# Patient Record
Sex: FEMALE | Race: BLACK OR AFRICAN AMERICAN | NOT HISPANIC OR LATINO | Employment: OTHER | URBAN - METROPOLITAN AREA
[De-identification: names, ages, dates, MRNs, and addresses within clinical notes are randomized per-mention and may not be internally consistent; named-entity substitution may affect disease eponyms.]

---

## 2017-01-13 ENCOUNTER — TELEPHONE (OUTPATIENT)
Dept: INTERNAL MEDICINE | Facility: CLINIC | Age: 72
End: 2017-01-13

## 2017-01-13 NOTE — TELEPHONE ENCOUNTER
----- Message from Brittney Niño sent at 1/13/2017  2:13 PM EST -----  Contact: CHIDI EPPERSON WITH [facility] IN HOME CARE MANAGER  SHE IS CALLING TO INFORM US THAT WHILE PATIENT WAS HAVING DIALYSIS DONE SHE WAS GIVEN 2 MEDICATIONS THAT SHE HAD ALLERGIC REACTION. THE 2 MEDICATIONS ARE MIRCERA AND VENOFER. WHILE GETTING THESE MEDICATION DURING DIALYSIS PATIENT STOPPED BREATHING AND WAS TAKEN TO [facility] FOR TREATMENT. PATIENT IS BETTER NOW BUT SHE WANTED THIS TO BE NOTED IN HER CHART ABOUT HER ALLERGIC REACTION TO THOSE MEDICATIONS. IF YOU NEED TO SPEAK TO CHIDI YOU CAN REACH HER [phone]

## 2017-01-13 NOTE — TELEPHONE ENCOUNTER
Pt states that while at dialysis yesterday that she had an anaphylaxis reaction to the medication that was given with her Iron.  This is the second time this has happened, and was taken to [facility] by EMS for treatment.  Pt is going to call and find out name of medication she was given and call us back.

## 2017-01-16 ENCOUNTER — DOCUMENTATION (OUTPATIENT)
Dept: CARDIAC REHAB | Facility: HOSPITAL | Age: 72
End: 2017-01-16

## 2017-01-16 NOTE — PROGRESS NOTES
Pt. Was scheduled for Phase II Cardiac Rehab at [facility] on 10/26/16.  Pt. Failed to show for scheduled appointment.  We are closing her file at this time.

## 2017-01-25 ENCOUNTER — TELEPHONE (OUTPATIENT)
Dept: CARDIOLOGY | Facility: CLINIC | Age: 72
End: 2017-01-25

## 2017-01-25 RX ORDER — LANCING DEVICE/LANCETS
KIT MISCELLANEOUS
Qty: 1 KIT | Refills: 0 | Status: SHIPPED | OUTPATIENT
Start: 2017-01-25 | End: 2020-07-21

## 2017-01-25 RX ORDER — LANCETS
EACH MISCELLANEOUS
Qty: 300 EACH | Refills: 3 | Status: SHIPPED | OUTPATIENT
Start: 2017-01-25 | End: 2017-02-14

## 2017-01-25 RX ORDER — BLOOD-GLUCOSE METER
EACH MISCELLANEOUS
Qty: 1 KIT | Refills: 0 | Status: SHIPPED | OUTPATIENT
Start: 2017-01-25 | End: 2020-07-21

## 2017-01-25 NOTE — TELEPHONE ENCOUNTER
Michelle, RN with [facility], has called requesting to move pt appt up due to night time tachycardia.  The patient is currently scheduled for two weeks from now, so I offered to place her on the cancellation list.  She verbalized agreement and appreciation.  VM left with scheduling.

## 2017-02-14 ENCOUNTER — OFFICE VISIT (OUTPATIENT)
Dept: CARDIOLOGY | Facility: CLINIC | Age: 72
End: 2017-02-14

## 2017-02-14 VITALS
HEART RATE: 78 BPM | DIASTOLIC BLOOD PRESSURE: 66 MMHG | WEIGHT: 195.2 LBS | HEIGHT: 67 IN | BODY MASS INDEX: 30.64 KG/M2 | SYSTOLIC BLOOD PRESSURE: 200 MMHG

## 2017-02-14 DIAGNOSIS — I10 ESSENTIAL HYPERTENSION: Primary | ICD-10-CM

## 2017-02-14 DIAGNOSIS — I25.9 ISCHEMIC HEART DISEASE: ICD-10-CM

## 2017-02-14 DIAGNOSIS — N18.6 TYPE 2 DIABETES MELLITUS WITH CHRONIC KIDNEY DISEASE ON CHRONIC DIALYSIS, WITH LONG-TERM CURRENT USE OF INSULIN (HCC): ICD-10-CM

## 2017-02-14 DIAGNOSIS — E78.5 HYPERLIPIDEMIA LDL GOAL <70: ICD-10-CM

## 2017-02-14 DIAGNOSIS — N18.6 END STAGE RENAL DISEASE ON DIALYSIS (HCC): ICD-10-CM

## 2017-02-14 DIAGNOSIS — I25.119 CORONARY ARTERY DISEASE WITH ANGINA PECTORIS, UNSPECIFIED VESSEL OR LESION TYPE, UNSPECIFIED WHETHER NATIVE OR TRANSPLANTED HEART (HCC): ICD-10-CM

## 2017-02-14 DIAGNOSIS — E11.22 TYPE 2 DIABETES MELLITUS WITH CHRONIC KIDNEY DISEASE ON CHRONIC DIALYSIS, WITH LONG-TERM CURRENT USE OF INSULIN (HCC): ICD-10-CM

## 2017-02-14 DIAGNOSIS — I35.0 NONRHEUMATIC AORTIC VALVE STENOSIS: ICD-10-CM

## 2017-02-14 DIAGNOSIS — Z79.4 TYPE 2 DIABETES MELLITUS WITH CHRONIC KIDNEY DISEASE ON CHRONIC DIALYSIS, WITH LONG-TERM CURRENT USE OF INSULIN (HCC): ICD-10-CM

## 2017-02-14 DIAGNOSIS — Z99.2 END STAGE RENAL DISEASE ON DIALYSIS (HCC): ICD-10-CM

## 2017-02-14 DIAGNOSIS — Z99.2 TYPE 2 DIABETES MELLITUS WITH CHRONIC KIDNEY DISEASE ON CHRONIC DIALYSIS, WITH LONG-TERM CURRENT USE OF INSULIN (HCC): ICD-10-CM

## 2017-02-14 PROCEDURE — 99213 OFFICE O/P EST LOW 20 MIN: CPT | Performed by: INTERNAL MEDICINE

## 2017-02-14 RX ORDER — LACTULOSE 10 G/15ML
20 SOLUTION ORAL 2 TIMES DAILY PRN
COMMUNITY
End: 2017-08-09

## 2017-02-14 RX ORDER — DOCUSATE SODIUM 100 MG/1
100 CAPSULE, LIQUID FILLED ORAL 2 TIMES DAILY
COMMUNITY
End: 2017-08-09

## 2017-02-14 RX ORDER — LISINOPRIL 20 MG/1
20 TABLET ORAL DAILY
Qty: 30 TABLET | Refills: 5 | Status: SHIPPED | OUTPATIENT
Start: 2017-02-14 | End: 2017-08-09

## 2017-02-14 RX ORDER — CARVEDILOL 25 MG/1
25 TABLET ORAL EVERY 12 HOURS SCHEDULED
Qty: 60 TABLET | Refills: 5 | Status: SHIPPED | OUTPATIENT
Start: 2017-02-14 | End: 2019-10-25 | Stop reason: SDUPTHER

## 2017-02-14 NOTE — PROGRESS NOTES
Subjective:     Encounter Date:02/14/2017      Patient ID: Esperanza Pop is a 72 y.o.   female, full-time domestic worker, from [place], [place].     INTERNIST: Meghana Rodgers MD  REFERRING HEALTHCARE PROVIDER: [facility]   INTERVENTIONAL CARDIOLOGIST: Hugh Pop MD, [facility], [facility]  ADVANCED HEART FAILURE CLINIC: [facility]  NEPHROLOGIST: [facility]  INTERVENTIONAL CARDIOLOGIST:  Scooter Zhao MD, [facility]     Chief Complaint:   Chief Complaint   Patient presents with   • Follow-up     Problem List:  1. Ischemic heart disease:  a. Acute non-STEMI/congestive heart failure with diagnostic coronary arteriography demonstrating 20% LAD plaque with high-grade stenosis in the proximal mid right coronary artery requiring a Xience drug-eluting stent (3.0 mm x 28 mm) with reduced echocardiographic LVEF (0.25) with abnormal diastolic LV dysfunction, December 2013.   b. Improved echocardiogram, April 2014.  c. Recent non-STEMI related to malignant hypertension with distal RCA occlusion, patent previous RCA stent with mild inferior hypokinesis but overall acceptable systolic LV function (LVEF 0.55) with PTCA, DARRYL distal RCA, October 2016.  d. Residual CCS class I angina pectoris/NYHA class II CHF.  2. Mild aortic stenosis (June 2016).  3. Severe hypertension - probably essential.   4. Dyslipidemia.  5. Type 2 diabetes mellitus type 2 with suboptimal control (hemoglobin A1c 8.8%).  6. Moderate obesity (BMI 36.2).  7. Acute kidney injury: Admission from 12/26/2013 to 01/02/2014, now resolved with latest creatinine 1.4 on 01/08/2014.  8. Moderate normocytic normochromic anemia; hemoglobin 9.2 gm/dL (June 2016).  9. Noncompliance.  10. Recent apparent end-stage renal disease with initiation of hemodialysis MWF weekly and construction of right upper extremity AV fistula; data deficit (June 2016).  11. Remote operations:  a. 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   b. Abdominal hernia repair/panniculectomy.  c. Hysterectomy.     Allergies   Allergen Reactions   • Albuterol      Increased blood pressure  Used right before heart attack   • Mircera [Methoxy Polyethylene Glycol-Epoetin Beta]      Pt stopped breathing   • Penicillins Hives   • Prednisone      Makes jittery/anxious   • Venofer [Iron Sucrose]      Pt stopped breathing         Current Outpatient Prescriptions:   •  amLODIPine (NORVASC) 10 MG tablet, Take 1 tablet by mouth Daily., Disp: 30 tablet, Rfl: 3  •  aspirin 81 MG EC tablet, Take 1 tablet by mouth Daily., Disp: , Rfl:   •  atorvastatin (LIPITOR) 80 MG tablet, Take 1 tablet by mouth Every Night., Disp: 90 tablet, Rfl: 1  •  B Complex-C-Folic Acid (ELVIRA-BO PO), Take  by mouth Daily., Disp: , Rfl:   •  Blood Glucose Monitoring Suppl (ACCU-CHEK NADER PLUS) W/DEVICE kit, DX: E11.65, Disp: 1 kit, Rfl: 0  •  carvedilol (COREG) 12.5 MG tablet, Take 1 tablet by mouth Every 12 (Twelve) Hours. Disp: 30 tablet, Rfl: 3  •  cholecalciferol (VITAMIN D3) 1000 UNITS tablet, Take 2,000 Units by mouth daily., Disp: , Rfl:   •  clopidogrel (PLAVIX) 75 MG tablet, Take 1 tablet by mouth Daily., Disp: 30 tablet, Rfl: 3  •  docusate sodium (COLACE) 100 MG capsule, Take 100 mg by mouth 2 (Two) Times a Day., Disp: , Rfl:   •  glucose blood (ACCU-CHEK NADER PLUS) test strip, Use as instructed 1 TO 3 TIMES DAILY  DX: E11.65, Disp: 300 each, Rfl: 3  •  hydrALAZINE (APRESOLINE) 50 MG tablet, Take 1 tablet by mouth 3 (Three) Times a Day., Disp: , Rfl:   •  insulin glargine (LANTUS) 100 UNIT/ML injection, Inject 20 Units under the skin Every Night. (Patient taking differently: Inject 20 Units under the skin 2 (Two) Times a Day As Needed.), Disp: 10 mL, Rfl: 5  •  insulin NPH (NOVOLIN N) 100 UNIT/ML injection, Inject 6 Units under the skin 2 (Two) Times a Day Before Meals. (Patient taking differently: Inject 6 Units under the skin 3 (Three) Times a Day.), Disp: 3 mL, Rfl:  "12  •  IRON DEXTRAN IJ, Inject  as directed 3 (Three) Times a Week., Disp: , Rfl:   •  isosorbide mononitrate (IMDUR) 120 MG 24 hr tablet, Take 1 tablet by mouth daily., Disp: 90 tablet, Rfl: 2  •  lactulose (CHRONULAC) 10 GM/15ML solution, Take 20 g by mouth 2 (Two) Times a Day As Needed., Disp: , Rfl:   •  Lancets Misc. (ACCU-CHEK SOFTCLIX LANCET DEV) kit, TEST 1-3 TIMES DAILY DX:E11.65, Disp: 1 kit, Rfl: 0  •  lisinopril (PRINIVIL,ZESTRIL) 2.5 MG tablet, Take 1 tablet by mouth Daily. (Patient taking differently: Take 5 mg by mouth Daily.), Disp: 30 tablet, Rfl: 3  •  nitroglycerin (NITROSTAT) 0.4 MG SL tablet, Place 0.4 mg under the tongue every 5 (five) minutes as needed. Dissolve one tablet under the tongue every 5 minutes as needed for chest pain , Disp: , Rfl:   •  olopatadine (PATANOL) 0.1 % ophthalmic solution, Apply 1 drop to eye 2 (two) times a day as needed. At 6-8 intervals, Disp: , Rfl:   •  torsemide (DEMADEX) 100 MG tablet, Take 0.5 tablets by mouth Daily As Needed. Pt takes 1 tablet 4 days a week and 0.5 tab;et 3 days a week, Disp: , Rfl:     History of Present Illness Patient returns for followup after a 6-month hiatus. She said that she \"wound up in the emergency room at [place] on [birth year],\" her birthday.  She notes that she had reactions during dialysis to Mircera and Venofer and stopped breathing.  She says that what is bothering her today are her palpitations, which wake her up at night. She says that she had an incident of swelling in her feet with taking 25 mg of Coreg, and her physician told her to only take 12.5 mg, but she asks if she should be taking the full 25 mg tablet. Patient otherwise denies chest pain, shortness of breath, PND, edema, palpitations, syncope or presyncope at this time.  She continues to apparently have very labile blood pressure readings when she undergoes dialysis, which is scheduled again in AM.  She can give no coherent history in terms of what happened at [facility] a month ago, but apparently there were no major changes in her medications or requirement for hospitalization or significant testing.  No tobacco or NTG use.      Review of Systems   Cardiovascular: Positive for irregular heartbeat and palpitations.   Skin: Positive for dry skin.      Obtained and otherwise negative except as outlined in problem list and HPI.    Procedures       Objective:       Vitals:    02/14/17 1306 02/14/17 1307 02/14/17 1335   BP: (!) 200/60 (!) 210/64 (!) 200/66   BP Location: Left arm Left arm Left arm   Patient Position: Sitting Standing Sitting   Pulse: 78     Weight: 195 lb 3.2 oz (88.5 kg)     Height: 67\" (170.2 cm)       Body mass index is 30.57 kg/(m^2).   Last weight: 192 lbs.    Physical Exam   Constitutional: She appears well-developed and well-nourished.   HENT:   Head: Normocephalic and atraumatic.   Mouth/Throat: Oropharynx is clear and moist.   Neck: Neck supple. No JVD present. Carotid bruit is not present. No thyromegaly present.   Cardiovascular: Normal rate and regular rhythm.  Exam reveals no gallop, no S3 and no friction rub.    Murmur heard.   Medium-pitched harsh early systolic murmur is present with a grade of 3/6  at the upper right sternal border radiating to the neck  Pulses:       Carotid pulses are 1+ on the right side, and 1+ on the left side.       Radial pulses are 1+ on the right side, and 1+ on the left side.        Femoral pulses are 1+ on the right side, and 1+ on the left side.       Popliteal pulses are 1+ on the right side, and 1+ on the left side.        Dorsalis pedis pulses are 1+ on the right side, and 1+ on the left side.        Posterior tibial pulses are 1+ on the right side, and 1+ on the left side.   Pulmonary/Chest: Effort normal and breath sounds normal.   Abdominal: Soft. She exhibits no mass. There is no hepatosplenomegaly. There is no tenderness.   Lymphadenopathy:     She has no cervical adenopathy. "   Neurological: She is alert. She has normal strength. No cranial nerve deficit or sensory deficit.   Skin: Skin is warm, dry and intact.       Lab Review:   Lab Results   Component Value Date    GLUCOSE 118 (H) 10/11/2016    BUN 60 (H) 10/11/2016    CREATININE 6.60 (H) 10/11/2016    EGFRIFAFRI 8 (L) 10/11/2016    BCR 9.1 10/11/2016    CO2 26.0 10/11/2016    CALCIUM 8.8 10/11/2016    PROTENTOTREF 6.1 04/29/2015    ALBUMIN 4.30 10/08/2016    LABIL2 1.0 10/08/2016    AST 20 10/08/2016    ALT 11 10/08/2016       Lab Results   Component Value Date    WBC 6.33 10/11/2016    HGB 7.3 (L) 10/11/2016    HCT 23.0 (L) 10/11/2016    MCV 91.6 10/11/2016     10/11/2016       Lab Results   Component Value Date    HGBA1C 7.60 (H) 10/11/2016       Lab Results   Component Value Date    TSH 1.962 06/30/2016       Lab Results   Component Value Date    CHOL 261 (H) 10/11/2016    CHOL 293 (H) 10/10/2016     Lab Results   Component Value Date    TRIG 171 (H) 10/11/2016    TRIG 94 10/10/2016    TRIG 216 (H) 07/29/2015     Lab Results   Component Value Date    HDL 46 10/11/2016    HDL 54 10/10/2016    HDL 57 07/29/2015         Assessment:   Overall continued acceptable course with no interim cardiopulmonary complaints with suboptimal functional status and uncontrolled hypertension. We will defer additional diagnostic or therapeutic intervention from a cardiac perspective at this time other than to attempt to modify her medications; hopefully, she is being compliant.       Diagnosis Plan   1. Essential hypertension  lisinopril (PRINIVIL,ZESTRIL) 20 MG tablet    carvedilol (COREG) 25 MG tablet   2. Coronary artery disease with angina pectoris, unspecified vessel or lesion type, unspecified whether native or transplanted heart  lisinopril (PRINIVIL,ZESTRIL) 20 MG tablet    carvedilol (COREG) 25 MG tablet   3. Ischemic heart disease     4. Nonrheumatic aortic valve stenosis     5. Hyperlipidemia LDL goal <70     6. Type 2 diabetes  mellitus with chronic kidney disease on chronic dialysis, with long-term current use of insulin     7. End stage renal disease on dialysis            Plan:         1. Patient to continue current medications and close follow up with the above providers with the following changes:  A. Alter lisinopril to 20 mg daily.  B. Alter carvedilol to 25 mg bid.  2. The patient will follow up with [facility] and the dialysis clinic on 20 February 2017; it is imperative in my opinion to keep her systolic blood pressure long term closer to 140 torr.  3. Tentative cardiology follow up in June 2017, or patient may return sooner PRN.       Transcribed by Mary Rivera for Dr. Demetrius Sagastume at 1:34 PM on 02/14/2017    I, Demetrius Sagastume MD, [facility], personally performed the services described in this documentation as scribed by the above named individual in my presence, and it is both accurate and complete. At 1:34 PM on 02/14/2017

## 2017-03-01 ENCOUNTER — RESULTS ENCOUNTER (OUTPATIENT)
Dept: INTERNAL MEDICINE | Facility: CLINIC | Age: 72
End: 2017-03-01

## 2017-03-01 DIAGNOSIS — Z99.2 END STAGE RENAL DISEASE ON DIALYSIS (HCC): ICD-10-CM

## 2017-03-01 DIAGNOSIS — I10 ESSENTIAL HYPERTENSION: ICD-10-CM

## 2017-03-01 DIAGNOSIS — E78.5 HYPERLIPIDEMIA LDL GOAL <70: ICD-10-CM

## 2017-03-01 DIAGNOSIS — I50.43 ACUTE ON CHRONIC COMBINED SYSTOLIC AND DIASTOLIC CONGESTIVE HEART FAILURE (HCC): ICD-10-CM

## 2017-03-01 DIAGNOSIS — N18.6 END STAGE RENAL DISEASE ON DIALYSIS (HCC): ICD-10-CM

## 2017-03-21 ENCOUNTER — OFFICE VISIT (OUTPATIENT)
Dept: INTERNAL MEDICINE | Facility: CLINIC | Age: 72
End: 2017-03-21

## 2017-03-21 VITALS
DIASTOLIC BLOOD PRESSURE: 62 MMHG | OXYGEN SATURATION: 97 % | HEIGHT: 67 IN | SYSTOLIC BLOOD PRESSURE: 130 MMHG | BODY MASS INDEX: 30.2 KG/M2 | WEIGHT: 192.4 LBS | HEART RATE: 72 BPM

## 2017-03-21 DIAGNOSIS — N18.6 TYPE 2 DIABETES MELLITUS WITH CHRONIC KIDNEY DISEASE ON CHRONIC DIALYSIS, WITH LONG-TERM CURRENT USE OF INSULIN (HCC): Primary | ICD-10-CM

## 2017-03-21 DIAGNOSIS — Z79.4 TYPE 2 DIABETES MELLITUS WITH CHRONIC KIDNEY DISEASE ON CHRONIC DIALYSIS, WITH LONG-TERM CURRENT USE OF INSULIN (HCC): Primary | ICD-10-CM

## 2017-03-21 DIAGNOSIS — E11.22 TYPE 2 DIABETES MELLITUS WITH CHRONIC KIDNEY DISEASE ON CHRONIC DIALYSIS, WITH LONG-TERM CURRENT USE OF INSULIN (HCC): Primary | ICD-10-CM

## 2017-03-21 DIAGNOSIS — Z99.2 TYPE 2 DIABETES MELLITUS WITH CHRONIC KIDNEY DISEASE ON CHRONIC DIALYSIS, WITH LONG-TERM CURRENT USE OF INSULIN (HCC): Primary | ICD-10-CM

## 2017-03-21 LAB — HBA1C MFR BLD: 7.8 %

## 2017-03-21 PROCEDURE — 83036 HEMOGLOBIN GLYCOSYLATED A1C: CPT | Performed by: INTERNAL MEDICINE

## 2017-03-21 PROCEDURE — 99214 OFFICE O/P EST MOD 30 MIN: CPT | Performed by: INTERNAL MEDICINE

## 2017-03-21 RX ORDER — LISINOPRIL 5 MG/1
1 TABLET ORAL DAILY
COMMUNITY
Start: 2017-03-16 | End: 2017-08-09 | Stop reason: SDUPTHER

## 2017-03-21 RX ORDER — DORZOLAMIDE HYDROCHLORIDE AND TIMOLOL MALEATE 20; 5 MG/ML; MG/ML
1 SOLUTION/ DROPS OPHTHALMIC DAILY
COMMUNITY
Start: 2017-01-04 | End: 2019-10-25 | Stop reason: SDUPTHER

## 2017-03-21 RX ORDER — LATANOPROST 50 UG/ML
1 SOLUTION/ DROPS OPHTHALMIC NIGHTLY
Status: ON HOLD | COMMUNITY
Start: 2017-01-04 | End: 2021-01-20

## 2017-03-21 NOTE — PROGRESS NOTES
Hypertension and Diabetes    Subjective   Esperanza Pop is a 72 y.o. female is here today for follow-up.    History of Present Illness   Esperanza reports that she has  Been to [place] after they gave her a couple of meds- venofer and ? Epo/ mircera, and she had passed out.  Feels very dry and is concerned. Her BP was elevated when she was at Dr. Sagastume's , and she was advised to keep her systolic at 140.  Here for f/u on her DM, insulin changed to NPH as others were not covered, reports she stopped the lantus when she picked this one up per pharm recs.    Current Outpatient Prescriptions:   •  amLODIPine (NORVASC) 10 MG tablet, Take 1 tablet by mouth Daily., Disp: 30 tablet, Rfl: 3  •  aspirin 81 MG EC tablet, Take 1 tablet by mouth Daily., Disp: , Rfl:   •  atorvastatin (LIPITOR) 80 MG tablet, Take 1 tablet by mouth Every Night., Disp: 90 tablet, Rfl: 1  •  B Complex-C-Folic Acid (ELVIRA-BO PO), Take  by mouth Daily., Disp: , Rfl:   •  Blood Glucose Monitoring Suppl (ACCU-CHEK NADER PLUS) W/DEVICE kit, DX: E11.65, Disp: 1 kit, Rfl: 0  •  carvedilol (COREG) 25 MG tablet, Take 1 tablet by mouth Every 12 (Twelve) Hours., Disp: 60 tablet, Rfl: 5  •  cholecalciferol (VITAMIN D3) 1000 UNITS tablet, Take 2,000 Units by mouth daily., Disp: , Rfl:   •  clopidogrel (PLAVIX) 75 MG tablet, Take 1 tablet by mouth Daily., Disp: 30 tablet, Rfl: 3  •  docusate sodium (COLACE) 100 MG capsule, Take 100 mg by mouth 2 (Two) Times a Day., Disp: , Rfl:   •  dorzolamide-timolol (COSOPT) 22.3-6.8 MG/ML ophthalmic solution, , Disp: , Rfl:   •  glucose blood (ACCU-CHEK NADER PLUS) test strip, Use as instructed 1 TO 3 TIMES DAILY  DX: E11.65, Disp: 300 each, Rfl: 3  •  hydrALAZINE (APRESOLINE) 50 MG tablet, Take 1 tablet by mouth 3 (Three) Times a Day., Disp: , Rfl:   •  IRON DEXTRAN IJ, Inject  as directed 3 (Three) Times a Week., Disp: , Rfl:   •  isosorbide mononitrate (IMDUR) 120 MG 24 hr tablet, Take 1 tablet by mouth daily., Disp: 90 tablet,  Rfl: 2  •  lactulose (CHRONULAC) 10 GM/15ML solution, Take 20 g by mouth 2 (Two) Times a Day As Needed., Disp: , Rfl:   •  Lancets Misc. (ACCU-CHEK SOFTCLIX LANCET DEV) kit, TEST 1-3 TIMES DAILY DX:E11.65, Disp: 1 kit, Rfl: 0  •  latanoprost (XALATAN) 0.005 % ophthalmic solution, , Disp: , Rfl:   •  lisinopril (PRINIVIL,ZESTRIL) 5 MG tablet, Take 1 tablet by mouth Daily., Disp: , Rfl:   •  nitroglycerin (NITROSTAT) 0.4 MG SL tablet, Place 0.4 mg under the tongue every 5 (five) minutes as needed. Dissolve one tablet under the tongue every 5 minutes as needed for chest pain , Disp: , Rfl:   •  olopatadine (PATANOL) 0.1 % ophthalmic solution, Apply 1 drop to eye 2 (two) times a day as needed. At 6-8 intervals, Disp: , Rfl:   •  torsemide (DEMADEX) 100 MG tablet, Take 0.5 tablets by mouth Daily As Needed. Pt takes 1 tablet 4 days a week and 0.5 tab;et 3 days a week, Disp: , Rfl:   •  insulin NPH-insulin regular (novoLIN 70/30) (70-30) 100 UNIT/ML injection, Inject 12 units before dinner and 8 Units before breakfast., Disp: 10 mL, Rfl: 5  •  lisinopril (PRINIVIL,ZESTRIL) 20 MG tablet, Take 1 tablet by mouth Daily., Disp: 30 tablet, Rfl: 5      The following portions of the patient's history were reviewed and updated as appropriate: allergies, current medications, past family history, past medical history, past social history, past surgical history and problem list.    Review of Systems   Constitutional: Positive for fatigue. Negative for chills and fever.   HENT: Negative for ear discharge, ear pain, sinus pressure and sore throat.    Respiratory: Negative for cough, chest tightness and shortness of breath.    Cardiovascular: Negative for chest pain, palpitations and leg swelling.   Gastrointestinal: Negative for diarrhea, nausea and vomiting.   Musculoskeletal: Positive for arthralgias. Negative for back pain and myalgias.   Skin:        dry   Neurological: Negative for dizziness, syncope and headaches.  "  Psychiatric/Behavioral: Negative for confusion and sleep disturbance.       Objective   Visit Vitals   • /62   • Pulse 72   • Ht 67\" (170.2 cm)   • Wt 192 lb 6.4 oz (87.3 kg)   • SpO2 97%  Comment: ra   • BMI 30.13 kg/m2     Physical Exam   Constitutional: She is oriented to person, place, and time. She appears well-developed and well-nourished.   HENT:   Head: Normocephalic and atraumatic.   Right Ear: External ear normal.   Left Ear: External ear normal.   Mouth/Throat: No oropharyngeal exudate.   Eyes: Conjunctivae are normal. Pupils are equal, round, and reactive to light.   Neck: Neck supple. No thyromegaly present.   Cardiovascular: Normal rate and regular rhythm.    Pulmonary/Chest: Effort normal. She has rales (basal minimal).   Abdominal: Soft. Bowel sounds are normal. She exhibits no distension. There is no tenderness.   Musculoskeletal: She exhibits no edema.   Neurological: She is alert and oriented to person, place, and time. No cranial nerve deficit.   Skin: Skin is warm and dry.   Diminished turgor- dehydrated   Psychiatric: She has a normal mood and affect. Judgment normal.   Nursing note and vitals reviewed.        Results for orders placed or performed in visit on 03/21/17   POC Glycosylated Hemoglobin (Hb A1C)   Result Value Ref Range    Hemoglobin A1C 7.8 %             Assessment/Plan   Diagnoses and all orders for this visit:    Type 2 diabetes mellitus with chronic kidney disease on chronic dialysis, with long-term current use of insulin  -     POC Glycosylated Hemoglobin (Hb A1C)  -     insulin NPH-insulin regular (novoLIN 70/30) (70-30) 100 UNIT/ML injection; Inject 12 units before dinner and 8 Units before breakfast.  -     Lipid panel  -     Comprehensive metabolic panel  -     BNP    Other orders  -     lisinopril (PRINIVIL,ZESTRIL) 5 MG tablet; Take 1 tablet by mouth Daily.  -     latanoprost (XALATAN) 0.005 % ophthalmic solution;   -     dorzolamide-timolol (COSOPT) 22.3-6.8 " MG/ML ophthalmic solution;       Sugars high, change insulin to 70/30 as above to supplement mealtime insulin.           Return in about 3 months (around 6/21/2017) for Next scheduled follow up with [facility].

## 2017-03-22 LAB
ALBUMIN SERPL-MCNC: 4.1 G/DL (ref 3.2–4.8)
ALBUMIN/GLOB SERPL: 1 G/DL (ref 1.5–2.5)
ALP SERPL-CCNC: 129 U/L (ref 25–100)
ALT SERPL-CCNC: 17 U/L (ref 7–40)
AST SERPL-CCNC: 16 U/L (ref 0–33)
BILIRUB SERPL-MCNC: 0.2 MG/DL (ref 0.3–1.2)
BNP SERPL-MCNC: 137.7 PG/ML (ref 0–100)
BUN SERPL-MCNC: 55 MG/DL (ref 9–23)
BUN/CREAT SERPL: 9.5 (ref 7–25)
CALCIUM SERPL-MCNC: 9.5 MG/DL (ref 8.7–10.4)
CHLORIDE SERPL-SCNC: 98 MMOL/L (ref 99–109)
CHOLEST SERPL-MCNC: 225 MG/DL (ref 0–200)
CO2 SERPL-SCNC: 30 MMOL/L (ref 20–31)
CREAT SERPL-MCNC: 5.8 MG/DL (ref 0.6–1.3)
GLOBULIN SER CALC-MCNC: 4.1 GM/DL
GLUCOSE SERPL-MCNC: 199 MG/DL (ref 70–100)
HDLC SERPL-MCNC: 60 MG/DL (ref 40–60)
LDLC SERPL CALC-MCNC: 138 MG/DL (ref 0–100)
POTASSIUM SERPL-SCNC: 5.2 MMOL/L (ref 3.5–5.5)
PROT SERPL-MCNC: 8.2 G/DL (ref 5.7–8.2)
SODIUM SERPL-SCNC: 139 MMOL/L (ref 132–146)
TRIGL SERPL-MCNC: 134 MG/DL (ref 0–150)
VLDLC SERPL CALC-MCNC: 26.8 MG/DL

## 2017-07-06 ENCOUNTER — OFFICE VISIT (OUTPATIENT)
Dept: INTERNAL MEDICINE | Facility: CLINIC | Age: 72
End: 2017-07-06

## 2017-07-06 VITALS
SYSTOLIC BLOOD PRESSURE: 118 MMHG | BODY MASS INDEX: 30.13 KG/M2 | WEIGHT: 192 LBS | DIASTOLIC BLOOD PRESSURE: 66 MMHG | HEIGHT: 67 IN

## 2017-07-06 DIAGNOSIS — Z23 ENCOUNTER FOR IMMUNIZATION: ICD-10-CM

## 2017-07-06 DIAGNOSIS — Z11.59 SCREENING FOR VIRAL DISEASE: ICD-10-CM

## 2017-07-06 DIAGNOSIS — Z99.2 END STAGE RENAL DISEASE ON DIALYSIS (HCC): ICD-10-CM

## 2017-07-06 DIAGNOSIS — Z99.2 TYPE 2 DIABETES MELLITUS WITH CHRONIC KIDNEY DISEASE ON CHRONIC DIALYSIS, WITH LONG-TERM CURRENT USE OF INSULIN (HCC): ICD-10-CM

## 2017-07-06 DIAGNOSIS — Z00.00 MEDICARE ANNUAL WELLNESS VISIT, SUBSEQUENT: Primary | ICD-10-CM

## 2017-07-06 DIAGNOSIS — I25.9 ISCHEMIC HEART DISEASE: ICD-10-CM

## 2017-07-06 DIAGNOSIS — I50.43 ACUTE ON CHRONIC COMBINED SYSTOLIC AND DIASTOLIC CONGESTIVE HEART FAILURE (HCC): ICD-10-CM

## 2017-07-06 DIAGNOSIS — N18.6 END STAGE RENAL DISEASE ON DIALYSIS (HCC): ICD-10-CM

## 2017-07-06 DIAGNOSIS — N18.6 TYPE 2 DIABETES MELLITUS WITH CHRONIC KIDNEY DISEASE ON CHRONIC DIALYSIS, WITH LONG-TERM CURRENT USE OF INSULIN (HCC): ICD-10-CM

## 2017-07-06 DIAGNOSIS — E55.9 VITAMIN D DEFICIENCY: ICD-10-CM

## 2017-07-06 DIAGNOSIS — Z79.4 TYPE 2 DIABETES MELLITUS WITH CHRONIC KIDNEY DISEASE ON CHRONIC DIALYSIS, WITH LONG-TERM CURRENT USE OF INSULIN (HCC): ICD-10-CM

## 2017-07-06 DIAGNOSIS — E11.22 TYPE 2 DIABETES MELLITUS WITH CHRONIC KIDNEY DISEASE ON CHRONIC DIALYSIS, WITH LONG-TERM CURRENT USE OF INSULIN (HCC): ICD-10-CM

## 2017-07-06 LAB
HBA1C MFR BLD: 7.6 %
POC CREATININE URINE: 300
POC MICROALBUMIN URINE: 150

## 2017-07-06 PROCEDURE — 90471 IMMUNIZATION ADMIN: CPT | Performed by: INTERNAL MEDICINE

## 2017-07-06 PROCEDURE — G0439 PPPS, SUBSEQ VISIT: HCPCS | Performed by: INTERNAL MEDICINE

## 2017-07-06 PROCEDURE — 96160 PT-FOCUSED HLTH RISK ASSMT: CPT | Performed by: INTERNAL MEDICINE

## 2017-07-06 PROCEDURE — 82044 UR ALBUMIN SEMIQUANTITATIVE: CPT | Performed by: INTERNAL MEDICINE

## 2017-07-06 PROCEDURE — 82570 ASSAY OF URINE CREATININE: CPT | Performed by: INTERNAL MEDICINE

## 2017-07-06 PROCEDURE — 90732 PPSV23 VACC 2 YRS+ SUBQ/IM: CPT | Performed by: INTERNAL MEDICINE

## 2017-07-06 PROCEDURE — 99397 PER PM REEVAL EST PAT 65+ YR: CPT | Performed by: INTERNAL MEDICINE

## 2017-07-06 PROCEDURE — 83036 HEMOGLOBIN GLYCOSYLATED A1C: CPT | Performed by: INTERNAL MEDICINE

## 2017-07-06 PROCEDURE — G0444 DEPRESSION SCREEN ANNUAL: HCPCS | Performed by: INTERNAL MEDICINE

## 2017-07-06 NOTE — PROGRESS NOTES
QUICK REFERENCE INFORMATION:  The ABCs of the Annual Wellness Visit    Subsequent Medicare Wellness Visit    HEALTH RISK ASSESSMENT    1945    Recent Hospitalizations:  Recently treated at the following:  [facility].        Current Medical Providers:  Patient Care Team:  Meghana Rodgers MD as PCP - General  Meghana Rodgers MD as PCP - Family Medicine        Smoking Status:  History   Smoking Status   • Never Smoker   Smokeless Tobacco   • Never Used       Alcohol Consumption:  History   Alcohol Use No       Depression Screen:   PHQ-9 Depression Screening 7/6/2017   Feeling down, depressed, or hopeless 0   PHQ-9 Total Score 0       Health Habits and Functional and Cognitive Screening:  Functional & Cognitive Status 7/6/2017   Do you have difficulty preparing food and eating? No   Do you have difficulty bathing yourself? No   Do you have difficulty getting dressed? No   Do you have difficulty using the toilet? No   Do you have difficulty moving around from place to place? No   In the past year have you fallen or experienced a near fall? No   Do you need help using the phone?  No   Are you deaf or do you have serious difficulty hearing?  No   Do you need help with transportation? No   Do you need help shopping? No   Do you need help preparing meals?  No   Do you need help with housework?  No   Do you need help with laundry? No   Do you need help taking your medications? No   Do you need help managing money? No              Does the patient have evidence of cognitive impairment? No    Aspirin use counseling: Start ASA 81 mg daily       Recent Lab Results:  CMP:  Lab Results   Component Value Date     (H) 03/21/2017    BUN 55 (H) 03/21/2017    CREATININE 5.80 (H) 03/21/2017    EGFRIFNONA 7 (L) 03/21/2017    EGFRIFAFRI 9 (L) 03/21/2017    BCR 9.5 03/21/2017     03/21/2017    K 5.2 03/21/2017    CO2 30.0 03/21/2017    CALCIUM 9.5 03/21/2017    PROTENTOTREF 8.2 03/21/2017    ALBUMIN 4.10  03/21/2017    LABGLOBREF 4.1 03/21/2017    LABIL2 1.0 (L) 03/21/2017    BILITOT 0.2 (L) 03/21/2017    ALKPHOS 129 (H) 03/21/2017    AST 16 03/21/2017    ALT 17 03/21/2017     Lipid Panel:  Lab Results   Component Value Date    CHOL 261 (H) 10/11/2016    TRIG 134 03/21/2017    HDL 60 03/21/2017    VLDL 26.8 03/21/2017    LDLDIRECT 176 (H) 10/11/2016     HbA1c:  Lab Results   Component Value Date    HGBA1C 7.6 07/06/2017       Visual Acuity:  No exam data present    Age-appropriate Screening Schedule:  Refer to the list below for future screening recommendations based on patient's age, sex and/or medical conditions. Orders for these recommended tests are listed in the plan section. The patient has been provided with a written plan.    Health Maintenance   Topic Date Due   • PNEUMOCOCCAL VACCINES (65+ LOW/MEDIUM RISK) (1 of 2 - PCV13) 01/12/2012   • DIABETIC EYE EXAM  07/11/2016   • ZOSTER VACCINE  07/12/2018 (Originally 5/25/2016)   • INFLUENZA VACCINE  08/01/2017   • HEMOGLOBIN A1C  09/21/2017   • LIPID PANEL  03/21/2018   • COLONOSCOPY  06/30/2018   • DIABETIC FOOT EXAM  07/06/2018   • URINE MICROALBUMIN  07/06/2018   • MAMMOGRAM  08/24/2018   • TDAP/TD VACCINES  Excluded        Subjective   History of Present Illness    Esperanza Pop is a 72 y.o. female who presents for an Subsequent Wellness Visit.and follow up on her HTN, HLP, DM2, CHF and CAD.  She reports her sugars have been stable. Denies any hypoglycemic spells.  No cp, soa, and edema.    The following portions of the patient's history were reviewed and updated as appropriate: allergies, current medications, past family history, past medical history, past social history, past surgical history and problem list.    Outpatient Medications Prior to Visit   Medication Sig Dispense Refill   • amLODIPine (NORVASC) 10 MG tablet Take 1 tablet by mouth Daily. 30 tablet 3   • aspirin 81 MG EC tablet Take 1 tablet by mouth Daily.     • atorvastatin (LIPITOR) 80 MG tablet  Take 1 tablet by mouth Every Night. 90 tablet 1   • B Complex-C-Folic Acid (ELVIRA-BO PO) Take  by mouth Daily.     • Blood Glucose Monitoring Suppl (ACCU-CHEK NADER PLUS) W/DEVICE kit DX: E11.65 1 kit 0   • carvedilol (COREG) 25 MG tablet Take 1 tablet by mouth Every 12 (Twelve) Hours. 60 tablet 5   • cholecalciferol (VITAMIN D3) 1000 UNITS tablet Take 2,000 Units by mouth daily.     • clopidogrel (PLAVIX) 75 MG tablet Take 1 tablet by mouth Daily. 30 tablet 3   • docusate sodium (COLACE) 100 MG capsule Take 100 mg by mouth 2 (Two) Times a Day.     • dorzolamide-timolol (COSOPT) 22.3-6.8 MG/ML ophthalmic solution      • glucose blood (ACCU-CHEK NADER PLUS) test strip Use as instructed 1 TO 3 TIMES DAILY  DX: E11.65 300 each 3   • hydrALAZINE (APRESOLINE) 50 MG tablet Take 1 tablet by mouth 3 (Three) Times a Day.     • insulin NPH-insulin regular (novoLIN 70/30) (70-30) 100 UNIT/ML injection Inject 12 units before dinner and 8 Units before breakfast. 10 mL 5   • IRON DEXTRAN IJ Inject  as directed 3 (Three) Times a Week.     • isosorbide mononitrate (IMDUR) 120 MG 24 hr tablet Take 1 tablet by mouth daily. 90 tablet 2   • lactulose (CHRONULAC) 10 GM/15ML solution Take 20 g by mouth 2 (Two) Times a Day As Needed.     • Lancets Misc. (ACCU-CHEK SOFTCLIX LANCET DEV) kit TEST 1-3 TIMES DAILY DX:E11.65 1 kit 0   • latanoprost (XALATAN) 0.005 % ophthalmic solution      • lisinopril (PRINIVIL,ZESTRIL) 20 MG tablet Take 1 tablet by mouth Daily. 30 tablet 5   • lisinopril (PRINIVIL,ZESTRIL) 5 MG tablet Take 1 tablet by mouth Daily.     • nitroglycerin (NITROSTAT) 0.4 MG SL tablet Place 0.4 mg under the tongue every 5 (five) minutes as needed. Dissolve one tablet under the tongue every 5 minutes as needed for chest pain      • olopatadine (PATANOL) 0.1 % ophthalmic solution Apply 1 drop to eye 2 (two) times a day as needed. At 6-8 intervals     • torsemide (DEMADEX) 100 MG tablet Take 0.5 tablets by mouth Daily As Needed. Pt  takes 1 tablet 4 days a week and 0.5 tab;et 3 days a week       No facility-administered medications prior to visit.        Patient Active Problem List   Diagnosis   • Acute on chronic diastolic heart failure   • Type 2 diabetes mellitus   • Essential hypertension   • Coronary artery disease involving native coronary artery of native heart with angina pectoris   • Breast cancer, female, right   • Seasonal allergic rhinitis   • Right carotid bruit   • Vitamin B12 deficiency   • Hyperlipidemia LDL goal <70   • End stage renal disease on dialysis   • Nonrheumatic aortic valve stenosis   • NSTEMI (non-ST elevated myocardial infarction)   • UTI (urinary tract infection)   • Ischemic heart disease   • CHF (congestive heart failure)   • Acute non-ST segment elevation myocardial infarction (STEMI) following previous myocardial infarction   • Hypertension   • Dyslipidemia   • Diabetes mellitus   • Mild obesity       Advance Care Planning:  has an advance directive - a copy has been provided and is in file    Identification of Risk Factors:  Risk factors include: weight , cardiovascular risk and increased fall risk.    Review of Systems   Constitutional: Negative for chills, fatigue and fever.   HENT: Negative for ear pain, hearing loss, nosebleeds, postnasal drip, sinus pressure and sore throat.    Eyes: Negative for pain, redness and itching.   Respiratory: Positive for shortness of breath. Negative for cough and wheezing.    Cardiovascular: Negative for chest pain, palpitations and leg swelling.   Gastrointestinal: Negative for abdominal distention, abdominal pain, blood in stool, constipation and diarrhea.   Endocrine: Negative for cold intolerance, heat intolerance and polyuria.   Genitourinary: Negative for dysuria, flank pain, hematuria and pelvic pain.   Musculoskeletal: Negative for back pain, joint swelling and myalgias.   Skin: Negative for pallor and wound.   Neurological: Positive for dizziness. Negative for  "tremors, seizures, weakness, light-headedness and headaches.   Psychiatric/Behavioral: Negative for agitation, confusion, hallucinations and sleep disturbance.       Compared to one year ago, the patient feels her physical health is better.  Compared to one year ago, the patient feels her mental health is better.    Objective     Physical Exam   Constitutional: She is oriented to person, place, and time. She appears well-developed and well-nourished.   HENT:   Head: Normocephalic and atraumatic.   Right Ear: External ear normal.   Left Ear: External ear normal.   Mouth/Throat: No oropharyngeal exudate.   Eyes: Conjunctivae are normal. Pupils are equal, round, and reactive to light.   Neck: Neck supple. No thyromegaly present.   Cardiovascular: Normal rate, regular rhythm and intact distal pulses.  Exam reveals no gallop and no friction rub.    Murmur heard.  Pulmonary/Chest: Effort normal and breath sounds normal. She has no wheezes. She has no rales.   Abdominal: Soft. Bowel sounds are normal. She exhibits no distension. There is no tenderness. There is no rebound and no guarding.   Musculoskeletal: She exhibits no edema.   Lymphadenopathy:     She has no cervical adenopathy.   Neurological: She is alert and oriented to person, place, and time. She displays normal reflexes. No cranial nerve deficit. She exhibits normal muscle tone. Coordination normal.   Skin: Skin is warm and dry.   Psychiatric: She has a normal mood and affect. Her behavior is normal. Judgment and thought content normal.   Nursing note and vitals reviewed.      Vitals:    07/06/17 1256   BP: 118/66   Weight: 192 lb (87.1 kg)   Height: 67\" (170.2 cm)       Body mass index is 30.07 kg/(m^2).  Discussed the patient's BMI with her. The BMI is above average; BMI management plan is completed.    Esperanza was seen today for [facility] medicare wellness.    Diagnoses and all orders for this visit:    Medicare annual wellness visit, subsequent  -     Pneumococcal " Polysaccharide Vaccine 23-Valent Greater Than or Equal To 1yo Subcutaneous / IM    End stage renal disease on dialysis    Type 2 diabetes mellitus with chronic kidney disease on chronic dialysis, with long-term current use of insulin  -     POC Glycosylated Hemoglobin (Hb A1C)    Ischemic heart disease  -     Lipid Panel  -     TSH    Acute on chronic combined systolic and diastolic congestive heart failure  -     Comprehensive Metabolic Panel  -     TSH    Screening for viral disease  -     Hepatitis C Antibody    Vitamin D deficiency  -     Vitamin D 25 Hydroxy    Encounter for immunization   -     Pneumococcal Polysaccharide Vaccine 23-Valent Greater Than or Equal To 1yo Subcutaneous / IM    Other orders  -     Cancel: Pneumococcal Polysaccharide Vaccine 23-Valent Greater Than or Equal To 1yo Subcutaneous / IM      Assessment/Plan   Patient Self-Management and Personalized Health Advice  The patient has been provided with information about: diet, exercise, prevention of cardiac or vascular disease, fall prevention and supplements and preventive services including:   · Bone densitometry screening, Counseling for cardiovascular disease risk reduction, Diabetes screening, see lab orders, Exercise counseling provided, Fall Risk assessment done, Fall Risk plan of care done, Pneumococcal vaccine , Screening mammography, referral placed.    Visit Diagnoses:    ICD-10-CM ICD-9-CM   1. Medicare annual wellness visit, subsequent Z00.00 V70.0   2. End stage renal disease on dialysis N18.6 585.6    Z99.2 V45.11   3. Type 2 diabetes mellitus with chronic kidney disease on chronic dialysis, with long-term current use of insulin E11.22 250.40    N18.6 585.9    Z99.2 V45.11    Z79.4 V58.67   4. Ischemic heart disease I25.9 414.9   5. Acute on chronic combined systolic and diastolic congestive heart failure I50.43 428.43     428.0   6. Screening for viral disease Z11.59 V73.99   7. Vitamin D deficiency E55.9 268.9   8. Encounter  for immunization  Z23 V03.89       Orders Placed This Encounter   Procedures   • Pneumococcal Polysaccharide Vaccine 23-Valent Greater Than or Equal To 1yo Subcutaneous / IM   • Comprehensive Metabolic Panel   • Hepatitis C Antibody   • Lipid Panel   • TSH   • Vitamin D 25 Hydroxy   • POC Glycosylated Hemoglobin (Hb A1C)       Outpatient Encounter Prescriptions as of 7/6/2017   Medication Sig Dispense Refill   • Ferric Citrate (AURYXIA PO) Take 1 tablet by mouth. WITH MEALS     • amLODIPine (NORVASC) 10 MG tablet Take 1 tablet by mouth Daily. 30 tablet 3   • aspirin 81 MG EC tablet Take 1 tablet by mouth Daily.     • atorvastatin (LIPITOR) 80 MG tablet Take 1 tablet by mouth Every Night. 90 tablet 1   • B Complex-C-Folic Acid (ELVIRA-BO PO) Take  by mouth Daily.     • Blood Glucose Monitoring Suppl (ACCU-CHEK NADER PLUS) W/DEVICE kit DX: E11.65 1 kit 0   • carvedilol (COREG) 25 MG tablet Take 1 tablet by mouth Every 12 (Twelve) Hours. 60 tablet 5   • cholecalciferol (VITAMIN D3) 1000 UNITS tablet Take 2,000 Units by mouth daily.     • clopidogrel (PLAVIX) 75 MG tablet Take 1 tablet by mouth Daily. 30 tablet 3   • docusate sodium (COLACE) 100 MG capsule Take 100 mg by mouth 2 (Two) Times a Day.     • dorzolamide-timolol (COSOPT) 22.3-6.8 MG/ML ophthalmic solution      • glucose blood (ACCU-CHEK NADER PLUS) test strip Use as instructed 1 TO 3 TIMES DAILY  DX: E11.65 300 each 3   • hydrALAZINE (APRESOLINE) 50 MG tablet Take 1 tablet by mouth 3 (Three) Times a Day.     • insulin NPH-insulin regular (novoLIN 70/30) (70-30) 100 UNIT/ML injection Inject 12 units before dinner and 8 Units before breakfast. 10 mL 5   • IRON DEXTRAN IJ Inject  as directed 3 (Three) Times a Week.     • isosorbide mononitrate (IMDUR) 120 MG 24 hr tablet Take 1 tablet by mouth daily. 90 tablet 2   • lactulose (CHRONULAC) 10 GM/15ML solution Take 20 g by mouth 2 (Two) Times a Day As Needed.     • Lancets Misc. (ACCU-CHEK SOFTCLIX LANCET DEV) kit  TEST 1-3 TIMES DAILY DX:E11.65 1 kit 0   • latanoprost (XALATAN) 0.005 % ophthalmic solution      • lisinopril (PRINIVIL,ZESTRIL) 20 MG tablet Take 1 tablet by mouth Daily. 30 tablet 5   • lisinopril (PRINIVIL,ZESTRIL) 5 MG tablet Take 1 tablet by mouth Daily.     • nitroglycerin (NITROSTAT) 0.4 MG SL tablet Place 0.4 mg under the tongue every 5 (five) minutes as needed. Dissolve one tablet under the tongue every 5 minutes as needed for chest pain      • olopatadine (PATANOL) 0.1 % ophthalmic solution Apply 1 drop to eye 2 (two) times a day as needed. At 6-8 intervals     • torsemide (DEMADEX) 100 MG tablet Take 0.5 tablets by mouth Daily As Needed. Pt takes 1 tablet 4 days a week and 0.5 tab;et 3 days a week       No facility-administered encounter medications on file as of 7/6/2017.        Reviewed use of high risk medication in the elderly: yes  Reviewed for potential of harmful drug interactions in the elderly: yes    Follow Up:  Return in about 3 months (around 10/6/2017).     An After Visit Summary and PPPS with all of these plans were given to the patient.

## 2017-07-13 RX ORDER — CLOPIDOGREL BISULFATE 75 MG/1
75 TABLET ORAL DAILY
Qty: 30 TABLET | Refills: 2 | Status: SHIPPED | OUTPATIENT
Start: 2017-07-13 | End: 2018-04-28 | Stop reason: SDUPTHER

## 2017-07-31 ENCOUNTER — OFFICE VISIT (OUTPATIENT)
Dept: INTERNAL MEDICINE | Facility: CLINIC | Age: 72
End: 2017-07-31

## 2017-07-31 VITALS
BODY MASS INDEX: 31.01 KG/M2 | DIASTOLIC BLOOD PRESSURE: 68 MMHG | WEIGHT: 198 LBS | SYSTOLIC BLOOD PRESSURE: 196 MMHG | HEART RATE: 64 BPM | OXYGEN SATURATION: 99 %

## 2017-07-31 DIAGNOSIS — S39.012A LUMBAR STRAIN, INITIAL ENCOUNTER: Primary | ICD-10-CM

## 2017-07-31 PROCEDURE — 99213 OFFICE O/P EST LOW 20 MIN: CPT | Performed by: PHYSICIAN ASSISTANT

## 2017-07-31 RX ORDER — TRAMADOL HYDROCHLORIDE 50 MG/1
50 TABLET ORAL EVERY 6 HOURS PRN
Qty: 30 TABLET | Refills: 0 | Status: CANCELLED
Start: 2017-07-31

## 2017-07-31 RX ORDER — METHOCARBAMOL 500 MG/1
500 TABLET, FILM COATED ORAL 3 TIMES DAILY PRN
Qty: 30 TABLET | Refills: 0 | Status: SHIPPED | OUTPATIENT
Start: 2017-07-31 | End: 2017-12-29

## 2017-07-31 NOTE — PROGRESS NOTES
Chief Complaint   Patient presents with   • Left sciatic nerve pain       Subjective   Esperanza Pop is a 72 y.o. female.       History of Present Illness     Pt fell years ago on her left hip and developed a sciatic nerve injury. This weekend she developed left hip pain that moved into her lower back and radiates into buttock and somewhat into her groin. Hurts to move at all. Tried some heat and ice. Took motrin and tylenol- ibuprofen helped the most but was concerned about her kidney function.     Pt did not take all of her BP meds this morning, BP usually drops during dialysis.      Current Outpatient Prescriptions:   •  amLODIPine (NORVASC) 10 MG tablet, Take 1 tablet by mouth Daily., Disp: 30 tablet, Rfl: 3  •  aspirin 81 MG EC tablet, Take 1 tablet by mouth Daily., Disp: , Rfl:   •  atorvastatin (LIPITOR) 80 MG tablet, Take 1 tablet by mouth Every Night., Disp: 90 tablet, Rfl: 1  •  B Complex-C-Folic Acid (ELVIRA-BO PO), Take  by mouth Daily., Disp: , Rfl:   •  Blood Glucose Monitoring Suppl (ACCU-CHEK NADER PLUS) W/DEVICE kit, DX: E11.65, Disp: 1 kit, Rfl: 0  •  carvedilol (COREG) 25 MG tablet, Take 1 tablet by mouth Every 12 (Twelve) Hours., Disp: 60 tablet, Rfl: 5  •  cholecalciferol (VITAMIN D3) 1000 UNITS tablet, Take 2,000 Units by mouth daily., Disp: , Rfl:   •  clopidogrel (PLAVIX) 75 MG tablet, Take 1 tablet by mouth Daily., Disp: 30 tablet, Rfl: 2  •  docusate sodium (COLACE) 100 MG capsule, Take 100 mg by mouth 2 (Two) Times a Day., Disp: , Rfl:   •  dorzolamide-timolol (COSOPT) 22.3-6.8 MG/ML ophthalmic solution, , Disp: , Rfl:   •  Ferric Citrate (AURYXIA PO), Take 1 tablet by mouth. WITH MEALS, Disp: , Rfl:   •  glucose blood (ACCU-CHEK NADER PLUS) test strip, Use as instructed 1 TO 3 TIMES DAILY  DX: E11.65, Disp: 300 each, Rfl: 3  •  hydrALAZINE (APRESOLINE) 50 MG tablet, Take 1 tablet by mouth 3 (Three) Times a Day., Disp: , Rfl:   •  insulin NPH-insulin regular (novoLIN 70/30) (70-30) 100  UNIT/ML injection, Inject 12 units before dinner and 8 Units before breakfast., Disp: 10 mL, Rfl: 5  •  IRON DEXTRAN IJ, Inject  as directed 3 (Three) Times a Week., Disp: , Rfl:   •  isosorbide mononitrate (IMDUR) 120 MG 24 hr tablet, Take 1 tablet by mouth daily., Disp: 90 tablet, Rfl: 2  •  lactulose (CHRONULAC) 10 GM/15ML solution, Take 20 g by mouth 2 (Two) Times a Day As Needed., Disp: , Rfl:   •  Lancets Misc. (ACCU-CHEK SOFTCLIX LANCET DEV) kit, TEST 1-3 TIMES DAILY DX:E11.65, Disp: 1 kit, Rfl: 0  •  latanoprost (XALATAN) 0.005 % ophthalmic solution, , Disp: , Rfl:   •  lisinopril (PRINIVIL,ZESTRIL) 20 MG tablet, Take 1 tablet by mouth Daily., Disp: 30 tablet, Rfl: 5  •  lisinopril (PRINIVIL,ZESTRIL) 5 MG tablet, Take 1 tablet by mouth Daily., Disp: , Rfl:   •  nitroglycerin (NITROSTAT) 0.4 MG SL tablet, Place 0.4 mg under the tongue every 5 (five) minutes as needed. Dissolve one tablet under the tongue every 5 minutes as needed for chest pain , Disp: , Rfl:   •  olopatadine (PATANOL) 0.1 % ophthalmic solution, Apply 1 drop to eye 2 (two) times a day as needed. At 6-8 intervals, Disp: , Rfl:   •  torsemide (DEMADEX) 100 MG tablet, Take 0.5 tablets by mouth Daily As Needed. Pt takes 1 tablet 4 days a week and 0.5 tab;et 3 days a week, Disp: , Rfl:   •  methocarbamol (ROBAXIN) 500 MG tablet, Take 1 tablet by mouth 3 (Three) Times a Day As Needed for Muscle Spasms., Disp: 30 tablet, Rfl: 0     PMFSH  The following portions of the patient's history were reviewed and updated as appropriate: allergies, current medications, past family history, past medical history, past social history, past surgical history and problem list.    Review of Systems   Constitutional: Negative for appetite change, fever and unexpected weight change.   HENT: Negative.    Eyes: Negative for pain and visual disturbance.   Respiratory: Negative for chest tightness, shortness of breath and wheezing.    Cardiovascular: Negative for chest  pain and palpitations.   Gastrointestinal: Negative for abdominal pain, blood in stool, diarrhea, nausea and vomiting.   Endocrine: Negative.    Genitourinary: Negative for difficulty urinating, flank pain, frequency and urgency.   Musculoskeletal: Positive for back pain. Negative for joint swelling.   Skin: Negative for color change and rash.   Neurological: Negative for dizziness, tremors, weakness and light-headedness.   Hematological: Negative for adenopathy.   Psychiatric/Behavioral: Negative for confusion and decreased concentration.   All other systems reviewed and are negative.      Objective   BP (!) 196/68  Pulse 64  Wt 198 lb (89.8 kg)  SpO2 99%  BMI 31.01 kg/m2    Physical Exam   Constitutional: She is oriented to person, place, and time. She appears well-developed and well-nourished. No distress.   HENT:   Head: Normocephalic and atraumatic.   Eyes: Conjunctivae and EOM are normal. Pupils are equal, round, and reactive to light. No scleral icterus.   Neck: Normal range of motion. Neck supple.   Cardiovascular: Normal rate, regular rhythm and normal heart sounds.  Exam reveals no gallop.    No murmur heard.  Pulmonary/Chest: Effort normal and breath sounds normal. No respiratory distress. She has no wheezes. She has no rales. She exhibits no tenderness.   Abdominal: Soft. There is no tenderness.   Musculoskeletal: She exhibits tenderness. She exhibits no deformity.        Lumbar back: She exhibits decreased range of motion, tenderness (left side), pain and spasm. She exhibits no bony tenderness, no swelling, no edema, no deformity and normal pulse.   Neurological: She is alert and oriented to person, place, and time. She has normal reflexes. She displays no atrophy, no tremor and normal reflexes. No sensory deficit. She exhibits normal muscle tone. Coordination normal.   Skin: Skin is warm and dry. No rash noted. She is not diaphoretic.   Psychiatric: She has a normal mood and affect. Her behavior is  normal. Judgment and thought content normal.   Nursing note and vitals reviewed.           ASSESSMENT/PLAN    Problem List Items Addressed This Visit     None      Visit Diagnoses     Lumbar strain, initial encounter    -  Primary    Gave pt rx for tramadol 50 mg 1 po q 6hrs prn #30 0RF per Dr Rodgers. Use robaxin prn, continue ice/heat, rest, gentle ROM exercises. Will refer for PT prn.    Relevant Medications    methocarbamol (ROBAXIN) 500 MG tablet               Return if symptoms worsen or fail to improve.

## 2017-08-01 ENCOUNTER — TELEPHONE (OUTPATIENT)
Dept: INTERNAL MEDICINE | Facility: CLINIC | Age: 72
End: 2017-08-01

## 2017-08-01 NOTE — TELEPHONE ENCOUNTER
PATIENT HAD A REACTION, BROKE OUT IN SMALL HIVES, AFTER TAKING 1/2 TAB OF ROBAXIN 500MG. PATIENT WONDERING ON WHAT SHE SHOULD DO. PLEASE CALL BACK AND ADVISE.

## 2017-08-01 NOTE — TELEPHONE ENCOUNTER
She should stop taking the robaxin and take some benadryl for the hives. Come back in if not resolving.

## 2017-08-09 ENCOUNTER — OFFICE VISIT (OUTPATIENT)
Dept: CARDIOLOGY | Facility: CLINIC | Age: 72
End: 2017-08-09

## 2017-08-09 VITALS
HEIGHT: 67 IN | WEIGHT: 200.2 LBS | DIASTOLIC BLOOD PRESSURE: 60 MMHG | BODY MASS INDEX: 31.42 KG/M2 | SYSTOLIC BLOOD PRESSURE: 158 MMHG | HEART RATE: 64 BPM

## 2017-08-09 DIAGNOSIS — I25.119 CORONARY ARTERY DISEASE INVOLVING NATIVE CORONARY ARTERY OF NATIVE HEART WITH ANGINA PECTORIS (HCC): Primary | ICD-10-CM

## 2017-08-09 DIAGNOSIS — I10 ESSENTIAL HYPERTENSION: ICD-10-CM

## 2017-08-09 DIAGNOSIS — Z99.2 TYPE 2 DIABETES MELLITUS WITH CHRONIC KIDNEY DISEASE ON CHRONIC DIALYSIS, WITH LONG-TERM CURRENT USE OF INSULIN (HCC): ICD-10-CM

## 2017-08-09 DIAGNOSIS — E11.22 TYPE 2 DIABETES MELLITUS WITH CHRONIC KIDNEY DISEASE ON CHRONIC DIALYSIS, WITH LONG-TERM CURRENT USE OF INSULIN (HCC): ICD-10-CM

## 2017-08-09 DIAGNOSIS — Z79.4 TYPE 2 DIABETES MELLITUS WITH CHRONIC KIDNEY DISEASE ON CHRONIC DIALYSIS, WITH LONG-TERM CURRENT USE OF INSULIN (HCC): ICD-10-CM

## 2017-08-09 DIAGNOSIS — Z99.2 END STAGE RENAL DISEASE ON DIALYSIS (HCC): ICD-10-CM

## 2017-08-09 DIAGNOSIS — N18.6 TYPE 2 DIABETES MELLITUS WITH CHRONIC KIDNEY DISEASE ON CHRONIC DIALYSIS, WITH LONG-TERM CURRENT USE OF INSULIN (HCC): ICD-10-CM

## 2017-08-09 DIAGNOSIS — E78.5 HYPERLIPIDEMIA LDL GOAL <70: ICD-10-CM

## 2017-08-09 DIAGNOSIS — N18.6 END STAGE RENAL DISEASE ON DIALYSIS (HCC): ICD-10-CM

## 2017-08-09 DIAGNOSIS — I50.43 ACUTE ON CHRONIC COMBINED SYSTOLIC AND DIASTOLIC CONGESTIVE HEART FAILURE (HCC): ICD-10-CM

## 2017-08-09 PROCEDURE — 99214 OFFICE O/P EST MOD 30 MIN: CPT | Performed by: INTERNAL MEDICINE

## 2017-08-09 RX ORDER — CHOLECALCIFEROL (VITAMIN D3) 125 MCG
CAPSULE ORAL 3 TIMES WEEKLY
COMMUNITY
End: 2019-08-28 | Stop reason: SDUPTHER

## 2017-08-09 RX ORDER — LISINOPRIL 5 MG/1
5 TABLET ORAL DAILY
Qty: 90 TABLET | Refills: 1 | Status: SHIPPED | OUTPATIENT
Start: 2017-08-09 | End: 2017-12-29 | Stop reason: SDUPTHER

## 2017-08-09 RX ORDER — ATORVASTATIN CALCIUM 80 MG/1
80 TABLET, FILM COATED ORAL NIGHTLY
Qty: 90 TABLET | Refills: 1 | Status: SHIPPED | OUTPATIENT
Start: 2017-08-09 | End: 2018-01-18 | Stop reason: ALTCHOICE

## 2017-08-09 RX ORDER — FAMOTIDINE 20 MG/1
20 TABLET, FILM COATED ORAL DAILY PRN
COMMUNITY
End: 2018-07-26

## 2017-08-09 NOTE — PROGRESS NOTES
Subjective:     Encounter Date:08/09/2017      Patient ID: Esperanza Pop is a 72 y.o.   female, part-time , from [place], [place].      INTERNIST: Meghana Rodgers MD  REFERRING HEALTHCARE PROVIDER: [facility]   INTERVENTIONAL CARDIOLOGIST: Hugh Pop MD, [facility], [facility]  [facility] FAILURE [facility]: [facility]  NEPHROLOGIST: [facility]  INTERVENTIONAL CARDIOLOGIST:  Scooter Zhao MD, [facility]     Chief Complaint:   Chief Complaint   Patient presents with   • Coronary Artery Disease   • Chest Pain   • Edema   • Hypotension     Problem List:  1. Ischemic heart disease:  a. Acute non-STEMI/congestive heart failure with diagnostic coronary arteriography demonstrating 20% LAD plaque with high-grade stenosis in the proximal mid right coronary artery requiring a Xience drug-eluting stent (3.0 mm x 28 mm) with reduced echocardiographic LVEF (0.25) with abnormal diastolic LV dysfunction, December 2013.   b. Improved echocardiogram, April 2014.  c. Recent non-STEMI related to malignant hypertension with distal RCA occlusion, patent previous RCA stent with mild inferior hypokinesis but overall acceptable systolic LV function (LVEF 0.55) with PTCA, DARRYL distal RCA, October 2016.  d. Residual CCS class I angina pectoris/NYHA class II CHF.  2. Mild aortic stenosis (June 2016).  3. Severe hypertension - probably essential.   4. Dyslipidemia.  5. Type 2 diabetes mellitus type 2 with suboptimal control (hemoglobin A1c 8.8%).  6. Moderate obesity (BMI 36.2).  7. Acute kidney injury: Admission from 12/26/2013 to 01/02/2014, now resolved with latest creatinine 1.4 on 01/08/2014.  8. Moderate normocytic normochromic anemia; hemoglobin 9.2 gm/dL (June 2016).  9. Noncompliance.  10. Recent apparent end-stage renal disease with initiation of hemodialysis MWF weekly and construction of right upper extremity AV fistula; data deficit (June  2016).  11. Remote operations:  a. 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   b. Abdominal hernia repair/panniculectomy.  c. Hysterectomy.      Allergies   Allergen Reactions   • Albuterol      Increased blood pressure  Used right before heart attack   • Mircera [Methoxy Polyethylene Glycol-Epoetin Beta]      Pt stopped breathing   • Penicillins Hives   • Prednisone      Makes jittery/anxious   • Venofer [Iron Sucrose]      Pt stopped breathing         Current Outpatient Prescriptions:   •  amLODIPine (NORVASC) 10 MG tablet, Take 1 tablet by mouth Daily., Disp: 30 tablet, Rfl: 3  •  aspirin 81 MG EC tablet, Take 1 tablet by mouth Daily., Disp: , Rfl:   •  atorvastatin (LIPITOR) 80 MG tablet, Take 1 tablet by mouth Every Night., Disp: 90 tablet, Rfl: 1  •  B Complex-C-Folic Acid (ELVIRA-BO PO), Take  by mouth Daily., Disp: , Rfl:   •  Blood Glucose Monitoring Suppl (ACCU-CHEK NADER PLUS) W/DEVICE kit, DX: E11.65, Disp: 1 kit, Rfl: 0  •  carvedilol (COREG) 25 MG tablet, Take 1 tablet by mouth Every 12 (Twelve) Hours., Disp: 60 tablet, Rfl: 5  •  Cholecalciferol (VITAMIN D3) 2000 UNITS tablet, Take  by mouth Daily., Disp: , Rfl:   •  clopidogrel (PLAVIX) 75 MG tablet, Take 1 tablet by mouth Daily., Disp: 30 tablet, Rfl: 2  •  dorzolamide-timolol (COSOPT) 22.3-6.8 MG/ML ophthalmic solution, Administer 1 drop to both eyes Daily., Disp: , Rfl:   •  famotidine (PEPCID) 20 MG tablet, Take 20 mg by mouth Daily., Disp: , Rfl:   •  Ferric Citrate (AURYXIA PO), Take 210 mg by mouth Daily. WITH MEALS , Disp: , Rfl:   •  glucose blood (ACCU-CHEK NADER PLUS) test strip, Use as instructed 1 TO 3 TIMES DAILY  DX: E11.65, Disp: 300 each, Rfl: 3  •  hydrALAZINE (APRESOLINE) 50 MG tablet, Take 1 tablet by mouth 3 (Three) Times a Day., Disp: , Rfl:   •  insulin NPH-insulin regular (novoLIN 70/30) (70-30) 100 UNIT/ML injection, Inject 12 units before dinner and 8 Units before breakfast., Disp: 10 mL, Rfl: 5  •  IRON DEXTRAN IJ,  Inject  as directed 3 (Three) Times a Week., Disp: , Rfl:   •  isosorbide mononitrate (IMDUR) 120 MG 24 hr tablet, Take 1 tablet by mouth daily., Disp: 90 tablet, Rfl: 2  •  Lancets Misc. (ACCU-CHEK SOFTCLIX LANCET DEV) kit, TEST 1-3 TIMES DAILY DX:E11.65, Disp: 1 kit, Rfl: 0  •  latanoprost (XALATAN) 0.005 % ophthalmic solution, Administer 1 drop to both eyes Every Night., Disp: , Rfl:   •  lisinopril (PRINIVIL,ZESTRIL) 5 MG tablet, Take 1 tablet by mouth Daily., Disp: , Rfl:   •  methocarbamol (ROBAXIN) 500 MG tablet, Take 1 tablet by mouth 3 (Three) Times a Day As Needed for Muscle Spasms. (Patient taking differently: Take 500 mg by mouth As Needed for Muscle Spasms.), Disp: 30 tablet, Rfl: 0  •  nitroglycerin (NITROSTAT) 0.4 MG SL tablet, Place 0.4 mg under the tongue every 5 (five) minutes as needed. Dissolve one tablet under the tongue every 5 minutes as needed for chest pain , Disp: , Rfl:   •  torsemide (DEMADEX) 100 MG tablet, Take 0.5 tablets by mouth Daily As Needed. Pt takes 1 tablet 4 days a week and 0.5 tab;et 3 days a week, Disp: , Rfl:     History of Present Illness Patient returns for followup after a 6-month hiatus.  She denies any chest pain, shortness of breath, presyncope, or syncope.  She has had trouble with her dialysis and her blood pressure dropping to 80/50 after she gets done.  She has been holding her medications on these days because she feels so weak afterwards.  She has tried eating olives when she starts feeling this way because she states that she knows that they have salt in them.  Intermittently she is having some palpitations that she notices more at night when she is laying down.  She has had some episodes where her blood pressure is getting too high, but then she goes to dialysis and it gets too low.  She is only taking her atorvastatin 3 times a week.  Patient otherwise denies chest pain, shortness of breath, PND, edema, palpitations, syncope or presyncope at this time.  She  "continues to work part-time as a , 3-4 days per week for 3 hours.        Review of Systems   Cardiovascular: Positive for irregular heartbeat.   Skin: Positive for dry skin.   Musculoskeletal: Positive for back pain.      Obtained and otherwise negative except as outlined in problem list and HPI.    Procedures       Objective:       Vitals:    08/09/17 0858 08/09/17 0911   BP: 164/62 158/60   BP Location: Right arm Right arm   Patient Position: Sitting Standing   Pulse: 60 64   Weight: 200 lb 3.2 oz (90.8 kg)    Height: 67\" (170.2 cm)    Recheck blood pressure left arm sitting was 170/50  Body mass index is 31.36 kg/(m^2).   Last weight:  195 lbs.    Physical Exam   Constitutional: She is oriented to person, place, and time. She appears well-developed and well-nourished.   Neck: No JVD present. Carotid bruit is present. No thyromegaly present.   Cardiovascular: Regular rhythm, S1 normal and S2 normal.  Exam reveals no gallop, no S3 and no friction rub.    Murmur heard.   Medium-pitched harsh early systolic murmur is present with a grade of 3/6  at the upper right sternal border, upper left sternal border radiating to the neck  Pulses:       Dorsalis pedis pulses are 2+ on the right side, and 2+ on the left side.        Posterior tibial pulses are 2+ on the right side, and 2+ on the left side.   Pulmonary/Chest: Effort normal and breath sounds normal. She has no wheezes. She has no rhonchi. She has no rales.   Abdominal: Soft. She exhibits no mass. There is no hepatosplenomegaly. There is no tenderness. There is no guarding.   Lymphadenopathy:     She has no cervical adenopathy.   Neurological: She is alert and oriented to person, place, and time.   Skin: Skin is warm, dry and intact. No rash noted.   Vitals reviewed.      Lab Review:   Lab Results   Component Value Date    GLUCOSE 118 (H) 10/11/2016    BUN 55 (H) 03/21/2017    CREATININE 5.80 (H) 03/21/2017    EGFRIFNONA 7 (L) 03/21/2017    EGFRIFAFRI 9 (L) " 03/21/2017    BCR 9.5 03/21/2017    CO2 30.0 03/21/2017    CALCIUM 9.5 03/21/2017    PROTENTOTREF 8.2 03/21/2017    ALBUMIN 4.10 03/21/2017    LABIL2 1.0 (L) 03/21/2017    AST 16 03/21/2017    ALT 17 03/21/2017       Lab Results   Component Value Date    WBC 6.33 10/11/2016    HGB 7.3 (L) 10/11/2016    HCT 23.0 (L) 10/11/2016    MCV 91.6 10/11/2016     10/11/2016       Lab Results   Component Value Date    HGBA1C 7.6 07/06/2017       Lab Results   Component Value Date    TSH 1.962 06/30/2016       Lab Results   Component Value Date    CHOL 261 (H) 10/11/2016    CHOL 293 (H) 10/10/2016     Lab Results   Component Value Date    TRIG 134 03/21/2017    TRIG 171 (H) 10/11/2016    TRIG 94 10/10/2016     Lab Results   Component Value Date    HDL 60 03/21/2017    HDL 46 10/11/2016    HDL 54 10/10/2016       Lab Results   Component Value Date     (H) 03/21/2017   TOTAL CHOLESTEROL - 225 (03/21/2017)      Assessment:   Overall continued acceptable course with no interim cardiopulmonary complaints with good functional status. She has a grade 3/6 RUSBSEM and we will order an echocardiogram to assess this.  We instructed the patient to take her atorvastatin daily.  We also encouraged the patient to work with her nephrologist on maintaining her blood pressure after dialysis, as well as keeping it within normal limits when she is not in dialysis.  She has had recent sciatica pain and may need to have neurosurgical evaluation if this becomes progressive and disabling.       Diagnosis Plan   1. Coronary artery disease involving native coronary artery of native heart with angina pectoris  Stable   2. Essential hypertension  Uncontrolled, Encouraged patient to work with her nephrologist    3. Hyperlipidemia LDL goal <70  Instructed patient to take her atorvastatin daily instead of 3 times a week    4. Type 2 diabetes mellitus with chronic kidney disease on chronic dialysis, with long-term current use of insulin   Uncontrolled          Plan:         1. Patient to continue current medications and close follow up with the above providers.  2. Tentative cardiology follow up in December 2017 or patient may return sooner PRN.  3. Echocardiogram  4. Atorvastatin 80 mg daily; compliance has been stressed, and if she is unable to be compliant, we would recommend Repatha injection monthly.      Scribed for Demetrius Sagastume MD by Geena Estrella, APRN. 8/9/2017  9:33 AM    I, Demetrius Sagastume MD, [facility], personally performed the services described in this documentation as scribed by the above named individual in my presence, and it is both accurate and complete. At 9:53 AM on 08/09/2017

## 2017-08-23 ENCOUNTER — HOSPITAL ENCOUNTER (OUTPATIENT)
Dept: CARDIOLOGY | Facility: HOSPITAL | Age: 72
Discharge: HOME | End: 2017-08-23
Attending: INTERNAL MEDICINE | Admitting: INTERNAL MEDICINE

## 2017-08-23 VITALS
HEIGHT: 67 IN | BODY MASS INDEX: 31.39 KG/M2 | WEIGHT: 200 LBS | DIASTOLIC BLOOD PRESSURE: 74 MMHG | SYSTOLIC BLOOD PRESSURE: 204 MMHG

## 2017-08-23 DIAGNOSIS — I50.43 ACUTE ON CHRONIC COMBINED SYSTOLIC AND DIASTOLIC CONGESTIVE HEART FAILURE (HCC): ICD-10-CM

## 2017-08-23 DIAGNOSIS — I25.119 CORONARY ARTERY DISEASE INVOLVING NATIVE CORONARY ARTERY OF NATIVE HEART WITH ANGINA PECTORIS (HCC): ICD-10-CM

## 2017-08-23 LAB
AORTIC DIMENSIONLESS INDEX: 0.4 CM2
BH CV ECHO MEAS - AO MAX PG (FULL): 12.6 MMHG
BH CV ECHO MEAS - AO MAX PG: 17 MMHG
BH CV ECHO MEAS - AO MEAN PG (FULL): 6.7 MMHG
BH CV ECHO MEAS - AO MEAN PG: 9 MMHG
BH CV ECHO MEAS - AO ROOT AREA (BSA CORRECTED): 1.4
BH CV ECHO MEAS - AO ROOT AREA: 5.9 CM^2
BH CV ECHO MEAS - AO ROOT DIAM: 2.8 CM
BH CV ECHO MEAS - AO V2 MAX: 207 CM/SEC
BH CV ECHO MEAS - AO V2 MEAN: 138.3 CM/SEC
BH CV ECHO MEAS - AO V2 VTI: 54.4 CM
BH CV ECHO MEAS - AVA(I,A): 1.3 CM^2
BH CV ECHO MEAS - AVA(I,D): 1.1 CM^2
BH CV ECHO MEAS - AVA(V,A): 1.2 CM^2
BH CV ECHO MEAS - AVA(V,D): 1.2 CM^2
BH CV ECHO MEAS - BSA(HAYCOCK): 2.1 M^2
BH CV ECHO MEAS - BSA: 2 M^2
BH CV ECHO MEAS - BZI_BMI: 31.3 KILOGRAMS/M^2
BH CV ECHO MEAS - BZI_METRIC_HEIGHT: 170.2 CM
BH CV ECHO MEAS - BZI_METRIC_WEIGHT: 90.7 KG
BH CV ECHO MEAS - EDV(CUBED): 91.1 ML
BH CV ECHO MEAS - EDV(TEICH): 92.4 ML
BH CV ECHO MEAS - EF(CUBED): 63.4 %
BH CV ECHO MEAS - EF(MOD-SP4): 55 %
BH CV ECHO MEAS - EF(TEICH): 55 %
BH CV ECHO MEAS - ESV(CUBED): 33.4 ML
BH CV ECHO MEAS - ESV(TEICH): 41.6 ML
BH CV ECHO MEAS - FS: 28.4 %
BH CV ECHO MEAS - IVS/LVPW: 1.1
BH CV ECHO MEAS - IVSD: 1.6 CM
BH CV ECHO MEAS - LA DIMENSION: 4.3 CM
BH CV ECHO MEAS - LA/AO: 1.5
BH CV ECHO MEAS - LAT PEAK E' VEL: 7.3 CM/SEC
BH CV ECHO MEAS - LV MASS(C)D: 268.8 GRAMS
BH CV ECHO MEAS - LV MASS(C)DI: 132.9 GRAMS/M^2
BH CV ECHO MEAS - LV MAX PG: 2.5 MMHG
BH CV ECHO MEAS - LV MEAN PG: 2 MMHG
BH CV ECHO MEAS - LV V1 MAX: 79.7 CM/SEC
BH CV ECHO MEAS - LV V1 MEAN: 58 CM/SEC
BH CV ECHO MEAS - LV V1 VTI: 24.6 CM
BH CV ECHO MEAS - LVIDD: 4.5 CM
BH CV ECHO MEAS - LVIDS: 3 CM
BH CV ECHO MEAS - LVOT AREA (M): 2.8 CM^2
BH CV ECHO MEAS - LVOT AREA: 2.8 CM^2
BH CV ECHO MEAS - LVOT DIAM: 1.9 CM
BH CV ECHO MEAS - LVPWD: 1.4 CM
BH CV ECHO MEAS - MED PEAK E' VEL: 4.15 CM/SEC
BH CV ECHO MEAS - MV A MAX VEL: 106 CM/SEC
BH CV ECHO MEAS - MV E MAX VEL: 111 CM/SEC
BH CV ECHO MEAS - MV E/A: 1
BH CV ECHO MEAS - MV MAX PG: 7.4 MMHG
BH CV ECHO MEAS - MV MEAN PG: 3 MMHG
BH CV ECHO MEAS - MV V2 MAX: 136 CM/SEC
BH CV ECHO MEAS - MV V2 MEAN: 79.4 CM/SEC
BH CV ECHO MEAS - MV V2 VTI: 50.5 CM
BH CV ECHO MEAS - MVA(VTI): 1.4 CM^2
BH CV ECHO MEAS - PA ACC SLOPE: 406 CM/SEC^2
BH CV ECHO MEAS - PA ACC TIME: 0.2 SEC
BH CV ECHO MEAS - PA MAX PG: 3.7 MMHG
BH CV ECHO MEAS - PA PR(ACCEL): -9.7 MMHG
BH CV ECHO MEAS - PA V2 MAX: 96.6 CM/SEC
BH CV ECHO MEAS - PI END-D VEL: 141 CM/SEC
BH CV ECHO MEAS - RAP SYSTOLE: 3 MMHG
BH CV ECHO MEAS - RVSP: 24.8 MMHG
BH CV ECHO MEAS - SI(AO): 159.7 ML/M^2
BH CV ECHO MEAS - SI(CUBED): 28.6 ML/M^2
BH CV ECHO MEAS - SI(LVOT): 34.5 ML/M^2
BH CV ECHO MEAS - SI(TEICH): 25.2 ML/M^2
BH CV ECHO MEAS - SV(AO): 322.9 ML
BH CV ECHO MEAS - SV(CUBED): 57.7 ML
BH CV ECHO MEAS - SV(LVOT): 69.7 ML
BH CV ECHO MEAS - SV(TEICH): 50.9 ML
BH CV ECHO MEAS - TAPSE (>1.6): 1.7 CM2
BH CV ECHO MEAS - TR MAX VEL: 233.5 CM/SEC
BH CV VAS BP LEFT ARM: NORMAL MMHG
BH CV XLRA - RV BASE: 3.1 CM
BH CV XLRA - RV LENGTH: 8 CM
BH CV XLRA - RV MID: 2.7 CM
BH CV XLRA - TDI S': 11.7 CM/SEC
E/E' RATIO: 15.3
LEFT ATRIUM VOLUME INDEX: 29.2 ML/M2
LV EF 2D ECHO EST: 65 %

## 2017-08-23 PROCEDURE — 93306 TTE W/DOPPLER COMPLETE: CPT | Performed by: INTERNAL MEDICINE

## 2017-08-23 PROCEDURE — 93306 TTE W/DOPPLER COMPLETE: CPT

## 2017-10-05 ENCOUNTER — TRANSCRIBE ORDERS (OUTPATIENT)
Dept: ADMINISTRATIVE | Facility: HOSPITAL | Age: 72
End: 2017-10-05

## 2017-10-05 DIAGNOSIS — Z12.31 VISIT FOR SCREENING MAMMOGRAM: Primary | ICD-10-CM

## 2017-10-24 ENCOUNTER — HOSPITAL ENCOUNTER (OUTPATIENT)
Dept: MAMMOGRAPHY | Facility: HOSPITAL | Age: 72
Discharge: HOME | End: 2017-10-24
Attending: INTERNAL MEDICINE | Admitting: INTERNAL MEDICINE

## 2017-10-24 DIAGNOSIS — Z12.31 VISIT FOR SCREENING MAMMOGRAM: ICD-10-CM

## 2017-10-24 PROCEDURE — 77063 BREAST TOMOSYNTHESIS BI: CPT

## 2017-10-24 PROCEDURE — G0202 SCR MAMMO BI INCL CAD: HCPCS

## 2017-10-25 PROCEDURE — G0202 SCR MAMMO BI INCL CAD: HCPCS | Performed by: RADIOLOGY

## 2017-10-25 PROCEDURE — 77063 BREAST TOMOSYNTHESIS BI: CPT | Performed by: RADIOLOGY

## 2017-12-21 ENCOUNTER — TELEPHONE (OUTPATIENT)
Dept: CARDIOLOGY | Facility: CLINIC | Age: 72
End: 2017-12-21

## 2017-12-21 NOTE — TELEPHONE ENCOUNTER
"Patient called because the dialysis nurse told her to let us know that her heart is irregular.  She also states that her blood pressure has been going up and down all week and her heart feels like it is \"skipping\" a beat at times.  I had her take her BP this morning and call me back it was 192/62 with a pulse of 75. She is having no shortness of breath, CP, chest pressure or edema. She was suppose to have a follow up appointment with [facility] in December but there was not one scheduled so [facility] scheduled her for December 29th.  Do you want to adjust any medications?   "

## 2017-12-22 ENCOUNTER — OFFICE VISIT (OUTPATIENT)
Dept: INTERNAL MEDICINE | Facility: CLINIC | Age: 72
End: 2017-12-22

## 2017-12-22 VITALS
SYSTOLIC BLOOD PRESSURE: 188 MMHG | OXYGEN SATURATION: 98 % | WEIGHT: 201.1 LBS | BODY MASS INDEX: 31.5 KG/M2 | DIASTOLIC BLOOD PRESSURE: 70 MMHG | HEART RATE: 78 BPM

## 2017-12-22 DIAGNOSIS — E55.9 VITAMIN D DEFICIENCY: ICD-10-CM

## 2017-12-22 DIAGNOSIS — I25.9 ISCHEMIC HEART DISEASE: ICD-10-CM

## 2017-12-22 DIAGNOSIS — R00.2 HEART PALPITATIONS: Primary | ICD-10-CM

## 2017-12-22 DIAGNOSIS — Z79.4 TYPE 2 DIABETES MELLITUS WITH CHRONIC KIDNEY DISEASE ON CHRONIC DIALYSIS, WITH LONG-TERM CURRENT USE OF INSULIN (HCC): ICD-10-CM

## 2017-12-22 DIAGNOSIS — Z11.59 SCREENING FOR VIRAL DISEASE: ICD-10-CM

## 2017-12-22 DIAGNOSIS — E11.22 TYPE 2 DIABETES MELLITUS WITH CHRONIC KIDNEY DISEASE ON CHRONIC DIALYSIS, WITH LONG-TERM CURRENT USE OF INSULIN (HCC): ICD-10-CM

## 2017-12-22 DIAGNOSIS — Z23 ENCOUNTER FOR IMMUNIZATION: ICD-10-CM

## 2017-12-22 DIAGNOSIS — Z00.00 MEDICARE ANNUAL WELLNESS VISIT, SUBSEQUENT: ICD-10-CM

## 2017-12-22 DIAGNOSIS — I50.43 ACUTE ON CHRONIC COMBINED SYSTOLIC AND DIASTOLIC CONGESTIVE HEART FAILURE (HCC): ICD-10-CM

## 2017-12-22 DIAGNOSIS — Z99.2 TYPE 2 DIABETES MELLITUS WITH CHRONIC KIDNEY DISEASE ON CHRONIC DIALYSIS, WITH LONG-TERM CURRENT USE OF INSULIN (HCC): ICD-10-CM

## 2017-12-22 DIAGNOSIS — I10 ESSENTIAL HYPERTENSION: ICD-10-CM

## 2017-12-22 DIAGNOSIS — N18.6 TYPE 2 DIABETES MELLITUS WITH CHRONIC KIDNEY DISEASE ON CHRONIC DIALYSIS, WITH LONG-TERM CURRENT USE OF INSULIN (HCC): ICD-10-CM

## 2017-12-22 DIAGNOSIS — N18.6 END STAGE RENAL DISEASE ON DIALYSIS (HCC): ICD-10-CM

## 2017-12-22 DIAGNOSIS — Z99.2 END STAGE RENAL DISEASE ON DIALYSIS (HCC): ICD-10-CM

## 2017-12-22 PROCEDURE — 93000 ELECTROCARDIOGRAM COMPLETE: CPT | Performed by: NURSE PRACTITIONER

## 2017-12-22 PROCEDURE — 99214 OFFICE O/P EST MOD 30 MIN: CPT | Performed by: NURSE PRACTITIONER

## 2017-12-22 NOTE — PROGRESS NOTES
Subjective  Irregular Heart Beat (2+ weeks)      Esperanza Pop is a 72 y.o. female.   Allergies   Allergen Reactions   • Albuterol      Increased blood pressure  Used right before heart attack   • Mircera [Methoxy Polyethylene Glycol-Epoetin Beta]      Pt stopped breathing   • Penicillins Hives   • Prednisone      Makes jittery/anxious   • Venofer [Iron Sucrose]      Pt stopped breathing     History of Present Illness      Intermittent waking up at night with pounding HR, feels as if beating fasting, will take hydralazine or carvedilol to slow down , this does help , taking half of Imdur because a whole one caused nightmares and low b/p  This problem has been going on at least 2 years and did get better for a while, does get better on days she does not do dialysis, DR Lerma has adjusted her dialysis  Does see the heart clinic on 1/16/18 , sees cardiology in one week   Pt only taking her hydralazine 2 x daily instead of 3   The following portions of the patient's history were reviewed and updated as appropriate: allergies, past family history, past surgical history and problem list.    Review of Systems   Constitutional: Negative.    HENT: Negative.    Respiratory: Negative.    Cardiovascular: Positive for palpitations.   Gastrointestinal: Negative.    Allergic/Immunologic: Negative.    Hematological: Negative.    Psychiatric/Behavioral: Negative.        Objective   Physical Exam   Constitutional: She is oriented to person, place, and time. She appears well-developed and well-nourished.   HENT:   Head: Normocephalic and atraumatic.   Eyes: Conjunctivae are normal.   Cardiovascular: Normal rate and regular rhythm.    Murmur heard.   Systolic murmur is present with a grade of 3/6   Lymphadenopathy:     She has no cervical adenopathy.   Neurological: She is alert and oriented to person, place, and time.   Skin: Skin is warm and dry.   Psychiatric: She has a normal mood and affect. Her behavior is normal. Judgment and  thought content normal.   Vitals reviewed.    BP (!) 188/70 (BP Location: Left arm, Patient Position: Sitting, Cuff Size: Adult)  Pulse 78  Wt 91.2 kg (201 lb 1.6 oz)  LMP  (LMP Unknown)  SpO2 98%  BMI 31.5 kg/m2    Assessment/Plan     Problem List Items Addressed This Visit        Cardiovascular and Mediastinum    Hypertension      Other Visit Diagnoses     Heart palpitations    -  Primary    Relevant Orders    ECG 12 Lead        Take hydralazine tid as ordered , keep cardiology and heart clinic appt , reviewed ekg with pt, if worsens present to ED    ECG 12 Lead  Date/Time: 12/22/2017 10:04 AM  Performed by: BRYAN DE LA GARZA  Authorized by: BRYAN DE LA GARZA   Rhythm: sinus rhythm  Rate: normal  BPM: 75  ST Segments: ST segments normal  T Waves: T waves normal  QRS axis: normal  Other: no other findings  Clinical impression: normal ECG

## 2017-12-22 NOTE — TELEPHONE ENCOUNTER
Per [facility] patient needs EKG and better control BP control with nephrology.  Instructed patient to get EKG and talk to her nephrologist about her blood pressure and to keep her appointment with Dr Sagastume on 12/29 and to call back if she has any further problems.  She verbalizes understanding.

## 2017-12-23 LAB
25(OH)D3+25(OH)D2 SERPL-MCNC: 15.9 NG/ML (ref 30–100)
ALBUMIN SERPL-MCNC: 4 G/DL (ref 3.5–4.8)
ALBUMIN/GLOB SERPL: 1.1 {RATIO} (ref 1.2–2.2)
ALP SERPL-CCNC: 127 IU/L (ref 39–117)
ALT SERPL-CCNC: 10 IU/L (ref 0–32)
AST SERPL-CCNC: 13 IU/L (ref 0–40)
BILIRUB SERPL-MCNC: 0.3 MG/DL (ref 0–1.2)
BUN SERPL-MCNC: 47 MG/DL (ref 8–27)
BUN/CREAT SERPL: 8 (ref 12–28)
CALCIUM SERPL-MCNC: 9.1 MG/DL (ref 8.7–10.3)
CHLORIDE SERPL-SCNC: 102 MMOL/L (ref 96–106)
CHOLEST SERPL-MCNC: 189 MG/DL (ref 100–199)
CO2 SERPL-SCNC: 24 MMOL/L (ref 18–29)
CREAT SERPL-MCNC: 5.92 MG/DL (ref 0.57–1)
GLOBULIN SER CALC-MCNC: 3.8 G/DL (ref 1.5–4.5)
GLUCOSE SERPL-MCNC: 175 MG/DL (ref 65–99)
HCV AB S/CO SERPL IA: <0.1 S/CO RATIO (ref 0–0.9)
HCV AB SERPL QL IA: NORMAL
HDLC SERPL-MCNC: 54 MG/DL
LDLC SERPL CALC-MCNC: 116 MG/DL (ref 0–99)
POTASSIUM SERPL-SCNC: 4.7 MMOL/L (ref 3.5–5.2)
PROT SERPL-MCNC: 7.8 G/DL (ref 6–8.5)
SODIUM SERPL-SCNC: 145 MMOL/L (ref 134–144)
TRIGL SERPL-MCNC: 97 MG/DL (ref 0–149)
TSH SERPL DL<=0.005 MIU/L-ACNC: 1.35 UIU/ML (ref 0.45–4.5)
VLDLC SERPL CALC-MCNC: 19 MG/DL (ref 5–40)

## 2017-12-29 ENCOUNTER — OFFICE VISIT (OUTPATIENT)
Dept: CARDIOLOGY | Facility: CLINIC | Age: 72
End: 2017-12-29

## 2017-12-29 VITALS
DIASTOLIC BLOOD PRESSURE: 74 MMHG | HEIGHT: 67 IN | WEIGHT: 201 LBS | SYSTOLIC BLOOD PRESSURE: 161 MMHG | HEART RATE: 74 BPM | BODY MASS INDEX: 31.55 KG/M2

## 2017-12-29 DIAGNOSIS — I10 ESSENTIAL HYPERTENSION: ICD-10-CM

## 2017-12-29 DIAGNOSIS — I50.33 ACUTE ON CHRONIC DIASTOLIC HEART FAILURE (HCC): ICD-10-CM

## 2017-12-29 DIAGNOSIS — N18.6 END STAGE RENAL DISEASE ON DIALYSIS (HCC): ICD-10-CM

## 2017-12-29 DIAGNOSIS — Z79.4 TYPE 2 DIABETES MELLITUS WITH CHRONIC KIDNEY DISEASE ON CHRONIC DIALYSIS, WITH LONG-TERM CURRENT USE OF INSULIN (HCC): ICD-10-CM

## 2017-12-29 DIAGNOSIS — I25.119 CORONARY ARTERY DISEASE INVOLVING NATIVE CORONARY ARTERY OF NATIVE HEART WITH ANGINA PECTORIS (HCC): ICD-10-CM

## 2017-12-29 DIAGNOSIS — Z99.2 TYPE 2 DIABETES MELLITUS WITH CHRONIC KIDNEY DISEASE ON CHRONIC DIALYSIS, WITH LONG-TERM CURRENT USE OF INSULIN (HCC): ICD-10-CM

## 2017-12-29 DIAGNOSIS — E11.22 TYPE 2 DIABETES MELLITUS WITH CHRONIC KIDNEY DISEASE ON CHRONIC DIALYSIS, WITH LONG-TERM CURRENT USE OF INSULIN (HCC): ICD-10-CM

## 2017-12-29 DIAGNOSIS — N18.6 TYPE 2 DIABETES MELLITUS WITH CHRONIC KIDNEY DISEASE ON CHRONIC DIALYSIS, WITH LONG-TERM CURRENT USE OF INSULIN (HCC): ICD-10-CM

## 2017-12-29 DIAGNOSIS — R00.2 PALPITATIONS: Primary | ICD-10-CM

## 2017-12-29 DIAGNOSIS — Z99.2 END STAGE RENAL DISEASE ON DIALYSIS (HCC): ICD-10-CM

## 2017-12-29 PROCEDURE — 99214 OFFICE O/P EST MOD 30 MIN: CPT | Performed by: INTERNAL MEDICINE

## 2017-12-29 RX ORDER — NITROGLYCERIN 400 UG/1
1 SPRAY ORAL
Qty: 1 EACH | Refills: 12 | Status: SHIPPED | OUTPATIENT
Start: 2017-12-29 | End: 2019-02-12 | Stop reason: SDUPTHER

## 2017-12-29 RX ORDER — LISINOPRIL 20 MG/1
20 TABLET ORAL DAILY
Qty: 90 TABLET | Refills: 4 | Status: ON HOLD | OUTPATIENT
Start: 2017-12-29 | End: 2019-04-21 | Stop reason: SDUPTHER

## 2017-12-29 NOTE — PROGRESS NOTES
Subjective:     Encounter Date:12/29/2017    Patient ID: Esperanza Pop is a 72 y.o.   female, retired , from [place], [place].      INTERNIST: Meghana Rodgers MD  REFERRING HEALTHCARE PROVIDER: [facility]   INTERVENTIONAL CARDIOLOGIST: Hugh Pop MD, [facility], [facility]  ADVANCED HE[facility] FAILURE [facility]INIC: [facility]  NEPHROLOGIST: [facility]  INTERVENTIONAL CARDIOLOGIST:  Scooter Zhao MD, [facility]   VASCULAR SURGEON:  Demetrius Rich MD-[facility]    Chief Complaint:   Chief Complaint   Patient presents with   • Hypertension   • Coronary Artery Disease     Problem List:  1. Ischemic heart disease:  a. Acute non-STEMI/congestive heart failure with diagnostic coronary arteriography demonstrating 20% LAD plaque with high-grade stenosis in the proximal mid right coronary artery requiring a Xience drug-eluting stent (3.0 mm x 28 mm) with reduced echocardiographic LVEF (0.25) with abnormal diastolic LV dysfunction, December 2013.   b. Improved echocardiogram, April 2014.  c. Remote non-STEMI related to malignant hypertension with distal RCA occlusion, patent previous RCA stent with mild inferior hypokinesis but overall acceptable systolic LV function (LVEF 0.55) with PTCA, DARRYL distal RCA, October 2016.  d. Residual CCS class I angina pectoris/NYHA class II CHF.  2. Mild aortic stenosis (June 2016).  3. Severe hypertension - probably essential.   4. Dyslipidemia.  5. Type 2 diabetes mellitus type 2 with suboptimal control (hemoglobin A1c 8.8%).  6. Moderate obesity (BMI 36.2).  7. Acute kidney injury: Admission from 12/26/2013 to 01/02/2014, now resolved with latest creatinine 1.4 on 01/08/2014.  8. Moderate normocytic normochromic anemia; hemoglobin 9.2 gm/dL (June 2016).  9. Noncompliance.  10. Remote apparent end-stage renal disease with initiation of hemodialysis MWF weekly and construction of right upper extremity AV fistula; data deficit (June  2016) with subsequent AV fistula dysfunction and fistulogram with angioplasty - [facility], October 2017.  11. Remote operations:  a. 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 Abdominal hernia repair/panniculectomy  c. Hysterectomy      Allergies   Allergen Reactions   • Albuterol      Increased blood pressure  Used right before heart attack   • Mircera [Methoxy Polyethylene Glycol-Epoetin Beta]      Pt stopped breathing   • Penicillins Hives   • Prednisone      Makes jittery/anxious   • Venofer [Iron Sucrose]      Pt stopped breathing         Current Outpatient Prescriptions:   •  amLODIPine (NORVASC) 10 MG tablet, Take 1 tablet by mouth Daily., Disp: 30 tablet, Rfl: 3  •  aspirin 81 MG EC tablet, Take 1 tablet by mouth Daily., Disp: , Rfl:   •  atorvastatin (LIPITOR) 80 MG tablet, Take 1 tablet by mouth Every Night., Disp: 90 tablet, Rfl: 1  •  Blood Glucose Monitoring Suppl (ACCU-CHEK NADER PLUS) W/DEVICE kit, DX: E11.65, Disp: 1 kit, Rfl: 0  •  carvedilol (COREG) 25 MG tablet, Take 1 tablet by mouth Every 12 (Twelve) Hours., Disp: 60 tablet, Rfl: 5  •  Cholecalciferol (VITAMIN D3) 2000 UNITS tablet, Take  by mouth Daily., Disp: , Rfl:   •  clopidogrel (PLAVIX) 75 MG tablet, Take 1 tablet by mouth Daily., Disp: 30 tablet, Rfl: 2  •  dorzolamide-timolol (COSOPT) 22.3-6.8 MG/ML ophthalmic solution, Administer 1 drop to both eyes Daily., Disp: , Rfl:   •  famotidine (PEPCID) 20 MG tablet, Take 20 mg by mouth Daily., Disp: , Rfl:   •  Ferric Citrate (AURYXIA PO), Take 210 mg by mouth Daily. WITH MEALS , Disp: , Rfl:   •  glucose blood (ACCU-CHEK NADER PLUS) test strip, Use as instructed 1 TO 3 TIMES DAILY  DX: E11.65, Disp: 300 each, Rfl: 3  •  hydrALAZINE (APRESOLINE) 50 MG tablet, Take 1 tablet by mouth 3 (Three) Times a Day., Disp: , Rfl:   •  insulin NPH-insulin regular (novoLIN 70/30) (70-30) 100 UNIT/ML injection, Inject 12 units before dinner and 8 Units before breakfast., Disp: 10 mL,  "Rfl: 5  •  IRON DEXTRAN IJ, Inject  as directed 3 (Three) Times a Week., Disp: , Rfl:   •  isosorbide mononitrate (IMDUR) 120 MG 24 hr tablet, Take 1 tablet by mouth daily., Disp: 90 tablet, Rfl: 2  •  Lancets Misc. (ACCU-CHEK SOFTCLIX LANCET DEV) kit, TEST 1-3 TIMES DAILY DX:E11.65, Disp: 1 kit, Rfl: 0  •  latanoprost (XALATAN) 0.005 % ophthalmic solution, Administer 1 drop to both eyes Every Night., Disp: , Rfl:   •  lisinopril (PRINIVIL,ZESTRIL) 5 MG tablet, Take 1 tablet by mouth Daily., Disp: 90 tablet, Rfl: 1  •  nitroglycerin (NITROSTAT) 0.4 MG SL tablet, Place 0.4 mg under the tongue every 5 (five) minutes as needed. Dissolve one tablet under the tongue every 5 minutes as needed for chest pain , Disp: , Rfl:   •  torsemide (DEMADEX) 100 MG tablet, Take 0.5 tablets by mouth Daily As Needed. Pt takes 1 tablet 4 days a week and 0.5 tab;et 3 days a week, Disp: , Rfl:     HISTORY OF PRESENT ILLNESS: Patient returns for scheduled 4-month followup. In the interim, she was undergoing dialysis, and the nurse heard her heart \"skip a beat,\" so she was advised to come in to be seen in our office.  She notices this skipped heartbeat herself \"often.\"  She states that her heart rate dropped while she was at dialysis also (40).  Her blood pressure is elevated today, and she wrote down yesterday what her blood pressures were running, and it was 165/64 when she woke up at 2 AM.  She goes to dialysis 3 days a week and is seen by the nephrologist there.  [facility], she states that on Sunday when she was at [facility], she had a little bit of pain in her elbow but no chest pain.  She did have some chest pain on Wednesday, 12/27/2017, when she was at dialysis, and she used nitroglycerin at that time; she typically does not need to use the nitroglycerin very often, maybe once a month.  She notes that she carries nitroglycerin in her bag when she goes to dialysis.  She has never been tried on nitroglycerin spray.  She states that she " "got a little bit short of breath at her sister's house on [place]alk[place] Day when she was going up 5-6 stairs.  Patient otherwise denies prolonged chest pain, severe shortness of breath, PND, edema, sustained palpitations even though they happen daily, syncope or presyncope at this time on limited activity.        Review of Systems   Constitution: Positive for decreased appetite.   Cardiovascular: Positive for palpitations.   Respiratory: Positive for shortness of breath (on 12/25/2017 at sister's house, going up steps).       Obtained and otherwise negative except as outlined in problem list and HPI.    Procedures       Objective:       Vitals:    12/29/17 0954 12/29/17 0955   BP: (!) 186/79 161/74   BP Location: Left arm Left arm   Patient Position: Sitting Standing   Pulse: 71 74   Weight: 91.2 kg (201 lb)    Height: 170.2 cm (67\")      Body mass index is 31.48 kg/(m^2).   Last weight:  200 lbs.    Physical Exam   Constitutional: She is oriented to person, place, and time. She appears well-developed and well-nourished.   Neck: No JVD present. Carotid bruit is present (bilateral). No thyromegaly present.   Cardiovascular: Regular rhythm, S1 normal and S2 normal.  Exam reveals distant heart sounds. Exam reveals no gallop, no S3 and no friction rub.    Murmur heard.   Medium-pitched harsh early systolic murmur is present with a grade of 2/6  at the upper right sternal border radiating to the neck  Pulses:       Carotid pulses are 1+ on the right side, and 1+ on the left side.       Radial pulses are 1+ on the right side, and 1+ on the left side.        Femoral pulses are 1+ on the right side, and 1+ on the left side.       Popliteal pulses are 1+ on the right side, and 1+ on the left side.        Dorsalis pedis pulses are 1+ on the right side, and 1+ on the left side.        Posterior tibial pulses are 1+ on the right side, and 1+ on the left side.   Pulmonary/Chest: Effort normal. She has decreased breath sounds. She " has no wheezes. She has no rhonchi. She has no rales.   Abdominal: Soft. She exhibits no mass. There is no hepatosplenomegaly. There is no tenderness. There is no guarding.   Bowel sounds audible x4   Musculoskeletal: Normal range of motion. She exhibits no edema.   Lymphadenopathy:     She has no cervical adenopathy.   Neurological: She is alert and oriented to person, place, and time.   Skin: Skin is warm, dry and intact. No rash noted.   Vitals reviewed.        Lab Review:   Lab Results   Component Value Date    GLUCOSE 118 (H) 10/11/2016    BUN 47 (H) 12/22/2017    CREATININE 5.92 (H) 12/22/2017    EGFRIFNONA 7 (L) 12/22/2017    EGFRIFAFRI 8 (L) 12/22/2017    BCR 8 (L) 12/22/2017    CO2 24 12/22/2017    CALCIUM 9.1 12/22/2017    PROTENTOTREF 7.8 12/22/2017    ALBUMIN 4.0 12/22/2017    LABIL2 1.1 (L) 12/22/2017    AST 13 12/22/2017    ALT 10 12/22/2017 12/22/2017:  · Glucose - 175  · Sodium - 145  · Potassium - 4.7    Lab Results   Component Value Date    WBC 6.33 10/11/2016    HGB 7.3 (L) 10/11/2016    HCT 23.0 (L) 10/11/2016    MCV 91.6 10/11/2016     10/11/2016       Lab Results   Component Value Date    HGBA1C 7.6 07/06/2017       Lab Results   Component Value Date    TSH 1.350 12/22/2017     TOTAL CHOLESTEROL - 189 (12/22/2017)    Lab Results   Component Value Date    CHOL 261 (H) 10/11/2016    CHOL 293 (H) 10/10/2016     Lab Results   Component Value Date    TRIG 97 12/22/2017    TRIG 134 03/21/2017     Lab Results   Component Value Date    HDL 54 12/22/2017    HDL 60 03/21/2017     Lab Results   Component Value Date     (H) 12/22/2017     (H) 03/21/2017       Lab Results   Component Value Date    .7 (H) 03/21/2017     Dialysis Clinic Date, December 2017:  · Serum albumin - 3.5  · enPCR - 0.9  · Potassium - 4.4  · Hemoglobin - 9.6  · Phosphorus - 4.9  · Calcium - 8.9  · IPTH - 331  · Hemoglobin A1c - 8.1%      Assessment:   Overall continued acceptable course with no definitive  "interim cardiopulmonary complaints with marginal functional status. I am uncertain whether she is having important cardiac arrhythmia, but she does note tachypalpitation and states during dialysis her heart rates are as low as \"40 beats per minute.\"  Her blood pressure readings remain uncontrolled which makes her at high risk for progressive CHF, as well as stroke.  We will defer additional diagnostic or therapeutic intervention from a cardiac perspective at this time other than to have the patient wear a 14-day event monitor her heart rate.  We will also increase the lisinopril dose to 20 mg daily.         Diagnosis Plan   1. Palpitations  Holter Monitor - 72 Hour Up To 21 Days   2. Essential hypertension  Increase lisinopril to 20 mg daily   3. Acute on chronic diastolic heart failure  Increase lisinopril to 20 mg daily   4. Coronary artery disease involving native coronary artery of native heart with angina pectoris  NTG spray prn; defer MPS/LHC at this time   5. Type 2 diabetes mellitus with chronic kidney disease on chronic dialysis, with long-term current use of insulin  Per Dr. Rodgers   6. End stage renal disease on dialysis  Per nephrologist          Plan:         1. Patient to continue current medications and close follow up with the above providers other than to increase her lisinopril to 20 mg daily.  2. 14-day event monitor ordered and placed.  3. Tentative cardiology follow up in May 2018, or patient may return sooner PRN.       Transcribed by Mary Rivera for Dr. Demetrius Sagastume at 9:58 AM on 12/29/2017    IDemetrius MD, [facility], personally performed the services described in this documentation as scribed by the above named individual in my presence, and it is both accurate and complete. At 10:32 AM on 12/29/2017         "

## 2018-01-18 ENCOUNTER — OFFICE VISIT (OUTPATIENT)
Dept: CARDIOLOGY | Facility: HOSPITAL | Age: 73
End: 2018-01-18

## 2018-01-18 VITALS
BODY MASS INDEX: 31.39 KG/M2 | RESPIRATION RATE: 19 BRPM | HEIGHT: 67 IN | DIASTOLIC BLOOD PRESSURE: 78 MMHG | TEMPERATURE: 97.6 F | HEART RATE: 73 BPM | SYSTOLIC BLOOD PRESSURE: 150 MMHG | OXYGEN SATURATION: 96 % | WEIGHT: 200 LBS

## 2018-01-18 DIAGNOSIS — E04.2 MULTIPLE THYROID NODULES: ICD-10-CM

## 2018-01-18 DIAGNOSIS — I50.32 CHRONIC DIASTOLIC CONGESTIVE HEART FAILURE (HCC): Primary | ICD-10-CM

## 2018-01-18 DIAGNOSIS — E78.5 HYPERLIPIDEMIA LDL GOAL <70: ICD-10-CM

## 2018-01-18 DIAGNOSIS — I10 ESSENTIAL HYPERTENSION: ICD-10-CM

## 2018-01-18 DIAGNOSIS — N18.6 END STAGE RENAL DISEASE ON DIALYSIS (HCC): ICD-10-CM

## 2018-01-18 DIAGNOSIS — I25.119 CORONARY ARTERY DISEASE INVOLVING NATIVE CORONARY ARTERY OF NATIVE HEART WITH ANGINA PECTORIS (HCC): ICD-10-CM

## 2018-01-18 DIAGNOSIS — Z99.2 END STAGE RENAL DISEASE ON DIALYSIS (HCC): ICD-10-CM

## 2018-01-18 DIAGNOSIS — I50.43 ACUTE ON CHRONIC COMBINED SYSTOLIC AND DIASTOLIC CONGESTIVE HEART FAILURE (HCC): ICD-10-CM

## 2018-01-18 DIAGNOSIS — E04.9 GOITER: ICD-10-CM

## 2018-01-18 PROCEDURE — 99214 OFFICE O/P EST MOD 30 MIN: CPT | Performed by: NURSE PRACTITIONER

## 2018-01-18 RX ORDER — FLUTICASONE PROPIONATE 50 MCG
2 SPRAY, SUSPENSION (ML) NASAL DAILY
COMMUNITY
Start: 2017-11-10 | End: 2020-06-16 | Stop reason: SDUPTHER

## 2018-01-18 RX ORDER — AMLODIPINE BESYLATE 10 MG/1
10 TABLET ORAL
Qty: 30 TABLET | Refills: 5 | Status: SHIPPED | OUTPATIENT
Start: 2018-01-18 | End: 2018-03-01 | Stop reason: SDUPTHER

## 2018-01-18 NOTE — PROGRESS NOTES
Encounter Date:01/18/2018      Patient ID: Esperanza Pop is a 73 y.o. female.        Subjective:     Chief Complaint: Follow-up CHF     History of Present Illness  [facility] of chronic diastolic heart failure and HTN. She reports that blood pressure is variable- SBP can be as high as 200 and then in the 90s. She admits that she does not always take medications like she should. She only takes her plavix and aspirin on days that she does not have dialysis because she says she bleeds heavily after dialysis. She also stopped her statin because she feels it was causing her heart to go faster. She tried cutting the atorvastatin in half which made it better but symptoms were still present. She also reports that hydralazine causes her to be sensitive to light, especially when she takes it with Imdur. She says it is better if she takes it by itself and with a meal. She says the lisinopril also drops her BP too low at times with a SBP around 90.   She recently wore a two week cardiac monitor to evaluate palpitations and bradycardia during dialysis. She has occasional mild chest discomfort but not usually during exertion. She does take nitro PRN. Her  is ill and she takes care of all of her household activities. She still preaches on occasion. Patient denies dyspnea, presyncope, syncope, orthopnea, PND, abdominal fullness, early satiety, claudication, cough or edema.    Patient Active Problem List   Diagnosis   • Acute on chronic diastolic heart failure   • Type 2 diabetes mellitus   • Essential hypertension   • Coronary artery disease involving native coronary artery of native heart with angina pectoris   • Breast cancer, female, right   • Seasonal allergic rhinitis   • Right carotid bruit   • Vitamin B12 deficiency   • Hyperlipidemia LDL goal <70   • End stage renal disease on dialysis   • Nonrheumatic aortic valve stenosis   • NSTEMI (non-ST elevated  myocardial infarction)   • UTI (urinary tract infection)   • Ischemic heart disease   • CHF (congestive heart failure)   • Acute non-ST segment elevation myocardial infarction (STEMI) following previous myocardial infarction   • Hypertension   • Dyslipidemia   • Diabetes mellitus   • Mild obesity         Past Surgical History:   Procedure Laterality Date   • AV FISTULA PLACEMENT, BRACHIOBASILIC     • BREAST BIOPSY Left 2010   • BREAST CYST EXCISION     • BREAST LUMPECTOMY Right 2005   • BREAST SURGERY     • CARDIAC CATHETERIZATION N/A 10/10/2016    Procedure: Left Heart Cath;  Surgeon: Scooter Zhao MD;  Location:  [facility];  Service:    • CARDIAC CATHETERIZATION  10/2016   • HERNIA REPAIR     • HYSTERECTOMY  2000   • INSERTION HEMODIALYSIS CATHETER Right 6/29/2016    Procedure: HEMODIALYSIS CATHETER INSERTION TUNNELLED;  Surgeon: Ramón Chavez MD;  Location:  [facility];  Service:    • MASTECTOMY Right 2006   • REDUCTION MAMMAPLASTY Left 2005       Allergies   Allergen Reactions   • Albuterol      Increased blood pressure  Used right before heart attack   • Mircera [Methoxy Polyethylene Glycol-Epoetin Beta]      Pt stopped breathing   • Penicillins Hives   • Prednisone      Makes jittery/anxious   • Venofer [Iron Sucrose]      Pt stopped breathing         Current Outpatient Prescriptions:   •  amLODIPine (NORVASC) 10 MG tablet, Take 1 tablet by mouth Daily., Disp: 30 tablet, Rfl: 3  •  aspirin 81 MG EC tablet, Take 1 tablet by mouth Daily. (Patient taking differently: Take 81 mg by mouth Every Other Day.), Disp: , Rfl:   •  carvedilol (COREG) 25 MG tablet, Take 1 tablet by mouth Every 12 (Twelve) Hours., Disp: 60 tablet, Rfl: 5  •  Cholecalciferol (VITAMIN D3) 2000 UNITS tablet, Take  by mouth 3 (Three) Times a Week., Disp: , Rfl:   •  clopidogrel (PLAVIX) 75 MG tablet, Take 1 tablet by mouth Daily. (Patient taking differently: Take 75 mg by mouth Every Other Day.), Disp: 30  tablet, Rfl: 2  •  dorzolamide-timolol (COSOPT) 22.3-6.8 MG/ML ophthalmic solution, Administer 1 drop to both eyes Daily., Disp: , Rfl:   •  famotidine (PEPCID) 20 MG tablet, Take 20 mg by mouth Daily As Needed., Disp: , Rfl:   •  Ferric Citrate 1  MG(Fe) tablet, Take 1 tablet by mouth As Needed., Disp: , Rfl:   •  fluticasone (FLONASE) 50 MCG/ACT nasal spray, 2 sprays into each nostril Daily., Disp: , Rfl:   •  hydrALAZINE (APRESOLINE) 50 MG tablet, Take 1 tablet by mouth 3 (Three) Times a Day., Disp: , Rfl:   •  insulin NPH-insulin regular (novoLIN 70/30) (70-30) 100 UNIT/ML injection, Inject 12 units before dinner and 8 Units before breakfast., Disp: 10 mL, Rfl: 5  •  IRON DEXTRAN IJ, Inject  as directed 3 (Three) Times a Week., Disp: , Rfl:   •  isosorbide mononitrate (IMDUR) 120 MG 24 hr tablet, Take 1 tablet by mouth daily., Disp: 90 tablet, Rfl: 2  •  latanoprost (XALATAN) 0.005 % ophthalmic solution, Administer 1 drop to both eyes Every Night., Disp: , Rfl:   •  lisinopril (PRINIVIL,ZESTRIL) 20 MG tablet, Take 1 tablet by mouth Daily., Disp: 90 tablet, Rfl: 4  •  nitroglycerin (NITROLINGUAL) 0.4 MG/SPRAY spray, Place 1 spray under the tongue Every 5 (Five) Minutes As Needed for Chest Pain., Disp: 1 each, Rfl: 12  •  torsemide (DEMADEX) 100 MG tablet, Take 0.5 tablets by mouth Daily As Needed., Disp: , Rfl:   •  atorvastatin (LIPITOR) 80 MG tablet, Take 1 tablet by mouth Every Night., Disp: 90 tablet, Rfl: 1  •  Blood Glucose Monitoring Suppl (ACCU-CHEK NADER PLUS) W/DEVICE kit, DX: E11.65, Disp: 1 kit, Rfl: 0  •  glucose blood (ACCU-CHEK NADER PLUS) test strip, Use as instructed 1 TO 3 TIMES DAILY  DX: E11.65, Disp: 300 each, Rfl: 3  •  Lancets Misc. (ACCU-CHEK SOFTCLIX LANCET DEV) kit, TEST 1-3 TIMES DAILY DX:E11.65, Disp: 1 kit, Rfl: 0    The following portions of the chart were reviewed and updated as appropriate: Allergies, current medications, past family history, social history, past medical  "history.     Review of Systems   Constitution: Negative for chills, decreased appetite, diaphoresis, fever, weakness, malaise/fatigue, night sweats, weight gain and weight loss.   HENT: Negative for congestion, hearing loss, hoarse voice and nosebleeds.    Eyes: Negative for blurred vision, visual disturbance and visual halos.   Cardiovascular: Positive for irregular heartbeat and palpitations. Negative for chest pain, claudication, cyanosis, dyspnea on exertion, leg swelling, near-syncope, orthopnea, paroxysmal nocturnal dyspnea and syncope.   Respiratory: Negative for cough, hemoptysis, shortness of breath, sleep disturbances due to breathing, snoring, sputum production and wheezing.    Hematologic/Lymphatic: Negative for bleeding problem. Does not bruise/bleed easily.   Skin: Positive for itching. Negative for dry skin and rash.   Musculoskeletal: Positive for joint pain. Negative for arthritis, joint swelling and myalgias.   Gastrointestinal: Positive for heartburn and nausea. Negative for bloating, abdominal pain, constipation, diarrhea, flatus, hematemesis, hematochezia, melena and vomiting.   Genitourinary: Negative for dysuria, frequency, hematuria, nocturia and urgency.   Neurological: Positive for loss of balance. Negative for excessive daytime sleepiness, dizziness, headaches and light-headedness.   Psychiatric/Behavioral: Negative for depression. The patient does not have insomnia and is not nervous/anxious.    Allergic/Immunologic:        Seasonal allergies           Objective:     Vitals:    01/18/18 1454 01/18/18 1456 01/18/18 1520   BP: (!) 190/80 151/70 150/78   BP Location: Left arm Left arm Left arm   Patient Position: Sitting Standing    Cuff Size: Adult     Pulse: 74 73    Resp: 19     Temp: 97.6 °F (36.4 °C)     TempSrc: Temporal Artery      SpO2: 96%     Weight: 90.7 kg (200 lb)     Height: 170.2 cm (67\")           Physical Exam   Constitutional: She is oriented to person, place, and time. She " appears well-developed and well-nourished. She is active and cooperative. No distress.   HENT:   Head: Normocephalic and atraumatic.   Mouth/Throat: Oropharynx is clear and moist.   Eyes: Conjunctivae and EOM are normal. Pupils are equal, round, and reactive to light.   Neck: Normal range of motion. Neck supple. No JVD present. No tracheal deviation present. Thyromegaly (palpable thyroid nodules) present.   Cardiovascular: Normal rate, regular rhythm and intact distal pulses.    Murmur (2/6 systolic murmur ) heard.  Pulmonary/Chest: Effort normal and breath sounds normal.   Abdominal: Soft. Bowel sounds are normal. She exhibits no distension. There is no tenderness.   Musculoskeletal: Normal range of motion.   Neurological: She is alert and oriented to person, place, and time.   Skin: Skin is warm, dry and intact.   Psychiatric: She has a normal mood and affect. Her behavior is normal.   Nursing note and vitals reviewed.      Lab and Diagnostic Review:      Results for orders placed or performed in visit on 12/22/17   Comprehensive metabolic panel   Result Value Ref Range    Glucose 175 (H) 65 - 99 mg/dL    BUN 47 (H) 8 - 27 mg/dL    Creatinine 5.92 (H) 0.57 - 1.00 mg/dL    eGFR Non African Am 7 (L) >59 mL/min/1.73    eGFR African Am 8 (L) >59 mL/min/1.73    BUN/Creatinine Ratio 8 (L) 12 - 28    Sodium 145 (H) 134 - 144 mmol/L    Potassium 4.7 3.5 - 5.2 mmol/L    Chloride 102 96 - 106 mmol/L    Total CO2 24 18 - 29 mmol/L    Calcium 9.1 8.7 - 10.3 mg/dL    Total Protein 7.8 6.0 - 8.5 g/dL    Albumin 4.0 3.5 - 4.8 g/dL    Globulin 3.8 1.5 - 4.5 g/dL    A/G Ratio 1.1 (L) 1.2 - 2.2    Total Bilirubin 0.3 0.0 - 1.2 mg/dL    Alkaline Phosphatase 127 (H) 39 - 117 IU/L    AST (SGOT) 13 0 - 40 IU/L    ALT (SGPT) 10 0 - 32 IU/L   Lipid panel   Result Value Ref Range    Total Cholesterol 189 100 - 199 mg/dL    Triglycerides 97 0 - 149 mg/dL    HDL Cholesterol 54 >39 mg/dL    VLDL Cholesterol 19 5 - 40 mg/dL    LDL Cholesterol   116 (H) 0 - 99 mg/dL   TSH   Result Value Ref Range    TSH 1.350 0.450 - 4.500 uIU/mL   Vitamin D 25 hydroxy   Result Value Ref Range    25 Hydroxy, Vitamin D 15.9 (L) 30.0 - 100.0 ng/mL   HCV Antibody Rfx To Qnt PCR   Result Value Ref Range    Hepatitis C Ab <0.1 0.0 - 0.9 s/co ratio         Assessment and Plan:         1. Chronic diastolic congestive heart failure  - Euvolemic    2. Essential hypertension  - Elevated today but she is not taking her meds consistently. She has multiple medication intolerances and reported episodes of hypotension.  Advised her to take her medications regularly.  - Continue close follow up with nephrology    3. Coronary artery disease involving native coronary artery of native heart with angina pectoris  - Occasional stable angina. Continue nitro PRN and imdur    4. End stage renal disease on dialysis  - Tolerating hemodialysis  - Followed by nephrology associates    5. Hyperlipidemia  Encouraged patient to resume lipitor 80 mg qhs.   - LDL not at goal    6. Goiter with multiple thyroid nodules  - Reviewed thyroid US from 2016 which showed multiple nodules and goiter  - Will repeat ultrasound and refer to endocrinology  It has been a pleasure to participate in the care of this patient.  Patient was instructed to call the [facility] with any questions, concerns, or worsening symptoms.    * Please note that portions of this note were completed with a voice recognition program. Efforts were made to edit the dictation but occasionally words are transcribed.

## 2018-01-19 RX ORDER — ATORVASTATIN CALCIUM 80 MG/1
80 TABLET, FILM COATED ORAL NIGHTLY
Qty: 90 TABLET | Refills: 1 | Status: SHIPPED | OUTPATIENT
Start: 2018-01-19 | End: 2020-03-11 | Stop reason: SDUPTHER

## 2018-01-23 ENCOUNTER — TELEPHONE (OUTPATIENT)
Dept: CARDIOLOGY | Facility: CLINIC | Age: 73
End: 2018-01-23

## 2018-01-23 RX ORDER — AMIODARONE HYDROCHLORIDE 200 MG/1
200 TABLET ORAL 2 TIMES DAILY
Qty: 60 TABLET | Refills: 5 | Status: SHIPPED | OUTPATIENT
Start: 2018-01-23 | End: 2018-07-26 | Stop reason: SDUPTHER

## 2018-01-23 NOTE — TELEPHONE ENCOUNTER
Patient called about HM results.  Per KTS She needs Amiodarone 200 mg BID for a month and then 200 mg daily and follow up in March 2018.  Patient verbalizes understanding.

## 2018-01-25 ENCOUNTER — HOSPITAL ENCOUNTER (OUTPATIENT)
Dept: ULTRASOUND IMAGING | Facility: HOSPITAL | Age: 73
Discharge: HOME | End: 2018-01-25
Admitting: NURSE PRACTITIONER

## 2018-01-25 ENCOUNTER — TELEPHONE (OUTPATIENT)
Dept: CARDIOLOGY | Facility: HOSPITAL | Age: 73
End: 2018-01-25

## 2018-01-25 DIAGNOSIS — E04.9 GOITER: ICD-10-CM

## 2018-01-25 DIAGNOSIS — E04.2 MULTIPLE THYROID NODULES: ICD-10-CM

## 2018-01-25 PROCEDURE — 76536 US EXAM OF HEAD AND NECK: CPT

## 2018-01-25 NOTE — TELEPHONE ENCOUNTER
----- Message from Ofelia Zelaya sent at 1/24/2018  9:23 AM EST -----  Mrs. Pop verbalized understanding of the medication update/instructions.  Ofelia  ----- Message -----     From: WILMA Chacon     Sent: 1/19/2018   8:20 AM       To: Ofelia Gilbert,  Would u mind to call Ms. Pop and tell her that the new cholesterol medication we talked about yesterday is not approved in dialysis patients. Lipitor is however safe with dialysis patients. Tell her that I would like to resume lipitor because I don't feel like the lipitor is causing the palpitations. Thanks

## 2018-03-01 ENCOUNTER — OFFICE VISIT (OUTPATIENT)
Dept: INTERNAL MEDICINE | Facility: CLINIC | Age: 73
End: 2018-03-01

## 2018-03-01 VITALS
SYSTOLIC BLOOD PRESSURE: 142 MMHG | HEIGHT: 67 IN | DIASTOLIC BLOOD PRESSURE: 60 MMHG | BODY MASS INDEX: 31.39 KG/M2 | OXYGEN SATURATION: 96 % | WEIGHT: 200 LBS | HEART RATE: 73 BPM

## 2018-03-01 DIAGNOSIS — Z99.2 TYPE 2 DIABETES MELLITUS WITH CHRONIC KIDNEY DISEASE ON CHRONIC DIALYSIS, WITH LONG-TERM CURRENT USE OF INSULIN (HCC): ICD-10-CM

## 2018-03-01 DIAGNOSIS — I10 ESSENTIAL HYPERTENSION: Primary | ICD-10-CM

## 2018-03-01 DIAGNOSIS — E11.22 TYPE 2 DIABETES MELLITUS WITH CHRONIC KIDNEY DISEASE ON CHRONIC DIALYSIS, WITH LONG-TERM CURRENT USE OF INSULIN (HCC): ICD-10-CM

## 2018-03-01 DIAGNOSIS — N18.6 TYPE 2 DIABETES MELLITUS WITH CHRONIC KIDNEY DISEASE ON CHRONIC DIALYSIS, WITH LONG-TERM CURRENT USE OF INSULIN (HCC): ICD-10-CM

## 2018-03-01 DIAGNOSIS — I50.32 CHRONIC DIASTOLIC CONGESTIVE HEART FAILURE (HCC): ICD-10-CM

## 2018-03-01 DIAGNOSIS — R53.83 FATIGUE, UNSPECIFIED TYPE: ICD-10-CM

## 2018-03-01 DIAGNOSIS — I25.119 CORONARY ARTERY DISEASE INVOLVING NATIVE CORONARY ARTERY OF NATIVE HEART WITH ANGINA PECTORIS (HCC): ICD-10-CM

## 2018-03-01 DIAGNOSIS — E78.5 HYPERLIPIDEMIA LDL GOAL <70: ICD-10-CM

## 2018-03-01 DIAGNOSIS — T82.898D ARTERIOVENOUS FISTULA OCCLUSION, SUBSEQUENT ENCOUNTER: ICD-10-CM

## 2018-03-01 DIAGNOSIS — Z79.4 TYPE 2 DIABETES MELLITUS WITH CHRONIC KIDNEY DISEASE ON CHRONIC DIALYSIS, WITH LONG-TERM CURRENT USE OF INSULIN (HCC): ICD-10-CM

## 2018-03-01 LAB — HBA1C MFR BLD: 7.6 %

## 2018-03-01 PROCEDURE — 81003 URINALYSIS AUTO W/O SCOPE: CPT | Performed by: INTERNAL MEDICINE

## 2018-03-01 PROCEDURE — 83036 HEMOGLOBIN GLYCOSYLATED A1C: CPT | Performed by: INTERNAL MEDICINE

## 2018-03-01 PROCEDURE — 99214 OFFICE O/P EST MOD 30 MIN: CPT | Performed by: INTERNAL MEDICINE

## 2018-03-01 RX ORDER — AMLODIPINE BESYLATE 10 MG/1
10 TABLET ORAL
Qty: 30 TABLET | Refills: 5 | Status: SHIPPED | OUTPATIENT
Start: 2018-03-01 | End: 2018-08-14 | Stop reason: SDUPTHER

## 2018-03-01 RX ORDER — LISINOPRIL 5 MG/1
TABLET ORAL
COMMUNITY
Start: 2018-02-02 | End: 2018-03-01 | Stop reason: ALTCHOICE

## 2018-03-01 NOTE — PROGRESS NOTES
Med Refill (Amlodipine ); Post-op Follow-up (Blood clot removal ); and Medication Problem    Subjective   Esperanza Pop is a 73 y.o. female is here today for follow-up.    History of Present Illness   Was at [facility] last Monday for a removal of blood clot in her vein from HD.  Done by Dr. Tan.  Here for f/u on DM2, chf and HTN.  She feels tired and off balance since her surgery, unsure if anesthesia.  Insulin regimen is up an down, tries to take it regularly though.  Needs DM shoes.    Current Outpatient Prescriptions:   •  amiodarone (PACERONE) 200 MG tablet, Take 1 tablet by mouth 2 (Two) Times a Day. Take 1 tablet twice daily for one month and then take one tablet daily 200 mg once daily., Disp: 60 tablet, Rfl: 5  •  amLODIPine (NORVASC) 10 MG tablet, Take 1 tablet by mouth Daily., Disp: 30 tablet, Rfl: 5  •  aspirin 81 MG EC tablet, Take 1 tablet by mouth Daily. (Patient taking differently: Take 81 mg by mouth Every Other Day.), Disp: , Rfl:   •  atorvastatin (LIPITOR) 80 MG tablet, Take 1 tablet by mouth Every Night., Disp: 90 tablet, Rfl: 1  •  Blood Glucose Monitoring Suppl (ACCU-CHEK NADER PLUS) W/DEVICE kit, DX: E11.65, Disp: 1 kit, Rfl: 0  •  carvedilol (COREG) 25 MG tablet, Take 1 tablet by mouth Every 12 (Twelve) Hours., Disp: 60 tablet, Rfl: 5  •  Cholecalciferol (VITAMIN D3) 2000 UNITS tablet, Take  by mouth 3 (Three) Times a Week., Disp: , Rfl:   •  clopidogrel (PLAVIX) 75 MG tablet, Take 1 tablet by mouth Daily. (Patient taking differently: Take 75 mg by mouth Every Other Day.), Disp: 30 tablet, Rfl: 2  •  dorzolamide-timolol (COSOPT) 22.3-6.8 MG/ML ophthalmic solution, Administer 1 drop to both eyes Daily., Disp: , Rfl:   •  Ferric Citrate 1  MG(Fe) tablet, Take 1 tablet by mouth As Needed., Disp: , Rfl:   •  fluticasone (FLONASE) 50 MCG/ACT nasal spray, 2 sprays into each nostril Daily., Disp: , Rfl:   •  glucose blood (ACCU-CHEK NADER PLUS) test strip, Use as instructed 1 TO 3 TIMES DAILY   DX: E11.65, Disp: 300 each, Rfl: 3  •  hydrALAZINE (APRESOLINE) 50 MG tablet, Take 1 tablet by mouth 3 (Three) Times a Day., Disp: , Rfl:   •  insulin NPH-insulin regular (novoLIN 70/30) (70-30) 100 UNIT/ML injection, Inject 12 units before dinner and 8 Units before breakfast., Disp: 10 mL, Rfl: 5  •  IRON DEXTRAN IJ, Inject  as directed 3 (Three) Times a Week., Disp: , Rfl:   •  isosorbide mononitrate (IMDUR) 120 MG 24 hr tablet, Take 1 tablet by mouth daily., Disp: 90 tablet, Rfl: 2  •  Lancets Misc. (ACCU-CHEK SOFTCLIX LANCET DEV) kit, TEST 1-3 TIMES DAILY DX:E11.65, Disp: 1 kit, Rfl: 0  •  latanoprost (XALATAN) 0.005 % ophthalmic solution, Administer 1 drop to both eyes Every Night., Disp: , Rfl:   •  lisinopril (PRINIVIL,ZESTRIL) 20 MG tablet, Take 1 tablet by mouth Daily., Disp: 90 tablet, Rfl: 4  •  nitroglycerin (NITROLINGUAL) 0.4 MG/SPRAY spray, Place 1 spray under the tongue Every 5 (Five) Minutes As Needed for Chest Pain., Disp: 1 each, Rfl: 12  •  torsemide (DEMADEX) 100 MG tablet, Take 0.5 tablets by mouth Daily As Needed., Disp: , Rfl:   •  famotidine (PEPCID) 20 MG tablet, Take 20 mg by mouth Daily As Needed., Disp: , Rfl:       The following portions of the patient's history were reviewed and updated as appropriate: allergies, current medications, past family history, past medical history, past social history, past surgical history and problem list.    Review of Systems   Constitutional: Positive for fatigue. Negative for chills and fever.   HENT: Negative for ear discharge, ear pain, sinus pressure and sore throat.    Respiratory: Negative for cough, chest tightness and shortness of breath.    Cardiovascular: Negative for chest pain, palpitations and leg swelling.   Gastrointestinal: Negative for diarrhea, nausea and vomiting.   Musculoskeletal: Negative for arthralgias, back pain and myalgias.   Skin: Positive for wound (arm healed).   Neurological: Positive for dizziness, weakness and  "light-headedness. Negative for syncope and headaches.   Psychiatric/Behavioral: Negative for confusion and sleep disturbance.       Objective   /60  Pulse 73  Ht 170.2 cm (67.01\")  Wt 90.7 kg (200 lb)  LMP  (LMP Unknown)  SpO2 96%  BMI 31.32 kg/m2  Physical Exam   Constitutional: She is oriented to person, place, and time. She appears well-developed and well-nourished.   HENT:   Head: Normocephalic and atraumatic.   Mouth/Throat: No oropharyngeal exudate.   Eyes: Conjunctivae are normal. Pupils are equal, round, and reactive to light.   Neck: Neck supple. No thyromegaly present.   Cardiovascular: Normal rate and regular rhythm.    Pulmonary/Chest: Effort normal. She has rales.   Abdominal: Soft. Bowel sounds are normal. She exhibits no distension. There is no tenderness.   Musculoskeletal: She exhibits edema.   Neurological: She is alert and oriented to person, place, and time. No cranial nerve deficit.   Skin: Skin is warm and dry.   Left arm surgical site healed   Psychiatric: She has a normal mood and affect. Judgment normal.   Nursing note and vitals reviewed.        Results for orders placed or performed in visit on 03/01/18   CBC (No Diff)   Result Value Ref Range    WBC 7.84 3.50 - 10.80 10*3/mm3    RBC 3.67 (L) 3.89 - 5.14 10*6/mm3    Hemoglobin 10.4 (L) 11.5 - 15.5 g/dL    Hematocrit 34.5 34.5 - 44.0 %    MCV 94.0 80.0 - 99.0 fL    MCH 28.3 27.0 - 31.0 pg    MCHC 30.1 (L) 32.0 - 36.0 g/dL    RDW 13.8 11.3 - 14.5 %    Platelets 220 150 - 450 10*3/mm3   Comprehensive Metabolic Panel   Result Value Ref Range    Glucose 141 (H) 70 - 100 mg/dL    BUN 40 (H) 9 - 23 mg/dL    Creatinine 6.80 (H) 0.60 - 1.30 mg/dL    eGFR Non African Am 6 (L) >60 mL/min/1.73    eGFR African Am 7 (L) >60 mL/min/1.73    BUN/Creatinine Ratio 5.9 (L) 7.0 - 25.0    Sodium 137 132 - 146 mmol/L    Potassium 4.5 3.5 - 5.5 mmol/L    Chloride 97 (L) 99 - 109 mmol/L    Total CO2 29.0 20.0 - 31.0 mmol/L    Calcium 8.6 (L) 8.7 - 10.4 " mg/dL    Total Protein 8.1 5.7 - 8.2 g/dL    Albumin 4.10 3.20 - 4.80 g/dL    Globulin 4.0 gm/dL    A/G Ratio 1.0 (L) 1.5 - 2.5 g/dL    Total Bilirubin 0.2 (L) 0.3 - 1.2 mg/dL    Alkaline Phosphatase 92 25 - 100 U/L    AST (SGOT) 14 0 - 33 U/L    ALT (SGPT) 12 7 - 40 U/L   TSH   Result Value Ref Range    TSH 1.657 0.350 - 5.350 mIU/mL   T4, Free   Result Value Ref Range    Free T4 1.08 0.89 - 1.76 ng/dL   BNP   Result Value Ref Range    BNP CANCELED pg/mL   Request Problem   Result Value Ref Range    Request Problem CANCELED    POC Urinalysis Dipstick, Automated   Result Value Ref Range    Color Yellow Yellow, Straw, Dark Yellow, Yoana    Clarity, UA Clear Clear    Glucose, UA Negative Negative, 1000 mg/dL (3+) mg/dL    Bilirubin Negative Negative    Ketones, UA Negative Negative    Specific Gravity  1.010 1.005 - 1.030    Blood, UA Negative Negative    pH, Urine 5.0 5.0 - 8.0    Protein, POC Negative Negative mg/dL    Urobilinogen, UA Normal Normal    Leukocytes Negative Negative    Nitrite, UA Negative Negative   POC Glycosylated Hemoglobin (Hb A1C)   Result Value Ref Range    Hemoglobin A1C 7.6 %             Assessment/Plan   Diagnoses and all orders for this visit:    Essential hypertension  -     amLODIPine (NORVASC) 10 MG tablet; Take 1 tablet by mouth Daily.    Arteriovenous fistula occlusion, subsequent encounter    Fatigue, unspecified type  -     CBC (No Diff)  -     Comprehensive Metabolic Panel  -     TSH  -     T4, Free  -     POC Urinalysis Dipstick, Automated    Coronary artery disease involving native coronary artery of native heart with angina pectoris  -     TSH  -     T4, Free    Hyperlipidemia LDL goal <70  -     Comprehensive Metabolic Panel  -     TSH  -     T4, Free    Chronic diastolic congestive heart failure  -     BNP    Type 2 diabetes mellitus with chronic kidney disease on chronic dialysis, with long-term current use of insulin  -     POC Glycosylated Hemoglobin (Hb A1C)    Other  orders  -     Discontinue: lisinopril (PRINIVIL,ZESTRIL) 5 MG tablet;   -     Request Problem      Sugars stable, continue current insilin regimen.           Return in about 3 months (around 6/1/2018) for Next scheduled follow up.foot exam and diabetic shoes at f/u

## 2018-03-02 ENCOUNTER — TELEPHONE (OUTPATIENT)
Dept: INTERNAL MEDICINE | Facility: CLINIC | Age: 73
End: 2018-03-02

## 2018-03-02 LAB
ALBUMIN SERPL-MCNC: 4.1 G/DL (ref 3.2–4.8)
ALBUMIN/GLOB SERPL: 1 G/DL (ref 1.5–2.5)
ALP SERPL-CCNC: 92 U/L (ref 25–100)
ALT SERPL-CCNC: 12 U/L (ref 7–40)
AST SERPL-CCNC: 14 U/L (ref 0–33)
BILIRUB BLD-MCNC: NEGATIVE MG/DL
BILIRUB SERPL-MCNC: 0.2 MG/DL (ref 0.3–1.2)
BNP SERPL-MCNC: NORMAL PG/ML
BUN SERPL-MCNC: 40 MG/DL (ref 9–23)
BUN/CREAT SERPL: 5.9 (ref 7–25)
CALCIUM SERPL-MCNC: 8.6 MG/DL (ref 8.7–10.4)
CHLORIDE SERPL-SCNC: 97 MMOL/L (ref 99–109)
CLARITY, POC: CLEAR
CO2 SERPL-SCNC: 29 MMOL/L (ref 20–31)
COLOR UR: YELLOW
CREAT SERPL-MCNC: 6.8 MG/DL (ref 0.6–1.3)
ERYTHROCYTE [DISTWIDTH] IN BLOOD BY AUTOMATED COUNT: 13.8 % (ref 11.3–14.5)
GFR SERPLBLD CREATININE-BSD FMLA CKD-EPI: 6 ML/MIN/1.73
GFR SERPLBLD CREATININE-BSD FMLA CKD-EPI: 7 ML/MIN/1.73
GLOBULIN SER CALC-MCNC: 4 GM/DL
GLUCOSE SERPL-MCNC: 141 MG/DL (ref 70–100)
GLUCOSE UR STRIP-MCNC: NEGATIVE MG/DL
HCT VFR BLD AUTO: 34.5 % (ref 34.5–44)
HGB BLD-MCNC: 10.4 G/DL (ref 11.5–15.5)
KETONES UR QL: NEGATIVE
LEUKOCYTE EST, POC: NEGATIVE
MCH RBC QN AUTO: 28.3 PG (ref 27–31)
MCHC RBC AUTO-ENTMCNC: 30.1 G/DL (ref 32–36)
MCV RBC AUTO: 94 FL (ref 80–99)
NITRITE UR-MCNC: NEGATIVE MG/ML
PH UR: 5 [PH] (ref 5–8)
PLATELET # BLD AUTO: 220 10*3/MM3 (ref 150–450)
POTASSIUM SERPL-SCNC: 4.5 MMOL/L (ref 3.5–5.5)
PROT SERPL-MCNC: 8.1 G/DL (ref 5.7–8.2)
PROT UR STRIP-MCNC: NEGATIVE MG/DL
RBC # BLD AUTO: 3.67 10*6/MM3 (ref 3.89–5.14)
RBC # UR STRIP: NEGATIVE /UL
REQUEST PROBLEM: NORMAL
SODIUM SERPL-SCNC: 137 MMOL/L (ref 132–146)
SP GR UR: 1.01 (ref 1–1.03)
T4 FREE SERPL-MCNC: 1.08 NG/DL (ref 0.89–1.76)
TSH SERPL DL<=0.005 MIU/L-ACNC: 1.66 MIU/ML (ref 0.35–5.35)
UROBILINOGEN UR QL: NORMAL
WBC # BLD AUTO: 7.84 10*3/MM3 (ref 3.5–10.8)

## 2018-03-02 NOTE — TELEPHONE ENCOUNTER
Spoke with Patrizia and informed her it was a mistake on our part a tube was sent but was not frozen Will call and inform patient that she will need to come back in for a redraw

## 2018-03-02 NOTE — TELEPHONE ENCOUNTER
[facility] CALLED, STATES A BNP WAS ORDERED BUT THEY DID NOT RECEIVE A FROZEN SPECIMEN TO COMPLETE THIS ORDER. WAS THIS OVERLOOKED OR NOT DONE? PLEASE RETURN CALL TO [facility] AT THE NUMBER LISTED BELOW.    [facility] [phone] #[id number]

## 2018-03-23 ENCOUNTER — OFFICE VISIT (OUTPATIENT)
Dept: CARDIOLOGY | Facility: CLINIC | Age: 73
End: 2018-03-23

## 2018-03-23 VITALS
SYSTOLIC BLOOD PRESSURE: 144 MMHG | DIASTOLIC BLOOD PRESSURE: 56 MMHG | WEIGHT: 200 LBS | HEART RATE: 76 BPM | BODY MASS INDEX: 31.39 KG/M2 | HEIGHT: 67 IN

## 2018-03-23 DIAGNOSIS — Z99.2 TYPE 2 DIABETES MELLITUS WITH CHRONIC KIDNEY DISEASE ON CHRONIC DIALYSIS, WITH LONG-TERM CURRENT USE OF INSULIN (HCC): ICD-10-CM

## 2018-03-23 DIAGNOSIS — N18.6 END STAGE RENAL DISEASE ON DIALYSIS (HCC): ICD-10-CM

## 2018-03-23 DIAGNOSIS — I25.9 ISCHEMIC HEART DISEASE: Primary | ICD-10-CM

## 2018-03-23 DIAGNOSIS — N18.6 TYPE 2 DIABETES MELLITUS WITH CHRONIC KIDNEY DISEASE ON CHRONIC DIALYSIS, WITH LONG-TERM CURRENT USE OF INSULIN (HCC): ICD-10-CM

## 2018-03-23 DIAGNOSIS — Z99.2 END STAGE RENAL DISEASE ON DIALYSIS (HCC): ICD-10-CM

## 2018-03-23 DIAGNOSIS — E11.22 TYPE 2 DIABETES MELLITUS WITH CHRONIC KIDNEY DISEASE ON CHRONIC DIALYSIS, WITH LONG-TERM CURRENT USE OF INSULIN (HCC): ICD-10-CM

## 2018-03-23 DIAGNOSIS — Z79.4 TYPE 2 DIABETES MELLITUS WITH CHRONIC KIDNEY DISEASE ON CHRONIC DIALYSIS, WITH LONG-TERM CURRENT USE OF INSULIN (HCC): ICD-10-CM

## 2018-03-23 DIAGNOSIS — I10 ESSENTIAL HYPERTENSION: ICD-10-CM

## 2018-03-23 DIAGNOSIS — I50.32 CHRONIC DIASTOLIC CONGESTIVE HEART FAILURE (HCC): ICD-10-CM

## 2018-03-23 PROCEDURE — 99214 OFFICE O/P EST MOD 30 MIN: CPT | Performed by: INTERNAL MEDICINE

## 2018-03-23 PROCEDURE — 93000 ELECTROCARDIOGRAM COMPLETE: CPT | Performed by: INTERNAL MEDICINE

## 2018-03-23 NOTE — PROGRESS NOTES
Subjective:     Encounter Date:03/23/2018    Patient ID: Esperanza Pop is a 73 y.o.   female, retired , from [place], [place].      INTERNIST: Meghana Rodgers MD  REFERRING HEALTHCARE PROVIDER: [facility]   INTERVENTIONAL CARDIOLOGIST: Hugh Pop MD, [facility], [facility]  ADVANCED HEART FAILURE CLINIC: [facility]  NEPHROLOGIST: [facility]  INTERVENTIONAL CARDIOLOGIST:  Scooter Zhao MD, [facility]   VASCULAR SURGEON:  Demetrius Rich MD-[facility]    Chief Complaint:   Chief Complaint   Patient presents with   • Palpitations   • Coronary Artery Disease   • Hypertension   • Congestive Heart Failure   • Dizziness     Problem List:  1. Ischemic heart disease:  a. Acute non-STEMI/congestive heart failure with diagnostic coronary arteriography demonstrating 20% LAD plaque with high-grade stenosis in the proximal mid right coronary artery requiring a Xience drug-eluting stent (3.0 mm x 28 mm) with reduced echocardiographic LVEF (0.25) with abnormal diastolic LV dysfunction, December 2013.   b. Improved echocardiogram, April 2014.  c. Remote non-STEMI related to malignant hypertension with distal RCA occlusion, patent previous RCA stent with mild inferior hypokinesis but overall acceptable systolic LV function (LVEF 0.55) with PTCA, DARRYL distal RCA, October 2016.  d. Residual CCS class I angina pectoris/NYHA class II CHF.  2. Mild aortic stenosis (June 2016).  3. Severe hypertension - probably essential.   4. Dyslipidemia.  5. Type 2 diabetes mellitus type 2 with suboptimal control (hemoglobin A1c 8.8%; March 2018, 7.6%).  6. Mild obesity (BMI 31.3).  7. Acute kidney injury: Admission from 12/26/2013 to 01/02/2014, now resolved with latest creatinine 1.4 on 01/08/2014.  8. Moderate normocytic normochromic anemia; hemoglobin 9.2 gm/dL (June 2016).  9. Noncompliance.  10. Remote apparent end-stage renal disease with initiation of hemodialysis MWF weekly  and construction of right upper extremity AV fistula; data deficit (June 2016) with subsequent AV fistula dysfunction and fistulogram with angioplasty - [facility], October 2017.  11. AV fistulogram with apparent thrombectomy [facility], February 2018  12. Palpitations with recent abnormal Holter monitor demonstrating intermittent brief atrial fibrillation, December 2017/January 2018  13. Remote operations:  a. 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 Abdominal hernia repair/panniculectomy  c. Hysterectomy      Allergies   Allergen Reactions   • Albuterol      Increased blood pressure  Used right before heart attack   • Mircera [Methoxy Polyethylene Glycol-Epoetin Beta]      Pt stopped breathing   • Penicillins Hives   • Prednisone      Makes jittery/anxious   • Venofer [Iron Sucrose]      Pt stopped breathing         Current Outpatient Prescriptions:   •  amiodarone (PACERONE) 200 MG tablet, Take 1 tablet by mouth 2 (Two) Times a Day. Take 1 tablet twice daily for one month and then take one tablet daily 200 mg once daily., Disp: 60 tablet, Rfl: 5  •  amLODIPine (NORVASC) 10 MG tablet, Take 1 tablet by mouth Daily., Disp: 30 tablet, Rfl: 5  •  aspirin 81 MG EC tablet, Take 1 tablet by mouth Daily. (Patient taking differently: Take 81 mg by mouth Every Other Day.), Disp: , Rfl:   •  atorvastatin (LIPITOR) 80 MG tablet, Take 1 tablet by mouth Every Night., Disp: 90 tablet, Rfl: 1  •  Blood Glucose Monitoring Suppl (ACCU-CHEK NADER PLUS) W/DEVICE kit, DX: E11.65, Disp: 1 kit, Rfl: 0  •  carvedilol (COREG) 25 MG tablet, Take 1 tablet by mouth Every 12 (Twelve) Hours., Disp: 60 tablet, Rfl: 5  •  Cholecalciferol (VITAMIN D3) 2000 UNITS tablet, Take  by mouth 3 (Three) Times a Week., Disp: , Rfl:   •  clopidogrel (PLAVIX) 75 MG tablet, Take 1 tablet by mouth Daily. (Patient taking differently: Take 75 mg by mouth Every Other Day.), Disp: 30 tablet, Rfl: 2  •  dorzolamide-timolol (COSOPT) 22.3-6.8  "MG/ML ophthalmic solution, Administer 1 drop to both eyes Daily., Disp: , Rfl:   •  famotidine (PEPCID) 20 MG tablet, Take 20 mg by mouth Daily As Needed., Disp: , Rfl:   •  Ferric Citrate 1  MG(Fe) tablet, Take 1 tablet by mouth As Needed., Disp: , Rfl:   •  fluticasone (FLONASE) 50 MCG/ACT nasal spray, 2 sprays into each nostril Daily., Disp: , Rfl:   •  glucose blood (ACCU-CHEK NADER PLUS) test strip, Use as instructed 1 TO 3 TIMES DAILY  DX: E11.65, Disp: 300 each, Rfl: 3  •  hydrALAZINE (APRESOLINE) 50 MG tablet, Take 1 tablet by mouth 3 (Three) Times a Day., Disp: , Rfl:   •  insulin NPH-insulin regular (novoLIN 70/30) (70-30) 100 UNIT/ML injection, Inject 12 units before dinner and 8 Units before breakfast., Disp: 10 mL, Rfl: 5  •  IRON DEXTRAN IJ, Inject  as directed 3 (Three) Times a Week., Disp: , Rfl:   •  isosorbide mononitrate (IMDUR) 120 MG 24 hr tablet, Take 1 tablet by mouth daily., Disp: 90 tablet, Rfl: 2  •  Lancets Misc. (ACCU-CHEK SOFTCLIX LANCET DEV) kit, TEST 1-3 TIMES DAILY DX:E11.65, Disp: 1 kit, Rfl: 0  •  latanoprost (XALATAN) 0.005 % ophthalmic solution, Administer 1 drop to both eyes Every Night., Disp: , Rfl:   •  lisinopril (PRINIVIL,ZESTRIL) 20 MG tablet, Take 1 tablet by mouth Daily., Disp: 90 tablet, Rfl: 4  •  nitroglycerin (NITROLINGUAL) 0.4 MG/SPRAY spray, Place 1 spray under the tongue Every 5 (Five) Minutes As Needed for Chest Pain., Disp: 1 each, Rfl: 12  •  torsemide (DEMADEX) 100 MG tablet, Take 0.5 tablets by mouth Daily As Needed., Disp: , Rfl:     HISTORY OF PRESENT ILLNESS: Patient returns for followup after a 3-month hiatus. She comes in today to discuss the results of her recent Holter monitor and to discuss the use of amiodarone to get rid of her extra beats.  She has noticed a change in her symptoms with the amiodarone and states that she is sleeping better.  She is up and about on a daily basis.  She notes that she gets \"oli nauseated\" in the early morning " "hours; she will get up and eat a piece of fruit.  She has not experienced any chest tightness or pressure.  Her blood pressure is elevated today, and she says that today, during dialysis, her blood pressure went down and then came back up.  She indicates that her nephrologists \"don't talk to me too much about my blood pressure.\"  She feels her palpitations are much less notable, and she is much more comfortable on a daily basis with no recurrent \"skipped beats or pauses.\"  No tobacco or NTG use.        Review of Systems   Skin: Positive for dry skin.   Musculoskeletal: Positive for neck pain and stiffness.      Obtained and otherwise negative except as outlined in problem list and HPI.      ECG 12 Lead  Date/Time: 3/23/2018 3:55 PM  Performed by: CALEB HENRY  Authorized by: CALEB HENRY   Comparison: compared with previous ECG from 12/22/2017  Similar to previous ECG  Rhythm: sinus rhythm  BPM: 73  Other findings: prolonged QTc interval  Clinical impression: abnormal ECG  Comments: Nonspecific ST-T changes  QRS 92 ms  QTc 493 ms   ms               Objective:       Vitals:    03/23/18 1508 03/23/18 1512 03/23/18 1530   BP: 178/77 144/59 144/56   BP Location: Left arm Left arm Left arm   Patient Position: Sitting Standing Sitting   Pulse: 73 76    Weight: 90.7 kg (200 lb)     Height: 170.2 cm (67\")       Body mass index is 31.32 kg/m².   Last weight:  201 lbs.    Physical Exam   Constitutional: She is oriented to person, place, and time. She appears well-developed and well-nourished.   Neck: No JVD present. Carotid bruit is present (bilateral). No thyromegaly present.   Cardiovascular: Regular rhythm, S1 normal and S2 normal.  Exam reveals no gallop, no S3 and no friction rub.    Murmur heard.   Medium-pitched harsh early systolic murmur is present with a grade of 2/6  at the upper right sternal border radiating to the neck  Pulses:       Carotid pulses are 1+ on the right side, and 1+ on the left side.   "     Radial pulses are 1+ on the right side, and 1+ on the left side.        Femoral pulses are 1+ on the right side, and 1+ on the left side.       Popliteal pulses are 1+ on the right side, and 1+ on the left side.        Dorsalis pedis pulses are 1+ on the right side, and 1+ on the left side.        Posterior tibial pulses are 1+ on the right side, and 1+ on the left side.   Pulmonary/Chest: Effort normal. She has decreased breath sounds. She has no wheezes. She has no rhonchi. She has no rales.   Abdominal: Soft. She exhibits no mass. There is no hepatosplenomegaly. There is no tenderness. There is no guarding.   Bowel sounds audible x4   Musculoskeletal: Normal range of motion. She exhibits no edema.   Lymphadenopathy:     She has no cervical adenopathy.   Neurological: She is alert and oriented to person, place, and time.   Skin: Skin is warm, dry and intact. No rash noted.   Vitals reviewed.        Lab Review:   Lab Results   Component Value Date    GLUCOSE 118 (H) 10/11/2016    BUN 40 (H) 03/01/2018    CREATININE 6.80 (H) 03/01/2018    EGFRIFNONA 6 (L) 03/01/2018    EGFRIFAFRI 7 (L) 03/01/2018    BCR 5.9 (L) 03/01/2018    CO2 29.0 03/01/2018    CALCIUM 8.6 (L) 03/01/2018    PROTENTOTREF 8.1 03/01/2018    ALBUMIN 4.10 03/01/2018    LABIL2 1.0 (L) 03/01/2018    AST 14 03/01/2018    ALT 12 03/01/2018       Lab Results   Component Value Date    WBC 7.84 03/01/2018    HGB 10.4 (L) 03/01/2018    HCT 34.5 03/01/2018    MCV 94.0 03/01/2018     03/01/2018       Lab Results   Component Value Date    HGBA1C 7.6 03/01/2018       Lab Results   Component Value Date    TSH 1.657 03/01/2018   T4, Free - 1.08 (03/01/2018)      Lab Results   Component Value Date    CHOL 261 (H) 10/11/2016    CHOL 293 (H) 10/10/2016   TOTAL CHOLESTEROL - 189 (12/22/2017)    Lab Results   Component Value Date    TRIG 97 12/22/2017    TRIG 134 03/21/2017     Lab Results   Component Value Date    HDL 54 12/22/2017    HDL 60 03/21/2017      Lab Results   Component Value Date     (H) 12/22/2017     (H) 03/21/2017       Lab Results   Component Value Date    BNP CANCELED 03/01/2018     Vitamin D - 15.9 (12/22/2017)        Assessment:   Overall continued acceptable course with no interim cardiopulmonary complaints with good functional status. We will defer additional diagnostic or therapeutic intervention from a cardiac perspective at this time.  Because of her continuing dialysis 3 times weekly, as well as only brief atrial fibrillation, we will defer formal anticoagulation at this time and continue dual antiplatelet therapy.  She seems to have had marked improvement in symptoms on amiodarone, and hopefully, she will be able to tolerate low-dose amiodarone in view of the fact that her ESRD and chronic ischemic heart disease preclude the use of other antiarrhythmic drug therapy.       Diagnosis Plan   1. Ischemic heart disease  No recurrent angina pectoris or CHF on limited activity   2. Chronic diastolic congestive heart failure  No recurrent symptoms   3. Essential hypertension  Continued labile blood pressure readings   4. End stage renal disease on dialysis  Per Nephrology   5. Type 2 diabetes mellitus with chronic kidney disease on chronic dialysis, with long-term current use of insulin  No data to review          Plan:         1. Patient to continue current medications and close follow up with the above providers.  2. Patient is to continue taking her amiodarone  3. Tentative cardiology follow up in July 2018, or patient may return sooner PRN.       Transcribed by Mary Rivera for Dr. Demetrius Sagastume at 3:30 PM on 03/23/2018    IDemetrius MD, [facility], personally performed the services described in this documentation as scribed by the above named individual in my presence, and it is both accurate and complete. At 3:56 PM on 03/23/2018

## 2018-04-13 ENCOUNTER — OFFICE VISIT (OUTPATIENT)
Dept: INTERNAL MEDICINE | Facility: CLINIC | Age: 73
End: 2018-04-13

## 2018-04-13 VITALS
WEIGHT: 200 LBS | BODY MASS INDEX: 31.32 KG/M2 | DIASTOLIC BLOOD PRESSURE: 82 MMHG | SYSTOLIC BLOOD PRESSURE: 142 MMHG | TEMPERATURE: 98.9 F

## 2018-04-13 DIAGNOSIS — J40 BRONCHITIS: Primary | ICD-10-CM

## 2018-04-13 PROCEDURE — 99213 OFFICE O/P EST LOW 20 MIN: CPT | Performed by: NURSE PRACTITIONER

## 2018-04-13 RX ORDER — DOXYCYCLINE HYCLATE 100 MG/1
100 CAPSULE ORAL 2 TIMES DAILY
Qty: 20 CAPSULE | Refills: 0 | Status: SHIPPED | OUTPATIENT
Start: 2018-04-13 | End: 2018-04-26

## 2018-04-13 NOTE — PROGRESS NOTES
Subjective  Cough (x couple of days, Patient states coughing up green and yellow phelgm)      Esperanza Pop is a 73 y.o. female.   Allergies   Allergen Reactions   • Albuterol      Increased blood pressure  Used right before heart attack   • Mircera [Methoxy Polyethylene Glycol-Epoetin Beta]      Pt stopped breathing   • Penicillins Hives   • Prednisone      Makes jittery/anxious   • Venofer [Iron Sucrose]      Pt stopped breathing     History of Present Illness      Cough x 3 days, pnd down back of throat , pt has chills but not know if has had temp , neg body aches  The following portions of the patient's history were reviewed and updated as appropriate: allergies, current medications, past surgical history and problem list.    Review of Systems   Constitutional: Positive for chills.   HENT: Positive for postnasal drip and rhinorrhea.    Respiratory: Positive for cough.    All other systems reviewed and are negative.      Objective   Physical Exam   Constitutional: She is oriented to person, place, and time. She appears well-developed and well-nourished.  Non-toxic appearance. No distress.   HENT:   Head: Normocephalic and atraumatic. Hair is normal.   Right Ear: External ear normal. No drainage, swelling or tenderness. Tympanic membrane is retracted.   Left Ear: External ear normal. No drainage, swelling or tenderness. Tympanic membrane is retracted.   Nose: Mucosal edema present. No epistaxis.   Mouth/Throat: Uvula is midline and mucous membranes are normal. No oral lesions. No uvula swelling. Posterior oropharyngeal erythema present. No oropharyngeal exudate.   Eyes: Conjunctivae and EOM are normal. Pupils are equal, round, and reactive to light. Right eye exhibits no discharge. Left eye exhibits no discharge. No scleral icterus.   Neck: Normal range of motion. Neck supple.   Cardiovascular: Normal rate and regular rhythm.  Exam reveals no gallop.    No murmur heard.  Pulmonary/Chest: Effort normal. No stridor.  She has no wheezes. She has rhonchi in the right upper field, the right middle field, the right lower field and the left upper field. She has no rales. She exhibits no tenderness.   Abdominal: Soft. There is no tenderness.   Lymphadenopathy:     She has cervical adenopathy.   Neurological: She is alert and oriented to person, place, and time. She exhibits normal muscle tone.   Skin: Skin is warm and dry. No rash noted. She is not diaphoretic.   Psychiatric: She has a normal mood and affect. Her behavior is normal. Judgment and thought content normal.   Nursing note and vitals reviewed.    /82   Temp 98.9 °F (37.2 °C)   Wt 90.7 kg (200 lb)   LMP  (LMP Unknown)   BMI 31.32 kg/m²     Assessment/Plan     Problem List Items Addressed This Visit     None      Visit Diagnoses     Bronchitis    -  Primary    Relevant Medications    doxycycline (VIBRAMYCIN) 100 MG capsule

## 2018-04-18 ENCOUNTER — TRANSCRIBE ORDERS (OUTPATIENT)
Dept: INTERVENTIONAL RADIOLOGY/VASCULAR | Facility: HOSPITAL | Age: 73
End: 2018-04-18

## 2018-04-18 ENCOUNTER — TELEPHONE (OUTPATIENT)
Dept: INTERNAL MEDICINE | Facility: CLINIC | Age: 73
End: 2018-04-18

## 2018-04-18 ENCOUNTER — HOSPITAL ENCOUNTER (OUTPATIENT)
Dept: GENERAL RADIOLOGY | Facility: HOSPITAL | Age: 73
Discharge: HOME | End: 2018-04-18
Attending: INTERNAL MEDICINE | Admitting: INTERNAL MEDICINE

## 2018-04-18 DIAGNOSIS — J18.9 PNEUMONIA DUE TO INFECTIOUS ORGANISM, UNSPECIFIED LATERALITY, UNSPECIFIED PART OF LUNG: Primary | ICD-10-CM

## 2018-04-18 PROCEDURE — 71046 X-RAY EXAM CHEST 2 VIEWS: CPT

## 2018-04-18 NOTE — TELEPHONE ENCOUNTER
I don't see where we have Rxed any nebs for her.  Is it her 's?  Or she may have seen Pulmonary.  Pls check.

## 2018-04-18 NOTE — TELEPHONE ENCOUNTER
PATIENT WOULD LIKE NEBULIZER SOLUTION CALLED INTO HER PHARMACY. SHE COULD NOT REMEMBER WHAT IT WAS CALLED. SHE ONLY SAYS THAT SHE CAN NOT USE ALBUTEROL.

## 2018-04-19 NOTE — TELEPHONE ENCOUNTER
It looks like she may have the beginning of a pneumonia.  Are they treating her for it?  If not to be seen asap.  Can overbook any AM physical appt.    thanks

## 2018-04-19 NOTE — TELEPHONE ENCOUNTER
Called pt and she said she has had a bad cough and wanted neb solution for it. Doctor at dialysis sent her to [facility] for a chest x-ray yesterday. Pt states she is feeling a little better but want to know if you need to see her.

## 2018-04-20 ENCOUNTER — OFFICE VISIT (OUTPATIENT)
Dept: INTERNAL MEDICINE | Facility: CLINIC | Age: 73
End: 2018-04-20

## 2018-04-20 ENCOUNTER — CLINICAL SUPPORT (OUTPATIENT)
Dept: INTERNAL MEDICINE | Facility: CLINIC | Age: 73
End: 2018-04-20

## 2018-04-20 VITALS
OXYGEN SATURATION: 96 % | WEIGHT: 200 LBS | SYSTOLIC BLOOD PRESSURE: 206 MMHG | HEART RATE: 86 BPM | BODY MASS INDEX: 31.39 KG/M2 | HEIGHT: 67 IN | DIASTOLIC BLOOD PRESSURE: 84 MMHG

## 2018-04-20 DIAGNOSIS — J18.9 PNEUMONIA OF RIGHT MIDDLE LOBE DUE TO INFECTIOUS ORGANISM: Primary | ICD-10-CM

## 2018-04-20 DIAGNOSIS — J45.41 MODERATE PERSISTENT REACTIVE AIRWAY DISEASE WITH ACUTE EXACERBATION: ICD-10-CM

## 2018-04-20 PROCEDURE — 96372 THER/PROPH/DIAG INJ SC/IM: CPT | Performed by: INTERNAL MEDICINE

## 2018-04-20 PROCEDURE — 99213 OFFICE O/P EST LOW 20 MIN: CPT | Performed by: INTERNAL MEDICINE

## 2018-04-20 RX ORDER — LEVALBUTEROL INHALATION SOLUTION 1.25 MG/3ML
1 SOLUTION RESPIRATORY (INHALATION) EVERY 4 HOURS PRN
Qty: 60 AMPULE | Refills: 4 | Status: SHIPPED | OUTPATIENT
Start: 2018-04-20 | End: 2018-05-10

## 2018-04-20 RX ORDER — BENZONATATE 100 MG/1
100 CAPSULE ORAL 3 TIMES DAILY PRN
Qty: 30 CAPSULE | Refills: 1 | Status: SHIPPED | OUTPATIENT
Start: 2018-04-20 | End: 2018-05-10

## 2018-04-20 RX ORDER — CEFDINIR 300 MG/1
300 CAPSULE ORAL 2 TIMES DAILY
Qty: 20 CAPSULE | Refills: 0 | Status: SHIPPED | OUTPATIENT
Start: 2018-04-20 | End: 2018-05-10

## 2018-04-20 RX ORDER — PROMETHAZINE HYDROCHLORIDE AND CODEINE PHOSPHATE 6.25; 1 MG/5ML; MG/5ML
2.5 SYRUP ORAL EVERY 6 HOURS PRN
Qty: 120 ML | Refills: 0 | Status: SHIPPED | OUTPATIENT
Start: 2018-04-20 | End: 2018-05-10

## 2018-04-20 RX ORDER — CEFTRIAXONE 1 G/1
1 INJECTION, POWDER, FOR SOLUTION INTRAMUSCULAR; INTRAVENOUS EVERY 24 HOURS
Status: COMPLETED | OUTPATIENT
Start: 2018-04-20 | End: 2018-04-21

## 2018-04-20 RX ADMIN — CEFTRIAXONE 1 G: 1 INJECTION, POWDER, FOR SOLUTION INTRAMUSCULAR; INTRAVENOUS at 16:12

## 2018-04-20 NOTE — TELEPHONE ENCOUNTER
Spoke with Pt, she states she goes in for dialysis at 11:30 and last until around 3:30. Per message below it is ok to schedule on the back half of a PE. Pt is coming in at 9am.

## 2018-04-20 NOTE — PROGRESS NOTES
URI (1 week ); Shortness of Breath (1 week); and Headache (1 week)    Subjective   Esperanza Pop is a 73 y.o. female is here today for follow-up.    History of Present Illness   On doxy for bronchitis, , but her cough became worse, so the Dialysis drAmado Sent her for a CXR, which she wanted reviewed by me. Tried Juan's nebs - albuterol, makes her BP go up.      Current Outpatient Prescriptions:   •  amiodarone (PACERONE) 200 MG tablet, Take 1 tablet by mouth 2 (Two) Times a Day. Take 1 tablet twice daily for one month and then take one tablet daily 200 mg once daily., Disp: 60 tablet, Rfl: 5  •  amLODIPine (NORVASC) 10 MG tablet, Take 1 tablet by mouth Daily., Disp: 30 tablet, Rfl: 5  •  aspirin 81 MG EC tablet, Take 1 tablet by mouth Daily. (Patient taking differently: Take 81 mg by mouth Every Other Day.), Disp: , Rfl:   •  atorvastatin (LIPITOR) 80 MG tablet, Take 1 tablet by mouth Every Night., Disp: 90 tablet, Rfl: 1  •  Blood Glucose Monitoring Suppl (ACCU-CHEK NADER PLUS) W/DEVICE kit, DX: E11.65, Disp: 1 kit, Rfl: 0  •  carvedilol (COREG) 25 MG tablet, Take 1 tablet by mouth Every 12 (Twelve) Hours., Disp: 60 tablet, Rfl: 5  •  Cholecalciferol (VITAMIN D3) 2000 UNITS tablet, Take  by mouth 3 (Three) Times a Week., Disp: , Rfl:   •  clopidogrel (PLAVIX) 75 MG tablet, Take 1 tablet by mouth Daily. (Patient taking differently: Take 75 mg by mouth Every Other Day.), Disp: 30 tablet, Rfl: 2  •  dorzolamide-timolol (COSOPT) 22.3-6.8 MG/ML ophthalmic solution, Administer 1 drop to both eyes Daily., Disp: , Rfl:   •  doxycycline (VIBRAMYCIN) 100 MG capsule, Take 1 capsule by mouth 2 (Two) Times a Day., Disp: 20 capsule, Rfl: 0  •  famotidine (PEPCID) 20 MG tablet, Take 20 mg by mouth Daily As Needed., Disp: , Rfl:   •  Ferric Citrate 1  MG(Fe) tablet, Take 1 tablet by mouth As Needed., Disp: , Rfl:   •  fluticasone (FLONASE) 50 MCG/ACT nasal spray, 2 sprays into each nostril Daily., Disp: , Rfl:   •  glucose  blood (ACCU-CHEK NADER PLUS) test strip, Use as instructed 1 TO 3 TIMES DAILY  DX: E11.65, Disp: 300 each, Rfl: 3  •  hydrALAZINE (APRESOLINE) 50 MG tablet, Take 1 tablet by mouth 3 (Three) Times a Day., Disp: , Rfl:   •  insulin NPH-insulin regular (novoLIN 70/30) (70-30) 100 UNIT/ML injection, Inject 12 units before dinner and 8 Units before breakfast., Disp: 10 mL, Rfl: 5  •  IRON DEXTRAN IJ, Inject  as directed 3 (Three) Times a Week., Disp: , Rfl:   •  isosorbide mononitrate (IMDUR) 120 MG 24 hr tablet, Take 1 tablet by mouth daily., Disp: 90 tablet, Rfl: 2  •  Lancets Misc. (ACCU-CHEK SOFTCLIX LANCET DEV) kit, TEST 1-3 TIMES DAILY DX:E11.65, Disp: 1 kit, Rfl: 0  •  latanoprost (XALATAN) 0.005 % ophthalmic solution, Administer 1 drop to both eyes Every Night., Disp: , Rfl:   •  lisinopril (PRINIVIL,ZESTRIL) 20 MG tablet, Take 1 tablet by mouth Daily., Disp: 90 tablet, Rfl: 4  •  nitroglycerin (NITROLINGUAL) 0.4 MG/SPRAY spray, Place 1 spray under the tongue Every 5 (Five) Minutes As Needed for Chest Pain., Disp: 1 each, Rfl: 12  •  torsemide (DEMADEX) 100 MG tablet, Take 0.5 tablets by mouth Daily As Needed., Disp: , Rfl:   •  benzonatate (TESSALON PERLES) 100 MG capsule, Take 1 capsule by mouth 3 (Three) Times a Day As Needed for Cough., Disp: 30 capsule, Rfl: 1  •  cefdinir (OMNICEF) 300 MG capsule, Take 1 capsule by mouth 2 (Two) Times a Day., Disp: 20 capsule, Rfl: 0  •  levalbuterol (XOPENEX) 1.25 MG/3ML nebulizer solution, Take 1 ampule by nebulization Every 4 (Four) Hours As Needed for Wheezing., Disp: 60 ampule, Rfl: 4  •  promethazine-codeine (PHENERGAN with CODEINE) 6.25-10 MG/5ML syrup, Take 2.5 mL by mouth Every 6 (Six) Hours As Needed for Cough. 1-2 tsp at bedtime as needed for cough, Disp: 120 mL, Rfl: 0      The following portions of the patient's history were reviewed and updated as appropriate: allergies, current medications, past family history, past medical history, past social history, past  "surgical history and problem list.    Review of Systems   Constitutional: Positive for chills, fatigue and fever.   HENT: Negative for ear discharge, ear pain, sinus pressure and sore throat.    Respiratory: Positive for cough, chest tightness, shortness of breath and wheezing.    Cardiovascular: Negative for chest pain, palpitations and leg swelling.   Gastrointestinal: Negative for diarrhea, nausea and vomiting.   Musculoskeletal: Negative for arthralgias, back pain and myalgias.   Neurological: Negative for dizziness, syncope and headaches.   Psychiatric/Behavioral: Negative for confusion and sleep disturbance.       Objective   BP (!) 206/84   Pulse 86   Ht 170.2 cm (67.01\")   Wt 90.7 kg (200 lb)   LMP  (LMP Unknown)   SpO2 96%   BMI 31.32 kg/m²   Physical Exam   Constitutional: She appears distressed.   HENT:   Head: Normocephalic and atraumatic.   Eyes: EOM are normal. Pupils are equal, round, and reactive to light.   Cardiovascular: Normal rate and regular rhythm.    Pulmonary/Chest: She has wheezes (rt sided). She has rales.   Abdominal: Soft. Bowel sounds are normal.   Skin: Skin is warm and dry.         Results for orders placed or performed in visit on 03/01/18   CBC (No Diff)   Result Value Ref Range    WBC 7.84 3.50 - 10.80 10*3/mm3    RBC 3.67 (L) 3.89 - 5.14 10*6/mm3    Hemoglobin 10.4 (L) 11.5 - 15.5 g/dL    Hematocrit 34.5 34.5 - 44.0 %    MCV 94.0 80.0 - 99.0 fL    MCH 28.3 27.0 - 31.0 pg    MCHC 30.1 (L) 32.0 - 36.0 g/dL    RDW 13.8 11.3 - 14.5 %    Platelets 220 150 - 450 10*3/mm3   Comprehensive Metabolic Panel   Result Value Ref Range    Glucose 141 (H) 70 - 100 mg/dL    BUN 40 (H) 9 - 23 mg/dL    Creatinine 6.80 (H) 0.60 - 1.30 mg/dL    eGFR Non African Am 6 (L) >60 mL/min/1.73    eGFR African Am 7 (L) >60 mL/min/1.73    BUN/Creatinine Ratio 5.9 (L) 7.0 - 25.0    Sodium 137 132 - 146 mmol/L    Potassium 4.5 3.5 - 5.5 mmol/L    Chloride 97 (L) 99 - 109 mmol/L    Total CO2 29.0 20.0 - 31.0 " mmol/L    Calcium 8.6 (L) 8.7 - 10.4 mg/dL    Total Protein 8.1 5.7 - 8.2 g/dL    Albumin 4.10 3.20 - 4.80 g/dL    Globulin 4.0 gm/dL    A/G Ratio 1.0 (L) 1.5 - 2.5 g/dL    Total Bilirubin 0.2 (L) 0.3 - 1.2 mg/dL    Alkaline Phosphatase 92 25 - 100 U/L    AST (SGOT) 14 0 - 33 U/L    ALT (SGPT) 12 7 - 40 U/L   TSH   Result Value Ref Range    TSH 1.657 0.350 - 5.350 mIU/mL   T4, Free   Result Value Ref Range    Free T4 1.08 0.89 - 1.76 ng/dL   BNP   Result Value Ref Range    BNP CANCELED pg/mL   Request Problem   Result Value Ref Range    Request Problem CANCELED    POC Urinalysis Dipstick, Automated   Result Value Ref Range    Color Yellow Yellow, Straw, Dark Yellow, Yoana    Clarity, UA Clear Clear    Glucose, UA Negative Negative, 1000 mg/dL (3+) mg/dL    Bilirubin Negative Negative    Ketones, UA Negative Negative    Specific Gravity  1.010 1.005 - 1.030    Blood, UA Negative Negative    pH, Urine 5.0 5.0 - 8.0    Protein, POC Negative Negative mg/dL    Urobilinogen, UA Normal Normal    Leukocytes Negative Negative    Nitrite, UA Negative Negative   POC Glycosylated Hemoglobin (Hb A1C)   Result Value Ref Range    Hemoglobin A1C 7.6 %             Assessment/Plan   Diagnoses and all orders for this visit:    Pneumonia of right middle lobe due to infectious organism  -     levalbuterol (XOPENEX) 1.25 MG/3ML nebulizer solution; Take 1 ampule by nebulization Every 4 (Four) Hours As Needed for Wheezing.  -     promethazine-codeine (PHENERGAN with CODEINE) 6.25-10 MG/5ML syrup; Take 2.5 mL by mouth Every 6 (Six) Hours As Needed for Cough. 1-2 tsp at bedtime as needed for cough  -     cefTRIAXone (ROCEPHIN) injection 1 g; Inject 1 g into the shoulder, thigh, or buttocks Daily.  -     benzonatate (TESSALON PERLES) 100 MG capsule; Take 1 capsule by mouth 3 (Three) Times a Day As Needed for Cough.  -     cefdinir (OMNICEF) 300 MG capsule; Take 1 capsule by mouth 2 (Two) Times a Day.    Moderate persistent reactive airway  disease with acute exacerbation           Reviewed cxr results with patient.    Add zpak , if not better, hold off now due to interaction with her Amiodarone.    Return in about 1 week (around 4/27/2018).

## 2018-04-21 ENCOUNTER — CLINICAL SUPPORT (OUTPATIENT)
Dept: INTERNAL MEDICINE | Facility: CLINIC | Age: 73
End: 2018-04-21

## 2018-04-21 PROCEDURE — 96372 THER/PROPH/DIAG INJ SC/IM: CPT | Performed by: INTERNAL MEDICINE

## 2018-04-21 RX ADMIN — CEFTRIAXONE 1 G: 1 INJECTION, POWDER, FOR SOLUTION INTRAMUSCULAR; INTRAVENOUS at 10:43

## 2018-04-24 ENCOUNTER — TELEPHONE (OUTPATIENT)
Dept: INTERNAL MEDICINE | Facility: CLINIC | Age: 73
End: 2018-04-24

## 2018-04-24 NOTE — TELEPHONE ENCOUNTER
I need a new Dx code for levaluterol and pharmacy said that code is invalid. After dx is changed medication needs to be resent. Please advise.

## 2018-04-26 ENCOUNTER — HOSPITAL ENCOUNTER (OUTPATIENT)
Dept: GENERAL RADIOLOGY | Facility: HOSPITAL | Age: 73
Discharge: HOME | End: 2018-04-26
Attending: INTERNAL MEDICINE | Admitting: INTERNAL MEDICINE

## 2018-04-26 ENCOUNTER — TELEPHONE (OUTPATIENT)
Dept: INTERNAL MEDICINE | Facility: CLINIC | Age: 73
End: 2018-04-26

## 2018-04-26 ENCOUNTER — OFFICE VISIT (OUTPATIENT)
Dept: INTERNAL MEDICINE | Facility: CLINIC | Age: 73
End: 2018-04-26

## 2018-04-26 VITALS
SYSTOLIC BLOOD PRESSURE: 210 MMHG | HEART RATE: 70 BPM | OXYGEN SATURATION: 98 % | DIASTOLIC BLOOD PRESSURE: 60 MMHG | WEIGHT: 202 LBS | BODY MASS INDEX: 31.71 KG/M2 | HEIGHT: 67 IN

## 2018-04-26 DIAGNOSIS — J18.9 PNEUMONIA OF RIGHT MIDDLE LOBE DUE TO INFECTIOUS ORGANISM: Primary | ICD-10-CM

## 2018-04-26 PROCEDURE — 71046 X-RAY EXAM CHEST 2 VIEWS: CPT

## 2018-04-26 PROCEDURE — 99213 OFFICE O/P EST LOW 20 MIN: CPT | Performed by: INTERNAL MEDICINE

## 2018-04-26 PROCEDURE — 96372 THER/PROPH/DIAG INJ SC/IM: CPT | Performed by: INTERNAL MEDICINE

## 2018-04-26 RX ORDER — METHYLPREDNISOLONE ACETATE 40 MG/ML
40 INJECTION, SUSPENSION INTRA-ARTICULAR; INTRALESIONAL; INTRAMUSCULAR; SOFT TISSUE ONCE
Status: COMPLETED | OUTPATIENT
Start: 2018-04-26 | End: 2018-04-26

## 2018-04-26 RX ORDER — CEFTRIAXONE 1 G/1
1 INJECTION, POWDER, FOR SOLUTION INTRAMUSCULAR; INTRAVENOUS ONCE
Status: COMPLETED | OUTPATIENT
Start: 2018-04-26 | End: 2018-04-26

## 2018-04-26 RX ORDER — LEVOFLOXACIN 500 MG/1
500 TABLET, FILM COATED ORAL DAILY
Qty: 7 TABLET | Refills: 0 | Status: SHIPPED | OUTPATIENT
Start: 2018-04-26 | End: 2018-05-10

## 2018-04-26 RX ADMIN — METHYLPREDNISOLONE ACETATE 40 MG: 40 INJECTION, SUSPENSION INTRA-ARTICULAR; INTRALESIONAL; INTRAMUSCULAR; SOFT TISSUE at 13:29

## 2018-04-26 RX ADMIN — CEFTRIAXONE 1 G: 1 INJECTION, POWDER, FOR SOLUTION INTRAMUSCULAR; INTRAVENOUS at 13:29

## 2018-04-26 NOTE — PROGRESS NOTES
Pneumonia (fu)    Subjective   Esperanza Pop is a 73 y.o. female is here today for follow-up.    History of Present Illness   Esperanza reports she feels a little better, but continues to cough a lot.   She is s/p 2 doses of rocephin. Has not tried Juan's albuterol, her xopenex have not been approved by insurance.    Current Outpatient Prescriptions:   •  amiodarone (PACERONE) 200 MG tablet, Take 1 tablet by mouth 2 (Two) Times a Day. Take 1 tablet twice daily for one month and then take one tablet daily 200 mg once daily., Disp: 60 tablet, Rfl: 5  •  amLODIPine (NORVASC) 10 MG tablet, Take 1 tablet by mouth Daily., Disp: 30 tablet, Rfl: 5  •  aspirin 81 MG EC tablet, Take 1 tablet by mouth Daily. (Patient taking differently: Take 81 mg by mouth Every Other Day.), Disp: , Rfl:   •  atorvastatin (LIPITOR) 80 MG tablet, Take 1 tablet by mouth Every Night., Disp: 90 tablet, Rfl: 1  •  benzonatate (TESSALON PERLES) 100 MG capsule, Take 1 capsule by mouth 3 (Three) Times a Day As Needed for Cough., Disp: 30 capsule, Rfl: 1  •  Blood Glucose Monitoring Suppl (ACCU-CHEK NADER PLUS) W/DEVICE kit, DX: E11.65, Disp: 1 kit, Rfl: 0  •  carvedilol (COREG) 25 MG tablet, Take 1 tablet by mouth Every 12 (Twelve) Hours., Disp: 60 tablet, Rfl: 5  •  cefdinir (OMNICEF) 300 MG capsule, Take 1 capsule by mouth 2 (Two) Times a Day., Disp: 20 capsule, Rfl: 0  •  Cholecalciferol (VITAMIN D3) 2000 UNITS tablet, Take  by mouth 3 (Three) Times a Week., Disp: , Rfl:   •  clopidogrel (PLAVIX) 75 MG tablet, Take 1 tablet by mouth Daily. (Patient taking differently: Take 75 mg by mouth Every Other Day.), Disp: 30 tablet, Rfl: 2  •  dorzolamide-timolol (COSOPT) 22.3-6.8 MG/ML ophthalmic solution, Administer 1 drop to both eyes Daily., Disp: , Rfl:   •  famotidine (PEPCID) 20 MG tablet, Take 20 mg by mouth Daily As Needed., Disp: , Rfl:   •  Ferric Citrate 1  MG(Fe) tablet, Take 1 tablet by mouth As Needed., Disp: , Rfl:   •  fluticasone  (FLONASE) 50 MCG/ACT nasal spray, 2 sprays into each nostril Daily., Disp: , Rfl:   •  glucose blood (ACCU-CHEK NADER PLUS) test strip, Use as instructed 1 TO 3 TIMES DAILY  DX: E11.65, Disp: 300 each, Rfl: 3  •  hydrALAZINE (APRESOLINE) 50 MG tablet, Take 1 tablet by mouth 3 (Three) Times a Day., Disp: , Rfl:   •  insulin NPH-insulin regular (novoLIN 70/30) (70-30) 100 UNIT/ML injection, Inject 12 units before dinner and 8 Units before breakfast., Disp: 10 mL, Rfl: 5  •  IRON DEXTRAN IJ, Inject  as directed 3 (Three) Times a Week., Disp: , Rfl:   •  isosorbide mononitrate (IMDUR) 120 MG 24 hr tablet, Take 1 tablet by mouth daily., Disp: 90 tablet, Rfl: 2  •  Lancets Misc. (ACCU-CHEK SOFTCLIX LANCET DEV) kit, TEST 1-3 TIMES DAILY DX:E11.65, Disp: 1 kit, Rfl: 0  •  latanoprost (XALATAN) 0.005 % ophthalmic solution, Administer 1 drop to both eyes Every Night., Disp: , Rfl:   •  lisinopril (PRINIVIL,ZESTRIL) 20 MG tablet, Take 1 tablet by mouth Daily., Disp: 90 tablet, Rfl: 4  •  nitroglycerin (NITROLINGUAL) 0.4 MG/SPRAY spray, Place 1 spray under the tongue Every 5 (Five) Minutes As Needed for Chest Pain., Disp: 1 each, Rfl: 12  •  promethazine-codeine (PHENERGAN with CODEINE) 6.25-10 MG/5ML syrup, Take 2.5 mL by mouth Every 6 (Six) Hours As Needed for Cough. 1-2 tsp at bedtime as needed for cough, Disp: 120 mL, Rfl: 0  •  torsemide (DEMADEX) 100 MG tablet, Take 0.5 tablets by mouth Daily As Needed., Disp: , Rfl:   •  levalbuterol (XOPENEX) 1.25 MG/3ML nebulizer solution, Take 1 ampule by nebulization Every 4 (Four) Hours As Needed for Wheezing., Disp: 60 ampule, Rfl: 4  •  levoFLOXacin (LEVAQUIN) 500 MG tablet, Take 1 tablet by mouth Daily. For Respiratory infection, Disp: 7 tablet, Rfl: 0      The following portions of the patient's history were reviewed and updated as appropriate: allergies, current medications, past family history, past medical history, past social history, past surgical history and problem  "list.    Review of Systems   Constitutional: Positive for activity change and appetite change. Negative for chills and fever.   HENT: Positive for congestion, postnasal drip and sinus pressure. Negative for ear discharge, ear pain and sore throat.    Respiratory: Positive for cough, shortness of breath and wheezing. Negative for chest tightness.    Cardiovascular: Negative for chest pain, palpitations and leg swelling.   Gastrointestinal: Negative for diarrhea, nausea and vomiting.   Musculoskeletal: Positive for arthralgias. Negative for back pain and myalgias.   Neurological: Negative for dizziness, syncope and headaches.   Psychiatric/Behavioral: Negative for confusion and sleep disturbance.       Objective   BP (!) 210/60   Pulse 70   Ht 170.2 cm (67.01\")   Wt 91.6 kg (202 lb)   LMP  (LMP Unknown)   SpO2 98%   BMI 31.63 kg/m²   Physical Exam   Constitutional: She is oriented to person, place, and time. She appears well-developed and well-nourished.   HENT:   Head: Normocephalic and atraumatic.   Mouth/Throat: No oropharyngeal exudate.   Eyes: Conjunctivae are normal. Pupils are equal, round, and reactive to light.   Cardiovascular: Normal rate and regular rhythm.    Pulmonary/Chest: Effort normal. No respiratory distress (coughing significantly). She has wheezes (b/l).   Abdominal: Soft. Bowel sounds are normal. She exhibits no distension. There is no tenderness.   Musculoskeletal: She exhibits no edema.   Neurological: She is alert and oriented to person, place, and time. No cranial nerve deficit.   Skin: Skin is warm and dry.   Psychiatric: She has a normal mood and affect. Judgment normal.   Nursing note and vitals reviewed.        Results for orders placed or performed in visit on 03/01/18   CBC (No Diff)   Result Value Ref Range    WBC 7.84 3.50 - 10.80 10*3/mm3    RBC 3.67 (L) 3.89 - 5.14 10*6/mm3    Hemoglobin 10.4 (L) 11.5 - 15.5 g/dL    Hematocrit 34.5 34.5 - 44.0 %    MCV 94.0 80.0 - 99.0 fL    " MCH 28.3 27.0 - 31.0 pg    MCHC 30.1 (L) 32.0 - 36.0 g/dL    RDW 13.8 11.3 - 14.5 %    Platelets 220 150 - 450 10*3/mm3   Comprehensive Metabolic Panel   Result Value Ref Range    Glucose 141 (H) 70 - 100 mg/dL    BUN 40 (H) 9 - 23 mg/dL    Creatinine 6.80 (H) 0.60 - 1.30 mg/dL    eGFR Non African Am 6 (L) >60 mL/min/1.73    eGFR African Am 7 (L) >60 mL/min/1.73    BUN/Creatinine Ratio 5.9 (L) 7.0 - 25.0    Sodium 137 132 - 146 mmol/L    Potassium 4.5 3.5 - 5.5 mmol/L    Chloride 97 (L) 99 - 109 mmol/L    Total CO2 29.0 20.0 - 31.0 mmol/L    Calcium 8.6 (L) 8.7 - 10.4 mg/dL    Total Protein 8.1 5.7 - 8.2 g/dL    Albumin 4.10 3.20 - 4.80 g/dL    Globulin 4.0 gm/dL    A/G Ratio 1.0 (L) 1.5 - 2.5 g/dL    Total Bilirubin 0.2 (L) 0.3 - 1.2 mg/dL    Alkaline Phosphatase 92 25 - 100 U/L    AST (SGOT) 14 0 - 33 U/L    ALT (SGPT) 12 7 - 40 U/L   TSH   Result Value Ref Range    TSH 1.657 0.350 - 5.350 mIU/mL   T4, Free   Result Value Ref Range    Free T4 1.08 0.89 - 1.76 ng/dL   BNP   Result Value Ref Range    BNP CANCELED pg/mL   Request Problem   Result Value Ref Range    Request Problem CANCELED    POC Urinalysis Dipstick, Automated   Result Value Ref Range    Color Yellow Yellow, Straw, Dark Yellow, Yoana    Clarity, UA Clear Clear    Glucose, UA Negative Negative, 1000 mg/dL (3+) mg/dL    Bilirubin Negative Negative    Ketones, UA Negative Negative    Specific Gravity  1.010 1.005 - 1.030    Blood, UA Negative Negative    pH, Urine 5.0 5.0 - 8.0    Protein, POC Negative Negative mg/dL    Urobilinogen, UA Normal Normal    Leukocytes Negative Negative    Nitrite, UA Negative Negative   POC Glycosylated Hemoglobin (Hb A1C)   Result Value Ref Range    Hemoglobin A1C 7.6 %             Assessment/Plan   Diagnoses and all orders for this visit:    Pneumonia of right middle lobe due to infectious organism  -     XR Chest PA & Lateral  -     levoFLOXacin (LEVAQUIN) 500 MG tablet; Take 1 tablet by mouth Daily. For Respiratory  infection  -     cefTRIAXone (ROCEPHIN) injection 1 g; Inject 1 g into the shoulder, thigh, or buttocks 1 (One) Time.  -     methylPREDNISolone acetate (DEPO-medrol) injection 40 mg; Inject 1 mL into the shoulder, thigh, or buttocks 1 (One) Time.    repeat cxr. Abx changed due to no response to cephalosporins. Since pacerone interacts with zpak and levaquin, start levaquin.         Patient notified of interaction between amiodarone and levaquin, she will monitor for side effects and d/c If not tolerating.  Continue inhalers, adv. To try albuterol nebs until xopenex hets approved.    Return in about 10 days (around 5/6/2018).

## 2018-04-30 RX ORDER — CLOPIDOGREL BISULFATE 75 MG/1
TABLET ORAL
Qty: 30 TABLET | Refills: 2 | Status: SHIPPED | OUTPATIENT
Start: 2018-04-30 | End: 2018-11-19 | Stop reason: SDUPTHER

## 2018-05-10 ENCOUNTER — TELEPHONE (OUTPATIENT)
Dept: INTERNAL MEDICINE | Facility: CLINIC | Age: 73
End: 2018-05-10

## 2018-05-10 ENCOUNTER — OFFICE VISIT (OUTPATIENT)
Dept: INTERNAL MEDICINE | Facility: CLINIC | Age: 73
End: 2018-05-10

## 2018-05-10 VITALS
BODY MASS INDEX: 30.94 KG/M2 | WEIGHT: 197.6 LBS | DIASTOLIC BLOOD PRESSURE: 94 MMHG | SYSTOLIC BLOOD PRESSURE: 188 MMHG | HEART RATE: 84 BPM | OXYGEN SATURATION: 99 %

## 2018-05-10 DIAGNOSIS — E11.22 TYPE 2 DIABETES MELLITUS WITH CHRONIC KIDNEY DISEASE ON CHRONIC DIALYSIS, WITH LONG-TERM CURRENT USE OF INSULIN (HCC): ICD-10-CM

## 2018-05-10 DIAGNOSIS — I50.32 CHRONIC DIASTOLIC CONGESTIVE HEART FAILURE (HCC): ICD-10-CM

## 2018-05-10 DIAGNOSIS — J18.9 PNEUMONIA OF RIGHT MIDDLE LOBE DUE TO INFECTIOUS ORGANISM: Primary | ICD-10-CM

## 2018-05-10 DIAGNOSIS — B37.31 VAGINITIS DUE TO CANDIDA: ICD-10-CM

## 2018-05-10 DIAGNOSIS — Z99.2 TYPE 2 DIABETES MELLITUS WITH CHRONIC KIDNEY DISEASE ON CHRONIC DIALYSIS, WITH LONG-TERM CURRENT USE OF INSULIN (HCC): ICD-10-CM

## 2018-05-10 DIAGNOSIS — Z79.4 TYPE 2 DIABETES MELLITUS WITH CHRONIC KIDNEY DISEASE ON CHRONIC DIALYSIS, WITH LONG-TERM CURRENT USE OF INSULIN (HCC): ICD-10-CM

## 2018-05-10 DIAGNOSIS — N18.6 TYPE 2 DIABETES MELLITUS WITH CHRONIC KIDNEY DISEASE ON CHRONIC DIALYSIS, WITH LONG-TERM CURRENT USE OF INSULIN (HCC): ICD-10-CM

## 2018-05-10 PROCEDURE — 99213 OFFICE O/P EST LOW 20 MIN: CPT | Performed by: INTERNAL MEDICINE

## 2018-05-10 RX ORDER — ISOSORBIDE MONONITRATE 60 MG/1
60 TABLET, EXTENDED RELEASE ORAL DAILY
Qty: 90 TABLET | Refills: 1 | Status: SHIPPED | OUTPATIENT
Start: 2018-05-10 | End: 2019-05-23 | Stop reason: SDUPTHER

## 2018-05-10 RX ORDER — FLUCONAZOLE 150 MG/1
150 TABLET ORAL ONCE
Qty: 1 TABLET | Refills: 0 | Status: SHIPPED | OUTPATIENT
Start: 2018-05-10 | End: 2018-05-10

## 2018-05-10 NOTE — TELEPHONE ENCOUNTER
PHARMACY CALLED IN REGARDS TO A DRUG INTERACTION WITH TWO OF THE PATIENT'S MEDICATIONS ( FLUCONAZOLE AND AMIODARONE) AND WOULD LIKE TO GET A CALL BACK IN REGARDS TO THIS MATTER -[phone]

## 2018-05-10 NOTE — PROGRESS NOTES
Pneumonia (Follow Up)    Subjective   Esperanza Pop is a 73 y.o. female is here today for follow-up.    History of Present Illness   Esperanza is here for a follow up on her pneumonia. Cough is much better.  Has a vaginal itch, took monistat, and still has some.  Also had a black out like spell, [facility]    Current Outpatient Prescriptions:   •  amiodarone (PACERONE) 200 MG tablet, Take 1 tablet by mouth 2 (Two) Times a Day. Take 1 tablet twice daily for one month and then take one tablet daily 200 mg once daily., Disp: 60 tablet, Rfl: 5  •  amLODIPine (NORVASC) 10 MG tablet, Take 1 tablet by mouth Daily., Disp: 30 tablet, Rfl: 5  •  aspirin 81 MG EC tablet, Take 1 tablet by mouth Daily. (Patient taking differently: Take 81 mg by mouth Every Other Day.), Disp: , Rfl:   •  atorvastatin (LIPITOR) 80 MG tablet, Take 1 tablet by mouth Every Night., Disp: 90 tablet, Rfl: 1  •  Blood Glucose Monitoring Suppl (ACCU-CHEK NADER PLUS) W/DEVICE kit, DX: E11.65, Disp: 1 kit, Rfl: 0  •  carvedilol (COREG) 25 MG tablet, Take 1 tablet by mouth Every 12 (Twelve) Hours., Disp: 60 tablet, Rfl: 5  •  Cholecalciferol (VITAMIN D3) 2000 UNITS tablet, Take  by mouth 3 (Three) Times a Week., Disp: , Rfl:   •  clopidogrel (PLAVIX) 75 MG tablet, TAKE ONE TABLET BY MOUTH ONCE DAILY **PT  NEEDS  TO  MAKE  FOLLOW  UP  APPT  FOR  REFILLS**, Disp: 30 tablet, Rfl: 2  •  dorzolamide-timolol (COSOPT) 22.3-6.8 MG/ML ophthalmic solution, Administer 1 drop to both eyes Daily., Disp: , Rfl:   •  famotidine (PEPCID) 20 MG tablet, Take 20 mg by mouth Daily As Needed., Disp: , Rfl:   •  Ferric Citrate 1  MG(Fe) tablet, Take 1 tablet by mouth As Needed., Disp: , Rfl:   •  fluticasone (FLONASE) 50 MCG/ACT nasal spray, 2 sprays into each nostril Daily., Disp: , Rfl:   •  glucose blood (ACCU-CHEK NADER PLUS) test strip, Use as instructed 1 TO 3 TIMES DAILY  DX: E11.65, Disp: 300 each, Rfl: 3  •  hydrALAZINE  (APRESOLINE) 50 MG tablet, Take 1 tablet by mouth 3 (Three) Times a Day., Disp: , Rfl:   •  insulin NPH-insulin regular (novoLIN 70/30) (70-30) 100 UNIT/ML injection, Inject 12 units before dinner and 8 Units before breakfast., Disp: 10 mL, Rfl: 5  •  IRON DEXTRAN IJ, Inject  as directed 3 (Three) Times a Week., Disp: , Rfl:   •  isosorbide mononitrate (IMDUR) 60 MG 24 hr tablet, Take 1 tablet by mouth Daily., Disp: 90 tablet, Rfl: 1  •  Lancets Misc. (ACCU-CHEK SOFTCLIX LANCET DEV) kit, TEST 1-3 TIMES DAILY DX:E11.65, Disp: 1 kit, Rfl: 0  •  latanoprost (XALATAN) 0.005 % ophthalmic solution, Administer 1 drop to both eyes Every Night., Disp: , Rfl:   •  lisinopril (PRINIVIL,ZESTRIL) 20 MG tablet, Take 1 tablet by mouth Daily., Disp: 90 tablet, Rfl: 4  •  nitroglycerin (NITROLINGUAL) 0.4 MG/SPRAY spray, Place 1 spray under the tongue Every 5 (Five) Minutes As Needed for Chest Pain., Disp: 1 each, Rfl: 12  •  torsemide (DEMADEX) 100 MG tablet, Take 0.5 tablets by mouth Daily As Needed., Disp: , Rfl:   •  fluconazole (DIFLUCAN) 150 MG tablet, Take 1 tablet by mouth 1 (One) Time for 1 dose., Disp: 1 tablet, Rfl: 0      The following portions of the patient's history were reviewed and updated as appropriate: allergies, current medications, past family history, past medical history, past social history, past surgical history and problem list.    Review of Systems   Constitutional: Negative.  Negative for chills and fever.   HENT: Negative for ear discharge, ear pain, sinus pressure and sore throat.    Respiratory: Positive for cough (better). Negative for chest tightness and shortness of breath.    Cardiovascular: Negative for chest pain, palpitations and leg swelling.   Gastrointestinal: Negative for diarrhea, nausea and vomiting.   Musculoskeletal: Negative for arthralgias, back pain and myalgias.   Neurological: Negative for dizziness, syncope and headaches.   Psychiatric/Behavioral: Negative for confusion and sleep  disturbance.       Objective   BP (!) 188/94   Pulse 84   Wt 89.6 kg (197 lb 9.6 oz)   LMP  (LMP Unknown)   SpO2 99%   Breastfeeding? No   BMI 30.94 kg/m²   Physical Exam   Constitutional: She is oriented to person, place, and time. She appears well-developed and well-nourished.   HENT:   Head: Normocephalic and atraumatic.   Mouth/Throat: No oropharyngeal exudate.   Eyes: Conjunctivae are normal. Pupils are equal, round, and reactive to light.   Cardiovascular: Normal rate and regular rhythm.    Pulmonary/Chest: Effort normal. She has wheezes (occasional rt sided).   Musculoskeletal: She exhibits no edema.   Neurological: She is alert and oriented to person, place, and time. No cranial nerve deficit.   Skin: Skin is warm and dry.   Psychiatric: She has a normal mood and affect. Judgment normal.   Nursing note and vitals reviewed.        Results for orders placed or performed in visit on 03/01/18   CBC (No Diff)   Result Value Ref Range    WBC 7.84 3.50 - 10.80 10*3/mm3    RBC 3.67 (L) 3.89 - 5.14 10*6/mm3    Hemoglobin 10.4 (L) 11.5 - 15.5 g/dL    Hematocrit 34.5 34.5 - 44.0 %    MCV 94.0 80.0 - 99.0 fL    MCH 28.3 27.0 - 31.0 pg    MCHC 30.1 (L) 32.0 - 36.0 g/dL    RDW 13.8 11.3 - 14.5 %    Platelets 220 150 - 450 10*3/mm3   Comprehensive Metabolic Panel   Result Value Ref Range    Glucose 141 (H) 70 - 100 mg/dL    BUN 40 (H) 9 - 23 mg/dL    Creatinine 6.80 (H) 0.60 - 1.30 mg/dL    eGFR Non African Am 6 (L) >60 mL/min/1.73    eGFR African Am 7 (L) >60 mL/min/1.73    BUN/Creatinine Ratio 5.9 (L) 7.0 - 25.0    Sodium 137 132 - 146 mmol/L    Potassium 4.5 3.5 - 5.5 mmol/L    Chloride 97 (L) 99 - 109 mmol/L    Total CO2 29.0 20.0 - 31.0 mmol/L    Calcium 8.6 (L) 8.7 - 10.4 mg/dL    Total Protein 8.1 5.7 - 8.2 g/dL    Albumin 4.10 3.20 - 4.80 g/dL    Globulin 4.0 gm/dL    A/G Ratio 1.0 (L) 1.5 - 2.5 g/dL    Total Bilirubin 0.2 (L) 0.3 - 1.2 mg/dL    Alkaline Phosphatase 92 25 - 100 U/L    AST (SGOT) 14 0 - 33 U/L     ALT (SGPT) 12 7 - 40 U/L   TSH   Result Value Ref Range    TSH 1.657 0.350 - 5.350 mIU/mL   T4, Free   Result Value Ref Range    Free T4 1.08 0.89 - 1.76 ng/dL   BNP   Result Value Ref Range    BNP CANCELED pg/mL   Request Problem   Result Value Ref Range    Request Problem CANCELED    POC Urinalysis Dipstick, Automated   Result Value Ref Range    Color Yellow Yellow, Straw, Dark Yellow, Yoana    Clarity, UA Clear Clear    Glucose, UA Negative Negative, 1000 mg/dL (3+) mg/dL    Bilirubin Negative Negative    Ketones, UA Negative Negative    Specific Gravity  1.010 1.005 - 1.030    Blood, UA Negative Negative    pH, Urine 5.0 5.0 - 8.0    Protein, POC Negative Negative mg/dL    Urobilinogen, UA Normal Normal    Leukocytes Negative Negative    Nitrite, UA Negative Negative   POC Glycosylated Hemoglobin (Hb A1C)   Result Value Ref Range    Hemoglobin A1C 7.6 %             Assessment/Plan   Diagnoses and all orders for this visit:    Pneumonia of right middle lobe due to infectious organism    Vaginitis due to Candida  -     fluconazole (DIFLUCAN) 150 MG tablet; Take 1 tablet by mouth 1 (One) Time for 1 dose.  -     Cancel: POC Urinalysis Dipstick, Automated    Type 2 diabetes mellitus with chronic kidney disease on chronic dialysis, with long-term current use of insulin  Comments:  hypoglycemic spell, cautioned to eat frequent small meals.    Chronic diastolic congestive heart failure  -     isosorbide mononitrate (IMDUR) 60 MG 24 hr tablet; Take 1 tablet by mouth Daily.                 Return for Next scheduled follow up.

## 2018-05-10 NOTE — TELEPHONE ENCOUNTER
Patient is on just 1 pill of the fluconazole. She has been advised of the interaction.  They should proceed and fill the rx.    thanks

## 2018-07-13 NOTE — PROGRESS NOTES
Subjective:     Encounter Date:07/26/2018    Patient ID: Esperanza Pop is a 73 y.o.    female, retired , from [place], [place].      INTERNIST: Meghana Rodgers MD  REFERRING HEALTHCARE PROVIDER: [facility]   INTERVENTIONAL CARDIOLOGIST: Hugh Pop MD, [facility], [facility]  ADVANCED HEART FAILURE CLINIC: [facility]  NEPHROLOGIST: [facility]  INTERVENTIONAL CARDIOLOGIST:  Scooter Zhao MD, [facility]   VASCULAR SURGEON:  Demetrius Rich MD-[facility]    Chief Complaint:   Chief Complaint   Patient presents with   • Coronary Artery Disease   • Congestive Heart Failure   • Palpitations   • Chest Pain   • Dizziness     Problem List:  1. Ischemic heart disease:  a. Acute non-STEMI/congestive heart failure with diagnostic coronary arteriography demonstrating 20% LAD plaque with high-grade stenosis in the proximal mid right coronary artery requiring a Xience drug-eluting stent (3.0 mm x 28 mm) with reduced echocardiographic LVEF (0.25) with abnormal diastolic LV dysfunction, December 2013.   b. Improved echocardiogram, April 2014.  c. Remote non-STEMI related to malignant hypertension with distal RCA occlusion, patent previous RCA stent with mild inferior hypokinesis but overall acceptable systolic LV function (LVEF 0.55) with PTCA, DARRYL distal RCA, October 2016.  d. Residual CCS class I angina pectoris/NYHA class II CHF.  2. Mild aortic stenosis (June 2016).  3. Severe hypertension - probably essential.   4. Dyslipidemia.  5. Type 2 diabetes mellitus type 2 with suboptimal control (hemoglobin A1c 8.8%; March 2018, 7.6%).  6. Mild obesity (BMI 31.3).  7. Acute kidney injury: Admission from 12/26/2013 to 01/02/2014, now resolved with latest creatinine 1.4 on 01/08/2014.  8. Moderate normocytic normochromic anemia; hemoglobin 9.2 gm/dL (June 2016).  9. Noncompliance.  10. Remote apparent end-stage renal disease with initiation of hemodialysis MWF weekly  and construction of right upper extremity AV fistula; data deficit (June 2016) with subsequent AV fistula dysfunction and fistulogram with angioplasty - [facility], October 2017.  11. AV fistulogram with apparent thrombectomy [facility], February 2018  12. Palpitations with recent abnormal Holter monitor demonstrating intermittent brief atrial fibrillation, December 2017/January 2018  13. Remote operations:  a. 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 Abdominal hernia repair/panniculectomy  c. Hysterectomy      Allergies   Allergen Reactions   • Albuterol      Increased blood pressure  Used right before heart attack   • Mircera [Methoxy Polyethylene Glycol-Epoetin Beta]      Pt stopped breathing   • Penicillins Hives   • Prednisone      Makes jittery/anxious   • Venofer [Iron Sucrose]      Pt stopped breathing         Current Outpatient Prescriptions:   •  amiodarone (PACERONE) 200 MG tablet, Take 1 tablet by mouth 2 (Two) Times a Day. Take 1 tablet twice daily for one month and then take one tablet daily 200 mg once daily., Disp: 60 tablet, Rfl: 5  •  amLODIPine (NORVASC) 10 MG tablet, Take 1 tablet by mouth Daily., Disp: 30 tablet, Rfl: 5  •  aspirin 81 MG EC tablet, Take 1 tablet by mouth Daily. (Patient taking differently: Take 81 mg by mouth Every Other Day.), Disp: , Rfl:   •  atorvastatin (LIPITOR) 80 MG tablet, Take 1 tablet by mouth Every Night., Disp: 90 tablet, Rfl: 1  •  Blood Glucose Monitoring Suppl (ACCU-CHEK NADER PLUS) W/DEVICE kit, DX: E11.65, Disp: 1 kit, Rfl: 0  •  carvedilol (COREG) 25 MG tablet, Take 1 tablet by mouth Every 12 (Twelve) Hours., Disp: 60 tablet, Rfl: 5  •  Cholecalciferol (VITAMIN D3) 2000 UNITS tablet, Take  by mouth 3 (Three) Times a Week., Disp: , Rfl:   •  clopidogrel (PLAVIX) 75 MG tablet, TAKE ONE TABLET BY MOUTH ONCE DAILY **PT  NEEDS  TO  MAKE  FOLLOW  UP  APPT  FOR  REFILLS**, Disp: 30 tablet, Rfl: 2  •  dorzolamide-timolol (COSOPT) 22.3-6.8 MG/ML  ophthalmic solution, Administer 1 drop to both eyes Daily., Disp: , Rfl:   •  Ferric Citrate 1  MG(Fe) tablet, Take 1 tablet by mouth As Needed., Disp: , Rfl:   •  fluticasone (FLONASE) 50 MCG/ACT nasal spray, 2 sprays into each nostril Daily., Disp: , Rfl:   •  glucose blood (ACCU-CHEK NADER PLUS) test strip, Use as instructed 1 TO 3 TIMES DAILY  DX: E11.65, Disp: 300 each, Rfl: 3  •  hydrALAZINE (APRESOLINE) 50 MG tablet, Take 1 tablet by mouth 3 (Three) Times a Day., Disp: , Rfl:   •  insulin NPH-insulin regular (novoLIN 70/30) (70-30) 100 UNIT/ML injection, Inject 12 units before dinner and 8 Units before breakfast., Disp: 10 mL, Rfl: 5  •  IRON DEXTRAN IJ, Inject  as directed 3 (Three) Times a Week., Disp: , Rfl:   •  isosorbide mononitrate (IMDUR) 60 MG 24 hr tablet, Take 1 tablet by mouth Daily., Disp: 90 tablet, Rfl: 1  •  Lancets Misc. (ACCU-CHEK SOFTCLIX LANCET DEV) kit, TEST 1-3 TIMES DAILY DX:E11.65, Disp: 1 kit, Rfl: 0  •  latanoprost (XALATAN) 0.005 % ophthalmic solution, Administer 1 drop to both eyes Every Night., Disp: , Rfl:   •  lisinopril (PRINIVIL,ZESTRIL) 20 MG tablet, Take 1 tablet by mouth Daily., Disp: 90 tablet, Rfl: 4  •  nitroglycerin (NITROLINGUAL) 0.4 MG/SPRAY spray, Place 1 spray under the tongue Every 5 (Five) Minutes As Needed for Chest Pain., Disp: 1 each, Rfl: 12  •  torsemide (DEMADEX) 100 MG tablet, Take 0.5 tablets by mouth Daily As Needed., Disp: , Rfl:     HISTORY OF PRESENT ILLNESS:  Patient is here for a 4 month follow up. She is still doing dialysis 3 times a week.  She has a normal EKG today and is in normal sinus rhythm.  She has not noticed any palpitations.  She states that she had an incident at her [employer] when she was helping the  with communion, and she was reading and could not remember the words she wanted to say.  She says that Dr. Rodgers told her that she might have had an elevated glucose or a low glucose reading; this has never happened again  "since that time.  She has been having trouble with her blood clotting during dialysis; she talked to her nephrologist about that, and he told the dialysis nurses to watch the tubing and flush it if her blood started to clot.  She feels like she is retaining fluid in her abdomen.  Her blood sugar at home has been 102, and the highest it has been is 287, but that was after she ate.  The patient notes that she has a thyroid nodule and that Dr. Rodgers has been watching it; however, she wants to have it taken out.  She has not taken all of her medications today, and she thinks this is why her blood pressure is high today; she says that her blood pressure was 112/70 at dialysis yesterday.  She also feels like she often \"leans to one side\" when she is walking, almost like she is drunk, and she asks if that is related to her medication.  She denies any dizziness, presyncope, or syncope with these episodes.      Review of Systems   Eyes: Positive for blurred vision.   Skin: Positive for dry skin.   Musculoskeletal: Positive for arthritis.   Neurological: Positive for loss of balance.      Obtained and otherwise negative except as outlined in problem list and HPI.      ECG 12 Lead  Date/Time: 7/26/2018 4:19 PM  Performed by: KENRICK APONTE  Authorized by: KENRICK APONTE   Rhythm comments: Normal sinus rhythm, normal ECG, 77 bpm, QRS 90 ms,  ms,  ms                 Objective:       Vitals:    07/26/18 1313 07/26/18 1323 07/26/18 1344   BP: 158/82 107/77 160/84   BP Location: Left arm Left arm Left arm   Patient Position: Sitting Standing Sitting   Pulse: 77 79    Weight: 90.7 kg (200 lb) 90.7 kg (200 lb)    Height: 170.2 cm (67\") 170.2 cm (67\")      Body mass index is 31.32 kg/m².  Last weight March 2018 was 200 pounds    Physical Exam   Constitutional: She is oriented to person, place, and time. She appears well-developed and well-nourished.   Neck: No JVD present. Carotid bruit is present (bilateral). No " thyromegaly present.   Cardiovascular: Regular rhythm, S1 normal and S2 normal.  Exam reveals no gallop, no S3 and no friction rub.    Murmur heard.   Medium-pitched harsh early systolic murmur is present with a grade of 2/6  at the upper right sternal border radiating to the neck  Pulses:       Dorsalis pedis pulses are 1+ on the right side, and 1+ on the left side.        Posterior tibial pulses are 1+ on the right side, and 1+ on the left side.   Pulmonary/Chest: Effort normal. She has decreased breath sounds. She has no wheezes. She has no rhonchi. She has no rales.   Abdominal: Soft. She exhibits no mass. There is no hepatosplenomegaly. There is no tenderness. There is no guarding.   Bowel sounds audible x4   Musculoskeletal: Normal range of motion. She exhibits no edema.   Lymphadenopathy:     She has no cervical adenopathy.   Neurological: She is alert and oriented to person, place, and time.   Skin: Skin is warm, dry and intact. No rash noted.   Vitals reviewed.        Lab Review:   Lab Results   Component Value Date    GLUCOSE 118 (H) 10/11/2016    BUN 40 (H) 03/01/2018    CREATININE 6.80 (H) 03/01/2018    EGFRIFNONA 6 (L) 03/01/2018    EGFRIFAFRI 7 (L) 03/01/2018    BCR 5.9 (L) 03/01/2018    CO2 29.0 03/01/2018    CALCIUM 8.6 (L) 03/01/2018    PROTENTOTREF 8.1 03/01/2018    ALBUMIN 4.10 03/01/2018    LABIL2 1.0 (L) 03/01/2018    AST 14 03/01/2018    ALT 12 03/01/2018       Lab Results   Component Value Date    WBC 7.84 03/01/2018    HGB 10.4 (L) 03/01/2018    HCT 34.5 03/01/2018    MCV 94.0 03/01/2018     03/01/2018       Lab Results   Component Value Date    HGBA1C 7.6 03/01/2018       Lab Results   Component Value Date    TSH 1.657 03/01/2018       Lab Results   Component Value Date    CHOL 261 (H) 10/11/2016    CHOL 293 (H) 10/10/2016     Lab Results   Component Value Date    TRIG 97 12/22/2017    TRIG 134 03/21/2017     Lab Results   Component Value Date    HDL 54 12/22/2017    HDL 60 03/21/2017      Lab Results   Component Value Date     (H) 12/22/2017     (H) 03/21/2017       Lab Results   Component Value Date    BNP CANCELED 03/01/2018           Assessment:   Overall continued acceptable course with no interim cardiopulmonary complaints with fair functional status. We will defer additional diagnostic or therapeutic intervention from a cardiac perspective at this time. She continues to have dialysis 3 times a week. She seems to have had marked improvement in symptoms on amiodarone, and ESRD and chronic ischemic heart disease preclude the use of other antiarrhythmic drug therapy. She continues to stay in NSR. I encouraged the patient to follow-up with her nephrologist regarding her AV fistula and recent clots in the tubing. I asked the patient to continue monitoring her blood pressure and to call us if it is running 140 or greater systolic consistently.     Diagnosis Plan   1. Coronary artery disease involving native coronary artery of native heart with angina pectoris (CMS/HCC)   stable   2. Acute on chronic diastolic heart failure (CMS/HCC)   stable   3. Essential hypertension   encouraged the patient to continue monitoring her bp   4. End stage renal disease on dialysis (CMS/[facility])   encouraged the patient to follow with her nephrologist          Plan:         1. Patient to continue current medications and close follow up with the above providers.  2. Tentative cardiology follow up in January 2019, or patient may return sooner PRN.    Transcribed by Mary Rivera for WILMA Manning at 1:40 PM on 07/26/2018     WILMA Millan

## 2018-07-26 ENCOUNTER — OFFICE VISIT (OUTPATIENT)
Dept: CARDIOLOGY | Facility: CLINIC | Age: 73
End: 2018-07-26

## 2018-07-26 VITALS
BODY MASS INDEX: 31.39 KG/M2 | HEART RATE: 79 BPM | SYSTOLIC BLOOD PRESSURE: 160 MMHG | WEIGHT: 200 LBS | HEIGHT: 67 IN | DIASTOLIC BLOOD PRESSURE: 84 MMHG

## 2018-07-26 DIAGNOSIS — N18.6 END STAGE RENAL DISEASE ON DIALYSIS (HCC): ICD-10-CM

## 2018-07-26 DIAGNOSIS — I10 ESSENTIAL HYPERTENSION: ICD-10-CM

## 2018-07-26 DIAGNOSIS — Z99.2 END STAGE RENAL DISEASE ON DIALYSIS (HCC): ICD-10-CM

## 2018-07-26 DIAGNOSIS — I50.33 ACUTE ON CHRONIC DIASTOLIC HEART FAILURE (HCC): ICD-10-CM

## 2018-07-26 DIAGNOSIS — I25.119 CORONARY ARTERY DISEASE INVOLVING NATIVE CORONARY ARTERY OF NATIVE HEART WITH ANGINA PECTORIS (HCC): Primary | ICD-10-CM

## 2018-07-26 PROCEDURE — 93000 ELECTROCARDIOGRAM COMPLETE: CPT | Performed by: NURSE PRACTITIONER

## 2018-07-26 PROCEDURE — 99214 OFFICE O/P EST MOD 30 MIN: CPT | Performed by: NURSE PRACTITIONER

## 2018-07-26 RX ORDER — AMIODARONE HYDROCHLORIDE 200 MG/1
200 TABLET ORAL 2 TIMES DAILY
Qty: 60 TABLET | Refills: 5 | Status: SHIPPED | OUTPATIENT
Start: 2018-07-26 | End: 2018-12-06 | Stop reason: SDUPTHER

## 2018-07-26 RX ORDER — HYDRALAZINE HYDROCHLORIDE 50 MG/1
50 TABLET, FILM COATED ORAL 3 TIMES DAILY
Qty: 90 TABLET | Refills: 5 | Status: SHIPPED | OUTPATIENT
Start: 2018-07-26 | End: 2019-01-29

## 2018-08-14 DIAGNOSIS — I10 ESSENTIAL HYPERTENSION: ICD-10-CM

## 2018-08-14 RX ORDER — AMLODIPINE BESYLATE 10 MG/1
10 TABLET ORAL
Qty: 30 TABLET | Refills: 5 | Status: SHIPPED | OUTPATIENT
Start: 2018-08-14 | End: 2019-02-12 | Stop reason: SDUPTHER

## 2018-11-19 RX ORDER — CLOPIDOGREL BISULFATE 75 MG/1
TABLET ORAL
Qty: 30 TABLET | Refills: 2 | Status: SHIPPED | OUTPATIENT
Start: 2018-11-19 | End: 2019-06-14 | Stop reason: SDUPTHER

## 2018-12-06 RX ORDER — AMIODARONE HYDROCHLORIDE 200 MG/1
200 TABLET ORAL DAILY
Qty: 90 TABLET | Refills: 0 | Status: SHIPPED | OUTPATIENT
Start: 2018-12-06 | End: 2019-05-28 | Stop reason: SDUPTHER

## 2018-12-06 RX ORDER — AMIODARONE HYDROCHLORIDE 200 MG/1
200 TABLET ORAL DAILY
Qty: 180 TABLET | Refills: 0 | Status: SHIPPED | OUTPATIENT
Start: 2018-12-06 | End: 2018-12-06

## 2019-01-22 ENCOUNTER — OFFICE VISIT (OUTPATIENT)
Dept: INTERNAL MEDICINE | Facility: CLINIC | Age: 74
End: 2019-01-22

## 2019-01-22 ENCOUNTER — HOSPITAL ENCOUNTER (OUTPATIENT)
Dept: GENERAL RADIOLOGY | Facility: HOSPITAL | Age: 74
Discharge: HOME | End: 2019-01-22
Admitting: NURSE PRACTITIONER

## 2019-01-22 VITALS
WEIGHT: 201 LBS | DIASTOLIC BLOOD PRESSURE: 70 MMHG | OXYGEN SATURATION: 96 % | SYSTOLIC BLOOD PRESSURE: 200 MMHG | BODY MASS INDEX: 31.48 KG/M2 | HEART RATE: 66 BPM | TEMPERATURE: 98.3 F

## 2019-01-22 DIAGNOSIS — J40 BRONCHITIS: Primary | ICD-10-CM

## 2019-01-22 DIAGNOSIS — R05.9 COUGH: ICD-10-CM

## 2019-01-22 DIAGNOSIS — I10 ACCELERATED HYPERTENSION: ICD-10-CM

## 2019-01-22 DIAGNOSIS — J40 BRONCHITIS: ICD-10-CM

## 2019-01-22 PROCEDURE — 96372 THER/PROPH/DIAG INJ SC/IM: CPT | Performed by: NURSE PRACTITIONER

## 2019-01-22 PROCEDURE — 71046 X-RAY EXAM CHEST 2 VIEWS: CPT

## 2019-01-22 PROCEDURE — 99214 OFFICE O/P EST MOD 30 MIN: CPT | Performed by: NURSE PRACTITIONER

## 2019-01-22 RX ORDER — CEFDINIR 300 MG/1
300 CAPSULE ORAL
Qty: 5 CAPSULE | Refills: 0 | Status: SHIPPED | OUTPATIENT
Start: 2019-01-22 | End: 2019-01-29

## 2019-01-22 RX ORDER — CEFTRIAXONE 1 G/1
1 INJECTION, POWDER, FOR SOLUTION INTRAMUSCULAR; INTRAVENOUS ONCE
Status: COMPLETED | OUTPATIENT
Start: 2019-01-22 | End: 2019-01-22

## 2019-01-22 RX ORDER — BENZONATATE 100 MG/1
100 CAPSULE ORAL 3 TIMES DAILY PRN
Qty: 21 CAPSULE | Refills: 0 | Status: SHIPPED | OUTPATIENT
Start: 2019-01-22 | End: 2019-02-26

## 2019-01-22 RX ADMIN — CEFTRIAXONE 1 G: 1 INJECTION, POWDER, FOR SOLUTION INTRAMUSCULAR; INTRAVENOUS at 11:01

## 2019-01-22 NOTE — PATIENT INSTRUCTIONS
For blood pressure; check your blood pressure before your doses of hydralazine today.  If your top number is still above 180 take 100mg of the hydralazine for your mid day dose.  Check your blood pressure again before your evening dose and if top number still above 180 take 100mg of your hydralazine for evening dose.  Tomorrow go to dialysis and go back to your normal hydralazine dose of 50mg three times a day.

## 2019-01-22 NOTE — PROGRESS NOTES
Chief Complaint   Patient presents with   • Cough     x1 week brownish mucux,       History of Present Illness  74 y.o.female presents for cough.    C/o worsening cough; onset almost week. Some uri drainage but symptoms mostly in chest; productive cough brown yellow green sputum. Some soa with coughing. Feels like a low grade fever her normal tempis 97.0. Hx chronic renal failure on dialysis; did not do dialysis yesterday due to feeling so bad.  bp has been running high. Nephrology put her on hydralazine and sometimes she takes anywhere from 50 to 100mg TID.  No chest pain.    Review of Systems   Constitutional: Positive for fatigue and fever. Negative for chills.   HENT: Positive for congestion, postnasal drip, rhinorrhea and sore throat. Negative for ear pain, sinus pressure and sneezing.    Respiratory: Positive for cough and shortness of breath. Negative for chest tightness and wheezing.    Cardiovascular: Negative for chest pain, palpitations and leg swelling.   Musculoskeletal: Negative for myalgias.   Neurological: Negative for headache.       [facility]  The following portions of the patient's history were reviewed and updated as appropriate: allergies, current medications, past family history, past medical history, past social history, past surgical history and problem list.     Social hx:  Tobacco nonsmoker  Alcohol  Past Medical History:   Diagnosis Date   • Acute bronchitis    • Acute conjunctivitis    • Acute kidney injury (CMS/[facility])     Admission from 12/26/2013 to 01/02/2014, now resolved with latest creatinine 1.4 on 01/08/2014.   • Acute non-ST segment elevation myocardial infarction (STEMI) following previous myocardial infarction    • Breast cancer, female, right 5/20/2016 2005   • Cancer (CMS/HCC)    • CHF (congestive heart failure) (CMS/[facility])    • Clotting disorder (CMS/[facility])    • Diabetes mellitus (CMS/[facility])    • Diarrhea    • Dyslipidemia    • Dyspepsia    • Dyspnea    • Edema    • Hx of radiation  therapy    • Hyperlipidemia    • Hypertension     Severe   • Ischemic heart disease    • Moderate obesity     BMI 36.2   • Myocardial infarction (CMS/HCC)    • Radiation 2005   • Renal disorder    • Seasonal allergies       Past Surgical History:   Procedure Laterality Date   • AV FISTULA PLACEMENT, BRACHIOBASILIC     • BREAST BIOPSY Left 2010   • BREAST CYST EXCISION     • BREAST LUMPECTOMY Right 2005   • BREAST SURGERY     • CARDIAC CATHETERIZATION N/A 10/10/2016    Procedure: Left Heart Cath;  Surgeon: Scooter Zhao MD;  Location:  [facility];  Service:    • CARDIAC CATHETERIZATION  10/2016   • HERNIA REPAIR     • HYSTERECTOMY  2000   • INSERTION HEMODIALYSIS CATHETER Right 6/29/2016    Procedure: HEMODIALYSIS CATHETER INSERTION TUNNELLED;  Surgeon: Ramón Chavez MD;  Location:  [facility];  Service:    • MASTECTOMY Right 2006   • REDUCTION MAMMAPLASTY Left 2005      Allergies   Allergen Reactions   • Albuterol      Increased blood pressure  Used right before heart attack   • Mircera [Methoxy Polyethylene Glycol-Epoetin Beta]      Pt stopped breathing   • Penicillins Hives   • Prednisone      Makes jittery/anxious   • Venofer [Iron Sucrose]      Pt stopped breathing      Family History   Problem Relation Age of Onset   • Stroke Mother    • Cancer Mother    • Coronary artery disease Father    • Heart failure Father    • Breast cancer Sister 55   • Ovarian cancer Neg Hx    • Endometrial cancer Neg Hx             Current Outpatient Medications:   •  amiodarone (PACERONE) 200 MG tablet, Take 1 tablet by mouth Daily., Disp: 90 tablet, Rfl: 0  •  amLODIPine (NORVASC) 10 MG tablet, Take 1 tablet by mouth Daily., Disp: 30 tablet, Rfl: 5  •  aspirin 81 MG EC tablet, Take 1 tablet by mouth Daily. (Patient taking differently: Take 81 mg by mouth Every Other Day.), Disp: , Rfl:   •  atorvastatin (LIPITOR) 80 MG tablet, Take 1 tablet by mouth Every Night., Disp: 90 tablet, Rfl: 1  •  Blood  Glucose Monitoring Suppl (ACCU-CHEK NADER PLUS) W/DEVICE kit, DX: E11.65, Disp: 1 kit, Rfl: 0  •  carvedilol (COREG) 25 MG tablet, Take 1 tablet by mouth Every 12 (Twelve) Hours., Disp: 60 tablet, Rfl: 5  •  Cholecalciferol (VITAMIN D3) 2000 UNITS tablet, Take  by mouth 3 (Three) Times a Week., Disp: , Rfl:   •  clopidogrel (PLAVIX) 75 MG tablet, TAKE 1 TABLET BY MOUTH  DAILY (PATIENT NEEDS TO MAKE FOLLOW UP APPT FOR REFILLS), Disp: 30 tablet, Rfl: 2  •  dorzolamide-timolol (COSOPT) 22.3-6.8 MG/ML ophthalmic solution, Administer 1 drop to both eyes Daily., Disp: , Rfl:   •  Ferric Citrate 1  MG(Fe) tablet, Take 1 tablet by mouth As Needed., Disp: , Rfl:   •  fluticasone (FLONASE) 50 MCG/ACT nasal spray, 2 sprays into each nostril Daily., Disp: , Rfl:   •  glucose blood (ACCU-CHEK NADER PLUS) test strip, Use as instructed 1 TO 3 TIMES DAILY  DX: E11.65, Disp: 300 each, Rfl: 3  •  hydrALAZINE (APRESOLINE) 50 MG tablet, Take 1 tablet by mouth 3 (Three) Times a Day. (Patient taking differently: Take 100 mg by mouth 3 (Three) Times a Day.), Disp: 90 tablet, Rfl: 5  •  insulin NPH-insulin regular (novoLIN 70/30) (70-30) 100 UNIT/ML injection, Inject 12 units before dinner and 8 Units before breakfast., Disp: 10 mL, Rfl: 5  •  IRON DEXTRAN IJ, Inject  as directed 3 (Three) Times a Week., Disp: , Rfl:   •  isosorbide mononitrate (IMDUR) 60 MG 24 hr tablet, Take 1 tablet by mouth Daily., Disp: 90 tablet, Rfl: 1  •  Lancets Misc. (ACCU-CHEK SOFTCLIX LANCET DEV) kit, TEST 1-3 TIMES DAILY DX:E11.65, Disp: 1 kit, Rfl: 0  •  latanoprost (XALATAN) 0.005 % ophthalmic solution, Administer 1 drop to both eyes Every Night., Disp: , Rfl:   •  lisinopril (PRINIVIL,ZESTRIL) 20 MG tablet, Take 1 tablet by mouth Daily., Disp: 90 tablet, Rfl: 4  •  nitroglycerin (NITROLINGUAL) 0.4 MG/SPRAY spray, Place 1 spray under the tongue Every 5 (Five) Minutes As Needed for Chest Pain., Disp: 1 each, Rfl: 12  •  torsemide (DEMADEX) 100 MG  tablet, Take 0.5 tablets by mouth Daily As Needed., Disp: , Rfl:     VITALS:  BP (!) 200/70 (BP Location: Left arm, Patient Position: Sitting)   Pulse 66   Temp 98.3 °F (36.8 °C)   Wt 91.2 kg (201 lb)   LMP  (LMP Unknown)   SpO2 96%   BMI 31.48 kg/m²       Physical Exam   Constitutional: She is oriented to person, place, and time. She appears well-developed and well-nourished. No distress.   HENT:   Right Ear: External ear and ear canal normal. Tympanic membrane is not erythematous and not bulging. A middle ear effusion is present.   Left Ear: External ear and ear canal normal. Tympanic membrane is not erythematous and not bulging. A middle ear effusion is present.   Nose: Mucosal edema, rhinorrhea and congestion present. Right sinus exhibits no maxillary sinus tenderness and no frontal sinus tenderness. Left sinus exhibits no maxillary sinus tenderness and no frontal sinus tenderness.   Mouth/Throat: Mucous membranes are normal. Posterior oropharyngeal erythema present. No oropharyngeal exudate, posterior oropharyngeal edema or tonsillar abscesses.   Thick white nasal secretions with posterior pharynx drainage   Eyes: Pupils are equal, round, and reactive to light.   Neck: Neck supple.   Cardiovascular: Normal rate, regular rhythm, S1 normal and S2 normal.   Murmur heard.  Pulmonary/Chest: Effort normal. She has wheezes. She has no rhonchi. She has rales in the right lower field, the left middle field and the left lower field.   Frequent productive cough with thick yellow sputum   Lymphadenopathy:     She has no cervical adenopathy.   Neurological: She is alert and oriented to person, place, and time.   Skin: Skin is warm and dry. Capillary refill takes less than 2 seconds. No rash noted.   Psychiatric: She has a normal mood and affect.       LABS  No new labs  Study Result     EXAMINATION: XR CHEST 2 VW- 01/22/2019     INDICATION: J40-Bronchitis, not specified as acute or chronic; R05-Cough         COMPARISON: Chest x-ray 04/26/2018     FINDINGS: Cardiac size borderline enlarged. Mild prominence of the  perihilar lung markings as well as interstitium concerning for trace  congestion and/or interstitial edema without significant effusion.  Asymmetric elevation of the right hemidiaphragm. No pneumothorax.  Degenerative changes of the spine.     IMPRESSION:  Borderline cardiomegaly with mild increase in central  pulmonary vascularity and interstitial prominence concerning for  interstitial edema pattern. No significant pleural effusion.     D:  01/22/2019  E:  01/22/2019       ASSESSMENT/PLAN  Esperanza was seen today for cough.    Diagnoses and all orders for this visit:    Bronchitis  -     XR Chest 2 View; Future  -     cefTRIAXone (ROCEPHIN) injection 1 g; Inject 1 g into the appropriate muscle as directed by prescriber 1 (One) Time.  -     cefdinir (OMNICEF) 300 MG capsule; Take 1 capsule by mouth Every Other Day for 5 doses. Start tomorrow 1-23.  -     benzonatate (TESSALON PERLES) 100 MG capsule; Take 1 capsule by mouth 3 (Three) Times a Day As Needed for Cough.    Cough  -     XR Chest 2 View; Future  -     benzonatate (TESSALON PERLES) 100 MG capsule; Take 1 capsule by mouth 3 (Three) Times a Day As Needed for Cough.    Accelerated hypertension  Comments:  continue hydralazine as below    Pneumonia is certainly in the differential; check chest xray. Considered neb tx in office but pt reports when she albuterol in the past made her BP worse and she is quite hypertensive today.  Adjusted abx dose in setting chronic renal failure HD pt.    For blood pressure; check your blood pressure before your doses of hydralazine today.  If your top number is still above 180 take 100mg of the hydralazine for your mid day dose.  Check your blood pressure again before your evening dose and if top number still above 180 take 100mg of your hydralazine for evening dose.  Tomorrow go to dialysis and go back to your  hydralazine  dose of 50mg three times a day.    If worsening of symptoms or no improvement in symptoms or fever > 101.5 patient should contact our office for further evaluation treatment or seek urgent care.    I discussed the patients findings and my recommendations with patient.  Patient was encouraged to keep me informed of any acute changes, lack of improvement, or any new concerning symptoms.    Patient voiced understanding of all instructions and denied further questions.    ADDENDUM: I called pt with results cxr; negative pneumonia but increased vascular congestion borderline cardiomegaly.  Needs to go to dialysis tomorrow; do not skip dialysis. Cont other abx meds as discussed earlier in visit.    FOLLOW-UP  Return if symptoms worsen or fail to improve.  Keep appt 1-29 FU with Dr. Ana Maria jaffe for blood pressure and resp recheck.  Electronically signed by:    WILMA Zaidi  01/22/2019

## 2019-01-29 ENCOUNTER — OFFICE VISIT (OUTPATIENT)
Dept: INTERNAL MEDICINE | Facility: CLINIC | Age: 74
End: 2019-01-29

## 2019-01-29 VITALS
WEIGHT: 203 LBS | BODY MASS INDEX: 31.79 KG/M2 | DIASTOLIC BLOOD PRESSURE: 64 MMHG | OXYGEN SATURATION: 98 % | SYSTOLIC BLOOD PRESSURE: 190 MMHG | HEART RATE: 61 BPM

## 2019-01-29 DIAGNOSIS — I10 UNCONTROLLED HYPERTENSION: Primary | ICD-10-CM

## 2019-01-29 DIAGNOSIS — J40 BRONCHITIS: ICD-10-CM

## 2019-01-29 PROCEDURE — 99214 OFFICE O/P EST MOD 30 MIN: CPT | Performed by: INTERNAL MEDICINE

## 2019-01-29 RX ORDER — CEFDINIR 300 MG/1
CAPSULE ORAL
Qty: 5 CAPSULE | Refills: 0 | Status: SHIPPED | OUTPATIENT
Start: 2019-01-29 | End: 2019-02-26

## 2019-01-29 RX ORDER — HYDRALAZINE HYDROCHLORIDE 50 MG/1
100 TABLET, FILM COATED ORAL 2 TIMES DAILY
Qty: 120 TABLET | Refills: 3 | Status: SHIPPED | OUTPATIENT
Start: 2019-01-29 | End: 2019-04-21 | Stop reason: HOSPADM

## 2019-01-29 RX ORDER — CLONIDINE HYDROCHLORIDE 0.1 MG/1
.1-.2 TABLET ORAL 2 TIMES DAILY
Qty: 120 TABLET | Refills: 0 | Status: ON HOLD | OUTPATIENT
Start: 2019-01-29 | End: 2019-04-21 | Stop reason: SDUPTHER

## 2019-02-04 ENCOUNTER — TELEPHONE (OUTPATIENT)
Dept: CARDIOLOGY | Facility: CLINIC | Age: 74
End: 2019-02-04

## 2019-02-04 NOTE — TELEPHONE ENCOUNTER
Patient reports BP has been elevated recently.  Stating BP readings in the 200 range systolically.  PCP recently added clonidine 01.mg BID, and hydralazine 50mg BID (has been increased to 100mg BID by kidney MD.)      This morning BP reported in the 150 systolic range before her call.  During call /75, HR 59.  She has not taken morning medications.  Patient states she gets anxious and BP goes higher.    She will take morning medication as ordered and continue to monitor.  Try to work on decreasing stressors.  Has an appt with Dr. Sagastume on Friday.  Will call back if necessary.

## 2019-02-12 ENCOUNTER — OFFICE VISIT (OUTPATIENT)
Dept: INTERNAL MEDICINE | Facility: CLINIC | Age: 74
End: 2019-02-12

## 2019-02-12 ENCOUNTER — RESULTS ENCOUNTER (OUTPATIENT)
Dept: INTERNAL MEDICINE | Facility: CLINIC | Age: 74
End: 2019-02-12

## 2019-02-12 ENCOUNTER — HOSPITAL ENCOUNTER (OUTPATIENT)
Dept: GENERAL RADIOLOGY | Facility: HOSPITAL | Age: 74
Discharge: HOME | End: 2019-02-12
Admitting: INTERNAL MEDICINE

## 2019-02-12 VITALS
WEIGHT: 200 LBS | DIASTOLIC BLOOD PRESSURE: 62 MMHG | HEIGHT: 67 IN | OXYGEN SATURATION: 95 % | HEART RATE: 64 BPM | BODY MASS INDEX: 31.39 KG/M2 | SYSTOLIC BLOOD PRESSURE: 168 MMHG

## 2019-02-12 DIAGNOSIS — J18.9 COMMUNITY ACQUIRED PNEUMONIA OF RIGHT MIDDLE LOBE OF LUNG: Primary | ICD-10-CM

## 2019-02-12 DIAGNOSIS — D50.8 OTHER IRON DEFICIENCY ANEMIA: ICD-10-CM

## 2019-02-12 DIAGNOSIS — I10 ESSENTIAL HYPERTENSION: ICD-10-CM

## 2019-02-12 PROCEDURE — 71046 X-RAY EXAM CHEST 2 VIEWS: CPT

## 2019-02-12 PROCEDURE — 99214 OFFICE O/P EST MOD 30 MIN: CPT | Performed by: INTERNAL MEDICINE

## 2019-02-12 RX ORDER — NITROGLYCERIN 400 UG/1
1 SPRAY ORAL
Qty: 1 EACH | Refills: 12 | Status: SHIPPED | OUTPATIENT
Start: 2019-02-12 | End: 2020-09-22 | Stop reason: SDUPTHER

## 2019-02-12 RX ORDER — AMLODIPINE BESYLATE 10 MG/1
10 TABLET ORAL
Qty: 30 TABLET | Refills: 5 | Status: SHIPPED | OUTPATIENT
Start: 2019-02-12 | End: 2019-04-21 | Stop reason: HOSPADM

## 2019-02-12 NOTE — PROGRESS NOTES
Pneumonia (hospital follow up); Hypertension; and Shortness of Breath    Subjective   Esperanza Pop is a 74 y.o. female is here today for follow-up.    History of Present Illness   Was admitted to [facility], as her AVfistula had thrombosed and was soa, and was found to be in Pulmonary edema and pneumonia. Had the thrombectomy, and AV Fistulogram, and Started on IV Abx , and sent home on PO, but has not filled it yet.    Current Outpatient Medications:   •  amiodarone (PACERONE) 200 MG tablet, Take 1 tablet by mouth Daily., Disp: 90 tablet, Rfl: 0  •  amLODIPine (NORVASC) 10 MG tablet, Take 1 tablet by mouth Daily., Disp: 30 tablet, Rfl: 5  •  aspirin 81 MG EC tablet, Take 1 tablet by mouth Daily. (Patient taking differently: Take 81 mg by mouth Every Other Day.), Disp: , Rfl:   •  atorvastatin (LIPITOR) 80 MG tablet, Take 1 tablet by mouth Every Night., Disp: 90 tablet, Rfl: 1  •  benzonatate (TESSALON PERLES) 100 MG capsule, Take 1 capsule by mouth 3 (Three) Times a Day As Needed for Cough., Disp: 21 capsule, Rfl: 0  •  Blood Glucose Monitoring Suppl (ACCU-CHEK NADER PLUS) W/DEVICE kit, DX: E11.65, Disp: 1 kit, Rfl: 0  •  carvedilol (COREG) 25 MG tablet, Take 1 tablet by mouth Every 12 (Twelve) Hours., Disp: 60 tablet, Rfl: 5  •  cefdinir (OMNICEF) 300 MG capsule, 1 PO q 48hrs after Hemodialysis, Disp: 5 capsule, Rfl: 0  •  Cholecalciferol (VITAMIN D3) 2000 UNITS tablet, Take  by mouth 3 (Three) Times a Week., Disp: , Rfl:   •  CloNIDine (CATAPRES) 0.1 MG tablet, Take 1-2 tablets by mouth 2 (Two) Times a Day., Disp: 120 tablet, Rfl: 0  •  clopidogrel (PLAVIX) 75 MG tablet, TAKE 1 TABLET BY MOUTH  DAILY (PATIENT NEEDS TO MAKE FOLLOW UP APPT FOR REFILLS), Disp: 30 tablet, Rfl: 2  •  dorzolamide-timolol (COSOPT) 22.3-6.8 MG/ML ophthalmic solution, Administer 1 drop to both eyes Daily., Disp: , Rfl:   •  Ferric Citrate 1  MG(Fe) tablet, Take 1 tablet by mouth As Needed., Disp: , Rfl:   •  fluticasone (FLONASE)  50 MCG/ACT nasal spray, 2 sprays into each nostril Daily., Disp: , Rfl:   •  glucose blood (ACCU-CHEK NADER PLUS) test strip, Use as instructed 1 TO 3 TIMES DAILY  DX: E11.65, Disp: 300 each, Rfl: 3  •  hydrALAZINE (APRESOLINE) 50 MG tablet, Take 2 tablets by mouth 2 (Two) Times a Day., Disp: 120 tablet, Rfl: 3  •  insulin NPH-insulin regular (novoLIN 70/30) (70-30) 100 UNIT/ML injection, Inject 12 units before dinner and 8 Units before breakfast., Disp: 10 mL, Rfl: 5  •  IRON DEXTRAN IJ, Inject  as directed 3 (Three) Times a Week., Disp: , Rfl:   •  isosorbide mononitrate (IMDUR) 60 MG 24 hr tablet, Take 1 tablet by mouth Daily., Disp: 90 tablet, Rfl: 1  •  Lancets Misc. (ACCU-CHEK SOFTCLIX LANCET DEV) kit, TEST 1-3 TIMES DAILY DX:E11.65, Disp: 1 kit, Rfl: 0  •  latanoprost (XALATAN) 0.005 % ophthalmic solution, Administer 1 drop to both eyes Every Night., Disp: , Rfl:   •  lisinopril (PRINIVIL,ZESTRIL) 20 MG tablet, Take 1 tablet by mouth Daily., Disp: 90 tablet, Rfl: 4  •  nitroglycerin (NITROLINGUAL) 0.4 MG/SPRAY spray, Place 1 spray under the tongue Every 5 (Five) Minutes As Needed for Chest Pain., Disp: 1 each, Rfl: 12  •  torsemide (DEMADEX) 100 MG tablet, Take 100 mg by mouth Daily As Needed., Disp: , Rfl:       The following portions of the patient's history were reviewed and updated as appropriate: allergies, current medications, past family history, past medical history, past social history, past surgical history and problem list.    Review of Systems   Constitutional: Positive for fatigue. Negative for chills and fever.   HENT: Negative for ear discharge, ear pain, sinus pressure and sore throat.    Respiratory: Positive for shortness of breath (better). Negative for cough (better) and chest tightness.    Cardiovascular: Negative for chest pain, palpitations and leg swelling.   Gastrointestinal: Negative for diarrhea, nausea and vomiting.   Musculoskeletal: Negative for arthralgias, back pain and  "myalgias.   Neurological: Positive for weakness. Negative for dizziness, syncope and headaches.   Psychiatric/Behavioral: Negative for confusion and sleep disturbance.       Objective   /62   Pulse 64   Ht 170.2 cm (67\")   Wt 90.7 kg (200 lb)   LMP  (LMP Unknown)   SpO2 95%   BMI 31.32 kg/m²   Physical Exam   Constitutional: She is oriented to person, place, and time. She appears well-developed and well-nourished.   HENT:   Head: Normocephalic and atraumatic.   Mouth/Throat: No oropharyngeal exudate.   Cardiovascular: Normal rate and regular rhythm.   Pulmonary/Chest: Effort normal. No respiratory distress. She has no wheezes (resolved). She has rales (better).   Abdominal: Soft. Bowel sounds are normal. She exhibits no distension. There is no tenderness.   Musculoskeletal: She exhibits no edema.   Neurological: She is alert and oriented to person, place, and time. No cranial nerve deficit.   Skin: Skin is warm and dry.   Psychiatric: She has a normal mood and affect. Judgment normal.   Nursing note and vitals reviewed.        Results for orders placed or performed in visit on 03/01/18   CBC (No Diff)   Result Value Ref Range    WBC 7.84 3.50 - 10.80 10*3/mm3    RBC 3.67 (L) 3.89 - 5.14 10*6/mm3    Hemoglobin 10.4 (L) 11.5 - 15.5 g/dL    Hematocrit 34.5 34.5 - 44.0 %    MCV 94.0 80.0 - 99.0 fL    MCH 28.3 27.0 - 31.0 pg    MCHC 30.1 (L) 32.0 - 36.0 g/dL    RDW 13.8 11.3 - 14.5 %    Platelets 220 150 - 450 10*3/mm3   Comprehensive Metabolic Panel   Result Value Ref Range    Glucose 141 (H) 70 - 100 mg/dL    BUN 40 (H) 9 - 23 mg/dL    Creatinine 6.80 (H) 0.60 - 1.30 mg/dL    eGFR Non African Am 6 (L) >60 mL/min/1.73    eGFR African Am 7 (L) >60 mL/min/1.73    BUN/Creatinine Ratio 5.9 (L) 7.0 - 25.0    Sodium 137 132 - 146 mmol/L    Potassium 4.5 3.5 - 5.5 mmol/L    Chloride 97 (L) 99 - 109 mmol/L    Total CO2 29.0 20.0 - 31.0 mmol/L    Calcium 8.6 (L) 8.7 - 10.4 mg/dL    Total Protein 8.1 5.7 - 8.2 g/dL    " Albumin 4.10 3.20 - 4.80 g/dL    Globulin 4.0 gm/dL    A/G Ratio 1.0 (L) 1.5 - 2.5 g/dL    Total Bilirubin 0.2 (L) 0.3 - 1.2 mg/dL    Alkaline Phosphatase 92 25 - 100 U/L    AST (SGOT) 14 0 - 33 U/L    ALT (SGPT) 12 7 - 40 U/L   TSH   Result Value Ref Range    TSH 1.657 0.350 - 5.350 mIU/mL   T4, Free   Result Value Ref Range    Free T4 1.08 0.89 - 1.76 ng/dL   BNP   Result Value Ref Range    BNP CANCELED pg/mL   Request Problem   Result Value Ref Range    Request Problem CANCELED    POC Urinalysis Dipstick, Automated   Result Value Ref Range    Color Yellow Yellow, Straw, Dark Yellow, Yoana    Clarity, UA Clear Clear    Glucose, UA Negative Negative, 1000 mg/dL (3+) mg/dL    Bilirubin Negative Negative    Ketones, UA Negative Negative    Specific Gravity  1.010 1.005 - 1.030    Blood, UA Negative Negative    pH, Urine 5.0 5.0 - 8.0    Protein, POC Negative Negative mg/dL    Urobilinogen, UA Normal Normal    Leukocytes Negative Negative    Nitrite, UA Negative Negative   POC Glycosylated Hemoglobin (Hb A1C)   Result Value Ref Range    Hemoglobin A1C 7.6 %             Assessment/Plan   Diagnoses and all orders for this visit:    Community acquired pneumonia of right middle lobe of lung (CMS/HCC)  Comments:  perihilar opacity per CXR, repeat, and start doxy ( pt has) if positive.  Orders:  -     XR Chest PA & Lateral    Essential hypertension  -     amLODIPine (NORVASC) 10 MG tablet; Take 1 tablet by mouth Daily.    Other iron deficiency anemia  Comments:  from procedure, recheck and if worse, will need blood transfusion.  Orders:  -     CBC (No Diff); Future  -     Iron Profile; Future  -     Ferritin; Future    Other orders  -     nitroglycerin (NITROLINGUAL) 0.4 MG/SPRAY spray; Place 1 spray under the tongue Every 5 (Five) Minutes As Needed for Chest Pain.    reviewed labs and notes, hemoglobin down to 7.8, recheck with HD and transfuse if worse.    CXR with patchy opacity, and was advised pneumonia. Patient  clinically better.   Recheck xray and if negative, no need for abx.             Return for Next scheduled follow up.

## 2019-02-12 NOTE — PROGRESS NOTES
Subjective:     Encounter Date:02/14/2019      Patient ID: Esperanza Pop is a 74 y.o.   female, retired , from [place], [place].      INTERNIST: Meghana Rodgers MD  REFERRING HEALTHCARE PROVIDER: [facility]   INTERVENTIONAL CARDIOLOGIST: Hugh Pop MD, [facility], [facility]  ADVANCED HEART FAILURE CLINIC: [facility]  NEPHROLOGIST: [facility]  INTERVENTIONAL CARDIOLOGIST:  Scooter Zhao MD, [facility]   VASCULAR SURGEON:  Demetrius Rich MD-[facility]     Chief Complaint:   Chief Complaint   Patient presents with   • Coronary Artery Disease   • Ischemic heart disease     Problem List:  1. Ischemic heart disease  a. Acute non-STEMI/congestive heart failure with diagnostic coronary arteriography demonstrating 20% LAD plaque with high-grade stenosis in the proximal mid right coronary artery requiring a Xience drug-eluting stent (3.0 mm x 28 mm) with reduced echocardiographic LVEF (0.25) with abnormal diastolic LV dysfunction, December 2013.   b. Improved echocardiogram, April 2014.  c. Remote non-STEMI related to malignant hypertension with distal RCA occlusion, patent previous RCA stent with mild inferior hypokinesis but overall acceptable systolic LV function (LVEF 0.55) with PTCA, DARRYL distal RCA, October 2016.  d. Residual CCS class I angina pectoris/NYHA class II CHF.  e. Mild aortic stenosis (June 2016).  f. Echocardiogram 2/7/19: Normal size LV, moderate LV wall thickness, LVEF 0.65, impaired LV relaxation, normal left atrial pressure, mild MR, mild TR, mild CA, no pericardial effusion  g. Residual CCS class I angina pectoris/NYHA class II CHF  2. Severe hypertension - probably essential.   3. Dyslipidemia.  4. Type 2 diabetes mellitus type 2 with suboptimal control (hemoglobin A1c 8.8%; March 2018, 7.6%, 10.7% February 2019).  5. Mild obesity (BMI 31.3).  6. Acute kidney injury: Admission from 12/26/2013 to 01/02/2014, now resolved with  latest creatinine 1.4 on 01/08/2014.  7. Moderate normocytic normochromic anemia; hemoglobin 9.2 gm/dL (June 2016).  8. Noncompliance.  9. Remote apparent end-stage renal disease with initiation of hemodialysis MWF weekly and construction of right upper extremity AV fistula; data deficit (June 2016) with subsequent AV fistula dysfunction and fistulogram with angioplasty - [facility], October 2017.  10. AV fistulogram with apparent thrombectomy [facility], February 2018  11. Palpitations with recent abnormal Holter monitor demonstrating intermittent brief atrial fibrillation, December 2017/January 2018  12. [facility] overnight hospitalization February 2019 for shortness of breath and thrombosed AV fistula with angioplasty  13. Remote operations:  a. 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 Abdominal hernia repair/panniculectomy  c. Hysterectomy  d. Angioplasty for thrombosed AV fistula February 2019        Allergies   Allergen Reactions   • Albuterol      Increased blood pressure  Used right before heart attack   • Mircera [Methoxy Polyethylene Glycol-Epoetin Beta]      Pt stopped breathing   • Penicillins Hives   • Prednisone      Makes jittery/anxious   • Venofer [Iron Sucrose]      Pt stopped breathing         Current Outpatient Medications:   •  amiodarone (PACERONE) 200 MG tablet, Take 1 tablet by mouth Daily., Disp: 90 tablet, Rfl: 0  •  amLODIPine (NORVASC) 10 MG tablet, Take 1 tablet by mouth Daily., Disp: 30 tablet, Rfl: 5  •  aspirin 81 MG EC tablet, Take 1 tablet by mouth Daily. (Patient taking differently: Take 81 mg by mouth Every Other Day.), Disp: , Rfl:   •  atorvastatin (LIPITOR) 80 MG tablet, Take 1 tablet by mouth Every Night., Disp: 90 tablet, Rfl: 1  •  benzonatate (TESSALON PERLES) 100 MG capsule, Take 1 capsule by mouth 3 (Three) Times a Day As Needed for Cough., Disp: 21 capsule, Rfl: 0  •  Blood Glucose Monitoring Suppl (ACCU-CHEK NADER PLUS) W/DEVICE kit, DX:  E11.65, Disp: 1 kit, Rfl: 0  •  carvedilol (COREG) 25 MG tablet, Take 1 tablet by mouth Every 12 (Twelve) Hours., Disp: 60 tablet, Rfl: 5  •  cefdinir (OMNICEF) 300 MG capsule, 1 PO q 48hrs after Hemodialysis, Disp: 5 capsule, Rfl: 0  •  Cholecalciferol (VITAMIN D3) 2000 UNITS tablet, Take  by mouth 3 (Three) Times a Week., Disp: , Rfl:   •  CloNIDine (CATAPRES) 0.1 MG tablet, Take 1-2 tablets by mouth 2 (Two) Times a Day., Disp: 120 tablet, Rfl: 0  •  clopidogrel (PLAVIX) 75 MG tablet, TAKE 1 TABLET BY MOUTH  DAILY (PATIENT NEEDS TO MAKE FOLLOW UP APPT FOR REFILLS), Disp: 30 tablet, Rfl: 2  •  dorzolamide-timolol (COSOPT) 22.3-6.8 MG/ML ophthalmic solution, Administer 1 drop to both eyes Daily., Disp: , Rfl:   •  Ferric Citrate 1  MG(Fe) tablet, Take 1 tablet by mouth As Needed., Disp: , Rfl:   •  fluticasone (FLONASE) 50 MCG/ACT nasal spray, 2 sprays into each nostril Daily., Disp: , Rfl:   •  glucose blood (ACCU-CHEK NADER PLUS) test strip, Use as instructed 1 TO 3 TIMES DAILY  DX: E11.65, Disp: 300 each, Rfl: 3  •  hydrALAZINE (APRESOLINE) 50 MG tablet, Take 2 tablets by mouth 2 (Two) Times a Day., Disp: 120 tablet, Rfl: 3  •  insulin NPH-insulin regular (novoLIN 70/30) (70-30) 100 UNIT/ML injection, Inject 12 units before dinner and 8 Units before breakfast., Disp: 10 mL, Rfl: 5  •  IRON DEXTRAN IJ, Inject  as directed 3 (Three) Times a Week., Disp: , Rfl:   •  isosorbide mononitrate (IMDUR) 60 MG 24 hr tablet, Take 1 tablet by mouth Daily., Disp: 90 tablet, Rfl: 1  •  Lancets Misc. (ACCU-CHEK SOFTCLIX LANCET DEV) kit, TEST 1-3 TIMES DAILY DX:E11.65, Disp: 1 kit, Rfl: 0  •  latanoprost (XALATAN) 0.005 % ophthalmic solution, Administer 1 drop to both eyes Every Night., Disp: , Rfl:   •  lisinopril (PRINIVIL,ZESTRIL) 20 MG tablet, Take 1 tablet by mouth Daily., Disp: 90 tablet, Rfl: 4  •  nitroglycerin (NITROLINGUAL) 0.4 MG/SPRAY spray, Place 1 spray under the tongue Every 5 (Five) Minutes As Needed for  Chest Pain., Disp: 1 each, Rfl: 12  •  torsemide (DEMADEX) 100 MG tablet, Take 100 mg by mouth Daily As Needed., Disp: , Rfl:     HISTORY OF PRESENT ILLNESS:  Patient is here for a 7 month follow-up.  She continues to do dialysis 3 times a week.  She had a [facility] overnight hospitalization February 2019 for shortness of breath and her AV fistula was found to be thrombosed and she had an angioplasty of the AV fistula 2/7/19 for the thrombosis and stenosis of the cephalic arch.  She had questionable CAP on chest x-ray and was started on doxycycline.  She had an acceptable echocardiogram with normal EF.  Her hemoglobin A1c was 10.7%. She is scheduled to have another angioplasty in 3 months for her AV fistula. Her SOB has improved and she was told she had pneumonia and bronchitis. She denies anginal type chest pressure but sometimes feels abdominal distension which radiates to her chest particularly when she is at dialysis. She denies palpitations, presyncope, syncope, or lower extremity edema. Her blood pressure has been elevated lately 150's systolic despite her hydralazine being increased to 100mg 1-2 weeks ago. She is considering talking to [facility] about a kidney transplant and was worried about the risks. The patient was advised to discuss all her options with her nephrologist and/or the transplant team.       Review of Systems   Respiratory: Positive for shortness of breath and sleep disturbances due to breathing.    Skin: Positive for dry skin.      Obtained and otherwise negative except as outlined in problem list and HPI.      ECG 12 Lead  Date/Time: 2/14/2019 3:29 PM  Performed by: Geena Estrella APRN  Authorized by: Geena Estrella APRN   Rhythm comments: Normal sinus rhythm, left axis deviation, prolonged QT, abnormal ECG, 73 bpm,  ms,  ms,  ms, ECG stable compared to last ECG to July 2018                 Objective:       Vitals:    02/14/19 1518 02/14/19 1526   BP: 152/63  "139/54   BP Location: Left arm Left arm   Patient Position: Sitting Standing   Pulse: 72 76   Weight: 89.5 kg (197 lb 6.4 oz)    Height: 170.2 cm (67\")    Recheck blood pressure left arm sitting was 150/50  Body mass index is 30.92 kg/m².  Last weight July 2018 was 200 pounds  Physical Exam   Constitutional: She is oriented to person, place, and time. She appears well-developed and well-nourished.   Neck: No JVD present. Carotid bruit is present (bilateral). No thyromegaly present.   Cardiovascular: Regular rhythm, S1 normal and S2 normal. Exam reveals no gallop, no S3 and no friction rub.   Murmur heard.   Medium-pitched harsh early systolic murmur is present with a grade of 2/6 at the upper right sternal border radiating to the neck.  Pulses:       Dorsalis pedis pulses are 1+ on the right side, and 1+ on the left side.        Posterior tibial pulses are 1+ on the right side, and 1+ on the left side.   Pulmonary/Chest: Effort normal. She has decreased breath sounds. She has no wheezes. She has no rhonchi. She has no rales.   Abdominal: Soft. She exhibits no mass. There is no hepatosplenomegaly. There is no tenderness. There is no guarding.   Bowel sounds audible x4   Musculoskeletal: Normal range of motion. She exhibits no edema.   Lymphadenopathy:     She has no cervical adenopathy.   Neurological: She is alert and oriented to person, place, and time.   Skin: Skin is warm, dry and intact. No rash noted.   Vitals reviewed.        Lab Review:   Lab Results   Component Value Date    GLUCOSE 118 (H) 10/11/2016    BUN 40 (H) 03/01/2018    CREATININE 6.80 (H) 03/01/2018    EGFRIFNONA 6 (L) 03/01/2018    EGFRIFAFRI 7 (L) 03/01/2018    BCR 5.9 (L) 03/01/2018    CO2 29.0 03/01/2018    CALCIUM 8.6 (L) 03/01/2018    PROTENTOTREF 8.1 03/01/2018    ALBUMIN 4.10 03/01/2018    LABIL2 1.0 (L) 03/01/2018    AST 14 03/01/2018    ALT 12 03/01/2018       Lab Results   Component Value Date    WBC 7.84 03/01/2018    HGB 10.4 (L) " 03/01/2018    HCT 34.5 03/01/2018    MCV 94.0 03/01/2018     03/01/2018       Lab Results   Component Value Date    HGBA1C 7.6 03/01/2018       Lab Results   Component Value Date    TSH 1.657 03/01/2018       Lab Results   Component Value Date    CHOL 261 (H) 10/11/2016    CHOL 293 (H) 10/10/2016     Lab Results   Component Value Date    TRIG 97 12/22/2017    TRIG 134 03/21/2017     Lab Results   Component Value Date    HDL 54 12/22/2017    HDL 60 03/21/2017     Lab Results   Component Value Date     (H) 12/22/2017     (H) 03/21/2017         Lab Results   Component Value Date    BNP CANCELED 03/01/2018   · Chest x-ray 2/7/19: Right parahilar opacity, probably secondary to pneumonia  · CMP 2/9/19:  · Chloride 100, carbon dioxide 31, BUN 31, glucose 199, calcium 7.9, creatinine 4.9, globulin 4.3, GFR 11, protein 6.8, sodium 138, potassium 4.3  · CBC: WBC 6.9, hematocrit 27.5, hemoglobin 7.8, platelets 165, MCV 84.9, MCH 24.1, MCHC 28.4, MPV 12.4, RDW 16, neutrophils 49.8, lymphocytes 26.3, monocytes 19.4, eos 3.8, basos 0.4  · Echocardiogram 2/7/19: Normal size LV, moderate LV wall thickness, EF 65%, impaired LV relaxation, normal left atrial pressure, mild MR, mild TR, mild MA, no pericardial effusion  · Hemoglobin A1c 10.7%      Assessment:     Overall continued acceptable course with no interim cardiopulmonary complaints with fair functional status with dialysis 3 times a week. We will defer additional diagnostic or therapeutic intervention from a cardiac perspective at this time.  Her echocardiogram was acceptable February 2019.  Her diabetes mellitus is uncontrolled with hemoglobin A1c 10.7%. She had a [facility] overnight hospitalization February 2019 for shortness of breath and her AV fistula was found to be thrombosed and she had an angioplasty of the AV fistula 2/7/19 for the thrombosis and stenosis of the cephalic arch. Her hypertension is uncontrolled so we will increase her Imdur  to 120 mg daily.  I encouraged the patient to call in 2 weeks to inform us of what her blood pressure readings at home have been after increasing her Imdur.  Her ECG was acceptable in office today with appropriate  ms.     Diagnosis Plan   1. Acute on chronic diastolic heart failure (CMS/[facility])  Stable   2. Coronary artery disease involving native coronary artery of native heart with angina pectoris (CMS/[facility])  Stable   3. Hyperlipidemia LDL goal <70  No new labs to review   4. Type 2 diabetes mellitus with chronic kidney disease on chronic dialysis, with long-term current use of insulin (CMS/[facility])  A1C 10.7% February 2019   5. Hypertension: Increase Imdur to 120mg daily       Plan:         1. Patient to continue current medications and close follow up with the above providers.  2. Tentative cardiology follow up in 6 months or patient may return sooner PRN.  3. Increase Imdur to 120 mg daily      Electronically signed by WILMA Millan, 02/14/19, 4:00 PM.

## 2019-02-14 ENCOUNTER — OFFICE VISIT (OUTPATIENT)
Dept: CARDIOLOGY | Facility: CLINIC | Age: 74
End: 2019-02-14

## 2019-02-14 VITALS
WEIGHT: 197.4 LBS | SYSTOLIC BLOOD PRESSURE: 139 MMHG | HEIGHT: 67 IN | HEART RATE: 76 BPM | BODY MASS INDEX: 30.98 KG/M2 | DIASTOLIC BLOOD PRESSURE: 54 MMHG

## 2019-02-14 DIAGNOSIS — I10 ESSENTIAL HYPERTENSION: ICD-10-CM

## 2019-02-14 DIAGNOSIS — Z99.2 TYPE 2 DIABETES MELLITUS WITH CHRONIC KIDNEY DISEASE ON CHRONIC DIALYSIS, WITH LONG-TERM CURRENT USE OF INSULIN (HCC): ICD-10-CM

## 2019-02-14 DIAGNOSIS — E78.5 HYPERLIPIDEMIA LDL GOAL <70: ICD-10-CM

## 2019-02-14 DIAGNOSIS — Z79.4 TYPE 2 DIABETES MELLITUS WITH CHRONIC KIDNEY DISEASE ON CHRONIC DIALYSIS, WITH LONG-TERM CURRENT USE OF INSULIN (HCC): ICD-10-CM

## 2019-02-14 DIAGNOSIS — I25.119 CORONARY ARTERY DISEASE INVOLVING NATIVE CORONARY ARTERY OF NATIVE HEART WITH ANGINA PECTORIS (HCC): ICD-10-CM

## 2019-02-14 DIAGNOSIS — E11.22 TYPE 2 DIABETES MELLITUS WITH CHRONIC KIDNEY DISEASE ON CHRONIC DIALYSIS, WITH LONG-TERM CURRENT USE OF INSULIN (HCC): ICD-10-CM

## 2019-02-14 DIAGNOSIS — N18.6 TYPE 2 DIABETES MELLITUS WITH CHRONIC KIDNEY DISEASE ON CHRONIC DIALYSIS, WITH LONG-TERM CURRENT USE OF INSULIN (HCC): ICD-10-CM

## 2019-02-14 DIAGNOSIS — I50.33 ACUTE ON CHRONIC DIASTOLIC HEART FAILURE (HCC): Primary | ICD-10-CM

## 2019-02-14 PROCEDURE — 93000 ELECTROCARDIOGRAM COMPLETE: CPT | Performed by: NURSE PRACTITIONER

## 2019-02-14 PROCEDURE — 99214 OFFICE O/P EST MOD 30 MIN: CPT | Performed by: NURSE PRACTITIONER

## 2019-02-26 ENCOUNTER — OFFICE VISIT (OUTPATIENT)
Dept: INTERNAL MEDICINE | Facility: CLINIC | Age: 74
End: 2019-02-26

## 2019-02-26 VITALS
OXYGEN SATURATION: 98 % | HEART RATE: 57 BPM | WEIGHT: 196.6 LBS | DIASTOLIC BLOOD PRESSURE: 62 MMHG | SYSTOLIC BLOOD PRESSURE: 170 MMHG | BODY MASS INDEX: 30.86 KG/M2 | HEIGHT: 67 IN

## 2019-02-26 DIAGNOSIS — Z99.2 TYPE 2 DIABETES MELLITUS WITH CHRONIC KIDNEY DISEASE ON CHRONIC DIALYSIS, WITH LONG-TERM CURRENT USE OF INSULIN (HCC): Primary | ICD-10-CM

## 2019-02-26 DIAGNOSIS — E78.5 HYPERLIPIDEMIA LDL GOAL <70: ICD-10-CM

## 2019-02-26 DIAGNOSIS — I10 ESSENTIAL HYPERTENSION: ICD-10-CM

## 2019-02-26 DIAGNOSIS — Z79.4 TYPE 2 DIABETES MELLITUS WITH CHRONIC KIDNEY DISEASE ON CHRONIC DIALYSIS, WITH LONG-TERM CURRENT USE OF INSULIN (HCC): Primary | ICD-10-CM

## 2019-02-26 DIAGNOSIS — N18.6 TYPE 2 DIABETES MELLITUS WITH CHRONIC KIDNEY DISEASE ON CHRONIC DIALYSIS, WITH LONG-TERM CURRENT USE OF INSULIN (HCC): Primary | ICD-10-CM

## 2019-02-26 DIAGNOSIS — E11.22 TYPE 2 DIABETES MELLITUS WITH CHRONIC KIDNEY DISEASE ON CHRONIC DIALYSIS, WITH LONG-TERM CURRENT USE OF INSULIN (HCC): Primary | ICD-10-CM

## 2019-02-26 LAB — HBA1C MFR BLD: 9 %

## 2019-02-26 PROCEDURE — 99214 OFFICE O/P EST MOD 30 MIN: CPT | Performed by: INTERNAL MEDICINE

## 2019-02-26 PROCEDURE — 83036 HEMOGLOBIN GLYCOSYLATED A1C: CPT | Performed by: INTERNAL MEDICINE

## 2019-02-26 NOTE — PROGRESS NOTES
Hypertension and Pneumonia (feeling better)    Subjective   Esperanza Pop is a 74 y.o. female is here today for follow-up.    History of Present Illness   Here for f/u BP has been up and down, usually holds meds on HD days, but sometimes, drops especially when she takes clonidine or imdur.    Current Outpatient Medications:   •  amiodarone (PACERONE) 200 MG tablet, Take 1 tablet by mouth Daily., Disp: 90 tablet, Rfl: 0  •  amLODIPine (NORVASC) 10 MG tablet, Take 1 tablet by mouth Daily., Disp: 30 tablet, Rfl: 5  •  aspirin 81 MG EC tablet, Take 1 tablet by mouth Daily. (Patient taking differently: Take 81 mg by mouth Every Other Day.), Disp: , Rfl:   •  atorvastatin (LIPITOR) 80 MG tablet, Take 1 tablet by mouth Every Night., Disp: 90 tablet, Rfl: 1  •  Blood Glucose Monitoring Suppl (ACCU-CHEK NADER PLUS) W/DEVICE kit, DX: E11.65, Disp: 1 kit, Rfl: 0  •  carvedilol (COREG) 25 MG tablet, Take 1 tablet by mouth Every 12 (Twelve) Hours., Disp: 60 tablet, Rfl: 5  •  Cholecalciferol (VITAMIN D3) 2000 UNITS tablet, Take  by mouth 3 (Three) Times a Week., Disp: , Rfl:   •  CloNIDine (CATAPRES) 0.1 MG tablet, Take 1-2 tablets by mouth 2 (Two) Times a Day., Disp: 120 tablet, Rfl: 0  •  clopidogrel (PLAVIX) 75 MG tablet, TAKE 1 TABLET BY MOUTH  DAILY (PATIENT NEEDS TO MAKE FOLLOW UP APPT FOR REFILLS), Disp: 30 tablet, Rfl: 2  •  dorzolamide-timolol (COSOPT) 22.3-6.8 MG/ML ophthalmic solution, Administer 1 drop to both eyes Daily., Disp: , Rfl:   •  Ferric Citrate 1  MG(Fe) tablet, Take 1 tablet by mouth As Needed., Disp: , Rfl:   •  fluticasone (FLONASE) 50 MCG/ACT nasal spray, 2 sprays into each nostril Daily., Disp: , Rfl:   •  glucose blood (ACCU-CHEK NADER PLUS) test strip, Use as instructed 1 TO 3 TIMES DAILY  DX: E11.65, Disp: 300 each, Rfl: 3  •  hydrALAZINE (APRESOLINE) 50 MG tablet, Take 2 tablets by mouth 2 (Two) Times a Day., Disp: 120 tablet, Rfl: 3  •  insulin NPH-insulin regular (humuLIN 70/30,novoLIN  "70/30) (70-30) 100 UNIT/ML injection, Inject 15 units before dinner and 10 Units before breakfast., Disp: 10 mL, Rfl: 5  •  IRON DEXTRAN IJ, Inject  as directed 3 (Three) Times a Week., Disp: , Rfl:   •  isosorbide mononitrate (IMDUR) 60 MG 24 hr tablet, Take 1 tablet by mouth Daily., Disp: 90 tablet, Rfl: 1  •  Lancets Misc. (ACCU-CHEK SOFTCLIX LANCET DEV) kit, TEST 1-3 TIMES DAILY DX:E11.65, Disp: 1 kit, Rfl: 0  •  latanoprost (XALATAN) 0.005 % ophthalmic solution, Administer 1 drop to both eyes Every Night., Disp: , Rfl:   •  lisinopril (PRINIVIL,ZESTRIL) 20 MG tablet, Take 1 tablet by mouth Daily., Disp: 90 tablet, Rfl: 4  •  nitroglycerin (NITROLINGUAL) 0.4 MG/SPRAY spray, Place 1 spray under the tongue Every 5 (Five) Minutes As Needed for Chest Pain., Disp: 1 each, Rfl: 12  •  torsemide (DEMADEX) 100 MG tablet, Take 100 mg by mouth Daily As Needed., Disp: , Rfl:       The following portions of the patient's history were reviewed and updated as appropriate: allergies, current medications, past family history, past medical history, past social history, past surgical history and problem list.    Review of Systems   Constitutional: Negative.  Negative for chills and fever.   HENT: Negative for ear discharge, ear pain, sinus pressure and sore throat.    Eyes: Negative for pain and redness.   Respiratory: Positive for chest tightness and shortness of breath (better). Negative for cough.    Cardiovascular: Negative for chest pain, palpitations and leg swelling.   Gastrointestinal: Negative for diarrhea, nausea and vomiting.   Genitourinary: Negative for dysuria and urgency.   Musculoskeletal: Negative for arthralgias, back pain and myalgias.   Neurological: Negative for dizziness, syncope and headaches.   Psychiatric/Behavioral: Negative for confusion and sleep disturbance.       Objective   /62   Pulse 57   Ht 170.2 cm (67\")   Wt 89.2 kg (196 lb 9.6 oz)   LMP  (LMP Unknown)   SpO2 98% Comment: ra  BMI " 30.79 kg/m²   Physical Exam   Constitutional: She is oriented to person, place, and time. She appears well-developed and well-nourished.   HENT:   Head: Normocephalic and atraumatic.   Right Ear: External ear normal.   Left Ear: External ear normal.   Mouth/Throat: No oropharyngeal exudate.   Eyes: Conjunctivae are normal. Pupils are equal, round, and reactive to light.   Neck: Neck supple. No thyromegaly present.   Cardiovascular: Normal rate and regular rhythm. Exam reveals no friction rub.   No murmur heard.  Pulmonary/Chest: Effort normal. She has no wheezes. She has rales.   Abdominal: Soft. Bowel sounds are normal. She exhibits no distension. There is no tenderness.   Musculoskeletal: She exhibits no edema.   Neurological: She is alert and oriented to person, place, and time. No cranial nerve deficit.   Skin: Skin is warm and dry.   Psychiatric: She has a normal mood and affect. Judgment normal.   Nursing note and vitals reviewed.        Results for orders placed or performed in visit on 02/26/19   POC Glycosylated Hemoglobin (Hb A1C)   Result Value Ref Range    Hemoglobin A1C 9.0 %             Assessment/Plan   Diagnoses and all orders for this visit:    Type 2 diabetes mellitus with chronic kidney disease on chronic dialysis, with long-term current use of insulin (CMS/[facility])  -     POC Glycosylated Hemoglobin (Hb A1C)  -     insulin NPH-insulin regular (humuLIN 70/30,novoLIN 70/30) (70-30) 100 UNIT/ML injection; Inject 15 units before dinner and 10 Units before breakfast.    Hyperlipidemia LDL goal <70  -     Lipid Panel; Future    Essential hypertension  Comments:  add clonidine 1/2 daily on non HD days.    increase insulin due to uncontrolled DM2 ( though better from 10.7  In Jan @HD)         Return in about 3 months (around 5/26/2019) for Next scheduled follow up with a1c.

## 2019-02-27 ENCOUNTER — RESULTS ENCOUNTER (OUTPATIENT)
Dept: INTERNAL MEDICINE | Facility: CLINIC | Age: 74
End: 2019-02-27

## 2019-02-27 DIAGNOSIS — E78.5 HYPERLIPIDEMIA LDL GOAL <70: ICD-10-CM

## 2019-04-18 ENCOUNTER — APPOINTMENT (OUTPATIENT)
Dept: CT IMAGING | Facility: HOSPITAL | Age: 74
End: 2019-04-18

## 2019-04-18 ENCOUNTER — HOSPITAL ENCOUNTER (OUTPATIENT)
Facility: HOSPITAL | Age: 74
Setting detail: OBSERVATION
Discharge: HOME | End: 2019-04-21
Attending: EMERGENCY MEDICINE | Admitting: EMERGENCY MEDICINE

## 2019-04-18 ENCOUNTER — APPOINTMENT (OUTPATIENT)
Dept: MRI IMAGING | Facility: HOSPITAL | Age: 74
End: 2019-04-18

## 2019-04-18 ENCOUNTER — APPOINTMENT (OUTPATIENT)
Dept: GENERAL RADIOLOGY | Facility: HOSPITAL | Age: 74
End: 2019-04-18

## 2019-04-18 DIAGNOSIS — I16.0 HYPERTENSIVE URGENCY: Primary | ICD-10-CM

## 2019-04-18 DIAGNOSIS — R77.8 ELEVATED TROPONIN: ICD-10-CM

## 2019-04-18 DIAGNOSIS — I10 UNCONTROLLED HYPERTENSION: ICD-10-CM

## 2019-04-18 DIAGNOSIS — N18.6 ESRD (END STAGE RENAL DISEASE) (HCC): ICD-10-CM

## 2019-04-18 DIAGNOSIS — R42 DIZZINESS: ICD-10-CM

## 2019-04-18 DIAGNOSIS — R51.9 NONINTRACTABLE HEADACHE, UNSPECIFIED CHRONICITY PATTERN, UNSPECIFIED HEADACHE TYPE: ICD-10-CM

## 2019-04-18 LAB
ALBUMIN SERPL-MCNC: 3.5 G/DL (ref 3.5–5.2)
ALBUMIN/GLOB SERPL: 0.7 G/DL
ALP SERPL-CCNC: 105 U/L (ref 39–117)
ALT SERPL W P-5'-P-CCNC: 5 U/L (ref 1–33)
ANION GAP SERPL CALCULATED.3IONS-SCNC: 13 MMOL/L
AST SERPL-CCNC: 11 U/L (ref 1–32)
BASOPHILS # BLD AUTO: 0.03 10*3/MM3 (ref 0–0.2)
BASOPHILS NFR BLD AUTO: 0.4 % (ref 0–1.5)
BILIRUB SERPL-MCNC: 0.4 MG/DL (ref 0.2–1.2)
BUN BLD-MCNC: 27 MG/DL (ref 8–23)
BUN/CREAT SERPL: 5.9 (ref 7–25)
CALCIUM SPEC-SCNC: 9.6 MG/DL (ref 8.6–10.5)
CHLORIDE SERPL-SCNC: 95 MMOL/L (ref 98–107)
CO2 SERPL-SCNC: 29 MMOL/L (ref 22–29)
CREAT BLD-MCNC: 4.61 MG/DL (ref 0.57–1)
DEPRECATED RDW RBC AUTO: 52.1 FL (ref 37–54)
EOSINOPHIL # BLD AUTO: 0.21 10*3/MM3 (ref 0–0.4)
EOSINOPHIL NFR BLD AUTO: 2.7 % (ref 0.3–6.2)
ERYTHROCYTE [DISTWIDTH] IN BLOOD BY AUTOMATED COUNT: 18.2 % (ref 12.3–15.4)
GFR SERPL CREATININE-BSD FRML MDRD: 11 ML/MIN/1.73
GFR SERPL CREATININE-BSD FRML MDRD: ABNORMAL ML/MIN/1.73
GLOBULIN UR ELPH-MCNC: 4.9 GM/DL
GLUCOSE BLD-MCNC: 126 MG/DL (ref 65–99)
HCT VFR BLD AUTO: 32.5 % (ref 34–46.6)
HGB BLD-MCNC: 9 G/DL (ref 12–15.9)
HOLD SPECIMEN: NORMAL
HOLD SPECIMEN: NORMAL
IMM GRANULOCYTES # BLD AUTO: 0.02 10*3/MM3 (ref 0–0.05)
IMM GRANULOCYTES NFR BLD AUTO: 0.3 % (ref 0–0.5)
LIPASE SERPL-CCNC: 12 U/L (ref 13–60)
LYMPHOCYTES # BLD AUTO: 2.78 10*3/MM3 (ref 0.7–3.1)
LYMPHOCYTES NFR BLD AUTO: 36.1 % (ref 19.6–45.3)
MCH RBC QN AUTO: 22 PG (ref 26.6–33)
MCHC RBC AUTO-ENTMCNC: 27.7 G/DL (ref 31.5–35.7)
MCV RBC AUTO: 79.5 FL (ref 79–97)
MONOCYTES # BLD AUTO: 0.83 10*3/MM3 (ref 0.1–0.9)
MONOCYTES NFR BLD AUTO: 10.8 % (ref 5–12)
NEUTROPHILS # BLD AUTO: 3.85 10*3/MM3 (ref 1.4–7)
NEUTROPHILS NFR BLD AUTO: 50 % (ref 42.7–76)
NT-PROBNP SERPL-MCNC: ABNORMAL PG/ML (ref 5–900)
PLATELET # BLD AUTO: 314 10*3/MM3 (ref 140–450)
PMV BLD AUTO: 11.4 FL (ref 6–12)
POTASSIUM BLD-SCNC: 3.6 MMOL/L (ref 3.5–5.2)
PROT SERPL-MCNC: 8.4 G/DL (ref 6–8.5)
RBC # BLD AUTO: 4.09 10*6/MM3 (ref 3.77–5.28)
SODIUM BLD-SCNC: 137 MMOL/L (ref 136–145)
TROPONIN I SERPL-MCNC: 0.01 NG/ML (ref 0–0.07)
TROPONIN T SERPL-MCNC: 0.04 NG/ML (ref 0–0.03)
WBC NRBC COR # BLD: 7.7 10*3/MM3 (ref 3.4–10.8)
WHOLE BLOOD HOLD SPECIMEN: NORMAL
WHOLE BLOOD HOLD SPECIMEN: NORMAL

## 2019-04-18 PROCEDURE — 71045 X-RAY EXAM CHEST 1 VIEW: CPT

## 2019-04-18 PROCEDURE — 80053 COMPREHEN METABOLIC PANEL: CPT

## 2019-04-18 PROCEDURE — 70551 MRI BRAIN STEM W/O DYE: CPT

## 2019-04-18 PROCEDURE — 99285 EMERGENCY DEPT VISIT HI MDM: CPT

## 2019-04-18 PROCEDURE — 85025 COMPLETE CBC W/AUTO DIFF WBC: CPT

## 2019-04-18 PROCEDURE — 93005 ELECTROCARDIOGRAM TRACING: CPT | Performed by: EMERGENCY MEDICINE

## 2019-04-18 PROCEDURE — 96375 TX/PRO/DX INJ NEW DRUG ADDON: CPT

## 2019-04-18 PROCEDURE — 84484 ASSAY OF TROPONIN QUANT: CPT | Performed by: EMERGENCY MEDICINE

## 2019-04-18 PROCEDURE — 83880 ASSAY OF NATRIURETIC PEPTIDE: CPT

## 2019-04-18 PROCEDURE — 84484 ASSAY OF TROPONIN QUANT: CPT

## 2019-04-18 PROCEDURE — 83690 ASSAY OF LIPASE: CPT

## 2019-04-18 PROCEDURE — 25010000002 HYDRALAZINE PER 20 MG: Performed by: NURSE PRACTITIONER

## 2019-04-18 PROCEDURE — 70450 CT HEAD/BRAIN W/O DYE: CPT

## 2019-04-18 PROCEDURE — 93005 ELECTROCARDIOGRAM TRACING: CPT

## 2019-04-18 RX ORDER — ASPIRIN 81 MG/1
324 TABLET, CHEWABLE ORAL ONCE
Status: DISCONTINUED | OUTPATIENT
Start: 2019-04-18 | End: 2019-04-19

## 2019-04-18 RX ORDER — SODIUM CHLORIDE 0.9 % (FLUSH) 0.9 %
10 SYRINGE (ML) INJECTION AS NEEDED
Status: DISCONTINUED | OUTPATIENT
Start: 2019-04-18 | End: 2019-04-21 | Stop reason: HOSPADM

## 2019-04-18 RX ORDER — HYDRALAZINE HYDROCHLORIDE 20 MG/ML
10 INJECTION INTRAMUSCULAR; INTRAVENOUS ONCE
Status: COMPLETED | OUTPATIENT
Start: 2019-04-18 | End: 2019-04-18

## 2019-04-18 RX ADMIN — HYDRALAZINE HYDROCHLORIDE 10 MG: 20 INJECTION INTRAMUSCULAR; INTRAVENOUS at 22:23

## 2019-04-19 ENCOUNTER — APPOINTMENT (OUTPATIENT)
Dept: NEPHROLOGY | Facility: HOSPITAL | Age: 74
End: 2019-04-19

## 2019-04-19 PROBLEM — R79.89 ELEVATED TROPONIN: Status: ACTIVE | Noted: 2019-04-19

## 2019-04-19 PROBLEM — I16.0 HYPERTENSIVE URGENCY: Status: ACTIVE | Noted: 2019-04-19

## 2019-04-19 PROBLEM — R77.8 ELEVATED TROPONIN: Status: ACTIVE | Noted: 2019-04-19

## 2019-04-19 LAB
ANION GAP SERPL CALCULATED.3IONS-SCNC: 16 MMOL/L
BASOPHILS # BLD AUTO: 0.03 10*3/MM3 (ref 0–0.2)
BASOPHILS NFR BLD AUTO: 0.3 % (ref 0–1.5)
BUN BLD-MCNC: 29 MG/DL (ref 8–23)
BUN/CREAT SERPL: 5.5 (ref 7–25)
CALCIUM SPEC-SCNC: 9.5 MG/DL (ref 8.6–10.5)
CHLORIDE SERPL-SCNC: 97 MMOL/L (ref 98–107)
CO2 SERPL-SCNC: 26 MMOL/L (ref 22–29)
CREAT BLD-MCNC: 5.24 MG/DL (ref 0.57–1)
DEPRECATED RDW RBC AUTO: 53.3 FL (ref 37–54)
EOSINOPHIL # BLD AUTO: 0.22 10*3/MM3 (ref 0–0.4)
EOSINOPHIL NFR BLD AUTO: 2.4 % (ref 0.3–6.2)
ERYTHROCYTE [DISTWIDTH] IN BLOOD BY AUTOMATED COUNT: 18.5 % (ref 12.3–15.4)
GFR SERPL CREATININE-BSD FRML MDRD: 10 ML/MIN/1.73
GFR SERPL CREATININE-BSD FRML MDRD: ABNORMAL ML/MIN/1.73
GLUCOSE BLD-MCNC: 118 MG/DL (ref 65–99)
GLUCOSE BLDC GLUCOMTR-MCNC: 125 MG/DL (ref 70–130)
GLUCOSE BLDC GLUCOMTR-MCNC: 166 MG/DL (ref 70–130)
GLUCOSE BLDC GLUCOMTR-MCNC: 254 MG/DL (ref 70–130)
GLUCOSE BLDC GLUCOMTR-MCNC: 280 MG/DL (ref 70–130)
HBA1C MFR BLD: 8.6 % (ref 4.8–5.6)
HCT VFR BLD AUTO: 33.6 % (ref 34–46.6)
HGB BLD-MCNC: 9.3 G/DL (ref 12–15.9)
IMM GRANULOCYTES # BLD AUTO: 0.03 10*3/MM3 (ref 0–0.05)
IMM GRANULOCYTES NFR BLD AUTO: 0.3 % (ref 0–0.5)
LYMPHOCYTES # BLD AUTO: 2.72 10*3/MM3 (ref 0.7–3.1)
LYMPHOCYTES NFR BLD AUTO: 29.4 % (ref 19.6–45.3)
MCH RBC QN AUTO: 21.9 PG (ref 26.6–33)
MCHC RBC AUTO-ENTMCNC: 27.7 G/DL (ref 31.5–35.7)
MCV RBC AUTO: 79.1 FL (ref 79–97)
MONOCYTES # BLD AUTO: 1.19 10*3/MM3 (ref 0.1–0.9)
MONOCYTES NFR BLD AUTO: 12.9 % (ref 5–12)
NEUTROPHILS # BLD AUTO: 5.1 10*3/MM3 (ref 1.4–7)
NEUTROPHILS NFR BLD AUTO: 55 % (ref 42.7–76)
PLATELET # BLD AUTO: 337 10*3/MM3 (ref 140–450)
PMV BLD AUTO: 11.6 FL (ref 6–12)
POTASSIUM BLD-SCNC: 3.8 MMOL/L (ref 3.5–5.2)
RBC # BLD AUTO: 4.25 10*6/MM3 (ref 3.77–5.28)
SODIUM BLD-SCNC: 139 MMOL/L (ref 136–145)
TROPONIN T SERPL-MCNC: 0.03 NG/ML (ref 0–0.03)
TROPONIN T SERPL-MCNC: 0.04 NG/ML (ref 0–0.03)
WBC NRBC COR # BLD: 9.26 10*3/MM3 (ref 3.4–10.8)

## 2019-04-19 PROCEDURE — 96365 THER/PROPH/DIAG IV INF INIT: CPT

## 2019-04-19 PROCEDURE — 99214 OFFICE O/P EST MOD 30 MIN: CPT | Performed by: INTERNAL MEDICINE

## 2019-04-19 PROCEDURE — 25010000002 HEPARIN (PORCINE) PER 1000 UNITS: Performed by: NURSE PRACTITIONER

## 2019-04-19 PROCEDURE — 63710000001 INSULIN LISPRO (HUMAN) PER 5 UNITS: Performed by: NURSE PRACTITIONER

## 2019-04-19 PROCEDURE — 80048 BASIC METABOLIC PNL TOTAL CA: CPT | Performed by: NURSE PRACTITIONER

## 2019-04-19 PROCEDURE — G0257 UNSCHED DIALYSIS ESRD PT HOS: HCPCS

## 2019-04-19 PROCEDURE — 82962 GLUCOSE BLOOD TEST: CPT

## 2019-04-19 PROCEDURE — 96366 THER/PROPH/DIAG IV INF ADDON: CPT

## 2019-04-19 PROCEDURE — G0108 DIAB MANAGE TRN  PER INDIV: HCPCS

## 2019-04-19 PROCEDURE — 84484 ASSAY OF TROPONIN QUANT: CPT | Performed by: NURSE PRACTITIONER

## 2019-04-19 PROCEDURE — G0378 HOSPITAL OBSERVATION PER HR: HCPCS

## 2019-04-19 PROCEDURE — 83036 HEMOGLOBIN GLYCOSYLATED A1C: CPT | Performed by: NURSE PRACTITIONER

## 2019-04-19 PROCEDURE — 63410000001 EPOETIN ALFA PER 1000 UNITS: Performed by: INTERNAL MEDICINE

## 2019-04-19 PROCEDURE — 96372 THER/PROPH/DIAG INJ SC/IM: CPT

## 2019-04-19 PROCEDURE — 99220 PR INITIAL OBSERVATION CARE/DAY 70 MINUTES: CPT | Performed by: INTERNAL MEDICINE

## 2019-04-19 PROCEDURE — 85025 COMPLETE CBC W/AUTO DIFF WBC: CPT | Performed by: NURSE PRACTITIONER

## 2019-04-19 RX ORDER — CLOPIDOGREL BISULFATE 75 MG/1
75 TABLET ORAL DAILY
Status: DISCONTINUED | OUTPATIENT
Start: 2019-04-19 | End: 2019-04-21 | Stop reason: HOSPADM

## 2019-04-19 RX ORDER — DEXTROSE MONOHYDRATE 25 G/50ML
25 INJECTION, SOLUTION INTRAVENOUS
Status: DISCONTINUED | OUTPATIENT
Start: 2019-04-19 | End: 2019-04-21 | Stop reason: HOSPADM

## 2019-04-19 RX ORDER — ASPIRIN 81 MG/1
81 TABLET ORAL DAILY
Status: DISCONTINUED | OUTPATIENT
Start: 2019-04-19 | End: 2019-04-21 | Stop reason: HOSPADM

## 2019-04-19 RX ORDER — HEPARIN SODIUM 5000 [USP'U]/ML
5000 INJECTION, SOLUTION INTRAVENOUS; SUBCUTANEOUS EVERY 12 HOURS SCHEDULED
Status: DISCONTINUED | OUTPATIENT
Start: 2019-04-19 | End: 2019-04-21 | Stop reason: HOSPADM

## 2019-04-19 RX ORDER — CARVEDILOL 12.5 MG/1
25 TABLET ORAL EVERY 12 HOURS SCHEDULED
Status: DISCONTINUED | OUTPATIENT
Start: 2019-04-19 | End: 2019-04-21 | Stop reason: HOSPADM

## 2019-04-19 RX ORDER — NICOTINE POLACRILEX 4 MG
15 LOZENGE BUCCAL
Status: DISCONTINUED | OUTPATIENT
Start: 2019-04-19 | End: 2019-04-21 | Stop reason: HOSPADM

## 2019-04-19 RX ORDER — HYDRALAZINE HYDROCHLORIDE 50 MG/1
100 TABLET, FILM COATED ORAL 2 TIMES DAILY
Status: DISCONTINUED | OUTPATIENT
Start: 2019-04-19 | End: 2019-04-19

## 2019-04-19 RX ORDER — SODIUM CHLORIDE 0.9 % (FLUSH) 0.9 %
3-10 SYRINGE (ML) INJECTION AS NEEDED
Status: DISCONTINUED | OUTPATIENT
Start: 2019-04-19 | End: 2019-04-21 | Stop reason: HOSPADM

## 2019-04-19 RX ORDER — HYDRALAZINE HYDROCHLORIDE 50 MG/1
50 TABLET, FILM COATED ORAL EVERY 8 HOURS SCHEDULED
Status: DISCONTINUED | OUTPATIENT
Start: 2019-04-19 | End: 2019-04-19

## 2019-04-19 RX ORDER — DORZOLAMIDE HYDROCHLORIDE AND TIMOLOL MALEATE 20; 5 MG/ML; MG/ML
1 SOLUTION/ DROPS OPHTHALMIC DAILY
Status: DISCONTINUED | OUTPATIENT
Start: 2019-04-19 | End: 2019-04-21 | Stop reason: HOSPADM

## 2019-04-19 RX ORDER — LISINOPRIL 20 MG/1
20 TABLET ORAL EVERY 12 HOURS SCHEDULED
Status: DISCONTINUED | OUTPATIENT
Start: 2019-04-19 | End: 2019-04-21 | Stop reason: HOSPADM

## 2019-04-19 RX ORDER — FLUTICASONE PROPIONATE 50 MCG
2 SPRAY, SUSPENSION (ML) NASAL DAILY
Status: DISCONTINUED | OUTPATIENT
Start: 2019-04-19 | End: 2019-04-21 | Stop reason: HOSPADM

## 2019-04-19 RX ORDER — LISINOPRIL 20 MG/1
20 TABLET ORAL DAILY
Status: DISCONTINUED | OUTPATIENT
Start: 2019-04-19 | End: 2019-04-19

## 2019-04-19 RX ORDER — ISOSORBIDE MONONITRATE 60 MG/1
60 TABLET, EXTENDED RELEASE ORAL DAILY
Status: DISCONTINUED | OUTPATIENT
Start: 2019-04-19 | End: 2019-04-21 | Stop reason: HOSPADM

## 2019-04-19 RX ORDER — AMIODARONE HYDROCHLORIDE 200 MG/1
200 TABLET ORAL DAILY
Status: DISCONTINUED | OUTPATIENT
Start: 2019-04-19 | End: 2019-04-21 | Stop reason: HOSPADM

## 2019-04-19 RX ORDER — ACETAMINOPHEN 325 MG/1
650 TABLET ORAL EVERY 4 HOURS PRN
Status: DISCONTINUED | OUTPATIENT
Start: 2019-04-19 | End: 2019-04-21 | Stop reason: HOSPADM

## 2019-04-19 RX ORDER — ALBUMIN (HUMAN) 12.5 G/50ML
12.5 SOLUTION INTRAVENOUS AS NEEDED
Status: ACTIVE | OUTPATIENT
Start: 2019-04-19 | End: 2019-04-20

## 2019-04-19 RX ORDER — ATORVASTATIN CALCIUM 40 MG/1
80 TABLET, FILM COATED ORAL NIGHTLY
Status: DISCONTINUED | OUTPATIENT
Start: 2019-04-19 | End: 2019-04-21 | Stop reason: HOSPADM

## 2019-04-19 RX ORDER — SODIUM CHLORIDE 0.9 % (FLUSH) 0.9 %
3 SYRINGE (ML) INJECTION EVERY 12 HOURS SCHEDULED
Status: DISCONTINUED | OUTPATIENT
Start: 2019-04-19 | End: 2019-04-21 | Stop reason: HOSPADM

## 2019-04-19 RX ORDER — TERAZOSIN 1 MG/1
1 CAPSULE ORAL EVERY 12 HOURS SCHEDULED
Status: DISCONTINUED | OUTPATIENT
Start: 2019-04-19 | End: 2019-04-20

## 2019-04-19 RX ORDER — LATANOPROST 50 UG/ML
1 SOLUTION/ DROPS OPHTHALMIC NIGHTLY
Status: DISCONTINUED | OUTPATIENT
Start: 2019-04-19 | End: 2019-04-21 | Stop reason: HOSPADM

## 2019-04-19 RX ORDER — AMLODIPINE BESYLATE 10 MG/1
10 TABLET ORAL
Status: DISCONTINUED | OUTPATIENT
Start: 2019-04-19 | End: 2019-04-19

## 2019-04-19 RX ORDER — CLONIDINE HYDROCHLORIDE 0.1 MG/1
0.1 TABLET ORAL 2 TIMES DAILY
Status: DISCONTINUED | OUTPATIENT
Start: 2019-04-19 | End: 2019-04-21 | Stop reason: HOSPADM

## 2019-04-19 RX ORDER — NIFEDIPINE 30 MG/1
30 TABLET, EXTENDED RELEASE ORAL
Status: DISCONTINUED | OUTPATIENT
Start: 2019-04-19 | End: 2019-04-20

## 2019-04-19 RX ADMIN — ERYTHROPOIETIN 4000 UNITS: 4000 INJECTION, SOLUTION INTRAVENOUS; SUBCUTANEOUS at 17:16

## 2019-04-19 RX ADMIN — FLUTICASONE PROPIONATE 2 SPRAY: 50 SPRAY, METERED NASAL at 13:01

## 2019-04-19 RX ADMIN — NICARDIPINE HYDROCHLORIDE 5 MG/HR: 0.1 INJECTION, SOLUTION INTRAVENOUS at 07:43

## 2019-04-19 RX ADMIN — HEPARIN SODIUM 5000 UNITS: 5000 INJECTION, SOLUTION INTRAVENOUS; SUBCUTANEOUS at 13:01

## 2019-04-19 RX ADMIN — NICARDIPINE HYDROCHLORIDE 5 MG/HR: 0.1 INJECTION, SOLUTION INTRAVENOUS at 13:40

## 2019-04-19 RX ADMIN — AMLODIPINE BESYLATE 10 MG: 10 TABLET ORAL at 13:01

## 2019-04-19 RX ADMIN — ISOSORBIDE MONONITRATE 60 MG: 60 TABLET, EXTENDED RELEASE ORAL at 13:01

## 2019-04-19 RX ADMIN — CARVEDILOL 25 MG: 12.5 TABLET, FILM COATED ORAL at 13:01

## 2019-04-19 RX ADMIN — HEPARIN SODIUM 5000 UNITS: 5000 INJECTION, SOLUTION INTRAVENOUS; SUBCUTANEOUS at 20:54

## 2019-04-19 RX ADMIN — LISINOPRIL 20 MG: 20 TABLET ORAL at 08:29

## 2019-04-19 RX ADMIN — DORZOLAMIDE HYDROCHLORIDE AND TIMOLOL MALEATE 1 DROP: 20; 5 SOLUTION/ DROPS OPHTHALMIC at 13:01

## 2019-04-19 RX ADMIN — NICARDIPINE HYDROCHLORIDE 5 MG/HR: 0.1 INJECTION, SOLUTION INTRAVENOUS at 04:28

## 2019-04-19 RX ADMIN — ATORVASTATIN CALCIUM 80 MG: 40 TABLET, FILM COATED ORAL at 20:55

## 2019-04-19 RX ADMIN — AMIODARONE HYDROCHLORIDE 200 MG: 200 TABLET ORAL at 13:05

## 2019-04-19 RX ADMIN — SODIUM CHLORIDE, PRESERVATIVE FREE 10 ML: 5 INJECTION INTRAVENOUS at 08:41

## 2019-04-19 RX ADMIN — NICARDIPINE HYDROCHLORIDE 5 MG/HR: 0.1 INJECTION, SOLUTION INTRAVENOUS at 00:08

## 2019-04-19 RX ADMIN — ASPIRIN 81 MG: 81 TABLET, COATED ORAL at 13:01

## 2019-04-19 RX ADMIN — LATANOPROST 1 DROP: 50 SOLUTION OPHTHALMIC at 20:54

## 2019-04-19 RX ADMIN — CLOPIDOGREL BISULFATE 75 MG: 75 TABLET ORAL at 13:01

## 2019-04-19 NOTE — PROGRESS NOTES
[facility] Medicine Services  INPATIENT PROGRESS NOTE    Date of Admission: 4/18/2019  Length of Stay: 0  Primary Care Physician: Meghana Rodgers MD    Subjective     Chief Complaint: Chest pain and accelerated hypertension    HPI:  She had dialysis today and overall feels better she denies any more chest pain headache numbness or tingling.  She is still requiring Cardene drip.  After receiving her medications her blood pressure then dropped to 90/50.    Review Of Systems:   Patient denies headaches, fever, chills, shortness of breath, chest pain, cough, abdominal pain, nausea or vomiting, diarrhea, rash, itching or bleeding      Objective      Vitals:   97.1 °F (36.2 °C) Temp  Min: 97.1 °F (36.2 °C)  Max: 98.7 °F (37.1 °C)    91/50 BP  Min: 91/50  Max: 219/90    60 Pulse  Min: 56  Max: 69    20 Resp  Min: 16  Max: 20    92 % SpO2  Min: 88 %  Max: 97 %    room air       No Data Recorded     Patient is alert and talkative in no distress at rest-eating her lunch in no distress  Neck is without mass or JVD  Heart is Reg wo murmur  Lungs are clear wo wheeze or crackle  Abd is soft without HSM or mass, not distended or tender to palpation  MAEW-right arm AV fistula  Skin is without rash  Neurologic exam is nonfocal   Mood is appropriate      Results Review:    I have reviewed the labs, radiology results and diagnostic studies.    Results from last 7 days   Lab Units 04/19/19  0401 04/18/19  1945   WBC 10*3/mm3 9.26 7.70   HEMOGLOBIN g/dL 9.3* 9.0*   HEMATOCRIT % 33.6* 32.5*   PLATELETS 10*3/mm3 337 314     Results from last 7 days   Lab Units 04/19/19  0611 04/18/19  1945   SODIUM mmol/L 139 137   POTASSIUM mmol/L 3.8 3.6   CHLORIDE mmol/L 97* 95*   CO2 mmol/L 26.0 29.0   BUN mg/dL 29* 27*   CREATININE mg/dL 5.24* 4.61*   GLUCOSE mg/dL 118* 126*   CALCIUM mg/dL 9.5 9.6   ALT (SGPT) U/L  --  5   AST (SGOT) U/L  --  11       Microbiology Results Abnormal     None        Ct Head Without  Contrast    Result Date: 4/18/2019  Normal, negative unenhanced head CT. Signer Name: Kevin Wilkins MD  Signed: 4/18/2019 10:06 PM  Workstation Name: [email or web address]     Mri Brain Without Contrast    Result Date: 4/18/2019  1. No evidence of recent stroke on diffusion-weighted images. 2. No clearly acute intracranial process. Signal changes in the periventricular and deep white matter that most likely reflect sequela of chronic ischemic small vessel disease. Signer Name: Kevin Wilkins MD  Signed: 4/18/2019 11:00 PM  Workstation Name: [email or web address]     Xr Chest 1 View    Result Date: 4/18/2019  1. Moderate cardiomegaly with central pulmonary artery enlargement. 2. Mildly prominent interstitial markings when compared with prior studies. This may be an artifact of shallow lung expansion, but borderline vascular congestion should be excluded clinically. Signer Name: Ramón Rodriguez MD  Signed: 4/18/2019 9:13 PM  Workstation Name: [email or web address]     Results for orders placed during the hospital encounter of 08/23/17   Adult Transthoracic Echo Complete    Narrative · Left ventricular wall thickness is consistent with moderate concentric   hypertrophy.  · Left atrial cavity size is mildly dilated.  · Mild aortic valve stenosis is present.  · Mild mitral valve regurgitation is present.  · Mild tricuspid valve regurgitation is present.  · Left ventricular systolic function is normal. Estimated EF = 65%.  · Left ventricular diastolic dysfunction (grade I) consistent with   impaired relaxation.  · There is no evidence of pericardial effusion.  · No evidence of pulmonary hypertension is present.  · Normal right ventricular cavity size, wall thickness, systolic function   and septal motion noted.          I have reviewed the medications.    Assessment/Plan     Assessment/Problem List    Hypertensive urgency    Essential hypertension    Coronary artery disease involving native coronary artery of native heart with angina pectoris  (CMS/[facility])    End stage renal disease on dialysis (CMS/[facility])    Ischemic heart disease    CHF (congestive heart failure) (CMS/[facility])    Dyslipidemia    Diabetes mellitus (CMS/[facility])    Elevated troponin      Plan  74-year-old woman whose been on hemodialysis for the last 3 years secondary hypertensive kidney disease/end-stage renal disease.  Who was admitted for accelerated hypertension.  Patient's chest pain and headache have resolved as has her hypertension.  We discussed her clonidine doses we discussed trying some terazosin as cardiology recommended.    Will try stopping hydralazine as she reports it gives her tachycardia.    There is no evidence that she had any cardiac ischemia.  She does have a history of a stent to her RCA in October 2016  Continue insulin for diabetes.  Anemia of chronic disease on Procrit with dialysis.  -MRI does not show any ischemic changes.  DVT prophylaxis: Heparin subcu  Discharge Planning: I expect patient to be discharged to home tomorrow    Electronically signed by Katiana Romero MD, 04/19/19 4:34 PM .

## 2019-04-19 NOTE — CONSULTS
NAL Consult Note    Esperanza Pop  1945  [id number]    Date of Admit:  4/18/2019    Date of Consult: 4/19/2019        Requesting Provider: No ref. provider found  Evaluating Physician: Mary Ching DO        Reason for Consultation: ESRD    History of present illness:    Patient is a 74 y.o.  Yr old female with PMH significant for CHF, CAD, DM 2, HLD, ESRD on HD, HTN the presents to the ED with complaint of elevated blood pressure.  She notes that over the last several months she has been having difficulty keeping her blood pressure control.  She recently has had adjustment in her medications but feels that this is not working.  CT brain was negative.  BP overnight was running in the 200s.  She dialyzes on MWF at [facility]      Past Medical History:   Diagnosis Date   • Acute bronchitis    • Acute conjunctivitis    • Acute kidney injury (CMS/HCC)     Admission from 12/26/2013 to 01/02/2014, now resolved with latest creatinine 1.4 on 01/08/2014.   • Acute non-ST segment elevation myocardial infarction (STEMI) following previous myocardial infarction    • Breast cancer, female, right 5/20/2016 2005   • Cancer (CMS/HCC)    • CHF (congestive heart failure) (CMS/HCC)    • Clotting disorder (CMS/HCC)    • Diabetes mellitus (CMS/HCC)    • Diarrhea    • Dyslipidemia    • Dyspepsia    • Dyspnea    • Edema    • Hx of radiation therapy    • Hyperlipidemia    • Hypertension     Severe   • Ischemic heart disease    • Moderate obesity     BMI 36.2   • Myocardial infarction (CMS/HCC)    • Pneumonia    • Pneumonia 02/2019   • Radiation 2005   • Renal disorder    • Seasonal allergies        Past Surgical History:   Procedure Laterality Date   • AV FISTULA PLACEMENT, BRACHIOBASILIC     • BREAST BIOPSY Left 2010   • BREAST CYST EXCISION     • BREAST LUMPECTOMY Right 2005   • BREAST SURGERY     • CARDIAC CATHETERIZATION N/A 10/10/2016    Procedure: Left Heart Cath;  Surgeon: Scooter Zhao MD;  Location:   [facility];  Service:    • CARDIAC CATHETERIZATION  10/2016   • HERNIA REPAIR     • HYSTERECTOMY  2000   • INSERTION HEMODIALYSIS CATHETER Right 6/29/2016    Procedure: HEMODIALYSIS CATHETER INSERTION TUNNELLED;  Surgeon: Ramón Chavez MD;  Location: [facility] OR;  Service:    • MASTECTOMY Right 2006   • REDUCTION MAMMAPLASTY Left 2005       Social History     Socioeconomic History   • Marital status:      Spouse name: Not on file   • Number of children: Not on file   • Years of education: Not on file   • Highest education level: Not on file   Occupational History   • Occupation: retired   Tobacco Use   • Smoking status: Never Smoker   • Smokeless tobacco: Never Used   • Tobacco comment: Michael second hand smoke   Substance and Sexual Activity   • Alcohol use: No     Comment: rarely   • Drug use: No   • Sexual activity: Defer   Social History Narrative    Pt consumes 1 serving of caffeine once every 2 weeks.        family history includes Breast cancer (age of onset: 55) in her sister; Cancer in her mother; Coronary artery disease in her father; Heart failure in her father; Stroke in her mother.    Allergies   Allergen Reactions   • Albuterol      Increased blood pressure  Used right before heart attack   • Mircera [Methoxy Polyethylene Glycol-Epoetin Beta]      Pt stopped breathing   • Penicillins Hives   • Prednisone      Makes jittery/anxious   • Venofer [Iron Sucrose]      Pt stopped breathing       Medication:    Current Facility-Administered Medications:   •  acetaminophen (TYLENOL) tablet 650 mg, 650 mg, Oral, Q4H PRN, Umer, Nena, APRN  •  amiodarone (PACERONE) tablet 200 mg, 200 mg, Oral, Daily, Umer, Nena, APRN, 200 mg at 04/19/19 0829  •  amLODIPine (NORVASC) tablet 10 mg, 10 mg, Oral, Q24H, Umer, Nena, APRN, 10 mg at 04/19/19 0828  •  aspirin EC tablet 81 mg, 81 mg, Oral, Daily, Umer, Nena, APRN, 81 mg at 04/19/19 0830  •  atorvastatin (LIPITOR) tablet 80  mg, 80 mg, Oral, Nightly, Umer, Nena, APRN  •  carvedilol (COREG) tablet 25 mg, 25 mg, Oral, Q12H, Umer, Nena, APRN, 25 mg at 04/19/19 0829  •  CloNIDine (CATAPRES) tablet 0.1 mg, 0.1 mg, Oral, BID, Umer, Nena, APRN  •  clopidogrel (PLAVIX) tablet 75 mg, 75 mg, Oral, Daily, Umer, Nena, APRN, 75 mg at 04/19/19 0829  •  dextrose (D50W) 25 g/ 50mL Intravenous Solution 25 g, 25 g, Intravenous, Q15 Min PRN, Umer, Nena, APRN  •  dextrose (GLUTOSE) oral gel 15 g, 15 g, Oral, Q15 Min PRN, Umer, Nena, APRN  •  dorzolamide-timolol (COSOPT) ophthalmic solution 1 drop, 1 drop, Both Eyes, Daily, Umer, Nena, APRN, 1 drop at 04/19/19 0830  •  fluticasone (FLONASE) 50 MCG/ACT nasal spray 2 spray, 2 spray, Nasal, Daily, Umer, Nena, APRN, 2 spray at 04/19/19 0830  •  glucagon (human recombinant) (GLUCAGEN DIAGNOSTIC) injection 1 mg, 1 mg, Subcutaneous, PRN, Umer, Nena, APRN  •  heparin (porcine) 5000 UNIT/ML injection 5,000 Units, 5,000 Units, Subcutaneous, Q12H, Umer, Nena, APRN, 5,000 Units at 04/19/19 0830  •  hydrALAZINE (APRESOLINE) tablet 100 mg, 100 mg, Oral, BID, Umer, Nena, APRN  •  insulin lispro (humaLOG) injection 0-7 Units, 0-7 Units, Subcutaneous, 4x Daily With Meals & Nightly, Umer, Nena, APRN  •  isosorbide mononitrate (IMDUR) 24 hr tablet 60 mg, 60 mg, Oral, Daily, Umer, Nena, APRN, 60 mg at 04/19/19 0829  •  latanoprost (XALATAN) 0.005 % ophthalmic solution 1 drop, 1 drop, Both Eyes, Nightly, Nnea Owusu APRN  •  lisinopril (PRINIVIL,ZESTRIL) tablet 20 mg, 20 mg, Oral, Daily, Nena Owusu APRN, 20 mg at 04/19/19 0829  •  niCARdipine (CARDENE-IV) 20 mg/200 mL (0.1 mg/mL) in 0.9% NaCl infusion, 5-15 mg/hr, Intravenous, Titrated, Maria Elena Esquivel APRN, Last Rate: 50 mL/hr at 04/19/19 0743, 5 mg/hr at 04/19/19 0743  •  sodium chloride 0.9 % flush 10 mL, 10 mL, Intravenous, PRN, Amy, Darrell Reese MD  •  sodium chloride 0.9 %  flush 3 mL, 3 mL, Intravenous, Q12H, Umer, Nena, APRN, 10 mL at 04/19/19 0841  •  sodium chloride 0.9 % flush 3-10 mL, 3-10 mL, Intravenous, PRN, Umer, Nena, APRN    Medications Prior to Admission   Medication Sig Dispense Refill Last Dose   • amiodarone (PACERONE) 200 MG tablet Take 1 tablet by mouth Daily. 90 tablet 0 Taking   • amLODIPine (NORVASC) 10 MG tablet Take 1 tablet by mouth Daily. 30 tablet 5 Taking   • aspirin 81 MG EC tablet Take 1 tablet by mouth Daily. (Patient taking differently: Take 81 mg by mouth Every Other Day.)   Taking   • atorvastatin (LIPITOR) 80 MG tablet Take 1 tablet by mouth Every Night. 90 tablet 1 Taking   • Blood Glucose Monitoring Suppl (ACCU-CHEK NADER PLUS) W/DEVICE kit DX: E11.65 1 kit 0 Taking   • carvedilol (COREG) 25 MG tablet Take 1 tablet by mouth Every 12 (Twelve) Hours. 60 tablet 5 Taking   • Cholecalciferol (VITAMIN D3) 2000 UNITS tablet Take  by mouth 3 (Three) Times a Week.   Taking   • CloNIDine (CATAPRES) 0.1 MG tablet Take 1-2 tablets by mouth 2 (Two) Times a Day. 120 tablet 0 Taking   • clopidogrel (PLAVIX) 75 MG tablet TAKE 1 TABLET BY MOUTH  DAILY (PATIENT NEEDS TO MAKE FOLLOW UP APPT FOR REFILLS) 30 tablet 2 Taking   • dorzolamide-timolol (COSOPT) 22.3-6.8 MG/ML ophthalmic solution Administer 1 drop to both eyes Daily.   Taking   • Ferric Citrate 1  MG(Fe) tablet Take 1 tablet by mouth As Needed.   Taking   • fluticasone (FLONASE) 50 MCG/ACT nasal spray 2 sprays into each nostril Daily.   Taking   • glucose blood (ACCU-CHEK NADER PLUS) test strip Use as instructed 1 TO 3 TIMES DAILY  DX: E11.65 300 each 3 Taking   • hydrALAZINE (APRESOLINE) 50 MG tablet Take 2 tablets by mouth 2 (Two) Times a Day. 120 tablet 3 Taking   • insulin NPH-insulin regular (humuLIN 70/30,novoLIN 70/30) (70-30) 100 UNIT/ML injection Inject 15 units before dinner and 10 Units before breakfast. 10 mL 5    • IRON DEXTRAN IJ Inject  as directed 3 (Three) Times a Week.    "Taking   • isosorbide mononitrate (IMDUR) 60 MG 24 hr tablet Take 1 tablet by mouth Daily. 90 tablet 1 Taking   • Lancets Misc. (ACCU-CHEK SOFTCLIX LANCET DEV) kit TEST 1-3 TIMES DAILY DX:E11.65 1 kit 0 Taking   • latanoprost (XALATAN) 0.005 % ophthalmic solution Administer 1 drop to both eyes Every Night.   Taking   • lisinopril (PRINIVIL,ZESTRIL) 20 MG tablet Take 1 tablet by mouth Daily. 90 tablet 4 Taking   • nitroglycerin (NITROLINGUAL) 0.4 MG/SPRAY spray Place 1 spray under the tongue Every 5 (Five) Minutes As Needed for Chest Pain. 1 each 12 Taking   • torsemide (DEMADEX) 100 MG tablet Take 100 mg by mouth Daily As Needed.   Taking       Review of Systems:    Constitutional-- No Fever, chills or sweats. No significant change in weight  Eye-- no diplopia, no conjunctivitis  ENT-- No new hearing or throat complaints.  No epistaxis or oral sores. No odynophagia or dysphagia. No headache, photophobia or neck stiffness.  CV-- No chest pain, palpitations, soa, or edema  Resp-- No SOB/cough/Hemoptysis  GI- No nausea, vomiting, or diarrhea.  No hematochezia, melena, or hematemesis.  -- No dysuria, hematuria, or flank pain. No lower tract obstructive symptoms  Skin-- No rash, warm and dry  Lymph- no painful or swollen lymph nodes in neck/axilla or groin.   Heme- No active bruising or bleeding; no Hx of DVT or PE.  MS-- no swelling or pain in the joints  Neuro-- No acute focal weakness or numbness in the arms or legs.  No seizures.  Psych--No anxiety or depression  Endo--No cold or heat intolerance.  No polyuria, polydipsia, or polyphagia    Full review of systems reviewed and negative otherwise for acute complaints    Physical Exam:   Vital Signs   Blood pressure 152/61, pulse 61, temperature 98.7 °F (37.1 °C), temperature source Oral, resp. rate 16, height 170.2 cm (67\"), weight 87.8 kg (193 lb 9.6 oz), SpO2 (!) 89 %, not currently breastfeeding.     GENERAL: Awake and alert, in no acute distress.   HEENT: " Normocephalic, atraumatic.  PER.  No conjunctivitis. No icterus. Oropharynx clear without evidence of thrush or exudate. No evidence of peridontal disease.    NECK: Supple without nuchal rigidity. No mass.  LYMPH: No painful cervical, axillary or inguinal lymphadenopathy.  HEART: RRR; No murmur, rubs, gallops. No bruits in neck, abdomen, or groins, no edema  LUNGS: Normal respiratory effort. Nonlabored. No dullness.  No crepitus.  Clear to auscultation bilaterally without wheezing, rales, rhonchi.  ABDOMEN: Soft, nontender, nondistended. Positive bowel sounds. No rebound or guarding. NO mass or HSM.  JOINTS:  Full range of motion, no redness or tenderness.  EXT:  No cyanosis, clubbing or edema. R AVF  :  External genitalia without swelling or lesion  SKIN: Warm and dry without rash  NEURO: Oriented to PPT. No focal neurological deficits. Strength equal bilateral  PSYCHIATRIC: Normal insight and judgement. Cooperative with PE    Laboratory Data  Results from last 7 days   Lab Units 04/19/19  0401 04/18/19 1945   HEMOGLOBIN g/dL 9.3* 9.0*   HEMATOCRIT % 33.6* 32.5*     Results from last 7 days   Lab Units 04/19/19  0611 04/18/19 1945   SODIUM mmol/L 139 137   POTASSIUM mmol/L 3.8 3.6   CHLORIDE mmol/L 97* 95*   CO2 mmol/L 26.0 29.0   BUN mg/dL 29* 27*   CREATININE mg/dL 5.24* 4.61*   CALCIUM mg/dL 9.5 9.6   ALBUMIN g/dL  --  3.50     Results from last 7 days   Lab Units 04/19/19  0611   GLUCOSE mg/dL 118*         Results from last 7 days   Lab Units 04/18/19 1945   ALK PHOS U/L 105   BILIRUBIN mg/dL 0.4   ALT (SGPT) U/L 5   AST (SGOT) U/L 11     Estimated Creatinine Clearance: 11 mL/min (A) (by C-G formula based on SCr of 5.24 mg/dL (H)).    Radiology:      Renal Imaging:    I personally read  the radiographic studies    Impression:   ESRD MWF at [facility]  Anemia of renal failure  HTN - uncontrolled        PLAN: Thank you for asking us to see Esperanza Pop, I recommend the following:   HD now  Orders for Monday  written  Check phos  Increase lisinopril to 20 BID  Can titrate clonidine as needed, decrease her hydralazine to 50 TID  Wean cardene drip  Epo with HD        Mary Ching,   4/19/2019  9:02 AM

## 2019-04-19 NOTE — CONSULTS
[facility] Consult Note      Referring Provider: No ref. provider found  Primary Provider:  Megahna Rodgers MD  Reason for Consultation: Hypertension    Patient Care Team:  Meghana Rodgers MD as PCP - General  Meghana Rodgers MD as PCP - Family Medicine    Chief complaint: hypertension    Identification:  74 year old male; patient of Dr. Sagastume    Problem list:    1. Ischemic heart disease  a. Acute non-STEMI/congestive heart failure with diagnostic coronary arteriography demonstrating 20% LAD plaque with high-grade stenosis in the proximal mid right coronary artery requiring a Xience drug-eluting stent (3.0 mm x 28 mm) with reduced echocardiographic LVEF (0.25) with abnormal diastolic LV dysfunction, December 2013.   b. Improved echocardiogram, April 2014.  c. Remote non-STEMI related to malignant hypertension with distal RCA occlusion, patent previous RCA stent with mild inferior hypokinesis but overall acceptable systolic LV function (LVEF 0.55) with PTCA, DARRYL distal RCA, October 2016.  d. Residual CCS class I angina pectoris/NYHA class II CHF.  e. Mild aortic stenosis (June 2016).  f. Echocardiogram 2/7/19: Normal size LV, moderate LV wall thickness, LVEF 0.65, impaired LV relaxation, normal left atrial pressure, mild MR, mild TR, mild NV, no pericardial effusion  g. Residual CCS class I angina pectoris/NYHA class II CHF  2. Severe hypertension - probably essential.   3. Dyslipidemia.  4. Type 2 diabetes mellitus type 2 with suboptimal control (hemoglobin A1c 8.8%; March 2018, 7.6%, 10.7% February 2019).  5. Mild obesity (BMI 31.3).  6. Acute kidney injury: Admission from 12/26/2013 to 01/02/2014, now resolved with latest creatinine 1.4 on 01/08/2014.  7. Moderate normocytic normochromic anemia; hemoglobin 9.2 gm/dL (June 2016).  8. Noncompliance.  9. Remote apparent end-stage renal disease with initiation of hemodialysis MWF weekly and construction of right upper extremity AV fistula; data  deficit (June 2016) with subsequent AV fistula dysfunction and fistulogram with angioplasty - [facility] [place], October 2017.  10. AV fistulogram with apparent thrombectomy [facility], February 2018  11. Palpitations with recent abnormal Holter monitor demonstrating intermittent brief atrial fibrillation, December 2017/January 2018  12. [facility] overnight hospitalization February 2019 for shortness of breath and thrombosed AV fistula with angioplasty  13. Remote operations:  a. 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 Abdominal hernia repair/panniculectomy  c. Hysterectomy  d. Angioplasty for thrombosed AV fistula February 2019    Allergies:  Albuterol; Mircera [methoxy polyethylene glycol-epoetin beta]; Penicillins; Prednisone; and Venofer [iron sucrose]    Home/Current Medications:    Scheduled Meds:  amiodarone 200 mg Oral Daily   amLODIPine 10 mg Oral Q24H   aspirin 81 mg Oral Daily   atorvastatin 80 mg Oral Nightly   carvedilol 25 mg Oral Q12H   CloNIDine 0.1 mg Oral BID   clopidogrel 75 mg Oral Daily   dorzolamide-timolol 1 drop Both Eyes Daily   epoetin orquidea 4,000 Units Subcutaneous Once per day on Mon Wed Fri   fluticasone 2 spray Nasal Daily   heparin (porcine) 5,000 Units Subcutaneous Q12H   hydrALAZINE 50 mg Oral Q8H   insulin lispro 0-7 Units Subcutaneous 4x Daily With Meals & Nightly   isosorbide mononitrate 60 mg Oral Daily   latanoprost 1 drop Both Eyes Nightly   lisinopril 20 mg Oral Q12H   sodium chloride 3 mL Intravenous Q12H     Continuous Infusions:  niCARdipine 5-15 mg/hr Last Rate: 5 mg/hr (04/19/19 0743)     PRN Meds:.•  acetaminophen  •  dextrose  •  dextrose  •  glucagon (human recombinant)  •  sodium chloride  •  sodium chloride      History of present illness:    Patient is a pleasant 74-year-old female with the above-noted medical history presented to [facility] emergency department with complaints of hypertension.  She states it is been difficult to  manage over the past couple of months.  There have been recent medication adjustments but she is not getting any benefit from them.  She states that she is only taking the clonidine at  1/2 tablet daily.  She also states she has seen no improvement since her PCP added hydralazine; which is now at 100mg bid.  She states that it has been running above 180 systolic sometimes even after medications.  She states with her blood pressure running higher, she has been noticed an increase in the amount of chest pain that is relieved with nitroglycerin spray.      Upon arrival to the emergency department her blood pressure was found to be 219/90.  IV hydralazine was given without relief.  She was then placed on a Cardene drip with good results.  She has had mildly elevated troponins which could presumptively be related to her blood pressure alone.  She denies having any current chest pain at this time.          Cardiac Risk Factors: Hypertension, hyperlipidemia, diabetes, advanced age female, known history of coronary disease, family history of coronary disease, sedentary lifestyle    Stress test within last 6 months:  No                        Details:     Previous cardiac catheterization:  10/10/2016 with Dr. Zhao  · Severe 1-vessel coronary artery disease involving 100% occlusion of the distal RCA.  · The distal RCA occlusion, and combination with malignant hypertension, contributed to the patient's recent STEMI.  · The previous RCA stent was widely patent.  · Normal LV systolic function with mild inferior hypokinesis  Successful angioplasty and drug-eluting stent placement to the distal RCA (Xience 2.5 x 23 mm)          Social History:  Social History     Socioeconomic History   • Marital status:      Spouse name: Not on file   • Number of children: Not on file   • Years of education: Not on file   • Highest education level: Not on file   Occupational History   • Occupation: retired   Tobacco Use   • Smoking  "status: Never Smoker   • Smokeless tobacco: Never Used   • Tobacco comment: Michael second hand smoke   Substance and Sexual Activity   • Alcohol use: No     Comment: rarely   • Drug use: No   • Sexual activity: Defer   Social History Narrative    Pt consumes 1 serving of caffeine once every 2 weeks.      Family History:  Family History   Problem Relation Age of Onset   • Stroke Mother    • Cancer Mother    • Coronary artery disease Father    • Heart failure Father    • Breast cancer Sister 55   • Ovarian cancer Neg Hx    • Endometrial cancer Neg Hx      Review of Systems  Pertinent positives are listed in the HPI.  All other systems reviewed are negative.         Objective     Vital Sign Min/Max for last 24 hours  Temp  Min: 98 °F (36.7 °C)  Max: 98.7 °F (37.1 °C)   BP  Min: 149/52  Max: 219/90   Pulse  Min: 58  Max: 69   Resp  Min: 16  Max: 18   SpO2  Min: 88 %  Max: 97 %   No Data Recorded   Weight  Min: 87.8 kg (193 lb 9.6 oz)  Max: 89.8 kg (198 lb)     Flowsheet Rows      First Filed Value   Admission Height  170.2 cm (67\") Documented at 04/18/2019 1902   Admission Weight  89.8 kg (198 lb) Documented at 04/18/2019 1902          Physical Exam:    GENERAL: well-developed, well-nourished; in no acute distress.   HEENT:  Bilateral submandibular lymph nodes; several.  Single right sided lymph node at the base of her cervical chain.   NECK:  Carotid upstrokes are 2+ and  symmetrical with left bruit.   LUNGS: Clear to auscultation bilaterally without wheezing, rhonchi, or rales noted.   CARDIOVASCULAR: The heart has a regular rate with a normal S1 and S2. There is 1-2/6 LOUIS murmur at RUSB; no gallop, rub, or click appreciated. The PMI is nondisplaced.   ABDOMEN: Soft and nontender  NEUROLOGICAL: Nonfocal; Alert and oriented  PERIPHERAL VASCULAR:  Posterior tibial pulses are trace+ and symmetrical. There is no peripheral edema.   SKIN:  Warm and dry  PSYCHIATRIC: normal mood and affect; behavior appropriate     EKG:  " Sinus hoang with PVC's    Echo 8/23/17:  · Left ventricular wall thickness is consistent with moderate concentric hypertrophy.  · Left atrial cavity size is mildly dilated.  · Mild aortic valve stenosis is present.  · Mild mitral valve regurgitation is present.  · Mild tricuspid valve regurgitation is present.  · Left ventricular systolic function is normal. Estimated EF = 65%.  · Left ventricular diastolic dysfunction (grade I) consistent with impaired relaxation.  · There is no evidence of pericardial effusion.  · No evidence of pulmonary hypertension is present.  · Normal right ventricular cavity size, wall thickness, systolic function and septal motion noted.  Labs:      Results from last 7 days   Lab Units 04/19/19 0611 04/18/19 1945   SODIUM mmol/L 139 137   POTASSIUM mmol/L 3.8 3.6   CHLORIDE mmol/L 97* 95*   CO2 mmol/L 26.0 29.0   BUN mg/dL 29* 27*   CREATININE mg/dL 5.24* 4.61*   CALCIUM mg/dL 9.5 9.6   BILIRUBIN mg/dL  --  0.4   ALK PHOS U/L  --  105   ALT (SGPT) U/L  --  5   AST (SGOT) U/L  --  11   GLUCOSE mg/dL 118* 126*       Lab Results (last 24 hours)     Procedure Component Value Units Date/Time    Basic Metabolic Panel [[id number]]  (Abnormal) Collected:  04/19/19 0611    Specimen:  Blood Updated:  04/19/19 0733     Glucose 118 mg/dL      BUN 29 mg/dL      Creatinine 5.24 mg/dL      Sodium 139 mmol/L      Potassium 3.8 mmol/L      Chloride 97 mmol/L      CO2 26.0 mmol/L      Calcium 9.5 mg/dL      eGFR  African Amer 10 mL/min/1.73      Comment: <15 Indicative of kidney failure.        eGFR Non African Amer -- mL/min/1.73      Comment: <15 Indicative of kidney failure.        BUN/Creatinine Ratio 5.5     Anion Gap 16.0 mmol/L     CBC Auto Differential [[id number]]  (Abnormal) Collected:  04/19/19 0401    Specimen:  Blood Updated:  04/19/19 0641     WBC 9.26 10*3/mm3      RBC 4.25 10*6/mm3      Hemoglobin 9.3 g/dL      Hematocrit 33.6 %      MCV 79.1 fL      MCH 21.9 pg      MCHC 27.7 g/dL      RDW  18.5 %      RDW-SD 53.3 fl      MPV 11.6 fL      Platelets 337 10*3/mm3      Neutrophil % 55.0 %      Lymphocyte % 29.4 %      Monocyte % 12.9 %      Eosinophil % 2.4 %      Basophil % 0.3 %      Immature Grans % 0.3 %      Neutrophils, Absolute 5.10 10*3/mm3      Lymphocytes, Absolute 2.72 10*3/mm3      Monocytes, Absolute 1.19 10*3/mm3      Eosinophils, Absolute 0.22 10*3/mm3      Basophils, Absolute 0.03 10*3/mm3      Immature Grans, Absolute 0.03 10*3/mm3     Hemoglobin A1c [[id number]] Collected:  04/19/19 0401    Specimen:  Blood Updated:  04/19/19 0630    CBC Auto Differential [[id number]]  (Abnormal) Collected:  04/18/19 1945    Specimen:  Blood Updated:  04/18/19 2025     WBC 7.70 10*3/mm3      RBC 4.09 10*6/mm3      Hemoglobin 9.0 g/dL      Hematocrit 32.5 %      MCV 79.5 fL      MCH 22.0 pg      MCHC 27.7 g/dL      RDW 18.2 %      RDW-SD 52.1 fl      MPV 11.4 fL      Platelets 314 10*3/mm3      Neutrophil % 50.0 %      Lymphocyte % 36.1 %      Monocyte % 10.8 %      Eosinophil % 2.7 %      Basophil % 0.4 %      Immature Grans % 0.3 %      Neutrophils, Absolute 3.85 10*3/mm3      Lymphocytes, Absolute 2.78 10*3/mm3      Monocytes, Absolute 0.83 10*3/mm3      Eosinophils, Absolute 0.21 10*3/mm3      Basophils, Absolute 0.03 10*3/mm3      Immature Grans, Absolute 0.02 10*3/mm3     Comprehensive Metabolic Panel [[id number]]  (Abnormal) Collected:  04/18/19 1945    Specimen:  Blood Updated:  04/18/19 2025     Glucose 126 mg/dL      BUN 27 mg/dL      Creatinine 4.61 mg/dL      Sodium 137 mmol/L      Potassium 3.6 mmol/L      Chloride 95 mmol/L      CO2 29.0 mmol/L      Calcium 9.6 mg/dL      Total Protein 8.4 g/dL      Albumin 3.50 g/dL      ALT (SGPT) 5 U/L      AST (SGOT) 11 U/L      Alkaline Phosphatase 105 U/L      Total Bilirubin 0.4 mg/dL      eGFR Non African Amer -- mL/min/1.73      Comment: <15 Indicative of kidney failure.        eGFR   Amer 11 mL/min/1.73      Comment: <15 Indicative of kidney  failure.        Globulin 4.9 gm/dL      A/G Ratio 0.7 g/dL      BUN/Creatinine Ratio 5.9     Anion Gap 13.0 mmol/L     Lipase [[id number]]  (Abnormal) Collected:  04/18/19 1945    Specimen:  Blood Updated:  04/18/19 2025     Lipase 12 U/L     BNP [[id number]]  (Abnormal) Collected:  04/18/19 1945    Specimen:  Blood Updated:  04/18/19 2020     proBNP 13,639.0 pg/mL         Lab Results   Component Value Date    TROPONINI 1.144 (C) 10/10/2016    TROPONINI 1.664 (C) 10/09/2016    TROPONINI 2.595 (C) 10/09/2016    TROPONINT 0.026 04/19/2019    TROPONINT 0.037 (C) 04/19/2019    TROPONINT 0.042 (C) 04/18/2019     Lab Results   Component Value Date    CHOL 261 (H) 10/11/2016    CHLPL 189 12/22/2017    TRIG 97 12/22/2017    HDL 54 12/22/2017     (H) 12/22/2017          Lab Results   Component Value Date    INR 0.92 10/09/2016    INR 1.01 11/08/2015    INR 0.92 04/29/2015    PROTIME 10.0 10/09/2016    PROTIME 10.6 11/08/2015    PROTIME 9.6 04/29/2015       Ejection Fraction:  65% per echo 2017      Results Review:  I reviewed the patients new clinical results.      Assessment:  1. Hypertension  1. cardene gtt recently turned off  2. CAD  1. Mildly elevated troponins in setting of hypertensive urgency  2. Last PCI 10/2016 to distal RCA  3. Chronic diastolic heart failure  4. Hyperlipidemia  5. Diabetes mellitus; type II  6. CKD on dialysis      Plan:  Consider adding terazosin 1mg daily or BID.  Will not make this medication adjustment just yet since they are adjusting how much fluid they are currently taking off in dialysis. Cardene gtt has been dc'd and BP now 142 mmHg.    I discussed the patients findings and my recommendations with patient.    Scribed for Marielena Richmond MD by WILMA Mayfield on 04/18/2019 at 9:40 AM        WILMA Castro  04/19/19  9:40 AM      I Marielena Richmond MD personally performed the services described in this documentation as scribed by the above individual in my presence, and  it is both accurate and complete.    Marielena Richmond MD, FACC

## 2019-04-19 NOTE — PROGRESS NOTES
Discharge Planning Assessment  [facility]     Patient Name: Esperanza Pop  MRN: [id number]  Today's Date: 4/19/2019    Admit Date: 4/18/2019    Discharge Needs Assessment     Row Name 04/19/19 1152       Living Environment    Lives With  spouse    Name(s) of Who Lives With Patient  Juan (spouse)    Current Living Arrangements  home/apartment/condo    Primary Care Provided by  self    Provides Primary Care For  no one    Family Caregiver if Needed  spouse    Family Caregiver Names  Juan    Quality of Family Relationships  helpful;involved;supportive    Able to Return to Prior Arrangements  yes    Living Arrangement Comments  Pt lives with spouse in [facility]. Pt is independent with ADL's PTA.       Resource/Environmental Concerns    Resource/Environmental Concerns  none       Transition Planning    Patient/Family Anticipates Transition to  home with family    Patient/Family Anticipated Services at Transition  none    Transportation Anticipated  family or friend will provide       Discharge Needs Assessment    Readmission Within the Last 30 Days  no previous admission in last 30 days    Concerns to be Addressed  no discharge needs identified    Equipment Currently Used at Home  glucometer;cane, straight    Anticipated Changes Related to Illness  none    Equipment Needed After Discharge  none        Discharge Plan     Row Name 04/19/19 1153       Plan    Plan  Home    Patient/Family in Agreement with Plan  yes    Plan Comments  Pt was gone to dialysis, I spoke wto her son Juan  at bedside. Pt states she lives with spouse in [facility]. She has not had HH. Pt does get dialysis at [facility] [place] on M,W,F.  Pt's PCP is Meghana Rodgers. She has [employer] with Rx coverage.    Final Discharge Disposition Code  01 - home or self-care        Destination      No service coordination in this encounter.      Durable Medical Equipment      No service coordination in this encounter.       Dialysis/Infusion      No service coordination in this encounter.      Home Medical Care      No service coordination in this encounter.      Therapy      No service coordination in this encounter.      Community Resources      No service coordination in this encounter.          Demographic Summary     Row Name 04/19/19 1152       General Information    Admission Type  inpatient    Arrived From  emergency department    Referral Source  admission list    Reason for Consult  discharge planning    Preferred Language  English       Contact Information    Contact Information Obtained for          Functional Status     Row Name 04/19/19 1152       Functional Status    Usual Activity Tolerance  good       Functional Status, IADL    Medications  independent    Meal Preparation  independent    Housekeeping  independent    Laundry  independent    Shopping  independent        Psychosocial    No documentation.       Abuse/Neglect    No documentation.       Legal    No documentation.       Substance Abuse    No documentation.       Patient Forms    No documentation.           Brittney Huang RN

## 2019-04-19 NOTE — CONSULTS
Discussed and taught patient about type 2 diabetes self-management, risk factors, and importance of blood glucose control to reduce complications. Target blood glucose readings and A1c goals per ADA were reviewed. Reviewed with patient current A1c of 8.6.  Her A1c in February was 9.0. Ms. Pop states she has lost 10-20 pounds without trying. We discussed the risk of stroke and MI with increased cholesterol, BP, and A1c.  Ms. Pop verbalized she is aware.  She states she doesn't check her feet every day but they are checked in dialysis once a week. She had an eye exam a year ago and will call to schedule.  We discussed checking glucose 3-4 times per day.  We discussed treatment for hypo and hyperglycemia. Lifestyle changes such as physical activity with MD approval and healthy eating were encouraged. Stressed the importance of strict blood sugar control to prevent complications such as infection. Instructed to  check blood sugar 4 times per day or per MD orders and to call PCP if  blood glucose is trending higher than 180. Ms. Pop was encouraged to keep record of blood glucose readings to take to follow up appointment with PCP.  She was offered a free op follow up appointment however declined at this time.  My card was given for any future questions or concerns.

## 2019-04-19 NOTE — H&P
[facility] Medicine Services  HISTORY AND PHYSICAL    Patient Name: Esperanza Pop  : 1945  MRN: [id number]  Primary Care Physician: Meghana Rodgers MD  Date of admission: 2019      Subjective   Subjective     Chief Complaint:  Elevated blood pressure     HPI:  Esperanza Pop is a 74 y.o. female with PMH significant for CHF, CAD, DM 2, HLD, ESRD on HD, HTN the presents to the ED with complaint of elevated blood pressure.  She notes that over the last several months she has been having difficulty keeping her blood pressure control.  She recently has had adjustment in her medications but feels that this is not working.  Today, she notes that she took her blood pressure this morning with a SBP in the 190s.  She took her medication as prescribed, and later rechecked her blood pressure while running errands and notes that the SBP was in the 200s.  She also admits to having an episode of chest pain for which she used NTG spray which resolved the chest pain.  It has not reoccurred.  She also admits to having intermittent headaches.  She denies any headache at this time.  Upon arrival to the ED, she is found to have hypertensive urgency.  She was given IV hydralazine without relief, and ultimately was placed on Cardene drip with good improvement.  Initially, her troponin was found to be negative, but repeat troponin was found to be elevated.  She denies any current chest pain, nausea, vomiting, or shortness of breath.  She will be admitted to hospital medicine for further evaluation.    Review of Systems   Constitutional: Negative for activity change, appetite change, chills, diaphoresis, fatigue and fever.   Eyes: Negative for visual disturbance.   Respiratory: Negative for cough, chest tightness, shortness of breath and wheezing.    Cardiovascular: Positive for chest pain. Negative for palpitations and leg swelling.   Gastrointestinal: Negative for abdominal distention, abdominal  pain, constipation, diarrhea, nausea and vomiting.   Genitourinary: Negative for difficulty urinating, dysuria, frequency and urgency.   Musculoskeletal: Negative for arthralgias and myalgias.   Skin: Negative for color change, pallor and rash.   Neurological: Positive for dizziness and headaches. Negative for weakness and light-headedness.   Psychiatric/Behavioral: Negative for confusion. The patient is not nervous/anxious.           Otherwise complete ROS reviewed and is negative except as mentioned in the HPI.    Personal History     Past Medical History:   Diagnosis Date   • Acute bronchitis    • Acute conjunctivitis    • Acute kidney injury (CMS/HCC)     Admission from 12/26/2013 to 01/02/2014, now resolved with latest creatinine 1.4 on 01/08/2014.   • Acute non-ST segment elevation myocardial infarction (STEMI) following previous myocardial infarction    • Breast cancer, female, right 5/20/2016 2005   • Cancer (CMS/HCC)    • CHF (congestive heart failure) (CMS/[facility])    • Clotting disorder (CMS/[facility])    • Diabetes mellitus (CMS/[facility])    • Diarrhea    • Dyslipidemia    • Dyspepsia    • Dyspnea    • Edema    • Hx of radiation therapy    • Hyperlipidemia    • Hypertension     Severe   • Ischemic heart disease    • Moderate obesity     BMI 36.2   • Myocardial infarction (CMS/HCC)    • Pneumonia    • Pneumonia 02/2019   • Radiation 2005   • Renal disorder    • Seasonal allergies        Past Surgical History:   Procedure Laterality Date   • AV FISTULA PLACEMENT, BRACHIOBASILIC     • BREAST BIOPSY Left 2010   • BREAST CYST EXCISION     • BREAST LUMPECTOMY Right 2005   • BREAST SURGERY     • CARDIAC CATHETERIZATION N/A 10/10/2016    Procedure: Left Heart Cath;  Surgeon: Scooter Zhao MD;  Location: [facility] CATH INVASIVE LOCATION;  Service:    • CARDIAC CATHETERIZATION  10/2016   • HERNIA REPAIR     • HYSTERECTOMY  2000   • INSERTION HEMODIALYSIS CATHETER Right 6/29/2016    Procedure: HEMODIALYSIS CATHETER  INSERTION TUNNELLED;  Surgeon: Ramón Chavez MD;  Location: On license of [facility];  Service:    • MASTECTOMY Right 2006   • REDUCTION MAMMAPLASTY Left 2005       Family History: family history includes Breast cancer (age of onset: 55) in her sister; Cancer in her mother; Coronary artery disease in her father; Heart failure in her father; Stroke in her mother. Otherwise pertinent FHx was reviewed and unremarkable.     Social History:  reports that she has never smoked. She has never used smokeless tobacco. She reports that she does not drink alcohol or use drugs.  Social History     Social History Narrative    Pt consumes 1 serving of caffeine once every 2 weeks.        Medications:    Available home medication information reviewed.  Medications Prior to Admission   Medication Sig Dispense Refill Last Dose   • amiodarone (PACERONE) 200 MG tablet Take 1 tablet by mouth Daily. 90 tablet 0 Taking   • amLODIPine (NORVASC) 10 MG tablet Take 1 tablet by mouth Daily. 30 tablet 5 Taking   • aspirin 81 MG EC tablet Take 1 tablet by mouth Daily. (Patient taking differently: Take 81 mg by mouth Every Other Day.)   Taking   • atorvastatin (LIPITOR) 80 MG tablet Take 1 tablet by mouth Every Night. 90 tablet 1 Taking   • Blood Glucose Monitoring Suppl (ACCU-CHEK NADER PLUS) W/DEVICE kit DX: E11.65 1 kit 0 Taking   • carvedilol (COREG) 25 MG tablet Take 1 tablet by mouth Every 12 (Twelve) Hours. 60 tablet 5 Taking   • Cholecalciferol (VITAMIN D3) 2000 UNITS tablet Take  by mouth 3 (Three) Times a Week.   Taking   • CloNIDine (CATAPRES) 0.1 MG tablet Take 1-2 tablets by mouth 2 (Two) Times a Day. 120 tablet 0 Taking   • clopidogrel (PLAVIX) 75 MG tablet TAKE 1 TABLET BY MOUTH  DAILY (PATIENT NEEDS TO MAKE FOLLOW UP APPT FOR REFILLS) 30 tablet 2 Taking   • dorzolamide-timolol (COSOPT) 22.3-6.8 MG/ML ophthalmic solution Administer 1 drop to both eyes Daily.   Taking   • Ferric Citrate 1  MG(Fe) tablet Take 1 tablet by mouth As  Needed.   Taking   • fluticasone (FLONASE) 50 MCG/ACT nasal spray 2 sprays into each nostril Daily.   Taking   • glucose blood (ACCU-CHEK NADER PLUS) test strip Use as instructed 1 TO 3 TIMES DAILY  DX: E11.65 300 each 3 Taking   • hydrALAZINE (APRESOLINE) 50 MG tablet Take 2 tablets by mouth 2 (Two) Times a Day. 120 tablet 3 Taking   • insulin NPH-insulin regular (humuLIN 70/30,novoLIN 70/30) (70-30) 100 UNIT/ML injection Inject 15 units before dinner and 10 Units before breakfast. 10 mL 5    • IRON DEXTRAN IJ Inject  as directed 3 (Three) Times a Week.   Taking   • isosorbide mononitrate (IMDUR) 60 MG 24 hr tablet Take 1 tablet by mouth Daily. 90 tablet 1 Taking   • Lancets Misc. (ACCU-CHEK SOFTCLIX LANCET DEV) kit TEST 1-3 TIMES DAILY DX:E11.65 1 kit 0 Taking   • latanoprost (XALATAN) 0.005 % ophthalmic solution Administer 1 drop to both eyes Every Night.   Taking   • lisinopril (PRINIVIL,ZESTRIL) 20 MG tablet Take 1 tablet by mouth Daily. 90 tablet 4 Taking   • nitroglycerin (NITROLINGUAL) 0.4 MG/SPRAY spray Place 1 spray under the tongue Every 5 (Five) Minutes As Needed for Chest Pain. 1 each 12 Taking   • torsemide (DEMADEX) 100 MG tablet Take 100 mg by mouth Daily As Needed.   Taking       Allergies   Allergen Reactions   • Albuterol      Increased blood pressure  Used right before heart attack   • Mircera [Methoxy Polyethylene Glycol-Epoetin Beta]      Pt stopped breathing   • Penicillins Hives   • Prednisone      Makes jittery/anxious   • Venofer [Iron Sucrose]      Pt stopped breathing       Objective   Objective     Vital Signs:   Temp:  [98 °F (36.7 °C)] 98 °F (36.7 °C)  Heart Rate:  [58-69] 62  Resp:  [18] 18  BP: (149-219)/() 164/54        Physical Exam   Constitutional: She is oriented to person, place, and time. She appears well-developed and well-nourished. No distress.   HENT:   Head: Normocephalic and atraumatic.   Eyes: Pupils are equal, round, and reactive to light.   Neck: Normal range of  motion. Neck supple. No JVD present.   Cardiovascular: Normal rate, regular rhythm, normal heart sounds and intact distal pulses. Exam reveals no gallop and no friction rub.   No murmur heard.  Pulmonary/Chest: Effort normal and breath sounds normal. No respiratory distress. She has no wheezes. She has no rales.   Abdominal: Soft. Bowel sounds are normal. She exhibits no distension. There is no tenderness. There is no guarding.   Musculoskeletal: Normal range of motion. She exhibits no edema or tenderness.   Neurological: She is alert and oriented to person, place, and time.   Skin: Skin is warm and dry. No erythema. No pallor.   Psychiatric: She has a normal mood and affect. Her behavior is normal. Thought content normal.   Vitals reviewed.       Results Reviewed:  I have personally reviewed current lab, radiology, and data and agree.    Results from last 7 days   Lab Units 04/18/19 1945   WBC 10*3/mm3 7.70   HEMOGLOBIN g/dL 9.0*   HEMATOCRIT % 32.5*   PLATELETS 10*3/mm3 314     Results from last 7 days   Lab Units 04/18/19 1958 04/18/19 1945   SODIUM mmol/L  --  137   POTASSIUM mmol/L  --  3.6   CHLORIDE mmol/L  --  95*   CO2 mmol/L  --  29.0   BUN mg/dL  --  27*   CREATININE mg/dL  --  4.61*   GLUCOSE mg/dL  --  126*   CALCIUM mg/dL  --  9.6   ALT (SGPT) U/L  --  5   AST (SGOT) U/L  --  11   TROPONIN I ng/mL 0.01  --      Estimated Creatinine Clearance: 12.7 mL/min (A) (by C-G formula based on SCr of 4.61 mg/dL (H)).  Brief Urine Lab Results     None        No results found for: BNP  Imaging Results (last 24 hours)     Procedure Component Value Units Date/Time    MRI Brain Without Contrast [[id number]] Collected:  04/18/19 2300     Updated:  04/18/19 2302    Narrative:       MRI Brain WO    INDICATION:   74-year-old female with photosensitivity headache and hypertension. Unsteadiness today.    TECHNIQUE:   MRI of the brain without contrast.    COMPARISON:    Correlation made with head CT same  date.    FINDINGS:  Diffusion-weighted images demonstrate no evidence of recent stroke. No midline shift. Midline structures of the brain intact. No tonsillar ectopia. There is no mass mass effect or edema. No distinct extra-axial fluid collection. No hydrocephalus. There  are tiny punctate areas of increased FLAIR and T2 signal within the deep white matter and in the periventricular white matter, technically nonspecific, but favored to represent sequela of chronic ischemic microvascular changes. The globes are intact and  the sinuses are clear. Flow voids are present in the major vessels at the base of the brain.      Impression:         1. No evidence of recent stroke on diffusion-weighted images.  2. No clearly acute intracranial process. Signal changes in the periventricular and deep white matter that most likely reflect sequela of chronic ischemic small vessel disease.    Signer Name: Kevin Wilkins MD   Signed: 4/18/2019 11:00 PM   Workstation Name: [email or web address]       CT Head Without Contrast [[id number]] Collected:  04/18/19 2206     Updated:  04/18/19 2208    Narrative:       CT Head WO    HISTORY:   74-year-old female with hypertension left-sided occipital headache. Gait disturbance. Sensitivity to light 2-3 weeks.    TECHNIQUE:   Axial unenhanced head CT. Radiation dose reduction techniques included automated exposure control or exposure modulation based on body size.     Time of scan: 2157    COUNT OF KNOWN CT AND CARDIAC NUC MED STUDIES PERFORMED IN PREVIOUS 12 MONTHS: 0.      COMPARISON:   None.    FINDINGS:   No intracranial hemorrhage, mass, or infarct. No hydrocephalus or extra-axial fluid collection. Brain parenchymal density is normal. The skull base, calvarium, and extracranial soft tissues are normal. Incidental physiologic basal ganglia calcifications.      Impression:       Normal, negative unenhanced head CT.          Signer Name: Kevin Wilkins MD   Signed: 4/18/2019 10:06 PM   Workstation Name:  [employer]       XR Chest 1 View [[id number]] Collected:  04/18/19 2113     Updated:  04/18/19 2115    Narrative:       CHEST X-RAY, 4/18/2019      HISTORY:    74-year-old female in the ED complaining of chest pain, elevated blood pressure and dizziness today.      TECHNIQUE:  AP upright chest x-ray.    COMPARISON:  *  Chest x-ray, 2/12/2019, 1/22/2019 and 4/26/2018.    FINDINGS:  The heart is moderately enlarged. Central pulmonary arteries are also prominent.    Mildly increased diffuse interstitial markings when compared with prior studies. Correlate for borderline vascular congestion or diffuse interstitial pneumonitis. There is no airspace consolidation or pleural effusion.      Impression:       1. Moderate cardiomegaly with central pulmonary artery enlargement.  2. Mildly prominent interstitial markings when compared with prior studies. This may be an artifact of shallow lung expansion, but borderline vascular congestion should be excluded clinically.    Signer Name: Ramón Rodriguez MD   Signed: 4/18/2019 9:13 PM   Workstation Name: [employer]           Results for orders placed during the hospital encounter of 08/23/17   Adult Transthoracic Echo Complete    Narrative · Left ventricular wall thickness is consistent with moderate concentric   hypertrophy.  · Left atrial cavity size is mildly dilated.  · Mild aortic valve stenosis is present.  · Mild mitral valve regurgitation is present.  · Mild tricuspid valve regurgitation is present.  · Left ventricular systolic function is normal. Estimated EF = 65%.  · Left ventricular diastolic dysfunction (grade I) consistent with   impaired relaxation.  · There is no evidence of pericardial effusion.  · No evidence of pulmonary hypertension is present.  · Normal right ventricular cavity size, wall thickness, systolic function   and septal motion noted.          Assessment/Plan   Assessment / Plan     Active Hospital Problems    Diagnosis POA   • **Hypertensive  urgency [I16.0] Yes   • Elevated troponin [R74.8] Yes   • CHF (congestive heart failure) (CMS/[facility]) [I50.9] Yes   • Diabetes mellitus (CMS/[facility]) [E11.9] Yes   • Dyslipidemia [E78.5] Yes   • Ischemic heart disease [I25.9] Yes     a. Acute non-STEMI/congestive heart failure with diagnostic coronary arteriography demonstrating 20% LAD plaque with high-grade stenosis in the proximal mid right coronary artery requiring a Xience drug-eluting stent (3.0 mm x 28 mm) with reduced echocardiographic LVEF (0.25) with abnormal diastolic LV dysfunction, December 2013.   b. Improved echocardiogram, April 2014.  c. Residual CCS class I angina pectoris/NYHA class II CHF.     • End stage renal disease on dialysis (CMS/[facility]) [N18.6, Z99.2] Not Applicable   • Coronary artery disease involving native coronary artery of native heart with angina pectoris (CMS/[facility]) [I25.119] Yes     · Cardiac catheterization for NSTEMI (December 2013): Severe RCA disease status post DARRYL.  LVEF 25%.  · Echo (6/30/16): LVEF 55%, mild AS/MR.     • Essential hypertension [I10] Yes     74-year-old female presenting to the ED with complaint of elevated blood pressure who was found to have hypertensive urgency    Hypertensive urgency  -Cardene drip initiated in the ED with good results  -Continue home dose of amlodipine, clonidine, hydralazine, lisinopril, and Imdur and carvedilol  -Follows with Dr. Sagastume outpatient, will consult in a.m.    Elevated troponin  -Likely secondary to above  -Initial troponin negative, secondary to troponin was positive  -Continue to trend troponin  -EKG  -Cardiology consult in a.m., follows with Dr. Sagastume  -No current complaint of chest pain    ESRD on HD  -Nephrology consult in a.m.  -HD on Monday Wednesday Friday  -BMP in the a.m.    CHF  - Uses torsemide as needed    Hyperlipidemia  -Continue home dose of Lipitor    Diabetes mellitus  -FS BG before meals at bedtime  -Assess insulin  -Hemoglobin A1c in the a.m.        DVT  prophylaxis:  Heparin     CODE STATUS:    Code Status and Medical Interventions:   Ordered at: 04/19/19 0315     Level Of Support Discussed With:    Patient     Code Status:    CPR     Medical Interventions (Level of Support Prior to Arrest):    Full       Electronically signed by WILMA Roman, 04/19/19, 2:44 AM.        Brief Attending Note   I have seen and examined the patient, performing an independent face-to-face diagnostic evaluation with plan of care reviewed and developed with the advanced practice clinician (APC).  Brief Summary Statement/HPI:   74-year-old woman with history of long-standing hypertension with subsequent end-stage renal disease on dialysis Monday Wednesday Friday, diabetes coronary artery disease with RCA stent, presented with complaints of chest pain and headache related to accelerated hypertension.  She was started on a Cardene drip with good results.    Attending Physical Exam:  Patient is alert and talkative in no distress at rest  Neck is without mass or JVD  Heart is Reg wo murmur  Lungs are clear wo wheeze or crackle  Abd is soft without HSM or mass  MAEW  Skin is without rash  Neurologic exam in nonfocal   Mood is appropriate  Data:  Labs and radiology studies have been reviewed.  Brief Assessment/Plan :  See above for further detailed assessment and plan developed with APC which I have reviewed and/or edited.  I believe this patient meets obs status for now.  See progress note for today for further detail.    Electronically signed by Katiana Romero MD 04/19/19 4:39 PM

## 2019-04-19 NOTE — ED PROVIDER NOTES
"Subjective   Ms. Esperanza Pop is a 74 y.o. female who presents to the ED with c/o hypertension. She reports this morning she checked her blood pressure at [employer] and it was 191 systolic. She took an extra dose of her antihypertensive and checked her blood pressure at [employer] again later and it was 221/79. She states she felt unsteady and \"unlike herself\" on her way home. She also complains of headache, chest pain, and photophobia. The chest pain was epigastric and resolved after she used nitroglycerin spray. She was sitting when the chest pain started. She denies nausea and vomiting. She is a dialysis patient and receives treatment Monday, Wednesday, and Friday. Her last treatment was today. She has a history of congestive heart failure and 2 heart attacks. She also has a history of stent placement. Her last stress test was not recent. Her cardiologist is Dr. Langley. She states she has been trying to lose weight recently. There are no other acute complaints at this time.        History provided by:  Patient  Hypertension   Severity:  Moderate  Onset quality:  Sudden  Timing:  Constant  Progression:  Unchanged  Time since last dose of antihypertensive: this evening.  Notable PTA blood pressures:  221/79  Relieved by:  Nothing  Ineffective treatments:  Medication  Associated symptoms: chest pain and headaches    Associated symptoms: no fever, no nausea, no shortness of breath and not vomiting    Risk factors: cardiac disease    Risk factors: no obesity        Review of Systems   Constitutional: Negative for chills and fever.   Eyes: Positive for photophobia.   Respiratory: Negative for cough and shortness of breath.    Cardiovascular: Positive for chest pain.   Gastrointestinal: Negative for nausea and vomiting.   Neurological: Positive for headaches.   All other systems reviewed and are negative.      Past Medical History:   Diagnosis Date   • Acute bronchitis    • Acute conjunctivitis    • Acute kidney injury " (CMS/[facility])     Admission from 12/26/2013 to 01/02/2014, now resolved with latest creatinine 1.4 on 01/08/2014.   • Acute non-ST segment elevation myocardial infarction (STEMI) following previous myocardial infarction    • Breast cancer, female, right 5/20/2016 2005   • Cancer (CMS/[facility])    • CHF (congestive heart failure) (CMS/[facility])    • Clotting disorder (CMS/[facility])    • Diabetes mellitus (CMS/[facility])    • Diarrhea    • Dyslipidemia    • Dyspepsia    • Dyspnea    • Edema    • Hx of radiation therapy    • Hyperlipidemia    • Hypertension     Severe   • Ischemic heart disease    • Moderate obesity     BMI 36.2   • Myocardial infarction (CMS/[facility])    • Pneumonia    • Pneumonia 02/2019   • Radiation 2005   • Renal disorder    • Seasonal allergies        Allergies   Allergen Reactions   • Albuterol      Increased blood pressure  Used right before heart attack   • Mircera [Methoxy Polyethylene Glycol-Epoetin Beta]      Pt stopped breathing   • Penicillins Hives   • Prednisone      Makes jittery/anxious   • Venofer [Iron Sucrose]      Pt stopped breathing       Past Surgical History:   Procedure Laterality Date   • AV FISTULA PLACEMENT, BRACHIOBASILIC     • BREAST BIOPSY Left 2010   • BREAST CYST EXCISION     • BREAST LUMPECTOMY Right 2005   • BREAST SURGERY     • CARDIAC CATHETERIZATION N/A 10/10/2016    Procedure: Left Heart Cath;  Surgeon: Scooter Zhao MD;  Location:  [facility];  Service:    • CARDIAC CATHETERIZATION  10/2016   • HERNIA REPAIR     • HYSTERECTOMY  2000   • INSERTION HEMODIALYSIS CATHETER Right 6/29/2016    Procedure: HEMODIALYSIS CATHETER INSERTION TUNNELLED;  Surgeon: Ramón Chavez MD;  Location:  [facility];  Service:    • MASTECTOMY Right 2006   • REDUCTION MAMMAPLASTY Left 2005       Family History   Problem Relation Age of Onset   • Stroke Mother    • Cancer Mother    • Coronary artery disease Father    • Heart failure Father    • Breast cancer Sister 55   • Ovarian  cancer Neg Hx    • Endometrial cancer Neg Hx        Social History     Socioeconomic History   • Marital status:      Spouse name: Not on file   • Number of children: Not on file   • Years of education: Not on file   • Highest education level: Not on file   Occupational History   • Occupation: retired   Tobacco Use   • Smoking status: Never Smoker   • Smokeless tobacco: Never Used   Substance and Sexual Activity   • Alcohol use: No   • Drug use: No   • Sexual activity: Defer   Social History Narrative    Pt consumes 1 serving of caffeine once every 2 weeks.          Objective   Physical Exam   Constitutional: She is oriented to person, place, and time. She appears well-developed and well-nourished. She is cooperative.  Non-toxic appearance. No distress.   HENT:   Head: Normocephalic and atraumatic.   Right Ear: External ear normal.   Left Ear: External ear normal.   Mouth/Throat: Oropharynx is clear and moist. No oropharyngeal exudate.   Eyes: Conjunctivae, EOM and lids are normal. Pupils are equal, round, and reactive to light.   Neck: Trachea normal, normal range of motion and full passive range of motion without pain.   Cardiovascular: Regular rhythm, normal heart sounds, intact distal pulses and normal pulses.   Pulmonary/Chest: Effort normal and breath sounds normal. No respiratory distress. She has no decreased breath sounds. She has no wheezes. She has no rhonchi. She has no rales.   Abdominal: Soft. Normal appearance and bowel sounds are normal. She exhibits no distension. There is no tenderness.   Musculoskeletal: Normal range of motion.   Neurological: She is alert and oriented to person, place, and time. She has normal strength. No cranial nerve deficit.   Skin: Skin is warm, dry and intact. No rash noted.   Psychiatric: She has a normal mood and affect. Her speech is normal and behavior is normal.   Nursing note and vitals reviewed.      Procedures         ED Course  ED Course as of Apr 19 0414    Thu Apr 18, 2019 2125 proBNP: (!) 13,639.0 [KG]   2125 Creatinine: (!) 4.61 [KG]   2125 Hemoglobin: (!) 9.0 [KG]   2125 Hematocrit: (!) 32.5 [KG]   2140 Troponin I: 0.01 [KG]   2352 Troponin T: (!!) 0.042 [KG]   Fri Apr 19, 2019   0000 Dr. Kothari contacted, pt to be admitted to the hospital for hypertensive urgency, elevated troponin.  [KG]      ED Course User Index  [KG] Maria Elena Esquivel C, APRN     Recent Results (from the past 24 hour(s))   Comprehensive Metabolic Panel    Collection Time: 04/18/19  7:45 PM   Result Value Ref Range    Glucose 126 (H) 65 - 99 mg/dL    BUN 27 (H) 8 - 23 mg/dL    Creatinine 4.61 (H) 0.57 - 1.00 mg/dL    Sodium 137 136 - 145 mmol/L    Potassium 3.6 3.5 - 5.2 mmol/L    Chloride 95 (L) 98 - 107 mmol/L    CO2 29.0 22.0 - 29.0 mmol/L    Calcium 9.6 8.6 - 10.5 mg/dL    Total Protein 8.4 6.0 - 8.5 g/dL    Albumin 3.50 3.50 - 5.20 g/dL    ALT (SGPT) 5 1 - 33 U/L    AST (SGOT) 11 1 - 32 U/L    Alkaline Phosphatase 105 39 - 117 U/L    Total Bilirubin 0.4 0.2 - 1.2 mg/dL    eGFR Non African Amer  >60 mL/min/1.73    eGFR  African Amer 11 (L) >60 mL/min/1.73    Globulin 4.9 gm/dL    A/G Ratio 0.7 g/dL    BUN/Creatinine Ratio 5.9 (L) 7.0 - 25.0    Anion Gap 13.0 mmol/L   Lipase    Collection Time: 04/18/19  7:45 PM   Result Value Ref Range    Lipase 12 (L) 13 - 60 U/L   BNP    Collection Time: 04/18/19  7:45 PM   Result Value Ref Range    proBNP 13,639.0 (H) 5.0 - 900.0 pg/mL   Light Blue Top    Collection Time: 04/18/19  7:45 PM   Result Value Ref Range    Extra Tube hold for add-on    Green Top (Gel)    Collection Time: 04/18/19  7:45 PM   Result Value Ref Range    Extra Tube Hold for add-ons.    Lavender Top    Collection Time: 04/18/19  7:45 PM   Result Value Ref Range    Extra Tube hold for add-on    Gold Top - SST    Collection Time: 04/18/19  7:45 PM   Result Value Ref Range    Extra Tube Hold for add-ons.    CBC Auto Differential    Collection Time: 04/18/19  7:45 PM   Result Value  Ref Range    WBC 7.70 3.40 - 10.80 10*3/mm3    RBC 4.09 3.77 - 5.28 10*6/mm3    Hemoglobin 9.0 (L) 12.0 - 15.9 g/dL    Hematocrit 32.5 (L) 34.0 - 46.6 %    MCV 79.5 79.0 - 97.0 fL    MCH 22.0 (L) 26.6 - 33.0 pg    MCHC 27.7 (L) 31.5 - 35.7 g/dL    RDW 18.2 (H) 12.3 - 15.4 %    RDW-SD 52.1 37.0 - 54.0 fl    MPV 11.4 6.0 - 12.0 fL    Platelets 314 140 - 450 10*3/mm3    Neutrophil % 50.0 42.7 - 76.0 %    Lymphocyte % 36.1 19.6 - 45.3 %    Monocyte % 10.8 5.0 - 12.0 %    Eosinophil % 2.7 0.3 - 6.2 %    Basophil % 0.4 0.0 - 1.5 %    Immature Grans % 0.3 0.0 - 0.5 %    Neutrophils, Absolute 3.85 1.40 - 7.00 10*3/mm3    Lymphocytes, Absolute 2.78 0.70 - 3.10 10*3/mm3    Monocytes, Absolute 0.83 0.10 - 0.90 10*3/mm3    Eosinophils, Absolute 0.21 0.00 - 0.40 10*3/mm3    Basophils, Absolute 0.03 0.00 - 0.20 10*3/mm3    Immature Grans, Absolute 0.02 0.00 - 0.05 10*3/mm3   POC Troponin, Rapid    Collection Time: 04/18/19  7:58 PM   Result Value Ref Range    Troponin I 0.01 0.00 - 0.07 ng/mL   Troponin    Collection Time: 04/18/19 10:16 PM   Result Value Ref Range    Troponin T 0.042 (C) 0.000 - 0.030 ng/mL     Note: In addition to lab results from this visit, the labs listed above may include labs taken at another facility or during a different encounter within the last 24 hours. Please correlate lab times with ED admission and discharge times for further clarification of the services performed during this visit.    MRI Brain Without Contrast   Final Result      1. No evidence of recent stroke on diffusion-weighted images.   2. No clearly acute intracranial process. Signal changes in the periventricular and deep white matter that most likely reflect sequela of chronic ischemic small vessel disease.      Signer Name: Kevin Wilkins MD    Signed: 4/18/2019 11:00 PM    Workstation Name: [email or web address]          CT Head Without Contrast   Final Result   Normal, negative unenhanced head CT.               Signer Name: Kevin Wilkins MD     Signed: 4/18/2019 10:06 PM    Workstation Name: [email or web address]          XR Chest 1 View   Final Result   1. Moderate cardiomegaly with central pulmonary artery enlargement.   2. Mildly prominent interstitial markings when compared with prior studies. This may be an artifact of shallow lung expansion, but borderline vascular congestion should be excluded clinically.      Signer Name: Ramón Rodriguez MD    Signed: 4/18/2019 9:13 PM    Workstation Name: [id number]            Vitals:    04/19/19 0230 04/19/19 0245 04/19/19 0257 04/19/19 0300   BP: 161/57 156/54  164/54   Pulse: 69  62    Resp:       Temp:       TempSrc:       SpO2: 93%  93%    Weight:       Height:         Medications   sodium chloride 0.9 % flush 10 mL (not administered)   niCARdipine (CARDENE-IV) 20 mg/200 mL (0.1 mg/mL) in 0.9% NaCl infusion (0 mg/hr Intravenous Hold 4/19/19 0231)   hydrALAZINE (APRESOLINE) injection 10 mg (10 mg Intravenous Given 4/18/19 2223)     ECG/EMG Results (last 24 hours)     Procedure Component Value Units Date/Time    ECG 12 Lead [[id number]] Collected:  04/18/19 1943     Updated:  04/18/19 1943    ECG 12 Lead [[id number]] Collected:  04/18/19 2111     Updated:  04/18/19 2111        ECG 12 Lead         ECG 12 Lead                             MDM    Final diagnoses:   Hypertensive urgency   Nonintractable headache, unspecified chronicity pattern, unspecified headache type   Dizziness   Elevated troponin   ESRD (end stage renal disease) (CMS/[facility])       Documentation assistance provided by marlene Gaffney.  Information recorded by the marlene was done at my direction and has been verified and validated by me.     Heydi Gaffney  04/18/19 1626       Maria Elena Esquivel APRN  04/19/19 4386

## 2019-04-19 NOTE — CONSULTS
"Clinical Nutrition   Reason For Visit: Identified at risk by screening criteria, MST score 2+, Unintentional weight loss, Reduced oral intake    Patient Name: Esperanza Pop  YOB: 1945  MRN: [id number]  Date of Encounter: 04/19/19 3:17 PM  Admission date: 4/18/2019      Comments:       Nutrition Assessment     Admission Problem List:  1. Hypertension  2. CAD  3. Chronic diastolic heart failure  4. Hyperlipidemia  5. Diabetes mellitus; type II  6. CKD on dialysis-HD      Applicable Nutrition-Related Information:  Unintentional wt loss      Applicable medical tests/procedures since admission:  HD      PMH: She  has a past medical history of Acute bronchitis, Acute conjunctivitis, Acute kidney injury (CMS/HCC), Acute non-ST segment elevation myocardial infarction (STEMI) following previous myocardial infarction, Breast cancer, female, right (5/20/2016), Cancer (CMS/HCC), CHF (congestive heart failure) (CMS/[facility]), Clotting disorder (CMS/[facility]), Diabetes mellitus (CMS/[facility]), Diarrhea, Dyslipidemia, Dyspepsia, Dyspnea, Edema, radiation therapy, Hyperlipidemia, Hypertension, Ischemic heart disease, Moderate obesity, Myocardial infarction (CMS/[facility]), Pneumonia, Pneumonia (02/2019), Radiation (2005), Renal disorder, and Seasonal allergies.   PSxH: She  has a past surgical history that includes Breast surgery; Hernia repair; AV fistula placement, brachiobasilic; Hemodialysis Catheter (Right, 6/29/2016); Breast cyst excision; Mastectomy (Right, 2006); Hysterectomy (2000); Reduction mammaplasty (Left, 2005); Breast biopsy (Left, 2010); Cardiac catheterization (N/A, 10/10/2016); Cardiac catheterization (10/2016); and Breast lumpectomy (Right, 2005).        Reported/Observed/Food/Nutrition Related History     Pt reports a decrease in appetite x3-4 weeks, states foods have \"not tasted right\".  She goes to HD [facility] and she states she is given Prostat during each visit.  She used to drink Nepro but found it caused her to have " "gas and diarrhea. She avoids milk and milk based foods. She states she limits her fluid intake to 32 oz per day, but occasionally drinks more than that on weekends.       Anthropometrics   Height: 67\"  Weight: 193 lb 9.6oz--standing weight on admission  BMI: 30.32  BMI classification: Obese Class I: 30-34.9kg/m2   UBW: 200 lbs  IBW: 135 lbs      Weight change: Pt reports weight loss of ~7 lbs in 1 month unintentional but associated with decreased appetite. (3%)        Labs reviewed   Labs reviewed: Yes  Results from last 7 days   Lab Units 04/19/19  0611 04/18/19  1945   SODIUM mmol/L 139 137   POTASSIUM mmol/L 3.8 3.6   CHLORIDE mmol/L 97* 95*   CO2 mmol/L 26.0 29.0   BUN mg/dL 29* 27*   CREATININE mg/dL 5.24* 4.61*   GLUCOSE mg/dL 118* 126*   CALCIUM mg/dL 9.5 9.6     Results from last 7 days   Lab Units 04/19/19  0401 04/18/19  1945   WBC 10*3/mm3 9.26 7.70   ALBUMIN g/dL  --  3.50     Results from last 7 days   Lab Units 04/19/19  1341 04/19/19  0709   GLUCOSE mg/dL 166* 125     Lab Results   Lab Value Date/Time    HGBA1C 8.60 (H) 04/19/2019 0401    HGBA1C 9.0 02/26/2019 1508    HGBA1C 7.6 03/01/2018 1140    HGBA1C 7.60 (H) 10/11/2016 0518     Medications reviewed   Medications reviewed: Yes  Pertinent:    Intake/Ouptut 24 hrs (7:00AM - 6:59 AM)     Intake & Output (last day)       04/18 0701 - 04/19 0700 04/19 0701 - 04/20 0700    Other  2800    Total Output  2800    Net  -2800          Urine Unmeasured Occurrence 1 x                Current Nutrition Prescription   PO: Diet Regular; Consistent Carbohydrate, Cardiac, Renal    Evaluation of Received Nutrient/Fluid Intake:  Insufficient data       Nutrition Diagnosis     Problem Unintended weight loss   Etiology ESRD, DM   Signs/Symptoms Reported weight loss of ~7 lbs in 1 month unintentional       Problem Predicted suboptimal energy intake   Etiology Decreased appetite, taste off   Signs/Symptoms Reported decrease in po intake x2-3 weeks       Intervention "   Intervention: Follow treatment progress, Care plan reviewed, Advise alternate selection, Encourage intake, Supplement provided  1) Prostat 1x/day    Goal:   General: Nutrition support treatment  PO: Establish PO, Tolerate PO      Additional goals: pt will eat >67% of meals consistently      Monitoring/Evaluation:       Monitoring/Evaluation: Per protocol, I&O, PO intake, Pertinent labs, Symptoms  Will Continue to follow per protocol  Zaida Arroyo RD  Time Spent: 35 min

## 2019-04-20 LAB
ALBUMIN SERPL-MCNC: 3.1 G/DL (ref 3.5–5.2)
ANION GAP SERPL CALCULATED.3IONS-SCNC: 13 MMOL/L
BUN BLD-MCNC: 23 MG/DL (ref 8–23)
BUN/CREAT SERPL: 5.5 (ref 7–25)
CALCIUM SPEC-SCNC: 8.7 MG/DL (ref 8.6–10.5)
CHLORIDE SERPL-SCNC: 98 MMOL/L (ref 98–107)
CO2 SERPL-SCNC: 24 MMOL/L (ref 22–29)
CREAT BLD-MCNC: 4.2 MG/DL (ref 0.57–1)
GFR SERPL CREATININE-BSD FRML MDRD: 13 ML/MIN/1.73
GFR SERPL CREATININE-BSD FRML MDRD: ABNORMAL ML/MIN/1.73
GLUCOSE BLD-MCNC: 158 MG/DL (ref 65–99)
GLUCOSE BLDC GLUCOMTR-MCNC: 188 MG/DL (ref 70–130)
GLUCOSE BLDC GLUCOMTR-MCNC: 259 MG/DL (ref 70–130)
GLUCOSE BLDC GLUCOMTR-MCNC: 260 MG/DL (ref 70–130)
GLUCOSE BLDC GLUCOMTR-MCNC: 271 MG/DL (ref 70–130)
IRON 24H UR-MRATE: 31 MCG/DL (ref 37–145)
IRON SATN MFR SERPL: 9 % (ref 20–50)
PHOSPHATE SERPL-MCNC: 3.9 MG/DL (ref 2.5–4.5)
POTASSIUM BLD-SCNC: 4.2 MMOL/L (ref 3.5–5.2)
SODIUM BLD-SCNC: 135 MMOL/L (ref 136–145)
TIBC SERPL-MCNC: 329 MCG/DL (ref 298–536)
TRANSFERRIN SERPL-MCNC: 221 MG/DL (ref 200–360)

## 2019-04-20 PROCEDURE — 99214 OFFICE O/P EST MOD 30 MIN: CPT | Performed by: INTERNAL MEDICINE

## 2019-04-20 PROCEDURE — 82962 GLUCOSE BLOOD TEST: CPT

## 2019-04-20 PROCEDURE — 84466 ASSAY OF TRANSFERRIN: CPT | Performed by: INTERNAL MEDICINE

## 2019-04-20 PROCEDURE — 25010000002 HEPARIN (PORCINE) PER 1000 UNITS: Performed by: NURSE PRACTITIONER

## 2019-04-20 PROCEDURE — G0378 HOSPITAL OBSERVATION PER HR: HCPCS

## 2019-04-20 PROCEDURE — 80069 RENAL FUNCTION PANEL: CPT | Performed by: INTERNAL MEDICINE

## 2019-04-20 PROCEDURE — 83540 ASSAY OF IRON: CPT | Performed by: INTERNAL MEDICINE

## 2019-04-20 PROCEDURE — 99225 PR SBSQ OBSERVATION CARE/DAY 25 MINUTES: CPT | Performed by: INTERNAL MEDICINE

## 2019-04-20 PROCEDURE — 96372 THER/PROPH/DIAG INJ SC/IM: CPT

## 2019-04-20 RX ORDER — TERAZOSIN 1 MG/1
1 CAPSULE ORAL NIGHTLY
Status: DISCONTINUED | OUTPATIENT
Start: 2019-04-20 | End: 2019-04-21 | Stop reason: HOSPADM

## 2019-04-20 RX ADMIN — CARVEDILOL 25 MG: 12.5 TABLET, FILM COATED ORAL at 09:42

## 2019-04-20 RX ADMIN — ISOSORBIDE MONONITRATE 60 MG: 60 TABLET, EXTENDED RELEASE ORAL at 09:42

## 2019-04-20 RX ADMIN — CLOPIDOGREL BISULFATE 75 MG: 75 TABLET ORAL at 09:41

## 2019-04-20 RX ADMIN — HEPARIN SODIUM 5000 UNITS: 5000 INJECTION, SOLUTION INTRAVENOUS; SUBCUTANEOUS at 20:39

## 2019-04-20 RX ADMIN — ATORVASTATIN CALCIUM 80 MG: 40 TABLET, FILM COATED ORAL at 20:38

## 2019-04-20 RX ADMIN — HEPARIN SODIUM 5000 UNITS: 5000 INJECTION, SOLUTION INTRAVENOUS; SUBCUTANEOUS at 09:41

## 2019-04-20 RX ADMIN — ASPIRIN 81 MG: 81 TABLET, COATED ORAL at 09:42

## 2019-04-20 RX ADMIN — TERAZOSIN HYDROCHLORIDE 1 MG: 1 CAPSULE ORAL at 20:38

## 2019-04-20 RX ADMIN — LISINOPRIL 20 MG: 20 TABLET ORAL at 20:36

## 2019-04-20 RX ADMIN — DORZOLAMIDE HYDROCHLORIDE AND TIMOLOL MALEATE 1 DROP: 20; 5 SOLUTION/ DROPS OPHTHALMIC at 09:45

## 2019-04-20 RX ADMIN — FLUTICASONE PROPIONATE 2 SPRAY: 50 SPRAY, METERED NASAL at 09:45

## 2019-04-20 RX ADMIN — AMIODARONE HYDROCHLORIDE 200 MG: 200 TABLET ORAL at 09:42

## 2019-04-20 RX ADMIN — CLONIDINE HYDROCHLORIDE 0.1 MG: 0.1 TABLET ORAL at 20:43

## 2019-04-20 RX ADMIN — SODIUM CHLORIDE, PRESERVATIVE FREE 10 ML: 5 INJECTION INTRAVENOUS at 09:42

## 2019-04-20 RX ADMIN — LISINOPRIL 20 MG: 20 TABLET ORAL at 09:42

## 2019-04-20 NOTE — PROGRESS NOTES
"   LOS: 0 days    Patient Care Team:  Meghana Rodgers MD as PCP - General  Meghana Rodgers MD as PCP - Family Medicine    Reason For Visit:    Subjective   Patient seen/examined. BP better today, was hypotensive after dialysis    Review of Systems:    Pulm: No soa   CV:  No CP      Objective       amiodarone 200 mg Oral Daily   aspirin 81 mg Oral Daily   atorvastatin 80 mg Oral Nightly   carvedilol 25 mg Oral Q12H   CloNIDine 0.1 mg Oral BID   clopidogrel 75 mg Oral Daily   dorzolamide-timolol 1 drop Both Eyes Daily   epoetin orquidea 4,000 Units Subcutaneous Once per day on Mon Wed Fri   fluticasone 2 spray Nasal Daily   heparin (porcine) 5,000 Units Subcutaneous Q12H   insulin lispro 0-7 Units Subcutaneous TID With Meals   isosorbide mononitrate 60 mg Oral Daily   latanoprost 1 drop Both Eyes Nightly   lisinopril 20 mg Oral Q12H   sodium chloride 3 mL Intravenous Q12H   terazosin 1 mg Oral Nightly       niCARdipine 5-15 mg/hr Last Rate: Stopped (04/19/19 1434)         Vital Signs:  Blood pressure 153/63, pulse 58, temperature 98.5 °F (36.9 °C), temperature source Oral, resp. rate 18, height 170.2 cm (67\"), weight 87.8 kg (193 lb 9.6 oz), SpO2 97 %, not currently breastfeeding.    Flowsheet Rows      First Filed Value   Admission Height  170.2 cm (67\") Documented at 04/18/2019 1902   Admission Weight  89.8 kg (198 lb) Documented at 04/18/2019 1902          04/19 0701 - 04/20 0700  In: -   Out: 2800     Physical Exam:    General Appearance: NAD, alert and cooperative, Ox3  Eyes: PER, conjunctivae and sclerae normal, no icterus  Lungs: respirations regular and unlabored, no crepitus, clear to auscultation  Heart/CV: regular rhythm & normal rate, no murmur, no gallop, no rub and no edema  Abdomen: not distended, soft, non-tender, no masses,  bowel sounds present  Skin: No rash, Warm and dry R AVF    Radiology:      Renal Imaging:        Labs:  Results from last 7 days   Lab Units 04/19/19  0401 04/18/19  1945   WBC " 10*3/mm3 9.26 7.70   HEMOGLOBIN g/dL 9.3* 9.0*   HEMATOCRIT % 33.6* 32.5*   PLATELETS 10*3/mm3 337 314     Results from last 7 days   Lab Units 04/20/19  0409 04/19/19  0611 04/18/19 1945   SODIUM mmol/L 135* 139 137   POTASSIUM mmol/L 4.2 3.8 3.6   CHLORIDE mmol/L 98 97* 95*   CO2 mmol/L 24.0 26.0 29.0   BUN mg/dL 23 29* 27*   CREATININE mg/dL 4.20* 5.24* 4.61*   CALCIUM mg/dL 8.7 9.5 9.6   PHOSPHORUS mg/dL 3.9  --   --    ALBUMIN g/dL 3.10*  --  3.50     Results from last 7 days   Lab Units 04/20/19  0409   GLUCOSE mg/dL 158*       Results from last 7 days   Lab Units 04/18/19 1945   ALK PHOS U/L 105   BILIRUBIN mg/dL 0.4   ALT (SGPT) U/L 5   AST (SGOT) U/L 11                 Estimated Creatinine Clearance: 13.8 mL/min (A) (by C-G formula based on SCr of 4.2 mg/dL (H)).      Assessment       Hypertensive urgency    Essential hypertension    Coronary artery disease involving native coronary artery of native heart with angina pectoris (CMS/[facility])    End stage renal disease on dialysis (CMS/[facility])    Ischemic heart disease    CHF (congestive heart failure) (CMS/[facility])    Dyslipidemia    Diabetes mellitus (CMS/[facility])    Elevated troponin            Impression:     ESRD MWF at [place]  Anemia of renal failure  HTN - uncontrolled      Recommendations:   Orders for Monday written if still here  Check phos  Lisinopril 20 BID   Can titrate clonidine as needed, patient states her hydralazine makes her feel bad, ok to stop     Epo with HD        Mray Ching DO  04/20/19  11:12 AM

## 2019-04-20 NOTE — PLAN OF CARE
"Problem: Patient Care Overview  Goal: Plan of Care Review  Outcome: Ongoing (interventions implemented as appropriate)   04/20/19 1811   Coping/Psychosocial   Plan of Care Reviewed With patient;spouse   OTHER   Outcome Summary Pt reports feeling\"much better today\". VSS on room air. Home tomorrow if BP remains stable.        Problem: Hemodialysis (Adult)  Goal: Signs and Symptoms of Listed Potential Problems Will be Absent, Minimized or Managed (Hemodialysis)  Outcome: Ongoing (interventions implemented as appropriate)        "

## 2019-04-20 NOTE — PROGRESS NOTES
[facility]  INPATIENT PROGRESS NOTE    Date of Admission: 4/18/2019  Length of Stay: 0  Primary Care Physician: Meghana Rodgers MD    Subjective     Chief Complaint: Chest pain and accelerated hypertension    HPI:  She had a good night and feels food today- had a low BP overnight so didn't get all her BP meds that were ordered.    Review Of Systems:   Patient denies headaches, fever, chills, shortness of breath, chest pain, cough, abdominal pain, nausea or vomiting, diarrhea, rash, itching or bleeding      Objective      Vitals:   98.5 °F (36.9 °C) Temp  Min: 98.3 °F (36.8 °C)  Max: 98.5 °F (36.9 °C)    153/63 BP  Min: 91/50  Max: 153/63    57 Pulse  Min: 50  Max: 62    18 Resp  Min: 14  Max: 18    97 % SpO2  Min: 93 %  Max: 97 %    room air       No Data Recorded     Patient is alert and talkative in no distress at rest-eating her lunch in no distress  Neck is without mass or JVD  Heart is Reg with a systolic murmur  Lungs are clear wo wheeze or crackle  Abd is soft without HSM or mass, not distended or tender to palpation  MAEW-right arm AV fistula  Skin is without rash  Neurologic exam is nonfocal   Mood is appropriate      Results Review:    I have reviewed the labs, radiology results and diagnostic studies.    Results from last 7 days   Lab Units 04/19/19  0401 04/18/19 1945   WBC 10*3/mm3 9.26 7.70   HEMOGLOBIN g/dL 9.3* 9.0*   HEMATOCRIT % 33.6* 32.5*   PLATELETS 10*3/mm3 337 314     Results from last 7 days   Lab Units 04/20/19  0409 04/19/19  0611 04/18/19  1945   SODIUM mmol/L 135* 139 137   POTASSIUM mmol/L 4.2 3.8 3.6   CHLORIDE mmol/L 98 97* 95*   CO2 mmol/L 24.0 26.0 29.0   BUN mg/dL 23 29* 27*   CREATININE mg/dL 4.20* 5.24* 4.61*   GLUCOSE mg/dL 158* 118* 126*   CALCIUM mg/dL 8.7 9.5 9.6   ALT (SGPT) U/L  --   --  5   AST (SGOT) U/L  --   --  11       Microbiology Results Abnormal     None        Ct Head Without Contrast    Result Date:  4/18/2019  Normal, negative unenhanced head CT. Signer Name: Kevin Wilkins MD  Signed: 4/18/2019 10:06 PM  Workstation Name: [email or web address]     Mri Brain Without Contrast    Result Date: 4/18/2019  1. No evidence of recent stroke on diffusion-weighted images. 2. No clearly acute intracranial process. Signal changes in the periventricular and deep white matter that most likely reflect sequela of chronic ischemic small vessel disease. Signer Name: Kevin Wilkins MD  Signed: 4/18/2019 11:00 PM  Workstation Name: [email or web address]     Xr Chest 1 View    Result Date: 4/18/2019  1. Moderate cardiomegaly with central pulmonary artery enlargement. 2. Mildly prominent interstitial markings when compared with prior studies. This may be an artifact of shallow lung expansion, but borderline vascular congestion should be excluded clinically. Signer Name: Ramón Rodriguez MD  Signed: 4/18/2019 9:13 PM  Workstation Name: [id number]     Results for orders placed during the hospital encounter of 08/23/17   Adult Transthoracic Echo Complete    Narrative · Left ventricular wall thickness is consistent with moderate concentric   hypertrophy.  · Left atrial cavity size is mildly dilated.  · Mild aortic valve stenosis is present.  · Mild mitral valve regurgitation is present.  · Mild tricuspid valve regurgitation is present.  · Left ventricular systolic function is normal. Estimated EF = 65%.  · Left ventricular diastolic dysfunction (grade I) consistent with   impaired relaxation.  · There is no evidence of pericardial effusion.  · No evidence of pulmonary hypertension is present.  · Normal right ventricular cavity size, wall thickness, systolic function   and septal motion noted.          I have reviewed the medications.    Assessment/Plan     Assessment/Problem List    Hypertensive urgency    Essential hypertension    Coronary artery disease involving native coronary artery of native heart with angina pectoris (CMS/[facility])    End stage  renal disease on dialysis (CMS/[facility])    Ischemic heart disease    CHF (congestive heart failure) (CMS/[facility])    Dyslipidemia    Diabetes mellitus (CMS/[facility])    Elevated troponin      Plan  74-year-old woman whose been on hemodialysis for the last 3 years secondary hypertensive/ end-stage renal disease.  Who was admitted for accelerated hypertension.    Patient's chest pain and headache have resolved as has her hypertension.    We discussed her clonidine doses we discussed trying some terazosin as cardiology recommended.    Will try stopping hydralazine as she reports it gives her tachycardia.    There is no evidence that she had any cardiac ischemia.  She does have a history of a stent to her RCA in October 2016  Continue insulin for diabetes.  Anemia of chronic disease on Procrit with dialysis.  -MRI does not show any ischemic changes.  DVT prophylaxis: Heparin subcu  Discharge Planning: I expect patient to be discharged to home tomorrow if BP remains stable    Electronically signed by Katiana Romero MD, 04/20/19 1:02 PM .

## 2019-04-20 NOTE — PROGRESS NOTES
"[facility] Daily Note       LOS: 0 days   Patient Care Team:  Meghana Rodgers MD as PCP - General  Meghana Rodgers MD as PCP - Family Medicine    Chief Complaint: Flat troponin elevation, hypertension uncontrolled    Subjective     Subjective: Feels much better this morning.  She had some extra fluid taken off in dialysis yesterday and her blood pressures under better control.  Her hydralazine has now been stopped.  She states that hydralazine causes her to be extremely sensitive to light and when she takes that in the Imdur together she just cannot handle it.  Her first dose of terazosin is due tonight.    Pressures currently 150 systolic which is much better than yesterday.    Review of Systems:   As above.    Medications:    amiodarone 200 mg Oral Daily   aspirin 81 mg Oral Daily   atorvastatin 80 mg Oral Nightly   carvedilol 25 mg Oral Q12H   CloNIDine 0.1 mg Oral BID   clopidogrel 75 mg Oral Daily   dorzolamide-timolol 1 drop Both Eyes Daily   epoetin orquidea 4,000 Units Subcutaneous Once per day on Mon Wed Fri   fluticasone 2 spray Nasal Daily   heparin (porcine) 5,000 Units Subcutaneous Q12H   insulin lispro 0-7 Units Subcutaneous TID With Meals   isosorbide mononitrate 60 mg Oral Daily   latanoprost 1 drop Both Eyes Nightly   lisinopril 20 mg Oral Q12H   sodium chloride 3 mL Intravenous Q12H   terazosin 1 mg Oral Nightly       Objective     Vital Sign Min/Max for last 24 hours  Temp  Min: 97.1 °F (36.2 °C)  Max: 98.5 °F (36.9 °C)   BP  Min: 91/50  Max: 182/84   Pulse  Min: 50  Max: 63   Resp  Min: 14  Max: 20   SpO2  Min: 93 %  Max: 97 %   No Data Recorded   No Data Recorded      Intake/Output Summary (Last 24 hours) at 4/20/2019 1124  Last data filed at 4/19/2019 1306  Gross per 24 hour   Intake --   Output 2800 ml   Net -2800 ml        Flowsheet Rows      First Filed Value   Admission Height  170.2 cm (67\") Documented at 04/18/2019 1902   Admission Weight  89.8 kg (198 lb) Documented at " 04/18/2019 1902          Physical Exam:    General: Alert and oriented.   Cardiovascular: Heart has a nondisplaced focal PMI. Regular rate and rhythm with 2/6 SE murmur, no gallop or rub.  Lungs: Clear without rales or wheezes. Equal expansion is noted.   Abdomen: Soft, nontender.  Extremities: Show no edema.   Skin: warm and dry.     Results Review:    I reviewed the patient's new clinical results.  EKG:  Tele: Normal sinus rhythm    Labs:    Results from last 7 days   Lab Units 04/20/19  0409 04/19/19  0611 04/18/19 1945   SODIUM mmol/L 135* 139 137   POTASSIUM mmol/L 4.2 3.8 3.6   CHLORIDE mmol/L 98 97* 95*   CO2 mmol/L 24.0 26.0 29.0   BUN mg/dL 23 29* 27*   CREATININE mg/dL 4.20* 5.24* 4.61*   CALCIUM mg/dL 8.7 9.5 9.6   BILIRUBIN mg/dL  --   --  0.4   ALK PHOS U/L  --   --  105   ALT (SGPT) U/L  --   --  5   AST (SGOT) U/L  --   --  11   GLUCOSE mg/dL 158* 118* 126*     Results from last 7 days   Lab Units 04/19/19  0401 04/18/19 1945   WBC 10*3/mm3 9.26 7.70   HEMOGLOBIN g/dL 9.3* 9.0*   HEMATOCRIT % 33.6* 32.5*   PLATELETS 10*3/mm3 337 314     Lab Results   Component Value Date    TROPONINI 1.144 (C) 10/10/2016    TROPONINI 1.664 (C) 10/09/2016    TROPONINI 2.595 (C) 10/09/2016    TROPONINT 0.026 04/19/2019    TROPONINT 0.037 (C) 04/19/2019    TROPONINT 0.042 (C) 04/18/2019     Lab Results   Component Value Date    CHOL 261 (H) 10/11/2016    CHOL 293 (H) 10/10/2016     Lab Results   Component Value Date    TRIG 97 12/22/2017    TRIG 134 03/21/2017    TRIG 171 (H) 10/11/2016     Lab Results   Component Value Date    HDL 54 12/22/2017    HDL 60 03/21/2017    HDL 46 10/11/2016     No components found for: LDLCALC  Lab Results   Component Value Date    INR 0.92 10/09/2016    INR 1.01 11/08/2015    INR 0.92 04/29/2015    PROTIME 10.0 10/09/2016    PROTIME 10.6 11/08/2015    PROTIME 9.6 04/29/2015         Ejection Fraction: EF 65% 8/23/2017 by echo    Assessment   Assessment:    1. Hypertension  1. cardene gtt  recently turned off  2. Hydralazine discontinued secondary to side effects  2. CAD  1. Mildly elevated troponins in setting of hypertensive urgency  2. Last PCI 10/2016 to distal RCA  3. Chronic diastolic heart failure  4. Hyperlipidemia  5. Diabetes mellitus; type II  6. CKD on dialysis  7. Mild aortic stenosis by echo 8/23/2017      Plan:    Continue current medications.  I think the addition of 1 or 2 mg of terazosin in the evenings will very nicely control her blood pressure.  The first dose is scheduled at 2100 this evening    Marielena Richmond MD  04/20/19  11:24 AM

## 2019-04-21 VITALS
WEIGHT: 190.8 LBS | TEMPERATURE: 98.1 F | RESPIRATION RATE: 16 BRPM | SYSTOLIC BLOOD PRESSURE: 137 MMHG | OXYGEN SATURATION: 93 % | BODY MASS INDEX: 29.95 KG/M2 | DIASTOLIC BLOOD PRESSURE: 87 MMHG | HEIGHT: 67 IN | HEART RATE: 50 BPM

## 2019-04-21 PROBLEM — I16.0 HYPERTENSIVE URGENCY: Status: RESOLVED | Noted: 2019-04-19 | Resolved: 2019-04-21

## 2019-04-21 LAB
GLUCOSE BLDC GLUCOMTR-MCNC: 239 MG/DL (ref 70–130)
GLUCOSE BLDC GLUCOMTR-MCNC: 326 MG/DL (ref 70–130)

## 2019-04-21 PROCEDURE — 99217 PR OBSERVATION CARE DISCHARGE MANAGEMENT: CPT | Performed by: NURSE PRACTITIONER

## 2019-04-21 PROCEDURE — 25010000002 HEPARIN (PORCINE) PER 1000 UNITS: Performed by: NURSE PRACTITIONER

## 2019-04-21 PROCEDURE — 82962 GLUCOSE BLOOD TEST: CPT

## 2019-04-21 PROCEDURE — G0378 HOSPITAL OBSERVATION PER HR: HCPCS

## 2019-04-21 PROCEDURE — 96372 THER/PROPH/DIAG INJ SC/IM: CPT

## 2019-04-21 RX ORDER — LISINOPRIL 20 MG/1
20 TABLET ORAL EVERY 12 HOURS
Qty: 60 TABLET | Refills: 0 | Status: SHIPPED | OUTPATIENT
Start: 2019-04-21 | End: 2019-10-03 | Stop reason: SDUPTHER

## 2019-04-21 RX ORDER — CLONIDINE HYDROCHLORIDE 0.1 MG/1
0.1 TABLET ORAL 2 TIMES DAILY
Qty: 120 TABLET | Refills: 0
Start: 2019-04-21 | End: 2019-06-04 | Stop reason: SDUPTHER

## 2019-04-21 RX ORDER — TERAZOSIN 1 MG/1
1 CAPSULE ORAL NIGHTLY
Qty: 30 CAPSULE | Refills: 0 | Status: SHIPPED | OUTPATIENT
Start: 2019-04-21 | End: 2019-05-24 | Stop reason: SDUPTHER

## 2019-04-21 RX ADMIN — ISOSORBIDE MONONITRATE 60 MG: 60 TABLET, EXTENDED RELEASE ORAL at 08:27

## 2019-04-21 RX ADMIN — HEPARIN SODIUM 5000 UNITS: 5000 INJECTION, SOLUTION INTRAVENOUS; SUBCUTANEOUS at 08:27

## 2019-04-21 RX ADMIN — LATANOPROST 1 DROP: 50 SOLUTION OPHTHALMIC at 00:48

## 2019-04-21 RX ADMIN — ACETAMINOPHEN 650 MG: 325 TABLET, FILM COATED ORAL at 10:24

## 2019-04-21 RX ADMIN — DORZOLAMIDE HYDROCHLORIDE AND TIMOLOL MALEATE 1 DROP: 20; 5 SOLUTION/ DROPS OPHTHALMIC at 08:29

## 2019-04-21 RX ADMIN — LISINOPRIL 20 MG: 20 TABLET ORAL at 08:28

## 2019-04-21 RX ADMIN — CLOPIDOGREL BISULFATE 75 MG: 75 TABLET ORAL at 08:28

## 2019-04-21 RX ADMIN — ASPIRIN 81 MG: 81 TABLET, COATED ORAL at 08:29

## 2019-04-21 RX ADMIN — FLUTICASONE PROPIONATE 2 SPRAY: 50 SPRAY, METERED NASAL at 08:29

## 2019-04-21 NOTE — PROGRESS NOTES
"[facility] Daily Note       LOS: 0 days   Patient Care Team:  Meghana Rodgers MD as PCP - General  Meghana Rodgers MD as PCP - Family Medicine    Chief Complaint:  Flat troponin elevation, hypertension uncontrolled      Subjective     Subjective: Feeling well this morning and hopes to go home soon.  BP improved with Terazosin.  I think her current regimen of medications is best for her hypertension.  Does not need hydralazine.  No complaints of chest pain or shortness of breath.      Review of Systems:   As above.    Medications:    amiodarone 200 mg Oral Daily   aspirin 81 mg Oral Daily   atorvastatin 80 mg Oral Nightly   carvedilol 25 mg Oral Q12H   CloNIDine 0.1 mg Oral BID   clopidogrel 75 mg Oral Daily   dorzolamide-timolol 1 drop Both Eyes Daily   epoetin orquidea 4,000 Units Subcutaneous Once per day on Mon Wed Fri   fluticasone 2 spray Nasal Daily   heparin (porcine) 5,000 Units Subcutaneous Q12H   insulin lispro 0-7 Units Subcutaneous TID With Meals   isosorbide mononitrate 60 mg Oral Daily   latanoprost 1 drop Both Eyes Nightly   lisinopril 20 mg Oral Q12H   sodium chloride 3 mL Intravenous Q12H   terazosin 1 mg Oral Nightly       Objective     Vital Sign Min/Max for last 24 hours  Temp  Min: 98.1 °F (36.7 °C)  Max: 98.7 °F (37.1 °C)   BP  Min: 123/60  Max: 156/65   Pulse  Min: 49  Max: 58   Resp  Min: 16  Max: 18   SpO2  Min: 92 %  Max: 97 %   No Data Recorded   Weight  Min: 86.5 kg (190 lb 12.8 oz)  Max: 86.5 kg (190 lb 12.8 oz)    No intake or output data in the 24 hours ending 04/21/19 0927     Flowsheet Rows      First Filed Value   Admission Height  170.2 cm (67\") Documented at 04/18/2019 1902   Admission Weight  89.8 kg (198 lb) Documented at 04/18/2019 1902          Physical Exam:    GENERAL: well-developed, well-nourished; in no acute distress.   NECK: Carotid upstrokes are 2+ and  symmetrical without bruits.   LUNGS: clear to auscultation bilaterally without wheezing, rhonchi, or rales " noted.   CARDIOVASCULAR: The heart has a regular rate with a normal S1 and S2. There is no murmur, gallop, rub, or click appreciated. The PMI is nondisplaced.   NEUROLOGICAL: Nonfocal; Alert and oriented  PERIPHERAL VASCULAR:  Posterior tibial pulses are 2+ and symmetrical. There is no peripheral edema.   SKIN:  Warm and dry  PSYCHIATRIC: normal mood and affect; behavior appropriate     Results Review:    I reviewed the patient's new clinical results.    Tele:  Sinus hoang @ 57    Labs:    Results from last 7 days   Lab Units 04/20/19  0409 04/19/19  0611 04/18/19 1945   SODIUM mmol/L 135* 139 137   POTASSIUM mmol/L 4.2 3.8 3.6   CHLORIDE mmol/L 98 97* 95*   CO2 mmol/L 24.0 26.0 29.0   BUN mg/dL 23 29* 27*   CREATININE mg/dL 4.20* 5.24* 4.61*   CALCIUM mg/dL 8.7 9.5 9.6   BILIRUBIN mg/dL  --   --  0.4   ALK PHOS U/L  --   --  105   ALT (SGPT) U/L  --   --  5   AST (SGOT) U/L  --   --  11   GLUCOSE mg/dL 158* 118* 126*     Results from last 7 days   Lab Units 04/19/19  0401 04/18/19 1945   WBC 10*3/mm3 9.26 7.70   HEMOGLOBIN g/dL 9.3* 9.0*   HEMATOCRIT % 33.6* 32.5*   PLATELETS 10*3/mm3 337 314     Lab Results   Component Value Date    TROPONINI 1.144 (C) 10/10/2016    TROPONINI 1.664 (C) 10/09/2016    TROPONINI 2.595 (C) 10/09/2016    TROPONINT 0.026 04/19/2019    TROPONINT 0.037 (C) 04/19/2019    TROPONINT 0.042 (C) 04/18/2019     Lab Results   Component Value Date    CHOL 261 (H) 10/11/2016    CHOL 293 (H) 10/10/2016     Lab Results   Component Value Date    TRIG 97 12/22/2017    TRIG 134 03/21/2017    TRIG 171 (H) 10/11/2016     Lab Results   Component Value Date    HDL 54 12/22/2017    HDL 60 03/21/2017    HDL 46 10/11/2016     No components found for: LDLCALC  Lab Results   Component Value Date    INR 0.92 10/09/2016    INR 1.01 11/08/2015    INR 0.92 04/29/2015    PROTIME 10.0 10/09/2016    PROTIME 10.6 11/08/2015    PROTIME 9.6 04/29/2015         Ejection Fraction:  EF 65% 8/23/2017 by echo      Assessment    Assessment:  1. Hypertension  1. cardene gtt recently turned off  2. Hydralazine discontinued secondary to side effects  2. CAD  1. Mildly elevated troponins in setting of hypertensive urgency  2. Last PCI 10/2016 to distal RCA  3. Chronic diastolic heart failure  4. Hyperlipidemia  5. Diabetes mellitus; type II  6. CKD on dialysis  7. Mild aortic stenosis by echo 8/23/2017      Plan:  I think her current medication regimen is best for her hypertension control.  Will dc home hydralazine and add terazosin 1mg qhs.    OK to dc home from cardiac standpoint.   Follow up with Dr crenshaw as previously scheduled in August.  Can call our office if BP issues resurface.        Fabiola Morgan, APRN  04/21/19  9:27 AM

## 2019-04-21 NOTE — DISCHARGE SUMMARY
[facility] Medicine Services  DISCHARGE SUMMARY    Patient Name: Esperanza Pop  : 1945  MRN: [id number]    Date of Admission: 2019  Date of Discharge: 2019  Primary Care Physician: Meghana Rodgers MD    Consults     Date and Time Order Name Status Description    2019 0602 Inpatient Nephrology Consult Completed     2019 0602 Inpatient Cardiology Consult Completed           Hospital Course     Presenting Problem:   Hypertensive urgency [I16.0]    Active Hospital Problems    Diagnosis  POA   • Elevated troponin [R74.8]  Yes   • CHF (congestive heart failure) (CMS/[facility]) [I50.9]  Yes   • Diabetes mellitus (CMS/[facility]) [E11.9]  Yes   • Dyslipidemia [E78.5]  Yes   • Ischemic heart disease [I25.9]  Yes   • End stage renal disease on dialysis (CMS/[facility]) [N18.6, Z99.2]  Not Applicable   • Coronary artery disease involving native coronary artery of native heart with angina pectoris (CMS/[facility]) [I25.119]  Yes   • Essential hypertension [I10]  Yes      Resolved Hospital Problems    Diagnosis Date Resolved POA   • **Hypertensive urgency [I16.0] 2019 Yes          Hospital Course:  Esperanza Pop is a 74 y.o. female with past medical history of end-stage renal disease on hemodialysis , hypertension presents with accelerated hypertension on 2019 prompting admission and further evaluation per cardiology.  Noted slight elevation in troponin in the setting of hypertensive urgency.  Medications were adjusted this admission.  Patient was having side effects to routine hydralazine which was stopped this admission.  Terra Zosyn was added and lisinopril changed to twice daily.  Blood pressure has remained stable with these changes.  She has received her regularly scheduled dialysis and will continue as scheduled every Monday, Wednesday, Friday.      Discharge Follow Up Recommendations for labs/diagnostics:  Follow-up primary care in 1 week with repeat blood  pressure evaluation  Keep scheduled appointment with Dr. Richmond in August    Day of Discharge     HPI:   Patient sitting on bedside states she feels very well today.  Denies pain, shortness of breath, nausea, lightheadedness or dizziness.  Patient states she rested well and denies intake or voiding issue.  Wants to go home.    Review of Systems  Gen- No fevers, chills  CV- No chest pain, palpitations  Resp- No cough, dyspnea  GI- No N/V/D, abd pain      Otherwise ROS is negative except as mentioned in the HPI.    Vital Signs:   Temp:  [98.1 °F (36.7 °C)-98.7 °F (37.1 °C)] 98.1 °F (36.7 °C)  Heart Rate:  [49-57] 53  Resp:  [16] 16  BP: (123-156)/(56-87) 137/87     Physical Exam:  Constitutional: No acute distress, awake, alert  HENT: NCAT, mucous membranes moist  Respiratory: Clear to auscultation bilaterally, respiratory effort normal   Cardiovascular: RRR, no murmurs, rubs, or gallops, palpable pedal pulses bilaterally  Gastrointestinal: Positive bowel sounds, soft, nontender, nondistended  Musculoskeletal: No bilateral ankle edema  Psychiatric: Appropriate affect, cooperative  Neurologic: Oriented x 3, strength symmetric in all extremities, Cranial Nerves grossly intact to confrontation, speech clear  Skin: No rashes      Pertinent  and/or Most Recent Results     Results from last 7 days   Lab Units 04/20/19  0409 04/19/19  0611 04/19/19  0401 04/18/19  1945   WBC 10*3/mm3  --   --  9.26 7.70   HEMOGLOBIN g/dL  --   --  9.3* 9.0*   HEMATOCRIT %  --   --  33.6* 32.5*   PLATELETS 10*3/mm3  --   --  337 314   SODIUM mmol/L 135* 139  --  137   POTASSIUM mmol/L 4.2 3.8  --  3.6   CHLORIDE mmol/L 98 97*  --  95*   CO2 mmol/L 24.0 26.0  --  29.0   BUN mg/dL 23 29*  --  27*   CREATININE mg/dL 4.20* 5.24*  --  4.61*   GLUCOSE mg/dL 158* 118*  --  126*   CALCIUM mg/dL 8.7 9.5  --  9.6     Results from last 7 days   Lab Units 04/18/19  1945   BILIRUBIN mg/dL 0.4   ALK PHOS U/L 105   ALT (SGPT) U/L 5   AST (SGOT) U/L  11           Invalid input(s): TG, LDLCALC, LDLREALC  Results from last 7 days   Lab Units 04/19/19  0401 04/18/19 1958   HEMOGLOBIN A1C % 8.60*  --    TROPONIN I ng/mL  --  0.01       Brief Urine Lab Results     None          Microbiology Results Abnormal     None          Imaging Results (all)     Procedure Component Value Units Date/Time    MRI Brain Without Contrast [[id number]] Collected:  04/18/19 2300     Updated:  04/18/19 2302    Narrative:       MRI Brain WO    INDICATION:   74-year-old female with photosensitivity headache and hypertension. Unsteadiness today.    TECHNIQUE:   MRI of the brain without contrast.    COMPARISON:    Correlation made with head CT same date.    FINDINGS:  Diffusion-weighted images demonstrate no evidence of recent stroke. No midline shift. Midline structures of the brain intact. No tonsillar ectopia. There is no mass mass effect or edema. No distinct extra-axial fluid collection. No hydrocephalus. There  are tiny punctate areas of increased FLAIR and T2 signal within the deep white matter and in the periventricular white matter, technically nonspecific, but favored to represent sequela of chronic ischemic microvascular changes. The globes are intact and  the sinuses are clear. Flow voids are present in the major vessels at the base of the brain.      Impression:         1. No evidence of recent stroke on diffusion-weighted images.  2. No clearly acute intracranial process. Signal changes in the periventricular and deep white matter that most likely reflect sequela of chronic ischemic small vessel disease.    Signer Name: Kevin Wilkins MD   Signed: 4/18/2019 11:00 PM   Workstation Name: [email or web address]       CT Head Without Contrast [[id number]] Collected:  04/18/19 2206     Updated:  04/18/19 2208    Narrative:       CT Head WO    HISTORY:   74-year-old female with hypertension left-sided occipital headache. Gait disturbance. Sensitivity to light 2-3 weeks.    TECHNIQUE:   Axial  unenhanced head CT. Radiation dose reduction techniques included automated exposure control or exposure modulation based on body size.     Time of scan: 2157    COUNT OF KNOWN CT AND CARDIAC NUC MED STUDIES PERFORMED IN PREVIOUS 12 MONTHS: 0.      COMPARISON:   None.    FINDINGS:   No intracranial hemorrhage, mass, or infarct. No hydrocephalus or extra-axial fluid collection. Brain parenchymal density is normal. The skull base, calvarium, and extracranial soft tissues are normal. Incidental physiologic basal ganglia calcifications.      Impression:       Normal, negative unenhanced head CT.          Signer Name: Kevin Wilkins MD   Signed: 4/18/2019 10:06 PM   Workstation Name: [email or web address]       XR Chest 1 View [[id number]] Collected:  04/18/19 2113     Updated:  04/18/19 2115    Narrative:       CHEST X-RAY, 4/18/2019      HISTORY:    74-year-old female in the ED complaining of chest pain, elevated blood pressure and dizziness today.      TECHNIQUE:  AP upright chest x-ray.    COMPARISON:  *  Chest x-ray, 2/12/2019, 1/22/2019 and 4/26/2018.    FINDINGS:  The heart is moderately enlarged. Central pulmonary arteries are also prominent.    Mildly increased diffuse interstitial markings when compared with prior studies. Correlate for borderline vascular congestion or diffuse interstitial pneumonitis. There is no airspace consolidation or pleural effusion.      Impression:       1. Moderate cardiomegaly with central pulmonary artery enlargement.  2. Mildly prominent interstitial markings when compared with prior studies. This may be an artifact of shallow lung expansion, but borderline vascular congestion should be excluded clinically.    Signer Name: Ramón Rodriguez MD   Signed: 4/18/2019 9:13 PM   Workstation Name: [email or web address]             Results for orders placed during the hospital encounter of 04/26/15   Ultrasound renal artery stenosis hypertension complete    Narrative EXAMINATION: VL RENAL DUPLEX SCAN  COMPLETE-      INDICATION: elevated creatinine , congestive heart failure, shortness of  breath, anemia, acute on chronic renal insufficiency.       COMPARISON: 12/28/2013     FINDINGS:   1. The right kidney measures 12 cm. The parenchymal complement is  normal. The right kidney is smooth.     The right renal arterial velocity is 167 cm/sec.  The aortic velocity is  90 cm/sec. Renal to aortic ratio is 1.86, well within normal limits.     The resistive index analysis through the right kidney demonstrates  average values of 0.93, markedly abnormal.     Renal hilar veins are patent.     2. The left kidney measures 12 cm in axial length and is also smooth  with normal parenchymal complement. There is no mass or obstruction.     Left renal arterial velocity is 175 cm/sec. Renal to aortic ratio on the  left is 1.94. The resistive index analysis through the left kidney  averages 0.87, significantly abnormal. The renal hilar veins on the left  are patent.         IMPRESSION- 1.  No evidence of renal vascular stenosis to indicate renal  vascular hypertension. Renal artery stenosis is not identified by  velocities or renal to aortic ratio on either side. The renal to aortic  ratios are well within normal limits.  2.  On the other hand, however, there is marked increased peripheral  small vessel vascular resistance throughout the parenchyma with elevated  resistive index analysis bilaterally, worse on the right than left but  noted significantly bilaterally.  3.  Otherwise, there is no atrophy, mass, hydronephrotic obstruction or  focal renal mass and the renal hilar veins are patent.  4.  Lastly, and incidentally, the velocities in both SMA and celiac are  elevated indicating likely visceral vessel stenosis. SMA average  velocity is 323 cm/sec. The celiac artery average velocity is 349  cm/sec.     Please note the positive findings.        DT:  04/30/2015  DE:  04/30/2015             Reading Radiologist- DORIAN LOTT        Releasing Radiologist- DORIAN LOTT       Released Date Time- 04/30/15 0922       - ALEENA   ------------------------------------------------------------------------------       Results for orders placed during the hospital encounter of 04/26/15   Ultrasound renal artery stenosis hypertension complete    Narrative EXAMINATION: VL RENAL DUPLEX SCAN COMPLETE-      INDICATION: elevated creatinine , congestive heart failure, shortness of  breath, anemia, acute on chronic renal insufficiency.       COMPARISON: 12/28/2013     FINDINGS:   1. The right kidney measures 12 cm. The parenchymal complement is  normal. The right kidney is smooth.     The right renal arterial velocity is 167 cm/sec.  The aortic velocity is  90 cm/sec. Renal to aortic ratio is 1.86, well within normal limits.     The resistive index analysis through the right kidney demonstrates  average values of 0.93, markedly abnormal.     Renal hilar veins are patent.     2. The left kidney measures 12 cm in axial length and is also smooth  with normal parenchymal complement. There is no mass or obstruction.     Left renal arterial velocity is 175 cm/sec. Renal to aortic ratio on the  left is 1.94. The resistive index analysis through the left kidney  averages 0.87, significantly abnormal. The renal hilar veins on the left  are patent.         IMPRESSION- 1.  No evidence of renal vascular stenosis to indicate renal  vascular hypertension. Renal artery stenosis is not identified by  velocities or renal to aortic ratio on either side. The renal to aortic  ratios are well within normal limits.  2.  On the other hand, however, there is marked increased peripheral  small vessel vascular resistance throughout the parenchyma with elevated  resistive index analysis bilaterally, worse on the right than left but  noted significantly bilaterally.  3.  Otherwise, there is no atrophy, mass, hydronephrotic obstruction or  focal renal mass and the  renal hilar veins are patent.  4.  Lastly, and incidentally, the velocities in both SMA and celiac are  elevated indicating likely visceral vessel stenosis. SMA average  velocity is 323 cm/sec. The celiac artery average velocity is 349  cm/sec.     Please note the positive findings.        DT:  04/30/2015  DE:  04/30/2015             Reading Radiologist- DORIAN LOTT       Releasing Radiologist- DORIAN LOTT       Released Date Time- 04/30/15 0922       - [email or web address].   ------------------------------------------------------------------------------       Results for orders placed during the hospital encounter of 08/23/17   Adult Transthoracic Echo Complete    Narrative · Left ventricular wall thickness is consistent with moderate concentric   hypertrophy.  · Left atrial cavity size is mildly dilated.  · Mild aortic valve stenosis is present.  · Mild mitral valve regurgitation is present.  · Mild tricuspid valve regurgitation is present.  · Left ventricular systolic function is normal. Estimated EF = 65%.  · Left ventricular diastolic dysfunction (grade I) consistent with   impaired relaxation.  · There is no evidence of pericardial effusion.  · No evidence of pulmonary hypertension is present.  · Normal right ventricular cavity size, wall thickness, systolic function   and septal motion noted.            Discharge Details        Discharge Medications      New Medications      Instructions Start Date   terazosin 1 MG capsule  Commonly known as:  HYTRIN   1 mg, Oral, Nightly         Changes to Medications      Instructions Start Date   aspirin 81 MG EC tablet  What changed:  when to take this   81 mg, Oral, Daily      CloNIDine 0.1 MG tablet  Commonly known as:  CATAPRES  What changed:  how much to take   0.1 mg, Oral, 2 Times Daily      lisinopril 20 MG tablet  Commonly known as:  PRINIVIL,ZESTRIL  What changed:  when to take this   20 mg, Oral, Every 12 Hours         Continue These Medications       Instructions Start Date   ACCU-CHEK NADER PLUS w/Device kit   DX: E11.65      ACCU-CHEK SOFTCLIX LANCET DEV kit   TEST 1-3 TIMES DAILY DX:E11.65      amiodarone 200 MG tablet  Commonly known as:  PACERONE   200 mg, Oral, Daily      atorvastatin 80 MG tablet  Commonly known as:  LIPITOR   80 mg, Oral, Nightly      carvedilol 25 MG tablet  Commonly known as:  COREG   25 mg, Oral, Every 12 Hours Scheduled      clopidogrel 75 MG tablet  Commonly known as:  PLAVIX   TAKE 1 TABLET BY MOUTH  DAILY (PATIENT NEEDS TO MAKE FOLLOW UP APPT FOR REFILLS)      dorzolamide-timolol 22.3-6.8 MG/ML ophthalmic solution  Commonly known as:  COSOPT   1 drop, Both Eyes, Daily      Ferric Citrate 1  MG(Fe) tablet   1 tablet, Oral, As Needed      fluticasone 50 MCG/ACT nasal spray  Commonly known as:  FLONASE   2 sprays, Nasal, Daily      glucose blood test strip  Commonly known as:  ACCU-CHEK NADER PLUS   Use as instructed 1 TO 3 TIMES DAILY  DX: E11.65      insulin NPH-insulin regular (70-30) 100 UNIT/ML injection  Commonly known as:  humuLIN 70/30,novoLIN 70/30   Inject 15 units before dinner and 10 Units before breakfast.      IRON DEXTRAN IJ   Injection, 3 Times Weekly      isosorbide mononitrate 60 MG 24 hr tablet  Commonly known as:  IMDUR   60 mg, Oral, Daily      latanoprost 0.005 % ophthalmic solution  Commonly known as:  XALATAN   1 drop, Both Eyes, Nightly      nitroglycerin 0.4 MG/SPRAY spray  Commonly known as:  NITROLINGUAL   1 spray, Sublingual, Every 5 Minutes PRN      torsemide 100 MG tablet  Commonly known as:  DEMADEX   100 mg, Oral, Daily PRN      Vitamin D3 2000 units tablet   Oral, 3 Times Weekly         Stop These Medications    amLODIPine 10 MG tablet  Commonly known as:  NORVASC     hydrALAZINE 50 MG tablet  Commonly known as:  APRESOLINE            Allergies   Allergen Reactions   • Albuterol      Increased blood pressure  Used right before heart attack   • Mircera [Methoxy Polyethylene Glycol-Epoetin  Beta]      Pt stopped breathing   • Penicillins Hives   • Prednisone      Makes jittery/anxious   • Venofer [Iron Sucrose]      Pt stopped breathing         Discharge Disposition:  Home or Self Care    Discharge Diet:  Diet Order   Procedures   • Diet Regular; Consistent Carbohydrate, Cardiac, Renal         Discharge Activity:   Activity Instructions     Activity as Tolerated              CODE STATUS:    Code Status and Medical Interventions:   Ordered at: 04/19/19 0315     Level Of Support Discussed With:    Patient     Code Status:    CPR     Medical Interventions (Level of Support Prior to Arrest):    Full         Future Appointments   Date Time Provider Department Center   4/22/2019  7:00 AM [facility]   6/4/2019 10:30 AM Meghana Rodgers MD [facility] None   8/28/2019  9:45 AM Demetrius Sagastume MD [facility] None       Additional Instructions for the Follow-ups that You Need to Schedule     Discharge Follow-up with PCP   As directed       Currently Documented PCP:    Meghana Rodgers MD    PCP Phone Number:    [phone]     Follow Up Details:  1 week         Discharge Follow-up with Specialty: Continue hemodialysis as scheduled Monday, Wednesday, Friday   As directed      Specialty:  Continue hemodialysis as scheduled Monday, Wednesday, Friday         Discharge Follow-up with Specified Provider: cardiology as scheduled in August   As directed      To:  cardiology as scheduled in August               Time Spent on Discharge:  35 minutes    Electronically signed by WILMA Santa, 04/21/19, 11:21 AM.

## 2019-04-22 ENCOUNTER — TRANSITIONAL CARE MANAGEMENT TELEPHONE ENCOUNTER (OUTPATIENT)
Dept: INTERNAL MEDICINE | Facility: CLINIC | Age: 74
End: 2019-04-22

## 2019-04-22 ENCOUNTER — READMISSION MANAGEMENT (OUTPATIENT)
Dept: CALL CENTER | Facility: HOSPITAL | Age: 74
End: 2019-04-22

## 2019-04-22 NOTE — OUTREACH NOTE
Prep Survey      Responses   Facility patient discharged from?  [facility]   Is patient eligible?  Yes   Discharge diagnosis  Elevated troponin, CHF, DM Dyslipidemia, Ischemic HD, ESRD on DIalysis, CAD with angina pectoris, essential HTN, hypertensive urgency   Does the patient have one of the following disease processes/diagnoses(primary or secondary)?  Other [Chronic CHF]   Does the patient have Home health ordered?  No   Is there a DME ordered?  No   Comments regarding appointments  See AVS   General alerts for this patient  [facility] at [place] for HD MWF   Prep survey completed?  Yes          Susan Emery RN

## 2019-04-23 NOTE — OUTREACH NOTE
"ANTOINETTE call completed. Please see flow sheet for additional details.  Pt says she's \"doing fine\" today.  Systolic BP ~150mmHg yesterday @ HD.  BP today at home: 170/75. Is dialyzing M-W-F. She confirms she is taking hytrin, clonidine & lisinopril as ordered at DC; states she has DC'd norvasc & apresoline as ordered. Denies needs/questions/new sx at this time. She confirms ANTOINETTE appt 4/30/19, per Le Garcia.  "

## 2019-04-24 ENCOUNTER — READMISSION MANAGEMENT (OUTPATIENT)
Dept: CALL CENTER | Facility: HOSPITAL | Age: 74
End: 2019-04-24

## 2019-04-24 NOTE — OUTREACH NOTE
Medical Week 1 Survey      Responses   Facility patient discharged from?  [facility]   Does the patient have one of the following disease processes/diagnoses(primary or secondary)?  Other   Is there a successful TCM telephone encounter documented?  No   Week 1 attempt successful?  Yes   Call start time  1158   Call end time  1200   Discharge diagnosis  Elevated troponin, CHF, DM Dyslipidemia, Ischemic HD, ESRD on DIalysis, CAD with angina pectoris, essential HTN, hypertensive urgency   Person spoke with today (if not patient) and relationship  , Juan Bates reviewed with patient/caregiver?  Yes   Is the patient having any side effects they believe may be caused by any medication additions or changes?  No   Does the patient have all medications ordered at discharge?  Yes   Is the patient taking all medications as directed (includes completed medication regime)?  Yes   Comments regarding appointments  HD M, W, F   Does the patient have a primary care provider?   Yes   Does the patient have an appointment with their PCP within 7 days of discharge?  Yes   Has the patient kept scheduled appointments due by today?  N/A   Has home health visited the patient within 72 hours of discharge?  N/A   Psychosocial issues?  No   Did the patient receive a copy of their discharge instructions?  Yes   Nursing interventions  Reviewed instructions with patient   What is the patient's perception of their health status since discharge?  Improving   Is the patient/caregiver able to teach back signs and symptoms related to disease process for when to call PCP?  Yes   Is the patient/caregiver able to teach back signs and symptoms related to disease process for when to call 911?  Yes   Is the patient/caregiver able to teach back the hierarchy of who to call/visit for symptoms/problems? PCP, Specialist, Home health nurse, Urgent Care, ED, 911  Yes   Week 1 call completed?  Yes          Sanjuanita Milian RN

## 2019-05-02 ENCOUNTER — READMISSION MANAGEMENT (OUTPATIENT)
Dept: CALL CENTER | Facility: HOSPITAL | Age: 74
End: 2019-05-02

## 2019-05-02 NOTE — OUTREACH NOTE
Medical Week 2 Survey      Responses   Facility patient discharged from?  [facility]   Does the patient have one of the following disease processes/diagnoses(primary or secondary)?  Other   Week 2 attempt successful?  No   Unsuccessful attempts  Attempt 1          Grace Stone RN

## 2019-05-03 ENCOUNTER — READMISSION MANAGEMENT (OUTPATIENT)
Dept: CALL CENTER | Facility: HOSPITAL | Age: 74
End: 2019-05-03

## 2019-05-03 NOTE — OUTREACH NOTE
Medical Week 2 Survey      Responses   Facility patient discharged from?  [facility]   Does the patient have one of the following disease processes/diagnoses(primary or secondary)?  Other   Week 2 attempt successful?  Yes   Call start time  1522   Discharge diagnosis  Elevated troponin, CHF, DM Dyslipidemia, Ischemic HD, ESRD on DIalysis, CAD with angina pectoris, essential HTN, hypertensive urgency   Call end time  1523   Is patient permission given to speak with other caregiver?  Yes   Person spoke with today (if not patient) and relationship  , Juan Bates reviewed with patient/caregiver?  Yes   Is the patient having any side effects they believe may be caused by any medication additions or changes?  No   Does the patient have all medications ordered at discharge?  Yes   Is the patient taking all medications as directed (includes completed medication regime)?  Yes   Comments regarding appointments  HD M, W, F   Does the patient have a primary care provider?   Yes   Does the patient have an appointment with their PCP within 7 days of discharge?  Yes   Has the patient kept scheduled appointments due by today?  Yes   Comments  No PCP appt yet.   Has home health visited the patient within 72 hours of discharge?  N/A   Psychosocial issues?  No   Did the patient receive a copy of their discharge instructions?  Yes   Nursing interventions  Reviewed instructions with patient   What is the patient's perception of their health status since discharge?  Improving   Is the patient/caregiver able to teach back signs and symptoms related to disease process for when to call PCP?  Yes   Is the patient/caregiver able to teach back signs and symptoms related to disease process for when to call 911?  Yes   Is the patient/caregiver able to teach back the hierarchy of who to call/visit for symptoms/problems? PCP, Specialist, Home health nurse, Urgent Care, ED, 911  Yes   Additional teach back comments  Encouraged daily  weights.  He says she is doing fine and has no questions.   Week 2 Call Completed?  Yes          Esther Figueroa RN

## 2019-05-10 ENCOUNTER — READMISSION MANAGEMENT (OUTPATIENT)
Dept: CALL CENTER | Facility: HOSPITAL | Age: 74
End: 2019-05-10

## 2019-05-10 NOTE — OUTREACH NOTE
Medical Week 3 Survey      Responses   Facility patient discharged from?  [facility]   Does the patient have one of the following disease processes/diagnoses(primary or secondary)?  Other   Week 3 attempt successful?  No   Unsuccessful attempts  Attempt 1          Grace Stone RN

## 2019-05-23 DIAGNOSIS — I50.32 CHRONIC DIASTOLIC CONGESTIVE HEART FAILURE (HCC): ICD-10-CM

## 2019-05-24 RX ORDER — TERAZOSIN 1 MG/1
1 CAPSULE ORAL NIGHTLY
Qty: 30 CAPSULE | Refills: 2 | Status: SHIPPED | OUTPATIENT
Start: 2019-05-24 | End: 2019-08-28 | Stop reason: DRUGHIGH

## 2019-05-24 RX ORDER — ISOSORBIDE MONONITRATE 60 MG/1
60 TABLET, EXTENDED RELEASE ORAL DAILY
Qty: 90 TABLET | Refills: 1 | Status: SHIPPED | OUTPATIENT
Start: 2019-05-24 | End: 2019-08-28 | Stop reason: DRUGHIGH

## 2019-05-24 NOTE — TELEPHONE ENCOUNTER
PT DID NOT NOTICE THAT RX (TERAZOSIN) HAD AN EXPERATION DATE FOR REFILL. SHE WOULD LIKE TO HAVE A REFILL MADE ASAP SINCE SHE ONLY HAS TWO LEFT. HER # [phone] PT IS GOING TO DIALYSIS FOR THE DAY SO SHE MIGHT NOT ANSWER. SHE SAID THAT SHE WOULD PREFER IF SOMEONE WOULD JUST CALL IN THE RX TO HER PHARMACY.

## 2019-05-28 ENCOUNTER — TELEPHONE (OUTPATIENT)
Dept: CARDIOLOGY | Facility: CLINIC | Age: 74
End: 2019-05-28

## 2019-05-28 RX ORDER — AMIODARONE HYDROCHLORIDE 200 MG/1
200 TABLET ORAL DAILY
Qty: 90 TABLET | Refills: 1 | Status: SHIPPED | OUTPATIENT
Start: 2019-05-28 | End: 2019-08-28 | Stop reason: DRUGHIGH

## 2019-05-28 NOTE — TELEPHONE ENCOUNTER
"Patient calls asking if she should restart her amiodarone 200 mg daily. She was seen in the ED on 4/18/19 and her coreg and amiodarone was \"held\" for a HR of 58 but not discontinued for home use. The patient said since they held it there, she did not restart at home. She has been taking her carvedilol 25 mg BID but not the amiodarone. She recently starting feeling skipped beats and says her HR is now in the 60's. I spoke to Dr. Sagastume and he advised to restart amiodarone at 200 mg daily and update us next week. Rx sent to pharmacy.   "

## 2019-06-04 ENCOUNTER — OFFICE VISIT (OUTPATIENT)
Dept: INTERNAL MEDICINE | Facility: CLINIC | Age: 74
End: 2019-06-04

## 2019-06-04 VITALS
BODY MASS INDEX: 29.44 KG/M2 | OXYGEN SATURATION: 99 % | SYSTOLIC BLOOD PRESSURE: 162 MMHG | WEIGHT: 187.6 LBS | HEART RATE: 74 BPM | HEIGHT: 67 IN | DIASTOLIC BLOOD PRESSURE: 70 MMHG

## 2019-06-04 DIAGNOSIS — E11.42 DIABETIC POLYNEUROPATHY ASSOCIATED WITH TYPE 2 DIABETES MELLITUS (HCC): ICD-10-CM

## 2019-06-04 DIAGNOSIS — Z99.2 TYPE 2 DIABETES MELLITUS WITH CHRONIC KIDNEY DISEASE ON CHRONIC DIALYSIS, WITH LONG-TERM CURRENT USE OF INSULIN (HCC): Primary | ICD-10-CM

## 2019-06-04 DIAGNOSIS — L85.3 XEROSIS CUTIS: ICD-10-CM

## 2019-06-04 DIAGNOSIS — N18.6 TYPE 2 DIABETES MELLITUS WITH CHRONIC KIDNEY DISEASE ON CHRONIC DIALYSIS, WITH LONG-TERM CURRENT USE OF INSULIN (HCC): Primary | ICD-10-CM

## 2019-06-04 DIAGNOSIS — M65.341 TRIGGER FINGER, RIGHT RING FINGER: ICD-10-CM

## 2019-06-04 DIAGNOSIS — N76.0 ACUTE VAGINITIS: ICD-10-CM

## 2019-06-04 DIAGNOSIS — E11.22 TYPE 2 DIABETES MELLITUS WITH CHRONIC KIDNEY DISEASE ON CHRONIC DIALYSIS, WITH LONG-TERM CURRENT USE OF INSULIN (HCC): Primary | ICD-10-CM

## 2019-06-04 DIAGNOSIS — I10 UNCONTROLLED HYPERTENSION: ICD-10-CM

## 2019-06-04 DIAGNOSIS — Z79.4 TYPE 2 DIABETES MELLITUS WITH CHRONIC KIDNEY DISEASE ON CHRONIC DIALYSIS, WITH LONG-TERM CURRENT USE OF INSULIN (HCC): Primary | ICD-10-CM

## 2019-06-04 LAB
BILIRUB BLD-MCNC: NEGATIVE MG/DL
CLARITY, POC: CLEAR
COLOR UR: YELLOW
GLUCOSE UR STRIP-MCNC: ABNORMAL MG/DL
HBA1C MFR BLD: 8.9 %
KETONES UR QL: NEGATIVE
LEUKOCYTE EST, POC: NEGATIVE
NITRITE UR-MCNC: NEGATIVE MG/ML
PH UR: 8 [PH] (ref 5–8)
POC CREATININE URINE: ABNORMAL
POC MICROALBUMIN URINE: ABNORMAL
PROT UR STRIP-MCNC: ABNORMAL MG/DL
RBC # UR STRIP: NEGATIVE /UL
SP GR UR: 1.02 (ref 1–1.03)
UROBILINOGEN UR QL: NORMAL

## 2019-06-04 PROCEDURE — 99214 OFFICE O/P EST MOD 30 MIN: CPT | Performed by: INTERNAL MEDICINE

## 2019-06-04 PROCEDURE — 83036 HEMOGLOBIN GLYCOSYLATED A1C: CPT | Performed by: INTERNAL MEDICINE

## 2019-06-04 PROCEDURE — 82044 UR ALBUMIN SEMIQUANTITATIVE: CPT | Performed by: INTERNAL MEDICINE

## 2019-06-04 PROCEDURE — 81003 URINALYSIS AUTO W/O SCOPE: CPT | Performed by: INTERNAL MEDICINE

## 2019-06-04 RX ORDER — CLONIDINE HYDROCHLORIDE 0.1 MG/1
0.1 TABLET ORAL 2 TIMES DAILY
Qty: 180 TABLET | Refills: 1 | Status: SHIPPED | OUTPATIENT
Start: 2019-06-04 | End: 2019-09-10 | Stop reason: SDUPTHER

## 2019-06-04 RX ORDER — CETIRIZINE HYDROCHLORIDE 10 MG/1
1 TABLET ORAL AS NEEDED
Refills: 11 | COMMUNITY
Start: 2019-04-12 | End: 2020-04-21

## 2019-06-04 RX ORDER — AMMONIUM LACTATE 12 G/100G
LOTION TOPICAL AS NEEDED
Qty: 500 G | Refills: 3 | Status: SHIPPED | OUTPATIENT
Start: 2019-06-04 | End: 2023-03-29 | Stop reason: HOSPADM

## 2019-06-04 RX ORDER — FLUCONAZOLE 150 MG/1
150 TABLET ORAL
Qty: 2 TABLET | Refills: 0 | Status: SHIPPED | OUTPATIENT
Start: 2019-06-04 | End: 2019-08-28

## 2019-06-04 RX ORDER — PNV NO.95/FERROUS FUM/FOLIC AC 28MG-0.8MG
1 TABLET ORAL 2 TIMES DAILY
Refills: 5 | COMMUNITY
Start: 2019-05-23 | End: 2022-06-28 | Stop reason: SDUPTHER

## 2019-06-04 NOTE — PROGRESS NOTES
Diabetes; Hypertension; Itching (all over); and Vaginitis    Subjective   Esperanza Pop is a 74 y.o. female is here today for follow-up.    History of Present Illness   Was in the hospital again last month.   Notes skin is very dry.  BP is high, meds started- hytrin,and hydralazine stopped also started back on coreg and amiodarone. Taking clonidine bid.  Would liek rx for diabetic shoes.    Current Outpatient Medications:   •  amiodarone (PACERONE) 200 MG tablet, Take 1 tablet by mouth Daily., Disp: 90 tablet, Rfl: 1  •  aspirin 81 MG EC tablet, Take 1 tablet by mouth Daily. (Patient taking differently: Take 81 mg by mouth Every Other Day.), Disp: , Rfl:   •  atorvastatin (LIPITOR) 80 MG tablet, Take 1 tablet by mouth Every Night., Disp: 90 tablet, Rfl: 1  •  Blood Glucose Monitoring Suppl (ACCU-CHEK NADER PLUS) W/DEVICE kit, DX: E11.65, Disp: 1 kit, Rfl: 0  •  carvedilol (COREG) 25 MG tablet, Take 1 tablet by mouth Every 12 (Twelve) Hours., Disp: 60 tablet, Rfl: 5  •  Cholecalciferol (VITAMIN D3) 2000 UNITS tablet, Take  by mouth 3 (Three) Times a Week., Disp: , Rfl:   •  CloNIDine (CATAPRES) 0.1 MG tablet, Take 1 tablet by mouth 2 (Two) Times a Day., Disp: 180 tablet, Rfl: 1  •  clopidogrel (PLAVIX) 75 MG tablet, TAKE 1 TABLET BY MOUTH  DAILY (PATIENT NEEDS TO MAKE FOLLOW UP APPT FOR REFILLS), Disp: 30 tablet, Rfl: 2  •  dorzolamide-timolol (COSOPT) 22.3-6.8 MG/ML ophthalmic solution, Administer 1 drop to both eyes Daily., Disp: , Rfl:   •  Ferric Citrate 1  MG(Fe) tablet, Take 1 tablet by mouth As Needed., Disp: , Rfl:   •  fluticasone (FLONASE) 50 MCG/ACT nasal spray, 2 sprays into each nostril Daily., Disp: , Rfl:   •  glucose blood (ACCU-CHEK NADER PLUS) test strip, Use as instructed 1 TO 3 TIMES DAILY  DX: E11.65, Disp: 300 each, Rfl: 3  •  insulin NPH-insulin regular (humuLIN 70/30,novoLIN 70/30) (70-30) 100 UNIT/ML injection, Inject 17 units before dinner and 12 Units before breakfast., Disp: 10 mL,  Rfl: 5  •  IRON DEXTRAN IJ, Inject  as directed 3 (Three) Times a Week., Disp: , Rfl:   •  isosorbide mononitrate (IMDUR) 60 MG 24 hr tablet, TAKE 1 TABLET BY MOUTH DAILY, Disp: 90 tablet, Rfl: 1  •  Lancets Misc. (ACCU-CHEK SOFTCLIX LANCET DEV) kit, TEST 1-3 TIMES DAILY DX:E11.65, Disp: 1 kit, Rfl: 0  •  latanoprost (XALATAN) 0.005 % ophthalmic solution, Administer 1 drop to both eyes Every Night., Disp: , Rfl:   •  lisinopril (PRINIVIL,ZESTRIL) 20 MG tablet, Take 1 tablet by mouth Every 12 (Twelve) Hours., Disp: 60 tablet, Rfl: 0  •  nitroglycerin (NITROLINGUAL) 0.4 MG/SPRAY spray, Place 1 spray under the tongue Every 5 (Five) Minutes As Needed for Chest Pain., Disp: 1 each, Rfl: 12  •  terazosin (HYTRIN) 1 MG capsule, Take 1 capsule by mouth Every Night., Disp: 30 capsule, Rfl: 2  •  torsemide (DEMADEX) 100 MG tablet, Take 100 mg by mouth Daily As Needed., Disp: , Rfl:   •  ammonium lactate (LAC-HYDRIN) 12 % lotion, Apply  topically to the appropriate area as directed As Needed for Dry Skin., Disp: 500 g, Rfl: 3  •  cetirizine (zyrTEC) 10 MG tablet, Take 1 tablet by mouth Daily., Disp: , Rfl: 11  •  Ferrous Sulfate (IRON) 325 (65 Fe) MG tablet, Take 1 tablet by mouth 2 (Two) Times a Day., Disp: , Rfl: 5  •  fluconazole (DIFLUCAN) 150 MG tablet, Take 1 tablet by mouth Every 72 (Seventy-Two) Hours. One PO X 1 dose for yeast infection, Disp: 2 tablet, Rfl: 0      The following portions of the patient's history were reviewed and updated as appropriate: allergies, current medications, past family history, past medical history, past social history, past surgical history and problem list.    Review of Systems   Constitutional: Positive for activity change and appetite change. Negative for chills and fever.   HENT: Negative for ear discharge, ear pain, sinus pressure and sore throat.    Respiratory: Positive for shortness of breath. Negative for cough and chest tightness.    Cardiovascular: Negative for chest pain,  "palpitations and leg swelling.   Gastrointestinal: Negative for diarrhea, nausea and vomiting.   Musculoskeletal: Negative for arthralgias, back pain and myalgias.   Neurological: Negative for dizziness, syncope and headaches.   Psychiatric/Behavioral: Negative for confusion and sleep disturbance.       Objective   /70   Pulse 74   Ht 170.2 cm (67\")   Wt 85.1 kg (187 lb 9.6 oz)   LMP  (LMP Unknown)   SpO2 99% Comment: ra  BMI 29.38 kg/m²   Physical Exam   Constitutional: She is oriented to person, place, and time. She appears well-developed and well-nourished.   HENT:   Head: Normocephalic and atraumatic.   Right Ear: External ear normal.   Left Ear: External ear normal.   Mouth/Throat: No oropharyngeal exudate.   Eyes: Conjunctivae are normal. Pupils are equal, round, and reactive to light.   Neck: Neck supple. No thyromegaly present.   Cardiovascular: Normal rate and regular rhythm.   Pulmonary/Chest: Effort normal and breath sounds normal.   Abdominal: Soft. Bowel sounds are normal. She exhibits no distension. There is no tenderness.   Musculoskeletal: She exhibits no edema.    Esperanza had a diabetic foot exam performed today.   During the foot exam she had a monofilament test performed (diminished over toes).  Vascular Status -  Her right foot exhibits abnormal foot vasculature . Her right foot exhibits no edema. Her left foot exhibits abnormal foot vasculature . Her left foot exhibits no edema.  Neurological: She is alert and oriented to person, place, and time. No cranial nerve deficit.   Skin: Skin is warm and dry.   Psychiatric: She has a normal mood and affect. Judgment normal.   Nursing note and vitals reviewed.        Results for orders placed or performed in visit on 06/04/19   POC Glycosylated Hemoglobin (Hb A1C)   Result Value Ref Range    Hemoglobin A1C 8.9 %   POCT microalbumin   Result Value Ref Range    Microalbumin, Urine 150 mg/L     Creatinine, Urine 50 mg/dL    POC Urinalysis Dipstick, " Automated   Result Value Ref Range    Color Yellow Yellow, Straw, Dark Yellow, Yoana    Clarity, UA Clear Clear    Specific Gravity  1.020 1.005 - 1.030    pH, Urine 8.0 5.0 - 8.0    Leukocytes Negative Negative    Nitrite, UA Negative Negative    Protein,  mg/dL (A) Negative mg/dL    Glucose,  mg/dL (A) Negative, 1000 mg/dL (3+) mg/dL    Ketones, UA Negative Negative    Urobilinogen, UA Normal Normal    Bilirubin Negative Negative    Blood, UA Negative Negative             Assessment/Plan   Diagnoses and all orders for this visit:    Type 2 diabetes mellitus with chronic kidney disease on chronic dialysis, with long-term current use of insulin (CMS/[facility])  -     Cancel: POCT Glucose  -     POC Glycosylated Hemoglobin (Hb A1C)  -     POCT microalbumin  -     insulin NPH-insulin regular (humuLIN 70/30,novoLIN 70/30) (70-30) 100 UNIT/ML injection; Inject 17 units before dinner and 12 Units before breakfast.    Uncontrolled hypertension  Comments:  increase clonidine to BID, as taking only prn right now.  Orders:  -     CloNIDine (CATAPRES) 0.1 MG tablet; Take 1 tablet by mouth 2 (Two) Times a Day.    Xerosis cutis  -     ammonium lactate (LAC-HYDRIN) 12 % lotion; Apply  topically to the appropriate area as directed As Needed for Dry Skin.    Trigger finger, right ring finger  -     Ambulatory Referral to Hand Surgery    Diabetic polyneuropathy associated with type 2 diabetes mellitus (CMS/[facility])  -     Diabetic Footwear    Acute vaginitis  -     POC Urinalysis Dipstick, Automated  -     insulin NPH-insulin regular (humuLIN 70/30,novoLIN 70/30) (70-30) 100 UNIT/ML injection; Inject 17 units before dinner and 12 Units before breakfast.  -     fluconazole (DIFLUCAN) 150 MG tablet; Take 1 tablet by mouth Every 72 (Seventy-Two) Hours. One PO X 1 dose for yeast infection    Other orders  -     cetirizine (zyrTEC) 10 MG tablet; Take 1 tablet by mouth Daily.  -     Ferrous Sulfate (IRON) 325 (65 Fe) MG tablet; Take 1  tablet by mouth 2 (Two) Times a Day.             Return in about 3 months (around 9/4/2019) for Next scheduled follow up.

## 2019-06-17 RX ORDER — CLOPIDOGREL BISULFATE 75 MG/1
75 TABLET ORAL DAILY
Qty: 30 TABLET | Refills: 5 | Status: SHIPPED | OUTPATIENT
Start: 2019-06-17 | End: 2020-11-17 | Stop reason: SDUPTHER

## 2019-06-18 RX ORDER — CLOPIDOGREL BISULFATE 75 MG/1
75 TABLET ORAL DAILY
Qty: 30 TABLET | Refills: 5 | OUTPATIENT
Start: 2019-06-18

## 2019-08-28 ENCOUNTER — OFFICE VISIT (OUTPATIENT)
Dept: CARDIOLOGY | Facility: CLINIC | Age: 74
End: 2019-08-28

## 2019-08-28 VITALS
HEART RATE: 62 BPM | WEIGHT: 185 LBS | BODY MASS INDEX: 29.03 KG/M2 | SYSTOLIC BLOOD PRESSURE: 170 MMHG | HEIGHT: 67 IN | DIASTOLIC BLOOD PRESSURE: 72 MMHG

## 2019-08-28 DIAGNOSIS — Z99.2 END STAGE RENAL DISEASE ON DIALYSIS (HCC): ICD-10-CM

## 2019-08-28 DIAGNOSIS — I10 ESSENTIAL HYPERTENSION: Primary | ICD-10-CM

## 2019-08-28 DIAGNOSIS — I48.0 PAROXYSMAL ATRIAL FIBRILLATION (HCC): ICD-10-CM

## 2019-08-28 DIAGNOSIS — N18.6 END STAGE RENAL DISEASE ON DIALYSIS (HCC): ICD-10-CM

## 2019-08-28 DIAGNOSIS — Z99.2 TYPE 2 DIABETES MELLITUS WITH CHRONIC KIDNEY DISEASE ON CHRONIC DIALYSIS, WITH LONG-TERM CURRENT USE OF INSULIN (HCC): ICD-10-CM

## 2019-08-28 DIAGNOSIS — E78.5 DYSLIPIDEMIA: ICD-10-CM

## 2019-08-28 DIAGNOSIS — I25.9 ISCHEMIC HEART DISEASE: ICD-10-CM

## 2019-08-28 DIAGNOSIS — N18.6 TYPE 2 DIABETES MELLITUS WITH CHRONIC KIDNEY DISEASE ON CHRONIC DIALYSIS, WITH LONG-TERM CURRENT USE OF INSULIN (HCC): ICD-10-CM

## 2019-08-28 DIAGNOSIS — Z79.4 TYPE 2 DIABETES MELLITUS WITH CHRONIC KIDNEY DISEASE ON CHRONIC DIALYSIS, WITH LONG-TERM CURRENT USE OF INSULIN (HCC): ICD-10-CM

## 2019-08-28 DIAGNOSIS — I35.0 NONRHEUMATIC AORTIC VALVE STENOSIS: ICD-10-CM

## 2019-08-28 DIAGNOSIS — E11.22 TYPE 2 DIABETES MELLITUS WITH CHRONIC KIDNEY DISEASE ON CHRONIC DIALYSIS, WITH LONG-TERM CURRENT USE OF INSULIN (HCC): ICD-10-CM

## 2019-08-28 PROCEDURE — 99214 OFFICE O/P EST MOD 30 MIN: CPT | Performed by: INTERNAL MEDICINE

## 2019-08-28 PROCEDURE — 93000 ELECTROCARDIOGRAM COMPLETE: CPT | Performed by: INTERNAL MEDICINE

## 2019-08-28 RX ORDER — LIDOCAINE AND PRILOCAINE 25; 25 MG/G; MG/G
1 CREAM TOPICAL AS NEEDED
Refills: 6 | Status: ON HOLD | COMMUNITY
Start: 2019-08-19 | End: 2023-04-06

## 2019-08-28 RX ORDER — ISOSORBIDE MONONITRATE 120 MG/1
120 TABLET, EXTENDED RELEASE ORAL DAILY
Qty: 90 TABLET | Refills: 3 | Status: SHIPPED | OUTPATIENT
Start: 2019-08-28 | End: 2020-09-11 | Stop reason: SDUPTHER

## 2019-08-28 RX ORDER — ACETAMINOPHEN 160 MG
2000 TABLET,DISINTEGRATING ORAL DAILY
Refills: 3 | COMMUNITY
Start: 2019-06-15 | End: 2020-07-21

## 2019-08-28 RX ORDER — TERAZOSIN 2 MG/1
2 CAPSULE ORAL NIGHTLY
Qty: 90 CAPSULE | Refills: 3 | Status: SHIPPED | OUTPATIENT
Start: 2019-08-28 | End: 2020-04-21

## 2019-08-28 RX ORDER — AMIODARONE HYDROCHLORIDE 100 MG/1
100 TABLET ORAL DAILY
Qty: 90 TABLET | Refills: 3 | Status: SHIPPED | OUTPATIENT
Start: 2019-08-28 | End: 2021-01-15

## 2019-08-28 NOTE — PROGRESS NOTES
Subjective:     Encounter Date:08/28/2019    Patient ID: Esperanza Pop is a 74 y.o.   female, retired , assistant , from [place], [place].      INTERNIST: Meghana Rodgers MD  REFERRING HEALTHCARE PROVIDER: [facility]   INTERVENTIONAL CARDIOLOGIST: Hugh Pop MD, [facility], [facility]  ADVANCED HEART FAILURE CLINIC: [facility]  NEPHROLOGIST: [facility]  INTERVENTIONAL CARDIOLOGIST:  Scooter Zhao MD, [facility]   VASCULAR SURGEON:  Demetrius Rich MD-[facility]    Chief Complaint:   Chief Complaint   Patient presents with   • Palpitations   • Coronary Artery Disease     Problem List:  1. Ischemic heart disease  a. Acute non-STEMI/congestive heart failure with diagnostic coronary arteriography demonstrating 20% LAD plaque with high-grade stenosis in the proximal mid right coronary artery requiring a Xience drug-eluting stent (3.0 mm x 28 mm) with reduced echocardiographic LVEF (0.25) with abnormal diastolic LV dysfunction, December 2013.   b. Improved echocardiogram, April 2014.  c. Remote non-STEMI related to malignant hypertension with distal RCA occlusion, patent previous RCA stent with mild inferior hypokinesis but overall acceptable systolic LV function (LVEF 0.55) with PTCA, DARRYL distal RCA, October 2016.  d. Residual CCS class I angina pectoris/NYHA class II CHF.  e. Mild aortic stenosis (June 2016).  f. Echocardiogram 2/7/19: Normal size LV, moderate LV wall thickness, LVEF 0.65, impaired LV relaxation, normal left atrial pressure, mild MR, mild TR, mild NV, no pericardial effusion  g. Residual CCS class I angina pectoris/NYHA class II CHF  2. Severe hypertension - probably essential.   3. Dyslipidemia.  4. Type 2 diabetes mellitus type 2 with suboptimal control (hemoglobin A1c 8.8%; March 2018, 7.6%, 10.7% February 2019).  5. Mild obesity (BMI 31.3); resolved, BMI 29.0  6. Acute kidney injury: Admission from 12/26/2013 to  01/02/2014, now resolved with latest creatinine 1.4 on 01/08/2014.  7. Moderate normocytic normochromic anemia; hemoglobin 9.2 gm/dL (June 2016).  8. Noncompliance.  9. Remote apparent end-stage renal disease with initiation of hemodialysis MWF weekly and construction of right upper extremity AV fistula; data deficit (June 2016) with subsequent AV fistula dysfunction and fistulogram with angioplasty - [facility], October 2017.  10. AV fistulogram with apparent thrombectomy [facility], February 2018  11. Palpitations with recent abnormal Holter monitor demonstrating intermittent brief atrial fibrillation, December 2017/January 2018  12. [facility] overnight hospitalization February 2019 for shortness of breath and thrombosed AV fistula with angioplasty  13. Remote operations:  a. 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 Abdominal hernia repair/panniculectomy  c. Hysterectomy  d. Angioplasty for thrombosed AV fistula February 2019        Allergies   Allergen Reactions   • Albuterol      Increased blood pressure  Used right before heart attack   • Mircera [Methoxy Polyethylene Glycol-Epoetin Beta]      Pt stopped breathing   • Penicillins Hives   • Prednisone      Makes jittery/anxious   • Venofer [Iron Sucrose]      Pt stopped breathing         Current Outpatient Medications:   •  amiodarone (PACERONE) 200 MG tablet, Take 1 tablet by mouth Daily., Disp: 90 tablet, Rfl: 1  •  ammonium lactate (LAC-HYDRIN) 12 % lotion, Apply  topically to the appropriate area as directed As Needed for Dry Skin., Disp: 500 g, Rfl: 3  •  aspirin 81 MG EC tablet, Take 1 tablet by mouth Daily. (Patient taking differently: Take 81 mg by mouth Every Other Day.), Disp: , Rfl:   •  atorvastatin (LIPITOR) 80 MG tablet, Take 1 tablet by mouth Every Night., Disp: 90 tablet, Rfl: 1  •  Blood Glucose Monitoring Suppl (ACCU-CHEK NADER PLUS) W/DEVICE kit, DX: E11.65, Disp: 1 kit, Rfl: 0  •  carvedilol (COREG) 25 MG tablet,  Take 1 tablet by mouth Every 12 (Twelve) Hours., Disp: 60 tablet, Rfl: 5  •  cetirizine (zyrTEC) 10 MG tablet, Take 1 tablet by mouth As Needed., Disp: , Rfl: 11  •  Cholecalciferol (VITAMIN D3) 2000 units capsule, Take 2,000 Units by mouth Daily., Disp: , Rfl: 3  •  CloNIDine (CATAPRES) 0.1 MG tablet, Take 1 tablet by mouth 2 (Two) Times a Day., Disp: 180 tablet, Rfl: 1  •  clopidogrel (PLAVIX) 75 MG tablet, Take 1 tablet by mouth Daily., Disp: 30 tablet, Rfl: 5  •  dorzolamide-timolol (COSOPT) 22.3-6.8 MG/ML ophthalmic solution, Administer 1 drop to both eyes Daily., Disp: , Rfl:   •  Ferric Citrate 1  MG(Fe) tablet, Take 1 tablet by mouth As Needed., Disp: , Rfl:   •  Ferrous Sulfate (IRON) 325 (65 Fe) MG tablet, Take 1 tablet by mouth 2 (Two) Times a Day., Disp: , Rfl: 5  •  fluticasone (FLONASE) 50 MCG/ACT nasal spray, 2 sprays into each nostril Daily., Disp: , Rfl:   •  glucose blood (ACCU-CHEK NADER PLUS) test strip, Use as instructed 1 TO 3 TIMES DAILY  DX: E11.65, Disp: 300 each, Rfl: 3  •  insulin NPH-insulin regular (humuLIN 70/30,novoLIN 70/30) (70-30) 100 UNIT/ML injection, Inject 17 units before dinner and 12 Units before breakfast., Disp: 10 mL, Rfl: 5  •  IRON DEXTRAN IJ, Inject  as directed 3 (Three) Times a Week., Disp: , Rfl:   •  isosorbide mononitrate (IMDUR) 60 MG 24 hr tablet, TAKE 1 TABLET BY MOUTH DAILY, Disp: 90 tablet, Rfl: 1  •  Lancets Misc. (ACCU-CHEK SOFTCLIX LANCET DEV) kit, TEST 1-3 TIMES DAILY DX:E11.65, Disp: 1 kit, Rfl: 0  •  latanoprost (XALATAN) 0.005 % ophthalmic solution, Administer 1 drop to both eyes Every Night., Disp: , Rfl:   •  lidocaine-prilocaine (EMLA) 2.5-2.5 % cream, APPLY SMALL AMOUNT TO ACCESS SITE (AVF) 1 TO 2 HOURS BEFORE DIALYSIS. COVER WITH OCCLUSIVE DRESSING (SARAN WRAP), Disp: , Rfl: 6  •  lisinopril (PRINIVIL,ZESTRIL) 20 MG tablet, Take 1 tablet by mouth Every 12 (Twelve) Hours., Disp: 60 tablet, Rfl: 0  •  nitroglycerin (NITROLINGUAL) 0.4 MG/SPRAY  "spray, Place 1 spray under the tongue Every 5 (Five) Minutes As Needed for Chest Pain., Disp: 1 each, Rfl: 12  •  terazosin (HYTRIN) 1 MG capsule, Take 1 capsule by mouth Every Night., Disp: 30 capsule, Rfl: 2  •  torsemide (DEMADEX) 100 MG tablet, Take 100 mg by mouth Daily As Needed., Disp: , Rfl:     HISTORY OF PRESENT ILLNESS: Patient returns for scheduled 6-month followup.  In the interim, in April 2019, she was admitted to [facility] for 4 days for hypertension (191 systolic at [employer] and 221/79 after taking extra dose of medicine) and elevated troponin.  She is advised that her EKG looks good today, and she says that she occasionally notices her heart beating out of rhythm, but it does not last very long.  She notes that her blood pressure is elevated when she goes to dialysis, and she does take her blood pressure medicines those days, but it \"drops so much (with dialysis) that I can't take too much.\"  She states that when she was having dialysis on Monday, her blood pressure dropped so low that she started having chest pain; this resolved on its own.  She is up and about on a daily basis and is able to run her own errands and do her housework, and she has no chest pain, tightness, or pressure with her activities.  On July 4th, she did have some problems with hip pain; she notes she has had 2 falls, once when she was getting something out of her car trunk and once at work when she slipped on water on the floor.  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 Patient otherwise denies chest pain, shortness of breath, PND, edema, palpitations, syncope or presyncope at this time.      Review of Systems   Constitution: Positive for night sweats.   Skin: Positive for dry skin.   Musculoskeletal: Positive for arthritis.      All other systems reviewed and otherwise " "negative.      ECG 12 Lead  Date/Time: 8/28/2019 10:43 AM  Performed by: Demetrius Sagastume MD  Authorized by: Demetrius Sagastume MD   Comparison: compared with previous ECG from 4/18/2019  Similar to previous ECG  Rhythm: sinus bradycardia  BPM: 57  Conduction: left anterior fascicular block  Other findings: left ventricular hypertrophy and poor R wave progression    Clinical impression: abnormal EKG  Comments: QRS 96 ms  QTc 471 ms   ms               Objective:       Vitals:    08/28/19 0941 08/28/19 0947 08/28/19 1006   BP: 176/83 150/70 170/72   BP Location: Left arm Left arm Left arm   Patient Position: Sitting Standing Sitting   Pulse: 60 62    Weight: 83.9 kg (185 lb)     Height: 170.2 cm (67\")       Body mass index is 28.98 kg/m².   Last weight:  197 lbs.    Physical Exam   Constitutional: She is oriented to person, place, and time. She appears well-developed and well-nourished.   Neck: No JVD present. Carotid bruit is not present. No thyromegaly present.   Cardiovascular: Regular rhythm, S1 normal and S2 normal. Exam reveals no gallop, no S3 and no friction rub.   Murmur heard.   Medium-pitched harsh early systolic murmur is present with a grade of 2/6 at the upper right sternal border radiating to the neck.  Pulses:       Carotid pulses are 1+ on the right side, and 1+ on the left side.       Radial pulses are 1+ on the right side, and 1+ on the left side.        Femoral pulses are 1+ on the right side, and 1+ on the left side.       Popliteal pulses are 1+ on the right side, and 1+ on the left side.        Dorsalis pedis pulses are 1+ on the right side, and 1+ on the left side.        Posterior tibial pulses are 1+ on the right side, and 1+ on the left side.   Pulmonary/Chest: Effort normal. She has decreased breath sounds. She has no wheezes. She has no rhonchi. She has no rales.   Abdominal: Soft. She exhibits no mass. There is no hepatosplenomegaly. There is no tenderness. There is no guarding. "   Bowel sounds audible x4   Musculoskeletal: Normal range of motion. She exhibits no edema.   Lymphadenopathy:     She has no cervical adenopathy.   Neurological: She is alert and oriented to person, place, and time.   Skin: Skin is warm, dry and intact. No rash noted.   Vitals reviewed.        Lab Review:   Lab Results   Component Value Date    GLUCOSE 158 (H) 04/20/2019    BUN 23 04/20/2019    CREATININE 4.20 (H) 04/20/2019    EGFRIFNONA  04/20/2019      Comment:      <15 Indicative of kidney failure.    EGFRIFAFRI 13 (L) 04/20/2019    BCR 5.5 (L) 04/20/2019    CO2 24.0 04/20/2019    CALCIUM 8.7 04/20/2019    PROTENTOTREF 8.1 03/01/2018    ALBUMIN 3.10 (L) 04/20/2019    LABIL2 1.0 (L) 03/01/2018    AST 11 04/18/2019    ALT 5 04/18/2019       Lab Results   Component Value Date    WBC 9.26 04/19/2019    HGB 9.3 (L) 04/19/2019    HCT 33.6 (L) 04/19/2019    MCV 79.1 04/19/2019     04/19/2019       Lab Results   Component Value Date    HGBA1C 8.9 06/04/2019       Lab Results   Component Value Date    TSH 1.657 03/01/2018       Lab Results   Component Value Date    CHOL 261 (H) 10/11/2016    CHOL 293 (H) 10/10/2016     Lab Results   Component Value Date    TRIG 97 12/22/2017    TRIG 134 03/21/2017     Lab Results   Component Value Date    HDL 54 12/22/2017    HDL 60 03/21/2017     Lab Results   Component Value Date     (H) 12/22/2017     (H) 03/21/2017       Lab Results   Component Value Date    BNP CANCELED 03/01/2018 04/18/2019, troponin - 0.01, 0.042  04/19/2019, troponin - 0.037, 0.026    04/18/2019:  · Chest x-ray:  FINDINGS:  The heart is moderately enlarged. Central pulmonary arteries are also prominent.     Mildly increased diffuse interstitial markings when compared with prior studies. Correlate for borderline vascular congestion or diffuse interstitial pneumonitis. There is no airspace consolidation or pleural effusion.     IMPRESSION:  1. Moderate cardiomegaly with central pulmonary  artery enlargement.  2. Mildly prominent interstitial markings when compared with prior studies. This may be an artifact of shallow lung expansion, but borderline vascular congestion should be excluded clinically.    · Head CT:  FINDINGS:   No intracranial hemorrhage, mass, or infarct. No hydrocephalus or extra-axial fluid collection. Brain parenchymal density is normal. The skull base, calvarium, and extracranial soft tissues are normal. Incidental physiologic basal ganglia calcifications.     IMPRESSION: Normal, negative unenhanced head CT.    · Brain MRI:  FINDINGS:  Diffusion-weighted images demonstrate no evidence of recent stroke. No midline shift. Midline structures of the brain intact. No tonsillar ectopia. There is no mass mass effect or edema. No distinct extra-axial fluid collection. No hydrocephalus. There are tiny punctate areas of increased FLAIR and T2 signal within the deep white matter and in the periventricular white matter, technically nonspecific, but favored to represent sequela of chronic ischemic microvascular changes. The globes are intact and the sinuses are clear. Flow voids are present in the major vessels at the base of the brain.     IMPRESSION:  1. No evidence of recent stroke on diffusion-weighted images.  2. No clearly acute intracranial process. Signal changes in the periventricular and deep white matter that most likely reflect sequela of chronic ischemic small vessel disease.        Assessment:   Overall continued acceptable course with no new interim cardiopulmonary complaints with acceptable functional status. We will defer additional diagnostic or therapeutic intervention from a cardiac perspective at this time other than to adjust her antihypertensive medication.       Diagnosis Plan   1. Essential hypertension  Suboptimal control; see below   2. Ischemic heart disease  No recurrent angina pectoris or CHF on current activity schedule; continue current treatment     3.  Nonrheumatic aortic valve stenosis  Stable examination without increased symptoms   4. Type 2 diabetes mellitus with chronic kidney disease on chronic dialysis, with long-term current use of insulin (CMS/HCC)  Continue current treatment and followup with Dr. Rodgers   5. Paroxysmal atrial fibrillation (CMS/HCC)  No documented recurrence; see below   6. Dyslipidemia  No recent lab results; continue atorvastatin   7. End stage renal disease on dialysis (CMS/[facility])  Continue followup and monitoring with nephrology          Plan:         1. Patient to continue current medications and close follow up with the above providers with the following changes:   A. Alter Terazosin dose to 2 mg daily   B. Alter Imdur to 120 mg daily   C. Alter amiodarone to 200 mg qod  2. Tentative cardiology follow up in February 2020, or patient may return sooner PRN.    Transcribed by Mary Rviera for Dr. Demetrius Sagastume at 10:04 AM on 08/28/2019    I, Demetrius Sagastume MD, [facility], personally performed the services described in this documentation as scribed by the above named individual in my presence, and it is both accurate and complete. At 10:09 AM on 08/28/2019

## 2019-09-10 ENCOUNTER — OFFICE VISIT (OUTPATIENT)
Dept: INTERNAL MEDICINE | Facility: CLINIC | Age: 74
End: 2019-09-10

## 2019-09-10 ENCOUNTER — TELEPHONE (OUTPATIENT)
Dept: INTERNAL MEDICINE | Facility: CLINIC | Age: 74
End: 2019-09-10

## 2019-09-10 VITALS
DIASTOLIC BLOOD PRESSURE: 64 MMHG | WEIGHT: 187.2 LBS | BODY MASS INDEX: 29.38 KG/M2 | SYSTOLIC BLOOD PRESSURE: 182 MMHG | HEART RATE: 81 BPM | OXYGEN SATURATION: 96 % | HEIGHT: 67 IN

## 2019-09-10 DIAGNOSIS — I10 UNCONTROLLED HYPERTENSION: ICD-10-CM

## 2019-09-10 DIAGNOSIS — E11.22 TYPE 2 DIABETES MELLITUS WITH CHRONIC KIDNEY DISEASE ON CHRONIC DIALYSIS, WITH LONG-TERM CURRENT USE OF INSULIN (HCC): Primary | ICD-10-CM

## 2019-09-10 DIAGNOSIS — R21 RASH: ICD-10-CM

## 2019-09-10 DIAGNOSIS — N18.6 TYPE 2 DIABETES MELLITUS WITH CHRONIC KIDNEY DISEASE ON CHRONIC DIALYSIS, WITH LONG-TERM CURRENT USE OF INSULIN (HCC): Primary | ICD-10-CM

## 2019-09-10 DIAGNOSIS — Z99.2 TYPE 2 DIABETES MELLITUS WITH CHRONIC KIDNEY DISEASE ON CHRONIC DIALYSIS, WITH LONG-TERM CURRENT USE OF INSULIN (HCC): Primary | ICD-10-CM

## 2019-09-10 DIAGNOSIS — L01.00 IMPETIGO: ICD-10-CM

## 2019-09-10 DIAGNOSIS — Z79.4 TYPE 2 DIABETES MELLITUS WITH CHRONIC KIDNEY DISEASE ON CHRONIC DIALYSIS, WITH LONG-TERM CURRENT USE OF INSULIN (HCC): Primary | ICD-10-CM

## 2019-09-10 DIAGNOSIS — I10 ESSENTIAL HYPERTENSION: ICD-10-CM

## 2019-09-10 LAB
GLUCOSE BLDC GLUCOMTR-MCNC: 216 MG/DL (ref 70–130)
HBA1C MFR BLD: 8.2 %

## 2019-09-10 PROCEDURE — 82947 ASSAY GLUCOSE BLOOD QUANT: CPT | Performed by: INTERNAL MEDICINE

## 2019-09-10 PROCEDURE — 83036 HEMOGLOBIN GLYCOSYLATED A1C: CPT | Performed by: INTERNAL MEDICINE

## 2019-09-10 PROCEDURE — 99214 OFFICE O/P EST MOD 30 MIN: CPT | Performed by: INTERNAL MEDICINE

## 2019-09-10 RX ORDER — CLONIDINE HYDROCHLORIDE 0.2 MG/1
0.2 TABLET ORAL 2 TIMES DAILY
Qty: 60 TABLET | Refills: 5 | Status: SHIPPED | OUTPATIENT
Start: 2019-09-10 | End: 2020-07-21

## 2019-09-10 RX ORDER — DOXYCYCLINE HYCLATE 100 MG/1
100 CAPSULE ORAL 2 TIMES DAILY
Qty: 14 CAPSULE | Refills: 0 | Status: SHIPPED | OUTPATIENT
Start: 2019-09-10 | End: 2019-12-10

## 2019-09-10 RX ORDER — CEPHALEXIN 500 MG/1
500 CAPSULE ORAL 2 TIMES DAILY
Qty: 14 CAPSULE | Refills: 0 | Status: SHIPPED | OUTPATIENT
Start: 2019-09-10 | End: 2019-12-10

## 2019-09-10 NOTE — PROGRESS NOTES
Diabetes    Subjective   Esperanza Pop is a 74 y.o. female is here today for follow-up.    History of Present Illness   Here for a follow up on her htn, hlp and dm2.  Hasn't seen her hand surgeon yet.  Saw Dr. Sagastume last week. Has her next HD tomorrow. Bp very high today.    Concerned about her rash on her forehead , back, abdomen and legs and arms. SOme itching an pain. Thinks started after she took diflucan for her vaginitis.  S/p fistulogram last week due to blockages    Current Outpatient Medications:   •  amiodarone (PACERONE) 100 MG tablet, Take 1 tablet by mouth Daily., Disp: 90 tablet, Rfl: 3  •  ammonium lactate (LAC-HYDRIN) 12 % lotion, Apply  topically to the appropriate area as directed As Needed for Dry Skin., Disp: 500 g, Rfl: 3  •  aspirin 81 MG EC tablet, Take 1 tablet by mouth Daily. (Patient taking differently: Take 81 mg by mouth Every Other Day.), Disp: , Rfl:   •  atorvastatin (LIPITOR) 80 MG tablet, Take 1 tablet by mouth Every Night., Disp: 90 tablet, Rfl: 1  •  Blood Glucose Monitoring Suppl (ACCU-CHEK NADER PLUS) W/DEVICE kit, DX: E11.65, Disp: 1 kit, Rfl: 0  •  carvedilol (COREG) 25 MG tablet, Take 1 tablet by mouth Every 12 (Twelve) Hours., Disp: 60 tablet, Rfl: 5  •  cetirizine (zyrTEC) 10 MG tablet, Take 1 tablet by mouth As Needed., Disp: , Rfl: 11  •  Cholecalciferol (VITAMIN D3) 2000 units capsule, Take 2,000 Units by mouth Daily., Disp: , Rfl: 3  •  CloNIDine (CATAPRES) 0.2 MG tablet, Take 1 tablet by mouth 2 (Two) Times a Day., Disp: 60 tablet, Rfl: 5  •  clopidogrel (PLAVIX) 75 MG tablet, Take 1 tablet by mouth Daily., Disp: 30 tablet, Rfl: 5  •  dorzolamide-timolol (COSOPT) 22.3-6.8 MG/ML ophthalmic solution, Administer 1 drop to both eyes Daily., Disp: , Rfl:   •  Ferric Citrate 1  MG(Fe) tablet, Take 1 tablet by mouth As Needed., Disp: , Rfl:   •  Ferrous Sulfate (IRON) 325 (65 Fe) MG tablet, Take 1 tablet by mouth 2 (Two) Times a Day., Disp: , Rfl: 5  •  fluticasone  (FLONASE) 50 MCG/ACT nasal spray, 2 sprays into each nostril Daily., Disp: , Rfl:   •  glucose blood (ACCU-CHEK NADER PLUS) test strip, Use as instructed 1 TO 3 TIMES DAILY  DX: E11.65, Disp: 300 each, Rfl: 3  •  insulin NPH-insulin regular (humuLIN 70/30,novoLIN 70/30) (70-30) 100 UNIT/ML injection, Inject 17 units before dinner and 15 Units before breakfast., Disp: 10 mL, Rfl: 5  •  IRON DEXTRAN IJ, Inject  as directed 3 (Three) Times a Week., Disp: , Rfl:   •  isosorbide mononitrate (IMDUR) 120 MG 24 hr tablet, Take 1 tablet by mouth Daily., Disp: 90 tablet, Rfl: 3  •  Lancets Misc. (ACCU-CHEK SOFTCLIX LANCET DEV) kit, TEST 1-3 TIMES DAILY DX:E11.65, Disp: 1 kit, Rfl: 0  •  latanoprost (XALATAN) 0.005 % ophthalmic solution, Administer 1 drop to both eyes Every Night., Disp: , Rfl:   •  lidocaine-prilocaine (EMLA) 2.5-2.5 % cream, APPLY SMALL AMOUNT TO ACCESS SITE (AVF) 1 TO 2 HOURS BEFORE DIALYSIS. COVER WITH OCCLUSIVE DRESSING (SARAN WRAP), Disp: , Rfl: 6  •  lisinopril (PRINIVIL,ZESTRIL) 20 MG tablet, Take 1 tablet by mouth Every 12 (Twelve) Hours., Disp: 60 tablet, Rfl: 0  •  nitroglycerin (NITROLINGUAL) 0.4 MG/SPRAY spray, Place 1 spray under the tongue Every 5 (Five) Minutes As Needed for Chest Pain., Disp: 1 each, Rfl: 12  •  terazosin (HYTRIN) 2 MG capsule, Take 1 capsule by mouth Every Night., Disp: 90 capsule, Rfl: 3  •  torsemide (DEMADEX) 100 MG tablet, Take 100 mg by mouth Daily As Needed., Disp: , Rfl:   •  cephalexin (KEFLEX) 500 MG capsule, Take 1 capsule by mouth 2 (Two) Times a Day., Disp: 14 capsule, Rfl: 0  •  doxycycline (VIBRAMYCIN) 100 MG capsule, Take 1 capsule by mouth 2 (Two) Times a Day., Disp: 14 capsule, Rfl: 0      The following portions of the patient's history were reviewed and updated as appropriate: allergies, current medications, past family history, past medical history, past social history, past surgical history and problem list.    Review of Systems   Constitutional: Positive  "for fatigue. Negative for chills and fever.   HENT: Negative for ear discharge, ear pain, sinus pressure and sore throat.    Respiratory: Negative for cough, chest tightness and shortness of breath.    Cardiovascular: Negative for chest pain, palpitations and leg swelling.   Gastrointestinal: Negative for diarrhea, nausea and vomiting.   Musculoskeletal: Negative for arthralgias, back pain and myalgias.   Skin: Positive for rash.   Neurological: Negative for dizziness, syncope and headaches.   Psychiatric/Behavioral: Negative for confusion and sleep disturbance.       Objective   BP (!) 182/64   Pulse 81   Ht 170.2 cm (67\")   Wt 84.9 kg (187 lb 3.2 oz)   LMP  (LMP Unknown)   SpO2 96% Comment: ra  BMI 29.32 kg/m²   Physical Exam   Constitutional: She is oriented to person, place, and time. She appears well-developed and well-nourished.   HENT:   Head: Normocephalic and atraumatic.   Mouth/Throat: No oropharyngeal exudate.   Eyes: Conjunctivae are normal. Pupils are equal, round, and reactive to light.   Neck: Neck supple. No thyromegaly present.   Cardiovascular: Normal rate and regular rhythm.   Pulmonary/Chest: Effort normal and breath sounds normal.   Abdominal: Soft. Bowel sounds are normal. She exhibits no distension. There is no tenderness.   Musculoskeletal: She exhibits no edema.   Neurological: She is alert and oriented to person, place, and time. No cranial nerve deficit.   Skin: Skin is warm and dry. Rash noted. There is erythema.        Psychiatric: She has a normal mood and affect. Judgment normal.   Nursing note and vitals reviewed.        Results for orders placed or performed in visit on 09/10/19   Comprehensive metabolic panel   Result Value Ref Range    Glucose 207 (H) 65 - 99 mg/dL    BUN 31 (H) 8 - 23 mg/dL    Creatinine 5.76 (H) 0.57 - 1.00 mg/dL    eGFR Non African Am 7 (L) >60 mL/min/1.73    eGFR African Am 9 (L) >60 mL/min/1.73    BUN/Creatinine Ratio 5.4 (L) 7.0 - 25.0    Sodium 141 136 " - 145 mmol/L    Potassium 4.3 3.5 - 5.2 mmol/L    Chloride 96 (L) 98 - 107 mmol/L    Total CO2 32.8 (H) 22.0 - 29.0 mmol/L    Calcium 8.8 8.6 - 10.5 mg/dL    Total Protein 7.5 6.0 - 8.5 g/dL    Albumin 3.70 3.50 - 5.20 g/dL    Globulin 3.8 gm/dL    A/G Ratio 1.0 g/dL    Total Bilirubin 0.3 0.2 - 1.2 mg/dL    Alkaline Phosphatase 98 39 - 117 U/L    AST (SGOT) 10 1 - 32 U/L    ALT (SGPT) 6 1 - 33 U/L   Lipid panel   Result Value Ref Range    Total Cholesterol 167 0 - 200 mg/dL    Triglycerides 106 0 - 150 mg/dL    HDL Cholesterol 57 40 - 60 mg/dL    VLDL Cholesterol 21.2 mg/dL    LDL Cholesterol  89 0 - 100 mg/dL   Manual Differential   Result Value Ref Range    Neutrophil Rel % 54.0 42.7 - 76.0 %    Lymphocyte Rel % 29.0 19.6 - 45.3 %    Monocyte Rel % 14.0 (H) 5.0 - 12.0 %    Eosinophil Rel % 3.0 0.3 - 6.2 %    Basophil Rel % 0.0 0.0 - 1.5 %    Neutrophils Absolute 3.43 1.70 - 7.00 10*3/mm3    Lymphocytes Absolute 1.84 0.70 - 3.10 10*3/mm3    Monocytes Absolute 0.89 0.10 - 0.90 10*3/mm3    Eosinophil Abs 0.19 0.00 - 0.40 10*3/mm3    Basophils Absolute 0.00 0.00 - 0.20 10*3/mm3    Differential Comment Comment     Comment Comment     Plt Comment Comment    POCT Glucose   Result Value Ref Range    Glucose 216 (A) 70 - 130 mg/dL   POC Glycosylated Hemoglobin (Hb A1C)   Result Value Ref Range    Hemoglobin A1C 8.2 %   CBC w AUTO Differential   Result Value Ref Range    WBC 6.35 3.40 - 10.80 10*3/mm3    RBC 4.28 3.77 - 5.28 10*6/mm3    Hemoglobin 8.9 (L) 12.0 - 15.9 g/dL    Hematocrit 34.9 34.0 - 46.6 %    MCV 81.5 79.0 - 97.0 fL    MCH 20.8 (L) 26.6 - 33.0 pg    MCHC 25.5 (L) 31.5 - 35.7 g/dL    RDW 18.2 (H) 12.3 - 15.4 %    Platelets 189 140 - 450 10*3/mm3    Neutrophil Rel % CANCELED     Lymphocyte Rel % CANCELED     Monocyte Rel % CANCELED     Eosinophil Rel % CANCELED     Lymphocytes Absolute CANCELED     Eosinophils Absolute CANCELED     Basophils Absolute CANCELED              Assessment/Plan   Diagnoses and all  orders for this visit:    Type 2 diabetes mellitus with chronic kidney disease on chronic dialysis, with long-term current use of insulin (CMS/[facility])  Comments:  increase insulin to 15 unts before breakfast , continue 17 hs.  Orders:  -     POCT Glucose  -     POC Glycosylated Hemoglobin (Hb A1C)  -     insulin NPH-insulin regular (humuLIN 70/30,novoLIN 70/30) (70-30) 100 UNIT/ML injection; Inject 17 units before dinner and 15 Units before breakfast.  -     Ambulatory Referral to Ophthalmology    Uncontrolled hypertension  Comments:  reiterated importance of bp control. increase clopnidine to 0.2 bid, take full dose of isosorbide.  Orders:  -     CloNIDine (CATAPRES) 0.2 MG tablet; Take 1 tablet by mouth 2 (Two) Times a Day.    Impetigo  -     cephalexin (KEFLEX) 500 MG capsule; Take 1 capsule by mouth 2 (Two) Times a Day.  -     doxycycline (VIBRAMYCIN) 100 MG capsule; Take 1 capsule by mouth 2 (Two) Times a Day.  -     Ambulatory Referral to Dermatology    Uncontrolled hypertension  Comments:  increase clonidine to BID, as taking only prn right now.  Orders:  -     CloNIDine (CATAPRES) 0.2 MG tablet; Take 1 tablet by mouth 2 (Two) Times a Day.    Essential hypertension  -     CBC w AUTO Differential  -     Comprehensive metabolic panel  -     Lipid panel    Rash  -     Ambulatory Referral to Dermatology    Other orders  -     Manual Differential      Avoid oral iron x 7 days when on doxy           Return in about 3 months (around 12/10/2019) for Next scheduled follow up.

## 2019-09-10 NOTE — TELEPHONE ENCOUNTER
Pt would like a referral to her eye doctor; Dr. Daniels.  Pt would also like a referral to see a Dermatologist.

## 2019-09-11 LAB
ALBUMIN SERPL-MCNC: 3.7 G/DL (ref 3.5–5.2)
ALBUMIN/GLOB SERPL: 1 G/DL
ALP SERPL-CCNC: 98 U/L (ref 39–117)
ALT SERPL-CCNC: 6 U/L (ref 1–33)
AST SERPL-CCNC: 10 U/L (ref 1–32)
BASOPHILS # BLD AUTO: (no result) 10*3/UL
BASOPHILS # BLD MANUAL: 0 10*3/MM3 (ref 0–0.2)
BASOPHILS NFR BLD MANUAL: 0 % (ref 0–1.5)
BILIRUB SERPL-MCNC: 0.3 MG/DL (ref 0.2–1.2)
BUN SERPL-MCNC: 31 MG/DL (ref 8–23)
BUN/CREAT SERPL: 5.4 (ref 7–25)
CALCIUM SERPL-MCNC: 8.8 MG/DL (ref 8.6–10.5)
CHLORIDE SERPL-SCNC: 96 MMOL/L (ref 98–107)
CHOLEST SERPL-MCNC: 167 MG/DL (ref 0–200)
CO2 SERPL-SCNC: 32.8 MMOL/L (ref 22–29)
CREAT SERPL-MCNC: 5.76 MG/DL (ref 0.57–1)
DIFFERENTIAL COMMENT: ABNORMAL
EOSINOPHIL # BLD AUTO: (no result) 10*3/UL
EOSINOPHIL # BLD MANUAL: 0.19 10*3/MM3 (ref 0–0.4)
EOSINOPHIL NFR BLD AUTO: (no result) %
EOSINOPHIL NFR BLD MANUAL: 3 % (ref 0.3–6.2)
ERYTHROCYTE [DISTWIDTH] IN BLOOD BY AUTOMATED COUNT: 18.2 % (ref 12.3–15.4)
GLOBULIN SER CALC-MCNC: 3.8 GM/DL
GLUCOSE SERPL-MCNC: 207 MG/DL (ref 65–99)
HCT VFR BLD AUTO: 34.9 % (ref 34–46.6)
HDLC SERPL-MCNC: 57 MG/DL (ref 40–60)
HGB BLD-MCNC: 8.9 G/DL (ref 12–15.9)
LDLC SERPL CALC-MCNC: 89 MG/DL (ref 0–100)
LYMPHOCYTES # BLD AUTO: (no result) 10*3/UL
LYMPHOCYTES # BLD MANUAL: 1.84 10*3/MM3 (ref 0.7–3.1)
LYMPHOCYTES NFR BLD AUTO: (no result) %
LYMPHOCYTES NFR BLD MANUAL: 29 % (ref 19.6–45.3)
MCH RBC QN AUTO: 20.8 PG (ref 26.6–33)
MCHC RBC AUTO-ENTMCNC: 25.5 G/DL (ref 31.5–35.7)
MCV RBC AUTO: 81.5 FL (ref 79–97)
MONOCYTES # BLD MANUAL: 0.89 10*3/MM3 (ref 0.1–0.9)
MONOCYTES NFR BLD AUTO: (no result) %
MONOCYTES NFR BLD MANUAL: 14 % (ref 5–12)
NEUTROPHILS # BLD MANUAL: 3.43 10*3/MM3 (ref 1.7–7)
NEUTROPHILS NFR BLD AUTO: (no result) %
NEUTROPHILS NFR BLD MANUAL: 54 % (ref 42.7–76)
PLATELET # BLD AUTO: 189 10*3/MM3 (ref 140–450)
PLATELET BLD QL SMEAR: ABNORMAL
POTASSIUM SERPL-SCNC: 4.3 MMOL/L (ref 3.5–5.2)
PROT SERPL-MCNC: 7.5 G/DL (ref 6–8.5)
RBC # BLD AUTO: 4.28 10*6/MM3 (ref 3.77–5.28)
RBC MORPH BLD: ABNORMAL
SODIUM SERPL-SCNC: 141 MMOL/L (ref 136–145)
TRIGL SERPL-MCNC: 106 MG/DL (ref 0–150)
VLDLC SERPL CALC-MCNC: 21.2 MG/DL
WBC # BLD AUTO: 6.35 10*3/MM3 (ref 3.4–10.8)

## 2019-10-03 ENCOUNTER — OFFICE VISIT (OUTPATIENT)
Dept: INTERNAL MEDICINE | Facility: CLINIC | Age: 74
End: 2019-10-03

## 2019-10-03 VITALS
TEMPERATURE: 97.5 F | HEART RATE: 66 BPM | DIASTOLIC BLOOD PRESSURE: 64 MMHG | SYSTOLIC BLOOD PRESSURE: 180 MMHG | OXYGEN SATURATION: 97 % | BODY MASS INDEX: 28.88 KG/M2 | HEIGHT: 67 IN | WEIGHT: 184 LBS

## 2019-10-03 DIAGNOSIS — M54.6 ACUTE LEFT-SIDED THORACIC BACK PAIN: Primary | ICD-10-CM

## 2019-10-03 PROCEDURE — 99213 OFFICE O/P EST LOW 20 MIN: CPT | Performed by: NURSE PRACTITIONER

## 2019-10-03 NOTE — PROGRESS NOTES
Chief Complaint   Patient presents with   • Flank Pain     left side       History of Present Illness    Esperanza Pop is a 74 y.o. female who presents today for left back pain first noticed 5 days ago. Pain and discomfort of left upper back and side. Thought she was having fluid overload so took Torsemide 5 days ago with no relief of back pain. Pain was worst yesterday and feeling better today. She is concerned for pneumonia due to pain and history of asymptomatic pneumonia per patient. She has taken Tylenol twice with some relief. Denies cough, fevers, chills, recent injury or trauma to back or side. She has does not recall recent heavy lifting, pushing, or pulling.     [facility]    The following portions of the patient's history were reviewed and updated as appropriate: allergies, current medications, past family history, past medical history, past social history, past surgical history and problem list.     Social History     Tobacco Use   • Smoking status: Never Smoker   • Smokeless tobacco: Never Used   • Tobacco comment: Michael second hand smoke   Substance Use Topics   • Alcohol use: No     Comment: rarely       Past Medical History:   Diagnosis Date   • Acute bronchitis    • Acute conjunctivitis    • Acute kidney injury (CMS/[facility])     Admission from 12/26/2013 to 01/02/2014, now resolved with latest creatinine 1.4 on 01/08/2014.   • Acute non-ST segment elevation myocardial infarction (STEMI) following previous myocardial infarction    • Breast cancer, female, right 5/20/2016 2005   • Cancer (CMS/[facility])    • CHF (congestive heart failure) (CMS/[facility])    • Clotting disorder (CMS/[facility])    • Diabetes mellitus (CMS/[facility])    • Diarrhea    • Dyslipidemia    • Dyspepsia    • Dyspnea    • Edema    • Hx of radiation therapy    • Hyperlipidemia    • Hypertension     Severe   • Ischemic heart disease    • Moderate obesity     BMI 36.2   • Myocardial infarction (CMS/[facility])    • Pneumonia    • Pneumonia 02/2019   • Radiation 2005   •  Renal disorder    • Seasonal allergies        Past Surgical History:   Procedure Laterality Date   • AV FISTULA PLACEMENT, BRACHIOBASILIC     • BREAST BIOPSY Left 2010   • BREAST CYST EXCISION     • BREAST LUMPECTOMY Right 2005   • BREAST SURGERY     • CARDIAC CATHETERIZATION N/A 10/10/2016    Procedure: Left Heart Cath;  Surgeon: Scooter Zhao MD;  Location:  [facility];  Service:    • CARDIAC CATHETERIZATION  10/2016   • HERNIA REPAIR     • HYSTERECTOMY  2000   • INSERTION HEMODIALYSIS CATHETER Right 6/29/2016    Procedure: HEMODIALYSIS CATHETER INSERTION TUNNELLED;  Surgeon: Ramón Chavez MD;  Location:  [facility];  Service:    • MASTECTOMY Right 2006   • REDUCTION MAMMAPLASTY Left 2005       Allergies   Allergen Reactions   • Albuterol      Increased blood pressure  Used right before heart attack   • Mircera [Methoxy Polyethylene Glycol-Epoetin Beta]      Pt stopped breathing   • Penicillins Hives   • Prednisone      Makes jittery/anxious   • Venofer [Iron Sucrose]      Pt stopped breathing         Current Outpatient Medications:   •  amiodarone (PACERONE) 100 MG tablet, Take 1 tablet by mouth Daily., Disp: 90 tablet, Rfl: 3  •  ammonium lactate (LAC-HYDRIN) 12 % lotion, Apply  topically to the appropriate area as directed As Needed for Dry Skin., Disp: 500 g, Rfl: 3  •  aspirin 81 MG EC tablet, Take 1 tablet by mouth Daily. (Patient taking differently: Take 81 mg by mouth Every Other Day.), Disp: , Rfl:   •  atorvastatin (LIPITOR) 80 MG tablet, Take 1 tablet by mouth Every Night., Disp: 90 tablet, Rfl: 1  •  Blood Glucose Monitoring Suppl (ACCU-CHEK NADER PLUS) W/DEVICE kit, DX: E11.65, Disp: 1 kit, Rfl: 0  •  carvedilol (COREG) 25 MG tablet, Take 1 tablet by mouth Every 12 (Twelve) Hours., Disp: 60 tablet, Rfl: 5  •  cephalexin (KEFLEX) 500 MG capsule, Take 1 capsule by mouth 2 (Two) Times a Day., Disp: 14 capsule, Rfl: 0  •  cetirizine (zyrTEC) 10 MG tablet, Take 1 tablet  by mouth As Needed., Disp: , Rfl: 11  •  Cholecalciferol (VITAMIN D3) 2000 units capsule, Take 2,000 Units by mouth Daily., Disp: , Rfl: 3  •  CloNIDine (CATAPRES) 0.2 MG tablet, Take 1 tablet by mouth 2 (Two) Times a Day., Disp: 60 tablet, Rfl: 5  •  clopidogrel (PLAVIX) 75 MG tablet, Take 1 tablet by mouth Daily., Disp: 30 tablet, Rfl: 5  •  dorzolamide-timolol (COSOPT) 22.3-6.8 MG/ML ophthalmic solution, Administer 1 drop to both eyes Daily., Disp: , Rfl:   •  doxycycline (VIBRAMYCIN) 100 MG capsule, Take 1 capsule by mouth 2 (Two) Times a Day., Disp: 14 capsule, Rfl: 0  •  Ferric Citrate 1  MG(Fe) tablet, Take 1 tablet by mouth As Needed., Disp: , Rfl:   •  Ferrous Sulfate (IRON) 325 (65 Fe) MG tablet, Take 1 tablet by mouth 2 (Two) Times a Day., Disp: , Rfl: 5  •  fluticasone (FLONASE) 50 MCG/ACT nasal spray, 2 sprays into each nostril Daily., Disp: , Rfl:   •  glucose blood (ACCU-CHEK NADER PLUS) test strip, Use as instructed 1 TO 3 TIMES DAILY  DX: E11.65, Disp: 300 each, Rfl: 3  •  insulin NPH-insulin regular (humuLIN 70/30,novoLIN 70/30) (70-30) 100 UNIT/ML injection, Inject 17 units before dinner and 15 Units before breakfast., Disp: 10 mL, Rfl: 5  •  IRON DEXTRAN IJ, Inject  as directed 3 (Three) Times a Week., Disp: , Rfl:   •  isosorbide mononitrate (IMDUR) 120 MG 24 hr tablet, Take 1 tablet by mouth Daily., Disp: 90 tablet, Rfl: 3  •  Lancets Misc. (ACCU-CHEK SOFTCLIX LANCET DEV) kit, TEST 1-3 TIMES DAILY DX:E11.65, Disp: 1 kit, Rfl: 0  •  latanoprost (XALATAN) 0.005 % ophthalmic solution, Administer 1 drop to both eyes Every Night., Disp: , Rfl:   •  lidocaine-prilocaine (EMLA) 2.5-2.5 % cream, APPLY SMALL AMOUNT TO ACCESS SITE (AVF) 1 TO 2 HOURS BEFORE DIALYSIS. COVER WITH OCCLUSIVE DRESSING (SARAN WRAP), Disp: , Rfl: 6  •  nitroglycerin (NITROLINGUAL) 0.4 MG/SPRAY spray, Place 1 spray under the tongue Every 5 (Five) Minutes As Needed for Chest Pain., Disp: 1 each, Rfl: 12  •  terazosin  "(HYTRIN) 2 MG capsule, Take 1 capsule by mouth Every Night., Disp: 90 capsule, Rfl: 3  •  torsemide (DEMADEX) 100 MG tablet, Take 100 mg by mouth Daily As Needed., Disp: , Rfl:   •  lisinopril (PRINIVIL,ZESTRIL) 20 MG tablet, Take 1 tablet by mouth Every 12 (Twelve) Hours., Disp: 60 tablet, Rfl: 5    Review of Systems  Review of Systems   Constitutional: Negative for appetite change, chills, diaphoresis, fatigue and fever.   HENT: Negative for congestion, ear pain, facial swelling, postnasal drip, sinus pressure, sinus pain and sore throat.    Respiratory: Negative for cough, chest tightness, shortness of breath and wheezing.    Cardiovascular: Negative for chest pain, palpitations and leg swelling.   Gastrointestinal: Negative for diarrhea, nausea and vomiting.   Musculoskeletal: Positive for back pain and myalgias.   Skin: Negative for color change.   Neurological: Negative for dizziness, light-headedness and headaches.   Psychiatric/Behavioral: Negative for sleep disturbance.       Vitals:  Vitals:    10/03/19 1015   BP: 180/64   Pulse: 66   Temp: 97.5 °F (36.4 °C)   TempSrc: Oral   SpO2: 97%   Weight: 83.5 kg (184 lb)   Height: 170.2 cm (67.01\")       Physical Exam  Physical Exam   Constitutional: She is oriented to person, place, and time. She appears well-developed and well-nourished.   HENT:   Head: Normocephalic and atraumatic.   Mouth/Throat: Oropharynx is clear and moist.   Eyes: Conjunctivae are normal. Pupils are equal, round, and reactive to light.   Neck: Neck supple.   Cardiovascular: Normal rate and regular rhythm.   Murmur heard.  Pulmonary/Chest: Effort normal and breath sounds normal. No apnea and no tachypnea. No respiratory distress. She has no decreased breath sounds. She has no wheezes. She has no rhonchi. She has no rales.   Musculoskeletal:        Thoracic back: She exhibits tenderness. She exhibits normal range of motion, no bony tenderness, no swelling, no edema, no deformity and no spasm. "        Arms:  There is tenderness to palpation of left upper thoracic paraspinal muscles and left lateral chest. There is no change in skin color, temperature, or integrity. Patient exhibits full ROM of bilateral upper extremities.    Neurological: She is alert and oriented to person, place, and time.   Skin: Skin is warm and dry.   Psychiatric: She has a normal mood and affect. Her behavior is normal.   Nursing note and vitals reviewed.      Labs  None this visit    Assessment/Plan  Esperanza was seen today for flank pain.    Diagnoses and all orders for this visit:    Acute left-sided thoracic back pain  -     XR Ribs Left With PA Chest; Future    Patient requests x-ray to verify absence of pneumonia in presence of clear lung fields to auscultation. Imaging ordered, will review results when received. Pain and physical exam most consistent with musculoskeletal strain from unknown cause. Advised Tylenol, icy hot, restricted exertion for appropriate healing. Follow-up in no improvement or worsening of symptoms.     Plan of care reviewed with patient at conclusion of today's visit. Patient education was provided regarding diagnosis, management, and prescribed or recommended OTC medications. Patient was informed to notify office of any new, worsening, or persistent symptoms. Patient verbalized understanding and agreement with plan of care.     Follow-Up  Return for Follow-up in 2-3 days if no improvement or worsening of symptoms..    Electronically Signed By:  WILMA Calle

## 2019-10-04 RX ORDER — DORZOLAMIDE HYDROCHLORIDE AND TIMOLOL MALEATE 20; 5 MG/ML; MG/ML
1 SOLUTION/ DROPS OPHTHALMIC DAILY
Qty: 10 ML | Refills: 0 | OUTPATIENT
Start: 2019-10-04

## 2019-10-04 RX ORDER — LATANOPROST 50 UG/ML
1 SOLUTION/ DROPS OPHTHALMIC NIGHTLY
Qty: 2.5 ML | Refills: 0 | OUTPATIENT
Start: 2019-10-04

## 2019-10-04 RX ORDER — LISINOPRIL 20 MG/1
20 TABLET ORAL EVERY 12 HOURS
Qty: 60 TABLET | Refills: 5 | Status: SHIPPED | OUTPATIENT
Start: 2019-10-04 | End: 2020-09-22

## 2019-10-15 ENCOUNTER — HOSPITAL ENCOUNTER (OUTPATIENT)
Dept: GENERAL RADIOLOGY | Facility: HOSPITAL | Age: 74
Discharge: HOME | End: 2019-10-15
Admitting: NURSE PRACTITIONER

## 2019-10-15 DIAGNOSIS — M54.6 ACUTE LEFT-SIDED THORACIC BACK PAIN: ICD-10-CM

## 2019-10-15 PROCEDURE — 71101 X-RAY EXAM UNILAT RIBS/CHEST: CPT

## 2019-10-18 ENCOUNTER — TELEPHONE (OUTPATIENT)
Dept: INTERNAL MEDICINE | Facility: CLINIC | Age: 74
End: 2019-10-18

## 2019-10-21 ENCOUNTER — TELEPHONE (OUTPATIENT)
Dept: INTERNAL MEDICINE | Facility: CLINIC | Age: 74
End: 2019-10-21

## 2019-10-21 NOTE — TELEPHONE ENCOUNTER
----- Message from WILMA Calle sent at 10/16/2019  6:54 PM EDT -----  Please call patient and inform no evidence of infectious process on x-ray (pneumonia) or fluid overload. There is some enlargement of the heart which is similar to last chest x-ray earlier this year. Advise to follow-up with PCP for new, worsening, or persistent symptoms.

## 2019-10-21 NOTE — TELEPHONE ENCOUNTER
Patient notified of results and states that she is still in a lot of pain. She is taking tylenol every four hours and sometimes has to take more often for relief.     Please advise on what she can try for pain.

## 2019-10-23 ENCOUNTER — TELEPHONE (OUTPATIENT)
Dept: INTERNAL MEDICINE | Facility: CLINIC | Age: 74
End: 2019-10-23

## 2019-10-23 NOTE — TELEPHONE ENCOUNTER
PATIENT CALLED REQUESTING TO HAVE DR PATRICK ORDER AN MRI FOR HER BACK PAIN. SHE STATES IT IS GETTING WORSE AND IS KEEPING HER AWAKE THROUGH THE NIGHT. PATIENT IS HAVING DIALYSIS TODAY AND WILL NOT BE HOME UNTIL AROUND 4:30 PM.

## 2019-10-23 NOTE — TELEPHONE ENCOUNTER
Patient needs to make an apt with PCP for further evaluation and management as previously advised. I have seen her once for an acute visit in which she thought she had pneumonia, x-ray results were negative. She denied injury or trauma at the time. Thank you.

## 2019-10-24 ENCOUNTER — TELEPHONE (OUTPATIENT)
Dept: INTERNAL MEDICINE | Facility: CLINIC | Age: 74
End: 2019-10-24

## 2019-10-25 ENCOUNTER — OFFICE VISIT (OUTPATIENT)
Dept: INTERNAL MEDICINE | Facility: CLINIC | Age: 74
End: 2019-10-25

## 2019-10-25 VITALS
WEIGHT: 187.2 LBS | HEIGHT: 67 IN | HEART RATE: 64 BPM | DIASTOLIC BLOOD PRESSURE: 72 MMHG | OXYGEN SATURATION: 96 % | BODY MASS INDEX: 29.38 KG/M2 | RESPIRATION RATE: 16 BRPM | SYSTOLIC BLOOD PRESSURE: 182 MMHG

## 2019-10-25 DIAGNOSIS — M54.9 UPPER BACK PAIN: Primary | ICD-10-CM

## 2019-10-25 DIAGNOSIS — I25.119 CORONARY ARTERY DISEASE WITH ANGINA PECTORIS, UNSPECIFIED VESSEL OR LESION TYPE, UNSPECIFIED WHETHER NATIVE OR TRANSPLANTED HEART (HCC): ICD-10-CM

## 2019-10-25 DIAGNOSIS — I10 ESSENTIAL HYPERTENSION: ICD-10-CM

## 2019-10-25 PROCEDURE — 99213 OFFICE O/P EST LOW 20 MIN: CPT | Performed by: INTERNAL MEDICINE

## 2019-10-25 RX ORDER — FLUOCINONIDE 0.5 MG/G
1 OINTMENT TOPICAL 2 TIMES DAILY
Refills: 2 | Status: ON HOLD | COMMUNITY
Start: 2019-10-01

## 2019-10-25 RX ORDER — TRAMADOL HYDROCHLORIDE 50 MG/1
50 TABLET ORAL EVERY 6 HOURS PRN
Qty: 18 TABLET | Refills: 0 | Status: SHIPPED | OUTPATIENT
Start: 2019-10-25 | End: 2020-04-21

## 2019-10-25 RX ORDER — DORZOLAMIDE HYDROCHLORIDE AND TIMOLOL MALEATE 20; 5 MG/ML; MG/ML
1 SOLUTION/ DROPS OPHTHALMIC DAILY
Qty: 10 ML | Refills: 3 | Status: ON HOLD | OUTPATIENT
Start: 2019-10-25

## 2019-10-25 RX ORDER — CARVEDILOL 25 MG/1
25 TABLET ORAL EVERY 12 HOURS SCHEDULED
Qty: 60 TABLET | Refills: 5 | Status: ON HOLD | OUTPATIENT
Start: 2019-10-25 | End: 2021-01-23 | Stop reason: SDUPTHER

## 2019-10-25 RX ORDER — DESONIDE 0.5 MG/G
0.05 CREAM TOPICAL 2 TIMES DAILY
Refills: 1 | COMMUNITY
Start: 2019-10-01 | End: 2023-02-07

## 2019-10-25 RX ORDER — CARVEDILOL 25 MG/1
25 TABLET ORAL EVERY 12 HOURS SCHEDULED
Qty: 60 TABLET | Refills: 5 | Status: SHIPPED | OUTPATIENT
Start: 2019-10-25 | End: 2019-10-25 | Stop reason: SDUPTHER

## 2019-10-25 NOTE — PROGRESS NOTES
Back Pain (lft side, x1 week also discuss need for MRI)    Subjective   Esperanza Pop is a 74 y.o. female is here today for follow-up.    History of Present Illness   Here for evaluation of her back pain- and , unsure of trigger. Tylenol- made her shaky.  Started 3 weeks ago, worse when she walks or moves.    Current Outpatient Medications:   •  amiodarone (PACERONE) 100 MG tablet, Take 1 tablet by mouth Daily., Disp: 90 tablet, Rfl: 3  •  ammonium lactate (LAC-HYDRIN) 12 % lotion, Apply  topically to the appropriate area as directed As Needed for Dry Skin., Disp: 500 g, Rfl: 3  •  aspirin 81 MG EC tablet, Take 1 tablet by mouth Daily. (Patient taking differently: Take 81 mg by mouth Every Other Day.), Disp: , Rfl:   •  atorvastatin (LIPITOR) 80 MG tablet, Take 1 tablet by mouth Every Night., Disp: 90 tablet, Rfl: 1  •  Blood Glucose Monitoring Suppl (ACCU-CHEK NADER PLUS) W/DEVICE kit, DX: E11.65, Disp: 1 kit, Rfl: 0  •  carvedilol (COREG) 25 MG tablet, Take 1 tablet by mouth Every 12 (Twelve) Hours., Disp: 60 tablet, Rfl: 5  •  cephalexin (KEFLEX) 500 MG capsule, Take 1 capsule by mouth 2 (Two) Times a Day., Disp: 14 capsule, Rfl: 0  •  cetirizine (zyrTEC) 10 MG tablet, Take 1 tablet by mouth As Needed., Disp: , Rfl: 11  •  Cholecalciferol (VITAMIN D3) 2000 units capsule, Take 2,000 Units by mouth Daily., Disp: , Rfl: 3  •  CloNIDine (CATAPRES) 0.2 MG tablet, Take 1 tablet by mouth 2 (Two) Times a Day., Disp: 60 tablet, Rfl: 5  •  clopidogrel (PLAVIX) 75 MG tablet, Take 1 tablet by mouth Daily., Disp: 30 tablet, Rfl: 5  •  dorzolamide-timolol (COSOPT) 22.3-6.8 MG/ML ophthalmic solution, Administer 1 drop to both eyes Daily., Disp: 10 mL, Rfl: 3  •  doxycycline (VIBRAMYCIN) 100 MG capsule, Take 1 capsule by mouth 2 (Two) Times a Day., Disp: 14 capsule, Rfl: 0  •  Ferric Citrate 1  MG(Fe) tablet, Take 1 tablet by mouth As Needed., Disp: , Rfl:   •  Ferrous Sulfate (IRON) 325 (65 Fe) MG tablet, Take 1 tablet by  mouth 2 (Two) Times a Day., Disp: , Rfl: 5  •  fluticasone (FLONASE) 50 MCG/ACT nasal spray, 2 sprays into each nostril Daily., Disp: , Rfl:   •  glucose blood (ACCU-CHEK NADER PLUS) test strip, Use as instructed 1 TO 3 TIMES DAILY  DX: E11.65, Disp: 300 each, Rfl: 3  •  insulin NPH-insulin regular (humuLIN 70/30,novoLIN 70/30) (70-30) 100 UNIT/ML injection, Inject 17 units before dinner and 15 Units before breakfast., Disp: 10 mL, Rfl: 5  •  IRON DEXTRAN IJ, Inject  as directed 3 (Three) Times a Week., Disp: , Rfl:   •  isosorbide mononitrate (IMDUR) 120 MG 24 hr tablet, Take 1 tablet by mouth Daily., Disp: 90 tablet, Rfl: 3  •  Lancets Misc. (ACCU-CHEK SOFTCLIX LANCET DEV) kit, TEST 1-3 TIMES DAILY DX:E11.65, Disp: 1 kit, Rfl: 0  •  latanoprost (XALATAN) 0.005 % ophthalmic solution, Administer 1 drop to both eyes Every Night., Disp: , Rfl:   •  lidocaine-prilocaine (EMLA) 2.5-2.5 % cream, APPLY SMALL AMOUNT TO ACCESS SITE (AVF) 1 TO 2 HOURS BEFORE DIALYSIS. COVER WITH OCCLUSIVE DRESSING (SARAN WRAP), Disp: , Rfl: 6  •  lisinopril (PRINIVIL,ZESTRIL) 20 MG tablet, Take 1 tablet by mouth Every 12 (Twelve) Hours., Disp: 60 tablet, Rfl: 5  •  nitroglycerin (NITROLINGUAL) 0.4 MG/SPRAY spray, Place 1 spray under the tongue Every 5 (Five) Minutes As Needed for Chest Pain., Disp: 1 each, Rfl: 12  •  terazosin (HYTRIN) 2 MG capsule, Take 1 capsule by mouth Every Night., Disp: 90 capsule, Rfl: 3  •  torsemide (DEMADEX) 100 MG tablet, Take 100 mg by mouth Daily As Needed., Disp: , Rfl:   •  desonide (DESOWEN) 0.05 % cream, Apply 0.05 application topically to the appropriate area as directed 2 (Two) Times a Day., Disp: , Rfl: 1  •  fluocinonide (LIDEX) 0.05 % external solution, Apply 0.05 application topically to the appropriate area as directed 2 (Two) Times a Day., Disp: , Rfl: 2  •  traMADol (ULTRAM) 50 MG tablet, Take 1 tablet by mouth Every 6 (Six) Hours As Needed for Moderate Pain ., Disp: 18 tablet, Rfl: 0      The  "following portions of the patient's history were reviewed and updated as appropriate: allergies, current medications, past family history, past medical history, past social history, past surgical history and problem list.    Review of Systems   Constitutional: Positive for activity change and fatigue. Negative for appetite change, chills, fever and unexpected weight change.   HENT: Negative for ear discharge, ear pain, sinus pressure and sore throat.    Respiratory: Negative for cough, chest tightness and shortness of breath.    Cardiovascular: Negative for chest pain, palpitations and leg swelling.   Gastrointestinal: Negative for diarrhea, nausea and vomiting.   Musculoskeletal: Positive for arthralgias and back pain. Negative for myalgias.   Neurological: Negative for dizziness, syncope and headaches.   Psychiatric/Behavioral: Negative for confusion and sleep disturbance.       Objective   BP (!) 182/72   Pulse 64   Resp 16   Ht 170.2 cm (67.01\")   Wt 84.9 kg (187 lb 3.2 oz)   LMP  (LMP Unknown)   SpO2 96%   BMI 29.31 kg/m²   Physical Exam   Constitutional: She is oriented to person, place, and time. She appears well-developed and well-nourished.   HENT:   Head: Normocephalic and atraumatic.   Eyes: Conjunctivae are normal. Pupils are equal, round, and reactive to light.   Neck: Neck supple. No thyromegaly present.   Cardiovascular: Normal rate and regular rhythm.   Pulmonary/Chest: Effort normal and breath sounds normal.   Abdominal: Soft. Bowel sounds are normal. She exhibits no distension. There is no tenderness.   Musculoskeletal: She exhibits tenderness (left upper back pain). She exhibits no edema.   Neurological: She is alert and oriented to person, place, and time. No cranial nerve deficit.   Skin: Skin is warm and dry.   Psychiatric: She has a normal mood and affect. Judgment normal.   Nursing note and vitals reviewed.        Results for orders placed or performed in visit on 09/10/19 "   Comprehensive metabolic panel   Result Value Ref Range    Glucose 207 (H) 65 - 99 mg/dL    BUN 31 (H) 8 - 23 mg/dL    Creatinine 5.76 (H) 0.57 - 1.00 mg/dL    eGFR Non African Am 7 (L) >60 mL/min/1.73    eGFR African Am 9 (L) >60 mL/min/1.73    BUN/Creatinine Ratio 5.4 (L) 7.0 - 25.0    Sodium 141 136 - 145 mmol/L    Potassium 4.3 3.5 - 5.2 mmol/L    Chloride 96 (L) 98 - 107 mmol/L    Total CO2 32.8 (H) 22.0 - 29.0 mmol/L    Calcium 8.8 8.6 - 10.5 mg/dL    Total Protein 7.5 6.0 - 8.5 g/dL    Albumin 3.70 3.50 - 5.20 g/dL    Globulin 3.8 gm/dL    A/G Ratio 1.0 g/dL    Total Bilirubin 0.3 0.2 - 1.2 mg/dL    Alkaline Phosphatase 98 39 - 117 U/L    AST (SGOT) 10 1 - 32 U/L    ALT (SGPT) 6 1 - 33 U/L   Lipid panel   Result Value Ref Range    Total Cholesterol 167 0 - 200 mg/dL    Triglycerides 106 0 - 150 mg/dL    HDL Cholesterol 57 40 - 60 mg/dL    VLDL Cholesterol 21.2 mg/dL    LDL Cholesterol  89 0 - 100 mg/dL   Manual Differential   Result Value Ref Range    Neutrophil Rel % 54.0 42.7 - 76.0 %    Lymphocyte Rel % 29.0 19.6 - 45.3 %    Monocyte Rel % 14.0 (H) 5.0 - 12.0 %    Eosinophil Rel % 3.0 0.3 - 6.2 %    Basophil Rel % 0.0 0.0 - 1.5 %    Neutrophils Absolute 3.43 1.70 - 7.00 10*3/mm3    Lymphocytes Absolute 1.84 0.70 - 3.10 10*3/mm3    Monocytes Absolute 0.89 0.10 - 0.90 10*3/mm3    Eosinophil Abs 0.19 0.00 - 0.40 10*3/mm3    Basophils Absolute 0.00 0.00 - 0.20 10*3/mm3    Differential Comment Comment     Comment Comment     Plt Comment Comment    POCT Glucose   Result Value Ref Range    Glucose 216 (A) 70 - 130 mg/dL   POC Glycosylated Hemoglobin (Hb A1C)   Result Value Ref Range    Hemoglobin A1C 8.2 %   CBC w AUTO Differential   Result Value Ref Range    WBC 6.35 3.40 - 10.80 10*3/mm3    RBC 4.28 3.77 - 5.28 10*6/mm3    Hemoglobin 8.9 (L) 12.0 - 15.9 g/dL    Hematocrit 34.9 34.0 - 46.6 %    MCV 81.5 79.0 - 97.0 fL    MCH 20.8 (L) 26.6 - 33.0 pg    MCHC 25.5 (L) 31.5 - 35.7 g/dL    RDW 18.2 (H) 12.3 - 15.4 %     Platelets 189 140 - 450 10*3/mm3    Neutrophil Rel % CANCELED     Lymphocyte Rel % CANCELED     Monocyte Rel % CANCELED     Eosinophil Rel % CANCELED     Lymphocytes Absolute CANCELED     Eosinophils Absolute CANCELED     Basophils Absolute CANCELED              Assessment/Plan   Diagnoses and all orders for this visit:    Upper back pain  Comments:  check xrays , and if okay start PT and proceed with MRI.  Orders:  -     XR spine thoracic 2 vw  -     Ambulatory Referral to Physical Therapy Evaluate and treat  -     traMADol (ULTRAM) 50 MG tablet; Take 1 tablet by mouth Every 6 (Six) Hours As Needed for Moderate Pain .    Essential hypertension  -     Discontinue: carvedilol (COREG) 25 MG tablet; Take 1 tablet by mouth Every 12 (Twelve) Hours.  -     carvedilol (COREG) 25 MG tablet; Take 1 tablet by mouth Every 12 (Twelve) Hours.    Coronary artery disease with angina pectoris, unspecified vessel or lesion type, unspecified whether native or transplanted heart (CMS/HCC)  -     Discontinue: carvedilol (COREG) 25 MG tablet; Take 1 tablet by mouth Every 12 (Twelve) Hours.  -     carvedilol (COREG) 25 MG tablet; Take 1 tablet by mouth Every 12 (Twelve) Hours.    Other orders  -     desonide (DESOWEN) 0.05 % cream; Apply 0.05 application topically to the appropriate area as directed 2 (Two) Times a Day.  -     fluocinonide (LIDEX) 0.05 % external solution; Apply 0.05 application topically to the appropriate area as directed 2 (Two) Times a Day.  -     dorzolamide-timolol (COSOPT) 22.3-6.8 MG/ML ophthalmic solution; Administer 1 drop to both eyes Daily.             Return for Next scheduled follow up.

## 2019-10-29 ENCOUNTER — HOSPITAL ENCOUNTER (OUTPATIENT)
Dept: GENERAL RADIOLOGY | Facility: HOSPITAL | Age: 74
Discharge: HOME | End: 2019-10-29
Admitting: INTERNAL MEDICINE

## 2019-10-29 PROCEDURE — 72070 X-RAY EXAM THORAC SPINE 2VWS: CPT

## 2019-11-14 ENCOUNTER — TELEPHONE (OUTPATIENT)
Dept: INTERNAL MEDICINE | Facility: CLINIC | Age: 74
End: 2019-11-14

## 2019-11-14 NOTE — TELEPHONE ENCOUNTER
Please check why she needs the diagnostic mamm?  She may need to be seen as we have to document the issue at her visit before ordering the test.  The MRI of the back will only show her spine, not her pancreas or breast.  I will order the MRI if her back pain is worse, but it will not help with the other issues.    thanks

## 2019-11-14 NOTE — TELEPHONE ENCOUNTER
PATIENT CALLING FOR A REFERRAL FOR DIAGNOSTIC MAMMOGRAM ON [facility] [address]  PLEASE CALL [phone]    ALSO, WANTS TO KNOW IF A MRI BACK SCAN THAT HAS BEEN DISCUSSED  SHOULD BE DONE, BECAUSE OF HER MOTHER'S PANCREATIC CANCER HISTORY AND HER BREAST CANCER HISTORY

## 2019-11-25 ENCOUNTER — HOSPITAL ENCOUNTER (EMERGENCY)
Facility: HOSPITAL | Age: 74
Discharge: HOME | End: 2019-11-25
Attending: EMERGENCY MEDICINE | Admitting: EMERGENCY MEDICINE

## 2019-11-25 ENCOUNTER — TELEPHONE (OUTPATIENT)
Dept: CARDIOLOGY | Facility: CLINIC | Age: 74
End: 2019-11-25

## 2019-11-25 VITALS
HEIGHT: 67 IN | TEMPERATURE: 98.9 F | DIASTOLIC BLOOD PRESSURE: 86 MMHG | RESPIRATION RATE: 18 BRPM | BODY MASS INDEX: 28.56 KG/M2 | SYSTOLIC BLOOD PRESSURE: 215 MMHG | OXYGEN SATURATION: 97 % | WEIGHT: 182 LBS | HEART RATE: 79 BPM

## 2019-11-25 DIAGNOSIS — I10 UNCONTROLLED HYPERTENSION: Primary | ICD-10-CM

## 2019-11-25 LAB
ALBUMIN SERPL-MCNC: 3.6 G/DL (ref 3.5–5.2)
ALBUMIN/GLOB SERPL: 0.8 G/DL
ALP SERPL-CCNC: 99 U/L (ref 39–117)
ALT SERPL W P-5'-P-CCNC: 5 U/L (ref 1–33)
ANION GAP SERPL CALCULATED.3IONS-SCNC: 13 MMOL/L (ref 5–15)
AST SERPL-CCNC: 17 U/L (ref 1–32)
BASOPHILS # BLD AUTO: 0.04 10*3/MM3 (ref 0–0.2)
BASOPHILS NFR BLD AUTO: 0.5 % (ref 0–1.5)
BILIRUB SERPL-MCNC: 0.4 MG/DL (ref 0.2–1.2)
BUN BLD-MCNC: 32 MG/DL (ref 8–23)
BUN/CREAT SERPL: 5.7 (ref 7–25)
CALCIUM SPEC-SCNC: 9.6 MG/DL (ref 8.6–10.5)
CHLORIDE SERPL-SCNC: 94 MMOL/L (ref 98–107)
CO2 SERPL-SCNC: 32 MMOL/L (ref 22–29)
CREAT BLD-MCNC: 5.59 MG/DL (ref 0.57–1)
DEPRECATED RDW RBC AUTO: 58.9 FL (ref 37–54)
EOSINOPHIL # BLD AUTO: 0.17 10*3/MM3 (ref 0–0.4)
EOSINOPHIL NFR BLD AUTO: 2.2 % (ref 0.3–6.2)
ERYTHROCYTE [DISTWIDTH] IN BLOOD BY AUTOMATED COUNT: 19.2 % (ref 12.3–15.4)
GFR SERPL CREATININE-BSD FRML MDRD: 9 ML/MIN/1.73
GFR SERPL CREATININE-BSD FRML MDRD: ABNORMAL ML/MIN/{1.73_M2}
GLOBULIN UR ELPH-MCNC: 4.8 GM/DL
GLUCOSE BLD-MCNC: 299 MG/DL (ref 65–99)
HCT VFR BLD AUTO: 36.5 % (ref 34–46.6)
HGB BLD-MCNC: 9.7 G/DL (ref 12–15.9)
HOLD SPECIMEN: NORMAL
HOLD SPECIMEN: NORMAL
IMM GRANULOCYTES # BLD AUTO: 0.02 10*3/MM3 (ref 0–0.05)
IMM GRANULOCYTES NFR BLD AUTO: 0.3 % (ref 0–0.5)
LYMPHOCYTES # BLD AUTO: 2.27 10*3/MM3 (ref 0.7–3.1)
LYMPHOCYTES NFR BLD AUTO: 29.4 % (ref 19.6–45.3)
MCH RBC QN AUTO: 22.3 PG (ref 26.6–33)
MCHC RBC AUTO-ENTMCNC: 26.6 G/DL (ref 31.5–35.7)
MCV RBC AUTO: 83.9 FL (ref 79–97)
MONOCYTES # BLD AUTO: 1.33 10*3/MM3 (ref 0.1–0.9)
MONOCYTES NFR BLD AUTO: 17.2 % (ref 5–12)
NEUTROPHILS # BLD AUTO: 3.9 10*3/MM3 (ref 1.7–7)
NEUTROPHILS NFR BLD AUTO: 50.4 % (ref 42.7–76)
NRBC BLD AUTO-RTO: 0 /100 WBC (ref 0–0.2)
PLATELET # BLD AUTO: 298 10*3/MM3 (ref 140–450)
PMV BLD AUTO: 11.7 FL (ref 6–12)
POTASSIUM BLD-SCNC: 3.7 MMOL/L (ref 3.5–5.2)
PROT SERPL-MCNC: 8.4 G/DL (ref 6–8.5)
RBC # BLD AUTO: 4.35 10*6/MM3 (ref 3.77–5.28)
SODIUM BLD-SCNC: 139 MMOL/L (ref 136–145)
WBC NRBC COR # BLD: 7.73 10*3/MM3 (ref 3.4–10.8)
WHOLE BLOOD HOLD SPECIMEN: NORMAL
WHOLE BLOOD HOLD SPECIMEN: NORMAL

## 2019-11-25 PROCEDURE — 80053 COMPREHEN METABOLIC PANEL: CPT | Performed by: EMERGENCY MEDICINE

## 2019-11-25 PROCEDURE — 85025 COMPLETE CBC W/AUTO DIFF WBC: CPT | Performed by: EMERGENCY MEDICINE

## 2019-11-25 PROCEDURE — 99284 EMERGENCY DEPT VISIT MOD MDM: CPT

## 2019-11-25 RX ORDER — AMLODIPINE BESYLATE 5 MG/1
5 TABLET ORAL
Status: DISCONTINUED | OUTPATIENT
Start: 2019-11-25 | End: 2019-11-26 | Stop reason: HOSPADM

## 2019-11-25 RX ORDER — HYDRALAZINE HYDROCHLORIDE 25 MG/1
50 TABLET, FILM COATED ORAL ONCE
Status: COMPLETED | OUTPATIENT
Start: 2019-11-25 | End: 2019-11-25

## 2019-11-25 RX ORDER — AMLODIPINE BESYLATE 5 MG/1
5 TABLET ORAL DAILY
Qty: 30 TABLET | Refills: 0 | Status: SHIPPED | OUTPATIENT
Start: 2019-11-25 | End: 2019-12-10

## 2019-11-25 RX ORDER — SODIUM CHLORIDE 0.9 % (FLUSH) 0.9 %
10 SYRINGE (ML) INJECTION AS NEEDED
Status: DISCONTINUED | OUTPATIENT
Start: 2019-11-25 | End: 2019-11-26 | Stop reason: HOSPADM

## 2019-11-25 RX ORDER — CLONIDINE HYDROCHLORIDE 0.1 MG/1
0.1 TABLET ORAL ONCE
Status: COMPLETED | OUTPATIENT
Start: 2019-11-25 | End: 2019-11-25

## 2019-11-25 RX ADMIN — HYDRALAZINE HYDROCHLORIDE 50 MG: 25 TABLET, FILM COATED ORAL at 18:50

## 2019-11-25 RX ADMIN — CLONIDINE HYDROCHLORIDE 0.1 MG: 0.1 TABLET ORAL at 20:29

## 2019-11-25 RX ADMIN — AMLODIPINE BESYLATE 5 MG: 5 TABLET ORAL at 20:35

## 2019-11-25 NOTE — TELEPHONE ENCOUNTER
Pt called and stated that her BP has been high. 200s/75    Terazosin 2 mg at night  Carvedilol 12.5 mg BID -states medication makes her retain fluid  Lisinopril 20 mg BID  Clonidine 0.2 mg BID  Amiodarone 100 mg daily  Imdur 120 mg daily  Plavix 75 mg  Torsemide 100 mg dose Sunday 11/24 and Monday 11/25    200/80 currently    Pt states that BP runns 150s/70s after morning medications. Pt's BP runs around 115-130s/50s-60s after dialysis. Pt has dialysis Sunday/tuesday/friday this week, pt is on 3 day a week schedule.     Pt states that she has a slight headache that comes and goes. Pt has slight SOB early in the morning.    Pt denies chest pain, blurred vision.    Advised pt to go to the ER due to high blood pressure accompanied with headache.    Pt verbalizes understanding and agreeable to plan.

## 2019-11-26 ENCOUNTER — EPISODE CHANGES (OUTPATIENT)
Dept: CASE MANAGEMENT | Facility: OTHER | Age: 74
End: 2019-11-26

## 2019-12-03 ENCOUNTER — OFFICE VISIT (OUTPATIENT)
Dept: INTERNAL MEDICINE | Facility: CLINIC | Age: 74
End: 2019-12-03

## 2019-12-03 VITALS
SYSTOLIC BLOOD PRESSURE: 116 MMHG | HEART RATE: 71 BPM | HEIGHT: 67 IN | BODY MASS INDEX: 27.62 KG/M2 | DIASTOLIC BLOOD PRESSURE: 46 MMHG | TEMPERATURE: 98 F | OXYGEN SATURATION: 95 % | WEIGHT: 176 LBS

## 2019-12-03 DIAGNOSIS — N64.4 BREAST PAIN, LEFT: Primary | ICD-10-CM

## 2019-12-03 PROCEDURE — 99213 OFFICE O/P EST LOW 20 MIN: CPT | Performed by: NURSE PRACTITIONER

## 2019-12-03 NOTE — PROGRESS NOTES
Chief Complaint   Patient presents with   • breast exam       History of Present Illness    Esperanza Pop is a 74 y.o. female who presents today for breast pain and itching of nipple. She last had a mammogram 10/2017 with results BI-RADS 1 with recommendation for annual screening. Has history of right mastectomy post right sided breast cancer 8-9 years ago, as well as left breast reduction mammoplasty. Reports itching of left nipple for a prolonged period of time, unchanged. Sharp pains of left breast rarely approx. once every month or two. Denies change in nipple. She has not felt any breast lumps on exam. Does have some tenderness to touch of left breast. She reports increased weight loss, 11lbs since last visit 10/25/19. Has lost appetite and feels increasingly weak. Had dialysis yesterday 2+ L removed.    PMSFH    The following portions of the patient's history were reviewed and updated as appropriate: allergies, current medications, past family history, past medical history, past social history, past surgical history and problem list.     Social History     Tobacco Use   • Smoking status: Never Smoker   • Smokeless tobacco: Never Used   • Tobacco comment: Michael second hand smoke   Substance Use Topics   • Alcohol use: No     Comment: rarely       Past Medical History:   Diagnosis Date   • Acute bronchitis    • Acute conjunctivitis    • Acute kidney injury (CMS/[facility])     Admission from 12/26/2013 to 01/02/2014, now resolved with latest creatinine 1.4 on 01/08/2014.   • Acute non-ST segment elevation myocardial infarction (STEMI) following previous myocardial infarction    • Breast cancer, female, right 5/20/2016 2005   • Cancer (CMS/[facility])    • CHF (congestive heart failure) (CMS/[facility])    • Clotting disorder (CMS/[facility])    • Diabetes mellitus (CMS/HCC)    • Diarrhea    • Dyslipidemia    • Dyspepsia    • Dyspnea    • Edema    • Hx of radiation therapy    • Hyperlipidemia    • Hypertension     Severe   • Ischemic heart  disease    • Moderate obesity     BMI 36.2   • Myocardial infarction (CMS/HCC)    • Pneumonia    • Pneumonia 02/2019   • Radiation 2005   • Renal disorder    • Seasonal allergies        Past Surgical History:   Procedure Laterality Date   • AV FISTULA PLACEMENT, BRACHIOBASILIC     • BREAST BIOPSY Left 2010   • BREAST CYST EXCISION     • BREAST LUMPECTOMY Right 2005   • BREAST SURGERY     • CARDIAC CATHETERIZATION N/A 10/10/2016    Procedure: Left Heart Cath;  Surgeon: Scooter Zhao MD;  Location:  [facility];  Service:    • CARDIAC CATHETERIZATION  10/2016   • HERNIA REPAIR     • HYSTERECTOMY  2000   • INSERTION HEMODIALYSIS CATHETER Right 6/29/2016    Procedure: HEMODIALYSIS CATHETER INSERTION TUNNELLED;  Surgeon: Ramón Chavez MD;  Location:  [facility];  Service:    • MASTECTOMY Right 2006   • REDUCTION MAMMAPLASTY Left 2005       Family History   Problem Relation Age of Onset   • Stroke Mother    • Cancer Mother    • Coronary artery disease Father    • Heart failure Father    • Breast cancer Sister 55   • Ovarian cancer Neg Hx    • Endometrial cancer Neg Hx        Allergies   Allergen Reactions   • Albuterol      Increased blood pressure  Used right before heart attack   • Mircera [Methoxy Polyethylene Glycol-Epoetin Beta]      Pt stopped breathing   • Penicillins Hives   • Prednisone      Makes jittery/anxious   • Venofer [Iron Sucrose]      Pt stopped breathing         Current Outpatient Medications:   •  amiodarone (PACERONE) 100 MG tablet, Take 1 tablet by mouth Daily., Disp: 90 tablet, Rfl: 3  •  amLODIPine (NORVASC) 5 MG tablet, Take 1 tablet by mouth Daily., Disp: 30 tablet, Rfl: 0  •  ammonium lactate (LAC-HYDRIN) 12 % lotion, Apply  topically to the appropriate area as directed As Needed for Dry Skin., Disp: 500 g, Rfl: 3  •  aspirin 81 MG EC tablet, Take 1 tablet by mouth Daily. (Patient taking differently: Take 81 mg by mouth Every Other Day.), Disp: , Rfl:   •   atorvastatin (LIPITOR) 80 MG tablet, Take 1 tablet by mouth Every Night., Disp: 90 tablet, Rfl: 1  •  Blood Glucose Monitoring Suppl (ACCU-CHEK NADER PLUS) W/DEVICE kit, DX: E11.65, Disp: 1 kit, Rfl: 0  •  carvedilol (COREG) 25 MG tablet, Take 1 tablet by mouth Every 12 (Twelve) Hours., Disp: 60 tablet, Rfl: 5  •  cephalexin (KEFLEX) 500 MG capsule, Take 1 capsule by mouth 2 (Two) Times a Day., Disp: 14 capsule, Rfl: 0  •  cetirizine (zyrTEC) 10 MG tablet, Take 1 tablet by mouth As Needed., Disp: , Rfl: 11  •  Cholecalciferol (VITAMIN D3) 2000 units capsule, Take 2,000 Units by mouth Daily., Disp: , Rfl: 3  •  CloNIDine (CATAPRES) 0.2 MG tablet, Take 1 tablet by mouth 2 (Two) Times a Day., Disp: 60 tablet, Rfl: 5  •  clopidogrel (PLAVIX) 75 MG tablet, Take 1 tablet by mouth Daily., Disp: 30 tablet, Rfl: 5  •  desonide (DESOWEN) 0.05 % cream, Apply 0.05 application topically to the appropriate area as directed 2 (Two) Times a Day., Disp: , Rfl: 1  •  dorzolamide-timolol (COSOPT) 22.3-6.8 MG/ML ophthalmic solution, Administer 1 drop to both eyes Daily., Disp: 10 mL, Rfl: 3  •  doxycycline (VIBRAMYCIN) 100 MG capsule, Take 1 capsule by mouth 2 (Two) Times a Day., Disp: 14 capsule, Rfl: 0  •  Ferric Citrate 1  MG(Fe) tablet, Take 1 tablet by mouth As Needed., Disp: , Rfl:   •  Ferrous Sulfate (IRON) 325 (65 Fe) MG tablet, Take 1 tablet by mouth 2 (Two) Times a Day., Disp: , Rfl: 5  •  fluocinonide (LIDEX) 0.05 % external solution, Apply 0.05 application topically to the appropriate area as directed 2 (Two) Times a Day., Disp: , Rfl: 2  •  fluticasone (FLONASE) 50 MCG/ACT nasal spray, 2 sprays into each nostril Daily., Disp: , Rfl:   •  glucose blood (ACCU-CHEK NADER PLUS) test strip, Use as instructed 1 TO 3 TIMES DAILY  DX: E11.65, Disp: 300 each, Rfl: 3  •  insulin NPH-insulin regular (humuLIN 70/30,novoLIN 70/30) (70-30) 100 UNIT/ML injection, Inject 17 units before dinner and 15 Units before breakfast., Disp:  10 mL, Rfl: 5  •  IRON DEXTRAN IJ, Inject  as directed 3 (Three) Times a Week., Disp: , Rfl:   •  isosorbide mononitrate (IMDUR) 120 MG 24 hr tablet, Take 1 tablet by mouth Daily., Disp: 90 tablet, Rfl: 3  •  Lancets Misc. (ACCU-CHEK SOFTCLIX LANCET DEV) kit, TEST 1-3 TIMES DAILY DX:E11.65, Disp: 1 kit, Rfl: 0  •  latanoprost (XALATAN) 0.005 % ophthalmic solution, Administer 1 drop to both eyes Every Night., Disp: , Rfl:   •  lidocaine-prilocaine (EMLA) 2.5-2.5 % cream, APPLY SMALL AMOUNT TO ACCESS SITE (AVF) 1 TO 2 HOURS BEFORE DIALYSIS. COVER WITH OCCLUSIVE DRESSING (SARAN WRAP), Disp: , Rfl: 6  •  lisinopril (PRINIVIL,ZESTRIL) 20 MG tablet, Take 1 tablet by mouth Every 12 (Twelve) Hours., Disp: 60 tablet, Rfl: 5  •  nitroglycerin (NITROLINGUAL) 0.4 MG/SPRAY spray, Place 1 spray under the tongue Every 5 (Five) Minutes As Needed for Chest Pain., Disp: 1 each, Rfl: 12  •  terazosin (HYTRIN) 2 MG capsule, Take 1 capsule by mouth Every Night., Disp: 90 capsule, Rfl: 3  •  torsemide (DEMADEX) 100 MG tablet, Take 100 mg by mouth Daily As Needed., Disp: , Rfl:   •  traMADol (ULTRAM) 50 MG tablet, Take 1 tablet by mouth Every 6 (Six) Hours As Needed for Moderate Pain ., Disp: 18 tablet, Rfl: 0    Review of Systems  Review of Systems   Constitutional: Positive for unexpected weight change (weight loss). Negative for appetite change, chills, diaphoresis, fatigue and fever.   Respiratory: Negative for cough, chest tightness, shortness of breath and wheezing.    Cardiovascular: Negative for chest pain and palpitations.   Gastrointestinal: Negative for abdominal pain, diarrhea, nausea and vomiting.   Musculoskeletal: Negative for myalgias.   Skin: Negative for color change, rash and wound.        Left nipple dryness   Neurological: Positive for weakness. Negative for dizziness, light-headedness and headaches.   Psychiatric/Behavioral: Negative for sleep disturbance.       Vitals:  Vitals:    12/03/19 0857 12/03/19 0923   BP:  "112/48 116/46   BP Location:  Left arm   Pulse: 71    Temp: 98 °F (36.7 °C)    TempSrc: Oral    SpO2: 95%    Weight: 79.8 kg (176 lb)    Height: 170.2 cm (67.01\")        Physical Exam  Physical Exam   Constitutional: She is oriented to person, place, and time. Vital signs are normal. She appears well-developed and well-nourished.   HENT:   Head: Normocephalic and atraumatic.   Eyes: Conjunctivae are normal. Pupils are equal, round, and reactive to light.   Cardiovascular: Normal rate, regular rhythm and normal heart sounds.   Pulmonary/Chest: Effort normal and breath sounds normal. No respiratory distress. She has no decreased breath sounds. She has no wheezes. She has no rhonchi. She has no rales. Right breast exhibits no mass, no skin change and no tenderness. Left breast exhibits tenderness. Left breast exhibits no inverted nipple, no mass, no nipple discharge and no skin change. Breasts are asymmetrical (right mastectomy). There is no breast swelling.   Lymphadenopathy:     She has no axillary adenopathy.        Right axillary: No pectoral and no lateral adenopathy present.        Left axillary: No pectoral and no lateral adenopathy present.  Neurological: She is alert and oriented to person, place, and time.   Skin: Skin is warm, dry and intact. Turgor is normal. No abrasion, no lesion and no rash noted. No erythema.   Psychiatric: She has a normal mood and affect. Her behavior is normal.   Nursing note and vitals reviewed.      Labs  Results for orders placed or performed during the hospital encounter of 11/25/19   Comprehensive Metabolic Panel   Result Value Ref Range    Glucose 299 (H) 65 - 99 mg/dL    BUN 32 (H) 8 - 23 mg/dL    Creatinine 5.59 (H) 0.57 - 1.00 mg/dL    Sodium 139 136 - 145 mmol/L    Potassium 3.7 3.5 - 5.2 mmol/L    Chloride 94 (L) 98 - 107 mmol/L    CO2 32.0 (H) 22.0 - 29.0 mmol/L    Calcium 9.6 8.6 - 10.5 mg/dL    Total Protein 8.4 6.0 - 8.5 g/dL    Albumin 3.60 3.50 - 5.20 g/dL    ALT " (SGPT) 5 1 - 33 U/L    AST (SGOT) 17 1 - 32 U/L    Alkaline Phosphatase 99 39 - 117 U/L    Total Bilirubin 0.4 0.2 - 1.2 mg/dL    eGFR Non  Amer      eGFR  African Amer 9 (L) >60 mL/min/1.73    Globulin 4.8 gm/dL    A/G Ratio 0.8 g/dL    BUN/Creatinine Ratio 5.7 (L) 7.0 - 25.0    Anion Gap 13.0 5.0 - 15.0 mmol/L   CBC Auto Differential   Result Value Ref Range    WBC 7.73 3.40 - 10.80 10*3/mm3    RBC 4.35 3.77 - 5.28 10*6/mm3    Hemoglobin 9.7 (L) 12.0 - 15.9 g/dL    Hematocrit 36.5 34.0 - 46.6 %    MCV 83.9 79.0 - 97.0 fL    MCH 22.3 (L) 26.6 - 33.0 pg    MCHC 26.6 (L) 31.5 - 35.7 g/dL    RDW 19.2 (H) 12.3 - 15.4 %    RDW-SD 58.9 (H) 37.0 - 54.0 fl    MPV 11.7 6.0 - 12.0 fL    Platelets 298 140 - 450 10*3/mm3    Neutrophil % 50.4 42.7 - 76.0 %    Lymphocyte % 29.4 19.6 - 45.3 %    Monocyte % 17.2 (H) 5.0 - 12.0 %    Eosinophil % 2.2 0.3 - 6.2 %    Basophil % 0.5 0.0 - 1.5 %    Immature Grans % 0.3 0.0 - 0.5 %    Neutrophils, Absolute 3.90 1.70 - 7.00 10*3/mm3    Lymphocytes, Absolute 2.27 0.70 - 3.10 10*3/mm3    Monocytes, Absolute 1.33 (H) 0.10 - 0.90 10*3/mm3    Eosinophils, Absolute 0.17 0.00 - 0.40 10*3/mm3    Basophils, Absolute 0.04 0.00 - 0.20 10*3/mm3    Immature Grans, Absolute 0.02 0.00 - 0.05 10*3/mm3    nRBC 0.0 0.0 - 0.2 /100 WBC   Light Blue Top   Result Value Ref Range    Extra Tube hold for add-on    Green Top (Gel)   Result Value Ref Range    Extra Tube Hold for add-ons.    Lavender Top   Result Value Ref Range    Extra Tube hold for add-on    Gold Top - SST   Result Value Ref Range    Extra Tube Hold for add-ons.        Assessment/Plan    Esperanza was seen today for breast exam.    Diagnoses and all orders for this visit:    Breast pain, left  -     Mammo diagnostic digital tomosynthesis left w CAD    Left diagnostic mammogram ordered for further evaluation and management. Physical exam unremarkable for lumps, bumps, nodules, or other abnormalities. Advised follow-up with PCP for evaluation of  chronic conditions given recent medication changes with HTN and fluctuation in weight due to dialysis.    Plan of care reviewed with patient at conclusion of today's visit. Patient education was provided regarding diagnosis, management, and prescribed or recommended OTC medications. Patient was informed to notify office of any new, worsening, or persistent symptoms. Patient verbalized understanding and agreement with plan of care.     Follow-Up  Return if symptoms worsen or fail to improve.    Electronically Signed By:  WILMA Calle

## 2019-12-10 ENCOUNTER — OFFICE VISIT (OUTPATIENT)
Dept: INTERNAL MEDICINE | Facility: CLINIC | Age: 74
End: 2019-12-10

## 2019-12-10 VITALS
BODY MASS INDEX: 28.09 KG/M2 | WEIGHT: 179 LBS | SYSTOLIC BLOOD PRESSURE: 146 MMHG | DIASTOLIC BLOOD PRESSURE: 62 MMHG | HEIGHT: 67 IN | OXYGEN SATURATION: 99 % | HEART RATE: 72 BPM

## 2019-12-10 DIAGNOSIS — E11.22 TYPE 2 DIABETES MELLITUS WITH CHRONIC KIDNEY DISEASE ON CHRONIC DIALYSIS, WITH LONG-TERM CURRENT USE OF INSULIN (HCC): Primary | ICD-10-CM

## 2019-12-10 DIAGNOSIS — Z79.4 TYPE 2 DIABETES MELLITUS WITH CHRONIC KIDNEY DISEASE ON CHRONIC DIALYSIS, WITH LONG-TERM CURRENT USE OF INSULIN (HCC): Primary | ICD-10-CM

## 2019-12-10 DIAGNOSIS — N18.6 TYPE 2 DIABETES MELLITUS WITH CHRONIC KIDNEY DISEASE ON CHRONIC DIALYSIS, WITH LONG-TERM CURRENT USE OF INSULIN (HCC): Primary | ICD-10-CM

## 2019-12-10 DIAGNOSIS — Z99.2 TYPE 2 DIABETES MELLITUS WITH CHRONIC KIDNEY DISEASE ON CHRONIC DIALYSIS, WITH LONG-TERM CURRENT USE OF INSULIN (HCC): Primary | ICD-10-CM

## 2019-12-10 DIAGNOSIS — I10 ESSENTIAL HYPERTENSION: ICD-10-CM

## 2019-12-10 DIAGNOSIS — H70.91 MASTOIDITIS OF RIGHT SIDE: ICD-10-CM

## 2019-12-10 LAB
GLUCOSE BLDC GLUCOMTR-MCNC: 198 MG/DL (ref 70–130)
HBA1C MFR BLD: 7.9 %

## 2019-12-10 PROCEDURE — 83036 HEMOGLOBIN GLYCOSYLATED A1C: CPT | Performed by: INTERNAL MEDICINE

## 2019-12-10 PROCEDURE — 82947 ASSAY GLUCOSE BLOOD QUANT: CPT | Performed by: INTERNAL MEDICINE

## 2019-12-10 PROCEDURE — 99214 OFFICE O/P EST MOD 30 MIN: CPT | Performed by: INTERNAL MEDICINE

## 2019-12-10 RX ORDER — CEFDINIR 300 MG/1
300 CAPSULE ORAL
Qty: 8 CAPSULE | Refills: 0 | Status: SHIPPED | OUTPATIENT
Start: 2019-12-10 | End: 2020-03-06

## 2019-12-10 NOTE — PROGRESS NOTES
Diabetes and Earache (down into neck)    Subjective   Esperanza Pop is a 74 y.o. female is here today for follow-up.    History of Present Illness   Was in the ER with HTN, and started on hydralazine and amlodpine but hasn't been able to fill the amlodipine.      Current Outpatient Medications:   •  amiodarone (PACERONE) 100 MG tablet, Take 1 tablet by mouth Daily., Disp: 90 tablet, Rfl: 3  •  ammonium lactate (LAC-HYDRIN) 12 % lotion, Apply  topically to the appropriate area as directed As Needed for Dry Skin., Disp: 500 g, Rfl: 3  •  aspirin 81 MG EC tablet, Take 1 tablet by mouth Daily. (Patient taking differently: Take 81 mg by mouth Every Other Day.), Disp: , Rfl:   •  atorvastatin (LIPITOR) 80 MG tablet, Take 1 tablet by mouth Every Night., Disp: 90 tablet, Rfl: 1  •  Blood Glucose Monitoring Suppl (ACCU-CHEK NADER PLUS) W/DEVICE kit, DX: E11.65, Disp: 1 kit, Rfl: 0  •  carvedilol (COREG) 25 MG tablet, Take 1 tablet by mouth Every 12 (Twelve) Hours., Disp: 60 tablet, Rfl: 5  •  cetirizine (zyrTEC) 10 MG tablet, Take 1 tablet by mouth As Needed., Disp: , Rfl: 11  •  Cholecalciferol (VITAMIN D3) 2000 units capsule, Take 2,000 Units by mouth Daily., Disp: , Rfl: 3  •  CloNIDine (CATAPRES) 0.2 MG tablet, Take 1 tablet by mouth 2 (Two) Times a Day., Disp: 60 tablet, Rfl: 5  •  clopidogrel (PLAVIX) 75 MG tablet, Take 1 tablet by mouth Daily., Disp: 30 tablet, Rfl: 5  •  desonide (DESOWEN) 0.05 % cream, Apply 0.05 application topically to the appropriate area as directed 2 (Two) Times a Day., Disp: , Rfl: 1  •  dorzolamide-timolol (COSOPT) 22.3-6.8 MG/ML ophthalmic solution, Administer 1 drop to both eyes Daily., Disp: 10 mL, Rfl: 3  •  Ferric Citrate 1  MG(Fe) tablet, Take 1 tablet by mouth As Needed., Disp: , Rfl:   •  Ferrous Sulfate (IRON) 325 (65 Fe) MG tablet, Take 1 tablet by mouth 2 (Two) Times a Day., Disp: , Rfl: 5  •  fluocinonide (LIDEX) 0.05 % external solution, Apply 0.05 application topically to  the appropriate area as directed 2 (Two) Times a Day., Disp: , Rfl: 2  •  fluticasone (FLONASE) 50 MCG/ACT nasal spray, 2 sprays into each nostril Daily., Disp: , Rfl:   •  glucose blood (ACCU-CHEK NADER PLUS) test strip, Use as instructed 1 TO 3 TIMES DAILY  DX: E11.65, Disp: 300 each, Rfl: 3  •  insulin NPH-insulin regular (humuLIN 70/30,novoLIN 70/30) (70-30) 100 UNIT/ML injection, Inject 17 units before dinner and 15 Units before breakfast., Disp: 10 mL, Rfl: 5  •  IRON DEXTRAN IJ, Inject  as directed 3 (Three) Times a Week., Disp: , Rfl:   •  isosorbide mononitrate (IMDUR) 120 MG 24 hr tablet, Take 1 tablet by mouth Daily., Disp: 90 tablet, Rfl: 3  •  Lancets Misc. (ACCU-CHEK SOFTCLIX LANCET DEV) kit, TEST 1-3 TIMES DAILY DX:E11.65, Disp: 1 kit, Rfl: 0  •  latanoprost (XALATAN) 0.005 % ophthalmic solution, Administer 1 drop to both eyes Every Night., Disp: , Rfl:   •  lidocaine-prilocaine (EMLA) 2.5-2.5 % cream, APPLY SMALL AMOUNT TO ACCESS SITE (AVF) 1 TO 2 HOURS BEFORE DIALYSIS. COVER WITH OCCLUSIVE DRESSING (SARAN WRAP), Disp: , Rfl: 6  •  lisinopril (PRINIVIL,ZESTRIL) 20 MG tablet, Take 1 tablet by mouth Every 12 (Twelve) Hours., Disp: 60 tablet, Rfl: 5  •  nitroglycerin (NITROLINGUAL) 0.4 MG/SPRAY spray, Place 1 spray under the tongue Every 5 (Five) Minutes As Needed for Chest Pain., Disp: 1 each, Rfl: 12  •  terazosin (HYTRIN) 2 MG capsule, Take 1 capsule by mouth Every Night., Disp: 90 capsule, Rfl: 3  •  torsemide (DEMADEX) 100 MG tablet, Take 100 mg by mouth Daily As Needed., Disp: , Rfl:   •  traMADol (ULTRAM) 50 MG tablet, Take 1 tablet by mouth Every 6 (Six) Hours As Needed for Moderate Pain ., Disp: 18 tablet, Rfl: 0  •  cefdinir (OMNICEF) 300 MG capsule, Take 1 capsule by mouth Every Other Day. After Hemodialysis and on Sunday x 8 doses, Disp: 8 capsule, Rfl: 0      The following portions of the patient's history were reviewed and updated as appropriate: allergies, current medications, past family  "history, past medical history, past social history, past surgical history and problem list.    Review of Systems   Constitutional: Negative.  Negative for chills and fever.   HENT: Positive for ear pain. Negative for ear discharge, sinus pressure and sore throat.    Respiratory: Positive for cough. Negative for chest tightness and shortness of breath.    Cardiovascular: Negative for chest pain, palpitations and leg swelling.   Gastrointestinal: Negative for diarrhea, nausea and vomiting.   Musculoskeletal: Positive for arthralgias. Negative for back pain and myalgias.   Neurological: Negative for dizziness, syncope and headaches.   Psychiatric/Behavioral: Negative for confusion and sleep disturbance.       Objective   /62   Pulse 72   Ht 170.2 cm (67\")   Wt 81.2 kg (179 lb)   LMP  (LMP Unknown)   SpO2 99% Comment: ra  BMI 28.04 kg/m²   Physical Exam   Constitutional: She is oriented to person, place, and time. She appears well-developed and well-nourished.   HENT:   Head: Normocephalic and atraumatic.   Right Ear: Tympanic membrane is bulging.   Left Ear: Tympanic membrane is bulging.   Mouth/Throat: Posterior oropharyngeal erythema present. No oropharyngeal exudate or tonsillar abscesses.   Eyes: Pupils are equal, round, and reactive to light.   Neck: Normal range of motion.   Cardiovascular: Normal rate and regular rhythm.   Pulmonary/Chest: Effort normal. She has wheezes.   Abdominal: Soft. Bowel sounds are normal.   Neurological: She is alert and oriented to person, place, and time.   Skin: Skin is warm and dry.   Psychiatric: She has a normal mood and affect.         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 continue hydralazine and rest of meds.    Mastoiditis of right side  -     cefdinir (OMNICEF) 300 MG capsule; Take 1 capsule by mouth Every Other Day. After Hemodialysis and on Sunday x 8 doses                 Return in about 4 months (around 4/10/2020) for Medicare Wellness.

## 2019-12-13 ENCOUNTER — EPISODE CHANGES (OUTPATIENT)
Dept: CASE MANAGEMENT | Facility: OTHER | Age: 74
End: 2019-12-13

## 2020-01-16 ENCOUNTER — HOSPITAL ENCOUNTER (OUTPATIENT)
Dept: MAMMOGRAPHY | Facility: HOSPITAL | Age: 75
Discharge: HOME | End: 2020-01-16
Admitting: NURSE PRACTITIONER

## 2020-01-16 PROCEDURE — 77065 DX MAMMO INCL CAD UNI: CPT

## 2020-01-16 PROCEDURE — G0279 TOMOSYNTHESIS, MAMMO: HCPCS | Performed by: RADIOLOGY

## 2020-01-16 PROCEDURE — 77065 DX MAMMO INCL CAD UNI: CPT | Performed by: RADIOLOGY

## 2020-01-16 PROCEDURE — G0279 TOMOSYNTHESIS, MAMMO: HCPCS

## 2020-03-06 ENCOUNTER — OFFICE VISIT (OUTPATIENT)
Dept: CARDIOLOGY | Facility: CLINIC | Age: 75
End: 2020-03-06

## 2020-03-06 VITALS
BODY MASS INDEX: 28.88 KG/M2 | OXYGEN SATURATION: 98 % | SYSTOLIC BLOOD PRESSURE: 180 MMHG | DIASTOLIC BLOOD PRESSURE: 80 MMHG | HEART RATE: 55 BPM | HEIGHT: 67 IN | WEIGHT: 184 LBS

## 2020-03-06 DIAGNOSIS — I50.33 ACUTE ON CHRONIC DIASTOLIC HEART FAILURE (HCC): Primary | ICD-10-CM

## 2020-03-06 DIAGNOSIS — E78.5 HYPERLIPIDEMIA LDL GOAL <70: ICD-10-CM

## 2020-03-06 DIAGNOSIS — Z79.4 TYPE 2 DIABETES MELLITUS WITH CHRONIC KIDNEY DISEASE ON CHRONIC DIALYSIS, WITH LONG-TERM CURRENT USE OF INSULIN (HCC): ICD-10-CM

## 2020-03-06 DIAGNOSIS — I48.0 PAF (PAROXYSMAL ATRIAL FIBRILLATION) (HCC): ICD-10-CM

## 2020-03-06 DIAGNOSIS — Z99.2 TYPE 2 DIABETES MELLITUS WITH CHRONIC KIDNEY DISEASE ON CHRONIC DIALYSIS, WITH LONG-TERM CURRENT USE OF INSULIN (HCC): ICD-10-CM

## 2020-03-06 DIAGNOSIS — N18.6 TYPE 2 DIABETES MELLITUS WITH CHRONIC KIDNEY DISEASE ON CHRONIC DIALYSIS, WITH LONG-TERM CURRENT USE OF INSULIN (HCC): ICD-10-CM

## 2020-03-06 DIAGNOSIS — I10 ESSENTIAL HYPERTENSION: ICD-10-CM

## 2020-03-06 DIAGNOSIS — E11.22 TYPE 2 DIABETES MELLITUS WITH CHRONIC KIDNEY DISEASE ON CHRONIC DIALYSIS, WITH LONG-TERM CURRENT USE OF INSULIN (HCC): ICD-10-CM

## 2020-03-06 PROCEDURE — 93000 ELECTROCARDIOGRAM COMPLETE: CPT | Performed by: INTERNAL MEDICINE

## 2020-03-06 PROCEDURE — 99214 OFFICE O/P EST MOD 30 MIN: CPT | Performed by: INTERNAL MEDICINE

## 2020-03-06 RX ORDER — HYDRALAZINE HYDROCHLORIDE 100 MG/1
100 TABLET, FILM COATED ORAL 3 TIMES DAILY
COMMUNITY
End: 2020-06-16

## 2020-03-06 NOTE — PROGRESS NOTES
Subjective:     Encounter Date:03/06/2020    Patient ID: Esperanza Pop is a 75 y.o.   female, retired , assistant , from [place], [place].      INTERNIST: Meghana Rodgers MD  REFERRING HEALTHCARE PROVIDER: [facility]   INTERVENTIONAL CARDIOLOGIST: Hugh Pop MD, [facility], [facility]  [facility] FAILURE [facility]: [facility]  NEPHROLOGIST: [facility]  INTERVENTIONAL CARDIOLOGIST:  Scooter Zhao MD, [facility]   VASCULAR SURGEON:  Demetrius Rich MD-[facility]    Chief Complaint:   Chief Complaint   Patient presents with   • Hypertension     f/u     Problem List:  1. Ischemic heart disease  a. Acute non-STEMI/congestive heart failure with diagnostic coronary arteriography demonstrating 20% LAD plaque with high-grade stenosis in the proximal mid right coronary artery requiring a Xience drug-eluting stent (3.0 mm x 28 mm) with reduced echocardiographic LVEF (0.25) with abnormal diastolic LV dysfunction, December 2013.   b. Improved echocardiogram, April 2014.  c. Remote non-STEMI related to malignant hypertension with distal RCA occlusion, patent previous RCA stent with mild inferior hypokinesis but overall acceptable systolic LV function (LVEF 0.55) with PTCA, DARRYL distal RCA, October 2016.  d. Residual CCS class I angina pectoris/NYHA class II CHF.  e. Mild aortic stenosis (June 2016).  f. Echocardiogram 2/7/19: Normal size LV, moderate LV wall thickness, LVEF 0.65, impaired LV relaxation, normal left atrial pressure, mild MR, mild TR, mild UT, no pericardial effusion  g. Residual CCS class I angina pectoris/NYHA class II CHF  2. Severe hypertension - probably essential.   3. Dyslipidemia.  4. Type 2 diabetes mellitus type 2 with suboptimal control (hemoglobin A1c 8.8%; March 2018, 7.6%, 10.7% February 2019; 8.2%, September 2019; 7.9%, December 2019).  5. Mild obesity (BMI 31.3); resolved, BMI 28.8  6. Acute kidney injury: Admission  from 12/26/2013 to 01/02/2014, now resolved with latest creatinine 1.4 on 01/08/2014.  7. Moderate normocytic normochromic anemia; hemoglobin 9.2 gm/dL (June 2016).  8. Noncompliance.  9. Remote apparent end-stage renal disease with initiation of hemodialysis MWF weekly and construction of right upper extremity AV fistula; data deficit (June 2016) with subsequent AV fistula dysfunction and fistulogram with angioplasty - [facility], October 2017.  10. AV fistulogram with apparent thrombectomy [facility], February 2018  11. Palpitations with recent abnormal Holter monitor demonstrating intermittent brief atrial fibrillation, December 2017/January 2018  12. [facility] overnight hospitalization February 2019 for shortness of breath and thrombosed AV fistula with angioplasty  13. Remote operations:  a. 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 Abdominal hernia repair/panniculectomy  c. Hysterectomy  d. Angioplasty for thrombosed AV fistula February 2019     Allergies   Allergen Reactions   • Albuterol      Increased blood pressure  Used right before heart attack   • Mircera [Methoxy Polyethylene Glycol-Epoetin Beta]      Pt stopped breathing   • Penicillins Hives   • Prednisone      Makes jittery/anxious   • Venofer [Iron Sucrose]      Pt stopped breathing       Current Outpatient Medications:   •  amiodarone (PACERONE) 100 MG tablet, Take 1 tablet by mouth Daily., Disp: 90 tablet, Rfl: 3  •  ammonium lactate (LAC-HYDRIN) 12 % lotion, Apply  topically to the appropriate area as directed As Needed for Dry Skin., Disp: 500 g, Rfl: 3  •  aspirin 81 MG EC tablet, Take 1 tablet by mouth Daily. (Patient taking differently: Take 81 mg by mouth Every Other Day.), Disp: , Rfl:   •  atorvastatin (LIPITOR) 80 MG tablet, Take 1 tablet by mouth Every Night., Disp: 90 tablet, Rfl: 1  •  Blood Glucose Monitoring Suppl (ACCU-CHEK NADER PLUS) W/DEVICE kit, DX: E11.65, Disp: 1 kit, Rfl: 0  •  carvedilol (COREG) 25  MG tablet, Take 1 tablet by mouth Every 12 (Twelve) Hours., Disp: 60 tablet, Rfl: 5  •  cetirizine (zyrTEC) 10 MG tablet, Take 1 tablet by mouth As Needed., Disp: , Rfl: 11  •  Cholecalciferol (VITAMIN D3) 2000 units capsule, Take 2,000 Units by mouth Daily., Disp: , Rfl: 3  •  CloNIDine (CATAPRES) 0.2 MG tablet, Take 1 tablet by mouth 2 (Two) Times a Day., Disp: 60 tablet, Rfl: 5  •  clopidogrel (PLAVIX) 75 MG tablet, Take 1 tablet by mouth Daily., Disp: 30 tablet, Rfl: 5  •  desonide (DESOWEN) 0.05 % cream, Apply 0.05 application topically to the appropriate area as directed 2 (Two) Times a Day., Disp: , Rfl: 1  •  dorzolamide-timolol (COSOPT) 22.3-6.8 MG/ML ophthalmic solution, Administer 1 drop to both eyes Daily., Disp: 10 mL, Rfl: 3  •  Ferric Citrate 1  MG(Fe) tablet, Take 1 tablet by mouth As Needed., Disp: , Rfl:   •  Ferrous Sulfate (IRON) 325 (65 Fe) MG tablet, Take 1 tablet by mouth 2 (Two) Times a Day., Disp: , Rfl: 5  •  fluocinonide (LIDEX) 0.05 % external solution, Apply 0.05 application topically to the appropriate area as directed 2 (Two) Times a Day., Disp: , Rfl: 2  •  fluticasone (FLONASE) 50 MCG/ACT nasal spray, 2 sprays into each nostril Daily., Disp: , Rfl:   •  glucose blood (ACCU-CHEK NADER PLUS) test strip, Use as instructed 1 TO 3 TIMES DAILY  DX: E11.65, Disp: 300 each, Rfl: 3  •  hydrALAZINE (APRESOLINE) 100 MG tablet, Take 100 mg by mouth 3 (Three) Times a Day., Disp: , Rfl:   •  insulin NPH-insulin regular (humuLIN 70/30,novoLIN 70/30) (70-30) 100 UNIT/ML injection, Inject 17 units before dinner and 15 Units before breakfast., Disp: 10 mL, Rfl: 5  •  IRON DEXTRAN IJ, Inject  as directed 3 (Three) Times a Week., Disp: , Rfl:   •  isosorbide mononitrate (IMDUR) 120 MG 24 hr tablet, Take 1 tablet by mouth Daily., Disp: 90 tablet, Rfl: 3  •  Lancets Misc. (ACCU-CHEK SOFTCLIX LANCET DEV) kit, TEST 1-3 TIMES DAILY DX:E11.65, Disp: 1 kit, Rfl: 0  •  latanoprost (XALATAN) 0.005 %  ophthalmic solution, Administer 1 drop to both eyes Every Night., Disp: , Rfl:   •  lidocaine-prilocaine (EMLA) 2.5-2.5 % cream, APPLY SMALL AMOUNT TO ACCESS SITE (AVF) 1 TO 2 HOURS BEFORE DIALYSIS. COVER WITH OCCLUSIVE DRESSING (SARAN WRAP), Disp: , Rfl: 6  •  lisinopril (PRINIVIL,ZESTRIL) 20 MG tablet, Take 1 tablet by mouth Every 12 (Twelve) Hours., Disp: 60 tablet, Rfl: 5  •  nitroglycerin (NITROLINGUAL) 0.4 MG/SPRAY spray, Place 1 spray under the tongue Every 5 (Five) Minutes As Needed for Chest Pain., Disp: 1 each, Rfl: 12  •  terazosin (HYTRIN) 2 MG capsule, Take 1 capsule by mouth Every Night., Disp: 90 capsule, Rfl: 3  •  torsemide (DEMADEX) 100 MG tablet, Take 100 mg by mouth Daily As Needed., Disp: , Rfl:   •  traMADol (ULTRAM) 50 MG tablet, Take 1 tablet by mouth Every 6 (Six) Hours As Needed for Moderate Pain ., Disp: 18 tablet, Rfl: 0    History of Present Illness: Patient returns for scheduled 6-month followup. [facility] with complaints of hypertension; no studies were done at that time, including EKG. The patient had a fistulogram recently and has to do this every few months; this was performed at [facility] by Lee Ann Appiah and Klever Doyle.  The patient sometimes has labile hypertension, particularly with dialysis.  She has not taken her medications yet this morning other than Imdur.    About a week and a half ago, she felt some heart fluttering when she was drinking orange juice and eating oranges at the same time.  She was told by her dialysis nurse that it could be because of her potassium.  She is going to dialysis today.  The patient can run errands, go to the grocery, and do her housework without any cardiopulmonary complaints.  She denies any chest pressure, increased shortness of breath, edema, or presyncope.  The patient states that she does not like to take clonidine with the hydralazine because it makes her sick.  Patient otherwise denies chest  "pain, severe shortness of breath, PND, edema, palpitations, syncope or presyncope at this time on limited activity.         Review of Systems   HENT: Positive for ear pain.    Cardiovascular: Positive for irregular heartbeat.   Skin: Positive for dry skin and itching.   Musculoskeletal: Positive for back pain.      Obtained and negative except as outlined in problem list and HPI.      ECG 12 Lead  Date/Time: 3/6/2020 11:45 AM  Performed by: Demetrius Sagastume MD  Authorized by: Demetrius Sagastume MD   Rhythm comments: Normal sinus rhythm, left axis deviation, anteroseptal infarct, age undetermined, abnormal ECG, 66 bpm, QRS 96 ms,  ms,  ms, sinus rhythm has replaced sinus bradycardia compared to ECG in August 2019                 Objective:       Vitals:    03/06/20 0929 03/06/20 0938   BP: (!) 210/100 180/80   BP Location: Left arm Left arm   Patient Position: Sitting    Pulse: 54 55   SpO2: 98%    Weight: 83.5 kg (184 lb)    Height: 170.2 cm (67\")    Recheck blood pressure left arm sitting was 180/64  Body mass index is 28.82 kg/m².   Last weight:  185 lbs.    Physical Exam   Constitutional: She is oriented to person, place, and time. She appears well-developed and well-nourished.   Neck: No JVD present. Carotid bruit is present. No thyromegaly present.   Cardiovascular: Regular rhythm, S1 normal and S2 normal. Exam reveals no gallop, no S3 and no friction rub.   Murmur heard.   Medium-pitched harsh early systolic murmur is present with a grade of 2/6 at the upper right sternal border, upper left sternal border and lower left sternal border radiating to the neck.  Pulses:       Dorsalis pedis pulses are 1+ on the right side, and 1+ on the left side.        Posterior tibial pulses are 1+ on the right side, and 1+ on the left side.   Pulmonary/Chest: Effort normal. She has decreased breath sounds. She has no wheezes. She has no rhonchi. She has no rales.   Abdominal: Soft. She exhibits no mass. There is no " hepatosplenomegaly. There is no tenderness. There is no guarding.   Bowel sounds audible x4   Musculoskeletal: Normal range of motion. She exhibits no edema.   Lymphadenopathy:     She has no cervical adenopathy.   Neurological: She is alert and oriented to person, place, and time.   Skin: Skin is warm, dry and intact. No rash noted.   Vitals reviewed.        Lab Review:   Lab Results   Component Value Date    GLUCOSE 299 (H) 11/25/2019    BUN 32 (H) 11/25/2019    CREATININE 5.59 (H) 11/25/2019    EGFRIFNONA  11/25/2019      Comment:      <15 Indicative of kidney failure.    EGFRIFAFRI 9 (L) 11/25/2019    BCR 5.7 (L) 11/25/2019    CO2 32.0 (H) 11/25/2019    CALCIUM 9.6 11/25/2019    PROTENTOTREF 7.5 09/10/2019    ALBUMIN 3.60 11/25/2019    LABIL2 1.0 09/10/2019    AST 17 11/25/2019    ALT 5 11/25/2019   Sodium - 139  Potassium - 3.7  Chloride - 94    Lab Results   Component Value Date    WBC 7.73 11/25/2019    HGB 9.7 (L) 11/25/2019    HCT 36.5 11/25/2019    MCV 83.9 11/25/2019     11/25/2019       Lab Results   Component Value Date    HGBA1C 7.9 12/10/2019       Lab Results   Component Value Date    TSH 1.657 03/01/2018       Lab Results   Component Value Date    CHOL 261 (H) 10/11/2016    CHOL 293 (H) 10/10/2016     Lab Results   Component Value Date    TRIG 106 09/10/2019    TRIG 97 12/22/2017    TRIG 134 03/21/2017     Lab Results   Component Value Date    HDL 57 09/10/2019    HDL 54 12/22/2017    HDL 60 03/21/2017   09/10/2019: total cholesterol - 167, LDL cholesterol - 89    10/15/2019, left ribs x-rays:  FINDINGS: PA chest and 4 views of the left ribs reveal heart to be enlarged. Mild increased markings seen within the lung fields bilaterally suggesting chronic change which is stable. No focal  parenchymal opacification present.  No pleural effusion or pneumothorax. Degenerative changes seen within the spine. Increased pulmonary vascularity bilaterally.         IMPRESSION: Prominence of the pulmonary  vascularity bilaterally with enlargement of the heart and mild chronic changes seen within the lung fields. No focal parenchymal opacification present.    10/29/2019, thoracic spine x-ray:  FINDINGS: AP, lateral, and swimmer's views of the thoracic spine reveal no evidence of acute fracture or malalignment. The vertebral body height and disc spaces are preserved. Facets are well aligned. Pedicles are intact. Degenerative changes seen diffusely throughout the thoracic spine with mild anterior spurring. Slight disc space narrowing. Facets are well aligned. Pedicles are intact.     IMPRESSION: Multilevel degenerative changes seen throughout the thoracic spine with no evidence of fracture or dislocation.      Assessment:       Overall continued acceptable course with no new interim cardiopulmonary complaints with acceptable functional status on limited activity. We will defer additional diagnostic or therapeutic intervention from a cardiac perspective at this time. We encouraged the patient to stay compliant with her medications. She has dialysis M, W, and F.  Her blood pressure is elevated, but she did not take her medications this morning, except for Imdur.     Diagnosis Plan   1. Acute on chronic diastolic heart failure (CMS/HCC)   Stable, continue torsemide, Coreg, lisinopril   2. Essential hypertension   Controlled, continue lisinopril, Imdur, Coreg, clonidine, hydralazine   3. Hyperlipidemia LDL goal <70   No new labs to review, continue atorvastatin   4. Type 2 diabetes mellitus with chronic kidney disease on chronic dialysis, with long-term current use of insulin (CMS/[facility])   Hemoglobin A1c 7.9% December 2019, encouraged patient to continue physical activity as tolerated and to limit carbohydrate intake          Plan:         1. Patient to continue current medications and close follow up with the above providers.  2. Tentative cardiology follow up in September 2020, or patient may return sooner PRN.    Scribed  for Demetrius Sagastume MD by Geena Estrella, APRN. 3/6/2020  9:43 AM    I, Demetrius Sagastume MD, [facility], personally performed the services described in this documentation as scribed by the above named individual in my presence, and it is both accurate and complete. At 12:46 PM on 03/06/2020

## 2020-03-11 DIAGNOSIS — I50.43 ACUTE ON CHRONIC COMBINED SYSTOLIC AND DIASTOLIC CONGESTIVE HEART FAILURE (HCC): ICD-10-CM

## 2020-03-11 DIAGNOSIS — I10 ESSENTIAL HYPERTENSION: ICD-10-CM

## 2020-03-11 DIAGNOSIS — E78.5 HYPERLIPIDEMIA LDL GOAL <70: ICD-10-CM

## 2020-03-11 DIAGNOSIS — Z99.2 END STAGE RENAL DISEASE ON DIALYSIS (HCC): ICD-10-CM

## 2020-03-11 DIAGNOSIS — N18.6 END STAGE RENAL DISEASE ON DIALYSIS (HCC): ICD-10-CM

## 2020-03-11 RX ORDER — ATORVASTATIN CALCIUM 80 MG/1
80 TABLET, FILM COATED ORAL NIGHTLY
Qty: 90 TABLET | Refills: 1 | Status: SHIPPED | OUTPATIENT
Start: 2020-03-11 | End: 2022-01-19 | Stop reason: SDUPTHER

## 2020-03-16 ENCOUNTER — TELEPHONE (OUTPATIENT)
Dept: INTERNAL MEDICINE | Facility: CLINIC | Age: 75
End: 2020-03-16

## 2020-03-16 RX ORDER — AMOXICILLIN 875 MG/1
875 TABLET, COATED ORAL 2 TIMES DAILY
Qty: 20 TABLET | Refills: 0 | Status: SHIPPED | OUTPATIENT
Start: 2020-03-16 | End: 2020-03-17

## 2020-03-16 NOTE — TELEPHONE ENCOUNTER
Patient states that she saw Dr. Rodgers about 1 month ago for an ear infection, but it is back, so she was wanting to see if she can get an antibiotic called in.  She can be reached at [phone]    [address]

## 2020-03-17 RX ORDER — CEPHALEXIN 500 MG/1
500 CAPSULE ORAL 2 TIMES DAILY
Qty: 14 CAPSULE | Refills: 0 | Status: SHIPPED | OUTPATIENT
Start: 2020-03-17 | End: 2020-06-16

## 2020-03-17 NOTE — TELEPHONE ENCOUNTER
Sravanthi from [facility] card in regards to rx. Patient is allergic to penicillin and would like a new rx be sent over. Patient is currently at the pharmacy.     You can reach her at [phone]

## 2020-03-17 NOTE — TELEPHONE ENCOUNTER
Patient states that Dr. Rodgers sent in a prescription for Amoxycillin but she is allergice to Penacillin and cannot take it.  Can she get something else called in.  She can be reached at [phone]    [facility], [address]

## 2020-04-21 ENCOUNTER — HOSPITAL ENCOUNTER (EMERGENCY)
Facility: HOSPITAL | Age: 75
Discharge: HOME | End: 2020-04-21
Attending: EMERGENCY MEDICINE | Admitting: EMERGENCY MEDICINE

## 2020-04-21 ENCOUNTER — TELEPHONE (OUTPATIENT)
Dept: INTERNAL MEDICINE | Facility: CLINIC | Age: 75
End: 2020-04-21

## 2020-04-21 ENCOUNTER — APPOINTMENT (OUTPATIENT)
Dept: GENERAL RADIOLOGY | Facility: HOSPITAL | Age: 75
End: 2020-04-21

## 2020-04-21 ENCOUNTER — OFFICE VISIT (OUTPATIENT)
Dept: INTERNAL MEDICINE | Facility: CLINIC | Age: 75
End: 2020-04-21

## 2020-04-21 VITALS
RESPIRATION RATE: 16 BRPM | OXYGEN SATURATION: 96 % | SYSTOLIC BLOOD PRESSURE: 136 MMHG | WEIGHT: 180 LBS | HEART RATE: 58 BPM | DIASTOLIC BLOOD PRESSURE: 62 MMHG | HEIGHT: 67 IN | BODY MASS INDEX: 28.25 KG/M2 | TEMPERATURE: 98.8 F

## 2020-04-21 DIAGNOSIS — R50.9 FEVER, UNSPECIFIED FEVER CAUSE: Primary | ICD-10-CM

## 2020-04-21 DIAGNOSIS — R05.9 COUGH: ICD-10-CM

## 2020-04-21 DIAGNOSIS — I10 ESSENTIAL HYPERTENSION: ICD-10-CM

## 2020-04-21 DIAGNOSIS — N18.9 CHRONIC RENAL FAILURE, UNSPECIFIED CKD STAGE: ICD-10-CM

## 2020-04-21 DIAGNOSIS — I10 UNCONTROLLED HYPERTENSION: Primary | ICD-10-CM

## 2020-04-21 LAB
ALBUMIN SERPL-MCNC: 3.5 G/DL (ref 3.5–5.2)
ALBUMIN/GLOB SERPL: 0.7 G/DL
ALP SERPL-CCNC: 171 U/L (ref 39–117)
ALT SERPL W P-5'-P-CCNC: 9 U/L (ref 1–33)
ANION GAP SERPL CALCULATED.3IONS-SCNC: 17 MMOL/L (ref 5–15)
AST SERPL-CCNC: 12 U/L (ref 1–32)
B-OH-BUTYR SERPL-SCNC: 0.11 MMOL/L (ref 0.02–0.27)
BASOPHILS # BLD AUTO: 0.04 10*3/MM3 (ref 0–0.2)
BASOPHILS NFR BLD AUTO: 0.7 % (ref 0–1.5)
BILIRUB SERPL-MCNC: 0.4 MG/DL (ref 0.2–1.2)
BUN BLD-MCNC: 39 MG/DL (ref 8–23)
BUN/CREAT SERPL: 6.3 (ref 7–25)
CALCIUM SPEC-SCNC: 8.9 MG/DL (ref 8.6–10.5)
CHLORIDE SERPL-SCNC: 92 MMOL/L (ref 98–107)
CO2 SERPL-SCNC: 26 MMOL/L (ref 22–29)
CREAT BLD-MCNC: 6.21 MG/DL (ref 0.57–1)
DEPRECATED RDW RBC AUTO: 53.9 FL (ref 37–54)
EOSINOPHIL # BLD AUTO: 0.13 10*3/MM3 (ref 0–0.4)
EOSINOPHIL NFR BLD AUTO: 2.4 % (ref 0.3–6.2)
ERYTHROCYTE [DISTWIDTH] IN BLOOD BY AUTOMATED COUNT: 18.9 % (ref 12.3–15.4)
GFR SERPL CREATININE-BSD FRML MDRD: 8 ML/MIN/1.73
GFR SERPL CREATININE-BSD FRML MDRD: ABNORMAL ML/MIN/{1.73_M2}
GLOBULIN UR ELPH-MCNC: 4.8 GM/DL
GLUCOSE BLD-MCNC: 264 MG/DL (ref 65–99)
HCT VFR BLD AUTO: 40 % (ref 34–46.6)
HGB BLD-MCNC: 10.9 G/DL (ref 12–15.9)
IMM GRANULOCYTES # BLD AUTO: 0.01 10*3/MM3 (ref 0–0.05)
IMM GRANULOCYTES NFR BLD AUTO: 0.2 % (ref 0–0.5)
LYMPHOCYTES # BLD AUTO: 1.65 10*3/MM3 (ref 0.7–3.1)
LYMPHOCYTES NFR BLD AUTO: 30 % (ref 19.6–45.3)
MCH RBC QN AUTO: 21.8 PG (ref 26.6–33)
MCHC RBC AUTO-ENTMCNC: 27.3 G/DL (ref 31.5–35.7)
MCV RBC AUTO: 80.2 FL (ref 79–97)
MONOCYTES # BLD AUTO: 1.06 10*3/MM3 (ref 0.1–0.9)
MONOCYTES NFR BLD AUTO: 19.3 % (ref 5–12)
NEUTROPHILS # BLD AUTO: 2.61 10*3/MM3 (ref 1.7–7)
NEUTROPHILS NFR BLD AUTO: 47.4 % (ref 42.7–76)
NRBC BLD AUTO-RTO: 0 /100 WBC (ref 0–0.2)
PLATELET # BLD AUTO: 282 10*3/MM3 (ref 140–450)
PMV BLD AUTO: 11.7 FL (ref 6–12)
POTASSIUM BLD-SCNC: 4.3 MMOL/L (ref 3.5–5.2)
PROT SERPL-MCNC: 8.3 G/DL (ref 6–8.5)
RBC # BLD AUTO: 4.99 10*6/MM3 (ref 3.77–5.28)
SODIUM BLD-SCNC: 135 MMOL/L (ref 136–145)
WBC NRBC COR # BLD: 5.5 10*3/MM3 (ref 3.4–10.8)

## 2020-04-21 PROCEDURE — 71045 X-RAY EXAM CHEST 1 VIEW: CPT

## 2020-04-21 PROCEDURE — 82010 KETONE BODYS QUAN: CPT | Performed by: NURSE PRACTITIONER

## 2020-04-21 PROCEDURE — 80053 COMPREHEN METABOLIC PANEL: CPT | Performed by: NURSE PRACTITIONER

## 2020-04-21 PROCEDURE — 85025 COMPLETE CBC W/AUTO DIFF WBC: CPT | Performed by: NURSE PRACTITIONER

## 2020-04-21 PROCEDURE — U0003 INFECTIOUS AGENT DETECTION BY NUCLEIC ACID (DNA OR RNA); SEVERE ACUTE RESPIRATORY SYNDROME CORONAVIRUS 2 (SARS-COV-2) (CORONAVIRUS DISEASE [COVID-19]), AMPLIFIED PROBE TECHNIQUE, MAKING USE OF HIGH THROUGHPUT TECHNOLOGIES AS DESCRIBED BY CMS-2020-01-R: HCPCS | Performed by: NURSE PRACTITIONER

## 2020-04-21 PROCEDURE — U0002 COVID-19 LAB TEST NON-CDC: HCPCS | Performed by: NURSE PRACTITIONER

## 2020-04-21 PROCEDURE — 99284 EMERGENCY DEPT VISIT MOD MDM: CPT

## 2020-04-21 PROCEDURE — 99213 OFFICE O/P EST LOW 20 MIN: CPT | Performed by: PHYSICIAN ASSISTANT

## 2020-04-21 RX ORDER — CLONIDINE 0.2 MG/24H
PATCH, EXTENDED RELEASE TRANSDERMAL
COMMUNITY
Start: 2020-04-13 | End: 2020-07-21

## 2020-04-21 RX ORDER — CLONIDINE HYDROCHLORIDE 0.1 MG/1
0.1 TABLET ORAL 2 TIMES DAILY
COMMUNITY
Start: 2020-01-30 | End: 2020-09-11 | Stop reason: DRUGHIGH

## 2020-04-21 NOTE — ED PROVIDER NOTES
Subjective   Esperanza Pop is a 75-year-old female that presents the emergency department with complaints of cough and low-grade fever.  Patient is a dialysis patient.  She is dialyzed on Monday, Wednesday, Friday.  Patient was last dialyzed on Monday.  It was advised at this time that prior to returning to her routine dialysis clinic she would need to be ruled out for COVID-19.  Patient denies any shortness of breath, chest pain.  Patient has had a cough that is been nonproductive for the past few days.  She reports a low-grade fever up to 99 as well as diaphoresis.  She is unsure if she has had any sick contacts.  She explains that her granddaughter helps her a lot and she is continue to go to her [employer].  Patient is also gone to the grocery store and continue to go to dialysis.  Patient was encouraged to go to the [facility] for the testing.  When she went there today they had checked her blood pressure and it was elevated.  They told her to come to the ER for further evaluation.  Patient denies any nausea, vomiting.  She denies any dizziness, headaches.  Prior to coming to the ED patient took her clonidine and lisinopril.      History provided by:  Patient  Cough   Cough characteristics:  Non-productive  Severity:  Mild  Timing:  Intermittent  Progression:  Unchanged  Relieved by:  Nothing  Worsened by:  Nothing  Associated symptoms: diaphoresis, ear pain and fever    Associated symptoms: no chest pain, no chills, no headaches, no rhinorrhea, no shortness of breath, no sinus congestion and no sore throat        Review of Systems   Constitutional: Positive for diaphoresis and fever. Negative for chills.   HENT: Positive for ear pain. Negative for rhinorrhea and sore throat.    Eyes: Negative for visual disturbance.   Respiratory: Positive for cough. Negative for shortness of breath.    Cardiovascular: Negative for chest pain.   Gastrointestinal: Negative for nausea and vomiting.   Neurological: Negative  for dizziness, weakness and headaches.   All other systems reviewed and are negative.      Past Medical History:   Diagnosis Date   • Acute bronchitis    • Acute conjunctivitis    • Acute kidney injury (CMS/HCC)     Admission from 12/26/2013 to 01/02/2014, now resolved with latest creatinine 1.4 on 01/08/2014.   • Acute non-ST segment elevation myocardial infarction (STEMI) following previous myocardial infarction    • Breast cancer, female, right 5/20/2016 2005   • Cancer (CMS/HCC)    • CHF (congestive heart failure) (CMS/[facility])    • Clotting disorder (CMS/[facility])    • Diabetes mellitus (CMS/HCC)    • Diarrhea    • Dyslipidemia    • Dyspepsia    • Dyspnea    • Edema    • Hx of radiation therapy    • Hyperlipidemia    • Hypertension     Severe   • Ischemic heart disease    • Moderate obesity     BMI 36.2   • Myocardial infarction (CMS/[facility])    • Pneumonia    • Pneumonia 02/2019   • Radiation 2005   • Renal disorder    • Seasonal allergies        Allergies   Allergen Reactions   • Albuterol      Increased blood pressure  Used right before heart attack   • Mircera [Methoxy Polyethylene Glycol-Epoetin Beta]      Pt stopped breathing   • Penicillins Hives   • Prednisone      Makes jittery/anxious   • Venofer [Iron Sucrose]      Pt stopped breathing       Past Surgical History:   Procedure Laterality Date   • AV FISTULA PLACEMENT, BRACHIOBASILIC     • BREAST BIOPSY Left 2010   • BREAST CYST EXCISION     • BREAST LUMPECTOMY Right 2005   • BREAST SURGERY     • CARDIAC CATHETERIZATION N/A 10/10/2016    Procedure: Left Heart Cath;  Surgeon: Scooter Zhao MD;  Location:  [facility];  Service:    • CARDIAC CATHETERIZATION  10/2016   • HERNIA REPAIR     • HYSTERECTOMY  2000   • INSERTION HEMODIALYSIS CATHETER Right 6/29/2016    Procedure: HEMODIALYSIS CATHETER INSERTION TUNNELLED;  Surgeon: Ramón Chavez MD;  Location: [facility] OR;  Service:    • MASTECTOMY Right 2006   • OOPHORECTOMY     •  REDUCTION MAMMAPLASTY Left 2005       Family History   Problem Relation Age of Onset   • Stroke Mother    • Cancer Mother    • Coronary artery disease Father    • Heart failure Father    • Breast cancer Sister 55   • Ovarian cancer Neg Hx    • Endometrial cancer Neg Hx        Social History     Socioeconomic History   • Marital status:      Spouse name: Not on file   • Number of children: Not on file   • Years of education: Not on file   • Highest education level: Not on file   Occupational History   • Occupation: retired   Tobacco Use   • Smoking status: Never Smoker   • Smokeless tobacco: Never Used   • Tobacco comment: Michael second hand smoke   Substance and Sexual Activity   • Alcohol use: No     Comment: rarely   • Drug use: No   • Sexual activity: Defer   Social History Narrative    Pt consumes 1 serving of caffeine once every 2 weeks.            Objective   Physical Exam   Constitutional: She is oriented to person, place, and time. She appears well-developed and well-nourished. She is cooperative.  Non-toxic appearance. No distress.   HENT:   Head: Normocephalic and atraumatic.   Right Ear: Tympanic membrane, external ear and ear canal normal.   Left Ear: Tympanic membrane, external ear and ear canal normal.   Nose: Nose normal.   Mouth/Throat: Oropharynx is clear and moist. No oropharyngeal exudate.   Eyes: Conjunctivae, EOM and lids are normal.   Neck: Trachea normal, normal range of motion and full passive range of motion without pain.   Cardiovascular: Regular rhythm, normal heart sounds, intact distal pulses and normal pulses.   Pulmonary/Chest: Effort normal and breath sounds normal. No respiratory distress. She has no decreased breath sounds. She has no wheezes. She has no rhonchi. She has no rales.   Abdominal: Soft. Normal appearance and bowel sounds are normal. There is no tenderness.   Musculoskeletal: Normal range of motion.   Lt arm dialysis AV fistula     Neurological: She is alert and  oriented to person, place, and time. She has normal strength. No cranial nerve deficit.   Skin: Skin is warm, dry and intact. No rash noted.   Psychiatric: She has a normal mood and affect. Her speech is normal and behavior is normal.   Nursing note and vitals reviewed.      Procedures           ED Course  ED Course as of Apr 21 2138 Tue Apr 21, 2020 1956 BP: 170/75 [KG]   2013 Creatinine(!): 6.21 [KG]   2013 Sodium(!): 135 [KG]   2014 Glucose(!): 264 [KG]   2034 Beta-Hydroxybutyrate Quant: 0.109 [KG]   2134 Results were discussed with patient at this time. Pt will be discharged home.  Patient advised to quarantine until COVID-19 results return.  Patient verbalizes understanding.  Patient to go ahead with her dialysis as planned at the different site.  Patient to return to the ED as needed.    [KG]      ED Course User Index  [KG] Maria Elena Esquivel, APRN                                   Recent Results (from the past 24 hour(s))   Comprehensive Metabolic Panel    Collection Time: 04/21/20  7:42 PM   Result Value Ref Range    Glucose 264 (H) 65 - 99 mg/dL    BUN 39 (H) 8 - 23 mg/dL    Creatinine 6.21 (H) 0.57 - 1.00 mg/dL    Sodium 135 (L) 136 - 145 mmol/L    Potassium 4.3 3.5 - 5.2 mmol/L    Chloride 92 (L) 98 - 107 mmol/L    CO2 26.0 22.0 - 29.0 mmol/L    Calcium 8.9 8.6 - 10.5 mg/dL    Total Protein 8.3 6.0 - 8.5 g/dL    Albumin 3.50 3.50 - 5.20 g/dL    ALT (SGPT) 9 1 - 33 U/L    AST (SGOT) 12 1 - 32 U/L    Alkaline Phosphatase 171 (H) 39 - 117 U/L    Total Bilirubin 0.4 0.2 - 1.2 mg/dL    eGFR Non  Amer      eGFR  African Amer 8 (L) >60 mL/min/1.73    Globulin 4.8 gm/dL    A/G Ratio 0.7 g/dL    BUN/Creatinine Ratio 6.3 (L) 7.0 - 25.0    Anion Gap 17.0 (H) 5.0 - 15.0 mmol/L   CBC Auto Differential    Collection Time: 04/21/20  7:42 PM   Result Value Ref Range    WBC 5.50 3.40 - 10.80 10*3/mm3    RBC 4.99 3.77 - 5.28 10*6/mm3    Hemoglobin 10.9 (L) 12.0 - 15.9 g/dL    Hematocrit 40.0 34.0 - 46.6 %    MCV  80.2 79.0 - 97.0 fL    MCH 21.8 (L) 26.6 - 33.0 pg    MCHC 27.3 (L) 31.5 - 35.7 g/dL    RDW 18.9 (H) 12.3 - 15.4 %    RDW-SD 53.9 37.0 - 54.0 fl    MPV 11.7 6.0 - 12.0 fL    Platelets 282 140 - 450 10*3/mm3    Neutrophil % 47.4 42.7 - 76.0 %    Lymphocyte % 30.0 19.6 - 45.3 %    Monocyte % 19.3 (H) 5.0 - 12.0 %    Eosinophil % 2.4 0.3 - 6.2 %    Basophil % 0.7 0.0 - 1.5 %    Immature Grans % 0.2 0.0 - 0.5 %    Neutrophils, Absolute 2.61 1.70 - 7.00 10*3/mm3    Lymphocytes, Absolute 1.65 0.70 - 3.10 10*3/mm3    Monocytes, Absolute 1.06 (H) 0.10 - 0.90 10*3/mm3    Eosinophils, Absolute 0.13 0.00 - 0.40 10*3/mm3    Basophils, Absolute 0.04 0.00 - 0.20 10*3/mm3    Immature Grans, Absolute 0.01 0.00 - 0.05 10*3/mm3    nRBC 0.0 0.0 - 0.2 /100 WBC   Beta Hydroxybutyrate Quantitative    Collection Time: 04/21/20  7:42 PM   Result Value Ref Range    Beta-Hydroxybutyrate Quant 0.109 0.020 - 0.270 mmol/L     Note: In addition to lab results from this visit, the labs listed above may include labs taken at another facility or during a different encounter within the last 24 hours. Please correlate lab times with ED admission and discharge times for further clarification of the services performed during this visit.    XR Chest 1 View   Final Result   Cardiac enlargement without evidence of failure.      Signer Name: Hali Zhou MD    Signed: 4/21/2020 7:58 PM    Workstation Name: [id number]     [facility]        Vitals:    04/21/20 2030 04/21/20 2032 04/21/20 2100 04/21/20 2130   BP: 123/61  136/59 136/62   BP Location:       Patient Position:       Pulse:  59 57 58   Resp:       Temp:       TempSrc:       SpO2:  95% 96% 96%   Weight:       Height:         Medications - No data to display  ECG/EMG Results (last 24 hours)     ** No results found for the last 24 hours. **        No orders to display         COVID-19 RISK SCREEN     1. Has the patient had close contact without PPE with a lab confirmed  COVID-19 (+) person or a person under investigation (PUI) for COVID-19 infection?  -- No     2. Has the patient had respiratory symptoms, worsened/new cough and/or SOA, unexplained fever, or sudden loss of smell and/or taste in the past 7 days? --  Yes    3. Does the patient have baseline higher exposure risk such as working in healthcare field, currently residing in healthcare facility, or ongoing hemodialysis?  --  Yes             MDM    Final diagnoses:   Uncontrolled hypertension   Chronic renal failure, unspecified CKD stage   Cough            Maria Elena Esquivle, APRN  04/21/20 1720

## 2020-04-21 NOTE — TELEPHONE ENCOUNTER
PT CALLED STATED THAT SHE IS REQUESTING A CHANGE TO HER BLOOD PRESSURE RX hydrALAZINE (APRESOLINE) 100 MG tablet  MEDICATION BECAUSE IT MAKES HER SHAKY AND MAKE HER FEEL AS IF SHE IS HIGH.    PLEASE ADVISE.  CALL BACK:[phone]       [facility] [address] [place], [place] - [address] [place] [address]

## 2020-04-21 NOTE — PROGRESS NOTES
Chief Complaint   Patient presents with   • Hypertension   • Earache     L ear    • Headache       Subjective   Esperanza Pop is a 75 y.o. female.       History of Present Illness     This visit has been rescheduled as a phone visit to comply with patient safety concerns in accordance with CDC recommendations. Total time of discussion was 10 minutes.      Pt was supposed to have an AV fistula done today but had to be cancelled due to elevated blood pressure. She was started on hydralazine but it causes fatigue. She had dialysis yesterday, helped with some of the usual shortness of breath she gets before dialysis. Felt some better afterwards but was having some ear pain. Took some tylenol and it helped a little.     She has continued to feel weak and not like herself. Does not have any shortness of breath today. Has had elevated temperature, above her normal. Not above 100.    She took 50 mg yesterday of hydralazine yesterday but has not taken it today.    Cancelled her surgery that was scheduled for this morning.        Current Outpatient Medications:   •  amiodarone (PACERONE) 100 MG tablet, Take 1 tablet by mouth Daily., Disp: 90 tablet, Rfl: 3  •  ammonium lactate (LAC-HYDRIN) 12 % lotion, Apply  topically to the appropriate area as directed As Needed for Dry Skin., Disp: 500 g, Rfl: 3  •  aspirin 81 MG EC tablet, Take 1 tablet by mouth Daily. (Patient taking differently: Take 81 mg by mouth Every Other Day.), Disp: , Rfl:   •  atorvastatin (LIPITOR) 80 MG tablet, Take 1 tablet by mouth Every Night., Disp: 90 tablet, Rfl: 1  •  Blood Glucose Monitoring Suppl (ACCU-CHEK NADER PLUS) W/DEVICE kit, DX: E11.65, Disp: 1 kit, Rfl: 0  •  carvedilol (COREG) 25 MG tablet, Take 1 tablet by mouth Every 12 (Twelve) Hours., Disp: 60 tablet, Rfl: 5  •  cephalexin (Keflex) 500 MG capsule, Take 1 capsule by mouth 2 (Two) Times a Day., Disp: 14 capsule, Rfl: 0  •  Cholecalciferol (VITAMIN D3) 2000 units capsule, Take 2,000 Units by  mouth Daily., Disp: , Rfl: 3  •  cloNIDine (CATAPRES) 0.1 MG tablet, Take 0.1 mg by mouth 2 (Two) Times a Day., Disp: , Rfl:   •  CloNIDine (CATAPRES) 0.2 MG tablet, Take 1 tablet by mouth 2 (Two) Times a Day., Disp: 60 tablet, Rfl: 5  •  cloNIDine (CATAPRES-TTS) 0.2 MG/24HR patch, , Disp: , Rfl:   •  clopidogrel (PLAVIX) 75 MG tablet, Take 1 tablet by mouth Daily., Disp: 30 tablet, Rfl: 5  •  desonide (DESOWEN) 0.05 % cream, Apply 0.05 application topically to the appropriate area as directed 2 (Two) Times a Day., Disp: , Rfl: 1  •  dorzolamide-timolol (COSOPT) 22.3-6.8 MG/ML ophthalmic solution, Administer 1 drop to both eyes Daily., Disp: 10 mL, Rfl: 3  •  Ferric Citrate 1  MG(Fe) tablet, Take 1 tablet by mouth As Needed., Disp: , Rfl:   •  Ferrous Sulfate (IRON) 325 (65 Fe) MG tablet, Take 1 tablet by mouth 2 (Two) Times a Day., Disp: , Rfl: 5  •  fluocinonide (LIDEX) 0.05 % external solution, Apply 0.05 application topically to the appropriate area as directed 2 (Two) Times a Day., Disp: , Rfl: 2  •  fluticasone (FLONASE) 50 MCG/ACT nasal spray, 2 sprays into each nostril Daily., Disp: , Rfl:   •  glucose blood (ACCU-CHEK NADER PLUS) test strip, Use as instructed 1 TO 3 TIMES DAILY  DX: E11.65, Disp: 300 each, Rfl: 3  •  hydrALAZINE (APRESOLINE) 100 MG tablet, Take 100 mg by mouth 3 (Three) Times a Day., Disp: , Rfl:   •  insulin NPH-insulin regular (humuLIN 70/30,novoLIN 70/30) (70-30) 100 UNIT/ML injection, Inject 17 units before dinner and 15 Units before breakfast., Disp: 10 mL, Rfl: 5  •  IRON DEXTRAN IJ, Inject  as directed 3 (Three) Times a Week., Disp: , Rfl:   •  isosorbide mononitrate (IMDUR) 120 MG 24 hr tablet, Take 1 tablet by mouth Daily., Disp: 90 tablet, Rfl: 3  •  Lancets Misc. (ACCU-CHEK SOFTCLIX LANCET DEV) kit, TEST 1-3 TIMES DAILY DX:E11.65, Disp: 1 kit, Rfl: 0  •  latanoprost (XALATAN) 0.005 % ophthalmic solution, Administer 1 drop to both eyes Every Night., Disp: , Rfl:   •   lidocaine-prilocaine (EMLA) 2.5-2.5 % cream, APPLY SMALL AMOUNT TO ACCESS SITE (AVF) 1 TO 2 HOURS BEFORE DIALYSIS. COVER WITH OCCLUSIVE DRESSING (SARAN WRAP), Disp: , Rfl: 6  •  lisinopril (PRINIVIL,ZESTRIL) 20 MG tablet, Take 1 tablet by mouth Every 12 (Twelve) Hours., Disp: 60 tablet, Rfl: 5  •  nitroglycerin (NITROLINGUAL) 0.4 MG/SPRAY spray, Place 1 spray under the tongue Every 5 (Five) Minutes As Needed for Chest Pain., Disp: 1 each, Rfl: 12     PMFSH  The following portions of the patient's history were reviewed and updated as appropriate: allergies, current medications, past family history, past medical history, past social history, past surgical history and problem list.    Review of Systems   Constitutional: Positive for activity change, chills, diaphoresis, fatigue and fever. Negative for appetite change.   HENT: Negative for congestion, postnasal drip, rhinorrhea, sinus pressure and sore throat.    Respiratory: Positive for shortness of breath. Negative for chest tightness and wheezing.    Cardiovascular: Negative for chest pain and palpitations.   Gastrointestinal: Negative for abdominal pain.   Musculoskeletal: Negative for arthralgias.   Neurological: Negative for dizziness, seizures, weakness and headaches.       Objective   LMP  (LMP Unknown)     Physical Exam   Constitutional: She is oriented to person, place, and time. She appears well-developed and well-nourished.   Pulmonary/Chest: Effort normal.   Neurological: She is alert and oriented to person, place, and time.   Psychiatric: She has a normal mood and affect. Her behavior is normal. Judgment and thought content normal.            ASSESSMENT/PLAN    Problem List Items Addressed This Visit        Cardiovascular and Mediastinum    Essential hypertension     Advised pt to monitor BP at home and discuss hydralazine SE with Dr Appiah who put her on the medication.         Relevant Medications    cloNIDine (CATAPRES-TTS) 0.2 MG/24HR patch     cloNIDine (CATAPRES) 0.1 MG tablet      Other Visit Diagnoses     Fever, unspecified fever cause    -  Primary    Due to underlying health conditions, including CKD and CHF, and ongoing dialysis, advised pt to go to [facility] urgent care for physical eval. &covid testing.               Return if symptoms worsen or fail to improve, for Next scheduled follow up.

## 2020-04-21 NOTE — ASSESSMENT & PLAN NOTE
Advised pt to monitor BP at home and discuss hydralazine SE with Dr Appiah who put her on the medication.

## 2020-04-21 NOTE — TELEPHONE ENCOUNTER
Please let her know that she is on a high dose of the hydralazine and that could be making her shaky.  Unfortunately there are not many alternatives to this high dose of the hydralazine.  She needs to check her blood pressures regularly and if they are on the low side <120 systolic , then she can cut the hydralazine in half and take half pill 3 times a day.  Other option would be to discuss with the kidney specialists regarding alternatives for the hydralazine.

## 2020-04-21 NOTE — TELEPHONE ENCOUNTER
Patient states that she needs someone to call her back regarding getting a COVID and her dialysis. She can be reached at [phone]

## 2020-04-22 ENCOUNTER — PATIENT OUTREACH (OUTPATIENT)
Dept: CASE MANAGEMENT | Facility: OTHER | Age: 75
End: 2020-04-22

## 2020-04-22 ENCOUNTER — EPISODE CHANGES (OUTPATIENT)
Dept: CASE MANAGEMENT | Facility: OTHER | Age: 75
End: 2020-04-22

## 2020-04-22 LAB
REF LAB TEST METHOD: NORMAL
SARS-COV-2 RNA RESP QL NAA+PROBE: NOT DETECTED

## 2020-04-22 NOTE — TELEPHONE ENCOUNTER
Pt need to know where she could go to have a COVID test as she had a fever, so she could return to dialysis, states she was able to resolve this issue last night, thanked me for the call.

## 2020-04-22 NOTE — OUTREACH NOTE
"ED Potential Covid Discharge Follow-up    Pt seen in ED 4/21/20 for cough and low grade temp. Pt said she has not been notified of results yet and I advised her to remain self quarantine until she hears back. She states she is feeling fine. Temp was 98.8 this am and bp today 135/60s. She said she only has the \"sniffles\" and believes it may be due to allergies. She said she is having her son go and pick her up flonase from the pharmacy. She goes to dialysis and plans to go tomorrow pending hearing back on covid results. She has food, transportation, and her medications. 24/7 nurse line and covid hotline. PCP is Dr Meghana Rodgers. Instructed to f/u with pcp with new or worsening sxs.    Sanjuanita Romero RN  Ambulatory     4/22/2020, 14:31      "

## 2020-04-23 ENCOUNTER — TELEPHONE (OUTPATIENT)
Dept: INTERNAL MEDICINE | Facility: CLINIC | Age: 75
End: 2020-04-23

## 2020-04-23 NOTE — PROGRESS NOTES
COVID-19 Test Result   Telephone Encounter    Patient Name: Esperanza Pop  : 1945  MRN: [id number]    COVID19   Date Value Ref Range Status   2020 Not Detected Not Detected - Ref. Range Final        Patient was counseled as follows:  • (-) negative COVID-19 test result  • The test is not perfect, so there is a chance it could be falsely negative or the virus level for too low for detection due to being very early in their infectious process.    • For this reason, Esperanza Pop strongly encouraged to practice the safest standards in protecting their health and others given the endemic concerns.  Patient was advised to:  o Practice social distancing in the community  o Continue to wash their hands frequently with soap and hot water, and cover their cough.   • If patient develops non-emergent symptoms such as mild increased shortness of breath, fever, cough, or for other questions, Esperanza Pop was asked to please call their primary care physician’s office or the [place] hotline at [phone].  • Questions were engaged and answered to the best of my ability, patient expressed verbal understanding of their test results and my advice.      Primary Care Physician verified as being: Meghana Rodgers MD        Electronically signed by WILMA Vital, 20, 6:09 PM.

## 2020-04-23 NOTE — TELEPHONE ENCOUNTER
PATIENT HAS CALLED FOR A STATUS UPDATE ON HER COVID 19 TEST. SHE HAD IT DONE AT [facility] THIS PAST Tuesday. SHE HAS STATED SHE WILL NOT BE ABLE TO GO BACK TO HER DIALYSIS CLINIC UNTIL SHE RECEIVES HER RESULTS.   PLEASE CALL PATIENT FOR ANY QUESTIONS OR  CONCERNS AT   [phone]

## 2020-04-24 NOTE — TELEPHONE ENCOUNTER
[facility]   [phone].  Called and advised on negative results. She wanted us to fax them to [facility] as above  Called them to get fax number and advised Vi on staff re: the negative results.  She determined patient will not need another test if asymptomatic.  Their fax is [phone]

## 2020-04-29 NOTE — PROGRESS NOTES
I have reviewed the notes, assessments, and/or procedures performed by Connie Salgado PA-C, I concur with her/his documentation of Esperanza Pop.

## 2020-06-16 ENCOUNTER — OFFICE VISIT (OUTPATIENT)
Dept: INTERNAL MEDICINE | Facility: CLINIC | Age: 75
End: 2020-06-16

## 2020-06-16 VITALS
SYSTOLIC BLOOD PRESSURE: 142 MMHG | HEIGHT: 67 IN | DIASTOLIC BLOOD PRESSURE: 70 MMHG | TEMPERATURE: 98.2 F | BODY MASS INDEX: 28.91 KG/M2 | WEIGHT: 184.2 LBS | HEART RATE: 71 BPM | OXYGEN SATURATION: 99 %

## 2020-06-16 DIAGNOSIS — E11.22 TYPE 2 DIABETES MELLITUS WITH CHRONIC KIDNEY DISEASE ON CHRONIC DIALYSIS, WITH LONG-TERM CURRENT USE OF INSULIN (HCC): ICD-10-CM

## 2020-06-16 DIAGNOSIS — C50.911 MALIGNANT NEOPLASM OF RIGHT FEMALE BREAST, UNSPECIFIED ESTROGEN RECEPTOR STATUS, UNSPECIFIED SITE OF BREAST (HCC): ICD-10-CM

## 2020-06-16 DIAGNOSIS — N18.6 TYPE 2 DIABETES MELLITUS WITH CHRONIC KIDNEY DISEASE ON CHRONIC DIALYSIS, WITH LONG-TERM CURRENT USE OF INSULIN (HCC): ICD-10-CM

## 2020-06-16 DIAGNOSIS — Z99.2 TYPE 2 DIABETES MELLITUS WITH CHRONIC KIDNEY DISEASE ON CHRONIC DIALYSIS, WITH LONG-TERM CURRENT USE OF INSULIN (HCC): ICD-10-CM

## 2020-06-16 DIAGNOSIS — Z00.00 MEDICARE ANNUAL WELLNESS VISIT, SUBSEQUENT: Primary | ICD-10-CM

## 2020-06-16 DIAGNOSIS — Z12.11 SCREENING FOR COLON CANCER: ICD-10-CM

## 2020-06-16 DIAGNOSIS — E55.9 VITAMIN D DEFICIENCY: ICD-10-CM

## 2020-06-16 DIAGNOSIS — I50.33 ACUTE ON CHRONIC DIASTOLIC HEART FAILURE (HCC): ICD-10-CM

## 2020-06-16 DIAGNOSIS — I10 ESSENTIAL HYPERTENSION: ICD-10-CM

## 2020-06-16 DIAGNOSIS — R25.2 LEG CRAMPS: ICD-10-CM

## 2020-06-16 DIAGNOSIS — Z79.4 TYPE 2 DIABETES MELLITUS WITH CHRONIC KIDNEY DISEASE ON CHRONIC DIALYSIS, WITH LONG-TERM CURRENT USE OF INSULIN (HCC): ICD-10-CM

## 2020-06-16 DIAGNOSIS — E78.5 HYPERLIPIDEMIA LDL GOAL <70: ICD-10-CM

## 2020-06-16 DIAGNOSIS — R07.81 RIB PAIN: ICD-10-CM

## 2020-06-16 LAB — HBA1C MFR BLD: 8.6 %

## 2020-06-16 PROCEDURE — G0444 DEPRESSION SCREEN ANNUAL: HCPCS | Performed by: INTERNAL MEDICINE

## 2020-06-16 PROCEDURE — 96160 PT-FOCUSED HLTH RISK ASSMT: CPT | Performed by: INTERNAL MEDICINE

## 2020-06-16 PROCEDURE — G0439 PPPS, SUBSEQ VISIT: HCPCS | Performed by: INTERNAL MEDICINE

## 2020-06-16 PROCEDURE — 83036 HEMOGLOBIN GLYCOSYLATED A1C: CPT | Performed by: INTERNAL MEDICINE

## 2020-06-16 PROCEDURE — 99213 OFFICE O/P EST LOW 20 MIN: CPT | Performed by: INTERNAL MEDICINE

## 2020-06-16 RX ORDER — FLUTICASONE PROPIONATE 50 MCG
2 SPRAY, SUSPENSION (ML) NASAL DAILY
Qty: 16 G | Refills: 5 | Status: SHIPPED | OUTPATIENT
Start: 2020-06-16 | End: 2020-07-21

## 2020-06-16 RX ORDER — NIFEDIPINE 30 MG/1
30 TABLET, EXTENDED RELEASE ORAL DAILY
COMMUNITY
End: 2020-09-11 | Stop reason: SDUPTHER

## 2020-06-16 RX ORDER — ROPINIROLE 0.5 MG/1
TABLET, FILM COATED ORAL
Qty: 30 TABLET | Refills: 5 | Status: SHIPPED | OUTPATIENT
Start: 2020-06-16 | End: 2021-01-09

## 2020-06-16 NOTE — PROGRESS NOTES
The ABCs of the Annual Wellness Visit  Subsequent Medicare Wellness Visit    Chief Complaint   Patient presents with   • Medicare Wellness-subsequent       Subjective   History of Present Illness:  Esperanza Pop is a 75 y.o. female who presents for a Subsequent Medicare Wellness Visit.    HEALTH RISK ASSESSMENT    Recent Hospitalizations:  No hospitalization(s) within the last year.    Current Medical Providers:  Patient Care Team:  Meghana Rodgers MD as PCP - General  Meghana Rodgers MD as PCP - Family Medicine    Smoking Status:  Social History     Tobacco Use   Smoking Status Never Smoker   Smokeless Tobacco Never Used   Tobacco Comment    Michael second hand smoke       Alcohol Consumption:  Social History     Substance and Sexual Activity   Alcohol Use No    Comment: rarely       Depression Screen:   PHQ-2/PHQ-9 Depression Screening 6/16/2020   Little interest or pleasure in doing things 0   Feeling down, depressed, or hopeless 0   Total Score 0       Fall Risk Screen:  BRUNA Fall Risk Assessment was completed, and patient is at LOW risk for falls.Assessment completed on:6/16/2020    Health Habits and Functional and Cognitive Screening:  Functional & Cognitive Status 6/16/2020   Do you have difficulty preparing food and eating? No   Do you have difficulty bathing yourself, getting dressed or grooming yourself? No   Do you have difficulty using the toilet? No   Do you have difficulty moving around from place to place? No   Do you have trouble with steps or getting out of a bed or a chair? No   Current Diet Well Balanced Diet   Dental Exam Not up to date   Eye Exam Up to date   Exercise (times per week) 3 times per week   Current Exercise Activities Include Walking   Do you need help using the phone?  No   Are you deaf or do you have serious difficulty hearing?  No   Do you need help with transportation? No   Do you need help shopping? No   Do you need help preparing meals?  No   Do you need help with housework?   No   Do you need help with laundry? No   Do you need help taking your medications? No   Do you need help managing money? No   Do you ever drive or ride in a car without wearing a seat belt? No   Have you felt unusual stress, anger or loneliness in the last month? No   Who do you live with? Spouse   If you need help, do you have trouble finding someone available to you? No   Have you been bothered in the last four weeks by sexual problems? No   Do you have difficulty concentrating, remembering or making decisions? No         Does the patient have evidence of cognitive impairment? No    Asprin use counseling:Taking ASA appropriately as indicated    Age-appropriate Screening Schedule:  Refer to the list below for future screening recommendations based on patient's age, sex and/or medical conditions. Orders for these recommended tests are listed in the plan section. The patient has been provided with a written plan.    Health Maintenance   Topic Date Due   • ZOSTER VACCINE (1 of 2) 01/12/1997   • COLONOSCOPY  06/30/2018   • URINE MICROALBUMIN  06/04/2020   • INFLUENZA VACCINE  08/01/2020   • LIPID PANEL  09/10/2020   • DIABETIC EYE EXAM  11/20/2020   • HEMOGLOBIN A1C  12/16/2020   • DIABETIC FOOT EXAM  06/16/2021   • MAMMOGRAM  01/16/2022   • PNEUMOCOCCAL VACCINE (65+ HIGH RISK)  Completed   • TDAP/TD VACCINES  Discontinued          The following portions of the patient's history were reviewed and updated as appropriate: allergies, current medications, past family history, past medical history, past social history, past surgical history and problem list.    Outpatient Medications Prior to Visit   Medication Sig Dispense Refill   • NIFEdipine XL (PROCARDIA XL) 30 MG 24 hr tablet Take 30 mg by mouth 2 (two) times a day.     • amiodarone (PACERONE) 100 MG tablet Take 1 tablet by mouth Daily. 90 tablet 3   • ammonium lactate (LAC-HYDRIN) 12 % lotion Apply  topically to the appropriate area as directed As Needed for Dry  Skin. 500 g 3   • aspirin 81 MG EC tablet Take 1 tablet by mouth Daily. (Patient taking differently: Take 81 mg by mouth Every Other Day.)     • atorvastatin (LIPITOR) 80 MG tablet Take 1 tablet by mouth Every Night. 90 tablet 1   • Blood Glucose Monitoring Suppl (ACCU-CHEK NADER PLUS) W/DEVICE kit DX: E11.65 1 kit 0   • carvedilol (COREG) 25 MG tablet Take 1 tablet by mouth Every 12 (Twelve) Hours. 60 tablet 5   • Cholecalciferol (VITAMIN D3) 2000 units capsule Take 2,000 Units by mouth Daily.  3   • cloNIDine (CATAPRES) 0.1 MG tablet Take 0.1 mg by mouth 2 (Two) Times a Day.     • CloNIDine (CATAPRES) 0.2 MG tablet Take 1 tablet by mouth 2 (Two) Times a Day. 60 tablet 5   • cloNIDine (CATAPRES-TTS) 0.2 MG/24HR patch      • clopidogrel (PLAVIX) 75 MG tablet Take 1 tablet by mouth Daily. 30 tablet 5   • desonide (DESOWEN) 0.05 % cream Apply 0.05 application topically to the appropriate area as directed 2 (Two) Times a Day.  1   • dorzolamide-timolol (COSOPT) 22.3-6.8 MG/ML ophthalmic solution Administer 1 drop to both eyes Daily. 10 mL 3   • Ferric Citrate 1  MG(Fe) tablet Take 1 tablet by mouth As Needed.     • Ferrous Sulfate (IRON) 325 (65 Fe) MG tablet Take 1 tablet by mouth 2 (Two) Times a Day.  5   • fluocinonide (LIDEX) 0.05 % external solution Apply 0.05 application topically to the appropriate area as directed 2 (Two) Times a Day.  2   • glucose blood (ACCU-CHEK NADER PLUS) test strip Use as instructed 1 TO 3 TIMES DAILY  DX: E11.65 300 each 3   • IRON DEXTRAN IJ Inject  as directed 3 (Three) Times a Week.     • isosorbide mononitrate (IMDUR) 120 MG 24 hr tablet Take 1 tablet by mouth Daily. 90 tablet 3   • Lancets Misc. (ACCU-CHEK SOFTCLIX LANCET DEV) kit TEST 1-3 TIMES DAILY DX:E11.65 1 kit 0   • latanoprost (XALATAN) 0.005 % ophthalmic solution Administer 1 drop to both eyes Every Night.     • lidocaine-prilocaine (EMLA) 2.5-2.5 % cream APPLY SMALL AMOUNT TO ACCESS SITE (AVF) 1 TO 2 HOURS BEFORE  DIALYSIS. COVER WITH OCCLUSIVE DRESSING (SARAN WRAP)  6   • lisinopril (PRINIVIL,ZESTRIL) 20 MG tablet Take 1 tablet by mouth Every 12 (Twelve) Hours. 60 tablet 5   • nitroglycerin (NITROLINGUAL) 0.4 MG/SPRAY spray Place 1 spray under the tongue Every 5 (Five) Minutes As Needed for Chest Pain. 1 each 12   • cephalexin (Keflex) 500 MG capsule Take 1 capsule by mouth 2 (Two) Times a Day. 14 capsule 0   • fluticasone (FLONASE) 50 MCG/ACT nasal spray 2 sprays into each nostril Daily.     • hydrALAZINE (APRESOLINE) 100 MG tablet Take 100 mg by mouth 3 (Three) Times a Day.     • insulin NPH-insulin regular (humuLIN 70/30,novoLIN 70/30) (70-30) 100 UNIT/ML injection Inject 17 units before dinner and 15 Units before breakfast. 10 mL 5     No facility-administered medications prior to visit.        Patient Active Problem List   Diagnosis   • Acute on chronic diastolic heart failure (CMS/[facility])   • Type 2 diabetes mellitus (CMS/[facility])   • Essential hypertension   • Coronary artery disease involving native coronary artery of native heart with angina pectoris (CMS/[facility])   • Breast cancer, female, right   • Seasonal allergic rhinitis   • Right carotid bruit   • Vitamin B12 deficiency   • Hyperlipidemia LDL goal <70   • End stage renal disease on dialysis (CMS/[facility])   • Nonrheumatic aortic valve stenosis   • NSTEMI (non-ST elevated myocardial infarction) (CMS/[facility])   • UTI (urinary tract infection)   • Ischemic heart disease   • CHF (congestive heart failure) (CMS/[facility])   • Acute non-ST segment elevation myocardial infarction (STEMI) following previous myocardial infarction   • Dyslipidemia   • Diabetes mellitus (CMS/[facility])   • Mild obesity   • Elevated troponin       Advanced Care Planning:  ACP discussion was held with the patient during this visit. Patient has an advance directive (not in EMR), copy requested.    Review of Systems   Constitutional: Positive for fatigue. Negative for chills.   HENT: Negative for congestion, ear pain and  "sore throat.    Eyes: Negative for pain, redness and visual disturbance.   Respiratory: Positive for shortness of breath. Negative for cough.    Cardiovascular: Negative for chest pain, palpitations and leg swelling.   Gastrointestinal: Negative for abdominal pain, diarrhea and nausea.   Endocrine: Negative for cold intolerance and heat intolerance.   Genitourinary: Negative for flank pain and urgency.   Musculoskeletal: Positive for arthralgias and myalgias. Negative for gait problem.   Skin: Negative for pallor and rash.   Neurological: Positive for weakness. Negative for dizziness and headaches.   Psychiatric/Behavioral: Negative for dysphoric mood and sleep disturbance. The patient is not nervous/anxious.        Compared to one year ago, the patient feels her physical health is better.  Compared to one year ago, the patient feels her mental health is better.    Reviewed chart for potential of high risk medication in the elderly: yes  Reviewed chart for potential of harmful drug interactions in the elderly:yes    Objective         Vitals:    06/16/20 1411   BP: 142/70   Pulse: 71   Temp: 98.2 °F (36.8 °C)   SpO2: 99%  Comment: ra   Weight: 83.6 kg (184 lb 3.2 oz)   Height: 170.2 cm (67\")   PainSc: 0-No pain       Body mass index is 28.85 kg/m².  Discussed the patient's BMI with her. The BMI is in the acceptable range.    Physical Exam   Constitutional: She is oriented to person, place, and time. She appears well-developed and well-nourished.   HENT:   Head: Normocephalic and atraumatic.   Right Ear: External ear normal.   Left Ear: External ear normal.   Mouth/Throat: No oropharyngeal exudate.   Eyes: Pupils are equal, round, and reactive to light. Conjunctivae are normal.   Neck: Neck supple. No thyromegaly present.   Cardiovascular: Normal rate and regular rhythm. Exam reveals no gallop and no friction rub.   No murmur heard.  + fistula.   Pulmonary/Chest: Effort normal and breath sounds normal.   Abdominal: " Soft. Bowel sounds are normal. She exhibits no distension. There is no tenderness.   Musculoskeletal: She exhibits no edema or tenderness.   Neurological: She is alert and oriented to person, place, and time. No cranial nerve deficit.   Skin: Skin is warm and dry.   Psychiatric: She has a normal mood and affect. Judgment normal.   Nursing note and vitals reviewed.      Lab Results   Component Value Date    HGBA1C 8.6 06/16/2020        Assessment/Plan   Medicare Risks and Personalized Health Plan  CMS Preventative Services Quick Reference  Advance Directive Discussion  Cardiovascular risk  Depression/Dysphoria  Diabetic Lab Screening   Fall Risk  Immunizations Discussed/Encouraged (specific immunizations; Pneumococcal 23 and Shingrix )  Obesity/Overweight   Osteoprorosis Risk    The above risks/problems have been discussed with the patient.  Pertinent information has been shared with the patient in the After Visit Summary.  Follow up plans and orders are seen below in the Assessment/Plan Section.    Medicare Wellness-subsequent    Subjective   Esperanza Pop is a 75 y.o. female is here today for follow-up.  HPI  Arm and back rash better, on desonide and flucinonide, on 1 week and off the next.  Off the hydralazine and on nifedipine, no side effects on it.  Increased leg cramps and over the last mo- had night sweats.        Current Outpatient Medications:   •  NIFEdipine XL (PROCARDIA XL) 30 MG 24 hr tablet, Take 30 mg by mouth 2 (two) times a day., Disp: , Rfl:   •  amiodarone (PACERONE) 100 MG tablet, Take 1 tablet by mouth Daily., Disp: 90 tablet, Rfl: 3  •  ammonium lactate (LAC-HYDRIN) 12 % lotion, Apply  topically to the appropriate area as directed As Needed for Dry Skin., Disp: 500 g, Rfl: 3  •  aspirin 81 MG EC tablet, Take 1 tablet by mouth Daily. (Patient taking differently: Take 81 mg by mouth Every Other Day.), Disp: , Rfl:   •  atorvastatin (LIPITOR) 80 MG tablet, Take 1 tablet by mouth Every Night., Disp:  90 tablet, Rfl: 1  •  Blood Glucose Monitoring Suppl (ACCU-CHEK NADER PLUS) W/DEVICE kit, DX: E11.65, Disp: 1 kit, Rfl: 0  •  carvedilol (COREG) 25 MG tablet, Take 1 tablet by mouth Every 12 (Twelve) Hours., Disp: 60 tablet, Rfl: 5  •  Cholecalciferol (VITAMIN D3) 2000 units capsule, Take 2,000 Units by mouth Daily., Disp: , Rfl: 3  •  cloNIDine (CATAPRES) 0.1 MG tablet, Take 0.1 mg by mouth 2 (Two) Times a Day., Disp: , Rfl:   •  CloNIDine (CATAPRES) 0.2 MG tablet, Take 1 tablet by mouth 2 (Two) Times a Day., Disp: 60 tablet, Rfl: 5  •  cloNIDine (CATAPRES-TTS) 0.2 MG/24HR patch, , Disp: , Rfl:   •  clopidogrel (PLAVIX) 75 MG tablet, Take 1 tablet by mouth Daily., Disp: 30 tablet, Rfl: 5  •  desonide (DESOWEN) 0.05 % cream, Apply 0.05 application topically to the appropriate area as directed 2 (Two) Times a Day., Disp: , Rfl: 1  •  dorzolamide-timolol (COSOPT) 22.3-6.8 MG/ML ophthalmic solution, Administer 1 drop to both eyes Daily., Disp: 10 mL, Rfl: 3  •  Ferric Citrate 1  MG(Fe) tablet, Take 1 tablet by mouth As Needed., Disp: , Rfl:   •  Ferrous Sulfate (IRON) 325 (65 Fe) MG tablet, Take 1 tablet by mouth 2 (Two) Times a Day., Disp: , Rfl: 5  •  fluocinonide (LIDEX) 0.05 % external solution, Apply 0.05 application topically to the appropriate area as directed 2 (Two) Times a Day., Disp: , Rfl: 2  •  fluticasone (FLONASE) 50 MCG/ACT nasal spray, 2 sprays into the nostril(s) as directed by provider Daily., Disp: 16 g, Rfl: 5  •  glucose blood (ACCU-CHEK NADER PLUS) test strip, Use as instructed 1 TO 3 TIMES DAILY  DX: E11.65, Disp: 300 each, Rfl: 3  •  insulin NPH-insulin regular (humuLIN 70/30,novoLIN 70/30) (70-30) 100 UNIT/ML injection, Inject 20 units before dinner and 17 Units before breakfast., Disp: 10 mL, Rfl: 5  •  IRON DEXTRAN IJ, Inject  as directed 3 (Three) Times a Week., Disp: , Rfl:   •  isosorbide mononitrate (IMDUR) 120 MG 24 hr tablet, Take 1 tablet by mouth Daily., Disp: 90 tablet, Rfl:  "3  •  Lancets Misc. (ACCU-CHEK SOFTCLIX LANCET DEV) kit, TEST 1-3 TIMES DAILY DX:E11.65, Disp: 1 kit, Rfl: 0  •  latanoprost (XALATAN) 0.005 % ophthalmic solution, Administer 1 drop to both eyes Every Night., Disp: , Rfl:   •  lidocaine-prilocaine (EMLA) 2.5-2.5 % cream, APPLY SMALL AMOUNT TO ACCESS SITE (AVF) 1 TO 2 HOURS BEFORE DIALYSIS. COVER WITH OCCLUSIVE DRESSING (SARAN WRAP), Disp: , Rfl: 6  •  lisinopril (PRINIVIL,ZESTRIL) 20 MG tablet, Take 1 tablet by mouth Every 12 (Twelve) Hours., Disp: 60 tablet, Rfl: 5  •  nitroglycerin (NITROLINGUAL) 0.4 MG/SPRAY spray, Place 1 spray under the tongue Every 5 (Five) Minutes As Needed for Chest Pain., Disp: 1 each, Rfl: 12  •  rOPINIRole (Requip) 0.5 MG tablet, 1/2 PO HS x 1 week then 1 PO HS. Take 1 hour before bedtime., Disp: 30 tablet, Rfl: 5      The following portions of the patient's history were reviewed and updated as appropriate: allergies, current medications, past family history, past medical history, past social history, past surgical history and problem list.        Objective   /70   Pulse 71   Temp 98.2 °F (36.8 °C)   Ht 170.2 cm (67\")   Wt 83.6 kg (184 lb 3.2 oz)   LMP  (LMP Unknown)   SpO2 99% Comment: ra  BMI 28.85 kg/m²         Results for orders placed or performed in visit on 06/16/20   POC Glycosylated Hemoglobin (Hb A1C)   Result Value Ref Range    Hemoglobin A1C 8.6 %             Assessment/Plan   Diagnoses and all orders for this visit:    Medicare annual wellness visit, subsequent    Type 2 diabetes mellitus with chronic kidney disease on chronic dialysis, with long-term current use of insulin (CMS/[facility])  -     POC Glycosylated Hemoglobin (Hb A1C)  -     insulin NPH-insulin regular (humuLIN 70/30,novoLIN 70/30) (70-30) 100 UNIT/ML injection; Inject 20 units before dinner and 17 Units before breakfast.    Acute on chronic diastolic heart failure (CMS/[facility])  Comments:  can take demaex prn if feels overloaded.  Orders:  -     Cancel: CBC " (No Diff); Future  -     CBC (No Diff); Future    Essential hypertension  Comments:  better on adalat    Hyperlipidemia LDL goal <70  -     Cancel: Comprehensive Metabolic Panel; Future  -     Cancel: Lipid Panel; Future  -     Cancel: TSH; Future  -     Comprehensive Metabolic Panel; Future  -     Lipid Panel; Future  -     TSH; Future    Leg cramps  Comments:  start requip, on iron per nephrology  Orders:  -     rOPINIRole (Requip) 0.5 MG tablet; 1/2 PO HS x 1 week then 1 PO HS. Take 1 hour before bedtime.  -     Cancel: CBC (No Diff); Future  -     Cancel: Comprehensive Metabolic Panel; Future  -     CBC (No Diff); Future    Malignant neoplasm of right female breast, unspecified estrogen receptor status, unspecified site of breast (CMS/[facility])  -     XR Ribs Right With PA Chest    Screening for colon cancer  -     Ambulatory Referral For Screening Colonoscopy    Type 2 diabetes mellitus with chronic kidney disease on chronic dialysis, with long-term current use of insulin (CMS/[facility])  Comments:  increase insulin to 17 unts before breakfast , 20 hs.  Orders:  -     POC Glycosylated Hemoglobin (Hb A1C)  -     insulin NPH-insulin regular (humuLIN 70/30,novoLIN 70/30) (70-30) 100 UNIT/ML injection; Inject 20 units before dinner and 17 Units before breakfast.    Vitamin D deficiency  -     Cancel: Vitamin D 25 Hydroxy; Future  -     Vitamin D 25 Hydroxy; Future    Rib pain  -     XR Ribs Right With PA Chest  -     CBC (No Diff); Future    Other orders  -     NIFEdipine XL (PROCARDIA XL) 30 MG 24 hr tablet; Take 30 mg by mouth 2 (two) times a day.  -     fluticasone (FLONASE) 50 MCG/ACT nasal spray; 2 sprays into the nostril(s) as directed by provider Daily.     sugars worse. Increase insulin, continue to restrict sweets.    Start requip to help with leg cramps., continue iron per nephrology.           PHQ-2/PHQ-9 Depression screening reviewed with patient . Total time spent today for depression screening  with Esperanza Pop   was 15 minutes in counseling, along with plans for any diagnositc work-up and treatment.    Follow Up:  Return in about 3 months (around 9/16/2020) for Recheck diabetes.     An After Visit Summary and PPPS were given to the patient.

## 2020-06-19 ENCOUNTER — PREP FOR SURGERY (OUTPATIENT)
Dept: GASTROENTEROLOGY | Facility: CLINIC | Age: 75
End: 2020-06-19

## 2020-06-19 ENCOUNTER — TELEPHONE (OUTPATIENT)
Dept: GASTROENTEROLOGY | Facility: CLINIC | Age: 75
End: 2020-06-19

## 2020-06-19 DIAGNOSIS — Z12.11 SCREEN FOR COLON CANCER: Primary | ICD-10-CM

## 2020-06-21 ENCOUNTER — RESULTS ENCOUNTER (OUTPATIENT)
Dept: INTERNAL MEDICINE | Facility: CLINIC | Age: 75
End: 2020-06-21

## 2020-06-21 DIAGNOSIS — R07.81 RIB PAIN: ICD-10-CM

## 2020-06-21 DIAGNOSIS — I50.33 ACUTE ON CHRONIC DIASTOLIC HEART FAILURE (HCC): ICD-10-CM

## 2020-06-21 DIAGNOSIS — E55.9 VITAMIN D DEFICIENCY: ICD-10-CM

## 2020-06-21 DIAGNOSIS — R25.2 LEG CRAMPS: ICD-10-CM

## 2020-06-21 DIAGNOSIS — E78.5 HYPERLIPIDEMIA LDL GOAL <70: ICD-10-CM

## 2020-06-23 ENCOUNTER — HOSPITAL ENCOUNTER (OUTPATIENT)
Dept: GENERAL RADIOLOGY | Facility: HOSPITAL | Age: 75
Discharge: HOME | End: 2020-06-23
Admitting: INTERNAL MEDICINE

## 2020-06-23 PROCEDURE — 71101 X-RAY EXAM UNILAT RIBS/CHEST: CPT

## 2020-06-26 ENCOUNTER — TELEPHONE (OUTPATIENT)
Dept: INTERNAL MEDICINE | Facility: CLINIC | Age: 75
End: 2020-06-26

## 2020-06-26 NOTE — TELEPHONE ENCOUNTER
----- Message from Meghana Rodgers MD sent at 6/26/2020 12:03 AM EDT -----  Please let Pt. Know that her Rib xrays are negative for any rib fractures. Pain is likely muscular. She should take tylenol prn and apply heat/ ice.    thanks

## 2020-06-29 PROBLEM — Z12.11 SCREEN FOR COLON CANCER: Status: ACTIVE | Noted: 2020-06-29

## 2020-06-29 NOTE — TELEPHONE ENCOUNTER
PATIENT IS CURRENTLY PLAVIX. PATIENT IS SCHEDULED FOR COLON AT THE HOSPITAL ON 07/23. PLEASE OBTAIN CARDIAC CLEARANCE.

## 2020-06-30 NOTE — TELEPHONE ENCOUNTER
I SPOKE WITH PATIENT. INFORMED HER THAT DR HENRY APPROVED FOR HER TO STOP PLAVIX 5 DAYS BEFORE COLONOSCOPY. PATIENT VOICED UNDERSTANDING.

## 2020-07-15 DIAGNOSIS — Z12.11 SCREENING FOR COLON CANCER: Primary | ICD-10-CM

## 2020-07-15 RX ORDER — SODIUM, POTASSIUM,MAG SULFATES 17.5-3.13G
1 SOLUTION, RECONSTITUTED, ORAL ORAL TAKE AS DIRECTED
Qty: 2 BOTTLE | Refills: 0 | Status: SHIPPED | OUTPATIENT
Start: 2020-07-15 | End: 2020-09-11

## 2020-07-20 ENCOUNTER — APPOINTMENT (OUTPATIENT)
Dept: PREADMISSION TESTING | Facility: HOSPITAL | Age: 75
End: 2020-07-20

## 2020-07-21 ENCOUNTER — APPOINTMENT (OUTPATIENT)
Dept: PREADMISSION TESTING | Facility: HOSPITAL | Age: 75
End: 2020-07-21

## 2020-07-21 ENCOUNTER — TELEPHONE (OUTPATIENT)
Dept: GASTROENTEROLOGY | Facility: CLINIC | Age: 75
End: 2020-07-21

## 2020-07-21 ENCOUNTER — TELEPHONE (OUTPATIENT)
Dept: CARDIOLOGY | Facility: CLINIC | Age: 75
End: 2020-07-21

## 2020-07-21 VITALS — HEIGHT: 67 IN | WEIGHT: 182.1 LBS | BODY MASS INDEX: 28.58 KG/M2

## 2020-07-21 LAB
DEPRECATED RDW RBC AUTO: 53.3 FL (ref 37–54)
ERYTHROCYTE [DISTWIDTH] IN BLOOD BY AUTOMATED COUNT: 19.1 % (ref 12.3–15.4)
HCT VFR BLD AUTO: 39 % (ref 34–46.6)
HGB BLD-MCNC: 10.4 G/DL (ref 12–15.9)
MCH RBC QN AUTO: 21.2 PG (ref 26.6–33)
MCHC RBC AUTO-ENTMCNC: 26.7 G/DL (ref 31.5–35.7)
MCV RBC AUTO: 79.6 FL (ref 79–97)
PLATELET # BLD AUTO: 306 10*3/MM3 (ref 140–450)
PMV BLD AUTO: 11.2 FL (ref 6–12)
POTASSIUM SERPL-SCNC: 3.9 MMOL/L (ref 3.5–5.2)
RBC # BLD AUTO: 4.9 10*6/MM3 (ref 3.77–5.28)
WBC # BLD AUTO: 5.32 10*3/MM3 (ref 3.4–10.8)

## 2020-07-21 PROCEDURE — 36415 COLL VENOUS BLD VENIPUNCTURE: CPT

## 2020-07-21 PROCEDURE — U0004 COV-19 TEST NON-CDC HGH THRU: HCPCS

## 2020-07-21 PROCEDURE — 85027 COMPLETE CBC AUTOMATED: CPT | Performed by: INTERNAL MEDICINE

## 2020-07-21 PROCEDURE — U0002 COVID-19 LAB TEST NON-CDC: HCPCS

## 2020-07-21 PROCEDURE — 84132 ASSAY OF SERUM POTASSIUM: CPT | Performed by: INTERNAL MEDICINE

## 2020-07-21 PROCEDURE — C9803 HOPD COVID-19 SPEC COLLECT: HCPCS

## 2020-07-21 RX ORDER — BUSPIRONE HYDROCHLORIDE 7.5 MG/1
7.5 TABLET ORAL 2 TIMES DAILY
COMMUNITY
End: 2021-01-08

## 2020-07-21 RX ORDER — TORSEMIDE 100 MG/1
100 TABLET ORAL DAILY PRN
COMMUNITY
End: 2021-01-23 | Stop reason: HOSPADM

## 2020-07-21 NOTE — PAT
Per Anesthesia Request, patient instructed not to take their ACE/ARB medications on the AM of surgery.    Patient Stopped aspirin and plavix on 7-18 for colonoscopy per dr ortiz recs per patient report,  per jaleel in dr crenshaw office pt needs to restart aspirin and cannot stop, pt notified and aware and dr schofield's office notified. jaleel in dr crenshaw office notified for cardiac clearance and ok to stop plavix for colonoscopy

## 2020-07-21 NOTE — TELEPHONE ENCOUNTER
Dr. Ralph Garcia with Pre Admission Testing called (left a vm) to inform you patient was advised to resume her aspirin per nurse at Dr. Sagastume office, however, they are in the process of getting clearance for the plavix. She has been off plavix per your recommendation.   Thank you  Bonnie

## 2020-07-21 NOTE — TELEPHONE ENCOUNTER
Patient is scheduled to have colonoscopy with Dr. Rubén Rosas on Thursday, July 23 and is requesting a cardiac risk stratification prior to procedure. At Dr. Rosas's request, patient has been of her Plavix and ASA since 7/18 for procedure.     Patient has no c/o of cardiac symptoms including chest pain, SOA, palpitations, or edema.     Please advise. Thanks!

## 2020-07-22 ENCOUNTER — ANESTHESIA EVENT (OUTPATIENT)
Dept: GASTROENTEROLOGY | Facility: HOSPITAL | Age: 75
End: 2020-07-22

## 2020-07-22 LAB
REF LAB TEST METHOD: NORMAL
SARS-COV-2 RNA RESP QL NAA+PROBE: NOT DETECTED

## 2020-07-22 RX ORDER — FAMOTIDINE 20 MG/1
20 TABLET, FILM COATED ORAL ONCE
Status: CANCELLED | OUTPATIENT
Start: 2020-07-22 | End: 2020-07-22

## 2020-07-23 ENCOUNTER — HOSPITAL ENCOUNTER (OUTPATIENT)
Facility: HOSPITAL | Age: 75
Setting detail: HOSPITAL OUTPATIENT SURGERY
Discharge: HOME | End: 2020-07-23
Attending: INTERNAL MEDICINE | Admitting: ANESTHESIOLOGY

## 2020-07-23 ENCOUNTER — ANESTHESIA (OUTPATIENT)
Dept: GASTROENTEROLOGY | Facility: HOSPITAL | Age: 75
End: 2020-07-23

## 2020-07-23 VITALS
HEIGHT: 67 IN | SYSTOLIC BLOOD PRESSURE: 140 MMHG | OXYGEN SATURATION: 93 % | DIASTOLIC BLOOD PRESSURE: 65 MMHG | TEMPERATURE: 97.4 F | BODY MASS INDEX: 28.56 KG/M2 | HEART RATE: 67 BPM | RESPIRATION RATE: 20 BRPM | WEIGHT: 182 LBS

## 2020-07-23 LAB
ANION GAP SERPL CALCULATED.3IONS-SCNC: 16 MMOL/L (ref 5–15)
BUN SERPL-MCNC: 12 MG/DL (ref 8–23)
BUN/CREAT SERPL: 2.5 (ref 7–25)
CALCIUM SPEC-SCNC: 9.5 MG/DL (ref 8.6–10.5)
CHLORIDE SERPL-SCNC: 92 MMOL/L (ref 98–107)
CO2 SERPL-SCNC: 31 MMOL/L (ref 22–29)
CREAT SERPL-MCNC: 4.72 MG/DL (ref 0.57–1)
GFR SERPL CREATININE-BSD FRML MDRD: 11 ML/MIN/1.73
GFR SERPL CREATININE-BSD FRML MDRD: ABNORMAL ML/MIN/{1.73_M2}
GLUCOSE BLDC GLUCOMTR-MCNC: 163 MG/DL (ref 70–130)
GLUCOSE SERPL-MCNC: 173 MG/DL (ref 65–99)
POTASSIUM SERPL-SCNC: 3.4 MMOL/L (ref 3.5–5.2)
SODIUM SERPL-SCNC: 139 MMOL/L (ref 136–145)

## 2020-07-23 PROCEDURE — S0260 H&P FOR SURGERY: HCPCS | Performed by: INTERNAL MEDICINE

## 2020-07-23 PROCEDURE — 80048 BASIC METABOLIC PNL TOTAL CA: CPT | Performed by: ANESTHESIOLOGY

## 2020-07-23 PROCEDURE — 25010000002 PROPOFOL 10 MG/ML EMULSION: Performed by: NURSE ANESTHETIST, CERTIFIED REGISTERED

## 2020-07-23 PROCEDURE — G0121 COLON CA SCRN NOT HI RSK IND: HCPCS | Performed by: INTERNAL MEDICINE

## 2020-07-23 PROCEDURE — 82962 GLUCOSE BLOOD TEST: CPT

## 2020-07-23 RX ORDER — PROMETHAZINE HYDROCHLORIDE 25 MG/1
25 TABLET ORAL ONCE AS NEEDED
Status: DISCONTINUED | OUTPATIENT
Start: 2020-07-23 | End: 2020-07-27 | Stop reason: HOSPADM

## 2020-07-23 RX ORDER — FAMOTIDINE 10 MG/ML
20 INJECTION, SOLUTION INTRAVENOUS ONCE
Status: COMPLETED | OUTPATIENT
Start: 2020-07-23 | End: 2020-07-23

## 2020-07-23 RX ORDER — SODIUM CHLORIDE, SODIUM LACTATE, POTASSIUM CHLORIDE, CALCIUM CHLORIDE 600; 310; 30; 20 MG/100ML; MG/100ML; MG/100ML; MG/100ML
9 INJECTION, SOLUTION INTRAVENOUS CONTINUOUS
Status: DISCONTINUED | OUTPATIENT
Start: 2020-07-23 | End: 2020-07-27 | Stop reason: HOSPADM

## 2020-07-23 RX ORDER — PROMETHAZINE HYDROCHLORIDE 25 MG/ML
6.25 INJECTION, SOLUTION INTRAMUSCULAR; INTRAVENOUS ONCE AS NEEDED
Status: DISCONTINUED | OUTPATIENT
Start: 2020-07-23 | End: 2020-07-27 | Stop reason: HOSPADM

## 2020-07-23 RX ORDER — PROMETHAZINE HYDROCHLORIDE 25 MG/1
25 SUPPOSITORY RECTAL ONCE AS NEEDED
Status: DISCONTINUED | OUTPATIENT
Start: 2020-07-23 | End: 2020-07-27 | Stop reason: HOSPADM

## 2020-07-23 RX ORDER — PROPOFOL 10 MG/ML
VIAL (ML) INTRAVENOUS AS NEEDED
Status: DISCONTINUED | OUTPATIENT
Start: 2020-07-23 | End: 2020-07-23 | Stop reason: SURG

## 2020-07-23 RX ORDER — LIDOCAINE HYDROCHLORIDE 10 MG/ML
0.5 INJECTION, SOLUTION EPIDURAL; INFILTRATION; INTRACAUDAL; PERINEURAL ONCE AS NEEDED
Status: DISCONTINUED | OUTPATIENT
Start: 2020-07-23 | End: 2020-07-23 | Stop reason: HOSPADM

## 2020-07-23 RX ORDER — IPRATROPIUM BROMIDE AND ALBUTEROL SULFATE 2.5; .5 MG/3ML; MG/3ML
3 SOLUTION RESPIRATORY (INHALATION) ONCE AS NEEDED
Status: DISCONTINUED | OUTPATIENT
Start: 2020-07-23 | End: 2020-07-23

## 2020-07-23 RX ORDER — SODIUM CHLORIDE 0.9 % (FLUSH) 0.9 %
10 SYRINGE (ML) INJECTION AS NEEDED
Status: DISCONTINUED | OUTPATIENT
Start: 2020-07-23 | End: 2020-07-23 | Stop reason: HOSPADM

## 2020-07-23 RX ORDER — HYDRALAZINE HYDROCHLORIDE 20 MG/ML
5 INJECTION INTRAMUSCULAR; INTRAVENOUS
Status: DISCONTINUED | OUTPATIENT
Start: 2020-07-23 | End: 2020-07-27 | Stop reason: HOSPADM

## 2020-07-23 RX ORDER — SODIUM CHLORIDE 9 MG/ML
20 INJECTION, SOLUTION INTRAVENOUS ONCE
Status: COMPLETED | OUTPATIENT
Start: 2020-07-23 | End: 2020-07-23

## 2020-07-23 RX ORDER — LABETALOL HYDROCHLORIDE 5 MG/ML
5 INJECTION, SOLUTION INTRAVENOUS
Status: DISCONTINUED | OUTPATIENT
Start: 2020-07-23 | End: 2020-07-27 | Stop reason: HOSPADM

## 2020-07-23 RX ORDER — LIDOCAINE HYDROCHLORIDE 10 MG/ML
INJECTION, SOLUTION EPIDURAL; INFILTRATION; INTRACAUDAL; PERINEURAL AS NEEDED
Status: DISCONTINUED | OUTPATIENT
Start: 2020-07-23 | End: 2020-07-23 | Stop reason: SURG

## 2020-07-23 RX ORDER — ONDANSETRON 2 MG/ML
4 INJECTION INTRAMUSCULAR; INTRAVENOUS ONCE AS NEEDED
Status: DISCONTINUED | OUTPATIENT
Start: 2020-07-23 | End: 2020-07-27 | Stop reason: HOSPADM

## 2020-07-23 RX ORDER — SODIUM CHLORIDE 0.9 % (FLUSH) 0.9 %
10 SYRINGE (ML) INJECTION EVERY 12 HOURS SCHEDULED
Status: DISCONTINUED | OUTPATIENT
Start: 2020-07-23 | End: 2020-07-23 | Stop reason: HOSPADM

## 2020-07-23 RX ORDER — SODIUM CHLORIDE 9 MG/ML
INJECTION, SOLUTION INTRAVENOUS CONTINUOUS PRN
Status: DISCONTINUED | OUTPATIENT
Start: 2020-07-23 | End: 2020-07-23 | Stop reason: SURG

## 2020-07-23 RX ADMIN — PROPOFOL 200 MCG/KG/MIN: 10 INJECTION, EMULSION INTRAVENOUS at 08:09

## 2020-07-23 RX ADMIN — FAMOTIDINE 20 MG: 10 INJECTION INTRAVENOUS at 07:19

## 2020-07-23 RX ADMIN — SODIUM CHLORIDE 20 ML/HR: 9 INJECTION, SOLUTION INTRAVENOUS at 07:10

## 2020-07-23 RX ADMIN — LIDOCAINE HYDROCHLORIDE 50 MG: 10 INJECTION, SOLUTION EPIDURAL; INFILTRATION; INTRACAUDAL; PERINEURAL at 08:09

## 2020-07-23 RX ADMIN — PROPOFOL 80 MG: 10 INJECTION, EMULSION INTRAVENOUS at 08:09

## 2020-07-23 RX ADMIN — SODIUM CHLORIDE: 9 INJECTION, SOLUTION INTRAVENOUS at 07:25

## 2020-07-23 NOTE — H&P
HPI: Mrs. Pop is a 76 yo with a history of ESRD, CHF and diabetes. The patient presents for a screening colon examination. The patient has some constipation. Mrs. Pop denies any blood in the stool but at times on the toilet paper. There is no weight loss. Mrs. Pop denies any localized abdominal pain. She has no difficult or painful swallowing.    PMH: ESRD            CHF            CAD             Diabetes mellitus  PSH: Hysterectomy           Breast surgery  All: PCN        Albuterol  Fam hx: Daughter- esophageal cancer  Soc hx: , no alcohol  ROS: see HPI  Meds: see list  Phy Exam: Gen- pleasant female, no acute distress                     HEENT- oropharynx clear, neck supple                     Chest- clear to auscultation                     Cardiac- s1s2, murmur present                     Abd- soft, bowel sounds present                     Ext- no edema, no cyanosis                     Neuro- awake, alert, no asterixis  Imp: Screening for colon cancer  Plan: Proceed with colonoscopy

## 2020-07-23 NOTE — ANESTHESIA PREPROCEDURE EVALUATION
Anesthesia Evaluation     Patient summary reviewed and Nursing notes reviewed   NPO Solid Status: > 8 hours  NPO Liquid Status: > 8 hours           Airway   Mallampati: II  TM distance: >3 FB  Neck ROM: full  No difficulty expected  Dental      Pulmonary    (+) pneumonia , shortness of breath,   (-) COPD, asthma, recent URI, not a smoker  Cardiovascular     ECG reviewed    (+) hypertension, valvular problems/murmurs, past MI , CAD, cardiac stents angina, CHF , hyperlipidemia,     ROS comment: ECG NSR LAD AS MI   ECHO 2017 EF 65% moderate concentric LVH  Left ventricular diastolic dysfunction (grade I) consistent with impaired relaxation. Mild AS   CATH 2016 RCA stent (re stent)      Neuro/Psych  (-) seizures, CVA  GI/Hepatic/Renal/Endo    (+) obesity,   renal disease CRI, dialysis and ESRD, diabetes mellitus using insulin,   (-) morbid obesity    Musculoskeletal     Abdominal    Substance History      OB/GYN          Other   arthritis,    history of cancer (breast R)    ROS/Med Hx Other: Plavix     K 3.9  7/21/20 HCT 39                 Anesthesia Plan    ASA 3     general and MAC   (Aim MAP >65  pressors )  intravenous induction     Anesthetic plan, all risks, benefits, and alternatives have been provided, discussed and informed consent has been obtained with: patient.    Plan discussed with CRNA.

## 2020-07-23 NOTE — ANESTHESIA POSTPROCEDURE EVALUATION
Patient: Esperanza Pop    Procedure Summary     Date:  07/23/20 Room / Location:   [facility]    Anesthesia Start:  0805 Anesthesia Stop:  0835    Procedure:  COLONOSCOPY (N/A ) Diagnosis:       Screen for colon cancer      (Screen for colon cancer [Z12.11])    Surgeon:  Rubén Rosas MD Provider:  Julián Dumont MD    Anesthesia Type:  general, MAC ASA Status:  3          Anesthesia Type: general, MAC    Vitals 7/23/2020 at 0836    SpO2 95%  2 lpm NC  /55  HR 58  RR 14        Post Anesthesia Care and Evaluation    Patient location during evaluation: PACU  Patient participation: complete - patient participated  Level of consciousness: awake and alert  Pain score: 0  Pain management: adequate  Airway patency: patent  Anesthetic complications: No anesthetic complications  PONV Status: none  Cardiovascular status: hemodynamically stable and acceptable  Respiratory status: nonlabored ventilation, acceptable and nasal cannula  Hydration status: acceptable

## 2020-07-28 ENCOUNTER — TELEPHONE (OUTPATIENT)
Dept: GASTROENTEROLOGY | Facility: CLINIC | Age: 75
End: 2020-07-28

## 2020-07-28 NOTE — TELEPHONE ENCOUNTER
Dr. Rosas  Ms. Pop wanted to inform you she has not had a BM since her colonoscopy on 7/23. Please advise  Thank you  Bonnie

## 2020-07-29 NOTE — TELEPHONE ENCOUNTER
Return call to Ms. Pop to advise of Dr. Rosas reply, s/w pt spouse, he will have her to return call once she returns home.           Rubén Rosas MD  You Yesterday (11:45 AM)      This is not uncommon for a colonoscopy.  She had a bowel prep prior to the procedure.    Routing comment

## 2020-09-09 NOTE — PROGRESS NOTES
Subjective:     Encounter Date:09/11/2020    Patient ID: Esperanza Pop is a 75 y.o.   female, retired , assistant , from [place], [place].      INTERNIST: Meghana Rodgers MD  REFERRING HEALTHCARE PROVIDER: [facility]   INTERVENTIONAL CARDIOLOGIST: Hugh Pop MD, [facility], [facility]  ADVANCED HEART FAILURE CLINIC: [facility]  INTERVENTIONAL CARDIOLOGIST:  Scooter Zhao MD, [facility]   VASCULAR SURGEON:  [facility]rius [facility]  GASTROENTEROLOGIST: Rubén Rosas MD  NEPHROLOGIST: Kevan Lerma MD    Chief Complaint:   Chief Complaint   Patient presents with   • Hypertension     f/u   • Shortness of Breath       Problem List:  1. Ischemic heart disease  a. Acute non-STEMI/congestive heart failure with diagnostic coronary arteriography demonstrating 20% LAD plaque with high-grade stenosis in the proximal mid right coronary artery requiring a Xience drug-eluting stent (3.0 mm x 28 mm) with reduced echocardiographic LVEF (0.25) with abnormal diastolic LV dysfunction, December 2013.   b. Improved echocardiogram, April 2014.  c. Remote non-STEMI related to malignant hypertension with distal RCA occlusion, patent previous RCA stent with mild inferior hypokinesis but overall acceptable systolic LV function (LVEF 0.55) with PTCA, DARRYL distal RCA, October 2016.  d. Residual CCS class I angina pectoris/NYHA class II CHF.  e. Mild aortic stenosis (June 2016).  f. Echocardiogram 2/7/19: Normal size LV, moderate LV wall thickness, LVEF 0.65, impaired LV relaxation, normal left atrial pressure, mild MR, mild TR, mild MT, no pericardial effusion  g. Residual CCS class I angina pectoris/NYHA class II CHF  2. Severe hypertension - probably essential.   3. Dyslipidemia.  4. Type 2 diabetes mellitus type 2 with suboptimal control (hemoglobin A1c 8.8%; March 2018, 7.6%, 10.7% February 2019; 8.2%, September 2019; 7.9%, December  2019).  5. Mild obesity (BMI 31.3); resolved, BMI 29.0  6. Acute kidney injury: Admission from 12/26/2013 to 01/02/2014, now resolved with latest creatinine 1.4 on 01/08/2014.  7. Moderate normocytic normochromic anemia; hemoglobin 9.2 gm/dL (June 2016).  8. Noncompliance.  9. Remote apparent end-stage renal disease with initiation of hemodialysis MWF weekly and construction of right upper extremity AV fistula; data deficit (June 2016) with subsequent AV fistula dysfunction and fistulogram with angioplasty - [facility], October 2017.  10. AV fistulogram with apparent thrombectomy [facility], February 2018  11. Palpitations with recent abnormal Holter monitor demonstrating intermittent brief atrial fibrillation, December 2017/January 2018  12. [facility] overnight hospitalization February 2019 for shortness of breath and thrombosed AV fistula with angioplasty  13. Remote operations:  a. 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 Abdominal hernia repair/panniculectomy  c. Hysterectomy  d. Angioplasty for thrombosed AV fistula February 2019    Allergies   Allergen Reactions   • Mircera [Methoxy Polyethylene Glycol-Epoetin Beta] Anaphylaxis     Pt stopped breathing   • Venofer [Iron Sucrose] Anaphylaxis     Pt stopped breathing   • Albuterol Other (See Comments)     Increased blood pressure  Used right before heart attack   • Penicillins Hives   • Prednisone Other (See Comments)     Makes jittery/anxious       Current Outpatient Medications:   •  amiodarone (PACERONE) 100 MG tablet, Take 1 tablet by mouth Daily., Disp: 90 tablet, Rfl: 3  •  ammonium lactate (LAC-HYDRIN) 12 % lotion, Apply  topically to the appropriate area as directed As Needed for Dry Skin. (Patient taking differently: Apply 1 application topically to the appropriate area as directed 2 (Two) Times a Day As Needed for Dry Skin.), Disp: 500 g, Rfl: 3  •  aspirin 81 MG EC tablet, Take 1 tablet by mouth Daily. (Patient taking  differently: Take 81 mg by mouth Every Other Day.), Disp: , Rfl:   •  atorvastatin (LIPITOR) 80 MG tablet, Take 1 tablet by mouth Every Night. (Patient taking differently: Take 80 mg by mouth Daily.), Disp: 90 tablet, Rfl: 1  •  B Complex-C-Folic Acid (DIALYVITE 800 PO), Take 1 tablet by mouth Daily., Disp: , Rfl:   •  busPIRone (BUSPAR) 7.5 MG tablet, Take 7.5 mg by mouth 2 (two) times a day., Disp: , Rfl:   •  carvedilol (COREG) 25 MG tablet, Take 1 tablet by mouth Every 12 (Twelve) Hours., Disp: 60 tablet, Rfl: 5  •  cloNIDine (CATAPRES) 0.1 MG tablet, Take 0.1 mg by mouth 2 (Two) Times a Day., Disp: , Rfl:   •  clopidogrel (PLAVIX) 75 MG tablet, Take 1 tablet by mouth Daily., Disp: 30 tablet, Rfl: 5  •  dorzolamide-timolol (COSOPT) 22.3-6.8 MG/ML ophthalmic solution, Administer 1 drop to both eyes Daily., Disp: 10 mL, Rfl: 3  •  famotidine (PEPCID) 20 MG tablet, Take 20 mg by mouth 2 (Two) Times a Day., Disp: , Rfl:   •  Ferric Citrate 1  MG(Fe) tablet, Take 1 tablet by mouth As Needed., Disp: , Rfl:   •  Ferrous Sulfate (IRON) 325 (65 Fe) MG tablet, Take 1 tablet by mouth 2 (Two) Times a Day., Disp: , Rfl: 5  •  fluocinonide (LIDEX) 0.05 % external solution, Apply 0.05 application topically to the appropriate area as directed 2 (Two) Times a Day. Scalp, Disp: , Rfl: 2  •  insulin NPH-insulin regular (humuLIN 70/30,novoLIN 70/30) (70-30) 100 UNIT/ML injection, Inject 20 units before dinner and 17 Units before breakfast. (Patient taking differently: 17 Units Every Morning. Inject 20 units before dinner and 17 Units before breakfast.), Disp: 10 mL, Rfl: 5  •  insulin NPH-insulin regular (humuLIN 70/30,novoLIN 70/30) (70-30) 100 UNIT/ML injection, Inject 20 Units under the skin into the appropriate area as directed Every Night., Disp: , Rfl:   •  IRON DEXTRAN IJ, Inject  as directed 3 (Three) Times a Week., Disp: , Rfl:   •  isosorbide mononitrate (IMDUR) 120 MG 24 hr tablet, Take 1 tablet by mouth Daily.,  "Disp: 90 tablet, Rfl: 3  •  latanoprost (XALATAN) 0.005 % ophthalmic solution, Administer 1 drop to both eyes Every Night., Disp: , Rfl:   •  lidocaine-prilocaine (EMLA) 2.5-2.5 % cream, Apply 1 application topically to the appropriate area as directed As Needed (dialysis access)., Disp: , Rfl: 6  •  lisinopril (PRINIVIL,ZESTRIL) 20 MG tablet, Take 1 tablet by mouth Every 12 (Twelve) Hours., Disp: 60 tablet, Rfl: 5  •  NIFEdipine CC (ADALAT CC) 30 MG 24 hr tablet, Take 30 mg by mouth Daily., Disp: , Rfl:   •  nitroglycerin (NITROLINGUAL) 0.4 MG/SPRAY spray, Place 1 spray under the tongue Every 5 (Five) Minutes As Needed for Chest Pain., Disp: 1 each, Rfl: 12  •  torsemide (DEMADEX) 100 MG tablet, Take 100 mg by mouth Daily As Needed (weight gain 4+lb)., Disp: , Rfl:   •  desonide (DESOWEN) 0.05 % cream, Apply 0.05 application topically to the appropriate area as directed 2 (Two) Times a Day., Disp: , Rfl: 1  •  rOPINIRole (Requip) 0.5 MG tablet, 1/2 PO HS x 1 week then 1 PO HS. Take 1 hour before bedtime., Disp: 30 tablet, Rfl: 5    History of Present Illness: Patient returns for scheduled 6-month follow up. Patient was seen at [facility] ED on 04/21/2020 with complaints of a cough and low-grade fever. She was tested negative for COVID-19 at that time. She also had a colonoscopy on 07/23/2020 by Dr. Rosas. Since last visit, she \"hasn't felt like doing anything.\" She also has been feeling short of breath in the middle of the night. She notes that she is on oxygen but her  is a smoker. Her  has recently been diagnosed with esophageal and lung cancer. She is still going to dialysis on MWF weekly and plans to go after today's visit. Following dialysis, her blood pressure sometimes falls to approximately 122/45, which makes her feel unwell. She occasionally takes 60 mg of nifedipine as recommended by Dr. Lerma, and it helps decrease her blood pressure. Patient otherwise denies chest pain, PND, edema, " "palpitations, syncope, or presyncope at this time.        ROS   Obtained and negative except as outlined in problem list and HPI.      ECG 12 Lead  Date/Time: 9/11/2020 9:57 AM  Performed by: Demetrius Sagastume MD  Authorized by: Demetrius Sagastume MD   Comparison: compared with previous ECG from 8/28/2019  Comparison to previous ECG: PVC's noted.  Rhythm: sinus rhythm  Ectopy: unifocal PVCs  Rate: normal  BPM: 77  Conduction: left posterior fascicular block  Other findings: non-specific ST-T wave changes    Clinical impression: abnormal EKG  Comments: MN interval 160 ms  QRS 86 ms  QTc 500 ms               Objective:       Vitals:    09/11/20 0911 09/11/20 0919   BP: (!) 199/125 (!) 186/111   BP Location: Left arm Left arm   Patient Position: Sitting Standing   Pulse: 76 77   Weight: 84 kg (185 lb 3.2 oz)    Height: 170.2 cm (67\")      Body mass index is 29.01 kg/m².  Last weight: 184 lbs    Physical Exam   Constitutional: She is oriented to person, place, and time. She appears well-developed and well-nourished.   Neck: No JVD present. Carotid bruit is not present. No thyromegaly present.   Cardiovascular: Regular rhythm, S1 normal and S2 normal.  Occasional extrasystoles are present. Exam reveals no gallop, no S3 and no friction rub.   Murmur heard.   Medium-pitched early systolic murmur is present with a grade of 2/6 at the upper right sternal border.  Pulses:       Dorsalis pedis pulses are 1+ on the right side, and 1+ on the left side.        Posterior tibial pulses are 1+ on the right side, and 1+ on the left side.   Pulmonary/Chest: Effort normal. She has decreased breath sounds. She has no wheezes. She has no rhonchi. She has no rales.   Abdominal: Soft. She exhibits no mass. There is no hepatosplenomegaly. There is no tenderness. There is no guarding.   Musculoskeletal: Normal range of motion. She exhibits edema (Trace- 1+ bipedal ).   Lymphadenopathy:     She has no cervical adenopathy.   Neurological: She is " alert and oriented to person, place, and time.   Skin: Skin is warm, dry and intact. No rash noted.   Vitals reviewed.        Lab Review:   Lab Results   Component Value Date    GLUCOSE 173 (H) 07/23/2020    BUN 12 07/23/2020    CREATININE 4.72 (H) 07/23/2020    EGFRIFNONA  07/23/2020      Comment:      <15 Indicative of kidney failure.    EGFRIFAFRI 11 (L) 07/23/2020    BCR 2.5 (L) 07/23/2020     07/23/2020    K 3.4 (L) 07/23/2020    CL 92 (L) 07/23/2020    CO2 31.0 (H) 07/23/2020    CALCIUM 9.5 07/23/2020    ALBUMIN 3.50 04/21/2020    AST 12 04/21/2020    ALT 9 04/21/2020       Lab Results   Component Value Date    WBC 5.32 07/21/2020    HGB 10.4 (L) 07/21/2020    HCT 39.0 07/21/2020    MCV 79.6 07/21/2020     07/21/2020       Lab Results   Component Value Date    HGBA1C 8.6 06/16/2020       Lab Results   Component Value Date    TSH 1.657 03/01/2018       Lab Results   Component Value Date    CHLPL 167 09/10/2019    CHLPL 189 12/22/2017    CHLPL 225 (H) 03/21/2017     Lab Results   Component Value Date    TRIG 106 09/10/2019    TRIG 97 12/22/2017    TRIG 134 03/21/2017     Lab Results   Component Value Date    HDL 57 09/10/2019    HDL 54 12/22/2017    HDL 60 03/21/2017     Lab Results   Component Value Date    LDL 89 09/10/2019     (H) 12/22/2017     (H) 03/21/2017     XR Chest, 04/21/2020: Cardiac enlargement without evidence of failure.     XR Right Ribs, 06/23/2020: No acute right rib fracture identified. There is enlargement of the heart with no evidence of acute intrathoracic abnormality.    Colonoscopy, 07/23/2020:  FINDINGS:  Non-bleeding internal hemorrhoids were found during retroflexion. The hemorrhoids were mild.  The rectum, recto-sigmoid colon, sigmoid colon, descending colon, splenic flexure, transverse colon, hepatic flexure, ascending colon, cecum and appendiceal orifice appeared normal. A few small-mouthed diverticula were found in the sigmoid colon. There was no  evidence of diverticular bleeding.    IMPRESSION:  - The examined portion of the ileum was normal.  - Non-bleeding internal hemorrhoids.  - The rectum, recto-sigmoid colon, sigmoid colon, descending colon, splenic flexure, transverse colon, hepatic flexure, ascending colon, cecum and appendiceal orifice are normal.  - Mild diverticulosis in the sigmoid colon. There was no evidence of diverticular bleeding.  - No specimens collected.    RECOMMENDATIONS:  - The findings and recommendations were discussed with the patient's family.  - Return to primary care physician as previously scheduled.      Assessment:       Overall continued acceptable course with no new interim cardiopulmonary complaints with good functional status. We will defer additional diagnostic or therapeutic intervention from a cardiac perspective at this time. Chest X-ray April 2020 overall acceptable by report.      Diagnosis Plan   1. Acute on chronic diastolic heart failure (CMS/[facility])  Adult Transthoracic Echo Complete W/ Cont if Necessary Per Protocol   2. Essential hypertension  Suboptimal control; see below.   3. Hyperlipidemia LDL goal <70  No data to review; continue atorvastatin 80 mg.    4. Type 2 diabetes mellitus with chronic kidney disease on chronic dialysis, with long-term current use of insulin (CMS/[facility])  Suboptimal control; continue treatment with Dr. Rodgers.           Plan:         1. Patient to continue current medications and close follow up with the above providers with the following changes:   A. Increase clonidine to 0.2 mg BID   B. Increase nifedipine to 60 mg daily with an extra 30 mg PM if systolic blood pressure is greater than 160.  2. Tentative cardiology follow up in December 2020 or January 2021 with same day echocardiogram or patient may return sooner PRN.      Scribed for Demetrius Sagastume MD by Shannan Joshi. 9/11/2020  10:05    I, Demetrius Sagastume MD, [facility] personally performed the services described in this  documentation as scribed by the above named individual in my presence, and it is both accurate and complete. At 9:55 AM on 09/11/2020

## 2020-09-11 ENCOUNTER — OFFICE VISIT (OUTPATIENT)
Dept: CARDIOLOGY | Facility: CLINIC | Age: 75
End: 2020-09-11

## 2020-09-11 VITALS
BODY MASS INDEX: 29.07 KG/M2 | HEART RATE: 77 BPM | DIASTOLIC BLOOD PRESSURE: 111 MMHG | WEIGHT: 185.2 LBS | SYSTOLIC BLOOD PRESSURE: 186 MMHG | HEIGHT: 67 IN

## 2020-09-11 DIAGNOSIS — N18.6 TYPE 2 DIABETES MELLITUS WITH CHRONIC KIDNEY DISEASE ON CHRONIC DIALYSIS, WITH LONG-TERM CURRENT USE OF INSULIN (HCC): ICD-10-CM

## 2020-09-11 DIAGNOSIS — E78.5 HYPERLIPIDEMIA LDL GOAL <70: ICD-10-CM

## 2020-09-11 DIAGNOSIS — E11.22 TYPE 2 DIABETES MELLITUS WITH CHRONIC KIDNEY DISEASE ON CHRONIC DIALYSIS, WITH LONG-TERM CURRENT USE OF INSULIN (HCC): ICD-10-CM

## 2020-09-11 DIAGNOSIS — I10 ESSENTIAL HYPERTENSION: ICD-10-CM

## 2020-09-11 DIAGNOSIS — Z99.2 TYPE 2 DIABETES MELLITUS WITH CHRONIC KIDNEY DISEASE ON CHRONIC DIALYSIS, WITH LONG-TERM CURRENT USE OF INSULIN (HCC): ICD-10-CM

## 2020-09-11 DIAGNOSIS — Z79.4 TYPE 2 DIABETES MELLITUS WITH CHRONIC KIDNEY DISEASE ON CHRONIC DIALYSIS, WITH LONG-TERM CURRENT USE OF INSULIN (HCC): ICD-10-CM

## 2020-09-11 DIAGNOSIS — I50.33 ACUTE ON CHRONIC DIASTOLIC HEART FAILURE (HCC): Primary | ICD-10-CM

## 2020-09-11 PROCEDURE — 93000 ELECTROCARDIOGRAM COMPLETE: CPT | Performed by: INTERNAL MEDICINE

## 2020-09-11 PROCEDURE — 99214 OFFICE O/P EST MOD 30 MIN: CPT | Performed by: INTERNAL MEDICINE

## 2020-09-11 RX ORDER — ISOSORBIDE MONONITRATE 120 MG/1
120 TABLET, EXTENDED RELEASE ORAL DAILY
Qty: 90 TABLET | Refills: 3 | Status: SHIPPED | OUTPATIENT
Start: 2020-09-11 | End: 2023-02-07 | Stop reason: SDUPTHER

## 2020-09-11 RX ORDER — CLONIDINE HYDROCHLORIDE 0.2 MG/1
0.2 TABLET ORAL 2 TIMES DAILY
Qty: 180 TABLET | Refills: 3 | Status: SHIPPED | OUTPATIENT
Start: 2020-09-11 | End: 2020-09-22 | Stop reason: SDUPTHER

## 2020-09-11 RX ORDER — NIFEDIPINE 30 MG/1
30 TABLET, FILM COATED, EXTENDED RELEASE ORAL DAILY
COMMUNITY
Start: 2020-08-08 | End: 2020-09-11 | Stop reason: DRUGHIGH

## 2020-09-11 RX ORDER — NIFEDIPINE 60 MG/1
60 TABLET, FILM COATED, EXTENDED RELEASE ORAL EVERY MORNING
Qty: 135 TABLET | Refills: 3 | Status: SHIPPED | OUTPATIENT
Start: 2020-09-11 | End: 2020-09-22

## 2020-09-11 RX ORDER — FAMOTIDINE 20 MG/1
20 TABLET, FILM COATED ORAL 2 TIMES DAILY
COMMUNITY
End: 2020-09-22 | Stop reason: SDUPTHER

## 2020-09-14 ENCOUNTER — TELEPHONE (OUTPATIENT)
Dept: CARDIOLOGY | Facility: CLINIC | Age: 75
End: 2020-09-14

## 2020-09-14 NOTE — TELEPHONE ENCOUNTER
Patient called, LVM requesting return call. Called pt back, pt not available. Was told by the person who answered the phone to try back at five. I advised that person I wouldn't be able to call that late. I was told to call back in the morning. Will call patient back in the morning, 9/15/20.

## 2020-09-22 ENCOUNTER — OFFICE VISIT (OUTPATIENT)
Dept: INTERNAL MEDICINE | Facility: CLINIC | Age: 75
End: 2020-09-22

## 2020-09-22 ENCOUNTER — LAB REQUISITION (OUTPATIENT)
Dept: LAB | Facility: HOSPITAL | Age: 75
End: 2020-09-22

## 2020-09-22 VITALS
WEIGHT: 183.7 LBS | TEMPERATURE: 98 F | SYSTOLIC BLOOD PRESSURE: 148 MMHG | DIASTOLIC BLOOD PRESSURE: 76 MMHG | OXYGEN SATURATION: 97 % | HEIGHT: 67 IN | HEART RATE: 60 BPM | BODY MASS INDEX: 28.83 KG/M2

## 2020-09-22 DIAGNOSIS — E11.22 TYPE 2 DIABETES MELLITUS WITH CHRONIC KIDNEY DISEASE ON CHRONIC DIALYSIS, WITH LONG-TERM CURRENT USE OF INSULIN (HCC): ICD-10-CM

## 2020-09-22 DIAGNOSIS — E11.42 DIABETIC POLYNEUROPATHY ASSOCIATED WITH TYPE 2 DIABETES MELLITUS (HCC): Primary | ICD-10-CM

## 2020-09-22 DIAGNOSIS — I10 ESSENTIAL (PRIMARY) HYPERTENSION: ICD-10-CM

## 2020-09-22 DIAGNOSIS — Z99.2 DEPENDENCE ON RENAL DIALYSIS (HCC): ICD-10-CM

## 2020-09-22 DIAGNOSIS — Z99.2 TYPE 2 DIABETES MELLITUS WITH CHRONIC KIDNEY DISEASE ON CHRONIC DIALYSIS, WITH LONG-TERM CURRENT USE OF INSULIN (HCC): ICD-10-CM

## 2020-09-22 DIAGNOSIS — Z79.4 LONG TERM (CURRENT) USE OF INSULIN (HCC): ICD-10-CM

## 2020-09-22 DIAGNOSIS — I10 ESSENTIAL HYPERTENSION: ICD-10-CM

## 2020-09-22 DIAGNOSIS — N18.6 TYPE 2 DIABETES MELLITUS WITH CHRONIC KIDNEY DISEASE ON CHRONIC DIALYSIS, WITH LONG-TERM CURRENT USE OF INSULIN (HCC): ICD-10-CM

## 2020-09-22 DIAGNOSIS — Z79.4 TYPE 2 DIABETES MELLITUS WITH CHRONIC KIDNEY DISEASE ON CHRONIC DIALYSIS, WITH LONG-TERM CURRENT USE OF INSULIN (HCC): ICD-10-CM

## 2020-09-22 DIAGNOSIS — N18.6 END STAGE RENAL DISEASE (HCC): ICD-10-CM

## 2020-09-22 DIAGNOSIS — E11.42 TYPE 2 DIABETES MELLITUS WITH DIABETIC POLYNEUROPATHY (HCC): ICD-10-CM

## 2020-09-22 DIAGNOSIS — E11.22 TYPE 2 DIABETES MELLITUS WITH DIABETIC CHRONIC KIDNEY DISEASE (HCC): ICD-10-CM

## 2020-09-22 LAB — HBA1C MFR BLD: 6.9 %

## 2020-09-22 PROCEDURE — 99214 OFFICE O/P EST MOD 30 MIN: CPT | Performed by: INTERNAL MEDICINE

## 2020-09-22 PROCEDURE — 83036 HEMOGLOBIN GLYCOSYLATED A1C: CPT | Performed by: INTERNAL MEDICINE

## 2020-09-22 PROCEDURE — G0008 ADMIN INFLUENZA VIRUS VAC: HCPCS | Performed by: INTERNAL MEDICINE

## 2020-09-22 PROCEDURE — 90694 VACC AIIV4 NO PRSRV 0.5ML IM: CPT | Performed by: INTERNAL MEDICINE

## 2020-09-22 RX ORDER — FAMOTIDINE 20 MG/1
20 TABLET, FILM COATED ORAL 2 TIMES DAILY
Qty: 180 TABLET | Refills: 1 | Status: SHIPPED | OUTPATIENT
Start: 2020-09-22 | End: 2021-02-22 | Stop reason: SDUPTHER

## 2020-09-22 RX ORDER — CLONIDINE HYDROCHLORIDE 0.2 MG/1
0.2 TABLET ORAL 3 TIMES DAILY
Qty: 270 TABLET | Refills: 3 | Status: SHIPPED | OUTPATIENT
Start: 2020-09-22 | End: 2021-01-23 | Stop reason: HOSPADM

## 2020-09-22 RX ORDER — NITROGLYCERIN 400 UG/1
1 SPRAY ORAL
Qty: 1 EACH | Refills: 12 | Status: SHIPPED | OUTPATIENT
Start: 2020-09-22 | End: 2021-07-29 | Stop reason: SDUPTHER

## 2020-09-22 RX ORDER — VALSARTAN 80 MG/1
80 TABLET ORAL
COMMUNITY
Start: 2020-09-18 | End: 2021-01-08

## 2020-09-22 NOTE — PROGRESS NOTES
Diabetes and Hypertension    Subjective   Esperanza Pop is a 75 y.o. female is here today for follow-up.    History of Present Illness   Doing okay. Concerned about Juan.   Here for a follow up on her dme, htn, and reports that nifedipine made her swell and very lethargic.  So switched to valsartan for the night, but would likie another med in the morning.  Appetite low, due to stress.      Current Outpatient Medications:   •  amiodarone (PACERONE) 100 MG tablet, Take 1 tablet by mouth Daily., Disp: 90 tablet, Rfl: 3  •  ammonium lactate (LAC-HYDRIN) 12 % lotion, Apply  topically to the appropriate area as directed As Needed for Dry Skin. (Patient taking differently: Apply 1 application topically to the appropriate area as directed 2 (Two) Times a Day As Needed for Dry Skin.), Disp: 500 g, Rfl: 3  •  aspirin 81 MG EC tablet, Take 1 tablet by mouth Daily. (Patient taking differently: Take 81 mg by mouth Every Other Day.), Disp: , Rfl:   •  atorvastatin (LIPITOR) 80 MG tablet, Take 1 tablet by mouth Every Night. (Patient taking differently: Take 80 mg by mouth Daily.), Disp: 90 tablet, Rfl: 1  •  B Complex-C-Folic Acid (DIALYVITE 800 PO), Take 1 tablet by mouth Daily., Disp: , Rfl:   •  busPIRone (BUSPAR) 7.5 MG tablet, Take 7.5 mg by mouth 2 (two) times a day., Disp: , Rfl:   •  carvedilol (COREG) 25 MG tablet, Take 1 tablet by mouth Every 12 (Twelve) Hours., Disp: 60 tablet, Rfl: 5  •  cloNIDine (CATAPRES) 0.2 MG tablet, Take 1 tablet by mouth 3 (Three) Times a Day., Disp: 270 tablet, Rfl: 3  •  clopidogrel (PLAVIX) 75 MG tablet, Take 1 tablet by mouth Daily., Disp: 30 tablet, Rfl: 5  •  desonide (DESOWEN) 0.05 % cream, Apply 0.05 application topically to the appropriate area as directed 2 (Two) Times a Day., Disp: , Rfl: 1  •  dorzolamide-timolol (COSOPT) 22.3-6.8 MG/ML ophthalmic solution, Administer 1 drop to both eyes Daily., Disp: 10 mL, Rfl: 3  •  famotidine (PEPCID) 20 MG tablet, Take 1 tablet by mouth 2  (Two) Times a Day., Disp: 180 tablet, Rfl: 1  •  Ferric Citrate 1  MG(Fe) tablet, Take 1 tablet by mouth As Needed., Disp: , Rfl:   •  Ferrous Sulfate (IRON) 325 (65 Fe) MG tablet, Take 1 tablet by mouth 2 (Two) Times a Day., Disp: , Rfl: 5  •  fluocinonide (LIDEX) 0.05 % external solution, Apply 0.05 application topically to the appropriate area as directed 2 (Two) Times a Day. Scalp, Disp: , Rfl: 2  •  insulin NPH-insulin regular (humuLIN 70/30,novoLIN 70/30) (70-30) 100 UNIT/ML injection, Inject 20 Units under the skin into the appropriate area as directed Every Night., Disp: , Rfl:   •  insulin NPH-insulin regular (humuLIN 70/30,novoLIN 70/30) (70-30) 100 UNIT/ML injection, Inject 20 units before dinner and 17 Units before breakfast., Disp: 10 mL, Rfl: 5  •  IRON DEXTRAN IJ, Inject  as directed 3 (Three) Times a Week., Disp: , Rfl:   •  isosorbide mononitrate (IMDUR) 120 MG 24 hr tablet, Take 1 tablet by mouth Daily., Disp: 90 tablet, Rfl: 3  •  latanoprost (XALATAN) 0.005 % ophthalmic solution, Administer 1 drop to both eyes Every Night., Disp: , Rfl:   •  lidocaine-prilocaine (EMLA) 2.5-2.5 % cream, Apply 1 application topically to the appropriate area as directed As Needed (dialysis access)., Disp: , Rfl: 6  •  nitroglycerin (Nitrolingual) 0.4 MG/SPRAY spray, Place 1 spray under the tongue Every 5 (Five) Minutes As Needed for Chest Pain., Disp: 1 each, Rfl: 12  •  rOPINIRole (Requip) 0.5 MG tablet, 1/2 PO HS x 1 week then 1 PO HS. Take 1 hour before bedtime., Disp: 30 tablet, Rfl: 5  •  torsemide (DEMADEX) 100 MG tablet, Take 100 mg by mouth Daily As Needed (weight gain 4+lb)., Disp: , Rfl:   •  valsartan (DIOVAN) 80 MG tablet, Take 80 mg by mouth every night at bedtime., Disp: , Rfl:       The following portions of the patient's history were reviewed and updated as appropriate: allergies, current medications, past family history, past medical history, past social history, past surgical history and  "problem list.    Review of Systems   Constitutional: Negative for chills and fever.   HENT: Negative for ear discharge, ear pain, sinus pressure and sore throat.    Respiratory: Positive for shortness of breath. Negative for cough and chest tightness.    Cardiovascular: Negative for chest pain, palpitations and leg swelling.   Gastrointestinal: Negative for diarrhea, nausea and vomiting.   Musculoskeletal: Negative for arthralgias, back pain and myalgias.   Neurological: Positive for weakness. Negative for dizziness, syncope and headaches.   Psychiatric/Behavioral: Negative for confusion and sleep disturbance.       Objective   /76   Pulse 60   Temp 98 °F (36.7 °C)   Ht 170.2 cm (67\")   Wt 83.3 kg (183 lb 11.2 oz)   LMP  (LMP Unknown)   SpO2 97% Comment: ra  BMI 28.77 kg/m²   Physical Exam  Vitals signs and nursing note reviewed.   Constitutional:       Appearance: She is well-developed.   HENT:      Head: Normocephalic and atraumatic.      Right Ear: External ear normal.      Left Ear: External ear normal.      Mouth/Throat:      Pharynx: No oropharyngeal exudate.   Eyes:      Conjunctiva/sclera: Conjunctivae normal.      Pupils: Pupils are equal, round, and reactive to light.   Neck:      Musculoskeletal: Neck supple.      Thyroid: No thyromegaly.   Cardiovascular:      Rate and Rhythm: Normal rate and regular rhythm.   Pulmonary:      Effort: Pulmonary effort is normal.      Breath sounds: Normal breath sounds.   Abdominal:      General: Bowel sounds are normal. There is no distension.      Palpations: Abdomen is soft.      Tenderness: There is no abdominal tenderness.   Skin:     General: Skin is warm and dry.   Neurological:      Mental Status: She is alert and oriented to person, place, and time.      Cranial Nerves: No cranial nerve deficit.   Psychiatric:         Judgment: Judgment normal.           Results for orders placed or performed in visit on 09/22/20   CBC (No Diff)    Specimen: Blood    " BLOOD   Result Value Ref Range    WBC 6.20 3.40 - 10.80 10*3/mm3    RBC 3.61 (L) 3.77 - 5.28 10*6/mm3    Hemoglobin 8.4 (L) 12.0 - 15.9 g/dL    Hematocrit 29.2 (L) 34.0 - 46.6 %    MCV 80.9 79.0 - 97.0 fL    MCH 23.3 (L) 26.6 - 33.0 pg    MCHC 28.8 (L) 31.5 - 35.7 g/dL    RDW 18.7 (H) 12.3 - 15.4 %    Platelets 244 140 - 450 10*3/mm3   Comprehensive Metabolic Panel    Specimen: Blood    BLOOD   Result Value Ref Range    Glucose 133 (H) 65 - 99 mg/dL    BUN 32 (H) 8 - 23 mg/dL    Creatinine 5.01 (H) 0.57 - 1.00 mg/dL    eGFR Non African Am 8 (L) >60 mL/min/1.73    eGFR African Am 10 (L) >60 mL/min/1.73    BUN/Creatinine Ratio 6.4 (L) 7.0 - 25.0    Sodium 142 136 - 145 mmol/L    Potassium 4.2 3.5 - 5.2 mmol/L    Chloride 98 98 - 107 mmol/L    Total CO2 32.1 (H) 22.0 - 29.0 mmol/L    Calcium 8.7 8.6 - 10.5 mg/dL    Total Protein 7.5 6.0 - 8.5 g/dL    Albumin 3.90 3.50 - 5.20 g/dL    Globulin 3.6 gm/dL    A/G Ratio 1.1 g/dL    Total Bilirubin 0.5 0.0 - 1.2 mg/dL    Alkaline Phosphatase 186 (H) 39 - 117 U/L    AST (SGOT) 20 1 - 32 U/L    ALT (SGPT) 35 (H) 1 - 33 U/L   Lipid Panel    Specimen: Blood    BLOOD   Result Value Ref Range    Total Cholesterol 155 0 - 200 mg/dL    Triglycerides 84 0 - 150 mg/dL    HDL Cholesterol 56 40 - 60 mg/dL    VLDL Cholesterol [place] 16.8 mg/dL    LDL Chol Calc (NIH) 82 0 - 100 mg/dL   TSH    Specimen: Blood    BLOOD   Result Value Ref Range    TSH 1.270 0.270 - 4.200 uIU/mL   Vitamin D 25 Hydroxy    Specimen: Blood    BLOOD   Result Value Ref Range    25 Hydroxy, Vitamin D 20.9 (L) 30.0 - 100.0 ng/ml   POC Glycosylated Hemoglobin (Hb A1C)    Specimen: Blood   Result Value Ref Range    Hemoglobin A1C 6.9 %             Assessment/Plan   Diagnoses and all orders for this visit:    Diabetic polyneuropathy associated with type 2 diabetes mellitus (CMS/[facility])  -     Vitamin D 25 Hydroxy; Future  -     TSH; Future  -     Lipid Panel; Future  -     Comprehensive Metabolic Panel; Future  -     CBC (No  Diff); Future  -     CBC (No Diff)  -     Comprehensive Metabolic Panel  -     Lipid Panel  -     TSH  -     Vitamin D 25 Hydroxy    Type 2 diabetes mellitus with chronic kidney disease on chronic dialysis, with long-term current use of insulin (CMS/[facility])  -     insulin NPH-insulin regular (humuLIN 70/30,novoLIN 70/30) (70-30) 100 UNIT/ML injection; Inject 20 units before dinner and 17 Units before breakfast.  -     POC Glycosylated Hemoglobin (Hb A1C)  -     Vitamin D 25 Hydroxy; Future  -     TSH; Future  -     Lipid Panel; Future  -     Comprehensive Metabolic Panel; Future  -     CBC (No Diff); Future  -     CBC (No Diff)  -     Comprehensive Metabolic Panel  -     Lipid Panel  -     TSH  -     Vitamin D 25 Hydroxy    Essential hypertension  -     cloNIDine (CATAPRES) 0.2 MG tablet; Take 1 tablet by mouth 3 (Three) Times a Day.  -     Vitamin D 25 Hydroxy; Future  -     TSH; Future  -     Lipid Panel; Future  -     Comprehensive Metabolic Panel; Future  -     CBC (No Diff); Future  -     CBC (No Diff)  -     Comprehensive Metabolic Panel  -     Lipid Panel  -     TSH  -     Vitamin D 25 Hydroxy    Other orders  -     valsartan (DIOVAN) 80 MG tablet; Take 80 mg by mouth every night at bedtime.  -     famotidine (PEPCID) 20 MG tablet; Take 1 tablet by mouth 2 (Two) Times a Day.  -     nitroglycerin (Nitrolingual) 0.4 MG/SPRAY spray; Place 1 spray under the tongue Every 5 (Five) Minutes As Needed for Chest Pain.  -     Fluad Quad 65+ yrs (3203-1391)    sugars are better.       Doing better on valsartan, was recently increased to 0.2 on the clonidine by Dr. Sagastume.  She would like to be able to take an extra dose , changed to tid.        Return in about 6 months (around 3/22/2021).

## 2020-09-23 LAB
25(OH)D3+25(OH)D2 SERPL-MCNC: 20.9 NG/ML (ref 30–100)
ALBUMIN SERPL-MCNC: 3.9 G/DL (ref 3.5–5.2)
ALBUMIN/GLOB SERPL: 1.1 G/DL
ALP SERPL-CCNC: 186 U/L (ref 39–117)
ALT SERPL-CCNC: 35 U/L (ref 1–33)
AST SERPL-CCNC: 20 U/L (ref 1–32)
BILIRUB SERPL-MCNC: 0.5 MG/DL (ref 0–1.2)
BUN SERPL-MCNC: 32 MG/DL (ref 8–23)
BUN/CREAT SERPL: 6.4 (ref 7–25)
CALCIUM SERPL-MCNC: 8.7 MG/DL (ref 8.6–10.5)
CHLORIDE SERPL-SCNC: 98 MMOL/L (ref 98–107)
CHOLEST SERPL-MCNC: 155 MG/DL (ref 0–200)
CO2 SERPL-SCNC: 32.1 MMOL/L (ref 22–29)
CREAT SERPL-MCNC: 5.01 MG/DL (ref 0.57–1)
ERYTHROCYTE [DISTWIDTH] IN BLOOD BY AUTOMATED COUNT: 18.7 % (ref 12.3–15.4)
GLOBULIN SER CALC-MCNC: 3.6 GM/DL
GLUCOSE SERPL-MCNC: 133 MG/DL (ref 65–99)
HCT VFR BLD AUTO: 29.2 % (ref 34–46.6)
HDLC SERPL-MCNC: 56 MG/DL (ref 40–60)
HGB BLD-MCNC: 8.4 G/DL (ref 12–15.9)
LDLC SERPL CALC-MCNC: 82 MG/DL (ref 0–100)
MCH RBC QN AUTO: 23.3 PG (ref 26.6–33)
MCHC RBC AUTO-ENTMCNC: 28.8 G/DL (ref 31.5–35.7)
MCV RBC AUTO: 80.9 FL (ref 79–97)
PLATELET # BLD AUTO: 244 10*3/MM3 (ref 140–450)
POTASSIUM SERPL-SCNC: 4.2 MMOL/L (ref 3.5–5.2)
PROT SERPL-MCNC: 7.5 G/DL (ref 6–8.5)
RBC # BLD AUTO: 3.61 10*6/MM3 (ref 3.77–5.28)
SODIUM SERPL-SCNC: 142 MMOL/L (ref 136–145)
TRIGL SERPL-MCNC: 84 MG/DL (ref 0–150)
TSH SERPL DL<=0.005 MIU/L-ACNC: 1.27 UIU/ML (ref 0.27–4.2)
VLDLC SERPL CALC-MCNC: 16.8 MG/DL
WBC # BLD AUTO: 6.2 10*3/MM3 (ref 3.4–10.8)

## 2020-10-20 ENCOUNTER — TELEPHONE (OUTPATIENT)
Dept: INTERNAL MEDICINE | Facility: CLINIC | Age: 75
End: 2020-10-20

## 2020-10-20 NOTE — TELEPHONE ENCOUNTER
JOE FROM [facility] ASKED FOR A COPY OF PTS FLU VACCINE BE SENT TO THEM    FAXED ON 10-

## 2020-11-17 RX ORDER — CLOPIDOGREL BISULFATE 75 MG/1
75 TABLET ORAL DAILY
Qty: 90 TABLET | Refills: 3 | Status: SHIPPED | OUTPATIENT
Start: 2020-11-17 | End: 2022-01-19

## 2020-12-02 ENCOUNTER — APPOINTMENT (OUTPATIENT)
Dept: CARDIOLOGY | Facility: HOSPITAL | Age: 75
End: 2020-12-02

## 2020-12-08 PROCEDURE — U0004 COV-19 TEST NON-CDC HGH THRU: HCPCS | Performed by: FAMILY MEDICINE

## 2020-12-21 ENCOUNTER — TELEPHONE (OUTPATIENT)
Dept: INTERNAL MEDICINE | Facility: CLINIC | Age: 75
End: 2020-12-21

## 2020-12-21 NOTE — TELEPHONE ENCOUNTER
Called pt to discuss for for medical accommodations.  No answer and VM is full.  Will call back again.

## 2020-12-23 NOTE — TELEPHONE ENCOUNTER
"Pt states she needs the 18\" tall toilet as she fell about a month ago, having trouble getting around.  Her family has already gotten her a ramp.  Form in yellow folder for signature.  "

## 2020-12-23 NOTE — TELEPHONE ENCOUNTER
Called pt, no answer and memory full on machine, unable to leave message.  Will call back again later.

## 2020-12-24 ENCOUNTER — TELEPHONE (OUTPATIENT)
Dept: INTERNAL MEDICINE | Facility: CLINIC | Age: 75
End: 2020-12-24

## 2020-12-24 PROBLEM — M54.50 ACUTE MIDLINE LOW BACK PAIN WITHOUT SCIATICA: Status: ACTIVE | Noted: 2020-12-24

## 2021-01-08 ENCOUNTER — OFFICE VISIT (OUTPATIENT)
Dept: INTERNAL MEDICINE | Facility: CLINIC | Age: 76
End: 2021-01-08

## 2021-01-08 VITALS — DIASTOLIC BLOOD PRESSURE: 102 MMHG | SYSTOLIC BLOOD PRESSURE: 202 MMHG | HEART RATE: 75 BPM

## 2021-01-08 DIAGNOSIS — I10 ESSENTIAL HYPERTENSION: ICD-10-CM

## 2021-01-08 DIAGNOSIS — I50.33 ACUTE ON CHRONIC DIASTOLIC HEART FAILURE (HCC): Primary | ICD-10-CM

## 2021-01-08 PROCEDURE — 99443 PR PHYS/QHP TELEPHONE EVALUATION 21-30 MIN: CPT | Performed by: INTERNAL MEDICINE

## 2021-01-08 RX ORDER — TERAZOSIN 2 MG/1
4 CAPSULE ORAL
Qty: 180 CAPSULE | Refills: 1 | Status: SHIPPED | OUTPATIENT
Start: 2021-01-08 | End: 2021-06-29 | Stop reason: SDUPTHER

## 2021-01-08 RX ORDER — TERAZOSIN 2 MG/1
2 CAPSULE ORAL
COMMUNITY
Start: 2020-10-09 | End: 2021-01-08 | Stop reason: SDUPTHER

## 2021-01-12 ENCOUNTER — TELEPHONE (OUTPATIENT)
Dept: CARDIOLOGY | Facility: CLINIC | Age: 76
End: 2021-01-12

## 2021-01-15 ENCOUNTER — OFFICE VISIT (OUTPATIENT)
Dept: CARDIOLOGY | Facility: HOSPITAL | Age: 76
End: 2021-01-15

## 2021-01-15 ENCOUNTER — HOSPITAL ENCOUNTER (OUTPATIENT)
Dept: CARDIOLOGY | Facility: HOSPITAL | Age: 76
Discharge: HOME | End: 2021-01-15

## 2021-01-15 ENCOUNTER — TELEPHONE (OUTPATIENT)
Dept: INTERNAL MEDICINE | Facility: CLINIC | Age: 76
End: 2021-01-15

## 2021-01-15 VITALS
OXYGEN SATURATION: 97 % | HEART RATE: 69 BPM | RESPIRATION RATE: 17 BRPM | TEMPERATURE: 97.3 F | WEIGHT: 179.25 LBS | BODY MASS INDEX: 28.13 KG/M2 | DIASTOLIC BLOOD PRESSURE: 86 MMHG | HEIGHT: 67 IN | SYSTOLIC BLOOD PRESSURE: 137 MMHG

## 2021-01-15 DIAGNOSIS — N18.6 END STAGE RENAL DISEASE ON DIALYSIS (HCC): ICD-10-CM

## 2021-01-15 DIAGNOSIS — Z99.2 END STAGE RENAL DISEASE ON DIALYSIS (HCC): ICD-10-CM

## 2021-01-15 DIAGNOSIS — R00.1 BRADYCARDIA, SINUS: ICD-10-CM

## 2021-01-15 DIAGNOSIS — I10 ESSENTIAL HYPERTENSION: ICD-10-CM

## 2021-01-15 DIAGNOSIS — R00.2 PALPITATIONS: Primary | ICD-10-CM

## 2021-01-15 DIAGNOSIS — I50.32 CHRONIC DIASTOLIC HEART FAILURE (HCC): ICD-10-CM

## 2021-01-15 LAB
QT INTERVAL: 486 MS
QTC INTERVAL: 524 MS

## 2021-01-15 PROCEDURE — 99214 OFFICE O/P EST MOD 30 MIN: CPT | Performed by: NURSE PRACTITIONER

## 2021-01-15 PROCEDURE — 93010 ELECTROCARDIOGRAM REPORT: CPT | Performed by: INTERNAL MEDICINE

## 2021-01-15 PROCEDURE — 93005 ELECTROCARDIOGRAM TRACING: CPT | Performed by: NURSE PRACTITIONER

## 2021-01-15 RX ORDER — AMIODARONE HYDROCHLORIDE 200 MG/1
200 TABLET ORAL DAILY
Qty: 30 TABLET | Refills: 5 | Status: SHIPPED | OUTPATIENT
Start: 2021-01-15 | End: 2022-01-19 | Stop reason: DRUGHIGH

## 2021-01-15 NOTE — TELEPHONE ENCOUNTER
PATIENT CALLED DR PATRICK BACK AND WANTED TO LET HER KNOW SHE IS FINE AND GOING TO HEART DR TODAY; SHE WILL CALL BACK ON Monday    ROSA: [phone]

## 2021-01-19 ENCOUNTER — HOSPITAL ENCOUNTER (INPATIENT)
Facility: HOSPITAL | Age: 76
LOS: 4 days | Discharge: HOME | End: 2021-01-23
Attending: EMERGENCY MEDICINE | Admitting: HOSPITALIST

## 2021-01-19 ENCOUNTER — APPOINTMENT (OUTPATIENT)
Dept: GENERAL RADIOLOGY | Facility: HOSPITAL | Age: 76
End: 2021-01-19

## 2021-01-19 DIAGNOSIS — R77.8 ELEVATED TROPONIN: ICD-10-CM

## 2021-01-19 DIAGNOSIS — I50.9 ACUTE CONGESTIVE HEART FAILURE, UNSPECIFIED HEART FAILURE TYPE (HCC): ICD-10-CM

## 2021-01-19 DIAGNOSIS — I25.119 CORONARY ARTERY DISEASE WITH ANGINA PECTORIS, UNSPECIFIED VESSEL OR LESION TYPE, UNSPECIFIED WHETHER NATIVE OR TRANSPLANTED HEART (HCC): ICD-10-CM

## 2021-01-19 DIAGNOSIS — I16.1 HYPERTENSIVE EMERGENCY: Primary | ICD-10-CM

## 2021-01-19 DIAGNOSIS — N18.9 CHRONIC RENAL FAILURE, UNSPECIFIED CKD STAGE: ICD-10-CM

## 2021-01-19 DIAGNOSIS — I10 ESSENTIAL HYPERTENSION: ICD-10-CM

## 2021-01-19 DIAGNOSIS — I50.33 ACUTE ON CHRONIC DIASTOLIC HEART FAILURE (HCC): ICD-10-CM

## 2021-01-19 LAB
ALBUMIN SERPL-MCNC: 3.3 G/DL (ref 3.5–5.2)
ALBUMIN/GLOB SERPL: 0.7 G/DL
ALP SERPL-CCNC: 114 U/L (ref 39–117)
ALT SERPL W P-5'-P-CCNC: 8 U/L (ref 1–33)
ANION GAP SERPL CALCULATED.3IONS-SCNC: 11 MMOL/L (ref 5–15)
AST SERPL-CCNC: 16 U/L (ref 1–32)
BASOPHILS # BLD AUTO: 0.03 10*3/MM3 (ref 0–0.2)
BASOPHILS NFR BLD AUTO: 0.5 % (ref 0–1.5)
BILIRUB SERPL-MCNC: 0.4 MG/DL (ref 0–1.2)
BUN SERPL-MCNC: 24 MG/DL (ref 8–23)
BUN/CREAT SERPL: 4.6 (ref 7–25)
CALCIUM SPEC-SCNC: 9.2 MG/DL (ref 8.6–10.5)
CHLORIDE SERPL-SCNC: 97 MMOL/L (ref 98–107)
CO2 SERPL-SCNC: 31 MMOL/L (ref 22–29)
CREAT SERPL-MCNC: 5.22 MG/DL (ref 0.57–1)
DEPRECATED RDW RBC AUTO: 60.8 FL (ref 37–54)
EOSINOPHIL # BLD AUTO: 0.1 10*3/MM3 (ref 0–0.4)
EOSINOPHIL NFR BLD AUTO: 1.5 % (ref 0.3–6.2)
ERYTHROCYTE [DISTWIDTH] IN BLOOD BY AUTOMATED COUNT: 19.6 % (ref 12.3–15.4)
FLUAV RNA RESP QL NAA+PROBE: NOT DETECTED
FLUBV RNA RESP QL NAA+PROBE: NOT DETECTED
GFR SERPL CREATININE-BSD FRML MDRD: 10 ML/MIN/1.73
GFR SERPL CREATININE-BSD FRML MDRD: ABNORMAL ML/MIN/{1.73_M2}
GLOBULIN UR ELPH-MCNC: 4.6 GM/DL
GLUCOSE BLDC GLUCOMTR-MCNC: 152 MG/DL (ref 70–130)
GLUCOSE BLDC GLUCOMTR-MCNC: 255 MG/DL (ref 70–130)
GLUCOSE SERPL-MCNC: 214 MG/DL (ref 65–99)
HCT VFR BLD AUTO: 30.7 % (ref 34–46.6)
HGB BLD-MCNC: 8.5 G/DL (ref 12–15.9)
HOLD SPECIMEN: NORMAL
HOLD SPECIMEN: NORMAL
HYPOCHROMIA BLD QL: NORMAL
IMM GRANULOCYTES # BLD AUTO: 0.01 10*3/MM3 (ref 0–0.05)
IMM GRANULOCYTES NFR BLD AUTO: 0.2 % (ref 0–0.5)
LYMPHOCYTES # BLD AUTO: 1.68 10*3/MM3 (ref 0.7–3.1)
LYMPHOCYTES NFR BLD AUTO: 25.6 % (ref 19.6–45.3)
MCH RBC QN AUTO: 23.5 PG (ref 26.6–33)
MCHC RBC AUTO-ENTMCNC: 27.7 G/DL (ref 31.5–35.7)
MCV RBC AUTO: 84.8 FL (ref 79–97)
MONOCYTES # BLD AUTO: 0.91 10*3/MM3 (ref 0.1–0.9)
MONOCYTES NFR BLD AUTO: 13.9 % (ref 5–12)
NEUTROPHILS NFR BLD AUTO: 3.82 10*3/MM3 (ref 1.7–7)
NEUTROPHILS NFR BLD AUTO: 58.3 % (ref 42.7–76)
NRBC BLD AUTO-RTO: 0 /100 WBC (ref 0–0.2)
NT-PROBNP SERPL-MCNC: ABNORMAL PG/ML (ref 0–900)
PLAT MORPH BLD: NORMAL
PLATELET # BLD AUTO: 186 10*3/MM3 (ref 140–450)
PMV BLD AUTO: 12.4 FL (ref 6–12)
POLYCHROMASIA BLD QL SMEAR: NORMAL
POTASSIUM SERPL-SCNC: 3.5 MMOL/L (ref 3.5–5.2)
PROT SERPL-MCNC: 7.9 G/DL (ref 6–8.5)
QT INTERVAL: 472 MS
QTC INTERVAL: 523 MS
RBC # BLD AUTO: 3.62 10*6/MM3 (ref 3.77–5.28)
SARS-COV-2 RNA RESP QL NAA+PROBE: NOT DETECTED
SODIUM SERPL-SCNC: 139 MMOL/L (ref 136–145)
TROPONIN T SERPL-MCNC: 0.07 NG/ML (ref 0–0.03)
WBC # BLD AUTO: 6.55 10*3/MM3 (ref 3.4–10.8)
WBC MORPH BLD: NORMAL
WHOLE BLOOD HOLD SPECIMEN: NORMAL
WHOLE BLOOD HOLD SPECIMEN: NORMAL

## 2021-01-19 PROCEDURE — 82962 GLUCOSE BLOOD TEST: CPT

## 2021-01-19 PROCEDURE — 83880 ASSAY OF NATRIURETIC PEPTIDE: CPT | Performed by: EMERGENCY MEDICINE

## 2021-01-19 PROCEDURE — 93005 ELECTROCARDIOGRAM TRACING: CPT | Performed by: EMERGENCY MEDICINE

## 2021-01-19 PROCEDURE — 93005 ELECTROCARDIOGRAM TRACING: CPT

## 2021-01-19 PROCEDURE — G0378 HOSPITAL OBSERVATION PER HR: HCPCS

## 2021-01-19 PROCEDURE — 99285 EMERGENCY DEPT VISIT HI MDM: CPT

## 2021-01-19 PROCEDURE — 87636 SARSCOV2 & INF A&B AMP PRB: CPT | Performed by: EMERGENCY MEDICINE

## 2021-01-19 PROCEDURE — 80053 COMPREHEN METABOLIC PANEL: CPT | Performed by: EMERGENCY MEDICINE

## 2021-01-19 PROCEDURE — 85007 BL SMEAR W/DIFF WBC COUNT: CPT | Performed by: EMERGENCY MEDICINE

## 2021-01-19 PROCEDURE — 80074 ACUTE HEPATITIS PANEL: CPT | Performed by: INTERNAL MEDICINE

## 2021-01-19 PROCEDURE — 99223 1ST HOSP IP/OBS HIGH 75: CPT | Performed by: INTERNAL MEDICINE

## 2021-01-19 PROCEDURE — 25010000002 FUROSEMIDE PER 20 MG: Performed by: INTERNAL MEDICINE

## 2021-01-19 PROCEDURE — 71045 X-RAY EXAM CHEST 1 VIEW: CPT

## 2021-01-19 PROCEDURE — 84484 ASSAY OF TROPONIN QUANT: CPT | Performed by: EMERGENCY MEDICINE

## 2021-01-19 PROCEDURE — 85025 COMPLETE CBC W/AUTO DIFF WBC: CPT | Performed by: EMERGENCY MEDICINE

## 2021-01-19 RX ORDER — CLONIDINE HYDROCHLORIDE 0.1 MG/1
0.2 TABLET ORAL EVERY 8 HOURS SCHEDULED
Status: DISCONTINUED | OUTPATIENT
Start: 2021-01-20 | End: 2021-01-22

## 2021-01-19 RX ORDER — ISOSORBIDE MONONITRATE 60 MG/1
120 TABLET, EXTENDED RELEASE ORAL DAILY
Status: DISCONTINUED | OUTPATIENT
Start: 2021-01-20 | End: 2021-01-22

## 2021-01-19 RX ORDER — CLONIDINE HYDROCHLORIDE 0.1 MG/1
0.2 TABLET ORAL ONCE
Status: COMPLETED | OUTPATIENT
Start: 2021-01-19 | End: 2021-01-19

## 2021-01-19 RX ORDER — SODIUM CHLORIDE 0.9 % (FLUSH) 0.9 %
10 SYRINGE (ML) INJECTION AS NEEDED
Status: DISCONTINUED | OUTPATIENT
Start: 2021-01-19 | End: 2021-01-23 | Stop reason: HOSPADM

## 2021-01-19 RX ORDER — HEPARIN SODIUM 5000 [USP'U]/ML
5000 INJECTION, SOLUTION INTRAVENOUS; SUBCUTANEOUS EVERY 8 HOURS SCHEDULED
Status: DISCONTINUED | OUTPATIENT
Start: 2021-01-20 | End: 2021-01-22

## 2021-01-19 RX ORDER — ASPIRIN 81 MG/1
81 TABLET ORAL DAILY
Status: DISCONTINUED | OUTPATIENT
Start: 2021-01-20 | End: 2021-01-22

## 2021-01-19 RX ORDER — SODIUM CHLORIDE 0.9 % (FLUSH) 0.9 %
10 SYRINGE (ML) INJECTION EVERY 12 HOURS SCHEDULED
Status: DISCONTINUED | OUTPATIENT
Start: 2021-01-20 | End: 2021-01-22

## 2021-01-19 RX ORDER — CLOPIDOGREL BISULFATE 75 MG/1
75 TABLET ORAL DAILY
Status: DISCONTINUED | OUTPATIENT
Start: 2021-01-20 | End: 2021-01-22

## 2021-01-19 RX ORDER — ATORVASTATIN CALCIUM 40 MG/1
80 TABLET, FILM COATED ORAL NIGHTLY
Status: DISCONTINUED | OUTPATIENT
Start: 2021-01-20 | End: 2021-01-22

## 2021-01-19 RX ORDER — TERAZOSIN 2 MG/1
4 CAPSULE ORAL NIGHTLY
Status: DISCONTINUED | OUTPATIENT
Start: 2021-01-20 | End: 2021-01-23 | Stop reason: HOSPADM

## 2021-01-19 RX ORDER — FAMOTIDINE 20 MG/1
20 TABLET, FILM COATED ORAL 2 TIMES DAILY
Status: DISCONTINUED | OUTPATIENT
Start: 2021-01-20 | End: 2021-01-20

## 2021-01-19 RX ORDER — LATANOPROST 50 UG/ML
1 SOLUTION/ DROPS OPHTHALMIC NIGHTLY
Status: DISCONTINUED | OUTPATIENT
Start: 2021-01-20 | End: 2021-01-23 | Stop reason: HOSPADM

## 2021-01-19 RX ORDER — FUROSEMIDE 10 MG/ML
120 INJECTION INTRAMUSCULAR; INTRAVENOUS ONCE
Status: COMPLETED | OUTPATIENT
Start: 2021-01-19 | End: 2021-01-19

## 2021-01-19 RX ORDER — DORZOLAMIDE HYDROCHLORIDE AND TIMOLOL MALEATE 20; 5 MG/ML; MG/ML
SOLUTION/ DROPS OPHTHALMIC DAILY
Status: DISCONTINUED | OUTPATIENT
Start: 2021-01-20 | End: 2021-01-22

## 2021-01-19 RX ORDER — CARVEDILOL 12.5 MG/1
25 TABLET ORAL EVERY 12 HOURS SCHEDULED
Status: DISCONTINUED | OUTPATIENT
Start: 2021-01-20 | End: 2021-01-20

## 2021-01-19 RX ORDER — ACETAMINOPHEN 325 MG/1
650 TABLET ORAL EVERY 4 HOURS PRN
Status: DISCONTINUED | OUTPATIENT
Start: 2021-01-19 | End: 2021-01-23 | Stop reason: HOSPADM

## 2021-01-19 RX ORDER — DEXTROSE MONOHYDRATE 25 G/50ML
25 INJECTION, SOLUTION INTRAVENOUS
Status: DISCONTINUED | OUTPATIENT
Start: 2021-01-19 | End: 2021-01-23 | Stop reason: HOSPADM

## 2021-01-19 RX ORDER — AMIODARONE HYDROCHLORIDE 200 MG/1
200 TABLET ORAL DAILY
Status: DISCONTINUED | OUTPATIENT
Start: 2021-01-20 | End: 2021-01-23 | Stop reason: HOSPADM

## 2021-01-19 RX ORDER — NICOTINE POLACRILEX 4 MG
15 LOZENGE BUCCAL
Status: DISCONTINUED | OUTPATIENT
Start: 2021-01-19 | End: 2021-01-23 | Stop reason: HOSPADM

## 2021-01-19 RX ADMIN — NICARDIPINE HYDROCHLORIDE 5 MG/HR: 0.1 INJECTION, SOLUTION INTRAVENOUS at 20:32

## 2021-01-19 RX ADMIN — TERAZOSIN HYDROCHLORIDE 4 MG: 2 CAPSULE ORAL at 23:43

## 2021-01-19 RX ADMIN — ATORVASTATIN CALCIUM 80 MG: 40 TABLET, FILM COATED ORAL at 23:44

## 2021-01-19 RX ADMIN — CARVEDILOL 25 MG: 12.5 TABLET, FILM COATED ORAL at 23:44

## 2021-01-19 RX ADMIN — CLONIDINE HYDROCHLORIDE 0.2 MG: 0.1 TABLET ORAL at 14:21

## 2021-01-19 RX ADMIN — CLONIDINE HYDROCHLORIDE 0.2 MG: 0.2 TABLET ORAL at 23:42

## 2021-01-19 RX ADMIN — NICARDIPINE HYDROCHLORIDE 5 MG/HR: 0.1 INJECTION, SOLUTION INTRAVENOUS at 16:46

## 2021-01-19 RX ADMIN — SODIUM CHLORIDE, PRESERVATIVE FREE 10 ML: 5 INJECTION INTRAVENOUS at 23:46

## 2021-01-19 RX ADMIN — FUROSEMIDE 120 MG: 10 INJECTION INTRAMUSCULAR; INTRAVENOUS at 20:32

## 2021-01-19 RX ADMIN — FAMOTIDINE 20 MG: 20 TABLET, FILM COATED ORAL at 23:43

## 2021-01-19 NOTE — H&P
[facility] Medicine Services  HISTORY AND PHYSICAL    Patient Name: Esperanza Pop  : 1945  MRN: [id number]  Primary Care Physician: Meghana Rodgers MD  Date of admission: 2021      Subjective   Subjective     Chief Complaint:  Orthopnea, dyspnea on exertion    HPI:  Esperanza Pop is a 76 y.o. female with diabetes, diastolic CHF, ESRD on hemodialysis MWF who presents to the hospital with increasing orthopnea and dyspnea on exertion since December.  She reports that her blood pressure will regularly get up over 200 systolic at home most notably at nighttime, during the day will be in the 130s normally.  She does associate her worsening dyspnea with elevations in blood pressure.  Yesterday at dialysis they attempted to pull additional fluid to help with her breathing which she states did help some however she feels worse again today.  She does report being adherent with dialysis and has not missed any recent.  She originally had an appointment with Dr. Sagastume the beginning of this month however she cares for her  with cancer and forgot that she had an appointment and could not be seen until the end of February.  In the ED her blood pressure was noted to be 215/122 mmHg.  She does also report that she was placed on nifedipine in September which was increased to 60 mg however she has not been taking it because it gives her lower extremity edema and worsening shortness of breath.      COVID Details: [] No Symptoms   [] Fever []  Cough [x] Shortness of breath [] Change in taste or smell   [] Direct Exposure [] High risk facility    Date of Onset:     Date of first positive COVID test:     Review of Systems   Gen- No fevers, chills  CV- No chest pain, palpitations  Resp- No cough, yes dyspnea  GI- No N/V/D, abd pain    All other systems reviewed and are negative.     Personal History     Past Medical History:   Diagnosis Date   • Acute bronchitis    • Acute conjunctivitis    •  Acute kidney injury (CMS/HCC)     Admission from 12/26/2013 to 01/02/2014, now resolved with latest creatinine 1.4 on 01/08/2014.   • Acute non-ST segment elevation myocardial infarction (STEMI) following previous myocardial infarction    • Arthritis    • Breast cancer, female, right     2005 mastectomy right   • Cancer (CMS/HCC)    • CHF (congestive heart failure) (CMS/HCC)    • Chronic kidney disease     dialysis MWF, f/u [facility]    • Clotting disorder (CMS/HCC)    • Diabetes mellitus (CMS/HCC)     diagnosed ~1992, checks fsbg 2-3x/day   • Diarrhea    • Dyslipidemia    • Dyspepsia    • Dyspnea    • Edema    • History of transfusion     ~2013 no reaction    • Hx of radiation therapy    • Hyperlipidemia    • Hypertension     Severe   • Ischemic heart disease    • Moderate obesity     BMI 36.2   • Myocardial infarction (CMS/HCC)    • Pneumonia    • Pneumonia 02/2019   • Radiation 2005   • Seasonal allergies    • Wears glasses        Past Surgical History:   Procedure Laterality Date   • AV FISTULA PLACEMENT, BRACHIOBASILIC     • BREAST BIOPSY Left 2010   • BREAST CYST EXCISION     • BREAST LUMPECTOMY Right 2005   • BREAST SURGERY     • CARDIAC CATHETERIZATION N/A 10/10/2016    Procedure: Left Heart Cath;  Surgeon: Scooter Zhao MD;  Location:  [facility];  Service:    • CARDIAC CATHETERIZATION  10/2016   • COLONOSCOPY N/A 7/23/2020    Procedure: COLONOSCOPY;  Surgeon: Rubén Rosas MD;  Location:  [facility];  Service: Gastroenterology;  Laterality: N/A;   • CORONARY STENT PLACEMENT  2016   • HERNIA REPAIR     • HYSTERECTOMY  2000   • INSERTION HEMODIALYSIS CATHETER Right 6/29/2016    Procedure: HEMODIALYSIS CATHETER INSERTION TUNNELLED;  Surgeon: Ramón Chavez MD;  Location:  [facility];  Service:    • MASTECTOMY Right 2006   • OOPHORECTOMY     • REDUCTION MAMMAPLASTY Left 2005       Family History: family history includes Breast cancer (age of onset:  55) in her sister; Cancer in her mother; Coronary artery disease in her father; Heart failure in her father; Stroke in her mother. Otherwise pertinent FHx was reviewed and unremarkable.     Social History:  reports that she has never smoked. She has never used smokeless tobacco. She reports current alcohol use. She reports that she does not use drugs.  Social History     Social History Narrative    Pt consumes 1 serving of caffeine once every 2 weeks.        Medications:  Available home medication information reviewed.  (Not in a hospital admission)      Allergies   Allergen Reactions   • Mircera [Methoxy Polyethylene Glycol-Epoetin Beta] Anaphylaxis     Pt stopped breathing   • Venofer [Iron Sucrose] Anaphylaxis     Pt stopped breathing   • Albuterol Other (See Comments)     Increased blood pressure  Used right before heart attack   • Nifedipine Er Swelling   • Penicillins Hives   • Prednisone Other (See Comments)     Makes jittery/anxious       Objective   Objective     Vital Signs:   Temp:  [97.1 °F (36.2 °C)] 97.1 °F (36.2 °C)  Heart Rate:  [43-68] 58  Resp:  [16] 16  BP: (191-215)/() 207/95       Physical Exam   Constitutional: Awake, alert, chronically ill-appearing elderly female  Eyes: PERRL, sclerae anicteric, no conjunctival injection  HENT: NCAT, mucous membranes moist  Neck: Supple, trachea midline  Respiratory: Clear to auscultation bilaterally, nonlabored respirations   Cardiovascular: RRR, no murmurs, rubs, or gallops, palpable radial pulses bilaterally  Gastrointestinal: Positive bowel sounds, soft, nontender, nondistended  Musculoskeletal: Mild bilateral lower extremity edema, RT UE with AV fistula with palpable thrill and audible bruit  Psychiatric: Appropriate affect, cooperative  Neurologic: Oriented x 3, strength symmetric in all extremities, speech clear      Result Review:  I have personally reviewed the results from the time of this admission to 01/19/21 4:16 PM EST and agree with these  findings:  [x]  Laboratory  []  Microbiology  [x]  Radiology  []  EKG/Telemetry   []  Cardiology/Vascular   []  Pathology  []  Old records  []  Other:  Most notable findings include: Blood pressure 215/122, BNP of greater than 70,000, fattening 5.2, chest x-ray consistent with CHF      LAB RESULTS:      Lab 01/19/21  1342   WBC 6.55   HEMOGLOBIN 8.5*   HEMATOCRIT 30.7*   PLATELETS 186   NEUTROS ABS 3.82   IMMATURE GRANS (ABS) 0.01   LYMPHS ABS 1.68   MONOS ABS 0.91*   EOS ABS 0.10   MCV 84.8         Lab 01/19/21  1417   SODIUM 139   POTASSIUM 3.5   CHLORIDE 97*   CO2 31.0*   ANION GAP 11.0   BUN 24*   CREATININE 5.22*   GLUCOSE 214*   CALCIUM 9.2         Lab 01/19/21  1417   TOTAL PROTEIN 7.9   ALBUMIN 3.30*   GLOBULIN 4.6   ALT (SGPT) 8   AST (SGOT) 16   BILIRUBIN 0.4   ALK PHOS 114         Lab 01/19/21  1417   PROBNP >70,000.0*   TROPONIN T 0.072*                 Brief Urine Lab Results     None        Microbiology Results (last 10 days)     ** No results found for the last 240 hours. **          Xr Chest 1 View    Result Date: 1/19/2021  EXAMINATION: XR CHEST 1 VW-  INDICATION: Shortness of air triage protocol.  COMPARISON: 06/23/2020.  FINDINGS: Heart is enlarged, appearing further increased in size from 06/23/2020 exam and is now a mild pulmonary vascular congestion pattern present. No effusion, consolidation or pneumothorax is seen.      Impression: Mild congestive heart failure.   D:  01/19/2021 E:  01/19/2021        Results for orders placed during the hospital encounter of 08/23/17   Adult Transthoracic Echo Complete    Narrative · Left ventricular wall thickness is consistent with moderate concentric   hypertrophy.  · Left atrial cavity size is mildly dilated.  · Mild aortic valve stenosis is present.  · Mild mitral valve regurgitation is present.  · Mild tricuspid valve regurgitation is present.  · Left ventricular systolic function is normal. Estimated EF = 65%.  · Left ventricular diastolic dysfunction  (grade I) consistent with   impaired relaxation.  · There is no evidence of pericardial effusion.  · No evidence of pulmonary hypertension is present.  · Normal right ventricular cavity size, wall thickness, systolic function   and septal motion noted.          Assessment/Plan   Assessment & Plan     Active Hospital Problems    Diagnosis POA   • **Acute on chronic diastolic heart failure (CMS/HCC) [I50.33] Yes   • Hypertensive emergency [I16.1] Yes   • Type 2 diabetes mellitus (CMS/[facility]) [E11.9] Yes   • Coronary artery disease involving native coronary artery of native heart with angina pectoris (CMS/[facility]) [I25.119] Yes     · Cardiac catheterization for NSTEMI (December 2013): Severe RCA disease status post DARRYL.  LVEF 25%.  · Echo (6/30/16): LVEF 55%, mild AS/MR.       Summary: This is a 76-year-old female with diabetes, diastolic CHF, ESRD on HD MWF, who presents to the hospital with a month of increasing dyspnea on exertion, orthopnea, uncontrolled blood pressures into the 200s systolic at nighttime presenting to the hospital with a blood pressure of 212 systolic and requiring oxygen    Assessment/plan    Hypoxia due to acute exacerbation of diastolic CHF  -BNP greater than 70,000, requiring 2.5 L/min of oxygen via nasal cannula  -We will give 1 dose of IV Lasix 120 mg  -Likely precipitated by hypertensive emergency  -Dialysis for volume control    Hypertensive emergency  -Resume home blood pressure medicines (carvedilol, clonidine, Imdur, Hytrin)  -Noted cardiology has placed her on nifedipine that she has not been taking at home due to side effects  -Nicardipine drip with target systolic blood pressure 180, then titrate down as tolerated  -Consult cardiology, Dr. Sagastume, for adjustments in blood pressure medicines and follow-up    ESRD on HD  -Dialyzes MWF, last dialysis yesterday, reports compliance  -Dialyzes via RT UE AV fistula  -Nephrology consultation for continuation of dialysis  -Renal diet    DM type 2,  with long-term use of insulin  -Last A1c 9/22/2020 was 6.9%  -Start slightly reduced basal insulin with sliding scale and adjust as appropriate      DVT prophylaxis: Subcu heparin      CODE STATUS: Patient wishes to be full code  Code Status and Medical Interventions:   Ordered at: 01/19/21 1615     Code Status:    CPR     Medical Interventions (Level of Support Prior to Arrest):    Full       Admission Status:  I believe this patient meets OBSERVATION status, however if further evaluation or treatment plans warrant, status may change.  Based upon current information, I predict patient's care encounter to be less than or equal to 2 midnights.      Ranulfo Thomas, DO  01/19/21

## 2021-01-19 NOTE — ED PROVIDER NOTES
Subjective   76-year-old female with a known history of congestive heart failure and dialysis dependent end-stage renal failure who presents with complaint of lower extremity edema and orthopnea.  The patient reports that for approximate the last month she feels shortness of breath whenever she lies flat.  She reports that it improves whenever she sits up.  She also has noticed lower extremity edema during the same timeframe.  She reports with leg elevation and turosemide she has been able to intermittently improve the lower extremity edema.  However, she feels the symptoms have continued to be persist and she states she was unable to get in with her cardiologist in a timely manner so she has now presented here for further evaluation.  No reported chest pain.  No reported fever, body aches, or chills.  No of respiratory congestion, sore throat, rhinorrhea, change in sense of taste or smell.  No reported bowel problems including no blood in the stool or diarrhea.  She has been getting dialysis as scheduled.  No new or different medications.  No other reported aggravating, alleviating, or associated symptoms.          Review of Systems   Constitutional: Negative for chills, fatigue and fever.   HENT: Negative for congestion, ear pain, postnasal drip, sinus pressure and sore throat.    Eyes: Negative for pain, redness and visual disturbance.   Respiratory: Positive for shortness of breath. Negative for cough and chest tightness.    Cardiovascular: Negative for chest pain, palpitations and leg swelling.   Gastrointestinal: Negative for abdominal pain, anal bleeding, blood in stool, diarrhea, nausea and vomiting.   Endocrine: Negative for polydipsia and polyuria.   Genitourinary: Negative for difficulty urinating, dysuria, frequency and urgency.   Musculoskeletal: Negative for arthralgias, back pain and neck pain.   Skin: Negative for pallor and rash.   Allergic/Immunologic: Negative for environmental allergies and  immunocompromised state.   Neurological: Negative for dizziness, weakness and headaches.   Hematological: Negative for adenopathy.   Psychiatric/Behavioral: Negative for confusion, self-injury and suicidal ideas. The patient is not nervous/anxious.    All other systems reviewed and are negative.      Past Medical History:   Diagnosis Date   • Acute bronchitis    • Acute conjunctivitis    • Acute kidney injury (CMS/HCC)     Admission from 12/26/2013 to 01/02/2014, now resolved with latest creatinine 1.4 on 01/08/2014.   • Acute non-ST segment elevation myocardial infarction (STEMI) following previous myocardial infarction    • Arthritis    • Breast cancer, female, right     2005 mastectomy right   • Cancer (CMS/HCC)    • CHF (congestive heart failure) (CMS/[facility])    • Chronic kidney disease     dialysis [facility], f/u [facility]rology [facility]    • Clotting disorder (CMS/[facility])    • Diabetes mellitus (CMS/[facility])     diagnosed ~1992, checks fsbg 2-3x/day   • Diarrhea    • Dyslipidemia    • Dyspepsia    • Dyspnea    • Edema    • History of transfusion     ~2013 no reaction    • Hx of radiation therapy    • Hyperlipidemia    • Hypertension     Severe   • Ischemic heart disease    • Moderate obesity     BMI 36.2   • Myocardial infarction (CMS/[facility])    • Pneumonia    • Pneumonia 02/2019   • Radiation 2005   • Seasonal allergies    • Wears glasses        Allergies   Allergen Reactions   • Mircera [Methoxy Polyethylene Glycol-Epoetin Beta] Anaphylaxis     Pt stopped breathing   • Venofer [Iron Sucrose] Anaphylaxis     Pt stopped breathing   • Albuterol Other (See Comments)     Increased blood pressure  Used right before heart attack   • Nifedipine Er Swelling   • Penicillins Hives   • Prednisone Other (See Comments)     Makes jittery/anxious       Past Surgical History:   Procedure Laterality Date   • AV FISTULA PLACEMENT, BRACHIOBASILIC     • BREAST BIOPSY Left 2010   • BREAST CYST EXCISION     • BREAST LUMPECTOMY Right 2005   • BREAST  SURGERY     • CARDIAC CATHETERIZATION N/A 10/10/2016    Procedure: Left Heart Cath;  Surgeon: Scooter Zhao MD;  Location:  [facility];  Service:    • CARDIAC CATHETERIZATION  10/2016   • COLONOSCOPY N/A 7/23/2020    Procedure: COLONOSCOPY;  Surgeon: Rubén Rosas MD;  Location:  [facility];  Service: Gastroenterology;  Laterality: N/A;   • CORONARY STENT PLACEMENT  2016   • HERNIA REPAIR     • HYSTERECTOMY  2000   • INSERTION HEMODIALYSIS CATHETER Right 6/29/2016    Procedure: HEMODIALYSIS CATHETER INSERTION TUNNELLED;  Surgeon: Ramón Chavez MD;  Location:  [facility];  Service:    • MASTECTOMY Right 2006   • OOPHORECTOMY     • REDUCTION MAMMAPLASTY Left 2005       Family History   Problem Relation Age of Onset   • Stroke Mother    • Cancer Mother    • Coronary artery disease Father    • Heart failure Father    • Breast cancer Sister 55   • Ovarian cancer Neg Hx    • Endometrial cancer Neg Hx        Social History     Socioeconomic History   • Marital status:      Spouse name: Not on file   • Number of children: Not on file   • Years of education: Not on file   • Highest education level: Not on file   Occupational History   • Occupation: retired   Tobacco Use   • Smoking status: Never Smoker   • Smokeless tobacco: Never Used   • Tobacco comment: Michael second hand smoke   Substance and Sexual Activity   • Alcohol use: Yes     Comment: rarely   • Drug use: No   • Sexual activity: Defer   Social History Narrative    Pt consumes 1 serving of caffeine once every 2 weeks.            Objective   Physical Exam  Vitals signs and nursing note reviewed.   Constitutional:       General: She is not in acute distress.     Appearance: Normal appearance. She is well-developed. She is not toxic-appearing or diaphoretic.   HENT:      Head: Normocephalic and atraumatic.      Right Ear: External ear normal.      Left Ear: External ear normal.      Nose: Nose normal.   Eyes:       General: Lids are normal.      Pupils: Pupils are equal, round, and reactive to light.   Neck:      Musculoskeletal: Normal range of motion and neck supple.      Trachea: No tracheal deviation.   Cardiovascular:      Rate and Rhythm: Normal rate and regular rhythm.      Pulses: No decreased pulses.      Heart sounds: Normal heart sounds. No murmur. No friction rub. No gallop.       Comments: Chronic 2+ edema to the lower half of the bilateral lower extremities.  Equal calf size.  Pulmonary:      Effort: Pulmonary effort is normal. No respiratory distress.      Breath sounds: Normal breath sounds. No decreased breath sounds, wheezing, rhonchi or rales.       Abdominal:      General: Bowel sounds are normal.      Palpations: Abdomen is soft.      Tenderness: There is no abdominal tenderness. There is no guarding or rebound.   Musculoskeletal: Normal range of motion.         General: No deformity.      Right lower le+ Edema present.      Left lower le+ Edema present.   Lymphadenopathy:      Cervical: No cervical adenopathy.   Skin:     General: Skin is warm and dry.      Findings: No rash.   Neurological:      Mental Status: She is alert and oriented to person, place, and time.      Cranial Nerves: No cranial nerve deficit.      Sensory: No sensory deficit.   Psychiatric:         Speech: Speech normal.         Behavior: Behavior normal.         Thought Content: Thought content normal.         Judgment: Judgment normal.         Procedures           ED Course                                           MDM  Number of Diagnoses or Management Options  Acute congestive heart failure, unspecified heart failure type (CMS/HCC): new and requires workup  Chronic renal failure, unspecified CKD stage: new and requires workup  Elevated troponin: new and requires workup  Hypertensive emergency: new and requires workup  Diagnosis management comments: Patient presents with complaint of vomiting.  Oxygen saturation observed to go  down to the mid 80s.  Chest x-ray consistent with acute CHF exacerbation.    Patient has remained significantly hypertensive despite administering her home medication and giving clonidine here in the ER.    The hospitalist will admit for further blood pressure control and fluid overload removal.    Dr. Thomas to admit.       Amount and/or Complexity of Data Reviewed  Clinical lab tests: ordered and reviewed  Tests in the radiology section of CPT®: ordered and reviewed  Decide to obtain previous medical records or to obtain history from someone other than the patient: yes  Review and summarize past medical records: yes  Independent visualization of images, tracings, or specimens: yes        Final diagnoses:   Hypertensive emergency   Acute congestive heart failure, unspecified heart failure type (CMS/HCC)   Chronic renal failure, unspecified CKD stage   Elevated troponin            Mary Vazquez MD  01/19/21 8839

## 2021-01-19 NOTE — PROGRESS NOTES
Discharge Planning Assessment  [facility]     Patient Name: Esperanza Pop  MRN: [id number]  Today's Date: 1/19/2021    Admit Date: 1/19/2021    Discharge Needs Assessment     Row Name 01/19/21 1527       Living Environment    Lives With  spouse    Name(s) of Who Lives With Patient  Juan    Current Living Arrangements  home/apartment/condo    Potentially Unsafe Housing Conditions  unable to assess    Primary Care Provided by  self    Provides Primary Care For  spouse    Caregiving Concerns  has lung cancer    Family Caregiver if Needed  child(evelyn), adult;spouse    Quality of Family Relationships  helpful;involved;supportive    Able to Return to Prior Arrangements  yes       Resource/Environmental Concerns    Resource/Environmental Concerns  none    Transportation Concerns  car, none       Transition Planning    Patient/Family Anticipates Transition to  home with family    Patient/Family Anticipated Services at Transition  none       Discharge Needs Assessment    Readmission Within the Last 30 Days  no previous admission in last 30 days    Equipment Currently Used at Home  cane, straight;walker, rolling    Concerns to be Addressed  denies needs/concerns at this time    Anticipated Changes Related to Illness  none    Equipment Needed After Discharge  none    Provided Post Acute Provider List?  N/A    Provided Post Acute Provider Quality & Resource List?  N/A        Discharge Plan     Row Name 01/19/21 1525       Plan    Plan  initial    Plan Comments  Pt lives with her  in [facility]. She is independent with her care, sometimes uses a cane. Her spouse has cancer and e is caring for him too. Plan is home when ready. PCP is Meghana Rodgers        Continued Care and Services - Admitted Since 1/19/2021    Coordination has not been started for this encounter.         Demographic Summary    No documentation.       Functional Status     Row Name 01/19/21 1527       Functional Status    Usual Activity Tolerance   good       Functional Status, IADL    Medications  independent    Meal Preparation  independent    Housekeeping  independent    Laundry  independent    Shopping  independent       Mental Status    General Appearance WDL  WDL       Mental Status Summary    Recent Changes in Mental Status/Cognitive Functioning  no changes       Employment/    Employment Status  retired        Psychosocial    No documentation.       Abuse/Neglect    No documentation.       Legal    No documentation.       Substance Abuse    No documentation.       Patient Forms    No documentation.           Merlyn Omalley RN

## 2021-01-20 ENCOUNTER — APPOINTMENT (OUTPATIENT)
Dept: CARDIOLOGY | Facility: HOSPITAL | Age: 76
End: 2021-01-20

## 2021-01-20 ENCOUNTER — APPOINTMENT (OUTPATIENT)
Dept: NEPHROLOGY | Facility: HOSPITAL | Age: 76
End: 2021-01-20

## 2021-01-20 LAB
ALBUMIN SERPL-MCNC: 3 G/DL (ref 3.5–5.2)
ALBUMIN/GLOB SERPL: 0.7 G/DL
ALP SERPL-CCNC: 106 U/L (ref 39–117)
ALT SERPL W P-5'-P-CCNC: 6 U/L (ref 1–33)
ANION GAP SERPL CALCULATED.3IONS-SCNC: 11 MMOL/L (ref 5–15)
AST SERPL-CCNC: 13 U/L (ref 1–32)
BILIRUB SERPL-MCNC: 0.4 MG/DL (ref 0–1.2)
BUN SERPL-MCNC: 27 MG/DL (ref 8–23)
BUN/CREAT SERPL: 4.6 (ref 7–25)
CALCIUM SPEC-SCNC: 8.9 MG/DL (ref 8.6–10.5)
CHLORIDE SERPL-SCNC: 100 MMOL/L (ref 98–107)
CO2 SERPL-SCNC: 28 MMOL/L (ref 22–29)
CREAT SERPL-MCNC: 5.91 MG/DL (ref 0.57–1)
DEPRECATED RDW RBC AUTO: 57.9 FL (ref 37–54)
ERYTHROCYTE [DISTWIDTH] IN BLOOD BY AUTOMATED COUNT: 19 % (ref 12.3–15.4)
GFR SERPL CREATININE-BSD FRML MDRD: 8 ML/MIN/1.73
GFR SERPL CREATININE-BSD FRML MDRD: ABNORMAL ML/MIN/{1.73_M2}
GLOBULIN UR ELPH-MCNC: 4.2 GM/DL
GLUCOSE BLDC GLUCOMTR-MCNC: 161 MG/DL (ref 70–130)
GLUCOSE BLDC GLUCOMTR-MCNC: 167 MG/DL (ref 70–130)
GLUCOSE BLDC GLUCOMTR-MCNC: 240 MG/DL (ref 70–130)
GLUCOSE BLDC GLUCOMTR-MCNC: 345 MG/DL (ref 70–130)
GLUCOSE SERPL-MCNC: 172 MG/DL (ref 65–99)
HAV IGM SERPL QL IA: NORMAL
HBV CORE IGM SERPL QL IA: NORMAL
HBV SURFACE AG SERPL QL IA: NORMAL
HCT VFR BLD AUTO: 26.8 % (ref 34–46.6)
HCV AB SER DONR QL: NORMAL
HGB BLD-MCNC: 7.6 G/DL (ref 12–15.9)
MCH RBC QN AUTO: 23.8 PG (ref 26.6–33)
MCHC RBC AUTO-ENTMCNC: 28.4 G/DL (ref 31.5–35.7)
MCV RBC AUTO: 83.8 FL (ref 79–97)
PLATELET # BLD AUTO: 191 10*3/MM3 (ref 140–450)
PMV BLD AUTO: 12.4 FL (ref 6–12)
POTASSIUM SERPL-SCNC: 3.2 MMOL/L (ref 3.5–5.2)
PROT SERPL-MCNC: 7.2 G/DL (ref 6–8.5)
RBC # BLD AUTO: 3.2 10*6/MM3 (ref 3.77–5.28)
SODIUM SERPL-SCNC: 139 MMOL/L (ref 136–145)
WBC # BLD AUTO: 5.26 10*3/MM3 (ref 3.4–10.8)

## 2021-01-20 PROCEDURE — G0378 HOSPITAL OBSERVATION PER HR: HCPCS

## 2021-01-20 PROCEDURE — 25010000002 EPOETIN ALFA-EPBX 10000 UNIT/ML SOLUTION: Performed by: NURSE PRACTITIONER

## 2021-01-20 PROCEDURE — 63710000001 INSULIN DETEMIR PER 5 UNITS: Performed by: INTERNAL MEDICINE

## 2021-01-20 PROCEDURE — 93306 TTE W/DOPPLER COMPLETE: CPT

## 2021-01-20 PROCEDURE — 93306 TTE W/DOPPLER COMPLETE: CPT | Performed by: INTERNAL MEDICINE

## 2021-01-20 PROCEDURE — 99232 SBSQ HOSP IP/OBS MODERATE 35: CPT | Performed by: INTERNAL MEDICINE

## 2021-01-20 PROCEDURE — 80053 COMPREHEN METABOLIC PANEL: CPT | Performed by: INTERNAL MEDICINE

## 2021-01-20 PROCEDURE — 82962 GLUCOSE BLOOD TEST: CPT

## 2021-01-20 PROCEDURE — 99222 1ST HOSP IP/OBS MODERATE 55: CPT | Performed by: INTERNAL MEDICINE

## 2021-01-20 PROCEDURE — 63710000001 INSULIN DETEMIR PER 5 UNITS: Performed by: NURSE PRACTITIONER

## 2021-01-20 PROCEDURE — 25010000002 EPOETIN ALFA-EPBX 10000 UNIT/ML SOLUTION: Performed by: INTERNAL MEDICINE

## 2021-01-20 PROCEDURE — 63710000001 INSULIN LISPRO (HUMAN) PER 5 UNITS: Performed by: INTERNAL MEDICINE

## 2021-01-20 PROCEDURE — 25010000002 HEPARIN (PORCINE) PER 1000 UNITS: Performed by: INTERNAL MEDICINE

## 2021-01-20 PROCEDURE — 5A1D70Z PERFORMANCE OF URINARY FILTRATION, INTERMITTENT, LESS THAN 6 HOURS PER DAY: ICD-10-PCS | Performed by: INTERNAL MEDICINE

## 2021-01-20 PROCEDURE — 85027 COMPLETE CBC AUTOMATED: CPT | Performed by: INTERNAL MEDICINE

## 2021-01-20 RX ORDER — NITROGLYCERIN 0.4 MG/1
0.4 TABLET SUBLINGUAL
Status: CANCELLED | OUTPATIENT
Start: 2021-01-20

## 2021-01-20 RX ORDER — TORSEMIDE 20 MG/1
40 TABLET ORAL DAILY
Status: DISCONTINUED | OUTPATIENT
Start: 2021-01-20 | End: 2021-01-22

## 2021-01-20 RX ORDER — ASPIRIN 81 MG/1
81 TABLET ORAL DAILY
Status: CANCELLED | OUTPATIENT
Start: 2021-01-21

## 2021-01-20 RX ORDER — HYDRALAZINE HYDROCHLORIDE 25 MG/1
25 TABLET, FILM COATED ORAL EVERY 8 HOURS SCHEDULED
Status: DISCONTINUED | OUTPATIENT
Start: 2021-01-20 | End: 2021-01-22

## 2021-01-20 RX ORDER — SODIUM CHLORIDE 9 MG/ML
3 INJECTION, SOLUTION INTRAVENOUS CONTINUOUS
Status: CANCELLED | OUTPATIENT
Start: 2021-01-21 | End: 2021-01-21

## 2021-01-20 RX ORDER — FAMOTIDINE 20 MG/1
20 TABLET, FILM COATED ORAL 3 TIMES WEEKLY
Status: DISCONTINUED | OUTPATIENT
Start: 2021-01-22 | End: 2021-01-23 | Stop reason: HOSPADM

## 2021-01-20 RX ORDER — ONDANSETRON 2 MG/ML
4 INJECTION INTRAMUSCULAR; INTRAVENOUS EVERY 6 HOURS PRN
Status: CANCELLED | OUTPATIENT
Start: 2021-01-20

## 2021-01-20 RX ORDER — CARVEDILOL 12.5 MG/1
12.5 TABLET ORAL EVERY 12 HOURS SCHEDULED
Status: DISCONTINUED | OUTPATIENT
Start: 2021-01-20 | End: 2021-01-22

## 2021-01-20 RX ORDER — ASPIRIN 81 MG/1
324 TABLET, CHEWABLE ORAL ONCE
Status: CANCELLED | OUTPATIENT
Start: 2021-01-21

## 2021-01-20 RX ADMIN — HYDRALAZINE HYDROCHLORIDE 25 MG: 25 TABLET, FILM COATED ORAL at 13:20

## 2021-01-20 RX ADMIN — EPOETIN ALFA-EPBX 10000 UNITS: 10000 INJECTION, SOLUTION INTRAVENOUS; SUBCUTANEOUS at 12:59

## 2021-01-20 RX ADMIN — CARVEDILOL 25 MG: 12.5 TABLET, FILM COATED ORAL at 08:32

## 2021-01-20 RX ADMIN — TORSEMIDE 40 MG: 20 TABLET ORAL at 13:20

## 2021-01-20 RX ADMIN — INSULIN LISPRO 5 UNITS: 100 INJECTION, SOLUTION INTRAVENOUS; SUBCUTANEOUS at 17:51

## 2021-01-20 RX ADMIN — INSULIN DETEMIR 5 UNITS: 100 INJECTION, SOLUTION SUBCUTANEOUS at 21:48

## 2021-01-20 RX ADMIN — ASPIRIN 81 MG: 81 TABLET, COATED ORAL at 08:29

## 2021-01-20 RX ADMIN — CLOPIDOGREL BISULFATE 75 MG: 75 TABLET ORAL at 08:29

## 2021-01-20 RX ADMIN — TIMOLOL MALEATE: 5 SOLUTION OPHTHALMIC at 08:29

## 2021-01-20 RX ADMIN — AMIODARONE HYDROCHLORIDE 200 MG: 200 TABLET ORAL at 08:29

## 2021-01-20 RX ADMIN — CLONIDINE HYDROCHLORIDE 0.2 MG: 0.2 TABLET ORAL at 05:03

## 2021-01-20 RX ADMIN — CARVEDILOL 12.5 MG: 12.5 TABLET, FILM COATED ORAL at 21:12

## 2021-01-20 RX ADMIN — HEPARIN SODIUM 5000 UNITS: 5000 INJECTION INTRAVENOUS; SUBCUTANEOUS at 05:03

## 2021-01-20 RX ADMIN — HEPARIN SODIUM 5000 UNITS: 5000 INJECTION INTRAVENOUS; SUBCUTANEOUS at 21:12

## 2021-01-20 RX ADMIN — TERAZOSIN HYDROCHLORIDE 4 MG: 2 CAPSULE ORAL at 21:46

## 2021-01-20 RX ADMIN — ATORVASTATIN CALCIUM 80 MG: 40 TABLET, FILM COATED ORAL at 21:12

## 2021-01-20 RX ADMIN — ISOSORBIDE MONONITRATE 120 MG: 60 TABLET, EXTENDED RELEASE ORAL at 08:29

## 2021-01-20 RX ADMIN — CLONIDINE HYDROCHLORIDE 0.2 MG: 0.2 TABLET ORAL at 21:12

## 2021-01-20 RX ADMIN — HYDRALAZINE HYDROCHLORIDE 25 MG: 25 TABLET, FILM COATED ORAL at 21:12

## 2021-01-20 RX ADMIN — INSULIN LISPRO 3 UNITS: 100 INJECTION, SOLUTION INTRAVENOUS; SUBCUTANEOUS at 21:47

## 2021-01-20 RX ADMIN — NICARDIPINE HYDROCHLORIDE 5 MG/HR: 0.1 INJECTION, SOLUTION INTRAVENOUS at 02:15

## 2021-01-20 RX ADMIN — INSULIN LISPRO 2 UNITS: 100 INJECTION, SOLUTION INTRAVENOUS; SUBCUTANEOUS at 08:30

## 2021-01-20 NOTE — PROGRESS NOTES
Continued Stay Note  [facility]     Patient Name: Esperanza Pop  MRN: [id number]  Today's Date: 1/20/2021    Admit Date: 1/19/2021    Discharge Plan     Row Name 01/20/21 1142       Plan    Plan  discharge plan    Plan Comments  Pt off the floor for HD. Plan is home at discharge. CM will cont to follow,    Final Discharge Disposition Code  01 - home or self-care        Discharge Codes    No documentation.       Expected Discharge Date and Time     Expected Discharge Date Expected Discharge Time    Jan 23, 2021             Cat Agarwal RN

## 2021-01-20 NOTE — PROGRESS NOTES
[facility] Medicine Services  PROGRESS NOTE    Patient Name: Esperanza Pop  : 1945  MRN: [id number]    Date of Admission: 2021  Primary Care Physician: Meghana Rodgers MD    Subjective   Subjective     CC:  Shortness of breath    HPI:  Breathing a bit better after starting dialysis today.    ROS:  Gen- No fevers, chills  CV- No chest pain, palpitations  Resp- No cough, + dyspnea  GI- No N/V/D, abd pain        Objective   Objective     Vital Signs:   Temp:  [97.1 °F (36.2 °C)-98 °F (36.7 °C)] 97.9 °F (36.6 °C)  Heart Rate:  [43-70] 58  Resp:  [16-18] 18  BP: (122-215)/() 146/67        Physical Exam:  Constitutional - no acute distress, frail female, in bed  HEENT-NCAT, mucous membranes moist  CV-RRR  Resp-grossly clear bilaterally  Abd-soft, nontender, nondistended, normoactive bowel sounds  Ext-No lower extremity cyanosis, clubbing or edema bilaterally  Neuro-alert, speech clear, moves all extremities   Psych-normal affect   Skin- No rash on exposed UE or LE bilaterally      Results Reviewed:  Results from last 7 days   Lab Units 21  0618 21  1342   WBC 10*3/mm3 5.26 6.55   HEMOGLOBIN g/dL 7.6* 8.5*   HEMATOCRIT % 26.8* 30.7*   PLATELETS 10*3/mm3 191 186     Results from last 7 days   Lab Units 21  0618 21  1417   SODIUM mmol/L 139 139   POTASSIUM mmol/L 3.2* 3.5   CHLORIDE mmol/L 100 97*   CO2 mmol/L 28.0 31.0*   BUN mg/dL 27* 24*   CREATININE mg/dL 5.91* 5.22*   GLUCOSE mg/dL 172* 214*   CALCIUM mg/dL 8.9 9.2   ALT (SGPT) U/L 6 8   AST (SGOT) U/L 13 16   TROPONIN T ng/mL  --  0.072*   PROBNP pg/mL  --  >70,000.0*     Estimated Creatinine Clearance: 9.3 mL/min (A) (by C-G formula based on SCr of 5.91 mg/dL (H)).    Microbiology Results Abnormal     Procedure Component Value - Date/Time    COVID PRE-OP / PRE-PROCEDURE SCREENING ORDER (NO ISOLATION) - Swab, Nasopharynx [[id number]]  (Normal) Collected: 21 1521    Lab Status: Final result  Specimen: Swab from Nasopharynx Updated: 01/19/21 1748    Narrative:      The following orders were created for panel order COVID PRE-OP / PRE-PROCEDURE SCREENING ORDER (NO ISOLATION) - Swab, Nasopharynx.  Procedure                               Abnormality         Status                     ---------                               -----------         ------                     COVID-19 and FLU A/B PCR...[[id number]]  Normal              Final result                 Please view results for these tests on the individual orders.    COVID-19 and FLU A/B PCR - Swab, Nasopharynx [[id number]]  (Normal) Collected: 01/19/21 1521    Lab Status: Final result Specimen: Swab from Nasopharynx Updated: 01/19/21 1748     COVID19 Not Detected     Influenza A PCR Not Detected     Influenza B PCR Not Detected    Narrative:      Fact sheet for providers: https://www.fda.gov/media/941684/download    Fact sheet for patients: https://www.fda.gov/media/836406/download    Test performed by PCR.          Imaging Results (Last 24 Hours)     Procedure Component Value Units Date/Time    XR Chest 1 View [[id number]] Collected: 01/19/21 1237     Updated: 01/19/21 2101    Narrative:      EXAMINATION: XR CHEST 1 VW-     INDICATION: Shortness of air triage protocol.      COMPARISON: 06/23/2020.     FINDINGS: Heart is enlarged, appearing further increased in size from  06/23/2020 exam and is now a mild pulmonary vascular congestion pattern  present. No effusion, consolidation or pneumothorax is seen.       Impression:      Mild congestive heart failure.      D:  01/19/2021  E:  01/19/2021     This report was finalized on 1/19/2021 8:58 PM by Dr. Champ Lee MD.             Results for orders placed during the hospital encounter of 08/23/17   Adult Transthoracic Echo Complete    Narrative · Left ventricular wall thickness is consistent with moderate concentric   hypertrophy.  · Left atrial cavity size is mildly dilated.  · Mild aortic valve stenosis is  present.  · Mild mitral valve regurgitation is present.  · Mild tricuspid valve regurgitation is present.  · Left ventricular systolic function is normal. Estimated EF = 65%.  · Left ventricular diastolic dysfunction (grade I) consistent with   impaired relaxation.  · There is no evidence of pericardial effusion.  · No evidence of pulmonary hypertension is present.  · Normal right ventricular cavity size, wall thickness, systolic function   and septal motion noted.          I have reviewed the medications:  Scheduled Meds:amiodarone, 200 mg, Oral, Daily  aspirin, 81 mg, Oral, Daily  atorvastatin, 80 mg, Oral, Nightly  carvedilol, 12.5 mg, Oral, Q12H  cloNIDine, 0.2 mg, Oral, Q8H  clopidogrel, 75 mg, Oral, Daily  dorzolamide-timolol, , Both Eyes, Daily  epoetin orquidea/orquidea-epbx, 10,000 Units, Subcutaneous, Once per day on Mon Wed Fri  [START ON 1/22/2021] famotidine, 20 mg, Oral, Once per day on Mon Wed Fri  heparin (porcine), 5,000 Units, Subcutaneous, Q8H  hydrALAZINE, 25 mg, Oral, Q8H  insulin detemir, 5 Units, Subcutaneous, Nightly  insulin lispro, 0-7 Units, Subcutaneous, 4x Daily With Meals & Nightly  isosorbide mononitrate, 120 mg, Oral, Daily  latanoprost, 1 drop, Both Eyes, Nightly  pharmacy consult - MTM, , Does not apply, Daily  sodium chloride, 10 mL, Intravenous, Q12H  terazosin, 4 mg, Oral, Nightly  torsemide, 40 mg, Oral, Daily      Continuous Infusions:niCARdipine, 5-15 mg/hr, Last Rate: Stopped (01/20/21 0837)      PRN Meds:.•  acetaminophen  •  dextrose  •  dextrose  •  glucagon (human recombinant)  •  sodium chloride  •  sodium chloride    Assessment/Plan   Assessment & Plan     Active Hospital Problems    Diagnosis  POA   • **Acute on chronic diastolic heart failure (CMS/HCC) [I50.33]  Yes   • Hypertensive emergency [I16.1]  Yes   • Type 2 diabetes mellitus (CMS/[facility]) [E11.9]  Yes   • Coronary artery disease involving native coronary artery of native heart with angina pectoris (CMS/[facility]) [I25.119]   Yes      Resolved Hospital Problems   No resolved problems to display.        Brief Hospital Course to date:  Esperanza Pop is a 76 y.o. female with history of DM 2, diastolic CHF, end-stage renal disease who presents with orthopnea, elevated BP and dyspnea on exertion.    Acute on chronic diastolic CHF  -proBNP greater than 70,000  -Currently on dialysis  -Torsemide 40 mg daily  -Echo pending    Hypertensive urgency  -Some occasional noncompliance with medications at home  -Currently off Cardene drip with home medications restarted, will monitor BP and adjust as needed    ESRD  - HD MWF    CAD  - ischemic eval planned    DMII  - basal bolus, check midnight and 3 am glucose    Anemia of ESRD      DVT Prophylaxis:  heparin      Disposition: I expect the patient to be discharged TBD    CODE STATUS:   Code Status and Medical Interventions:   Ordered at: 01/19/21 1615     Code Status:    CPR     Medical Interventions (Level of Support Prior to Arrest):    Full       Chip Sanchez MD  01/20/21

## 2021-01-20 NOTE — CONSULTS
[facility] Consult Note    Esperanza Pop  1945  [id number]    Date of Admit:  1/19/2021    Date of Consult: 1/20/2021        Requesting Provider: No ref. provider found  Evaluating Physician: Zeb Owusu MD        Reason for Consultation:  ESRD  History of present illness:    Patient is a 76 y.o.  Yr old female  KWOWN TO [facility] WITH ESRD ON MWF HD ( [facility]. 3.5 HRS WITH AVF ), HTN, ANEMIA, ISCHEMIC HEART DZ ADDITTED WITH PULMONARY EDEMA AND ELEVATED BP ( 215/122 ) AND WE ARE ASKED TO SEE PATIENT TO MANAGED ESRD/DIALYSIS ( DUE TODAY ). LAST HD 1/18/21 AND PATIENT STATES SHE GOT TO  .    Past Medical History:   Diagnosis Date   • Acute bronchitis    • Acute conjunctivitis    • Acute kidney injury (CMS/HCC)     Admission from 12/26/2013 to 01/02/2014, now resolved with latest creatinine 1.4 on 01/08/2014.   • Acute non-ST segment elevation myocardial infarction (STEMI) following previous myocardial infarction    • Arthritis    • Breast cancer, female, right     2005 mastectomy right   • Cancer (CMS/HCC)    • CHF (congestive heart failure) (CMS/HCC)    • Chronic kidney disease     dialysis MWF, [facility]    • Clotting disorder (CMS/HCC)    • Diabetes mellitus (CMS/HCC)     diagnosed ~1992, checks fsbg 2-3x/day   • Diarrhea    • Dyslipidemia    • Dyspepsia    • Dyspnea    • Edema    • History of transfusion     ~2013 no reaction    • Hx of radiation therapy    • Hyperlipidemia    • Hypertension     Severe   • Ischemic heart disease    • Moderate obesity     BMI 36.2   • Myocardial infarction (CMS/HCC)    • Pneumonia    • Pneumonia 02/2019   • Radiation 2005   • Seasonal allergies    • Wears glasses        Past Surgical History:   Procedure Laterality Date   • AV FISTULA PLACEMENT, BRACHIOBASILIC     • BREAST BIOPSY Left 2010   • BREAST CYST EXCISION     • BREAST LUMPECTOMY Right 2005   • BREAST SURGERY     • CARDIAC CATHETERIZATION N/A 10/10/2016    Procedure: Left Heart Cath;  Surgeon: Scooter  Ace Zhao MD;  Location:  [facility];  Service:    • CARDIAC CATHETERIZATION  10/2016   • COLONOSCOPY N/A 7/23/2020    Procedure: COLONOSCOPY;  Surgeon: Rubén Rosas MD;  Location:  [facility];  Service: Gastroenterology;  Laterality: N/A;   • CORONARY STENT PLACEMENT  2016   • HERNIA REPAIR     • HYSTERECTOMY  2000   • INSERTION HEMODIALYSIS CATHETER Right 6/29/2016    Procedure: HEMODIALYSIS CATHETER INSERTION TUNNELLED;  Surgeon: Ramón Chavez MD;  Location:  [facility];  Service:    • MASTECTOMY Right 2006   • OOPHORECTOMY     • REDUCTION MAMMAPLASTY Left 2005       Social History     Socioeconomic History   • Marital status:      Spouse name: Not on file   • Number of children: Not on file   • Years of education: Not on file   • Highest education level: Not on file   Occupational History   • Occupation: retired   Tobacco Use   • Smoking status: Never Smoker   • Smokeless tobacco: Never Used   • Tobacco comment: Michael second hand smoke   Substance and Sexual Activity   • Alcohol use: Yes     Comment: rarely   • Drug use: No   • Sexual activity: Defer   Social History Narrative    Pt consumes 1 serving of caffeine once every 2 weeks.        family history includes Breast cancer (age of onset: 55) in her sister; Cancer in her mother; Coronary artery disease in her father; Heart failure in her father; Stroke in her mother. NO FH OF RENAL DZ.    Allergies   Allergen Reactions   • Mircera [Methoxy Polyethylene Glycol-Epoetin Beta] Anaphylaxis     Pt stopped breathing   • Venofer [Iron Sucrose] Anaphylaxis     Pt stopped breathing   • Albuterol Other (See Comments)     Increased blood pressure  Used right before heart attack   • Nifedipine Er Swelling   • Penicillins Hives   • Prednisone Other (See Comments)     Makes jittery/anxious       Medication:    Current Facility-Administered Medications:   •  acetaminophen (TYLENOL) tablet 650 mg, 650 mg, Oral, Q4H PRN,  Ranulfo Thomas DO  •  amiodarone (PACERONE) tablet 200 mg, 200 mg, Oral, Daily, Ranulfo Thomas DO, 200 mg at 01/20/21 0829  •  aspirin EC tablet 81 mg, 81 mg, Oral, Daily, Ranulfo Thomas DO, 81 mg at 01/20/21 0829  •  atorvastatin (LIPITOR) tablet 80 mg, 80 mg, Oral, Nightly, Ranulfo Thomas DO, 80 mg at 01/19/21 2344  •  carvedilol (COREG) tablet 25 mg, 25 mg, Oral, Q12H, Ranulfo Thomas DO, 25 mg at 01/20/21 0832  •  cloNIDine (CATAPRES) tablet 0.2 mg, 0.2 mg, Oral, Q8H, Ranulfo Thomas DO, 0.2 mg at 01/20/21 0503  •  clopidogrel (PLAVIX) tablet 75 mg, 75 mg, Oral, Daily, Ranulfo Thomas DO, 75 mg at 01/20/21 0829  •  dextrose (D50W) 25 g/ 50mL Intravenous Solution 25 g, 25 g, Intravenous, Q15 Min PRN, Ranulfo Thomas DO  •  dextrose (GLUTOSE) oral gel 15 g, 15 g, Oral, Q15 Min PRN, Ranulfo Thomas DO  •  dorzolamide (TRUSOPT) 2 % 1 drop, timolol (TIMOPTIC) 0.5 % 1 drop for Cosopt 22.3-6.8 mg/mL, , Both Eyes, Daily, Ranulfo Thomas DO, Given at 01/20/21 0829  •  epoetin orquidea-epbx (RETACRIT) injection 10,000 Units, 10,000 Units, Subcutaneous, Once per day on Mon Wed Fri, Zeb Owusu MD  •  [START ON 1/22/2021] famotidine (PEPCID) tablet 20 mg, 20 mg, Oral, Once per day on Mon Wed Fri, Vale Esquivel, PharmD  •  glucagon (human recombinant) (GLUCAGEN DIAGNOSTIC) injection 1 mg, 1 mg, Subcutaneous, Q15 Min PRN, Thomas, Ranulfo Michael, DO  •  heparin (porcine) 5000 UNIT/ML injection 5,000 Units, 5,000 Units, Subcutaneous, Q8H, Ranulfo Thomas DO, 5,000 Units at 01/20/21 0503  •  insulin detemir (LEVEMIR) injection 5 Units, 5 Units, Subcutaneous, Nightly, Ranulfo Thomas,   •  insulin lispro (humaLOG, ADMELOG) injection 0-7 Units, 0-7 Units, Subcutaneous, 4x Daily With Meals & Nightly, Ranulfo Thomas DO, 2 Units at 01/20/21 0830  •  isosorbide mononitrate (IMDUR) 24 hr tablet 120 mg, 120 mg, Oral, Daily, Ranulfo Thomas  DO Michael, 120 mg at 01/20/21 0829  •  latanoprost (XALATAN) 0.005 % ophthalmic solution 1 drop, 1 drop, Both Eyes, Nightly, Ranulfo Thomas DO  •  niCARdipine (CARDENE) 20 mg in 200 mL NS infusion, 5-15 mg/hr, Intravenous, Titrated, Ranulfo Thomas DO, Stopped at 01/20/21 0837  •  Pharmacy Consult - [facility], , Does not apply, Daily, Vale Esquivel, PharmD  •  sodium chloride 0.9 % flush 10 mL, 10 mL, Intravenous, PRN, Mary Vazquez MD  •  sodium chloride 0.9 % flush 10 mL, 10 mL, Intravenous, Q12H, Ranulfo Thomas DO, 10 mL at 01/19/21 2346  •  sodium chloride 0.9 % flush 10 mL, 10 mL, Intravenous, PRN, Ranulfo Thomas DO  •  terazosin (HYTRIN) capsule 4 mg, 4 mg, Oral, Nightly, Ranulfo Thomas DO, 4 mg at 01/19/21 2343    Medications Prior to Admission   Medication Sig Dispense Refill Last Dose   • amiodarone (PACERONE) 200 MG tablet Take 1 tablet by mouth Daily. 30 tablet 5    • ammonium lactate (LAC-HYDRIN) 12 % lotion Apply  topically to the appropriate area as directed As Needed for Dry Skin. (Patient taking differently: Apply 1 application topically to the appropriate area as directed 2 (Two) Times a Day As Needed for Dry Skin.) 500 g 3    • aspirin 81 MG EC tablet Take 1 tablet by mouth Daily. (Patient taking differently: Take 81 mg by mouth Every Other Day.)      • atorvastatin (LIPITOR) 80 MG tablet Take 1 tablet by mouth Every Night. (Patient taking differently: Take 80 mg by mouth Daily.) 90 tablet 1    • B Complex-C-Folic Acid (DIALYVITE 800 PO) Take 1 tablet by mouth Daily.      • carvedilol (COREG) 25 MG tablet Take 1 tablet by mouth Every 12 (Twelve) Hours. (Patient taking differently: Take 12.5 mg by mouth Every 12 (Twelve) Hours.) 60 tablet 5    • cloNIDine (CATAPRES) 0.2 MG tablet Take 1 tablet by mouth 3 (Three) Times a Day. 270 tablet 3    • clopidogrel (PLAVIX) 75 MG tablet Take 1 tablet by mouth Daily. 90 tablet 3    • desonide (DESOWEN) 0.05 % cream Apply  0.05 application topically to the appropriate area as directed 2 (Two) Times a Day.  1    • dorzolamide-timolol (COSOPT) 22.3-6.8 MG/ML ophthalmic solution Administer 1 drop to both eyes Daily. 10 mL 3    • famotidine (PEPCID) 20 MG tablet Take 1 tablet by mouth 2 (Two) Times a Day. 180 tablet 1    • Ferrous Sulfate (IRON) 325 (65 Fe) MG tablet Take 1 tablet by mouth 2 (Two) Times a Day.  5    • fluocinonide (LIDEX) 0.05 % external solution Apply 0.05 application topically to the appropriate area as directed 2 (Two) Times a Day. Scalp  2    • insulin NPH-insulin regular (humuLIN 70/30,novoLIN 70/30) (70-30) 100 UNIT/ML injection Inject 20 Units under the skin into the appropriate area as directed Every Night.      • insulin NPH-insulin regular (humuLIN 70/30,novoLIN 70/30) (70-30) 100 UNIT/ML injection Inject 20 units before dinner and 17 Units before breakfast. 10 mL 5    • IRON DEXTRAN IJ Inject  as directed 3 (Three) Times a Week.      • isosorbide mononitrate (IMDUR) 120 MG 24 hr tablet Take 1 tablet by mouth Daily. 90 tablet 3    • latanoprost (XALATAN) 0.005 % ophthalmic solution Administer 1 drop to both eyes Every Night.      • lidocaine-prilocaine (EMLA) 2.5-2.5 % cream Apply 1 application topically to the appropriate area as directed As Needed (dialysis access).  6    • nitroglycerin (Nitrolingual) 0.4 MG/SPRAY spray Place 1 spray under the tongue Every 5 (Five) Minutes As Needed for Chest Pain. 1 each 12    • terazosin (HYTRIN) 2 MG capsule Take 2 capsules by mouth every night at bedtime. 180 capsule 1    • torsemide (DEMADEX) 100 MG tablet Take 100 mg by mouth Daily As Needed (weight gain 4+lb).          Review of Systems:    Constitutional-- No Fever, chills or sweats. No significant change in weight  Eye-- no diplopia, no conjunctivitis  ENT-- No new hearing or throat complaints.  No epistaxis or oral sores. No odynophagia or dysphagia. No headache, photophobia or neck stiffness.  CV-- No chest pain,  "palpitations. + edema. + UGARTE. + ORTHOPNEA.  Resp-- No cough/Hemoptysis  GI- No nausea, vomiting, or diarrhea.  No hematochezia, melena, or hematemesis.  -- No dysuria, hematuria, or flank pain. No lower tract obstructive symptoms  Skin-- No rash.  Lymph- no painful or swollen lymph nodes in neck/axilla or groin.   Heme- No active bruising or bleeding; no Hx of DVT or PE.  MS-- no swelling or pain in the joints  Neuro-- No acute focal weakness or numbness in the arms or legs.  No seizures.  Psych--No anxiety or depression  Endo--No cold or heat intolerance.  No polyuria, polydipsia, or polyphagia    Full review of systems reviewed and negative otherwise for acute complaints    Physical Exam:   Vital Signs   Blood pressure 149/67, pulse 53, temperature 98 °F (36.7 °C), temperature source Oral, resp. rate 18, height 170.2 cm (67\"), weight 84.7 kg (186 lb 12.8 oz), SpO2 100 %, not currently breastfeeding.     GENERAL: Awake and alert. MILD acute RESP DISTRESS. distress.   HEENT: Normocephalic, atraumatic.  PER.  No conjunctivitis. No icterus. Oropharynx clear without evidence of thrush or exudate. No evidence of peridontal disease.    NECK: Supple without nuchal rigidity. No mass.  LYMPH: No painful cervical, axillary or inguinal lymphadenopathy.  HEART: RRR; No murmur, rubs, gallops. No bruits in neck, abdomen, or groins. 1+ edema  LUNGS: Normal respiratory effort. Nonlabored. No dullness.  No crepitus.  FEW wheezing rales & rhonchi.  ABDOMEN: Soft, nontender, nondistended. Positive bowel sounds. No rebound or guarding. NO mass or HSM.  JOINTS:  Full range of motion, no redness or tenderness.  EXT:  No cyanosis/Clubbing. AVF LUE + THRILL/BRUIT.  :  BLADDER NOT DISTENDED.  SKIN: Warm and dry without rash  NEURO: Oriented to PPT. No focal neurological deficits. Strength equal bilateral  PSYCHIATRIC: Normal insight and judgement. Cooperative with PE    Laboratory Data  Results from last 7 days   Lab Units " 01/20/21  0618 01/19/21  1342   HEMOGLOBIN g/dL 7.6* 8.5*   HEMATOCRIT % 26.8* 30.7*     Results from last 7 days   Lab Units 01/20/21  0618 01/19/21  1417   SODIUM mmol/L 139 139   POTASSIUM mmol/L 3.2* 3.5   CHLORIDE mmol/L 100 97*   CO2 mmol/L 28.0 31.0*   BUN mg/dL 27* 24*   CREATININE mg/dL 5.91* 5.22*   CALCIUM mg/dL 8.9 9.2   ALBUMIN g/dL 3.00* 3.30*     Results from last 7 days   Lab Units 01/20/21  0618   GLUCOSE mg/dL 172*         Results from last 7 days   Lab Units 01/20/21  0618   ALK PHOS U/L 106   BILIRUBIN mg/dL 0.4   ALT (SGPT) U/L 6   AST (SGOT) U/L 13     Estimated Creatinine Clearance: 9.3 mL/min (A) (by C-G formula based on SCr of 5.91 mg/dL (H)).    Radiology: CXR: C/W CHF    ASSESSMENT: ESRD ON HD. PULM EDEMA. ACCELERATED HTN. ANEMIA. IHD. DM.      PLAN: Thank you for asking us to see Esperanza Pop, I recommend the following: HD WITH [facility] TODAY. EPO. CHECK FE. WILL FOLLOW.       Zeb Owusu MD  1/20/2021  09:02 EST

## 2021-01-20 NOTE — PLAN OF CARE
Problem: Fall Injury Risk  Goal: Absence of Fall and Fall-Related Injury  Outcome: Ongoing, Progressing  Intervention: Identify and Manage Contributors to Fall Injury Risk  Recent Flowsheet Documentation  Taken 1/20/2021 0000 by Shorty Krishna RN  Medication Review/Management: medications reviewed  Intervention: Promote Injury-Free Environment  Recent Flowsheet Documentation  Taken 1/20/2021 0200 by Shorty Krishna RN  Safety Promotion/Fall Prevention:   activity supervised   fall prevention program maintained   nonskid shoes/slippers when out of bed   safety round/check completed  Taken 1/20/2021 0000 by Shorty Krishna RN  Safety Promotion/Fall Prevention:   activity supervised   fall prevention program maintained   nonskid shoes/slippers when out of bed   safety round/check completed     Problem: Adult Inpatient Plan of Care  Goal: Plan of Care Review  Outcome: Ongoing, Progressing  Flowsheets (Taken 1/20/2021 0433)  Progress: no change  Plan of Care Reviewed With: patient  Goal: Patient-Specific Goal (Individualized)  Outcome: Ongoing, Progressing  Goal: Absence of Hospital-Acquired Illness or Injury  Outcome: Ongoing, Progressing  Intervention: Identify and Manage Fall Risk  Recent Flowsheet Documentation  Taken 1/20/2021 0200 by Shorty Krishna RN  Safety Promotion/Fall Prevention:   activity supervised   fall prevention program maintained   nonskid shoes/slippers when out of bed   safety round/check completed  Taken 1/20/2021 0000 by Shorty Krishna RN  Safety Promotion/Fall Prevention:   activity supervised   fall prevention program maintained   nonskid shoes/slippers when out of bed   safety round/check completed  Intervention: Prevent Skin Injury  Recent Flowsheet Documentation  Taken 1/20/2021 0200 by Shorty Krishna RN  Body Position: position changed independently  Taken 1/20/2021 0000 by Shorty Krishna RN  Body Position: position changed independently  Intervention: Prevent and Manage VTE (venous  thromboembolism) Risk  Recent Flowsheet Documentation  Taken 1/20/2021 0000 by Shorty Krishna RN  VTE Prevention/Management: (heparin) other (see comments)  Goal: Optimal Comfort and Wellbeing  Outcome: Ongoing, Progressing  Intervention: Provide Person-Centered Care  Recent Flowsheet Documentation  Taken 1/20/2021 0000 by Shorty Krishna RN  Trust Relationship/Rapport:   care explained   thoughts/feelings acknowledged  Goal: Readiness for Transition of Care  Outcome: Ongoing, Progressing  Intervention: Mutually Develop Transition Plan  Recent Flowsheet Documentation  Taken 1/19/2021 2320 by Shorty Krishna RN  Equipment Currently Used at Home: cane, straight  Transportation Anticipated: family or friend will provide  Transportation Concerns: car, none  Patient/Family Anticipated Services at Transition:   Patient/Family Anticipates Transition to: home with family   Goal Outcome Evaluation:  Plan of Care Reviewed With: patient  Progress: no change

## 2021-01-20 NOTE — CONSULTS
[facility]  Cardiovascular Consultation Note    Reason for consultation: Acute on chronic diastolic heart failure secondary to hypertensive urgency #2 CAD    History of Present Illness:  76-year-old female with chronic kidney disease on dialysis, chronic anemia, CAD with most recent catheterization intervention in 2016.  She also has hypertension, diabetes, hyperlipidemia, cancer who presents with shortness of breath.  The patient states she was doing well until around [place] time.  Since that time she is having significant shortness of breath.  She states that she does make urine and she was told to take torsemide 3 days in a row and it did help her breathing.  She complains her blood pressure goes up at night and she gets very short of breath when that happens.  She denies any chest pain.  She does feel her heart fluttering.  The patient states she feels similar to what she felt in 2016 when she had to have a stent to her right coronary artery.  She states she is compliant with her morning medications but sometimes misses her nighttime doses.  She does not want to take 25 mg twice daily of Coreg because she said it makes her feel more short of breath and has more edema.  She also does not want to take amlodipine because it caused profound lower extremity edema and shortness of breath.  She denies any use of NSAIDs    Cardiac risk factors: #1 age greater than 55 #2 hypertension #3 hyperlipidemia #4 diabetes    Past medical and surgical history, social and family history reviewed in EMR.    REVIEW OF SYSTEMS:     Past Medical History:   Diagnosis Date   • Acute bronchitis    • Acute conjunctivitis    • Acute kidney injury (CMS/[facility])     Admission from 12/26/2013 to 01/02/2014, now resolved with latest creatinine 1.4 on 01/08/2014.   • Acute non-ST segment elevation myocardial infarction (STEMI) following previous myocardial infarction    • Arthritis    • Breast cancer, female,  right     2005 mastectomy right   • Cancer (CMS/HCC)    • CHF (congestive heart failure) (CMS/HCC)    • Chronic kidney disease     dialysis MWF, f/u [facility]    • Clotting disorder (CMS/HCC)    • Diabetes mellitus (CMS/HCC)     diagnosed ~1992, checks fsbg 2-3x/day   • Diarrhea    • Dyslipidemia    • Dyspepsia    • Dyspnea    • Edema    • History of transfusion     ~2013 no reaction    • Hx of radiation therapy    • Hyperlipidemia    • Hypertension     Severe   • Ischemic heart disease    • Moderate obesity     BMI 36.2   • Myocardial infarction (CMS/HCC)    • Pneumonia    • Pneumonia 02/2019   • Radiation 2005   • Seasonal allergies    • Wears glasses      Past Surgical History:   Procedure Laterality Date   • AV FISTULA PLACEMENT, BRACHIOBASILIC     • BREAST BIOPSY Left 2010   • BREAST CYST EXCISION     • BREAST LUMPECTOMY Right 2005   • BREAST SURGERY     • CARDIAC CATHETERIZATION N/A 10/10/2016    Procedure: Left Heart Cath;  Surgeon: Scooter Zhao MD;  Location:  [facility];  Service:    • CARDIAC CATHETERIZATION  10/2016   • COLONOSCOPY N/A 7/23/2020    Procedure: COLONOSCOPY;  Surgeon: Rubén Rosas MD;  Location:  [facility];  Service: Gastroenterology;  Laterality: N/A;   • CORONARY STENT PLACEMENT  2016   • HERNIA REPAIR     • HYSTERECTOMY  2000   • INSERTION HEMODIALYSIS CATHETER Right 6/29/2016    Procedure: HEMODIALYSIS CATHETER INSERTION TUNNELLED;  Surgeon: Ramón Chavez MD;  Location:  [facility];  Service:    • MASTECTOMY Right 2006   • OOPHORECTOMY     • REDUCTION MAMMAPLASTY Left 2005     Social History     Socioeconomic History   • Marital status:      Spouse name: Not on file   • Number of children: Not on file   • Years of education: Not on file   • Highest education level: Not on file   Occupational History   • Occupation: retired   Tobacco Use   • Smoking status: Never Smoker   • Smokeless tobacco: Never Used   • Tobacco  comment: Michael second hand smoke   Substance and Sexual Activity   • Alcohol use: Yes     Comment: rarely   • Drug use: No   • Sexual activity: Defer   Social History Narrative    Pt consumes 1 serving of caffeine once every 2 weeks.      Family History   Problem Relation Age of Onset   • Stroke Mother    • Cancer Mother    • Coronary artery disease Father    • Heart failure Father    • Breast cancer Sister 55   • Ovarian cancer Neg Hx    • Endometrial cancer Neg Hx        H&P ROS reviewed and pertinent CV ROS as noted in HPI.    Cardiac: Patient has known history of CAD with prior catheter-based intervention to the right coronary artery the most recent 2016.  She also has labile hypertension.  She admits that she sometimes misses her p.m. dose of medications.  Respiratory: Complains of shortness of breath  Lower Extremities: Complains of episodic profound lower extremity edema especially when she takes nifedipine and Coreg 25 twice daily  GI: No nausea vomiting diarrhea bright red blood per rectum or melena  Neuro: No stroke TIA or neurologic event  Hematology: No bleeding diathesis ecchymosis or petechia      Physical Exam: General well-developed pleasant female overweight lying in bed at a 75 degree angle getting dialysis not dyspneic not tachypneic       HEENT: No icterus, positive JVP, positive noise in the neck       Respiratory: Equal bilateral symmetrical expansion and clear to auscultation       Cardiovascular: Regular rate and rhythm with a grade 5 over systolic ejection murmur and a diastolic murmur.  There is also widening of the pulse pressure and trace  edema to palpation       GI: Soft, obese, flat, nontender, no audible renal bruits       Lower Extremities: No lesions       Neuro: Facial expressions are symmetrical moves all 4 extremities handgrip strength equal bilaterally 4/5       Skin: Warm and dry trace edema to palpation       Psych: Pleasant affect oriented x3    Results Review: EKG shows sinus  rhythm PVCs LVH hemoglobin is 7.6 potassium is 3.3 blood pressure is now well controlled  Renal duplex in 2015 showed no obvious renal artery stenosis but there was SMA stenosis.  I personally reviewed her chest x-ray which shows significant cardiomegaly.  Also personally reviewed her cardiac cath films which show diffuse luminal irregularities of the left arterial system and possibly some mild LAD bridging           Vital Sign Min/Max for last 24 hours  Temp  Min: 97.1 °F (36.2 °C)  Max: 98 °F (36.7 °C)   BP  Min: 122/54  Max: 215/122   Pulse  Min: 43  Max: 70   Resp  Min: 16  Max: 18   SpO2  Min: 86 %  Max: 100 %   No data recorded    No intake or output data in the 24 hours ending 01/20/21 0957          Current Facility-Administered Medications:   •  acetaminophen (TYLENOL) tablet 650 mg, 650 mg, Oral, Q4H PRN, Ranulfo Thomas DO  •  amiodarone (PACERONE) tablet 200 mg, 200 mg, Oral, Daily, Ranulfo Thomas DO, 200 mg at 01/20/21 0829  •  aspirin EC tablet 81 mg, 81 mg, Oral, Daily, Ranulfo Thomas DO, 81 mg at 01/20/21 0829  •  atorvastatin (LIPITOR) tablet 80 mg, 80 mg, Oral, Nightly, Ranulfo Thomas DO, 80 mg at 01/19/21 2344  •  carvedilol (COREG) tablet 25 mg, 25 mg, Oral, Q12H, Ranulfo Thomas DO, 25 mg at 01/20/21 0832  •  cloNIDine (CATAPRES) tablet 0.2 mg, 0.2 mg, Oral, Q8H, Ranulfo Thomas DO, 0.2 mg at 01/20/21 0503  •  clopidogrel (PLAVIX) tablet 75 mg, 75 mg, Oral, Daily, Ranulfo Thomas DO, 75 mg at 01/20/21 0829  •  dextrose (D50W) 25 g/ 50mL Intravenous Solution 25 g, 25 g, Intravenous, Q15 Min PRN, Ranulfo Thomas DO  •  dextrose (GLUTOSE) oral gel 15 g, 15 g, Oral, Q15 Min PRN, Ranulfo Thomas DO  •  dorzolamide (TRUSOPT) 2 % 1 drop, timolol (TIMOPTIC) 0.5 % 1 drop for Cosopt 22.3-6.8 mg/mL, , Both Eyes, Daily, Ranulfo Thomas DO, Given at 01/20/21 0843  •  epoetin orquidea-epbx (RETACRIT) injection 10,000 Units, 10,000 Units, Subcutaneous,  Once per day on Mon Wed Fri, Zeb Owusu MD  •  [START ON 1/22/2021] famotidine (PEPCID) tablet 20 mg, 20 mg, Oral, Once per day on Mon Wed Fri, Vale Esquivel, PharmD  •  glucagon (human recombinant) (GLUCAGEN DIAGNOSTIC) injection 1 mg, 1 mg, Subcutaneous, Q15 Min PRN, Ranulfo Thomas DO  •  heparin (porcine) 5000 UNIT/ML injection 5,000 Units, 5,000 Units, Subcutaneous, Q8H, Ranulfo Thomas DO, 5,000 Units at 01/20/21 0503  •  insulin detemir (LEVEMIR) injection 5 Units, 5 Units, Subcutaneous, Nightly, Ranulfo Thomas DO  •  insulin lispro (humaLOG, ADMELOG) injection 0-7 Units, 0-7 Units, Subcutaneous, 4x Daily With Meals & Nightly, Ranulfo Thomas DO, 2 Units at 01/20/21 0830  •  isosorbide mononitrate (IMDUR) 24 hr tablet 120 mg, 120 mg, Oral, Daily, Ranulfo Thomas DO, 120 mg at 01/20/21 0829  •  latanoprost (XALATAN) 0.005 % ophthalmic solution 1 drop, 1 drop, Both Eyes, Nightly, Ranulfo Thomas DO  •  niCARdipine (CARDENE) 20 mg in 200 mL NS infusion, 5-15 mg/hr, Intravenous, Titrated, Ranulfo Thomas DO, Stopped at 01/20/21 0837  •  Pharmacy Consult - [facility], , Does not apply, Daily, Vale Esquivel, PharmD  •  sodium chloride 0.9 % flush 10 mL, 10 mL, Intravenous, PRN, Mary Vazquez MD  •  sodium chloride 0.9 % flush 10 mL, 10 mL, Intravenous, Q12H, Ranulfo Thomas DO, 10 mL at 01/19/21 2346  •  sodium chloride 0.9 % flush 10 mL, 10 mL, Intravenous, PRN, Ranulfo Thomas DO  •  terazosin (HYTRIN) capsule 4 mg, 4 mg, Oral, Nightly, Ranulfo Thomas DO, 4 mg at 01/19/21 3774    Lab Review:   Results from last 7 days   Lab Units 01/20/21  0618 01/19/21  1342   WBC 10*3/mm3 5.26 6.55   HEMOGLOBIN g/dL 7.6* 8.5*   PLATELETS 10*3/mm3 191 186     Results from last 7 days   Lab Units 01/20/21  0618 01/19/21  1417   SODIUM mmol/L 139 139   POTASSIUM mmol/L 3.2* 3.5   CO2 mmol/L 28.0 31.0*   BUN mg/dL 27* 24*   CREATININE mg/dL 5.91*  5.22*   GLUCOSE mg/dL 172* 214*     Estimated Creatinine Clearance: 9.3 mL/min (A) (by C-G formula based on SCr of 5.91 mg/dL (H)).        .lipd  Lab Results   Component Value Date    CHLPL 155 09/22/2020    TRIG 84 09/22/2020    HDL 56 09/22/2020    AST 13 01/20/2021    ALT 6 01/20/2021       Radiology Reports:  Imaging Results (Last 72 Hours)     Procedure Component Value Units Date/Time    XR Chest 1 View [[id number]] Collected: 01/19/21 1237     Updated: 01/19/21 2101    Narrative:      EXAMINATION: XR CHEST 1 VW-     INDICATION: Shortness of air triage protocol.      COMPARISON: 06/23/2020.     FINDINGS: Heart is enlarged, appearing further increased in size from  06/23/2020 exam and is now a mild pulmonary vascular congestion pattern  present. No effusion, consolidation or pneumothorax is seen.       Impression:      Mild congestive heart failure.      D:  01/19/2021  E:  01/19/2021     This report was finalized on 1/19/2021 8:58 PM by Dr. Champ Lee MD.             Assessment/Plan: 1 acute on chronic diastolic heart failure.  This is probably secondary to her elevated blood pressures.  She does admit to forgetting her p.m. doses.  I counseled her on the importance of not missing clonidine and/or beta-blockers because of rebound hypertension.  The patient does not want to take 25 mg of Coreg twice daily but is agreeable to take 12.5 and she does not want to take nifedipine.  Also concerned for the possibility of significant recurrent CAD.  She feels now like she fell in 2016 when she had a stent placed.  I will make her n.p.o. after midnight and Dr. Sagastume decided she should have an ischemic eval  2 labile hypertension-some of this is due to some noncompliance.  I will lower her dose of Coreg to 12.5 twice daily and start on hydralazine 25 every 8 also put her on daily dose of torsemide but a much lower dose and she felt better when she took the torsemide 100 mg for 3 consecutive days  3 consider outpatient  overnight oximetry to rule out sleep apnea  4 she has a loud systolic heart murmur and a diastolic murmur.  She has wide pulse pressure and Quincke his pulse which are suggestive of a high.  Echocardiogram has been ordered and pending      Corky Holly MD  01/20/21  09:57 EST

## 2021-01-21 LAB
ALBUMIN SERPL-MCNC: 3.3 G/DL (ref 3.5–5.2)
ANION GAP SERPL CALCULATED.3IONS-SCNC: 10 MMOL/L (ref 5–15)
ARTERIAL PATENCY WRIST A: ABNORMAL
ASCENDING AORTA: 3.2 CM
ATMOSPHERIC PRESS: ABNORMAL MM[HG]
ATMOSPHERIC PRESS: ABNORMAL MM[HG]
BASE EXCESS BLDA CALC-SCNC: 3.6 MMOL/L (ref 0–2)
BASE EXCESS BLDV CALC-SCNC: 4.5 MMOL/L (ref -2–2)
BDY SITE: ABNORMAL
BDY SITE: ABNORMAL
BH CV ECHO MEAS - AO MAX PG (FULL): 36.4 MMHG
BH CV ECHO MEAS - AO MAX PG: 39 MMHG
BH CV ECHO MEAS - AO MEAN PG (FULL): 20.1 MMHG
BH CV ECHO MEAS - AO MEAN PG: 21.4 MMHG
BH CV ECHO MEAS - AO ROOT AREA (BSA CORRECTED): 1.4
BH CV ECHO MEAS - AO ROOT AREA: 5.7 CM^2
BH CV ECHO MEAS - AO ROOT DIAM: 2.7 CM
BH CV ECHO MEAS - AO V2 MAX: 312.2 CM/SEC
BH CV ECHO MEAS - AO V2 MEAN: 213 CM/SEC
BH CV ECHO MEAS - AO V2 VTI: 73.5 CM
BH CV ECHO MEAS - ASC AORTA: 3.2 CM
BH CV ECHO MEAS - AVA(I,A): 0.9 CM^2
BH CV ECHO MEAS - AVA(I,D): 0.9 CM^2
BH CV ECHO MEAS - AVA(V,A): 0.92 CM^2
BH CV ECHO MEAS - AVA(V,D): 0.92 CM^2
BH CV ECHO MEAS - BSA(HAYCOCK): 2 M^2
BH CV ECHO MEAS - BSA: 2 M^2
BH CV ECHO MEAS - BZI_BMI: 29.1 KILOGRAMS/M^2
BH CV ECHO MEAS - BZI_METRIC_HEIGHT: 170.2 CM
BH CV ECHO MEAS - BZI_METRIC_WEIGHT: 84.4 KG
BH CV ECHO MEAS - EDV(CUBED): 172 ML
BH CV ECHO MEAS - EDV(MOD-SP2): 204 ML
BH CV ECHO MEAS - EDV(MOD-SP4): 204 ML
BH CV ECHO MEAS - EDV(TEICH): 151.2 ML
BH CV ECHO MEAS - EF(CUBED): 84.7 %
BH CV ECHO MEAS - EF(MOD-BP): 47 %
BH CV ECHO MEAS - EF(MOD-SP2): 47.1 %
BH CV ECHO MEAS - EF(MOD-SP4): 46.1 %
BH CV ECHO MEAS - EF(TEICH): 77.3 %
BH CV ECHO MEAS - ESV(CUBED): 26.3 ML
BH CV ECHO MEAS - ESV(MOD-SP2): 108 ML
BH CV ECHO MEAS - ESV(MOD-SP4): 110 ML
BH CV ECHO MEAS - ESV(TEICH): 34.3 ML
BH CV ECHO MEAS - FS: 46.5 %
BH CV ECHO MEAS - IVS/LVPW: 0.99
BH CV ECHO MEAS - IVSD: 1 CM
BH CV ECHO MEAS - LA DIMENSION: 4.6 CM
BH CV ECHO MEAS - LA/AO: 1.7
BH CV ECHO MEAS - LAD MAJOR: 6.4 CM
BH CV ECHO MEAS - LAT PEAK E' VEL: 5.8 CM/SEC
BH CV ECHO MEAS - LATERAL E/E' RATIO: 24.8
BH CV ECHO MEAS - LV DIASTOLIC VOL/BSA (35-75): 104 ML/M^2
BH CV ECHO MEAS - LV IVRT: 0.08 SEC
BH CV ECHO MEAS - LV MASS(C)D: 206.8 GRAMS
BH CV ECHO MEAS - LV MASS(C)DI: 105.5 GRAMS/M^2
BH CV ECHO MEAS - LV MAX PG: 2.6 MMHG
BH CV ECHO MEAS - LV MEAN PG: 1.3 MMHG
BH CV ECHO MEAS - LV SYSTOLIC VOL/BSA (12-30): 56.1 ML/M^2
BH CV ECHO MEAS - LV V1 MAX: 79.9 CM/SEC
BH CV ECHO MEAS - LV V1 MEAN: 53.2 CM/SEC
BH CV ECHO MEAS - LV V1 VTI: 18.5 CM
BH CV ECHO MEAS - LVIDD: 5.6 CM
BH CV ECHO MEAS - LVIDS: 3 CM
BH CV ECHO MEAS - LVLD AP2: 9.2 CM
BH CV ECHO MEAS - LVLD AP4: 8.7 CM
BH CV ECHO MEAS - LVLS AP2: 8.5 CM
BH CV ECHO MEAS - LVLS AP4: 8 CM
BH CV ECHO MEAS - LVOT AREA (M): 3.5 CM^2
BH CV ECHO MEAS - LVOT AREA: 3.6 CM^2
BH CV ECHO MEAS - LVOT DIAM: 2.1 CM
BH CV ECHO MEAS - LVPWD: 1 CM
BH CV ECHO MEAS - MED PEAK E' VEL: 2.7 CM/SEC
BH CV ECHO MEAS - MEDIAL E/E' RATIO: 51.6
BH CV ECHO MEAS - MV A MAX VEL: 116.6 CM/SEC
BH CV ECHO MEAS - MV DEC SLOPE: 648.5 CM/SEC^2
BH CV ECHO MEAS - MV DEC TIME: 0.18 SEC
BH CV ECHO MEAS - MV E MAX VEL: 148.1 CM/SEC
BH CV ECHO MEAS - MV E/A: 1.3
BH CV ECHO MEAS - MV MAX PG: 10.4 MMHG
BH CV ECHO MEAS - MV MEAN PG: 3.5 MMHG
BH CV ECHO MEAS - MV P1/2T MAX VEL: 168.9 CM/SEC
BH CV ECHO MEAS - MV P1/2T: 76.3 MSEC
BH CV ECHO MEAS - MV V2 MAX: 161.4 CM/SEC
BH CV ECHO MEAS - MV V2 MEAN: 85.7 CM/SEC
BH CV ECHO MEAS - MV V2 VTI: 42.5 CM
BH CV ECHO MEAS - MVA P1/2T LCG: 1.3 CM^2
BH CV ECHO MEAS - MVA(P1/2T): 2.9 CM^2
BH CV ECHO MEAS - MVA(VTI): 1.6 CM^2
BH CV ECHO MEAS - PA ACC SLOPE: 754.2 CM/SEC^2
BH CV ECHO MEAS - PA ACC TIME: 0.09 SEC
BH CV ECHO MEAS - PA MAX PG: 4.4 MMHG
BH CV ECHO MEAS - PA PR(ACCEL): 39.4 MMHG
BH CV ECHO MEAS - PA V2 MAX: 105 CM/SEC
BH CV ECHO MEAS - PI END-D VEL: 119 CM/SEC
BH CV ECHO MEAS - RAP SYSTOLE: 8 MMHG
BH CV ECHO MEAS - RVSP: 51 MMHG
BH CV ECHO MEAS - SI(AO): 214.1 ML/M^2
BH CV ECHO MEAS - SI(CUBED): 74.3 ML/M^2
BH CV ECHO MEAS - SI(LVOT): 33.9 ML/M^2
BH CV ECHO MEAS - SI(MOD-SP2): 49 ML/M^2
BH CV ECHO MEAS - SI(MOD-SP4): 47.9 ML/M^2
BH CV ECHO MEAS - SI(TEICH): 59.7 ML/M^2
BH CV ECHO MEAS - SV(AO): 419.7 ML
BH CV ECHO MEAS - SV(CUBED): 145.7 ML
BH CV ECHO MEAS - SV(LVOT): 66.4 ML
BH CV ECHO MEAS - SV(MOD-SP2): 96 ML
BH CV ECHO MEAS - SV(MOD-SP4): 94 ML
BH CV ECHO MEAS - SV(TEICH): 117 ML
BH CV ECHO MEAS - TR MAX PG: 43 MMHG
BH CV ECHO MEAS - TR MAX VEL: 327 CM/SEC
BH CV ECHO MEASUREMENTS AVERAGE E/E' RATIO: 34.85
BH CV VAS BP LEFT ARM: NORMAL MMHG
BODY TEMPERATURE: 37 C
BODY TEMPERATURE: 37 C
BUN SERPL-MCNC: 26 MG/DL (ref 8–23)
BUN/CREAT SERPL: 5.2 (ref 7–25)
CALCIUM SPEC-SCNC: 9.3 MG/DL (ref 8.6–10.5)
CHLORIDE SERPL-SCNC: 97 MMOL/L (ref 98–107)
CO2 BLDA-SCNC: 28.9 MMOL/L (ref 22–33)
CO2 BLDA-SCNC: 31.2 MMOL/L (ref 22–33)
CO2 SERPL-SCNC: 28 MMOL/L (ref 22–29)
COHGB MFR BLD: 1.4 % (ref 0–2)
COHGB MFR BLD: 1.5 %
CREAT SERPL-MCNC: 5.04 MG/DL (ref 0.57–1)
DEPRECATED RDW RBC AUTO: 61.4 FL (ref 37–54)
EPAP: 0
EPAP: 0
ERYTHROCYTE [DISTWIDTH] IN BLOOD BY AUTOMATED COUNT: 19.8 % (ref 12.3–15.4)
FERRITIN SERPL-MCNC: 28.81 NG/ML (ref 13–150)
GFR SERPL CREATININE-BSD FRML MDRD: 10 ML/MIN/1.73
GFR SERPL CREATININE-BSD FRML MDRD: ABNORMAL ML/MIN/{1.73_M2}
GLUCOSE BLDC GLUCOMTR-MCNC: 129 MG/DL (ref 70–130)
GLUCOSE BLDC GLUCOMTR-MCNC: 184 MG/DL (ref 70–130)
GLUCOSE BLDC GLUCOMTR-MCNC: 219 MG/DL (ref 70–130)
GLUCOSE BLDC GLUCOMTR-MCNC: 251 MG/DL (ref 70–130)
GLUCOSE SERPL-MCNC: 217 MG/DL (ref 65–99)
HCO3 BLDA-SCNC: 27.7 MMOL/L (ref 20–26)
HCO3 BLDV-SCNC: 29.7 MMOL/L (ref 22–28)
HCT VFR BLD AUTO: 31.1 % (ref 34–46.6)
HCT VFR BLD CALC: 25 %
HGB BLD-MCNC: 8.4 G/DL (ref 12–15.9)
HGB BLDA-MCNC: 8.1 G/DL (ref 14–18)
HGB BLDA-MCNC: 8.2 G/DL (ref 14–18)
INHALED O2 CONCENTRATION: 21 %
INHALED O2 CONCENTRATION: 21 %
IPAP: 0
IPAP: 0
IRON 24H UR-MRATE: 26 MCG/DL (ref 37–145)
IRON SATN MFR SERPL: 7 % (ref 20–50)
IVRT: 85 MSEC
LEFT ATRIUM VOLUME INDEX: 59.2 ML/M^2
LEFT ATRIUM VOLUME: 116 ML
LV EF 2D ECHO EST: 60 %
MCH RBC QN AUTO: 23.2 PG (ref 26.6–33)
MCHC RBC AUTO-ENTMCNC: 27 G/DL (ref 31.5–35.7)
MCV RBC AUTO: 85.9 FL (ref 79–97)
METHGB BLD QL: 0.4 % (ref 0–1.5)
METHGB BLD QL: 0.5 %
MODALITY: ABNORMAL
MODALITY: ABNORMAL
NOTE: ABNORMAL
NOTE: ABNORMAL
OXYHGB MFR BLDV: 54.5 %
OXYHGB MFR BLDV: 92.5 % (ref 94–99)
PAW @ PEAK INSP FLOW SETTING VENT: 0 CMH2O
PAW @ PEAK INSP FLOW SETTING VENT: 0 CMH2O
PCO2 BLDA: 39.4 MM HG (ref 35–45)
PCO2 BLDV: 47.4 MM HG (ref 41–51)
PCO2 TEMP ADJ BLD: 39.4 MM HG (ref 35–45)
PH BLDA: 7.46 PH UNITS (ref 7.35–7.45)
PH BLDV: 7.41 PH UNITS
PH, TEMP CORRECTED: 7.46 PH UNITS
PHOSPHATE SERPL-MCNC: 4.2 MG/DL (ref 2.5–4.5)
PLATELET # BLD AUTO: 185 10*3/MM3 (ref 140–450)
PMV BLD AUTO: 12.3 FL (ref 6–12)
PO2 BLDA: 70.7 MM HG (ref 83–108)
PO2 BLDV: 32.7 MM HG (ref 27–53)
PO2 TEMP ADJ BLD: 70.7 MM HG (ref 83–108)
POTASSIUM SERPL-SCNC: 4.1 MMOL/L (ref 3.5–5.2)
RBC # BLD AUTO: 3.62 10*6/MM3 (ref 3.77–5.28)
SODIUM SERPL-SCNC: 135 MMOL/L (ref 136–145)
TIBC SERPL-MCNC: 389 MCG/DL (ref 298–536)
TOTAL RATE: 0 BREATHS/MINUTE
TOTAL RATE: 0 BREATHS/MINUTE
TRANSFERRIN SERPL-MCNC: 261 MG/DL (ref 200–360)
WBC # BLD AUTO: 6.75 10*3/MM3 (ref 3.4–10.8)

## 2021-01-21 PROCEDURE — G0378 HOSPITAL OBSERVATION PER HR: HCPCS

## 2021-01-21 PROCEDURE — C9600 PERC DRUG-EL COR STENT SING: HCPCS | Performed by: INTERNAL MEDICINE

## 2021-01-21 PROCEDURE — C1751 CATH, INF, PER/CENT/MIDLINE: HCPCS | Performed by: INTERNAL MEDICINE

## 2021-01-21 PROCEDURE — 92928 PRQ TCAT PLMT NTRAC ST 1 LES: CPT | Performed by: INTERNAL MEDICINE

## 2021-01-21 PROCEDURE — 82728 ASSAY OF FERRITIN: CPT | Performed by: INTERNAL MEDICINE

## 2021-01-21 PROCEDURE — 25010000002 MIDAZOLAM PER 1 MG: Performed by: INTERNAL MEDICINE

## 2021-01-21 PROCEDURE — 4A023N8 MEASUREMENT OF CARDIAC SAMPLING AND PRESSURE, BILATERAL, PERCUTANEOUS APPROACH: ICD-10-PCS | Performed by: INTERNAL MEDICINE

## 2021-01-21 PROCEDURE — 25010000002 HEPARIN (PORCINE) PER 1000 UNITS: Performed by: INTERNAL MEDICINE

## 2021-01-21 PROCEDURE — 93460 R&L HRT ART/VENTRICLE ANGIO: CPT | Performed by: INTERNAL MEDICINE

## 2021-01-21 PROCEDURE — C1725 CATH, TRANSLUMIN NON-LASER: HCPCS | Performed by: INTERNAL MEDICINE

## 2021-01-21 PROCEDURE — C1769 GUIDE WIRE: HCPCS | Performed by: INTERNAL MEDICINE

## 2021-01-21 PROCEDURE — 82820 HEMOGLOBIN-OXYGEN AFFINITY: CPT

## 2021-01-21 PROCEDURE — 63710000001 INSULIN LISPRO (HUMAN) PER 5 UNITS: Performed by: NURSE PRACTITIONER

## 2021-01-21 PROCEDURE — 84466 ASSAY OF TRANSFERRIN: CPT | Performed by: INTERNAL MEDICINE

## 2021-01-21 PROCEDURE — 80069 RENAL FUNCTION PANEL: CPT | Performed by: INTERNAL MEDICINE

## 2021-01-21 PROCEDURE — B2151ZZ FLUOROSCOPY OF LEFT HEART USING LOW OSMOLAR CONTRAST: ICD-10-PCS | Performed by: INTERNAL MEDICINE

## 2021-01-21 PROCEDURE — C1760 CLOSURE DEV, VASC: HCPCS | Performed by: INTERNAL MEDICINE

## 2021-01-21 PROCEDURE — 85347 COAGULATION TIME ACTIVATED: CPT

## 2021-01-21 PROCEDURE — C1894 INTRO/SHEATH, NON-LASER: HCPCS | Performed by: INTERNAL MEDICINE

## 2021-01-21 PROCEDURE — 83540 ASSAY OF IRON: CPT | Performed by: INTERNAL MEDICINE

## 2021-01-21 PROCEDURE — 0 IOPAMIDOL PER 1 ML: Performed by: INTERNAL MEDICINE

## 2021-01-21 PROCEDURE — 82962 GLUCOSE BLOOD TEST: CPT

## 2021-01-21 PROCEDURE — 99232 SBSQ HOSP IP/OBS MODERATE 35: CPT | Performed by: INTERNAL MEDICINE

## 2021-01-21 PROCEDURE — 83050 HGB METHEMOGLOBIN QUAN: CPT

## 2021-01-21 PROCEDURE — 25010000002 FENTANYL CITRATE (PF) 100 MCG/2ML SOLUTION: Performed by: INTERNAL MEDICINE

## 2021-01-21 PROCEDURE — 85027 COMPLETE CBC AUTOMATED: CPT | Performed by: INTERNAL MEDICINE

## 2021-01-21 PROCEDURE — 63710000001 INSULIN LISPRO (HUMAN) PER 5 UNITS: Performed by: INTERNAL MEDICINE

## 2021-01-21 PROCEDURE — C1887 CATHETER, GUIDING: HCPCS | Performed by: INTERNAL MEDICINE

## 2021-01-21 PROCEDURE — C1874 STENT, COATED/COV W/DEL SYS: HCPCS | Performed by: INTERNAL MEDICINE

## 2021-01-21 PROCEDURE — 027034Z DILATION OF CORONARY ARTERY, ONE ARTERY WITH DRUG-ELUTING INTRALUMINAL DEVICE, PERCUTANEOUS APPROACH: ICD-10-PCS | Performed by: INTERNAL MEDICINE

## 2021-01-21 PROCEDURE — 82805 BLOOD GASES W/O2 SATURATION: CPT

## 2021-01-21 PROCEDURE — B2111ZZ FLUOROSCOPY OF MULTIPLE CORONARY ARTERIES USING LOW OSMOLAR CONTRAST: ICD-10-PCS | Performed by: INTERNAL MEDICINE

## 2021-01-21 PROCEDURE — 63710000001 INSULIN DETEMIR PER 5 UNITS: Performed by: NURSE PRACTITIONER

## 2021-01-21 PROCEDURE — 82375 ASSAY CARBOXYHB QUANT: CPT

## 2021-01-21 DEVICE — XIENCE SIERRA™ EVEROLIMUS ELUTING CORONARY STENT SYSTEM 2.50 MM X 15 MM / RAPID-EXCHANGE
Type: IMPLANTABLE DEVICE | Status: FUNCTIONAL
Brand: XIENCE SIERRA™

## 2021-01-21 RX ORDER — MIDAZOLAM HYDROCHLORIDE 1 MG/ML
INJECTION INTRAMUSCULAR; INTRAVENOUS AS NEEDED
Status: DISCONTINUED | OUTPATIENT
Start: 2021-01-21 | End: 2021-01-21 | Stop reason: HOSPADM

## 2021-01-21 RX ORDER — FENTANYL CITRATE 50 UG/ML
INJECTION, SOLUTION INTRAMUSCULAR; INTRAVENOUS AS NEEDED
Status: DISCONTINUED | OUTPATIENT
Start: 2021-01-21 | End: 2021-01-21 | Stop reason: HOSPADM

## 2021-01-21 RX ORDER — LIDOCAINE HYDROCHLORIDE 10 MG/ML
INJECTION, SOLUTION EPIDURAL; INFILTRATION; INTRACAUDAL; PERINEURAL AS NEEDED
Status: DISCONTINUED | OUTPATIENT
Start: 2021-01-21 | End: 2021-01-21 | Stop reason: HOSPADM

## 2021-01-21 RX ORDER — HEPARIN SODIUM 1000 [USP'U]/ML
INJECTION, SOLUTION INTRAVENOUS; SUBCUTANEOUS AS NEEDED
Status: DISCONTINUED | OUTPATIENT
Start: 2021-01-21 | End: 2021-01-21 | Stop reason: HOSPADM

## 2021-01-21 RX ADMIN — CLONIDINE HYDROCHLORIDE 0.2 MG: 0.2 TABLET ORAL at 13:36

## 2021-01-21 RX ADMIN — TERAZOSIN HYDROCHLORIDE 4 MG: 2 CAPSULE ORAL at 20:59

## 2021-01-21 RX ADMIN — CLOPIDOGREL BISULFATE 75 MG: 75 TABLET ORAL at 09:36

## 2021-01-21 RX ADMIN — ASPIRIN 81 MG: 81 TABLET, COATED ORAL at 09:36

## 2021-01-21 RX ADMIN — ISOSORBIDE MONONITRATE 120 MG: 60 TABLET, EXTENDED RELEASE ORAL at 09:36

## 2021-01-21 RX ADMIN — INSULIN DETEMIR 5 UNITS: 100 INJECTION, SOLUTION SUBCUTANEOUS at 21:55

## 2021-01-21 RX ADMIN — CLONIDINE HYDROCHLORIDE 0.2 MG: 0.2 TABLET ORAL at 06:14

## 2021-01-21 RX ADMIN — AMIODARONE HYDROCHLORIDE 200 MG: 200 TABLET ORAL at 09:36

## 2021-01-21 RX ADMIN — HYDRALAZINE HYDROCHLORIDE 25 MG: 25 TABLET, FILM COATED ORAL at 13:36

## 2021-01-21 RX ADMIN — CARVEDILOL 12.5 MG: 12.5 TABLET, FILM COATED ORAL at 09:36

## 2021-01-21 RX ADMIN — CARVEDILOL 12.5 MG: 12.5 TABLET, FILM COATED ORAL at 21:00

## 2021-01-21 RX ADMIN — INSULIN LISPRO 4 UNITS: 100 INJECTION, SOLUTION INTRAVENOUS; SUBCUTANEOUS at 21:55

## 2021-01-21 RX ADMIN — TORSEMIDE 40 MG: 20 TABLET ORAL at 09:36

## 2021-01-21 RX ADMIN — HYDRALAZINE HYDROCHLORIDE 25 MG: 25 TABLET, FILM COATED ORAL at 06:14

## 2021-01-21 RX ADMIN — HYDRALAZINE HYDROCHLORIDE 25 MG: 25 TABLET, FILM COATED ORAL at 20:59

## 2021-01-21 RX ADMIN — HEPARIN SODIUM 5000 UNITS: 5000 INJECTION INTRAVENOUS; SUBCUTANEOUS at 06:14

## 2021-01-21 RX ADMIN — CLONIDINE HYDROCHLORIDE 0.2 MG: 0.2 TABLET ORAL at 21:00

## 2021-01-21 RX ADMIN — SODIUM CHLORIDE, PRESERVATIVE FREE 10 ML: 5 INJECTION INTRAVENOUS at 09:44

## 2021-01-21 RX ADMIN — TIMOLOL MALEATE: 5 SOLUTION OPHTHALMIC at 09:37

## 2021-01-21 NOTE — PROGRESS NOTES
"   LOS: 0 days    Patient Care Team:  Meghana Rodgers MD as PCP - General Sagastume, Demetrius BOLDEN MD as Consulting Physician (Cardiology)  Kevan Lerma MD as Consulting Physician (Nephrology)    Chief Complaint: ESRD  ESRD on dialysis Monday Wednesday Friday [facility] using AV fistula, history of hypertension, anemia, ischemic heart disease.  Admitted with elevated blood pressure and edema.  Patient was dialyzed 1/20/2021.  Subjective     Interval History:      Pressure better controlled patient denied any shortness of breath.  No new events.  Review of Systems:   Patient denies shortness of breath, chest pain, dysuria, hematuria, nausea, vomiting.      Objective     Vital Sign Min/Max for last 24 hours  Temp  Min: 97.8 °F (36.6 °C)  Max: 98.2 °F (36.8 °C)   BP  Min: 142/67  Max: 191/78   Pulse  Min: 54  Max: 68   Resp  Min: 16  Max: 18   SpO2  Min: 97 %  Max: 100 %   Flow (L/min)  Min: 2  Max: 2   Weight  Min: 76.9 kg (169 lb 9.6 oz)  Max: 84.4 kg (186 lb)     Flowsheet Rows      First Filed Value   Admission Height  170.2 cm (67\") Documented at 01/19/2021 1145   Admission Weight  79.8 kg (176 lb) Documented at 01/19/2021 1145          No intake/output data recorded.  I/O last 3 completed shifts:  In: 690 [P.O.:360; I.V.:330]  Out: 3820 [Other:3820]    Physical Exam:  General Appearance: Alert, oriented, no obvious distress.  Eyes: PER, EOMI.  Neck: Supple no JVD.  Lungs: Clear auscultation, no rales rhonchi's, equal chest movement, nonlabored.  Heart: No gallop, murmur, rub, RRR.  Abdomen: Soft, nontender, positive bowel sounds, no organomegaly.  Extremities: No edema, no cyanosis.  Neuro: No focal deficit, moving all extremities, alert oriented X 3  AV fistula right upper arm functional    WBC WBC   Date Value Ref Range Status   01/21/2021 6.75 3.40 - 10.80 10*3/mm3 Final   01/20/2021 5.26 3.40 - 10.80 10*3/mm3 Final   01/19/2021 6.55 3.40 - 10.80 10*3/mm3 Final      HGB Hemoglobin   Date Value Ref Range " Status   01/21/2021 8.4 (L) 12.0 - 15.9 g/dL Final   01/20/2021 7.6 (L) 12.0 - 15.9 g/dL Final   01/19/2021 8.5 (L) 12.0 - 15.9 g/dL Final      HCT Hematocrit   Date Value Ref Range Status   01/21/2021 31.1 (L) 34.0 - 46.6 % Final   01/20/2021 26.8 (L) 34.0 - 46.6 % Final   01/19/2021 30.7 (L) 34.0 - 46.6 % Final      Platlets No results found for: LABPLAT   MCV MCV   Date Value Ref Range Status   01/21/2021 85.9 79.0 - 97.0 fL Final   01/20/2021 83.8 79.0 - 97.0 fL Final   01/19/2021 84.8 79.0 - 97.0 fL Final          Sodium Sodium   Date Value Ref Range Status   01/21/2021 135 (L) 136 - 145 mmol/L Final   01/20/2021 139 136 - 145 mmol/L Final   01/19/2021 139 136 - 145 mmol/L Final      Potassium Potassium   Date Value Ref Range Status   01/21/2021 4.1 3.5 - 5.2 mmol/L Final   01/20/2021 3.2 (L) 3.5 - 5.2 mmol/L Final   01/19/2021 3.5 3.5 - 5.2 mmol/L Final      Chloride Chloride   Date Value Ref Range Status   01/21/2021 97 (L) 98 - 107 mmol/L Final   01/20/2021 100 98 - 107 mmol/L Final   01/19/2021 97 (L) 98 - 107 mmol/L Final      CO2 CO2   Date Value Ref Range Status   01/21/2021 28.0 22.0 - 29.0 mmol/L Final   01/20/2021 28.0 22.0 - 29.0 mmol/L Final   01/19/2021 31.0 (H) 22.0 - 29.0 mmol/L Final      BUN BUN   Date Value Ref Range Status   01/21/2021 26 (H) 8 - 23 mg/dL Final   01/20/2021 27 (H) 8 - 23 mg/dL Final   01/19/2021 24 (H) 8 - 23 mg/dL Final      Creatinine Creatinine   Date Value Ref Range Status   01/21/2021 5.04 (H) 0.57 - 1.00 mg/dL Final   01/20/2021 5.91 (H) 0.57 - 1.00 mg/dL Final   01/19/2021 5.22 (H) 0.57 - 1.00 mg/dL Final      Calcium Calcium   Date Value Ref Range Status   01/21/2021 9.3 8.6 - 10.5 mg/dL Final   01/20/2021 8.9 8.6 - 10.5 mg/dL Final   01/19/2021 9.2 8.6 - 10.5 mg/dL Final      PO4 No results found for: CAPO4   Albumin Albumin   Date Value Ref Range Status   01/21/2021 3.30 (L) 3.50 - 5.20 g/dL Final   01/20/2021 3.00 (L) 3.50 - 5.20 g/dL Final   01/19/2021 3.30 (L)  3.50 - 5.20 g/dL Final      Magnesium No results found for: MG   Uric Acid No results found for: URICACID        Results Review:     I reviewed the patient's new clinical results.    amiodarone, 200 mg, Oral, Daily  aspirin, 81 mg, Oral, Daily  atorvastatin, 80 mg, Oral, Nightly  carvedilol, 12.5 mg, Oral, Q12H  cloNIDine, 0.2 mg, Oral, Q8H  clopidogrel, 75 mg, Oral, Daily  dorzolamide-timolol, , Both Eyes, Daily  epoetin orquidea/orquidea-epbx, 10,000 Units, Subcutaneous, Once per day on Mon Wed Fri  [START ON 1/22/2021] famotidine, 20 mg, Oral, Once per day on Mon Wed Fri  heparin (porcine), 5,000 Units, Subcutaneous, Q8H  hydrALAZINE, 25 mg, Oral, Q8H  insulin detemir, 5 Units, Subcutaneous, Nightly  insulin lispro, 0-7 Units, Subcutaneous, 4x Daily With Meals & Nightly  isosorbide mononitrate, 120 mg, Oral, Daily  latanoprost, 1 drop, Both Eyes, Nightly  pharmacy consult - [facility], , Does not apply, Daily  sodium chloride, 10 mL, Intravenous, Q12H  terazosin, 4 mg, Oral, Nightly  torsemide, 40 mg, Oral, Daily      niCARdipine, 5-15 mg/hr, Last Rate: Stopped (01/20/21 0837)        Medication Review: Reviewed    Assessment/Plan       Acute on chronic diastolic heart failure (CMS/HCC)    Type 2 diabetes mellitus (CMS/HCC)    Coronary artery disease involving native coronary artery of native heart with angina pectoris (CMS/HCC)    Hypertensive emergency    1.  ESRD on dialysis Monday Wednesday Friday.  2.  Hypertensive emergency:  3.  Anemia of chronic kidney disease.  4.  Hyperlipidemia.  5.  Pulmonary edema.  COVID-19 negative  6.  Iron deficiency iron saturation 7%  Plan:  Dialysis will be ordered for tomorrow.  IV iron will be ordered   monitor blood pressure  Going for heart cath today.  Darryn Burk MD  01/21/21  11:44 EST

## 2021-01-21 NOTE — PLAN OF CARE
Goal Outcome Evaluation:  Plan of Care Reviewed With: patient, daughter  Progress: no change  Outcome Summary: pt transferred from [facility] with right groin site, intact, soft, BP elevated previous RN stated physicians are aware. pt to have dialysis in AM. on bedrest until 8 pm

## 2021-01-21 NOTE — PROGRESS NOTES
[facility] Medicine Services  PROGRESS NOTE    Patient Name: Esperanza Pop  : 1945  MRN: [id number]    Date of Admission: 2021  Primary Care Physician: Meghana Rodgers MD    Subjective   Subjective     CC:  Shortness of breath    HPI:  Breathing feels better, no cough.    ROS:  Gen- No fevers, chills  CV- No chest pain, palpitations  Resp- No cough, dyspnea  GI- No N/V/D, abd pain            Objective   Objective     Vital Signs:   Temp:  [97.8 °F (36.6 °C)-98 °F (36.7 °C)] 97.8 °F (36.6 °C)  Heart Rate:  [55-68] 60  Resp:  [16-18] 16  BP: (137-172)/(67-92) 172/91        Physical Exam:  Constitutional - no acute distress, nontoxic, walking unassisted in room then sits on edge of bed  HEENT-NCAT, mucous membranes moist  CV-RRR, S1 S2 normal, no m/r/g  Resp-CTAB, no wheezes, rhonchi or rales  Abd-soft, nontender, nondistended, normoactive bowel sounds  Ext-No lower extremity cyanosis, clubbing or edema bilaterally  Neuro-alert and oriented, speech clear, moves all extremities   Psych-normal affect   Skin- No rash on exposed UE or LE bilaterally        Results Reviewed:  Results from last 7 days   Lab Units 21  0619 21  0618 21  1342   WBC 10*3/mm3 6.75 5.26 6.55   HEMOGLOBIN g/dL 8.4* 7.6* 8.5*   HEMATOCRIT % 31.1* 26.8* 30.7*   PLATELETS 10*3/mm3 185 191 186     Results from last 7 days   Lab Units 21  0621  0621  1417   SODIUM mmol/L 135* 139 139   POTASSIUM mmol/L 4.1 3.2* 3.5   CHLORIDE mmol/L 97* 100 97*   CO2 mmol/L 28.0 28.0 31.0*   BUN mg/dL 26* 27* 24*   CREATININE mg/dL 5.04* 5.91* 5.22*   GLUCOSE mg/dL 217* 172* 214*   CALCIUM mg/dL 9.3 8.9 9.2   ALT (SGPT) U/L  --  6 8   AST (SGOT) U/L  --  13 16   TROPONIN T ng/mL  --   --  0.072*   PROBNP pg/mL  --   --  >70,000.0*     Estimated Creatinine Clearance: 10.5 mL/min (A) (by C-G formula based on SCr of 5.04 mg/dL (H)).    Microbiology Results Abnormal     Procedure Component  Value - Date/Time    COVID PRE-OP / PRE-PROCEDURE SCREENING ORDER (NO ISOLATION) - Swab, Nasopharynx [[id number]]  (Normal) Collected: 01/19/21 1521    Lab Status: Final result Specimen: Swab from Nasopharynx Updated: 01/19/21 1748    Narrative:      The following orders were created for panel order COVID PRE-OP / PRE-PROCEDURE SCREENING ORDER (NO ISOLATION) - Swab, Nasopharynx.  Procedure                               Abnormality         Status                     ---------                               -----------         ------                     COVID-19 and FLU A/B PCR...[[id number]]  Normal              Final result                 Please view results for these tests on the individual orders.    COVID-19 and FLU A/B PCR - Swab, Nasopharynx [[id number]]  (Normal) Collected: 01/19/21 1521    Lab Status: Final result Specimen: Swab from Nasopharynx Updated: 01/19/21 1748     COVID19 Not Detected     Influenza A PCR Not Detected     Influenza B PCR Not Detected    Narrative:      Fact sheet for providers: https://www.fda.gov/media/240116/download    Fact sheet for patients: https://www.fda.gov/media/326135/download    Test performed by PCR.          Imaging Results (Last 24 Hours)     ** No results found for the last 24 hours. **          Results for orders placed during the hospital encounter of 01/19/21   Adult Transthoracic Echo Complete W/ Cont if Necessary Per Protocol    Narrative · Estimated left ventricular EF = 60% Left ventricular ejection fraction   appears to be 56 - 60%. Left ventricular systolic function is normal.  · Left ventricular diastolic function is consistent with (grade II w/high   LAP) pseudonormalization.  · Left atrial volume is severely increased.  · Moderate aortic valve stenosis is present.  · Estimated right ventricular systolic pressure from tricuspid   regurgitation is moderately elevated (45-55 mmHg). Calculated right   ventricular systolic pressure from tricuspid regurgitation is 51  mmHg.  · Moderate pulmonary hypertension is present.  · The right atrial cavity is mild to moderately dilated.  · Mild mitral valve stenosis is present with functional MAC.  · Moderate tricuspid valve regurgitation is present.  · Normal right ventricular wall thickness and septal motion noted with the   right ventricular cavity mildly dilated; mildly reduced right ventricular   systolic function noted.  · There is no evidence of pericardial effusion.          I have reviewed the medications:  Scheduled Meds:amiodarone, 200 mg, Oral, Daily  aspirin, 81 mg, Oral, Daily  atorvastatin, 80 mg, Oral, Nightly  carvedilol, 12.5 mg, Oral, Q12H  cloNIDine, 0.2 mg, Oral, Q8H  clopidogrel, 75 mg, Oral, Daily  dorzolamide-timolol, , Both Eyes, Daily  epoetin orquidea/orquidea-epbx, 10,000 Units, Subcutaneous, Once per day on Mon Wed Fri  [START ON 1/22/2021] famotidine, 20 mg, Oral, Once per day on Mon Wed Fri  heparin (porcine), 5,000 Units, Subcutaneous, Q8H  hydrALAZINE, 25 mg, Oral, Q8H  insulin detemir, 5 Units, Subcutaneous, Nightly  insulin lispro, 0-7 Units, Subcutaneous, 4x Daily With Meals & Nightly  isosorbide mononitrate, 120 mg, Oral, Daily  latanoprost, 1 drop, Both Eyes, Nightly  pharmacy consult - MTM, , Does not apply, Daily  sodium chloride, 10 mL, Intravenous, Q12H  terazosin, 4 mg, Oral, Nightly  torsemide, 40 mg, Oral, Daily      Continuous Infusions:niCARdipine, 5-15 mg/hr, Last Rate: Stopped (01/20/21 0837)      PRN Meds:.•  acetaminophen  •  dextrose  •  dextrose  •  glucagon (human recombinant)  •  sodium chloride  •  sodium chloride    Assessment/Plan   Assessment & Plan     Active Hospital Problems    Diagnosis  POA   • **Acute on chronic diastolic heart failure (CMS/HCC) [I50.33]  Yes   • Hypertensive emergency [I16.1]  Yes   • Type 2 diabetes mellitus (CMS/[facility]) [E11.9]  Yes   • Coronary artery disease involving native coronary artery of native heart with angina pectoris (CMS/[facility]) [I25.119]  Yes      Resolved  Hospital Problems   No resolved problems to display.        Brief Hospital Course to date:  Esperanza Pop is a 76 y.o. female with history of DM 2, diastolic CHF, end-stage renal disease who presents with orthopnea, elevated BP and dyspnea on exertion.    Acute on chronic diastolic CHF  -proBNP greater than 70,000  -Receives HD Monday Wednesday Friday  -Torsemide 40 mg daily  -Echo shows EF of 60% with grade 2 diastolic dysfunction.  Moderate aortic valve stenosis and elevated RVSP  -Right and left heart catheterization today planned    Hypertensive urgency  -Some occasional noncompliance with medications at home  -BP currently 172/91  · Coreg 12.5 twice daily  · Clonidine 0.2 every 8 hours  · Hydralazine 25 mg every 8 hours  · Isosorbide mononitrate 120 mg daily  · Terazosin 4 mg nightly  · Torsemide 40 mg daily    ESRD  - HD MWF    CAD  - ischemic eval planned-continue aspirin, Plavix    DMII  - basal bolus, glucose ranges from 219 -345 over the past 24 hours    Anemia of ESRD  -EPO  -Checks iron studies a.m.      DVT Prophylaxis:  heparin       Disposition: I expect the patient to be discharged home in 1-2 days    CODE STATUS:   Code Status and Medical Interventions:   Ordered at: 01/19/21 1615     Code Status:    CPR     Medical Interventions (Level of Support Prior to Arrest):    Full       Chip Sanchez MD  01/21/21

## 2021-01-21 NOTE — PROGRESS NOTES
"Esperanza Pop  [id number]  1945   LOS: 0 days   Patient Care Team:  Meghana Rodgers MD as PCP - Demetrius Abraham MD as Consulting Physician (Cardiology)  Kevan Lerma MD as Consulting Physician (Nephrology)    Chief Complaint:  HTN / DM / CKD / IHD / HD / SEVERE ANEMIA / HFpEF     Subjective     Comfortable in bed this morning off oxygen therapy with normal room air oximetry (100%).  No anginal type chest discomfort, cough, sputum production, pleurisy, or hemoptysis.  No headache or focal motor-sensory change.  She relates continuing severe \"neck pain.\"  Acceptable appetite.    Objective     Vital Sign Min/Max for last 24 hours  Temp  Min: 97.8 °F (36.6 °C)  Max: 98 °F (36.7 °C)   BP  Min: 127/83  Max: 172/91   Pulse  Min: 55  Max: 68   Resp  Min: 16  Max: 18   SpO2  Min: 97 %  Max: 100 %      Weight  Min: 76.9 kg (169 lb 9.6 oz)  Max: 84.4 kg (186 lb)         01/20/21  1826 01/21/21  0606   Weight: 84.4 kg (186 lb) 76.9 kg (169 lb 9.6 oz)         Intake/Output Summary (Last 24 hours) at 1/21/2021 0723  Last data filed at 1/20/2021 1341  Gross per 24 hour   Intake 690 ml   Output 3820 ml   Net -3130 ml       Physical Exam:     General Appearance:    Alert, cooperative, in no acute distress   Lungs:    Scattered bibasilar crackles,respirations regular, even and       unlabored    Heart:    Regular and normal rate, normal S1 and S2, grade 2/6            murmur, no gallop, no rub, no click   Abdomen:  Extremities:   Soft, non-tender, bowel sounds audible x4    No edema, normal range of motion   Pulses:   Pulses palpable and equal bilaterally      Results Review:   Results from last 7 days   Lab Units 01/20/21  0618 01/19/21  1417   SODIUM mmol/L 139 139   POTASSIUM mmol/L 3.2* 3.5   CHLORIDE mmol/L 100 97*   CO2 mmol/L 28.0 31.0*   BUN mg/dL 27* 24*   CREATININE mg/dL 5.91* 5.22*   GLUCOSE mg/dL 172* 214*   CALCIUM mg/dL 8.9 9.2     Results from last 7 days   Lab Units 01/20/21  0618 " 01/19/21  1342   WBC 10*3/mm3 5.26 6.55   HEMOGLOBIN g/dL 7.6* 8.5*   HEMATOCRIT % 26.8* 30.7*   PLATELETS 10*3/mm3 191 186     Results from last 7 days   Lab Units 01/19/21  1417   TROPONIN T ng/mL 0.072*     · ACCU-CHECKS: 161 - 345 MG/DL.    · NO CXR / EKG.    · TTE:    · Estimated left ventricular EF = 60% Left ventricular ejection fraction appears to be 56 - 60%. Left ventricular systolic function is normal.  · Left ventricular diastolic function is consistent with (grade II w/high LAP) pseudonormalization.  · Left atrial volume is severely increased.  · Moderate aortic valve stenosis is present.  · Estimated right ventricular systolic pressure from tricuspid regurgitation is moderately elevated (45-55 mmHg). Calculated right ventricular systolic pressure from tricuspid regurgitation is 51 mmHg.  · Moderate pulmonary hypertension is present.  · The right atrial cavity is mild to moderately dilated.  · Mild mitral valve stenosis is present with functional MAC.  · Moderate tricuspid valve regurgitation is present.  · Normal right ventricular wall thickness and septal motion noted with the right ventricular cavity mildly dilated; mildly reduced right ventricular systolic function noted.  · There is no evidence of pericardial effusion.    Medication Review: REVIEWED; NO DRIPS.    Assessment/Plan     No new laboratory studies to review.  Severe anemia present.  She needs evaluation and treatment of anemia.  She will undergo right and left heart catheterization studies today to assess her valvular disease as well as exclude progressive obstructive coronary artery disease with cath-based interventional standby; procedure, risks, and potential complications discussed with patient and she is in agreement to proceed.  Would continue current cardiac medications.      Acute on chronic diastolic heart failure (CMS/HCC)    Type 2 diabetes mellitus (CMS/HCC)    Coronary artery disease involving native coronary artery of  native heart with angina pectoris (CMS/HCC)    Hypertensive emergency    01/21/21  07:23 EST

## 2021-01-21 NOTE — PLAN OF CARE
Problem: Fall Injury Risk  Goal: Absence of Fall and Fall-Related Injury  Outcome: Ongoing, Progressing  Intervention: Identify and Manage Contributors to Fall Injury Risk  Recent Flowsheet Documentation  Taken 1/21/2021 0200 by Amador Diallo RN  Medication Review/Management: medications reviewed  Taken 1/21/2021 0000 by Amador Diallo RN  Medication Review/Management: medications reviewed  Taken 1/20/2021 2200 by Amador Diallo RN  Medication Review/Management: medications reviewed  Taken 1/20/2021 2000 by Amador Diallo RN  Medication Review/Management: medications reviewed  Intervention: Promote Injury-Free Environment  Recent Flowsheet Documentation  Taken 1/21/2021 0200 by Amador Diallo RN  Safety Promotion/Fall Prevention:   activity supervised   assistive device/personal items within reach   clutter free environment maintained   fall prevention program maintained   lighting adjusted   nonskid shoes/slippers when out of bed   room organization consistent   safety round/check completed  Taken 1/21/2021 0000 by Amador Diallo RN  Safety Promotion/Fall Prevention:   activity supervised   assistive device/personal items within reach   clutter free environment maintained   fall prevention program maintained   lighting adjusted   muscle strengthening facilitated   nonskid shoes/slippers when out of bed   patient off unit   room organization consistent   safety round/check completed  Taken 1/20/2021 2200 by Amador Diallo RN  Safety Promotion/Fall Prevention:   activity supervised   assistive device/personal items within reach   clutter free environment maintained   fall prevention program maintained   lighting adjusted   muscle strengthening facilitated   nonskid shoes/slippers when out of bed   room organization consistent   safety round/check completed  Taken 1/20/2021 2000 by Amador Diallo RN  Safety Promotion/Fall Prevention:   activity supervised   assistive device/personal items within reach    clutter free environment maintained   fall prevention program maintained   lighting adjusted   muscle strengthening facilitated   nonskid shoes/slippers when out of bed   room organization consistent   safety round/check completed     Problem: Adult Inpatient Plan of Care  Goal: Plan of Care Review  Outcome: Ongoing, Progressing  Goal: Patient-Specific Goal (Individualized)  Outcome: Ongoing, Progressing  Goal: Absence of Hospital-Acquired Illness or Injury  Outcome: Ongoing, Progressing  Intervention: Identify and Manage Fall Risk  Recent Flowsheet Documentation  Taken 1/21/2021 0200 by Amador Diallo RN  Safety Promotion/Fall Prevention:   activity supervised   assistive device/personal items within reach   clutter free environment maintained   fall prevention program maintained   lighting adjusted   nonskid shoes/slippers when out of bed   room organization consistent   safety round/check completed  Taken 1/21/2021 0000 by Amador Diallo RN  Safety Promotion/Fall Prevention:   activity supervised   assistive device/personal items within reach   clutter free environment maintained   fall prevention program maintained   lighting adjusted   muscle strengthening facilitated   nonskid shoes/slippers when out of bed   patient off unit   room organization consistent   safety round/check completed  Taken 1/20/2021 2200 by Amador Diallo, RN  Safety Promotion/Fall Prevention:   activity supervised   assistive device/personal items within reach   clutter free environment maintained   fall prevention program maintained   lighting adjusted   muscle strengthening facilitated   nonskid shoes/slippers when out of bed   room organization consistent   safety round/check completed  Taken 1/20/2021 2000 by Amador Diallo, RN  Safety Promotion/Fall Prevention:   activity supervised   assistive device/personal items within reach   clutter free environment maintained   fall prevention program maintained   lighting adjusted   muscle  strengthening facilitated   nonskid shoes/slippers when out of bed   room organization consistent   safety round/check completed  Intervention: Prevent Skin Injury  Recent Flowsheet Documentation  Taken 1/21/2021 0200 by Amador Diallo RN  Body Position: position changed independently  Taken 1/21/2021 0000 by Amador Diallo RN  Body Position:   position changed independently   supine  Taken 1/20/2021 2200 by Amador Diallo RN  Body Position: supine  Taken 1/20/2021 2000 by Amador Diallo RN  Body Position: supine  Intervention: Prevent Infection  Recent Flowsheet Documentation  Taken 1/21/2021 0200 by Amador Diallo RN  Infection Prevention:   environmental surveillance performed   equipment surfaces disinfected   hand hygiene promoted   personal protective equipment utilized   rest/sleep promoted   single patient room provided   visitors restricted/screened  Goal: Optimal Comfort and Wellbeing  Outcome: Ongoing, Progressing  Intervention: Provide Person-Centered Care  Recent Flowsheet Documentation  Taken 1/20/2021 2000 by Amador Diallo RN  Trust Relationship/Rapport: care explained  Goal: Readiness for Transition of Care  Outcome: Ongoing, Progressing     Problem: Device-Related Complication Risk (Hemodialysis)  Goal: Safe, Effective Therapy Delivery  Outcome: Ongoing, Progressing  Intervention: Optimize Device Care and Function  Recent Flowsheet Documentation  Taken 1/21/2021 0200 by Amador Diallo RN  Medication Review/Management: medications reviewed  Taken 1/21/2021 0000 by Amador Diallo RN  Medication Review/Management: medications reviewed  Taken 1/20/2021 2200 by Amador Diallo RN  Medication Review/Management: medications reviewed  Taken 1/20/2021 2000 by Amador Diallo RN  Medication Review/Management: medications reviewed     Problem: Hemodynamic Instability (Hemodialysis)  Goal: Vital Signs Remain in Desired Range  Outcome: Ongoing, Progressing     Problem: Infection (Hemodialysis)  Goal:  Absence of Infection Signs and Symptoms  Outcome: Ongoing, Progressing  Intervention: Prevent or Manage Infection  Recent Flowsheet Documentation  Taken 1/21/2021 0200 by Amador Diallo, RN  Infection Prevention:   environmental surveillance performed   equipment surfaces disinfected   hand hygiene promoted   personal protective equipment utilized   rest/sleep promoted   single patient room provided   visitors restricted/screened   Goal Outcome Evaluation:  Plan of Care Reviewed With: patient  Progress: no change

## 2021-01-22 ENCOUNTER — APPOINTMENT (OUTPATIENT)
Dept: NEPHROLOGY | Facility: HOSPITAL | Age: 76
End: 2021-01-22

## 2021-01-22 ENCOUNTER — DOCUMENTATION (OUTPATIENT)
Dept: CARDIAC REHAB | Facility: HOSPITAL | Age: 76
End: 2021-01-22

## 2021-01-22 LAB
ACT BLD: 136 SECONDS (ref 82–152)
ACT BLD: 142 SECONDS (ref 82–152)
ACT BLD: 158 SECONDS (ref 82–152)
ALBUMIN SERPL-MCNC: 3.1 G/DL (ref 3.5–5.2)
ANION GAP SERPL CALCULATED.3IONS-SCNC: 12 MMOL/L (ref 5–15)
BUN SERPL-MCNC: 33 MG/DL (ref 8–23)
BUN/CREAT SERPL: 5 (ref 7–25)
CALCIUM SPEC-SCNC: 9 MG/DL (ref 8.6–10.5)
CHLORIDE SERPL-SCNC: 93 MMOL/L (ref 98–107)
CO2 SERPL-SCNC: 26 MMOL/L (ref 22–29)
CREAT SERPL-MCNC: 6.58 MG/DL (ref 0.57–1)
DEPRECATED RDW RBC AUTO: 62.4 FL (ref 37–54)
ERYTHROCYTE [DISTWIDTH] IN BLOOD BY AUTOMATED COUNT: 19.9 % (ref 12.3–15.4)
FERRITIN SERPL-MCNC: 28.85 NG/ML (ref 13–150)
FOLATE SERPL-MCNC: 13.1 NG/ML (ref 4.78–24.2)
GFR SERPL CREATININE-BSD FRML MDRD: 7 ML/MIN/1.73
GFR SERPL CREATININE-BSD FRML MDRD: ABNORMAL ML/MIN/{1.73_M2}
GLUCOSE BLDC GLUCOMTR-MCNC: 138 MG/DL (ref 70–130)
GLUCOSE BLDC GLUCOMTR-MCNC: 190 MG/DL (ref 70–130)
GLUCOSE BLDC GLUCOMTR-MCNC: 190 MG/DL (ref 70–130)
GLUCOSE BLDC GLUCOMTR-MCNC: 277 MG/DL (ref 70–130)
GLUCOSE SERPL-MCNC: 216 MG/DL (ref 65–99)
HCT VFR BLD AUTO: 29.4 % (ref 34–46.6)
HGB BLD-MCNC: 8.2 G/DL (ref 12–15.9)
IRON 24H UR-MRATE: 23 MCG/DL (ref 37–145)
IRON SATN MFR SERPL: 7 % (ref 20–50)
MCH RBC QN AUTO: 24 PG (ref 26.6–33)
MCHC RBC AUTO-ENTMCNC: 27.9 G/DL (ref 31.5–35.7)
MCV RBC AUTO: 86 FL (ref 79–97)
PHOSPHATE SERPL-MCNC: 4.4 MG/DL (ref 2.5–4.5)
PLATELET # BLD AUTO: 167 10*3/MM3 (ref 140–450)
PMV BLD AUTO: 12.7 FL (ref 6–12)
POTASSIUM SERPL-SCNC: 3.9 MMOL/L (ref 3.5–5.2)
RBC # BLD AUTO: 3.42 10*6/MM3 (ref 3.77–5.28)
RETICS # AUTO: 0.08 10*6/MM3 (ref 0.02–0.13)
RETICS/RBC NFR AUTO: 2.24 % (ref 0.7–1.9)
SODIUM SERPL-SCNC: 131 MMOL/L (ref 136–145)
TIBC SERPL-MCNC: 344 MCG/DL (ref 298–536)
TRANSFERRIN SERPL-MCNC: 231 MG/DL (ref 200–360)
VIT B12 BLD-MCNC: 1007 PG/ML (ref 211–946)
WBC # BLD AUTO: 6.26 10*3/MM3 (ref 3.4–10.8)

## 2021-01-22 PROCEDURE — 82607 VITAMIN B-12: CPT | Performed by: INTERNAL MEDICINE

## 2021-01-22 PROCEDURE — 85045 AUTOMATED RETICULOCYTE COUNT: CPT | Performed by: INTERNAL MEDICINE

## 2021-01-22 PROCEDURE — 63710000001 INSULIN DETEMIR PER 5 UNITS: Performed by: HOSPITALIST

## 2021-01-22 PROCEDURE — 82728 ASSAY OF FERRITIN: CPT | Performed by: INTERNAL MEDICINE

## 2021-01-22 PROCEDURE — 63710000001 INSULIN LISPRO (HUMAN) PER 5 UNITS: Performed by: HOSPITALIST

## 2021-01-22 PROCEDURE — 99233 SBSQ HOSP IP/OBS HIGH 50: CPT | Performed by: HOSPITALIST

## 2021-01-22 PROCEDURE — 85027 COMPLETE CBC AUTOMATED: CPT | Performed by: INTERNAL MEDICINE

## 2021-01-22 PROCEDURE — 25010000002 EPOETIN ALFA-EPBX 10000 UNIT/ML SOLUTION: Performed by: NURSE PRACTITIONER

## 2021-01-22 PROCEDURE — 25010000002 EPOETIN ALFA-EPBX 10000 UNIT/ML SOLUTION: Performed by: INTERNAL MEDICINE

## 2021-01-22 PROCEDURE — 83540 ASSAY OF IRON: CPT | Performed by: INTERNAL MEDICINE

## 2021-01-22 PROCEDURE — 80069 RENAL FUNCTION PANEL: CPT | Performed by: INTERNAL MEDICINE

## 2021-01-22 PROCEDURE — 82962 GLUCOSE BLOOD TEST: CPT

## 2021-01-22 PROCEDURE — 99232 SBSQ HOSP IP/OBS MODERATE 35: CPT | Performed by: INTERNAL MEDICINE

## 2021-01-22 PROCEDURE — 84466 ASSAY OF TRANSFERRIN: CPT | Performed by: INTERNAL MEDICINE

## 2021-01-22 PROCEDURE — 25010000002 HEPARIN (PORCINE) PER 1000 UNITS

## 2021-01-22 PROCEDURE — 82746 ASSAY OF FOLIC ACID SERUM: CPT | Performed by: INTERNAL MEDICINE

## 2021-01-22 RX ORDER — AMLODIPINE BESYLATE 2.5 MG/1
2.5 TABLET ORAL DAILY
Status: DISCONTINUED | OUTPATIENT
Start: 2021-01-22 | End: 2021-01-22

## 2021-01-22 RX ORDER — HEPARIN SODIUM 5000 [USP'U]/ML
5000 INJECTION, SOLUTION INTRAVENOUS; SUBCUTANEOUS EVERY 12 HOURS SCHEDULED
Status: DISCONTINUED | OUTPATIENT
Start: 2021-01-22 | End: 2021-01-23 | Stop reason: HOSPADM

## 2021-01-22 RX ORDER — DEXTROSE MONOHYDRATE 25 G/50ML
25 INJECTION, SOLUTION INTRAVENOUS
Status: DISCONTINUED | OUTPATIENT
Start: 2021-01-22 | End: 2021-01-23 | Stop reason: HOSPADM

## 2021-01-22 RX ORDER — LIDOCAINE 40 MG/G
CREAM TOPICAL EVERY 4 HOURS PRN
Status: DISCONTINUED | OUTPATIENT
Start: 2021-01-22 | End: 2021-01-23 | Stop reason: HOSPADM

## 2021-01-22 RX ORDER — ISOSORBIDE MONONITRATE 60 MG/1
120 TABLET, EXTENDED RELEASE ORAL
Status: DISCONTINUED | OUTPATIENT
Start: 2021-01-22 | End: 2021-01-23 | Stop reason: HOSPADM

## 2021-01-22 RX ORDER — ASPIRIN 81 MG/1
81 TABLET ORAL DAILY
Status: DISCONTINUED | OUTPATIENT
Start: 2021-01-22 | End: 2021-01-23 | Stop reason: HOSPADM

## 2021-01-22 RX ORDER — NICOTINE POLACRILEX 4 MG
15 LOZENGE BUCCAL
Status: DISCONTINUED | OUTPATIENT
Start: 2021-01-22 | End: 2021-01-23 | Stop reason: HOSPADM

## 2021-01-22 RX ORDER — HYDRALAZINE HYDROCHLORIDE 50 MG/1
50 TABLET, FILM COATED ORAL EVERY 12 HOURS SCHEDULED
Status: DISCONTINUED | OUTPATIENT
Start: 2021-01-22 | End: 2021-01-23 | Stop reason: HOSPADM

## 2021-01-22 RX ORDER — AMLODIPINE BESYLATE 2.5 MG/1
2.5 TABLET ORAL
Status: DISCONTINUED | OUTPATIENT
Start: 2021-01-22 | End: 2021-01-23 | Stop reason: HOSPADM

## 2021-01-22 RX ORDER — ALBUMIN (HUMAN) 12.5 G/50ML
12.5 SOLUTION INTRAVENOUS AS NEEDED
Status: DISCONTINUED | OUTPATIENT
Start: 2021-01-22 | End: 2021-01-23 | Stop reason: HOSPADM

## 2021-01-22 RX ORDER — ATORVASTATIN CALCIUM 40 MG/1
80 TABLET, FILM COATED ORAL NIGHTLY
Status: DISCONTINUED | OUTPATIENT
Start: 2021-01-22 | End: 2021-01-23 | Stop reason: HOSPADM

## 2021-01-22 RX ORDER — CLONIDINE HYDROCHLORIDE 0.1 MG/1
0.4 TABLET ORAL EVERY 12 HOURS SCHEDULED
Status: DISCONTINUED | OUTPATIENT
Start: 2021-01-22 | End: 2021-01-23 | Stop reason: HOSPADM

## 2021-01-22 RX ORDER — CARVEDILOL 12.5 MG/1
25 TABLET ORAL EVERY 12 HOURS SCHEDULED
Status: DISCONTINUED | OUTPATIENT
Start: 2021-01-22 | End: 2021-01-23 | Stop reason: HOSPADM

## 2021-01-22 RX ORDER — DORZOLAMIDE HCL 20 MG/ML
1 SOLUTION/ DROPS OPHTHALMIC DAILY
Status: DISCONTINUED | OUTPATIENT
Start: 2021-01-22 | End: 2021-01-23 | Stop reason: HOSPADM

## 2021-01-22 RX ORDER — CLOPIDOGREL BISULFATE 75 MG/1
75 TABLET ORAL DAILY
Status: DISCONTINUED | OUTPATIENT
Start: 2021-01-22 | End: 2021-01-23 | Stop reason: HOSPADM

## 2021-01-22 RX ORDER — ALBUMIN (HUMAN) 12.5 G/50ML
12.5 SOLUTION INTRAVENOUS AS NEEDED
Status: ACTIVE | OUTPATIENT
Start: 2021-01-22 | End: 2021-01-22

## 2021-01-22 RX ORDER — LATANOPROST 50 UG/ML
1 SOLUTION/ DROPS OPHTHALMIC NIGHTLY
Status: DISCONTINUED | OUTPATIENT
Start: 2021-01-22 | End: 2021-01-23 | Stop reason: HOSPADM

## 2021-01-22 RX ADMIN — INSULIN LISPRO 3 UNITS: 100 INJECTION, SOLUTION INTRAVENOUS; SUBCUTANEOUS at 13:03

## 2021-01-22 RX ADMIN — LATANOPROST 1 DROP: 50 SOLUTION OPHTHALMIC at 22:12

## 2021-01-22 RX ADMIN — HEPARIN SODIUM 5000 UNITS: 5000 INJECTION INTRAVENOUS; SUBCUTANEOUS at 22:10

## 2021-01-22 RX ADMIN — INSULIN DETEMIR 7 UNITS: 100 INJECTION, SOLUTION SUBCUTANEOUS at 22:13

## 2021-01-22 RX ADMIN — CLONIDINE HYDROCHLORIDE 0.4 MG: 0.1 TABLET ORAL at 13:02

## 2021-01-22 RX ADMIN — DORZOLAMIDE HYDROCHLORIDE 1 DROP: 20 SOLUTION/ DROPS OPHTHALMIC at 13:14

## 2021-01-22 RX ADMIN — INSULIN LISPRO 3 UNITS: 100 INJECTION, SOLUTION INTRAVENOUS; SUBCUTANEOUS at 17:09

## 2021-01-22 RX ADMIN — INSULIN LISPRO 4 UNITS: 100 INJECTION, SOLUTION INTRAVENOUS; SUBCUTANEOUS at 17:08

## 2021-01-22 RX ADMIN — INSULIN LISPRO 2 UNITS: 100 INJECTION, SOLUTION INTRAVENOUS; SUBCUTANEOUS at 13:02

## 2021-01-22 RX ADMIN — ACETAMINOPHEN 650 MG: 325 TABLET, FILM COATED ORAL at 08:53

## 2021-01-22 RX ADMIN — AMIODARONE HYDROCHLORIDE 200 MG: 200 TABLET ORAL at 13:03

## 2021-01-22 RX ADMIN — TERAZOSIN HYDROCHLORIDE 4 MG: 2 CAPSULE ORAL at 22:09

## 2021-01-22 RX ADMIN — LATANOPROST 1 DROP: 50 SOLUTION OPHTHALMIC at 22:11

## 2021-01-22 RX ADMIN — CLONIDINE HYDROCHLORIDE 0.4 MG: 0.1 TABLET ORAL at 22:09

## 2021-01-22 RX ADMIN — ATORVASTATIN CALCIUM 80 MG: 40 TABLET, FILM COATED ORAL at 22:09

## 2021-01-22 RX ADMIN — ASPIRIN 81 MG: 81 TABLET, COATED ORAL at 13:02

## 2021-01-22 RX ADMIN — CARVEDILOL 25 MG: 12.5 TABLET, FILM COATED ORAL at 22:09

## 2021-01-22 RX ADMIN — HYDRALAZINE HYDROCHLORIDE 50 MG: 50 TABLET, FILM COATED ORAL at 22:09

## 2021-01-22 RX ADMIN — EPOETIN ALFA-EPBX 10000 UNITS: 10000 INJECTION, SOLUTION INTRAVENOUS; SUBCUTANEOUS at 11:24

## 2021-01-22 RX ADMIN — FAMOTIDINE 20 MG: 20 TABLET, FILM COATED ORAL at 14:24

## 2021-01-22 RX ADMIN — ISOSORBIDE MONONITRATE 120 MG: 60 TABLET, EXTENDED RELEASE ORAL at 13:03

## 2021-01-22 RX ADMIN — CLOPIDOGREL BISULFATE 75 MG: 75 TABLET ORAL at 13:02

## 2021-01-22 RX ADMIN — HEPARIN SODIUM 5000 UNITS: 5000 INJECTION INTRAVENOUS; SUBCUTANEOUS at 13:01

## 2021-01-22 RX ADMIN — AMLODIPINE BESYLATE 2.5 MG: 2.5 TABLET ORAL at 13:02

## 2021-01-22 NOTE — PROGRESS NOTES
"   LOS: 0 days    Patient Care Team:  Meghana Rodgers MD as PCP - Demetrius Abraham MD as Consulting Physician (Cardiology)  Kevan Lerma MD as Consulting Physician (Nephrology)    Chief Complaint: ESRD  ESRD on dialysis Monday Wednesday Friday [facility] using AV fistula, history of hypertension, anemia, ischemic heart disease.  Admitted with elevated blood pressure and edema.  Patient was dialyzed 1/20/2021.  Subjective     Interval History:   Patient seen on dialysis blood pressure still high complains of mild shortness of breath.  Review of Systems:   Complains of right groin and abdominal pain.  Patient had a heart cath done yesterday.      Objective     Vital Sign Min/Max for last 24 hours  Temp  Min: 97.7 °F (36.5 °C)  Max: 98.2 °F (36.8 °C)   BP  Min: 151/64  Max: 191/78   Pulse  Min: 54  Max: 66   Resp  Min: 18  Max: 18   SpO2  Min: 92 %  Max: 99 %   No data recorded   No data recorded     Flowsheet Rows      First Filed Value   Admission Height  170.2 cm (67\") Documented at 01/19/2021 1145   Admission Weight  79.8 kg (176 lb) Documented at 01/19/2021 1145          No intake/output data recorded.  I/O last 3 completed shifts:  In: 240 [P.O.:240]  Out: -     Physical Exam:  General Appearance: Alert oriented x3 mild distress.  Eyes: PER, EOMI.  Neck: Supple no JVD.  Lungs: Clear auscultation, no rales rhonchi's, equal chest movement, nonlabored.  Heart: No gallop, murmur, rub, RRR.  Abdomen: Soft, mild tenderness right lower quadrant and groin area, positive bowel sounds, no organomegaly.  Extremities: No edema, no cyanosis.  Neuro: No focal deficit, moving all extremities, alert oriented X 3  AV fistula right upper arm functional    WBC WBC   Date Value Ref Range Status   01/22/2021 6.26 3.40 - 10.80 10*3/mm3 Final   01/21/2021 6.75 3.40 - 10.80 10*3/mm3 Final   01/20/2021 5.26 3.40 - 10.80 10*3/mm3 Final   01/19/2021 6.55 3.40 - 10.80 10*3/mm3 Final      HGB Hemoglobin   Date Value Ref " Range Status   01/22/2021 8.2 (L) 12.0 - 15.9 g/dL Final   01/21/2021 8.4 (L) 12.0 - 15.9 g/dL Final   01/20/2021 7.6 (L) 12.0 - 15.9 g/dL Final   01/19/2021 8.5 (L) 12.0 - 15.9 g/dL Final      HCT Hematocrit   Date Value Ref Range Status   01/22/2021 29.4 (L) 34.0 - 46.6 % Final   01/21/2021 31.1 (L) 34.0 - 46.6 % Final   01/20/2021 26.8 (L) 34.0 - 46.6 % Final   01/19/2021 30.7 (L) 34.0 - 46.6 % Final      Platlets No results found for: LABPLAT   MCV MCV   Date Value Ref Range Status   01/22/2021 86.0 79.0 - 97.0 fL Final   01/21/2021 85.9 79.0 - 97.0 fL Final   01/20/2021 83.8 79.0 - 97.0 fL Final   01/19/2021 84.8 79.0 - 97.0 fL Final          Sodium Sodium   Date Value Ref Range Status   01/22/2021 131 (L) 136 - 145 mmol/L Final   01/21/2021 135 (L) 136 - 145 mmol/L Final   01/20/2021 139 136 - 145 mmol/L Final   01/19/2021 139 136 - 145 mmol/L Final      Potassium Potassium   Date Value Ref Range Status   01/22/2021 3.9 3.5 - 5.2 mmol/L Final   01/21/2021 4.1 3.5 - 5.2 mmol/L Final   01/20/2021 3.2 (L) 3.5 - 5.2 mmol/L Final   01/19/2021 3.5 3.5 - 5.2 mmol/L Final      Chloride Chloride   Date Value Ref Range Status   01/22/2021 93 (L) 98 - 107 mmol/L Final   01/21/2021 97 (L) 98 - 107 mmol/L Final   01/20/2021 100 98 - 107 mmol/L Final   01/19/2021 97 (L) 98 - 107 mmol/L Final      CO2 CO2   Date Value Ref Range Status   01/22/2021 26.0 22.0 - 29.0 mmol/L Final   01/21/2021 28.0 22.0 - 29.0 mmol/L Final   01/20/2021 28.0 22.0 - 29.0 mmol/L Final   01/19/2021 31.0 (H) 22.0 - 29.0 mmol/L Final      BUN BUN   Date Value Ref Range Status   01/22/2021 33 (H) 8 - 23 mg/dL Final   01/21/2021 26 (H) 8 - 23 mg/dL Final   01/20/2021 27 (H) 8 - 23 mg/dL Final   01/19/2021 24 (H) 8 - 23 mg/dL Final      Creatinine Creatinine   Date Value Ref Range Status   01/22/2021 6.58 (H) 0.57 - 1.00 mg/dL Final   01/21/2021 5.04 (H) 0.57 - 1.00 mg/dL Final   01/20/2021 5.91 (H) 0.57 - 1.00 mg/dL Final   01/19/2021 5.22 (H) 0.57 -  1.00 mg/dL Final      Calcium Calcium   Date Value Ref Range Status   01/22/2021 9.0 8.6 - 10.5 mg/dL Final   01/21/2021 9.3 8.6 - 10.5 mg/dL Final   01/20/2021 8.9 8.6 - 10.5 mg/dL Final   01/19/2021 9.2 8.6 - 10.5 mg/dL Final      PO4 No results found for: CAPO4   Albumin Albumin   Date Value Ref Range Status   01/22/2021 3.10 (L) 3.50 - 5.20 g/dL Final   01/21/2021 3.30 (L) 3.50 - 5.20 g/dL Final   01/20/2021 3.00 (L) 3.50 - 5.20 g/dL Final   01/19/2021 3.30 (L) 3.50 - 5.20 g/dL Final      Magnesium No results found for: MG   Uric Acid No results found for: URICACID        Results Review:     I reviewed the patient's new clinical results.    amiodarone, 200 mg, Oral, Daily  [MAR Hold] aspirin, 81 mg, Oral, Daily  [MAR Hold] atorvastatin, 80 mg, Oral, Nightly  carvedilol, 12.5 mg, Oral, Q12H  cloNIDine, 0.2 mg, Oral, Q8H  [MAR Hold] clopidogrel, 75 mg, Oral, Daily  [MAR Hold] dorzolamide-timolol, , Both Eyes, Daily  [MAR Hold] epoetin orquidea/orquidea-epbx, 10,000 Units, Subcutaneous, Once per day on Mon Wed Fri  famotidine, 20 mg, Oral, Once per day on Mon Wed Fri  heparin (porcine), 5,000 Units, Subcutaneous, Q12H  hydrALAZINE, 25 mg, Oral, Q8H  [MAR Hold] insulin detemir, 5 Units, Subcutaneous, Nightly  [MAR Hold] insulin lispro, 0-7 Units, Subcutaneous, 4x Daily With Meals & Nightly  [MAR Hold] isosorbide mononitrate, 120 mg, Oral, Daily  [MAR Hold] latanoprost, 1 drop, Both Eyes, Nightly  [MAR Hold] pharmacy consult - [place], , Does not apply, Daily  [MAR Hold] sodium chloride, 10 mL, Intravenous, Q12H  terazosin, 4 mg, Oral, Nightly  [MAR Hold] torsemide, 40 mg, Oral, Daily      niCARdipine, 5-15 mg/hr, Last Rate: Stopped (01/20/21 0837)        Medication Review: Reviewed    Assessment/Plan       Acute on chronic diastolic heart failure (CMS/HCC)    Type 2 diabetes mellitus (CMS/HCC)    Coronary artery disease involving native coronary artery of native heart with angina pectoris (CMS/HCC)    Hypertensive  emergency    1.  ESRD on dialysis Monday Wednesday Friday.  2.  Hypertensive emergency:  3.  Anemia of chronic kidney disease.  4.  Hyperlipidemia.  5.  Pulmonary edema.  COVID-19 negative  6.  Iron deficiency iron saturation 7%  Plan:  If the groin pain continues or increased we will get a CT scan of the abdomen and pelvis to rule out retroperitoneal bleed.  Patient hemoglobin stable at this time.  Cardiology is following.  Dialysis in progress at this time.  Patient allergic to IV iron, no iron given.  monitor blood pressure  Going for heart cath today.  Darryn Burk MD  01/22/21  08:45 EST

## 2021-01-22 NOTE — PROGRESS NOTES
"Esperanza Pop  [id number]  1945   LOS: 0 days   Patient Care Team:  Meghana Rodgers MD as PCP - Demetrius Abraham MD as Consulting Physician (Cardiology)  Kevan Lerma MD as Consulting Physician (Nephrology)    Chief Complaint:  IHD / DM / ESRD / SEVERE ANEMIA / VHD / UNCONTROLLED HTN    Subjective     Comfortable in hemodialysis awaiting treatment. Nominal room air oximetry (96%) off supplemental oxygen therapy. No anginal type chest discomfort, increased tachypnea/dyspnea, nausea, emesis, abdominal pain, headache, or focal motor-sensory changes. Mild incisional pain at the right groin catheterization site. Relates not having dental care for \"several years.\" Acceptable appetite. No additional complaints.    Objective     Vital Sign Min/Max for last 24 hours  Temp  Min: 97.7 °F (36.5 °C)  Max: 98.2 °F (36.8 °C)   BP  Min: 151/64  Max: 191/78   Pulse  Min: 54  Max: 66   Resp  Min: 18  Max: 18   SpO2  Min: 92 %  Max: 99 %               01/20/21  1826 01/21/21  0606   Weight: 84.4 kg (186 lb) 76.9 kg (169 lb 9.6 oz)         Intake/Output Summary (Last 24 hours) at 1/22/2021 0723  Last data filed at 1/21/2021 2100  Gross per 24 hour   Intake 240 ml   Output --   Net 240 ml       Physical Exam:     General Appearance:    Alert, cooperative, in no acute distress   Lungs:     Clear to auscultation,respirations regular, even and                   unlabored    Heart:    Regular and normal rate, normal S1 and S2, grade 2/6            murmur, no gallop, no rub, no click   Abdomen:  Extremities:   Soft, non-tender, bowel sounds audible x4    No edema, normal range of motion   Pulses:   Pulses palpable and equal bilaterally  Right groin catheterization site CDI      Results Review:   Results from last 7 days   Lab Units 01/22/21  0358 01/21/21  0619 01/20/21  0618   SODIUM mmol/L 131* 135* 139   POTASSIUM mmol/L 3.9 4.1 3.2*   CHLORIDE mmol/L 93* 97* 100   CO2 mmol/L 26.0 28.0 28.0   BUN mg/dL 33* 26* " "27*   CREATININE mg/dL 6.58* 5.04* 5.91*   GLUCOSE mg/dL 216* 217* 172*   CALCIUM mg/dL 9.0 9.3 8.9     Results from last 7 days   Lab Units 01/22/21  0358 01/21/21  0619 01/20/21  0618   WBC 10*3/mm3 6.26 6.75 5.26   HEMOGLOBIN g/dL 8.2* 8.4* 7.6*   HEMATOCRIT % 29.4* 31.1* 26.8*   PLATELETS 10*3/mm3 167 185 191     Results from last 7 days   Lab Units 01/19/21  1417   TROPONIN T ng/mL 0.072*     · RHC/C REPORT:    IMPRESSION:  · There is an 80% distal LAD stenosis that is status post intervention with a Xience Demetra 2.5 x 15 mm drug-eluting stent.    · The previously placed stents in the right coronary artery are widely patent.  · Left ventricular ejection fraction 53% with hypokinesis of the basal to mid diaphragmatic/inferior segments  · Systemic hypertension with a wide pulse pressure  · Mild to moderate pulmonary hypertension with a mean PA pressure of 27 mmHg but a peak PA pressure of 67 mmHg.  · Normal pulmonary capillary wedge pressure of 7 mmHg  · At least moderately decreased cardiac index of 2.1 L/min/m² by thermal dilution and 1.6 L/min/m² by Brandon     RECOMMENDATIONS:  · Continue dual antiplatelet therapy, statin, beta-blocker, long-acting nitrate  · Continue medications for heart failure including beta-blocker, hydralazine, long-acting nitrate  · Further volume management with dialysis and torsemide  · Additional recommendations to follow.    · NO CXR / EKG.    · ACCU-CHECKS: 129 - 251 MG/DL.    · IRON SATURATION: 7%    Medication Review: REVIEWED; NO DRIPS.    Assessment/Plan     Severe iron deficiency with continued labile hypertension. Stable valvular heart disease that does not require intervention. Status post PTCA/stent LAD. She needs long-term improved volume control with hemodialysis, correction and treatment of anemia, and correction and treatment of uncontrolled hypertension to treat her \"diastolic congestive heart failure.\" She additionally needs evaluation and treatment by her dentist in " view of dental caries particularly in the setting of valvular heart disease. Home when blood pressure control is improved on the following tentative discharge cardiac medications and follow-up:    · Amiodarone 200 mg daily  · ASA 81 mg daily  · Clopidogrel 75 mg daily  · Atorvastatin 80 mg daily  · Carvedilol 25 mg BID  · Clonidine 0.4 mg BID  · Ferrous sulfate 325 mg PC BID  · Hydralazine 50 mg BID  · Imdur 120 mg daily  · NTG spray 0.4 mg SL as needed  · Torsemide 40 mg daily  · Sular 7.5 mg daily  · [facility] cardiology follow-up Dr. Sagastume 6 weeks      Acute on chronic diastolic heart failure (CMS/HCC)    Type 2 diabetes mellitus (CMS/HCC)    Coronary artery disease involving native coronary artery of native heart with angina pectoris (CMS/HCC)    Hypertensive emergency    01/22/21  07:23 EST

## 2021-01-22 NOTE — PROGRESS NOTES
Continued Stay Note  [facility]     Patient Name: Esperanza Pop  MRN: [id number]  Today's Date: 1/22/2021    Admit Date: 1/19/2021    Discharge Plan     Row Name 01/22/21 1434       Plan    Plan  Home    Patient/Family in Agreement with Plan  yes    Plan Comments  Spoke with patient at bedside. She denied dc needs, her family will transport upon discharge. Confirmed dialysis regimen with [facility]. [facility] [phone].    Final Discharge Disposition Code  01 - home or self-care        Discharge Codes    No documentation.       Expected Discharge Date and Time     Expected Discharge Date Expected Discharge Time    Jan 23, 2021             Lissette Flor RN

## 2021-01-22 NOTE — PROGRESS NOTES
[facility] Medicine Services  PROGRESS NOTE    Patient Name: Esperanza Pop  : 1945  MRN: [id number]    Date of Admission: 2021  Primary Care Physician: Meghana Rodgers MD    Subjective   Subjective     CC:  Shortness of breath    HPI:  Breathing better. No dyspnea or chest pain. Notes constipation. Notes R groin pain at cath insertion site.    Review of Systems   Constitutional: Positive for activity change and fatigue.   HENT: Negative.    Respiratory: Negative.    Cardiovascular: Negative.    Gastrointestinal:        R groin pain/ache at cath site   Genitourinary: Negative.    Musculoskeletal: Negative.    Hematological: Negative.          Objective   Objective     Vital Signs:   Temp:  [97.7 °F (36.5 °C)-98.2 °F (36.8 °C)] 97.9 °F (36.6 °C)  Heart Rate:  [54-66] 55  Resp:  [18] 18  BP: (151-191)/(62-82) 177/73        Physical Exam:  NAD, alert and oriented x 3  OP clear, MMM  PERRL  Neck supple  No LAD  RRR  CTAB  +BS, ND, NT, soft  R groin TTP, no edema/redness/drainage, no induration  No rashes  DONG  Normal affect      Results Reviewed:  Results from last 7 days   Lab Units 21  03521  0618   WBC 10*3/mm3 6.26 6.75 5.26   HEMOGLOBIN g/dL 8.2* 8.4* 7.6*   HEMATOCRIT % 29.4* 31.1* 26.8*   PLATELETS 10*3/mm3 167 185 191     Results from last 7 days   Lab Units 21  0358 21  0619 21  0618 21  1417   SODIUM mmol/L 131* 135* 139 139   POTASSIUM mmol/L 3.9 4.1 3.2* 3.5   CHLORIDE mmol/L 93* 97* 100 97*   CO2 mmol/L 26.0 28.0 28.0 31.0*   BUN mg/dL 33* 26* 27* 24*   CREATININE mg/dL 6.58* 5.04* 5.91* 5.22*   GLUCOSE mg/dL 216* 217* 172* 214*   CALCIUM mg/dL 9.0 9.3 8.9 9.2   ALT (SGPT) U/L  --   --  6 8   AST (SGOT) U/L  --   --  13 16   TROPONIN T ng/mL  --   --   --  0.072*   PROBNP pg/mL  --   --   --  >70,000.0*     Estimated Creatinine Clearance: 8 mL/min (A) (by C-G formula based on SCr of 6.58 mg/dL (H)).    Microbiology  Results Abnormal     Procedure Component Value - Date/Time    COVID PRE-OP / PRE-PROCEDURE SCREENING ORDER (NO ISOLATION) - Swab, Nasopharynx [[id number]]  (Normal) Collected: 01/19/21 1521    Lab Status: Final result Specimen: Swab from Nasopharynx Updated: 01/19/21 1748    Narrative:      The following orders were created for panel order COVID PRE-OP / PRE-PROCEDURE SCREENING ORDER (NO ISOLATION) - Swab, Nasopharynx.  Procedure                               Abnormality         Status                     ---------                               -----------         ------                     COVID-19 and FLU A/B PCR...[[id number]]  Normal              Final result                 Please view results for these tests on the individual orders.    COVID-19 and FLU A/B PCR - Swab, Nasopharynx [[id number]]  (Normal) Collected: 01/19/21 1521    Lab Status: Final result Specimen: Swab from Nasopharynx Updated: 01/19/21 1748     COVID19 Not Detected     Influenza A PCR Not Detected     Influenza B PCR Not Detected    Narrative:      Fact sheet for providers: https://www.fda.gov/media/167596/download    Fact sheet for patients: https://www.fda.gov/media/961683/download    Test performed by PCR.          Imaging Results (Last 24 Hours)     ** No results found for the last 24 hours. **          Results for orders placed during the hospital encounter of 01/19/21   Adult Transthoracic Echo Complete W/ Cont if Necessary Per Protocol    Narrative · Estimated left ventricular EF = 60% Left ventricular ejection fraction   appears to be 56 - 60%. Left ventricular systolic function is normal.  · Left ventricular diastolic function is consistent with (grade II w/high   LAP) pseudonormalization.  · Left atrial volume is severely increased.  · Moderate aortic valve stenosis is present.  · Estimated right ventricular systolic pressure from tricuspid   regurgitation is moderately elevated (45-55 mmHg). Calculated right   ventricular systolic  pressure from tricuspid regurgitation is 51 mmHg.  · Moderate pulmonary hypertension is present.  · The right atrial cavity is mild to moderately dilated.  · Mild mitral valve stenosis is present with functional MAC.  · Moderate tricuspid valve regurgitation is present.  · Normal right ventricular wall thickness and septal motion noted with the   right ventricular cavity mildly dilated; mildly reduced right ventricular   systolic function noted.  · There is no evidence of pericardial effusion.          I have reviewed the medications:  Scheduled Meds:amiodarone, 200 mg, Oral, Daily  [MAR Hold] aspirin, 81 mg, Oral, Daily  [MAR Hold] atorvastatin, 80 mg, Oral, Nightly  carvedilol, 12.5 mg, Oral, Q12H  cloNIDine, 0.2 mg, Oral, Q8H  [MAR Hold] clopidogrel, 75 mg, Oral, Daily  [MAR Hold] dorzolamide-timolol, , Both Eyes, Daily  [MAR Hold] epoetin orquidea/orquidea-epbx, 10,000 Units, Subcutaneous, Once per day on Mon Wed Fri  famotidine, 20 mg, Oral, Once per day on Mon Wed Fri  heparin (porcine), 5,000 Units, Subcutaneous, Q12H  hydrALAZINE, 25 mg, Oral, Q8H  [MAR Hold] insulin detemir, 5 Units, Subcutaneous, Nightly  [MAR Hold] insulin lispro, 0-7 Units, Subcutaneous, 4x Daily With Meals & Nightly  [MAR Hold] isosorbide mononitrate, 120 mg, Oral, Daily  [MAR Hold] latanoprost, 1 drop, Both Eyes, Nightly  [MAR Hold] pharmacy consult - [place], , Does not apply, Daily  [MAR Hold] sodium chloride, 10 mL, Intravenous, Q12H  terazosin, 4 mg, Oral, Nightly  [MAR Hold] torsemide, 40 mg, Oral, Daily      Continuous Infusions:niCARdipine, 5-15 mg/hr, Last Rate: Stopped (01/20/21 0837)      PRN Meds:.•  [MAR Hold] acetaminophen  •  albumin human  •  albumin human  •  [MAR Hold] dextrose  •  [MAR Hold] dextrose  •  [MAR Hold] glucagon (human recombinant)  •  lidocaine  •  [MAR Hold] sodium chloride  •  [MAR Hold] sodium chloride    Assessment/Plan   Assessment & Plan     Active Hospital Problems    Diagnosis  POA   • **Acute on chronic  diastolic heart failure (CMS/[facility]) [I50.33]  Yes   • Hypertensive emergency [I16.1]  Yes   • Type 2 diabetes mellitus (CMS/[facility]) [E11.9]  Yes   • Coronary artery disease involving native coronary artery of native heart with angina pectoris (CMS/[facility]) [I25.119]  Yes      Resolved Hospital Problems   No resolved problems to display.        Brief Hospital Course to date:  Esperanza Pop is a 76 y.o. female with history of DM 2, diastolic CHF, end-stage renal disease who presents with orthopnea, elevated BP and dyspnea on exertion.    Acute on chronic diastolic CHF  -s/p RHC/LHC, with DARRYL to LAD  -HD MWF  -torsemide  -further med mgmt per cardiology    HTN urgency  --reviewed most recent med recommendations per cardiology, MAR hold release per cardiology    ESRD  --MWF HD    DM  --trending better, adding meal insulin    Anemia  --low iron, replace/supplement    Needs BP optimization, home in 1-2 days      DVT Prophylaxis:  ANIRUDH       Disposition: I expect the patient to be discharged home in 1-2 days    CODE STATUS:   Code Status and Medical Interventions:   Ordered at: 01/19/21 1615     Code Status:    CPR     Medical Interventions (Level of Support Prior to Arrest):    Full       Champ Ríos MD  01/22/21

## 2021-01-23 ENCOUNTER — READMISSION MANAGEMENT (OUTPATIENT)
Dept: CALL CENTER | Facility: HOSPITAL | Age: 76
End: 2021-01-23

## 2021-01-23 VITALS
DIASTOLIC BLOOD PRESSURE: 63 MMHG | HEIGHT: 67 IN | RESPIRATION RATE: 20 BRPM | TEMPERATURE: 98.6 F | BODY MASS INDEX: 26.62 KG/M2 | HEART RATE: 55 BPM | OXYGEN SATURATION: 96 % | WEIGHT: 169.6 LBS | SYSTOLIC BLOOD PRESSURE: 133 MMHG

## 2021-01-23 LAB
GLUCOSE BLDC GLUCOMTR-MCNC: 183 MG/DL (ref 70–130)
GLUCOSE BLDC GLUCOMTR-MCNC: 263 MG/DL (ref 70–130)

## 2021-01-23 PROCEDURE — 25010000002 HEPARIN (PORCINE) PER 1000 UNITS

## 2021-01-23 PROCEDURE — 99239 HOSP IP/OBS DSCHRG MGMT >30: CPT | Performed by: NURSE PRACTITIONER

## 2021-01-23 PROCEDURE — 82962 GLUCOSE BLOOD TEST: CPT

## 2021-01-23 PROCEDURE — 99232 SBSQ HOSP IP/OBS MODERATE 35: CPT | Performed by: PHYSICIAN ASSISTANT

## 2021-01-23 PROCEDURE — 63710000001 INSULIN LISPRO (HUMAN) PER 5 UNITS: Performed by: HOSPITALIST

## 2021-01-23 RX ORDER — NISOLDIPINE 8.5 MG/1
8.5 TABLET, FILM COATED, EXTENDED RELEASE ORAL DAILY
Qty: 30 TABLET | Refills: 0 | Status: SHIPPED | OUTPATIENT
Start: 2021-01-23 | End: 2021-02-22 | Stop reason: SDUPTHER

## 2021-01-23 RX ORDER — TORSEMIDE 20 MG/1
40 TABLET ORAL DAILY
Qty: 60 TABLET | Refills: 0 | Status: SHIPPED | OUTPATIENT
Start: 2021-01-23 | End: 2021-02-22 | Stop reason: SDUPTHER

## 2021-01-23 RX ORDER — HYDRALAZINE HYDROCHLORIDE 50 MG/1
50 TABLET, FILM COATED ORAL EVERY 12 HOURS SCHEDULED
Qty: 60 TABLET | Refills: 0 | Status: SHIPPED | OUTPATIENT
Start: 2021-01-23 | End: 2021-02-22 | Stop reason: SDUPTHER

## 2021-01-23 RX ORDER — CARVEDILOL 25 MG/1
25 TABLET ORAL EVERY 12 HOURS SCHEDULED
Qty: 60 TABLET | Refills: 5 | Status: SHIPPED | OUTPATIENT
Start: 2021-01-23 | End: 2023-02-07

## 2021-01-23 RX ORDER — AMOXICILLIN 250 MG
2 CAPSULE ORAL ONCE
Status: COMPLETED | OUTPATIENT
Start: 2021-01-23 | End: 2021-01-23

## 2021-01-23 RX ORDER — CLONIDINE HYDROCHLORIDE 0.2 MG/1
0.4 TABLET ORAL EVERY 12 HOURS SCHEDULED
Qty: 120 TABLET | Refills: 0 | Status: SHIPPED | OUTPATIENT
Start: 2021-01-23 | End: 2021-02-22 | Stop reason: SDUPTHER

## 2021-01-23 RX ADMIN — HEPARIN SODIUM 5000 UNITS: 5000 INJECTION INTRAVENOUS; SUBCUTANEOUS at 08:52

## 2021-01-23 RX ADMIN — AMLODIPINE BESYLATE 2.5 MG: 2.5 TABLET ORAL at 08:52

## 2021-01-23 RX ADMIN — INSULIN LISPRO 4 UNITS: 100 INJECTION, SOLUTION INTRAVENOUS; SUBCUTANEOUS at 12:35

## 2021-01-23 RX ADMIN — INSULIN LISPRO 3 UNITS: 100 INJECTION, SOLUTION INTRAVENOUS; SUBCUTANEOUS at 12:35

## 2021-01-23 RX ADMIN — CLOPIDOGREL BISULFATE 75 MG: 75 TABLET ORAL at 08:52

## 2021-01-23 RX ADMIN — ASPIRIN 81 MG: 81 TABLET, COATED ORAL at 08:52

## 2021-01-23 RX ADMIN — ISOSORBIDE MONONITRATE 120 MG: 60 TABLET, EXTENDED RELEASE ORAL at 08:51

## 2021-01-23 RX ADMIN — CLONIDINE HYDROCHLORIDE 0.4 MG: 0.1 TABLET ORAL at 08:51

## 2021-01-23 RX ADMIN — HYDRALAZINE HYDROCHLORIDE 50 MG: 50 TABLET, FILM COATED ORAL at 08:52

## 2021-01-23 RX ADMIN — INSULIN LISPRO 3 UNITS: 100 INJECTION, SOLUTION INTRAVENOUS; SUBCUTANEOUS at 08:53

## 2021-01-23 RX ADMIN — DOCUSATE SODIUM 50MG AND SENNOSIDES 8.6MG 2 TABLET: 8.6; 5 TABLET, FILM COATED ORAL at 12:35

## 2021-01-23 RX ADMIN — CARVEDILOL 25 MG: 12.5 TABLET, FILM COATED ORAL at 08:56

## 2021-01-23 RX ADMIN — AMIODARONE HYDROCHLORIDE 200 MG: 200 TABLET ORAL at 08:52

## 2021-01-23 RX ADMIN — INSULIN LISPRO 2 UNITS: 100 INJECTION, SOLUTION INTRAVENOUS; SUBCUTANEOUS at 08:53

## 2021-01-23 RX ADMIN — DORZOLAMIDE HYDROCHLORIDE 1 DROP: 20 SOLUTION/ DROPS OPHTHALMIC at 08:53

## 2021-01-23 NOTE — OUTREACH NOTE
Prep Survey      Responses   [facility] facility patient discharged from?  [place]   Is LACE score < 7 ?  No   Emergency Room discharge w/ pulse ox?  No   Eligibility  TCM   Hospital  [facility]   Date of Admission  01/19/21   Date of Discharge  01/23/21   Discharge Disposition  Home or Self Care   Discharge diagnosis  Acute on chronic diastolic heart failure    Does the patient have one of the following disease processes/diagnoses(primary or secondary)?  CHF   Does the patient have Home health ordered?  No   Is there a DME ordered?  No   Prep survey completed?  Yes          Juliet Bennett RN

## 2021-01-23 NOTE — PROGRESS NOTES
"   LOS: 4 days    Patient Care Team:  Meghana Rodgers MD as PCP - General Sagastume, Demetrius BOLDEN MD as Consulting Physician (Cardiology)  Kevan Lerma MD as Consulting Physician (Nephrology)    Chief Complaint: ESRD  ESRD on dialysis Monday Wednesday Friday [facility] using AV fistula, history of hypertension, anemia, ischemic heart disease.  Admitted with elevated blood pressure and edema.  Patient was dialyzed 1/20/2021.  Subjective     Interval History:   No complaints no new events.  Patient may going home today  Review of Systems:   No new complaints feeling fine no nausea vomiting or pain.      Objective     Vital Sign Min/Max for last 24 hours  Temp  Min: 97.8 °F (36.6 °C)  Max: 98.7 °F (37.1 °C)   BP  Min: 133/63  Max: 183/86   Pulse  Min: 54  Max: 73   Resp  Min: 18  Max: 20   SpO2  Min: 94 %  Max: 99 %   No data recorded   No data recorded     Flowsheet Rows      First Filed Value   Admission Height  170.2 cm (67\") Documented at 01/19/2021 1145   Admission Weight  79.8 kg (176 lb) Documented at 01/19/2021 1145          No intake/output data recorded.  I/O last 3 completed shifts:  In: 600 [P.O.:600]  Out: 2400 [Other:2400]    Physical Exam:  General Appearance: Wake alert oriented x3 no obvious distress laying comfortably in bed.  Eyes: PER, EOMI.  Neck: Supple no JVD.  Lungs: Clear auscultation no rales rhonchi's nonlabored breathing  Heart: No gallop, murmur, rub, RRR.  Abdomen: Soft, mild tenderness right lower quadrant and groin area, positive bowel sounds, no organomegaly.  Extremities: No edema, no cyanosis.  Neuro: No focal deficit, moving all extremities, alert oriented X 3  AV fistula right upper arm functional    WBC WBC   Date Value Ref Range Status   01/22/2021 6.26 3.40 - 10.80 10*3/mm3 Final   01/21/2021 6.75 3.40 - 10.80 10*3/mm3 Final      HGB Hemoglobin   Date Value Ref Range Status   01/22/2021 8.2 (L) 12.0 - 15.9 g/dL Final   01/21/2021 8.4 (L) 12.0 - 15.9 g/dL Final      HCT " Hematocrit   Date Value Ref Range Status   01/22/2021 29.4 (L) 34.0 - 46.6 % Final   01/21/2021 31.1 (L) 34.0 - 46.6 % Final      Platlets No results found for: LABPLAT   MCV MCV   Date Value Ref Range Status   01/22/2021 86.0 79.0 - 97.0 fL Final   01/21/2021 85.9 79.0 - 97.0 fL Final          Sodium Sodium   Date Value Ref Range Status   01/22/2021 131 (L) 136 - 145 mmol/L Final   01/21/2021 135 (L) 136 - 145 mmol/L Final      Potassium Potassium   Date Value Ref Range Status   01/22/2021 3.9 3.5 - 5.2 mmol/L Final   01/21/2021 4.1 3.5 - 5.2 mmol/L Final      Chloride Chloride   Date Value Ref Range Status   01/22/2021 93 (L) 98 - 107 mmol/L Final   01/21/2021 97 (L) 98 - 107 mmol/L Final      CO2 CO2   Date Value Ref Range Status   01/22/2021 26.0 22.0 - 29.0 mmol/L Final   01/21/2021 28.0 22.0 - 29.0 mmol/L Final      BUN BUN   Date Value Ref Range Status   01/22/2021 33 (H) 8 - 23 mg/dL Final   01/21/2021 26 (H) 8 - 23 mg/dL Final      Creatinine Creatinine   Date Value Ref Range Status   01/22/2021 6.58 (H) 0.57 - 1.00 mg/dL Final   01/21/2021 5.04 (H) 0.57 - 1.00 mg/dL Final      Calcium Calcium   Date Value Ref Range Status   01/22/2021 9.0 8.6 - 10.5 mg/dL Final   01/21/2021 9.3 8.6 - 10.5 mg/dL Final      PO4 No results found for: CAPO4   Albumin Albumin   Date Value Ref Range Status   01/22/2021 3.10 (L) 3.50 - 5.20 g/dL Final   01/21/2021 3.30 (L) 3.50 - 5.20 g/dL Final      Magnesium No results found for: MG   Uric Acid No results found for: URICACID        Results Review:     I reviewed the patient's new clinical results.    amiodarone, 200 mg, Oral, Daily  amLODIPine, 2.5 mg, Oral, Q24H  aspirin, 81 mg, Oral, Daily  atorvastatin, 80 mg, Oral, Nightly  carvedilol, 25 mg, Oral, Q12H  cloNIDine, 0.4 mg, Oral, Q12H  clopidogrel, 75 mg, Oral, Daily  dorzolamide, 1 drop, Both Eyes, Daily  epoetin orquidea/orquidea-epbx, 10,000 Units, Subcutaneous, Once per day on Mon Wed Fri  famotidine, 20 mg, Oral, Once per day  on Mon Wed Fri  heparin (porcine), 5,000 Units, Subcutaneous, Q12H  hydrALAZINE, 50 mg, Oral, Q12H  insulin detemir, 7 Units, Subcutaneous, Nightly  insulin lispro, 0-7 Units, Subcutaneous, TID AC  insulin lispro, 3 Units, Subcutaneous, TID With Meals  isosorbide mononitrate, 120 mg, Oral, Q24H  latanoprost, 1 drop, Both Eyes, Nightly  latanoprost, 1 drop, Both Eyes, Nightly  senna-docusate sodium, 2 tablet, Oral, Once  terazosin, 4 mg, Oral, Nightly      niCARdipine, 5-15 mg/hr, Last Rate: Stopped (01/20/21 0837)        Medication Review: Reviewed    Assessment/Plan       Acute on chronic diastolic heart failure (CMS/HCC)    Type 2 diabetes mellitus (CMS/HCC)    Coronary artery disease involving native coronary artery of native heart with angina pectoris (CMS/HCC)    Hypertensive emergency    1.  ESRD on dialysis Monday Wednesday Friday.  2.  Hypertensive emergency:  3.  Anemia of chronic kidney disease.  4.  Hyperlipidemia.  5.  Pulmonary edema.  COVID-19 negative  6.  Iron deficiency iron saturation 7%  Plan:  Okay to DC home from renal point of view.  Patient will follow fluid restriction renal diet her binders for phosphorus and return to the dialysis unit on Monday at [facility].  Darryn Burk MD  01/23/21  11:43 EST

## 2021-01-23 NOTE — PROGRESS NOTES
Esperanza Pop  [id number]  1945   LOS: 4 days   Patient Care Team:  Meghana Rodgers MD as PCP - Demetrius Abraham MD as Consulting Physician (Cardiology)  Kevan Lerma MD as Consulting Physician (Nephrology)    Chief Complaint:  Following for IHD, VHD, UNCONTROLLED HTN    Subjective   Sleeping in room.  Arouses easily.  No complaints.  Groin stable without current pain or discomfort.  Asking about going home. States she will be compliant with medications.      Objective     Vital Sign Min/Max for last 24 hours  Temp  Min: 96.3 °F (35.7 °C)  Max: 98.7 °F (37.1 °C)   BP  Min: 134/81  Max: 190/74   Pulse  Min: 54  Max: 73   Resp  Min: 18  Max: 20   SpO2  Min: 94 %  Max: 99 %               01/20/21  1826 01/21/21  0606   Weight: 84.4 kg (186 lb) 76.9 kg (169 lb 9.6 oz)         Intake/Output Summary (Last 24 hours) at 1/23/2021 0939  Last data filed at 1/22/2021 1252  Gross per 24 hour   Intake 360 ml   Output 2400 ml   Net -2040 ml       Physical Exam:     General Appearance:    Alert, cooperative, in no acute distress   Lungs:     Clear to auscultation,respirations regular, even and                   unlabored    Heart:    Regular and normal rate, normal S1 and S2, grade 2/6            murmur, no gallop, no rub, no click   Abdomen:  Extremities:   Soft, non-tender, bowel sounds audible x4    No edema, normal range of motion   Pulses:   Pulses palpable and equal bilaterally  Right groin catheterization site stable.      Results Review:   Results from last 7 days   Lab Units 01/22/21 0358 01/21/21 0619 01/20/21  0618   SODIUM mmol/L 131* 135* 139   POTASSIUM mmol/L 3.9 4.1 3.2*   CHLORIDE mmol/L 93* 97* 100   CO2 mmol/L 26.0 28.0 28.0   BUN mg/dL 33* 26* 27*   CREATININE mg/dL 6.58* 5.04* 5.91*   GLUCOSE mg/dL 216* 217* 172*   CALCIUM mg/dL 9.0 9.3 8.9     Results from last 7 days   Lab Units 01/22/21 0358 01/21/21 0619 01/20/21  0618   WBC 10*3/mm3 6.26 6.75 5.26   HEMOGLOBIN g/dL 8.2* 8.4*  7.6*   HEMATOCRIT % 29.4* 31.1* 26.8*   PLATELETS 10*3/mm3 167 185 191     Results from last 7 days   Lab Units 01/19/21  1417   TROPONIN T ng/mL 0.072*     · RHC/LHC REPORT:    IMPRESSION:  · There is an 80% distal LAD stenosis that is status post intervention with a Xience Demetra 2.5 x 15 mm drug-eluting stent.    · The previously placed stents in the right coronary artery are widely patent.  · Left ventricular ejection fraction 53% with hypokinesis of the basal to mid diaphragmatic/inferior segments  · Systemic hypertension with a wide pulse pressure  · Mild to moderate pulmonary hypertension with a mean PA pressure of 27 mmHg but a peak PA pressure of 67 mmHg.  · Normal pulmonary capillary wedge pressure of 7 mmHg  · At least moderately decreased cardiac index of 2.1 L/min/m² by thermal dilution and 1.6 L/min/m² by Brandon     RECOMMENDATIONS:  · Continue dual antiplatelet therapy, statin, beta-blocker, long-acting nitrate  · Continue medications for heart failure including beta-blocker, hydralazine, long-acting nitrate  · Further volume management with dialysis and torsemide  · Additional recommendations to follow.    · NO CXR / EKG.    · ACCU-CHECKS: 129 - 251 MG/DL.    · IRON SATURATION: 7%    Medication Review: REVIEWED; NO DRIPS.    Assessment/Plan     1.  Severe iron deficiency anemia    -  with continued labile hypertension.    -  Continue Ferrous sulfate.  Per MDs.    2.  VHDz:    -  Stable, does not require intervention.     -  Will need evaluation and treatment by her dentist in view of dental caries particularly in the setting of valvular heart disease.     3.  CAD:     -  Status post PTCA/stent LAD.    -  Continue DAPT, high intensity statin, BB, RISSA.    4.  Acute on Chronic Diastolic CHF:     -  EF 56-60% with grade II diastolic dysfunction by echo.   -  She needs long-term improved volume control with hemodialysis, correction and treatment of anemia, and correction and treatment of uncontrolled  hypertension.       5.  Palpitaitons   -  Historically has been on Amiodarone for this but discontinued on her own prior to admission   -  Is back on Amio 200mg daily.  Compliance may be an issue at time of discharge.    6.  CKDz   -  Continue hemodialysis.    Okay to HI Home from cardiology standpoint.  Discussed with Dr. Ríos.  Recommend HI Home on the following medications.    · Amiodarone 200 mg daily  · ASA 81 mg daily  · Clopidogrel 75 mg daily  · Atorvastatin 80 mg daily  · Carvedilol 25 mg BID  · Clonidine 0.4 mg BID  · Ferrous sulfate 325 mg PC BID  · Hydralazine 50 mg BID  · Imdur 120 mg daily  · NTG spray 0.4 mg SL as needed  · Torsemide 40 mg daily  · Sular 7.5 mg daily  · [facility] cardiology follow-up Dr. Sagastume 6 weeks. Order for FU in computer.    Laurie Rider PA-C  Functioning independently.  01/23/21  09:39 EST   Electronically signed by DARRYL Kearney, 01/23/21, 9:56 AM EST.

## 2021-01-23 NOTE — DISCHARGE SUMMARY
[facility]  DISCHARGE SUMMARY    Patient Name: Esperanza Pop  : 1945  MRN: [id number]    Date of Admission: 2021  1:07 PM  Date of Discharge:  2021  Primary Care Physician: Meghana Rodgers MD    Consults     Date and Time Order Name Status Description    2021 2315 Inpatient Nephrology Consult Completed     2021 2315 Inpatient Cardiology Consult Completed           Hospital Course     Presenting Problem:   Hypertensive emergency [I16.1]  Hypertensive emergency [I16.1]  Hypertensive emergency [I16.1]    Active Hospital Problems    Diagnosis  POA   • **Acute on chronic diastolic heart failure (CMS/HCC) [I50.33]  Yes   • Hypertensive emergency [I16.1]  Yes   • Type 2 diabetes mellitus (CMS/HCC) [E11.9]  Yes   • Coronary artery disease involving native coronary artery of native heart with angina pectoris (CMS/HCC) [I25.119]  Yes      Resolved Hospital Problems   No resolved problems to display.          Hospital Course:  Esperanza Pop is a 76 y.o. female  with history of DM 2, diastolic CHF, end-stage renal disease who presented with orthopnea, elevated BP and dyspnea on exertion. Found to have acute exacerbation of diastolic heart failure likely precipitated by hypertensive emergency.  Patient resumed home blood pressure medications and had cardiology consultation to manage/adjust blood pressure medications.  Treated with diuretics and dialysis for volume control.  Patient's blood pressure is now stable and patient is medically stable.  Patient will be discharged home today with family on current doses of blood pressure medications per recommendations by cardiology.    Discharge Follow Up Recommendations for outpatient labs/diagnostics:  PCP 1 week   Dr Sagastume 6 weeks     Day of Discharge     HPI:   Feels well today. NAD. Eager to go home. Right groin pain much improved, minimally sore. Blood pressures better, denies HA, soa, cp. Denies f/c, n/v/d,  palpitations. No abd pain.     Review of Systems  All other systems negative     Vital Signs:   Temp:  [96.3 °F (35.7 °C)-98.7 °F (37.1 °C)] 98.6 °F (37 °C)  Heart Rate:  [54-73] 58  Resp:  [18-20] 18  BP: (134-186)/(69-88) 134/81     Physical Exam:  Constitutional: Awake, alert  Eyes: PERRLA, sclerae anicteric, no conjunctival injection  HENT: NCAT, mucous membranes moist  Respiratory: Clear to auscultation bilaterally, nonlabored respirations   Cardiovascular: Bradycardia, + murmur, palpable pedal pulses bilaterally  Gastrointestinal: Positive bowel sounds, soft, nontender, nondistended  Musculoskeletal: No bilateral ankle edema, no clubbing or cyanosis to extremities  Psychiatric: Appropriate affect, cooperative  Neurologic: Oriented x 3, strength symmetric in all extremities, Cranial Nerves grossly intact to confrontation, speech clear  Skin: No rashes, RUE AV Fistula + bruit/ thrill, right groin soft minimally tender     Pertinent  and/or Most Recent Results     LAB RESULTS:      Lab 01/22/21 0358 01/21/21 0619 01/20/21 0618 01/19/21  1342   WBC 6.26 6.75 5.26 6.55   HEMOGLOBIN 8.2* 8.4* 7.6* 8.5*   HEMATOCRIT 29.4* 31.1* 26.8* 30.7*   PLATELETS 167 185 191 186   NEUTROS ABS  --   --   --  3.82   IMMATURE GRANS (ABS)  --   --   --  0.01   LYMPHS ABS  --   --   --  1.68   MONOS ABS  --   --   --  0.91*   EOS ABS  --   --   --  0.10   MCV 86.0 85.9 83.8 84.8         Lab 01/22/21  0358 01/21/21 0619 01/20/21 0618 01/19/21  1417   SODIUM 131* 135* 139 139   POTASSIUM 3.9 4.1 3.2* 3.5   CHLORIDE 93* 97* 100 97*   CO2 26.0 28.0 28.0 31.0*   ANION GAP 12.0 10.0 11.0 11.0   BUN 33* 26* 27* 24*   CREATININE 6.58* 5.04* 5.91* 5.22*   GLUCOSE 216* 217* 172* 214*   CALCIUM 9.0 9.3 8.9 9.2   PHOSPHORUS 4.4 4.2  --   --          Lab 01/22/21  0358 01/21/21  0619 01/20/21  0618 01/19/21  1417   TOTAL PROTEIN  --   --  7.2 7.9   ALBUMIN 3.10* 3.30* 3.00* 3.30*   GLOBULIN  --   --  4.2 4.6   ALT (SGPT)  --   --  6 8   AST  (SGOT)  --   --  13 16   BILIRUBIN  --   --  0.4 0.4   ALK PHOS  --   --  106 114         Lab 01/19/21  1417   PROBNP >70,000.0*   TROPONIN T 0.072*             Lab 01/22/21  0358   IRON 23*   IRON SATURATION 7*   TIBC 344   TRANSFERRIN 231   FERRITIN 28.85   FOLATE 13.10   VITAMIN B 12 1,007*         Lab 01/21/21  1628 01/21/21  1625   PH, ARTERIAL 7.455*  --    PCO2, ARTERIAL 39.4  --    PO2 ART 70.7*  --    FIO2 21 21   HCO3 ART 27.7*  --    BASE EXCESS ART 3.6*  --    CARBOXYHEMOGLOBIN 1.4  --    CARBOXYHEMOGLOBIN (VENOUS)  --  1.5     Brief Urine Lab Results     None        Microbiology Results (last 10 days)     Procedure Component Value - Date/Time    COVID PRE-OP / PRE-PROCEDURE SCREENING ORDER (NO ISOLATION) - Swab, Nasopharynx [[id number]]  (Normal) Collected: 01/19/21 1521    Lab Status: Final result Specimen: Swab from Nasopharynx Updated: 01/19/21 1748    Narrative:      The following orders were created for panel order COVID PRE-OP / PRE-PROCEDURE SCREENING ORDER (NO ISOLATION) - Swab, Nasopharynx.  Procedure                               Abnormality         Status                     ---------                               -----------         ------                     COVID-19 and FLU A/B PCR...[[id number]]  Normal              Final result                 Please view results for these tests on the individual orders.    COVID-19 and FLU A/B PCR - Swab, Nasopharynx [[id number]]  (Normal) Collected: 01/19/21 1521    Lab Status: Final result Specimen: Swab from Nasopharynx Updated: 01/19/21 1748     COVID19 Not Detected     Influenza A PCR Not Detected     Influenza B PCR Not Detected    Narrative:      Fact sheet for providers: https://www.fda.gov/media/717513/download    Fact sheet for patients: https://www.fda.gov/media/425303/download    Test performed by PCR.          Xr Chest 1 View    Result Date: 1/19/2021  EXAMINATION: XR CHEST 1 VW-  INDICATION: Shortness of air triage protocol.  COMPARISON:  06/23/2020.  FINDINGS: Heart is enlarged, appearing further increased in size from 06/23/2020 exam and is now a mild pulmonary vascular congestion pattern present. No effusion, consolidation or pneumothorax is seen.      Mild congestive heart failure.   D:  01/19/2021 E:  01/19/2021  This report was finalized on 1/19/2021 8:58 PM by Dr. Champ Lee MD.                  Results for orders placed during the hospital encounter of 01/19/21   Adult Transthoracic Echo Complete W/ Cont if Necessary Per Protocol    Narrative · Estimated left ventricular EF = 60% Left ventricular ejection fraction   appears to be 56 - 60%. Left ventricular systolic function is normal.  · Left ventricular diastolic function is consistent with (grade II w/high   LAP) pseudonormalization.  · Left atrial volume is severely increased.  · Moderate aortic valve stenosis is present.  · Estimated right ventricular systolic pressure from tricuspid   regurgitation is moderately elevated (45-55 mmHg). Calculated right   ventricular systolic pressure from tricuspid regurgitation is 51 mmHg.  · Moderate pulmonary hypertension is present.  · The right atrial cavity is mild to moderately dilated.  · Mild mitral valve stenosis is present with functional MAC.  · Moderate tricuspid valve regurgitation is present.  · Normal right ventricular wall thickness and septal motion noted with the   right ventricular cavity mildly dilated; mildly reduced right ventricular   systolic function noted.  · There is no evidence of pericardial effusion.          Discharge Details        Discharge Medications      New Medications      Instructions Start Date   hydrALAZINE 50 MG tablet  Commonly known as: APRESOLINE   50 mg, Oral, Every 12 Hours Scheduled      nisoldipine 8.5 MG 24 hr tablet  Commonly known as: Sular   8.5 mg, Oral, Daily         Changes to Medications      Instructions Start Date   ammonium lactate 12 % lotion  Commonly known as: LAC-HYDRIN  What changed:    · how much to take  · when to take this   Topical, As Needed      carvedilol 25 MG tablet  Commonly known as: COREG  What changed: how much to take   25 mg, Oral, Every 12 Hours Scheduled      cloNIDine 0.2 MG tablet  Commonly known as: CATAPRES  What changed:   · how much to take  · when to take this   0.4 mg, Oral, Every 12 Hours Scheduled      torsemide 20 MG tablet  Commonly known as: Demadex  What changed:   · medication strength  · how much to take  · when to take this  · reasons to take this   40 mg, Oral, Daily         Continue These Medications      Instructions Start Date   amiodarone 200 MG tablet  Commonly known as: PACERONE   200 mg, Oral, Daily      aspirin 81 MG EC tablet   81 mg, Oral, Daily      atorvastatin 80 MG tablet  Commonly known as: LIPITOR   80 mg, Oral, Nightly      clopidogrel 75 MG tablet  Commonly known as: PLAVIX   75 mg, Oral, Daily      desonide 0.05 % cream  Commonly known as: DESOWEN   0.05 application, Topical, 2 Times Daily      DIALYVITE 800 PO   1 tablet, Oral, Daily      dorzolamide-timolol 22.3-6.8 MG/ML ophthalmic solution  Commonly known as: COSOPT   1 drop, Both Eyes, Daily      famotidine 20 MG tablet  Commonly known as: PEPCID   20 mg, Oral, 2 Times Daily      ferrous sulfate 325 (65 Fe) MG tablet   1 tablet, Oral, 2 Times Daily      fluocinonide 0.05 % external solution  Commonly known as: LIDEX   0.05 application, Topical, 2 Times Daily, Scalp      insulin NPH-insulin regular (70-30) 100 UNIT/ML injection  Commonly known as: humuLIN 70/30,novoLIN 70/30   20 Units, Subcutaneous, Nightly      insulin NPH-insulin regular (70-30) 100 UNIT/ML injection  Commonly known as: humuLIN 70/30,novoLIN 70/30   Inject 20 units before dinner and 17 Units before breakfast.      IRON DEXTRAN IJ   Injection, 3 Times Weekly      isosorbide mononitrate 120 MG 24 hr tablet  Commonly known as: IMDUR   120 mg, Oral, Daily      lidocaine-prilocaine 2.5-2.5 % cream  Commonly known as: EMLA    1 application, Topical, As Needed      nitroglycerin 0.4 MG/SPRAY spray  Commonly known as: Nitrolingual   1 spray, Sublingual, Every 5 Minutes PRN      terazosin 2 MG capsule  Commonly known as: HYTRIN   4 mg, Oral, Every Night at Bedtime             Allergies   Allergen Reactions   • Mircera [Methoxy Polyethylene Glycol-Epoetin Beta] Anaphylaxis     Pt stopped breathing   • Venofer [Iron Sucrose] Anaphylaxis     Pt stopped breathing   • Albuterol Other (See Comments)     Increased blood pressure  Used right before heart attack   • Nifedipine Er Swelling   • Penicillins Hives   • Prednisone Other (See Comments)     Makes jittery/anxious         Discharge Disposition:  Home or Self Care    Diet:  Hospital:  Diet Order   Procedures   • Diet Regular; Renal          CODE STATUS:    Code Status and Medical Interventions:   Ordered at: 01/19/21 1615     Code Status:    CPR     Medical Interventions (Level of Support Prior to Arrest):    Full       Future Appointments   Date Time Provider Department Center   1/28/2021  1:00 PM [facility]   2/12/2021 11:30 AM Meghana Rodgers MD [facility]   2/25/2021  9:15 AM Geena Estrella APRN [facility] None   3/23/2021 11:15 AM Meghana Rodgers MD [facility]                 WILMA Newby  01/23/21      Time Spent on Discharge:  I spent  45 minutes on this discharge activity which included: face-to-face encounter with the patient, reviewing the data in the system, coordination of the care with the nursing staff as well as consultants, documentation, and entering orders.

## 2021-01-23 NOTE — DISCHARGE PLACEMENT REQUEST
"Esperanza Richardson (76 y.o. Female)     To [facility]  From Katiana Polk RN Case Manager  [phone]    [facility] summary    Date of Birth Social Security Number Address Home Phone MRN    1945  [id number]  [place] [id number] [phone] [id number]    Sikh Marital Status          Christian        Admission Date Admission Type Admitting Provider Attending Provider Department, Room/Bed    21 Emergency Champ Ríos MD  [facility] 4H, S474/1    Discharge Date Discharge Disposition Discharge Destination        2021 Home or Self Care              Attending Provider: (none)   Allergies: Mircera [Methoxy Polyethylene Glycol-epoetin Beta], Venofer [Iron Sucrose], Albuterol, Nifedipine Er, Penicillins, Prednisone    Isolation: None   Infection: None   Code Status: CPR    Ht: 170 cm (66.93\")   Wt: 76.9 kg (169 lb 9.6 oz)    Admission Cmt: None   Principal Problem: Acute on chronic diastolic heart failure (CMS/HCC) [I50.33] More...                 Active Insurance as of 2021     Primary Coverage     Payor Plan Insurance Group Employer/Plan Group    HUMANA MEDICARE REPLACEMENT HUMANA MEDICARE REPLACEMENT [id number]     Payor Plan Address Payor Plan Phone Number Payor Plan Fax Number Effective Dates    [address] [address] [phone]  2018 - None Entered    [facility] [address]       Subscriber Name Subscriber Birth Date Member ID       ESPERANZA RICHARDSON 1945 [id number]                 Emergency Contacts      (Rel.) Home Phone Work Phone Mobile Phone    Juan Richardson (Spouse) [phone] -- [phone]               Discharge Summary      Grace Mccray APRN at 21 1101              [facility]  DISCHARGE SUMMARY    Patient Name: Esperanza Richardson  : 1945  MRN: [id number]    Date of Admission: 2021  1:07 PM  Date of Discharge:  2021  Primary Care Physician: Meghana Rodgers MD    Consults     " Date and Time Order Name Status Description    1/19/2021 2315 Inpatient Nephrology Consult Completed     1/19/2021 2315 Inpatient Cardiology Consult Completed           Hospital Course     Presenting Problem:   Hypertensive emergency [I16.1]  Hypertensive emergency [I16.1]  Hypertensive emergency [I16.1]    Active Hospital Problems    Diagnosis  POA   • **Acute on chronic diastolic heart failure (CMS/[facility]) [I50.33]  Yes   • Hypertensive emergency [I16.1]  Yes   • Type 2 diabetes mellitus (CMS/[facility]) [E11.9]  Yes   • Coronary artery disease involving native coronary artery of native heart with angina pectoris (CMS/[facility]) [I25.119]  Yes      Resolved Hospital Problems   No resolved problems to display.          Hospital Course:  Esperanza Pop is a 76 y.o. female  with history of DM 2, diastolic CHF, end-stage renal disease who presented with orthopnea, elevated BP and dyspnea on exertion. Found to have acute exacerbation of diastolic heart failure likely precipitated by hypertensive emergency.  Patient resumed home blood pressure medications and had cardiology consultation to manage/adjust blood pressure medications.  Treated with diuretics and dialysis for volume control.  Patient's blood pressure is now stable and patient is medically stable.  Patient will be discharged home today with family on current doses of blood pressure medications per recommendations by cardiology.    Discharge Follow Up Recommendations for outpatient labs/diagnostics:  PCP 1 week   Dr Sagastume 6 weeks     Day of Discharge     HPI:   Feels well today. NAD. Eager to go home. Right groin pain much improved, minimally sore. Blood pressures better, denies HA, soa, cp. Denies f/c, n/v/d, palpitations. No abd pain.     Review of Systems  All other systems negative     Vital Signs:   Temp:  [96.3 °F (35.7 °C)-98.7 °F (37.1 °C)] 98.6 °F (37 °C)  Heart Rate:  [54-73] 58  Resp:  [18-20] 18  BP: (134-186)/(69-88) 134/81     Physical Exam:  Constitutional:  Awake, alert  Eyes: PERRLA, sclerae anicteric, no conjunctival injection  HENT: NCAT, mucous membranes moist  Respiratory: Clear to auscultation bilaterally, nonlabored respirations   Cardiovascular: Bradycardia, + murmur, palpable pedal pulses bilaterally  Gastrointestinal: Positive bowel sounds, soft, nontender, nondistended  Musculoskeletal: No bilateral ankle edema, no clubbing or cyanosis to extremities  Psychiatric: Appropriate affect, cooperative  Neurologic: Oriented x 3, strength symmetric in all extremities, Cranial Nerves grossly intact to confrontation, speech clear  Skin: No rashes, RUE AV Fistula + bruit/ thrill, right groin soft minimally tender     Pertinent  and/or Most Recent Results     LAB RESULTS:      Lab 01/22/21 0358 01/21/21 0619 01/20/21 0618 01/19/21  1342   WBC 6.26 6.75 5.26 6.55   HEMOGLOBIN 8.2* 8.4* 7.6* 8.5*   HEMATOCRIT 29.4* 31.1* 26.8* 30.7*   PLATELETS 167 185 191 186   NEUTROS ABS  --   --   --  3.82   IMMATURE GRANS (ABS)  --   --   --  0.01   LYMPHS ABS  --   --   --  1.68   MONOS ABS  --   --   --  0.91*   EOS ABS  --   --   --  0.10   MCV 86.0 85.9 83.8 84.8         Lab 01/22/21  0358 01/21/21  0619 01/20/21 0618 01/19/21  1417   SODIUM 131* 135* 139 139   POTASSIUM 3.9 4.1 3.2* 3.5   CHLORIDE 93* 97* 100 97*   CO2 26.0 28.0 28.0 31.0*   ANION GAP 12.0 10.0 11.0 11.0   BUN 33* 26* 27* 24*   CREATININE 6.58* 5.04* 5.91* 5.22*   GLUCOSE 216* 217* 172* 214*   CALCIUM 9.0 9.3 8.9 9.2   PHOSPHORUS 4.4 4.2  --   --          Lab 01/22/21  0358 01/21/21  0619 01/20/21  0618 01/19/21  1417   TOTAL PROTEIN  --   --  7.2 7.9   ALBUMIN 3.10* 3.30* 3.00* 3.30*   GLOBULIN  --   --  4.2 4.6   ALT (SGPT)  --   --  6 8   AST (SGOT)  --   --  13 16   BILIRUBIN  --   --  0.4 0.4   ALK PHOS  --   --  106 114         Lab 01/19/21  1417   PROBNP >70,000.0*   TROPONIN T 0.072*             Lab 01/22/21  0358   IRON 23*   IRON SATURATION 7*   TIBC 344   TRANSFERRIN 231   FERRITIN 28.85   FOLATE  13.10   VITAMIN B 12 1,007*         Lab 01/21/21  1628 01/21/21  1625   PH, ARTERIAL 7.455*  --    PCO2, ARTERIAL 39.4  --    PO2 ART 70.7*  --    FIO2 21 21   HCO3 ART 27.7*  --    BASE EXCESS ART 3.6*  --    CARBOXYHEMOGLOBIN 1.4  --    CARBOXYHEMOGLOBIN (VENOUS)  --  1.5     Brief Urine Lab Results     None        Microbiology Results (last 10 days)     Procedure Component Value - Date/Time    COVID PRE-OP / PRE-PROCEDURE SCREENING ORDER (NO ISOLATION) - Swab, Nasopharynx [[id number]]  (Normal) Collected: 01/19/21 1521    Lab Status: Final result Specimen: Swab from Nasopharynx Updated: 01/19/21 1748    Narrative:      The following orders were created for panel order COVID PRE-OP / PRE-PROCEDURE SCREENING ORDER (NO ISOLATION) - Swab, Nasopharynx.  Procedure                               Abnormality         Status                     ---------                               -----------         ------                     COVID-19 and FLU A/B PCR...[[id number]]  Normal              Final result                 Please view results for these tests on the individual orders.    COVID-19 and FLU A/B PCR - Swab, Nasopharynx [[id number]]  (Normal) Collected: 01/19/21 1521    Lab Status: Final result Specimen: Swab from Nasopharynx Updated: 01/19/21 1748     COVID19 Not Detected     Influenza A PCR Not Detected     Influenza B PCR Not Detected    Narrative:      Fact sheet for providers: https://www.fda.gov/media/584262/download    Fact sheet for patients: https://www.fda.gov/media/440136/download    Test performed by PCR.          Xr Chest 1 View    Result Date: 1/19/2021  EXAMINATION: XR CHEST 1 VW-  INDICATION: Shortness of air triage protocol.  COMPARISON: 06/23/2020.  FINDINGS: Heart is enlarged, appearing further increased in size from 06/23/2020 exam and is now a mild pulmonary vascular congestion pattern present. No effusion, consolidation or pneumothorax is seen.      Mild congestive heart failure.   D:  01/19/2021 E:   01/19/2021  This report was finalized on 1/19/2021 8:58 PM by Dr. Champ Lee MD.                  Results for orders placed during the hospital encounter of 01/19/21   Adult Transthoracic Echo Complete W/ Cont if Necessary Per Protocol    Narrative · Estimated left ventricular EF = 60% Left ventricular ejection fraction   appears to be 56 - 60%. Left ventricular systolic function is normal.  · Left ventricular diastolic function is consistent with (grade II w/high   LAP) pseudonormalization.  · Left atrial volume is severely increased.  · Moderate aortic valve stenosis is present.  · Estimated right ventricular systolic pressure from tricuspid   regurgitation is moderately elevated (45-55 mmHg). Calculated right   ventricular systolic pressure from tricuspid regurgitation is 51 mmHg.  · Moderate pulmonary hypertension is present.  · The right atrial cavity is mild to moderately dilated.  · Mild mitral valve stenosis is present with functional MAC.  · Moderate tricuspid valve regurgitation is present.  · Normal right ventricular wall thickness and septal motion noted with the   right ventricular cavity mildly dilated; mildly reduced right ventricular   systolic function noted.  · There is no evidence of pericardial effusion.          Discharge Details        Discharge Medications      New Medications      Instructions Start Date   hydrALAZINE 50 MG tablet  Commonly known as: APRESOLINE   50 mg, Oral, Every 12 Hours Scheduled      nisoldipine 8.5 MG 24 hr tablet  Commonly known as: Sular   8.5 mg, Oral, Daily         Changes to Medications      Instructions Start Date   ammonium lactate 12 % lotion  Commonly known as: LAC-HYDRIN  What changed:   · how much to take  · when to take this   Topical, As Needed      carvedilol 25 MG tablet  Commonly known as: COREG  What changed: how much to take   25 mg, Oral, Every 12 Hours Scheduled      cloNIDine 0.2 MG tablet  Commonly known as: CATAPRES  What changed:   · how much to  take  · when to take this   0.4 mg, Oral, Every 12 Hours Scheduled      torsemide 20 MG tablet  Commonly known as: Demadex  What changed:   · medication strength  · how much to take  · when to take this  · reasons to take this   40 mg, Oral, Daily         Continue These Medications      Instructions Start Date   amiodarone 200 MG tablet  Commonly known as: PACERONE   200 mg, Oral, Daily      aspirin 81 MG EC tablet   81 mg, Oral, Daily      atorvastatin 80 MG tablet  Commonly known as: LIPITOR   80 mg, Oral, Nightly      clopidogrel 75 MG tablet  Commonly known as: PLAVIX   75 mg, Oral, Daily      desonide 0.05 % cream  Commonly known as: DESOWEN   0.05 application, Topical, 2 Times Daily      DIALYVITE 800 PO   1 tablet, Oral, Daily      dorzolamide-timolol 22.3-6.8 MG/ML ophthalmic solution  Commonly known as: COSOPT   1 drop, Both Eyes, Daily      famotidine 20 MG tablet  Commonly known as: PEPCID   20 mg, Oral, 2 Times Daily      ferrous sulfate 325 (65 Fe) MG tablet   1 tablet, Oral, 2 Times Daily      fluocinonide 0.05 % external solution  Commonly known as: LIDEX   0.05 application, Topical, 2 Times Daily, Scalp      insulin NPH-insulin regular (70-30) 100 UNIT/ML injection  Commonly known as: humuLIN 70/30,novoLIN 70/30   20 Units, Subcutaneous, Nightly      insulin NPH-insulin regular (70-30) 100 UNIT/ML injection  Commonly known as: humuLIN 70/30,novoLIN 70/30   Inject 20 units before dinner and 17 Units before breakfast.      IRON DEXTRAN IJ   Injection, 3 Times Weekly      isosorbide mononitrate 120 MG 24 hr tablet  Commonly known as: IMDUR   120 mg, Oral, Daily      lidocaine-prilocaine 2.5-2.5 % cream  Commonly known as: EMLA   1 application, Topical, As Needed      nitroglycerin 0.4 MG/SPRAY spray  Commonly known as: Nitrolingual   1 spray, Sublingual, Every 5 Minutes PRN      terazosin 2 MG capsule  Commonly known as: HYTRIN   4 mg, Oral, Every Night at Bedtime             Allergies   Allergen  Reactions   • Mircera [Methoxy Polyethylene Glycol-Epoetin Beta] Anaphylaxis     Pt stopped breathing   • Venofer [Iron Sucrose] Anaphylaxis     Pt stopped breathing   • Albuterol Other (See Comments)     Increased blood pressure  Used right before heart attack   • Nifedipine Er Swelling   • Penicillins Hives   • Prednisone Other (See Comments)     Makes jittery/anxious         Discharge Disposition:  Home or Self Care    Diet:  Hospital:  Diet Order   Procedures   • Diet Regular; Renal          CODE STATUS:    Code Status and Medical Interventions:   Ordered at: 01/19/21 3151     Code Status:    CPR     Medical Interventions (Level of Support Prior to Arrest):    Full       Future Appointments   Date Time Provider Department Center   1/28/2021  1:00 PM [facility]   2/12/2021 11:30 AM Meghana Rodgers MD [facility]   2/25/2021  9:15 AM Geena Estrella APRN [facility] None   3/23/2021 11:15 AM Meghana Rodgers MD [facility]                 WILMA Newby  01/23/21      Time Spent on Discharge:  I spent  45 minutes on this discharge activity which included: face-to-face encounter with the patient, reviewing the data in the system, coordination of the care with the nursing staff as well as consultants, documentation, and entering orders.            Electronically signed by Grace Mccray APRN at 01/23/21 4298

## 2021-01-23 NOTE — PROGRESS NOTES
Continued Stay Note  [facility]     Patient Name: Esperanza Pop  MRN: [id number]  Today's Date: 1/23/2021    Admit Date: 1/19/2021    Discharge Plan     Row Name 01/23/21 1532       Plan    Plan  Home    Plan Comments  Faxed DC summary to [facility]    Final Discharge Disposition Code  01 - home or self-care        Discharge Codes    No documentation.       Expected Discharge Date and Time     Expected Discharge Date Expected Discharge Time    Jan 23, 2021             Katiana Polk RN

## 2021-01-25 ENCOUNTER — TRANSITIONAL CARE MANAGEMENT TELEPHONE ENCOUNTER (OUTPATIENT)
Dept: CALL CENTER | Facility: HOSPITAL | Age: 76
End: 2021-01-25

## 2021-01-25 NOTE — OUTREACH NOTE
Call Center TCM Note      Responses   [facility] patient discharged from?  [place]   Does the patient have one of the following disease processes/diagnoses(primary or secondary)?  CHF   TCM attempt successful?  Yes   Call start time  1153   Call end time  1156   Discharge diagnosis  Acute on chronic diastolic heart failure    Is patient permission given to speak with other caregiver?  Yes   List who call center can speak with  spouse- Juan   Person spoke with today (if not patient) and relationship  spouse- Juan   Does the patient have all medications ordered at discharge?  Yes   Is the patient taking all medications as directed (includes completed medication regime)?  Yes   Does the patient have a primary care provider?   Yes   Does the patient have an appointment with their PCP within 7 days of discharge?  No   Comments regarding PCP  Per spouse, patient will call to make a f/u appt.   Nursing Interventions  Advised patient to make appointment   Has the patient kept scheduled appointments due by today?  N/A   Did the patient receive a copy of their discharge instructions?  Yes   What is the patient's perception of their health status since discharge?  Improving   Is the patient/caregiver able to teach back the hierarchy of who to call/visit for symptoms/problems? PCP, Specialist, Home health nurse, Urgent Care, ED, 911  Yes   TCM call completed?  Yes   Wrap up additional comments  Per spouse, patient is doing well, no questions or concerns at this time.           Mabel Pop RN    1/25/2021, 11:56 EST

## 2021-01-26 NOTE — PROGRESS NOTES
Subjective:     Encounter Date:01/28/2021      Patient ID: Esperanza Pop is a 76 y.o.   female, retired , assistant , from [place], [place].      INTERNIST: Meghana Rodgers MD  REFERRING HEALTHCARE PROVIDER: [facility]   INTERVENTIONAL CARDIOLOGIST: Hugh Pop MD, [facility], [facility],Scooter Zhao MD, [facility], [facility], Corky Moon MD, [facility], [facility]  ADVANCED HEART FAILURE CLINIC: [facility]  VASCULAR SURGEON:  [facility]  GASTROENTEROLOGIST: Rubén Rosas MD  NEPHROLOGIST: Kevan Lerma MD.    Chief Complaint:   Chief Complaint   Patient presents with   • Acute on chronic diastolic heart failure (CMS/HCC)     Problem List:  1. Ischemic heart disease  a. Acute non-STEMI/congestive heart failure with diagnostic coronary arteriography demonstrating 20% LAD plaque with high-grade stenosis in the proximal mid right coronary artery requiring a Xience drug-eluting stent (3.0 mm x 28 mm) with reduced echocardiographic LVEF (0.25) with abnormal diastolic LV dysfunction, December 2013.   b. Improved echocardiogram, April 2014.  c. Remote non-STEMI related to malignant hypertension with distal RCA occlusion, patent previous RCA stent with mild inferior hypokinesis but overall acceptable systolic LV function (LVEF 0.55) with PTCA, DARRYL distal RCA, October 2016.  d. Residual CCS class I angina pectoris/NYHA class II CHF.  e. Mild aortic stenosis (June 2016).  f. Echocardiogram 02/07/2019: Normal size LV, moderate LV wall thickness, LVEF 0.65, impaired LV relaxation, normal left atrial pressure, mild MR, mild TR, mild WI, no pericardial effusion  g. Echocardiogram 01/20/2021: LVEF 60%, LV diastolic function consistent with grade 2 with high LAP pseudonormalization, LA severely increased, moderate aortic stenosis, RVSP 51 mmHg, RA mild to moderately dilated, mild mitral stenosis with functional MAC, moderate TR, normal RV  wall thickness and septal motion noted with RV cavity mildly dilated, no pericardial effusion  h. Right and left heart catheterization 01/21/2021: 80% distal LAD stenosis, status post intervention with Xience Demetra 2.5 x 15 mm DARRYL, previously placed stents in the RCA widely patent, LVEF 53% with hypokinesis of the basal to mid diaphragmatic/inferior segments, systemic hypertension with a wide pulse pressure, mild to moderate pulmonary hypertension, RVSP 67 mmHg, at least moderately decreased cardiac index 2.1 L/min/m² by thermal dilution and 1.6 L/min/m² by Brandon, recommendations for DAPT, statin, beta-blocker, long-acting nitrate, hydralazine  i. Residual CCS class I angina pectoris/NYHA class II CHF  2. Severe hypertension - probably essential.   3. Dyslipidemia.  4. Type 2 diabetes mellitus type 2 with suboptimal control (hemoglobin A1c 8.8%; March 2018, 7.6%, 10.7% February 2019; 8.2%, September 2019; 7.9%, December 2019, 6.9% September 2020).  5. Moderate aortic stenosis January 2021  6. Moderate pulmonary hypertension January 2021  7. Mild obesity (BMI 31.3); resolved, BMI 27.41  8. Acute kidney injury: Admission from 12/26/2013 to 01/02/2014, now resolved with latest creatinine 1.4 on 01/08/2014.  9. Moderate normocytic normochromic anemia; hemoglobin 9.2 gm/dL (June 2016).  10. Noncompliance.  11. Remote apparent end-stage renal disease with initiation of hemodialysis MWF weekly and construction of right upper extremity AV fistula; data deficit (June 2016) with subsequent AV fistula dysfunction and fistulogram with angioplasty - [facility], October 2017.  12. AV fistulogram with apparent thrombectomy [facility], February 2018  13. Palpitations with recent abnormal Holter monitor demonstrating intermittent brief atrial fibrillation, December 2017/January 2018, sinus rhythm on ECG January 2021  14. [facility] overnight hospitalization February 2019 for shortness of breath and thrombosed AV  fistula with angioplasty  15. [facility] 4-day hospitalization late January 2021 for acute on chronic diastolic heart failure precipitated by hypertensive emergency  16. Remote operations:  a. 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 Abdominal hernia repair/panniculectomy  c. Hysterectomy  d. Angioplasty for thrombosed AV fistula February 2019  e. C with stent January 2021    Allergies   Allergen Reactions   • Mircera [Methoxy Polyethylene Glycol-Epoetin Beta] Anaphylaxis     Pt stopped breathing   • Venofer [Iron Sucrose] Anaphylaxis     Pt stopped breathing   • Albuterol Other (See Comments)     Increased blood pressure  Used right before heart attack   • Nifedipine Er Swelling   • Penicillins Hives   • Prednisone Other (See Comments)     Makes jittery/anxious         Current Outpatient Medications:   •  amiodarone (PACERONE) 200 MG tablet, Take 1 tablet by mouth Daily., Disp: 30 tablet, Rfl: 5  •  ammonium lactate (LAC-HYDRIN) 12 % lotion, Apply  topically to the appropriate area as directed As Needed for Dry Skin. (Patient taking differently: Apply 1 application topically to the appropriate area as directed 2 (Two) Times a Day As Needed for Dry Skin.), Disp: 500 g, Rfl: 3  •  aspirin 81 MG EC tablet, Take 1 tablet by mouth Daily., Disp: , Rfl:   •  atorvastatin (LIPITOR) 80 MG tablet, Take 1 tablet by mouth Every Night., Disp: 90 tablet, Rfl: 1  •  carvedilol (COREG) 25 MG tablet, Take 1 tablet by mouth Every 12 (Twelve) Hours., Disp: 60 tablet, Rfl: 5  •  cloNIDine (CATAPRES) 0.2 MG tablet, Take 2 tablets by mouth Every 12 (Twelve) Hours., Disp: 120 tablet, Rfl: 0  •  clopidogrel (PLAVIX) 75 MG tablet, Take 1 tablet by mouth Daily., Disp: 90 tablet, Rfl: 3  •  desonide (DESOWEN) 0.05 % cream, Apply 0.05 application topically to the appropriate area as directed 2 (Two) Times a Day., Disp: , Rfl: 1  •  dorzolamide-timolol (COSOPT) 22.3-6.8 MG/ML ophthalmic solution, Administer 1 drop to both eyes Daily.,  Disp: 10 mL, Rfl: 3  •  famotidine (PEPCID) 20 MG tablet, Take 1 tablet by mouth 2 (Two) Times a Day., Disp: 180 tablet, Rfl: 1  •  Ferrous Sulfate (IRON) 325 (65 Fe) MG tablet, Take 1 tablet by mouth 2 (Two) Times a Day., Disp: , Rfl: 5  •  fluocinonide (LIDEX) 0.05 % external solution, Apply 0.05 application topically to the appropriate area as directed 2 (Two) Times a Day. Scalp, Disp: , Rfl: 2  •  hydrALAZINE (APRESOLINE) 50 MG tablet, Take 1 tablet by mouth Every 12 (Twelve) Hours., Disp: 60 tablet, Rfl: 0  •  insulin NPH-insulin regular (humuLIN 70/30,novoLIN 70/30) (70-30) 100 UNIT/ML injection, Inject 20 Units under the skin into the appropriate area as directed Every Night., Disp: , Rfl:   •  insulin NPH-insulin regular (humuLIN 70/30,novoLIN 70/30) (70-30) 100 UNIT/ML injection, Inject 20 units before dinner and 17 Units before breakfast., Disp: 10 mL, Rfl: 5  •  IRON DEXTRAN IJ, Inject  as directed 3 (Three) Times a Week., Disp: , Rfl:   •  isosorbide mononitrate (IMDUR) 120 MG 24 hr tablet, Take 1 tablet by mouth Daily., Disp: 90 tablet, Rfl: 3  •  lidocaine-prilocaine (EMLA) 2.5-2.5 % cream, Apply 1 application topically to the appropriate area as directed As Needed (dialysis access)., Disp: , Rfl: 6  •  nisoldipine (Sular) 8.5 MG 24 hr tablet, Take 1 tablet by mouth Daily., Disp: 30 tablet, Rfl: 0  •  nitroglycerin (Nitrolingual) 0.4 MG/SPRAY spray, Place 1 spray under the tongue Every 5 (Five) Minutes As Needed for Chest Pain., Disp: 1 each, Rfl: 12  •  terazosin (HYTRIN) 2 MG capsule, Take 2 capsules by mouth every night at bedtime., Disp: 180 capsule, Rfl: 1  •  torsemide (Demadex) 20 MG tablet, Take 2 tablets by mouth Daily., Disp: 60 tablet, Rfl: 0  •  B Complex-C-Folic Acid (DIALYVITE 800 PO), Take 1 tablet by mouth Daily., Disp: , Rfl:     HISTORY OF PRESENT ILLNESS:  The patient is here for 4-month follow-up.  At her last appointment we increased her clonidine to 0.2 mg twice daily and  increased nifedipine to 60 mg daily with an extra 30 mg p.m. dose if systolic blood pressure was greater than 160mmHg. She was seen in the atrial fibrillation clinic January 2021 and reported that she had stopped amiodarone for unknown reasons around 6 months ago and was supposed to restart low-dose amiodarone after her visit but she has not picked it up from the pharmacy because they were apparently out of this medication.  Also her Hytrin was increased to 4 mg nightly.  She had a recent 4-day [facility] hospitalization for acute on chronic diastolic heart failure precipitated by hypertensive emergency.  On her echocardiogram she was found to have a EF 60%, moderate pulmonary hypertension, moderate aortic stenosis, moderate TR, mild MS. She had a right and left heart catheterization 01/21/2021 and had a stent placed to the distal LAD. She goes to dialysis [facility].  The patient states that she feels much better since having the stent placed and since she had this performed, that her blood pressure has been around 130-140mmHg systolic.  She states that when she goes to dialysis that it will be good there as well.  Occasionally it will be elevated but it is much lower than it was previously.  She denies any chest pain, shortness of breath, palpitations, or any other symptoms.  She was supposed to go to the atrial fibrillation clinic tomorrow but she cannot go to this appointment because she has to take her  to radiation.  The patient admits that she has not had much appetite but is trying to still eat and will try she also uses Boost as a supplement.    Review of Systems   Constitution: Positive for decreased appetite and weight loss.   All other systems reviewed and are negative.     All other systems reviewed and otherwise negative.      ECG 12 Lead    Date/Time: 1/28/2021 2:49 PM  Performed by: Geena Estrella APRN  Authorized by: Geena Estrella APRN   Rhythm comments: Normal sinus rhythm, possible LAE, left axis  "deviation, septal infarct, age undetermined, abnormal ECG, 71 bpm,  ms,  ms,  ms, QTC has shortened compared to last ECG January 2021                 Objective:       Vitals:    01/28/21 1322   BP: 176/60   BP Location: Left arm   Patient Position: Sitting   Cuff Size: Adult   Pulse: 73   SpO2: 96%   Weight: 79.4 kg (175 lb)   Height: 170.2 cm (67\")   Recheck blood pressure left arm sitting was 176/60  Body mass index is 27.41 kg/m².  Last weight September 2020 was 185 pounds  Constitutional:       Appearance: Healthy appearance. Not in distress.   Neck:      Vascular: No JVR. JVD normal.   Pulmonary:      Effort: Pulmonary effort is normal.      Breath sounds: Decreased breath sounds present. No wheezing. No rhonchi. No rales.   Chest:      Chest wall: Not tender to palpatation.   Cardiovascular:      Murmurs: There is a grade 2/6 mid frequency harsh early systolic murmur at the URSB, LLSB and ULSB, radiating to the neck.   Pulses:     Dorsalis pedis: 1+ bilaterally.     Posterior tibial: 1+ bilaterally.  Abdominal:      General: Bowel sounds are normal.      Palpations: Abdomen is soft.      Tenderness: There is no abdominal tenderness.   Musculoskeletal: Normal range of motion.         General: No tenderness.   Skin:     General: Skin is warm and dry.   Neurological:      General: No focal deficit present.      Mental Status: Alert and oriented to person, place and time.           Lab Review:   Lab Results   Component Value Date    GLUCOSE 216 (H) 01/22/2021    BUN 33 (H) 01/22/2021    CREATININE 6.58 (H) 01/22/2021    EGFRIFNONA  01/22/2021      Comment:      <15 Indicative of kidney failure.    EGFRIFAFRI 7 (L) 01/22/2021    BCR 5.0 (L) 01/22/2021    CO2 26.0 01/22/2021    CALCIUM 9.0 01/22/2021    PROTENTOTREF 7.5 09/22/2020    ALBUMIN 3.10 (L) 01/22/2021    LABIL2 1.1 09/22/2020    AST 13 01/20/2021    ALT 6 01/20/2021       Lab Results   Component Value Date    WBC 6.26 01/22/2021    HGB " 8.2 (L) 01/22/2021    HCT 29.4 (L) 01/22/2021    MCV 86.0 01/22/2021     01/22/2021       Lab Results   Component Value Date    HGBA1C 6.9 09/22/2020       Lab Results   Component Value Date    TSH 1.270 09/22/2020       Lab Results   Component Value Date    CHOL 261 (H) 10/11/2016    CHOL 293 (H) 10/10/2016   Cholesterol 155 on 09/22/2020  Lab Results   Component Value Date    TRIG 84 09/22/2020    TRIG 106 09/10/2019     Lab Results   Component Value Date    HDL 56 09/22/2020    HDL 57 09/10/2019     Lab Results   Component Value Date    LDL 82 09/22/2020    LDL 89 09/10/2019         Lab Results   Component Value Date    BNP CANCELED 03/01/2018   Echocardiogram 01/19/2021:  · Estimated left ventricular EF = 60% Left ventricular ejection fraction appears to be 56 - 60%. Left ventricular systolic function is normal.  · Left ventricular diastolic function is consistent with (grade II w/high LAP) pseudonormalization.  · Left atrial volume is severely increased.  · Moderate aortic valve stenosis is present.  · Estimated right ventricular systolic pressure from tricuspid regurgitation is moderately elevated (45-55 mmHg). Calculated right ventricular systolic pressure from tricuspid regurgitation is 51 mmHg.  · Moderate pulmonary hypertension is present.  · The right atrial cavity is mild to moderately dilated.  · Mild mitral valve stenosis is present with functional MAC.  · Moderate tricuspid valve regurgitation is present.  · Normal right ventricular wall thickness and septal motion noted with the right ventricular cavity mildly dilated; mildly reduced right ventricular systolic function noted.  · There is no evidence of pericardial effusion.    Right and left heart catheterization 1/21/2021:  IMPRESSION:  · There is an 80% distal LAD stenosis that is status post intervention with a Xience Demetra 2.5 x 15 mm drug-eluting stent.    · The previously placed stents in the right coronary artery are widely  patent.  · Left ventricular ejection fraction 53% with hypokinesis of the basal to mid diaphragmatic/inferior segments  · Systemic hypertension with a wide pulse pressure  · Mild to moderate pulmonary hypertension with a mean PA pressure of 27 mmHg but a peak PA pressure of 67 mmHg.  · Normal pulmonary capillary wedge pressure of 7 mmHg  · At least moderately decreased cardiac index of 2.1 L/min/m² by thermal dilution and 1.6 L/min/m² by Brandon     RECOMMENDATIONS:  · Continue dual antiplatelet therapy, statin, beta-blocker, long-acting nitrate  · Continue medications for heart failure including beta-blocker, hydralazine, long-acting nitrate  · Further volume management with dialysis and torsemide  · Additional recommendations to follow    ECG 01/19/2021:  Sinus rhythm with sinus arrhythmia with frequent premature ventricular  complexes  Left axis deviation  Left ventricular hypertrophy with repolarization abnormality  Prolonged QT (523ms)  Abnormal ECG  When compared with ECG of 15-MARQUEZ-2021 12:13,  T wave inversion now evident in Inferior leads  Confirmed by CHIKI SOLIS ([id number]) on 1/19/2021 4:39:06 PM    Advance Care Planning   ACP discussion was held with the patient during this visit. Patient does not have an advance directive, declines further assistance.        Assessment:     Overall continued acceptable course with no new interim cardiopulmonary complaints with fair functional status on dialysis MWF. We will defer additional diagnostic or therapeutic intervention from a cardiac perspective at this time.She had a recent 4-day [facility] hospitalization for acute on chronic diastolic heart failure precipitated by hypertensive emergency.  On her echocardiogram she was found to have a EF 60%, moderate pulmonary hypertension, moderate aortic stenosis, moderate TR, mild MS. She had a right and left heart catheterization 1/21/2021 and had a stent placed to the distal LAD.  The patient had elevated blood pressure in office  today but states at home it is WNL most of the time.  She will start logging her blood pressure and call Susan Alfred RN next week with her blood pressure readings.  She will obtain her amiodarone from the pharmacy and begin this and understands that she will need to go to the atrial fibrillation clinic 1 week after starting it for a repeat ECG.  QTc was 493ms on ECG in office today and she is in sinus rhythm.  Her previous QTC was 523 ms on 01/19/2021.         Diagnosis Plan   1. Ischemic heart disease  She had a right and left heart catheterization 1/21/2021 and had a stent placed to the distal LAD.No recurrent angina pectoris or CHF on current activity schedule; continue current treatment   2. Essential hypertension  Uncontrolled, continue current cardiac medications, call Susan Alferd RN next week with blood pressure readings   3. Dyslipidemia  Excellent lipid panel September 2020, continue atorvastatin   4. Diabetic polyneuropathy associated with type 2 diabetes mellitus (CMS/[facility])  HgbA1C 6.9% September 2020, continue heart healthy diet and physical activity as tolerated   5. PAF (paroxysmal atrial fibrillation) (CMS/[facility])  No recurrent atrial fibrillation, patient will obtain prescription for low-dose amiodarone and get a repeat ECG in 1 week after beginning medication in the atrial fibrillation clinic          Plan:         1. Patient to continue current medications and close follow up with the above providers.  2. Tentative cardiology follow up in May 2021 or patient may return sooner PRN.  3. Patient to begin low-dose amiodarone and obtain ECG 1 week after beginning her medication in the atrial fibrillation clinic  4. Call Susan Alfred RN next week with her blood pressure readings      Electronically signed by WILMA Millan, 01/28/21, 2:55 PM EST.

## 2021-01-27 ENCOUNTER — DOCUMENTATION (OUTPATIENT)
Dept: CARDIAC REHAB | Facility: HOSPITAL | Age: 76
End: 2021-01-27

## 2021-01-28 ENCOUNTER — OFFICE VISIT (OUTPATIENT)
Dept: CARDIOLOGY | Facility: CLINIC | Age: 76
End: 2021-01-28

## 2021-01-28 ENCOUNTER — HOSPITAL ENCOUNTER (OUTPATIENT)
Dept: CARDIOLOGY | Facility: HOSPITAL | Age: 76
End: 2021-01-28

## 2021-01-28 VITALS
OXYGEN SATURATION: 96 % | HEIGHT: 67 IN | SYSTOLIC BLOOD PRESSURE: 176 MMHG | BODY MASS INDEX: 27.47 KG/M2 | HEART RATE: 73 BPM | DIASTOLIC BLOOD PRESSURE: 60 MMHG | WEIGHT: 175 LBS

## 2021-01-28 DIAGNOSIS — I10 ESSENTIAL HYPERTENSION: ICD-10-CM

## 2021-01-28 DIAGNOSIS — E11.42 DIABETIC POLYNEUROPATHY ASSOCIATED WITH TYPE 2 DIABETES MELLITUS (HCC): ICD-10-CM

## 2021-01-28 DIAGNOSIS — I48.0 PAF (PAROXYSMAL ATRIAL FIBRILLATION) (HCC): ICD-10-CM

## 2021-01-28 DIAGNOSIS — I25.9 ISCHEMIC HEART DISEASE: Primary | ICD-10-CM

## 2021-01-28 DIAGNOSIS — E78.5 DYSLIPIDEMIA: ICD-10-CM

## 2021-01-28 PROCEDURE — 93000 ELECTROCARDIOGRAM COMPLETE: CPT | Performed by: NURSE PRACTITIONER

## 2021-01-28 PROCEDURE — 99214 OFFICE O/P EST MOD 30 MIN: CPT | Performed by: NURSE PRACTITIONER

## 2021-02-01 ENCOUNTER — READMISSION MANAGEMENT (OUTPATIENT)
Dept: CALL CENTER | Facility: HOSPITAL | Age: 76
End: 2021-02-01

## 2021-02-01 NOTE — OUTREACH NOTE
CHF Week 2 Survey      Responses   [facility] patient discharged from?  [place]   Does the patient have one of the following disease processes/diagnoses(primary or secondary)?  CHF   Week 2 attempt successful?  No   Unsuccessful attempts  Attempt 1          Aneta Olivia RN

## 2021-02-02 ENCOUNTER — READMISSION MANAGEMENT (OUTPATIENT)
Dept: CALL CENTER | Facility: HOSPITAL | Age: 76
End: 2021-02-02

## 2021-02-02 NOTE — OUTREACH NOTE
CHF Week 2 Survey      Responses   [facility] patient discharged from?  [place]   Does the patient have one of the following disease processes/diagnoses(primary or secondary)?  CHF   Week 2 attempt successful?  No   Unsuccessful attempts  Attempt 2          Radha Walton RN

## 2021-02-10 ENCOUNTER — READMISSION MANAGEMENT (OUTPATIENT)
Dept: CALL CENTER | Facility: HOSPITAL | Age: 76
End: 2021-02-10

## 2021-02-10 NOTE — OUTREACH NOTE
CHF Week 3 Survey      Responses   [facility] patient discharged from?  [place]   Does the patient have one of the following disease processes/diagnoses(primary or secondary)?  CHF   Week 3 attempt successful?  Yes   Call start time  1610   Call end time  1612   Discharge diagnosis  Acute on chronic diastolic heart failure    Is patient permission given to speak with other caregiver?  Yes   List who call center can speak with  emiliano- Juan Bates reviewed with patient/caregiver?  Yes   Is the patient having any side effects they believe may be caused by any medication additions or changes?  No   Does the patient have all medications ordered at discharge?  Yes   Is the patient taking all medications as directed (includes completed medication regime)?  Yes   Does the patient have a primary care provider?   Yes   Does the patient have an appointment with their PCP within 7 days of discharge?  Greater than 7 days   Comments regarding PCP  Has a followup on 2/12/2021 with PCP   What is preventing the patient from scheduling follow up appointments within 7 days of discharge?  Earlier appointment not available   Nursing Interventions  Verified appointment date/time/provider   Has the patient kept scheduled appointments due by today?  N/A   Has home health visited the patient within 72 hours of discharge?  N/A   Psychosocial issues?  No   Did the patient receive a copy of their discharge instructions?  Yes   Nursing interventions  Reviewed instructions with patient   What is the patient's perception of their health status since discharge?  Improving   Nursing interventions  Nurse provided patient education   Is the patient weighing daily?  No   Does the patient have scales?  Yes   Daily weight interventions  Education provided on importance of daily weight   Is the patient able to teach back Heart Failure diet management?  Yes   Is the patient able to teach back Heart Failure Zones?  Yes   Is the patient able to  teach back signs and symptoms of worsening condition? (i.e. weight gain, shortness of air, etc.)  Yes   Is the patient/caregiver able to teach back the hierarchy of who to call/visit for symptoms/problems? PCP, Specialist, Home health nurse, Urgent Care, ED, 911  Yes   CHF Week 3 call completed?  Yes   Wrap up additional comments  Per spouse, patient is doing well, no questions or concerns at this time.          Jef Jett RN

## 2021-02-18 ENCOUNTER — TELEPHONE (OUTPATIENT)
Dept: INTERNAL MEDICINE | Facility: CLINIC | Age: 76
End: 2021-02-18

## 2021-02-18 NOTE — TELEPHONE ENCOUNTER
PT not available tomorrow, pt has dialysis does not have phone while there. Pt stated that she can schedule for next week, please advise if able to work in

## 2021-02-18 NOTE — TELEPHONE ENCOUNTER
I Have openings in the afternoon tomorrow.  Please check if she can do a telephone visit at one of those times?    thanks

## 2021-02-18 NOTE — TELEPHONE ENCOUNTER
Can do any 8.15 am available slot, except Tuesday as its full.  If tuesdays and thursdays are the only days she can, then we can do next Tuesday at 2.15 pm.

## 2021-02-18 NOTE — TELEPHONE ENCOUNTER
PT CALLED STATING SHE WAS AT hospitals FOR 1/19 FOR 4 DAYS    PT STATED SHE HAD SHORTNESS OF BREATH AND HAD A STINT PUT IN    PT IS REQUESTING TO DO A TELEPHONE VISIT FOR A HOSPITAL FOLLOW UP    PT DOES NOT WANT TO COME INTO OFFICE    PT IS REQUESTING AN APPT WITH DR OVIEDO    PLEASE ADVISE AT: [phone]

## 2021-02-19 ENCOUNTER — READMISSION MANAGEMENT (OUTPATIENT)
Dept: CALL CENTER | Facility: HOSPITAL | Age: 76
End: 2021-02-19

## 2021-02-19 NOTE — OUTREACH NOTE
CHF Week 4 Survey      Responses   [facility] patient discharged from?  [place]   Does the patient have one of the following disease processes/diagnoses(primary or secondary)?  CHF   Week 4 attempt successful?  Yes   Call start time  1012   Call end time  1015   Discharge diagnosis  Acute on chronic diastolic heart failure    Is patient permission given to speak with other caregiver?  Yes   List who call center can speak with  spouse- Juan   Person spoke with today (if not patient) and relationship  spouse- Juan   Meds reviewed with patient/caregiver?  Yes   Is the patient having any side effects they believe may be caused by any medication additions or changes?  No   Is the patient taking all medications as directed (includes completed medication regime)?  Yes   Has the patient kept scheduled appointments due by today?  N/A   Comments  She has PCP she sees.    Is the patient still receiving Home Health Services?  N/A   Pulse Ox monitoring  None   Psychosocial issues?  No   What is the patient's perception of their health status since discharge?  Improving   Nursing interventions  Nurse provided patient education   Is the patient weighing daily?  Yes   Does the patient have scales?  Yes   Daily weight interventions  Education provided on importance of daily weight   Is the patient able to teach back Heart Failure diet management?  Yes   Is the patient able to teach back Heart Failure Zones?  Yes [green zone- reports no swelling sob or weight gain. ]   Is the patient able to teach back signs and symptoms of worsening condition? (i.e. weight gain, shortness of air, etc.)  Yes   If the patient is a current smoker, are they able to teach back resources for cessation?  Not a smoker   Is the patient/caregiver able to teach back the hierarchy of who to call/visit for symptoms/problems? PCP, Specialist, Home health nurse, Urgent Care, ED, 911  Yes   Additional teach back comments  She is at , He reports she is  weighing every day.    Week 4 Call Completed?  Yes   Would the patient like one additional call?  No   Graduated  Yes   Is the patient interested in additional calls from an ambulatory ?  NOTE:  applies to high risk patients requiring additional follow-up.  No   Did the patient feel the follow up calls were helpful during their recovery period?  Yes   Was the number of calls appropriate?  Yes          Radha Walton RN

## 2021-02-22 ENCOUNTER — OFFICE VISIT (OUTPATIENT)
Dept: INTERNAL MEDICINE | Facility: CLINIC | Age: 76
End: 2021-02-22

## 2021-02-22 DIAGNOSIS — Z99.2 TYPE 2 DIABETES MELLITUS WITH CHRONIC KIDNEY DISEASE ON CHRONIC DIALYSIS, WITH LONG-TERM CURRENT USE OF INSULIN (HCC): ICD-10-CM

## 2021-02-22 DIAGNOSIS — N18.6 TYPE 2 DIABETES MELLITUS WITH CHRONIC KIDNEY DISEASE ON CHRONIC DIALYSIS, WITH LONG-TERM CURRENT USE OF INSULIN (HCC): ICD-10-CM

## 2021-02-22 DIAGNOSIS — Z79.4 TYPE 2 DIABETES MELLITUS WITH CHRONIC KIDNEY DISEASE ON CHRONIC DIALYSIS, WITH LONG-TERM CURRENT USE OF INSULIN (HCC): ICD-10-CM

## 2021-02-22 DIAGNOSIS — E11.22 TYPE 2 DIABETES MELLITUS WITH CHRONIC KIDNEY DISEASE ON CHRONIC DIALYSIS, WITH LONG-TERM CURRENT USE OF INSULIN (HCC): ICD-10-CM

## 2021-02-22 DIAGNOSIS — I10 UNCONTROLLED HYPERTENSION: ICD-10-CM

## 2021-02-22 DIAGNOSIS — I50.33 ACUTE ON CHRONIC DIASTOLIC HEART FAILURE (HCC): Primary | ICD-10-CM

## 2021-02-22 PROCEDURE — 99443 PR PHYS/QHP TELEPHONE EVALUATION 21-30 MIN: CPT | Performed by: INTERNAL MEDICINE

## 2021-02-22 RX ORDER — FAMOTIDINE 20 MG/1
20 TABLET, FILM COATED ORAL 2 TIMES DAILY
Qty: 180 TABLET | Refills: 1 | Status: SHIPPED | OUTPATIENT
Start: 2021-02-22 | End: 2023-02-09

## 2021-02-22 RX ORDER — HYDRALAZINE HYDROCHLORIDE 50 MG/1
50 TABLET, FILM COATED ORAL EVERY 12 HOURS SCHEDULED
Qty: 60 TABLET | Refills: 5 | Status: SHIPPED | OUTPATIENT
Start: 2021-02-22 | End: 2021-06-29 | Stop reason: SDUPTHER

## 2021-02-22 RX ORDER — CLONIDINE HYDROCHLORIDE 0.2 MG/1
0.4 TABLET ORAL EVERY 12 HOURS SCHEDULED
Qty: 120 TABLET | Refills: 5 | Status: SHIPPED | OUTPATIENT
Start: 2021-02-22 | End: 2021-06-29 | Stop reason: SDUPTHER

## 2021-02-22 RX ORDER — TORSEMIDE 20 MG/1
TABLET ORAL
Qty: 90 TABLET | Refills: 5 | Status: SHIPPED | OUTPATIENT
Start: 2021-02-22 | End: 2021-06-29 | Stop reason: SDUPTHER

## 2021-02-22 RX ORDER — NISOLDIPINE 8.5 MG/1
8.5 TABLET, FILM COATED, EXTENDED RELEASE ORAL DAILY
Qty: 30 TABLET | Refills: 5 | Status: SHIPPED | OUTPATIENT
Start: 2021-02-22 | End: 2021-03-23

## 2021-02-22 NOTE — PROGRESS NOTES
Follow-up (CHF)    Subjective   Esperanza Pop is a 76 y.o. female is here today for follow-up.    History of Present Illness   Esperanza presents for a follow up on her hospital visit for CHF exacerbation .  She stays weak after HD, as they are trying to take out more fluids.  BP is up and down.was 211 systolic Friday am.  Feet stays swollen in the morning when she wakes up.  Has not received her covid vaccination yet, she and Juan have been signed up and will get it shortly.  Of note she had -Right and left heart catheterization 01/21/2021: 80% distal LAD stenosis, status post intervention with Xience Demetra 2.5 x 15 mm DARRYL, previously placed stents in the RCA widely patent, LVEF 53% with hypokinesis of the basal to mid diaphragmatic/inferior segments, systemic hypertension with a wide pulse pressure, mild to moderate pulmonary hypertension, RVSP 67 mmHg, at least moderately decreased cardiac index 2.1 L/min/m² by thermal dilution and 1.6 L/min/m² by Brandon, recommendations for DAPT, statin, beta-blocker, long-acting nitrate, hydralazine    Current Outpatient Medications:   •  amiodarone (PACERONE) 200 MG tablet, Take 1 tablet by mouth Daily., Disp: 30 tablet, Rfl: 5  •  ammonium lactate (LAC-HYDRIN) 12 % lotion, Apply  topically to the appropriate area as directed As Needed for Dry Skin. (Patient taking differently: Apply 1 application topically to the appropriate area as directed 2 (Two) Times a Day As Needed for Dry Skin.), Disp: 500 g, Rfl: 3  •  aspirin 81 MG EC tablet, Take 1 tablet by mouth Daily., Disp: , Rfl:   •  atorvastatin (LIPITOR) 80 MG tablet, Take 1 tablet by mouth Every Night., Disp: 90 tablet, Rfl: 1  •  B Complex-C-Folic Acid (DIALYVITE 800 PO), Take 1 tablet by mouth Daily., Disp: , Rfl:   •  carvedilol (COREG) 25 MG tablet, Take 1 tablet by mouth Every 12 (Twelve) Hours., Disp: 60 tablet, Rfl: 5  •  cloNIDine (CATAPRES) 0.2 MG tablet, Take 2 tablets by mouth Every 12 (Twelve) Hours., Disp: 120  tablet, Rfl: 5  •  clopidogrel (PLAVIX) 75 MG tablet, Take 1 tablet by mouth Daily., Disp: 90 tablet, Rfl: 3  •  desonide (DESOWEN) 0.05 % cream, Apply 0.05 application topically to the appropriate area as directed 2 (Two) Times a Day., Disp: , Rfl: 1  •  dorzolamide-timolol (COSOPT) 22.3-6.8 MG/ML ophthalmic solution, Administer 1 drop to both eyes Daily., Disp: 10 mL, Rfl: 3  •  famotidine (PEPCID) 20 MG tablet, Take 1 tablet by mouth 2 (Two) Times a Day., Disp: 180 tablet, Rfl: 1  •  Ferrous Sulfate (IRON) 325 (65 Fe) MG tablet, Take 1 tablet by mouth 2 (Two) Times a Day., Disp: , Rfl: 5  •  fluocinonide (LIDEX) 0.05 % external solution, Apply 0.05 application topically to the appropriate area as directed 2 (Two) Times a Day. Scalp, Disp: , Rfl: 2  •  hydrALAZINE (APRESOLINE) 50 MG tablet, Take 1 tablet by mouth Every 12 (Twelve) Hours., Disp: 60 tablet, Rfl: 5  •  insulin NPH-insulin regular (humuLIN 70/30,novoLIN 70/30) (70-30) 100 UNIT/ML injection, Inject 20 units before dinner and 17 Units before breakfast., Disp: 10 mL, Rfl: 5  •  IRON DEXTRAN IJ, Inject  as directed 3 (Three) Times a Week., Disp: , Rfl:   •  isosorbide mononitrate (IMDUR) 120 MG 24 hr tablet, Take 1 tablet by mouth Daily., Disp: 90 tablet, Rfl: 3  •  lidocaine-prilocaine (EMLA) 2.5-2.5 % cream, Apply 1 application topically to the appropriate area as directed As Needed (dialysis access)., Disp: , Rfl: 6  •  nisoldipine (Sular) 8.5 MG 24 hr tablet, Take 1 tablet by mouth Daily., Disp: 30 tablet, Rfl: 5  •  nitroglycerin (Nitrolingual) 0.4 MG/SPRAY spray, Place 1 spray under the tongue Every 5 (Five) Minutes As Needed for Chest Pain., Disp: 1 each, Rfl: 12  •  terazosin (HYTRIN) 2 MG capsule, Take 2 capsules by mouth every night at bedtime., Disp: 180 capsule, Rfl: 1  •  torsemide (Demadex) 20 MG tablet, 1 PO QAM and 2 PO QPM, Disp: 90 tablet, Rfl: 5      The following portions of the patient's history were reviewed and updated as  appropriate: allergies, current medications, past family history, past medical history, past social history, past surgical history and problem list.    Review of Systems   Constitutional: Positive for fatigue. Negative for chills and fever.   HENT: Negative for ear discharge, ear pain, sinus pressure and sore throat.    Respiratory: Negative for cough, chest tightness and shortness of breath.    Cardiovascular: Negative for chest pain, palpitations and leg swelling.   Gastrointestinal: Negative for diarrhea, nausea and vomiting.   Musculoskeletal: Negative for arthralgias, back pain and myalgias.   Neurological: Positive for weakness. Negative for dizziness, syncope and headaches.   Psychiatric/Behavioral: Negative for confusion and sleep disturbance.       Objective   LMP  (LMP Unknown)   Physical Exam  Pulmonary:      Effort: Pulmonary effort is normal. No respiratory distress.   Neurological:      Mental Status: She is alert and oriented to person, place, and time.      Comments: Speech wnl   Psychiatric:         Judgment: Judgment normal.           Results for orders placed or performed during the hospital encounter of 01/19/21   COVID-19 and FLU A/B PCR - Swab, Nasopharynx    Specimen: Nasopharynx; Swab   Result Value Ref Range    COVID19 Not Detected Not Detected - Ref. Range    Influenza A PCR Not Detected Not Detected    Influenza B PCR Not Detected Not Detected   Comprehensive Metabolic Panel    Specimen: Blood   Result Value Ref Range    Glucose 214 (H) 65 - 99 mg/dL    BUN 24 (H) 8 - 23 mg/dL    Creatinine 5.22 (H) 0.57 - 1.00 mg/dL    Sodium 139 136 - 145 mmol/L    Potassium 3.5 3.5 - 5.2 mmol/L    Chloride 97 (L) 98 - 107 mmol/L    CO2 31.0 (H) 22.0 - 29.0 mmol/L    Calcium 9.2 8.6 - 10.5 mg/dL    Total Protein 7.9 6.0 - 8.5 g/dL    Albumin 3.30 (L) 3.50 - 5.20 g/dL    ALT (SGPT) 8 1 - 33 U/L    AST (SGOT) 16 1 - 32 U/L    Alkaline Phosphatase 114 39 - 117 U/L    Total Bilirubin 0.4 0.0 - 1.2 mg/dL     eGFR Non  Amer      eGFR  African Amer 10 (L) >60 mL/min/1.73    Globulin 4.6 gm/dL    A/G Ratio 0.7 g/dL    BUN/Creatinine Ratio 4.6 (L) 7.0 - 25.0    Anion Gap 11.0 5.0 - 15.0 mmol/L   BNP    Specimen: Blood   Result Value Ref Range    proBNP >70,000.0 (H) 0.0 - 900.0 pg/mL   Troponin    Specimen: Blood   Result Value Ref Range    Troponin T 0.072 (C) 0.000 - 0.030 ng/mL   CBC Auto Differential    Specimen: Blood   Result Value Ref Range    WBC 6.55 3.40 - 10.80 10*3/mm3    RBC 3.62 (L) 3.77 - 5.28 10*6/mm3    Hemoglobin 8.5 (L) 12.0 - 15.9 g/dL    Hematocrit 30.7 (L) 34.0 - 46.6 %    MCV 84.8 79.0 - 97.0 fL    MCH 23.5 (L) 26.6 - 33.0 pg    MCHC 27.7 (L) 31.5 - 35.7 g/dL    RDW 19.6 (H) 12.3 - 15.4 %    RDW-SD 60.8 (H) 37.0 - 54.0 fl    MPV 12.4 (H) 6.0 - 12.0 fL    Platelets 186 140 - 450 10*3/mm3    Neutrophil % 58.3 42.7 - 76.0 %    Lymphocyte % 25.6 19.6 - 45.3 %    Monocyte % 13.9 (H) 5.0 - 12.0 %    Eosinophil % 1.5 0.3 - 6.2 %    Basophil % 0.5 0.0 - 1.5 %    Immature Grans % 0.2 0.0 - 0.5 %    Neutrophils, Absolute 3.82 1.70 - 7.00 10*3/mm3    Lymphocytes, Absolute 1.68 0.70 - 3.10 10*3/mm3    Monocytes, Absolute 0.91 (H) 0.10 - 0.90 10*3/mm3    Eosinophils, Absolute 0.10 0.00 - 0.40 10*3/mm3    Basophils, Absolute 0.03 0.00 - 0.20 10*3/mm3    Immature Grans, Absolute 0.01 0.00 - 0.05 10*3/mm3    nRBC 0.0 0.0 - 0.2 /100 WBC   Scan Slide    Specimen: Blood   Result Value Ref Range    Hypochromia Slight/1+ None Seen    Polychromasia Slight/1+ None Seen    WBC Morphology Normal Normal    Platelet Morphology Normal Normal   Comprehensive Metabolic Panel    Specimen: Blood   Result Value Ref Range    Glucose 172 (H) 65 - 99 mg/dL    BUN 27 (H) 8 - 23 mg/dL    Creatinine 5.91 (H) 0.57 - 1.00 mg/dL    Sodium 139 136 - 145 mmol/L    Potassium 3.2 (L) 3.5 - 5.2 mmol/L    Chloride 100 98 - 107 mmol/L    CO2 28.0 22.0 - 29.0 mmol/L    Calcium 8.9 8.6 - 10.5 mg/dL    Total Protein 7.2 6.0 - 8.5 g/dL    Albumin  3.00 (L) 3.50 - 5.20 g/dL    ALT (SGPT) 6 1 - 33 U/L    AST (SGOT) 13 1 - 32 U/L    Alkaline Phosphatase 106 39 - 117 U/L    Total Bilirubin 0.4 0.0 - 1.2 mg/dL    eGFR Non  Amer      eGFR  African Amer 8 (L) >60 mL/min/1.73    Globulin 4.2 gm/dL    A/G Ratio 0.7 g/dL    BUN/Creatinine Ratio 4.6 (L) 7.0 - 25.0    Anion Gap 11.0 5.0 - 15.0 mmol/L   CBC (No Diff)    Specimen: Blood   Result Value Ref Range    WBC 5.26 3.40 - 10.80 10*3/mm3    RBC 3.20 (L) 3.77 - 5.28 10*6/mm3    Hemoglobin 7.6 (L) 12.0 - 15.9 g/dL    Hematocrit 26.8 (L) 34.0 - 46.6 %    MCV 83.8 79.0 - 97.0 fL    MCH 23.8 (L) 26.6 - 33.0 pg    MCHC 28.4 (L) 31.5 - 35.7 g/dL    RDW 19.0 (H) 12.3 - 15.4 %    RDW-SD 57.9 (H) 37.0 - 54.0 fl    MPV 12.4 (H) 6.0 - 12.0 fL    Platelets 191 140 - 450 10*3/mm3   Hepatitis Panel, Acute    Specimen: Blood   Result Value Ref Range    Hepatitis B Surface Ag Non-Reactive Non-Reactive    Hep A IgM Non-Reactive Non-Reactive    Hep B C IgM Non-Reactive Non-Reactive    Hepatitis C Ab Non-Reactive Non-Reactive   Renal Function Panel    Specimen: Blood   Result Value Ref Range    Glucose 217 (H) 65 - 99 mg/dL    BUN 26 (H) 8 - 23 mg/dL    Creatinine 5.04 (H) 0.57 - 1.00 mg/dL    Sodium 135 (L) 136 - 145 mmol/L    Potassium 4.1 3.5 - 5.2 mmol/L    Chloride 97 (L) 98 - 107 mmol/L    CO2 28.0 22.0 - 29.0 mmol/L    Calcium 9.3 8.6 - 10.5 mg/dL    Albumin 3.30 (L) 3.50 - 5.20 g/dL    Phosphorus 4.2 2.5 - 4.5 mg/dL    Anion Gap 10.0 5.0 - 15.0 mmol/L    BUN/Creatinine Ratio 5.2 (L) 7.0 - 25.0    eGFR Non  Amer      eGFR  African Amer 10 (L) >60 mL/min/1.73   Iron Profile    Specimen: Blood   Result Value Ref Range    Iron 26 (L) 37 - 145 mcg/dL    Iron Saturation 7 (L) 20 - 50 %    Transferrin 261 200 - 360 mg/dL    TIBC 389 298 - 536 mcg/dL   Ferritin    Specimen: Blood   Result Value Ref Range    Ferritin 28.81 13.00 - 150.00 ng/mL   CBC (No Diff)    Specimen: Blood   Result Value Ref Range    WBC 6.75 3.40 -  10.80 10*3/mm3    RBC 3.62 (L) 3.77 - 5.28 10*6/mm3    Hemoglobin 8.4 (L) 12.0 - 15.9 g/dL    Hematocrit 31.1 (L) 34.0 - 46.6 %    MCV 85.9 79.0 - 97.0 fL    MCH 23.2 (L) 26.6 - 33.0 pg    MCHC 27.0 (L) 31.5 - 35.7 g/dL    RDW 19.8 (H) 12.3 - 15.4 %    RDW-SD 61.4 (H) 37.0 - 54.0 fl    MPV 12.3 (H) 6.0 - 12.0 fL    Platelets 185 140 - 450 10*3/mm3   Blood Gas, Venous With Co-Ox    Specimen: Venous Blood   Result Value Ref Range    Site PA Wedge     pH, Venous 7.406 pH Units    pCO2, Venous 47.4 41.0 - 51.0 mm Hg    pO2, Venous 32.7 27.0 - 53.0 mm Hg    HCO3, Venous 29.7 (H) 22.0 - 28.0 mmol/L    Base Excess, Venous 4.5 (H) -2.0 - 2.0 mmol/L    Hemoglobin, Blood Gas 8.2 (L) 14 - 18 g/dL    Oxyhemoglobin Venous 54.5 %    Methemoglobin Venous 0.5 %    Carboxyhemoglobin Venous 1.5 %    CO2 Content 31.2 22 - 33 mmol/L    Temperature 37.0 C    Barometric Pressure for Blood Gas      Modality Room Air     FIO2 21 %    Rate 0 Breaths/minute    PIP 0 cmH2O    IPAP 0     EPAP 0     Note     Blood Gas, Arterial With Co-Ox    Specimen: Arterial Blood   Result Value Ref Range    Site Arterial Line     Mack's Test N/A     pH, Arterial 7.455 (H) 7.350 - 7.450 pH units    pCO2, Arterial 39.4 35.0 - 45.0 mm Hg    pO2, Arterial 70.7 (L) 83.0 - 108.0 mm Hg    HCO3, Arterial 27.7 (H) 20.0 - 26.0 mmol/L    Base Excess, Arterial 3.6 (H) 0.0 - 2.0 mmol/L    Hemoglobin, Blood Gas 8.1 (L) 14 - 18 g/dL    Hematocrit, Blood Gas 25.0 %    Oxyhemoglobin 92.5 (L) 94 - 99 %    Methemoglobin 0.40 0.00 - 1.50 %    Carboxyhemoglobin 1.4 0 - 2 %    CO2 Content 28.9 22 - 33 mmol/L    Temperature 37.0 C    Barometric Pressure for Blood Gas      Modality Room Air     FIO2 21 %    Rate 0 Breaths/minute    PIP 0 cmH2O    IPAP 0     EPAP 0     Note      pH, Temp Corrected 7.455 pH Units    pCO2, Temperature Corrected 39.4 35 - 45 mm Hg    pO2, Temperature Corrected 70.7 (L) 83 - 108 mm Hg   Vitamin B12    Specimen: Blood   Result Value Ref Range    Vitamin  B-12 1,007 (H) 211 - 946 pg/mL   Folate    Specimen: Blood   Result Value Ref Range    Folate 13.10 4.78 - 24.20 ng/mL   Ferritin    Specimen: Blood   Result Value Ref Range    Ferritin 28.85 13.00 - 150.00 ng/mL   Iron Profile    Specimen: Blood   Result Value Ref Range    Iron 23 (L) 37 - 145 mcg/dL    Iron Saturation 7 (L) 20 - 50 %    Transferrin 231 200 - 360 mg/dL    TIBC 344 298 - 536 mcg/dL   Reticulocytes    Specimen: Blood   Result Value Ref Range    Reticulocyte % 2.24 (H) 0.70 - 1.90 %    Reticulocyte Absolute 0.0766 0.0200 - 0.1300 10*6/mm3   CBC (No Diff)    Specimen: Blood   Result Value Ref Range    WBC 6.26 3.40 - 10.80 10*3/mm3    RBC 3.42 (L) 3.77 - 5.28 10*6/mm3    Hemoglobin 8.2 (L) 12.0 - 15.9 g/dL    Hematocrit 29.4 (L) 34.0 - 46.6 %    MCV 86.0 79.0 - 97.0 fL    MCH 24.0 (L) 26.6 - 33.0 pg    MCHC 27.9 (L) 31.5 - 35.7 g/dL    RDW 19.9 (H) 12.3 - 15.4 %    RDW-SD 62.4 (H) 37.0 - 54.0 fl    MPV 12.7 (H) 6.0 - 12.0 fL    Platelets 167 140 - 450 10*3/mm3   Renal Function Panel    Specimen: Blood   Result Value Ref Range    Glucose 216 (H) 65 - 99 mg/dL    BUN 33 (H) 8 - 23 mg/dL    Creatinine 6.58 (H) 0.57 - 1.00 mg/dL    Sodium 131 (L) 136 - 145 mmol/L    Potassium 3.9 3.5 - 5.2 mmol/L    Chloride 93 (L) 98 - 107 mmol/L    CO2 26.0 22.0 - 29.0 mmol/L    Calcium 9.0 8.6 - 10.5 mg/dL    Albumin 3.10 (L) 3.50 - 5.20 g/dL    Phosphorus 4.4 2.5 - 4.5 mg/dL    Anion Gap 12.0 5.0 - 15.0 mmol/L    BUN/Creatinine Ratio 5.0 (L) 7.0 - 25.0    eGFR Non  Amer      eGFR  African Amer 7 (L) >60 mL/min/1.73   POC Glucose Once    Specimen: Blood   Result Value Ref Range    Glucose 152 (H) 70 - 130 mg/dL   POC Glucose Once    Specimen: Blood   Result Value Ref Range    Glucose 255 (H) 70 - 130 mg/dL   POC Glucose Once    Specimen: Blood   Result Value Ref Range    Glucose 167 (H) 70 - 130 mg/dL   POC Glucose Once    Specimen: Blood   Result Value Ref Range    Glucose 161 (H) 70 - 130 mg/dL   POC Glucose  Once    Specimen: Blood   Result Value Ref Range    Glucose 345 (H) 70 - 130 mg/dL   POC Glucose Once    Specimen: Blood   Result Value Ref Range    Glucose 240 (H) 70 - 130 mg/dL   POC Glucose Once    Specimen: Blood   Result Value Ref Range    Glucose 219 (H) 70 - 130 mg/dL   POC Glucose Once    Specimen: Blood   Result Value Ref Range    Glucose 184 (H) 70 - 130 mg/dL   POC Glucose Once    Specimen: Blood   Result Value Ref Range    Glucose 129 70 - 130 mg/dL   POC Glucose Once    Specimen: Blood   Result Value Ref Range    Glucose 251 (H) 70 - 130 mg/dL   POC Activated Clotting Time    Specimen: Blood   Result Value Ref Range    Activated Clotting Time  158 (H) 82 - 152 Seconds   POC Activated Clotting Time    Specimen: Blood   Result Value Ref Range    Activated Clotting Time  136 82 - 152 Seconds   POC Activated Clotting Time    Specimen: Blood   Result Value Ref Range    Activated Clotting Time  142 82 - 152 Seconds   POC Glucose Once    Specimen: Blood   Result Value Ref Range    Glucose 138 (H) 70 - 130 mg/dL   POC Glucose Once    Specimen: Blood   Result Value Ref Range    Glucose 190 (H) 70 - 130 mg/dL   POC Glucose Once    Specimen: Blood   Result Value Ref Range    Glucose 277 (H) 70 - 130 mg/dL   POC Glucose Once    Specimen: Blood   Result Value Ref Range    Glucose 190 (H) 70 - 130 mg/dL   POC Glucose Once    Specimen: Blood   Result Value Ref Range    Glucose 183 (H) 70 - 130 mg/dL   POC Glucose Once    Specimen: Blood   Result Value Ref Range    Glucose 263 (H) 70 - 130 mg/dL   ECG 12 Lead   Result Value Ref Range    QT Interval 472 ms    QTC Interval 523 ms   Adult Transthoracic Echo Complete W/ Cont if Necessary Per Protocol   Result Value Ref Range    BSA 2.0 m^2    IVSd 1.0 cm    LVIDd 5.6 cm    LVIDs 3.0 cm    LVPWd 1.0 cm    IVS/LVPW 0.99     FS 46.5 %    EDV(Teich) 151.2 ml    ESV(Teich) 34.3 ml    EF(Teich) 77.3 %    EDV(cubed) 172.0 ml    ESV(cubed) 26.3 ml    EF(cubed) 84.7 %    LV  mass(C)d 206.8 grams    LV mass(C)dI 105.5 grams/m^2    SV(Teich) 117.0 ml    SI(Teich) 59.7 ml/m^2    SV(cubed) 145.7 ml    SI(cubed) 74.3 ml/m^2    Ao root diam 2.7 cm    Ao root area 5.7 cm^2    LA dimension 4.6 cm    asc Aorta Diam 3.2 cm    LA/Ao 1.7     LVOT diam 2.1 cm    LVOT area 3.6 cm^2    LVOT area(traced) 3.5 cm^2    LAd major 6.4 cm    LVLd ap4 8.7 cm    EDV(MOD-sp4) 204.0 ml    LVLs ap4 8.0 cm    ESV(MOD-sp4) 110.0 ml    EF(MOD-sp4) 46.1 %    LVLd ap2 9.2 cm    EDV(MOD-sp2) 204.0 ml    LVLs ap2 8.5 cm    ESV(MOD-sp2) 108.0 ml    EF(MOD-sp2) 47.1 %    LA volume 116.0 ml    EF(MOD-bp) 47.0 %    SV(MOD-sp4) 94.0 ml    SI(MOD-sp4) 47.9 ml/m^2    SV(MOD-sp2) 96.0 ml    SI(MOD-sp2) 49.0 ml/m^2    Ao root area (BSA corrected) 1.4     LV Mcnamara Vol (BSA corrected) 104.0 ml/m^2    LV Sys Vol (BSA corrected) 56.1 ml/m^2    LA Volume Index 59.2 ml/m^2    MV E max will 148.1 cm/sec    MV A max will 116.6 cm/sec    MV E/A 1.3     LV IVRT 0.08 sec    MV V2 max 161.4 cm/sec    MV max PG 10.4 mmHg    MV V2 mean 85.7 cm/sec    MV mean PG 3.5 mmHg    MV V2 VTI 42.5 cm    MVA(VTI) 1.6 cm^2    MV P1/2t max will 168.9 cm/sec    MV P1/2t 76.3 msec    MVA(P1/2t) 2.9 cm^2    MV dec slope 648.5 cm/sec^2    MV dec time 0.18 sec    Ao pk will 312.2 cm/sec    Ao max PG 39.0 mmHg    Ao max PG (full) 36.4 mmHg    Ao V2 mean 213.0 cm/sec    Ao mean PG 21.4 mmHg    Ao mean PG (full) 20.1 mmHg    Ao V2 VTI 73.5 cm    DOUG(I,A) 0.9 cm^2    DOUG(I,D) 0.9 cm^2    DOUG(V,A) 0.92 cm^2    DOUG(V,D) 0.92 cm^2    LV V1 max PG 2.6 mmHg    LV V1 mean PG 1.3 mmHg    LV V1 max 79.9 cm/sec    LV V1 mean 53.2 cm/sec    LV V1 VTI 18.5 cm    SV(Ao) 419.7 ml    SI(Ao) 214.1 ml/m^2    SV(LVOT) 66.4 ml    SI(LVOT) 33.9 ml/m^2    PA V2 max 105.0 cm/sec    PA max PG 4.4 mmHg    PA acc slope 754.2 cm/sec^2    PA acc time 0.09 sec    PI end-d will 119.0 cm/sec    TR max will 327 cm/sec    TR max PG 43 mmHg    PA pr(Accel) 39.4 mmHg    MVA P1/2T LCG 1.3 cm^2    Lat  E/e'  24.8     Med E/e' 51.6     Lat Peak E' Zeke 5.8 cm/sec    Med Peak E' Zeke 2.7 cm/sec    BH CV ECHO BABAR - BZI_BMI 29.1 kilograms/m^2    BH CV ECHO BABAR - BSA(HAYCOCK) 2.0 m^2     CV ECHO BABAR - BZI_METRIC_WEIGHT 84.4 kg    BH CV ECHO BABAR - BZI_METRIC_HEIGHT 170.2 cm    Avg E/e' ratio 34.85      CV VAS BP LEFT /85 mmHg    Ascending aorta 3.2 cm    IVRT 85.0 msec    RAP systole 8 mmHg    RVSP(TR) 51 mmHg    Echo EF Estimated 60 %   Light Blue Top   Result Value Ref Range    Extra Tube hold for add-on    Green Top (Gel)   Result Value Ref Range    Extra Tube Hold for add-ons.    Lavender Top   Result Value Ref Range    Extra Tube hold for add-on    Gold Top - SST   Result Value Ref Range    Extra Tube Hold for add-ons.              Assessment/Plan   Diagnoses and all orders for this visit:    Acute on chronic diastolic heart failure (CMS/HCC)  -     torsemide (Demadex) 20 MG tablet; 1 PO QAM and 2 PO QPM    Type 2 diabetes mellitus with chronic kidney disease on chronic dialysis, with long-term current use of insulin (CMS/HCC)  -     famotidine (PEPCID) 20 MG tablet; Take 1 tablet by mouth 2 (Two) Times a Day.  -     insulin NPH-insulin regular (humuLIN 70/30,novoLIN 70/30) (70-30) 100 UNIT/ML injection; Inject 20 units before dinner and 17 Units before breakfast.    Uncontrolled hypertension  -     cloNIDine (CATAPRES) 0.2 MG tablet; Take 2 tablets by mouth Every 12 (Twelve) Hours.  -     hydrALAZINE (APRESOLINE) 50 MG tablet; Take 1 tablet by mouth Every 12 (Twelve) Hours.  -     nisoldipine (Sular) 8.5 MG 24 hr tablet; Take 1 tablet by mouth Daily.    sugars are okay per Pt< 200 mostly. Continue current insulin regimen.    Monitor BP and increase hydralazine to 50 tid if > 140 systolic.    Increase demadex as above.    This visit has been rescheduled as a phone visit to comply with patient safety concerns in accordance with CDC recommendations. Total time of discussion was 22 minutes.                Return in about 8 weeks (around 4/19/2021) for Recheck with A1C.

## 2021-02-23 ENCOUNTER — OFFICE VISIT (OUTPATIENT)
Dept: CARDIOLOGY | Facility: HOSPITAL | Age: 76
End: 2021-02-23

## 2021-02-23 VITALS
BODY MASS INDEX: 27.53 KG/M2 | WEIGHT: 175.38 LBS | TEMPERATURE: 97.6 F | RESPIRATION RATE: 14 BRPM | DIASTOLIC BLOOD PRESSURE: 78 MMHG | HEART RATE: 58 BPM | HEIGHT: 67 IN | OXYGEN SATURATION: 97 % | SYSTOLIC BLOOD PRESSURE: 158 MMHG

## 2021-02-23 DIAGNOSIS — I48.0 PAF (PAROXYSMAL ATRIAL FIBRILLATION) (HCC): ICD-10-CM

## 2021-02-23 DIAGNOSIS — N18.6 END STAGE RENAL DISEASE ON DIALYSIS (HCC): ICD-10-CM

## 2021-02-23 DIAGNOSIS — Z99.2 END STAGE RENAL DISEASE ON DIALYSIS (HCC): ICD-10-CM

## 2021-02-23 DIAGNOSIS — I50.32 CHRONIC DIASTOLIC HEART FAILURE (HCC): Primary | ICD-10-CM

## 2021-02-23 DIAGNOSIS — I25.10 CORONARY ARTERY DISEASE INVOLVING NATIVE CORONARY ARTERY OF NATIVE HEART WITHOUT ANGINA PECTORIS: ICD-10-CM

## 2021-02-23 PROCEDURE — 99214 OFFICE O/P EST MOD 30 MIN: CPT | Performed by: NURSE PRACTITIONER

## 2021-02-24 NOTE — PROGRESS NOTES
"Chief Complaint  Congestive Heart Failure and Follow-up    Subjective    History of Present Illness {CC  Problem List  Visit  Diagnosis   Encounters  Notes  Medications  Labs  Result Review Imaging  Media :23}       History of Present Illness   76-year-old female presents the office today for ongoing evaluationOf her chronic diastolic heart failure and CAD.  Patient recently hospitalized January 2021 underwent stenting to the distal LAD.  Previously placed stents in RCA are widely patent.  EF 53% with hypokinesis of the basal to mid inferior segments.  She is currently on hemodialysis 3 days a week.  Reports her last hemodialysis session was yesterday.  She notes she is feeling significantly better.  Notes dyspnea on exertion has gone from severe to mild.  Dyspnea on exertion always improves with rest.  She currently denies any chest pain.  Denies pedal edema, orthopnea, abdominal fullness, syncope.  Objective     Vital Signs:   Vitals:    02/23/21 1331 02/23/21 1356   BP: (!) 184/86 158/78   BP Location: Left arm Left arm   Patient Position: Sitting Sitting   Cuff Size: Adult    Pulse: 58    Resp: 14    Temp: 97.6 °F (36.4 °C)    TempSrc: Temporal    SpO2: 97%    Weight: 79.5 kg (175 lb 6 oz)    Height: 170.2 cm (67\")      Body mass index is 27.47 kg/m².  Physical Exam  Vitals signs and nursing note reviewed.   Constitutional:       Appearance: Normal appearance.   HENT:      Head: Normocephalic.   Eyes:      Pupils: Pupils are equal, round, and reactive to light.   Neck:      Musculoskeletal: Normal range of motion.   Cardiovascular:      Rate and Rhythm: Normal rate and regular rhythm.      Pulses: Normal pulses.      Heart sounds: Murmur (2/6) present.   Pulmonary:      Effort: Pulmonary effort is normal.      Breath sounds: Normal breath sounds.   Abdominal:      General: Bowel sounds are normal.      Palpations: Abdomen is soft.   Musculoskeletal: Normal range of motion.      Right lower leg: No " edema.      Left lower leg: No edema.   Skin:     General: Skin is warm and dry.      Capillary Refill: Capillary refill takes less than 2 seconds.   Neurological:      Mental Status: She is alert and oriented to person, place, and time.   Psychiatric:         Mood and Affect: Mood normal.         Thought Content: Thought content normal.              Result Review  Data Reviewed:{ Labs  Result Review  Imaging  Med Tab  Media :23}   CBC (No Diff) (01/22/2021 03:58)  Renal Function Panel (01/22/2021 03:58)               Assessment and Plan {CC Problem List  Visit Diagnosis  ROS  Review (Popup)  Health Maintenance  Quality  BestPractice  Medications  SmartSets  SnapShot Encounters  Media :23}   1. Chronic diastolic heart failure (CMS/HCC)  euvolemic  Heart failure education today including signs and symptoms, the role of the heart failure center, daily weights, low sodium diet (less than 1500 mg per day), and daily physical activity. Reviewed HF Zones with patient and family.  Patient to continue current medications as previously ordered.     2. PAF (paroxysmal atrial fibrillation) (CMS/[facility])  Maintaining normal sinus rhythm on amiodarone with stable QT/QTc  CHADS-VASc Risk Assessment            5       Total Score        1 CHF    1 Hypertension    1 DM    1 Age 65-74    1 Sex: Female        Criteria that do not apply:    Age >/= 75    PRIOR STROKE/TIA/THROMBO    Vascular Disease          3. End stage renal disease on dialysis (CMS/[facility])  Currently on HD 3 days a week     4. Coronary artery disease involving native coronary artery of native heart without angina pectoris  Recent stent to distal LAD.  Continue aspirin, Lipitor, carvedilol, Plavix  Currently without angina      Follow Up {Instructions Charge Capture  Follow-up Communications :23}   Return in about 8 weeks (around 4/20/2021) for Office visit, HF, HTN.    Patient was given instructions and counseling regarding her condition or for health  maintenance advice. Please see specific information pulled into the AVS if appropriate.  Patient was instructed to call the [facility] with any questions, concerns, or worsening symptoms.    *Please note that portions of this note were completed with a voice recognition program. Efforts were made to edit the dictations, but occasionally words are mistranscribed.

## 2021-03-23 ENCOUNTER — OFFICE VISIT (OUTPATIENT)
Dept: INTERNAL MEDICINE | Facility: CLINIC | Age: 76
End: 2021-03-23

## 2021-03-23 VITALS
HEART RATE: 67 BPM | HEIGHT: 67 IN | SYSTOLIC BLOOD PRESSURE: 112 MMHG | BODY MASS INDEX: 27.15 KG/M2 | TEMPERATURE: 98 F | OXYGEN SATURATION: 100 % | WEIGHT: 173 LBS | DIASTOLIC BLOOD PRESSURE: 52 MMHG

## 2021-03-23 DIAGNOSIS — Z79.4 TYPE 2 DIABETES MELLITUS WITH CHRONIC KIDNEY DISEASE ON CHRONIC DIALYSIS, WITH LONG-TERM CURRENT USE OF INSULIN (HCC): Primary | ICD-10-CM

## 2021-03-23 DIAGNOSIS — N18.6 TYPE 2 DIABETES MELLITUS WITH CHRONIC KIDNEY DISEASE ON CHRONIC DIALYSIS, WITH LONG-TERM CURRENT USE OF INSULIN (HCC): Primary | ICD-10-CM

## 2021-03-23 DIAGNOSIS — I10 ESSENTIAL HYPERTENSION: ICD-10-CM

## 2021-03-23 DIAGNOSIS — E11.22 TYPE 2 DIABETES MELLITUS WITH CHRONIC KIDNEY DISEASE ON CHRONIC DIALYSIS, WITH LONG-TERM CURRENT USE OF INSULIN (HCC): Primary | ICD-10-CM

## 2021-03-23 DIAGNOSIS — E11.49 OTHER DIABETIC NEUROLOGICAL COMPLICATION ASSOCIATED WITH TYPE 2 DIABETES MELLITUS (HCC): ICD-10-CM

## 2021-03-23 DIAGNOSIS — Z99.2 TYPE 2 DIABETES MELLITUS WITH CHRONIC KIDNEY DISEASE ON CHRONIC DIALYSIS, WITH LONG-TERM CURRENT USE OF INSULIN (HCC): Primary | ICD-10-CM

## 2021-03-23 LAB — HBA1C MFR BLD: 7.2 %

## 2021-03-23 PROCEDURE — 83036 HEMOGLOBIN GLYCOSYLATED A1C: CPT | Performed by: INTERNAL MEDICINE

## 2021-03-23 PROCEDURE — 99214 OFFICE O/P EST MOD 30 MIN: CPT | Performed by: INTERNAL MEDICINE

## 2021-03-23 RX ORDER — ASPIRIN 81 MG
TABLET,CHEWABLE ORAL
Qty: 60 G | Refills: 3 | Status: ON HOLD | OUTPATIENT
Start: 2021-03-23

## 2021-03-23 RX ORDER — BISACODYL 5 MG/1
5 TABLET, DELAYED RELEASE ORAL DAILY PRN
Status: ON HOLD | COMMUNITY

## 2021-03-23 RX ORDER — NORTRIPTYLINE HYDROCHLORIDE 10 MG/1
10 CAPSULE ORAL NIGHTLY
Qty: 30 CAPSULE | Refills: 3 | Status: SHIPPED | OUTPATIENT
Start: 2021-03-23 | End: 2021-06-29 | Stop reason: SDUPTHER

## 2021-03-23 NOTE — PROGRESS NOTES
Diabetes and Dizziness    Subjective   Esperanza Pop is a 76 y.o. female is here today for follow-up.    History of Present Illness   Here for a follow up on her htn and dm2. On HD m,w,f, thinks renetta out too much fluid.  SUgars are stable.  Dizzy today. Took her full hydralazine. Takes clonidine prn    Her neuropathy is worse. Has been using Franincense and Myrrh, with partial results. Asking if any other options.        Current Outpatient Medications:   •  amiodarone (PACERONE) 200 MG tablet, Take 1 tablet by mouth Daily., Disp: 30 tablet, Rfl: 5  •  ammonium lactate (LAC-HYDRIN) 12 % lotion, Apply  topically to the appropriate area as directed As Needed for Dry Skin. (Patient taking differently: Apply 1 application topically to the appropriate area as directed 2 (Two) Times a Day As Needed for Dry Skin.), Disp: 500 g, Rfl: 3  •  aspirin 81 MG EC tablet, Take 1 tablet by mouth Daily., Disp: , Rfl:   •  atorvastatin (LIPITOR) 80 MG tablet, Take 1 tablet by mouth Every Night., Disp: 90 tablet, Rfl: 1  •  B Complex-C-Folic Acid (DIALYVITE 800 PO), Take 1 tablet by mouth Daily., Disp: , Rfl:   •  bisacodyl (DULCOLAX) 5 MG EC tablet, Take 5 mg by mouth Daily As Needed for Constipation., Disp: , Rfl:   •  carvedilol (COREG) 25 MG tablet, Take 1 tablet by mouth Every 12 (Twelve) Hours., Disp: 60 tablet, Rfl: 5  •  cloNIDine (CATAPRES) 0.2 MG tablet, Take 2 tablets by mouth Every 12 (Twelve) Hours., Disp: 120 tablet, Rfl: 5  •  clopidogrel (PLAVIX) 75 MG tablet, Take 1 tablet by mouth Daily., Disp: 90 tablet, Rfl: 3  •  desonide (DESOWEN) 0.05 % cream, Apply 0.05 application topically to the appropriate area as directed 2 (Two) Times a Day., Disp: , Rfl: 1  •  dorzolamide-timolol (COSOPT) 22.3-6.8 MG/ML ophthalmic solution, Administer 1 drop to both eyes Daily., Disp: 10 mL, Rfl: 3  •  famotidine (PEPCID) 20 MG tablet, Take 1 tablet by mouth 2 (Two) Times a Day., Disp: 180 tablet, Rfl: 1  •  Ferrous Sulfate (IRON) 325 (98  Fe) MG tablet, Take 1 tablet by mouth 2 (Two) Times a Day., Disp: , Rfl: 5  •  fluocinonide (LIDEX) 0.05 % external solution, Apply 0.05 application topically to the appropriate area as directed 2 (Two) Times a Day. Scalp, Disp: , Rfl: 2  •  hydrALAZINE (APRESOLINE) 50 MG tablet, Take 1 tablet by mouth Every 12 (Twelve) Hours., Disp: 60 tablet, Rfl: 5  •  insulin NPH-insulin regular (humuLIN 70/30,novoLIN 70/30) (70-30) 100 UNIT/ML injection, Inject 20 units before dinner and 17 Units before breakfast., Disp: 10 mL, Rfl: 5  •  IRON DEXTRAN IJ, Inject  as directed 3 (Three) Times a Week., Disp: , Rfl:   •  isosorbide mononitrate (IMDUR) 120 MG 24 hr tablet, Take 1 tablet by mouth Daily., Disp: 90 tablet, Rfl: 3  •  lidocaine-prilocaine (EMLA) 2.5-2.5 % cream, Apply 1 application topically to the appropriate area as directed As Needed (dialysis access)., Disp: , Rfl: 6  •  nitroglycerin (Nitrolingual) 0.4 MG/SPRAY spray, Place 1 spray under the tongue Every 5 (Five) Minutes As Needed for Chest Pain., Disp: 1 each, Rfl: 12  •  terazosin (HYTRIN) 2 MG capsule, Take 2 capsules by mouth every night at bedtime., Disp: 180 capsule, Rfl: 1  •  torsemide (Demadex) 20 MG tablet, 1 PO QAM and 2 PO QPM, Disp: 90 tablet, Rfl: 5  •  Capsaicin 0.1 % cream, Apply 2-4 gms to feet nightly. Use gloves, Disp: 60 g, Rfl: 3  •  nortriptyline (PAMELOR) 10 MG capsule, Take 1 capsule by mouth Every Night., Disp: 30 capsule, Rfl: 3      The following portions of the patient's history were reviewed and updated as appropriate: allergies, current medications, past family history, past medical history, past social history, past surgical history and problem list.    Review of Systems   Constitutional: Positive for fatigue. Negative for chills and fever.   HENT: Negative for ear discharge, ear pain, sinus pressure and sore throat.    Respiratory: Negative for cough, chest tightness and shortness of breath.    Cardiovascular: Negative for chest  "pain, palpitations and leg swelling.   Gastrointestinal: Negative for diarrhea, nausea and vomiting.   Musculoskeletal: Negative for arthralgias, back pain and myalgias.   Neurological: Positive for dizziness and weakness. Negative for syncope and headaches.   Psychiatric/Behavioral: Negative for confusion and sleep disturbance.       Objective   /52   Pulse 67   Temp 98 °F (36.7 °C)   Ht 170.2 cm (67\")   Wt 78.5 kg (173 lb)   LMP  (LMP Unknown)   SpO2 100% Comment: ra  BMI 27.10 kg/m²   Physical Exam  Vitals and nursing note reviewed.   Constitutional:       Appearance: She is well-developed.   HENT:      Head: Normocephalic and atraumatic.      Right Ear: External ear normal.      Left Ear: External ear normal.      Mouth/Throat:      Pharynx: No oropharyngeal exudate.   Eyes:      Conjunctiva/sclera: Conjunctivae normal.      Pupils: Pupils are equal, round, and reactive to light.   Neck:      Thyroid: No thyromegaly.   Cardiovascular:      Rate and Rhythm: Normal rate and regular rhythm.   Pulmonary:      Effort: Pulmonary effort is normal.      Breath sounds: Normal breath sounds.   Abdominal:      General: Bowel sounds are normal. There is no distension.      Palpations: Abdomen is soft.      Tenderness: There is no abdominal tenderness.   Musculoskeletal:      Cervical back: Neck supple.   Skin:     General: Skin is warm and dry.   Neurological:      Mental Status: She is alert and oriented to person, place, and time.      Cranial Nerves: No cranial nerve deficit.   Psychiatric:         Judgment: Judgment normal.           Results for orders placed or performed in visit on 03/23/21   POC Glycosylated Hemoglobin (Hb A1C)    Specimen: Blood   Result Value Ref Range    Hemoglobin A1C 7.2 %             Assessment/Plan   Diagnoses and all orders for this visit:    Type 2 diabetes mellitus with chronic kidney disease on chronic dialysis, with long-term current use of insulin (CMS/[facility])  -     POC " Glycosylated Hemoglobin (Hb A1C)    Essential hypertension  Comments:  cut hydralazine to 1/2 bid if dizzy and lightheaded.       Other diabetic neurological complication associated with type 2 diabetes mellitus (CMS/HCC)  Comments:  not a candidate fo rneurontin or lyrica.  Orders:  -     nortriptyline (PAMELOR) 10 MG capsule; Take 1 capsule by mouth Every Night.  -     Capsaicin 0.1 % cream; Apply 2-4 gms to feet nightly. Use gloves    Other orders  -     bisacodyl (DULCOLAX) 5 MG EC tablet; Take 5 mg by mouth Daily As Needed for Constipation.      BP on the low side. Cut back on hydralazine, continue to monitor closely, especially post HD.           Return in about 3 months (around 6/23/2021) for Next scheduled follow up.

## 2021-05-04 ENCOUNTER — HOSPITAL ENCOUNTER (OUTPATIENT)
Dept: CARDIOLOGY | Facility: HOSPITAL | Age: 76
Discharge: HOME | End: 2021-05-04

## 2021-05-04 ENCOUNTER — OFFICE VISIT (OUTPATIENT)
Dept: CARDIOLOGY | Facility: HOSPITAL | Age: 76
End: 2021-05-04

## 2021-05-04 DIAGNOSIS — Z99.2 END STAGE RENAL DISEASE ON DIALYSIS (HCC): ICD-10-CM

## 2021-05-04 DIAGNOSIS — I48.0 PAF (PAROXYSMAL ATRIAL FIBRILLATION) (HCC): ICD-10-CM

## 2021-05-04 DIAGNOSIS — I50.32 CHRONIC DIASTOLIC HEART FAILURE (HCC): ICD-10-CM

## 2021-05-04 DIAGNOSIS — I48.0 PAF (PAROXYSMAL ATRIAL FIBRILLATION) (HCC): Primary | ICD-10-CM

## 2021-05-04 DIAGNOSIS — N18.6 END STAGE RENAL DISEASE ON DIALYSIS (HCC): ICD-10-CM

## 2021-05-04 DIAGNOSIS — I10 ESSENTIAL HYPERTENSION: ICD-10-CM

## 2021-05-04 DIAGNOSIS — I25.10 CORONARY ARTERY DISEASE INVOLVING NATIVE CORONARY ARTERY OF NATIVE HEART WITHOUT ANGINA PECTORIS: ICD-10-CM

## 2021-05-04 LAB
QT INTERVAL: 484 MS
QTC INTERVAL: 507 MS

## 2021-05-04 PROCEDURE — 99214 OFFICE O/P EST MOD 30 MIN: CPT | Performed by: NURSE PRACTITIONER

## 2021-05-04 PROCEDURE — 93010 ELECTROCARDIOGRAM REPORT: CPT | Performed by: INTERNAL MEDICINE

## 2021-05-04 PROCEDURE — 93005 ELECTROCARDIOGRAM TRACING: CPT | Performed by: NURSE PRACTITIONER

## 2021-05-11 VITALS
TEMPERATURE: 98.2 F | OXYGEN SATURATION: 96 % | HEART RATE: 69 BPM | WEIGHT: 176.38 LBS | HEIGHT: 67 IN | BODY MASS INDEX: 27.68 KG/M2 | RESPIRATION RATE: 18 BRPM | DIASTOLIC BLOOD PRESSURE: 78 MMHG | SYSTOLIC BLOOD PRESSURE: 151 MMHG

## 2021-05-12 NOTE — PROGRESS NOTES
"Chief Complaint  Follow-up, Hypertension, and Congestive Heart Failure    Subjective    History of Present Illness {CC  Problem List  Visit  Diagnosis   Encounters  Notes  Medications  Labs  Result Review Imaging  Media :23}       History of Present Illness   76-year-old female with history of end-stage renal disease (HD 3 times weekly), CAD, hypertension, PAF  presents today for follow-up.  She notes that she has been experiencing cramping on dialysis days as well as htn and fatigue. Patient does note compliance with her medications. Currently denies cp. Notes one occasion where she took a dose of ntg. Currently denies palps, presyncope; orthopnea, PND or pedal edema.  Patient denies any palpitations, tachycardia.     Vital Signs:   Vitals:    05/04/21 1341 05/04/21 1400   BP: (!) 200/86 151/78   BP Location: Left arm Left arm   Patient Position: Sitting Sitting   Cuff Size: Adult    Pulse: 69    Resp: 18    Temp: 98.2 °F (36.8 °C)    TempSrc: Temporal    SpO2: 96%    Weight: 80 kg (176 lb 6 oz)    Height: 170.2 cm (67\")      Body mass index is 27.62 kg/m².  Physical Exam  Vitals and nursing note reviewed.   Constitutional:       Appearance: Normal appearance.   HENT:      Head: Normocephalic.   Eyes:      Pupils: Pupils are equal, round, and reactive to light.   Cardiovascular:      Rate and Rhythm: Normal rate and regular rhythm.      Pulses: Normal pulses.      Heart sounds: Normal heart sounds. No murmur heard.     Pulmonary:      Effort: Pulmonary effort is normal.      Breath sounds: Normal breath sounds.   Abdominal:      General: Bowel sounds are normal.      Palpations: Abdomen is soft.   Musculoskeletal:         General: Normal range of motion.      Cervical back: Normal range of motion.      Right lower leg: No edema.      Left lower leg: No edema.   Skin:     General: Skin is warm and dry.      Capillary Refill: Capillary refill takes less than 2 seconds.      Comments: Fistula right arm  "   Neurological:      Mental Status: She is alert and oriented to person, place, and time.   Psychiatric:         Mood and Affect: Mood normal.         Thought Content: Thought content normal.              Result Review  Data Reviewed:{ Labs  Result Review  Imaging  Med Tab  Media :23}     Adult Transthoracic Echo Complete W/ Cont if Necessary Per Protocol (01/20/2021 18:26)  SCANNED - CARDIOLOGY (01/20/2021)  Cardiac Catheterization/Vascular Study (01/21/2021 16:58)           Assessment and Plan {CC Problem List  Visit Diagnosis  ROS  Review (Popup)  Health Maintenance  Quality  BestPractice  Medications  SmartSets  SnapShot Encounters  Media :23}   1. PAF (paroxysmal atrial fibrillation) (CMS/HCC)  Acceptable qt/qtc on amiodarone - ECG 12 Lead; Future  CHADS-VASc Risk Assessment            5 Total Score    1 CHF    1 Hypertension    1 DM    1 Age 65-74    1 Sex: Female        Criteria that do not apply:    Age >/= 75    PRIOR STROKE/TIA/THROMBO    Vascular Disease          2. Chronic diastolic heart failure (CMS/HCC)  euvolemic  Heart failure education today including signs and symptoms, the role of the heart failure center, daily weights, low sodium diet (less than 1500 mg per day), and daily physical activity. Reviewed HF Zones with patient and family.  Patient to continue current medications as previously ordered.     3. End stage renal disease on dialysis (CMS/HCC)  On hemodialysis     4. Coronary artery disease involving native coronary artery of native heart without angina pectoris  Without angina   Continue asa, lipitor, coreg   5. Essential hypertension  Suspect non compliance with her antihypertensives  HTN Education provided today including signs and symptoms, medication management, daily blood pressure monitoring. Patient encouraged to call the Heart and Valve center with any abnormal readings.         Follow Up {Instructions Charge Capture  Follow-up Communications :23}   Return in  about 3 months (around 8/4/2021) for Office visit, HTN, AFIB, HF.    Patient was given instructions and counseling regarding her condition or for health maintenance advice. Please see specific information pulled into the AVS if appropriate.  Patient was instructed to call the [facility] Center with any questions, concerns, or worsening symptoms.    *Please note that portions of this note were completed with a voice recognition program. Efforts were made to edit the dictations, but occasionally words are mistranscribed.

## 2021-06-29 ENCOUNTER — OFFICE VISIT (OUTPATIENT)
Dept: INTERNAL MEDICINE | Facility: CLINIC | Age: 76
End: 2021-06-29

## 2021-06-29 ENCOUNTER — TELEPHONE (OUTPATIENT)
Dept: INTERNAL MEDICINE | Facility: CLINIC | Age: 76
End: 2021-06-29

## 2021-06-29 VITALS
WEIGHT: 178.2 LBS | SYSTOLIC BLOOD PRESSURE: 144 MMHG | TEMPERATURE: 97.6 F | HEIGHT: 67 IN | DIASTOLIC BLOOD PRESSURE: 72 MMHG | OXYGEN SATURATION: 97 % | BODY MASS INDEX: 27.97 KG/M2 | HEART RATE: 62 BPM

## 2021-06-29 DIAGNOSIS — Z79.4 TYPE 2 DIABETES MELLITUS WITH CHRONIC KIDNEY DISEASE ON CHRONIC DIALYSIS, WITH LONG-TERM CURRENT USE OF INSULIN (HCC): Primary | ICD-10-CM

## 2021-06-29 DIAGNOSIS — Z99.2 TYPE 2 DIABETES MELLITUS WITH CHRONIC KIDNEY DISEASE ON CHRONIC DIALYSIS, WITH LONG-TERM CURRENT USE OF INSULIN (HCC): Primary | ICD-10-CM

## 2021-06-29 DIAGNOSIS — R60.0 EDEMA OF LEFT UPPER ARM: ICD-10-CM

## 2021-06-29 DIAGNOSIS — E11.22 TYPE 2 DIABETES MELLITUS WITH CHRONIC KIDNEY DISEASE ON CHRONIC DIALYSIS, WITH LONG-TERM CURRENT USE OF INSULIN (HCC): Primary | ICD-10-CM

## 2021-06-29 DIAGNOSIS — E78.5 HYPERLIPIDEMIA LDL GOAL <70: ICD-10-CM

## 2021-06-29 DIAGNOSIS — I10 ESSENTIAL HYPERTENSION: ICD-10-CM

## 2021-06-29 DIAGNOSIS — E11.49 OTHER DIABETIC NEUROLOGICAL COMPLICATION ASSOCIATED WITH TYPE 2 DIABETES MELLITUS (HCC): ICD-10-CM

## 2021-06-29 DIAGNOSIS — M79.10 MYALGIA: ICD-10-CM

## 2021-06-29 DIAGNOSIS — N18.6 TYPE 2 DIABETES MELLITUS WITH CHRONIC KIDNEY DISEASE ON CHRONIC DIALYSIS, WITH LONG-TERM CURRENT USE OF INSULIN (HCC): Primary | ICD-10-CM

## 2021-06-29 DIAGNOSIS — M79.10 MYALGIA: Primary | ICD-10-CM

## 2021-06-29 DIAGNOSIS — I10 UNCONTROLLED HYPERTENSION: ICD-10-CM

## 2021-06-29 DIAGNOSIS — I50.33 ACUTE ON CHRONIC DIASTOLIC HEART FAILURE (HCC): ICD-10-CM

## 2021-06-29 DIAGNOSIS — R07.89 OTHER CHEST PAIN: ICD-10-CM

## 2021-06-29 LAB — HBA1C MFR BLD: 6.3 %

## 2021-06-29 PROCEDURE — 3044F HG A1C LEVEL LT 7.0%: CPT | Performed by: INTERNAL MEDICINE

## 2021-06-29 PROCEDURE — 83036 HEMOGLOBIN GLYCOSYLATED A1C: CPT | Performed by: INTERNAL MEDICINE

## 2021-06-29 PROCEDURE — 99214 OFFICE O/P EST MOD 30 MIN: CPT | Performed by: INTERNAL MEDICINE

## 2021-06-29 PROCEDURE — 93000 ELECTROCARDIOGRAM COMPLETE: CPT | Performed by: INTERNAL MEDICINE

## 2021-06-29 RX ORDER — MAGNESIUM 200 MG
100 TABLET ORAL DAILY
Qty: 5 EACH | Refills: 0 | Status: SHIPPED | OUTPATIENT
Start: 2021-06-29 | End: 2021-07-06 | Stop reason: DRUGHIGH

## 2021-06-29 RX ORDER — NORTRIPTYLINE HYDROCHLORIDE 10 MG/1
10 CAPSULE ORAL NIGHTLY
Qty: 30 CAPSULE | Refills: 3 | Status: SHIPPED | OUTPATIENT
Start: 2021-06-29 | End: 2023-03-29 | Stop reason: HOSPADM

## 2021-06-29 RX ORDER — TORSEMIDE 20 MG/1
TABLET ORAL
Qty: 90 TABLET | Refills: 5 | Status: SHIPPED | OUTPATIENT
Start: 2021-06-29 | End: 2023-03-29 | Stop reason: HOSPADM

## 2021-06-29 RX ORDER — TERAZOSIN 2 MG/1
4 CAPSULE ORAL
Qty: 180 CAPSULE | Refills: 1 | Status: SHIPPED | OUTPATIENT
Start: 2021-06-29 | End: 2022-01-19 | Stop reason: DRUGHIGH

## 2021-06-29 RX ORDER — CLONIDINE HYDROCHLORIDE 0.2 MG/1
0.4 TABLET ORAL EVERY 12 HOURS SCHEDULED
Qty: 120 TABLET | Refills: 5 | Status: SHIPPED | OUTPATIENT
Start: 2021-06-29 | End: 2023-03-29 | Stop reason: HOSPADM

## 2021-06-29 RX ORDER — MAGNESIUM 30 MG
30 TABLET ORAL DAILY
Qty: 5 TABLET | Refills: 0 | Status: SHIPPED | OUTPATIENT
Start: 2021-06-29 | End: 2021-06-29

## 2021-06-29 RX ORDER — HYDRALAZINE HYDROCHLORIDE 50 MG/1
50 TABLET, FILM COATED ORAL EVERY 12 HOURS SCHEDULED
Qty: 60 TABLET | Refills: 5 | Status: SHIPPED | OUTPATIENT
Start: 2021-06-29 | End: 2022-07-14 | Stop reason: SINTOL

## 2021-06-29 NOTE — TELEPHONE ENCOUNTER
[facility] HAS CALLED ABOUT THE RX FOR MAGNESIUM 30 MG, CALLED IN TODAY 6-    [facility] STATES THAT IT DOES NOT COME IN 30 MG      THEY DO HAVE:  MAGNESIUM OXIDE 400 MG  MAGNESIUM SULFATE 200 MG  MAGNESIUM CITRATE 200 MG    PLEASE ADVISE

## 2021-06-29 NOTE — PROGRESS NOTES
Diabetes    Subjective   Esperanza Pop is a 76 y.o. female is here today for follow-up.    History of Present Illness   Esperanza is here for a follow up on her DM2, reports that   Getting HD regularly, last week started having pain in her upper abdomen  Feels like a cramp when she has too much   RUE swelling, seen by Dr. Appiah. 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  BP is better today.      Current Outpatient Medications:   •  amiodarone (PACERONE) 200 MG tablet, Take 1 tablet by mouth Daily., Disp: 30 tablet, Rfl: 5  •  ammonium lactate (LAC-HYDRIN) 12 % lotion, Apply  topically to the appropriate area as directed As Needed for Dry Skin. (Patient taking differently: Apply 1 application topically to the appropriate area as directed 2 (Two) Times a Day As Needed for Dry Skin.), Disp: 500 g, Rfl: 3  •  aspirin 81 MG EC tablet, Take 1 tablet by mouth Daily., Disp: , Rfl:   •  atorvastatin (LIPITOR) 80 MG tablet, Take 1 tablet by mouth Every Night., Disp: 90 tablet, Rfl: 1  •  B Complex-C-Folic Acid (DIALYVITE 800 PO), Take 1 tablet by mouth 3 (Three) Times a Week. On dialysis says MWF, Disp: , Rfl:   •  bisacodyl (DULCOLAX) 5 MG EC tablet, Take 5 mg by mouth Daily As Needed for Constipation., Disp: , Rfl:   •  Capsaicin 0.1 % cream, Apply 2-4 gms to feet nightly. Use gloves, Disp: 60 g, Rfl: 3  •  carvedilol (COREG) 25 MG tablet, Take 1 tablet by mouth Every 12 (Twelve) Hours., Disp: 60 tablet, Rfl: 5  •  cloNIDine (CATAPRES) 0.2 MG tablet, Take 2 tablets by mouth Every 12 (Twelve) Hours., Disp: 120 tablet, Rfl: 5  •  clopidogrel (PLAVIX) 75 MG tablet, Take 1 tablet by mouth Daily., Disp: 90 tablet, Rfl: 3  •  desonide (DESOWEN) 0.05 % cream, Apply 0.05 application topically to the appropriate area as directed 2 (Two) Times a Day., Disp: , Rfl: 1  •  dorzolamide-timolol (COSOPT) 22.3-6.8 MG/ML ophthalmic solution, Administer 1 drop to both eyes Daily., Disp: 10 mL, Rfl: 3  •   famotidine (PEPCID) 20 MG tablet, Take 1 tablet by mouth 2 (Two) Times a Day. (Patient taking differently: Take 20 mg by mouth 2 (Two) Times a Day As Needed.), Disp: 180 tablet, Rfl: 1  •  Ferrous Sulfate (IRON) 325 (65 Fe) MG tablet, Take 1 tablet by mouth 2 (Two) Times a Day., Disp: , Rfl: 5  •  fluocinonide (LIDEX) 0.05 % external solution, Apply 0.05 application topically to the appropriate area as directed 2 (Two) Times a Day. Scalp, Disp: , Rfl: 2  •  hydrALAZINE (APRESOLINE) 50 MG tablet, Take 1 tablet by mouth Every 12 (Twelve) Hours., Disp: 60 tablet, Rfl: 5  •  insulin NPH-insulin regular (humuLIN 70/30,novoLIN 70/30) (70-30) 100 UNIT/ML injection, Inject 20 units before dinner and 17 Units before breakfast., Disp: 10 mL, Rfl: 5  •  IRON DEXTRAN IJ, Inject  as directed 3 (Three) Times a Week., Disp: , Rfl:   •  isosorbide mononitrate (IMDUR) 120 MG 24 hr tablet, Take 1 tablet by mouth Daily., Disp: 90 tablet, Rfl: 3  •  lidocaine-prilocaine (EMLA) 2.5-2.5 % cream, Apply 1 application topically to the appropriate area as directed As Needed (dialysis access)., Disp: , Rfl: 6  •  nitroglycerin (Nitrolingual) 0.4 MG/SPRAY spray, Place 1 spray under the tongue Every 5 (Five) Minutes As Needed for Chest Pain., Disp: 1 each, Rfl: 12  •  nortriptyline (PAMELOR) 10 MG capsule, Take 1 capsule by mouth Every Night., Disp: 30 capsule, Rfl: 3  •  terazosin (HYTRIN) 2 MG capsule, Take 2 capsules by mouth every night at bedtime., Disp: 180 capsule, Rfl: 1  •  torsemide (Demadex) 20 MG tablet, 1 PO QAM and 2 PO QPM, Disp: 90 tablet, Rfl: 5  •  magnesium 30 MG tablet, Take 1 tablet by mouth Daily for 5 doses., Disp: 5 tablet, Rfl: 0      The following portions of the patient's history were reviewed and updated as appropriate: allergies, current medications, past family history, past medical history, past social history, past surgical history and problem list.    Review of Systems   Constitutional: Positive for fatigue.  "Negative for chills and fever.   HENT: Negative for ear discharge, ear pain, sinus pressure and sore throat.    Respiratory: Positive for chest tightness. Negative for cough and shortness of breath.    Cardiovascular: Positive for chest pain. Negative for palpitations and leg swelling.   Gastrointestinal: Negative for diarrhea, nausea and vomiting.   Musculoskeletal: Positive for myalgias. Negative for arthralgias and back pain.   Neurological: Negative for dizziness, syncope and headaches.   Psychiatric/Behavioral: Negative for confusion and sleep disturbance.       Objective   /72   Pulse 62   Temp 97.6 °F (36.4 °C)   Ht 170.2 cm (67\")   Wt 80.8 kg (178 lb 3.2 oz)   LMP  (LMP Unknown)   SpO2 97% Comment: ra  BMI 27.91 kg/m²   Physical Exam  Vitals and nursing note reviewed.   Constitutional:       Appearance: She is well-developed.   HENT:      Head: Normocephalic and atraumatic.      Mouth/Throat:      Pharynx: No oropharyngeal exudate.   Eyes:      Conjunctiva/sclera: Conjunctivae normal.      Pupils: Pupils are equal, round, and reactive to light.   Neck:      Thyroid: No thyromegaly.   Cardiovascular:      Rate and Rhythm: Normal rate and regular rhythm.      Pulses: Normal pulses.      Heart sounds: Murmur (3/6 hsm) heard.   No friction rub. No gallop.    Pulmonary:      Effort: Pulmonary effort is normal.      Breath sounds: Rales (mild basal) present.   Abdominal:      General: Bowel sounds are normal. There is no distension.      Palpations: Abdomen is soft.      Tenderness: There is no abdominal tenderness.   Musculoskeletal:      Cervical back: Neck supple.   Skin:     General: Skin is warm and dry.   Neurological:      Mental Status: She is alert and oriented to person, place, and time.      Cranial Nerves: No cranial nerve deficit.   Psychiatric:         Judgment: Judgment normal.           Results for orders placed or performed in visit on 06/29/21   POC Glycosylated Hemoglobin (Hb A1C) "    Specimen: Blood   Result Value Ref Range    Hemoglobin A1C 6.3 %           ECG 12 Lead    Date/Time: 6/29/2021 2:35 PM  Performed by: Meghana Rodgers MD  Authorized by: Meghana Rodgers MD   Comparison: compared with previous ECG from 5/4/2021  Similar to previous ECG  Rhythm: sinus rhythm  Rate: normal  Conduction: conduction normal  Conduction: left anterior fascicular block  ST Segments: ST segments normal  T Waves: T waves normal  T inversion: III  QRS axis: normal  Other findings: non-specific ST-T wave changes    Clinical impression: abnormal EKG  Comments: EKG Interpretation Report    Heart rate:    61 beats/min, RI interval:  190 msec, QRS duration:  110 msec  QTu:488 msec, QTc:  491 msec            Assessment/Plan   Diagnoses and all orders for this visit:    Type 2 diabetes mellitus with chronic kidney disease on chronic dialysis, with long-term current use of insulin (CMS/[facility])  -     POC Glycosylated Hemoglobin (Hb A1C)  -     insulin NPH-insulin regular (humuLIN 70/30,novoLIN 70/30) (70-30) 100 UNIT/ML injection; Inject 20 units before dinner and 17 Units before breakfast.    Uncontrolled hypertension  -     cloNIDine (CATAPRES) 0.2 MG tablet; Take 2 tablets by mouth Every 12 (Twelve) Hours.  -     hydrALAZINE (APRESOLINE) 50 MG tablet; Take 1 tablet by mouth Every 12 (Twelve) Hours.    Other diabetic neurological complication associated with type 2 diabetes mellitus (CMS/HCC)  Comments:  not a candidate fo rneurontin or lyrica.  Orders:  -     nortriptyline (PAMELOR) 10 MG capsule; Take 1 capsule by mouth Every Night.    Essential hypertension  Comments:  increase terazosin to 0.4mg hs, continue c;lonidine, and add torsemide daily x 5 days.  d/w HD  Orders:  -     terazosin (HYTRIN) 2 MG capsule; Take 2 capsules by mouth every night at bedtime.    Acute on chronic diastolic heart failure (CMS/HCC)  -     torsemide (Demadex) 20 MG tablet; 1 PO QAM and 2 PO QPM  -     TSH; Future  -     T4, Free;  Future    Myalgia  -     Magnesium; Future  -     magnesium 30 MG tablet; Take 1 tablet by mouth Daily for 5 doses.    Hyperlipidemia LDL goal <70  -     Comprehensive Metabolic Panel; Future  -     Lipid Panel; Future    Other chest pain  -     ECG 12 Lead    Edema of left upper arm  Comments:  f/u with Dr. Appiah    chest cramps- ? Mag def- low dose eplacement.  Check labs.             Return in about 4 months (around 10/29/2021) for Medicare Wellness 4-5 mos, pls use 2 x 15 min appt.    Electronically signed by:    Meghana Rodgers MD

## 2021-07-07 RX ORDER — MAGNESIUM OXIDE 400 MG/1
200 TABLET ORAL DAILY
Qty: 5 TABLET | Refills: 0 | Status: SHIPPED | OUTPATIENT
Start: 2021-07-07 | End: 2021-07-29

## 2021-07-21 ENCOUNTER — APPOINTMENT (OUTPATIENT)
Dept: GENERAL RADIOLOGY | Facility: HOSPITAL | Age: 76
End: 2021-07-21

## 2021-07-21 ENCOUNTER — HOSPITAL ENCOUNTER (EMERGENCY)
Facility: HOSPITAL | Age: 76
Discharge: HOME | End: 2021-07-21
Attending: EMERGENCY MEDICINE | Admitting: EMERGENCY MEDICINE

## 2021-07-21 VITALS
HEART RATE: 54 BPM | OXYGEN SATURATION: 94 % | TEMPERATURE: 97.5 F | BODY MASS INDEX: 27.62 KG/M2 | HEIGHT: 67 IN | DIASTOLIC BLOOD PRESSURE: 88 MMHG | WEIGHT: 176 LBS | SYSTOLIC BLOOD PRESSURE: 221 MMHG | RESPIRATION RATE: 18 BRPM

## 2021-07-21 DIAGNOSIS — I25.119 CORONARY ARTERY DISEASE INVOLVING NATIVE CORONARY ARTERY OF NATIVE HEART WITH ANGINA PECTORIS (HCC): Primary | ICD-10-CM

## 2021-07-21 LAB
ALBUMIN SERPL-MCNC: 3.6 G/DL (ref 3.5–5.2)
ALBUMIN/GLOB SERPL: 0.8 G/DL
ALP SERPL-CCNC: 123 U/L (ref 39–117)
ALT SERPL W P-5'-P-CCNC: 9 U/L (ref 1–33)
ANION GAP SERPL CALCULATED.3IONS-SCNC: 15 MMOL/L (ref 5–15)
AST SERPL-CCNC: 24 U/L (ref 1–32)
BASOPHILS # BLD AUTO: 0.06 10*3/MM3 (ref 0–0.2)
BASOPHILS NFR BLD AUTO: 1.1 % (ref 0–1.5)
BILIRUB SERPL-MCNC: 0.3 MG/DL (ref 0–1.2)
BUN SERPL-MCNC: 20 MG/DL (ref 8–23)
BUN/CREAT SERPL: 5.1 (ref 7–25)
CALCIUM SPEC-SCNC: 9.1 MG/DL (ref 8.6–10.5)
CHLORIDE SERPL-SCNC: 96 MMOL/L (ref 98–107)
CO2 SERPL-SCNC: 27 MMOL/L (ref 22–29)
CREAT SERPL-MCNC: 3.91 MG/DL (ref 0.57–1)
DACRYOCYTES BLD QL SMEAR: NORMAL
DEPRECATED RDW RBC AUTO: 71.9 FL (ref 37–54)
EOSINOPHIL # BLD AUTO: 0.43 10*3/MM3 (ref 0–0.4)
EOSINOPHIL NFR BLD AUTO: 7.7 % (ref 0.3–6.2)
ERYTHROCYTE [DISTWIDTH] IN BLOOD BY AUTOMATED COUNT: 21.8 % (ref 12.3–15.4)
GFR SERPL CREATININE-BSD FRML MDRD: 14 ML/MIN/1.73
GFR SERPL CREATININE-BSD FRML MDRD: ABNORMAL ML/MIN/{1.73_M2}
GLOBULIN UR ELPH-MCNC: 4.5 GM/DL
GLUCOSE SERPL-MCNC: 224 MG/DL (ref 65–99)
HCT VFR BLD AUTO: 43.6 % (ref 34–46.6)
HGB BLD-MCNC: 12.9 G/DL (ref 12–15.9)
HOLD SPECIMEN: NORMAL
IMM GRANULOCYTES # BLD AUTO: 0.03 10*3/MM3 (ref 0–0.05)
IMM GRANULOCYTES NFR BLD AUTO: 0.5 % (ref 0–0.5)
LIPASE SERPL-CCNC: 19 U/L (ref 13–60)
LYMPHOCYTES # BLD AUTO: 1.55 10*3/MM3 (ref 0.7–3.1)
LYMPHOCYTES NFR BLD AUTO: 27.8 % (ref 19.6–45.3)
MCH RBC QN AUTO: 26.9 PG (ref 26.6–33)
MCHC RBC AUTO-ENTMCNC: 29.6 G/DL (ref 31.5–35.7)
MCV RBC AUTO: 91 FL (ref 79–97)
MONOCYTES # BLD AUTO: 0.72 10*3/MM3 (ref 0.1–0.9)
MONOCYTES NFR BLD AUTO: 12.9 % (ref 5–12)
NEUTROPHILS NFR BLD AUTO: 2.79 10*3/MM3 (ref 1.7–7)
NEUTROPHILS NFR BLD AUTO: 50 % (ref 42.7–76)
NRBC BLD AUTO-RTO: 0 /100 WBC (ref 0–0.2)
NT-PROBNP SERPL-MCNC: ABNORMAL PG/ML (ref 0–900)
PLAT MORPH BLD: NORMAL
PLATELET # BLD AUTO: 243 10*3/MM3 (ref 140–450)
PMV BLD AUTO: 11.7 FL (ref 6–12)
POTASSIUM SERPL-SCNC: 3.7 MMOL/L (ref 3.5–5.2)
PROT SERPL-MCNC: 8.1 G/DL (ref 6–8.5)
RBC # BLD AUTO: 4.79 10*6/MM3 (ref 3.77–5.28)
SODIUM SERPL-SCNC: 138 MMOL/L (ref 136–145)
TROPONIN T SERPL-MCNC: 0.05 NG/ML (ref 0–0.03)
TROPONIN T SERPL-MCNC: 0.07 NG/ML (ref 0–0.03)
WBC # BLD AUTO: 5.58 10*3/MM3 (ref 3.4–10.8)
WBC MORPH BLD: NORMAL
WHOLE BLOOD HOLD SPECIMEN: NORMAL

## 2021-07-21 PROCEDURE — 83690 ASSAY OF LIPASE: CPT

## 2021-07-21 PROCEDURE — 93005 ELECTROCARDIOGRAM TRACING: CPT | Performed by: EMERGENCY MEDICINE

## 2021-07-21 PROCEDURE — 83880 ASSAY OF NATRIURETIC PEPTIDE: CPT

## 2021-07-21 PROCEDURE — 80053 COMPREHEN METABOLIC PANEL: CPT

## 2021-07-21 PROCEDURE — 84484 ASSAY OF TROPONIN QUANT: CPT

## 2021-07-21 PROCEDURE — 93005 ELECTROCARDIOGRAM TRACING: CPT

## 2021-07-21 PROCEDURE — 99283 EMERGENCY DEPT VISIT LOW MDM: CPT

## 2021-07-21 PROCEDURE — 85025 COMPLETE CBC W/AUTO DIFF WBC: CPT

## 2021-07-21 PROCEDURE — 85007 BL SMEAR W/DIFF WBC COUNT: CPT

## 2021-07-21 PROCEDURE — 71045 X-RAY EXAM CHEST 1 VIEW: CPT

## 2021-07-21 RX ORDER — ASPIRIN 81 MG/1
324 TABLET, CHEWABLE ORAL ONCE
Status: COMPLETED | OUTPATIENT
Start: 2021-07-21 | End: 2021-07-21

## 2021-07-21 RX ORDER — MEGESTROL ACETATE 40 MG/1
40 TABLET ORAL DAILY
COMMUNITY
End: 2023-03-29 | Stop reason: HOSPADM

## 2021-07-21 RX ORDER — SODIUM CHLORIDE 0.9 % (FLUSH) 0.9 %
10 SYRINGE (ML) INJECTION AS NEEDED
Status: DISCONTINUED | OUTPATIENT
Start: 2021-07-21 | End: 2021-07-21 | Stop reason: HOSPADM

## 2021-07-21 RX ADMIN — ASPIRIN 81 MG CHEWABLE TABLET 324 MG: 81 TABLET CHEWABLE at 17:39

## 2021-07-21 NOTE — ED PROVIDER NOTES
EMERGENCY DEPARTMENT ENCOUNTER    Pt Name: Esperanza Pop  MRN: [id number]  Pt :   1945  Room Number:    Date of encounter:  2021  PCP: Meghana Rodgers MD  ED Provider: Kevin Kaur PA-C    Historian: Patient      HPI:  Chief Complaint: Chest pain          Context: Esperanza Pop is a 76 y.o. female who presents to the ED c/o intermittent chest pain for the past 3 days.  Her symptoms began Monday with intermittent left-sided chest pain that radiates into the midsternal area.  She states her episode on Monday lasted approximately 1.5 hours and responded after 2 sprays of nitroglycerin.  Today she started dialysis around 1:15 PM.  She developed left-sided chest pain that was described as a sharp pain originating in the left inframammary area radiating into the substernal area just above the epigastrium.  She rated the pain at 9 out of 10.  She took 2 sprays of nitroglycerin and her symptoms improved.  She is currently pain-free.  She also states her symptoms have been relieved with Tums.  Not associated with nausea diaphoresis palpitations arm neck jaw or shoulder pain.  No exacerbating factors.  She does have a history of ischemic coronary artery disease with angina, and has a history of non-STEMI following previous MI.  She has a history of congestive heart failure and chronic kidney disease.  She has chronic hypertension, hyperlipidemia, history of breast cancer, and chronic kidney disease.  She is dialyzed .  [facility].      PAST MEDICAL HISTORY type 2 diabetes,  Past Medical History:   Diagnosis Date   • Acute bronchitis    • Acute conjunctivitis    • Acute kidney injury (CMS/HCC)     Admission from 2013 to 2014, now resolved with latest creatinine 1.4 on 2014.   • Acute non-ST segment elevation myocardial infarction (STEMI) following previous myocardial infarction    • Arthritis    • Breast cancer, female, right      2005 mastectomy right   • Cancer (CMS/HCC)    • CHF (congestive heart failure) (CMS/HCC)    • Chronic kidney disease     dialysis MWF, [facility]    • Clotting disorder (CMS/HCC)    • Diabetes mellitus (CMS/HCC)     diagnosed ~1992, checks fsbg 2-3x/day   • Diarrhea    • Dyslipidemia    • Dyspepsia    • Dyspnea    • Edema    • History of transfusion     ~2013 no reaction    • Hx of radiation therapy    • Hyperlipidemia    • Hypertension     Severe   • Ischemic heart disease    • Moderate obesity     BMI 36.2   • Myocardial infarction (CMS/HCC)    • Pneumonia    • Pneumonia 02/2019   • Radiation 2005   • Seasonal allergies    • Wears glasses          PAST SURGICAL HISTORY  Past Surgical History:   Procedure Laterality Date   • AV FISTULA PLACEMENT, BRACHIOBASILIC     • BREAST BIOPSY Left 2010   • BREAST CYST EXCISION     • BREAST LUMPECTOMY Right 2005   • BREAST SURGERY     • CARDIAC CATHETERIZATION N/A 10/10/2016    Procedure: Left Heart Cath;  Surgeon: Scooter Zhao MD;  Location:  [facility];  Service:    • CARDIAC CATHETERIZATION  10/2016   • CARDIAC CATHETERIZATION N/A 1/21/2021    Procedure: Right and Left Heart Cath;  Surgeon: Hugh Tidwell MD;  Location:  [facility];  Service: Cardiology;  Laterality: N/A;   • COLONOSCOPY N/A 7/23/2020    Procedure: COLONOSCOPY;  Surgeon: Rubén Rosas MD;  Location:  [facility];  Service: Gastroenterology;  Laterality: N/A;   • CORONARY STENT PLACEMENT  2016   • HERNIA REPAIR     • HYSTERECTOMY  2000   • INSERTION HEMODIALYSIS CATHETER Right 6/29/2016    Procedure: HEMODIALYSIS CATHETER INSERTION TUNNELLED;  Surgeon: Ramón Chavez MD;  Location:  [facility];  Service:    • MASTECTOMY Right 2006   • OOPHORECTOMY     • REDUCTION MAMMAPLASTY Left 2005         FAMILY HISTORY  Family History   Problem Relation Age of Onset   • Stroke Mother    • Cancer Mother    • Coronary artery disease Father     • Heart failure Father    • Breast cancer Sister 55   • Ovarian cancer Neg Hx    • Endometrial cancer Neg Hx          SOCIAL HISTORY  Social History     Socioeconomic History   • Marital status:      Spouse name: Not on file   • Number of children: Not on file   • Years of education: Not on file   • Highest education level: Not on file   Tobacco Use   • Smoking status: Never Smoker   • Smokeless tobacco: Never Used   • Tobacco comment: Michael second hand smoke   Substance and Sexual Activity   • Alcohol use: Yes     Comment: rarely   • Drug use: No   • Sexual activity: Defer         ALLERGIES  Mircera [methoxy polyethylene glycol-epoetin beta], Venofer [iron sucrose], Albuterol, Nifedipine er, Penicillins, and Prednisone        REVIEW OF SYSTEMS  Review of Systems   Constitutional: Positive for activity change. Negative for appetite change, chills, fatigue and fever.   HENT: Negative for congestion, rhinorrhea and sore throat.    Eyes: Negative for photophobia and visual disturbance.   Respiratory: Negative for cough, shortness of breath and wheezing.    Cardiovascular: Positive for chest pain. Negative for palpitations and leg swelling.   Gastrointestinal: Negative for abdominal pain, nausea and vomiting.   Genitourinary: Negative for flank pain.   Musculoskeletal: Negative for arthralgias, back pain, myalgias and neck pain.   Neurological: Negative for dizziness, seizures, syncope, light-headedness and headaches.          All systems reviewed and negative except for those discussed in HPI.       PHYSICAL EXAM    I have reviewed the triage vital signs and nursing notes.    ED Triage Vitals [07/21/21 1719]   Temp Heart Rate Resp BP SpO2   97.5 °F (36.4 °C) 60 18 180/80 94 %      Temp src Heart Rate Source Patient Position BP Location FiO2 (%)   Infrared Monitor Sitting Left arm --       Physical Exam  GENERAL:   Pleasant awake alert and oriented nontoxic-appearing afebrile and hemodynamically stable.  HENT:  Nares patent.  EYES: No scleral icterus.  CV: Regular rhythm, regular rate.  3/6 systolic murmur heard across the precordium.  RESPIRATORY: Normal effort.  No audible wheezes, rales or rhonchi.  ABDOMEN: Soft, nontender  MUSCULOSKELETAL: No deformities.   NEURO: Alert, moves all extremities, follows commands.  SKIN: Warm, dry, no rash visualized.        LAB RESULTS  Recent Results (from the past 24 hour(s))   Troponin    Collection Time: 07/21/21  5:37 PM    Specimen: Blood   Result Value Ref Range    Troponin T 0.073 (C) 0.000 - 0.030 ng/mL   Comprehensive Metabolic Panel    Collection Time: 07/21/21  5:37 PM    Specimen: Blood   Result Value Ref Range    Glucose 224 (H) 65 - 99 mg/dL    BUN 20 8 - 23 mg/dL    Creatinine 3.91 (H) 0.57 - 1.00 mg/dL    Sodium 138 136 - 145 mmol/L    Potassium 3.7 3.5 - 5.2 mmol/L    Chloride 96 (L) 98 - 107 mmol/L    CO2 27.0 22.0 - 29.0 mmol/L    Calcium 9.1 8.6 - 10.5 mg/dL    Total Protein 8.1 6.0 - 8.5 g/dL    Albumin 3.60 3.50 - 5.20 g/dL    ALT (SGPT) 9 1 - 33 U/L    AST (SGOT) 24 1 - 32 U/L    Alkaline Phosphatase 123 (H) 39 - 117 U/L    Total Bilirubin 0.3 0.0 - 1.2 mg/dL    eGFR Non  Amer      eGFR  African Amer 14 (L) >60 mL/min/1.73    Globulin 4.5 gm/dL    A/G Ratio 0.8 g/dL    BUN/Creatinine Ratio 5.1 (L) 7.0 - 25.0    Anion Gap 15.0 5.0 - 15.0 mmol/L   Lipase    Collection Time: 07/21/21  5:37 PM    Specimen: Blood   Result Value Ref Range    Lipase 19 13 - 60 U/L   BNP    Collection Time: 07/21/21  5:37 PM    Specimen: Blood   Result Value Ref Range    proBNP >70,000.0 (H) 0.0 - 900.0 pg/mL   Green Top (Gel)    Collection Time: 07/21/21  5:37 PM   Result Value Ref Range    Extra Tube Hold for add-ons.    Lavender Top    Collection Time: 07/21/21  5:37 PM   Result Value Ref Range    Extra Tube hold for add-on    Gold Top - SST    Collection Time: 07/21/21  5:37 PM   Result Value Ref Range    Extra Tube Hold for add-ons.    Gray Top    Collection Time: 07/21/21   5:37 PM   Result Value Ref Range    Extra Tube Hold for add-ons.    CBC Auto Differential    Collection Time: 07/21/21  5:37 PM    Specimen: Blood   Result Value Ref Range    WBC 5.58 3.40 - 10.80 10*3/mm3    RBC 4.79 3.77 - 5.28 10*6/mm3    Hemoglobin 12.9 12.0 - 15.9 g/dL    Hematocrit 43.6 34.0 - 46.6 %    MCV 91.0 79.0 - 97.0 fL    MCH 26.9 26.6 - 33.0 pg    MCHC 29.6 (L) 31.5 - 35.7 g/dL    RDW 21.8 (H) 12.3 - 15.4 %    RDW-SD 71.9 (H) 37.0 - 54.0 fl    MPV 11.7 6.0 - 12.0 fL    Platelets 243 140 - 450 10*3/mm3    Neutrophil % 50.0 42.7 - 76.0 %    Lymphocyte % 27.8 19.6 - 45.3 %    Monocyte % 12.9 (H) 5.0 - 12.0 %    Eosinophil % 7.7 (H) 0.3 - 6.2 %    Basophil % 1.1 0.0 - 1.5 %    Immature Grans % 0.5 0.0 - 0.5 %    Neutrophils, Absolute 2.79 1.70 - 7.00 10*3/mm3    Lymphocytes, Absolute 1.55 0.70 - 3.10 10*3/mm3    Monocytes, Absolute 0.72 0.10 - 0.90 10*3/mm3    Eosinophils, Absolute 0.43 (H) 0.00 - 0.40 10*3/mm3    Basophils, Absolute 0.06 0.00 - 0.20 10*3/mm3    Immature Grans, Absolute 0.03 0.00 - 0.05 10*3/mm3    nRBC 0.0 0.0 - 0.2 /100 WBC   Scan Slide    Collection Time: 07/21/21  5:37 PM    Specimen: Blood   Result Value Ref Range    Dacrocytes Slight/1+ None Seen    WBC Morphology Normal Normal    Platelet Morphology Normal Normal   Troponin    Collection Time: 07/21/21  7:30 PM    Specimen: Blood   Result Value Ref Range    Troponin T 0.054 (C) 0.000 - 0.030 ng/mL       If labs were ordered, I independently reviewed the results.        RADIOLOGY  XR Chest 1 View    Result Date: 7/21/2021  EXAMINATION: XR CHEST 1 VW-  INDICATION: Chest Pain triage protocol  COMPARISON: 1/19/2021  FINDINGS: Unchanged cardiac enlargement and bilateral interstitial opacities likely reflecting some mild volume overload/interstitial edema. There is no definite new focal lobar consolidation. No significant effusion or distinct pneumothorax.      Unchanged cardiac enlargement and bilateral interstitial opacities likely  reflecting some mild volume overload/interstitial edema. There is no definite new focal lobar consolidation. No significant effusion or distinct pneumothorax.  This report was finalized on 7/21/2021 5:58 PM by Zeb Blandon.          PROCEDURES    Procedures    ECG 12 Lead         ECG 12 Lead         ECG 12 Lead             MEDICATIONS GIVEN IN ER    Medications   aspirin chewable tablet 324 mg (324 mg Oral Given 7/21/21 1739)         PROGRESS, DATA ANALYSIS, CONSULTS, AND MEDICAL DECISION MAKING    All labs have been independently reviewed by me.  All radiology studies have been reviewed by me and the radiologist dictating the report.   EKG's have been independently viewed and interpreted by me.      Differential diagnoses: Angina,, noncardiac chest pain, pleuritic pain      ED Course as of Jul 22 1251   Wed Jul 21, 2021 2043 Troponin T(!!): 0.054 [TG]      ED Course User Index  [TG] Kevin Kaur PA-C       She remained stable throughout course of stay in emergency department.  Troponin today was elevated initially, at 0.073, but her repeat troponin was 0.054.  Previous troponins over the past 6 months have been around 0.073.  No acute findings on initial or repeat EKGs.  She remained pain-free throughout her course of stay in the emergency department.  She did remain hypertensive, but she states this is not unusual for her.  We will discharge to home with instructions to follow-up with Dr. Sagastume by telephone tomorrow, continue to use her nitroglycerin spray as directed, and return immediately if her symptoms change or worsen.  She and her granddaughter verbalized understanding and are agreeable to plan.      AS OF 12:51 EDT VITALS:    BP - (!) 221/88  HR - 54  TEMP - 97.5 °F (36.4 °C) (Infrared)  O2 SATS - 94%        DIAGNOSIS  Final diagnoses:   Coronary artery disease involving native coronary artery of native heart with angina pectoris (CMS/[facility])         DISPOSITION  DISCHARGE    Patient discharged  in stable condition.    Reviewed implications of results, diagnosis, meds, responsibility to follow up, warning signs and symptoms of possible worsening, potential complications and reasons to return to ER.    Patient/Family voiced understanding of above instructions.    Discussed plan for discharge, as there is no emergent indication for admission.  Pt/family is agreeable and understands need for follow up and possible repeat testing.  Pt/family is aware that discharge does not mean that nothing is wrong but that it indicates no emergency is currently present that requires admission and they must continue care with follow-up as given below or with a physician of their choice.     FOLLOW-UP  Demetrius Sagastume MD  [id number]  [place] [id number] [phone]    Call   Call in the morning to let him know of your visit today.         Medication List      Changed    ammonium lactate 12 % lotion  Commonly known as: LAC-HYDRIN  Apply  topically to the appropriate area as directed As Needed for Dry Skin.  What changed:   · how much to take  · when to take this     famotidine 20 MG tablet  Commonly known as: PEPCID  Take 1 tablet by mouth 2 (Two) Times a Day.  What changed:   · when to take this  · reasons to take this                     Kevin Kaur PA-C  07/22/21 5965

## 2021-07-22 ENCOUNTER — PATIENT OUTREACH (OUTPATIENT)
Dept: CASE MANAGEMENT | Facility: OTHER | Age: 76
End: 2021-07-22

## 2021-07-22 ENCOUNTER — TELEPHONE (OUTPATIENT)
Dept: CARDIOLOGY | Facility: HOSPITAL | Age: 76
End: 2021-07-22

## 2021-07-22 NOTE — OUTREACH NOTE
"Ambulatory Case Management Note    Patient Outreach    Pt contacted regarding ED visit 7/21/21 with chief c/o listed as chest pain and to assess for any case management needs.  Pt states \"I feel much better today.\"  States she did have to take nitroglycerin both Monday and Wednesday this week and after 2 yest went to ED from dialysis.  States she has called Dr. Sagastume's office and appt set for 8/26/21.  Has appt with Nettie Carnes for 8/2, however this was already scheduled.  Offered to call to see if cardiology needs to see her sooner, which she states \"that would be fine.\"  Will let them know she has dialysis on M-W-F starting at 11:15, but can make in am on those days or T TH.  She states she is up and about and denies any SOB, arm, chest, shoulder or neck pain.      Care Coordination    Contacted [facility]. [phone].  Had to leave message that pt was in ED for chest pain and pt's dialysis scheduled and that they could call RN-ACM or the pt with any new appts or information.      Patient Outreach    Pt notified that had to leave message and that she may receive call from cardiology.  She is up and about and ambulates with cane or walker.  States she sometimes gets a little lightheaded with her medicines and that is why she has to use device. Fall prevention discussed.  She lives with her spouse, but  manages her own medicines, gives herself insulin and drives herself.  She monitors her bs at home and reports it is usually betw. 120-150 and occas will go up to 200 if \"I have eaten something I should not.\"  She was aware that he last A1C was 6.3. States they check her bp at dialysis along with monthly foot checks.  Care gaps reviewed.  States she has upcoming appt for eye exam in Sept and will get mammogram done.  She has AWV scheduled for 10/11/21.  She does not have living will, but is interested and does have material regarding this.  [facility] hotline explained and number provided.  " She declines Mychart. Role of  explained and contact number given.  [facility] 24/7 Nurse line explained and contact number provided.  She denies any needs and voiced her appreciation for the call.  Will check in on her next week to f/u on cardiology appt.      SDOH updated and reviewed with the patient during this program:     Financial Resource Strain: Low Risk    • Difficulty of Paying Living Expenses: Not hard at all       Food Insecurity: No Food Insecurity   • Worried About Running Out of Food in the Last Year: Never true   • Ran Out of Food in the Last Year: Never true       Transportation Needs: No Transportation Needs   • Lack of Transportation (Medical): No   • Lack of Transportation (Non-Medical): No       Housing Stability: Low Risk    • Unable to Pay for Housing in the Last Year: No   • Number of Places Lived in the Last Year: 1   • Unstable Housing in the Last Year: No   She denies any food insecurities at present, but was getting food stamps, but is not now and is going to call regarding why it was stopped.  She states she has the number.     General & Health Literacy Assessment    Questions/Answers      Most Recent Value   Assessment Completed With  Patient   Living Arrangement  Significant Other   Type of Residence  Private Residence   Equiptment Used at Home  Cane, Wheelchair [glucometer]   Communication Device  No   Bed or Wheelchair Confined  No   Difficulty Keeping Appointments  No          Care Evaluation    Questions/Answers      Most Recent Value   Annual Wellness Visit:   -- [is scheduled for Oct 2021]   Care Gaps Addressed  Colon Cancer Screening, Diabetic A1C, Diabetic Eye Exam, Flu Shot, Mammogram, Pneumonia Vaccine   Colon Cancer Screening Type  Colonoscopy   Colon Cancer Screening Completion at [facility] or Other  [facility] [7/23/20  Dr. Rosas]   HbA1c Status  -- [6.3   6/29/21 ]   Diabetic Eye Exam Status  Patient will Complete   Flu Shot Status  Up to Date   Mammogram Status   Patient will Complete   Pneumonia Vaccine Status  Up to Date   Other Patient Education/Resources   24/[facility] Nurse Call Line, Advanced Care Planning, MyChart   24/7 Nurse Call Line Education Method  Verbal   ACP Education Method  Verbal   MyChart Education Method  Verbal   Advanced Directives:  -- [ACP hotline number provided   Has material already]   Medication Adherence  Medications understood   Healthy Lifestyle (Self-Efficacy)  self-monitors vital signs appropriately, recognizes when to contact medical assistance          Laurie Law RN  Ambulatory Case Management    7/22/2021, 15:33 EDT

## 2021-07-22 NOTE — DISCHARGE INSTRUCTIONS
Call Dr. Sagastume's office tomorrow morning to advise him of your visit today.  Return to the emergency department immediately if any change or worsening of symptoms.

## 2021-07-22 NOTE — TELEPHONE ENCOUNTER
Jess from case management called and stated that patient was recently in the ED for chest pain this week and has had to take nitroglycerin several times. She has a f/u on August 3 but since you are out next week do you want her to f/u with someone else?

## 2021-07-27 ENCOUNTER — PATIENT OUTREACH (OUTPATIENT)
Dept: CASE MANAGEMENT | Facility: OTHER | Age: 76
End: 2021-07-27

## 2021-07-27 NOTE — OUTREACH NOTE
"Ambulatory Case Management Note    Patient Outreach    Contacted pt regarding the cardiology appt that is now scheduled for 7/29/21 instead of 8/3/21.  States she had gotten the message and will be there.  Asked how she is doing, which she states, \"I am doing good.\"  Denies any further problems.  Wished her a good visit with cardiology, which she states \"Oh, it will be good.\"  Voiced her appreciation for the call.     Laurie Law RN  Ambulatory Case Management    7/27/2021, 16:49 EDT    "

## 2021-07-29 ENCOUNTER — OFFICE VISIT (OUTPATIENT)
Dept: CARDIOLOGY | Facility: HOSPITAL | Age: 76
End: 2021-07-29

## 2021-07-29 VITALS
DIASTOLIC BLOOD PRESSURE: 68 MMHG | WEIGHT: 176.38 LBS | HEIGHT: 67 IN | OXYGEN SATURATION: 97 % | BODY MASS INDEX: 27.68 KG/M2 | SYSTOLIC BLOOD PRESSURE: 132 MMHG | HEART RATE: 77 BPM | RESPIRATION RATE: 19 BRPM | TEMPERATURE: 97.1 F

## 2021-07-29 DIAGNOSIS — I48.0 PAF (PAROXYSMAL ATRIAL FIBRILLATION) (HCC): ICD-10-CM

## 2021-07-29 DIAGNOSIS — N18.6 END STAGE RENAL DISEASE ON DIALYSIS (HCC): ICD-10-CM

## 2021-07-29 DIAGNOSIS — I50.32 CHRONIC DIASTOLIC HEART FAILURE (HCC): ICD-10-CM

## 2021-07-29 DIAGNOSIS — I25.119 CORONARY ARTERY DISEASE INVOLVING NATIVE CORONARY ARTERY OF NATIVE HEART WITH ANGINA PECTORIS (HCC): ICD-10-CM

## 2021-07-29 DIAGNOSIS — Z99.2 END STAGE RENAL DISEASE ON DIALYSIS (HCC): ICD-10-CM

## 2021-07-29 DIAGNOSIS — I25.10 CORONARY ARTERY DISEASE INVOLVING NATIVE CORONARY ARTERY OF NATIVE HEART WITHOUT ANGINA PECTORIS: Primary | ICD-10-CM

## 2021-07-29 PROCEDURE — 99214 OFFICE O/P EST MOD 30 MIN: CPT | Performed by: NURSE PRACTITIONER

## 2021-07-29 RX ORDER — NITROGLYCERIN 400 UG/1
1 SPRAY ORAL
Qty: 1 EACH | Refills: 12 | Status: ON HOLD | OUTPATIENT
Start: 2021-07-29 | End: 2023-04-06

## 2021-07-29 NOTE — PROGRESS NOTES
Chief Complaint  Atrial Fibrillation, Congestive Heart Failure, Hypertension, Chest Pain, and Follow-up    Subjective    History of Present Illness {CC  Problem List  Visit  Diagnosis   Encounters  Notes  Medications  Labs  Result Review Imaging  Media :23}     Esperanza Pop, 76 y.o. female with end-stage renal disease (HD 3 times weekly), CAD, hypertension, PAF presents to [facility] for Atrial Fibrillation, Congestive Heart Failure, Hypertension, Chest Pain, and Follow-up.  Primary cardiologist is Dr. Sagastume.    Patient recently evaluated at [facility] emergency department on 7/21/2021 for complaints of intermittent chest pain for 3 days, with improvement with nitroglycerin. She also stated her symptoms had been relieved with Tums.  Blood pressures were elevated at time of her emergency department work-up, 180/80.  Troponins in emergency department stable from previous troponins.  BNP grossly elevated in emergency department, patient currently on dialysis.  EKGs with no evidence of ACS.    She reports no further chest pain symptoms since emergency department work-up. Chest pain symptoms had occurred at rest and with exertion, in the setting of hypertension.  States that chest pain that prompted emergency department visit similar to pain that prompted last heart catheterization, which revealed nonobstructive CAD and distal stenosis. Patient states pain possibly related to dialysis episode. She denies increased dyspnea, fatigue, racing heart, and syncope.    She presents today with no further chest pain symptoms since the emergency department visit.  Blood pressure recheck during exam 132/68. Blood pressures at home approximately 120/80s at home after medicine; she reports taking her antihypertensives just before leaving for her appointment this morning. She had dialysis yesterday; M,W,F schedule.     Objective     Vital Signs:   Vitals:    07/29/21 0920 07/29/21  "1022   BP: (!) 185/80 132/68   BP Location: Left arm Left arm   Patient Position: Sitting Sitting   Cuff Size: Adult    Pulse: 77    Resp: 19    Temp: 97.1 °F (36.2 °C)    TempSrc: Temporal    SpO2: 97%    Weight: 80 kg (176 lb 6 oz)    Height: 170.2 cm (67\")      Body mass index is 27.62 kg/m².  Physical Exam  Vitals and nursing note reviewed.   Constitutional:       Appearance: Normal appearance.   HENT:      Head: Normocephalic.   Eyes:      Extraocular Movements: Extraocular movements intact.   Neck:      Vascular: No carotid bruit.   Cardiovascular:      Rate and Rhythm: Normal rate and regular rhythm.      Pulses: Normal pulses.      Heart sounds: S1 normal and S2 normal. Murmur heard.        Comments: Right arm fistula: Positive bruit/thrill  Pulmonary:      Effort: Pulmonary effort is normal. No respiratory distress.      Breath sounds: Normal breath sounds.   Musculoskeletal:      Cervical back: Neck supple.      Right lower leg: No edema.      Left lower leg: No edema.   Skin:     General: Skin is warm and dry.   Neurological:      General: No focal deficit present.      Mental Status: She is alert.   Psychiatric:         Mood and Affect: Mood normal.         Behavior: Behavior normal.         Thought Content: Thought content normal.        Data Reviewed:{ Labs  Result Review  Imaging  Med Tab  Media :23}     Cardiac Catheterization/Vascular Study (01/21/2021 16:58)  Adult Transthoracic Echo Complete W/ Cont if Necessary Per Protocol (01/20/2021 18:26)  ECG 12 Lead (07/21/2021 21:16)    Troponin (07/21/2021 19:30)  BNP (07/21/2021 17:37)  Comprehensive Metabolic Panel (07/21/2021 17:37)  CBC & Differential (07/21/2021 17:37)  Troponin (07/21/2021 17:37)      Assessment and Plan {CC Problem List  Visit Diagnosis  ROS  Review (Popup)  Health Maintenance  Quality  BestPractice  Medications  SmartSets  SnapShot Encounters  Media :23}     1. Coronary artery disease involving native coronary " artery of native heart without angina pectoris  -No recurrence of chest pain since emergency department evaluation  -Nonanginal chest pain, patient relates to timing of dialysis.  Patient also hypertensive at time of emergency department evaluation prompted by chest pain  -Blood pressures well controlled at home  -Continue ASA, clopidogrel, atorvastatin, carvedilol, Imdur  -nitroglycerin (Nitrolingual) 0.4 MG/SPRAY spray; Place 1 spray under the tongue Every 5 (Five) Minutes As Needed for Chest Pain.  Dispense: 1 each; Refill: 12    2. Chronic diastolic heart failure (CMS/HCC)  -Appears near euvolemic on exam  -NYHA class: II  -Continue torsemide 20mg daily  -Volume management with hemodialysis    3. End stage renal disease on dialysis (CMS/HCC)  -Volume management with hemodialysis    4. PAF (paroxysmal atrial fibrillation) (CMS/HCC)  - No recurrence, maintained on amiodarone  - Continue amiodarone as ordered; QT/QTc stable compared to previous ECGs      Follow Up {Instructions Charge Capture  Follow-up Communications :23}     Return if symptoms worsen or fail to improve.    Patient was given instructions and counseling regarding her condition or for health maintenance advice. Please see specific information pulled into the AVS if appropriate.  Patient was instructed to call the [facility] with any questions, concerns, or worsening symptoms.    *Please note that portions of this note were completed with a voice recognition program. Efforts were made to edit the dictations, but occasionally words are mistranscribed.

## 2021-08-05 LAB
QT INTERVAL: 488 MS
QT INTERVAL: 502 MS
QT INTERVAL: 526 MS
QTC INTERVAL: 457 MS
QTC INTERVAL: 496 MS
QTC INTERVAL: 498 MS

## 2021-08-20 ENCOUNTER — OFFICE VISIT (OUTPATIENT)
Dept: INTERNAL MEDICINE | Facility: CLINIC | Age: 76
End: 2021-08-20

## 2021-08-20 VITALS
WEIGHT: 176 LBS | HEIGHT: 67 IN | DIASTOLIC BLOOD PRESSURE: 82 MMHG | SYSTOLIC BLOOD PRESSURE: 162 MMHG | OXYGEN SATURATION: 100 % | RESPIRATION RATE: 16 BRPM | BODY MASS INDEX: 27.62 KG/M2 | HEART RATE: 71 BPM | TEMPERATURE: 98.1 F

## 2021-08-20 DIAGNOSIS — R09.89 RUNNY NOSE: Primary | ICD-10-CM

## 2021-08-20 DIAGNOSIS — Z91.89 AT INCREASED RISK OF EXPOSURE TO COVID-19 VIRUS: ICD-10-CM

## 2021-08-20 PROCEDURE — U0004 COV-19 TEST NON-CDC HGH THRU: HCPCS | Performed by: NURSE PRACTITIONER

## 2021-08-20 PROCEDURE — 99213 OFFICE O/P EST LOW 20 MIN: CPT | Performed by: NURSE PRACTITIONER

## 2021-08-24 LAB — SARS-COV-2 RNA NOSE QL NAA+PROBE: NOT DETECTED

## 2021-12-14 ENCOUNTER — OFFICE VISIT (OUTPATIENT)
Dept: CARDIOLOGY | Facility: HOSPITAL | Age: 76
End: 2021-12-14

## 2021-12-14 ENCOUNTER — HOSPITAL ENCOUNTER (OUTPATIENT)
Dept: CARDIOLOGY | Facility: HOSPITAL | Age: 76
Discharge: HOME | End: 2021-12-14

## 2021-12-14 VITALS
BODY MASS INDEX: 29.19 KG/M2 | SYSTOLIC BLOOD PRESSURE: 187 MMHG | DIASTOLIC BLOOD PRESSURE: 81 MMHG | HEIGHT: 67 IN | RESPIRATION RATE: 20 BRPM | HEART RATE: 78 BPM | WEIGHT: 186 LBS | TEMPERATURE: 97.4 F | OXYGEN SATURATION: 98 %

## 2021-12-14 DIAGNOSIS — R00.1 BRADYCARDIA: ICD-10-CM

## 2021-12-14 DIAGNOSIS — I48.0 PAF (PAROXYSMAL ATRIAL FIBRILLATION) (HCC): ICD-10-CM

## 2021-12-14 DIAGNOSIS — N18.6 END STAGE RENAL DISEASE ON DIALYSIS (HCC): ICD-10-CM

## 2021-12-14 DIAGNOSIS — I48.0 PAF (PAROXYSMAL ATRIAL FIBRILLATION) (HCC): Primary | ICD-10-CM

## 2021-12-14 DIAGNOSIS — Z99.2 END STAGE RENAL DISEASE ON DIALYSIS (HCC): ICD-10-CM

## 2021-12-14 DIAGNOSIS — I25.10 CORONARY ARTERY DISEASE INVOLVING NATIVE CORONARY ARTERY OF NATIVE HEART WITHOUT ANGINA PECTORIS: ICD-10-CM

## 2021-12-14 PROCEDURE — 99214 OFFICE O/P EST MOD 30 MIN: CPT | Performed by: NURSE PRACTITIONER

## 2021-12-14 PROCEDURE — 93246 EXT ECG>7D<15D RECORDING: CPT

## 2021-12-14 NOTE — PROGRESS NOTES
[facility] Heart Monitor Documentation    Esperanza BOLDEN Donn  1945  [id number]  12/14/21    WILMA Covarrubias    [] ZIO XT Patch  Model [id number] Prescribed for N/A Days    · Serial Number: (N + 9 Digits) N   · Apply-By Date on Box:   · USPS Tracking Number:   · USPS Tracking        [] [employer]ian MINI PLUS Mobile Cardiac Telemetry  Model BGMINIPLUS Prescribed for N/A Days    · Serial Number: (BGM + 7 Digits) BGM  · Shipped-By Date on Box:   · UPS Tracking Number: [id number]  · UPS Tracking      [] [employer] MINI Holter Monitor  Model BGMINIEL Prescribed for 14 Days    · Serial Number: (7 Digits) [id number]  · Shipped-By Date on Box: 801511  · UPS Tracking Number: [id number]  · UPS Tracking        This monitor was applied to the patient's chest and checked for proper functioning.  Ms. Esperanza Pop was instructed in the proper use of this monitor.  She was given the opportunity to ask questions and left the office with the device 's instruction manual.    Ursula Wharton MA, 10:44 EST, 12/14/21                  [facility] 8[facility]

## 2021-12-14 NOTE — PROGRESS NOTES
"Chief Complaint  Slow Heart Rate and Follow-up    Subjective    History of Present Illness {CC  Problem List  Visit  Diagnosis   Encounters  Notes  Medications  Labs  Result Review Imaging  Media :23}       History of Present Illness   76-year-old female presents the office today for ongoing evaluation of her palpitations and bradycardia.  Patient has a known history of PAF and is currently on amiodarone.  Patient reports that her heart rate has been recorded in the low 40s during dialysis.  Patient does report significant fatigue.  She reports that she was instructed by by the dialysis nurse practitioner to hold her Coreg and her Imdur.  She has resumed her carvedilol 12.5 mg twice daily but has been holding her Imdur.  Currently denies chest pain, presyncope, syncope, orthopnea, PND or pedal edema.  She is on hemodialysis 3 days a week  Objective     Vital Signs:   Vitals:    12/14/21 1020   BP: (!) 187/81   BP Location: Left arm   Patient Position: Sitting   Cuff Size: Adult   Pulse: 78   Resp: 20   Temp: 97.4 °F (36.3 °C)   TempSrc: Temporal   SpO2: 98%   Weight: 84.4 kg (186 lb)   Height: 170.2 cm (67\")     Body mass index is 29.13 kg/m².  Physical Exam  Vitals and nursing note reviewed.   Constitutional:       Appearance: Normal appearance.   HENT:      Head: Normocephalic.   Eyes:      Pupils: Pupils are equal, round, and reactive to light.   Cardiovascular:      Rate and Rhythm: Normal rate and regular rhythm.      Pulses: Normal pulses.      Heart sounds: Murmur heard.       Pulmonary:      Effort: Pulmonary effort is normal.      Breath sounds: Normal breath sounds.   Abdominal:      General: Bowel sounds are normal.      Palpations: Abdomen is soft.   Musculoskeletal:         General: Normal range of motion.      Cervical back: Normal range of motion.      Right lower leg: No edema.      Left lower leg: No edema.   Skin:     General: Skin is warm and dry.      Capillary Refill: Capillary refill " takes less than 2 seconds.   Neurological:      Mental Status: She is alert and oriented to person, place, and time.   Psychiatric:         Mood and Affect: Mood normal.         Thought Content: Thought content normal.              Result Review  Data Reviewed:{ Labs  Result Review  Imaging  Med Tab  Media :23}     Lab Results   Component Value Date    GLUCOSE 224 (H) 07/21/2021    CALCIUM 9.1 07/21/2021     07/21/2021    K 3.7 07/21/2021    CO2 27.0 07/21/2021    CL 96 (L) 07/21/2021    BUN 20 07/21/2021    CREATININE 3.91 (H) 07/21/2021    EGFRIFAFRI 14 (L) 07/21/2021    EGFRIFNONA  07/21/2021      Comment:      <15 Indicative of kidney failure.    BCR 5.1 (L) 07/21/2021    ANIONGAP 15.0 07/21/2021   Cardiac Catheterization/Vascular Study (01/21/2021 16:58)  SCANNED - CARDIOLOGY (01/20/2021)  Adult Transthoracic Echo Complete W/ Cont if Necessary Per Protocol (01/20/2021 18:26)               Assessment and Plan {CC Problem List  Visit Diagnosis  ROS  Review (Popup)  Health Maintenance  Quality  BestPractice  Medications  SmartSets  SnapShot Encounters  Media :23}   1. PAF (paroxysmal atrial fibrillation) (HCC)  Currently on amiodarone with increased palpitations  - Holter Monitor - 72 Hour Up To 15 Days; Future    2. Bradycardia  Per patient report while at dialysis   - Holter Monitor - 72 Hour Up To 15 Days; Future    3. Coronary artery disease involving native coronary artery of native heart without angina pectoris  Continue aspirin, Lipitor, Coreg, Plavix  Hold Imdur at this time temporarily  4. End stage renal disease on dialysis (HCC)  Stable  [facility]          Follow Up {Instructions Charge Capture  Follow-up Communications :23}   Return in about 4 weeks (around 1/11/2022) for Office visit, Monitor results, AFIB, HTN, HF.    Patient was given instructions and counseling regarding her condition or for health maintenance advice. Please see specific information  pulled into the AVS if appropriate.  Patient was instructed to call the [facility] with any questions, concerns, or worsening symptoms.

## 2021-12-26 PROCEDURE — U0004 COV-19 TEST NON-CDC HGH THRU: HCPCS | Performed by: PHYSICIAN ASSISTANT

## 2021-12-27 ENCOUNTER — TELEPHONE (OUTPATIENT)
Dept: INTERNAL MEDICINE | Facility: CLINIC | Age: 76
End: 2021-12-27

## 2021-12-27 NOTE — TELEPHONE ENCOUNTER
Let pt know to treat sxs w/ OTC meds, if SOB/trouble breathing to go to ED. Pt states feels a little better today and temp as been 99.2

## 2021-12-27 NOTE — TELEPHONE ENCOUNTER
Caller: Esperanza Pop    Relationship: Self    Best call back number: [phone]    What is the best time to reach you: ANYTIME    Who are you requesting to speak with (clinical staff, provider,  specific staff member): CLINICAL STAFF    Do you know the name of the person who called: SELF    What was the call regarding: PATIENT STATES THAT SHE WOULD LIKE A CALL ABOUT HER BEING POSITIVE FOR COVID. PATIENT STATES THAT SHE DOES NOT KNOW WHAT TO DO ABOUT HER COUGH AND FEVER.    Do you require a callback: YES

## 2022-01-18 ENCOUNTER — OFFICE VISIT (OUTPATIENT)
Dept: CARDIOLOGY | Facility: HOSPITAL | Age: 77
End: 2022-01-18

## 2022-01-18 VITALS
OXYGEN SATURATION: 94 % | WEIGHT: 191.56 LBS | TEMPERATURE: 97.5 F | DIASTOLIC BLOOD PRESSURE: 78 MMHG | BODY MASS INDEX: 30.07 KG/M2 | RESPIRATION RATE: 16 BRPM | SYSTOLIC BLOOD PRESSURE: 160 MMHG | HEIGHT: 67 IN | HEART RATE: 98 BPM

## 2022-01-18 DIAGNOSIS — Z99.2 END STAGE RENAL DISEASE ON DIALYSIS: ICD-10-CM

## 2022-01-18 DIAGNOSIS — I47.1 ATRIAL TACHYCARDIA: ICD-10-CM

## 2022-01-18 DIAGNOSIS — N18.6 END STAGE RENAL DISEASE ON DIALYSIS: ICD-10-CM

## 2022-01-18 DIAGNOSIS — I48.0 PAF (PAROXYSMAL ATRIAL FIBRILLATION): ICD-10-CM

## 2022-01-18 DIAGNOSIS — I50.32 CHRONIC DIASTOLIC HEART FAILURE: Primary | ICD-10-CM

## 2022-01-18 DIAGNOSIS — I47.29 NSVT (NONSUSTAINED VENTRICULAR TACHYCARDIA): ICD-10-CM

## 2022-01-18 DIAGNOSIS — I10 ESSENTIAL HYPERTENSION: ICD-10-CM

## 2022-01-18 PROCEDURE — 99215 OFFICE O/P EST HI 40 MIN: CPT | Performed by: NURSE PRACTITIONER

## 2022-01-18 RX ORDER — SUCROFERRIC OXYHYDROXIDE 500 MG/1
500 TABLET, CHEWABLE ORAL
COMMUNITY
Start: 2021-12-30 | End: 2023-02-07

## 2022-01-18 NOTE — PROGRESS NOTES
"Chief Complaint  Atrial Fibrillation and Follow-up    Subjective    History of Present Illness {CC  Problem List  Visit  Diagnosis   Encounters  Notes  Medications  Labs  Result Review Imaging  Media :23}       History of Present Illness   77-year-old female presents the office today for ongoing evaluation of her palpitations and hypertension.  Patient reports that she is taking some of her meds as directed however she has not taken any of her a.m. blood pressure medications.  She notes that she has not taken amiodarone in about 6 months and is unclear why she stopped taking it. she reports that she recently had COVID at the end of December. She has ERSD with dialysis MWF; patient notes that she did not go to dialysis yesterday. Patient notes intermittent racing heart beat. Recently wore an extended holter and is here to follow up on those results. Patient does report that her blood pressure was low last week at dialysis 90/50 and she experienced weakness. Denies chest pain, syncope, orthopnea, pnd.   Objective     Vital Signs:   Vitals:    01/18/22 1033 01/18/22 1118   BP: (!) 204/78 160/78   BP Location: Left arm Left arm   Patient Position: Sitting Sitting   Cuff Size: Adult    Pulse: 98    Resp: 16    Temp: 97.5 °F (36.4 °C)    TempSrc: Temporal    SpO2: 94%    Weight: 86.9 kg (191 lb 9 oz)    Height: 170.2 cm (67\")      Body mass index is 30 kg/m².  Physical Exam  Vitals and nursing note reviewed.   Constitutional:       Appearance: Normal appearance.   HENT:      Head: Normocephalic.   Eyes:      Pupils: Pupils are equal, round, and reactive to light.   Cardiovascular:      Rate and Rhythm: Normal rate and regular rhythm.      Pulses: Normal pulses.      Heart sounds: Normal heart sounds. No murmur heard.      Pulmonary:      Effort: Pulmonary effort is normal.      Breath sounds: Normal breath sounds.   Abdominal:      General: Bowel sounds are normal.      Palpations: Abdomen is soft. "   Musculoskeletal:         General: Normal range of motion.      Cervical back: Normal range of motion.      Right lower leg: No edema.      Left lower leg: No edema.   Skin:     General: Skin is warm and dry.      Capillary Refill: Capillary refill takes less than 2 seconds.   Neurological:      Mental Status: She is alert and oriented to person, place, and time.   Psychiatric:         Mood and Affect: Mood normal.         Thought Content: Thought content normal.              Result Review  Data Reviewed:{ Labs  Result Review  Imaging  Med Tab  Media :23}   Adult Transthoracic Echo Complete W/ Cont if Necessary Per Protocol (01/20/2021 18:26)    Cardiac Catheterization/Vascular Study (01/21/2021 16:58)    Extended Holter worn for 10 days 12 hours and 6 minutes showed an average heart rate of 80 bpm, PAC burden 5.70%, PVC burden 6.59%  19 runs of nonsustained VT with the longest lasting 4 beats and the fastest 169 bpm  1921 runs of SVT with the longest lasting 19 beats and the fastest 176 bpm    17 minutes noted of atrial fibrillation with a 0.26 burden         Assessment and Plan {CC Problem List  Visit Diagnosis  ROS  Review (Popup)  Health Maintenance  Quality  BestPractice  Medications  SmartSets  SnapShot Encounters  Media :23}   1. Chronic diastolic heart failure (HCC)  Euvolemic     2. PAF (paroxysmal atrial fibrillation) ([facility])  Brief burden noted on recent extended holter  Will add eliquis 2. 5mg bid  Patient has not taken amiodarone for the past six months for unclear reasons; will defer to cardiology to determine if amiodarone is to be restarted  3. Essential hypertension  I suspect that patient is not taking antihypertensive medications as directed  Did print off medication list and reviewed with patient dosing instructions.     4. Atrial tachycardia (HCC)  Continue coreg bid     5. NSVT (nonsustained ventricular tachycardia) ([facility])  [facility] 1/21  Patient asymptomatic     6. End stage renal  disease on dialysis (HCC)  HD on MWF      I spent 45 minutes caring for Esperanza on this date of service. This time includes time spent by me in the following activities:preparing for the visit, reviewing tests, obtaining and/or reviewing a separately obtained history, performing a medically appropriate examination and/or evaluation , counseling and educating the patient/family/caregiver, ordering medications, tests, or procedures and documenting information in the medical record    Follow Up {Instructions Charge Capture  Follow-up Communications :23}   No follow-ups on file.    Patient was given instructions and counseling regarding her condition or for health maintenance advice. Please see specific information pulled into the AVS if appropriate.  Patient was instructed to call the [facility] with any questions, concerns, or worsening symptoms.

## 2022-01-19 ENCOUNTER — OFFICE VISIT (OUTPATIENT)
Dept: CARDIOLOGY | Facility: CLINIC | Age: 77
End: 2022-01-19

## 2022-01-19 VITALS
DIASTOLIC BLOOD PRESSURE: 90 MMHG | HEART RATE: 101 BPM | OXYGEN SATURATION: 95 % | SYSTOLIC BLOOD PRESSURE: 197 MMHG | HEIGHT: 67 IN | WEIGHT: 191 LBS | BODY MASS INDEX: 29.98 KG/M2

## 2022-01-19 DIAGNOSIS — E11.42 DIABETIC POLYNEUROPATHY ASSOCIATED WITH TYPE 2 DIABETES MELLITUS: ICD-10-CM

## 2022-01-19 DIAGNOSIS — E78.5 DYSLIPIDEMIA: ICD-10-CM

## 2022-01-19 DIAGNOSIS — I48.0 PAF (PAROXYSMAL ATRIAL FIBRILLATION): Primary | ICD-10-CM

## 2022-01-19 DIAGNOSIS — I10 ESSENTIAL HYPERTENSION: ICD-10-CM

## 2022-01-19 DIAGNOSIS — I25.9 ISCHEMIC HEART DISEASE: ICD-10-CM

## 2022-01-19 PROCEDURE — 93000 ELECTROCARDIOGRAM COMPLETE: CPT | Performed by: INTERNAL MEDICINE

## 2022-01-19 PROCEDURE — 99214 OFFICE O/P EST MOD 30 MIN: CPT | Performed by: INTERNAL MEDICINE

## 2022-01-19 RX ORDER — ATORVASTATIN CALCIUM 80 MG/1
80 TABLET, FILM COATED ORAL NIGHTLY
Qty: 90 TABLET | Refills: 3 | Status: ON HOLD | OUTPATIENT
Start: 2022-01-19

## 2022-01-19 RX ORDER — TERAZOSIN 5 MG/1
5 CAPSULE ORAL NIGHTLY
Qty: 90 CAPSULE | Refills: 3 | Status: SHIPPED | OUTPATIENT
Start: 2022-01-19 | End: 2022-01-19

## 2022-01-19 RX ORDER — TERAZOSIN 2 MG/1
4 CAPSULE ORAL NIGHTLY
Qty: 180 CAPSULE | Refills: 3 | Status: SHIPPED | OUTPATIENT
Start: 2022-01-19 | End: 2023-03-29 | Stop reason: HOSPADM

## 2022-01-19 RX ORDER — AMIODARONE HYDROCHLORIDE 200 MG/1
200 TABLET ORAL DAILY
Qty: 90 TABLET | Refills: 1 | Status: SHIPPED | OUTPATIENT
Start: 2022-01-19 | End: 2022-09-22 | Stop reason: SDUPTHER

## 2022-01-19 NOTE — PROGRESS NOTES
Subjective:     Encounter Date:01/19/2022    Patient ID: Esperanza Pop is a 77 y.o.   female, retired , assistant , from [place], [place].      INTERNIST: Meghana Rodgers MD  REFERRING HEALTHCARE PROVIDER: [facility]   INTERVENTIONAL CARDIOLOGIST: Hugh Pop MD, [facility], [facility],Scooter Zhao MD, [facility], [facility], Corky Moon MD, [facility], [facility]  ADVANCED HEART FAILURE CLINIC: [facility]  VASCULAR SURGEON:  Dem[facility]rius Rich [facility]  GASTROENTEROLOGIST: Rubén Rosas MD  NEPHROLOGIST: Kevan Lerma MD.    Chief Complaint:   Chief Complaint   Patient presents with   • ischemic heart disease       Problem List:  1. Ischemic heart disease  a. Acute non-STEMI/congestive heart failure with diagnostic coronary arteriography demonstrating 20% LAD plaque with high-grade stenosis in the proximal mid right coronary artery requiring a Xience drug-eluting stent (3.0 mm x 28 mm) with reduced echocardiographic LVEF (0.25) with abnormal diastolic LV dysfunction, December 2013.   b. Improved echocardiogram, April 2014.  c. Remote non-STEMI related to malignant hypertension with distal RCA occlusion, patent previous RCA stent with mild inferior hypokinesis but overall acceptable systolic LV function (LVEF 0.55) with PTCA, DARRYL distal RCA, October 2016.  d. Residual CCS class I angina pectoris/NYHA class II CHF.  e. Mild aortic stenosis (June 2016).  f. Echocardiogram 02/07/2019: Normal size LV, moderate LV wall thickness, LVEF 0.65, impaired LV relaxation, normal left atrial pressure, mild MR, mild TR, mild NM, no pericardial effusion  g. Echocardiogram 01/20/2021: LVEF 60%, LV diastolic function consistent with grade 2 with high LAP pseudonormalization, LA severely increased, moderate aortic stenosis, RVSP 51 mmHg, RA mild to moderately dilated, mild mitral stenosis with functional MAC, moderate TR, normal RV wall thickness and septal  motion noted with RV cavity mildly dilated, no pericardial effusion  h. Right and left heart catheterization 01/21/2021: 80% distal LAD stenosis, status post intervention with Xience Demetra 2.5 x 15 mm DARRYL, previously placed stents in the RCA widely patent, LVEF 53% with hypokinesis of the basal to mid diaphragmatic/inferior segments, systemic hypertension with a wide pulse pressure, mild to moderate pulmonary hypertension, RVSP 67 mmHg, at least moderately decreased cardiac index 2.1 L/min/m² by thermal dilution and 1.6 L/min/m² by Brandon, recommendations for DAPT, statin, beta-blocker, long-acting nitrate, hydralazine  i. Residual CCS class I angina pectoris/NYHA class II CHF, January 2022.  2. Severe hypertension - probably essential.   3. Dyslipidemia.  4. Type 2 diabetes mellitus type 2 with suboptimal control (hemoglobin A1c 8.8%; March 2018, 7.6%, 10.7% February 2019; 8.2%, September 2019; 7.9%, December 2019, 6.9% September 2020, 6.3% June 2021).  5. Moderate aortic stenosis January 2021  6. Moderate pulmonary hypertension January 2021  7. Mild obesity (BMI 31.3); resolved, BMI 29.91  8. Acute kidney injury: Admission from 12/26/2013 to 01/02/2014, now resolved with latest creatinine 1.4 on 01/08/2014.  9. Moderate normocytic normochromic anemia; hemoglobin 9.2 gm/dL (June 2016).  10. Noncompliance.  11. Remote apparent end-stage renal disease with initiation of hemodialysis MWF weekly and construction of right upper extremity AV fistula; data deficit (June 2016) with subsequent AV fistula dysfunction and fistulogram with angioplasty - [facility], October 2017.  12. AV fistulogram with apparent thrombectomy [facility], February 2018  13. Palpitations with recent abnormal Holter monitor demonstrating intermittent brief atrial fibrillation, December 2017/January 2018, sinus rhythm on ECG January 2021, event monitor 12/14/2021- 12/28/2021 demonstrated 0.26% atrial fibrillation burden, 5.7% PVC burden,  minimum heart rate 58 bpm, maximum heart rate 176 bpm, average heart rate 80 bpm, occasional SVT with the longest episode 19 beats and the fastest 176 bpm, reinitiated amiodarone and Eliquis January 2022  14. [facility] overnight hospitalization February 2019 for shortness of breath and thrombosed AV fistula with angioplasty  15. [facility] 4-day hospitalization late January 2021 for acute on chronic diastolic heart failure precipitated by hypertensive emergency  16. COVID December 2021 with low-grade fever, body aches, fatigue, and cough, symptoms lasted for about a week, patient had both of her COVID vaccinations but had not yet had her booster  16. Remote operations:  a. 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 Abdominal hernia repair/panniculectomy  c. Hysterectomy  d. Angioplasty for thrombosed AV fistula February 2019  e. [facility] with stent January 2021    Allergies   Allergen Reactions   • Mircera [Methoxy Polyethylene Glycol-Epoetin Beta] Anaphylaxis     Pt stopped breathing   • Venofer [Iron Sucrose] Anaphylaxis     Pt stopped breathing   • Albuterol Other (See Comments)     Increased blood pressure  Used right before heart attack   • Nifedipine Er Swelling   • Penicillins Hives   • Prednisone Other (See Comments)     Makes jittery/anxious       Current Outpatient Medications:   •  amiodarone (PACERONE) 200 MG tablet, Take 1 tablet by mouth Daily., Disp: 30 tablet, Rfl: 5  •  ammonium lactate (LAC-HYDRIN) 12 % lotion, Apply  topically to the appropriate area as directed As Needed for Dry Skin. (Patient taking differently: Apply 1 application topically to the appropriate area as directed 2 (Two) Times a Day As Needed for Dry Skin.), Disp: 500 g, Rfl: 3  •  aspirin 81 MG EC tablet, Take 1 tablet by mouth Daily., Disp: , Rfl:   •  atorvastatin (LIPITOR) 80 MG tablet, Take 1 tablet by mouth Every Night., Disp: 90 tablet, Rfl: 1  •  B Complex-C-Folic Acid (DIALYVITE 800 PO), Take 1 tablet by mouth 3 (Three)  Times a Week. On dialysis says MWF, Disp: , Rfl:   •  bisacodyl (DULCOLAX) 5 MG EC tablet, Take 5 mg by mouth Daily As Needed for Constipation., Disp: , Rfl:   •  Capsaicin 0.1 % cream, Apply 2-4 gms to feet nightly. Use gloves, Disp: 60 g, Rfl: 3  •  carvedilol (COREG) 25 MG tablet, Take 1 tablet by mouth Every 12 (Twelve) Hours. (Patient taking differently: Take 12.5 mg by mouth Every 12 (Twelve) Hours.), Disp: 60 tablet, Rfl: 5  •  cloNIDine (CATAPRES) 0.2 MG tablet, Take 2 tablets by mouth Every 12 (Twelve) Hours., Disp: 120 tablet, Rfl: 5  •  clopidogrel (PLAVIX) 75 MG tablet, Take 1 tablet by mouth Daily., Disp: 90 tablet, Rfl: 3  •  desonide (DESOWEN) 0.05 % cream, Apply 0.05 application topically to the appropriate area as directed 2 (Two) Times a Day., Disp: , Rfl: 1  •  dorzolamide-timolol (COSOPT) 22.3-6.8 MG/ML ophthalmic solution, Administer 1 drop to both eyes Daily., Disp: 10 mL, Rfl: 3  •  famotidine (PEPCID) 20 MG tablet, Take 1 tablet by mouth 2 (Two) Times a Day. (Patient taking differently: Take 20 mg by mouth 2 (Two) Times a Day As Needed.), Disp: 180 tablet, Rfl: 1  •  Ferrous Sulfate (IRON) 325 (65 Fe) MG tablet, Take 1 tablet by mouth 2 (Two) Times a Day., Disp: , Rfl: 5  •  fluocinonide (LIDEX) 0.05 % external solution, Apply 0.05 application topically to the appropriate area as directed 2 (Two) Times a Day. Scalp, Disp: , Rfl: 2  •  hydrALAZINE (APRESOLINE) 50 MG tablet, Take 1 tablet by mouth Every 12 (Twelve) Hours., Disp: 60 tablet, Rfl: 5  •  insulin NPH-insulin regular (humuLIN 70/30,novoLIN 70/30) (70-30) 100 UNIT/ML injection, Inject 20 units before dinner and 17 Units before breakfast., Disp: 10 mL, Rfl: 5  •  IRON DEXTRAN IJ, Inject  as directed 3 (Three) Times a Week., Disp: , Rfl:   •  isosorbide mononitrate (IMDUR) 120 MG 24 hr tablet, Take 1 tablet by mouth Daily., Disp: 90 tablet, Rfl: 3  •  lidocaine-prilocaine (EMLA) 2.5-2.5 % cream, Apply 1 application topically to the  appropriate area as directed As Needed (dialysis access)., Disp: , Rfl: 6  •  megestrol (MEGACE) 40 MG tablet, Take 40 mg by mouth Daily., Disp: , Rfl:   •  nitroglycerin (Nitrolingual) 0.4 MG/SPRAY spray, Place 1 spray under the tongue Every 5 (Five) Minutes As Needed for Chest Pain., Disp: 1 each, Rfl: 12  •  nortriptyline (PAMELOR) 10 MG capsule, Take 1 capsule by mouth Every Night., Disp: 30 capsule, Rfl: 3  •  terazosin (HYTRIN) 2 MG capsule, Take 2 capsules by mouth every night at bedtime., Disp: 180 capsule, Rfl: 1  •  torsemide (Demadex) 20 MG tablet, 1 PO QAM and 2 PO QPM, Disp: 90 tablet, Rfl: 5  •  Velphoro 500 MG chewable tablet, Chew 500 mg 3 (Three) Times a Day With Meals., Disp: , Rfl:   •  apixaban (ELIQUIS) 2.5 MG tablet tablet, Take 1 tablet by mouth 2 (Two) Times a Day., Disp: 60 tablet, Rfl: 3           History of Present Illness:  Patient returns after 12-month hiatus for follow up.  She recently wore a event monitor 12/14/2021- 12/28/2021 which demonstrated 0.26% atrial fibrillation burden, 5.7% PVC burden, minimum heart rate 58 bpm, maximum heart rate 176 bpm, average heart rate 80 bpm, occasional SVT with the longest episode 19 beats and the fastest 176 bpm.  The patient says that she feels her heart fluttering sometimes.  She has not been compliant with taking her amiodarone or Eliquis regularly.  She does take Plavix.  She denies any chest pain unless she eats chili.  She denies any increased shortness of breath, presyncope, or syncope.  She has had a couple falls that were mechanical when she was going up a flight of stairs.  She feels that her knees are weak sometimes.  She had COVID around 3 weeks ago with a low-grade fever of 99 °F, body aches, fatigue, and a mild cough.  Her symptoms lasted for about a week and then they resolved.  She had had both of her COVID vaccinations but was about to get her booster but got COVID in the interim.  She continues to do dialysis every Monday,  "Wednesday, and Friday.  She has concerns because sometimes her blood pressure will get as low as 99 systolic.  Whenever she checks her blood pressure it is always around 200 systolic in the mornings.  After she takes her medications, her blood pressure improves.  She just took her blood pressure medication between 8931-6009 this morning.  She has not had any other hospitalizations or surgeries.  The patient is able to wash her dishes and do laundry and other household chores without any cardiopulmonary complaints.  She used to go to the mall to walk for exercise but since the pandemic she has refrained from doing this.  The patient is unsure whether she is taking her terazosin or not either.          Review of Systems   All other systems reviewed and are negative.     Obtained and negative except as outlined in problem list and HPI.      ECG 12 Lead    Date/Time: 1/19/2022 9:24 AM  Performed by: Demetrius Sagastume MD  Authorized by: Demetrius Sagastume MD   Rhythm comments: Normal sinus rhythm, left axis deviation, abnormal ECG, 100 bpm, QRS 94 ms,  ms,  ms, sinus rhythm has replaced sinus bradycardia compared to ECG in July 2021                 Objective:       Vitals:    01/19/22 0848 01/19/22 0850   BP: (!) 207/91 (!) 197/90   BP Location: Left arm Left arm   Patient Position: Sitting Standing   Cuff Size: Adult Adult   Pulse: 100 101   SpO2: 95%    Weight: 86.6 kg (191 lb)    Height: 170.2 cm (67\")    Recheck blood pressure left arm sitting was 200/74  Body mass index is 29.91 kg/m².  Last weight: 175 lbs    Vitals reviewed.   Constitutional:       Appearance: Well-developed.   Neck:      Thyroid: No thyromegaly.      Vascular: No carotid bruit or JVD.      Lymphadenopathy: No cervical adenopathy.   Pulmonary:      Effort: Pulmonary effort is normal.      Breath sounds: Normal breath sounds. No wheezing. No rhonchi. No rales.   Cardiovascular:      Regular rhythm.      Murmurs: There is a grade 3/6 harsh " systolic murmur at the URSB, LLSB, LRSB and ULSB, radiating to the neck.      No gallop. No S3 gallop.   Pulses:     Dorsalis pedis: 1+ bilaterally.     Posterior tibial: 1+ bilaterally.  Edema:     Peripheral edema absent.   Abdominal:      Palpations: Abdomen is soft. There is no abdominal mass.      Tenderness: There is no abdominal tenderness.   Musculoskeletal: Normal range of motion. Skin:     General: Skin is warm and dry.      Findings: No rash.   Neurological:      Mental Status: Alert and oriented to person, place, and time.           Lab Review:   Lab Results   Component Value Date    GLUCOSE 224 (H) 07/21/2021    BUN 20 07/21/2021    CREATININE 3.91 (H) 07/21/2021    EGFRIFNONA  07/21/2021      Comment:      <15 Indicative of kidney failure.    EGFRIFAFRI 14 (L) 07/21/2021    BCR 5.1 (L) 07/21/2021     07/21/2021    K 3.7 07/21/2021    CL 96 (L) 07/21/2021    CO2 27.0 07/21/2021    CALCIUM 9.1 07/21/2021    ALBUMIN 3.60 07/21/2021    AST 24 07/21/2021    ALT 9 07/21/2021       Lab Results   Component Value Date    WBC 5.58 07/21/2021    HGB 12.9 07/21/2021    HCT 43.6 07/21/2021    MCV 91.0 07/21/2021     07/21/2021       Lab Results   Component Value Date    HGBA1C 6.3 06/29/2021       Lab Results   Component Value Date    TSH 1.270 09/22/2020     XR Chest, 7/21/2021:  Unchanged cardiac enlargement and bilateral interstitial opacities likely reflecting some mild volume overload/interstitial edema. There is no definite new focal lobar consolidation. No significant effusion or distinct pneumothorax.    Event monitor 12/14/2021- 12/28/2021 demonstrated 0.26% atrial fibrillation burden, 5.7% PVC  burden, minimum heart rate 58 bpm, maximum heart rate 176 bpm, average heart rate 80 bpm,  occasional SVT with the longest episode 19 beats and the fastest 176 bpm        Assessment:       Patient with fair functional status on dialysis M, W, and F. We will defer additional diagnostic or therapeutic  intervention from a cardiac perspective at this time.  She was found to have 0.26% atrial fibrillation burden on recent event monitor and will restart amiodarone 200 mg twice daily x1 week, then go to 200 mg daily.  She will discontinue Plavix and start on Eliquis 2.5 mg twice daily.  She has uncontrolled hypertension, particularly in the morning and the patient is unsure whether she is taking her terazosin or not so we will have her restart terazosin 4 mg nightly and have her call back in 1-2 weeks with blood pressure readings.     Diagnosis Plan   1. Ischemic heart disease  No recurrent angina pectoris or CHF on current activity schedule; continue current treatment   2. PAF (paroxysmal atrial fibrillation) (HCC)  She was found to have 0.26% atrial fibrillation burden on recent event monitor and will restart amiodarone 200 mg twice daily x1 week, then go to 200 mg daily.  She will discontinue Plavix and start on Eliquis 2.5 mg twice daily.   3. Essential hypertension   Restart terazosin 4 mg nightly, call back in 1-2 weeks with blood pressure readings   4. Dyslipidemia   No new labs to review, continue atorvastatin   5. Diabetic polyneuropathy associated with type 2 diabetes mellitus (HCC)   No new labs to review, continue heart healthy diet and physical activity as tolerated          Plan:         1. Patient to continue current medications and close follow up with the above providers.  2. Tentative cardiology follow up in May 2022 or patient may return sooner PRN.  3. Restart terazosin 4 mg nightly  4. Patient to call back in 1-2 weeks with blood pressure readings  5. Start amiodarone 200 mg twice daily x1 week, then 200 mg daily  6. Discontinue Plavix  7. Begin Eliquis 2.5 mg twice daily    Scribed for Demetrius Sagastume MD by Geena Estrella, APRN. 1/19/2022  09:23 EST    I, Demetrius Sagastume MD, [facility], personally performed the services described in this documentation as scribed by the above named individual in my  presence, and it is both accurate and complete. At 09:33 EST on 01/19/2022

## 2022-01-21 PROCEDURE — 93248 EXT ECG>7D<15D REV&INTERPJ: CPT | Performed by: INTERNAL MEDICINE

## 2022-06-14 ENCOUNTER — OFFICE VISIT (OUTPATIENT)
Dept: INTERNAL MEDICINE | Facility: CLINIC | Age: 77
End: 2022-06-14

## 2022-06-14 VITALS
HEART RATE: 62 BPM | WEIGHT: 197 LBS | SYSTOLIC BLOOD PRESSURE: 172 MMHG | DIASTOLIC BLOOD PRESSURE: 68 MMHG | OXYGEN SATURATION: 91 % | BODY MASS INDEX: 30.85 KG/M2

## 2022-06-14 DIAGNOSIS — Z99.2 TYPE 2 DIABETES MELLITUS WITH CHRONIC KIDNEY DISEASE ON CHRONIC DIALYSIS, WITH LONG-TERM CURRENT USE OF INSULIN: ICD-10-CM

## 2022-06-14 DIAGNOSIS — M65.342 TRIGGER RING FINGER OF LEFT HAND: Primary | ICD-10-CM

## 2022-06-14 DIAGNOSIS — G89.29 CHRONIC PAIN OF BOTH KNEES: ICD-10-CM

## 2022-06-14 DIAGNOSIS — M25.562 CHRONIC PAIN OF BOTH KNEES: ICD-10-CM

## 2022-06-14 DIAGNOSIS — Z79.4 TYPE 2 DIABETES MELLITUS WITH CHRONIC KIDNEY DISEASE ON CHRONIC DIALYSIS, WITH LONG-TERM CURRENT USE OF INSULIN: ICD-10-CM

## 2022-06-14 DIAGNOSIS — E11.22 TYPE 2 DIABETES MELLITUS WITH CHRONIC KIDNEY DISEASE ON CHRONIC DIALYSIS, WITH LONG-TERM CURRENT USE OF INSULIN: ICD-10-CM

## 2022-06-14 DIAGNOSIS — M25.561 CHRONIC PAIN OF BOTH KNEES: ICD-10-CM

## 2022-06-14 DIAGNOSIS — N18.6 TYPE 2 DIABETES MELLITUS WITH CHRONIC KIDNEY DISEASE ON CHRONIC DIALYSIS, WITH LONG-TERM CURRENT USE OF INSULIN: ICD-10-CM

## 2022-06-14 LAB
EXPIRATION DATE: NORMAL
GLUCOSE BLDC GLUCOMTR-MCNC: 117 MG/DL (ref 70–130)
HBA1C MFR BLD: 6.3 %
Lab: NORMAL

## 2022-06-14 PROCEDURE — 3044F HG A1C LEVEL LT 7.0%: CPT | Performed by: PHYSICIAN ASSISTANT

## 2022-06-14 PROCEDURE — 82947 ASSAY GLUCOSE BLOOD QUANT: CPT | Performed by: PHYSICIAN ASSISTANT

## 2022-06-14 PROCEDURE — 99214 OFFICE O/P EST MOD 30 MIN: CPT | Performed by: PHYSICIAN ASSISTANT

## 2022-06-14 PROCEDURE — 83036 HEMOGLOBIN GLYCOSYLATED A1C: CPT | Performed by: PHYSICIAN ASSISTANT

## 2022-06-14 RX ORDER — TRIPROLIDINE/PSEUDOEPHEDRINE 2.5MG-60MG
TABLET ORAL
Status: ON HOLD | COMMUNITY
Start: 2022-04-28

## 2022-06-14 RX ORDER — LANCETS 28 GAUGE
EACH MISCELLANEOUS
Qty: 100 EACH | Refills: 11 | Status: ON HOLD | OUTPATIENT
Start: 2022-06-14 | End: 2023-04-06

## 2022-06-14 RX ORDER — BLOOD-GLUCOSE METER
KIT MISCELLANEOUS
Qty: 1 EACH | Refills: 0 | Status: ON HOLD | OUTPATIENT
Start: 2022-06-14 | End: 2023-04-06

## 2022-06-14 NOTE — PROGRESS NOTES
Chief Complaint   Patient presents with   • Knee Pain   • Finger locking up     Acute    • Diabetes       Subjective     Esperanza Pop is a 77 y.o. female.        History of Present Illness     Pt has not been monitoring her BS because monitor is broken, needs a new one.    She has had left ring finger trigger finger that required injections before. Thinks she had them done at  before. She has had recurrence of finger locking. She tried immobilizing with tape and continues to be painful.     She is also having significant pain in her bilateral knees. She has fallen on both of them, the left one was 6-7 months ago. Fell because her right knee gave out. The right one is weaker than left. Most discomfort is when she is sitting and trying to get up or in the mornings.     She has BP monitored routinely at dialysis and it is usually better controlled- around 140 systolic.      Current Outpatient Medications:   •  amiodarone (PACERONE) 200 MG tablet, Take 1 tablet by mouth Daily. Take 1 tablet twice a day for first week., Disp: 90 tablet, Rfl: 1  •  ammonium lactate (LAC-HYDRIN) 12 % lotion, Apply  topically to the appropriate area as directed As Needed for Dry Skin. (Patient taking differently: Apply 1 application topically to the appropriate area as directed 2 (Two) Times a Day As Needed for Dry Skin.), Disp: 500 g, Rfl: 3  •  apixaban (ELIQUIS) 2.5 MG tablet tablet, Take 1 tablet by mouth 2 (Two) Times a Day., Disp: 180 tablet, Rfl: 3  •  aspirin 81 MG EC tablet, Take 1 tablet by mouth Daily., Disp: , Rfl:   •  atorvastatin (LIPITOR) 80 MG tablet, Take 1 tablet by mouth Every Night., Disp: 90 tablet, Rfl: 3  •  B Complex-C-Folic Acid (DIALYVITE 800 PO), Take 1 tablet by mouth 3 (Three) Times a Week. On dialysis says MWNINO, Disp: , Rfl:   •  bisacodyl (DULCOLAX) 5 MG EC tablet, Take 5 mg by mouth Daily As Needed for Constipation., Disp: , Rfl:   •  Capsaicin 0.1 % cream, Apply 2-4 gms to feet nightly. Use gloves, Disp:  60 g, Rfl: 3  •  carvedilol (COREG) 25 MG tablet, Take 1 tablet by mouth Every 12 (Twelve) Hours. (Patient taking differently: Take 12.5 mg by mouth Every 12 (Twelve) Hours.), Disp: 60 tablet, Rfl: 5  •  cloNIDine (CATAPRES) 0.2 MG tablet, Take 2 tablets by mouth Every 12 (Twelve) Hours., Disp: 120 tablet, Rfl: 5  •  desonide (DESOWEN) 0.05 % cream, Apply 0.05 application topically to the appropriate area as directed 2 (Two) Times a Day., Disp: , Rfl: 1  •  dorzolamide-timolol (COSOPT) 22.3-6.8 MG/ML ophthalmic solution, Administer 1 drop to both eyes Daily., Disp: 10 mL, Rfl: 3  •  Epoetin Rusty (EPOGEN IJ), Epoetin Rusty (Epogen), Disp: , Rfl:   •  famotidine (PEPCID) 20 MG tablet, Take 1 tablet by mouth 2 (Two) Times a Day. (Patient taking differently: Take 20 mg by mouth 2 (Two) Times a Day As Needed.), Disp: 180 tablet, Rfl: 1  •  Ferrous Sulfate (IRON) 325 (65 Fe) MG tablet, Take 1 tablet by mouth 2 (Two) Times a Day., Disp: , Rfl: 5  •  fluocinonide (LIDEX) 0.05 % external solution, Apply 0.05 application topically to the appropriate area as directed 2 (Two) Times a Day. Scalp, Disp: , Rfl: 2  •  hydrALAZINE (APRESOLINE) 50 MG tablet, Take 1 tablet by mouth Every 12 (Twelve) Hours., Disp: 60 tablet, Rfl: 5  •  insulin NPH-insulin regular (humuLIN 70/30,novoLIN 70/30) (70-30) 100 UNIT/ML injection, Inject 20 units before dinner and 17 Units before breakfast., Disp: 10 mL, Rfl: 5  •  IRON DEXTRAN IJ, Inject  as directed 3 (Three) Times a Week., Disp: , Rfl:   •  isosorbide mononitrate (IMDUR) 120 MG 24 hr tablet, Take 1 tablet by mouth Daily., Disp: 90 tablet, Rfl: 3  •  lidocaine-prilocaine (EMLA) 2.5-2.5 % cream, Apply 1 application topically to the appropriate area as directed As Needed (dialysis access)., Disp: , Rfl: 6  •  megestrol (MEGACE) 40 MG tablet, Take 40 mg by mouth Daily., Disp: , Rfl:   •  nitroglycerin (Nitrolingual) 0.4 MG/SPRAY spray, Place 1 spray under the tongue Every 5 (Five) Minutes As  Needed for Chest Pain., Disp: 1 each, Rfl: 12  •  nortriptyline (PAMELOR) 10 MG capsule, Take 1 capsule by mouth Every Night., Disp: 30 capsule, Rfl: 3  •  terazosin (HYTRIN) 2 MG capsule, Take 2 capsules by mouth Every Night., Disp: 180 capsule, Rfl: 3  •  torsemide (Demadex) 20 MG tablet, 1 PO QAM and 2 PO QPM, Disp: 90 tablet, Rfl: 5  •  Velphoro 500 MG chewable tablet, Chew 500 mg 3 (Three) Times a Day With Meals., Disp: , Rfl:   •  Durezol 0.05 % ophthalmic emulsion, INSTILL 1 DROP 4 TIMES DAILY INTO SURGICAL EYE STARTING AFTER SURGERY., Disp: , Rfl:   •  glucose blood test strip, Use as instructed to monitor blood glucose 1-2 times daily, Disp: 100 each, Rfl: 12  •  glucose monitor monitoring kit, Use as directed to monitor blood glucose 1-2 times daily, Disp: 1 each, Rfl: 0  •  Lancets Ultra Fine misc, Use as directed to check blood sugar 1-2 times daily, Disp: 100 each, Rfl: 11     PMFSH  The following portions of the patient's history were reviewed and updated as appropriate: allergies, current medications, past family history, past medical history, past social history, past surgical history and problem list.    Review of Systems   Constitutional: Negative for fever and unexpected weight change.   HENT: Negative.    Eyes: Negative.    Respiratory: Negative for chest tightness, shortness of breath and wheezing.    Cardiovascular: Negative.    Gastrointestinal: Negative for abdominal pain.   Endocrine: Negative.    Genitourinary: Negative.    Musculoskeletal: Positive for arthralgias and myalgias.   Skin: Negative for color change, rash and wound.   Allergic/Immunologic: Negative.    Neurological: Negative for dizziness, seizures, syncope and numbness.   Hematological: Negative for adenopathy. Does not bruise/bleed easily.   Psychiatric/Behavioral: Negative for confusion.       Objective   /68   Pulse 62   Wt 89.4 kg (197 lb)   LMP  (LMP Unknown)   SpO2 91%   BMI 30.85 kg/m²     Physical  Exam  Vitals and nursing note reviewed.   Constitutional:       Appearance: She is well-developed.   HENT:      Head: Normocephalic and atraumatic.      Right Ear: External ear normal.      Left Ear: External ear normal.   Eyes:      Conjunctiva/sclera: Conjunctivae normal.   Cardiovascular:      Rate and Rhythm: Normal rate and regular rhythm.   Pulmonary:      Effort: Pulmonary effort is normal.      Breath sounds: Normal breath sounds.   Musculoskeletal:      Cervical back: Normal range of motion.      Right knee: Swelling present. Decreased range of motion. Tenderness present.      Left knee: No swelling. Decreased range of motion. Tenderness present.   Skin:     General: Skin is warm and dry.   Psychiatric:         Behavior: Behavior normal.              ASSESSMENT/PLAN    Diagnoses and all orders for this visit:    1. Trigger ring finger of left hand (Primary)  Comments:  Refer to Ortho for eval and treatment recommendations.  Orders:  -     Ambulatory Referral to Orthopedic Surgery    2. Type 2 diabetes mellitus with chronic kidney disease on chronic dialysis, with long-term current use of insulin ([facility])  Assessment & Plan:  Diabetes is unchanged.   Continue current treatment regimen.  Reminded to bring in blood sugar diary at next visit.  Dietary recommendations for ADA diet.  Regular aerobic exercise.  Diabetes will be reassessed in 3 months.    Orders:  -     glucose blood test strip; Use as instructed to monitor blood glucose 1-2 times daily  Dispense: 100 each; Refill: 12  -     glucose monitor monitoring kit; Use as directed to monitor blood glucose 1-2 times daily  Dispense: 1 each; Refill: 0  -     Lancets Ultra Fine misc; Use as directed to check blood sugar 1-2 times daily  Dispense: 100 each; Refill: 11  -     POC Glucose  -     POC Glycosylated Hemoglobin (Hb A1C)    3. Chronic pain of both knees  Comments:  Check xrays of knees and refer to Ortho for eval.  Orders:  -     XR Knee Bilateral AP  Standing; Future  -     Ambulatory Referral to Orthopedic Surgery           Return for Medicare Wellness with Dr. Rodgers.

## 2022-06-16 ENCOUNTER — HOSPITAL ENCOUNTER (OUTPATIENT)
Dept: GENERAL RADIOLOGY | Facility: HOSPITAL | Age: 77
Discharge: HOME | End: 2022-06-16
Admitting: PHYSICIAN ASSISTANT

## 2022-06-16 DIAGNOSIS — M25.562 CHRONIC PAIN OF BOTH KNEES: ICD-10-CM

## 2022-06-16 DIAGNOSIS — G89.29 CHRONIC PAIN OF BOTH KNEES: ICD-10-CM

## 2022-06-16 DIAGNOSIS — M25.561 CHRONIC PAIN OF BOTH KNEES: ICD-10-CM

## 2022-06-16 PROCEDURE — 73565 X-RAY EXAM OF KNEES: CPT

## 2022-06-27 ENCOUNTER — TELEPHONE (OUTPATIENT)
Dept: INTERNAL MEDICINE | Facility: CLINIC | Age: 77
End: 2022-06-27

## 2022-06-27 NOTE — TELEPHONE ENCOUNTER
----- Message from DARRYL Yegaer sent at 6/26/2022 10:35 PM EDT -----  Please let her know that her knee xrays showed moderate arthritis on both sides. We will see what the Orthopedist recommends.

## 2022-06-27 NOTE — PROGRESS NOTES
Please let her know that her knee xrays showed moderate arthritis on both sides. We will see what the Orthopedist recommends.

## 2022-06-28 ENCOUNTER — OFFICE VISIT (OUTPATIENT)
Dept: ORTHOPEDIC SURGERY | Facility: CLINIC | Age: 77
End: 2022-06-28

## 2022-06-28 VITALS — BODY MASS INDEX: 30.92 KG/M2 | WEIGHT: 197 LBS | HEIGHT: 67 IN

## 2022-06-28 DIAGNOSIS — M17.0 PRIMARY OSTEOARTHRITIS OF BOTH KNEES: Primary | ICD-10-CM

## 2022-06-28 PROCEDURE — 99204 OFFICE O/P NEW MOD 45 MIN: CPT | Performed by: ORTHOPAEDIC SURGERY

## 2022-06-28 PROCEDURE — 20610 DRAIN/INJ JOINT/BURSA W/O US: CPT | Performed by: ORTHOPAEDIC SURGERY

## 2022-06-28 RX ORDER — HEPARIN SODIUM 1000 [USP'U]/ML
INJECTION INTRAVENOUS; SUBCUTANEOUS
COMMUNITY
Start: 2022-06-08 | End: 2022-06-28 | Stop reason: SDUPTHER

## 2022-06-28 RX ORDER — LIDOCAINE HYDROCHLORIDE 10 MG/ML
3 INJECTION, SOLUTION EPIDURAL; INFILTRATION; INTRACAUDAL; PERINEURAL
Status: COMPLETED | OUTPATIENT
Start: 2022-06-28 | End: 2022-06-28

## 2022-06-28 RX ORDER — TRIAMCINOLONE ACETONIDE 40 MG/ML
80 INJECTION, SUSPENSION INTRA-ARTICULAR; INTRAMUSCULAR
Status: COMPLETED | OUTPATIENT
Start: 2022-06-28 | End: 2022-06-28

## 2022-06-28 RX ADMIN — LIDOCAINE HYDROCHLORIDE 3 ML: 10 INJECTION, SOLUTION EPIDURAL; INFILTRATION; INTRACAUDAL; PERINEURAL at 11:50

## 2022-06-28 RX ADMIN — TRIAMCINOLONE ACETONIDE 80 MG: 40 INJECTION, SUSPENSION INTRA-ARTICULAR; INTRAMUSCULAR at 11:50

## 2022-06-28 NOTE — PROGRESS NOTES
Orthopaedic Clinic Note: Knee New Patient    Chief Complaint   Patient presents with   • Left Knee - Pain   • Right Knee - Pain        HPI  Consult from: DARRYL Yeager    Esperanza Pop is a 77 y.o. female who presents with bilateral knee pain for 1 year(s). Onset atraumatic and gradual in nature. Pain is localized to the medial joint line and is a 7/10 on the pain scale. Pain is described as aching. Associated symptoms include pain and swelling. The pain is worse with walking, standing, climbing stairs and rising from seated position; resting and assistive device (cane/walker) make it better. Previous treatments have included: cane/walker for 3 months duration or longer. Although some transient relief was reported with these interventions, these conservative measures have failed and symptoms have persisted. The patient is limited in daily activities and has had a significant decrease in quality of life as a result. She admits to  night sweats .    I have reviewed the following portions of the patient's history:History of Present Illness    Past Medical History:   Diagnosis Date   • Acute bronchitis    • Acute conjunctivitis    • Acute kidney injury (HCC)     Admission from 12/26/2013 to 01/02/2014, now resolved with latest creatinine 1.4 on 01/08/2014.   • Acute non-ST segment elevation myocardial infarction (STEMI) following previous myocardial infarction    • Arthritis    • Breast cancer, female, right     2005 mastectomy right   • Cancer (HCC)    • CHF (congestive heart failure) (HCC)    • Chronic kidney disease     dialysis [facility], f/u [facility]    • Clotting disorder (HCC)    • COVID-19    • Diabetes mellitus (HCC)     diagnosed ~1992, checks fsbg 2-3x/day   • Diarrhea    • Dyslipidemia    • Dyspepsia    • Dyspnea    • Edema    • History of transfusion     ~2013 no reaction    • Hx of radiation therapy    • Hyperlipidemia    • Hypertension     Severe   • Ischemic heart disease    • Moderate obesity      BMI 36.2   • Myocardial infarction (HCC)    • Pneumonia    • Pneumonia 02/2019   • Radiation 2005   • Seasonal allergies    • Wears glasses       Past Surgical History:   Procedure Laterality Date   • AV FISTULA PLACEMENT, BRACHIOBASILIC     • BREAST BIOPSY Left 2010   • BREAST CYST EXCISION     • BREAST LUMPECTOMY Right 2005   • BREAST SURGERY     • CARDIAC CATHETERIZATION N/A 10/10/2016    Procedure: Left Heart Cath;  Surgeon: Scooter Zhao MD;  Location:  [facility];  Service:    • CARDIAC CATHETERIZATION  10/2016   • CARDIAC CATHETERIZATION N/A 1/21/2021    Procedure: Right and Left Heart Cath;  Surgeon: Hugh Tidwell MD;  Location:  [facility];  Service: Cardiology;  Laterality: N/A;   • COLONOSCOPY N/A 7/23/2020    Procedure: COLONOSCOPY;  Surgeon: Rubén Rosas MD;  Location:  [facility];  Service: Gastroenterology;  Laterality: N/A;   • CORONARY STENT PLACEMENT  2016   • HERNIA REPAIR     • HYSTERECTOMY  2000   • INSERTION HEMODIALYSIS CATHETER Right 6/29/2016    Procedure: HEMODIALYSIS CATHETER INSERTION TUNNELLED;  Surgeon: Ramón Chavez MD;  Location:  [facility];  Service:    • MASTECTOMY Right 2006   • OOPHORECTOMY     • REDUCTION MAMMAPLASTY Left 2005      Family History   Problem Relation Age of Onset   • Stroke Mother    • Cancer Mother    • Coronary artery disease Father    • Heart failure Father    • Breast cancer Sister 55   • Ovarian cancer Neg Hx    • Endometrial cancer Neg Hx      Social History     Socioeconomic History   • Marital status:    Tobacco Use   • Smoking status: Never Smoker   • Smokeless tobacco: Never Used   • Tobacco comment: Michael second hand smoke   Substance and Sexual Activity   • Alcohol use: Yes     Comment: rarely   • Drug use: No   • Sexual activity: Defer      Current Outpatient Medications on File Prior to Visit   Medication Sig Dispense Refill   • amiodarone (PACERONE) 200 MG tablet Take 1  tablet by mouth Daily. Take 1 tablet twice a day for first week. 90 tablet 1   • ammonium lactate (LAC-HYDRIN) 12 % lotion Apply  topically to the appropriate area as directed As Needed for Dry Skin. (Patient taking differently: Apply 1 application topically to the appropriate area as directed 2 (Two) Times a Day As Needed for Dry Skin.) 500 g 3   • apixaban (ELIQUIS) 2.5 MG tablet tablet Take 1 tablet by mouth 2 (Two) Times a Day. 180 tablet 3   • aspirin 81 MG EC tablet Take 1 tablet by mouth Daily.     • atorvastatin (LIPITOR) 80 MG tablet Take 1 tablet by mouth Every Night. 90 tablet 3   • B Complex-C-Folic Acid (DIALYVITE 800 PO) Take 1 tablet by mouth 3 (Three) Times a Week. On dialysis says MWF     • bisacodyl (DULCOLAX) 5 MG EC tablet Take 5 mg by mouth Daily As Needed for Constipation.     • Capsaicin 0.1 % cream Apply 2-4 gms to feet nightly. Use gloves 60 g 3   • carvedilol (COREG) 25 MG tablet Take 1 tablet by mouth Every 12 (Twelve) Hours. (Patient taking differently: Take 12.5 mg by mouth Every 12 (Twelve) Hours.) 60 tablet 5   • cloNIDine (CATAPRES) 0.2 MG tablet Take 2 tablets by mouth Every 12 (Twelve) Hours. 120 tablet 5   • desonide (DESOWEN) 0.05 % cream Apply 0.05 application topically to the appropriate area as directed 2 (Two) Times a Day.  1   • dorzolamide-timolol (COSOPT) 22.3-6.8 MG/ML ophthalmic solution Administer 1 drop to both eyes Daily. 10 mL 3   • Durezol 0.05 % ophthalmic emulsion INSTILL 1 DROP 4 TIMES DAILY INTO SURGICAL EYE STARTING AFTER SURGERY.     • Epoetin Rusty (EPOGEN IJ) Epoetin Rusty (Epogen)     • famotidine (PEPCID) 20 MG tablet Take 1 tablet by mouth 2 (Two) Times a Day. (Patient taking differently: Take 20 mg by mouth 2 (Two) Times a Day As Needed.) 180 tablet 1   • fluocinonide (LIDEX) 0.05 % external solution Apply 0.05 application topically to the appropriate area as directed 2 (Two) Times a Day. Scalp  2   • glucose blood test strip Use as instructed to monitor  blood glucose 1-2 times daily 100 each 12   • glucose monitor monitoring kit Use as directed to monitor blood glucose 1-2 times daily 1 each 0   • hydrALAZINE (APRESOLINE) 50 MG tablet Take 1 tablet by mouth Every 12 (Twelve) Hours. 60 tablet 5   • insulin NPH-insulin regular (humuLIN 70/30,novoLIN 70/30) (70-30) 100 UNIT/ML injection Inject 20 units before dinner and 17 Units before breakfast. 10 mL 5   • IRON DEXTRAN IJ Inject  as directed 3 (Three) Times a Week.     • isosorbide mononitrate (IMDUR) 120 MG 24 hr tablet Take 1 tablet by mouth Daily. 90 tablet 3   • Lancets Ultra Fine misc Use as directed to check blood sugar 1-2 times daily 100 each 11   • lidocaine-prilocaine (EMLA) 2.5-2.5 % cream Apply 1 application topically to the appropriate area as directed As Needed (dialysis access).  6   • megestrol (MEGACE) 40 MG tablet Take 40 mg by mouth Daily.     • nitroglycerin (Nitrolingual) 0.4 MG/SPRAY spray Place 1 spray under the tongue Every 5 (Five) Minutes As Needed for Chest Pain. 1 each 12   • nortriptyline (PAMELOR) 10 MG capsule Take 1 capsule by mouth Every Night. 30 capsule 3   • terazosin (HYTRIN) 2 MG capsule Take 2 capsules by mouth Every Night. 180 capsule 3   • torsemide (Demadex) 20 MG tablet 1 PO QAM and 2 PO QPM 90 tablet 5   • Velphoro 500 MG chewable tablet Chew 500 mg 3 (Three) Times a Day With Meals.     • VITAMIN D, CHOLECALCIFEROL, PO Take 0.5 mcg by mouth.     • [DISCONTINUED] ferric gluconate (FERRLECIT)125 MG in sodium chloride 0.9 % 100 mL IVPB 125 mg.     • [DISCONTINUED] Heparin Sodium, Porcine, (heparin, porcine,) 1000 units/mL injection Heparin Sodium (Porcine) 1,000 Units/mL Systemic     • [DISCONTINUED] Ferrous Sulfate (IRON) 325 (65 Fe) MG tablet Take 1 tablet by mouth 2 (Two) Times a Day.  5     No current facility-administered medications on file prior to visit.      Allergies   Allergen Reactions   • Mircera [Methoxy Polyethylene Glycol-Epoetin Beta] Anaphylaxis     Pt  "stopped breathing   • Venofer [Iron Sucrose] Anaphylaxis     Pt stopped breathing   • Albuterol Other (See Comments)     Increased blood pressure  Used right before heart attack   • Nifedipine Er Swelling   • Penicillins Hives   • Prednisone Other (See Comments)     Makes jittery/anxious        Review of Systems   Constitutional: Negative.    HENT: Negative.    Eyes: Negative.    Respiratory: Negative.    Cardiovascular: Negative.    Gastrointestinal: Negative.    Endocrine: Negative.    Genitourinary: Negative.    Musculoskeletal: Positive for arthralgias.   Skin: Negative.    Allergic/Immunologic: Negative.    Neurological: Negative.    Hematological: Negative.    Psychiatric/Behavioral: Negative.         The patient's Review of Systems was personally reviewed and confirmed as accurate.    The following portions of the patient's history were reviewed and updated as appropriate: allergies, current medications, past family history, past medical history, past social history, past surgical history and problem list.    Physical Exam  Height 170.2 cm (67.01\"), weight 89.4 kg (197 lb), not currently breastfeeding.    Body mass index is 30.85 kg/m².    GENERAL APPEARANCE: awake, alert & oriented x 3, in no acute distress and well developed, well nourished  PSYCH: normal affect  LUNGS:  breathing nonlabored  EYES: sclera anicteric  CARDIOVASCULAR: palpable dorsalis pedis, palpable posterior tibial bilaterally. Capillary refill less than 2 seconds  EXTREMITIES: no clubbing, cyanosis  GAIT:  Antalgic            Right Lower Extremity Exam:   ----------  Hip Exam  ----------  FLEXION CONTRACTURE: None  FLEXION: 110 degrees  INTERNAL ROTATION: 20 degrees at 90 degrees of flexion   EXTERNAL ROTATION: 40 degrees at 90 degrees of flexion    PAIN WITH HIP MOTION: no  ----------  Knee Exam  ----------  ALIGNMENT: mild varus, correctible to neutral    RANGE OF MOTION:  Decreased (5 - 115 degrees) with no extensor lag  LIGAMENTOUS " STABILITY:   stable to varus and valgus stress at terminal extension and 30 degrees; retensioning of the MCL is appreciated with valgus stress at 30 degrees consistent with medial compartment degeneration     STRENGTH:  4/5 knee flexion, extension. 5/5 ankle dorsiflexion and plantarflexion.     PAIN WITH PALPATION: global  KNEE EFFUSION: yes, mild effusion  PAIN WITH KNEE ROM: yes, global  PATELLAR CREPITUS: yes, painful and symptomatic  SPECIAL EXAM FINDINGS:  none    REFLEXES:  PATELLAR 2+/4  ACHILLES 2+/4    CLONUS: no  STRAIGHT LEG TEST:   negative    SENSATION TO LIGHT TOUCH:  DEEP PERONEAL/SUPERFICIAL PERONEAL/SURAL/SAPHENOUS/TIBIAL:   intact    EDEMA:  no  ERYTHEMA:  no  WOUNDS/INCISIONS:  no        Left Lower Extremity Exam:   ----------  Hip Exam  ----------  FLEXION CONTRACTURE: None  FLEXION: 110 degrees  INTERNAL ROTATION: 20 degrees at 90 degrees of flexion   EXTERNAL ROTATION: 40 degrees at 90 degrees of flexion    PAIN WITH HIP MOTION: no  ----------  Knee Exam  ----------  ALIGNMENT: mild varus, correctible to neutral    RANGE OF MOTION:  Decreased (5 - 115 degrees) with no extensor lag  LIGAMENTOUS STABILITY:   stable to varus and valgus stress at terminal extension and 30 degrees; retensioning of the MCL is appreciated with valgus stress at 30 degrees consistent with medial compartment degeneration     STRENGTH:  4/5 knee flexion, extension. 5/5 ankle dorsiflexion and plantarflexion.     PAIN WITH PALPATION: global  KNEE EFFUSION: yes, trace effusion  PAIN WITH KNEE ROM: yes, global  PATELLAR CREPITUS: yes, painful and symptomatic  SPECIAL EXAM FINDINGS:  none    REFLEXES:  PATELLAR 2+/4  ACHILLES 2+/4    CLONUS: no  STRAIGHT LEG TEST:   negative    SENSATION TO LIGHT TOUCH:  DEEP PERONEAL/SUPERFICIAL PERONEAL/SURAL/SAPHENOUS/TIBIAL:   intact    EDEMA:  no  ERYTHEMA:  no  WOUNDS/INCISIONS:   no      ______________________________________________________________________  ______________________________________________________________________    RADIOGRAPHIC FINDINGS:   Bilateral knee radiographs from 6/16/2022 are personally interpreted.  Radiographs demonstrate moderate tricompartmental osteoarthritis with genu varum alignment of the bilateral knees.  Small periarticular osteophytes visualized in all compartments.  No acute bony injury or fracture.    Assessment/Plan:   Diagnosis Plan   1. Primary osteoarthritis of both knees       Patient suffering or osteoarthritis of the bilateral knees.  I discussed treatment options with her.  She is agreeable to cortisone injections in both knees today.  She will follow-up in 3 months for repeat assessment.    Procedure Note:  I discussed with the patient the potential benefits of performing a therapeutic injections of the bilateral knees as well as potential risks including but not limited to infection, swelling, pain, bleeding, bruising, nerve/vessel damage, skin color changes, transient elevation in blood glucose levels, and fat atrophy. After informed consent and verifying correct patient, procedure site, and type of procedure, the areas were prepped with alcohol, ethyl chloride was used to numb the skin. Via the superior lateral approach, 3cc of 1% lidocaine,1 cc of 0.75% Marcaine and 2 cc of 40mg/ml of Kenalog were each injected into the bilateral knee. The patient tolerated the procedures well. There were no complications. A sterile dressing was placed over each injection site.    Chris Lion MD  06/28/22  11:55 EDT

## 2022-06-28 NOTE — PROGRESS NOTES
Procedure   - Large Joint Arthrocentesis: bilateral knee on 6/28/2022 11:50 AM  Indications: pain  Details: 22 G needle, anterolateral approach  Medications (Right): 3 mL lidocaine PF 1% 1 %; 80 mg triamcinolone acetonide 40 MG/ML (1cc .75% Marcaine NDC 0970-5641-91 LOT [id number] EXP 04.01.2023)  Medications (Left): 3 mL lidocaine PF 1% 1 %; 80 mg triamcinolone acetonide 40 MG/ML (1cc .75% Marcaine NDC 3598-5421-28 LOT [id number] EXP 04.01.2023)  Outcome: tolerated well, no immediate complications  Procedure, treatment alternatives, risks and benefits explained, specific risks discussed. Consent was given by the patient. Immediately prior to procedure a time out was called to verify the correct patient, procedure, equipment, support staff and site/side marked as required. Patient was prepped and draped in the usual sterile fashion.

## 2022-07-12 ENCOUNTER — TRANSCRIBE ORDERS (OUTPATIENT)
Dept: ADMINISTRATIVE | Facility: HOSPITAL | Age: 77
End: 2022-07-12

## 2022-07-12 DIAGNOSIS — Z12.31 VISIT FOR SCREENING MAMMOGRAM: Primary | ICD-10-CM

## 2022-07-13 NOTE — PROGRESS NOTES
Subjective:     Encounter Date:07/14/2022      Patient ID: Esperanza Pop is a 77 y.o.   female, retired , assistant , from [place], [place].      INTERNIST: Meghana Rodgers MD  REFERRING HEALTHCARE PROVIDER: [facility]   INTERVENTIONAL CARDIOLOGIST: Hugh Pop MD, [facility],Scooter Zhao MD, [facility], Corky Moon MD, [facility]  ADV[facility] FAILURE [facility]IN[facility]: [facility]  VASCULAR SURGEON:  [facility]rius [facility]  GASTROENTEROLOGIST: Rubén Rosas MD  NEPHROLOGIST: Kevan Lerma MD.     Chief Complaint:   Chief Complaint   Patient presents with   • Heart Murmur     F/u     Problem List:  1. Ischemic heart disease  a. Acute non-STEMI/congestive heart failure with diagnostic coronary arteriography demonstrating 20% LAD plaque with high-grade stenosis in the proximal mid right coronary artery requiring a Xience drug-eluting stent (3.0 mm x 28 mm) with reduced echocardiographic LVEF (0.25) with abnormal diastolic LV dysfunction, December 2013.   b. Improved echocardiogram, April 2014.  c. Remote non-STEMI related to malignant hypertension with distal RCA occlusion, patent previous RCA stent with mild inferior hypokinesis but overall acceptable systolic LV function (LVEF 0.55) with PTCA, DARRYL distal RCA, October 2016.  d. Residual CCS class I angina pectoris/NYHA class II CHF.  e. Mild aortic stenosis (June 2016).  f. Echocardiogram 02/07/2019: Normal size LV, moderate LV wall thickness, LVEF 0.65, impaired LV relaxation, normal left atrial pressure, mild MR, mild TR, mild VT, no pericardial effusion  g. Echocardiogram 01/20/2021: LVEF 60%, LV diastolic function consistent with grade 2 with high LAP pseudonormalization, LA severely increased, moderate aortic stenosis, RVSP 51 mmHg, RA mild to moderately dilated, mild mitral stenosis with functional MAC, moderate TR, normal RV wall thickness and septal  motion noted with RV cavity mildly dilated, no pericardial effusion  h. Right and left heart catheterization 01/21/2021: 80% distal LAD stenosis, status post intervention with Xience Demetra 2.5 x 15 mm DARRYL, previously placed stents in the RCA widely patent, LVEF 53% with hypokinesis of the basal to mid diaphragmatic/inferior segments, systemic hypertension with a wide pulse pressure, mild to moderate pulmonary hypertension, RVSP 67 mmHg, at least moderately decreased cardiac index 2.1 L/min/m² by thermal dilution and 1.6 L/min/m² by Brandon, recommendations for DAPT, statin, beta-blocker, long-acting nitrate, hydralazine  i. Residual CCS class I angina pectoris/NYHA class II CHF, January 2022, July 2022.  2. Severe hypertension - probably essential.   3. Dyslipidemia.  4. Type 2 diabetes mellitus type 2 with suboptimal control (hemoglobin A1c 8.8%; March 2018, 7.6%, 10.7% February 2019; 8.2%, September 2019; 7.9%, December 2019, 6.9% September 2020, 6.3% June 2021).  5. Moderate aortic stenosis January 2021  6. Moderate pulmonary hypertension January 2021  7. Mild obesity (BMI 31.3); resolved, BMI 29.91  8. Acute kidney injury: Admission from 12/26/2013 to 01/02/2014, now resolved with latest creatinine 1.4 on 01/08/2014.  9. Moderate normocytic normochromic anemia; hemoglobin 9.2 gm/dL (June 2016).  10. Noncompliance.  11. Remote apparent end-stage renal disease with initiation of hemodialysis MWF weekly and construction of right upper extremity AV fistula; data deficit (June 2016) with subsequent AV fistula dysfunction and fistulogram with angioplasty - [facility], October 2017.  12. AV fistulogram with apparent thrombectomy [facility], February 2018  13. Palpitations with recent abnormal Holter monitor demonstrating intermittent brief atrial fibrillation, December 2017/January 2018, sinus rhythm on ECG January 2021, event monitor 12/14/2021- 12/28/2021 demonstrated 0.26% atrial fibrillation burden, 5.7%  PVC burden, minimum heart rate 58 bpm, maximum heart rate 176 bpm, average heart rate 80 bpm, occasional SVT with the longest episode 19 beats and the fastest 176 bpm, reinitiated amiodarone and Eliquis January 2022  14. [facility] overnight hospitalization February 2019 for shortness of breath and thrombosed AV fistula with angioplasty  15. [facility] 4-day hospitalization late January 2021 for acute on chronic diastolic heart failure precipitated by hypertensive emergency  16. COVID December 2021 with low-grade fever, body aches, fatigue, and cough, symptoms lasted for about a week, patient had both of her COVID vaccinations but had not yet had her booster  16. Remote operations:  a. 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 Abdominal hernia repair/panniculectomy  c. Hysterectomy  d. Angioplasty for thrombosed AV fistula February 2019  e. [facility] with stent January 2021    Allergies   Allergen Reactions   • Mircera [Methoxy Polyethylene Glycol-Epoetin Beta] Anaphylaxis     Pt stopped breathing   • Venofer [Iron Sucrose] Anaphylaxis     Pt stopped breathing   • Albuterol Other (See Comments)     Increased blood pressure  Used right before heart attack   • Nifedipine Er Swelling   • Penicillins Hives   • Prednisone Other (See Comments)     Makes jittery/anxious       Current Outpatient Medications   Medication Instructions   • amiodarone (PACERONE) 200 mg, Oral, Daily, Take 1 tablet twice a day for first week.   • ammonium lactate (LAC-HYDRIN) 12 % lotion Topical, As Needed   • apixaban (ELIQUIS) 2.5 mg, Oral, 2 Times Daily   • aspirin 81 mg, Oral, Daily   • atorvastatin (LIPITOR) 80 mg, Oral, Nightly   • B Complex-C-Folic Acid (DIALYVITE 800 PO) 1 tablet, Oral, 3 Times Weekly, On dialysis says MWF   • bisacodyl (DULCOLAX) 5 mg, Oral, Daily PRN   • Capsaicin 0.1 % cream Apply 2-4 gms to feet nightly. Use gloves   • carvedilol (COREG) 25 mg, Oral, Every 12 Hours Scheduled   • cloNIDine (CATAPRES) 0.4 mg, Oral,  Every 12 Hours Scheduled   • desonide (DESOWEN) 0.05 % cream 0.05 application, Topical, 2 Times Daily   • dorzolamide-timolol (COSOPT) 22.3-6.8 MG/ML ophthalmic solution 1 drop, Both Eyes, Daily   • Durezol 0.05 % ophthalmic emulsion INSTILL 1 DROP 4 TIMES DAILY INTO SURGICAL EYE STARTING AFTER SURGERY.   • Epoetin Rusty (EPOGEN IJ) Epoetin Rusty (Epogen)   • famotidine (PEPCID) 20 mg, Oral, 2 Times Daily   • fluocinonide (LIDEX) 0.05 % external solution 0.05 application, Topical, 2 Times Daily, Scalp   • glucose blood test strip Use as instructed to monitor blood glucose 1-2 times daily   • glucose monitor monitoring kit Use as directed to monitor blood glucose 1-2 times daily   • insulin NPH-insulin regular (humuLIN 70/30,novoLIN 70/30) (70-30) 100 UNIT/ML injection Inject 20 units before dinner and 17 Units before breakfast.   • IRON DEXTRAN IJ Injection, 3 Times Weekly   • isosorbide mononitrate (IMDUR) 120 mg, Oral, Daily   • Lancets Ultra Fine misc Use as directed to check blood sugar 1-2 times daily   • lidocaine-prilocaine (EMLA) 2.5-2.5 % cream 1 application, Topical, As Needed   • megestrol (MEGACE) 40 mg, Oral, Daily   • nitroglycerin (Nitrolingual) 0.4 MG/SPRAY spray 1 spray, Sublingual, Every 5 Minutes PRN   • nortriptyline (PAMELOR) 10 mg, Oral, Nightly   • terazosin (HYTRIN) 4 mg, Oral, Nightly   • torsemide (Demadex) 20 MG tablet 1 PO QAM and 2 PO QPM   • Velphoro 500 mg, Oral, 3 Times Daily With Meals   • VITAMIN D, CHOLECALCIFEROL, PO 0.5 mcg, Oral         HISTORY OF PRESENT ILLNESS:  The patient is here for a 6-month follow-up.  At her last appointment she was started on amiodarone 200 mg daily.  She was discontinued off of Plavix and started on Eliquis 2.5 mg twice daily.  She was also encouraged to restart terazosin 4 mg nightly.  The patient was a no-show to her 5/26/2022 appointment.  She says that after dialysis her blood pressure is around 140 systolic.  If it gets less than 140 systolic, she  "says that she gets lightheaded and is hesitant to want to increase any other medications right now due to this.  She is still having dialysis M, W, and F.  She has felt more fatigued lately.  She also notes that she recently lost her grandson to non-Hodgkin's lymphoma and he was only 19 years old which has made her upset.  She denies any chest pressure but occasionally will have a sharp chest pain that will last 1-2 seconds and then resolve.  She occasionally has some shortness of breath or edema, but her torsemide and dialysis helps.  Yesterday she had 3 L pulled off.  She has not noticed any heart fluttering and denies any presyncope or syncope.  She has to be careful when she goes from a sitting to standing position and sometimes she has loss of balance.  She is using a cane to ambulate. She is no longer taking any hydralazine because of weakness symptoms.  She says that the Epogen she is taking makes her fingers very cold.  She has been compliant with her Eliquis.  She recently got knee injections for arthritis.      ROS   All other systems reviewed and otherwise negative.      ECG 12 Lead    Date/Time: 7/14/2022 4:02 PM  Performed by: Geena Estrella APRN  Authorized by: Geena Estrella APRN   Rhythm comments: Sinus bradycardia, possible LAE, left axis deviation, pulmonary disease pattern, septal infarct, age undetermined, 57 bpm,  ms,  ms,  ms, sinus bradycardia has replaced sinus rhythm compared to ECG January 2022                 Objective:       Vitals:    07/14/22 1535   BP: 156/54   BP Location: Left arm   Patient Position: Sitting   Cuff Size: Adult   Pulse: 57   SpO2: 96%   Weight: 87.1 kg (192 lb)   Height: 170.2 cm (67\")     Body mass index is 30.07 kg/m².  Last weight January 2022 was 191 pounds  Constitutional:       Appearance: Healthy appearance. Not in distress.      Comments: Ambulating with cane   Neck:      Vascular: No JVR. JVD normal.   Pulmonary:      Effort: " Pulmonary effort is normal.      Breath sounds: Normal breath sounds. No wheezing. No rhonchi. No rales.   Chest:      Chest wall: Not tender to palpatation.   Cardiovascular:      PMI at left midclavicular line. Normal rate. Regular rhythm. Normal S1. Normal S2.      Murmurs: There is a grade 3/6 systolic murmur at the URSB, LLSB, LRSB and ULSB, radiating to the neck.      No gallop. No click. No rub.   Pulses:     Dorsalis pedis: 1+ bilaterally.     Posterior tibial: 1+ bilaterally.  Edema:     Peripheral edema absent.   Abdominal:      General: Bowel sounds are normal.      Palpations: Abdomen is soft.      Tenderness: There is no abdominal tenderness.   Musculoskeletal: Normal range of motion.         General: No tenderness. Skin:     General: Skin is warm and dry.   Neurological:      General: No focal deficit present.      Mental Status: Alert and oriented to person, place and time.           Lab Review:   Lab Results   Component Value Date    GLUCOSE 224 (H) 07/21/2021    BUN 20 07/21/2021    CREATININE 3.91 (H) 07/21/2021    EGFRIFNONA  07/21/2021      Comment:      <15 Indicative of kidney failure.    EGFRIFAFRI 14 (L) 07/21/2021    BCR 5.1 (L) 07/21/2021    CO2 27.0 07/21/2021    CALCIUM 9.1 07/21/2021    PROTENTOTREF 7.5 09/22/2020    ALBUMIN 3.60 07/21/2021    LABIL2 1.1 09/22/2020    AST 24 07/21/2021    ALT 9 07/21/2021       Lab Results   Component Value Date    WBC 5.58 07/21/2021    HGB 12.9 07/21/2021    HCT 43.6 07/21/2021    MCV 91.0 07/21/2021     07/21/2021       Lab Results   Component Value Date    HGBA1C 6.3 06/14/2022       Lab Results   Component Value Date    TSH 1.270 09/22/2020       Lab Results   Component Value Date    CHOL 261 (H) 10/11/2016    CHOL 293 (H) 10/10/2016     Lab Results   Component Value Date    TRIG 84 09/22/2020    TRIG 106 09/10/2019     Lab Results   Component Value Date    HDL 56 09/22/2020    HDL 57 09/10/2019     Lab Results   Component Value Date    LDL  82 09/22/2020    LDL 89 09/10/2019         Lab Results   Component Value Date    BNP CANCELED 03/01/2018     Advance Care Planning   ACP discussion was held with the patient during this visit. Patient does not have an advance directive, declines further assistance.             Assessment:     Overall continued acceptable course with no new interim cardiopulmonary complaints with fair functional status on limited activity and dialysis on M, W, and F. We will defer additional diagnostic or therapeutic intervention from a cardiac perspective at this time other than to order an echocardiogram to assess her aortic stenosis and pulmonary hypertension since the patient has had increased fatigue.  On echocardiogram January 2021 she had moderate aortic stenosis and moderate pulmonary hypertension (51 mmHg).She is in normal sinus rhythm on ECG in office today with acceptable QTC on amiodarone therapy.  Her blood pressure was elevated in office today but the patient did not want to increase any of her medications currently because she gets lightheaded if her systolic blood pressure is less than 140 mmHg.     Diagnosis Plan   1. Coronary artery disease involving native coronary artery of native heart with angina pectoris (HCC)  No recurrent angina pectoris or CHF on current activity schedule; continue current treatment   2. Acute on chronic diastolic heart failure (HCC)  No recurrent angina pectoris or CHF on current activity schedule but does have increased fatigue; continue current treatment, echocardiogram   3. Essential hypertension  Elevated, continue monitoring bp and current cardiac medications, patient hesitant to increase medication because she gets lightheadedness if her blood pressure is less than 140 systolic   4. Hyperlipidemia LDL goal <70  No new labs to review, continue atorvastatin   5. PAF (paroxysmal atrial fibrillation) (HCC)  NSR on ECG in office today with acceptable QTc on amiodarone therapy   6. Type 2  diabetes mellitus with chronic kidney disease on chronic dialysis, with long-term current use of insulin (HCC)  HgbA1C 6.3% June 2022, continue heart healthy diet and physical activity as tolerated          Plan:         1. Patient to continue current medications and close follow up with the above providers.  2. Tentative cardiology follow up in December 2022 or patient may return sooner PRN.  3. Echocardiogram on T or R (patient has dialysis M, W, and F)    Electronically signed by WILMA Millan, 07/14/22, 4:08 PM EDT.

## 2022-07-14 ENCOUNTER — OFFICE VISIT (OUTPATIENT)
Dept: CARDIOLOGY | Facility: CLINIC | Age: 77
End: 2022-07-14

## 2022-07-14 VITALS
DIASTOLIC BLOOD PRESSURE: 54 MMHG | SYSTOLIC BLOOD PRESSURE: 156 MMHG | BODY MASS INDEX: 30.13 KG/M2 | OXYGEN SATURATION: 96 % | HEART RATE: 57 BPM | WEIGHT: 192 LBS | HEIGHT: 67 IN

## 2022-07-14 DIAGNOSIS — E78.5 HYPERLIPIDEMIA LDL GOAL <70: ICD-10-CM

## 2022-07-14 DIAGNOSIS — I35.0 AORTIC STENOSIS, MODERATE: ICD-10-CM

## 2022-07-14 DIAGNOSIS — Z99.2 TYPE 2 DIABETES MELLITUS WITH CHRONIC KIDNEY DISEASE ON CHRONIC DIALYSIS, WITH LONG-TERM CURRENT USE OF INSULIN: ICD-10-CM

## 2022-07-14 DIAGNOSIS — Z79.4 TYPE 2 DIABETES MELLITUS WITH CHRONIC KIDNEY DISEASE ON CHRONIC DIALYSIS, WITH LONG-TERM CURRENT USE OF INSULIN: ICD-10-CM

## 2022-07-14 DIAGNOSIS — E11.22 TYPE 2 DIABETES MELLITUS WITH CHRONIC KIDNEY DISEASE ON CHRONIC DIALYSIS, WITH LONG-TERM CURRENT USE OF INSULIN: ICD-10-CM

## 2022-07-14 DIAGNOSIS — I48.0 PAF (PAROXYSMAL ATRIAL FIBRILLATION): ICD-10-CM

## 2022-07-14 DIAGNOSIS — I25.119 CORONARY ARTERY DISEASE INVOLVING NATIVE CORONARY ARTERY OF NATIVE HEART WITH ANGINA PECTORIS: Primary | ICD-10-CM

## 2022-07-14 DIAGNOSIS — I50.33 ACUTE ON CHRONIC DIASTOLIC HEART FAILURE: ICD-10-CM

## 2022-07-14 DIAGNOSIS — I10 ESSENTIAL HYPERTENSION: ICD-10-CM

## 2022-07-14 DIAGNOSIS — N18.6 TYPE 2 DIABETES MELLITUS WITH CHRONIC KIDNEY DISEASE ON CHRONIC DIALYSIS, WITH LONG-TERM CURRENT USE OF INSULIN: ICD-10-CM

## 2022-07-14 PROCEDURE — 93000 ELECTROCARDIOGRAM COMPLETE: CPT | Performed by: NURSE PRACTITIONER

## 2022-07-14 PROCEDURE — 99214 OFFICE O/P EST MOD 30 MIN: CPT | Performed by: NURSE PRACTITIONER

## 2022-08-23 DIAGNOSIS — E11.22 TYPE 2 DIABETES MELLITUS WITH CHRONIC KIDNEY DISEASE ON CHRONIC DIALYSIS, WITH LONG-TERM CURRENT USE OF INSULIN: ICD-10-CM

## 2022-08-23 DIAGNOSIS — Z99.2 TYPE 2 DIABETES MELLITUS WITH CHRONIC KIDNEY DISEASE ON CHRONIC DIALYSIS, WITH LONG-TERM CURRENT USE OF INSULIN: ICD-10-CM

## 2022-08-23 DIAGNOSIS — Z79.4 TYPE 2 DIABETES MELLITUS WITH CHRONIC KIDNEY DISEASE ON CHRONIC DIALYSIS, WITH LONG-TERM CURRENT USE OF INSULIN: ICD-10-CM

## 2022-08-23 DIAGNOSIS — N18.6 TYPE 2 DIABETES MELLITUS WITH CHRONIC KIDNEY DISEASE ON CHRONIC DIALYSIS, WITH LONG-TERM CURRENT USE OF INSULIN: ICD-10-CM

## 2022-08-23 RX ORDER — HUMAN INSULIN 100 [USP'U]/ML
INJECTION, SUSPENSION SUBCUTANEOUS
Qty: 10 ML | Refills: 0 | Status: SHIPPED | OUTPATIENT
Start: 2022-08-23 | End: 2023-03-29 | Stop reason: HOSPADM

## 2022-08-25 ENCOUNTER — APPOINTMENT (OUTPATIENT)
Dept: MAMMOGRAPHY | Facility: HOSPITAL | Age: 77
End: 2022-08-25

## 2022-09-22 RX ORDER — AMIODARONE HYDROCHLORIDE 200 MG/1
200 TABLET ORAL DAILY
Qty: 90 TABLET | Refills: 1 | Status: ON HOLD | OUTPATIENT
Start: 2022-09-22

## 2022-09-29 ENCOUNTER — OFFICE VISIT (OUTPATIENT)
Dept: ORTHOPEDIC SURGERY | Facility: CLINIC | Age: 77
End: 2022-09-29

## 2022-09-29 VITALS
SYSTOLIC BLOOD PRESSURE: 150 MMHG | WEIGHT: 183.4 LBS | HEIGHT: 67 IN | DIASTOLIC BLOOD PRESSURE: 78 MMHG | BODY MASS INDEX: 28.79 KG/M2

## 2022-09-29 DIAGNOSIS — M17.0 PRIMARY OSTEOARTHRITIS OF BOTH KNEES: Primary | ICD-10-CM

## 2022-09-29 PROCEDURE — 99214 OFFICE O/P EST MOD 30 MIN: CPT | Performed by: ORTHOPAEDIC SURGERY

## 2022-09-29 PROCEDURE — 20610 DRAIN/INJ JOINT/BURSA W/O US: CPT | Performed by: ORTHOPAEDIC SURGERY

## 2022-09-29 RX ORDER — BUPIVACAINE HYDROCHLORIDE 2.5 MG/ML
3 INJECTION, SOLUTION EPIDURAL; INFILTRATION; INTRACAUDAL
Status: COMPLETED | OUTPATIENT
Start: 2022-09-29 | End: 2022-09-29

## 2022-09-29 RX ORDER — TRIAMCINOLONE ACETONIDE 40 MG/ML
80 INJECTION, SUSPENSION INTRA-ARTICULAR; INTRAMUSCULAR
Status: COMPLETED | OUTPATIENT
Start: 2022-09-29 | End: 2022-09-29

## 2022-09-29 RX ORDER — LIDOCAINE HYDROCHLORIDE 10 MG/ML
3 INJECTION, SOLUTION EPIDURAL; INFILTRATION; INTRACAUDAL; PERINEURAL
Status: COMPLETED | OUTPATIENT
Start: 2022-09-29 | End: 2022-09-29

## 2022-09-29 RX ADMIN — LIDOCAINE HYDROCHLORIDE 3 ML: 10 INJECTION, SOLUTION EPIDURAL; INFILTRATION; INTRACAUDAL; PERINEURAL at 11:52

## 2022-09-29 RX ADMIN — TRIAMCINOLONE ACETONIDE 80 MG: 40 INJECTION, SUSPENSION INTRA-ARTICULAR; INTRAMUSCULAR at 11:52

## 2022-09-29 RX ADMIN — BUPIVACAINE HYDROCHLORIDE 3 ML: 2.5 INJECTION, SOLUTION EPIDURAL; INFILTRATION; INTRACAUDAL at 11:52

## 2022-09-29 NOTE — PROGRESS NOTES
Procedure   - Large Joint Arthrocentesis: bilateral knee on 9/29/2022 11:52 AM  Indications: pain  Details: 22 G needle, superolateral approach  Medications (Right): 3 mL bupivacaine (PF) 0.25 %; 3 mL lidocaine PF 1% 1 %; 80 mg triamcinolone acetonide 40 MG/ML  Medications (Left): 3 mL bupivacaine (PF) 0.25 %; 3 mL lidocaine PF 1% 1 %; 80 mg triamcinolone acetonide 40 MG/ML  Outcome: tolerated well, no immediate complications  Procedure, treatment alternatives, risks and benefits explained, specific risks discussed. Consent was given by the patient. Immediately prior to procedure a time out was called to verify the correct patient, procedure, equipment, support staff and site/side marked as required. Patient was prepped and draped in the usual sterile fashion.

## 2022-09-29 NOTE — PROGRESS NOTES
Orthopaedic Clinic Note: Knee Established Patient    Chief Complaint   Patient presents with   • Follow-up     3 month f/u; Primary osteoarthritis of both knees (cortisone injections 6/28/22)        HPI    It has been 3  month(s) since Ms. Pop's last visit. She returns to clinic today for follow-up bilateral knee osteoarthritis.  Patient with cortisone injection both knees 3 months ago.  The injections worked well for about 6 weeks.  Pain is gradually returned.  Rates her pain 8/10 on the pain scale.  Since the injections wore off, her pain is gotten progressively worse.  Her she is having worsening swelling and stiffness in the knee and pain is worse with walking weightbearing activities.  Denies fevers chills or constitutional symptoms.  Overall she is doing worse.    Past Medical History:   Diagnosis Date   • Acute bronchitis    • Acute conjunctivitis    • Acute kidney injury (HCC)     Admission from 12/26/2013 to 01/02/2014, now resolved with latest creatinine 1.4 on 01/08/2014.   • Acute non-ST segment elevation myocardial infarction (STEMI) following previous myocardial infarction    • Arthritis    • Breast cancer, female, right     2005 mastectomy right   • Cancer (HCC)    • CHF (congestive heart failure) (HCC)    • Chronic kidney disease     dialysis [facility], [facility]/u [facility]    • Clotting disorder (HCC)    • COVID-19    • Diabetes mellitus (HCC)     diagnosed ~1992, checks fsbg 2-3x/day   • Diarrhea    • Dyslipidemia    • Dyspepsia    • Dyspnea    • Edema    • History of transfusion     ~2013 no reaction    • Hx of radiation therapy    • Hyperlipidemia    • Hypertension     Severe   • Ischemic heart disease    • Moderate obesity     BMI 36.2   • Myocardial infarction (HCC)    • Pneumonia    • Pneumonia 02/2019   • Radiation 2005   • Seasonal allergies    • Wears glasses       Past Surgical History:   Procedure Laterality Date   • AV FISTULA PLACEMENT, BRACHIOBASILIC     • BREAST BIOPSY Left 2010   •  BREAST CYST EXCISION     • BREAST LUMPECTOMY Right 2005   • BREAST SURGERY     • CARDIAC CATHETERIZATION N/A 10/10/2016    Procedure: Left Heart Cath;  Surgeon: Scooter Zhao MD;  Location:  [facility];  Service:    • CARDIAC CATHETERIZATION  10/2016   • CARDIAC CATHETERIZATION N/A 1/21/2021    Procedure: Right and Left Heart Cath;  Surgeon: Hugh Tidwell MD;  Location:  [facility];  Service: Cardiology;  Laterality: N/A;   • COLONOSCOPY N/A 7/23/2020    Procedure: COLONOSCOPY;  Surgeon: Rubén Rsoas MD;  Location:  [facility];  Service: Gastroenterology;  Laterality: N/A;   • CORONARY STENT PLACEMENT  2016   • HERNIA REPAIR     • HYSTERECTOMY  2000   • INSERTION HEMODIALYSIS CATHETER Right 6/29/2016    Procedure: HEMODIALYSIS CATHETER INSERTION TUNNELLED;  Surgeon: Ramón Chavez MD;  Location:  [facility];  Service:    • MASTECTOMY Right 2006   • OOPHORECTOMY     • REDUCTION MAMMAPLASTY Left 2005      Family History   Problem Relation Age of Onset   • Stroke Mother    • Cancer Mother    • Coronary artery disease Father    • Heart failure Father    • Breast cancer Sister 55   • Ovarian cancer Neg Hx    • Endometrial cancer Neg Hx      Social History     Socioeconomic History   • Marital status:    Tobacco Use   • Smoking status: Never Smoker   • Smokeless tobacco: Never Used   • Tobacco comment: Michael second hand smoke   Substance and Sexual Activity   • Alcohol use: Yes     Comment: rarely   • Drug use: No   • Sexual activity: Defer      Current Outpatient Medications on File Prior to Visit   Medication Sig Dispense Refill   • amiodarone (PACERONE) 200 MG tablet Take 1 tablet by mouth Daily. 90 tablet 1   • ammonium lactate (LAC-HYDRIN) 12 % lotion Apply  topically to the appropriate area as directed As Needed for Dry Skin. (Patient taking differently: Apply 1 application topically to the appropriate area as directed 2 (Two) Times a Day As  Needed for Dry Skin.) 500 g 3   • apixaban (ELIQUIS) 2.5 MG tablet tablet Take 1 tablet by mouth 2 (Two) Times a Day. 180 tablet 3   • aspirin 81 MG EC tablet Take 1 tablet by mouth Daily.     • atorvastatin (LIPITOR) 80 MG tablet Take 1 tablet by mouth Every Night. 90 tablet 3   • B Complex-C-Folic Acid (DIALYVITE 800 PO) Take 1 tablet by mouth 3 (Three) Times a Week. On dialysis says MWF     • bisacodyl (DULCOLAX) 5 MG EC tablet Take 5 mg by mouth Daily As Needed for Constipation.     • Capsaicin 0.1 % cream Apply 2-4 gms to feet nightly. Use gloves 60 g 3   • carvedilol (COREG) 25 MG tablet Take 1 tablet by mouth Every 12 (Twelve) Hours. (Patient taking differently: Take 12.5 mg by mouth Every 12 (Twelve) Hours.) 60 tablet 5   • cephalexin (KEFLEX) 500 MG capsule Take 1 capsule by mouth 3 (Three) Times a Day. 30 capsule 0   • cloNIDine (CATAPRES) 0.2 MG tablet Take 2 tablets by mouth Every 12 (Twelve) Hours. 120 tablet 5   • desonide (DESOWEN) 0.05 % cream Apply 0.05 application topically to the appropriate area as directed 2 (Two) Times a Day.  1   • dorzolamide-timolol (COSOPT) 22.3-6.8 MG/ML ophthalmic solution Administer 1 drop to both eyes Daily. 10 mL 3   • Durezol 0.05 % ophthalmic emulsion INSTILL 1 DROP 4 TIMES DAILY INTO SURGICAL EYE STARTING AFTER SURGERY.     • Epoetin Rusty (EPOGEN IJ) Epoetin Rusty (Epogen)     • famotidine (PEPCID) 20 MG tablet Take 1 tablet by mouth 2 (Two) Times a Day. (Patient taking differently: Take 20 mg by mouth 2 (Two) Times a Day As Needed.) 180 tablet 1   • fluocinonide (LIDEX) 0.05 % external solution Apply 0.05 application topically to the appropriate area as directed 2 (Two) Times a Day. Scalp  2   • glucose blood test strip Use as instructed to monitor blood glucose 1-2 times daily 100 each 12   • glucose monitor monitoring kit Use as directed to monitor blood glucose 1-2 times daily 1 each 0   • IRON DEXTRAN IJ Inject  as directed 3 (Three) Times a Week.     •  isosorbide mononitrate (IMDUR) 120 MG 24 hr tablet Take 1 tablet by mouth Daily. 90 tablet 3   • Lancets Ultra Fine misc Use as directed to check blood sugar 1-2 times daily 100 each 11   • lidocaine-prilocaine (EMLA) 2.5-2.5 % cream Apply 1 application topically to the appropriate area as directed As Needed (dialysis access).  6   • megestrol (MEGACE) 40 MG tablet Take 40 mg by mouth Daily.     • nitroglycerin (Nitrolingual) 0.4 MG/SPRAY spray Place 1 spray under the tongue Every 5 (Five) Minutes As Needed for Chest Pain. 1 each 12   • nortriptyline (PAMELOR) 10 MG capsule Take 1 capsule by mouth Every Night. 30 capsule 3   • NovoLIN 70/30 (70-30) 100 UNIT/ML injection INJECT 20 UNITS SUBCUTANEOUSLY BEFORE SUPPER AND 17 UNITS BEFORE BREAKFAST 10 mL 0   • terazosin (HYTRIN) 2 MG capsule Take 2 capsules by mouth Every Night. 180 capsule 3   • torsemide (Demadex) 20 MG tablet 1 PO QAM and 2 PO QPM 90 tablet 5   • Velphoro 500 MG chewable tablet Chew 500 mg 3 (Three) Times a Day With Meals.     • VITAMIN D PO Take 1 mcg by mouth.     • VITAMIN D, CHOLECALCIFEROL, PO Take 0.5 mcg by mouth.       No current facility-administered medications on file prior to visit.      Allergies   Allergen Reactions   • Mircera [Methoxy Polyethylene Glycol-Epoetin Beta] Anaphylaxis     Pt stopped breathing   • Venofer [Iron Sucrose] Anaphylaxis     Pt stopped breathing   • Albuterol Other (See Comments)     Increased blood pressure  Used right before heart attack   • Nifedipine Er Swelling   • Penicillins Hives   • Prednisone Other (See Comments)     Makes jittery/anxious        Review of Systems   Constitutional: Negative.    HENT: Negative.    Eyes: Negative.    Respiratory: Negative.    Cardiovascular: Negative.    Gastrointestinal: Negative.    Endocrine: Negative.    Genitourinary: Negative.    Musculoskeletal: Positive for arthralgias.   Skin: Negative.    Allergic/Immunologic: Negative.    Neurological: Negative.    Hematological:  "Negative.    Psychiatric/Behavioral: Negative.         The patient's Review of Systems was personally reviewed and confirmed as accurate.    Physical Exam  Blood pressure 150/78, height 170.2 cm (67.01\"), weight 83.2 kg (183 lb 6.4 oz), not currently breastfeeding.    Body mass index is 28.72 kg/m².    GENERAL APPEARANCE: awake, alert, oriented, in no acute distress and well developed, well nourished  LUNGS:  breathing nonlabored  EXTREMITIES: no clubbing, cyanosis  PERIPHERAL PULSES: palpable dorsalis pedis and posterior tibial pulses bilaterally.    GAIT:  Antalgic        ----------  Bilateral Knee Exam:  ----------  ALIGNMENT: mild varus, correctible to neutral  ----------  RANGE OF MOTION:  Decreased (5 - 115 degrees) with no extensor lag  LIGAMENTOUS STABILITY:   stable to varus and valgus stress at terminal extension and 30 degrees; retensioning of the MCL is appreciated with valgus stress at 30 degrees consistent with medial compartment degeneration  ----------  STRENGTH:  KNEE FLEXION 4/5  KNEE EXTENSION  4/5  ANKLE DORSIFLEXION  5/5  ANKLE PLANTARFLEXION  5/5  ----------  PAIN WITH PALPATION:global  KNEE EFFUSION: yes, trace effusion  PAIN WITH KNEE ROM: yes  PATELLAR CREPITUS:  yes, painful and symptomatic  ----------  SENSATION TO LIGHT TOUCH:  DEEP PERONEAL/SUPERFICIAL PERONEAL/SURAL/SAPHENOUS/TIBIAL:    intact  ----------  EDEMA:  no  ERYTHEMA:    no  WOUNDS/INCISIONS:   no  _____________________________________________________________________  _____________________________________________________________________    RADIOGRAPHIC FINDINGS:   No new imaging today    Assessment/Plan:   Diagnosis Plan   1. Primary osteoarthritis of both knees       The patient has failed conservative treatment measures and is a candidate for joint arthroplasty.  I discussed the joint arthroplasty surgical process as well as the recovery and rehabilitation time frame.  The patient asked several questions regarding the joint " arthroplasty surgery, which were answered accordingly.  Ultimately, the patient declines surgical intervention at this time and wishes to continue with conservative treatment measures.  Alternative conservative treatment measures were discussed including bracing, therapy, topical/oral anti-inflammatories, activity modification, and weight loss.  The patient considered these treatment options and wishes to proceed with corticosteroid injection(s) today.  Therefore we will proceed with corticosteroid injection(s) today.  Follow-up 3 months for repeat assessment.    Procedure Note:  I discussed with the patient the potential benefits of performing a therapeutic injections of the bilateral as well as potential risks including but not limited to infection, swelling, pain, bleeding, bruising, nerve/vessel damage, skin color changes, transient elevation in blood glucose levels, and fat atrophy. After informed consent and verifying correct patient, procedure site, and type of procedure, the areas were prepped with alcohol, ethyl chloride was used to numb the skin. Via the superior lateral approach, 3cc of 1% lidocaine, 3 cc of 0.25% Marcaine and 2 cc of 40mg/ml of Kenalog were each injected into the bilateral knees. The patient tolerated the procedures well. There were no complications. A sterile dressing was placed over each injection site.      Chris Lion MD  09/29/22  12:06 EDT

## 2022-10-20 ENCOUNTER — TRANSCRIBE ORDERS (OUTPATIENT)
Dept: ADMINISTRATIVE | Facility: HOSPITAL | Age: 77
End: 2022-10-20

## 2022-10-28 ENCOUNTER — TELEPHONE (OUTPATIENT)
Dept: ORTHOPEDIC SURGERY | Facility: CLINIC | Age: 77
End: 2022-10-28

## 2022-11-22 ENCOUNTER — CLINICAL SUPPORT (OUTPATIENT)
Dept: INTERNAL MEDICINE | Facility: CLINIC | Age: 77
End: 2022-11-22

## 2022-11-22 DIAGNOSIS — I50.33 ACUTE ON CHRONIC DIASTOLIC HEART FAILURE: ICD-10-CM

## 2022-11-22 DIAGNOSIS — Z23 NEED FOR VACCINATION: Primary | ICD-10-CM

## 2022-11-22 PROCEDURE — 91312 COVID-19 (PFIZER) BIVALENT BOOSTER 12+YRS: CPT | Performed by: INTERNAL MEDICINE

## 2022-11-22 PROCEDURE — 0124A PR ADM SARSCOV2 30MCG/0.3ML BST: CPT | Performed by: INTERNAL MEDICINE

## 2022-11-22 RX ORDER — TORSEMIDE 20 MG/1
TABLET ORAL
Qty: 90 TABLET | Refills: 0 | OUTPATIENT
Start: 2022-11-22

## 2022-12-06 ENCOUNTER — OFFICE VISIT (OUTPATIENT)
Dept: INTERNAL MEDICINE | Facility: CLINIC | Age: 77
End: 2022-12-06

## 2022-12-06 VITALS
HEART RATE: 61 BPM | HEIGHT: 67 IN | DIASTOLIC BLOOD PRESSURE: 80 MMHG | SYSTOLIC BLOOD PRESSURE: 170 MMHG | BODY MASS INDEX: 29.03 KG/M2 | WEIGHT: 185 LBS | OXYGEN SATURATION: 93 %

## 2022-12-06 DIAGNOSIS — K64.8 OTHER HEMORRHOIDS: Primary | ICD-10-CM

## 2022-12-06 DIAGNOSIS — R03.0 ELEVATED BLOOD PRESSURE READING: ICD-10-CM

## 2022-12-06 DIAGNOSIS — I50.9 CONGESTIVE HEART FAILURE, UNSPECIFIED HF CHRONICITY, UNSPECIFIED HEART FAILURE TYPE: ICD-10-CM

## 2022-12-06 DIAGNOSIS — K59.09 OTHER CONSTIPATION: ICD-10-CM

## 2022-12-06 DIAGNOSIS — Z99.2 END STAGE RENAL DISEASE ON DIALYSIS: ICD-10-CM

## 2022-12-06 DIAGNOSIS — N18.6 END STAGE RENAL DISEASE ON DIALYSIS: ICD-10-CM

## 2022-12-06 DIAGNOSIS — Z85.3 HISTORY OF BREAST CANCER: ICD-10-CM

## 2022-12-06 DIAGNOSIS — Z90.11 H/O RIGHT MASTECTOMY: ICD-10-CM

## 2022-12-06 PROCEDURE — 99213 OFFICE O/P EST LOW 20 MIN: CPT | Performed by: NURSE PRACTITIONER

## 2022-12-06 RX ORDER — WITCH HAZEL 5 G/1
1 CLOTH TOPICAL 2 TIMES DAILY
Qty: 180 EACH | Refills: 0 | Status: SHIPPED | OUTPATIENT
Start: 2022-12-06 | End: 2023-03-06

## 2022-12-06 RX ORDER — DOCUSATE CALCIUM 240 MG
240 CAPSULE ORAL DAILY
Qty: 90 CAPSULE | Refills: 0 | Status: SHIPPED | OUTPATIENT
Start: 2022-12-06 | End: 2023-03-06

## 2022-12-06 NOTE — PROGRESS NOTES
Office Note     Name: Esperanza Pop    : 1945     MRN: [id number]     Chief Complaint  Hemorrhoids    Subjective     History of Present Illness:  Esperanza Pop is a 77 y.o. female who presents today for evaluation of hemorrhoids.  She feels this first started last October and November as she was started on iron and then struggled with constipation.  She has continued to take the MiraLAX but is still having to strain with bowel movements.  She has been using Preparation H and Tucks pads.  She notes that the hemorrhoids are smaller than they were but they are still irritated.  Denies any blood in the stool.  She describes them more as uncomfortable.  She is currently on dialysis due to end-stage renal disease with some associated heart failure and elevated blood pressure.  She does have to be conscious of her fluid intake.  This has not helped with her constipation and hemorrhoid issue    Patient also requested a order for mammogram.  She does have a history of breast cancer with a right mastectomy and left reconstruction    Review of Systems   Constitutional: Negative for chills, fatigue and fever.   HENT: Negative for sore throat.    Eyes: Negative for visual disturbance.   Respiratory: Negative for cough and shortness of breath.    Cardiovascular: Negative for chest pain.   Gastrointestinal: Negative for abdominal pain.        Hemorrhoids   Skin: Negative for color change.   Allergic/Immunologic: Negative for immunocompromised state.   Neurological: Negative for headaches.   Psychiatric/Behavioral: Negative for behavioral problems.       Past Medical History:   Diagnosis Date   • Acute bronchitis    • Acute conjunctivitis    • Acute kidney injury (HCC)     Admission from 2013 to 2014, now resolved with latest creatinine 1.4 on 2014.   • Acute non-ST segment elevation myocardial infarction (STEMI) following previous myocardial infarction    • Arthritis    • Breast cancer, female, right      2005 mastectomy right   • Cancer (HCC)    • CHF (congestive heart failure) (HCC)    • Chronic kidney disease     dialysis MWF, f/u [facility]    • Clotting disorder (HCC)    • COVID-19    • Diabetes mellitus (HCC)     diagnosed ~1992, checks fsbg 2-3x/day   • Diarrhea    • Dyslipidemia    • Dyspepsia    • Dyspnea    • Edema    • History of transfusion     ~2013 no reaction    • Hx of radiation therapy    • Hyperlipidemia    • Hypertension     Severe   • Ischemic heart disease    • Moderate obesity     BMI 36.2   • Myocardial infarction (HCC)    • Pneumonia    • Pneumonia 02/2019   • Radiation 2005   • Seasonal allergies    • Wears glasses        Past Surgical History:   Procedure Laterality Date   • AV FISTULA PLACEMENT, BRACHIOBASILIC     • BREAST BIOPSY Left 2010   • BREAST CYST EXCISION     • BREAST LUMPECTOMY Right 2005   • BREAST SURGERY     • CARDIAC CATHETERIZATION N/A 10/10/2016    Procedure: Left Heart Cath;  Surgeon: Scooter Zhao MD;  Location:  [facility];  Service:    • CARDIAC CATHETERIZATION  10/2016   • CARDIAC CATHETERIZATION N/A 1/21/2021    Procedure: Right and Left Heart Cath;  Surgeon: Hugh Tidwell MD;  Location:  [facility];  Service: Cardiology;  Laterality: N/A;   • COLONOSCOPY N/A 7/23/2020    Procedure: COLONOSCOPY;  Surgeon: Rubén Rosas MD;  Location:  [facility];  Service: Gastroenterology;  Laterality: N/A;   • CORONARY STENT PLACEMENT  2016   • HERNIA REPAIR     • HYSTERECTOMY  2000   • INSERTION HEMODIALYSIS CATHETER Right 6/29/2016    Procedure: HEMODIALYSIS CATHETER INSERTION TUNNELLED;  Surgeon: Ramón Chavez MD;  Location:  [facility];  Service:    • MASTECTOMY Right 2006   • OOPHORECTOMY     • REDUCTION MAMMAPLASTY Left 2005       Social History     Socioeconomic History   • Marital status:    Tobacco Use   • Smoking status: Never   • Smokeless tobacco: Never   • Tobacco comments:     Michael  second hand smoke   Substance and Sexual Activity   • Alcohol use: Yes     Comment: rarely   • Drug use: No   • Sexual activity: Defer         Current Outpatient Medications:   •  amiodarone (PACERONE) 200 MG tablet, Take 1 tablet by mouth Daily., Disp: 90 tablet, Rfl: 1  •  ammonium lactate (LAC-HYDRIN) 12 % lotion, Apply  topically to the appropriate area as directed As Needed for Dry Skin. (Patient taking differently: Apply 1 application topically to the appropriate area as directed 2 (Two) Times a Day As Needed for Dry Skin.), Disp: 500 g, Rfl: 3  •  apixaban (ELIQUIS) 2.5 MG tablet tablet, Take 1 tablet by mouth 2 (Two) Times a Day., Disp: 180 tablet, Rfl: 3  •  aspirin 81 MG EC tablet, Take 1 tablet by mouth Daily., Disp: , Rfl:   •  atorvastatin (LIPITOR) 80 MG tablet, Take 1 tablet by mouth Every Night., Disp: 90 tablet, Rfl: 3  •  B Complex-C-Folic Acid (DIALYVITE 800 PO), Take 1 tablet by mouth 3 (Three) Times a Week. On dialysis says [facility], Disp: , Rfl:   •  bisacodyl (DULCOLAX) 5 MG EC tablet, Take 5 mg by mouth Daily As Needed for Constipation., Disp: , Rfl:   •  Capsaicin 0.1 % cream, Apply 2-4 gms to feet nightly. Use gloves, Disp: 60 g, Rfl: 3  •  carvedilol (COREG) 25 MG tablet, Take 1 tablet by mouth Every 12 (Twelve) Hours. (Patient taking differently: Take 12.5 mg by mouth Every 12 (Twelve) Hours.), Disp: 60 tablet, Rfl: 5  •  cephalexin (KEFLEX) 500 MG capsule, Take 1 capsule by mouth 3 (Three) Times a Day., Disp: 30 capsule, Rfl: 0  •  cloNIDine (CATAPRES) 0.2 MG tablet, Take 2 tablets by mouth Every 12 (Twelve) Hours., Disp: 120 tablet, Rfl: 5  •  desonide (DESOWEN) 0.05 % cream, Apply 0.05 application topically to the appropriate area as directed 2 (Two) Times a Day., Disp: , Rfl: 1  •  dorzolamide-timolol (COSOPT) 22.3-6.8 MG/ML ophthalmic solution, Administer 1 drop to both eyes Daily., Disp: 10 mL, Rfl: 3  •  Durezol 0.05 % ophthalmic emulsion, INSTILL 1 DROP 4 TIMES DAILY INTO SURGICAL EYE  STARTING AFTER SURGERY., Disp: , Rfl:   •  Epoetin Rusty (EPOGEN IJ), Epoetin Rusty (Epogen), Disp: , Rfl:   •  famotidine (PEPCID) 20 MG tablet, Take 1 tablet by mouth 2 (Two) Times a Day. (Patient taking differently: Take 20 mg by mouth 2 (Two) Times a Day As Needed.), Disp: 180 tablet, Rfl: 1  •  fluocinonide (LIDEX) 0.05 % external solution, Apply 0.05 application topically to the appropriate area as directed 2 (Two) Times a Day. Scalp, Disp: , Rfl: 2  •  glucose blood test strip, Use as instructed to monitor blood glucose 1-2 times daily, Disp: 100 each, Rfl: 12  •  glucose monitor monitoring kit, Use as directed to monitor blood glucose 1-2 times daily, Disp: 1 each, Rfl: 0  •  IRON DEXTRAN IJ, Inject  as directed 3 (Three) Times a Week., Disp: , Rfl:   •  isosorbide mononitrate (IMDUR) 120 MG 24 hr tablet, Take 1 tablet by mouth Daily., Disp: 90 tablet, Rfl: 3  •  Lancets Ultra Fine misc, Use as directed to check blood sugar 1-2 times daily, Disp: 100 each, Rfl: 11  •  lidocaine-prilocaine (EMLA) 2.5-2.5 % cream, Apply 1 application topically to the appropriate area as directed As Needed (dialysis access)., Disp: , Rfl: 6  •  megestrol (MEGACE) 40 MG tablet, Take 40 mg by mouth Daily., Disp: , Rfl:   •  nitroglycerin (Nitrolingual) 0.4 MG/SPRAY spray, Place 1 spray under the tongue Every 5 (Five) Minutes As Needed for Chest Pain., Disp: 1 each, Rfl: 12  •  nortriptyline (PAMELOR) 10 MG capsule, Take 1 capsule by mouth Every Night., Disp: 30 capsule, Rfl: 3  •  NovoLIN 70/30 (70-30) 100 UNIT/ML injection, INJECT 20 UNITS SUBCUTANEOUSLY BEFORE SUPPER AND 17 UNITS BEFORE BREAKFAST, Disp: 10 mL, Rfl: 0  •  terazosin (HYTRIN) 2 MG capsule, Take 2 capsules by mouth Every Night., Disp: 180 capsule, Rfl: 3  •  torsemide (Demadex) 20 MG tablet, 1 PO QAM and 2 PO QPM, Disp: 90 tablet, Rfl: 5  •  Velphoro 500 MG chewable tablet, Chew 500 mg 3 (Three) Times a Day With Meals., Disp: , Rfl:   •  VITAMIN D PO, Take 1 mcg by  "mouth., Disp: , Rfl:   •  VITAMIN D, CHOLECALCIFEROL, PO, Take 0.5 mcg by mouth., Disp: , Rfl:   •  docusate calcium (SURFAK) 240 MG capsule, Take 1 capsule by mouth Daily for 90 days., Disp: 90 capsule, Rfl: 0  •  Witch Hazel (Preparation H) 50 % pads, Apply 1 pad topically 2 (Two) Times a Day for 90 days., Disp: 180 each, Rfl: 0    Objective     Vital Signs  /80   Pulse 61   Ht 170.2 cm (67.01\")   Wt 83.9 kg (185 lb)   SpO2 93%   BMI 28.97 kg/m²   Estimated body mass index is 28.97 kg/m² as calculated from the following:    Height as of this encounter: 170.2 cm (67.01\").    Weight as of this encounter: 83.9 kg (185 lb).    BMI is >= 25 and <30. (Overweight) The following options were offered after discussion;: will address at next visit       Physical Exam  Vitals and nursing note reviewed.   Constitutional:       Appearance: Normal appearance.   HENT:      Head: Normocephalic and atraumatic.   Eyes:      Extraocular Movements: Extraocular movements intact.      Pupils: Pupils are equal, round, and reactive to light.   Cardiovascular:      Rate and Rhythm: Normal rate and regular rhythm.      Pulses: Normal pulses.      Heart sounds: Normal heart sounds.   Pulmonary:      Effort: Pulmonary effort is normal.      Breath sounds: Normal breath sounds.   Abdominal:      General: Abdomen is flat.      Palpations: Abdomen is soft.      Tenderness: There is no abdominal tenderness. There is no guarding or rebound.      Comments: Patient deferred assessment of hemorrhoids at this time   Musculoskeletal:         General: Normal range of motion.   Skin:     General: Skin is warm and dry.   Neurological:      Mental Status: She is alert and oriented to person, place, and time.   Psychiatric:         Mood and Affect: Mood normal.         Behavior: Behavior normal.                 Assessment and Plan     Diagnoses and all orders for this visit:    1. Other hemorrhoids (Primary)  -     docusate calcium (SURFAK) 240 " MG capsule; Take 1 capsule by mouth Daily for 90 days.  Dispense: 90 capsule; Refill: 0  -     Witch Hazel (Preparation H) 50 % pads; Apply 1 pad topically 2 (Two) Times a Day for 90 days.  Dispense: 180 each; Refill: 0    2. Other constipation  -     docusate calcium (SURFAK) 240 MG capsule; Take 1 capsule by mouth Daily for 90 days.  Dispense: 90 capsule; Refill: 0  -     Witch Hazel (Preparation H) 50 % pads; Apply 1 pad topically 2 (Two) Times a Day for 90 days.  Dispense: 180 each; Refill: 0    3. End stage renal disease on dialysis (HCC)    4. Congestive heart failure, unspecified HF chronicity, unspecified heart failure type (HCC)    5. Elevated blood pressure reading    6. H/O right mastectomy  -     Mammo Diagnostic Digital Tomosynthesis Bilateral With CAD; Future    7. History of breast cancer  -     Mammo Diagnostic Digital Tomosynthesis Bilateral With CAD; Future    Plan  Discussed with patient that she could do 2 servings of MiraLAX with her fluid intake as she does have to be conscious of fluid intake due to renal disease and dialysis.  Sent in additional medication to the pharmacy as well to assist with symptoms.    Patient is aware that her blood pressure is elevated today.  She denies any symptoms.    Order for mammogram placed    Go to ER if any condition worsens or severe    Keep upcoming appointment with Dr. Rodgers    Follow Up  Return for as scheduled with Dr. Rodgers.    WILMA Hoffmann    Part of this note may be an electronic transcription/translation of spoken language to printed text using the Dragon Dictation System.

## 2022-12-13 DIAGNOSIS — Z85.3 HISTORY OF BREAST CANCER: Primary | ICD-10-CM

## 2022-12-13 DIAGNOSIS — C50.911 MALIGNANT NEOPLASM OF RIGHT FEMALE BREAST, UNSPECIFIED ESTROGEN RECEPTOR STATUS, UNSPECIFIED SITE OF BREAST: ICD-10-CM

## 2022-12-19 ENCOUNTER — TELEPHONE (OUTPATIENT)
Dept: INTERNAL MEDICINE | Facility: CLINIC | Age: 77
End: 2022-12-19

## 2022-12-19 RX ORDER — HYDROCORTISONE 25 MG/G
CREAM TOPICAL 2 TIMES DAILY
Qty: 28 G | Refills: 0 | Status: ON HOLD | OUTPATIENT
Start: 2022-12-19

## 2022-12-19 NOTE — TELEPHONE ENCOUNTER
Please let her know that I have sent in Anusol rectal cream to use topically for her hemorrhoids.  Even though it is related to prednisone, since its topical she should not have any side effects on it.  She could try sits baths with Epsom salts if this does not help.  Also, I would like for her to follow-up with me in 3 to 4 weeks as she has not seen me in greater than a year and she no showed her last appointment with me in August.  Thanks

## 2022-12-19 NOTE — TELEPHONE ENCOUNTER
Caller: Esperanza Pop    Relationship: Self    Best call back number: [phone], OKAY TO LEAVE VOICEMAIL    What is the best time to reach you: ANYTIME    Who are you requesting to speak with (clinical staff, provider,  specific staff member): CLINICAL STAFF    Do you know the name of the person who called: ESPERANZA POP     What was the call regarding: PATIENT ADVISED SHE WOULD LIKE TO SPEAK TO A NURSE REGARDING A PERSONAL ISSUE THAT SHE DID NOT FEEL COMFORTABLE SPEAKING WITH THE HUB ABOUT.    Do you require a callback: YES

## 2022-12-19 NOTE — TELEPHONE ENCOUNTER
Pt states that she is having some bleeding with her hemorrhoids and since she is on blood thinners sometimes it seems heavy but then it stops. She is using a stool softener, witch hazel pads and preparation H wipes and she is wondering if an epsom salt bath would help. Any other recommendations? She is not c/o any worsening symptoms other than the bleeding.

## 2022-12-30 ENCOUNTER — TELEPHONE (OUTPATIENT)
Dept: INTERNAL MEDICINE | Facility: CLINIC | Age: 77
End: 2022-12-30
Payer: MEDICARE

## 2022-12-30 NOTE — TELEPHONE ENCOUNTER
Caller: Esperanza Pop    Relationship: Self    Best call back number: [phone]    What is the medical concern/diagnosis: HAMROIDS    What specialty or service is being requested: HEMROID ASSISTANCE    What is the provider, practice or medical service name: NOT SURE    Any additional details: PATIENT SAID THEY WONT BE AVAILABLE FOR A CALLBACK AT 11AM BUT WOULD LIKE A REFERAL TO A SPECIALIST FOR ASSISTANCE WITH THIS

## 2023-01-01 ENCOUNTER — HOSPITAL ENCOUNTER (INPATIENT)
Facility: HOSPITAL | Age: 78
LOS: 3 days | End: 2023-04-25
Attending: INTERNAL MEDICINE | Admitting: INTERNAL MEDICINE
Payer: COMMERCIAL

## 2023-01-01 ENCOUNTER — TELEPHONE (OUTPATIENT)
Dept: INTERNAL MEDICINE | Facility: CLINIC | Age: 78
End: 2023-01-01

## 2023-01-01 ENCOUNTER — HOSPITAL ENCOUNTER (OUTPATIENT)
Dept: RADIATION ONCOLOGY | Facility: HOSPITAL | Age: 78
Setting detail: RADIATION/ONCOLOGY SERIES
Discharge: HOME | End: 2023-04-03
Payer: MEDICARE

## 2023-01-01 VITALS
HEART RATE: 69 BPM | OXYGEN SATURATION: 100 % | TEMPERATURE: 97.1 F | SYSTOLIC BLOOD PRESSURE: 128 MMHG | DIASTOLIC BLOOD PRESSURE: 52 MMHG | RESPIRATION RATE: 12 BRPM

## 2023-01-01 PROCEDURE — 25010000002 LORAZEPAM PER 2 MG: Performed by: NURSE PRACTITIONER

## 2023-01-01 PROCEDURE — 25010000002 HYDROMORPHONE PER 4 MG: Performed by: NURSE PRACTITIONER

## 2023-01-01 PROCEDURE — 25010000002 HYDROMORPHONE 1 MG/ML SOLUTION: Performed by: NURSE PRACTITIONER

## 2023-01-01 PROCEDURE — 25010000002 LORAZEPAM PER 2 MG: Performed by: INTERNAL MEDICINE

## 2023-01-01 PROCEDURE — 25010000002 HYDROMORPHONE PER 4 MG: Performed by: INTERNAL MEDICINE

## 2023-01-01 RX ORDER — LIDOCAINE 5% 5 G/100G
1 CREAM TOPICAL 2 TIMES DAILY PRN
Qty: 30 G | Refills: 0 | Status: SHIPPED | OUTPATIENT
Start: 2023-01-01 | End: 2023-01-01 | Stop reason: HOSPADM

## 2023-01-01 RX ORDER — HALOPERIDOL 5 MG/ML
1 INJECTION INTRAMUSCULAR EVERY 4 HOURS PRN
Status: DISCONTINUED | OUTPATIENT
Start: 2023-01-01 | End: 2023-01-01 | Stop reason: HOSPADM

## 2023-01-01 RX ORDER — ONDANSETRON 2 MG/ML
4 INJECTION INTRAMUSCULAR; INTRAVENOUS EVERY 6 HOURS PRN
Status: DISCONTINUED | OUTPATIENT
Start: 2023-01-01 | End: 2023-01-01 | Stop reason: HOSPADM

## 2023-01-01 RX ORDER — FENTANYL 25 UG/H
1 PATCH TRANSDERMAL
Status: DISCONTINUED | OUTPATIENT
Start: 2023-01-01 | End: 2023-01-01

## 2023-01-01 RX ORDER — BISACODYL 10 MG
10 SUPPOSITORY, RECTAL RECTAL DAILY PRN
Status: DISCONTINUED | OUTPATIENT
Start: 2023-01-01 | End: 2023-01-01 | Stop reason: HOSPADM

## 2023-01-01 RX ORDER — LORAZEPAM 2 MG/ML
1 INJECTION INTRAMUSCULAR
Status: DISCONTINUED | OUTPATIENT
Start: 2023-01-01 | End: 2023-01-01

## 2023-01-01 RX ORDER — GLYCOPYRROLATE 0.2 MG/ML
0.2 INJECTION INTRAMUSCULAR; INTRAVENOUS EVERY 4 HOURS PRN
Status: DISCONTINUED | OUTPATIENT
Start: 2023-01-01 | End: 2023-01-01 | Stop reason: HOSPADM

## 2023-01-01 RX ORDER — HYDROMORPHONE HYDROCHLORIDE 1 MG/ML
0.5 INJECTION, SOLUTION INTRAMUSCULAR; INTRAVENOUS; SUBCUTANEOUS EVERY 4 HOURS
Status: DISCONTINUED | OUTPATIENT
Start: 2023-01-01 | End: 2023-01-01 | Stop reason: HOSPADM

## 2023-01-01 RX ORDER — HYDROMORPHONE HYDROCHLORIDE 1 MG/ML
0.25 INJECTION, SOLUTION INTRAMUSCULAR; INTRAVENOUS; SUBCUTANEOUS
Status: DISCONTINUED | OUTPATIENT
Start: 2023-01-01 | End: 2023-01-01

## 2023-01-01 RX ORDER — HALOPERIDOL 5 MG/ML
1 INJECTION INTRAMUSCULAR EVERY 4 HOURS PRN
Status: DISCONTINUED | OUTPATIENT
Start: 2023-01-01 | End: 2023-01-01

## 2023-01-01 RX ORDER — HYDROMORPHONE HYDROCHLORIDE 1 MG/ML
0.5 INJECTION, SOLUTION INTRAMUSCULAR; INTRAVENOUS; SUBCUTANEOUS
Status: DISCONTINUED | OUTPATIENT
Start: 2023-01-01 | End: 2023-01-01 | Stop reason: HOSPADM

## 2023-01-01 RX ORDER — POLYVINYL ALCOHOL 14 MG/ML
1 SOLUTION/ DROPS OPHTHALMIC
Status: DISCONTINUED | OUTPATIENT
Start: 2023-01-01 | End: 2023-01-01 | Stop reason: HOSPADM

## 2023-01-01 RX ORDER — LORAZEPAM 2 MG/ML
1 INJECTION INTRAMUSCULAR
Status: DISCONTINUED | OUTPATIENT
Start: 2023-01-01 | End: 2023-01-01 | Stop reason: HOSPADM

## 2023-01-01 RX ORDER — HYDROMORPHONE HYDROCHLORIDE 1 MG/ML
0.25 INJECTION, SOLUTION INTRAMUSCULAR; INTRAVENOUS; SUBCUTANEOUS EVERY 4 HOURS PRN
Status: DISCONTINUED | OUTPATIENT
Start: 2023-01-01 | End: 2023-01-01

## 2023-01-01 RX ORDER — LORAZEPAM 2 MG/ML
1 INJECTION INTRAMUSCULAR EVERY 8 HOURS
Status: DISCONTINUED | OUTPATIENT
Start: 2023-01-01 | End: 2023-01-01 | Stop reason: HOSPADM

## 2023-01-01 RX ORDER — SCOLOPAMINE TRANSDERMAL SYSTEM 1 MG/1
1 PATCH, EXTENDED RELEASE TRANSDERMAL
Status: DISCONTINUED | OUTPATIENT
Start: 2023-01-01 | End: 2023-01-01 | Stop reason: HOSPADM

## 2023-01-01 RX ADMIN — LORAZEPAM 1 MG: 2 INJECTION INTRAMUSCULAR; INTRAVENOUS at 05:43

## 2023-01-01 RX ADMIN — HYDROMORPHONE HYDROCHLORIDE 0.25 MG: 1 INJECTION, SOLUTION INTRAMUSCULAR; INTRAVENOUS; SUBCUTANEOUS at 07:57

## 2023-01-01 RX ADMIN — LORAZEPAM 1 MG: 2 INJECTION INTRAMUSCULAR; INTRAVENOUS at 05:40

## 2023-01-01 RX ADMIN — HYDROMORPHONE HYDROCHLORIDE 0.25 MG: 1 INJECTION, SOLUTION INTRAMUSCULAR; INTRAVENOUS; SUBCUTANEOUS at 12:14

## 2023-01-01 RX ADMIN — LORAZEPAM 1 MG: 2 INJECTION INTRAMUSCULAR; INTRAVENOUS at 07:58

## 2023-01-01 RX ADMIN — HYDROMORPHONE HYDROCHLORIDE 0.5 MG: 1 INJECTION, SOLUTION INTRAMUSCULAR; INTRAVENOUS; SUBCUTANEOUS at 09:57

## 2023-01-01 RX ADMIN — HYDROMORPHONE HYDROCHLORIDE 0.25 MG: 1 INJECTION, SOLUTION INTRAMUSCULAR; INTRAVENOUS; SUBCUTANEOUS at 15:13

## 2023-01-01 RX ADMIN — HYDROMORPHONE HYDROCHLORIDE 0.5 MG: 1 INJECTION, SOLUTION INTRAMUSCULAR; INTRAVENOUS; SUBCUTANEOUS at 05:43

## 2023-01-01 RX ADMIN — LORAZEPAM 1 MG: 2 INJECTION INTRAMUSCULAR; INTRAVENOUS at 18:43

## 2023-01-01 RX ADMIN — HYDROMORPHONE HYDROCHLORIDE 0.25 MG: 1 INJECTION, SOLUTION INTRAMUSCULAR; INTRAVENOUS; SUBCUTANEOUS at 05:38

## 2023-01-01 RX ADMIN — HYDROMORPHONE HYDROCHLORIDE 0.5 MG: 1 INJECTION, SOLUTION INTRAMUSCULAR; INTRAVENOUS; SUBCUTANEOUS at 01:14

## 2023-01-01 RX ADMIN — HYDROMORPHONE HYDROCHLORIDE 0.25 MG: 1 INJECTION, SOLUTION INTRAMUSCULAR; INTRAVENOUS; SUBCUTANEOUS at 22:46

## 2023-01-01 RX ADMIN — HYDROMORPHONE HYDROCHLORIDE 0.25 MG: 1 INJECTION, SOLUTION INTRAMUSCULAR; INTRAVENOUS; SUBCUTANEOUS at 09:07

## 2023-01-01 RX ADMIN — HYDROMORPHONE HYDROCHLORIDE 0.25 MG: 1 INJECTION, SOLUTION INTRAMUSCULAR; INTRAVENOUS; SUBCUTANEOUS at 01:27

## 2023-01-01 RX ADMIN — HYDROMORPHONE HYDROCHLORIDE 0.25 MG: 1 INJECTION, SOLUTION INTRAMUSCULAR; INTRAVENOUS; SUBCUTANEOUS at 17:46

## 2023-01-01 RX ADMIN — HYDROMORPHONE HYDROCHLORIDE 0.5 MG: 1 INJECTION, SOLUTION INTRAMUSCULAR; INTRAVENOUS; SUBCUTANEOUS at 21:35

## 2023-01-01 RX ADMIN — LORAZEPAM 1 MG: 2 INJECTION INTRAMUSCULAR; INTRAVENOUS at 00:47

## 2023-01-01 RX ADMIN — LORAZEPAM 1 MG: 2 INJECTION INTRAMUSCULAR; INTRAVENOUS at 05:13

## 2023-01-01 RX ADMIN — LORAZEPAM 1 MG: 2 INJECTION INTRAMUSCULAR; INTRAVENOUS at 12:14

## 2023-01-01 RX ADMIN — Medication: at 09:57

## 2023-01-01 RX ADMIN — SCOPALAMINE 1 PATCH: 1 PATCH, EXTENDED RELEASE TRANSDERMAL at 09:59

## 2023-01-01 RX ADMIN — Medication: at 21:45

## 2023-01-01 RX ADMIN — HYDROMORPHONE HYDROCHLORIDE 0.5 MG: 1 INJECTION, SOLUTION INTRAMUSCULAR; INTRAVENOUS; SUBCUTANEOUS at 17:59

## 2023-01-01 RX ADMIN — GLYCOPYRROLATE 0.2 MG: 0.2 INJECTION INTRAMUSCULAR; INTRAVENOUS at 09:59

## 2023-01-01 RX ADMIN — HYDROMORPHONE HYDROCHLORIDE 0.5 MG: 1 INJECTION, SOLUTION INTRAMUSCULAR; INTRAVENOUS; SUBCUTANEOUS at 05:13

## 2023-01-01 RX ADMIN — LORAZEPAM 1 MG: 2 INJECTION INTRAMUSCULAR; INTRAVENOUS at 22:46

## 2023-01-01 RX ADMIN — LORAZEPAM 1 MG: 2 INJECTION INTRAMUSCULAR; INTRAVENOUS at 21:35

## 2023-01-01 NOTE — TELEPHONE ENCOUNTER
Referral placed to Colorectal surgery- Dr. Bernadette Rea. Please let her know, she will get a call soon.

## 2023-01-03 NOTE — TELEPHONE ENCOUNTER
HUB TO READ     Patient will call back will you let her know Dr Rodgers placed referral to Colorectal surgery Dr Bernadette Rea. She should receive a call to schedule.

## 2023-01-05 NOTE — TELEPHONE ENCOUNTER
Patient states she went to Urgent Care for her hemorrhoids and they said looks like she has an infection and called her in Bactrim, but she states she is having pain to the right side of her hemorrhoids and would like something sent to relieve the pain

## 2023-01-05 NOTE — TELEPHONE ENCOUNTER
PATIENT HAS CALLED REQUESTING A CALL BACK ASAP TO DISCUSS THE PAIN SHE IS HAVING. PATIENT STATES SHE IS IN A LOT OF PAIN IN HER REAR END.    CALL BACK NUMBER [phone]

## 2023-01-05 NOTE — TELEPHONE ENCOUNTER
I have sent in lidocaine cream, if its not covered, She should use OTC topical lidocaine or benzocaine gel it.

## 2023-01-05 NOTE — TELEPHONE ENCOUNTER
Patient advised lidocaine cream has been sent to your pharmacy but if it is not covered she should use otc topical lidocaine or benzocaine gel.

## 2023-01-10 NOTE — TELEPHONE ENCOUNTER
THE PATIENT IS CALLING BACK SHE STATES THAT SHE HAS NOT RECEIVED A CALL FROM THE COLO RECTAL DOCTOR THAT SHE WAS REFERRED TO THE PATIENT WOULD LIKE AN UPDATE ON THE REFERRAL     [phone]

## 2023-01-11 NOTE — TELEPHONE ENCOUNTER
PT CALLED AGAIN TO ASK ABOUT REFERRAL; LET PT KNOW; I AM BEHIND; I'M WORKING VERY HARD TO GET CAUGHT UP

## 2023-01-24 ENCOUNTER — TRANSCRIBE ORDERS (OUTPATIENT)
Dept: ADMINISTRATIVE | Facility: HOSPITAL | Age: 78
End: 2023-01-24
Payer: MEDICARE

## 2023-01-24 DIAGNOSIS — C21.0 ANAL SQUAMOUS CELL CARCINOMA: Primary | ICD-10-CM

## 2023-01-26 ENCOUNTER — HOSPITAL ENCOUNTER (OUTPATIENT)
Dept: MAMMOGRAPHY | Facility: HOSPITAL | Age: 78
Discharge: HOME | End: 2023-01-26
Payer: MEDICARE

## 2023-02-01 ENCOUNTER — APPOINTMENT (OUTPATIENT)
Dept: CT IMAGING | Facility: HOSPITAL | Age: 78
End: 2023-02-01
Payer: MEDICARE

## 2023-02-01 ENCOUNTER — HOSPITAL ENCOUNTER (OUTPATIENT)
Dept: CT IMAGING | Facility: HOSPITAL | Age: 78
Discharge: HOME | End: 2023-02-01
Admitting: COLON & RECTAL SURGERY
Payer: MEDICARE

## 2023-02-01 DIAGNOSIS — C21.0 ANAL SQUAMOUS CELL CARCINOMA: ICD-10-CM

## 2023-02-01 LAB — CREAT BLDA-MCNC: 6.9 MG/DL (ref 0.6–1.3)

## 2023-02-01 PROCEDURE — 82565 ASSAY OF CREATININE: CPT

## 2023-02-01 PROCEDURE — 71260 CT THORAX DX C+: CPT

## 2023-02-01 PROCEDURE — 74177 CT ABD & PELVIS W/CONTRAST: CPT

## 2023-02-01 PROCEDURE — 25010000002 IOPAMIDOL 61 % SOLUTION: Performed by: COLON & RECTAL SURGERY

## 2023-02-01 RX ADMIN — IOPAMIDOL 60 ML: 612 INJECTION, SOLUTION INTRAVENOUS at 16:25

## 2023-02-07 ENCOUNTER — OFFICE VISIT (OUTPATIENT)
Dept: INTERNAL MEDICINE | Facility: CLINIC | Age: 78
End: 2023-02-07
Payer: MEDICARE

## 2023-02-07 VITALS
TEMPERATURE: 97.6 F | HEIGHT: 67 IN | BODY MASS INDEX: 27.4 KG/M2 | SYSTOLIC BLOOD PRESSURE: 156 MMHG | DIASTOLIC BLOOD PRESSURE: 62 MMHG | WEIGHT: 174.6 LBS | OXYGEN SATURATION: 97 % | HEART RATE: 65 BPM

## 2023-02-07 DIAGNOSIS — C21.0 ANAL CANCER: Primary | ICD-10-CM

## 2023-02-07 DIAGNOSIS — E11.22 TYPE 2 DIABETES MELLITUS WITH CHRONIC KIDNEY DISEASE ON CHRONIC DIALYSIS, WITH LONG-TERM CURRENT USE OF INSULIN: ICD-10-CM

## 2023-02-07 DIAGNOSIS — Z79.4 TYPE 2 DIABETES MELLITUS WITH CHRONIC KIDNEY DISEASE ON CHRONIC DIALYSIS, WITH LONG-TERM CURRENT USE OF INSULIN: ICD-10-CM

## 2023-02-07 DIAGNOSIS — N18.6 TYPE 2 DIABETES MELLITUS WITH CHRONIC KIDNEY DISEASE ON CHRONIC DIALYSIS, WITH LONG-TERM CURRENT USE OF INSULIN: ICD-10-CM

## 2023-02-07 DIAGNOSIS — Z99.2 TYPE 2 DIABETES MELLITUS WITH CHRONIC KIDNEY DISEASE ON CHRONIC DIALYSIS, WITH LONG-TERM CURRENT USE OF INSULIN: ICD-10-CM

## 2023-02-07 DIAGNOSIS — K64.8 OTHER HEMORRHOIDS: ICD-10-CM

## 2023-02-07 DIAGNOSIS — I10 ESSENTIAL HYPERTENSION: ICD-10-CM

## 2023-02-07 DIAGNOSIS — I25.119 CORONARY ARTERY DISEASE WITH ANGINA PECTORIS, UNSPECIFIED VESSEL OR LESION TYPE, UNSPECIFIED WHETHER NATIVE OR TRANSPLANTED HEART: ICD-10-CM

## 2023-02-07 DIAGNOSIS — R60.0 LEG EDEMA, RIGHT: ICD-10-CM

## 2023-02-07 LAB
EXPIRATION DATE: ABNORMAL
HBA1C MFR BLD: 5.8 %
Lab: ABNORMAL

## 2023-02-07 PROCEDURE — 99214 OFFICE O/P EST MOD 30 MIN: CPT | Performed by: INTERNAL MEDICINE

## 2023-02-07 PROCEDURE — 83036 HEMOGLOBIN GLYCOSYLATED A1C: CPT | Performed by: INTERNAL MEDICINE

## 2023-02-07 PROCEDURE — 3044F HG A1C LEVEL LT 7.0%: CPT | Performed by: INTERNAL MEDICINE

## 2023-02-07 RX ORDER — ACETAMINOPHEN 500 MG
500 TABLET ORAL EVERY 6 HOURS PRN
Status: ON HOLD | COMMUNITY
Start: 2022-06-17 | End: 2023-06-16

## 2023-02-07 RX ORDER — CARVEDILOL 12.5 MG/1
12.5 TABLET ORAL EVERY 12 HOURS SCHEDULED
Qty: 180 TABLET | Refills: 1 | Status: SHIPPED | OUTPATIENT
Start: 2023-02-07 | End: 2023-03-29 | Stop reason: HOSPADM

## 2023-02-07 RX ORDER — ISOSORBIDE MONONITRATE 120 MG/1
120 TABLET, EXTENDED RELEASE ORAL DAILY
Qty: 90 TABLET | Refills: 3 | Status: ON HOLD | OUTPATIENT
Start: 2023-02-07

## 2023-02-07 NOTE — PROGRESS NOTES
"Diabetes and Fall (Yesterday and today)    Subjective   Esperanza Pop is a 78 y.o. female is here today for follow-up.    History of Present Illness   Fell several times, and is very weak. Recently dxed with Adenocarcinoma for her Anal canl,     The patient presents today for a follow-up.    She was recently diagnosed with colorectal cancer near her rectum. She was previously seen at urgent care for rectal pain. She thought her pain was due to having hemorrhoids. She was told she had an infection at that time and was prescribed antibiotics. She had a feeling that something was not right. She follows with colorectal surgeon, Dr. Frank Mandujano. She is scheduled to see oncologist, Dr. Darrel Carpenter on 02/08/2023 about starting chemotherapy and radiation. They will try to shrink the cancer cells before undergoing surgery. She complains of intermittent pain. She takes Tylenol and Motrin, which occasionally helps. She complains of intermittent anal bleeding. She denies bleeding profusely, but when she wipes herself, she bleeds. She has been doing sitz baths. She denies any abdominal pain. She also has diarrhea.     She has fallen 3 times within 1 week. She fell on 02/02/2023, 02/06/2023 at dialysis, and today. Her most recent labs show that her blood glucose levels are decreased. She is not eating enough. She is lactose intolerant. She notes that Daniel has lactose free shakes. She reports that she is weak. She notes that Megace helps her appetite if she takes it. She does not like taking Megace because it makes her feel \"funny\". She has decreased protein levels. She has lost weight since her last visit.     She takes torsemide occasionally. She is not taking it regularly because she was not retaining any fluid. She is taking isosorbide mononitrate 120 mg. She is prescribed carvedilol 25 mg, but she only takes 0.5 tablet. She was previously taking carvedilol 25 mg, and she experienced swelling. Her blood pressure is " mildly elevated today. She has edema in her right leg. She denies any pain. She had previous bilateral leg edema, but her right leg has never been this severe. Her edema started approximately 1 month ago after her cancer diagnosis. She is currently taking Eliquis.    She went to dialysis on 02/06/2023. Her systolic blood pressure was 200 mmHg at her last appointment. She was previously given heparin at dialysis, but it was discontinued approximately 2 to 3 weeks ago after her cancer diagnosis. She has a fistula on her right side.     She is due her mammogram.      Current Outpatient Medications:   •  acetaminophen (TYLENOL) 500 MG tablet, Take 500 mg by mouth., Disp: , Rfl:   •  amiodarone (PACERONE) 200 MG tablet, Take 1 tablet by mouth Daily., Disp: 90 tablet, Rfl: 1  •  ammonium lactate (LAC-HYDRIN) 12 % lotion, Apply  topically to the appropriate area as directed As Needed for Dry Skin. (Patient taking differently: Apply 1 application topically to the appropriate area as directed 2 (Two) Times a Day As Needed for Dry Skin.), Disp: 500 g, Rfl: 3  •  apixaban (ELIQUIS) 2.5 MG tablet tablet, Take 1 tablet by mouth 2 (Two) Times a Day., Disp: 180 tablet, Rfl: 3  •  aspirin 81 MG EC tablet, Take 1 tablet by mouth Daily., Disp: , Rfl:   •  atorvastatin (LIPITOR) 80 MG tablet, Take 1 tablet by mouth Every Night., Disp: 90 tablet, Rfl: 3  •  B Complex-C-Folic Acid (DIALYVITE 800 PO), Take 1 tablet by mouth 3 (Three) Times a Week. On dialysis says MWNINO, Disp: , Rfl:   •  bisacodyl (DULCOLAX) 5 MG EC tablet, Take 5 mg by mouth Daily As Needed for Constipation., Disp: , Rfl:   •  Capsaicin 0.1 % cream, Apply 2-4 gms to feet nightly. Use gloves, Disp: 60 g, Rfl: 3  •  carvedilol (COREG) 12.5 MG tablet, Take 1 tablet by mouth Every 12 (Twelve) Hours., Disp: 180 tablet, Rfl: 1  •  cloNIDine (CATAPRES) 0.2 MG tablet, Take 2 tablets by mouth Every 12 (Twelve) Hours., Disp: 120 tablet, Rfl: 5  •  docusate calcium (SURFAK) 240 MG  capsule, Take 1 capsule by mouth Daily for 90 days., Disp: 90 capsule, Rfl: 0  •  dorzolamide-timolol (COSOPT) 22.3-6.8 MG/ML ophthalmic solution, Administer 1 drop to both eyes Daily., Disp: 10 mL, Rfl: 3  •  Durezol 0.05 % ophthalmic emulsion, INSTILL 1 DROP 4 TIMES DAILY INTO SURGICAL EYE STARTING AFTER SURGERY., Disp: , Rfl:   •  Epoetin Rusty (EPOGEN IJ), Epoetin Rusty (Epogen), Disp: , Rfl:   •  fluocinonide (LIDEX) 0.05 % external solution, Apply 0.05 application topically to the appropriate area as directed 2 (Two) Times a Day. Scalp, Disp: , Rfl: 2  •  glucose blood test strip, Use as instructed to monitor blood glucose 1-2 times daily, Disp: 100 each, Rfl: 12  •  glucose monitor monitoring kit, Use as directed to monitor blood glucose 1-2 times daily, Disp: 1 each, Rfl: 0  •  Hydrocortisone, Perianal, (ANUSOL-HC) 2.5 % rectal cream, Insert  into the rectum 2 (Two) Times a Day., Disp: 28 g, Rfl: 0  •  IRON DEXTRAN IJ, Inject  as directed 3 (Three) Times a Week., Disp: , Rfl:   •  isosorbide mononitrate (IMDUR) 120 MG 24 hr tablet, Take 1 tablet by mouth Daily., Disp: 90 tablet, Rfl: 3  •  Lancets Ultra Fine misc, Use as directed to check blood sugar 1-2 times daily, Disp: 100 each, Rfl: 11  •  Lidocaine 5 % cream, Apply 1 g topically 2 (Two) Times a Day As Needed (pain)., Disp: 30 g, Rfl: 0  •  lidocaine-prilocaine (EMLA) 2.5-2.5 % cream, Apply 1 application topically to the appropriate area as directed As Needed (dialysis access)., Disp: , Rfl: 6  •  megestrol (MEGACE) 40 MG tablet, Take 40 mg by mouth Daily., Disp: , Rfl:   •  nitroglycerin (Nitrolingual) 0.4 MG/SPRAY spray, Place 1 spray under the tongue Every 5 (Five) Minutes As Needed for Chest Pain., Disp: 1 each, Rfl: 12  •  nortriptyline (PAMELOR) 10 MG capsule, Take 1 capsule by mouth Every Night., Disp: 30 capsule, Rfl: 3  •  NovoLIN 70/30 (70-30) 100 UNIT/ML injection, INJECT 20 UNITS SUBCUTANEOUSLY BEFORE SUPPER AND 17 UNITS BEFORE BREAKFAST,  Disp: 10 mL, Rfl: 0  •  terazosin (HYTRIN) 2 MG capsule, Take 2 capsules by mouth Every Night., Disp: 180 capsule, Rfl: 3  •  torsemide (Demadex) 20 MG tablet, 1 PO QAM and 2 PO QPM, Disp: 90 tablet, Rfl: 5  •  VITAMIN D PO, Take 1 mcg by mouth., Disp: , Rfl:   •  [START ON 2/14/2023] capecitabine (XELODA) 500 MG chemo tablet, Take 2 tablets by mouth 2 (Two) Times a Day. on Monday-Friday with radiation., Disp: 120 tablet, Rfl: 0  •  famotidine (PEPCID) 20 MG tablet, Take 1 tablet by mouth 2 (Two) Times a Day. (Patient taking differently: Take 20 mg by mouth 2 (Two) Times a Day As Needed.), Disp: 180 tablet, Rfl: 1  •  HYDROmorphone (DILAUDID) 2 MG tablet, Take 1 tablet by mouth Every 6 (Six) Hours As Needed for Moderate Pain., Disp: 60 tablet, Rfl: 0  •  metroNIDAZOLE (Flagyl) 500 MG tablet, Take 1 tablet by mouth 3 (Three) Times a Day., Disp: 30 tablet, Rfl: 0  •  Morphine (MS CONTIN) 15 MG 12 hr tablet, Take 1 tablet by mouth 2 (Two) Times a Day., Disp: 60 tablet, Rfl: 0  •  ondansetron (ZOFRAN) 8 MG tablet, Take 1 tablet by mouth 3 (Three) Times a Day As Needed for Nausea or Vomiting., Disp: 30 tablet, Rfl: 5  •  Witch Hazel (Preparation H) 50 % pads, Apply 1 pad topically 2 (Two) Times a Day for 90 days., Disp: 180 each, Rfl: 0      The following portions of the patient's history were reviewed and updated as appropriate: allergies, current medications, past family history, past medical history, past social history, past surgical history and problem list.    Review of Systems   Constitutional: Positive for fatigue. Negative for chills and fever.   HENT: Negative for ear discharge, ear pain, sinus pressure and sore throat.    Respiratory: Negative for cough, chest tightness and shortness of breath.    Cardiovascular: Positive for leg swelling. Negative for chest pain and palpitations.   Gastrointestinal: Positive for blood in stool, diarrhea and rectal pain. Negative for nausea and vomiting.   Musculoskeletal:  "Negative for arthralgias, back pain and myalgias.   Neurological: Negative for dizziness, syncope and headaches.   Psychiatric/Behavioral: Negative for confusion and sleep disturbance.       Objective   /62   Pulse 65   Temp 97.6 °F (36.4 °C)   Ht 170.2 cm (67.01\")   Wt 79.2 kg (174 lb 9.6 oz)   LMP  (LMP Unknown)   SpO2 97% Comment: ra  BMI 27.34 kg/m²   Physical Exam  Vitals and nursing note reviewed.   Constitutional:       Appearance: She is well-developed.   HENT:      Head: Normocephalic and atraumatic.      Right Ear: External ear normal.      Left Ear: External ear normal.      Mouth/Throat:      Pharynx: No oropharyngeal exudate.   Eyes:      Conjunctiva/sclera: Conjunctivae normal.      Pupils: Pupils are equal, round, and reactive to light.   Neck:      Thyroid: No thyromegaly.   Cardiovascular:      Rate and Rhythm: Normal rate and regular rhythm.      Pulses: Normal pulses.      Heart sounds: Murmur heard.     No friction rub. No gallop.   Pulmonary:      Effort: Pulmonary effort is normal.      Breath sounds: Normal breath sounds.   Abdominal:      General: Bowel sounds are normal. There is no distension.      Palpations: Abdomen is soft.      Tenderness: There is no abdominal tenderness.   Musculoskeletal:      Cervical back: Neck supple.      Right lower leg: Edema (3+) present.      Left lower leg: Edema (1+) present.   Skin:     General: Skin is warm and dry.   Neurological:      Mental Status: She is alert and oriented to person, place, and time.      Cranial Nerves: No cranial nerve deficit.      Motor: Weakness present.   Psychiatric:         Judgment: Judgment normal.           Results for orders placed or performed in visit on 02/07/23   POC Glycosylated Hemoglobin (Hb A1C)    Specimen: Blood   Result Value Ref Range    Hemoglobin A1C 5.8 %    Lot Number [id number]     Expiration Date 11/3/24              Assessment & Plan   Diagnoses and all orders for this visit:    Anal cancer " (HCC)    Other hemorrhoids  -     Ambulatory Referral to Colorectal Surgery    Type 2 diabetes mellitus with chronic kidney disease on chronic dialysis, with long-term current use of insulin (HCC)  -     POC Glycosylated Hemoglobin (Hb A1C)    Essential hypertension  -     carvedilol (COREG) 12.5 MG tablet; Take 1 tablet by mouth Every 12 (Twelve) Hours.    Coronary artery disease with angina pectoris, unspecified vessel or lesion type, unspecified whether native or transplanted heart (HCC)  -     isosorbide mononitrate (IMDUR) 120 MG 24 hr tablet; Take 1 tablet by mouth Daily.  -     carvedilol (COREG) 12.5 MG tablet; Take 1 tablet by mouth Every 12 (Twelve) Hours.    Leg edema, right  -     Duplex Venous Lower Extremity - Right CAR; Future    Other orders  -     acetaminophen (TYLENOL) 500 MG tablet; Take 500 mg by mouth.    1. Colorectal cancer  - Follow-up with oncology on 02/08/2023.  - Continue to follow-up with Dr. Mandujano.     2. Hemorrhoids  - Advised to use Tucks wipes and take sitz baths.     3. Hypoglycemia  - Advised to drink Glucerna lactose free shakes daily.    4. Right leg edema  - Referred for a venous Doppler.   - Advised to go to the emergency room if her symptoms worsen.  - Take torsemide as needed.    5. Hypertension  - Carvedilol will be decreased to 12.5 mg.         No follow-ups on file.    Electronically signed by:    Meghana Rodgers MD     Transcribed from ambient dictation for Meghana Rodgers MD by Betzaida Pulliam.  02/07/23   16:32 EST    Patient or patient representative verbalized consent to the visit recording.  I have personally performed the services described in this document as transcribed by the above individual, and it is both accurate and complete.  Meghana Rodgers MD  2/9/2023  22:23 EST

## 2023-02-08 ENCOUNTER — PATIENT OUTREACH (OUTPATIENT)
Dept: ONCOLOGY | Facility: CLINIC | Age: 78
End: 2023-02-08
Payer: MEDICARE

## 2023-02-08 ENCOUNTER — CONSULT (OUTPATIENT)
Dept: ONCOLOGY | Facility: CLINIC | Age: 78
End: 2023-02-08
Payer: MEDICARE

## 2023-02-08 VITALS
HEART RATE: 60 BPM | TEMPERATURE: 96.9 F | BODY MASS INDEX: 27.8 KG/M2 | SYSTOLIC BLOOD PRESSURE: 194 MMHG | DIASTOLIC BLOOD PRESSURE: 67 MMHG | HEIGHT: 66 IN | RESPIRATION RATE: 18 BRPM | OXYGEN SATURATION: 94 % | WEIGHT: 173 LBS

## 2023-02-08 DIAGNOSIS — C21.1 MALIGNANT NEOPLASM OF ANAL CANAL: Primary | ICD-10-CM

## 2023-02-08 DIAGNOSIS — C21.1 MALIGNANT NEOPLASM OF ANAL CANAL: ICD-10-CM

## 2023-02-08 DIAGNOSIS — C21.0 ANAL CANCER: Primary | ICD-10-CM

## 2023-02-08 PROCEDURE — 99205 OFFICE O/P NEW HI 60 MIN: CPT | Performed by: INTERNAL MEDICINE

## 2023-02-08 RX ORDER — HYDROMORPHONE HYDROCHLORIDE 2 MG/1
2 TABLET ORAL EVERY 6 HOURS PRN
Qty: 60 TABLET | Refills: 0 | Status: SHIPPED | OUTPATIENT
Start: 2023-02-08 | End: 2023-03-29 | Stop reason: HOSPADM

## 2023-02-08 RX ORDER — SODIUM CHLORIDE 9 MG/ML
250 INJECTION, SOLUTION INTRAVENOUS ONCE
Status: CANCELLED | OUTPATIENT
Start: 2023-02-15

## 2023-02-08 RX ORDER — MORPHINE SULFATE 15 MG/1
15 TABLET, FILM COATED, EXTENDED RELEASE ORAL 2 TIMES DAILY
Qty: 60 TABLET | Refills: 0 | Status: SHIPPED | OUTPATIENT
Start: 2023-02-08 | End: 2023-03-29 | Stop reason: HOSPADM

## 2023-02-08 RX ORDER — OXYCODONE HYDROCHLORIDE 5 MG/1
5 TABLET ORAL EVERY 6 HOURS PRN
Qty: 60 TABLET | Refills: 0 | Status: CANCELLED | OUTPATIENT
Start: 2023-02-08

## 2023-02-08 RX ORDER — SODIUM CHLORIDE 9 MG/ML
250 INJECTION, SOLUTION INTRAVENOUS ONCE
Status: CANCELLED | OUTPATIENT
Start: 2023-03-15

## 2023-02-09 ENCOUNTER — SPECIALTY PHARMACY (OUTPATIENT)
Dept: ONCOLOGY | Facility: HOSPITAL | Age: 78
End: 2023-02-09
Payer: MEDICARE

## 2023-02-09 ENCOUNTER — TELEPHONE (OUTPATIENT)
Dept: INTERNAL MEDICINE | Facility: CLINIC | Age: 78
End: 2023-02-09
Payer: MEDICARE

## 2023-02-09 ENCOUNTER — TELEPHONE (OUTPATIENT)
Dept: RADIATION ONCOLOGY | Facility: HOSPITAL | Age: 78
End: 2023-02-09
Payer: MEDICARE

## 2023-02-09 ENCOUNTER — HOSPITAL ENCOUNTER (OUTPATIENT)
Dept: RADIATION ONCOLOGY | Facility: HOSPITAL | Age: 78
Setting detail: RADIATION/ONCOLOGY SERIES
Discharge: HOME | End: 2023-02-09
Payer: MEDICARE

## 2023-02-09 ENCOUNTER — DOCUMENTATION (OUTPATIENT)
Dept: NUTRITION | Facility: HOSPITAL | Age: 78
End: 2023-02-09
Payer: MEDICARE

## 2023-02-09 ENCOUNTER — OFFICE VISIT (OUTPATIENT)
Dept: RADIATION ONCOLOGY | Facility: HOSPITAL | Age: 78
End: 2023-02-09
Payer: MEDICARE

## 2023-02-09 ENCOUNTER — HOSPITAL ENCOUNTER (OUTPATIENT)
Dept: CARDIOLOGY | Facility: HOSPITAL | Age: 78
Discharge: HOME | End: 2023-02-09
Admitting: INTERNAL MEDICINE
Payer: MEDICARE

## 2023-02-09 VITALS
BODY MASS INDEX: 28.57 KG/M2 | TEMPERATURE: 97.2 F | WEIGHT: 177.8 LBS | HEART RATE: 67 BPM | DIASTOLIC BLOOD PRESSURE: 81 MMHG | OXYGEN SATURATION: 92 % | SYSTOLIC BLOOD PRESSURE: 191 MMHG | HEIGHT: 66 IN | RESPIRATION RATE: 16 BRPM

## 2023-02-09 DIAGNOSIS — C21.1 MALIGNANT NEOPLASM OF ANAL CANAL: Primary | ICD-10-CM

## 2023-02-09 DIAGNOSIS — R60.0 LEG EDEMA, RIGHT: ICD-10-CM

## 2023-02-09 LAB
BH CV LOW VAS RIGHT LESSER SAPH VESSEL: 1
BH CV LOWER VASCULAR LEFT COMMON FEMORAL AUGMENT: NORMAL
BH CV LOWER VASCULAR LEFT COMMON FEMORAL COMPRESS: NORMAL
BH CV LOWER VASCULAR LEFT COMMON FEMORAL PHASIC: NORMAL
BH CV LOWER VASCULAR LEFT COMMON FEMORAL SPONT: NORMAL
BH CV LOWER VASCULAR RIGHT COMMON FEMORAL AUGMENT: NORMAL
BH CV LOWER VASCULAR RIGHT COMMON FEMORAL COMPRESS: NORMAL
BH CV LOWER VASCULAR RIGHT COMMON FEMORAL PHASIC: NORMAL
BH CV LOWER VASCULAR RIGHT COMMON FEMORAL SPONT: NORMAL
BH CV LOWER VASCULAR RIGHT DISTAL FEMORAL AUGMENT: NORMAL
BH CV LOWER VASCULAR RIGHT DISTAL FEMORAL COMPRESS: NORMAL
BH CV LOWER VASCULAR RIGHT DISTAL FEMORAL PHASIC: NORMAL
BH CV LOWER VASCULAR RIGHT DISTAL FEMORAL SPONT: NORMAL
BH CV LOWER VASCULAR RIGHT GASTRONEMIUS COMPRESS: NORMAL
BH CV LOWER VASCULAR RIGHT GREATER SAPH AK COMPRESS: NORMAL
BH CV LOWER VASCULAR RIGHT GREATER SAPH BK COMPRESS: NORMAL
BH CV LOWER VASCULAR RIGHT LESSER SAPH COMPRESS: NORMAL
BH CV LOWER VASCULAR RIGHT MID FEMORAL AUGMENT: NORMAL
BH CV LOWER VASCULAR RIGHT MID FEMORAL COMPRESS: NORMAL
BH CV LOWER VASCULAR RIGHT MID FEMORAL PHASIC: NORMAL
BH CV LOWER VASCULAR RIGHT MID FEMORAL SPONT: NORMAL
BH CV LOWER VASCULAR RIGHT PERONEAL AUGMENT: NORMAL
BH CV LOWER VASCULAR RIGHT PERONEAL COMPRESS: NORMAL
BH CV LOWER VASCULAR RIGHT POPLITEAL AUGMENT: NORMAL
BH CV LOWER VASCULAR RIGHT POPLITEAL COMPRESS: NORMAL
BH CV LOWER VASCULAR RIGHT POPLITEAL PHASIC: NORMAL
BH CV LOWER VASCULAR RIGHT POPLITEAL SPONT: NORMAL
BH CV LOWER VASCULAR RIGHT POSTERIOR TIBIAL AUGMENT: NORMAL
BH CV LOWER VASCULAR RIGHT POSTERIOR TIBIAL COMPRESS: NORMAL
BH CV LOWER VASCULAR RIGHT PROFUNDA FEMORAL AUGMENT: NORMAL
BH CV LOWER VASCULAR RIGHT PROFUNDA FEMORAL PHASIC: NORMAL
BH CV LOWER VASCULAR RIGHT PROFUNDA FEMORAL SPONT: NORMAL
BH CV LOWER VASCULAR RIGHT PROXIMAL FEMORAL AUGMENT: NORMAL
BH CV LOWER VASCULAR RIGHT PROXIMAL FEMORAL COMPRESS: NORMAL
BH CV LOWER VASCULAR RIGHT PROXIMAL FEMORAL PHASIC: NORMAL
BH CV LOWER VASCULAR RIGHT PROXIMAL FEMORAL SPONT: NORMAL
BH CV LOWER VASCULAR RIGHT SAPHENOFEMORAL JUNCTION AUGMENT: NORMAL
BH CV LOWER VASCULAR RIGHT SAPHENOFEMORAL JUNCTION COMPRESS: NORMAL
BH CV LOWER VASCULAR RIGHT SAPHENOFEMORAL JUNCTION PHASIC: NORMAL
BH CV LOWER VASCULAR RIGHT SAPHENOFEMORAL JUNCTION SPONT: NORMAL
BH CV VAS PRELIMINARY FINDINGS SCRIPTING: 1
MAXIMAL PREDICTED HEART RATE: 144 BPM
STRESS TARGET HR: 122 BPM

## 2023-02-09 PROCEDURE — 93971 EXTREMITY STUDY: CPT | Performed by: INTERNAL MEDICINE

## 2023-02-09 PROCEDURE — 93971 EXTREMITY STUDY: CPT

## 2023-02-09 PROCEDURE — G0463 HOSPITAL OUTPT CLINIC VISIT: HCPCS

## 2023-02-09 RX ORDER — CAPECITABINE 500 MG/1
1000 TABLET, FILM COATED ORAL 2 TIMES DAILY
Qty: 120 TABLET | Refills: 0 | Status: SHIPPED | OUTPATIENT
Start: 2023-02-14 | End: 2023-03-29 | Stop reason: HOSPADM

## 2023-02-09 RX ORDER — METRONIDAZOLE 500 MG/1
500 TABLET ORAL 3 TIMES DAILY
Qty: 30 TABLET | Refills: 0 | Status: SHIPPED | OUTPATIENT
Start: 2023-02-09 | End: 2023-03-07 | Stop reason: SDUPTHER

## 2023-02-09 RX ORDER — ONDANSETRON HYDROCHLORIDE 8 MG/1
8 TABLET, FILM COATED ORAL 3 TIMES DAILY PRN
Qty: 30 TABLET | Refills: 5 | Status: SHIPPED | OUTPATIENT
Start: 2023-02-09 | End: 2023-03-29 | Stop reason: HOSPADM

## 2023-02-09 NOTE — PROGRESS NOTES
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 Cavazos 23    Patient Active Problem List   Diagnosis   • Acute on chronic diastolic heart failure (HCC)   • Type 2 diabetes mellitus (HCC)   • Essential hypertension   • Coronary artery disease involving native coronary artery of native heart with angina pectoris (HCC)   • Breast cancer, female, right   • Seasonal allergic rhinitis   • Right carotid bruit   • Vitamin B12 deficiency   • Hyperlipidemia LDL goal <70   • End stage renal disease on dialysis (HCC)   • Nonrheumatic aortic valve stenosis   • NSTEMI (non-ST elevated myocardial infarction) (HCC)   • UTI (urinary tract infection)   • Ischemic heart disease   • CHF (congestive heart failure) (HCC)   • Acute non-ST segment elevation myocardial infarction (STEMI) following previous myocardial infarction   • Dyslipidemia   • Diabetes mellitus (HCC)   • Mild obesity   • Elevated troponin   • Screen for colon cancer   • Acute midline low back pain without sciatica   • Hypertensive emergency   • PAF (paroxysmal atrial fibrillation) (HCC)   • Malignant neoplasm of anal canal (HCC)       Food / Nutrition Related History       Hydration Status     Goal:  80 ounces    Enteral Feeding     NA  Anthropometric Measurements     Height:    Ht Readings from Last 1 Encounters:   23 167.6 cm (66\")       Weight:    Wt Readings from Last 1 Encounters:   23 80.6 kg (177 lb 12.8 oz)       BMI: 28.7 / overweight    Weight Change: 20 lbs lost the past 6 months (10% weight loss)    Review of Lab Data (Time " Frame - 1 month / 2 month)     Component  Ref Range & Units 2 d ago  (2/7/23) 8 mo ago  (6/14/22) 1 yr ago  (12/26/21) 1 yr ago  (6/29/21) 1 yr ago  (3/23/21) 2 yr ago  (9/22/20) 2 yr ago  (6/16/20)   Hemoglobin A1C  % 5.8  6.3             Component  Ref Range & Units 8 d ago  (2/1/23) 1 yr ago  (7/21/21) 2 yr ago  (1/22/21) 2 yr ago  (1/21/21) 2 yr ago  (1/20/21) 2 yr ago  (1/19/21) 2 yr ago  (9/22/20)   Creatinine  0.60 - 1.30 mg/dL 6.90 High   3.91 High  R  6.58 High  R  5.04 High  R  5.91 High  R  5.22 High  R  5.01 High        Medication Review   Reviewed noting insulin, which she states that she is not taking because her blood sugars have been running low.  Nahun noted in chart note / not noted on MAR - ? Patient taking    Nutrition Focused Physical Findings   Muscle and fat loss    Nutrition Impact Symptoms     Altered appetite   Taste changes    Physical Activity      Able to do little activity and spend most of the day in bed or chair    Current Nutritional Intake     Oral diet:  Regular / patient states that no renal restrictions have been placed on her diet    Oral nutritional supplements: None    Malnutrition Risk Assessment     Recent weight loss over the past 6 months:  Yes    How much weight loss:  2 = 14-23 lbs    Eating poorly because of a decreased appetite:  1 = Yes    Malnutrition Screening Score:     MST = 2 more Patient at risk for malnutrition     Nutrition Diagnosis     Problem Severe malnutrition in context of chronic illness   Etiology Diagnosis and related comobrbities including CKD, diabetes  Social Situation   Signs / Symptoms 20 lbs weight loss past 6 months (10% weight loss)  Muscle and fat loss  Per recall of nutritional intake, oral intake inadequate to meet nutritional needs     Nutrition Intervention   Initial consultation with patient and her sister who accompanied her to consultation today.      Patient lives with her  who is currently under treatment for H&N cancer; his  nutritional status is supported via PEG enteral feedings.  She states that since her  is not eating, she doesn't bother with fixing anything for herself. She is receiving dialysis 3x per week and states that on the way home, she will occasionally stop and  fast food to eat; states that sometimes she eats it and sometimes by the time she gets home and ready to eat, she doesn't want it anymore.     Poor appetite and weight loss has been over the past 6 months.  Noted that Nahun is listed on her chart, but not on MAR - ? patient is currently taking. She is doing very little to no food preparation at home since her  has been enteral feeding dependent.  She is weak; states that she has fallen 3x since last Thursday. She is currently only going to dialysis 2x per week because she cannot sit for extended lengths of time and she is so fatigued.   She does some grocery shopping herself and her son will help her occasionally with the task.    Discussed the importance of improving her nutritional status and stablizing weight loss facing chemoradiation.  Discussed role of protein for nutritional repletion, healing from radiation and combating the constant fatigue / she states that recent lab work indicated that her protein stores were low. Encouraged small, frequent meals and snacks (5-6 meals per day) with inclusion of protein at each. Provided array of suggestions for easy to keep on hand foods for meals and snacks that would support her nutritional status.  Encouraged her to reach out to her 4 children and 4 sisters that live in [place] to support her with meal preparation / grocery shopping.  Discussed tips for stimulating the appetite.    She checks blood sugars randomly; encouraged her to check more frequently; she states that she Is not presently taking her insulin because of the low blood sugars. Reinforced the importance again of the small frequent meals / snacks to support blood sugars and  prevent fluctuations.    Provided samples of Ensure Max (30 grams protein / 1 gram sugar / 150 calories) with encouragement for her to consume 1-2 per day; tips for flavor and nutrient enhancement discussed.  If patient finds acceptable, will provide through [facility] Patient Assistance Fund while patient is under active treatment.    Patient understands that in order for her to get through treatment and have the best outcome, nutritional status has to improve and be maintained.  Adequate oral hydration also addressed.    Goal   1. Prevent additional weight loss during chemoradiation / repletion of              nutritional status.  2. Achieve nutrient and hydration goals via oral intake.    3. Provide patient education for management of identified nutritional             issues and nutritionally related side effects of treatment.    Monitoring / Evaluation     Will continue to follow; patient has my contact information for any questions /

## 2023-02-09 NOTE — PROGRESS NOTES
Oral Chemotherapy - New Referral    Received a referral from Dr. Carpenter    Treatment Plan: Xeloda (capecitabine) and radiation  Start date of treatment planned for: Day 1 with concurrent radiation.  Indication: frontline treatment of anal adenocarcinoma  Relevant past treatments: none  Is the therapy appropriate based on treatment guidelines and FDA labeling?: yes  Therapeutic Goals: Continue treatment during concurrent radiation  Patient can self-administer oral medications: Yes    • Drug-Drug Interactions: The current medication list was reviewed and there are no relevant drug-drug interactions.  • Medication Allergies: The patient has no relevant allergies as it relates to their oral specialty medication  • Review of Labs/Dose Adjustments: The patient's most recent labs were reviewed and the medication has been dose adjusted based on decreased renal function (she receives hemodialysis on Mondays, Wednesdays, and Fridays). There is limited data with use of capecitabine with such compromised renal function, but it's recommended to start at a lower dose and then if tolerated, increase to the full dose.  o 825 mg/m2/dose is the typical start dose with concurrent radiation for anal cancer -- 825 mg/m2/dose x 1.88 m2 = 1551 mg/dose  o Start dose will be reduced to 1000 mg/dose (which is a 36% dose reduction and 532 mg/m2/dose).  o Once she starts radiation, Dr. Rojo will assess how she's tolerating this dose and if she's not having significant toxicities, we can increase her to 1500 mg/dose for the remainder of her radiation treatments.    A prescription was released to [facility] specialty pharmacy for   Drug: Xeloda (capecitabine)  Strength: 500 mg  Directions: Take 2 tablets by mouth twice a day on Monday-Friday with radiation  Quantity: 120  Refills: 0    Pharmacy education is scheduled for 2/14/22 at 2 pm. and CCA and consent will be signed at that time.    Shandra Weaver, PharmD, BCOP  Oncology  Clinical Pharmacist  2/9/2023  07:22 EST

## 2023-02-09 NOTE — PROGRESS NOTES
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 female  with history of progressive pain bleeding diarrhea and incontinence.  As patient has noticed a mass increasing in size.  She is also noticed a lump in her left groin getting somewhat larger.  The patient has a several other serious medical problems and her  is very ill.  Is been very difficult for them to get access to care.  Ultimately the patient was seen by Dr. Knott who performed a biopsy and his office that showed squamous of carcinoma consistent with anal primary.  Patient was sent for a CT scan that showed a large primary lesion centered in the anal cavity measuring greater than 5 cm with at least 1 enlarged lymph node in the left inguinal region.  The patient also had some scattered smaller lymphadenopathy potentially concerning but not meeting strict criteria for involvement.  Patient was evaluated by Dr. Carpenter who recommended Xeloda concurrent with radiotherapy.  The patient was seen in clinic today to discuss radiotherapy options.      The patient reports that she does have bleeding and is noticed an odor has been getting worse as the tumor is gotten larger.  She has had pain and discomfort.  She reports that she has some control of her bowels but that has really started to be a problem for her.  The patient has dialysis 3 times a week.  She also has a  who is very ill with cancer and is receiving treatments at [facility].      BMI:  Body mass index is 28.7 kg/m².      Social History     Substance and Sexual  Activity   Alcohol Use Yes    Comment: rarely       Allergies   Allergen Reactions   • Mircera [Methoxy Polyethylene Glycol-Epoetin Beta] Anaphylaxis     Pt stopped breathing   • Venofer [Iron Sucrose] Anaphylaxis     Pt stopped breathing   • Albuterol Other (See Comments)     Increased blood pressure  Used right before heart attack   • Nifedipine Er Swelling   • Penicillins Hives   • Prednisone Other (See Comments)     Makes jittery/anxious       Social History     Tobacco Use   • Smoking status: Never   • Smokeless tobacco: Never   • Tobacco comments:     Michael second hand smoke   Vaping Use   • Vaping Use: Never used   Substance Use Topics   • Alcohol use: Yes     Comment: rarely   • Drug use: No         Past Medical History:   Diagnosis Date   • Acute bronchitis    • Acute conjunctivitis    • Acute kidney injury (HCC)     Admission from 12/26/2013 to 01/02/2014, now resolved with latest creatinine 1.4 on 01/08/2014.   • Acute non-ST segment elevation myocardial infarction (STEMI) following previous myocardial infarction    • Anal cancer (HCC) 2/8/2023   • Anal cancer (HCC) 2/8/2023   • Arthritis    • Breast cancer, female, right     2005 mastectomy right   • Cancer (HCC)    • CHF (congestive heart failure) (HCC)    • Chronic kidney disease     dialysis [facility], [facility]    • Clotting disorder (HCC)    • COVID-19    • Diabetes mellitus (HCC)     diagnosed ~1992, checks fsbg 2-3x/day   • Diarrhea    • Dyslipidemia    • Dyspepsia    • Dyspnea    • Edema    • History of transfusion     ~2013 no reaction    • Hx of radiation therapy    • Hyperlipidemia    • Hypertension     Severe   • Ischemic heart disease    • Moderate obesity     BMI 36.2   • Myocardial infarction (HCC)    • Pneumonia    • Pneumonia 02/2019   • Radiation 2005   • Seasonal allergies    • Wears glasses        family history includes Breast cancer (age of onset: 55) in her sister; Cancer in her mother; Colon cancer in her maternal  "grandmother; Coronary artery disease in her father; Heart failure in her father; Pancreatic cancer in her mother; Stroke in her mother; Throat cancer in her daughter.     Past Surgical History:   Procedure Laterality Date   • AV FISTULA PLACEMENT, BRACHIOBASILIC     • BREAST BIOPSY Left 2010   • BREAST CYST EXCISION     • BREAST LUMPECTOMY Right 2005   • BREAST SURGERY     • CARDIAC CATHETERIZATION N/A 10/10/2016    Procedure: Left Heart Cath;  Surgeon: Scooter Zhao MD;  Location:  [facility];  Service:    • CARDIAC CATHETERIZATION  10/2016   • CARDIAC CATHETERIZATION N/A 1/21/2021    Procedure: Right and Left Heart Cath;  Surgeon: Hugh Tidwell MD;  Location:  [facility];  Service: Cardiology;  Laterality: N/A;   • COLONOSCOPY N/A 7/23/2020    Procedure: COLONOSCOPY;  Surgeon: Rubén Rosas MD;  Location:  [facility];  Service: Gastroenterology;  Laterality: N/A;   • CORONARY STENT PLACEMENT  2016   • HERNIA REPAIR     • HYSTERECTOMY  2000   • INSERTION HEMODIALYSIS CATHETER Right 6/29/2016    Procedure: HEMODIALYSIS CATHETER INSERTION TUNNELLED;  Surgeon: Ramón Chavez MD;  Location: [facility] OR;  Service:    • MASTECTOMY Right 2006   • OOPHORECTOMY     • REDUCTION MAMMAPLASTY Left 2005        Review of Systems   Constitutional: Positive for appetite change, fatigue and unexpected weight change.   Gastrointestinal: Positive for diarrhea.   Neurological: Positive for dizziness and light-headedness.   A full 14 point review of systems was performed and was negative except as noted in the HPI.           Objective   VITAL SIGNS:   Vitals:    02/09/23 1134   BP: (!) 191/81   Pulse: 67   Resp: 16   Temp: 97.2 °F (36.2 °C)   TempSrc: Temporal   SpO2: 92%   Weight: 80.6 kg (177 lb 12.8 oz)   Height: 167.6 cm (66\")   PainSc: 0-No pain        KPS       80%    Physical Exam  Vitals and nursing note reviewed.   Constitutional:       Appearance: She is " well-developed.   HENT:      Head: Normocephalic and atraumatic.   Eyes:      Conjunctiva/sclera: Conjunctivae normal.      Pupils: Pupils are equal, round, and reactive to light.   Neck:      Comments: No obviously enlarged cervical or supraclavicular LAD.  Cardiovascular:      Comments: Patient well perfused. Non cyanotic. No prominent JVD. No pedal edema  Pulmonary:      Effort: Pulmonary effort is normal.      Breath sounds: Normal breath sounds. No stridor. No wheezing.   Abdominal:      General: There is no distension.      Palpations: Abdomen is soft.   Genitourinary:     General: Normal vulva.      Comments: Vaginal mucosa intact. No palpable involvement of the vaginal septum.  Large friable mass centered in the anus. Unable to visualize anal mucosa or evaluate anal sphincter due to the bulky tumor mass.  Musculoskeletal:         General: Normal range of motion.      Cervical back: Normal range of motion and neck supple.      Comments: Patient moves all extremities spontaneously.   Fistula in the right arm intact   Skin:     General: Skin is warm and dry.   Neurological:      Mental Status: She is alert and oriented to person, place, and time.      Comments: Coordination intact.   Psychiatric:         Behavior: Behavior normal.         Thought Content: Thought content normal.         Judgment: Judgment normal.              The following portions of the patient's history were reviewed and updated as appropriate: allergies, current medications, past family history, past medical history, past social history, past surgical history and problem list.  I have personally requested reviewed and interpreted the patient's images and radiology reports and pathology reports listed below:  CT Chest With Contrast Diagnostic    Result Date: 2/1/2023  Impression: 1. Abnormal soft tissue thickening involving the anus with the soft tissue gas bubbles. This may represent a combination of the patient's known tumor and/or  postsurgical/biopsy changes. 2. Enlarged left inguinal lymph node measuring 3.2 x 2.3 cm consistent with metastatic adenopathy. 3. There is a faint indeterminate nodule in the posterior segment of the right upper lobe measuring 6 mm. I would recommend a follow-up CT of the chest in 6 months. 4. Additional findings as noted above. Electronically Signed: Nathan Irwin  2/1/2023 5:03 PM EST  Workstation ID: [id number]    CT Abdomen Pelvis With Contrast    Result Date: 2/1/2023  Impression: 1. Abnormal soft tissue thickening involving the anus with the soft tissue gas bubbles. This may represent a combination of the patient's known tumor and/or postsurgical/biopsy changes. 2. Enlarged left inguinal lymph node measuring 3.2 x 2.3 cm consistent with metastatic adenopathy. 3. There is a faint indeterminate nodule in the posterior segment of the right upper lobe measuring 6 mm. I would recommend a follow-up CT of the chest in 6 months. 4. Additional findings as noted above. Electronically Signed: Nathan Irwin  2/1/2023 5:03 PM EST  Workstation ID: [id number]    I reviewed the patient's outside pathology.    Assessment      IMPRESSION:     Patient is a very pleasant 78-year-old female with history of diabetes and kidney failure on dialysis 3 times a week.  The patient has a large mass growing at the anus.  She does have some bleeding denies any present obstructive symptoms but does report some incontinence and frequent diarrhea.  The patient denies any changes to her urinary function.  The patient is in a difficult situation she is not a candidate for mitomycin given her renal function and need for dialysis.  The patient also has a large bulky tumor that is starting to affect her functionality.  This lesion likely is involving the sphincter muscles.  The odds that the patient has preserved function once we complete her treatments is fairly low.  We discussed the potential side effects of concurrent chemotherapy and radiation both  acutely and long-term.  We discussed the additional complication she faces due to her other comorbidities.  Patient has a high risk of wound healing issues and dermatitis.  The patient is very well aware that she may need surgery in the future palliate her symptoms or for recurrent tumor.  We will try to escalate her dose as best we can while still meeting dose constraints, as the patient will not be receiving mitomycin as a component of her care but Xeloda alone.    Greater than 1 hour was spent preparing for and coordinating this visit. >50% of the time was spent in direct face to face conversation with the patient teaching, answering question, and providing explanations regarding the patient's case.  The decision to treat the patient with radiation is a complex one and carries the risk of long-term side effects and complications.  The patient's malignancy represents a complicated life threatening condition that requires complex multidisciplinary management for treatment and followup.      RECOMMENDATIONS:      Anal cancer  -T3 N1 M0  -Positive inguinal lymph node on CT scan  -Bulky primary 6 cm at least  -Discussed with Dr. Knott patient is not in acute danger of obstruction  -Lesion is painful and foul-smelling.  -Recommend PET scan  -Recommend IGR T and IMRT dose of 59 versus 54 Gray to primary with lower dose to involved adenopathy and lower dose yet to elective nodes.  -Recommend CT simulation quickly as possible to begin radiation planning  -We will use PET scan to delineate tumor for boost and rule out metastatic disease  -High risk for wound healing complications due to diabetes and dialysis    CKD  -On dialysis  -Complicates care    Diabetes  -Complicates care  -We will have issues with wound healing due to this.             Kevan Cavazos MD        Errors in dictation may reflect use of voice recognition software and not all errors in transcription may have been detected prior to signing.

## 2023-02-09 NOTE — TELEPHONE ENCOUNTER
Please let Pt know that her Doppler of the legs shows that she has superficial phlebitis.  This is not dangerous and since she is on Eliquis she is covered.  If her leg hurts, she can apply warm moist compresses/topical heat.  Otherwise she should continue to wear support hose .  Thank you

## 2023-02-09 NOTE — PROGRESS NOTES
ID: 78 y.o. 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 Mandujano,  and Dr. Cavazos    It is a pleasure to meet Ms. Pop today.  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  She does have an enlarged left inguinal node present.  This would make it in N1 disease.  She receives dialysis 3 times a week.  Due to weakness she has been going only twice a week.  She denies any infections.  She has considerable pain with sitting.  This seems to be affecting how she is most days.  The symptoms have persisted for the last 2 months at least.  She initially thought these were hemorrhoids and subsequent work-up showed an anal canal cancer.    Past Medical History:   Diagnosis Date   • Acute bronchitis    • Acute conjunctivitis    • Acute kidney injury (HCC)     Admission from 12/26/2013 to 01/02/2014, now resolved with latest creatinine 1.4 on 01/08/2014.   • Acute non-ST segment elevation myocardial infarction (STEMI) following previous myocardial infarction    • Anal cancer (HCC) 2/8/2023   • Anal cancer (HCC) 2/8/2023   • Arthritis    • Breast cancer, female, right     2005 mastectomy right   • Cancer (HCC)    • CHF (congestive heart failure) (HCC)    • Chronic kidney disease     dialysis MWF, [facility]/u [facility]    • Clotting disorder (HCC)    • COVID-19    • Diabetes mellitus (HCC)     diagnosed ~1992, checks fsbg 2-3x/day   • Diarrhea    • Dyslipidemia    • Dyspepsia    • Dyspnea    • Edema    • History of transfusion     ~2013 no reaction    • Hx of radiation therapy    • Hyperlipidemia    • Hypertension     Severe   • Ischemic heart disease    • Moderate obesity     BMI 36.2   • Myocardial infarction (HCC)    •  Pneumonia    • Pneumonia 02/2019   • Radiation 2005   • Seasonal allergies    • Wears glasses        Past Surgical History:   Procedure Laterality Date   • AV FISTULA PLACEMENT, BRACHIOBASILIC     • BREAST BIOPSY Left 2010   • BREAST CYST EXCISION     • BREAST LUMPECTOMY Right 2005   • BREAST SURGERY     • CARDIAC CATHETERIZATION N/A 10/10/2016    Procedure: Left Heart Cath;  Surgeon: Scooter Zhao MD;  Location:  [facility];  Service:    • CARDIAC CATHETERIZATION  10/2016   • CARDIAC CATHETERIZATION N/A 1/21/2021    Procedure: Right and Left Heart Cath;  Surgeon: Hugh Tidwell MD;  Location:  [facility];  Service: Cardiology;  Laterality: N/A;   • COLONOSCOPY N/A 7/23/2020    Procedure: COLONOSCOPY;  Surgeon: Rubén Rosas MD;  Location:  [facility];  Service: Gastroenterology;  Laterality: N/A;   • CORONARY STENT PLACEMENT  2016   • HERNIA REPAIR     • HYSTERECTOMY  2000   • INSERTION HEMODIALYSIS CATHETER Right 6/29/2016    Procedure: HEMODIALYSIS CATHETER INSERTION TUNNELLED;  Surgeon: Ramón Chavez MD;  Location:  [facility];  Service:    • MASTECTOMY Right 2006   • OOPHORECTOMY     • REDUCTION MAMMAPLASTY Left 2005       Social History     Socioeconomic History   • Marital status:    Tobacco Use   • Smoking status: Never   • Smokeless tobacco: Never   • Tobacco comments:     Michael second hand smoke   Vaping Use   • Vaping Use: Never used   Substance and Sexual Activity   • Alcohol use: Yes     Comment: rarely   • Drug use: No   • Sexual activity: Defer       Family History   Problem Relation Age of Onset   • Stroke Mother    • Cancer Mother    • Coronary artery disease Father    • Heart failure Father    • Breast cancer Sister 55   • Ovarian cancer Neg Hx    • Endometrial cancer Neg Hx        Review of Systems:    16 point review of systems was performed and reviewed and scanned into the EMR    Review of Systems - Oncology      Current  Outpatient Medications:   •  acetaminophen (TYLENOL) 500 MG tablet, Take 500 mg by mouth., Disp: , Rfl:   •  amiodarone (PACERONE) 200 MG tablet, Take 1 tablet by mouth Daily., Disp: 90 tablet, Rfl: 1  •  ammonium lactate (LAC-HYDRIN) 12 % lotion, Apply  topically to the appropriate area as directed As Needed for Dry Skin. (Patient taking differently: Apply 1 application topically to the appropriate area as directed 2 (Two) Times a Day As Needed for Dry Skin.), Disp: 500 g, Rfl: 3  •  apixaban (ELIQUIS) 2.5 MG tablet tablet, Take 1 tablet by mouth 2 (Two) Times a Day., Disp: 180 tablet, Rfl: 3  •  aspirin 81 MG EC tablet, Take 1 tablet by mouth Daily., Disp: , Rfl:   •  atorvastatin (LIPITOR) 80 MG tablet, Take 1 tablet by mouth Every Night., Disp: 90 tablet, Rfl: 3  •  B Complex-C-Folic Acid (DIALYVITE 800 PO), Take 1 tablet by mouth 3 (Three) Times a Week. On dialysis says MWF, Disp: , Rfl:   •  bisacodyl (DULCOLAX) 5 MG EC tablet, Take 5 mg by mouth Daily As Needed for Constipation., Disp: , Rfl:   •  Capsaicin 0.1 % cream, Apply 2-4 gms to feet nightly. Use gloves, Disp: 60 g, Rfl: 3  •  carvedilol (COREG) 12.5 MG tablet, Take 1 tablet by mouth Every 12 (Twelve) Hours., Disp: 180 tablet, Rfl: 1  •  cloNIDine (CATAPRES) 0.2 MG tablet, Take 2 tablets by mouth Every 12 (Twelve) Hours., Disp: 120 tablet, Rfl: 5  •  docusate calcium (SURFAK) 240 MG capsule, Take 1 capsule by mouth Daily for 90 days., Disp: 90 capsule, Rfl: 0  •  dorzolamide-timolol (COSOPT) 22.3-6.8 MG/ML ophthalmic solution, Administer 1 drop to both eyes Daily., Disp: 10 mL, Rfl: 3  •  Durezol 0.05 % ophthalmic emulsion, INSTILL 1 DROP 4 TIMES DAILY INTO SURGICAL EYE STARTING AFTER SURGERY., Disp: , Rfl:   •  Epoetin Rusty (EPOGEN IJ), Epoetin Rusty (Epogen), Disp: , Rfl:   •  famotidine (PEPCID) 20 MG tablet, Take 1 tablet by mouth 2 (Two) Times a Day. (Patient taking differently: Take 20 mg by mouth 2 (Two) Times a Day As Needed.), Disp: 180  tablet, Rfl: 1  •  fluocinonide (LIDEX) 0.05 % external solution, Apply 0.05 application topically to the appropriate area as directed 2 (Two) Times a Day. Scalp, Disp: , Rfl: 2  •  glucose blood test strip, Use as instructed to monitor blood glucose 1-2 times daily, Disp: 100 each, Rfl: 12  •  glucose monitor monitoring kit, Use as directed to monitor blood glucose 1-2 times daily, Disp: 1 each, Rfl: 0  •  Hydrocortisone, Perianal, (ANUSOL-HC) 2.5 % rectal cream, Insert  into the rectum 2 (Two) Times a Day., Disp: 28 g, Rfl: 0  •  IRON DEXTRAN IJ, Inject  as directed 3 (Three) Times a Week., Disp: , Rfl:   •  isosorbide mononitrate (IMDUR) 120 MG 24 hr tablet, Take 1 tablet by mouth Daily., Disp: 90 tablet, Rfl: 3  •  Lancets Ultra Fine misc, Use as directed to check blood sugar 1-2 times daily, Disp: 100 each, Rfl: 11  •  Lidocaine 5 % cream, Apply 1 g topically 2 (Two) Times a Day As Needed (pain)., Disp: 30 g, Rfl: 0  •  lidocaine-prilocaine (EMLA) 2.5-2.5 % cream, Apply 1 application topically to the appropriate area as directed As Needed (dialysis access)., Disp: , Rfl: 6  •  megestrol (MEGACE) 40 MG tablet, Take 40 mg by mouth Daily., Disp: , Rfl:   •  nitroglycerin (Nitrolingual) 0.4 MG/SPRAY spray, Place 1 spray under the tongue Every 5 (Five) Minutes As Needed for Chest Pain., Disp: 1 each, Rfl: 12  •  nortriptyline (PAMELOR) 10 MG capsule, Take 1 capsule by mouth Every Night., Disp: 30 capsule, Rfl: 3  •  NovoLIN 70/30 (70-30) 100 UNIT/ML injection, INJECT 20 UNITS SUBCUTANEOUSLY BEFORE SUPPER AND 17 UNITS BEFORE BREAKFAST, Disp: 10 mL, Rfl: 0  •  terazosin (HYTRIN) 2 MG capsule, Take 2 capsules by mouth Every Night., Disp: 180 capsule, Rfl: 3  •  torsemide (Demadex) 20 MG tablet, 1 PO QAM and 2 PO QPM, Disp: 90 tablet, Rfl: 5  •  VITAMIN D PO, Take 1 mcg by mouth., Disp: , Rfl:   •  Witch Hazel (Preparation H) 50 % pads, Apply 1 pad topically 2 (Two) Times a Day for 90 days., Disp: 180 each, Rfl: 0  •   HYDROmorphone (DILAUDID) 2 MG tablet, Take 1 tablet by mouth Every 6 (Six) Hours As Needed for Moderate Pain., Disp: 60 tablet, Rfl: 0  •  Morphine (MS CONTIN) 15 MG 12 hr tablet, Take 1 tablet by mouth 2 (Two) Times a Day., Disp: 60 tablet, Rfl: 0    Pain Medications             acetaminophen (TYLENOL) 500 MG tablet Take 500 mg by mouth.    aspirin 81 MG EC tablet Take 1 tablet by mouth Daily.    nortriptyline (PAMELOR) 10 MG capsule Take 1 capsule by mouth Every Night.           Allergies   Allergen Reactions   • Mircera [Methoxy Polyethylene Glycol-Epoetin Beta] Anaphylaxis     Pt stopped breathing   • Venofer [Iron Sucrose] Anaphylaxis     Pt stopped breathing   • Albuterol Other (See Comments)     Increased blood pressure  Used right before heart attack   • Nifedipine Er Swelling   • Penicillins Hives   • Prednisone Other (See Comments)     Makes jittery/anxious         ECOG score: 2           Objective     Vitals:    02/08/23 1415   BP: (!) 194/67   Pulse: 60   Resp: 18   Temp: 96.9 °F (36.1 °C)   SpO2: 94%     Body mass index is 27.92 kg/m².  Body surface area is 1.88 meters squared.        02/08/23  1415   Weight: 78.5 kg (173 lb)     Pain Score    02/08/23 1415   PainSc:   9   PainLoc: Rectum          Physical Exam    General: Elderly weak appearing, in no acute distress  HEENT: sclera anicteric, oropharynx clear, neck is supple  Lymphatics: no cervical, supraclavicular, or axillary adenopathy  Cardiovascular: regular rate and rhythm, no murmurs, rubs or gallops  Lungs: clear to auscultation bilaterally  Abdomen: soft, nontender, nondistended.  No palpable organomegaly  Extremities: no lower extremity edema  Skin: no rashes, lesions, bruising, or petechiae  Msk:  Shows significant weakness of the large muscle groups  Psych: Mood is stable        Lab Results   Component Value Date    GLUCOSE 224 (H) 07/21/2021    BUN 20 07/21/2021    CREATININE 6.90 (H) 02/01/2023     07/21/2021    K 3.7 07/21/2021     CL 96 (L) 07/21/2021    CO2 27.0 07/21/2021    CALCIUM 9.1 07/21/2021    PROTEINTOT 8.1 07/21/2021    ALBUMIN 3.60 07/21/2021    BILITOT 0.3 07/21/2021    ALKPHOS 123 (H) 07/21/2021    AST 24 07/21/2021    ALT 9 07/21/2021       Lab Results   Component Value Date    HGB 12.9 07/21/2021    HCT 43.6 07/21/2021    MCV 91.0 07/21/2021     07/21/2021    WBC 5.58 07/21/2021    NEUTROABS 2.79 07/21/2021    LYMPHSABS 1.55 07/21/2021    MONOSABS 0.72 07/21/2021    EOSABS 0.43 (H) 07/21/2021    BASOSABS 0.06 07/21/2021       CT Chest With Contrast Diagnostic    Result Date: 2/1/2023  Impression: 1. Abnormal soft tissue thickening involving the anus with the soft tissue gas bubbles. This may represent a combination of the patient's known tumor and/or postsurgical/biopsy changes. 2. Enlarged left inguinal lymph node measuring 3.2 x 2.3 cm consistent with metastatic adenopathy. 3. There is a faint indeterminate nodule in the posterior segment of the right upper lobe measuring 6 mm. I would recommend a follow-up CT of the chest in 6 months. 4. Additional findings as noted above. Electronically Signed: Nathan Irwin  2/1/2023 5:03 PM EST  Workstation ID: [id number]    CT Abdomen Pelvis With Contrast    Result Date: 2/1/2023  Impression: 1. Abnormal soft tissue thickening involving the anus with the soft tissue gas bubbles. This may represent a combination of the patient's known tumor and/or postsurgical/biopsy changes. 2. Enlarged left inguinal lymph node measuring 3.2 x 2.3 cm consistent with metastatic adenopathy. 3. There is a faint indeterminate nodule in the posterior segment of the right upper lobe measuring 6 mm. I would recommend a follow-up CT of the chest in 6 months. 4. Additional findings as noted above. Electronically Signed: Nathan Irwin  2/1/2023 5:03 PM EST  Workstation ID: [id number]    Pathology reveals ulcerated invasive moderately differentiated squamous cell carcinoma    Assessment      1.  Stage II squamous  cell carcinoma of the anus.  The standard of care for this patient would be using mitomycin with 5-FU concurrently with radiation if she does not show any sign of metastatic disease on PET scan.  She had a CAT scan done without contrast which is not really helpful in identifying liver lesions.  I am going to order a PET scan today.  If that is negative then the plan is to proceed with radiation concurrently with chemotherapy.  I do not feel that she is a very good candidate for mitomycin due to all her kidney issues but also her performance status is not robust enough to tolerate this.  I suggested single agent Xeloda on days of radiation to improve her radiation outcomes.  Regardless if she has metastatic disease I think from a palliative standpoint she would benefit from radiation.    2.  Pain secondary to anal canal cancer.  I Dione start her on morphine 15 mg twice daily along with Dilaudid for breakthrough pain.    3.  End-stage renal disease on dialysis.  She has been going only to dialysis twice a week because of her inability to go sit.  Hopefully with treatment this will improve then she can go back to her 3 days a week.      Total time of patient care on day of service including time prior to, face to face with patient, and following visit spent in reviewing records, lab results, imaging studies, discussion with patient, and documentation/charting was > 70 minutes.              Thank you for allowing me to participate in the care of this patient.    Yours sincerely,    Tony Carpenter MD  [facility]  Hematology and Oncology    Return in (Approximately): 1 week    Orders Placed This Encounter   Procedures   • NM PET/CT Skull Base to Mid Thigh     Standing Status:   Future     Standing Expiration Date:   2/8/2024     Order Specific Question:   What radiopharmaceutical is preferred for this exam?     Answer:   FD[employer]  (offered at all sites)     Order Specific Question:   Is the patient a  diabetic?     Answer:   No   • Comprehensive metabolic panel     Standing Status:   Future     Standing Expiration Date:   2/15/2024     Order Specific Question:   Release to patient     Answer:   Routine Release   • Comprehensive metabolic panel     Standing Status:   Future     Standing Expiration Date:   3/14/2024     Order Specific Question:   Release to patient     Answer:   Routine Release   • CBC and Differential     Standing Status:   Future     Standing Expiration Date:   2/15/2024     Order Specific Question:   Manual Differential     Answer:   No     Order Specific Question:   Release to patient     Answer:   Routine Release   • CBC and Differential     Standing Status:   Future     Standing Expiration Date:   2/22/2024     Order Specific Question:   Manual Differential     Answer:   No     Order Specific Question:   Release to patient     Answer:   Routine Release   • CBC and Differential     Standing Status:   Future     Standing Expiration Date:   2/29/2024     Order Specific Question:   Manual Differential     Answer:   No     Order Specific Question:   Release to patient     Answer:   Routine Release   • CBC and Differential     Standing Status:   Future     Standing Expiration Date:   3/7/2024     Order Specific Question:   Manual Differential     Answer:   No     Order Specific Question:   Release to patient     Answer:   Routine Release   • CBC and Differential     Standing Status:   Future     Standing Expiration Date:   3/14/2024     Order Specific Question:   Manual Differential     Answer:   No     Order Specific Question:   Release to patient     Answer:   Routine Release

## 2023-02-10 ENCOUNTER — HOSPITAL ENCOUNTER (OUTPATIENT)
Dept: RADIATION ONCOLOGY | Facility: HOSPITAL | Age: 78
Discharge: HOME | End: 2023-02-10

## 2023-02-10 PROCEDURE — 77332 RADIATION TREATMENT AID(S): CPT | Performed by: RADIOLOGY

## 2023-02-10 PROCEDURE — 77334 RADIATION TREATMENT AID(S): CPT | Performed by: RADIOLOGY

## 2023-02-10 PROCEDURE — 77470 SPECIAL RADIATION TREATMENT: CPT | Performed by: RADIOLOGY

## 2023-02-10 NOTE — PROGRESS NOTES
Oral Chemotherapy - Double Check    Drug: capecitabine  Strength: 500mg tablet  Directions: Take 2 tablets PO BID on Monday-Friday with RT  QTY: 120  RF:0    Released to pharmacy: [facility]    Completed independent double check on medication order/RX.    2/10/2023  09:19 EST

## 2023-02-14 ENCOUNTER — HOSPITAL ENCOUNTER (OUTPATIENT)
Dept: INFUSION THERAPY | Facility: HOSPITAL | Age: 78
Discharge: HOME | End: 2023-02-14
Payer: MEDICARE

## 2023-02-14 ENCOUNTER — HOSPITAL ENCOUNTER (OUTPATIENT)
Dept: ONCOLOGY | Facility: HOSPITAL | Age: 78
Discharge: HOME | End: 2023-02-14
Payer: MEDICARE

## 2023-02-14 ENCOUNTER — TELEPHONE (OUTPATIENT)
Dept: ONCOLOGY | Facility: CLINIC | Age: 78
End: 2023-02-14
Payer: MEDICARE

## 2023-02-14 NOTE — TELEPHONE ENCOUNTER
Returned call to [facility] in Radiology. At this time she informed nurse that Nephrology stated no picc and she would need a groshang placed.  related to patient being on Dialysis. Informing nurse that Dr. Rojo made aware and would request a port.

## 2023-02-14 NOTE — TELEPHONE ENCOUNTER
Caller: JUHI    Relationship: RADIOLOGY    Best call back number: [phone]      What is the best time to reach you: ANYTIME     Who are you requesting to speak with (clinical staff, provider,  specific staff member): CLINICAL     What was the call regarding: PIC LINE WAS NOT APPROVED PLEASE CALL JUHI TO DISCUSS  [phone]    Do you require a callback: YES

## 2023-02-17 PROCEDURE — 77300 RADIATION THERAPY DOSE PLAN: CPT | Performed by: RADIOLOGY

## 2023-02-17 PROCEDURE — 77301 RADIOTHERAPY DOSE PLAN IMRT: CPT | Performed by: RADIOLOGY

## 2023-02-17 PROCEDURE — 77338 DESIGN MLC DEVICE FOR IMRT: CPT | Performed by: RADIOLOGY

## 2023-02-20 ENCOUNTER — HOSPITAL ENCOUNTER (OUTPATIENT)
Dept: RADIATION ONCOLOGY | Facility: HOSPITAL | Age: 78
Discharge: HOME | End: 2023-02-20

## 2023-02-20 PROCEDURE — 77386: CPT | Performed by: RADIOLOGY

## 2023-02-21 ENCOUNTER — HOSPITAL ENCOUNTER (OUTPATIENT)
Dept: ONCOLOGY | Facility: HOSPITAL | Age: 78
Discharge: HOME | End: 2023-02-21
Payer: MEDICARE

## 2023-02-21 ENCOUNTER — DOCUMENTATION (OUTPATIENT)
Dept: NUTRITION | Facility: HOSPITAL | Age: 78
End: 2023-02-21
Payer: MEDICARE

## 2023-02-21 ENCOUNTER — HOSPITAL ENCOUNTER (OUTPATIENT)
Dept: RADIATION ONCOLOGY | Facility: HOSPITAL | Age: 78
Discharge: HOME | End: 2023-02-21

## 2023-02-21 ENCOUNTER — SPECIALTY PHARMACY (OUTPATIENT)
Dept: ONCOLOGY | Facility: HOSPITAL | Age: 78
End: 2023-02-21
Payer: MEDICARE

## 2023-02-21 ENCOUNTER — LAB (OUTPATIENT)
Dept: LAB | Facility: HOSPITAL | Age: 78
End: 2023-02-21
Payer: MEDICARE

## 2023-02-21 ENCOUNTER — OFFICE VISIT (OUTPATIENT)
Dept: ONCOLOGY | Facility: CLINIC | Age: 78
End: 2023-02-21
Payer: MEDICARE

## 2023-02-21 VITALS
RESPIRATION RATE: 18 BRPM | TEMPERATURE: 96.9 F | DIASTOLIC BLOOD PRESSURE: 68 MMHG | HEIGHT: 66 IN | WEIGHT: 170 LBS | HEART RATE: 60 BPM | BODY MASS INDEX: 27.32 KG/M2 | OXYGEN SATURATION: 95 % | SYSTOLIC BLOOD PRESSURE: 186 MMHG

## 2023-02-21 VITALS — BODY MASS INDEX: 27.31 KG/M2 | WEIGHT: 169.2 LBS

## 2023-02-21 DIAGNOSIS — C21.1 MALIGNANT NEOPLASM OF ANAL CANAL: Primary | ICD-10-CM

## 2023-02-21 DIAGNOSIS — C21.1 MALIGNANT NEOPLASM OF ANAL CANAL: ICD-10-CM

## 2023-02-21 LAB
ALBUMIN SERPL-MCNC: 3.8 G/DL (ref 3.5–5.2)
ALBUMIN/GLOB SERPL: 0.9 G/DL
ALP SERPL-CCNC: 99 U/L (ref 39–117)
ALT SERPL W P-5'-P-CCNC: <5 U/L (ref 1–33)
ANION GAP SERPL CALCULATED.3IONS-SCNC: 19 MMOL/L (ref 5–15)
AST SERPL-CCNC: 13 U/L (ref 1–32)
BASOPHILS # BLD AUTO: 0.03 10*3/MM3 (ref 0–0.2)
BASOPHILS NFR BLD AUTO: 0.6 % (ref 0–1.5)
BILIRUB SERPL-MCNC: 0.5 MG/DL (ref 0–1.2)
BUN SERPL-MCNC: 36 MG/DL (ref 8–23)
BUN/CREAT SERPL: 4.1 (ref 7–25)
CALCIUM SPEC-SCNC: 9.1 MG/DL (ref 8.6–10.5)
CHLORIDE SERPL-SCNC: 91 MMOL/L (ref 98–107)
CO2 SERPL-SCNC: 27 MMOL/L (ref 22–29)
CREAT SERPL-MCNC: 8.78 MG/DL (ref 0.57–1)
DEPRECATED RDW RBC AUTO: 56.7 FL (ref 37–54)
EGFRCR SERPLBLD CKD-EPI 2021: 4.3 ML/MIN/1.73
EOSINOPHIL # BLD AUTO: 0.07 10*3/MM3 (ref 0–0.4)
EOSINOPHIL NFR BLD AUTO: 1.4 % (ref 0.3–6.2)
ERYTHROCYTE [DISTWIDTH] IN BLOOD BY AUTOMATED COUNT: 15.2 % (ref 12.3–15.4)
GLOBULIN UR ELPH-MCNC: 4.3 GM/DL
GLUCOSE SERPL-MCNC: 97 MG/DL (ref 65–99)
HCT VFR BLD AUTO: 40.2 % (ref 34–46.6)
HGB BLD-MCNC: 11.8 G/DL (ref 12–15.9)
IMM GRANULOCYTES # BLD AUTO: 0.01 10*3/MM3 (ref 0–0.05)
IMM GRANULOCYTES NFR BLD AUTO: 0.2 % (ref 0–0.5)
LYMPHOCYTES # BLD AUTO: 0.91 10*3/MM3 (ref 0.7–3.1)
LYMPHOCYTES NFR BLD AUTO: 18.6 % (ref 19.6–45.3)
MCH RBC QN AUTO: 29.2 PG (ref 26.6–33)
MCHC RBC AUTO-ENTMCNC: 29.4 G/DL (ref 31.5–35.7)
MCV RBC AUTO: 99.5 FL (ref 79–97)
MONOCYTES # BLD AUTO: 0.66 10*3/MM3 (ref 0.1–0.9)
MONOCYTES NFR BLD AUTO: 13.5 % (ref 5–12)
NEUTROPHILS NFR BLD AUTO: 3.22 10*3/MM3 (ref 1.7–7)
NEUTROPHILS NFR BLD AUTO: 65.7 % (ref 42.7–76)
PLATELET # BLD AUTO: 181 10*3/MM3 (ref 140–450)
PMV BLD AUTO: 10.6 FL (ref 6–12)
POTASSIUM SERPL-SCNC: 3.8 MMOL/L (ref 3.5–5.2)
PROT SERPL-MCNC: 8.1 G/DL (ref 6–8.5)
RBC # BLD AUTO: 4.04 10*6/MM3 (ref 3.77–5.28)
SODIUM SERPL-SCNC: 137 MMOL/L (ref 136–145)
WBC NRBC COR # BLD: 4.9 10*3/MM3 (ref 3.4–10.8)

## 2023-02-21 PROCEDURE — 85025 COMPLETE CBC W/AUTO DIFF WBC: CPT

## 2023-02-21 PROCEDURE — 36415 COLL VENOUS BLD VENIPUNCTURE: CPT

## 2023-02-21 PROCEDURE — 77386: CPT | Performed by: RADIOLOGY

## 2023-02-21 PROCEDURE — 99214 OFFICE O/P EST MOD 30 MIN: CPT | Performed by: NURSE PRACTITIONER

## 2023-02-21 PROCEDURE — 80053 COMPREHEN METABOLIC PANEL: CPT

## 2023-02-21 NOTE — PROGRESS NOTES
CHEMOTHERAPY PREPARATION    Esperanza Pop  [id number]  1945    Chief Complaint: Treatment preparation and needs assessment    History of present illness:  Esperanza Pop is a 78 y.o. year old female who is here today for treatment preparation and needs assessment.  The patient has been diagnosed with anal cancer and is scheduled to begin treatment with Capecitabine with concurrent radiation.     Oncology History:    Oncology/Hematology History Overview Note   Mastectomy and radiation therapy       Breast cancer, female, right   5/20/2016 Initial Diagnosis    Breast cancer, female, right     Malignant neoplasm of anal canal (HCC)   2/8/2023 Initial Diagnosis    Anal cancer (HCC)     2/15/2023 -  Chemotherapy    OP ANAL  Capecitabine 825 mg/m2 M-F + XRT         The current medication list and allergy list were reviewed and reconciled.     Past Medical History, Past Surgical History, Social History, Family History have been reviewed and are without significant changes except as mentioned.    Review of Systems:    Review of Systems   Constitutional: Positive for appetite change. Negative for fatigue, fever and unexpected weight change.   HENT: Negative for mouth sores, sore throat and trouble swallowing.    Respiratory: Negative for cough, shortness of breath and wheezing.    Cardiovascular: Negative for chest pain, palpitations and leg swelling.   Gastrointestinal: Positive for rectal pain. Negative for abdominal distention, abdominal pain, constipation, diarrhea, nausea and vomiting.   Genitourinary: Negative for difficulty urinating, dysuria and frequency.   Musculoskeletal: Negative for arthralgias.   Skin: Negative for pallor, rash and wound.   Neurological: Negative for dizziness and weakness.   Hematological: Does not bruise/bleed easily.   Psychiatric/Behavioral: Negative for confusion and sleep disturbance. The patient is not nervous/anxious.        Physical Exam:    Vitals:    02/21/23 1133   BP: (!) 186/68    Pulse: 60   Resp: 18   Temp: 96.9 °F (36.1 °C)   SpO2: 95%     Vitals:    02/21/23 1133   PainSc:   7   PainLoc: Rectum          ECOG: (1) Restricted in Physically Strenuous Activity, Ambulatory & Able to Do Work of Light Nature    General: well appearing, in no acute distress    Psych: Mood is stable        NEEDS ASSESSMENTS    Genetics  The patient's new diagnosis and family history have been reviewed for genetic counseling needs. A genetic referral is not recommended.     Psychosocial  The patient has completed a PHQ-9 Depression Screening and the Distress Thermometer (DT) today.   PHQ-9 results show 5-9 (Mild Depression). The patient scored their distress today as 0 on a scale of 0-10 with 0 being no distress and 10 being extreme distress.   Problems marked by the patient as being an issue for them within the last week include physical problems.   Results were reviewed along with psychosocial resources offered by our cancer center.  Our oncology social worker will be flagged for a DT score of 4 or above, and a same day call will be made for a score of 9 or 10.  A mental health referral is offered at this time. The patient is not interested in a referral to [facility].   Copies of patient's questionnaires will be scanned into EMR for details and further reference.    Barriers to care  A barriers form was also completed by the patient today. We discussed services offered by our facility to help her have adequate access to care. The patient was given the name and contact information for our Oncology Social Worker, Shandra Guerrero.  Based upon barriers assessment today, the patient will not require a follow-up call from the  to further discuss needs.   A copy of the barriers form will also be scanned into EMR for details and further reference.     VAD Assessment  The patient and I discussed planned intervenous chemotherapy as well as other IV treatments that are often needed throughout the course  "of treatment. These may include, but are not limited to blood transfusions, antibiotics, and IV hydration. The vasculature does appear to be adequate for multiple peripheral IVs throughout their treatment course.     Advanced Care Planning  The patient and I discussed advanced care planning, \"Conversations that Matter\".   This service was offered, free of charge, for development of advance directives with a certified ACP facilitator.  The patient does not have an up-to-date advanced directive.  The patient is not interested in an appointment with one of our facilitators to create or update their advanced directives.      Palliative Care  The patient and I discussed palliative care services. Palliative care is not the same as Hospice care. This is specialized medical care for people living with serious illness with the goal of improving quality of life for the patient and their family. [facility] [place], operated by [facility] offers our patients outpatient palliative care early along with their treatment to assist in coordination of care, symptom management, pain management, and medical decision making.  Oncology criteria for palliative care referral is not met at this time. The patient is not interested in a palliative care consultation.     Additional Referral needs  none      CHEMOTHERAPY EDUCATION    Chemotherapy education completed and consent obtained per oncology pharmacist.  See their documentation for further details.    Booklets Given: Chemotherapy and You [x]  Nutrition for the Patient with Cancer During Treatment [x]    Sexuality/Fertility Books []     Chemotherapy Regimen:   Treatment Plans     Name Type Plan Dates Plan Provider         Active    OP ANAL  Capecitabine 825 mg/m2 M-F + XRT ONCOLOGY TREATMENT  2/14/2023 - Present Tony Carpenter MD                    Chemotherapy education comprehension reviewed. Questions answered.      Assessment and Plan:    Diagnoses and all orders for this visit:    1. Malignant neoplasm of anal " canal ([facility]) (Primary)      The patient and I have reviewed their cancer diagnosis and scheduled treatment plan. Needs assessment was completed including genetics, psychosocial needs, barriers to care, VAD evaluation, advanced care planning, and palliative care services. Referrals have been ordered as appropriate based upon our evaluation and patient desires.     Chemotherapy teaching was also completed today per pharmacy.  Adequate time was given to answer questions.  Patient and family are aware of their care team members and contact information if they have questions or problems throughout the treatment course. The patient is adequately prepared to begin treatment as scheduled today.  Patient started radiation yesterday.  She will follow up with Dr. Rojo in 2 weeks to make sure she is tolerating his Xeloda.       Patient will have pretreatment labs drawn today.      I spent 30 minutes caring for Esperanza on this date of service. This time includes time spent by me in the following activities: preparing for the visit, reviewing tests, counseling and educating the patient/family/caregiver, referring and communicating with other health care professionals, documenting information in the medical record and care coordination.     Sabrina Weaver, APRN  02/21/23

## 2023-02-21 NOTE — PROGRESS NOTES
Specialty Pharmacy Patient Management Program  Oncology Initial Assessment       Esperanza Pop is a 78 y.o. female with anal cancer seen by an Oncology provider and enrolled in the Oncology Patient Management program offered by [facility].  An initial outreach was conducted, including assessment of therapy appropriateness and specialty medication education for Xeloda (capecitabine). The patient was introduced to services offered by [facility], including: regular assessments, refill coordination, curbside pick-up or mail order delivery options, prior authorization maintenance, and financial assistance programs as applicable. The patient was also provided with contact information for the pharmacy team.     Regimen: Capecitabine 500 mg tablets - Take 2 tablets by mouth twice daily with food on Monday-Friday with radiation x 6 weeks.  May increase the dose to 3 tablets after a few weeks, if she's tolerating the lower dose.  She started radiation yesterday 2/20/23.    Start date of oral specialty medication: 2/21/23  She will take the first dose with lunch today and the 2nd dose with dinner.  Then tomorrow will get on the typical schedule of dosing with breakfast and dinner.    Relevant Past Medical History, Comorbidities, and Vaccines  Relevant medical history and concomitant health conditions were discussed with the patient. The patient's chart has been reviewed for relevant past medical history and comorbid health conditions and updated as necessary.  Vaccines are coordinated by the patient's oncologist and primary care provider.  Past Medical History:   Diagnosis Date   • Acute bronchitis    • Acute conjunctivitis    • Acute kidney injury (HCC)     Admission from 12/26/2013 to 01/02/2014, now resolved with latest creatinine 1.4 on 01/08/2014.   • Acute non-ST segment elevation myocardial infarction (STEMI) following previous myocardial infarction    • Anal cancer (HCC)  2/8/2023   • Anal cancer (HCC) 2/8/2023   • Arthritis    • Breast cancer, female, right     2005 mastectomy right   • Cancer (HCC)    • CHF (congestive heart failure) (HCC)    • Chronic kidney disease     dialysis MW, f/u [facility]    • Clotting disorder (HCC)    • COVID-19    • Diabetes mellitus (HCC)     diagnosed ~1992, checks fsbg 2-3x/day   • Diarrhea    • Dyslipidemia    • Dyspepsia    • Dyspnea    • Edema    • History of transfusion     ~2013 no reaction    • Hx of radiation therapy    • Hyperlipidemia    • Hypertension     Severe   • Ischemic heart disease    • Moderate obesity     BMI 36.2   • Myocardial infarction (HCC)    • Pneumonia    • Pneumonia 02/2019   • Radiation 2005   • Seasonal allergies    • Wears glasses      Social History     Socioeconomic History   • Marital status:    Tobacco Use   • Smoking status: Never   • Smokeless tobacco: Never   • Tobacco comments:     Michael second hand smoke   Vaping Use   • Vaping Use: Never used   Substance and Sexual Activity   • Alcohol use: Yes     Comment: rarely   • Drug use: No   • Sexual activity: Defer       Allergies  Known allergies and reactions were discussed with the patient. The patient's chart has been reviewed for allergy information and updated as necessary.   Allergies   Allergen Reactions   • Mircera [Methoxy Polyethylene Glycol-Epoetin Beta] Anaphylaxis     Pt stopped breathing   • Venofer [Iron Sucrose] Anaphylaxis     Pt stopped breathing   • Albuterol Other (See Comments)     Increased blood pressure  Used right before heart attack   • Nifedipine Er Swelling   • Penicillins Hives   • Prednisone Other (See Comments)     Makes jittery/anxious        Current Medication List  This medication list has been reviewed with the patient and evaluated for any interactions or necessary modifications/recommendations, and updated to include all prescription medications, OTC medications, and supplements the patient is currently taking.   This list reflects what is contained in the patient's profile, which has also been marked as reviewed to communicate to other providers it is the most up to date version of the patient's current medication therapy.   Prior to Admission medications    Medication Sig Start Date End Date Taking? Authorizing Provider   acetaminophen (TYLENOL) 500 MG tablet Take 500 mg by mouth. 6/17/22 6/16/23  Jojo Borja MD   amiodarone (PACERONE) 200 MG tablet Take 1 tablet by mouth Daily. 9/22/22   Geena Estrella APRN   ammonium lactate (LAC-HYDRIN) 12 % lotion Apply  topically to the appropriate area as directed As Needed for Dry Skin.  Patient taking differently: Apply 1 application topically to the appropriate area as directed 2 (Two) Times a Day As Needed for Dry Skin. 6/4/19   Meghana Rodgers MD   apixaban (ELIQUIS) 2.5 MG tablet tablet Take 1 tablet by mouth 2 (Two) Times a Day. 1/19/22   Demetrius Sagastume MD   aspirin 81 MG EC tablet Take 1 tablet by mouth Daily. 10/11/16   Shandra Aparicio APRN   atorvastatin (LIPITOR) 80 MG tablet Take 1 tablet by mouth Every Night. 1/19/22   Demetrius Sagastume MD   B Complex-C-Folic Acid (DIALYVITE 800 PO) Take 1 tablet by mouth 3 (Three) Times a Week. On dialysis says [facility]    Jojo Borja MD   bisacodyl (DULCOLAX) 5 MG EC tablet Take 5 mg by mouth Daily As Needed for Constipation.    Jojo Borja MD   Capsaicin 0.1 % cream Apply 2-4 gms to feet nightly. Use gloves 3/23/21   Meghana Rodgers MD   carvedilol (COREG) 12.5 MG tablet Take 1 tablet by mouth Every 12 (Twelve) Hours. 2/7/23   Meghana Rodgers MD   cloNIDine (CATAPRES) 0.2 MG tablet Take 2 tablets by mouth Every 12 (Twelve) Hours. 6/29/21   Meghana Rodgers MD   docusate calcium (SURFAK) 240 MG capsule Take 1 capsule by mouth Daily for 90 days. 12/6/22 3/6/23  Deirdre Bucio APRN   dorzolamide-timolol (COSOPT) 22.3-6.8 MG/ML ophthalmic solution Administer 1 drop to both eyes Daily. 10/25/19    Meghana Rodgers MD   Durezol 0.05 % ophthalmic emulsion INSTILL 1 DROP 4 TIMES DAILY INTO SURGICAL EYE STARTING AFTER SURGERY. 4/28/22   Jojo Borja MD   Epoetin Rusty (EPOGEN IJ) Epoetin Rusty (Epogen) 6/13/22 6/12/23  Jojo Borja MD   fluocinonide (LIDEX) 0.05 % external solution Apply 0.05 application topically to the appropriate area as directed 2 (Two) Times a Day. Scalp 10/1/19   Jojo Borja MD   glucose blood test strip Use as instructed to monitor blood glucose 1-2 times daily 6/14/22   Connie Salgado PA   glucose monitor monitoring kit Use as directed to monitor blood glucose 1-2 times daily 6/14/22   Connie Salgado PA   Hydrocortisone, Perianal, (ANUSOL-HC) 2.5 % rectal cream Insert  into the rectum 2 (Two) Times a Day. 12/19/22   Meghana Rodgers MD   HYDROmorphone (DILAUDID) 2 MG tablet Take 1 tablet by mouth Every 6 (Six) Hours As Needed for Moderate Pain. 2/8/23   Tony Carpenter MD   IRON DEXTRAN IJ Inject  as directed 3 (Three) Times a Week.    Jojo Borja MD   isosorbide mononitrate (IMDUR) 120 MG 24 hr tablet Take 1 tablet by mouth Daily. 2/7/23   Meghana Rodgers MD   Lancets Ultra Fine misc Use as directed to check blood sugar 1-2 times daily 6/14/22   Connie Salgado PA   Lidocaine 5 % cream Apply 1 g topically 2 (Two) Times a Day As Needed (pain). 1/5/23   Meghana Rodgers MD   lidocaine-prilocaine (EMLA) 2.5-2.5 % cream Apply 1 application topically to the appropriate area as directed As Needed (dialysis access). 8/19/19   Jojo Borja MD   megestrol (MEGACE) 40 MG tablet Take 40 mg by mouth Daily.    Jojo Borja MD   metroNIDAZOLE (Flagyl) 500 MG tablet Take 1 tablet by mouth 3 (Three) Times a Day. 2/9/23   Kevan Cavazos MD   Morphine (MS CONTIN) 15 MG 12 hr tablet Take 1 tablet by mouth 2 (Two) Times a Day. 2/8/23   Tony Carpenter MD   nitroglycerin (Nitrolingual) 0.4 MG/SPRAY spray Place 1 spray under the  tongue Every 5 (Five) Minutes As Needed for Chest Pain. 7/29/21   Santos Jaquez APRN   nortriptyline (PAMELOR) 10 MG capsule Take 1 capsule by mouth Every Night. 6/29/21   Meghana Rodgers MD   NovoLIN 70/30 (70-30) 100 UNIT/ML injection INJECT 20 UNITS SUBCUTANEOUSLY BEFORE SUPPER AND 17 UNITS BEFORE BREAKFAST 8/23/22   Meghana Rodgers MD   terazosin (HYTRIN) 2 MG capsule Take 2 capsules by mouth Every Night. 1/19/22   Demetrius Sagastume MD   torsemide (Demadex) 20 MG tablet 1 PO QAM and 2 PO QPM 6/29/21   Meghana Rodgers MD       Drug Interactions  • Reviewed concomitant medications, allergies, labs, comorbidities/medical history, quality of life, and immunization history.   • Drug-drug interactions noted and discussed during education: no significant drug interactions noted. . Reminded the patient to let us know before making any changes or starting any new prescription or OTC medications so we can first assess drug interactions.  • Drug-food interactions: None    Recommended Medications Assessment  • Bone Health (such as calcium/vitamin D, bisphosphonate, RANKL inhibitor) - Not Indicated   • VTE prophylaxis - Not Indicated   • Prophylactic antimicrobials - Not Indicated     Relevant Laboratory Values  Lab Results   Component Value Date    GLUCOSE 224 (H) 07/21/2021    CALCIUM 9.1 07/21/2021     07/21/2021    K 3.7 07/21/2021    CO2 27.0 07/21/2021    CL 96 (L) 07/21/2021    BUN 20 07/21/2021    CREATININE 6.90 (H) 02/01/2023    EGFRIFAFRI 14 (L) 07/21/2021    EGFRIFNONA  07/21/2021      Comment:      <15 Indicative of kidney failure.    BCR 5.1 (L) 07/21/2021    ANIONGAP 15.0 07/21/2021     Lab Results   Component Value Date    WBC 5.58 07/21/2021    RBC 4.79 07/21/2021    HGB 12.9 07/21/2021    HCT 43.6 07/21/2021    MCV 91.0 07/21/2021    MCH 26.9 07/21/2021    MCHC 29.6 (L) 07/21/2021    RDW 21.8 (H) 07/21/2021    RDWSD 71.9 (H) 07/21/2021    MPV 11.7 07/21/2021     07/21/2021     NEUTRORELPCT 50.0 07/21/2021    LYMPHORELPCT 27.8 07/21/2021    MONORELPCT 12.9 (H) 07/21/2021    EOSRELPCT 7.7 (H) 07/21/2021    BASORELPCT 1.1 07/21/2021    AUTOIGPER 0.5 07/21/2021    NEUTROABS 2.79 07/21/2021    LYMPHSABS 1.55 07/21/2021    MONOSABS 0.72 07/21/2021    EOSABS 0.43 (H) 07/21/2021    BASOSABS 0.06 07/21/2021    AUTOIGNUM 0.03 07/21/2021    NRBC 0.0 07/21/2021       The above labs have been reviewed. No dose adjustments are needed for the oral specialty medication(s) based on the labs. Her dose has already been reduced due to intermittent hemodialysis.    Initial Education Provided for Specialty Medication  The patient has been provided with the following education. All questions and concerns have been addressed prior to the patient receiving the medication, and the patient has verbalized understanding of the education and any materials provided.  Additional patient education shall be provided and documented upon request by the patient, provider or payer.      Provided patient with:   Chemo calendar to help improve adherence., Education sheets about the medication, 24-hour clinic phone number and my contact information and instructions to call should additional questions arise.     Medication Education Sheets Provided: (select all that apply)  • Oral Specialty Medication: Xeloda (capecitabine)    Other Education Sheets Provided: (select all that apply)  Adherence, CINV, Diarrhea, Hand-Foot Syndrome and Symptom Tracker Sheet and MACI Information    TOPICS COMMENTS   Storage and Handling of Oral Specialty Medication Store in the original container, in a dry location out of direct sunlight, and out of reach of children or pets. and Store at room temperature. Discussed safe handling and what to do with any unused medication.   Administration of Oral Specialty Medication Take with food at the same time(s) each day., Take with a full glass of water. and Do not crush or chew tablets.   Adherence to Oral  Specialty Regimen and Handling Missed Doses Patient is likely to have good treatment adherence; reinforced the importance of adherence. Reviewed how to address missed doses and to let us know of any missed doses.   Anemia: role of RBC, cause, s/s, ways to manage, role of transfusion Reviewed the role of RBC and the use of transfusions if hemoglobin decreases too much.  Patient to notify us if they experience shortness of breath, dizziness, or palpitations.  Also let patient know they could feel more tired than usual and to try to stay active, but rest if they need to.    Thrombocytopenia: role of platelet, cause, s/s, ways to prevent bleeding, things to avoid, when to seek help Reviewed the role of platelets in blood clotting and when to call clinic (bloody nose that bleeds for 5 mins despite pressure, a cut that won't stop bleeding despite pressure, gums that bleed excessively with brushing or flossing). Recommended using an electric razor, soft bristle toothbrush, and blowing your nose gently.    Neutropenia: role of WBC, cause, infection precautions, s/s of infection, when to call MD Reviewed the role of WBC, good infection prevention practices, and when to call the clinic (temperature 100.4F, sore throat, burning urination, etc)  • COVID Vaccines: 2 doses plus 2 boosters  • Flu Vaccine: 2022 flu vaccine received   Diarrhea: causes, s/s of dehydration, ways to manage, dietary changes, when to call MD Chemotherapy - Discussed risk of diarrhea. Instructed patient that they can use OTC loperamide at first presentation of diarrhea, but call MD if 4-6 episodes in 24 hours not relieved by OTC loperamide.   Nausea/Vomiting: cause, use of antiemetics, dietary changes, when to call MD • Emetic risk: Low  • Premeds: None  • Scheduled meds: None  • PRN home meds: Ondansetron 8 mg PO TID PRN N/V  • Pharmacy home meds sent to: [facility] on [address]    Instructed the patient to take a dose of the PRN medication at the  first onset of nausea and if it's not working to call us for additional medications.  Also provided non-drug measures to mitigate nausea.   Mouth Sores: causes, oral care, ways to manage Mouth sores can be prevented by making a mouth wash mixture of salt, baking soda, and water. The patient was instructed to swish and spit four times daily after meals and before bedtime.  Use of a soft bristle toothbrush was recommended.  The patient was instructed to avoid alcohol-containing OTC mouthwashes.    Skin/Nail Changes: cause, s/s, ways to manage Hand-foot syndrome was discussed, including the s/s, prevention measures, and treatment measures. A supplementary handout with this information was also given to the patient. , Rash can occur, although less common.  Discussed management and when to call clinic.   Organ Toxicities: cause, s/s, need for diagnostic tests, labs, when to notify MD Discussed potential effects on organ systems, monitoring, diagnostic tests, labs, and when to notify their MD. Discussed the signs/symptoms of the following: cardiotoxicity, hepatotoxicity and nephrotoxicity   Miscellaneous • Financial Issues: $62.73 at [facility].  She met with Castro Mathur today and has his contact information.    • Lab Draws: Weekly  • Miscellaneous: She was given contact information for Yue Reeves GI Nurse Navigator   Infertility and Sexuality:  causes, fertility preservation options, sexuality changes, ways to manage, importance of birth control Oral Oncology Therapy: Reviewed safe sex practices and minimizing exposure to body fluids while on oral oncology therapy. and The patient is not of childbearing potential.   Home Care: how to manage bodily fluids Counseled on management of soiled linens and proper flush technique.  Discussed how to manage all the side effects at home and advised when to contact the MD office     Adherence and Self-Administration  • Barriers to Patient Adherence and/or Self-Administration:  None  • Methods for Supporting Patient Adherence and/or Self-Administration: dosing calendar  • Expected duration of therapy: for the duration of concurrent radiation x 6 weeks    Goals of Therapy  • Patient Goals of Therapy:   o Consistently take medications as prescribed  o Patient will adhere to medication regimen  o Patient will report any medication side effects to healthcare provider  • Clinical Goals:    Goals     • Specialty Pharmacy General Goal      Clinical goal/therapeutic target: disease control           o Support patient understanding of medication regimen  o Ensure patient knows the pharmacy contact information  o Schedule regular follow-up to monitor the treatment serious adverse events  o Schedule regular follow-up to confirm medication adherence  o Schedule regular follow-up to monitor disease progression or stability    Quality of Life Assessment   The patient's current (pre-therapy) quality of life was discussed with the patient. The QOL segment of this outreach has been reviewed and updated.   • Quality of Life Score: 8/10    Reassessment Plan & Follow-Up  1. Pharmacist to perform regular reassessments no more than (6) months from the previous assessment.  2. Welcome information and patient satisfaction survey to be sent by retail team with patient's initial fill.  3. Care Coordinator to set up future refill outreaches, coordinate prescription delivery, and escalate clinical questions to pharmacist.     Additional Plans, Therapy Recommendations or Therapy Problems to Be Addressed: Castro walked her to the pharmacy to  the prescription after education.   I will follow-up with the patient around 3/7/23 to see how she's tolerating this dose and if she's doing okay, I'll discuss with Dr. Rojo if he wants to increase to 3 tablets PO BID M-F.    Attestation  I attest that the initiated specialty medication is appropriate for the patient based on my assessment.  If the prescribed therapy is at  any point deemed not appropriate based on the current or future assessments, a consultation will be initiated with the patient's specialty care provider to determine the best course of action. The revised plan of therapy will be documented along with any additional patient education provided.     Shandra Weaver PharmD, [facility]  Oncology Clinical Pharmacist   Phone: [phone]    Date and Time: 2/21/2023  10:15 EST   Never smoker

## 2023-02-21 NOTE — PROGRESS NOTES
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 Cavazos 2/9/23 - IGR T and IMRT dose of 59 versus 54 Gray to primary with lower dose to involved adenopathy and lower dose yet to elective nodes - patient has completed 2/25 treatments    Weight 169.2 lbs / weight decrease of 8 lbs past 10 days / weight fluctuations with dialysis    Follow up with patient - has completed 2/25 fractions.  Patient reports that she is trying to do a better job of focusing on her nutritional intake and eating better since our last consultation; reviewed intake which confirms improvement.  Provided additional suggestions for patient to keep on hand for snacking / meals.    Patient states that she recently visited her PCP with notation that HGBA1C was down to 5.8.  Because of low blood sugars she states that she is not taking insulin presently.  She is not checking blood sugars at home; encouraged her to monitor, especially in light her her not taking insulin currently.     Constipated today; states that she took a stool softener; also encouraged her to use Miralax as needed.

## 2023-02-22 ENCOUNTER — HOSPITAL ENCOUNTER (OUTPATIENT)
Dept: RADIATION ONCOLOGY | Facility: HOSPITAL | Age: 78
Discharge: HOME | End: 2023-02-22

## 2023-02-22 PROCEDURE — 77386: CPT | Performed by: RADIOLOGY

## 2023-02-23 ENCOUNTER — HOSPITAL ENCOUNTER (OUTPATIENT)
Dept: RADIATION ONCOLOGY | Facility: HOSPITAL | Age: 78
Discharge: HOME | End: 2023-02-23

## 2023-02-23 PROCEDURE — 77386: CPT | Performed by: RADIOLOGY

## 2023-02-23 PROCEDURE — 77336 RADIATION PHYSICS CONSULT: CPT | Performed by: RADIOLOGY

## 2023-02-24 ENCOUNTER — HOSPITAL ENCOUNTER (OUTPATIENT)
Dept: RADIATION ONCOLOGY | Facility: HOSPITAL | Age: 78
Discharge: HOME | End: 2023-02-24

## 2023-02-24 PROCEDURE — 77386: CPT | Performed by: RADIOLOGY

## 2023-02-24 NOTE — TELEPHONE ENCOUNTER
Caller: danelle ayoub     Relationship: Emergency Contact    Best call back number: [phone]    What form or medical record are you requesting: [employer] FMLA    Who is requesting this form or medical record from you: LAUREN     How would you like to receive the form or medical records (pick-up, mail, fax): FAX  If fax, what is the fax number: ON FORM    Timeframe paperwork needed: ASAP NEEDED BY END OF DAY TODAY 02.24.23    Additional notes: PATIENT'S DAUGHTER DROPPED OF PAPERWORK FOR FMLA YESTERDAY 02.23.23. THE ORIGINAL PAPERWORK DR. VALDEZ SENT IN WAS BLANK AND PATIENT'S DAUGHTER FORGOT TO INCLUDE HER MOTHER'S RADIATION TIME FRAME. [facility] DID NOT HAVE THE DURATION OR FREQUENCY. PATIENT NEEDS RADIATION EVERYDAY M-F FOR 6 WEEKS.     PLEASE CALL PATIENT IF THERE ARE ANY QUESTIONS OR CONCERNS WITH THIS.

## 2023-02-27 ENCOUNTER — TELEPHONE (OUTPATIENT)
Dept: INTERNAL MEDICINE | Facility: CLINIC | Age: 78
End: 2023-02-27
Payer: MEDICARE

## 2023-02-27 ENCOUNTER — LAB (OUTPATIENT)
Dept: LAB | Facility: HOSPITAL | Age: 78
End: 2023-02-27
Payer: MEDICARE

## 2023-02-27 ENCOUNTER — HOSPITAL ENCOUNTER (OUTPATIENT)
Dept: RADIATION ONCOLOGY | Facility: HOSPITAL | Age: 78
Discharge: HOME | End: 2023-02-27

## 2023-02-27 ENCOUNTER — HOSPITAL ENCOUNTER (OUTPATIENT)
Dept: PET IMAGING | Facility: HOSPITAL | Age: 78
Discharge: HOME | End: 2023-02-27
Payer: MEDICARE

## 2023-02-27 DIAGNOSIS — C21.0 ANAL CANCER: ICD-10-CM

## 2023-02-27 DIAGNOSIS — C21.1 MALIGNANT NEOPLASM OF ANAL CANAL: ICD-10-CM

## 2023-02-27 LAB
ALBUMIN SERPL-MCNC: 3.4 G/DL (ref 3.5–5.2)
ALBUMIN/GLOB SERPL: 0.9 G/DL
ALP SERPL-CCNC: 83 U/L (ref 39–117)
ALT SERPL W P-5'-P-CCNC: <5 U/L (ref 1–33)
ANION GAP SERPL CALCULATED.3IONS-SCNC: 13 MMOL/L (ref 5–15)
AST SERPL-CCNC: 10 U/L (ref 1–32)
BASOPHILS # BLD AUTO: 0.03 10*3/MM3 (ref 0–0.2)
BASOPHILS NFR BLD AUTO: 0.6 % (ref 0–1.5)
BILIRUB SERPL-MCNC: 0.6 MG/DL (ref 0–1.2)
BUN SERPL-MCNC: 35 MG/DL (ref 8–23)
BUN/CREAT SERPL: 4.6 (ref 7–25)
CALCIUM SPEC-SCNC: 8.4 MG/DL (ref 8.6–10.5)
CHLORIDE SERPL-SCNC: 90 MMOL/L (ref 98–107)
CO2 SERPL-SCNC: 31 MMOL/L (ref 22–29)
CREAT SERPL-MCNC: 7.6 MG/DL (ref 0.57–1)
DEPRECATED RDW RBC AUTO: 55.8 FL (ref 37–54)
EGFRCR SERPLBLD CKD-EPI 2021: 5.1 ML/MIN/1.73
EOSINOPHIL # BLD AUTO: 0.03 10*3/MM3 (ref 0–0.4)
EOSINOPHIL NFR BLD AUTO: 0.6 % (ref 0.3–6.2)
ERYTHROCYTE [DISTWIDTH] IN BLOOD BY AUTOMATED COUNT: 15.7 % (ref 12.3–15.4)
GLOBULIN UR ELPH-MCNC: 3.9 GM/DL
GLUCOSE BLDC GLUCOMTR-MCNC: 145 MG/DL (ref 70–130)
GLUCOSE SERPL-MCNC: 151 MG/DL (ref 65–99)
HCT VFR BLD AUTO: 26.7 % (ref 34–46.6)
HGB BLD-MCNC: 8 G/DL (ref 12–15.9)
IMM GRANULOCYTES # BLD AUTO: 0.06 10*3/MM3 (ref 0–0.05)
IMM GRANULOCYTES NFR BLD AUTO: 1.2 % (ref 0–0.5)
LYMPHOCYTES # BLD AUTO: 0.57 10*3/MM3 (ref 0.7–3.1)
LYMPHOCYTES NFR BLD AUTO: 11.6 % (ref 19.6–45.3)
MCH RBC QN AUTO: 29.7 PG (ref 26.6–33)
MCHC RBC AUTO-ENTMCNC: 30 G/DL (ref 31.5–35.7)
MCV RBC AUTO: 99.3 FL (ref 79–97)
MONOCYTES # BLD AUTO: 0.65 10*3/MM3 (ref 0.1–0.9)
MONOCYTES NFR BLD AUTO: 13.2 % (ref 5–12)
NEUTROPHILS NFR BLD AUTO: 3.57 10*3/MM3 (ref 1.7–7)
NEUTROPHILS NFR BLD AUTO: 72.8 % (ref 42.7–76)
NRBC BLD AUTO-RTO: 0 /100 WBC (ref 0–0.2)
PLATELET # BLD AUTO: 153 10*3/MM3 (ref 140–450)
PMV BLD AUTO: 12.1 FL (ref 6–12)
POTASSIUM SERPL-SCNC: 3.7 MMOL/L (ref 3.5–5.2)
PROT SERPL-MCNC: 7.3 G/DL (ref 6–8.5)
RBC # BLD AUTO: 2.69 10*6/MM3 (ref 3.77–5.28)
SODIUM SERPL-SCNC: 134 MMOL/L (ref 136–145)
WBC NRBC COR # BLD: 4.91 10*3/MM3 (ref 3.4–10.8)

## 2023-02-27 PROCEDURE — 85025 COMPLETE CBC W/AUTO DIFF WBC: CPT

## 2023-02-27 PROCEDURE — 0 FLUDEOXYGLUCOSE F18 SOLUTION: Performed by: INTERNAL MEDICINE

## 2023-02-27 PROCEDURE — 77386: CPT | Performed by: RADIOLOGY

## 2023-02-27 PROCEDURE — 80053 COMPREHEN METABOLIC PANEL: CPT

## 2023-02-27 PROCEDURE — 78815 PET IMAGE W/CT SKULL-THIGH: CPT

## 2023-02-27 PROCEDURE — A9552 F18 FDG: HCPCS | Performed by: INTERNAL MEDICINE

## 2023-02-27 PROCEDURE — 36415 COLL VENOUS BLD VENIPUNCTURE: CPT

## 2023-02-27 PROCEDURE — 82962 GLUCOSE BLOOD TEST: CPT

## 2023-02-27 RX ADMIN — FLUDEOXYGLUCOSE F18 1 DOSE: 300 INJECTION INTRAVENOUS at 08:03

## 2023-02-27 NOTE — TELEPHONE ENCOUNTER
PT'S DAUGHTER, BETSY RICHARDSON, IS CALLING REGARDING HER [facility] PAPERWORK. DAUGHTER IS NOT ON [employer] BUT SHE IS NOT REQUESTING ANY PATIENT INFORMATION.     PATIENT STATES THAT FMLA PAPERWORK WAS DUE TO [employer] ON 2/22 BUT THEY GAVE HER AN EXTENSION AND IT IS NOW DUE ON 2/28, WHICH IS TOMORROW.     BETSY STATES THAT #6 AND #7 ON THE PAPERWORK NEED TO BE COMPLETED. ONCE THOSE QUESTIONS ARE COMPLETED DR. PATRICK NEEDS TO INITIAL AND DATE THOSE RESPONSES. THEN FAX BACK TO [employer]. PLEASE CALL BETSY WITH ANY QUESTIONS [phone]

## 2023-02-27 NOTE — TELEPHONE ENCOUNTER
Pt daughter Indigo Pop, not on [employer] called about some paperwork not filled out correctly.  Please call pt back.

## 2023-02-28 ENCOUNTER — HOSPITAL ENCOUNTER (OUTPATIENT)
Dept: RADIATION ONCOLOGY | Facility: HOSPITAL | Age: 78
Discharge: HOME | End: 2023-02-28
Payer: MEDICARE

## 2023-02-28 ENCOUNTER — DOCUMENTATION (OUTPATIENT)
Dept: NUTRITION | Facility: HOSPITAL | Age: 78
End: 2023-02-28
Payer: MEDICARE

## 2023-02-28 VITALS — WEIGHT: 171 LBS | BODY MASS INDEX: 27.6 KG/M2

## 2023-02-28 PROCEDURE — 77386: CPT | Performed by: RADIOLOGY

## 2023-02-28 NOTE — PROGRESS NOTES
I reviewed the patient's vital signs and nursing note today.  The patient reports that she has noticed less pain around the lesion.  She does note difficulty with solid stools.  She also reports increase in the odor from her tumor.  Patient just completed course of antibiotics with Flagyl.  I recommend waiting another week before considering another round of Flagyl for her.  The patient's  is in the hospital and has signed up with hospice.  The patient does have dialysis Monday Wednesday Friday.  She comes with her grandson today.  We discussed the importance of nutrition today and expected response to treatments.  The patient reports that she is started her Xeloda.    Proceed with radiation treatments as planned.    Thank you for allowing me to be involved in the care of the patient. If you have any questions or concerns, please feel free to call or contact me at your earliest convenience.     Kevan Cavazos  Radiation Oncology

## 2023-03-01 ENCOUNTER — HOSPITAL ENCOUNTER (OUTPATIENT)
Dept: RADIATION ONCOLOGY | Facility: HOSPITAL | Age: 78
Setting detail: RADIATION/ONCOLOGY SERIES
Discharge: HOME | End: 2023-03-01
Payer: MEDICARE

## 2023-03-01 ENCOUNTER — HOSPITAL ENCOUNTER (OUTPATIENT)
Dept: RADIATION ONCOLOGY | Facility: HOSPITAL | Age: 78
Discharge: HOME | End: 2023-03-01

## 2023-03-01 PROCEDURE — 77386: CPT | Performed by: RADIOLOGY

## 2023-03-02 ENCOUNTER — HOSPITAL ENCOUNTER (OUTPATIENT)
Dept: RADIATION ONCOLOGY | Facility: HOSPITAL | Age: 78
Discharge: HOME | End: 2023-03-02

## 2023-03-02 ENCOUNTER — TELEPHONE (OUTPATIENT)
Dept: ONCOLOGY | Facility: CLINIC | Age: 78
End: 2023-03-02
Payer: MEDICARE

## 2023-03-02 PROCEDURE — 77386: CPT | Performed by: RADIOLOGY

## 2023-03-02 PROCEDURE — 77336 RADIATION PHYSICS CONSULT: CPT | Performed by: RADIOLOGY

## 2023-03-02 NOTE — TELEPHONE ENCOUNTER
Distress Screening Follow-up    Name: Esperanza Pop    : 1945    Diagnosis:Malignant neoplasm of anal canal     Location of Distress Screening: Radiation Oncology    Distress Level: 4 (2023 11:00 AM)    OVI contacted pt to follow up on Distress Screen from recent visit. OVI provided introductions and explained nature of the call. Pt communicated she is doing okay at this time, and denied any needs. SW educated on role and services and provided contact information for ongoing assistance. PT thanked OVI for the call and was agreeable. SW will be available ongoing.

## 2023-03-03 ENCOUNTER — HOSPITAL ENCOUNTER (OUTPATIENT)
Dept: RADIATION ONCOLOGY | Facility: HOSPITAL | Age: 78
Discharge: HOME | End: 2023-03-03

## 2023-03-03 PROCEDURE — 77386: CPT | Performed by: RADIOLOGY

## 2023-03-06 ENCOUNTER — HOSPITAL ENCOUNTER (OUTPATIENT)
Dept: RADIATION ONCOLOGY | Facility: HOSPITAL | Age: 78
Discharge: HOME | End: 2023-03-06

## 2023-03-06 PROCEDURE — 77386: CPT | Performed by: RADIOLOGY

## 2023-03-07 ENCOUNTER — HOSPITAL ENCOUNTER (OUTPATIENT)
Dept: RADIATION ONCOLOGY | Facility: HOSPITAL | Age: 78
Discharge: HOME | End: 2023-03-07

## 2023-03-07 VITALS — WEIGHT: 169.8 LBS | BODY MASS INDEX: 27.41 KG/M2

## 2023-03-07 PROCEDURE — 77386: CPT | Performed by: RADIOLOGY

## 2023-03-07 RX ORDER — METRONIDAZOLE 500 MG/1
500 TABLET ORAL 3 TIMES DAILY
Qty: 30 TABLET | Refills: 0 | Status: SHIPPED | OUTPATIENT
Start: 2023-03-07 | End: 2023-03-29 | Stop reason: HOSPADM

## 2023-03-08 ENCOUNTER — TELEPHONE (OUTPATIENT)
Dept: RADIATION ONCOLOGY | Facility: HOSPITAL | Age: 78
End: 2023-03-08
Payer: MEDICARE

## 2023-03-08 NOTE — TELEPHONE ENCOUNTER
"Mrs. Pop called to report she had \"a bad night\" and would not be in for treatment today, complaints of loose stool and burning.  "

## 2023-03-09 ENCOUNTER — HOSPITAL ENCOUNTER (OUTPATIENT)
Dept: RADIATION ONCOLOGY | Facility: HOSPITAL | Age: 78
Discharge: HOME | End: 2023-03-09

## 2023-03-09 PROCEDURE — 77336 RADIATION PHYSICS CONSULT: CPT | Performed by: RADIOLOGY

## 2023-03-09 PROCEDURE — 77386: CPT | Performed by: RADIOLOGY

## 2023-03-10 ENCOUNTER — HOSPITAL ENCOUNTER (OUTPATIENT)
Facility: HOSPITAL | Age: 78
Setting detail: OBSERVATION
LOS: 3 days | Discharge: NURSING, REHAB OR LONG-TERM CARE | End: 2023-03-29
Attending: EMERGENCY MEDICINE | Admitting: INTERNAL MEDICINE
Payer: MEDICARE

## 2023-03-10 ENCOUNTER — APPOINTMENT (OUTPATIENT)
Dept: GENERAL RADIOLOGY | Facility: HOSPITAL | Age: 78
End: 2023-03-10
Payer: MEDICARE

## 2023-03-10 DIAGNOSIS — I25.119 CORONARY ARTERY DISEASE INVOLVING NATIVE CORONARY ARTERY OF NATIVE HEART WITH ANGINA PECTORIS: ICD-10-CM

## 2023-03-10 DIAGNOSIS — C21.1 MALIGNANT NEOPLASM OF ANAL CANAL: ICD-10-CM

## 2023-03-10 DIAGNOSIS — R13.12 OROPHARYNGEAL DYSPHAGIA: ICD-10-CM

## 2023-03-10 DIAGNOSIS — N18.6 ESRD ON DIALYSIS: ICD-10-CM

## 2023-03-10 DIAGNOSIS — N18.6 TYPE 2 DIABETES MELLITUS WITH CHRONIC KIDNEY DISEASE ON CHRONIC DIALYSIS, WITH LONG-TERM CURRENT USE OF INSULIN: ICD-10-CM

## 2023-03-10 DIAGNOSIS — Z99.2 TYPE 2 DIABETES MELLITUS WITH CHRONIC KIDNEY DISEASE ON CHRONIC DIALYSIS, WITH LONG-TERM CURRENT USE OF INSULIN: ICD-10-CM

## 2023-03-10 DIAGNOSIS — I21.4 NSTEMI (NON-ST ELEVATED MYOCARDIAL INFARCTION): ICD-10-CM

## 2023-03-10 DIAGNOSIS — R53.1 GENERALIZED WEAKNESS: ICD-10-CM

## 2023-03-10 DIAGNOSIS — I50.9 CONGESTIVE HEART FAILURE, UNSPECIFIED HF CHRONICITY, UNSPECIFIED HEART FAILURE TYPE: ICD-10-CM

## 2023-03-10 DIAGNOSIS — I35.0 NONRHEUMATIC AORTIC VALVE STENOSIS: ICD-10-CM

## 2023-03-10 DIAGNOSIS — K92.1 HEMATOCHEZIA: ICD-10-CM

## 2023-03-10 DIAGNOSIS — I50.33 ACUTE ON CHRONIC DIASTOLIC HEART FAILURE: ICD-10-CM

## 2023-03-10 DIAGNOSIS — Z99.2 ESRD ON DIALYSIS: ICD-10-CM

## 2023-03-10 DIAGNOSIS — E11.22 TYPE 2 DIABETES MELLITUS WITH CHRONIC KIDNEY DISEASE ON CHRONIC DIALYSIS, WITH LONG-TERM CURRENT USE OF INSULIN: ICD-10-CM

## 2023-03-10 DIAGNOSIS — M54.50 ACUTE MIDLINE LOW BACK PAIN WITHOUT SCIATICA: ICD-10-CM

## 2023-03-10 DIAGNOSIS — I25.9 ISCHEMIC HEART DISEASE: ICD-10-CM

## 2023-03-10 DIAGNOSIS — Z99.2 END STAGE RENAL DISEASE ON DIALYSIS: ICD-10-CM

## 2023-03-10 DIAGNOSIS — Z74.09 IMPAIRED MOBILITY: ICD-10-CM

## 2023-03-10 DIAGNOSIS — D64.9 SYMPTOMATIC ANEMIA: Primary | ICD-10-CM

## 2023-03-10 DIAGNOSIS — N30.00 ACUTE CYSTITIS WITHOUT HEMATURIA: ICD-10-CM

## 2023-03-10 DIAGNOSIS — C50.911 MALIGNANT NEOPLASM OF RIGHT FEMALE BREAST, UNSPECIFIED ESTROGEN RECEPTOR STATUS, UNSPECIFIED SITE OF BREAST: ICD-10-CM

## 2023-03-10 DIAGNOSIS — E43 SEVERE MALNUTRITION: ICD-10-CM

## 2023-03-10 DIAGNOSIS — R77.8 ELEVATED TROPONIN: ICD-10-CM
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
## 2023-03-10 DIAGNOSIS — Z79.4 TYPE 2 DIABETES MELLITUS WITH CHRONIC KIDNEY DISEASE ON CHRONIC DIALYSIS, WITH LONG-TERM CURRENT USE OF INSULIN: ICD-10-CM

## 2023-03-10 DIAGNOSIS — I48.0 PAF (PAROXYSMAL ATRIAL FIBRILLATION): ICD-10-CM

## 2023-03-10 DIAGNOSIS — N18.6 END STAGE RENAL DISEASE ON DIALYSIS: ICD-10-CM

## 2023-03-10 LAB
ABO GROUP BLD: NORMAL
ALBUMIN SERPL-MCNC: 3.6 G/DL (ref 3.5–5.2)
ALBUMIN/GLOB SERPL: 0.9 G/DL
ALP SERPL-CCNC: 86 U/L (ref 39–117)
ALT SERPL W P-5'-P-CCNC: 5 U/L (ref 1–33)
ANION GAP SERPL CALCULATED.3IONS-SCNC: 22 MMOL/L (ref 5–15)
AST SERPL-CCNC: 17 U/L (ref 1–32)
BASOPHILS # BLD AUTO: 0.02 10*3/MM3 (ref 0–0.2)
BASOPHILS NFR BLD AUTO: 0.4 % (ref 0–1.5)
BILIRUB SERPL-MCNC: 0.7 MG/DL (ref 0–1.2)
BLD GP AB SCN SERPL QL: NEGATIVE
BUN SERPL-MCNC: 38 MG/DL (ref 8–23)
BUN/CREAT SERPL: 5.1 (ref 7–25)
CALCIUM SPEC-SCNC: 9.2 MG/DL (ref 8.6–10.5)
CHLORIDE SERPL-SCNC: 90 MMOL/L (ref 98–107)
CO2 SERPL-SCNC: 24 MMOL/L (ref 22–29)
CREAT SERPL-MCNC: 7.43 MG/DL (ref 0.57–1)
DEPRECATED RDW RBC AUTO: 63.5 FL (ref 37–54)
EGFRCR SERPLBLD CKD-EPI 2021: 5.3 ML/MIN/1.73
EOSINOPHIL # BLD AUTO: 0.01 10*3/MM3 (ref 0–0.4)
EOSINOPHIL NFR BLD AUTO: 0.2 % (ref 0.3–6.2)
ERYTHROCYTE [DISTWIDTH] IN BLOOD BY AUTOMATED COUNT: 17 % (ref 12.3–15.4)
GEN 5 2HR TROPONIN T REFLEX: 176 NG/L
GLOBULIN UR ELPH-MCNC: 4.1 GM/DL
GLUCOSE BLDC GLUCOMTR-MCNC: 136 MG/DL (ref 70–130)
GLUCOSE SERPL-MCNC: 196 MG/DL (ref 65–99)
HCT VFR BLD AUTO: 25.4 % (ref 34–46.6)
HCT VFR BLD AUTO: 26.8 % (ref 34–46.6)
HGB BLD-MCNC: 7.6 G/DL (ref 12–15.9)
HGB BLD-MCNC: 7.8 G/DL (ref 12–15.9)
HOLD SPECIMEN: NORMAL
IMM GRANULOCYTES # BLD AUTO: 0.07 10*3/MM3 (ref 0–0.05)
IMM GRANULOCYTES NFR BLD AUTO: 1.4 % (ref 0–0.5)
LYMPHOCYTES # BLD AUTO: 0.78 10*3/MM3 (ref 0.7–3.1)
LYMPHOCYTES NFR BLD AUTO: 15.5 % (ref 19.6–45.3)
MAGNESIUM SERPL-MCNC: 2.2 MG/DL (ref 1.6–2.4)
MCH RBC QN AUTO: 30 PG (ref 26.6–33)
MCHC RBC AUTO-ENTMCNC: 29.1 G/DL (ref 31.5–35.7)
MCV RBC AUTO: 103.1 FL (ref 79–97)
MONOCYTES # BLD AUTO: 0.68 10*3/MM3 (ref 0.1–0.9)
MONOCYTES NFR BLD AUTO: 13.5 % (ref 5–12)
NEUTROPHILS NFR BLD AUTO: 3.48 10*3/MM3 (ref 1.7–7)
NEUTROPHILS NFR BLD AUTO: 69 % (ref 42.7–76)
NRBC BLD AUTO-RTO: 0 /100 WBC (ref 0–0.2)
PLATELET # BLD AUTO: 143 10*3/MM3 (ref 140–450)
PMV BLD AUTO: 11.4 FL (ref 6–12)
POTASSIUM SERPL-SCNC: 4 MMOL/L (ref 3.5–5.2)
PROT SERPL-MCNC: 7.7 G/DL (ref 6–8.5)
QT INTERVAL: 448 MS
QTC INTERVAL: 443 MS
RBC # BLD AUTO: 2.6 10*6/MM3 (ref 3.77–5.28)
RH BLD: POSITIVE
SODIUM SERPL-SCNC: 136 MMOL/L (ref 136–145)
T&S EXPIRATION DATE: NORMAL
TROPONIN T DELTA: 3 NG/L
TROPONIN T SERPL HS-MCNC: 172 NG/L
TROPONIN T SERPL HS-MCNC: 173 NG/L
WBC NRBC COR # BLD: 5.04 10*3/MM3 (ref 3.4–10.8)
WHOLE BLOOD HOLD COAG: NORMAL
WHOLE BLOOD HOLD SPECIMEN: NORMAL

## 2023-03-10 PROCEDURE — 86850 RBC ANTIBODY SCREEN: CPT | Performed by: PHYSICIAN ASSISTANT

## 2023-03-10 PROCEDURE — 85018 HEMOGLOBIN: CPT | Performed by: FAMILY MEDICINE

## 2023-03-10 PROCEDURE — 86900 BLOOD TYPING SEROLOGIC ABO: CPT | Performed by: PHYSICIAN ASSISTANT

## 2023-03-10 PROCEDURE — 82962 GLUCOSE BLOOD TEST: CPT

## 2023-03-10 PROCEDURE — 86901 BLOOD TYPING SEROLOGIC RH(D): CPT | Performed by: PHYSICIAN ASSISTANT

## 2023-03-10 PROCEDURE — 85014 HEMATOCRIT: CPT | Performed by: FAMILY MEDICINE

## 2023-03-10 PROCEDURE — 84484 ASSAY OF TROPONIN QUANT: CPT | Performed by: FAMILY MEDICINE

## 2023-03-10 PROCEDURE — 80053 COMPREHEN METABOLIC PANEL: CPT | Performed by: EMERGENCY MEDICINE

## 2023-03-10 PROCEDURE — 84484 ASSAY OF TROPONIN QUANT: CPT | Performed by: EMERGENCY MEDICINE

## 2023-03-10 PROCEDURE — 36415 COLL VENOUS BLD VENIPUNCTURE: CPT

## 2023-03-10 PROCEDURE — 99284 EMERGENCY DEPT VISIT MOD MDM: CPT

## 2023-03-10 PROCEDURE — 86923 COMPATIBILITY TEST ELECTRIC: CPT

## 2023-03-10 PROCEDURE — 93005 ELECTROCARDIOGRAM TRACING: CPT | Performed by: EMERGENCY MEDICINE

## 2023-03-10 PROCEDURE — 99222 1ST HOSP IP/OBS MODERATE 55: CPT | Performed by: FAMILY MEDICINE

## 2023-03-10 PROCEDURE — 83735 ASSAY OF MAGNESIUM: CPT | Performed by: EMERGENCY MEDICINE

## 2023-03-10 PROCEDURE — 85025 COMPLETE CBC W/AUTO DIFF WBC: CPT | Performed by: EMERGENCY MEDICINE

## 2023-03-10 PROCEDURE — 71045 X-RAY EXAM CHEST 1 VIEW: CPT

## 2023-03-10 RX ORDER — SODIUM CHLORIDE 0.9 % (FLUSH) 0.9 %
10 SYRINGE (ML) INJECTION AS NEEDED
Status: DISCONTINUED | OUTPATIENT
Start: 2023-03-10 | End: 2023-03-29 | Stop reason: HOSPADM

## 2023-03-10 RX ORDER — DEXTROSE MONOHYDRATE 25 G/50ML
25 INJECTION, SOLUTION INTRAVENOUS
Status: DISCONTINUED | OUTPATIENT
Start: 2023-03-10 | End: 2023-03-29 | Stop reason: HOSPADM

## 2023-03-10 RX ORDER — NORTRIPTYLINE HYDROCHLORIDE 10 MG/1
10 CAPSULE ORAL NIGHTLY
Status: DISCONTINUED | OUTPATIENT
Start: 2023-03-10 | End: 2023-03-12

## 2023-03-10 RX ORDER — MORPHINE SULFATE 15 MG/1
15 TABLET, FILM COATED, EXTENDED RELEASE ORAL 2 TIMES DAILY
Status: DISCONTINUED | OUTPATIENT
Start: 2023-03-10 | End: 2023-03-12

## 2023-03-10 RX ORDER — HYDROCORTISONE 25 MG/G
CREAM TOPICAL 2 TIMES DAILY
Status: DISCONTINUED | OUTPATIENT
Start: 2023-03-10 | End: 2023-03-21

## 2023-03-10 RX ORDER — INSULIN LISPRO 100 [IU]/ML
5 INJECTION, SOLUTION INTRAVENOUS; SUBCUTANEOUS
Status: DISCONTINUED | OUTPATIENT
Start: 2023-03-10 | End: 2023-03-19

## 2023-03-10 RX ORDER — INSULIN LISPRO 100 [IU]/ML
0-9 INJECTION, SOLUTION INTRAVENOUS; SUBCUTANEOUS
Status: DISCONTINUED | OUTPATIENT
Start: 2023-03-10 | End: 2023-03-29 | Stop reason: HOSPADM

## 2023-03-10 RX ORDER — ASPIRIN 81 MG/1
81 TABLET ORAL DAILY
Status: DISCONTINUED | OUTPATIENT
Start: 2023-03-10 | End: 2023-03-29 | Stop reason: HOSPADM

## 2023-03-10 RX ORDER — IBUPROFEN 600 MG/1
1 TABLET ORAL
Status: DISCONTINUED | OUTPATIENT
Start: 2023-03-10 | End: 2023-03-29 | Stop reason: HOSPADM

## 2023-03-10 RX ORDER — CLONIDINE HYDROCHLORIDE 0.1 MG/1
0.4 TABLET ORAL EVERY 12 HOURS SCHEDULED
Status: DISCONTINUED | OUTPATIENT
Start: 2023-03-10 | End: 2023-03-25

## 2023-03-10 RX ORDER — HYDROMORPHONE HYDROCHLORIDE 2 MG/1
2 TABLET ORAL EVERY 6 HOURS PRN
Status: DISCONTINUED | OUTPATIENT
Start: 2023-03-10 | End: 2023-03-12

## 2023-03-10 RX ORDER — CARVEDILOL 12.5 MG/1
12.5 TABLET ORAL EVERY 12 HOURS SCHEDULED
Status: DISCONTINUED | OUTPATIENT
Start: 2023-03-10 | End: 2023-03-23

## 2023-03-10 RX ORDER — CAPECITABINE 500 MG/1
1000 TABLET, FILM COATED ORAL 2 TIMES DAILY
Status: DISCONTINUED | OUTPATIENT
Start: 2023-03-10 | End: 2023-03-10

## 2023-03-10 RX ORDER — AMIODARONE HYDROCHLORIDE 200 MG/1
200 TABLET ORAL DAILY
Status: DISCONTINUED | OUTPATIENT
Start: 2023-03-10 | End: 2023-03-29 | Stop reason: HOSPADM

## 2023-03-10 RX ORDER — ONDANSETRON 2 MG/ML
4 INJECTION INTRAMUSCULAR; INTRAVENOUS EVERY 6 HOURS PRN
Status: DISCONTINUED | OUTPATIENT
Start: 2023-03-10 | End: 2023-03-29 | Stop reason: HOSPADM

## 2023-03-10 RX ORDER — ONDANSETRON 4 MG/1
4 TABLET, FILM COATED ORAL EVERY 6 HOURS PRN
Status: DISCONTINUED | OUTPATIENT
Start: 2023-03-10 | End: 2023-03-29 | Stop reason: HOSPADM

## 2023-03-10 RX ORDER — SODIUM CHLORIDE 9 MG/ML
40 INJECTION, SOLUTION INTRAVENOUS AS NEEDED
Status: DISCONTINUED | OUTPATIENT
Start: 2023-03-10 | End: 2023-03-29 | Stop reason: HOSPADM

## 2023-03-10 RX ORDER — SODIUM CHLORIDE 0.9 % (FLUSH) 0.9 %
10 SYRINGE (ML) INJECTION EVERY 12 HOURS SCHEDULED
Status: DISCONTINUED | OUTPATIENT
Start: 2023-03-10 | End: 2023-03-22

## 2023-03-10 RX ORDER — NICOTINE POLACRILEX 4 MG
15 LOZENGE BUCCAL
Status: DISCONTINUED | OUTPATIENT
Start: 2023-03-10 | End: 2023-03-29 | Stop reason: HOSPADM

## 2023-03-10 RX ORDER — ATORVASTATIN CALCIUM 40 MG/1
80 TABLET, FILM COATED ORAL NIGHTLY
Status: DISCONTINUED | OUTPATIENT
Start: 2023-03-10 | End: 2023-03-29 | Stop reason: HOSPADM

## 2023-03-10 RX ORDER — ISOSORBIDE MONONITRATE 60 MG/1
120 TABLET, EXTENDED RELEASE ORAL DAILY
Status: DISCONTINUED | OUTPATIENT
Start: 2023-03-10 | End: 2023-03-26

## 2023-03-10 RX ADMIN — HYDROCORTISONE 2.5%: 25 CREAM TOPICAL at 21:30

## 2023-03-10 NOTE — LETTER
"    EMS Transport Request  For use at [facility], [place], [place], and [place] only   Patient Name: Esperanza Pop : 1945   Weight:79.1 kg (174 lb 6.1 oz) Pick-up Location: [facility] BLS/ALS: BLS   Insurance: [employer] [place] [employer] REPL[employer]EMENT Auth End Date: 3/29/23   Pre-Cert #: D/C Summary complete:    Destination: [facility], [address] [place] [address]   Contact Precautions: None   Equipment (O2, Fluids, etc.): None   Arrive By Date/Time: 3/29/23, late afternoon Stretcher/WC: stretcher   CM Requesting: SELIN Alberto Ext: [phone]   Notes/Medical Necessity: severe pain on movement, anal CA, end stage renal, debility/generalized weakness, painful bilateral gluteal and intergluteal wounds     ______________________________________________________________________    *Only 2 patient bags OR 1 carry-on size bag are permitted.  Wheelchairs and walkers CANNOT transported with the patient. Acknowledge: {Acknowledge:[id number]::\"Yes\"}    "

## 2023-03-10 NOTE — LETTER
[address] [address]  [phone]        March 24, 2023      Patient: Esperanza Pop  YOB: 1945  Date of Visit: 3/10/2023      Please consider for skilled rehab services.  The patient will need outpatient hemodialysis.        SELIN Carrillo

## 2023-03-10 NOTE — PLAN OF CARE
Goal Outcome Evaluation:   Patient arrived from ED with c/o weakness. Placed on 2L oxygen nasal cannula and satting > 90%. Uses cane to walk at home.

## 2023-03-10 NOTE — H&P
"    [facility] Medicine Services  HISTORY AND PHYSICAL    Patient Name: Esperanza Pop  : 1945  MRN: [id number]  Primary Care Physician: eMghana Rodgers MD  Date of admission: 3/10/2023      Subjective   Subjective     Chief Complaint:  Generalized Weakness     HPI:  Esperanza Pop is a 78 y.o. female with PMH of ESRD on HD MWF, IDDM, HTN, HLD, HFpEF, Pulm HTN, Hx of breast cancer (s/p mastectomy and radiation) PAF (on xarelto), Chronic anemia, and rectal cancer (recently diagnosed currently gettintg XRT and chemo) that presented to the ED after a fall at home. Her 's  was today and she was getting ready to go when her legs \"gave out\". She has been having rectal bleeding and loose stools but reports that both of those are better the past few days. She has held her AC for the past four days. She also reports improvement in her rectal pain and attributes that to her radiation treatment. She denies any LOC or hitting her head. She reports that her lower extremities have gotten weaker. She also reports about a one month history of lower extremity edema. She denies any chest pain or shortness of breath.   In the ED labs are consistent with ESRD, and Hgb is at 7.8. CXR shows cardiomegaly and pulmonary edema.   She will be admitted to the hospitalist service for further treatment and evaluation.       Review of Systems   Gen- No fevers, chills  CV- No chest pain, palpitations  Resp- No cough, dyspnea  GI- No N/V/D, abd pain        Personal History     Past Medical History:   Diagnosis Date   • Acute bronchitis    • Acute conjunctivitis    • Acute kidney injury (HCC)     Admission from 2013 to 2014, now resolved with latest creatinine 1.4 on 2014.   • Acute non-ST segment elevation myocardial infarction (STEMI) following previous myocardial infarction    • Anal cancer (HCC) 2023   • Anal cancer (HCC) 2023   • Arthritis    • Breast cancer, female, right     " 2005 mastectomy right   • Cancer (HCC)    • CHF (congestive heart failure) (HCC)    • Chronic kidney disease     dialysis MWF, f/u [facility]    • Clotting disorder (HCC)    • COVID-19    • Diabetes mellitus (HCC)     diagnosed ~1992, checks fsbg 2-3x/day   • Diarrhea    • Dyslipidemia    • Dyspepsia    • Dyspnea    • Edema    • History of transfusion     ~2013 no reaction    • Hx of radiation therapy    • Hyperlipidemia    • Hypertension     Severe   • Ischemic heart disease    • Moderate obesity     BMI 36.2   • Myocardial infarction (HCC)    • Pneumonia    • Pneumonia 02/2019   • Radiation 2005   • Seasonal allergies    • Wears glasses          Oncology Problem List:  Malignant neoplasm of anal canal (HCC) (02/08/2023; Status: Active)  Breast cancer, female, right (05/20/2016; Status: Active)    Oncology/Hematology History   Breast cancer, female, right   5/20/2016 Initial Diagnosis    Breast cancer, female, right     Malignant neoplasm of anal canal (HCC)   2/8/2023 Initial Diagnosis    Anal cancer (HCC)     2/15/2023 -  Chemotherapy    OP ANAL  Capecitabine 825 mg/m2 M-F + XRT         Past Surgical History:   Procedure Laterality Date   • AV FISTULA PLACEMENT, BRACHIOBASILIC     • BREAST BIOPSY Left 2010   • BREAST CYST EXCISION     • BREAST LUMPECTOMY Right 2005   • BREAST SURGERY     • CARDIAC CATHETERIZATION N/A 10/10/2016    Procedure: Left Heart Cath;  Surgeon: Scooter Zhao MD;  Location:  [facility];  Service:    • CARDIAC CATHETERIZATION  10/2016   • CARDIAC CATHETERIZATION N/A 1/21/2021    Procedure: Right and Left Heart Cath;  Surgeon: Hugh Tidwell MD;  Location:  [facility];  Service: Cardiology;  Laterality: N/A;   • COLONOSCOPY N/A 7/23/2020    Procedure: COLONOSCOPY;  Surgeon: Rubén Rosas MD;  Location:  [facility];  Service: Gastroenterology;  Laterality: N/A;   • CORONARY STENT PLACEMENT  2016   • HERNIA REPAIR     •  HYSTERECTOMY  2000   • INSERTION HEMODIALYSIS CATHETER Right 6/29/2016    Procedure: HEMODIALYSIS CATHETER INSERTION TUNNELLED;  Surgeon: Ramón Chavez MD;  Location: [facility];  Service:    • MASTECTOMY Right 2006   • OOPHORECTOMY     • REDUCTION MAMMAPLASTY Left 2005       Family History: family history includes Breast cancer (age of onset: 55) in her sister; Cancer in her mother; Colon cancer in her maternal grandmother; Coronary artery disease in her father; Heart failure in her father; Pancreatic cancer in her mother; Stroke in her mother; Throat cancer in her daughter.     Social History:  reports that she has never smoked. She has never used smokeless tobacco. She reports current alcohol use. She reports that she does not use drugs.  Social History     Social History Narrative    Pt consumes 1 serving of caffeine once every 2 weeks.     Lost grandson in Feb. 2022       Medications:  Available home medication information reviewed.  Medications Prior to Admission   Medication Sig Dispense Refill Last Dose   • acetaminophen (TYLENOL) 500 MG tablet Take 500 mg by mouth.      • amiodarone (PACERONE) 200 MG tablet Take 1 tablet by mouth Daily. 90 tablet 1    • ammonium lactate (LAC-HYDRIN) 12 % lotion Apply  topically to the appropriate area as directed As Needed for Dry Skin. (Patient taking differently: Apply 1 application topically to the appropriate area as directed 2 (Two) Times a Day As Needed for Dry Skin.) 500 g 3    • apixaban (ELIQUIS) 2.5 MG tablet tablet Take 1 tablet by mouth 2 (Two) Times a Day. 180 tablet 3    • aspirin 81 MG EC tablet Take 1 tablet by mouth Daily.      • atorvastatin (LIPITOR) 80 MG tablet Take 1 tablet by mouth Every Night. 90 tablet 3    • B Complex-C-Folic Acid (DIALYVITE 800 PO) Take 1 tablet by mouth 3 (Three) Times a Week. On dialysis says MWF      • bisacodyl (DULCOLAX) 5 MG EC tablet Take 5 mg by mouth Daily As Needed for Constipation.      • capecitabine (XELODA)  500 MG chemo tablet Take 2 tablets by mouth 2 (Two) Times a Day on Monday-Friday with radiation. 120 tablet 0    • Capsaicin 0.1 % cream Apply 2-4 gms to feet nightly. Use gloves 60 g 3    • carvedilol (COREG) 12.5 MG tablet Take 1 tablet by mouth Every 12 (Twelve) Hours. 180 tablet 1    • cloNIDine (CATAPRES) 0.2 MG tablet Take 2 tablets by mouth Every 12 (Twelve) Hours. 120 tablet 5    • dorzolamide-timolol (COSOPT) 22.3-6.8 MG/ML ophthalmic solution Administer 1 drop to both eyes Daily. 10 mL 3    • Durezol 0.05 % ophthalmic emulsion INSTILL 1 DROP 4 TIMES DAILY INTO SURGICAL EYE STARTING AFTER SURGERY.      • Epoetin Rusty (EPOGEN IJ) Epoetin Rusty (Epogen)      • fluocinonide (LIDEX) 0.05 % external solution Apply 0.05 application topically to the appropriate area as directed 2 (Two) Times a Day. Scalp  2    • glucose blood test strip Use as instructed to monitor blood glucose 1-2 times daily 100 each 12    • glucose monitor monitoring kit Use as directed to monitor blood glucose 1-2 times daily 1 each 0    • Hydrocortisone, Perianal, (ANUSOL-HC) 2.5 % rectal cream Insert  into the rectum 2 (Two) Times a Day. 28 g 0    • HYDROmorphone (DILAUDID) 2 MG tablet Take 1 tablet by mouth Every 6 (Six) Hours As Needed for Moderate Pain. 60 tablet 0    • IRON DEXTRAN IJ Inject  as directed 3 (Three) Times a Week.      • isosorbide mononitrate (IMDUR) 120 MG 24 hr tablet Take 1 tablet by mouth Daily. 90 tablet 3    • Lancets Ultra Fine misc Use as directed to check blood sugar 1-2 times daily 100 each 11    • Lidocaine 5 % cream Apply 1 g topically 2 (Two) Times a Day As Needed (pain). 30 g 0    • lidocaine-prilocaine (EMLA) 2.5-2.5 % cream Apply 1 application topically to the appropriate area as directed As Needed (dialysis access).  6    • megestrol (MEGACE) 40 MG tablet Take 40 mg by mouth Daily.      • metroNIDAZOLE (Flagyl) 500 MG tablet Take 1 tablet by mouth 3 (Three) Times a Day. 30 tablet 0    • Morphine (MS  CONTIN) 15 MG 12 hr tablet Take 1 tablet by mouth 2 (Two) Times a Day. 60 tablet 0    • nitroglycerin (Nitrolingual) 0.4 MG/SPRAY spray Place 1 spray under the tongue Every 5 (Five) Minutes As Needed for Chest Pain. 1 each 12    • nortriptyline (PAMELOR) 10 MG capsule Take 1 capsule by mouth Every Night. 30 capsule 3    • NovoLIN 70/30 (70-30) 100 UNIT/ML injection INJECT 20 UNITS SUBCUTANEOUSLY BEFORE SUPPER AND 17 UNITS BEFORE BREAKFAST 10 mL 0    • ondansetron (ZOFRAN) 8 MG tablet Take 1 tablet by mouth 3 (Three) Times a Day As Needed for Nausea or Vomiting. 30 tablet 5    • terazosin (HYTRIN) 2 MG capsule Take 2 capsules by mouth Every Night. 180 capsule 3    • torsemide (Demadex) 20 MG tablet 1 PO QAM and 2 PO QPM 90 tablet 5    • VITAMIN D PO Take 1 mcg by mouth.          Allergies   Allergen Reactions   • Mircera [Methoxy Polyethylene Glycol-Epoetin Beta] Anaphylaxis     Pt stopped breathing   • Venofer [Iron Sucrose] Anaphylaxis     Pt stopped breathing   • Albuterol Other (See Comments)     Increased blood pressure  Used right before heart attack   • Nifedipine Er Swelling   • Penicillins Hives   • Prednisone Other (See Comments)     Makes jittery/anxious       Objective   Objective     Vital Signs:   Temp:  [97.8 °F (36.6 °C)-98.4 °F (36.9 °C)] 97.8 °F (36.6 °C)  Heart Rate:  [67] 67  Resp:  [14-18] 14  BP: (193)/(77) 193/77       Physical Exam   Constitutional: Awake, alert, chronically ill sitting up in bed   Eyes: PERRLA, sclerae anicteric, no conjunctival injection  HENT: NCAT, mucous membranes moist  Neck: Supple,  Respiratory: Clear to auscultation bilaterally, nonlabored respirations   Cardiovascular: RRR, + murmurs, rubs, or gallops, vascular access in right upper extremity   Gastrointestinal: Positive bowel sounds, soft, nontender, nondistended  Musculoskeletal: + bilateral ankle edema,   Psychiatric: Appropriate affect, cooperative  Neurologic: Oriented x 3, bilateral lower extremity edema,  speech clear  Skin: No rashes      Result Review:  I have personally reviewed the results from the time of this admission to 3/10/2023 15:23 EST and agree with these findings:  [x]  Laboratory list / accordion  []  Microbiology  []  Radiology  []  EKG/Telemetry   []  Cardiology/Vascular   []  Pathology  [x]  Old records  []  Other:  Most notable findings include:       LAB RESULTS:      Lab 03/10/23  1145   WBC 5.04   HEMOGLOBIN 7.8*   HEMATOCRIT 26.8*   PLATELETS 143   NEUTROS ABS 3.48   IMMATURE GRANS (ABS) 0.07*   LYMPHS ABS 0.78   MONOS ABS 0.68   EOS ABS 0.01   .1*         Lab 03/10/23  1145   SODIUM 136   POTASSIUM 4.0   CHLORIDE 90*   CO2 24.0   ANION GAP 22.0*   BUN 38*   CREATININE 7.43*   EGFR 5.3*   GLUCOSE 196*   CALCIUM 9.2   MAGNESIUM 2.2         Lab 03/10/23  1145   TOTAL PROTEIN 7.7   ALBUMIN 3.6   GLOBULIN 4.1   ALT (SGPT) 5   AST (SGOT) 17   BILIRUBIN 0.7   ALK PHOS 86         Lab 03/10/23  1145   HSTROP T 172*             Lab 03/10/23  1418   ABO TYPING B   RH TYPING Positive   ANTIBODY SCREEN Negative             Microbiology Results (last 10 days)     ** No results found for the last 240 hours. **          XR Chest 1 View    Result Date: 3/10/2023  XR CHEST 1 VW Date of Exam: 3/10/2023 12:05 PM EST Indication: Weak/Dizzy/AMS triage protocol. Comparison: CT chest with contrast 2/1/2023, chest radiograph 7/21/2021 Findings: Cardiomegaly. Lungs normally expanded. Mild groundglass opacity and septal thickening lower lobe predominant. No pneumothorax or pleural effusion.     Impression: Impression: Questionable pulmonary edema. Cardiomegaly. Electronically Signed: Shannan Erickson  3/10/2023 12:20 PM EST  Workstation ID: [id number]      Results for orders placed during the hospital encounter of 01/19/21    Adult Transthoracic Echo Complete W/ Cont if Necessary Per Protocol    Interpretation Summary  · Estimated left ventricular EF = 60% Left ventricular ejection fraction appears to be 56 - 60%. Left  ventricular systolic function is normal.  · Left ventricular diastolic function is consistent with (grade II w/high LAP) pseudonormalization.  · Left atrial volume is severely increased.  · Moderate aortic valve stenosis is present.  · Estimated right ventricular systolic pressure from tricuspid regurgitation is moderately elevated (45-55 mmHg). Calculated right ventricular systolic pressure from tricuspid regurgitation is 51 mmHg.  · Moderate pulmonary hypertension is present.  · The right atrial cavity is mild to moderately dilated.  · Mild mitral valve stenosis is present with functional MAC.  · Moderate tricuspid valve regurgitation is present.  · Normal right ventricular wall thickness and septal motion noted with the right ventricular cavity mildly dilated; mildly reduced right ventricular systolic function noted.  · There is no evidence of pericardial effusion.      Assessment & Plan   Assessment & Plan     Active Hospital Problems    Diagnosis  POA   • **Hematochezia [K92.1]  Yes   • PAF (paroxysmal atrial fibrillation) ([facility]) [I48.0]  Yes   • CHF (congestive heart failure) ([facility]) [I50.9]  Yes   • End stage renal disease on dialysis ([facility]) [N18.6, Z99.2]  Not Applicable   • Hyperlipidemia LDL goal <70 [E78.5]  Yes   • Essential hypertension [I10]  Yes   • Type 2 diabetes mellitus (HCC) [E11.9]  Yes       Generalized Weakness  Acute on Chronic Anemia  -hgb is at 7.8, appears to be baseline  -serial H&H   -hold off on transfusion now, should probably be done at dialysis   -rectal bleeding is apparently improving  -this is probably multi factorial, give she was going to her 's     Rectal Cancer  Hx breast cancer   -sees Dr Cavazos with rad onc  -sees Dr Rojo with oncology, currently on Xeloda  -consult in am for both   -continued home pain regimen     ESRD on HD  -nephrology consulted     HFpEF  Pulm HTN  PAF  HTN  HLD   -holding eliquis  -ASA  -amiodarone   -last echo EF 60% with grade II diastolic  dysfunction pulm htn     Elevated Troponin  -no chest pain  -will trend  -could consider repeat ECHO as last one is from 2021           DVT prophylaxis:  Mechanical       CODE STATUS:    Code Status and Medical Interventions:   Ordered at: 03/10/23 1448     Medical Intervention Limits:    NO intubation (DNI)     Code Status (Patient has no pulse and is not breathing):    No CPR (Do Not Attempt to Resuscitate)     Medical Interventions (Patient has pulse or is breathing):    Limited Support       Expected Discharge   Expected Discharge Date and Time     Expected Discharge Date Expected Discharge Time    Mar 12, 2023            Connie Whtifield, DO  03/10/23

## 2023-03-10 NOTE — LETTER
EMS Transport Request  For use at [facility], [place], [place], and [place] only   Patient Name: Esperanza Pop : 1945   Weight:79.1 kg (174 lb 6.1 oz) Pick-up Location: [facility] BLS/ALS: BLS   Insurance: [employer] [place] [employer] Auth End Date: 3/29/23   Pre-Cert #: D/C Summary complete:    Destination: [facility], [address] [place] [address]   Contact Precautions: None   Equipment (O2, Fluids, etc.): None   Arrive By Date/Time: 3/29/23 @ 4:00pm Stretcher/WC: stretcher   CM Requesting: SELIN Alberto Ext: [phone]   Notes/Medical Necessity: neoplastic pain, end stage renal, rectal CA, generalized/weakness, painful bilateral gluteal and intergluteal wounds     ______________________________________________________________________    *Only 2 patient bags OR 1 carry-on size bag are permitted.  Wheelchairs and walkers CANNOT transported with the patient. Acknowledge: Yes

## 2023-03-10 NOTE — LETTER
"    EMS Transport Request  For use at [facility], [place], [place], and [place] only   Patient Name: Esperanza Pop : 1945   Weight:79.1 kg (174 lb 6.1 oz) Pick-up Location: [facility] BLS/ALS: BLS   Insurance: [employer] [place] [employer] Auth End Date: 3/29/32   Pre-Cert #: D/C Summary complete:    Destination: [facility], [address] [place] [address]., [place] [address]   Contact Precautions:    Equipment (O2, Fluids, etc.):    Arrive By Date/Time: *** Stretcher/WC: stretcher   CM Requesting: SELIN Alberto Ext: [phone]   Notes/Medical Necessity:      ______________________________________________________________________    *Only 2 patient bags OR 1 carry-on size bag are permitted.  Wheelchairs and walkers CANNOT transported with the patient. Acknowledge: {Acknowledge:[id number]::\"Yes\"}    "

## 2023-03-10 NOTE — ED PROVIDER NOTES
Subjective   History of Present Illness  Ms. Pop is a 78-year-old female who was recently diagnosed with anal rectal cancer about 3 weeks ago.  She also has a history of end-stage renal disease on dialysis.  The patient was trying to get ready this morning to go to her 's .  She felt weak and fell at home and was unable to get up on her own.  She did not injure herself during the fall.  She was brought to the emergency department for further evaluation of her generalized weakness.  The patient has recently begun radiation and chemotherapy.  Her oncologist is Dr. Carpenter.  The patient has had some nausea and has vomited several times this morning.  She denies any abdominal pain.  No diarrhea or constipation.  She states that she has had no appetite.  She reports that she makes urine still and has had normal urine output for her.  She also has a history of insulin-dependent diabetes, hypertension, hyperlipidemia, ischemic heart disease, CHF, remote history of breast cancer with right mastectomy in , and clotting disorder.  She states that she had been on Eliquis daily until recently when she started having rectal bleeding.  She states that she has been taking it every other day but her last dose was 4 days ago.  She missed dialysis today.        Review of Systems   Constitutional: Positive for appetite change and fatigue. Negative for chills and fever.   HENT: Negative for sore throat.    Respiratory: Negative for cough and shortness of breath.    Cardiovascular: Negative for chest pain and palpitations.   Gastrointestinal: Positive for blood in stool, nausea and vomiting. Negative for abdominal pain, constipation and diarrhea.   Genitourinary: Negative for dysuria.   Musculoskeletal: Negative for back pain and neck pain.   Skin: Negative for wound.   Neurological: Positive for weakness (generalized) and light-headedness.   Hematological: Bruises/bleeds easily (on Eliquis, last dose 4 days  ago.).   Psychiatric/Behavioral: Negative for confusion.       Past Medical History:   Diagnosis Date   • Acute bronchitis    • Acute conjunctivitis    • Acute kidney injury (HCC)     Admission from 12/26/2013 to 01/02/2014, now resolved with latest creatinine 1.4 on 01/08/2014.   • Acute non-ST segment elevation myocardial infarction (STEMI) following previous myocardial infarction    • Anal cancer (HCC) 2/8/2023   • Anal cancer (HCC) 2/8/2023   • Arthritis    • Breast cancer, female, right     2005 mastectomy right   • Cancer (HCC)    • CHF (congestive heart failure) (HCC)    • Chronic kidney disease     dialysis [facility], [facility]    • Clotting disorder (HCC)    • COVID-19    • Diabetes mellitus (HCC)     diagnosed ~1992, checks fsbg 2-3x/day   • Diarrhea    • Dyslipidemia    • Dyspepsia    • Dyspnea    • Edema    • History of transfusion     ~2013 no reaction    • Hx of radiation therapy    • Hyperlipidemia    • Hypertension     Severe   • Ischemic heart disease    • Moderate obesity     BMI 36.2   • Myocardial infarction (HCC)    • Pneumonia    • Pneumonia 02/2019   • Radiation 2005   • Seasonal allergies    • Wears glasses        Allergies   Allergen Reactions   • Mircera [Methoxy Polyethylene Glycol-Epoetin Beta] Anaphylaxis     Pt stopped breathing   • Venofer [Iron Sucrose] Anaphylaxis     Pt stopped breathing   • Albuterol Other (See Comments)     Increased blood pressure  Used right before heart attack   • Nifedipine Er Swelling   • Penicillins Hives   • Prednisone Other (See Comments)     Makes jittery/anxious       Past Surgical History:   Procedure Laterality Date   • AV FISTULA PLACEMENT, BRACHIOBASILIC     • BREAST BIOPSY Left 2010   • BREAST CYST EXCISION     • BREAST LUMPECTOMY Right 2005   • BREAST SURGERY     • CARDIAC CATHETERIZATION N/A 10/10/2016    Procedure: Left Heart Cath;  Surgeon: Scooter Zhao MD;  Location: [facility] CATH INVASIVE LOCATION;  Service:    • CARDIAC  CATHETERIZATION  10/2016   • CARDIAC CATHETERIZATION N/A 1/21/2021    Procedure: Right and Left Heart Cath;  Surgeon: Hugh Tidwell MD;  Location:  [facility] CATH INVASIVE LOCATION;  Service: Cardiology;  Laterality: N/A;   • COLONOSCOPY N/A 7/23/2020    Procedure: COLONOSCOPY;  Surgeon: Rubén Rosas MD;  Location:  [facility];  Service: Gastroenterology;  Laterality: N/A;   • CORONARY STENT PLACEMENT  2016   • HERNIA REPAIR     • HYSTERECTOMY  2000   • INSERTION HEMODIALYSIS CATHETER Right 6/29/2016    Procedure: HEMODIALYSIS CATHETER INSERTION TUNNELLED;  Surgeon: Ramón Chavez MD;  Location:  [facility];  Service:    • MASTECTOMY Right 2006   • OOPHORECTOMY     • REDUCTION MAMMAPLASTY Left 2005       Family History   Problem Relation Age of Onset   • Stroke Mother    • Cancer Mother    • Pancreatic cancer Mother    • Coronary artery disease Father    • Heart failure Father    • Breast cancer Sister 55   • Throat cancer Daughter    • Colon cancer Maternal Grandmother    • Ovarian cancer Neg Hx    • Endometrial cancer Neg Hx        Social History     Socioeconomic History   • Marital status:    Tobacco Use   • Smoking status: Never   • Smokeless tobacco: Never   • Tobacco comments:     Michael second hand smoke   Vaping Use   • Vaping Use: Never used   Substance and Sexual Activity   • Alcohol use: Yes     Comment: rarely   • Drug use: No   • Sexual activity: Defer           Objective   Physical Exam  Constitutional:       Appearance: Normal appearance.   HENT:      Head: Normocephalic.      Nose: Nose normal.      Mouth/Throat:      Mouth: Mucous membranes are moist.   Eyes:      General: No scleral icterus.     Conjunctiva/sclera: Conjunctivae normal.      Pupils: Pupils are equal, round, and reactive to light.   Cardiovascular:      Rate and Rhythm: Normal rate and regular rhythm.      Pulses: Normal pulses.   Pulmonary:      Effort: Pulmonary effort is normal.   Abdominal:      General:  Bowel sounds are normal.      Tenderness: There is no abdominal tenderness. There is no guarding.   Genitourinary:     Comments: Anus is swollen, tender, beefy appearing.  No blood noted.  Unable to do SURI due to swelling and tenderness.    Musculoskeletal:         General: Normal range of motion.      Cervical back: Normal range of motion and neck supple.      Right lower leg: No edema.      Left lower leg: No edema.   Skin:     General: Skin is warm and dry.   Neurological:      General: No focal deficit present.      Mental Status: She is alert and oriented to person, place, and time.   Psychiatric:         Mood and Affect: Mood normal.         Critical Care  Performed by: Aaron Chavez PA  Authorized by: Connie Whitfield DO     Critical care provider statement:     Critical care time (minutes):  60    Critical care time was exclusive of:  Separately billable procedures and treating other patients    Critical care was necessary to treat or prevent imminent or life-threatening deterioration of the following conditions: symptomatic anemia requiring blood transfusion.      Critical care was time spent personally by me on the following activities:  Blood draw for specimens, discussions with consultants, discussions with primary provider, evaluation of patient's response to treatment, examination of patient, obtaining history from patient or surrogate, ordering and performing treatments and interventions, ordering and review of laboratory studies, ordering and review of radiographic studies, pulse oximetry, re-evaluation of patient's condition and review of old charts               ED Course      In summary, 78 yr old female with recently diagnosed anal cancer and hx of ESRD on dialysis, with IDDM, HTN, ischemic cardiac disease, presents to the ED with generalized weakness, nausea, vomiting, fatigue.  She fell at home while getting ready to go to her 's  this morning.  No injury from the  fall.  The pt recently started radiation tx and oral chemo.  She has had some rectal bleeding.  Had been on Eliquis but last dose was 4 days ago.    MDM:  Concerns include possible symptomatic anemia from rectal bleeding, electrolyte disorder, dehydration, arhythmia, MI, UTI, vasovagal syncope, etc.    Labs, EKG, and UA obtained.    EKG read by me shows IRBBB with a rate of 59.      Chest xray cardiomegaly. Lungs normally expanded. Mild groundglass opacity and septal thickening lower lobe predominant. No pneumothorax or pleural effusion.    H&H is 7.8/26.8 and is progressive over the past 2 wks.  Creatinine is 7.43 (chronic).  No concerning chemistries.    HS troponin is 172, likely secondary to her ESRD.  Pt has no chest pain.      Pt is generally weak.  States she fell this morning and was too weak to get up.  I will plan to admit for blood transfusion for her symptomatic anemia.  She did not get dialyzed today and will likely need it during her admission.                                             MDM    Final diagnoses:   Symptomatic anemia   Generalized weakness   ESRD on dialysis (HCC)   Impaired mobility   Fall, initial encounter   Elevated troponin       ED Disposition  ED Disposition     ED Disposition   Decision to Admit    Condition   --    Comment   Level of Care: Telemetry [5]   Diagnosis: Hematochezia [306847]   Admitting Physician: KO GARCIA [[id number]]   Attending Physician: KO GARCIA [[id number]]   Certification: I Certify That Inpatient Hospital Services Are Medically Necessary For Greater Than 2 Midnights               No follow-up provider specified.       Medication List      No changes were made to your prescriptions during this visit.          Aaron Chavez PA  03/10/23 1327       Araon Chavez PA  03/10/23 1355       Aaron Chavez PA  03/10/23 1402

## 2023-03-11 ENCOUNTER — APPOINTMENT (OUTPATIENT)
Dept: NEPHROLOGY | Facility: HOSPITAL | Age: 78
End: 2023-03-11
Payer: MEDICARE

## 2023-03-11 LAB
ALBUMIN SERPL-MCNC: 3.3 G/DL (ref 3.5–5.2)
ALBUMIN/GLOB SERPL: 0.8 G/DL
ALP SERPL-CCNC: 80 U/L (ref 39–117)
ALT SERPL W P-5'-P-CCNC: 5 U/L (ref 1–33)
ANION GAP SERPL CALCULATED.3IONS-SCNC: 16 MMOL/L (ref 5–15)
AST SERPL-CCNC: 11 U/L (ref 1–32)
BASOPHILS # BLD AUTO: 0.01 10*3/MM3 (ref 0–0.2)
BASOPHILS NFR BLD AUTO: 0.2 % (ref 0–1.5)
BH BB BLOOD EXPIRATION DATE: NORMAL
BH BB BLOOD EXPIRATION DATE: NORMAL
BH BB BLOOD TYPE BARCODE: 5100
BH BB BLOOD TYPE BARCODE: 5100
BH BB DISPENSE STATUS: NORMAL
BH BB DISPENSE STATUS: NORMAL
BH BB PRODUCT CODE: NORMAL
BH BB PRODUCT CODE: NORMAL
BH BB UNIT NUMBER: NORMAL
BH BB UNIT NUMBER: NORMAL
BILIRUB SERPL-MCNC: 0.7 MG/DL (ref 0–1.2)
BUN SERPL-MCNC: 43 MG/DL (ref 8–23)
BUN/CREAT SERPL: 4.6 (ref 7–25)
CALCIUM SPEC-SCNC: 9.1 MG/DL (ref 8.6–10.5)
CHLORIDE SERPL-SCNC: 93 MMOL/L (ref 98–107)
CO2 SERPL-SCNC: 29 MMOL/L (ref 22–29)
CREAT SERPL-MCNC: 9.37 MG/DL (ref 0.57–1)
CROSSMATCH INTERPRETATION: NORMAL
CROSSMATCH INTERPRETATION: NORMAL
DEPRECATED RDW RBC AUTO: 61.1 FL (ref 37–54)
EGFRCR SERPLBLD CKD-EPI 2021: 4 ML/MIN/1.73
EOSINOPHIL # BLD AUTO: 0.02 10*3/MM3 (ref 0–0.4)
EOSINOPHIL NFR BLD AUTO: 0.4 % (ref 0.3–6.2)
ERYTHROCYTE [DISTWIDTH] IN BLOOD BY AUTOMATED COUNT: 16.9 % (ref 12.3–15.4)
GLOBULIN UR ELPH-MCNC: 4 GM/DL
GLUCOSE BLDC GLUCOMTR-MCNC: 103 MG/DL (ref 70–130)
GLUCOSE BLDC GLUCOMTR-MCNC: 135 MG/DL (ref 70–130)
GLUCOSE BLDC GLUCOMTR-MCNC: 155 MG/DL (ref 70–130)
GLUCOSE BLDC GLUCOMTR-MCNC: 262 MG/DL (ref 70–130)
GLUCOSE BLDC GLUCOMTR-MCNC: 46 MG/DL (ref 70–130)
GLUCOSE BLDC GLUCOMTR-MCNC: 99 MG/DL (ref 70–130)
GLUCOSE SERPL-MCNC: 100 MG/DL (ref 65–99)
HCT VFR BLD AUTO: 23.5 % (ref 34–46.6)
HCT VFR BLD AUTO: 23.7 % (ref 34–46.6)
HCT VFR BLD AUTO: 24 % (ref 34–46.6)
HCT VFR BLD AUTO: 28.4 % (ref 34–46.6)
HGB BLD-MCNC: 7 G/DL (ref 12–15.9)
HGB BLD-MCNC: 7.1 G/DL (ref 12–15.9)
HGB BLD-MCNC: 7.1 G/DL (ref 12–15.9)
HGB BLD-MCNC: 8.2 G/DL (ref 12–15.9)
IMM GRANULOCYTES # BLD AUTO: 0.05 10*3/MM3 (ref 0–0.05)
IMM GRANULOCYTES NFR BLD AUTO: 1.1 % (ref 0–0.5)
LYMPHOCYTES # BLD AUTO: 0.48 10*3/MM3 (ref 0.7–3.1)
LYMPHOCYTES NFR BLD AUTO: 10.6 % (ref 19.6–45.3)
MCH RBC QN AUTO: 30 PG (ref 26.6–33)
MCHC RBC AUTO-ENTMCNC: 29.6 G/DL (ref 31.5–35.7)
MCV RBC AUTO: 101.3 FL (ref 79–97)
MONOCYTES # BLD AUTO: 0.66 10*3/MM3 (ref 0.1–0.9)
MONOCYTES NFR BLD AUTO: 14.5 % (ref 5–12)
NEUTROPHILS NFR BLD AUTO: 3.32 10*3/MM3 (ref 1.7–7)
NEUTROPHILS NFR BLD AUTO: 73.2 % (ref 42.7–76)
NRBC BLD AUTO-RTO: 0.4 /100 WBC (ref 0–0.2)
PLATELET # BLD AUTO: 129 10*3/MM3 (ref 140–450)
PMV BLD AUTO: 10.8 FL (ref 6–12)
POTASSIUM SERPL-SCNC: 3.4 MMOL/L (ref 3.5–5.2)
PROT SERPL-MCNC: 7.3 G/DL (ref 6–8.5)
RBC # BLD AUTO: 2.37 10*6/MM3 (ref 3.77–5.28)
SODIUM SERPL-SCNC: 138 MMOL/L (ref 136–145)
UNIT  ABO: NORMAL
UNIT  ABO: NORMAL
UNIT  RH: NORMAL
UNIT  RH: NORMAL
WBC NRBC COR # BLD: 4.54 10*3/MM3 (ref 3.4–10.8)

## 2023-03-11 PROCEDURE — 85018 HEMOGLOBIN: CPT | Performed by: FAMILY MEDICINE

## 2023-03-11 PROCEDURE — 85025 COMPLETE CBC W/AUTO DIFF WBC: CPT | Performed by: FAMILY MEDICINE

## 2023-03-11 PROCEDURE — 80053 COMPREHEN METABOLIC PANEL: CPT | Performed by: FAMILY MEDICINE

## 2023-03-11 PROCEDURE — 63710000001 INSULIN LISPRO (HUMAN) PER 5 UNITS: Performed by: FAMILY MEDICINE

## 2023-03-11 PROCEDURE — 99232 SBSQ HOSP IP/OBS MODERATE 35: CPT | Performed by: FAMILY MEDICINE

## 2023-03-11 PROCEDURE — 82962 GLUCOSE BLOOD TEST: CPT

## 2023-03-11 PROCEDURE — 36430 TRANSFUSION BLD/BLD COMPNT: CPT

## 2023-03-11 PROCEDURE — P9016 RBC LEUKOCYTES REDUCED: HCPCS

## 2023-03-11 PROCEDURE — G0257 UNSCHED DIALYSIS ESRD PT HOS: HCPCS

## 2023-03-11 PROCEDURE — 85014 HEMATOCRIT: CPT | Performed by: FAMILY MEDICINE

## 2023-03-11 PROCEDURE — 86900 BLOOD TYPING SEROLOGIC ABO: CPT

## 2023-03-11 PROCEDURE — 99223 1ST HOSP IP/OBS HIGH 75: CPT | Performed by: INTERNAL MEDICINE

## 2023-03-11 RX ORDER — HYDROXYZINE HYDROCHLORIDE 25 MG/1
25 TABLET, FILM COATED ORAL ONCE AS NEEDED
Status: COMPLETED | OUTPATIENT
Start: 2023-03-11 | End: 2023-03-11

## 2023-03-11 RX ORDER — ALBUMIN (HUMAN) 12.5 G/50ML
12.5 SOLUTION INTRAVENOUS AS NEEDED
Status: ACTIVE | OUTPATIENT
Start: 2023-03-11 | End: 2023-03-13

## 2023-03-11 RX ADMIN — CARVEDILOL 12.5 MG: 12.5 TABLET, FILM COATED ORAL at 19:47

## 2023-03-11 RX ADMIN — CARVEDILOL 12.5 MG: 12.5 TABLET, FILM COATED ORAL at 08:21

## 2023-03-11 RX ADMIN — AMIODARONE HYDROCHLORIDE 200 MG: 200 TABLET ORAL at 08:30

## 2023-03-11 RX ADMIN — INSULIN LISPRO 5 UNITS: 100 INJECTION, SOLUTION INTRAVENOUS; SUBCUTANEOUS at 12:03

## 2023-03-11 RX ADMIN — ASPIRIN 81 MG: 81 TABLET, COATED ORAL at 08:21

## 2023-03-11 RX ADMIN — Medication: at 19:03

## 2023-03-11 RX ADMIN — Medication 10 ML: at 08:31

## 2023-03-11 RX ADMIN — HYDROCORTISONE 2.5%: 25 CREAM TOPICAL at 08:47

## 2023-03-11 RX ADMIN — NORTRIPTYLINE HYDROCHLORIDE 10 MG: 10 CAPSULE ORAL at 19:48

## 2023-03-11 RX ADMIN — CLONIDINE HYDROCHLORIDE 0.4 MG: 0.1 TABLET ORAL at 19:48

## 2023-03-11 RX ADMIN — INSULIN LISPRO 6 UNITS: 100 INJECTION, SOLUTION INTRAVENOUS; SUBCUTANEOUS at 12:04

## 2023-03-11 RX ADMIN — ISOSORBIDE MONONITRATE 120 MG: 120 TABLET, EXTENDED RELEASE ORAL at 08:20

## 2023-03-11 RX ADMIN — HYDROCORTISONE 2.5%: 25 CREAM TOPICAL at 20:24

## 2023-03-11 RX ADMIN — HYDROXYZINE HYDROCHLORIDE 25 MG: 25 TABLET ORAL at 20:22

## 2023-03-11 NOTE — CONSULTS
Subjective     CHIEF COMPLAINT: rectal bleeding    HISTORY OF PRESENT ILLNESS:  The patient is a 78 y.o. female, referred by Connie Whitfield* for anal squamous cell carcinoma. The patient was started on concurrent radiation with xeloda. She is followed by my partner Dr. Rojo. The patient was admitted last night for rectal bleeding. Her labs showed Hgb of 7.1 I was consulted to further assist in her care.   When I saw the patient she is getting dialysis.  She is feeling better.  She has not been able to tolerate Xeloda secondary to diarrhea.    REVIEW OF SYSTEMS:  A 14 point review of systems was performed and is negative except as noted above.    Past Medical History:   Diagnosis Date   • Acute bronchitis    • Acute conjunctivitis    • Acute kidney injury (HCC)     Admission from 12/26/2013 to 01/02/2014, now resolved with latest creatinine 1.4 on 01/08/2014.   • Acute non-ST segment elevation myocardial infarction (STEMI) following previous myocardial infarction    • Anal cancer (HCC) 2/8/2023   • Anal cancer (HCC) 2/8/2023   • Arthritis    • Breast cancer, female, right     2005 mastectomy right   • Cancer (HCC)    • CHF (congestive heart failure) (HCC)    • Chronic kidney disease     dialysis MWF, f/u [facility]    • Clotting disorder (HCC)    • COVID-19    • Diabetes mellitus (HCC)     diagnosed ~1992, checks fsbg 2-3x/day   • Diarrhea    • Dyslipidemia    • Dyspepsia    • Dyspnea    • Edema    • History of transfusion     ~2013 no reaction    • Hx of radiation therapy    • Hyperlipidemia    • Hypertension     Severe   • Ischemic heart disease    • Moderate obesity     BMI 36.2   • Myocardial infarction (HCC)    • Pneumonia    • Pneumonia 02/2019   • Radiation 2005   • Seasonal allergies    • Wears glasses        No current facility-administered medications on file prior to encounter.     Current Outpatient Medications on File Prior to Encounter   Medication Sig Dispense Refill   •  acetaminophen (TYLENOL) 500 MG tablet Take 500 mg by mouth.     • amiodarone (PACERONE) 200 MG tablet Take 1 tablet by mouth Daily. 90 tablet 1   • ammonium lactate (LAC-HYDRIN) 12 % lotion Apply  topically to the appropriate area as directed As Needed for Dry Skin. (Patient taking differently: Apply 1 application topically to the appropriate area as directed 2 (Two) Times a Day As Needed for Dry Skin.) 500 g 3   • apixaban (ELIQUIS) 2.5 MG tablet tablet Take 1 tablet by mouth 2 (Two) Times a Day. 180 tablet 3   • aspirin 81 MG EC tablet Take 1 tablet by mouth Daily.     • atorvastatin (LIPITOR) 80 MG tablet Take 1 tablet by mouth Every Night. 90 tablet 3   • B Complex-C-Folic Acid (DIALYVITE 800 PO) Take 1 tablet by mouth 3 (Three) Times a Week. On dialysis says MWF     • bisacodyl (DULCOLAX) 5 MG EC tablet Take 5 mg by mouth Daily As Needed for Constipation.     • capecitabine (XELODA) 500 MG chemo tablet Take 2 tablets by mouth 2 (Two) Times a Day on Monday-Friday with radiation. 120 tablet 0   • Capsaicin 0.1 % cream Apply 2-4 gms to feet nightly. Use gloves 60 g 3   • carvedilol (COREG) 12.5 MG tablet Take 1 tablet by mouth Every 12 (Twelve) Hours. 180 tablet 1   • cloNIDine (CATAPRES) 0.2 MG tablet Take 2 tablets by mouth Every 12 (Twelve) Hours. 120 tablet 5   • dorzolamide-timolol (COSOPT) 22.3-6.8 MG/ML ophthalmic solution Administer 1 drop to both eyes Daily. 10 mL 3   • Durezol 0.05 % ophthalmic emulsion INSTILL 1 DROP 4 TIMES DAILY INTO SURGICAL EYE STARTING AFTER SURGERY.     • Epoetin Rusty (EPOGEN IJ) Epoetin Rusty (Epogen)     • fluocinonide (LIDEX) 0.05 % external solution Apply 0.05 application topically to the appropriate area as directed 2 (Two) Times a Day. Scalp  2   • glucose blood test strip Use as instructed to monitor blood glucose 1-2 times daily 100 each 12   • glucose monitor monitoring kit Use as directed to monitor blood glucose 1-2 times daily 1 each 0   • Hydrocortisone, Perianal,  (ANUSOL-HC) 2.5 % rectal cream Insert  into the rectum 2 (Two) Times a Day. 28 g 0   • HYDROmorphone (DILAUDID) 2 MG tablet Take 1 tablet by mouth Every 6 (Six) Hours As Needed for Moderate Pain. 60 tablet 0   • IRON DEXTRAN IJ Inject  as directed 3 (Three) Times a Week.     • isosorbide mononitrate (IMDUR) 120 MG 24 hr tablet Take 1 tablet by mouth Daily. 90 tablet 3   • Lancets Ultra Fine misc Use as directed to check blood sugar 1-2 times daily 100 each 11   • Lidocaine 5 % cream Apply 1 g topically 2 (Two) Times a Day As Needed (pain). 30 g 0   • lidocaine-prilocaine (EMLA) 2.5-2.5 % cream Apply 1 application topically to the appropriate area as directed As Needed (dialysis access).  6   • megestrol (MEGACE) 40 MG tablet Take 40 mg by mouth Daily.     • metroNIDAZOLE (Flagyl) 500 MG tablet Take 1 tablet by mouth 3 (Three) Times a Day. 30 tablet 0   • Morphine (MS CONTIN) 15 MG 12 hr tablet Take 1 tablet by mouth 2 (Two) Times a Day. 60 tablet 0   • nitroglycerin (Nitrolingual) 0.4 MG/SPRAY spray Place 1 spray under the tongue Every 5 (Five) Minutes As Needed for Chest Pain. 1 each 12   • nortriptyline (PAMELOR) 10 MG capsule Take 1 capsule by mouth Every Night. 30 capsule 3   • NovoLIN 70/30 (70-30) 100 UNIT/ML injection INJECT 20 UNITS SUBCUTANEOUSLY BEFORE SUPPER AND 17 UNITS BEFORE BREAKFAST 10 mL 0   • ondansetron (ZOFRAN) 8 MG tablet Take 1 tablet by mouth 3 (Three) Times a Day As Needed for Nausea or Vomiting. 30 tablet 5   • terazosin (HYTRIN) 2 MG capsule Take 2 capsules by mouth Every Night. 180 capsule 3   • torsemide (Demadex) 20 MG tablet 1 PO QAM and 2 PO QPM 90 tablet 5   • VITAMIN D PO Take 1 mcg by mouth.         Allergies   Allergen Reactions   • Mircera [Methoxy Polyethylene Glycol-Epoetin Beta] Anaphylaxis     Pt stopped breathing   • Venofer [Iron Sucrose] Anaphylaxis     Pt stopped breathing   • Albuterol Other (See Comments)     Increased blood pressure  Used right before heart attack   •  Nifedipine Er Swelling   • Penicillins Hives   • Prednisone Other (See Comments)     Makes jittery/anxious       Past Surgical History:   Procedure Laterality Date   • AV FISTULA PLACEMENT, BRACHIOBASILIC     • BREAST BIOPSY Left    • BREAST CYST EXCISION     • BREAST LUMPECTOMY Right    • BREAST SURGERY     • CARDIAC CATHETERIZATION N/A 10/10/2016    Procedure: Left Heart Cath;  Surgeon: Scooter Zhao MD;  Location:  [facility];  Service:    • CARDIAC CATHETERIZATION  10/2016   • CARDIAC CATHETERIZATION N/A 2021    Procedure: Right and Left Heart Cath;  Surgeon: Hugh Tidwell MD;  Location:  [facility];  Service: Cardiology;  Laterality: N/A;   • COLONOSCOPY N/A 2020    Procedure: COLONOSCOPY;  Surgeon: Rubén Rosas MD;  Location:  [facility];  Service: Gastroenterology;  Laterality: N/A;   • CORONARY STENT PLACEMENT     • HERNIA REPAIR     • HYSTERECTOMY     • INSERTION HEMODIALYSIS CATHETER Right 2016    Procedure: HEMODIALYSIS CATHETER INSERTION TUNNELLED;  Surgeon: Ramón Chavez MD;  Location:  [facility];  Service:    • MASTECTOMY Right    • OOPHORECTOMY     • REDUCTION MAMMAPLASTY Left        OB History    Para Term  AB Living   5 5 5         SAB IAB Ectopic Molar Multiple Live Births                    # Outcome Date GA Lbr Richard/2nd Weight Sex Delivery Anes PTL Lv   5 Term            4 Term            3 Term            2 Term            1 Term                Social History     Socioeconomic History   • Marital status:    Tobacco Use   • Smoking status: Never   • Smokeless tobacco: Never   • Tobacco comments:     Michael second hand smoke   Vaping Use   • Vaping Use: Never used   Substance and Sexual Activity   • Alcohol use: Yes     Comment: rarely   • Drug use: No   • Sexual activity: Defer       Family History   Problem Relation Age of Onset   • Stroke Mother    • Cancer Mother    •  Pancreatic cancer Mother    • Coronary artery disease Father    • Heart failure Father    • Breast cancer Sister 55   • Throat cancer Daughter    • Colon cancer Maternal Grandmother    • Ovarian cancer Neg Hx    • Endometrial cancer Neg Hx        Objective     Vitals:    03/11/23 0020 03/11/23 0402 03/11/23 0718 03/11/23 0755   BP: 166/65 175/84 (!) 207/84    BP Location: Left arm Left arm Left arm    Patient Position: Lying Lying Lying    Pulse: 64 64 69    Resp: 18 18 18    Temp: 98.5 °F (36.9 °C) 98.3 °F (36.8 °C) 97.9 °F (36.6 °C)    TempSrc: Oral Oral Oral    SpO2: 98% 97% 98%    Weight:    79.1 kg (174 lb 5.8 oz)   Height:                            ECOG Performance Status: 2 - Symptomatic, <50% confined to bed  General: well appearing female in no acute distress  Neuro/Psych: A&O x 3, gait steady, appropriate affect, strength 5/5 in all muscle groups  HEENT: sclerae anicteric, oropharynx clear  Lymphatics: no cervical, supraclavicular, or axillary adenopathy  Cardiovascular: regular rate and rhythm, no murmurs  Lungs: clear to auscultation bilaterally  Abdomen: soft, nontender, nondistended.  No palpable organomegaly  Extremities: no lower extremity edema  Skin: no rashes, lesions, bruising, or petechiae      Admission on 03/10/2023   Component Date Value Ref Range Status   • QT Interval 03/10/2023 448  ms Final   • QTC Interval 03/10/2023 443  ms Final   • Glucose 03/10/2023 196 (H)  65 - 99 mg/dL Final   • BUN 03/10/2023 38 (H)  8 - 23 mg/dL Final   • Creatinine 03/10/2023 7.43 (H)  0.57 - 1.00 mg/dL Final   • Sodium 03/10/2023 136  136 - 145 mmol/L Final   • Potassium 03/10/2023 4.0  3.5 - 5.2 mmol/L Final    Slight hemolysis detected by analyzer. Results may be affected.   • Chloride 03/10/2023 90 (L)  98 - 107 mmol/L Final   • CO2 03/10/2023 24.0  22.0 - 29.0 mmol/L Final   • Calcium 03/10/2023 9.2  8.6 - 10.5 mg/dL Final   • Total Protein 03/10/2023 7.7  6.0 - 8.5 g/dL Final   • Albumin 03/10/2023 3.6   3.5 - 5.2 g/dL Final   • ALT (SGPT) 03/10/2023 5  1 - 33 U/L Final   • AST (SGOT) 03/10/2023 17  1 - 32 U/L Final   • Alkaline Phosphatase 03/10/2023 86  39 - 117 U/L Final   • Total Bilirubin 03/10/2023 0.7  0.0 - 1.2 mg/dL Final   • Globulin 03/10/2023 4.1  gm/dL Final    Calculated Result   • A/G Ratio 03/10/2023 0.9  g/dL Final   • BUN/Creatinine Ratio 03/10/2023 5.1 (L)  7.0 - 25.0 Final   • Anion Gap 03/10/2023 22.0 (H)  5.0 - 15.0 mmol/L Final   • eGFR 03/10/2023 5.3 (L)  >60.0 mL/min/1.73 Final    <15 Indicative of kidney failure   • HS Troponin T 03/10/2023 172 (C)  <10 ng/L Final   • Magnesium 03/10/2023 2.2  1.6 - 2.4 mg/dL Final   • Extra Tube 03/10/2023 Hold for add-ons.   Final    Auto resulted.   • Extra Tube 03/10/2023 hold for add-on   Final    Auto resulted   • Extra Tube 03/10/2023 Hold for add-ons.   Final    Auto resulted.   • Extra Tube 03/10/2023 Hold for add-ons.   Final    Auto resulted.   • Extra Tube 03/10/2023 Hold for add-ons.   Final    Auto resulted   • WBC 03/10/2023 5.04  3.40 - 10.80 10*3/mm3 Final   • RBC 03/10/2023 2.60 (L)  3.77 - 5.28 10*6/mm3 Final   • Hemoglobin 03/10/2023 7.8 (L)  12.0 - 15.9 g/dL Final   • Hematocrit 03/10/2023 26.8 (L)  34.0 - 46.6 % Final   • MCV 03/10/2023 103.1 (H)  79.0 - 97.0 fL Final   • MCH 03/10/2023 30.0  26.6 - 33.0 pg Final   • MCHC 03/10/2023 29.1 (L)  31.5 - 35.7 g/dL Final   • RDW 03/10/2023 17.0 (H)  12.3 - 15.4 % Final   • RDW-SD 03/10/2023 63.5 (H)  37.0 - 54.0 fl Final   • MPV 03/10/2023 11.4  6.0 - 12.0 fL Final   • Platelets 03/10/2023 143  140 - 450 10*3/mm3 Final   • Neutrophil % 03/10/2023 69.0  42.7 - 76.0 % Final   • Lymphocyte % 03/10/2023 15.5 (L)  19.6 - 45.3 % Final   • Monocyte % 03/10/2023 13.5 (H)  5.0 - 12.0 % Final   • Eosinophil % 03/10/2023 0.2 (L)  0.3 - 6.2 % Final   • Basophil % 03/10/2023 0.4  0.0 - 1.5 % Final   • Immature Grans % 03/10/2023 1.4 (H)  0.0 - 0.5 % Final   • Neutrophils, Absolute 03/10/2023 3.48  1.70 -  7.00 10*3/mm3 Final   • Lymphocytes, Absolute 03/10/2023 0.78  0.70 - 3.10 10*3/mm3 Final   • Monocytes, Absolute 03/10/2023 0.68  0.10 - 0.90 10*3/mm3 Final   • Eosinophils, Absolute 03/10/2023 0.01  0.00 - 0.40 10*3/mm3 Final   • Basophils, Absolute 03/10/2023 0.02  0.00 - 0.20 10*3/mm3 Final   • Immature Grans, Absolute 03/10/2023 0.07 (H)  0.00 - 0.05 10*3/mm3 Final   • nRBC 03/10/2023 0.0  0.0 - 0.2 /100 WBC Final   • ABO Type 03/10/2023 B   Final   • RH type 03/10/2023 Positive   Final   • Antibody Screen 03/10/2023 Negative   Final   • T&S Expiration Date 03/10/2023 3/13/2023 11:59:59 PM   Final   • Product Code 03/10/2023 [id number]   Final   • Unit Number 03/10/2023 [id number]   Final   • UNIT  ABO 03/10/2023 O   Final   • UNIT  RH 03/10/2023 POS   Final   • Crossmatch Interpretation 03/10/2023 Compatible   Final   • Dispense Status 03/10/2023 XM   Final   • Blood Expiration Date 03/10/2023 [id number]   Final   • Blood Type Barcode 03/10/2023 5100   Final   • Product Code 03/10/2023 [id number]   Final   • Unit Number 03/10/2023 [id number]   Final   • UNIT  ABO 03/10/2023 O   Final   • UNIT  RH 03/10/2023 POS   Final   • Crossmatch Interpretation 03/10/2023 Compatible   Final   • Dispense Status 03/10/2023 XM   Final   • Blood Expiration Date 03/10/2023 [id number]   Final   • Blood Type Barcode 03/10/2023 5100   Final   • HS Troponin T 03/10/2023 173 (C)  <10 ng/L Final   • Hemoglobin 03/10/2023 7.6 (L)  12.0 - 15.9 g/dL Final   • Hematocrit 03/10/2023 25.4 (L)  34.0 - 46.6 % Final   • Glucose 03/10/2023 136 (H)  70 - 130 mg/dL Final    Meter: [id number] : [id number] Jay Rosales   • Hemoglobin 03/11/2023 7.0 (L)  12.0 - 15.9 g/dL Final   • Hematocrit 03/11/2023 23.5 (L)  34.0 - 46.6 % Final   • HS Troponin T 03/10/2023 176 (C)  <10 ng/L Final   • Troponin T Delta 03/10/2023 3  >=-4 - <+4 ng/L Final   • WBC 03/11/2023 4.54  3.40 - 10.80 10*3/mm3 Final   • RBC 03/11/2023 2.37 (L)  3.77 - 5.28  10*6/mm3 Final   • Hemoglobin 03/11/2023 7.1 (L)  12.0 - 15.9 g/dL Final   • Hematocrit 03/11/2023 24.0 (L)  34.0 - 46.6 % Final   • MCV 03/11/2023 101.3 (H)  79.0 - 97.0 fL Final   • MCH 03/11/2023 30.0  26.6 - 33.0 pg Final   • MCHC 03/11/2023 29.6 (L)  31.5 - 35.7 g/dL Final   • RDW 03/11/2023 16.9 (H)  12.3 - 15.4 % Final   • RDW-SD 03/11/2023 61.1 (H)  37.0 - 54.0 fl Final   • MPV 03/11/2023 10.8  6.0 - 12.0 fL Final   • Platelets 03/11/2023 129 (L)  140 - 450 10*3/mm3 Final   • Neutrophil % 03/11/2023 73.2  42.7 - 76.0 % Final   • Lymphocyte % 03/11/2023 10.6 (L)  19.6 - 45.3 % Final   • Monocyte % 03/11/2023 14.5 (H)  5.0 - 12.0 % Final   • Eosinophil % 03/11/2023 0.4  0.3 - 6.2 % Final   • Basophil % 03/11/2023 0.2  0.0 - 1.5 % Final   • Immature Grans % 03/11/2023 1.1 (H)  0.0 - 0.5 % Final   • Neutrophils, Absolute 03/11/2023 3.32  1.70 - 7.00 10*3/mm3 Final   • Lymphocytes, Absolute 03/11/2023 0.48 (L)  0.70 - 3.10 10*3/mm3 Final   • Monocytes, Absolute 03/11/2023 0.66  0.10 - 0.90 10*3/mm3 Final   • Eosinophils, Absolute 03/11/2023 0.02  0.00 - 0.40 10*3/mm3 Final   • Basophils, Absolute 03/11/2023 0.01  0.00 - 0.20 10*3/mm3 Final   • Immature Grans, Absolute 03/11/2023 0.05  0.00 - 0.05 10*3/mm3 Final   • nRBC 03/11/2023 0.4 (H)  0.0 - 0.2 /100 WBC Final   • Glucose 03/11/2023 100 (H)  65 - 99 mg/dL Final   • BUN 03/11/2023 43 (H)  8 - 23 mg/dL Final   • Creatinine 03/11/2023 9.37 (H)  0.57 - 1.00 mg/dL Final   • Sodium 03/11/2023 138  136 - 145 mmol/L Final   • Potassium 03/11/2023 3.4 (L)  3.5 - 5.2 mmol/L Final   • Chloride 03/11/2023 93 (L)  98 - 107 mmol/L Final   • CO2 03/11/2023 29.0  22.0 - 29.0 mmol/L Final   • Calcium 03/11/2023 9.1  8.6 - 10.5 mg/dL Final   • Total Protein 03/11/2023 7.3  6.0 - 8.5 g/dL Final   • Albumin 03/11/2023 3.3 (L)  3.5 - 5.2 g/dL Final   • ALT (SGPT) 03/11/2023 5  1 - 33 U/L Final   • AST (SGOT) 03/11/2023 11  1 - 32 U/L Final   • Alkaline Phosphatase 03/11/2023 80   39 - 117 U/L Final   • Total Bilirubin 03/11/2023 0.7  0.0 - 1.2 mg/dL Final   • Globulin 03/11/2023 4.0  gm/dL Final    Calculated Result   • A/G Ratio 03/11/2023 0.8  g/dL Final   • BUN/Creatinine Ratio 03/11/2023 4.6 (L)  7.0 - 25.0 Final   • Anion Gap 03/11/2023 16.0 (H)  5.0 - 15.0 mmol/L Final   • eGFR 03/11/2023 4.0 (L)  >60.0 mL/min/1.73 Final    <15 Indicative of kidney failure   • Glucose 03/11/2023 99  70 - 130 mg/dL Final    Meter: [id number] : [id number] Alix Sullivan   • Glucose 03/11/2023 103  70 - 130 mg/dL Final    Meter: [id number] : [id number] Clover Roberson        No results found.    ASSESSMENT 79 YO female with rectal bleeding    PLAN  1. Rectal bleeding:  A. Induced by cancer and cancer treatment.  B. Monitor clinically.    2. Anemia:  A. Will transfuse 1 unit of blood with HD today.    3. Anal squamous cell carcinoma:  A. Will resume Xeloda as an outpatient.  B.  I will decrease the dose to 500 mg twice daily on days of radiation only.    4.  Chemotherapy-induced diarrhea:  A.  We will start the patient on Imodium as needed.      Concepción Vazquez MD    3/11/2023

## 2023-03-11 NOTE — PROGRESS NOTES
[facility] Medicine Services  PROGRESS NOTE    Patient Name: Esperanza Pop  : 1945  MRN: [id number]    Date of Admission: 3/10/2023  Primary Care Physician: Meghana Rodgers MD    Subjective   Subjective     CC:  F/u generalized weakness     HPI:  Pt seen prior to dialysis  Still weak, no pain    ROS:  Gen- No fevers, chills  CV- No chest pain, palpitations  Resp- No cough, dyspnea  GI- No N/V/D, abd pain        Objective   Objective     Vital Signs:   Temp:  [97.8 °F (36.6 °C)-98.6 °F (37 °C)] 97.9 °F (36.6 °C)  Heart Rate:  [64-69] 69  Resp:  [14-18] 18  BP: (166-207)/(65-87) 207/84  Flow (L/min):  [2] 2     Physical Exam:  Constitutional: No acute distress, awake, alert  HENT: NCAT, mucous membranes moist  Respiratory: Clear to auscultation bilaterally, respiratory effort normal   Cardiovascular: RRR, + murmurs, rubs, or gallops  Gastrointestinal: Positive bowel sounds, soft, nontender, nondistended  Musculoskeletal: + bilateral ankle edema  Psychiatric: Appropriate affect, cooperative  Neurologic: Oriented x 3, speech clear  Skin: No rashes      Results Reviewed:  LAB RESULTS:      Lab 23  0741 23  0028 03/10/23  1809 03/10/23  1145   WBC 4.54  --   --  5.04   HEMOGLOBIN 7.1* 7.0* 7.6* 7.8*   HEMATOCRIT 24.0* 23.5* 25.4* 26.8*   PLATELETS 129*  --   --  143   NEUTROS ABS 3.32  --   --  3.48   IMMATURE GRANS (ABS) 0.05  --   --  0.07*   LYMPHS ABS 0.48*  --   --  0.78   MONOS ABS 0.66  --   --  0.68   EOS ABS 0.02  --   --  0.01   .3*  --   --  103.1*         Lab 23  0741 03/10/23  1145   SODIUM 138 136   POTASSIUM 3.4* 4.0   CHLORIDE 93* 90*   CO2 29.0 24.0   ANION GAP 16.0* 22.0*   BUN 43* 38*   CREATININE 9.37* 7.43*   EGFR 4.0* 5.3*   GLUCOSE 100* 196*   CALCIUM 9.1 9.2   MAGNESIUM  --  2.2         Lab 23  0741 03/10/23  1145   TOTAL PROTEIN 7.3 7.7   ALBUMIN 3.3* 3.6   GLOBULIN 4.0 4.1   ALT (SGPT) 5 5   AST (SGOT) 11 17   BILIRUBIN 0.7 0.7    ALK PHOS 80 86         Lab 03/10/23  2123 03/10/23  1809 03/10/23  1145   HSTROP T 176* 173* 172*             Lab 03/10/23  1418   ABO TYPING B   RH TYPING Positive   ANTIBODY SCREEN Negative         Brief Urine Lab Results     None          Microbiology Results Abnormal     None          XR Chest 1 View    Result Date: 3/10/2023  XR CHEST 1 VW Date of Exam: 3/10/2023 12:05 PM EST Indication: Weak/Dizzy/AMS triage protocol. Comparison: CT chest with contrast 2/1/2023, chest radiograph 7/21/2021 Findings: Cardiomegaly. Lungs normally expanded. Mild groundglass opacity and septal thickening lower lobe predominant. No pneumothorax or pleural effusion.     Impression: Impression: Questionable pulmonary edema. Cardiomegaly. Electronically Signed: Shannan Eirckson  3/10/2023 12:20 PM EST  Workstation ID: [id number]      Results for orders placed during the hospital encounter of 01/19/21    Adult Transthoracic Echo Complete W/ Cont if Necessary Per Protocol    Interpretation Summary  · Estimated left ventricular EF = 60% Left ventricular ejection fraction appears to be 56 - 60%. Left ventricular systolic function is normal.  · Left ventricular diastolic function is consistent with (grade II w/high LAP) pseudonormalization.  · Left atrial volume is severely increased.  · Moderate aortic valve stenosis is present.  · Estimated right ventricular systolic pressure from tricuspid regurgitation is moderately elevated (45-55 mmHg). Calculated right ventricular systolic pressure from tricuspid regurgitation is 51 mmHg.  · Moderate pulmonary hypertension is present.  · The right atrial cavity is mild to moderately dilated.  · Mild mitral valve stenosis is present with functional MAC.  · Moderate tricuspid valve regurgitation is present.  · Normal right ventricular wall thickness and septal motion noted with the right ventricular cavity mildly dilated; mildly reduced right ventricular systolic function noted.  · There is no evidence of  "pericardial effusion.      Current medications:  Scheduled Meds:amiodarone, 200 mg, Oral, Daily  aspirin, 81 mg, Oral, Daily  atorvastatin, 80 mg, Oral, Nightly  carvedilol, 12.5 mg, Oral, Q12H  cloNIDine, 0.4 mg, Oral, Q12H  Hydrocortisone (Perianal), , Rectal, BID  insulin lispro, 0-9 Units, Subcutaneous, TID With Meals  Insulin Lispro, 5 Units, Subcutaneous, TID With Meals  isosorbide mononitrate, 120 mg, Oral, Daily  Morphine, 15 mg, Oral, BID  nortriptyline, 10 mg, Oral, Nightly  sodium chloride, 10 mL, Intravenous, Q12H      Continuous Infusions:   PRN Meds:.•  dextrose  •  dextrose  •  glucagon (human recombinant)  •  HYDROmorphone  •  ondansetron **OR** ondansetron  •  sodium chloride  •  sodium chloride  •  sodium chloride    Assessment & Plan   Assessment & Plan     Active Hospital Problems    Diagnosis  POA   • **Hematochezia [K92.1]  Yes   • PAF (paroxysmal atrial fibrillation) ([facility]) [I48.0]  Yes   • CHF (congestive heart failure) ([facility]) [I50.9]  Yes   • End stage renal disease on dialysis ([facility]) [N18.6, Z99.2]  Not Applicable   • Hyperlipidemia LDL goal <70 [E78.5]  Yes   • Essential hypertension [I10]  Yes   • Type 2 diabetes mellitus ([facility]) [E11.9]  Yes      Resolved Hospital Problems   No resolved problems to display.        Brief Hospital Course to date:  Esperanza Pop is a 78 y.o. female  with PMH of ESRD on HD MWF, IDDM, HTN, HLD, HFpEF, Pulm HTN, Hx of breast cancer (s/p mastectomy and radiation) PAF (on xarelto), Chronic anemia, and rectal cancer (recently diagnosed currently gettintg XRT and chemo) that presented to the ED after a fall at home. Her 's  was today and she was getting ready to go when her legs \"gave out\". She had been having loose stool and bloody rectal discharge but that had improved prior to admission.     Generalized Weakness  Acute on Chronic Anemia  -hgb on admission  7.8, now 7.1   -serial H&H   -transfuse one unit with dialysis   -rectal bleeding is apparently " improving       Rectal Cancer  Hx breast cancer   -sees Dr Cavazos with rad onc  -sees Dr Rojo with oncology, currently on Xeloda  -consult in am for both   -continued home pain regimen      ESRD on HD MWF  -nephrology consulted   -missed dialysis yesterday      HFpEF  Pulm HTN  PAF  HTN  HLD   -holding eliquis  -ASA  -amiodarone   -last echo EF 60% with grade II diastolic dysfunction pulm htn      Elevated Troponin   -no chest pain  -will repeat echo given edema           Expected Discharge Location and Transportation: home   Expected Discharge   Expected Discharge Date and Time     Expected Discharge Date Expected Discharge Time    Mar 12, 2023            DVT prophylaxis:  Mechanical DVT prophylaxis orders are present.     AM-PAC 6 Clicks Score (PT): 15 (03/10/23 2000)    CODE STATUS:   Code Status and Medical Interventions:   Ordered at: 03/10/23 1448     Medical Intervention Limits:    NO intubation (DNI)     Code Status (Patient has no pulse and is not breathing):    No CPR (Do Not Attempt to Resuscitate)     Medical Interventions (Patient has pulse or is breathing):    Limited Support       Connie Whitfield,   03/11/23

## 2023-03-11 NOTE — NURSING NOTE
Attempted to administer night time medications to patient but she states she has already taken all of her medicine for the day. Education provided on importance of compliance with medication regiment, patient is a/ox4 and provided understanding but refused still. Will continue with plan of care.

## 2023-03-11 NOTE — NURSING NOTE
Notified on call hospitalist of critical lab result troponin 176. Patient denies any symptoms such as chest pain at this time. New orders received to monitor and repeat troponin. Will continue with plan of care.

## 2023-03-11 NOTE — PLAN OF CARE
Goal Outcome Evaluation:   Patient had dialysis today, 2.5L removed. 1 unit of blood given with dialysis for hgb of 7.1. Topical spray ordered for rectum pain prn. Family at bedside. Currently on room air.

## 2023-03-11 NOTE — PROGRESS NOTES
visited patient per RN referral due to the fact that pt's spouse's  was today.  Patient talked about their 60 year marriage, shared that she is a  at her [employer] and leans heavily on her ashleigh.  She appreciated empathic listening and prayer.

## 2023-03-11 NOTE — CONSULTS
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 She missed her HD treatment yesterday as her  passed away and she had to attend . [place] [facility] [facility]. She is seen on HD tolerating treatment well. Did get 1 unit pRBC with HD. UF increased to 3 lter as tolerated.    Review of Systems   Constitutional: Negative.    HENT: Negative.    Respiratory: Positive for shortness of breath.    Cardiovascular: Positive for leg swelling.   Gastrointestinal: Negative.    Genitourinary: Negative.    Musculoskeletal: Negative.    Skin: Negative.    Neurological: Negative.    Hematological: Negative.    Psychiatric/Behavioral: Negative.         Past Medical History:   Diagnosis Date   • Acute bronchitis    • Acute conjunctivitis    • Acute kidney injury (HCC)     Admission from 2013 to 2014, now resolved with latest creatinine 1.4 on 2014.   • Acute non-ST segment elevation myocardial infarction (STEMI) following previous myocardial infarction    • Anal cancer (HCC) 2023   • Anal cancer (HCC) 2023   • Arthritis    • Breast cancer, female, right     2005 mastectomy right   • Cancer (HCC)    • CHF (congestive heart failure) (HCC)    • Chronic kidney disease     dialysis MWF, [facility]    • Clotting disorder (HCC)    • COVID-19    • Diabetes mellitus (HCC)     diagnosed ~,  checks fsbg 2-3x/day   • Diarrhea    • Dyslipidemia    • Dyspepsia    • Dyspnea    • Edema    • History of transfusion     ~2013 no reaction    • Hx of radiation therapy    • Hyperlipidemia    • Hypertension     Severe   • Ischemic heart disease    • Moderate obesity     BMI 36.2   • Myocardial infarction (HCC)    • Pneumonia    • Pneumonia 02/2019   • Radiation 2005   • Seasonal allergies    • Wears glasses    ,   Past Surgical History:   Procedure Laterality Date   • AV FISTULA PLACEMENT, BRACHIOBASILIC     • BREAST BIOPSY Left 2010   • BREAST CYST EXCISION     • BREAST LUMPECTOMY Right 2005   • BREAST SURGERY     • CARDIAC CATHETERIZATION N/A 10/10/2016    Procedure: Left Heart Cath;  Surgeon: Scooter Zhao MD;  Location:  [facility];  Service:    • CARDIAC CATHETERIZATION  10/2016   • CARDIAC CATHETERIZATION N/A 1/21/2021    Procedure: Right and Left Heart Cath;  Surgeon: Hugh Tidwell MD;  Location: [facility];  Service: Cardiology;  Laterality: N/A;   • COLONOSCOPY N/A 7/23/2020    Procedure: COLONOSCOPY;  Surgeon: Rubén Rosas MD;  Location:  [facility];  Service: Gastroenterology;  Laterality: N/A;   • CORONARY STENT PLACEMENT  2016   • HERNIA REPAIR     • HYSTERECTOMY  2000   • INSERTION HEMODIALYSIS CATHETER Right 6/29/2016    Procedure: HEMODIALYSIS CATHETER INSERTION TUNNELLED;  Surgeon: Ramón Chavez MD;  Location:  [facility];  Service:    • MASTECTOMY Right 2006   • OOPHORECTOMY     • REDUCTION MAMMAPLASTY Left 2005   ,   Family History   Problem Relation Age of Onset   • Stroke Mother    • Cancer Mother    • Pancreatic cancer Mother    • Coronary artery disease Father    • Heart failure Father    • Breast cancer Sister 55   • Throat cancer Daughter    • Colon cancer Maternal Grandmother    • Ovarian cancer Neg Hx    • Endometrial cancer Neg Hx    ,   Social History     Socioeconomic History   • Marital status:    Tobacco Use    • Smoking status: Never   • Smokeless tobacco: Never   • Tobacco comments:     Michael second hand smoke   Vaping Use   • Vaping Use: Never used   Substance and Sexual Activity   • Alcohol use: Yes     Comment: rarely   • Drug use: No   • Sexual activity: Defer     E-cigarette/Vaping   • E-cigarette/Vaping Use Never User      E-cigarette/Vaping Substances     E-cigarette/Vaping Devices       ,   Medications Prior to Admission   Medication Sig Dispense Refill Last Dose   • acetaminophen (TYLENOL) 500 MG tablet Take 500 mg by mouth.      • amiodarone (PACERONE) 200 MG tablet Take 1 tablet by mouth Daily. 90 tablet 1    • ammonium lactate (LAC-HYDRIN) 12 % lotion Apply  topically to the appropriate area as directed As Needed for Dry Skin. (Patient taking differently: Apply 1 application topically to the appropriate area as directed 2 (Two) Times a Day As Needed for Dry Skin.) 500 g 3    • apixaban (ELIQUIS) 2.5 MG tablet tablet Take 1 tablet by mouth 2 (Two) Times a Day. 180 tablet 3    • aspirin 81 MG EC tablet Take 1 tablet by mouth Daily.      • atorvastatin (LIPITOR) 80 MG tablet Take 1 tablet by mouth Every Night. 90 tablet 3    • B Complex-C-Folic Acid (DIALYVITE 800 PO) Take 1 tablet by mouth 3 (Three) Times a Week. On dialysis says MWF      • bisacodyl (DULCOLAX) 5 MG EC tablet Take 5 mg by mouth Daily As Needed for Constipation.      • capecitabine (XELODA) 500 MG chemo tablet Take 2 tablets by mouth 2 (Two) Times a Day on Monday-Friday with radiation. 120 tablet 0    • Capsaicin 0.1 % cream Apply 2-4 gms to feet nightly. Use gloves 60 g 3    • carvedilol (COREG) 12.5 MG tablet Take 1 tablet by mouth Every 12 (Twelve) Hours. 180 tablet 1    • cloNIDine (CATAPRES) 0.2 MG tablet Take 2 tablets by mouth Every 12 (Twelve) Hours. 120 tablet 5    • dorzolamide-timolol (COSOPT) 22.3-6.8 MG/ML ophthalmic solution Administer 1 drop to both eyes Daily. 10 mL 3    • Durezol 0.05 % ophthalmic emulsion INSTILL 1 DROP 4  TIMES DAILY INTO SURGICAL EYE STARTING AFTER SURGERY.      • Epoetin Rusty (EPOGEN IJ) Epoetin Rusty (Epogen)      • fluocinonide (LIDEX) 0.05 % external solution Apply 0.05 application topically to the appropriate area as directed 2 (Two) Times a Day. Scalp  2    • glucose blood test strip Use as instructed to monitor blood glucose 1-2 times daily 100 each 12    • glucose monitor monitoring kit Use as directed to monitor blood glucose 1-2 times daily 1 each 0    • Hydrocortisone, Perianal, (ANUSOL-HC) 2.5 % rectal cream Insert  into the rectum 2 (Two) Times a Day. 28 g 0    • HYDROmorphone (DILAUDID) 2 MG tablet Take 1 tablet by mouth Every 6 (Six) Hours As Needed for Moderate Pain. 60 tablet 0    • IRON DEXTRAN IJ Inject  as directed 3 (Three) Times a Week.      • isosorbide mononitrate (IMDUR) 120 MG 24 hr tablet Take 1 tablet by mouth Daily. 90 tablet 3    • Lancets Ultra Fine misc Use as directed to check blood sugar 1-2 times daily 100 each 11    • Lidocaine 5 % cream Apply 1 g topically 2 (Two) Times a Day As Needed (pain). 30 g 0    • lidocaine-prilocaine (EMLA) 2.5-2.5 % cream Apply 1 application topically to the appropriate area as directed As Needed (dialysis access).  6    • megestrol (MEGACE) 40 MG tablet Take 40 mg by mouth Daily.      • metroNIDAZOLE (Flagyl) 500 MG tablet Take 1 tablet by mouth 3 (Three) Times a Day. 30 tablet 0    • Morphine (MS CONTIN) 15 MG 12 hr tablet Take 1 tablet by mouth 2 (Two) Times a Day. 60 tablet 0    • nitroglycerin (Nitrolingual) 0.4 MG/SPRAY spray Place 1 spray under the tongue Every 5 (Five) Minutes As Needed for Chest Pain. 1 each 12    • nortriptyline (PAMELOR) 10 MG capsule Take 1 capsule by mouth Every Night. 30 capsule 3    • NovoLIN 70/30 (70-30) 100 UNIT/ML injection INJECT 20 UNITS SUBCUTANEOUSLY BEFORE SUPPER AND 17 UNITS BEFORE BREAKFAST 10 mL 0    • ondansetron (ZOFRAN) 8 MG tablet Take 1 tablet by mouth 3 (Three) Times a Day As Needed for Nausea or  Vomiting. 30 tablet 5    • terazosin (HYTRIN) 2 MG capsule Take 2 capsules by mouth Every Night. 180 capsule 3    • torsemide (Demadex) 20 MG tablet 1 PO QAM and 2 PO QPM 90 tablet 5    • VITAMIN D PO Take 1 mcg by mouth.       and Scheduled Meds:  amiodarone, 200 mg, Oral, Daily  aspirin, 81 mg, Oral, Daily  atorvastatin, 80 mg, Oral, Nightly  carvedilol, 12.5 mg, Oral, Q12H  cloNIDine, 0.4 mg, Oral, Q12H  Hydrocortisone (Perianal), , Rectal, BID  insulin lispro, 0-9 Units, Subcutaneous, TID With Meals  Insulin Lispro, 5 Units, Subcutaneous, TID With Meals  isosorbide mononitrate, 120 mg, Oral, Daily  Morphine, 15 mg, Oral, BID  nortriptyline, 10 mg, Oral, Nightly  sodium chloride, 10 mL, Intravenous, Q12H        Objective     Vital Signs  Temp:  [97.8 °F (36.6 °C)-98.6 °F (37 °C)] 98.1 °F (36.7 °C)  Heart Rate:  [57-69] 64  Resp:  [14-18] 18  BP: (163-207)/(65-89) 180/84    I/O this shift:  In: 480 [P.O.:120; Blood:360]  Out: -   No intake/output data recorded.    Physical Exam  Constitutional:       Appearance: Normal appearance.   HENT:      Head: Normocephalic and atraumatic.      Nose: Nose normal.      Mouth/Throat:      Mouth: Mucous membranes are moist.   Eyes:      Pupils: Pupils are equal, round, and reactive to light.   Cardiovascular:      Rate and Rhythm: Normal rate and regular rhythm.      Pulses: Normal pulses.   Pulmonary:      Effort: Pulmonary effort is normal.      Breath sounds: Normal breath sounds.   Abdominal:      General: Abdomen is flat.   Musculoskeletal:      Right lower leg: Edema present.      Left lower leg: Edema present.   Skin:     General: Skin is warm.   Neurological:      General: No focal deficit present.      Mental Status: She is alert and oriented to person, place, and time. Mental status is at baseline.   Psychiatric:         Mood and Affect: Mood normal.         Results Review:    I reviewed the patient's new clinical results.    WBC WBC   Date Value Ref Range Status    03/11/2023 4.54 3.40 - 10.80 10*3/mm3 Final   03/10/2023 5.04 3.40 - 10.80 10*3/mm3 Final      HGB Hemoglobin   Date Value Ref Range Status   03/11/2023 7.1 (L) 12.0 - 15.9 g/dL Final   03/11/2023 7.1 (L) 12.0 - 15.9 g/dL Final   03/11/2023 7.0 (L) 12.0 - 15.9 g/dL Final   03/10/2023 7.6 (L) 12.0 - 15.9 g/dL Final   03/10/2023 7.8 (L) 12.0 - 15.9 g/dL Final      HCT Hematocrit   Date Value Ref Range Status   03/11/2023 23.7 (L) 34.0 - 46.6 % Final   03/11/2023 24.0 (L) 34.0 - 46.6 % Final   03/11/2023 23.5 (L) 34.0 - 46.6 % Final   03/10/2023 25.4 (L) 34.0 - 46.6 % Final   03/10/2023 26.8 (L) 34.0 - 46.6 % Final      Platlets No results found for: LABPLAT   MCV MCV   Date Value Ref Range Status   03/11/2023 101.3 (H) 79.0 - 97.0 fL Final   03/10/2023 103.1 (H) 79.0 - 97.0 fL Final          Sodium Sodium   Date Value Ref Range Status   03/11/2023 138 136 - 145 mmol/L Final   03/10/2023 136 136 - 145 mmol/L Final      Potassium Potassium   Date Value Ref Range Status   03/11/2023 3.4 (L) 3.5 - 5.2 mmol/L Final   03/10/2023 4.0 3.5 - 5.2 mmol/L Final     Comment:     Slight hemolysis detected by analyzer. Results may be affected.      Chloride Chloride   Date Value Ref Range Status   03/11/2023 93 (L) 98 - 107 mmol/L Final   03/10/2023 90 (L) 98 - 107 mmol/L Final      CO2 CO2   Date Value Ref Range Status   03/11/2023 29.0 22.0 - 29.0 mmol/L Final   03/10/2023 24.0 22.0 - 29.0 mmol/L Final      BUN BUN   Date Value Ref Range Status   03/11/2023 43 (H) 8 - 23 mg/dL Final   03/10/2023 38 (H) 8 - 23 mg/dL Final      Creatinine Creatinine   Date Value Ref Range Status   03/11/2023 9.37 (H) 0.57 - 1.00 mg/dL Final   03/10/2023 7.43 (H) 0.57 - 1.00 mg/dL Final      Calcium Calcium   Date Value Ref Range Status   03/11/2023 9.1 8.6 - 10.5 mg/dL Final   03/10/2023 9.2 8.6 - 10.5 mg/dL Final      PO4 No results found for: CAPO4   Albumin Albumin   Date Value Ref Range Status   03/11/2023 3.3 (L) 3.5 - 5.2 g/dL Final    03/10/2023 3.6 3.5 - 5.2 g/dL Final      Magnesium Magnesium   Date Value Ref Range Status   03/10/2023 2.2 1.6 - 2.4 mg/dL Final      Uric Acid No results found for: URICACID         Assessment & Plan       Hematochezia    Type 2 diabetes mellitus (HCC)    Essential hypertension    Hyperlipidemia LDL goal <70    End stage renal disease on dialysis (HCC)    CHF (congestive heart failure) ([facility])    PAF (paroxysmal atrial fibrillation) ([facility])      Assessment & Plan     ESRD: MWF gr[facility]. Missed HD before admission     Access: AV fistula     Anemia: Transfuse at Hb 7 or less. On chemo for anal ca    Volume status: Dependent edema noted.    HTN: Bp elevated.    I discussed the patients findings and my recommendations with patient    Gabe Butler MD  03/11/23  14:33 EST

## 2023-03-12 ENCOUNTER — APPOINTMENT (OUTPATIENT)
Dept: CT IMAGING | Facility: HOSPITAL | Age: 78
End: 2023-03-12
Payer: MEDICARE

## 2023-03-12 ENCOUNTER — APPOINTMENT (OUTPATIENT)
Dept: CARDIOLOGY | Facility: HOSPITAL | Age: 78
End: 2023-03-12
Payer: MEDICARE

## 2023-03-12 PROBLEM — E43 SEVERE MALNUTRITION: Status: ACTIVE | Noted: 2023-03-12

## 2023-03-12 LAB
ANION GAP SERPL CALCULATED.3IONS-SCNC: 16 MMOL/L (ref 5–15)
BASOPHILS # BLD AUTO: 0 10*3/MM3 (ref 0–0.2)
BASOPHILS NFR BLD AUTO: 0 % (ref 0–1.5)
BUN SERPL-MCNC: 29 MG/DL (ref 8–23)
BUN/CREAT SERPL: 5.2 (ref 7–25)
CALCIUM SPEC-SCNC: 8.7 MG/DL (ref 8.6–10.5)
CHLORIDE SERPL-SCNC: 97 MMOL/L (ref 98–107)
CO2 SERPL-SCNC: 23 MMOL/L (ref 22–29)
CREAT SERPL-MCNC: 5.55 MG/DL (ref 0.57–1)
DEPRECATED RDW RBC AUTO: 67.6 FL (ref 37–54)
EGFRCR SERPLBLD CKD-EPI 2021: 7.5 ML/MIN/1.73
EOSINOPHIL # BLD AUTO: 0 10*3/MM3 (ref 0–0.4)
EOSINOPHIL NFR BLD AUTO: 0 % (ref 0.3–6.2)
ERYTHROCYTE [DISTWIDTH] IN BLOOD BY AUTOMATED COUNT: 18.4 % (ref 12.3–15.4)
GLUCOSE BLDC GLUCOMTR-MCNC: 100 MG/DL (ref 70–130)
GLUCOSE BLDC GLUCOMTR-MCNC: 134 MG/DL (ref 70–130)
GLUCOSE BLDC GLUCOMTR-MCNC: 221 MG/DL (ref 70–130)
GLUCOSE SERPL-MCNC: 85 MG/DL (ref 65–99)
HCT VFR BLD AUTO: 27.9 % (ref 34–46.6)
HGB BLD-MCNC: 8.2 G/DL (ref 12–15.9)
IMM GRANULOCYTES # BLD AUTO: 0.03 10*3/MM3 (ref 0–0.05)
IMM GRANULOCYTES NFR BLD AUTO: 0.9 % (ref 0–0.5)
LYMPHOCYTES # BLD AUTO: 0.42 10*3/MM3 (ref 0.7–3.1)
LYMPHOCYTES NFR BLD AUTO: 12 % (ref 19.6–45.3)
MCH RBC QN AUTO: 30.1 PG (ref 26.6–33)
MCHC RBC AUTO-ENTMCNC: 29.4 G/DL (ref 31.5–35.7)
MCV RBC AUTO: 102.6 FL (ref 79–97)
MONOCYTES # BLD AUTO: 0.55 10*3/MM3 (ref 0.1–0.9)
MONOCYTES NFR BLD AUTO: 15.7 % (ref 5–12)
NEUTROPHILS NFR BLD AUTO: 2.51 10*3/MM3 (ref 1.7–7)
NEUTROPHILS NFR BLD AUTO: 71.4 % (ref 42.7–76)
NRBC BLD AUTO-RTO: 0 /100 WBC (ref 0–0.2)
PLAT MORPH BLD: NORMAL
PLATELET # BLD AUTO: 113 10*3/MM3 (ref 140–450)
PMV BLD AUTO: 10.7 FL (ref 6–12)
POTASSIUM SERPL-SCNC: 3.5 MMOL/L (ref 3.5–5.2)
RBC # BLD AUTO: 2.72 10*6/MM3 (ref 3.77–5.28)
RBC MORPH BLD: NORMAL
SODIUM SERPL-SCNC: 136 MMOL/L (ref 136–145)
WBC MORPH BLD: NORMAL
WBC NRBC COR # BLD: 3.51 10*3/MM3 (ref 3.4–10.8)

## 2023-03-12 PROCEDURE — 25010000002 CEFTRIAXONE PER 250 MG: Performed by: FAMILY MEDICINE

## 2023-03-12 PROCEDURE — 63710000001 INSULIN LISPRO (HUMAN) PER 5 UNITS: Performed by: FAMILY MEDICINE

## 2023-03-12 PROCEDURE — 85007 BL SMEAR W/DIFF WBC COUNT: CPT | Performed by: FAMILY MEDICINE

## 2023-03-12 PROCEDURE — 25010000002 HALOPERIDOL LACTATE PER 5 MG: Performed by: FAMILY MEDICINE

## 2023-03-12 PROCEDURE — 85025 COMPLETE CBC W/AUTO DIFF WBC: CPT | Performed by: FAMILY MEDICINE

## 2023-03-12 PROCEDURE — 82962 GLUCOSE BLOOD TEST: CPT

## 2023-03-12 PROCEDURE — 80048 BASIC METABOLIC PNL TOTAL CA: CPT | Performed by: FAMILY MEDICINE

## 2023-03-12 PROCEDURE — 93306 TTE W/DOPPLER COMPLETE: CPT

## 2023-03-12 PROCEDURE — 99233 SBSQ HOSP IP/OBS HIGH 50: CPT | Performed by: FAMILY MEDICINE

## 2023-03-12 PROCEDURE — 96374 THER/PROPH/DIAG INJ IV PUSH: CPT

## 2023-03-12 PROCEDURE — 93306 TTE W/DOPPLER COMPLETE: CPT | Performed by: INTERNAL MEDICINE

## 2023-03-12 PROCEDURE — 70450 CT HEAD/BRAIN W/O DYE: CPT

## 2023-03-12 RX ORDER — HYDROXYZINE HYDROCHLORIDE 25 MG/1
25 TABLET, FILM COATED ORAL 3 TIMES DAILY PRN
Status: DISCONTINUED | OUTPATIENT
Start: 2023-03-12 | End: 2023-03-29 | Stop reason: HOSPADM

## 2023-03-12 RX ORDER — HALOPERIDOL 5 MG/ML
0.5 INJECTION INTRAMUSCULAR EVERY 6 HOURS PRN
Status: DISCONTINUED | OUTPATIENT
Start: 2023-03-12 | End: 2023-03-29 | Stop reason: HOSPADM

## 2023-03-12 RX ADMIN — Medication 10 ML: at 20:33

## 2023-03-12 RX ADMIN — INSULIN LISPRO 5 UNITS: 100 INJECTION, SOLUTION INTRAVENOUS; SUBCUTANEOUS at 18:29

## 2023-03-12 RX ADMIN — CLONIDINE HYDROCHLORIDE 0.4 MG: 0.1 TABLET ORAL at 20:27

## 2023-03-12 RX ADMIN — Medication: at 09:31

## 2023-03-12 RX ADMIN — CEFTRIAXONE 1 G: 1 INJECTION, POWDER, FOR SOLUTION INTRAMUSCULAR; INTRAVENOUS at 18:04

## 2023-03-12 RX ADMIN — HALOPERIDOL LACTATE 0.5 MG: 5 INJECTION, SOLUTION INTRAMUSCULAR at 16:31

## 2023-03-12 RX ADMIN — HYDROCORTISONE 2.5%: 25 CREAM TOPICAL at 09:31

## 2023-03-12 RX ADMIN — HYDROCORTISONE 2.5%: 25 CREAM TOPICAL at 20:34

## 2023-03-12 RX ADMIN — HYDROXYZINE HYDROCHLORIDE 25 MG: 25 TABLET ORAL at 23:42

## 2023-03-12 RX ADMIN — ATORVASTATIN CALCIUM 80 MG: 40 TABLET, FILM COATED ORAL at 20:26

## 2023-03-12 RX ADMIN — INSULIN LISPRO 4 UNITS: 100 INJECTION, SOLUTION INTRAVENOUS; SUBCUTANEOUS at 18:29

## 2023-03-12 RX ADMIN — CARVEDILOL 12.5 MG: 12.5 TABLET, FILM COATED ORAL at 20:26

## 2023-03-12 RX ADMIN — Medication 10 ML: at 09:31

## 2023-03-12 NOTE — PLAN OF CARE
Problem: Adult Inpatient Plan of Care  Goal: Plan of Care Review  Outcome: Ongoing, Progressing  Goal: Patient-Specific Goal (Individualized)  Outcome: Ongoing, Progressing  Goal: Absence of Hospital-Acquired Illness or Injury  Outcome: Ongoing, Progressing  Intervention: Identify and Manage Fall Risk  Recent Flowsheet Documentation  Taken 3/11/2023 2000 by Harmony Ramos RN  Safety Promotion/Fall Prevention: safety round/check completed  Intervention: Prevent Skin Injury  Recent Flowsheet Documentation  Taken 3/11/2023 2000 by Harmony Ramos RN  Body Position: position changed independently  Skin Protection: incontinence pads utilized  Intervention: Prevent and Manage VTE (Venous Thromboembolism) Risk  Recent Flowsheet Documentation  Taken 3/11/2023 2000 by Harmony Ramos RN  Activity Management: activity adjusted per tolerance  VTE Prevention/Management: (aspirin) sequential compression devices on  Intervention: Prevent Infection  Recent Flowsheet Documentation  Taken 3/11/2023 2000 by Harmony Ramos RN  Infection Prevention: hand hygiene promoted  Goal: Optimal Comfort and Wellbeing  Outcome: Ongoing, Progressing  Goal: Readiness for Transition of Care  Outcome: Ongoing, Progressing     Problem: Fall Injury Risk  Goal: Absence of Fall and Fall-Related Injury  Outcome: Ongoing, Progressing  Intervention: Identify and Manage Contributors  Recent Flowsheet Documentation  Taken 3/11/2023 2000 by Harmony Ramos RN  Medication Review/Management: medications reviewed  Intervention: Promote Injury-Free Environment  Recent Flowsheet Documentation  Taken 3/11/2023 2000 by Harmony Ramos RN  Safety Promotion/Fall Prevention: safety round/check completed     Problem: Diabetes Comorbidity  Goal: Blood Glucose Level Within Targeted Range  Outcome: Ongoing, Progressing     Problem: Hypertension Comorbidity  Goal: Blood Pressure in Desired Range  Outcome: Ongoing, Progressing  Intervention: Maintain Blood Pressure  Management  Recent Flowsheet Documentation  Taken 3/11/2023 2000 by Harmony Ramos, RN  Medication Review/Management: medications reviewed   Goal Outcome Evaluation:

## 2023-03-12 NOTE — PROGRESS NOTES
" LOS: 2 days   Patient Care Team:  Meghana Rodgers MD as PCP - General  Demetrius Sagastume MD as Consulting Physician (Cardiology)  Kevan Lerma MD as Consulting Physician (Nephrology)  Geena Estrlela APRN as Nurse Practitioner (Cardiology)  Frank Mandujano MD as Referring Physician (Colon and Rectal Surgery)  Kevan Cavazos MD as Consulting Physician (Radiation Oncology)  Yue Reeves RN as Nurse Navigator  Darryn Burk MD as Consulting Physician (Nephrology)    Chief Complaint: ESRD    Subjective       Subjective:  Symptoms:  Stable.  No shortness of breath or chest pain.    Diet:  Adequate intake.        History taken from: patient    Objective     Vital Sign Min/Max for last 24 hours  Temp  Min: 97.8 °F (36.6 °C)  Max: 98.5 °F (36.9 °C)   BP  Min: 131/91  Max: 167/85   Pulse  Min: 61  Max: 65   Resp  Min: 16  Max: 20   SpO2  Min: 93 %  Max: 100 %   Flow (L/min)  Min: 2  Max: 2   Weight  Min: 79.1 kg (174 lb 6.1 oz)  Max: 79.1 kg (174 lb 6.1 oz)     Flowsheet Rows    Flowsheet Row First Filed Value   Admission Height 167.6 cm (66\") Documented at 03/10/2023 1122   Admission Weight 76.7 kg (169 lb) Documented at 03/10/2023 1122          No intake/output data recorded.  I/O last 3 completed shifts:  In: 580 [P.O.:220; Blood:360]  Out: 2480 [Other:2480]    Objective:  General Appearance:  Comfortable.    Vital signs: (most recent): Blood pressure 167/85, pulse 63, temperature 98.5 °F (36.9 °C), temperature source Oral, resp. rate 20, height 167.6 cm (65.98\"), weight 79.1 kg (174 lb 6.1 oz), SpO2 100 %, not currently breastfeeding.  Vital signs are normal.    Output: Minimal urine output.    HEENT: Normal HEENT exam.    Lungs:  Normal effort and normal respiratory rate.  Breath sounds clear to auscultation.  She is not in respiratory distress.  No decreased breath sounds or wheezes.    Heart: Normal rate.    Extremities: There is dependent edema.    Pulses: Distal pulses are intact.  "   Neurological: Patient is alert and oriented to person, place and time.  Normal strength.              Results Review:     I reviewed the patient's new clinical results.    WBC WBC   Date Value Ref Range Status   03/12/2023 3.51 3.40 - 10.80 10*3/mm3 Final   03/11/2023 4.54 3.40 - 10.80 10*3/mm3 Final   03/10/2023 5.04 3.40 - 10.80 10*3/mm3 Final      HGB Hemoglobin   Date Value Ref Range Status   03/12/2023 8.2 (L) 12.0 - 15.9 g/dL Final   03/11/2023 8.2 (L) 12.0 - 15.9 g/dL Final   03/11/2023 7.1 (L) 12.0 - 15.9 g/dL Final   03/11/2023 7.1 (L) 12.0 - 15.9 g/dL Final   03/11/2023 7.0 (L) 12.0 - 15.9 g/dL Final   03/10/2023 7.6 (L) 12.0 - 15.9 g/dL Final   03/10/2023 7.8 (L) 12.0 - 15.9 g/dL Final      HCT Hematocrit   Date Value Ref Range Status   03/12/2023 27.9 (L) 34.0 - 46.6 % Final   03/11/2023 28.4 (L) 34.0 - 46.6 % Final   03/11/2023 23.7 (L) 34.0 - 46.6 % Final   03/11/2023 24.0 (L) 34.0 - 46.6 % Final   03/11/2023 23.5 (L) 34.0 - 46.6 % Final   03/10/2023 25.4 (L) 34.0 - 46.6 % Final   03/10/2023 26.8 (L) 34.0 - 46.6 % Final      Platlets No results found for: LABPLAT   MCV MCV   Date Value Ref Range Status   03/12/2023 102.6 (H) 79.0 - 97.0 fL Final   03/11/2023 101.3 (H) 79.0 - 97.0 fL Final   03/10/2023 103.1 (H) 79.0 - 97.0 fL Final          Sodium Sodium   Date Value Ref Range Status   03/12/2023 136 136 - 145 mmol/L Final   03/11/2023 138 136 - 145 mmol/L Final   03/10/2023 136 136 - 145 mmol/L Final      Potassium Potassium   Date Value Ref Range Status   03/12/2023 3.5 3.5 - 5.2 mmol/L Final   03/11/2023 3.4 (L) 3.5 - 5.2 mmol/L Final   03/10/2023 4.0 3.5 - 5.2 mmol/L Final     Comment:     Slight hemolysis detected by analyzer. Results may be affected.      Chloride Chloride   Date Value Ref Range Status   03/12/2023 97 (L) 98 - 107 mmol/L Final   03/11/2023 93 (L) 98 - 107 mmol/L Final   03/10/2023 90 (L) 98 - 107 mmol/L Final      CO2 CO2   Date Value Ref Range Status   03/12/2023 23.0 22.0 -  29.0 mmol/L Final   03/11/2023 29.0 22.0 - 29.0 mmol/L Final   03/10/2023 24.0 22.0 - 29.0 mmol/L Final      BUN BUN   Date Value Ref Range Status   03/12/2023 29 (H) 8 - 23 mg/dL Final   03/11/2023 43 (H) 8 - 23 mg/dL Final   03/10/2023 38 (H) 8 - 23 mg/dL Final      Creatinine Creatinine   Date Value Ref Range Status   03/12/2023 5.55 (H) 0.57 - 1.00 mg/dL Final   03/11/2023 9.37 (H) 0.57 - 1.00 mg/dL Final   03/10/2023 7.43 (H) 0.57 - 1.00 mg/dL Final      Calcium Calcium   Date Value Ref Range Status   03/12/2023 8.7 8.6 - 10.5 mg/dL Final   03/11/2023 9.1 8.6 - 10.5 mg/dL Final   03/10/2023 9.2 8.6 - 10.5 mg/dL Final      PO4 No results found for: CAPO4   Albumin Albumin   Date Value Ref Range Status   03/11/2023 3.3 (L) 3.5 - 5.2 g/dL Final   03/10/2023 3.6 3.5 - 5.2 g/dL Final      Magnesium Magnesium   Date Value Ref Range Status   03/10/2023 2.2 1.6 - 2.4 mg/dL Final      Uric Acid No results found for: URICACID     Medication Review: yes    Assessment & Plan       Hematochezia    Type 2 diabetes mellitus (HCC)    Essential hypertension    Hyperlipidemia LDL goal <70    End stage renal disease on dialysis (HCC)    CHF (congestive heart failure) (HCC)    PAF (paroxysmal atrial fibrillation) (HCC)    Severe malnutrition (HCC)      Assessment & Plan     ESRD: MWF [facility]. Missed HD before admission      Access: AV fistula      Anemia: Transfuse at Hb 7 or less. On chemo for anal ca     Volume status: Dependent edema noted.    HTN: Bp elevated.     I discussed the patients findings and my recommendations with patient    Gabe Butler MD  03/12/23  19:11 EDT

## 2023-03-12 NOTE — PROGRESS NOTES
[facility] Medicine Services  PROGRESS NOTE    Patient Name: Esperanza Pop  : 1945  MRN: [id number]    Date of Admission: 3/10/2023  Primary Care Physician: Meghana Rodgers MD    Subjective   Subjective     CC:  F/U generalized weakness     HPI:  Pt seen and examined. Seems tired and confused this morning.    ROS:  UTO    Objective   Objective     Vital Signs:   Temp:  [97.6 °F (36.4 °C)-98.5 °F (36.9 °C)] 98.2 °F (36.8 °C)  Heart Rate:  [57-65] 62  Resp:  [8-18] 16  BP: (113-205)/(56-99) 131/91  Flow (L/min):  [2] 2     Physical Exam:  Constitutional: No acute distress, sleeping but awakens easily   HENT: NCAT, mucous membranes moist  Respiratory: Clear to auscultation bilaterally, respiratory effort normal   Cardiovascular: RRR, + murmur, No rubs, or gallops  Gastrointestinal: Positive bowel sounds, soft, nontender, nondistended  Musculoskeletal: + bilateral ankle edema  Psychiatric: unable to evaluate   Neurologic: Confused   Skin: No rashes    Results Reviewed:  LAB RESULTS:      Lab 23  0524 23  2321 23  1229 23  0741 23  0028 03/10/23  1809 03/10/23  1145   WBC 3.51  --   --  4.54  --   --  5.04   HEMOGLOBIN 8.2* 8.2* 7.1* 7.1* 7.0*   < > 7.8*   HEMATOCRIT 27.9* 28.4* 23.7* 24.0* 23.5*   < > 26.8*   PLATELETS 113*  --   --  129*  --   --  143   NEUTROS ABS 2.51  --   --  3.32  --   --  3.48   IMMATURE GRANS (ABS) 0.03  --   --  0.05  --   --  0.07*   LYMPHS ABS 0.42*  --   --  0.48*  --   --  0.78   MONOS ABS 0.55  --   --  0.66  --   --  0.68   EOS ABS 0.00  --   --  0.02  --   --  0.01   .6*  --   --  101.3*  --   --  103.1*    < > = values in this interval not displayed.         Lab 23  0524 23  0741 03/10/23  1145   SODIUM 136 138 136   POTASSIUM 3.5 3.4* 4.0   CHLORIDE 97* 93* 90*   CO2 23.0 29.0 24.0   ANION GAP 16.0* 16.0* 22.0*   BUN 29* 43* 38*   CREATININE 5.55* 9.37* 7.43*   EGFR 7.5* 4.0* 5.3*   GLUCOSE 85 100*  196*   CALCIUM 8.7 9.1 9.2   MAGNESIUM  --   --  2.2         Lab 03/11/23  0741 03/10/23  1145   TOTAL PROTEIN 7.3 7.7   ALBUMIN 3.3* 3.6   GLOBULIN 4.0 4.1   ALT (SGPT) 5 5   AST (SGOT) 11 17   BILIRUBIN 0.7 0.7   ALK PHOS 80 86         Lab 03/10/23  2123 03/10/23  1809 03/10/23  1145   HSTROP T 176* 173* 172*             Lab 03/10/23  1418   ABO TYPING B   RH TYPING Positive   ANTIBODY SCREEN Negative         Brief Urine Lab Results     None          Microbiology Results Abnormal     None          XR Chest 1 View    Result Date: 3/10/2023  XR CHEST 1 VW Date of Exam: 3/10/2023 12:05 PM EST Indication: Weak/Dizzy/AMS triage protocol. Comparison: CT chest with contrast 2/1/2023, chest radiograph 7/21/2021 Findings: Cardiomegaly. Lungs normally expanded. Mild groundglass opacity and septal thickening lower lobe predominant. No pneumothorax or pleural effusion.     Impression: Impression: Questionable pulmonary edema. Cardiomegaly. Electronically Signed: Shannan Erickson  3/10/2023 12:20 PM EST  Workstation ID: [id number]      Results for orders placed during the hospital encounter of 01/19/21    Adult Transthoracic Echo Complete W/ Cont if Necessary Per Protocol    Interpretation Summary  · Estimated left ventricular EF = 60% Left ventricular ejection fraction appears to be 56 - 60%. Left ventricular systolic function is normal.  · Left ventricular diastolic function is consistent with (grade II w/high LAP) pseudonormalization.  · Left atrial volume is severely increased.  · Moderate aortic valve stenosis is present.  · Estimated right ventricular systolic pressure from tricuspid regurgitation is moderately elevated (45-55 mmHg). Calculated right ventricular systolic pressure from tricuspid regurgitation is 51 mmHg.  · Moderate pulmonary hypertension is present.  · The right atrial cavity is mild to moderately dilated.  · Mild mitral valve stenosis is present with functional MAC.  · Moderate tricuspid valve regurgitation is  present.  · Normal right ventricular wall thickness and septal motion noted with the right ventricular cavity mildly dilated; mildly reduced right ventricular systolic function noted.  · There is no evidence of pericardial effusion.      Current medications:  Scheduled Meds:amiodarone, 200 mg, Oral, Daily  aspirin, 81 mg, Oral, Daily  atorvastatin, 80 mg, Oral, Nightly  carvedilol, 12.5 mg, Oral, Q12H  cloNIDine, 0.4 mg, Oral, Q12H  Hydrocortisone (Perianal), , Rectal, BID  insulin lispro, 0-9 Units, Subcutaneous, TID With Meals  Insulin Lispro, 5 Units, Subcutaneous, TID With Meals  isosorbide mononitrate, 120 mg, Oral, Daily  Morphine, 15 mg, Oral, BID  nortriptyline, 10 mg, Oral, Nightly  sodium chloride, 10 mL, Intravenous, Q12H      Continuous Infusions:   PRN Meds:.•  albumin human  •  benzocaine-menthol  •  dextrose  •  dextrose  •  glucagon (human recombinant)  •  HYDROmorphone  •  hydrOXYzine  •  ondansetron **OR** ondansetron  •  sodium chloride  •  sodium chloride  •  sodium chloride    Assessment & Plan   Assessment & Plan     Active Hospital Problems    Diagnosis  POA   • **Hematochezia [K92.1]  Yes   • Severe malnutrition (HCC) [E43]  Yes   • PAF (paroxysmal atrial fibrillation) (HCC) [I48.0]  Yes   • CHF (congestive heart failure) (HCC) [I50.9]  Yes   • End stage renal disease on dialysis (HCC) [N18.6, Z99.2]  Not Applicable   • Hyperlipidemia LDL goal <70 [E78.5]  Yes   • Essential hypertension [I10]  Yes   • Type 2 diabetes mellitus (HCC) [E11.9]  Yes      Resolved Hospital Problems   No resolved problems to display.        Brief Hospital Course to date:  Esperanza Pop is a 78 y.o. female  with PMH of ESRD on HD MWF, IDDM, HTN, HLD, HFpEF, Pulm HTN, Hx of breast cancer (s/p mastectomy and radiation) PAF (on xarelto), Chronic anemia, and rectal cancer (recently diagnosed currently gettintg XRT and chemo) that presented to the ED after a fall at home. Her 's  was today and she was getting  "ready to go when her legs \"gave out\". She had been having loose stool and bloody rectal discharge but that had improved prior to admission.     This patient's problems and plans were partially entered by my partner and updated as appropriate by me 03/12/23.  All problems are new to me today    Generalized Weakness  Acute on Chronic Anemia  Rectal bleeding, Rectal Cancer   -S/P 1 unit PRBCs with appropriate rise in H&H, now stable  -PT/OT evaluation, likely needs rehab     AMS   -Had some confusion yesterday, worse today per primary RN. She is also paranoid   -Check UA   -Patient has been refusing her MS Contin because it causes her to hallucinate   -Hold Pamelor   -Check CT head   -Atarax for agitation if will take PO. PRN IV Haldol if refuses PO  -Refused her AM meds today      Rectal Cancer  Hx breast cancer   -Follows with Dr Cavazos with rad onc  -Follows Dr Rojo with oncology, currently on Xeloda, plan to decrease the dose to 500 mg twice daily on days of radiation only     ESRD on HD MWF  -Renal following for HD (had dialysis Saturday due to missed session on Friday)     HFpEF  Pulm HTN  PAF  HTN  HLD   -Continue to hold Eliquis today. If H&H remains stable, will resume in AM   -ASA/Amiodarone   -last echo EF 60% with grade II diastolic dysfunction pulm htn      Elevated Troponin   -no chest pain  -Echo pending    Expected Discharge Location and Transportation: home vs rehab pending therapy eval   Expected Discharge   Expected Discharge Date and Time     Expected Discharge Date Expected Discharge Time    Mar 13, 2023            DVT prophylaxis:  Mechanical DVT prophylaxis orders are present.     AM-PAC 6 Clicks Score (PT): 15 (03/11/23 2000)    CODE STATUS:   Code Status and Medical Interventions:   Ordered at: 03/10/23 1448     Medical Intervention Limits:    NO intubation (DNI)     Code Status (Patient has no pulse and is not breathing):    No CPR (Do Not Attempt to Resuscitate)     Medical Interventions " (Patient has pulse or is breathing):    Limited Support       Viv Kapadia, DO  03/12/23

## 2023-03-12 NOTE — PLAN OF CARE
Goal Outcome Evaluation:   Increased confusion today. MD notified. Head CT done, UA ordered but unable to be obtained after multiple attempts. Family is concerned about new confusion, requested to speak to MD. MD aware and will speak to them in am rounds. IV Haldol given for agitation. IV antibiotic ordered per family concerns of possible UTI.

## 2023-03-12 NOTE — CONSULTS
Clinical Nutrition     Nutrition Support Assessment  Reason for Visit: Identified at risk by screening criteria, MST score 2+, Malnutrition Severity Assessment      Patient Name: Esperanza Pop  YOB: 1945  MRN: [id number]  Date of Encounter: 03/11/23 22:42 EST  Admission date: 3/10/2023    Comments: Pt meets criteria for severe chronic malnutrition based on intake hx wt loss w wasting. See MSA note.    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    PSH:  She  has a past surgical history that includes Hernia repair; AV fistula placement, brachiobasilic; Hemodialysis Catheter (Right, 6/29/2016); Hysterectomy (2000); Oophorectomy; Breast surgery; Breast cyst excision; Reduction mammaplasty (Left, 2005); Breast biopsy (Left, 2010); Breast lumpectomy (Right, 2005); Mastectomy (Right, 2006); Cardiac catheterization (N/A, 10/10/2016); Cardiac  "catheterization (10/2016); Coronary stent placement (2016); Colonoscopy (N/A, 7/23/2020); and Cardiac catheterization (N/A, 1/21/2021).      Applicable Nutrition Concerns:   Skin:  Oral:  GI:      Applicable Interval History:         Reported/Observed/Food/Nutrition Related History:     Pt and family confirm wt loss, loss of appetite. < 3/4 intake over 4-5 mo.  Allow pt would like SB flavor suppl if available.       Labs    Labs Reviewed: Yes     Results from last 7 days   Lab Units 03/11/23  0741 03/10/23  1145   GLUCOSE mg/dL 100* 196*   BUN mg/dL 43* 38*   CREATININE mg/dL 9.37* 7.43*   SODIUM mmol/L 138 136   CHLORIDE mmol/L 93* 90*   POTASSIUM mmol/L 3.4* 4.0   MAGNESIUM mg/dL  --  2.2   ALT (SGPT) U/L 5 5       Results from last 7 days   Lab Units 03/11/23  0741 03/10/23  1145   ALBUMIN g/dL 3.3* 3.6       Results from last 7 days   Lab Units 03/11/23  1740 03/11/23  1738 03/11/23  1214 03/11/23  1102 03/11/23  0720 03/11/23  0625   GLUCOSE mg/dL 155* 46* 135* 262* 103 99     Lab Results   Lab Value Date/Time    HGBA1C 5.8 02/07/2023 1346    HGBA1C 6.3 06/14/2022 1101    HGBA1C 8.60 (H) 04/19/2019 0401    HGBA1C 7.60 (H) 10/11/2016 0518                 Medications    Medications Reviewed: Yes  Pertinent: anusol, insulin  Infusion:  PRN:       Intake/Ouptut 24 hrs (0701 - 0700)   I&O's Reviewed: Yes     Intake & Output (last day)       03/11 0701 03/12 0700    P.O. 120    Blood 360    Total Intake(mL/kg) 480 (6.1)    Other 2480    Total Output 2480    Net -2000                 Anthropometrics     Admission Height 167.6 cm (66\") Documented at 03/10/2023 1122   Admission Weight 76.7 kg (169 lb) Documented at 03/10/2023 1122       Height: Height: 167.6 cm (66\")  Last Filed Weight: Weight: 79.1 kg (174 lb 5.8 oz) (03/11/23 5175)  Method: Weight Method: Stated  BMI: BMI (Calculated): 28.2  BMI classification: Overweight: 25.0-29.9kg/m2     UBW: Per EMR wt of 192 lbs in July 2022 and loss to 177 lbs on 2/9/23 - no " wt recovery  Weight change: 15 lbs loss of 8% body wt over 7 mo w no recovery     Nutrition Focused Physical Exam     Date: 3/11    Patient meets criteria for severe chronic malnutrition diagnosis, see MSA note.    Current Nutrition Prescription     PO: Diet: Cardiac Diets, Diabetic Diets, Renal Diets; Healthy Heart (2-3 Na+); Consistent Carbohydrate; Low Sodium (2-3g), Low Potassium, Low Phosphorus; Texture: Regular Texture (IDDSI 7); Fluid Consistency: Thin (IDDSI 0)  Oral Nutrition Supplement: attempt to locate suppl w desired flavor for pt    Intake: Insufficient data 50% x 1 meal recorded      Nutrition Diagnosis   Date: 3/11 Updated:   Problem Malnutrition  severe chronic   Etiology Effect CA tx   Signs/Symptoms Intake hx wt loss w/o recovery w wasting   Status:     Goal:   General: Nutrition to support treatment  PO: Establish PO  EN/PN: N/A    Nutrition Intervention      Follow treatment progress, Care plan reviewed, Advise alternate selection, Menu provided        Monitoring/Evaluation:   Per protocol, I&O, PO intake, Pertinent labs, Weight, Symptoms  Attempt to identify suppl for pt.    Phyllis Brandt RD  Time Spent: 30 min

## 2023-03-12 NOTE — PROGRESS NOTES
Malnutrition Severity Assessment    Patient Name:  Esperanza Pop  YOB: 1945  MRN: [id number]  Admit Date:  3/10/2023    Patient meets criteria for : Severe Malnutrition (Pt meets criteria for severe chronic malnutrition based on intake hx wt loss w wasting.)    Comments:      Malnutrition Severity Assessment  Malnutrition Type: Chronic Disease - Related Malnutrition  Malnutrition Type (last 8 hours)     Malnutrition Severity Assessment     Row Name 03/11/23 2236       Malnutrition Severity Assessment    Malnutrition Type Chronic Disease - Related Malnutrition    Row Name 03/11/23 2236       Insufficient Energy Intake     Insufficient Energy Intake Findings Severe    Insufficient Energy Intake  <75% of est. energy requirement for > or equal to 1 month    Row Name 03/11/23 2236       Unintentional Weight Loss     Unintentional Weight Loss Findings Severe    Unintentional Weight Loss  Weight loss greater than 7.5% in three months    Row Name 03/11/23 2236       Muscle Loss    Loss of Muscle Mass Findings Mild    [place] Region --  mild    Clavicle Bone Region Moderate - some protrusion in females, visible in males    Acromion Bone Region Moderate - acromion may slightly protrude    Scapular Bone Region --  mild    Dorsal Hand Region --  mild    Patellar Region Moderate - patella more prominent, less muscle definition around patella    Anterior Thigh Region --  mild    Posterior Calf Region --  mild    Row Name 03/11/23 2236       Fat Loss    Subcutaneous Fat Loss Findings Moderate    Orbital Region  Moderate -  somewhat hollowness, slightly dark circles    Upper Arm Region Moderate - some fat tissue, not ample    Thoracic & Lumbar Region --  mild    Row Name 03/11/23 2236       Criteria Met (Must meet criteria for severity in at least 2 of these categories: M Wasting, Fat Loss, Fluid, Secondary Signs, Wt. Status, Intake)    Patient meets criteria for  Severe Malnutrition  Pt meets criteria for severe  chronic malnutrition based on intake hx wt loss w wasting.                Electronically signed by:  Phyllis Brandt RD  03/11/23 23:12 EST

## 2023-03-13 ENCOUNTER — APPOINTMENT (OUTPATIENT)
Dept: NEPHROLOGY | Facility: HOSPITAL | Age: 78
End: 2023-03-13
Payer: MEDICARE

## 2023-03-13 PROBLEM — D64.9 SYMPTOMATIC ANEMIA: Status: ACTIVE | Noted: 2023-03-13

## 2023-03-13 LAB
ANION GAP SERPL CALCULATED.3IONS-SCNC: 20 MMOL/L (ref 5–15)
ANISOCYTOSIS BLD QL: NORMAL
ASCENDING AORTA: 3.3 CM
BASOPHILS # BLD AUTO: 0.01 10*3/MM3 (ref 0–0.2)
BASOPHILS NFR BLD AUTO: 0.3 % (ref 0–1.5)
BH CV ECHO MEAS - AI P1/2T: 496.2 MSEC
BH CV ECHO MEAS - AO MAX PG: 47.9 MMHG
BH CV ECHO MEAS - AO MEAN PG: 34 MMHG
BH CV ECHO MEAS - AO ROOT DIAM: 2.8 CM
BH CV ECHO MEAS - AO V2 MAX: 345.8 CM/SEC
BH CV ECHO MEAS - AO V2 VTI: 80.8 CM
BH CV ECHO MEAS - AVA(I,D): 0.86 CM2
BH CV ECHO MEAS - EDV(CUBED): 79.5 ML
BH CV ECHO MEAS - EDV(MOD-SP2): 170 ML
BH CV ECHO MEAS - EDV(MOD-SP4): 135 ML
BH CV ECHO MEAS - EF(MOD-BP): 45.5 %
BH CV ECHO MEAS - EF(MOD-SP2): 46.6 %
BH CV ECHO MEAS - EF(MOD-SP4): 44.4 %
BH CV ECHO MEAS - ESV(CUBED): 39.3 ML
BH CV ECHO MEAS - ESV(MOD-SP2): 90.7 ML
BH CV ECHO MEAS - ESV(MOD-SP4): 75 ML
BH CV ECHO MEAS - FS: 20.9 %
BH CV ECHO MEAS - IVS/LVPW: 1.07 CM
BH CV ECHO MEAS - IVSD: 1.6 CM
BH CV ECHO MEAS - LA DIMENSION: 4.5 CM
BH CV ECHO MEAS - LAT PEAK E' VEL: 4.8 CM/SEC
BH CV ECHO MEAS - LV DIASTOLIC VOL/BSA (35-75): 71.6 CM2
BH CV ECHO MEAS - LV MASS(C)D: 271.6 GRAMS
BH CV ECHO MEAS - LV MAX PG: 3.2 MMHG
BH CV ECHO MEAS - LV MEAN PG: 2 MMHG
BH CV ECHO MEAS - LV SYSTOLIC VOL/BSA (12-30): 39.8 CM2
BH CV ECHO MEAS - LV V1 MAX: 89.6 CM/SEC
BH CV ECHO MEAS - LV V1 VTI: 22.1 CM
BH CV ECHO MEAS - LVIDD: 4.3 CM
BH CV ECHO MEAS - LVIDS: 3.4 CM
BH CV ECHO MEAS - LVOT AREA: 3.1 CM2
BH CV ECHO MEAS - LVOT DIAM: 2 CM
BH CV ECHO MEAS - LVPWD: 1.5 CM
BH CV ECHO MEAS - MED PEAK E' VEL: 5.1 CM/SEC
BH CV ECHO MEAS - MV A MAX VEL: 132 CM/SEC
BH CV ECHO MEAS - MV DEC SLOPE: 479 CM/SEC2
BH CV ECHO MEAS - MV DEC TIME: 0.26 MSEC
BH CV ECHO MEAS - MV E MAX VEL: 121 CM/SEC
BH CV ECHO MEAS - MV E/A: 0.92
BH CV ECHO MEAS - MV MAX PG: 6.8 MMHG
BH CV ECHO MEAS - MV MEAN PG: 3 MMHG
BH CV ECHO MEAS - MV P1/2T: 81.3 MSEC
BH CV ECHO MEAS - MV V2 VTI: 44.7 CM
BH CV ECHO MEAS - MVA(P1/2T): 2.7 CM2
BH CV ECHO MEAS - MVA(VTI): 1.55 CM2
BH CV ECHO MEAS - PA ACC TIME: 0.13 SEC
BH CV ECHO MEAS - PA PR(ACCEL): 20.5 MMHG
BH CV ECHO MEAS - PI END-D VEL: 159 CM/SEC
BH CV ECHO MEAS - RAP SYSTOLE: 8 MMHG
BH CV ECHO MEAS - RVSP: 48 MMHG
BH CV ECHO MEAS - SI(MOD-SP2): 42.1 ML/M2
BH CV ECHO MEAS - SI(MOD-SP4): 31.8 ML/M2
BH CV ECHO MEAS - SV(LVOT): 69.4 ML
BH CV ECHO MEAS - SV(MOD-SP2): 79.3 ML
BH CV ECHO MEAS - SV(MOD-SP4): 60 ML
BH CV ECHO MEAS - TAPSE (>1.6): 1.38 CM
BH CV ECHO MEAS - TR MAX PG: 40.4 MMHG
BH CV ECHO MEAS - TR MAX VEL: 276.7 CM/SEC
BH CV ECHO MEASUREMENTS AVERAGE E/E' RATIO: 24.44
BH CV VAS BP LEFT ARM: NORMAL MMHG
BH CV XLRA - RV BASE: 4.6 CM
BH CV XLRA - RV LENGTH: 8.2 CM
BH CV XLRA - RV MID: 4.3 CM
BH CV XLRA - TDI S': 9.4 CM/SEC
BUN SERPL-MCNC: 34 MG/DL (ref 8–23)
BUN/CREAT SERPL: 5 (ref 7–25)
CALCIUM SPEC-SCNC: 8.7 MG/DL (ref 8.6–10.5)
CHLORIDE SERPL-SCNC: 94 MMOL/L (ref 98–107)
CO2 SERPL-SCNC: 20 MMOL/L (ref 22–29)
CREAT SERPL-MCNC: 6.82 MG/DL (ref 0.57–1)
DEPRECATED RDW RBC AUTO: 63.9 FL (ref 37–54)
EGFRCR SERPLBLD CKD-EPI 2021: 5.8 ML/MIN/1.73
EOSINOPHIL # BLD AUTO: 0 10*3/MM3 (ref 0–0.4)
EOSINOPHIL NFR BLD AUTO: 0 % (ref 0.3–6.2)
ERYTHROCYTE [DISTWIDTH] IN BLOOD BY AUTOMATED COUNT: 17.6 % (ref 12.3–15.4)
GLUCOSE BLDC GLUCOMTR-MCNC: 100 MG/DL (ref 70–130)
GLUCOSE BLDC GLUCOMTR-MCNC: 111 MG/DL (ref 70–130)
GLUCOSE BLDC GLUCOMTR-MCNC: 167 MG/DL (ref 70–130)
GLUCOSE BLDC GLUCOMTR-MCNC: 62 MG/DL (ref 70–130)
GLUCOSE BLDC GLUCOMTR-MCNC: 81 MG/DL (ref 70–130)
GLUCOSE SERPL-MCNC: 89 MG/DL (ref 65–99)
HCT VFR BLD AUTO: 31.3 % (ref 34–46.6)
HGB BLD-MCNC: 9.2 G/DL (ref 12–15.9)
IMM GRANULOCYTES # BLD AUTO: 0.04 10*3/MM3 (ref 0–0.05)
IMM GRANULOCYTES NFR BLD AUTO: 1 % (ref 0–0.5)
LEFT ATRIUM VOLUME INDEX: 44.9 ML/M2
LV EF 2D ECHO EST: 55 %
LYMPHOCYTES # BLD AUTO: 0.43 10*3/MM3 (ref 0.7–3.1)
LYMPHOCYTES NFR BLD AUTO: 10.9 % (ref 19.6–45.3)
MACROCYTES BLD QL SMEAR: NORMAL
MAXIMAL PREDICTED HEART RATE: 144 BPM
MCH RBC QN AUTO: 29.9 PG (ref 26.6–33)
MCHC RBC AUTO-ENTMCNC: 29.4 G/DL (ref 31.5–35.7)
MCV RBC AUTO: 101.6 FL (ref 79–97)
MONOCYTES # BLD AUTO: 0.57 10*3/MM3 (ref 0.1–0.9)
MONOCYTES NFR BLD AUTO: 14.4 % (ref 5–12)
NEUTROPHILS NFR BLD AUTO: 2.9 10*3/MM3 (ref 1.7–7)
NEUTROPHILS NFR BLD AUTO: 73.4 % (ref 42.7–76)
NRBC BLD AUTO-RTO: 0 /100 WBC (ref 0–0.2)
PLAT MORPH BLD: NORMAL
PLATELET # BLD AUTO: 71 10*3/MM3 (ref 140–450)
PMV BLD AUTO: 11.5 FL (ref 6–12)
POTASSIUM SERPL-SCNC: 4.2 MMOL/L (ref 3.5–5.2)
RBC # BLD AUTO: 3.08 10*6/MM3 (ref 3.77–5.28)
SODIUM SERPL-SCNC: 134 MMOL/L (ref 136–145)
STRESS TARGET HR: 122 BPM
WBC MORPH BLD: NORMAL
WBC NRBC COR # BLD: 3.95 10*3/MM3 (ref 3.4–10.8)

## 2023-03-13 PROCEDURE — 99232 SBSQ HOSP IP/OBS MODERATE 35: CPT | Performed by: FAMILY MEDICINE

## 2023-03-13 PROCEDURE — 99231 SBSQ HOSP IP/OBS SF/LOW 25: CPT | Performed by: INTERNAL MEDICINE

## 2023-03-13 PROCEDURE — G0378 HOSPITAL OBSERVATION PER HR: HCPCS

## 2023-03-13 PROCEDURE — 85007 BL SMEAR W/DIFF WBC COUNT: CPT | Performed by: FAMILY MEDICINE

## 2023-03-13 PROCEDURE — 63710000001 INSULIN LISPRO (HUMAN) PER 5 UNITS: Performed by: FAMILY MEDICINE

## 2023-03-13 PROCEDURE — 25010000002 HALOPERIDOL LACTATE PER 5 MG: Performed by: FAMILY MEDICINE

## 2023-03-13 PROCEDURE — 82962 GLUCOSE BLOOD TEST: CPT

## 2023-03-13 PROCEDURE — 97165 OT EVAL LOW COMPLEX 30 MIN: CPT

## 2023-03-13 PROCEDURE — 25010000002 CEFTRIAXONE PER 250 MG: Performed by: FAMILY MEDICINE

## 2023-03-13 PROCEDURE — 97161 PT EVAL LOW COMPLEX 20 MIN: CPT

## 2023-03-13 PROCEDURE — 80048 BASIC METABOLIC PNL TOTAL CA: CPT | Performed by: FAMILY MEDICINE

## 2023-03-13 PROCEDURE — 85025 COMPLETE CBC W/AUTO DIFF WBC: CPT | Performed by: FAMILY MEDICINE

## 2023-03-13 PROCEDURE — 97116 GAIT TRAINING THERAPY: CPT

## 2023-03-13 PROCEDURE — 96376 TX/PRO/DX INJ SAME DRUG ADON: CPT

## 2023-03-13 PROCEDURE — G0257 UNSCHED DIALYSIS ESRD PT HOS: HCPCS

## 2023-03-13 PROCEDURE — 25010000002 EPOETIN ALFA-EPBX 10000 UNIT/ML SOLUTION: Performed by: INTERNAL MEDICINE

## 2023-03-13 RX ORDER — MANNITOL 250 MG/ML
25 INJECTION, SOLUTION INTRAVENOUS AS NEEDED
Status: ACTIVE | OUTPATIENT
Start: 2023-03-13 | End: 2023-03-14

## 2023-03-13 RX ADMIN — APIXABAN 2.5 MG: 2.5 TABLET, FILM COATED ORAL at 17:55

## 2023-03-13 RX ADMIN — EPOETIN ALFA-EPBX 20000 UNITS: 10000 INJECTION, SOLUTION INTRAVENOUS; SUBCUTANEOUS at 16:30

## 2023-03-13 RX ADMIN — AMIODARONE HYDROCHLORIDE 200 MG: 200 TABLET ORAL at 09:07

## 2023-03-13 RX ADMIN — ISOSORBIDE MONONITRATE 120 MG: 120 TABLET, EXTENDED RELEASE ORAL at 09:07

## 2023-03-13 RX ADMIN — INSULIN LISPRO 5 UNITS: 100 INJECTION, SOLUTION INTRAVENOUS; SUBCUTANEOUS at 09:08

## 2023-03-13 RX ADMIN — Medication 10 ML: at 09:08

## 2023-03-13 RX ADMIN — CLONIDINE HYDROCHLORIDE 0.4 MG: 0.1 TABLET ORAL at 09:07

## 2023-03-13 RX ADMIN — ASPIRIN 81 MG: 81 TABLET, COATED ORAL at 09:08

## 2023-03-13 RX ADMIN — HYDROCORTISONE 2.5%: 25 CREAM TOPICAL at 11:55

## 2023-03-13 RX ADMIN — CARVEDILOL 12.5 MG: 12.5 TABLET, FILM COATED ORAL at 09:07

## 2023-03-13 RX ADMIN — Medication: at 09:09

## 2023-03-13 RX ADMIN — SODIUM CHLORIDE 1 G: 900 INJECTION INTRAVENOUS at 17:55

## 2023-03-13 RX ADMIN — HALOPERIDOL LACTATE 0.5 MG: 5 INJECTION, SOLUTION INTRAMUSCULAR at 22:15

## 2023-03-13 NOTE — THERAPY EVALUATION
Patient Name: Esperanza Pop  : 1945    MRN: [id number]                              Today's Date: 3/13/2023       Admit Date: 3/10/2023    Visit Dx:     ICD-10-CM ICD-9-CM   1. Symptomatic anemia  D64.9 285.9   2. Generalized weakness  R53.1 780.79   3. ESRD on dialysis ([facility])  N18.6 585.6    Z99.2 V45.11   4. Impaired mobility  Z74.09 799.89   5. Fall, initial encounter  W19.XXXA E888.9   6. Elevated troponin  R77.8 790.6     Patient Active Problem List   Diagnosis   • Acute on chronic diastolic heart failure ([facility])   • Type 2 diabetes mellitus ([facility])   • Essential hypertension   • Coronary artery disease involving native coronary artery of native heart with angina pectoris ([facility])   • Breast cancer, female, right   • Seasonal allergic rhinitis   • Right carotid bruit   • Vitamin B12 deficiency   • Hyperlipidemia LDL goal <70   • End stage renal disease on dialysis ([facility])   • Nonrheumatic aortic valve stenosis   • NSTEMI (non-ST elevated myocardial infarction) ([facility])   • UTI (urinary tract infection)   • Ischemic heart disease   • CHF (congestive heart failure) ([facility])   • Acute non-ST segment elevation myocardial infarction (STEMI) following previous myocardial infarction   • Dyslipidemia   • Diabetes mellitus ([facility])   • Mild obesity   • Elevated troponin   • Screen for colon cancer   • Acute midline low back pain without sciatica   • Hypertensive emergency   • PAF (paroxysmal atrial fibrillation) ([facility])   • Malignant neoplasm of anal canal ([facility])   • Hematochezia   • Severe malnutrition ([facility])   • Symptomatic anemia     Past Medical History:   Diagnosis Date   • Acute bronchitis    • Acute conjunctivitis    • Acute kidney injury ([facility])     Admission from 2013 to 2014, now resolved with latest creatinine 1.4 on 2014.   • Acute non-ST segment elevation myocardial infarction (STEMI) following previous myocardial infarction    • Anal cancer (HCC) 2023   • Anal cancer (HCC) 2023   • Arthritis    •  Breast cancer, female, right     2005 mastectomy right   • Cancer (HCC)    • CHF (congestive heart failure) (HCC)    • Chronic kidney disease     dialysis MWF, [facility]/u [facility]    • Clotting disorder (HCC)    • COVID-19    • Diabetes mellitus (HCC)     diagnosed ~1992, checks fsbg 2-3x/day   • Diarrhea    • Dyslipidemia    • Dyspepsia    • Dyspnea    • Edema    • History of transfusion     ~2013 no reaction    • Hx of radiation therapy    • Hyperlipidemia    • Hypertension     Severe   • Ischemic heart disease    • Moderate obesity     BMI 36.2   • Myocardial infarction (HCC)    • Pneumonia    • Pneumonia 02/2019   • Radiation 2005   • Seasonal allergies    • Wears glasses      Past Surgical History:   Procedure Laterality Date   • AV FISTULA PLACEMENT, BRACHIOBASILIC     • BREAST BIOPSY Left 2010   • BREAST CYST EXCISION     • BREAST LUMPECTOMY Right 2005   • BREAST SURGERY     • CARDIAC CATHETERIZATION N/A 10/10/2016    Procedure: Left Heart Cath;  Surgeon: Scooter Zhao MD;  Location:  [facility]IVE [facility];  Service:    • CARDIAC CATHETERIZATION  10/2016   • CARDIAC CATHETERIZATION N/A 1/21/2021    Procedure: Right and Left Heart Cath;  Surgeon: Hugh Tidwell MD;  Location:  [facility] CATH INVASIVE LOCATION;  Service: Cardiology;  Laterality: N/A;   • COLONOSCOPY N/A 7/23/2020    Procedure: COLONOSCOPY;  Surgeon: Rubén Rosas MD;  Location:  [facility];  Service: Gastroenterology;  Laterality: N/A;   • CORONARY STENT PLACEMENT  2016   • HERNIA REPAIR     • HYSTERECTOMY  2000   • INSERTION HEMODIALYSIS CATHETER Right 6/29/2016    Procedure: HEMODIALYSIS CATHETER INSERTION TUNNELLED;  Surgeon: Ramón Chavez MD;  Location: [facility] OR;  Service:    • MASTECTOMY Right 2006   • OOPHORECTOMY     • REDUCTION MAMMAPLASTY Left 2005      General Information     Row Name 03/13/23 0828          Physical Therapy Time and Intention    Document Type evaluation  -KR     Mode of  Treatment physical therapy  -KR     Row Name 23          General Information    Patient Profile Reviewed yes  -KR     Prior Level of Function independent:;community mobility;all household mobility;ADL's  SPC use at baseline.  -KR     Existing Precautions/Restrictions fall  -KR     Barriers to Rehab medically complex;previous functional deficit;cognitive status;visual deficit  -KR     Row Name 23          Living Environment    People in Home alone;other (see comments)    last week.  -KR     Row Name 23          Home Main Entrance    Number of Stairs, Main Entrance two  -KR     Stair Railings, Main Entrance railing on right side (ascending)  -KR     Row Name 23          Stairs Within Home, Primary    Number of Stairs, Within Home, Primary none  -KR     Row Name 23          Cognition    Orientation Status (Cognition) oriented to;person;disoriented to;place;time  -KR     Row Name 23          Safety Issues, Functional Mobility    Safety Issues Affecting Function (Mobility) ability to follow commands;awareness of need for assistance;insight into deficits/self-awareness;positioning of assistive device;problem-solving;safety precaution awareness;safety precautions follow-through/compliance;sequencing abilities;judgment  -KR     Impairments Affecting Function (Mobility) balance;cognition;endurance/activity tolerance;pain;strength;postural/trunk control  -KR     Cognitive Impairments, Mobility Safety/Performance attention;awareness, need for assistance;insight into deficits/self-awareness;problem-solving/reasoning;judgment;safety precaution awareness;safety precaution follow-through;sequencing abilities  -KR           User Key  (r) = Recorded By, (t) = Taken By, (c) = Cosigned By    Initials Name Provider Type    Starla Dickson PT Physical Therapist               Mobility     Row Name 23          Bed Mobility    Bed Mobility  supine-sit  -KR     Supine-Sit [place] (Bed Mobility) moderate assist (50% patient effort);2 person assist;nonverbal cues (demo/gesture);verbal cues  -KR     Assistive Device (Bed Mobility) bed rails;head of bed elevated  -KR     Comment, (Bed Mobility) assist needed at trunk and BLEs  -KR     Row Name 03/13/23 0828          Sit-Stand Transfer    Sit-Stand [place] (Transfers) minimum assist (75% patient effort);verbal cues;nonverbal cues (demo/gesture);2 person assist  -KR     Assistive Device (Sit-Stand Transfers) walker, front-wheeled  -KR     Comment, (Sit-Stand Transfer) 1x from bed, 1x from toilet  -KR     Row Name 03/13/23 0828          Gait/Stairs (Locomotion)    [place] Level (Gait) moderate assist (50% patient effort);2 person assist  -KR     Assistive Device (Gait) walker, front-wheeled  -KR     Distance in Feet (Gait) 15 + 15  -KR     Deviations/Abnormal Patterns (Gait) luigi decreased;gait speed decreased;stride length decreased;weight shifting decreased  -KR     Left Sided Gait Deviations leans left  -KR     Comment, (Gait/Stairs) assist needed for walker management and pathfinding d/t visual deficits. Pt with L posterolateral lean requiring VCs for midline. VCs for increased step length B.  -KR           User Key  (r) = Recorded By, (t) = Taken By, (c) = Cosigned By    Initials Name Provider Type    Starla Dickson PT Physical Therapist               Obj/Interventions     Row Name 03/13/23 0828          Range of Motion Comprehensive    General Range of Motion bilateral lower extremity ROM WNL  -KR     Row Name 03/13/23 0828          Strength Comprehensive (MMT)    General Manual Muscle Testing (MMT) Assessment lower extremity strength deficits identified  -KR     Comment, General Manual Muscle Testing (MMT) Assessment unable to perform formal MMT d/t cognition, however BLEs at least 3+/5 observed during fxnl tasks.  -KR     Row Name 03/13/23 0828          Balance    Balance  Assessment sitting static balance;sitting dynamic balance;sit to stand dynamic balance;standing static balance;standing dynamic balance  -KR     Static Sitting Balance supervision  -KR     Dynamic Sitting Balance contact guard  -KR     Position, Sitting Balance unsupported;sitting edge of bed;other (see comments)  toilet  -KR     Sit to Stand Dynamic Balance 2-person assist;minimal assist  -KR     Static Standing Balance moderate assist;2-person assist  modAx2 d/t L posterolateral lean progressing to CGA following VCs  -KR     Dynamic Standing Balance moderate assist;2-person assist  -KR     Position/Device Used, Standing Balance supported;walker, rolling  -KR     Row Name 03/13/23 0828          Sensory Assessment (Somatosensory)    Sensory Assessment (Somatosensory) LE sensation intact  -KR           User Key  (r) = Recorded By, (t) = Taken By, (c) = Cosigned By    Initials Name Provider Type    Starla Dickson, PT Physical Therapist               Goals/Plan     Row Name 03/13/23 0828          Bed Mobility Goal 1 (PT)    Activity/Assistive Device (Bed Mobility Goal 1, PT) bed mobility activities, all  -KR     [place] Level/Cues Needed (Bed Mobility Goal 1, PT) contact guard required  -KR     Time Frame (Bed Mobility Goal 1, PT) long term goal (LTG);2 weeks  -KR     Row Name 03/13/23 0828          Transfer Goal 1 (PT)    Activity/Assistive Device (Transfer Goal 1, PT) sit-to-stand/stand-to-sit;bed-to-chair/chair-to-bed;walker, rolling  -KR     [place] Level/Cues Needed (Transfer Goal 1, PT) contact guard required  -KR     Time Frame (Transfer Goal 1, PT) long term goal (LTG);2 weeks  -KR     Row Name 03/13/23 0828          Gait Training Goal 1 (PT)    Activity/Assistive Device (Gait Training Goal 1, PT) gait (walking locomotion);increase endurance/gait distance;walker, rolling  -KR     [place] Level (Gait Training Goal 1, PT) contact guard required  -KR     Distance (Gait Training Goal 1, PT) 50   -KR     Time Frame (Gait Training Goal 1, PT) long term goal (LTG);2 weeks  -KR     Row Name 03/13/23 0828          Stairs Goal 1 (PT)    Activity/Assistive Device (Stairs Goal 1, PT) stairs, all skills;using handrail, right  -KR     [place] Level/Cues Needed (Stairs Goal 1, PT) moderate assist (50-74% patient effort)  -KR     Number of Stairs (Stairs Goal 1, PT) 2  -KR     Time Frame (Stairs Goal 1, PT) long term goal (LTG);2 weeks  -KR     Row Name 03/13/23 0828          Therapy Assessment/Plan (PT)    Planned Therapy Interventions (PT) balance training;bed mobility training;gait training;home exercise program;manual therapy techniques;patient/family education;neuromuscular re-education;ROM (range of motion);stair training;strengthening;stretching;transfer training;postural re-education  -KR           User Key  (r) = Recorded By, (t) = Taken By, (c) = Cosigned By    Initials Name Provider Type    KR Starla Nicholas, PT Physical Therapist               Clinical Impression     Row Name 03/13/23 0828          Pain    Additional Documentation Pain Scale: FACES Pre/Post-Treatment (Group)  -KR     Row Name 03/13/23 0828          Pain Scale: FACES Pre/Post-Treatment    Pain: FACES Scale, Pretreatment 4-->hurts little more  -KR     Posttreatment Pain Rating 4-->hurts little more  -KR     Pain Location lower  -KR     Pain Location - back  -KR     Pre/Posttreatment Pain Comment pt reporting LBP and gluteal pain where wound is, however unable to rate.  -KR     Row Name 03/13/23 0828          Plan of Care Review    Plan of Care Reviewed With patient;daughter  -KR     Outcome Evaluation Pt presents with strength, balance, endurance, and safety awareness below baseline contributing to impairments in bed mobility, transfers, ambulation, and overall fxnl mobility. Pt will benefit from PT to address aforementioned deficits and return to PLOF. PT rec SNF upon dc.  -KR     Row Name 03/13/23 0828          Therapy Assessment/Plan  (PT)    Rehab Potential (PT) good, to achieve stated therapy goals  -KR     Criteria for Skilled Interventions Met (PT) yes;skilled treatment is necessary  -KR     Therapy Frequency (PT) daily  -KR     Predicted Duration of Therapy Intervention (PT) 2 weeks  -KR     Row Name 03/13/23 0828          Vital Signs    Pre Systolic BP Rehab 154  -KR     Pre Treatment Diastolic BP 88  -KR     Pretreatment Heart Rate (beats/min) 59  -KR     Posttreatment Heart Rate (beats/min) 59  -KR     Pre SpO2 (%) 95  -KR     O2 Delivery Pre Treatment room air  -KR     O2 Delivery Intra Treatment room air  -KR     Post SpO2 (%) 96  -KR     O2 Delivery Post Treatment room air  -KR     Pre Patient Position Supine  -KR     Intra Patient Position Standing  -KR     Post Patient Position Sitting  -KR     Row Name 03/13/23 0828          Positioning and Restraints    Pre-Treatment Position in bed  -KR     Post Treatment Position chair  -KR     In Chair notified nsg;reclined;call light within reach;encouraged to call for assist;exit alarm on;waffle cushion  -KR           User Key  (r) = Recorded By, (t) = Taken By, (c) = Cosigned By    Initials Name Provider Type    Starla Dickson, PT Physical Therapist               Outcome Measures     Row Name 03/13/23 1009 03/13/23 0834       How much help from another person do you currently need...    Turning from your back to your side while in flat bed without using bedrails? 3  -KH 3  -KH    Moving from lying on back to sitting on the side of a flat bed without bedrails? 3  -KH 3  -KH    Moving to and from a bed to a chair (including a wheelchair)? 2  -KH 2  -KH    Standing up from a chair using your arms (e.g., wheelchair, bedside chair)? 2  -KH 2  -KH    Climbing 3-5 steps with a railing? 2  -KH 2  -KH    To walk in hospital room? 2  -KH 2  -KH    AM-PAC 6 Clicks Score (PT) 14  -KH 14  -KH    Highest level of mobility 4 --> Transferred to chair/commode  -KH 4 --> Transferred to chair/commode  -KH     Row Name 03/13/23 0828          How much help from another person do you currently need...    Turning from your back to your side while in flat bed without using bedrails? 3  -KR     Moving from lying on back to sitting on the side of a flat bed without bedrails? 2  -KR     Moving to and from a bed to a chair (including a wheelchair)? 3  -KR     Standing up from a chair using your arms (e.g., wheelchair, bedside chair)? 3  -KR     Climbing 3-5 steps with a railing? 2  -KR     To walk in hospital room? 3  -KR     AM-PAC 6 Clicks Score (PT) 16  -KR     Highest level of mobility 5 --> Static standing  -KR     Row Name 03/13/23 1013          Functional Assessment    Outcome Measure Options AM-PAC 6 Clicks Daily Activity (OT)  -JOESPH           User Key  (r) = Recorded By, (t) = Taken By, (c) = Cosigned By    Initials Name Provider Type    Merline Lloyd, OT Occupational Therapist    Starla Dickson, PT Physical Therapist    Allyson Pelayo, RN Registered Nurse                             Physical Therapy Education     Title: PT OT SLP Therapies (In Progress)     Topic: Physical Therapy (In Progress)     Point: Mobility training (In Progress)     Learning Progress Summary           Patient Acceptance, E, NR by  at 3/13/2023 1011                   Point: Home exercise program (In Progress)     Learning Progress Summary           Patient Acceptance, E, NR by  at 3/13/2023 1011                   Point: Body mechanics (In Progress)     Learning Progress Summary           Patient Acceptance, E, NR by  at 3/13/2023 1011                   Point: Precautions (In Progress)     Learning Progress Summary           Patient Acceptance, E, NR by  at 3/13/2023 1011                               User Key     Initials Effective Dates Name Provider Type Discipline     12/30/22 -  Starla Nicholas, PT Physical Therapist PT              PT Recommendation and Plan  Planned Therapy Interventions (PT): balance training, bed  mobility training, gait training, home exercise program, manual therapy techniques, patient/family education, neuromuscular re-education, ROM (range of motion), stair training, strengthening, stretching, transfer training, postural re-education  Plan of Care Reviewed With: patient, daughter  Outcome Evaluation: Pt presents with strength, balance, endurance, and safety awareness below baseline contributing to impairments in bed mobility, transfers, ambulation, and overall fxnl mobility. Pt will benefit from PT to address aforementioned deficits and return to PLOF. PT rec SNF upon dc.     Time Calculation:    PT Charges     Row Name 03/13/23 0828             Time Calculation    Start Time 0828  -KR      PT Received On 03/13/23  -KR      PT Goal Re-Cert Due Date 03/23/23  -KR         Timed Charges    91522 - Gait Training Minutes  9  -KR         Untimed Charges    PT Eval/Re-eval Minutes 46  -KR         Total Minutes    Timed Charges Total Minutes 9  -KR      Untimed Charges Total Minutes 46  -KR       Total Minutes 55  -KR            User Key  (r) = Recorded By, (t) = Taken By, (c) = Cosigned By    Initials Name Provider Type    KR Starla Nicholas, PT Physical Therapist              Therapy Charges for Today     Code Description Service Date Service Provider Modifiers Qty    78132512882 HC GAIT TRAINING EA 15 MIN 3/13/2023 Starla Nicholas, PT GP 1    78406898423 HC PT EVAL LOW COMPLEXITY 4 3/13/2023 Starla Nicholas, PT GP 1          PT G-Codes  Outcome Measure Options: AM-PAC 6 Clicks Daily Activity (OT)  AM-PAC 6 Clicks Score (PT): 14  AM-PAC 6 Clicks Score (OT): 13  PT Discharge Summary  Anticipated Discharge Disposition (PT): skilled nursing facility    Starla Nicholas PT  3/13/2023

## 2023-03-13 NOTE — PROGRESS NOTES
"HEMATOLOGY/ONCOLOGY PROGRESS NOTE    S: Undergoing dialysis.  Looks profoundly weak.  Reports that she is able to sit a little better.        Past medical history, social history and family history was reviewed and unchanged from prior visit.    Review of Systems:    Review of Systems   Constitutional: Positive for activity change, appetite change and fatigue.   HENT: Negative.    Eyes: Negative.    Respiratory: Negative.    Cardiovascular: Negative.    Gastrointestinal: Negative.    Genitourinary: Negative.    Musculoskeletal: Negative.    Skin: Negative.    Neurological: Positive for weakness.   Psychiatric/Behavioral: Positive for confusion.            Medications:  The current medication list was reviewed in the EMR    ALLERGIES:    Allergies   Allergen Reactions   • Mircera [Methoxy Polyethylene Glycol-Epoetin Beta] Anaphylaxis     Pt stopped breathing   • Venofer [Iron Sucrose] Anaphylaxis     Pt stopped breathing   • Albuterol Other (See Comments)     Increased blood pressure  Used right before heart attack   • Nifedipine Er Swelling   • Penicillins Hives   • Prednisone Other (See Comments)     Makes jittery/anxious         Physical Exam    VITAL SIGNS:  /64   Pulse 58   Temp 97.3 °F (36.3 °C) (Tympanic)   Resp 18   Ht 167.6 cm (65.98\")   Wt 79.1 kg (174 lb 6.1 oz)   LMP  (LMP Unknown)   SpO2 100%   BMI 28.16 kg/m²   Temp:  [97.3 °F (36.3 °C)-98.5 °F (36.9 °C)] 97.3 °F (36.3 °C)      Performance Status: 2                Physical Exam    General: Weak  appearing, in no acute distress  HEENT: sclerae anicteric, oropharynx clear, neck is supple  Lymphatics: no cervical, supraclavicular, or axillary adenopathy  Cardiovascular: regular rate and rhythm, no murmurs, rubs or gallops  Lungs: clear to auscultation bilaterally  Abdomen: soft, nontender, nondistended.  No palpable organomegaly  Extremities: no lower extremity edema  Skin: no rashes, lesions, bruising, or petechiae  Msk:  Shows no weakness " of the large muscle groups  Psych: Mood is stable        RECENT LABS:    Lab Results   Component Value Date    HGB 9.2 (L) 03/13/2023    HCT 31.3 (L) 03/13/2023    .6 (H) 03/13/2023    PLT 71 (L) 03/13/2023    WBC 3.95 03/13/2023    NEUTROABS 2.90 03/13/2023    LYMPHSABS 0.43 (L) 03/13/2023    MONOSABS 0.57 03/13/2023    EOSABS 0.00 03/13/2023    BASOSABS 0.01 03/13/2023       Lab Results   Component Value Date    GLUCOSE 89 03/13/2023    BUN 34 (H) 03/13/2023    CREATININE 6.82 (H) 03/13/2023     (L) 03/13/2023    K 4.2 03/13/2023    CL 94 (L) 03/13/2023    CO2 20.0 (L) 03/13/2023    CALCIUM 8.7 03/13/2023    PROTEINTOT 7.3 03/11/2023    ALBUMIN 3.3 (L) 03/11/2023    BILITOT 0.7 03/11/2023    ALKPHOS 80 03/11/2023    AST 11 03/11/2023    ALT 5 03/11/2023         Assessment/Plan    1.  Advanced anal cancer.  Patient undergoing chemotherapy with Xeloda and radiation.  We will have to reduce her Xeloda to 1 pill twice a day.  She has not really tolerating it due to her renal dysfunction.  For now hold till she is better.    2.  Anemia of renal dysfunction.  Worsened with chemotherapy.  We will start her on erythropoietin replacement    3.  End-stage renal disease on dialysis.  I fear that she is not being adequately dialyzed due to her inability to sit for that long.  Hopefully we can get her tuned up while she is in the hospital.            Tony Carpenter MD  [facility]    3/13/2023

## 2023-03-13 NOTE — PLAN OF CARE
HD completed. Tolerated fair. UF goal reached. Blood returned. Report given to primary RN.   problem: Device-Related Complication Risk (Hemodialysis)  Goal: Safe, Effective Therapy Delivery  Outcome: Ongoing, Progressing  Intervention: Optimize Device Care and Function  Description: Maintain flow rate, anticoagulation parameters, pressure ranges and prescribed fluid balance with gradual adjustments to maintain hemodynamic stability and achieve therapy goals.  Monitor laboratory results (e.g., electrolytes, glucose, albumin, coagulation studies, hemoglobin, hematocrit) and clinical status (e.g., cramping; nausea, vomiting, blood pressure fluctuations) for response to therapy; advocate for treatment or adju  Assess vascular access site for patency, securement and position to meet flow demands. Assess perfusion distal to access site to ensure adequate tissue oxygenation.  For arteriovenous fistula, assess presence of thrill to ensure presence of blood flow. Avoid blood pressure readings, lab draws, tight clothing or jewelry on the AV (arteriovenous) fistula extremity to prevent trauma.  Maintain circuit monitoring (e.g., arterial, venous and transmembrane pressure; ultrafiltrate removal; dialysate flow, conductivity and temperature); address alarms promptly to decrease risk to patient and preserve circuit function.  Evaluate circuit for disconnections, cracks, clotting, malfunction, leaks or rupture of hemofilter; intervene promptly to prevent risk to patient.  Consider distal vascular access for antibiotic or vasoactive medication administration to prevent filtration of medication effect prior to being delivered systemically to the patient.  Maintain infusion of anticoagulation; adjust to keep laboratory values within ordered range.  Provide emergency equipment, such as clamps, replacement devices and resuscitative supplies, if malfunction, tubing rupture, clot formation or migration occur.  Recent Flowsheet  Documentation  Taken 3/13/2023 1300 by Issac Hinojosa, RN  Medication Review/Management: medications reviewed     Problem: Hemodynamic Instability (Hemodialysis)  Goal: Effective Tissue Perfusion  Outcome: Ongoing, Progressing     Problem: Infection (Hemodialysis)  Goal: Absence of Infection Signs and Symptoms  Outcome: Ongoing, Progressing   Goal Outcome Evaluation:

## 2023-03-13 NOTE — PROGRESS NOTES
" LOS: 3 days   Patient Care Team:  Meghana Rodgers MD as PCP - General  Demetrius Sagastume MD as Consulting Physician (Cardiology)  Kevan Lerma MD as Consulting Physician (Nephrology)  Geena Estrella APRN as Nurse Practitioner (Cardiology)  Frank Mandujano MD as Referring Physician (Colon and Rectal Surgery)  Kevan Cavazos MD as Consulting Physician (Radiation Oncology)  Yue Reeves RN as Nurse Navigator  Darryn Burk MD as Consulting Physician (Nephrology)    Chief Complaint: ESRD    Subjective   Seen on HD agitate. Goal UF 3 liter as tolerated. Bp stable. Good access pressure.       Subjective:  Symptoms:  Stable.  No shortness of breath or chest pain.    Diet:  Adequate intake.        History taken from: patient    Objective     Vital Sign Min/Max for last 24 hours  Temp  Min: 97.3 °F (36.3 °C)  Max: 98.5 °F (36.9 °C)   BP  Min: 106/51  Max: 168/79   Pulse  Min: 55  Max: 79   Resp  Min: 17  Max: 20   SpO2  Min: 94 %  Max: 100 %   Flow (L/min)  Min: 2  Max: 2   Weight  Min: 79.1 kg (174 lb 6.1 oz)  Max: 79.1 kg (174 lb 6.1 oz)     Flowsheet Rows    Flowsheet Row First Filed Value   Admission Height 167.6 cm (66\") Documented at 03/10/2023 1122   Admission Weight 76.7 kg (169 lb) Documented at 03/10/2023 1122          No intake/output data recorded.  I/O last 3 completed shifts:  In: 160 [P.O.:160]  Out: 0     Objective:  General Appearance:  Comfortable.    Vital signs: (most recent): Blood pressure 115/56, pulse 57, temperature 97.3 °F (36.3 °C), temperature source Tympanic, resp. rate 18, height 167.6 cm (65.98\"), weight 79.1 kg (174 lb 6.1 oz), SpO2 96 %, not currently breastfeeding.  Vital signs are normal.    Output: Minimal urine output.    HEENT: Normal HEENT exam.    Lungs:  Normal effort and normal respiratory rate.  Breath sounds clear to auscultation.  She is not in respiratory distress.  No decreased breath sounds or wheezes.    Heart: Normal rate.    Extremities: There is " dependent edema.    Pulses: Distal pulses are intact.    Neurological: Patient is alert and oriented to person, place and time.  Normal strength.              Results Review:     I reviewed the patient's new clinical results.    WBC WBC   Date Value Ref Range Status   03/13/2023 3.95 3.40 - 10.80 10*3/mm3 Final   03/12/2023 3.51 3.40 - 10.80 10*3/mm3 Final   03/11/2023 4.54 3.40 - 10.80 10*3/mm3 Final      HGB Hemoglobin   Date Value Ref Range Status   03/13/2023 9.2 (L) 12.0 - 15.9 g/dL Final   03/12/2023 8.2 (L) 12.0 - 15.9 g/dL Final   03/11/2023 8.2 (L) 12.0 - 15.9 g/dL Final   03/11/2023 7.1 (L) 12.0 - 15.9 g/dL Final   03/11/2023 7.1 (L) 12.0 - 15.9 g/dL Final   03/11/2023 7.0 (L) 12.0 - 15.9 g/dL Final   03/10/2023 7.6 (L) 12.0 - 15.9 g/dL Final      HCT Hematocrit   Date Value Ref Range Status   03/13/2023 31.3 (L) 34.0 - 46.6 % Final   03/12/2023 27.9 (L) 34.0 - 46.6 % Final   03/11/2023 28.4 (L) 34.0 - 46.6 % Final   03/11/2023 23.7 (L) 34.0 - 46.6 % Final   03/11/2023 24.0 (L) 34.0 - 46.6 % Final   03/11/2023 23.5 (L) 34.0 - 46.6 % Final   03/10/2023 25.4 (L) 34.0 - 46.6 % Final      Platlets No results found for: LABPLAT   MCV MCV   Date Value Ref Range Status   03/13/2023 101.6 (H) 79.0 - 97.0 fL Final   03/12/2023 102.6 (H) 79.0 - 97.0 fL Final   03/11/2023 101.3 (H) 79.0 - 97.0 fL Final          Sodium Sodium   Date Value Ref Range Status   03/13/2023 134 (L) 136 - 145 mmol/L Final   03/12/2023 136 136 - 145 mmol/L Final   03/11/2023 138 136 - 145 mmol/L Final      Potassium Potassium   Date Value Ref Range Status   03/13/2023 4.2 3.5 - 5.2 mmol/L Final     Comment:     Slight hemolysis detected by analyzer. Results may be affected.   03/12/2023 3.5 3.5 - 5.2 mmol/L Final   03/11/2023 3.4 (L) 3.5 - 5.2 mmol/L Final      Chloride Chloride   Date Value Ref Range Status   03/13/2023 94 (L) 98 - 107 mmol/L Final   03/12/2023 97 (L) 98 - 107 mmol/L Final   03/11/2023 93 (L) 98 - 107 mmol/L Final      CO2  CO2   Date Value Ref Range Status   03/13/2023 20.0 (L) 22.0 - 29.0 mmol/L Final   03/12/2023 23.0 22.0 - 29.0 mmol/L Final   03/11/2023 29.0 22.0 - 29.0 mmol/L Final      BUN BUN   Date Value Ref Range Status   03/13/2023 34 (H) 8 - 23 mg/dL Final   03/12/2023 29 (H) 8 - 23 mg/dL Final   03/11/2023 43 (H) 8 - 23 mg/dL Final      Creatinine Creatinine   Date Value Ref Range Status   03/13/2023 6.82 (H) 0.57 - 1.00 mg/dL Final   03/12/2023 5.55 (H) 0.57 - 1.00 mg/dL Final   03/11/2023 9.37 (H) 0.57 - 1.00 mg/dL Final      Calcium Calcium   Date Value Ref Range Status   03/13/2023 8.7 8.6 - 10.5 mg/dL Final   03/12/2023 8.7 8.6 - 10.5 mg/dL Final   03/11/2023 9.1 8.6 - 10.5 mg/dL Final      PO4 No results found for: CAPO4   Albumin Albumin   Date Value Ref Range Status   03/11/2023 3.3 (L) 3.5 - 5.2 g/dL Final      Magnesium No results found for: MG   Uric Acid No results found for: URICACID     Medication Review: yes    Assessment & Plan       Hematochezia    Type 2 diabetes mellitus (HCC)    Essential hypertension    Hyperlipidemia LDL goal <70    End stage renal disease on dialysis (HCC)    CHF (congestive heart failure) (HCC)    PAF (paroxysmal atrial fibrillation) (HCC)    Severe malnutrition (HCC)    Symptomatic anemia      Assessment & Plan     ESRD: MWF [facility]. Missed HD before admission      Access: AV fistula      Anemia: Transfuse at Hb 7 or less. On chemo for anal ca     Volume status: Dependent edema noted.    HTN: Bp elevated.     I discussed the patients findings and my recommendations with patient    Gabe Butler MD  03/13/23  14:28 EDT

## 2023-03-13 NOTE — CASE MANAGEMENT/SOCIAL WORK
Discharge Planning Assessment  [facility]     Patient Name: Esperanza Pop  MRN: [id number]  Today's Date: 3/13/2023    Admit Date: 3/10/2023    Plan: Home at [place]   Discharge Needs Assessment     Row Name 23 0946       Living Environment    People in Home alone    Current Living Arrangements home    Living Arrangement Comments Her spouse  this past Friday.       Discharge Needs Assessment    Equipment Currently Used at Home cane, straight    Equipment Needed After Discharge none    Discharge Coordination/Progress Denies being current with . Goes to outpt HD MWF at 1100 at [facility] by family transport. Outpt RAD at [facility].               Discharge Plan     Row Name 23 0951       Plan    Plan Home at [place]    Patient/Family in Agreement with Plan yes    Plan Comments I spoke wth the pt and her daughter Indigo. Thy deny any DC needs at this time. Indigo will stay with her mom at [place].    Final Discharge Disposition Code 01 - home or self-care    Row Name 23 0844       Plan    Final Discharge Disposition Code 01 - home or self-care              Continued Care and Services - Admitted Since 3/10/2023    Coordination has not been started for this encounter.     Selected Continued Care - Episodes Includes continued care and service providers with selected services from the active episodes listed below    Oncology Episode start date: 2023   There are no active outsourced providers for this episode.               Expected Discharge Date and Time     Expected Discharge Date Expected Discharge Time    Mar 13, 2023          Demographic Summary    No documentation.                Functional Status     Row Name 23 0944       Functional Status    Usual Activity Tolerance good       Functional Status, IADL    Medications independent    Meal Preparation independent    Housekeeping independent    Laundry independent    Shopping independent               Psychosocial    No documentation.                 Abuse/Neglect    No documentation.                Legal    No documentation.                Substance Abuse    No documentation.                Patient Forms    No documentation.                   Emily Banks RN

## 2023-03-13 NOTE — THERAPY EVALUATION
Patient Name: Esperanza Pop  : 1945    MRN: [id number]                              Today's Date: 3/13/2023       Admit Date: 3/10/2023    Visit Dx:     ICD-10-CM ICD-9-CM   1. Symptomatic anemia  D64.9 285.9   2. Generalized weakness  R53.1 780.79   3. ESRD on dialysis ([facility])  N18.6 585.6    Z99.2 V45.11   4. Impaired mobility  Z74.09 799.89   5. Fall, initial encounter  W19.XXXA E888.9   6. Elevated troponin  R77.8 790.6     Patient Active Problem List   Diagnosis   • Acute on chronic diastolic heart failure ([facility])   • Type 2 diabetes mellitus ([facility])   • Essential hypertension   • Coronary artery disease involving native coronary artery of native heart with angina pectoris ([facility])   • Breast cancer, female, right   • Seasonal allergic rhinitis   • Right carotid bruit   • Vitamin B12 deficiency   • Hyperlipidemia LDL goal <70   • End stage renal disease on dialysis ([facility])   • Nonrheumatic aortic valve stenosis   • NSTEMI (non-ST elevated myocardial infarction) ([facility])   • UTI (urinary tract infection)   • Ischemic heart disease   • CHF (congestive heart failure) ([facility])   • Acute non-ST segment elevation myocardial infarction (STEMI) following previous myocardial infarction   • Dyslipidemia   • Diabetes mellitus ([facility])   • Mild obesity   • Elevated troponin   • Screen for colon cancer   • Acute midline low back pain without sciatica   • Hypertensive emergency   • PAF (paroxysmal atrial fibrillation) ([facility])   • Malignant neoplasm of anal canal ([facility])   • Hematochezia   • Severe malnutrition ([facility])   • Symptomatic anemia     Past Medical History:   Diagnosis Date   • Acute bronchitis    • Acute conjunctivitis    • Acute kidney injury ([facility])     Admission from 2013 to 2014, now resolved with latest creatinine 1.4 on 2014.   • Acute non-ST segment elevation myocardial infarction (STEMI) following previous myocardial infarction    • Anal cancer (HCC) 2023   • Anal cancer (HCC) 2023   • Arthritis    •  Breast cancer, female, right     2005 mastectomy right   • Cancer (HCC)    • CHF (congestive heart failure) (HCC)    • Chronic kidney disease     dialysis MWF, [facility]/u [facility]    • Clotting disorder (HCC)    • COVID-19    • Diabetes mellitus (HCC)     diagnosed ~1992, checks fsbg 2-3x/day   • Diarrhea    • Dyslipidemia    • Dyspepsia    • Dyspnea    • Edema    • History of transfusion     ~2013 no reaction    • Hx of radiation therapy    • Hyperlipidemia    • Hypertension     Severe   • Ischemic heart disease    • Moderate obesity     BMI 36.2   • Myocardial infarction (HCC)    • Pneumonia    • Pneumonia 02/2019   • Radiation 2005   • Seasonal allergies    • Wears glasses      Past Surgical History:   Procedure Laterality Date   • AV FISTULA PLACEMENT, BRACHIOBASILIC     • BREAST BIOPSY Left 2010   • BREAST CYST EXCISION     • BREAST LUMPECTOMY Right 2005   • BREAST SURGERY     • CARDIAC CATHETERIZATION N/A 10/10/2016    Procedure: Left Heart Cath;  Surgeon: Scooter Zhao MD;  Location:  [facility] CATH INVASIVE LOCATION;  Service:    • CARDIAC CATHETERIZATION  10/2016   • CARDIAC CATHETERIZATION N/A 1/21/2021    Procedure: Right and Left Heart Cath;  Surgeon: Hugh Tidwell MD;  Location:  [facility] CATH INVASIVE LOCATION;  Service: Cardiology;  Laterality: N/A;   • COLONOSCOPY N/A 7/23/2020    Procedure: COLONOSCOPY;  Surgeon: Rubén Rosas MD;  Location:  [facility];  Service: Gastroenterology;  Laterality: N/A;   • CORONARY STENT PLACEMENT  2016   • HERNIA REPAIR     • HYSTERECTOMY  2000   • INSERTION HEMODIALYSIS CATHETER Right 6/29/2016    Procedure: HEMODIALYSIS CATHETER INSERTION TUNNELLED;  Surgeon: Ramón Chavez MD;  Location:  [facility];  Service:    • MASTECTOMY Right 2006   • OOPHORECTOMY     • REDUCTION MAMMAPLASTY Left 2005      General Information     Row Name 03/13/23 0956          OT Time and Intention    Document Type evaluation  -JOESPH     Mode of Treatment  occupational therapy  -     Row Name 03/13/23 0956          General Information    Patient Profile Reviewed yes  -JOESPH     Prior Level of Function independent:;ADL's;bed mobility;transfer;all household mobility;home management  -JOESPH     Existing Precautions/Restrictions fall;other (see comments)  situational confusion  -     Barriers to Rehab medically complex;previous functional deficit;cognitive status;visual deficit  -     Row Name 03/13/23 0956          Occupational Profile    Environmental Supports and Barriers (Occupational Profile) Lived with her spouse who passed away on 3/10/2023; ambulates using SPC; has walk-in shower with built-in bench  -     Row Name 03/13/23 0956          Living Environment    People in Home alone  -     Row Name 03/13/23 0956          Home Main Entrance    Number of Stairs, Main Entrance two  -     Stair Railings, Main Entrance railing on right side (ascending)  -     Row Name 03/13/23 0956          Stairs Within Home, Primary    Number of Stairs, Within Home, Primary none  -     Row Name 03/13/23 0956          Cognition    Orientation Status (Cognition) oriented to;person;disoriented to;time;verbal cues/prompts needed for orientation;place  Pt knew she was in the hospital but thought she was at [place]  -     Row Name 03/13/23 0956          Safety Issues, Functional Mobility    Safety Issues Affecting Function (Mobility) ability to follow commands;awareness of need for assistance;friction/shear risk;insight into deficits/self-awareness;judgment;positioning of assistive device;problem-solving;safety precaution awareness;safety precautions follow-through/compliance;sequencing abilities  -JOESPH     Impairments Affecting Function (Mobility) balance;cognition;coordination;endurance/activity tolerance;motor planning;pain;postural/trunk control;strength  -     Cognitive Impairments, Mobility Safety/Performance attention;awareness, need for assistance;insight into  deficits/self-awareness;judgment;problem-solving/reasoning;safety precaution awareness;safety precaution follow-through;sequencing abilities  -JOESPH     Comment, Safety Issues/Impairments (Mobility) situational confusion; assist to manage walker during ambulation  -JOESPH           User Key  (r) = Recorded By, (t) = Taken By, (c) = Cosigned By    Initials Name Provider Type    JOESPH Merline Aguila OT Occupational Therapist                 Mobility/ADL's     Row Name 03/13/23 1000          Bed Mobility    Bed Mobility supine-sit  -JOESPH     Supine-Sit [place] (Bed Mobility) moderate assist (50% patient effort);verbal cues;nonverbal cues (demo/gesture);2 person assist  -JOESPH     Bed Mobility, Safety Issues cognitive deficits limit understanding;decreased use of legs for bridging/pushing  -JOESPH     Assistive Device (Bed Mobility) bed rails;head of bed elevated  -JOESPH     Comment, (Bed Mobility) cues for sequencing; assist at trunk and for bringing BLE's over bed surface  -JOESPH     Row Name 03/13/23 1000          Transfers    Transfers sit-stand transfer;toilet transfer;stand-sit transfer  -JOESPH     Comment, (Transfers) cues for HP and sequencing  -JOESPH     Row Name 03/13/23 1000          Sit-Stand Transfer    Sit-Stand [place] (Transfers) minimum assist (75% patient effort);verbal cues;nonverbal cues (demo/gesture);2 person assist  -JOESPH     Assistive Device (Sit-Stand Transfers) walker, front-wheeled  -JOESPH     Row Name 03/13/23 1000          Stand-Sit Transfer    Stand-Sit [place] (Transfers) minimum assist (75% patient effort);verbal cues;nonverbal cues (demo/gesture);2 person assist  -JOESPH     Assistive Device (Stand-Sit Transfers) walker, front-wheeled  -JOESPH     Comment, (Stand-Sit Transfer) cues to reach back prior to sitting  -JOESPH     Row Name 03/13/23 1000          Toilet Transfer    Type (Toilet Transfer) stand-sit;sit-stand;stand pivot/stand step  -JOESPH     [place] Level (Toilet Transfer) minimum assist (75% patient  effort);verbal cues;nonverbal cues (demo/gesture);2 person assist  -JOESPH     Assistive Device (Toilet Transfer) commode;grab bars/safety frame;walker, front-wheeled  -JOESPH     Comment, (Toilet Transfer) cues for HP and sequencing; increased processing time needed for cues  -JOESPH     Row Name 03/13/23 1000          Activities of Daily Living    BADL Assessment/Intervention lower body dressing;grooming;toileting  -     Row Name 03/13/23 1000          Lower Body Dressing Assessment/Training    [place] Level (Lower Body Dressing) don;socks;dependent (less than 25% patient effort)  -JOESPH     Position (Lower Body Dressing) edge of bed sitting  -JOESPH     Row Name 03/13/23 1000          Grooming Assessment/Training    [place] Level (Grooming) wash face, hands;supervision;verbal cues  -JOESPH     Position (Grooming) supported sitting  -JOESPH     Comment, (Grooming) declined oral care d/t breakfast tray arriving; cues to persist with task for face/hand washing  -     Row Name 03/13/23 1000          Toileting Assessment/Training    [place] Level (Toileting) adjust/manage clothing;perform perineal hygiene;dependent (less than 25% patient effort)  -JOESPH     Assistive Devices (Toileting) commode;grab bar/safety frame  -JOESPH     Position (Toileting) unsupported sitting;supported standing  -JOESPH     Comment, (Toileting) Pt reported extreme pain during pericare; RN notified  -           User Key  (r) = Recorded By, (t) = Taken By, (c) = Cosigned By    Initials Name Provider Type    Merline Lloyd OT Occupational Therapist               Obj/Interventions     Row Name 03/13/23 1005          Sensory Assessment (Somatosensory)    Sensory Assessment (Somatosensory) UE sensation intact  -[facility] Name 03/13/23 1005          Vision Assessment/Intervention    Visual Impairment/Limitations unable/difficult to assess;other (see comments)  Pt reported recently losing her prescription eyeglasses  -[facility] Name 03/13/23 1005          Range  of Motion Comprehensive    General Range of Motion bilateral upper extremity ROM WFL  -JOESPH     Row Name 03/13/23 1005          Strength Comprehensive (MMT)    Comment, General Manual Muscle Testing (MMT) Assessment BUE's grossly 3+/5  -JOESPH     Row Name 03/13/23 1005          Balance    Balance Interventions standing;weight shifting activity  -JOESPH     Comment, Balance ModA for dynamic standing balance  -JOESPH           User Key  (r) = Recorded By, (t) = Taken By, (c) = Cosigned By    Initials Name Provider Type    JOESPH Merline Aguila, [place] Occupational Therapist               Goals/Plan     Row Name 03/13/23 1011          Transfer Goal 1 (OT)    Activity/Assistive Device (Transfer Goal 1, OT) sit-to-stand/stand-to-sit;toilet;walker, rolling  -JOESPH     [place] Level/Cues Needed (Transfer Goal 1, OT) minimum assist (75% or more patient effort)  -JOESPH     Time Frame (Transfer Goal 1, OT) long term goal (LTG);by discharge  -JOESPH     Progress/Outcome (Transfer Goal 1, OT) new goal  -JOESPH     Row Name 03/13/23 1011          Toileting Goal 1 (OT)    Activity/Device (Toileting Goal 1, OT) adjust/manage clothing;perform perineal hygiene;commode;grab bar/safety frame  -JOESPH     [place] Level/Cues Needed (Toileting Goal 1, OT) minimum assist (75% or more patient effort)  -JOESPH     Time Frame (Toileting Goal 1, OT) long term goal (LTG);by discharge  -JOESPH     Progress/Outcome (Toileting Goal 1, OT) new goal  -JOESPH     Row Name 03/13/23 1011          Grooming Goal 1 (OT)    Activity/Device (Grooming Goal 1, OT) oral care;wash face, hands  -JOESPH     [place] (Grooming Goal 1, OT) supervision required  -JOESPH     Time Frame (Grooming Goal 1, OT) long term goal (LTG);by discharge  -JOESPH     Strategies/Barriers (Grooming Goal 1, OT) standing sink side  -JOESPH     Progress/Outcome (Grooming Goal 1, OT) new goal  -JOESPH     Row Name 03/13/23 1011          Therapy Assessment/Plan (OT)    Planned Therapy Interventions (OT) activity tolerance training;BADL  retraining;functional balance retraining;occupation/activity based interventions;patient/caregiver education/training;ROM/therapeutic exercise;strengthening exercise;transfer/mobility retraining  -           User Key  (r) = Recorded By, (t) = Taken By, (c) = Cosigned By    Initials Name Provider Type    Merline Lloyd OT Occupational Therapist               Clinical Impression     Row Name 03/13/23 1006          Pain Assessment    Additional Documentation Pain Scale: FACES Pre/Post-Treatment (Group)  -[facility] Name 03/13/23 1006          Pain Scale: FACES Pre/Post-Treatment    Pain: FACES Scale, Pretreatment 0-->no hurt  -     Posttreatment Pain Rating 8-->hurts whole lot  -     Pain Location - Side/Orientation Right  -     Pre/Posttreatment Pain Comment Pt reported increased pain following pericare; RN notified  -[facility] Name 03/13/23 1006          Plan of Care Review    Plan of Care Reviewed With patient;daughter  -     Outcome Evaluation OT eval completed. Pt presents with significant weakness, decreased standing tolerance, and increased confusion impacting ADL's. Pt ModAx2 for bed mobility, MinAx2 for functional transfers, Dep for pericare, [place] for grooming seated in chair. Cues needed for orientation, sequencing, and for motor planning tasks. Recommend SNF at discharge to support goal of return to PLOF.  -     Row Name 03/13/23 1006          Therapy Assessment/Plan (OT)    Rehab Potential (OT) good, to achieve stated therapy goals  -     Criteria for Skilled Therapeutic Interventions Met (OT) yes;meets criteria;skilled treatment is necessary  -     Therapy Frequency (OT) daily  -     Row Name 03/13/23 1006          Therapy Plan Review/Discharge Plan (OT)    Anticipated Discharge Disposition (OT) skilled nursing facility  -     Row Name 03/13/23 1006          Vital Signs    Pre Systolic BP Rehab 154  -JOESPH     Pre Treatment Diastolic BP 88  -JOESPH     Pretreatment Heart Rate (beats/min) 60   -JOESPH     Pre SpO2 (%) 95  -JOESPH     O2 Delivery Pre Treatment nasal cannula  -JOESPH     Intra SpO2 (%) 93  -JOESPH     O2 Delivery Intra Treatment room air  -JOESPH     Post SpO2 (%) 95  -JOESPH     O2 Delivery Post Treatment room air  -JOESPH     Pre Patient Position Supine  -JOESPH     Intra Patient Position Standing  -JOESPH     Post Patient Position Sitting  -JOESPH     Row Name 03/13/23 1006          Positioning and Restraints    Pre-Treatment Position in bed  -JOESPH     Post Treatment Position chair  -JOESPH     In Chair notified nsg;reclined;call light within reach;encouraged to call for assist;exit alarm on;waffle cushion;legs elevated  -JOESPH           User Key  (r) = Recorded By, (t) = Taken By, (c) = Cosigned By    Initials Name Provider Type    Merline Lloyd OT Occupational Therapist               Outcome Measures     Row Name 03/13/23 1013          How much help from another is currently needed...    Putting on and taking off regular lower body clothing? 1  -JOESPH     Bathing (including washing, rinsing, and drying) 2  -JOESPH     Toileting (which includes using toilet bed pan or urinal) 1  -JOESPH     Putting on and taking off regular upper body clothing 3  -JOESPH     Taking care of personal grooming (such as brushing teeth) 3  -JOESPH     Eating meals 3  -JOESPH     AM-PAC 6 Clicks Score (OT) 13  -JOESPH     Row Name 03/13/23 1009 03/13/23 0834       How much help from another person do you currently need...    Turning from your back to your side while in flat bed without using bedrails? 3  -KH 3  -KH    Moving from lying on back to sitting on the side of a flat bed without bedrails? 3  -KH 3  -KH    Moving to and from a bed to a chair (including a wheelchair)? 2  -KH 2  -KH    Standing up from a chair using your arms (e.g., wheelchair, bedside chair)? 2  -KH 2  -KH    Climbing 3-5 steps with a railing? 2  -KH 2  -KH    To walk in hospital room? 2  -KH 2  -KH    AM-PAC 6 Clicks Score (PT) 14  -KH 14  -KH    Highest level of mobility 4 --> Transferred to  chair/commode  - 4 --> Transferred to chair/commode  -KH    Row Name 03/13/23 0828          How much help from another person do you currently need...    Turning from your back to your side while in flat bed without using bedrails? 3  -KR     Moving from lying on back to sitting on the side of a flat bed without bedrails? 2  -KR     Moving to and from a bed to a chair (including a wheelchair)? 3  -KR     Standing up from a chair using your arms (e.g., wheelchair, bedside chair)? 3  -KR     Climbing 3-5 steps with a railing? 2  -KR     To walk in hospital room? 3  -KR     AM-PAC 6 Clicks Score (PT) 16  -KR     Highest level of mobility 5 --> Static standing  -KR     Row Name 03/13/23 1013          Functional Assessment    Outcome Measure Options AM-PAC 6 Clicks Daily Activity (OT)  -JOESPH           User Key  (r) = Recorded By, (t) = Taken By, (c) = Cosigned By    Initials Name Provider Type    Merline Lloyd, OT Occupational Therapist    Starla Dickson, PT Physical Therapist    Allyson Pelayo, RN Registered Nurse                Occupational Therapy Education     Title: PT OT SLP Therapies (In Progress)     Topic: Occupational Therapy (In Progress)     Point: ADL training (In Progress)     Description:   Instruct learner(s) on proper safety adaptation and remediation techniques during self care or transfers.   Instruct in proper use of assistive devices.              Learning Progress Summary           Patient Acceptance, E, NL,NR by JOESPH at 3/13/2023 1013    Comment: OT POC; fall prevention; orientation                   Point: Home exercise program (Not Started)     Description:   Instruct learner(s) on appropriate technique for monitoring, assisting and/or progressing therapeutic exercises/activities.              Learner Progress:  Not documented in this visit.          Point: Precautions (In Progress)     Description:   Instruct learner(s) on prescribed precautions during self-care and functional  transfers.              Learning Progress Summary           Patient Acceptance, E, NL,NR by JOESPH at 3/13/2023 1013    Comment: OT POC; fall prevention; orientation                   Point: Body mechanics (In Progress)     Description:   Instruct learner(s) on proper positioning and spine alignment during self-care, functional mobility activities and/or exercises.              Learning Progress Summary           Patient Acceptance, E, NL,NR by JOESPH at 3/13/2023 1013    Comment: OT POC; fall prevention; orientation                               User Key     Initials Effective Dates Name Provider Type Discipline     06/16/21 -  Merline Aguila, OT Occupational Therapist OT              OT Recommendation and Plan  Planned Therapy Interventions (OT): activity tolerance training, BADL retraining, functional balance retraining, occupation/activity based interventions, patient/caregiver education/training, ROM/therapeutic exercise, strengthening exercise, transfer/mobility retraining  Therapy Frequency (OT): daily  Plan of Care Review  Plan of Care Reviewed With: patient, daughter  Outcome Evaluation: OT eval completed. Pt presents with significant weakness, decreased standing tolerance, and increased confusion impacting ADL's. Pt ModAx2 for bed mobility, MinAx2 for functional transfers, Dep for pericare, [place] for grooming seated in chair. Cues needed for orientation, sequencing, and for motor planning tasks. Recommend SNF at discharge to support goal of return to PLOF.     Time Calculation:    Time Calculation- OT     Row Name 03/13/23 0830 03/13/23 0828          Time Calculation- OT    OT Start Time 0830 -JB --     OT Received On 03/13/23  -JOESPH --     OT Goal Re-Cert Due Date 03/23/23  -JOESPH --        Timed Charges    36338 - Gait Training Minutes  -- 9  -KR        Untimed Charges    OT Eval/Re-eval Minutes 58  -JOESPH --        Total Minutes    Timed Charges Total Minutes -- 9  -KR     Untimed Charges Total Minutes 58  -JOESPH --       Total Minutes 58  -JOESPH 9  -KR           User Key  (r) = Recorded By, (t) = Taken By, (c) = Cosigned By    Initials Name Provider Type    Merline Lloyd OT Occupational Therapist    Starla Dickson PT Physical Therapist              Therapy Charges for Today     Code Description Service Date Service Provider Modifiers Qty    90096859455 HC OT EVAL LOW COMPLEXITY 4 3/13/2023 Merline Aguila OT GO 1               Merline Aguila OT  3/13/2023

## 2023-03-13 NOTE — PROGRESS NOTES
[facility] Medicine Services  PROGRESS NOTE    Patient Name: Esperanza Pop  : 1945  MRN: [id number]    Date of Admission: 3/10/2023  Primary Care Physician: Meghana Rodgers MD    Subjective   Subjective     CC:  F/U generalized weakness     HPI:  Pt seen and examined. More awake and alert today. Daughter at bedside. Tells me she is supposed to have chemo/radiation today. Also today her is dialysis day.     ROS:  Gen- No fevers, chills  CV- No chest pain, palpitations  Resp- No cough, dyspnea  GI- No N/V/D, abd pain    Objective   Objective     Vital Signs:   Temp:  [97.3 °F (36.3 °C)-98.5 °F (36.9 °C)] 98.1 °F (36.7 °C)  Heart Rate:  [56-64] 59  Resp:  [17-20] 17  BP: (117-167)/(59-88) 154/88  Flow (L/min):  [2] 2     Physical Exam:  Constitutional: No acute distress, more awake and alert today   HENT: NCAT, mucous membranes moist  Respiratory: Clear to auscultation bilaterally, respiratory effort normal   Cardiovascular: RRR, + murmur, No rubs, or gallops  Gastrointestinal: Positive bowel sounds, soft, nontender, nondistended  Musculoskeletal: + bilateral ankle edema  Psychiatric: Cooperative   Neurologic: No focal deficits  Skin: No rashes    Results Reviewed:  LAB RESULTS:      Lab 23  0524 23  2321 23  1229 23  0741 23  0028 03/10/23  1809 03/10/23  1145   WBC 3.51  --   --  4.54  --   --  5.04   HEMOGLOBIN 8.2* 8.2* 7.1* 7.1* 7.0*   < > 7.8*   HEMATOCRIT 27.9* 28.4* 23.7* 24.0* 23.5*   < > 26.8*   PLATELETS 113*  --   --  129*  --   --  143   NEUTROS ABS 2.51  --   --  3.32  --   --  3.48   IMMATURE GRANS (ABS) 0.03  --   --  0.05  --   --  0.07*   LYMPHS ABS 0.42*  --   --  0.48*  --   --  0.78   MONOS ABS 0.55  --   --  0.66  --   --  0.68   EOS ABS 0.00  --   --  0.02  --   --  0.01   .6*  --   --  101.3*  --   --  103.1*    < > = values in this interval not displayed.         Lab 23  0524 23  0741 03/10/23  1145   SODIUM  136 138 136   POTASSIUM 3.5 3.4* 4.0   CHLORIDE 97* 93* 90*   CO2 23.0 29.0 24.0   ANION GAP 16.0* 16.0* 22.0*   BUN 29* 43* 38*   CREATININE 5.55* 9.37* 7.43*   EGFR 7.5* 4.0* 5.3*   GLUCOSE 85 100* 196*   CALCIUM 8.7 9.1 9.2   MAGNESIUM  --   --  2.2         Lab 03/11/23  0741 03/10/23  1145   TOTAL PROTEIN 7.3 7.7   ALBUMIN 3.3* 3.6   GLOBULIN 4.0 4.1   ALT (SGPT) 5 5   AST (SGOT) 11 17   BILIRUBIN 0.7 0.7   ALK PHOS 80 86         Lab 03/10/23  2123 03/10/23  1809 03/10/23  1145   HSTROP T 176* 173* 172*             Lab 03/10/23  1418   ABO TYPING B   RH TYPING Positive   ANTIBODY SCREEN Negative         Brief Urine Lab Results     None          Microbiology Results Abnormal     None          Adult Transthoracic Echo Complete W/ Cont if Necessary Per Protocol    Result Date: 3/13/2023  •  Left ventricular systolic function is normal. Estimated left ventricular EF = 55% •  Left ventricular wall thickness is consistent with moderate concentric hypertrophy. •  The right ventricular cavity is mildly dilated. •  Mild to moderate biatrial enlargement. •  Moderate to severe aortic valve stenosis is present.  Mean gradient 34 mmHg, DOUG 0.9 cm². •  Mild aortic insufficiency. •  Mild mitral regurgitation. •  Mild to moderate tricuspid regurgitation with RVSP 48 mmHg.     CT Head Without Contrast    Result Date: 3/12/2023  CT HEAD WO CONTRAST Date of Exam: 3/12/2023 11:19 AM EDT Indication: AMS. Comparison: MRI brain from April 18, 2019 Technique: Axial CT images were obtained of the head without contrast administration.  Reconstructed coronal and sagittal images were also obtained. Automated exposure control and iterative construction methods were used. Findings: No acute intracranial hemorrhage or extra-axial collection is identified. The ventricles appear normal in caliber, with no evidence of mass effect or midline shift. The basal cisterns appear patent. The gray-white differentiation appears preserved. The calvarium  appear intact. The paranasal sinuses are clear. The mastoid air cells are well-aerated. Scattered foci of periventricular and subcortical white matter hypodensities are nonspecific, but likely the sequela of mild chronic small vessel ischemic disease. Some degenerative mineralization is noted within the bilateral basal ganglia.     Impression: Impression: 1.No acute intracranial process identified. 2.Findings suggestive of mild chronic small vessel ischemic disease. Electronically Signed: Darrell Gonzalez  3/12/2023 11:35 AM EDT  Workstation ID: [id number]      Results for orders placed during the hospital encounter of 03/10/23    Adult Transthoracic Echo Complete W/ Cont if Necessary Per Protocol    Interpretation Summary  •  Left ventricular systolic function is normal. Estimated left ventricular EF = 55%  •  Left ventricular wall thickness is consistent with moderate concentric hypertrophy.  •  The right ventricular cavity is mildly dilated.  •  Mild to moderate biatrial enlargement.  •  Moderate to severe aortic valve stenosis is present.  Mean gradient 34 mmHg, DOUG 0.9 cm².  •  Mild aortic insufficiency.  •  Mild mitral regurgitation.  •  Mild to moderate tricuspid regurgitation with RVSP 48 mmHg.      Current medications:  Scheduled Meds:amiodarone, 200 mg, Oral, Daily  aspirin, 81 mg, Oral, Daily  atorvastatin, 80 mg, Oral, Nightly  carvedilol, 12.5 mg, Oral, Q12H  cloNIDine, 0.4 mg, Oral, Q12H  Hydrocortisone (Perianal), , Rectal, BID  insulin lispro, 0-9 Units, Subcutaneous, TID With Meals  Insulin Lispro, 5 Units, Subcutaneous, TID With Meals  isosorbide mononitrate, 120 mg, Oral, Daily  sodium chloride, 10 mL, Intravenous, Q12H      Continuous Infusions:   PRN Meds:.•  benzocaine-menthol  •  dextrose  •  dextrose  •  glucagon (human recombinant)  •  haloperidol lactate  •  hydrOXYzine  •  ondansetron **OR** ondansetron  •  sodium chloride  •  sodium chloride  •  sodium chloride    Assessment & Plan  "  Assessment & Plan     Active Hospital Problems    Diagnosis  POA   • **Hematochezia [K92.1]  Yes   • Symptomatic anemia [D64.9]  Yes   • Severe malnutrition (HCC) [E43]  Yes   • PAF (paroxysmal atrial fibrillation) (HCC) [I48.0]  Yes   • CHF (congestive heart failure) (HCC) [I50.9]  Yes   • End stage renal disease on dialysis (HCC) [N18.6, Z99.2]  Not Applicable   • Hyperlipidemia LDL goal <70 [E78.5]  Yes   • Essential hypertension [I10]  Yes   • Type 2 diabetes mellitus (HCC) [E11.9]  Yes      Resolved Hospital Problems   No resolved problems to display.        Brief Hospital Course to date:  Esperanza Pop is a 78 y.o. female  with PMH of ESRD on HD MWF, IDDM, HTN, HLD, HFpEF, Pulm HTN, Hx of breast cancer (s/p mastectomy and radiation) PAF (on xarelto), Chronic anemia, and rectal cancer (recently diagnosed currently gettintg XRT and chemo) that presented to the ED after a fall at home. Her 's  was today and she was getting ready to go when her legs \"gave out\". She had been having loose stool and bloody rectal discharge but that had improved prior to admission.     This patient's problems and plans were partially entered by my partner and updated as appropriate by me 23.    Generalized Weakness  Acute on Chronic Anemia  Rectal bleeding, Rectal Cancer   -S/P 1 unit PRBCs 3/11 with appropriate rise in H&H  -AM CBC for today pending   -PT/OT evaluated this AM, recommends rehab, will have CM follow-up    AMS -> Improved   Concern for UTI  -Patient has been refusing her MS Contin because it causes her to hallucinate so DC'd    -Holding nightly Pamelor   -CT head negative for acute findings   -PRN Atarax for agitation   -Staff unable to obtain UA but per family, she had similar symptoms with UTI. Will treat for UTI as her mental status is improved today after receiving dose of Rocephin yesterday.     Rectal Cancer  Hx breast cancer   -Follows with Dr Cavazos with rad onc  -Follows Dr Rojo with " oncology, currently on Xeloda, plan to decrease the dose to 500 mg twice daily on days of radiation only  -Patient was supposed to have Radiation and Chemo today but says she is too tired and will try tomorrow      ESRD on HD MWF  -Renal following for HD (had dialysis Saturday due to missed session on Friday)     HFpEF  Pulm HTN  PAF  HTN  HLD   -Awaiting AM H&H. If stable, will resume Eliquis   -ASA/Amiodarone   -last echo EF 60% with grade II diastolic dysfunction pulm htn      Elevated Troponin   Mod-Severe AS   -no chest pain  -Echo reviewed: EF normal. Mod-Severe AS. Mild/mod TR  -Mod AS noted on prior Echo in 2021   -Has appt with WIMLA Lopez 4/27/23, keep this appt     Expected Discharge Location and Transportation: home vs rehab   Expected Discharge   Expected Discharge Date and Time     Expected Discharge Date Expected Discharge Time    Mar 15, 2023            DVT prophylaxis:  Mechanical DVT prophylaxis orders are present.     AM-PAC 6 Clicks Score (PT): 14 (03/13/23 1009)    CODE STATUS:   Code Status and Medical Interventions:   Ordered at: 03/10/23 1448     Medical Intervention Limits:    NO intubation (DNI)     Code Status (Patient has no pulse and is not breathing):    No CPR (Do Not Attempt to Resuscitate)     Medical Interventions (Patient has pulse or is breathing):    Limited Support       Viv Kapadia,   03/13/23

## 2023-03-13 NOTE — PLAN OF CARE
Problem: Adult Inpatient Plan of Care  Goal: Plan of Care Review  3/13/2023 0455 by Tesha Guaman RN  Outcome: Ongoing, Progressing  Flowsheets (Taken 3/13/2023 0455)  Progress: no change  Plan of Care Reviewed With:   patient   daughter  3/13/2023 0446 by Tesha Guaman RN  Outcome: Ongoing, Progressing  Flowsheets (Taken 3/13/2023 0446)  Progress: improving  Plan of Care Reviewed With:   patient   daughter  Goal: Patient-Specific Goal (Individualized)  3/13/2023 0455 by Tesha Guaman RN  Outcome: Ongoing, Progressing  3/13/2023 0446 by Tesha Guaman RN  Outcome: Ongoing, Progressing  Goal: Absence of Hospital-Acquired Illness or Injury  3/13/2023 0455 by Tesha Guaman RN  Outcome: Ongoing, Progressing  3/13/2023 0446 by Tesha Guaman RN  Outcome: Ongoing, Progressing  Intervention: Identify and Manage Fall Risk  Recent Flowsheet Documentation  Taken 3/13/2023 0400 by Tesha Guaman RN  Safety Promotion/Fall Prevention:   assistive device/personal items within reach   clutter free environment maintained   fall prevention program maintained   lighting adjusted   nonskid shoes/slippers when out of bed   room organization consistent   safety round/check completed  Taken 3/13/2023 0200 by Tesha Guaman RN  Safety Promotion/Fall Prevention:   assistive device/personal items within reach   clutter free environment maintained   fall prevention program maintained   lighting adjusted   nonskid shoes/slippers when out of bed   room organization consistent   safety round/check completed  Taken 3/13/2023 0000 by Tesha Guaman RN  Safety Promotion/Fall Prevention:   clutter free environment maintained   fall prevention program maintained   nonskid shoes/slippers when out of bed   room organization consistent   safety round/check completed   activity supervised   assistive device/personal items within reach   lighting adjusted  Taken 3/12/2023 2200 by Tesha Guaman  RN  Safety Promotion/Fall Prevention:   assistive device/personal items within reach   clutter free environment maintained   fall prevention program maintained   nonskid shoes/slippers when out of bed   room organization consistent   safety round/check completed  Taken 3/12/2023 2000 by Tesha Guaman RN  Safety Promotion/Fall Prevention:   activity supervised   assistive device/personal items within reach   clutter free environment maintained   fall prevention program maintained   nonskid shoes/slippers when out of bed   lighting adjusted   room organization consistent   safety round/check completed  Intervention: Prevent Skin Injury  Recent Flowsheet Documentation  Taken 3/13/2023 0400 by Tesha Guaman RN  Body Position: position changed independently  Skin Protection:   incontinence pads utilized   tubing/devices free from skin contact  Taken 3/13/2023 0200 by Tesha Guaman RN  Body Position: position changed independently  Skin Protection:   incontinence pads utilized   tubing/devices free from skin contact  Taken 3/13/2023 0000 by Tesha Guaman RN  Body Position: position changed independently  Skin Protection:   incontinence pads utilized   tubing/devices free from skin contact  Taken 3/12/2023 2200 by Tesha Guaman RN  Body Position: position changed independently  Skin Protection:   incontinence pads utilized   tubing/devices free from skin contact  Taken 3/12/2023 2000 by Tesha Guaman RN  Body Position: position changed independently  Skin Protection:   adhesive use limited   incontinence pads utilized   tubing/devices free from skin contact  Intervention: Prevent and Manage VTE (Venous Thromboembolism) Risk  Recent Flowsheet Documentation  Taken 3/13/2023 0400 by Tesha Guaman RN  Activity Management: activity adjusted per tolerance  Taken 3/13/2023 0200 by Tesha Guaman RN  Activity Management: activity adjusted per tolerance  Taken 3/13/2023 0000 by  Tesha Guaman RN  Activity Management: activity adjusted per tolerance  Taken 3/12/2023 2200 by Tesha Guaman RN  Activity Management: activity adjusted per tolerance  Taken 3/12/2023 2000 by Tesha Guaman RN  Activity Management: activity adjusted per tolerance  VTE Prevention/Management: sequential compression devices on  Range of Motion: active ROM (range of motion) encouraged  Intervention: Prevent Infection  Recent Flowsheet Documentation  Taken 3/12/2023 2200 by Tesha Guaman RN  Infection Prevention: rest/sleep promoted  Taken 3/12/2023 2000 by Tesha Guaman RN  Infection Prevention:   environmental surveillance performed   equipment surfaces disinfected   hand hygiene promoted   personal protective equipment utilized   rest/sleep promoted   single patient room provided  Goal: Optimal Comfort and Wellbeing  3/13/2023 0455 by Tesha Guaman RN  Outcome: Ongoing, Progressing  3/13/2023 0446 by Tesha Guaman RN  Outcome: Ongoing, Progressing  Intervention: Provide Person-Centered Care  Recent Flowsheet Documentation  Taken 3/12/2023 2000 by Tesha Guaman RN  Trust Relationship/Rapport:   care explained   choices provided   questions answered   questions encouraged   thoughts/feelings acknowledged  Goal: Readiness for Transition of Care  3/13/2023 0455 by Tesha Guaman RN  Outcome: Ongoing, Progressing  3/13/2023 0446 by Tesha Guaman RN  Outcome: Ongoing, Progressing     Problem: Fall Injury Risk  Goal: Absence of Fall and Fall-Related Injury  3/13/2023 0455 by Tesha Guaman RN  Outcome: Ongoing, Progressing  3/13/2023 0446 by Tesha Guaman RN  Outcome: Ongoing, Progressing  Intervention: Identify and Manage Contributors  Recent Flowsheet Documentation  Taken 3/13/2023 0400 by Tesha Guaman RN  Medication Review/Management: medications reviewed  Self-Care Promotion: independence encouraged  Taken 3/13/2023 0200 by Tesha Guaman  RN  Medication Review/Management: medications reviewed  Self-Care Promotion: independence encouraged  Taken 3/13/2023 0000 by Tesha Guaman RN  Medication Review/Management: medications reviewed  Self-Care Promotion: independence encouraged  Taken 3/12/2023 2200 by Tesha Guaman RN  Medication Review/Management: medications reviewed  Self-Care Promotion: independence encouraged  Taken 3/12/2023 2000 by Tesha Guaman RN  Medication Review/Management: medications reviewed  Self-Care Promotion: independence encouraged  Intervention: Promote Injury-Free Environment  Recent Flowsheet Documentation  Taken 3/13/2023 0400 by Tesha Guaman, RN  Safety Promotion/Fall Prevention:   assistive device/personal items within reach   clutter free environment maintained   fall prevention program maintained   lighting adjusted   nonskid shoes/slippers when out of bed   room organization consistent   safety round/check completed  Taken 3/13/2023 0200 by Tesha Guaman RN  Safety Promotion/Fall Prevention:   assistive device/personal items within reach   clutter free environment maintained   fall prevention program maintained   lighting adjusted   nonskid shoes/slippers when out of bed   room organization consistent   safety round/check completed  Taken 3/13/2023 0000 by Tesha Guaman RN  Safety Promotion/Fall Prevention:   clutter free environment maintained   fall prevention program maintained   nonskid shoes/slippers when out of bed   room organization consistent   safety round/check completed   activity supervised   assistive device/personal items within reach   lighting adjusted  Taken 3/12/2023 2200 by Tesha Guaman, RN  Safety Promotion/Fall Prevention:   assistive device/personal items within reach   clutter free environment maintained   fall prevention program maintained   nonskid shoes/slippers when out of bed   room organization consistent   safety round/check completed  Taken 3/12/2023  2000 by Guaman, Tesha, RN  Safety Promotion/Fall Prevention:   activity supervised   assistive device/personal items within reach   clutter free environment maintained   fall prevention program maintained   nonskid shoes/slippers when out of bed   lighting adjusted   room organization consistent   safety round/check completed     Problem: Diabetes Comorbidity  Goal: Blood Glucose Level Within Targeted Range  3/13/2023 0455 by Tesha Guaman RN  Outcome: Ongoing, Progressing  3/13/2023 0446 by Tesha Guaman RN  Outcome: Ongoing, Progressing     Problem: Hypertension Comorbidity  Goal: Blood Pressure in Desired Range  3/13/2023 0455 by Tesha Guaman RN  Outcome: Ongoing, Progressing  3/13/2023 0446 by Tesha Guaman RN  Outcome: Ongoing, Progressing  Intervention: Maintain Blood Pressure Management  Recent Flowsheet Documentation  Taken 3/13/2023 0400 by Tesha Guaman RN  Medication Review/Management: medications reviewed  Taken 3/13/2023 0200 by Tesha Guaman RN  Medication Review/Management: medications reviewed  Taken 3/13/2023 0000 by Tesha Guaman RN  Medication Review/Management: medications reviewed  Taken 3/12/2023 2200 by Tesha Guaman RN  Medication Review/Management: medications reviewed  Taken 3/12/2023 2000 by Tesha Guaman RN  Medication Review/Management: medications reviewed     Problem: Skin Injury Risk Increased  Goal: Skin Health and Integrity  3/13/2023 0455 by Tesha Guaman RN  Outcome: Ongoing, Progressing  3/13/2023 0446 by Tesha Guaman RN  Outcome: Ongoing, Progressing  Intervention: Optimize Skin Protection  Recent Flowsheet Documentation  Taken 3/13/2023 0400 by Tesha Guaman RN  Pressure Reduction Techniques: frequent weight shift encouraged  Head of Bed (HOB) Positioning: HOB elevated  Pressure Reduction Devices: pressure-redistributing mattress utilized  Skin Protection:   incontinence pads utilized    tubing/devices free from skin contact  Taken 3/13/2023 0200 by Tesha Guaman RN  Pressure Reduction Techniques: frequent weight shift encouraged  Head of Bed ([facility]) Positioning: [facility] elevated  Pressure Reduction Devices: pressure-redistributing mattress utilized  Skin Protection:   incontinence pads utilized   tubing/devices free from skin contact  Taken 3/13/2023 0000 by Tesha Guaman RN  Pressure Reduction Techniques: frequent weight shift encouraged  Head of Bed ([facility]) Positioning: [facility] elevated  Pressure Reduction Devices: pressure-redistributing mattress utilized  Skin Protection:   incontinence pads utilized   tubing/devices free from skin contact  Taken 3/12/2023 2200 by Tesha Guaman RN  Pressure Reduction Techniques: frequent weight shift encouraged  Head of Bed ([facility]) Positioning: [facility] elevated  Pressure Reduction Devices: pressure-redistributing mattress utilized  Skin Protection:   incontinence pads utilized   tubing/devices free from skin contact  Taken 3/12/2023 2000 by Tesha Guaman RN  Pressure Reduction Techniques: frequent weight shift encouraged  Head of Bed ([facility]) Positioning: [facility] elevated  Pressure Reduction Devices: pressure-redistributing mattress utilized  Skin Protection:   adhesive use limited   incontinence pads utilized   tubing/devices free from skin contact   Goal Outcome Evaluation:  Plan of Care Reviewed With: patient, daughter        Progress: no change

## 2023-03-13 NOTE — PLAN OF CARE
Goal Outcome Evaluation:  Plan of Care Reviewed With: patient, daughter           Outcome Evaluation: OT alejo completed. Pt presents with significant weakness, decreased standing tolerance, and increased confusion impacting ADL's. Pt ModAx2 for bed mobility, MinAx2 for functional transfers, Dep for pericare, [place] for grooming seated in chair. Cues needed for orientation, sequencing, and for motor planning tasks. Recommend SNF at discharge to support goal of return to PLOF.

## 2023-03-13 NOTE — PLAN OF CARE
Goal Outcome Evaluation:  Plan of Care Reviewed With: patient        Progress: improving   Pt resting in bed. VSS.

## 2023-03-13 NOTE — PLAN OF CARE
Problem: Adult Inpatient Plan of Care  Goal: Plan of Care Review  Outcome: Ongoing, Progressing  Flowsheets (Taken 3/13/2023 0446)  Progress: improving  Plan of Care Reviewed With:   patient   daughter  Goal: Patient-Specific Goal (Individualized)  Outcome: Ongoing, Progressing  Goal: Absence of Hospital-Acquired Illness or Injury  Outcome: Ongoing, Progressing  Intervention: Identify and Manage Fall Risk  Recent Flowsheet Documentation  Taken 3/13/2023 0400 by Tesha Guaman, RN  Safety Promotion/Fall Prevention:   assistive device/personal items within reach   clutter free environment maintained   fall prevention program maintained   lighting adjusted   nonskid shoes/slippers when out of bed   room organization consistent   safety round/check completed  Taken 3/13/2023 0200 by Tesha Guaman, RN  Safety Promotion/Fall Prevention:   assistive device/personal items within reach   clutter free environment maintained   fall prevention program maintained   lighting adjusted   nonskid shoes/slippers when out of bed   room organization consistent   safety round/check completed  Taken 3/13/2023 0000 by Tesha Guaman, RN  Safety Promotion/Fall Prevention:   clutter free environment maintained   fall prevention program maintained   nonskid shoes/slippers when out of bed   room organization consistent   safety round/check completed   activity supervised   assistive device/personal items within reach   lighting adjusted  Taken 3/12/2023 2200 by Tesha Guaman, RN  Safety Promotion/Fall Prevention:   assistive device/personal items within reach   clutter free environment maintained   fall prevention program maintained   nonskid shoes/slippers when out of bed   room organization consistent   safety round/check completed  Taken 3/12/2023 2000 by Tesha Guaman, RN  Safety Promotion/Fall Prevention:   activity supervised   assistive device/personal items within reach   clutter free environment maintained    fall prevention program maintained   nonskid shoes/slippers when out of bed   lighting adjusted   room organization consistent   safety round/check completed  Intervention: Prevent Skin Injury  Recent Flowsheet Documentation  Taken 3/13/2023 0400 by Tesha Guaman RN  Body Position: position changed independently  Skin Protection:   incontinence pads utilized   tubing/devices free from skin contact  Taken 3/13/2023 0200 by Tesha Guaman RN  Body Position: position changed independently  Skin Protection:   incontinence pads utilized   tubing/devices free from skin contact  Taken 3/13/2023 0000 by Tesha Guaman RN  Body Position: position changed independently  Skin Protection:   incontinence pads utilized   tubing/devices free from skin contact  Taken 3/12/2023 2200 by Tesha Guaman RN  Body Position: position changed independently  Skin Protection:   incontinence pads utilized   tubing/devices free from skin contact  Taken 3/12/2023 2000 by Tesha Guaman RN  Body Position: position changed independently  Skin Protection:   adhesive use limited   incontinence pads utilized   tubing/devices free from skin contact  Intervention: Prevent and Manage VTE (Venous Thromboembolism) Risk  Recent Flowsheet Documentation  Taken 3/13/2023 0400 by Tesha Guaman RN  Activity Management: activity adjusted per tolerance  Taken 3/13/2023 0200 by Tesha Guaman RN  Activity Management: activity adjusted per tolerance  Taken 3/13/2023 0000 by Tesha Guaman RN  Activity Management: activity adjusted per tolerance  Taken 3/12/2023 2200 by Tesha Guaman RN  Activity Management: activity adjusted per tolerance  Taken 3/12/2023 2000 by Tesha Guaman RN  Activity Management: activity adjusted per tolerance  VTE Prevention/Management: sequential compression devices on  Range of Motion: active ROM (range of motion) encouraged  Intervention: Prevent Infection  Recent Flowsheet  Documentation  Taken 3/12/2023 2200 by Tesha Guaman RN  Infection Prevention: rest/sleep promoted  Taken 3/12/2023 2000 by Tesha Guaman RN  Infection Prevention:   environmental surveillance performed   equipment surfaces disinfected   hand hygiene promoted   personal protective equipment utilized   rest/sleep promoted   single patient room provided  Goal: Optimal Comfort and Wellbeing  Outcome: Ongoing, Progressing  Intervention: Provide Person-Centered Care  Recent Flowsheet Documentation  Taken 3/12/2023 2000 by Tesha Guaman RN  Trust Relationship/Rapport:   care explained   choices provided   questions answered   questions encouraged   thoughts/feelings acknowledged  Goal: Readiness for Transition of Care  Outcome: Ongoing, Progressing     Problem: Fall Injury Risk  Goal: Absence of Fall and Fall-Related Injury  Outcome: Ongoing, Progressing  Intervention: Identify and Manage Contributors  Recent Flowsheet Documentation  Taken 3/13/2023 0400 by Tesha Guaman RN  Medication Review/Management: medications reviewed  Self-Care Promotion: independence encouraged  Taken 3/13/2023 0200 by Tesha Guaman RN  Medication Review/Management: medications reviewed  Self-Care Promotion: independence encouraged  Taken 3/13/2023 0000 by Tesha Guaman RN  Medication Review/Management: medications reviewed  Self-Care Promotion: independence encouraged  Taken 3/12/2023 2200 by Tesha Guaman RN  Medication Review/Management: medications reviewed  Self-Care Promotion: independence encouraged  Taken 3/12/2023 2000 by Tesha Guaman RN  Medication Review/Management: medications reviewed  Self-Care Promotion: independence encouraged  Intervention: Promote Injury-Free Environment  Recent Flowsheet Documentation  Taken 3/13/2023 0400 by Tesha Guaman, RN  Safety Promotion/Fall Prevention:   assistive device/personal items within reach   clutter free environment maintained   fall  prevention program maintained   lighting adjusted   nonskid shoes/slippers when out of bed   room organization consistent   safety round/check completed  Taken 3/13/2023 0200 by Tesha Guaman, RN  Safety Promotion/Fall Prevention:   assistive device/personal items within reach   clutter free environment maintained   fall prevention program maintained   lighting adjusted   nonskid shoes/slippers when out of bed   room organization consistent   safety round/check completed  Taken 3/13/2023 0000 by Tesha Guaman, RN  Safety Promotion/Fall Prevention:   clutter free environment maintained   fall prevention program maintained   nonskid shoes/slippers when out of bed   room organization consistent   safety round/check completed   activity supervised   assistive device/personal items within reach   lighting adjusted  Taken 3/12/2023 2200 by Tesha Guaman, RN  Safety Promotion/Fall Prevention:   assistive device/personal items within reach   clutter free environment maintained   fall prevention program maintained   nonskid shoes/slippers when out of bed   room organization consistent   safety round/check completed  Taken 3/12/2023 2000 by Tesha Guaman, RN  Safety Promotion/Fall Prevention:   activity supervised   assistive device/personal items within reach   clutter free environment maintained   fall prevention program maintained   nonskid shoes/slippers when out of bed   lighting adjusted   room organization consistent   safety round/check completed     Problem: Diabetes Comorbidity  Goal: Blood Glucose Level Within Targeted Range  Outcome: Ongoing, Progressing     Problem: Hypertension Comorbidity  Goal: Blood Pressure in Desired Range  Outcome: Ongoing, Progressing  Intervention: Maintain Blood Pressure Management  Recent Flowsheet Documentation  Taken 3/13/2023 0400 by Tesha Guaman, RN  Medication Review/Management: medications reviewed  Taken 3/13/2023 0200 by Tesha Guaman  RN  Medication Review/Management: medications reviewed  Taken 3/13/2023 0000 by Tesha Guaman RN  Medication Review/Management: medications reviewed  Taken 3/12/2023 2200 by Tesha Guaman RN  Medication Review/Management: medications reviewed  Taken 3/12/2023 2000 by Tesha Guaman RN  Medication Review/Management: medications reviewed     Problem: Skin Injury Risk Increased  Goal: Skin Health and Integrity  Outcome: Ongoing, Progressing  Intervention: Optimize Skin Protection  Recent Flowsheet Documentation  Taken 3/13/2023 0400 by Tesha Guaman RN  Pressure Reduction Techniques: frequent weight shift encouraged  Head of Bed (HOB) Positioning: [facility] elevated  Pressure Reduction Devices: pressure-redistributing mattress utilized  Skin Protection:   incontinence pads utilized   tubing/devices free from skin contact  Taken 3/13/2023 0200 by Tesha Guaman RN  Pressure Reduction Techniques: frequent weight shift encouraged  Head of Bed (HOB) Positioning: [facility] elevated  Pressure Reduction Devices: pressure-redistributing mattress utilized  Skin Protection:   incontinence pads utilized   tubing/devices free from skin contact  Taken 3/13/2023 0000 by Tesha Guaman RN  Pressure Reduction Techniques: frequent weight shift encouraged  Head of Bed (HOB) Positioning: [facility] elevated  Pressure Reduction Devices: pressure-redistributing mattress utilized  Skin Protection:   incontinence pads utilized   tubing/devices free from skin contact  Taken 3/12/2023 2200 by Tesha Guaman RN  Pressure Reduction Techniques: frequent weight shift encouraged  Head of Bed (HOB) Positioning: [facility] elevated  Pressure Reduction Devices: pressure-redistributing mattress utilized  Skin Protection:   incontinence pads utilized   tubing/devices free from skin contact  Taken 3/12/2023 2000 by Tesha Guaman RN  Pressure Reduction Techniques: frequent weight shift encouraged  Head of Bed (HOB) Positioning: [facility]  elevated  Pressure Reduction Devices: pressure-redistributing mattress utilized  Skin Protection:   adhesive use limited   incontinence pads utilized   tubing/devices free from skin contact   Goal Outcome Evaluation:  Plan of Care Reviewed With: patient, daughter        Progress: improving

## 2023-03-13 NOTE — PLAN OF CARE
Goal Outcome Evaluation:  Plan of Care Reviewed With: patient, daughter           Outcome Evaluation: Pt presents with strength, balance, endurance, and safety awareness below baseline contributing to impairments in bed mobility, transfers, ambulation, and overall fxnl mobility. Pt will benefit from PT to address aforementioned deficits and return to PLOF. PT rec SNF upon dc.

## 2023-03-14 ENCOUNTER — APPOINTMENT (OUTPATIENT)
Dept: CT IMAGING | Facility: HOSPITAL | Age: 78
End: 2023-03-14
Payer: MEDICARE

## 2023-03-14 LAB
ALBUMIN SERPL-MCNC: 3.1 G/DL (ref 3.5–5.2)
ANION GAP SERPL CALCULATED.3IONS-SCNC: 12 MMOL/L (ref 5–15)
BH BB BLOOD EXPIRATION DATE: NORMAL
BH BB BLOOD EXPIRATION DATE: NORMAL
BH BB BLOOD TYPE BARCODE: 7300
BH BB BLOOD TYPE BARCODE: 7300
BH BB DISPENSE STATUS: NORMAL
BH BB DISPENSE STATUS: NORMAL
BH BB PRODUCT CODE: NORMAL
BH BB PRODUCT CODE: NORMAL
BH BB UNIT NUMBER: NORMAL
BH BB UNIT NUMBER: NORMAL
BUN SERPL-MCNC: 24 MG/DL (ref 8–23)
BUN/CREAT SERPL: 4.7 (ref 7–25)
CALCIUM SPEC-SCNC: 8.7 MG/DL (ref 8.6–10.5)
CHLORIDE SERPL-SCNC: 97 MMOL/L (ref 98–107)
CO2 SERPL-SCNC: 24 MMOL/L (ref 22–29)
CREAT SERPL-MCNC: 5.11 MG/DL (ref 0.57–1)
CROSSMATCH INTERPRETATION: NORMAL
CROSSMATCH INTERPRETATION: NORMAL
DEPRECATED RDW RBC AUTO: 60.1 FL (ref 37–54)
EGFRCR SERPLBLD CKD-EPI 2021: 8.3 ML/MIN/1.73
ERYTHROCYTE [DISTWIDTH] IN BLOOD BY AUTOMATED COUNT: 17.3 % (ref 12.3–15.4)
GLUCOSE BLDC GLUCOMTR-MCNC: 130 MG/DL (ref 70–130)
GLUCOSE BLDC GLUCOMTR-MCNC: 79 MG/DL (ref 70–130)
GLUCOSE SERPL-MCNC: 143 MG/DL (ref 65–99)
HCT VFR BLD AUTO: 27.1 % (ref 34–46.6)
HGB BLD-MCNC: 8.3 G/DL (ref 12–15.9)
MCH RBC QN AUTO: 30.3 PG (ref 26.6–33)
MCHC RBC AUTO-ENTMCNC: 30.6 G/DL (ref 31.5–35.7)
MCV RBC AUTO: 98.9 FL (ref 79–97)
PHOSPHATE SERPL-MCNC: 3.5 MG/DL (ref 2.5–4.5)
PLATELET # BLD AUTO: 85 10*3/MM3 (ref 140–450)
PMV BLD AUTO: 10.6 FL (ref 6–12)
POTASSIUM SERPL-SCNC: 4.3 MMOL/L (ref 3.5–5.2)
RBC # BLD AUTO: 2.74 10*6/MM3 (ref 3.77–5.28)
SODIUM SERPL-SCNC: 133 MMOL/L (ref 136–145)
UNIT  ABO: NORMAL
UNIT  ABO: NORMAL
UNIT  RH: NORMAL
UNIT  RH: NORMAL
WBC NRBC COR # BLD: 4.73 10*3/MM3 (ref 3.4–10.8)

## 2023-03-14 PROCEDURE — 99232 SBSQ HOSP IP/OBS MODERATE 35: CPT | Performed by: HOSPITALIST

## 2023-03-14 PROCEDURE — 25010000002 CEFTRIAXONE PER 250 MG: Performed by: FAMILY MEDICINE

## 2023-03-14 PROCEDURE — 97110 THERAPEUTIC EXERCISES: CPT

## 2023-03-14 PROCEDURE — 97530 THERAPEUTIC ACTIVITIES: CPT

## 2023-03-14 PROCEDURE — 96376 TX/PRO/DX INJ SAME DRUG ADON: CPT

## 2023-03-14 PROCEDURE — G0378 HOSPITAL OBSERVATION PER HR: HCPCS

## 2023-03-14 PROCEDURE — 99222 1ST HOSP IP/OBS MODERATE 55: CPT

## 2023-03-14 PROCEDURE — 80069 RENAL FUNCTION PANEL: CPT | Performed by: FAMILY MEDICINE

## 2023-03-14 PROCEDURE — 85027 COMPLETE CBC AUTOMATED: CPT | Performed by: FAMILY MEDICINE

## 2023-03-14 PROCEDURE — 77386: CPT | Performed by: RADIOLOGY

## 2023-03-14 PROCEDURE — 82962 GLUCOSE BLOOD TEST: CPT

## 2023-03-14 PROCEDURE — 25010000002 HALOPERIDOL LACTATE PER 5 MG: Performed by: PHYSICIAN ASSISTANT

## 2023-03-14 PROCEDURE — 63710000001 INSULIN LISPRO (HUMAN) PER 5 UNITS: Performed by: FAMILY MEDICINE

## 2023-03-14 RX ORDER — QUETIAPINE FUMARATE 25 MG/1
25 TABLET, FILM COATED ORAL NIGHTLY
Status: DISCONTINUED | OUTPATIENT
Start: 2023-03-14 | End: 2023-03-26

## 2023-03-14 RX ORDER — HALOPERIDOL 5 MG/ML
0.5 INJECTION INTRAMUSCULAR ONCE
Status: COMPLETED | OUTPATIENT
Start: 2023-03-14 | End: 2023-03-14

## 2023-03-14 RX ORDER — CHOLECALCIFEROL (VITAMIN D3) 125 MCG
5 CAPSULE ORAL NIGHTLY
Status: DISCONTINUED | OUTPATIENT
Start: 2023-03-14 | End: 2023-03-29 | Stop reason: HOSPADM

## 2023-03-14 RX ADMIN — CLONIDINE HYDROCHLORIDE 0.4 MG: 0.1 TABLET ORAL at 21:30

## 2023-03-14 RX ADMIN — Medication: at 00:46

## 2023-03-14 RX ADMIN — APIXABAN 2.5 MG: 2.5 TABLET, FILM COATED ORAL at 21:31

## 2023-03-14 RX ADMIN — HYDROCORTISONE 2.5%: 25 CREAM TOPICAL at 03:13

## 2023-03-14 RX ADMIN — Medication 10 ML: at 21:41

## 2023-03-14 RX ADMIN — CARVEDILOL 12.5 MG: 12.5 TABLET, FILM COATED ORAL at 08:53

## 2023-03-14 RX ADMIN — Medication 5 MG: at 21:31

## 2023-03-14 RX ADMIN — CLONIDINE HYDROCHLORIDE 0.4 MG: 0.1 TABLET ORAL at 08:53

## 2023-03-14 RX ADMIN — SODIUM CHLORIDE 1 G: 900 INJECTION INTRAVENOUS at 23:12

## 2023-03-14 RX ADMIN — Medication 10 ML: at 03:13

## 2023-03-14 RX ADMIN — QUETIAPINE FUMARATE 25 MG: 25 TABLET ORAL at 21:30

## 2023-03-14 RX ADMIN — AMIODARONE HYDROCHLORIDE 200 MG: 200 TABLET ORAL at 08:53

## 2023-03-14 RX ADMIN — CARVEDILOL 12.5 MG: 12.5 TABLET, FILM COATED ORAL at 21:32

## 2023-03-14 RX ADMIN — ISOSORBIDE MONONITRATE 120 MG: 120 TABLET, EXTENDED RELEASE ORAL at 08:53

## 2023-03-14 RX ADMIN — Medication: at 09:05

## 2023-03-14 RX ADMIN — HYDROCORTISONE 2.5%: 25 CREAM TOPICAL at 22:37

## 2023-03-14 RX ADMIN — ATORVASTATIN CALCIUM 80 MG: 40 TABLET, FILM COATED ORAL at 21:31

## 2023-03-14 RX ADMIN — INSULIN LISPRO 5 UNITS: 100 INJECTION, SOLUTION INTRAVENOUS; SUBCUTANEOUS at 08:53

## 2023-03-14 RX ADMIN — ASPIRIN 81 MG: 81 TABLET, COATED ORAL at 08:53

## 2023-03-14 RX ADMIN — APIXABAN 2.5 MG: 2.5 TABLET, FILM COATED ORAL at 08:53

## 2023-03-14 RX ADMIN — HALOPERIDOL LACTATE 0.5 MG: 5 INJECTION, SOLUTION INTRAMUSCULAR at 00:50

## 2023-03-14 RX ADMIN — HYDROCORTISONE 2.5%: 25 CREAM TOPICAL at 09:05

## 2023-03-14 RX ADMIN — Medication 10 ML: at 09:05

## 2023-03-14 NOTE — PLAN OF CARE
Problem: Adult Inpatient Plan of Care  Goal: Plan of Care Review  Outcome: Ongoing, Progressing  Flowsheets (Taken 3/14/2023 0631)  Progress: declining  Plan of Care Reviewed With: patient  Outcome Evaluation: Pt refused night time medication.  Pt agitated with staff, provider notified.  See MAR for orders.  Medication affective.  Pt rested well.  Goal: Patient-Specific Goal (Individualized)  Outcome: Ongoing, Progressing  Goal: Absence of Hospital-Acquired Illness or Injury  Outcome: Ongoing, Progressing  Intervention: Identify and Manage Fall Risk  Recent Flowsheet Documentation  Taken 3/14/2023 0600 by Tesha Guaman, RN  Safety Promotion/Fall Prevention:   safety round/check completed   room organization consistent   nonskid shoes/slippers when out of bed   fall prevention program maintained   clutter free environment maintained   assistive device/personal items within reach   activity supervised  Taken 3/14/2023 0400 by Tesha Guaman, RN  Safety Promotion/Fall Prevention:   assistive device/personal items within reach   clutter free environment maintained   fall prevention program maintained   nonskid shoes/slippers when out of bed   room organization consistent   safety round/check completed  Taken 3/14/2023 0200 by Tesha Guaman, RN  Safety Promotion/Fall Prevention: safety round/check completed  Taken 3/14/2023 0000 by Tesha Guaman, RN  Safety Promotion/Fall Prevention:   activity supervised   assistive device/personal items within reach   clutter free environment maintained   fall prevention program maintained   nonskid shoes/slippers when out of bed   room organization consistent   safety round/check completed  Taken 3/13/2023 2200 by Tesha Guaman, RN  Safety Promotion/Fall Prevention:   assistive device/personal items within reach   clutter free environment maintained   fall prevention program maintained   lighting adjusted   nonskid shoes/slippers when out of bed   room  organization consistent   safety round/check completed  Taken 3/13/2023 2000 by Tesha Guaman RN  Safety Promotion/Fall Prevention:   activity supervised   assistive device/personal items within reach   clutter free environment maintained   fall prevention program maintained   lighting adjusted   nonskid shoes/slippers when out of bed   room organization consistent   safety round/check completed  Intervention: Prevent Skin Injury  Recent Flowsheet Documentation  Taken 3/14/2023 0600 by Tesha Guaman RN  Body Position: position changed independently  Skin Protection:   tubing/devices free from skin contact   incontinence pads utilized  Taken 3/14/2023 0400 by Tesha Guaman RN  Body Position: position changed independently  Skin Protection:   tubing/devices free from skin contact   incontinence pads utilized  Taken 3/14/2023 0200 by Tesha Guaman RN  Body Position: position changed independently  Skin Protection:   tubing/devices free from skin contact   incontinence pads utilized  Taken 3/14/2023 0000 by Tesha Guaman RN  Body Position: position changed independently  Skin Protection:   tubing/devices free from skin contact   adhesive use limited   incontinence pads utilized  Taken 3/13/2023 2200 by Tesha Guaman RN  Body Position: position changed independently  Skin Protection:   adhesive use limited   incontinence pads utilized   tubing/devices free from skin contact  Taken 3/13/2023 2000 by Tesha Guaman RN  Body Position: position changed independently  Skin Protection:   adhesive use limited   incontinence pads utilized   tubing/devices free from skin contact  Intervention: Prevent and Manage VTE (Venous Thromboembolism) Risk  Recent Flowsheet Documentation  Taken 3/14/2023 0600 by Tesha Guaman RN  Activity Management: activity adjusted per tolerance  Taken 3/14/2023 0400 by Tesha Guaman RN  Activity Management: activity adjusted per tolerance  Taken  3/14/2023 0200 by Tesha Guaman RN  Activity Management: activity adjusted per tolerance  Taken 3/14/2023 0000 by Tesha Guaman RN  Activity Management: activity adjusted per tolerance  Taken 3/13/2023 2200 by Tesha Guaman RN  Activity Management: activity adjusted per tolerance  Taken 3/13/2023 2000 by Tesha Guaman RN  Activity Management: activity adjusted per tolerance  Range of Motion: active ROM (range of motion) encouraged  Intervention: Prevent Infection  Recent Flowsheet Documentation  Taken 3/14/2023 0600 by Tesha Guaman RN  Infection Prevention: rest/sleep promoted  Taken 3/14/2023 0400 by Tesha Guaman RN  Infection Prevention: rest/sleep promoted  Taken 3/14/2023 0200 by Tesha Guaman RN  Infection Prevention: rest/sleep promoted  Taken 3/14/2023 0000 by Tesha Guaman RN  Infection Prevention: rest/sleep promoted  Taken 3/13/2023 2200 by Tesha Guaman RN  Infection Prevention:   rest/sleep promoted   single patient room provided  Goal: Optimal Comfort and Wellbeing  Outcome: Ongoing, Progressing  Intervention: Provide Person-Centered Care  Recent Flowsheet Documentation  Taken 3/13/2023 2000 by Tesha Guaman RN  Trust Relationship/Rapport: care explained  Goal: Readiness for Transition of Care  Outcome: Ongoing, Progressing     Problem: Fall Injury Risk  Goal: Absence of Fall and Fall-Related Injury  Outcome: Ongoing, Progressing  Intervention: Identify and Manage Contributors  Recent Flowsheet Documentation  Taken 3/14/2023 0600 by Tesha Guaman RN  Medication Review/Management: medications reviewed  Self-Care Promotion: independence encouraged  Taken 3/14/2023 0400 by Tesha Guaman RN  Medication Review/Management: medications reviewed  Self-Care Promotion: independence encouraged  Taken 3/14/2023 0200 by Tesha Guaman RN  Medication Review/Management: medications reviewed  Self-Care Promotion: independence  encouraged  Taken 3/14/2023 0000 by Tesha Guaman RN  Medication Review/Management: medications reviewed  Self-Care Promotion: independence encouraged  Taken 3/13/2023 2200 by Tesha Guaman RN  Medication Review/Management: medications reviewed  Self-Care Promotion: independence encouraged  Taken 3/13/2023 2000 by Tesha Guaman RN  Medication Review/Management: medications reviewed  Self-Care Promotion: independence encouraged  Intervention: Promote Injury-Free Environment  Recent Flowsheet Documentation  Taken 3/14/2023 0600 by Tesha Guaman RN  Safety Promotion/Fall Prevention:   safety round/check completed   room organization consistent   nonskid shoes/slippers when out of bed   fall prevention program maintained   clutter free environment maintained   assistive device/personal items within reach   activity supervised  Taken 3/14/2023 0400 by Tesha Guaman RN  Safety Promotion/Fall Prevention:   assistive device/personal items within reach   clutter free environment maintained   fall prevention program maintained   nonskid shoes/slippers when out of bed   room organization consistent   safety round/check completed  Taken 3/14/2023 0200 by Tesha Guaman RN  Safety Promotion/Fall Prevention: safety round/check completed  Taken 3/14/2023 0000 by Tesha Guaman RN  Safety Promotion/Fall Prevention:   activity supervised   assistive device/personal items within reach   clutter free environment maintained   fall prevention program maintained   nonskid shoes/slippers when out of bed   room organization consistent   safety round/check completed  Taken 3/13/2023 2200 by Tesha Guaman RN  Safety Promotion/Fall Prevention:   assistive device/personal items within reach   clutter free environment maintained   fall prevention program maintained   lighting adjusted   nonskid shoes/slippers when out of bed   room organization consistent   safety round/check completed  Taken  3/13/2023 2000 by Tesha Guaman, RN  Safety Promotion/Fall Prevention:   activity supervised   assistive device/personal items within reach   clutter free environment maintained   fall prevention program maintained   lighting adjusted   nonskid shoes/slippers when out of bed   room organization consistent   safety round/check completed     Problem: Diabetes Comorbidity  Goal: Blood Glucose Level Within Targeted Range  Outcome: Ongoing, Progressing     Problem: Hypertension Comorbidity  Goal: Blood Pressure in Desired Range  Outcome: Ongoing, Progressing  Intervention: Maintain Blood Pressure Management  Recent Flowsheet Documentation  Taken 3/14/2023 0600 by Tesha Guaman RN  Medication Review/Management: medications reviewed  Taken 3/14/2023 0400 by Tesha Guaman RN  Medication Review/Management: medications reviewed  Taken 3/14/2023 0200 by Tesha Guaman RN  Medication Review/Management: medications reviewed  Taken 3/14/2023 0000 by Tesha Guaman RN  Medication Review/Management: medications reviewed  Taken 3/13/2023 2200 by Tesha Guaman RN  Medication Review/Management: medications reviewed  Taken 3/13/2023 2000 by Tesha Guaman RN  Medication Review/Management: medications reviewed     Problem: Skin Injury Risk Increased  Goal: Skin Health and Integrity  Outcome: Ongoing, Progressing  Intervention: Optimize Skin Protection  Recent Flowsheet Documentation  Taken 3/14/2023 0600 by Tesha Guaman RN  Pressure Reduction Techniques: frequent weight shift encouraged  Head of Bed (HOB) Positioning: HOB elevated  Pressure Reduction Devices: pressure-redistributing mattress utilized  Skin Protection:   tubing/devices free from skin contact   incontinence pads utilized  Taken 3/14/2023 0400 by Tesha Guaman RN  Pressure Reduction Techniques: frequent weight shift encouraged  Head of Bed (HOB) Positioning: HOB elevated  Pressure Reduction Devices: pressure-redistributing  mattress utilized  Skin Protection:   tubing/devices free from skin contact   incontinence pads utilized  Taken 3/14/2023 0200 by Tesha Guaman RN  Pressure Reduction Techniques: frequent weight shift encouraged  Head of Bed (HOB) Positioning: [facility] elevated  Pressure Reduction Devices: pressure-redistributing mattress utilized  Skin Protection:   tubing/devices free from skin contact   incontinence pads utilized  Taken 3/14/2023 0000 by Tesha Guaman RN  Pressure Reduction Techniques: frequent weight shift encouraged  Head of Bed (HOB) Positioning: [facility] elevated  Pressure Reduction Devices: pressure-redistributing mattress utilized  Skin Protection:   tubing/devices free from skin contact   adhesive use limited   incontinence pads utilized  Taken 3/13/2023 2200 by Tesha Guaman RN  Pressure Reduction Techniques: frequent weight shift encouraged  Head of Bed (HOB) Positioning: [facility] elevated  Pressure Reduction Devices: pressure-redistributing mattress utilized  Skin Protection:   adhesive use limited   incontinence pads utilized   tubing/devices free from skin contact  Taken 3/13/2023 2000 by Tesha Guaman RN  Pressure Reduction Techniques: frequent weight shift encouraged  Head of Bed (HOB) Positioning: [facility] elevated  Pressure Reduction Devices: pressure-redistributing mattress utilized  Skin Protection:   adhesive use limited   incontinence pads utilized   tubing/devices free from skin contact     Problem: Device-Related Complication Risk (Hemodialysis)  Goal: Safe, Effective Therapy Delivery  Outcome: Ongoing, Progressing  Intervention: Optimize Device Care and Function  Recent Flowsheet Documentation  Taken 3/14/2023 0600 by Tesha Guaman RN  Medication Review/Management: medications reviewed  Taken 3/14/2023 0400 by Tesha Guaman RN  Medication Review/Management: medications reviewed  Taken 3/14/2023 0200 by Tesha Guaman RN  Medication Review/Management: medications  reviewed  Taken 3/14/2023 0000 by Tesha Guaman, RN  Medication Review/Management: medications reviewed  Taken 3/13/2023 2200 by Tesha Guaman, RN  Medication Review/Management: medications reviewed  Taken 3/13/2023 2000 by Tesha Guaman, RN  Medication Review/Management: medications reviewed     Problem: Hemodynamic Instability (Hemodialysis)  Goal: Effective Tissue Perfusion  Outcome: Ongoing, Progressing     Problem: Infection (Hemodialysis)  Goal: Absence of Infection Signs and Symptoms  Outcome: Ongoing, Progressing   Goal Outcome Evaluation:  Plan of Care Reviewed With: patient        Progress: declining  Outcome Evaluation: Pt refused night time medication.  Pt agitated with staff, provider notified.  See MAR for orders.  Medication affective.  Pt rested well.

## 2023-03-14 NOTE — PROGRESS NOTES
[facility] Medicine Services  PROGRESS NOTE    Patient Name: Esperanza Pop  : 1945  MRN: [id number]    Date of Admission: 3/10/2023  Primary Care Physician: Meghana Rodgers MD    Subjective   Subjective     CC:  F/U generalized weakness     HPI: Awake and alert. Denies pain. No family at bedside today. Denies dyspnea. No bleeding.    ROS:  Gen- No fevers, chills  CV- No chest pain, palpitations  Resp- No cough, dyspnea  GI- No N/V/D, abd pain  No change from 3/13    Objective   Objective     Vital Signs:   Temp:  [97.3 °F (36.3 °C)-98.2 °F (36.8 °C)] 98 °F (36.7 °C)  Heart Rate:  [53-79] 64  Resp:  [16-18] 18  BP: ()/() 135/101     Physical Exam:  NAD, alert and oriented  OP clear, MMM  Neck supple  No LAD  RRR, 3/6 murmur, systolic  CTAB  +BS, ND, NT, soft  DONG  Normal affect  No rashes  B LE edema    Results Reviewed:  LAB RESULTS:      Lab 23  0838 23  1031 23  0524 23  2321 23  1229 23  0741 03/10/23  1809 03/10/23  1145   WBC 4.73 3.95 3.51  --   --  4.54  --  5.04   HEMOGLOBIN 8.3* 9.2* 8.2* 8.2* 7.1* 7.1*   < > 7.8*   HEMATOCRIT 27.1* 31.3* 27.9* 28.4* 23.7* 24.0*   < > 26.8*   PLATELETS 85* 71* 113*  --   --  129*  --  143   NEUTROS ABS  --  2.90 2.51  --   --  3.32  --  3.48   IMMATURE GRANS (ABS)  --  0.04 0.03  --   --  0.05  --  0.07*   LYMPHS ABS  --  0.43* 0.42*  --   --  0.48*  --  0.78   MONOS ABS  --  0.57 0.55  --   --  0.66  --  0.68   EOS ABS  --  0.00 0.00  --   --  0.02  --  0.01   MCV 98.9* 101.6* 102.6*  --   --  101.3*  --  103.1*    < > = values in this interval not displayed.         Lab 23  1031 23  0524 23  0741 03/10/23  1145   SODIUM 134* 136 138 136   POTASSIUM 4.2 3.5 3.4* 4.0   CHLORIDE 94* 97* 93* 90*   CO2 20.0* 23.0 29.0 24.0   ANION GAP 20.0* 16.0* 16.0* 22.0*   BUN 34* 29* 43* 38*   CREATININE 6.82* 5.55* 9.37* 7.43*   EGFR 5.8* 7.5* 4.0* 5.3*   GLUCOSE 89 85 100* 196*    CALCIUM 8.7 8.7 9.1 9.2   MAGNESIUM  --   --   --  2.2         Lab 03/11/23  0741 03/10/23  1145   TOTAL PROTEIN 7.3 7.7   ALBUMIN 3.3* 3.6   GLOBULIN 4.0 4.1   ALT (SGPT) 5 5   AST (SGOT) 11 17   BILIRUBIN 0.7 0.7   ALK PHOS 80 86         Lab 03/10/23  2123 03/10/23  1809 03/10/23  1145   HSTROP T 176* 173* 172*             Lab 03/10/23  1418   ABO TYPING B   RH TYPING Positive   ANTIBODY SCREEN Negative         Brief Urine Lab Results     None          Microbiology Results Abnormal     None          Adult Transthoracic Echo Complete W/ Cont if Necessary Per Protocol    Result Date: 3/13/2023  •  Left ventricular systolic function is normal. Estimated left ventricular EF = 55% •  Left ventricular wall thickness is consistent with moderate concentric hypertrophy. •  The right ventricular cavity is mildly dilated. •  Mild to moderate biatrial enlargement. •  Moderate to severe aortic valve stenosis is present.  Mean gradient 34 mmHg, DOUG 0.9 cm². •  Mild aortic insufficiency. •  Mild mitral regurgitation. •  Mild to moderate tricuspid regurgitation with RVSP 48 mmHg.     CT Head Without Contrast    Result Date: 3/12/2023  CT HEAD WO CONTRAST Date of Exam: 3/12/2023 11:19 AM EDT Indication: AMS. Comparison: MRI brain from April 18, 2019 Technique: Axial CT images were obtained of the head without contrast administration.  Reconstructed coronal and sagittal images were also obtained. Automated exposure control and iterative construction methods were used. Findings: No acute intracranial hemorrhage or extra-axial collection is identified. The ventricles appear normal in caliber, with no evidence of mass effect or midline shift. The basal cisterns appear patent. The gray-white differentiation appears preserved. The calvarium appear intact. The paranasal sinuses are clear. The mastoid air cells are well-aerated. Scattered foci of periventricular and subcortical white matter hypodensities are nonspecific, but likely the  sequela of mild chronic small vessel ischemic disease. Some degenerative mineralization is noted within the bilateral basal ganglia.     Impression: Impression: 1.No acute intracranial process identified. 2.Findings suggestive of mild chronic small vessel ischemic disease. Electronically Signed: Darrell Gonzalez  3/12/2023 11:35 AM EDT  Workstation ID: [id number]      Results for orders placed during the hospital encounter of 03/10/23    Adult Transthoracic Echo Complete W/ Cont if Necessary Per Protocol    Interpretation Summary  •  Left ventricular systolic function is normal. Estimated left ventricular EF = 55%  •  Left ventricular wall thickness is consistent with moderate concentric hypertrophy.  •  The right ventricular cavity is mildly dilated.  •  Mild to moderate biatrial enlargement.  •  Moderate to severe aortic valve stenosis is present.  Mean gradient 34 mmHg, DOUG 0.9 cm².  •  Mild aortic insufficiency.  •  Mild mitral regurgitation.  •  Mild to moderate tricuspid regurgitation with RVSP 48 mmHg.      Current medications:  Scheduled Meds:amiodarone, 200 mg, Oral, Daily  apixaban, 2.5 mg, Oral, Q12H  aspirin, 81 mg, Oral, Daily  atorvastatin, 80 mg, Oral, Nightly  carvedilol, 12.5 mg, Oral, Q12H  cefTRIAXone, 1 g, Intravenous, Q24H  cloNIDine, 0.4 mg, Oral, Q12H  epoetin orquidea/orquidea-epbx, 20,000 Units, Subcutaneous, Once per day on Mon Wed Fri  Hydrocortisone (Perianal), , Rectal, BID  insulin lispro, 0-9 Units, Subcutaneous, TID With Meals  Insulin Lispro, 5 Units, Subcutaneous, TID With Meals  isosorbide mononitrate, 120 mg, Oral, Daily  sodium chloride, 10 mL, Intravenous, Q12H      Continuous Infusions:   PRN Meds:.•  benzocaine-menthol  •  dextrose  •  dextrose  •  glucagon (human recombinant)  •  haloperidol lactate  •  hydrOXYzine  •  mannitol  •  ondansetron **OR** ondansetron  •  sodium chloride  •  sodium chloride  •  sodium chloride    Assessment & Plan   Assessment & Plan     [facility]  "Problems    Diagnosis  POA   • **Hematochezia [K92.1]  Yes   • Symptomatic anemia [D64.9]  Yes   • Severe malnutrition (HCC) [E43]  Yes   • PAF (paroxysmal atrial fibrillation) (HCC) [I48.0]  Yes   • CHF (congestive heart failure) (HCC) [I50.9]  Yes   • End stage renal disease on dialysis (HCC) [N18.6, Z99.2]  Not Applicable   • Hyperlipidemia LDL goal <70 [E78.5]  Yes   • Essential hypertension [I10]  Yes   • Type 2 diabetes mellitus (HCC) [E11.9]  Yes      Resolved Hospital Problems   No resolved problems to display.        Brief Hospital Course to date:  Esperanza Pop is a 78 y.o. female  with PMH of ESRD on HD MWF, IDDM, HTN, HLD, HFpEF, Pulm HTN, Hx of breast cancer (s/p mastectomy and radiation) PAF (on xarelto), Chronic anemia, and rectal cancer (recently diagnosed currently gettintg XRT and chemo) that presented to the ED after a fall at home. Her 's  was today and she was getting ready to go when her legs \"gave out\". She had been having loose stool and bloody rectal discharge but that had improved prior to admission.     This patient's problems and plans were partially entered by my partner and updated as appropriate by me 23.    Generalized Weakness  Acute on Chronic Anemia  Rectal bleeding, Rectal Cancer   -S/P 1 unit PRBCs 3/11 with appropriate rise in H&H  -monitor CBC, no bleeding noted  -PT/OT recommending rehab    AMS -> Improved   Concern for UTI/Possible UTI  -continue antibiotics  -pamelor held  -MS contin held  -monitor    Rectal Cancer  Hx breast cancer   -Follows with Dr. Cavazos, Rad/Onc  -Follows Dr. Carpenter with oncology, currently on Xeloda, held for now     ESRD on HD MWF  -Renal following for HD (had dialysis Saturday due to missed session on Friday)     HFpEF  Pulm HTN  PAF  HTN  HLD   -Eliquis resumed, monitor H&H, bleeding  -ASA/Amiodarone   -last ECHO EF 60% with grade II diastolic dysfunction, pulmonary HTN     Elevated Troponin   Mod-Severe AS   -no chest " pain  -ECHO with mod-sev AS   -Has appt with WILMA Lopez 4/27/23, can follow up    Expected Discharge Location and Transportation: Homej/rehab  Expected Discharge   Expected Discharge Date and Time     Expected Discharge Date Expected Discharge Time    Mar 15, 2023            DVT prophylaxis:  Medical and mechanical DVT prophylaxis orders are present.     AM-PAC 6 Clicks Score (PT): 14 (03/13/23 1744)    CODE STATUS:   Code Status and Medical Interventions:   Ordered at: 03/10/23 1448     Medical Intervention Limits:    NO intubation (DNI)     Code Status (Patient has no pulse and is not breathing):    No CPR (Do Not Attempt to Resuscitate)     Medical Interventions (Patient has pulse or is breathing):    Limited Support       Champ Ríos MD  03/14/23

## 2023-03-14 NOTE — PROGRESS NOTES
" LOS: 3 days   Patient Care Team:  Meghana oRdgers MD as PCP - General  Demetrius Sagastume MD as Consulting Physician (Cardiology)  Kevan Lerma MD as Consulting Physician (Nephrology)  Geena Estrella APRN as Nurse Practitioner (Cardiology)  Frank Mandujano MD as Referring Physician (Colon and Rectal Surgery)  Kevan Cavazos MD as Consulting Physician (Radiation Oncology)  Yue Reeves RN as Nurse Navigator  Darryn Burk MD as Consulting Physician (Nephrology)    Chief Complaint: ESRD    Subjective          Subjective:  Symptoms:  Stable.  No shortness of breath or chest pain.    Diet:  Adequate intake.        History taken from: patient    Objective     Vital Sign Min/Max for last 24 hours  Temp  Min: 97.3 °F (36.3 °C)  Max: 98 °F (36.7 °C)   BP  Min: 98/57  Max: 168/79   Pulse  Min: 53  Max: 79   Resp  Min: 16  Max: 18   SpO2  Min: 94 %  Max: 100 %   No data recorded   No data recorded     Flowsheet Rows    Flowsheet Row First Filed Value   Admission Height 167.6 cm (66\") Documented at 03/10/2023 1122   Admission Weight 76.7 kg (169 lb) Documented at 03/10/2023 1122          No intake/output data recorded.  I/O last 3 completed shifts:  In: 60 [P.O.:60]  Out: 3000 [Other:3000]    Objective:  General Appearance:  Comfortable.    Vital signs: (most recent): Blood pressure (!) 135/101, pulse 64, temperature 98 °F (36.7 °C), temperature source Oral, resp. rate 18, height 167.6 cm (65.98\"), weight 79.1 kg (174 lb 6.1 oz), SpO2 97 %, not currently breastfeeding.  Vital signs are normal.    Output: Minimal urine output.    HEENT: Normal HEENT exam.    Lungs:  Normal effort and normal respiratory rate.  Breath sounds clear to auscultation.  She is not in respiratory distress.  No decreased breath sounds or wheezes.    Heart: Normal rate.    Extremities: There is dependent edema.  (AVF)  Pulses: Distal pulses are intact.    Neurological: Patient is alert and oriented to person, place and time.  " Normal strength.              Results Review:     I reviewed the patient's new clinical results.    WBC WBC   Date Value Ref Range Status   03/14/2023 4.73 3.40 - 10.80 10*3/mm3 Final   03/13/2023 3.95 3.40 - 10.80 10*3/mm3 Final   03/12/2023 3.51 3.40 - 10.80 10*3/mm3 Final      HGB Hemoglobin   Date Value Ref Range Status   03/14/2023 8.3 (L) 12.0 - 15.9 g/dL Final   03/13/2023 9.2 (L) 12.0 - 15.9 g/dL Final   03/12/2023 8.2 (L) 12.0 - 15.9 g/dL Final   03/11/2023 8.2 (L) 12.0 - 15.9 g/dL Final      HCT Hematocrit   Date Value Ref Range Status   03/14/2023 27.1 (L) 34.0 - 46.6 % Final   03/13/2023 31.3 (L) 34.0 - 46.6 % Final   03/12/2023 27.9 (L) 34.0 - 46.6 % Final   03/11/2023 28.4 (L) 34.0 - 46.6 % Final      Platlets No results found for: LABPLAT   MCV MCV   Date Value Ref Range Status   03/14/2023 98.9 (H) 79.0 - 97.0 fL Final   03/13/2023 101.6 (H) 79.0 - 97.0 fL Final   03/12/2023 102.6 (H) 79.0 - 97.0 fL Final          Sodium Sodium   Date Value Ref Range Status   03/14/2023 133 (L) 136 - 145 mmol/L Final   03/13/2023 134 (L) 136 - 145 mmol/L Final   03/12/2023 136 136 - 145 mmol/L Final      Potassium Potassium   Date Value Ref Range Status   03/14/2023 4.3 3.5 - 5.2 mmol/L Final   03/13/2023 4.2 3.5 - 5.2 mmol/L Final     Comment:     Slight hemolysis detected by analyzer. Results may be affected.   03/12/2023 3.5 3.5 - 5.2 mmol/L Final      Chloride Chloride   Date Value Ref Range Status   03/14/2023 97 (L) 98 - 107 mmol/L Final   03/13/2023 94 (L) 98 - 107 mmol/L Final   03/12/2023 97 (L) 98 - 107 mmol/L Final      CO2 CO2   Date Value Ref Range Status   03/14/2023 24.0 22.0 - 29.0 mmol/L Final   03/13/2023 20.0 (L) 22.0 - 29.0 mmol/L Final   03/12/2023 23.0 22.0 - 29.0 mmol/L Final      BUN BUN   Date Value Ref Range Status   03/14/2023 24 (H) 8 - 23 mg/dL Final   03/13/2023 34 (H) 8 - 23 mg/dL Final   03/12/2023 29 (H) 8 - 23 mg/dL Final      Creatinine Creatinine   Date Value Ref Range Status    03/14/2023 5.11 (H) 0.57 - 1.00 mg/dL Final   03/13/2023 6.82 (H) 0.57 - 1.00 mg/dL Final   03/12/2023 5.55 (H) 0.57 - 1.00 mg/dL Final      Calcium Calcium   Date Value Ref Range Status   03/14/2023 8.7 8.6 - 10.5 mg/dL Final   03/13/2023 8.7 8.6 - 10.5 mg/dL Final   03/12/2023 8.7 8.6 - 10.5 mg/dL Final      PO4 No results found for: CAPO4   Albumin Albumin   Date Value Ref Range Status   03/14/2023 3.1 (L) 3.5 - 5.2 g/dL Final      Magnesium No results found for: MG   Uric Acid No results found for: URICACID     Medication Review: yes    Assessment & Plan       Hematochezia    Type 2 diabetes mellitus (HCC)    Essential hypertension    Hyperlipidemia LDL goal <70    End stage renal disease on dialysis (HCC)    CHF (congestive heart failure) (HCC)    PAF (paroxysmal atrial fibrillation) (HCC)    Severe malnutrition (HCC)    Symptomatic anemia      Assessment & Plan     ESRD: MWF [facility]. Missed HD before admission      Access: AV fistula      Anemia: Transfuse at Hb 7 or less. On chemo for anal ca     Volume status: Dependent edema noted.    HTN: Bp elevated.   HYPONATREMIA  I discussed the patients findings and my recommendations with patient  PLAN. HD 3/15/23.   Zeb Owusu MD  03/14/23  12:49 EDT

## 2023-03-14 NOTE — PROGRESS NOTES
RADIATION ONCOLOGY TREATMENT MANAGEMENT NOTE    PATIENT:                                                      Esperanza Pop  MEDICAL RECORD #:                        [id number]  :                                                          1945  DIAGNOSIS:     Anal cancer      CURRENT DOSE OF RADIATION: 2520cGy out of a planned 5940 cGy  NUMBER OF REMAINING TREATMENTS: 19    INTERVAL HISTORY  Ms. Pop reports feeling very tired and weak today.  She reports that she still has some soreness on her bottom.  She still having some diarrhea and occasional bleeding.    MEDICATIONS: Medication reconciliation for the patient was reviewed and confirmed in the electronic medical record.    KPS 70%    Physical Exam  Vitals and nursing note reviewed.   Constitutional:       Appearance: She is well-developed.      Comments: Appears tired and weak.   HENT:      Head: Normocephalic and atraumatic.   Eyes:      Conjunctiva/sclera: Conjunctivae normal.      Pupils: Pupils are equal, round, and reactive to light.   Neck:      Comments: No obviously enlarged cervical or supraclavicular LAD.  Cardiovascular:      Comments: Patient well perfused. Non cyanotic. No prominent JVD. No pedal edema  Pulmonary:      Effort: Pulmonary effort is normal.      Breath sounds: Normal breath sounds. No stridor. No wheezing.   Abdominal:      General: There is no distension.      Palpations: Abdomen is soft.   Genitourinary:     Comments: Patient still has a considerable amount of residual carcinoma.  Musculoskeletal:         General: Normal range of motion.      Cervical back: Normal range of motion and neck supple.      Comments: Patient moves all extremities spontaneously.    Skin:     General: Skin is warm and dry.   Neurological:      Mental Status: She is alert and oriented to person, place, and time.      Comments: Coordination intact.   Psychiatric:         Behavior: Behavior normal.         Thought Content: Thought content normal.          Judgment: Judgment normal.          VITAL SIGNS:   Vitals:    03/13/23 1600 03/13/23 1615 03/13/23 2229 03/14/23 0731   BP: 104/46 130/45 160/69 (!) 135/101   BP Location: Left arm  Right arm Left arm   Patient Position: Lying  Lying Sitting   Pulse: 56 56 64    Resp: 18  16 18   Temp: 97.3 °F (36.3 °C)   98 °F (36.7 °C)   TempSrc: Tympanic  Oral Oral   SpO2: 99% 99% 97% 97%   Weight:       Height:           The following portions of the patient's history were reviewed and updated as appropriate: allergies, current medications, past family history, past medical history, past social history, past surgical history and problem list.         IMPRESSION/PLAN:    Anal cancer  -T3 N1 M0  -Positive inguinal lymph node on CT scan  -Bulky primary 6 cm at least  -Discussed with Dr. Knott patient is not in acute danger of obstruction  -Lesion is painful and foul-smelling.  -Recommend PET scan  -Recommend IGR T and IMRT dose of 5940cGy versus 5400 to primary with lower dose to involved adenopathy and lower dose yet to elective nodes.  -Patient still has a significant amount of residual carcinoma causing symptoms  -Demand proceeding with radiation treatments while inpatient  -On Xeloda with Dr. Rojo  -We will continue to need consecutive treatments upon discharge  -Would not recommend a treatment break for rehab     CKD  -On dialysis  -Complicates care     Diabetes  -Complicates care  -We will have issues with wound healing due to this.    Kevan Cavazos MD

## 2023-03-14 NOTE — PLAN OF CARE
Goal Outcome Evaluation:  Plan of Care Reviewed With: patient        Progress: no change  Outcome Evaluation: Pt required less assist for transfers and steps to the chair, however declined attempts at ambulation d/t fatigue. Pt continues to demo poor strength, endurance, and balance and will continue to benefit from PT to address these ongoing deficits.

## 2023-03-14 NOTE — PLAN OF CARE
Goal Outcome Evaluation:         Pt. Rested in bed and chair comfortably. Bed and chair alarms activated. Provided perineal care. BG monitored.

## 2023-03-14 NOTE — CONSULTS
[facility] Neurology  Consult Note    Patient Name: Esperanza Pop  : 1945  MRN: [id number]  Primary Care Physician:  Meghana Rodgers MD  Referring Physician: No ref. provider found  Date of admission: 3/10/2023    Subjective     Reason for Consult/ Chief Complaint: Altered mental status    History of Present Illness  Esperanza Pop is a 78 y.o. female with a past medical history of ESRD on HD, IDDM, HLD, HTN, pulmonary hypertension, HFpEF, breast cancer s/p mastectomy and radiation, PAF on Xarelto, chronic anemia and a recent diagnosis of rectal cancer on radiation and chemo who presented to [facility] ED after a witnessed experiencing fall at home.  Patient was in route to her 's  when she states that her legs gave out.  CT head without contrast revealed no intracranial abnormalities.    Upon assessment patient was alert and oriented to person, place and time.  However per nursing staff her symptoms seem to wax and wane.  Overnight she was given Haldol for extreme agitation.  Patient states that she got limited sleep last night.    Review Of Systems   Constitutional:  Chronically ill appearing female  Cardiovascular: Negative for chest pain or palpitations.  Respiratory: Negative for shortness of breath or cough.  Gastrointestinal: Negative for nausea and vomiting.  Genitourinary: Negative for bladder incontinence.  Musculoskeletal: Negative for aches and pains in the muscles or joints.  Dermatological: Negative for skin breakdown.   Neurological: Negative for headache, pain, or weakness. Negative for vision changes.     Personal History     Past Medical History:   Diagnosis Date   • Acute bronchitis    • Acute conjunctivitis    • Acute kidney injury (HCC)     Admission from 2013 to 2014, now resolved with latest creatinine 1.4 on 2014.   • Acute non-ST segment elevation myocardial infarction (STEMI) following previous myocardial infarction    • Anal cancer (HCC) 2023   •  Anal cancer (HCC) 2/8/2023   • Arthritis    • Breast cancer, female, right     2005 mastectomy right   • Cancer (HCC)    • CHF (congestive heart failure) (HCC)    • Chronic kidney disease     dialysis MWF, f/u [facility]    • Clotting disorder (HCC)    • COVID-19    • Diabetes mellitus (HCC)     diagnosed ~1992, checks fsbg 2-3x/day   • Diarrhea    • Dyslipidemia    • Dyspepsia    • Dyspnea    • Edema    • History of transfusion     ~2013 no reaction    • Hx of radiation therapy    • Hyperlipidemia    • Hypertension     Severe   • Ischemic heart disease    • Moderate obesity     BMI 36.2   • Myocardial infarction (HCC)    • Pneumonia    • Pneumonia 02/2019   • Radiation 2005   • Seasonal allergies    • Wears glasses        Past Surgical History:   Procedure Laterality Date   • AV FISTULA PLACEMENT, BRACHIOBASILIC     • BREAST BIOPSY Left 2010   • BREAST CYST EXCISION     • BREAST LUMPECTOMY Right 2005   • BREAST SURGERY     • CARDIAC CATHETERIZATION N/A 10/10/2016    Procedure: Left Heart Cath;  Surgeon: Scooter Zhao MD;  Location:  [facility];  Service:    • CARDIAC CATHETERIZATION  10/2016   • CARDIAC CATHETERIZATION N/A 1/21/2021    Procedure: Right and Left Heart Cath;  Surgeon: Hugh Tidwell MD;  Location:  [facility];  Service: Cardiology;  Laterality: N/A;   • COLONOSCOPY N/A 7/23/2020    Procedure: COLONOSCOPY;  Surgeon: Rubén Rosas MD;  Location:  [facility];  Service: Gastroenterology;  Laterality: N/A;   • CORONARY STENT PLACEMENT  2016   • HERNIA REPAIR     • HYSTERECTOMY  2000   • INSERTION HEMODIALYSIS CATHETER Right 6/29/2016    Procedure: HEMODIALYSIS CATHETER INSERTION TUNNELLED;  Surgeon: Ramón Chavez MD;  Location:  [facility];  Service:    • MASTECTOMY Right 2006   • OOPHORECTOMY     • REDUCTION MAMMAPLASTY Left 2005       Family History: family history includes Breast cancer (age of onset: 55) in her sister;  Cancer in her mother; Colon cancer in her maternal grandmother; Coronary artery disease in her father; Heart failure in her father; Pancreatic cancer in her mother; Stroke in her mother; Throat cancer in her daughter. Otherwise pertinent FHx was reviewed and not pertinent to current issue.    Social History:  reports that she has never smoked. She has never used smokeless tobacco. She reports current alcohol use. She reports that she does not use drugs.    Home Medications:   B Complex-C-Folic Acid, Capsaicin, Epoetin Ursty, HYDROmorphone, Hydrocortisone (Perianal), Iron Dextran, Lancets Ultra Fine, Lidocaine, Morphine, Vitamin D, acetaminophen, amiodarone, ammonium lactate, apixaban, aspirin, atorvastatin, bisacodyl, capecitabine, carvedilol, cloNIDine, difluprednate, dorzolamide-timolol, fluocinonide, glucose blood, glucose monitor, insulin NPH-insulin regular, isosorbide mononitrate, lidocaine-prilocaine, megestrol, metroNIDAZOLE, nitroglycerin, nortriptyline, ondansetron, terazosin, and torsemide    Allergies:  Allergies   Allergen Reactions   • Mircera [Methoxy Polyethylene Glycol-Epoetin Beta] Anaphylaxis     Pt stopped breathing   • Venofer [Iron Sucrose] Anaphylaxis     Pt stopped breathing   • Albuterol Other (See Comments)     Increased blood pressure  Used right before heart attack   • Nifedipine Er Swelling   • Penicillins Hives   • Prednisone Other (See Comments)     Makes jittery/anxious       Objective    Objective     Vitals:  Temp:  [97.3 °F (36.3 °C)-98 °F (36.7 °C)] 98 °F (36.7 °C)  Heart Rate:  [53-64] 64  Resp:  [16-18] 18  BP: ()/() 135/101  Flow (L/min):  [0] 0    Physical Exam    Result Review    Result Review:  I have personally reviewed the results from the time of this admission to 3/14/2023 14:31 EDT and agree with these findings:  [x]  Laboratory list / accordion  []  Microbiology  [x]  Radiology  []  EKG/Telemetry   []  Cardiology/Vascular   []  Pathology  []  Old records  []   Other:  Most notable findings include:   CT head without contrast  Findings:  No acute intracranial hemorrhage or extra-axial collection is identified. The ventricles appear normal in caliber, with no evidence of mass effect or midline shift. The basal cisterns appear patent. The gray-white differentiation appears preserved.     The calvarium appear intact. The paranasal sinuses are clear. The mastoid air cells are well-aerated. Scattered foci of periventricular and subcortical white matter hypodensities are nonspecific, but likely the sequela of mild chronic small vessel   ischemic disease. Some degenerative mineralization is noted within the bilateral basal ganglia.     IMPRESSION:  Impression:  1.No acute intracranial process identified.  2.Findings suggestive of mild chronic small vessel ischemic disease.      Latest Reference Range & Units 03/14/23 08:38   Glucose 65 - 99 mg/dL 143 (H)   Sodium 136 - 145 mmol/L 133 (L)   Potassium 3.5 - 5.2 mmol/L 4.3   CO2 22.0 - 29.0 mmol/L 24.0   Chloride 98 - 107 mmol/L 97 (L)   Anion Gap 5.0 - 15.0 mmol/L 12.0   Creatinine 0.57 - 1.00 mg/dL 5.11 (H)   BUN 8 - 23 mg/dL 24 (H)   BUN/Creatinine Ratio 7.0 - 25.0  4.7 (L)   Calcium 8.6 - 10.5 mg/dL 8.7   eGFR >60.0 mL/min/1.73 8.3 (L)   Albumin 3.5 - 5.2 g/dL 3.1 (L)   Phosphorus 2.5 - 4.5 mg/dL 3.5   (H): Data is abnormally high  (L): Data is abnormally low       Latest Reference Range & Units 03/14/23 08:38   WBC 3.40 - 10.80 10*3/mm3 4.73   RBC 3.77 - 5.28 10*6/mm3 2.74 (L)   Hemoglobin 12.0 - 15.9 g/dL 8.3 (L)   Hematocrit 34.0 - 46.6 % 27.1 (L)   RDW 12.3 - 15.4 % 17.3 (H)   MCV 79.0 - 97.0 fL 98.9 (H)   MCH 26.6 - 33.0 pg 30.3   MCHC 31.5 - 35.7 g/dL 30.6 (L)   MPV 6.0 - 12.0 fL 10.6   Platelets 140 - 450 10*3/mm3 85 (L)   RDW-SD 37.0 - 54.0 fl 60.1 (H)   (L): Data is abnormally low  (H): Data is abnormally high     Latest Reference Range & Units 03/10/23 12:11   ECG 12-LEAD  Rpt   QT Interval ms 448   QTC Interval ms  443   Rpt: View report in Results Review for more information    Assessment & Plan   Assessment / Plan   Active Hospital Problems:  Active Hospital Problems    Diagnosis    • **Hematochezia    • Symptomatic anemia    • Severe malnutrition (HCC)    • PAF (paroxysmal atrial fibrillation) (HCC)    • CHF (congestive heart failure) (HCC)    • End stage renal disease on dialysis (HCC)    • Hyperlipidemia LDL goal <70    • Essential hypertension    • Type 2 diabetes mellitus (HCC)          Brief Patient Summary:  Esperanza Pop is a 78 y.o. female with a past medical history of ESRD on HD, IDDM, HLD, HTN, pulmonary hypertension, HFpEF, breast cancer s/p mastectomy and radiation, PAF on Xarelto, chronic anemia and a recent diagnosis of rectal cancer on radiation and chemo who presented to [facility] ED after a witnessed experiencing fall at home.  Patient was in route to her 's  when she states that her legs gave out.  CT head without contrast revealed no intracranial abnormalities.      Plan:   Altered mental status-likely metabolic in nature  1.  Initiate p.m. Seroquel 25 mg and melatonin 5 mg.  2.  Patient stated she was extremely claustrophobic and did not want an MRI at this time.  CT head was negative for any intracranial abnormalities.  We will reconsider MRI brain without contrast tomorrow.  3.  Continue to work with PT/OT.  4.  General neurology will continue to follow.      I have discussed the above with the patient and bedside RN  Time spent with patient: 75 minutes in face-to-face evaluation and management of the patient.       Stephanie Sultana, WILMA

## 2023-03-14 NOTE — THERAPY TREATMENT NOTE
Patient Name: Esperanza Pop  : 1945    MRN: [id number]                              Today's Date: 3/14/2023       Admit Date: 3/10/2023    Visit Dx:     ICD-10-CM ICD-9-CM   1. Symptomatic anemia  D64.9 285.9   2. Generalized weakness  R53.1 780.79   3. ESRD on dialysis ([facility])  N18.6 585.6    Z99.2 V45.11   4. Impaired mobility  Z74.09 799.89   5. Fall, initial encounter  W19.XXXA E888.9   6. Elevated troponin  R77.8 790.6     Patient Active Problem List   Diagnosis   • Acute on chronic diastolic heart failure ([facility])   • Type 2 diabetes mellitus ([facility])   • Essential hypertension   • Coronary artery disease involving native coronary artery of native heart with angina pectoris ([facility])   • Breast cancer, female, right   • Seasonal allergic rhinitis   • Right carotid bruit   • Vitamin B12 deficiency   • Hyperlipidemia LDL goal <70   • End stage renal disease on dialysis ([facility])   • Nonrheumatic aortic valve stenosis   • NSTEMI (non-ST elevated myocardial infarction) ([facility])   • UTI (urinary tract infection)   • Ischemic heart disease   • CHF (congestive heart failure) ([facility])   • Acute non-ST segment elevation myocardial infarction (STEMI) following previous myocardial infarction   • Dyslipidemia   • Diabetes mellitus ([facility])   • Mild obesity   • Elevated troponin   • Screen for colon cancer   • Acute midline low back pain without sciatica   • Hypertensive emergency   • PAF (paroxysmal atrial fibrillation) ([facility])   • Malignant neoplasm of anal canal ([facility])   • Hematochezia   • Severe malnutrition ([facility])   • Symptomatic anemia     Past Medical History:   Diagnosis Date   • Acute bronchitis    • Acute conjunctivitis    • Acute kidney injury ([facility])     Admission from 2013 to 2014, now resolved with latest creatinine 1.4 on 2014.   • Acute non-ST segment elevation myocardial infarction (STEMI) following previous myocardial infarction    • Anal cancer (HCC) 2023   • Anal cancer (HCC) 2023   • Arthritis    •  Breast cancer, female, right     2005 mastectomy right   • Cancer (HCC)    • CHF (congestive heart failure) (HCC)    • Chronic kidney disease     dialysis MWF, f/u [facility]    • Clotting disorder (HCC)    • COVID-19    • Diabetes mellitus (HCC)     diagnosed ~1992, checks fsbg 2-3x/day   • Diarrhea    • Dyslipidemia    • Dyspepsia    • Dyspnea    • Edema    • History of transfusion     ~2013 no reaction    • Hx of radiation therapy    • Hyperlipidemia    • Hypertension     Severe   • Ischemic heart disease    • Moderate obesity     BMI 36.2   • Myocardial infarction (HCC)    • Pneumonia    • Pneumonia 02/2019   • Radiation 2005   • Seasonal allergies    • Wears glasses      Past Surgical History:   Procedure Laterality Date   • AV FISTULA PLACEMENT, BRACHIOBASILIC     • BREAST BIOPSY Left 2010   • BREAST CYST EXCISION     • BREAST LUMPECTOMY Right 2005   • BREAST SURGERY     • CARDIAC CATHETERIZATION N/A 10/10/2016    Procedure: Left Heart Cath;  Surgeon: Scooter Zhao MD;  Location:  [facility];  Service:    • CARDIAC CATHETERIZATION  10/2016   • CARDIAC CATHETERIZATION N/A 1/21/2021    Procedure: Right and Left Heart Cath;  Surgeon: Hugh Tidwell MD;  Location:  [facility];  Service: Cardiology;  Laterality: N/A;   • COLONOSCOPY N/A 7/23/2020    Procedure: COLONOSCOPY;  Surgeon: Rubén Rosas MD;  Location:  [facility];  Service: Gastroenterology;  Laterality: N/A;   • CORONARY STENT PLACEMENT  2016   • HERNIA REPAIR     • HYSTERECTOMY  2000   • INSERTION HEMODIALYSIS CATHETER Right 6/29/2016    Procedure: HEMODIALYSIS CATHETER INSERTION TUNNELLED;  Surgeon: Ramón Chavez MD;  Location: [facility] OR;  Service:    • MASTECTOMY Right 2006   • OOPHORECTOMY     • REDUCTION MAMMAPLASTY Left 2005      General Information     Row Name 03/14/23 1442          Physical Therapy Time and Intention    Document Type therapy note (daily note)  -KR      Mode of Treatment individual therapy;physical therapy  -KR     Row Name 03/14/23 1442          General Information    Patient Profile Reviewed yes  -KR     Existing Precautions/Restrictions fall;other (see comments)  situational confusion  -KR     Barriers to Rehab medically complex;previous functional deficit;cognitive status;visual deficit  -KR     Row Name 03/14/23 1442          Safety Issues, Functional Mobility    Safety Issues Affecting Function (Mobility) ability to follow commands;awareness of need for assistance;friction/shear risk;judgment;positioning of assistive device;problem-solving;safety precaution awareness;sequencing abilities;safety precautions follow-through/compliance;insight into deficits/self-awareness;impulsivity  -KR     Impairments Affecting Function (Mobility) balance;cognition;coordination;endurance/activity tolerance;motor planning;pain;postural/trunk control;strength  -KR     Cognitive Impairments, Mobility Safety/Performance attention;awareness, need for assistance;insight into deficits/self-awareness;judgment;problem-solving/reasoning;safety precaution follow-through;sequencing abilities;safety precaution awareness  -KR           User Key  (r) = Recorded By, (t) = Taken By, (c) = Cosigned By    Initials Name Provider Type    KR Starla Nicholas PT Physical Therapist               Mobility     Row Name 03/14/23 1442          Bed-Chair Transfer    Bed-Chair [place] (Transfers) contact guard  -KR     Assistive Device (Bed-Chair Transfers) walker, front-wheeled  -TABITHA     Comment, (Bed-Chair Transfer) lateral steps bed to BSC, BSC to recliner.  -KR     Row Name 03/14/23 1442          Sit-Stand Transfer    Sit-Stand [place] (Transfers) minimum assist (75% patient effort);2 person assist;verbal cues;nonverbal cues (demo/gesture)  -KR     Assistive Device (Sit-Stand Transfers) walker, front-wheeled  -TABITHA     Comment, (Sit-Stand Transfer) 1x from bed with CGA, 1x from BSC Keegan with  VCs for UE push up from armrests  -KR     Row Name 03/14/23 1442          Gait/Stairs (Locomotion)    Comment, (Gait/Stairs) pt refused ambulation d/t fatigue  -KR           User Key  (r) = Recorded By, (t) = Taken By, (c) = Cosigned By    Initials Name Provider Type    Starla Dickson PT Physical Therapist               Obj/Interventions     Row Name 03/14/23 1442          Motor Skills    Therapeutic Exercise hip;knee  -KR     Row Name 03/14/23 1442          Hip (Therapeutic Exercise)    Hip (Therapeutic Exercise) strengthening exercise  -KR     Hip Strengthening (Therapeutic Exercise) 10 repetitions;bilateral;aDduction;aBduction  -KR     Row Name 03/14/23 1442          Knee (Therapeutic Exercise)    Knee (Therapeutic Exercise) strengthening exercise  -KR     Knee Strengthening (Therapeutic Exercise) 10 repetitions;bilateral;SLR (straight leg raise)  -KR     Row Name 03/14/23 1442          Balance    Balance Assessment sitting static balance;sitting dynamic balance;sit to stand dynamic balance;standing static balance;standing dynamic balance  -KR     Static Sitting Balance supervision  -KR     Dynamic Sitting Balance contact guard  -KR     Position, Sitting Balance unsupported;sitting edge of bed;other (see comments)  BSC  -KR     Sit to Stand Dynamic Balance minimal assist;1-person assist  -KR     Static Standing Balance contact guard  -KR     Dynamic Standing Balance contact guard  -KR     Position/Device Used, Standing Balance supported;walker, rolling  -KR     Comment, Balance CGA for static standing with BUE support during depA pericare following incontinent BM  -KR           User Key  (r) = Recorded By, (t) = Taken By, (c) = Cosigned By    Initials Name Provider Type    Starla Dickson PT Physical Therapist               Goals/Plan    No documentation.                Clinical Impression     Row Name 03/14/23 1442          Pain    Pretreatment Pain Rating 0/10 - no pain  -KR     Posttreatment Pain  Rating 0/10 - no pain  -KR     Row Name 03/14/23 1442          Plan of Care Review    Plan of Care Reviewed With patient  -KR     Progress no change  -KR     Outcome Evaluation Pt required less assist for transfers and steps to the chair, however declined attempts at ambulation d/t fatigue. Pt continues to demo poor strength, endurance, and balance and will continue to benefit from PT to address these ongoing deficits.  -KR     Row Name 03/14/23 1442          Vital Signs    Pre Systolic BP Rehab --  VSS  -KR     O2 Delivery Pre Treatment room air  -KR     O2 Delivery Intra Treatment room air  -KR     O2 Delivery Post Treatment room air  -KR     Pre Patient Position Sitting  sitting in bed  -KR     Intra Patient Position Standing  -KR     Post Patient Position Sitting  -KR     Row Name 03/14/23 1442          Positioning and Restraints    Pre-Treatment Position in bed  -KR     Post Treatment Position chair  -KR     In Chair notified nsg;reclined;call light within reach;encouraged to call for assist;exit alarm on;with PT;waffle cushion;legs elevated  -KR           User Key  (r) = Recorded By, (t) = Taken By, (c) = Cosigned By    Initials Name Provider Type    Starla Dickson, PT Physical Therapist               Outcome Measures     Row Name 03/14/23 1442 03/14/23 0800       How much help from another person do you currently need...    Turning from your back to your side while in flat bed without using bedrails? 4  -KR 3  -MC    Moving from lying on back to sitting on the side of a flat bed without bedrails? 4  -KR 3  -MC    Moving to and from a bed to a chair (including a wheelchair)? 3  -KR 2  -MC    Standing up from a chair using your arms (e.g., wheelchair, bedside chair)? 3  -KR 2  -MC    Climbing 3-5 steps with a railing? 2  -KR 2  -MC    To walk in hospital room? 2  -KR 2  -MC    AM-PAC 6 Clicks Score (PT) 18  -KR 14  -MC    Highest level of mobility 6 --> Walked 10 steps or more  -KR 4 --> Transferred to  chair/commode  -          User Key  (r) = Recorded By, (t) = Taken By, (c) = Cosigned By    Initials Name Provider Type    Starla Dickson PT Physical Therapist    MC Carrel, Miriam, RN Registered Nurse                             Physical Therapy Education     Title: PT OT SLP Therapies (In Progress)     Topic: Physical Therapy (In Progress)     Point: Mobility training (In Progress)     Learning Progress Summary           Patient Acceptance, E, NR by KR at 3/14/2023 1532    Acceptance, E, NR by KR at 3/13/2023 1011                   Point: Home exercise program (In Progress)     Learning Progress Summary           Patient Acceptance, E, NR by KR at 3/14/2023 1532    Acceptance, E, NR by KR at 3/13/2023 1011                   Point: Body mechanics (In Progress)     Learning Progress Summary           Patient Acceptance, E, NR by KR at 3/14/2023 1532    Acceptance, E, NR by KR at 3/13/2023 1011                   Point: Precautions (In Progress)     Learning Progress Summary           Patient Acceptance, E, NR by KR at 3/14/2023 1532    Acceptance, E, NR by KR at 3/13/2023 1011                               User Key     Initials Effective Dates Name Provider Type Discipline     12/30/22 -  Starla Nicholas PT Physical Therapist PT              PT Recommendation and Plan  Planned Therapy Interventions (PT): balance training, bed mobility training, gait training, home exercise program, manual therapy techniques, patient/family education, neuromuscular re-education, ROM (range of motion), stair training, strengthening, stretching, transfer training, postural re-education  Plan of Care Reviewed With: patient  Progress: no change  Outcome Evaluation: Pt required less assist for transfers and steps to the chair, however declined attempts at ambulation d/t fatigue. Pt continues to demo poor strength, endurance, and balance and will continue to benefit from PT to address these ongoing deficits.     Time Calculation:     PT Charges     Row Name 03/14/23 1442             Time Calculation    Start Time 1442  -KR      PT Received On 03/14/23  -KR      PT Goal Re-Cert Due Date 03/23/23  -KR         Timed Charges    98114 - PT Therapeutic Exercise Minutes 5  -KR      09481 - PT Therapeutic Activity Minutes 18  -KR         Total Minutes    Timed Charges Total Minutes 23  -KR       Total Minutes 23  -KR            User Key  (r) = Recorded By, (t) = Taken By, (c) = Cosigned By    Initials Name Provider Type    KR Starla Nicholas, PT Physical Therapist              Therapy Charges for Today     Code Description Service Date Service Provider Modifiers Qty    32817780957 HC GAIT TRAINING EA 15 MIN 3/13/2023 Starla Nicholas, PT GP 1    77777009223 HC PT EVAL LOW COMPLEXITY 4 3/13/2023 Starla Nicholas, PT GP 1    65708953355 HC PT THER PROC EA 15 MIN 3/14/2023 Starla Nicholas, PT GP 1    81660924207 HC PT THERAPEUTIC ACT EA 15 MIN 3/14/2023 Starla Nicholas, PT GP 1          PT G-Codes  Outcome Measure Options: AM-PAC 6 Clicks Daily Activity (OT)  AM-PAC 6 Clicks Score (PT): 18  AM-PAC 6 Clicks Score (OT): 13  PT Discharge Summary  Anticipated Discharge Disposition (PT): skilled nursing facility    Starla Nicholas, PT  3/14/2023

## 2023-03-15 ENCOUNTER — APPOINTMENT (OUTPATIENT)
Dept: NEPHROLOGY | Facility: HOSPITAL | Age: 78
End: 2023-03-15
Payer: MEDICARE

## 2023-03-15 LAB
ANION GAP SERPL CALCULATED.3IONS-SCNC: 14 MMOL/L (ref 5–15)
BUN SERPL-MCNC: 30 MG/DL (ref 8–23)
BUN/CREAT SERPL: 4.5 (ref 7–25)
CALCIUM SPEC-SCNC: 8.7 MG/DL (ref 8.6–10.5)
CHLORIDE SERPL-SCNC: 97 MMOL/L (ref 98–107)
CO2 SERPL-SCNC: 23 MMOL/L (ref 22–29)
CREAT SERPL-MCNC: 6.7 MG/DL (ref 0.57–1)
DEPRECATED RDW RBC AUTO: 60.5 FL (ref 37–54)
EGFRCR SERPLBLD CKD-EPI 2021: 6 ML/MIN/1.73
ERYTHROCYTE [DISTWIDTH] IN BLOOD BY AUTOMATED COUNT: 17.1 % (ref 12.3–15.4)
GLUCOSE BLDC GLUCOMTR-MCNC: 111 MG/DL (ref 70–130)
GLUCOSE SERPL-MCNC: 116 MG/DL (ref 65–99)
HCT VFR BLD AUTO: 28 % (ref 34–46.6)
HGB BLD-MCNC: 8.5 G/DL (ref 12–15.9)
MCH RBC QN AUTO: 30.2 PG (ref 26.6–33)
MCHC RBC AUTO-ENTMCNC: 30.4 G/DL (ref 31.5–35.7)
MCV RBC AUTO: 99.6 FL (ref 79–97)
PLATELET # BLD AUTO: 83 10*3/MM3 (ref 140–450)
PMV BLD AUTO: 11.2 FL (ref 6–12)
POTASSIUM SERPL-SCNC: 3.9 MMOL/L (ref 3.5–5.2)
RBC # BLD AUTO: 2.81 10*6/MM3 (ref 3.77–5.28)
SODIUM SERPL-SCNC: 134 MMOL/L (ref 136–145)
WBC NRBC COR # BLD: 3.95 10*3/MM3 (ref 3.4–10.8)

## 2023-03-15 PROCEDURE — 80048 BASIC METABOLIC PNL TOTAL CA: CPT | Performed by: HOSPITALIST

## 2023-03-15 PROCEDURE — G0378 HOSPITAL OBSERVATION PER HR: HCPCS

## 2023-03-15 PROCEDURE — 82962 GLUCOSE BLOOD TEST: CPT

## 2023-03-15 PROCEDURE — 77386: CPT | Performed by: RADIOLOGY

## 2023-03-15 PROCEDURE — 99232 SBSQ HOSP IP/OBS MODERATE 35: CPT | Performed by: HOSPITALIST

## 2023-03-15 PROCEDURE — 85027 COMPLETE CBC AUTOMATED: CPT | Performed by: HOSPITALIST

## 2023-03-15 PROCEDURE — 99232 SBSQ HOSP IP/OBS MODERATE 35: CPT

## 2023-03-15 PROCEDURE — G0257 UNSCHED DIALYSIS ESRD PT HOS: HCPCS

## 2023-03-15 RX ADMIN — ATORVASTATIN CALCIUM 80 MG: 40 TABLET, FILM COATED ORAL at 20:56

## 2023-03-15 RX ADMIN — HYDROCORTISONE 2.5%: 25 CREAM TOPICAL at 20:56

## 2023-03-15 RX ADMIN — Medication 10 ML: at 20:57

## 2023-03-15 RX ADMIN — Medication 5 MG: at 20:56

## 2023-03-15 RX ADMIN — CARVEDILOL 12.5 MG: 12.5 TABLET, FILM COATED ORAL at 20:56

## 2023-03-15 RX ADMIN — CLONIDINE HYDROCHLORIDE 0.4 MG: 0.1 TABLET ORAL at 20:56

## 2023-03-15 RX ADMIN — APIXABAN 2.5 MG: 2.5 TABLET, FILM COATED ORAL at 20:56

## 2023-03-15 RX ADMIN — QUETIAPINE FUMARATE 25 MG: 25 TABLET ORAL at 20:56

## 2023-03-15 NOTE — PLAN OF CARE
Goal Outcome Evaluation:         Pt calm and cooperative, rested well overnight. Pt woke up a few times confused but was easily redirected. Plan to tx pt to 5B when bed available. Pt to cont dialysis, chemo, and xrt per MD recs. VSS on RA, will cont to monitor.

## 2023-03-15 NOTE — CASE MANAGEMENT/SOCIAL WORK
Continued Stay Note   [facility]     Patient Name: Esperanza oPp  MRN: [id number]  Today's Date: 3/15/2023    Admit Date: 3/10/2023    Plan: Update   Discharge Plan     Row Name 03/15/23 1540       Plan    Plan Update    Patient/Family in Agreement with Plan yes    Plan Comments The pts daughter Indigo called me back and asked me to speak with the pts sister Indigo Herman regarding facilities to refer to. I spoke with Ms Virgil. She wants to speak with the pts MD. Also wants to confirm the pt can be placed on hold from RAD TX and Chemo safely for inpt rehab. I did explain the referral process to the family. The pts sister prefers [facility] or [facility]. I will hold off on the referrals at this time.    Final Discharge Disposition Code 03 - skilled nursing facility (SNF)    Row Name 03/15/23 4622       Plan    Plan SNF at [facility]    Patient/Family in Agreement with Plan yes    Plan Comments I spoke with the pts daughter Indigo by phone. She is at work. They are interested in SNF rehab at [facility]. I will f/u in the am and discuss with the pt. I informed the daughter, chemo and Rad Tx would need to be held while the pt is in rehab. [facility] is not an option due to HD. I did confrim with [facility]. The daughter has no SNF preferences. I will [facility] to [facility], [facility].    Final Discharge Disposition Code 03 - skilled nursing facility (SNF)               Discharge Codes    No documentation.               Expected Discharge Date and Time     Expected Discharge Date Expected Discharge Time    Mar 17, 2023             Emily Banks RN

## 2023-03-15 NOTE — PROGRESS NOTES
" LOS: 3 days   Patient Care Team:  Meghana Rodgers MD as PCP - General  Demetrius Sagastume MD as Consulting Physician (Cardiology)  Kevan Lerma MD as Consulting Physician (Nephrology)  Geena Estrella APRN as Nurse Practitioner (Cardiology)  Frank Mandujano MD as Referring Physician (Colon and Rectal Surgery)  Kevan Cavazos MD as Consulting Physician (Radiation Oncology)  Yue Reeves RN as Nurse Navigator  Darryn Burk MD as Consulting Physician (Nephrology)    Chief Complaint: ESRD    Subjective : SEEN ON DIALYSIS         Subjective:  Symptoms:  Stable.  No shortness of breath or chest pain.    Diet:  Adequate intake.        History taken from: patient    Objective     Vital Sign Min/Max for last 24 hours  Temp  Min: 97.8 °F (36.6 °C)  Max: 98.7 °F (37.1 °C)   BP  Min: 127/55  Max: 176/61   Pulse  Min: 53  Max: 64   Resp  Min: 16  Max: 18   SpO2  Min: 98 %  Max: 100 %   No data recorded   No data recorded     Flowsheet Rows    Flowsheet Row First Filed Value   Admission Height 167.6 cm (66\") Documented at 03/10/2023 1122   Admission Weight 76.7 kg (169 lb) Documented at 03/10/2023 1122          No intake/output data recorded.  No intake/output data recorded.    Objective:  General Appearance:  Comfortable.    Vital signs: (most recent): Blood pressure 163/95, pulse 53, temperature 98 °F (36.7 °C), temperature source Axillary, resp. rate 18, height 167.6 cm (65.98\"), weight 79.1 kg (174 lb 6.1 oz), SpO2 99 %, not currently breastfeeding.  Vital signs are normal.    Output: Minimal urine output.    HEENT: Normal HEENT exam.    Lungs:  Normal effort and normal respiratory rate.  Breath sounds clear to auscultation.  She is not in respiratory distress.  No decreased breath sounds or wheezes.    Heart: Normal rate.    Extremities: There is dependent edema.  (AVF)  Pulses: Distal pulses are intact.    Neurological: Patient is alert and oriented to person, place and time.  Normal strength.  "             Results Review:     I reviewed the patient's new clinical results.    WBC WBC   Date Value Ref Range Status   03/15/2023 3.95 3.40 - 10.80 10*3/mm3 Final   03/14/2023 4.73 3.40 - 10.80 10*3/mm3 Final   03/13/2023 3.95 3.40 - 10.80 10*3/mm3 Final      HGB Hemoglobin   Date Value Ref Range Status   03/15/2023 8.5 (L) 12.0 - 15.9 g/dL Final   03/14/2023 8.3 (L) 12.0 - 15.9 g/dL Final   03/13/2023 9.2 (L) 12.0 - 15.9 g/dL Final      HCT Hematocrit   Date Value Ref Range Status   03/15/2023 28.0 (L) 34.0 - 46.6 % Final   03/14/2023 27.1 (L) 34.0 - 46.6 % Final   03/13/2023 31.3 (L) 34.0 - 46.6 % Final      Platlets No results found for: LABPLAT   MCV MCV   Date Value Ref Range Status   03/15/2023 99.6 (H) 79.0 - 97.0 fL Final   03/14/2023 98.9 (H) 79.0 - 97.0 fL Final   03/13/2023 101.6 (H) 79.0 - 97.0 fL Final          Sodium Sodium   Date Value Ref Range Status   03/15/2023 134 (L) 136 - 145 mmol/L Final   03/14/2023 133 (L) 136 - 145 mmol/L Final   03/13/2023 134 (L) 136 - 145 mmol/L Final      Potassium Potassium   Date Value Ref Range Status   03/15/2023 3.9 3.5 - 5.2 mmol/L Final   03/14/2023 4.3 3.5 - 5.2 mmol/L Final   03/13/2023 4.2 3.5 - 5.2 mmol/L Final     Comment:     Slight hemolysis detected by analyzer. Results may be affected.      Chloride Chloride   Date Value Ref Range Status   03/15/2023 97 (L) 98 - 107 mmol/L Final   03/14/2023 97 (L) 98 - 107 mmol/L Final   03/13/2023 94 (L) 98 - 107 mmol/L Final      CO2 CO2   Date Value Ref Range Status   03/15/2023 23.0 22.0 - 29.0 mmol/L Final   03/14/2023 24.0 22.0 - 29.0 mmol/L Final   03/13/2023 20.0 (L) 22.0 - 29.0 mmol/L Final      BUN BUN   Date Value Ref Range Status   03/15/2023 30 (H) 8 - 23 mg/dL Final   03/14/2023 24 (H) 8 - 23 mg/dL Final   03/13/2023 34 (H) 8 - 23 mg/dL Final      Creatinine Creatinine   Date Value Ref Range Status   03/15/2023 6.70 (H) 0.57 - 1.00 mg/dL Final   03/14/2023 5.11 (H) 0.57 - 1.00 mg/dL Final   03/13/2023  6.82 (H) 0.57 - 1.00 mg/dL Final      Calcium Calcium   Date Value Ref Range Status   03/15/2023 8.7 8.6 - 10.5 mg/dL Final   03/14/2023 8.7 8.6 - 10.5 mg/dL Final   03/13/2023 8.7 8.6 - 10.5 mg/dL Final      PO4 No results found for: CAPO4   Albumin Albumin   Date Value Ref Range Status   03/14/2023 3.1 (L) 3.5 - 5.2 g/dL Final      Magnesium No results found for: MG   Uric Acid No results found for: URICACID     Medication Review: yes    Assessment & Plan       Hematochezia    Type 2 diabetes mellitus (HCC)    Essential hypertension    Hyperlipidemia LDL goal <70    End stage renal disease on dialysis (HCC)    CHF (congestive heart failure) (HCC)    PAF (paroxysmal atrial fibrillation) (HCC)    Severe malnutrition (HCC)    Symptomatic anemia      Assessment & Plan     ESRD: MWF [facility]. Missed HD before admission      Access: AV fistula      Anemia: Transfuse at Hb 7 or less. On chemo for anal ca     Volume status: Dependent edema noted.    HTN: Bp elevated.   HYPONATREMIA. MILD. SHOULD CORRECT WITH HD.   I discussed the patients findings and my recommendations with patient  PLAN. HD TODAY 3/15/23.   Zeb Owusu MD  03/15/23  08:20 EDT

## 2023-03-15 NOTE — PLAN OF CARE
Problem: Device-Related Complication Risk (Hemodialysis)  Goal: Safe, Effective Therapy Delivery  Outcome: Ongoing, Progressing     Problem: Hemodynamic Instability (Hemodialysis)  Goal: Effective Tissue Perfusion  Outcome: Ongoing, Progressing     Problem: Infection (Hemodialysis)  Goal: Absence of Infection Signs and Symptoms  Outcome: Ongoing, Progressing   Goal Outcome Evaluation:      HD tx today, goal UF 2 liters

## 2023-03-15 NOTE — CASE MANAGEMENT/SOCIAL WORK
Continued Stay Note   [facility]     Patient Name: Esperanza Pop  MRN: [id number]  Today's Date: 3/15/2023    Admit Date: 3/10/2023    Plan: SNF at [facility]   Discharge Plan     Row Name 03/15/23 1512       Plan    Plan SNF at [facility]    Patient/Family in Agreement with Plan yes    Plan Comments I spoke with the pts daughter Indigo by phone. She is at work. They are interested in SNF rehab at [facility]. I will f/u in the am and discuss with the pt. I informed the daughter, chemo and Rad Tx would need to be held while the pt is in rehab. [facility] is not an option due to HD. I did confrim with [facility]. The daughter has no SNF preferences. I will [facility] to [facility].    Final Discharge Disposition Code 03 - skilled nursing facility (SNF)               Discharge Codes    No documentation.               Expected Discharge Date and Time     Expected Discharge Date Expected Discharge Time    Mar 17, 2023             Emily Banks RN

## 2023-03-15 NOTE — PROGRESS NOTES
[facility] Neurology    Progress Note    Patient Name: Esperanza Pop  : 1945  MRN: [id number]  Primary Care Physician:  Meghana Rodgers MD  Date of admission: 3/10/2023    Subjective   Subjective     Chief Complaint: Altered mental status    History of Present Illness   Patient just returned from her hemodialysis.  She was resting comfortably in bed and was alert and oriented x4.  At this time it appears she has returned to her baseline.    Review of Systems   General: Negative for fever, nausea, or vomiting.   Neurological: Negative for headache, pain, or weakness.     Objective   Objective     Vitals:   Temp:  [97.5 °F (36.4 °C)-98.7 °F (37.1 °C)] 97.5 °F (36.4 °C)  Heart Rate:  [52-64] 63  Resp:  [16-18] 16  BP: (126-176)/() 143/72    Physical Exam  Vitals and nursing note reviewed.   Constitutional:       General: She is not in acute distress.  Eyes:      Extraocular Movements: Extraocular movements intact.      Pupils: Pupils are equal, round, and reactive to light.      Comments: No nystagmus noted   Neurological:      Mental Status: She is alert.      Cranial Nerves: Cranial nerves 2-12 are intact.      Sensory: Sensation is intact.      Motor: Motor function is intact.      Coordination: Finger-Nose-Finger Test normal.      Deep Tendon Reflexes: Reflexes are normal and symmetric.          Result Review    Result Review:  I have personally reviewed the results from the time of this admission to 3/15/2023 14:11 EDT and agree with these findings:  [x]  Laboratory list / accordion  []  Microbiology  []  Radiology  []  EKG/Telemetry   []  Cardiology/Vascular   []  Pathology  []  Old records  []  Other:  Most notable findings include:   Latest Reference Range & Units 03/15/23 05:51   Glucose 65 - 99 mg/dL 116 (H)   Sodium 136 - 145 mmol/L 134 (L)   Potassium 3.5 - 5.2 mmol/L 3.9   CO2 22.0 - 29.0 mmol/L 23.0   Chloride 98 - 107 mmol/L 97 (L)   Anion Gap 5.0 - 15.0 mmol/L 14.0   Creatinine 0.57  - 1.00 mg/dL 6.70 (H)   BUN 8 - 23 mg/dL 30 (H)   BUN/Creatinine Ratio 7.0 - 25.0  4.5 (L)   Calcium 8.6 - 10.5 mg/dL 8.7   eGFR >60.0 mL/min/1.73 6.0 (L)   (H): Data is abnormally high  (L): Data is abnormally low     Latest Reference Range & Units 03/15/23 05:51   WBC 3.40 - 10.80 10*3/mm3 3.95   RBC 3.77 - 5.28 10*6/mm3 2.81 (L)   Hemoglobin 12.0 - 15.9 g/dL 8.5 (L)   Hematocrit 34.0 - 46.6 % 28.0 (L)   RDW 12.3 - 15.4 % 17.1 (H)   MCV 79.0 - 97.0 fL 99.6 (H)   MCH 26.6 - 33.0 pg 30.2   MCHC 31.5 - 35.7 g/dL 30.4 (L)   MPV 6.0 - 12.0 fL 11.2   Platelets 140 - 450 10*3/mm3 83 (L)   RDW-SD 37.0 - 54.0 fl 60.5 (H)   (L): Data is abnormally low  (H): Data is abnormally high    CT head without contrast  Findings:  No acute intracranial hemorrhage or extra-axial collection is identified. The ventricles appear normal in caliber, with no evidence of mass effect or midline shift. The basal cisterns appear patent. The gray-white differentiation appears preserved.     The calvarium appear intact. The paranasal sinuses are clear. The mastoid air cells are well-aerated. Scattered foci of periventricular and subcortical white matter hypodensities are nonspecific, but likely the sequela of mild chronic small vessel   ischemic disease. Some degenerative mineralization is noted within the bilateral basal ganglia.     IMPRESSION:  Impression:  1.No acute intracranial process identified.  2.Findings suggestive of mild chronic small vessel ischemic disease.       Assessment & Plan   Assessment / Plan     Active Hospital Problems:  Active Hospital Problems    Diagnosis    • **Hematochezia    • Symptomatic anemia    • Severe malnutrition ([facility])    • PAF (paroxysmal atrial fibrillation) ([facility])    • CHF (congestive heart failure) ([facility])    • End stage renal disease on dialysis ([facility])    • Hyperlipidemia LDL goal <70    • Essential hypertension    • Type 2 diabetes mellitus ([facility])        Brief Patient Summary:  Esperanza Pop is a 78 y.o.  female with a past medical history of ESRD on HD, IDDM, HLD, HTN, pulmonary hypertension, HFpEF, breast cancer s/p mastectomy and radiation, PAF on Xarelto, chronic anemia and a recent diagnosis of rectal cancer on radiation and chemo who presented to [facility] ED after a witnessed experiencing fall at home.  Patient was in route to her 's  when she states that her legs gave out.  CT head without contrast revealed no intracranial abnormalities.      Plan:   Metabolic encephalopathy  1.  Patient appears to return to baseline she is alert and oriented x4.  Her CT head without contrast was negative for any intracranial abnormalities.  Per the nurses she rested well overnight and did not seem agitated.  Continue Seroquel 25 mg and melatonin 5 mg nightly.  2.  Patient to continue to work with PT/OT.  3.  At this time General neurology has no further recommendations and will be signing off please feel free to reach out with any questions.    I have discussed the above with the patient and bedside RN  Time spent with patient: 35 minutes in face-to-face evaluation and management of the patient.      WILMA Sifuentes

## 2023-03-15 NOTE — PROGRESS NOTES
[facility] Medicine Services  PROGRESS NOTE    Patient Name: Esperanza Pop  : 1945  MRN: [id number]    Date of Admission: 3/10/2023  Primary Care Physician: Meghana Rodgers MD    Subjective   Subjective     CC:  F/U generalized weakness     HPI: Awake and alert. Denies pain. Wants to go home. Seen in dialysis.    ROS:  Gen- No fevers, chills  CV- No chest pain, palpitations  Resp- No cough, dyspnea  GI- No N/V/D, abd pain  No change from 3/13    Objective   Objective     Vital Signs:   Temp:  [97.8 °F (36.6 °C)-98.7 °F (37.1 °C)] 98 °F (36.7 °C)  Heart Rate:  [52-64] 52  Resp:  [16-18] 18  BP: (127-176)/(54-98) 162/98     Physical Exam:  NAD, alert and oriented  OP clear, MMM  Neck supple  No LAD  RRR, 3/6 murmur, systolic  CTAB  +BS, ND, NT, soft  DONG  Normal affect  No rashes  B LE edema  No change from 3/14    Results Reviewed:  LAB RESULTS:      Lab 03/15/23  0551 23  0838 23  1031 23  0524 23  2321 23  1229 23  0741 03/10/23  1809 03/10/23  1145   WBC 3.95 4.73 3.95 3.51  --   --  4.54  --  5.04   HEMOGLOBIN 8.5* 8.3* 9.2* 8.2* 8.2*   < > 7.1*   < > 7.8*   HEMATOCRIT 28.0* 27.1* 31.3* 27.9* 28.4*   < > 24.0*   < > 26.8*   PLATELETS 83* 85* 71* 113*  --   --  129*  --  143   NEUTROS ABS  --   --  2.90 2.51  --   --  3.32  --  3.48   IMMATURE GRANS (ABS)  --   --  0.04 0.03  --   --  0.05  --  0.07*   LYMPHS ABS  --   --  0.43* 0.42*  --   --  0.48*  --  0.78   MONOS ABS  --   --  0.57 0.55  --   --  0.66  --  0.68   EOS ABS  --   --  0.00 0.00  --   --  0.02  --  0.01   MCV 99.6* 98.9* 101.6* 102.6*  --   --  101.3*  --  103.1*    < > = values in this interval not displayed.         Lab 03/15/23  0551 23  0838 23  1031 23  0524 23  0741 03/10/23  1145   SODIUM 134* 133* 134* 136 138 136   POTASSIUM 3.9 4.3 4.2 3.5 3.4* 4.0   CHLORIDE 97* 97* 94* 97* 93* 90*   CO2 23.0 24.0 20.0* 23.0 29.0 24.0   ANION GAP 14.0 12.0  20.0* 16.0* 16.0* 22.0*   BUN 30* 24* 34* 29* 43* 38*   CREATININE 6.70* 5.11* 6.82* 5.55* 9.37* 7.43*   EGFR 6.0* 8.3* 5.8* 7.5* 4.0* 5.3*   GLUCOSE 116* 143* 89 85 100* 196*   CALCIUM 8.7 8.7 8.7 8.7 9.1 9.2   MAGNESIUM  --   --   --   --   --  2.2   PHOSPHORUS  --  3.5  --   --   --   --          Lab 03/14/23  0838 03/11/23  0741 03/10/23  1145   TOTAL PROTEIN  --  7.3 7.7   ALBUMIN 3.1* 3.3* 3.6   GLOBULIN  --  4.0 4.1   ALT (SGPT)  --  5 5   AST (SGOT)  --  11 17   BILIRUBIN  --  0.7 0.7   ALK PHOS  --  80 86         Lab 03/10/23  2123 03/10/23  1809 03/10/23  1145   HSTROP T 176* 173* 172*             Lab 03/10/23  1418   ABO TYPING B   RH TYPING Positive   ANTIBODY SCREEN Negative         Brief Urine Lab Results     None          Microbiology Results Abnormal     None          No radiology results from the last 24 hrs    Results for orders placed during the hospital encounter of 03/10/23    Adult Transthoracic Echo Complete W/ Cont if Necessary Per Protocol    Interpretation Summary  •  Left ventricular systolic function is normal. Estimated left ventricular EF = 55%  •  Left ventricular wall thickness is consistent with moderate concentric hypertrophy.  •  The right ventricular cavity is mildly dilated.  •  Mild to moderate biatrial enlargement.  •  Moderate to severe aortic valve stenosis is present.  Mean gradient 34 mmHg, DOUG 0.9 cm².  •  Mild aortic insufficiency.  •  Mild mitral regurgitation.  •  Mild to moderate tricuspid regurgitation with RVSP 48 mmHg.      Current medications:  Scheduled Meds:amiodarone, 200 mg, Oral, Daily  apixaban, 2.5 mg, Oral, Q12H  aspirin, 81 mg, Oral, Daily  atorvastatin, 80 mg, Oral, Nightly  carvedilol, 12.5 mg, Oral, Q12H  cloNIDine, 0.4 mg, Oral, Q12H  epoetin orquidea/orquidea-epbx, 20,000 Units, Subcutaneous, Once per day on Mon Wed Fri  Hydrocortisone (Perianal), , Rectal, BID  insulin lispro, 0-9 Units, Subcutaneous, TID With Meals  Insulin Lispro, 5 Units, Subcutaneous,  "TID With Meals  isosorbide mononitrate, 120 mg, Oral, Daily  melatonin, 5 mg, Oral, Nightly  QUEtiapine, 25 mg, Oral, Nightly  sodium chloride, 10 mL, Intravenous, Q12H      Continuous Infusions:   PRN Meds:.•  benzocaine-menthol  •  dextrose  •  dextrose  •  glucagon (human recombinant)  •  haloperidol lactate  •  hydrOXYzine  •  ondansetron **OR** ondansetron  •  sodium chloride  •  sodium chloride  •  sodium chloride    Assessment & Plan   Assessment & Plan     Active Hospital Problems    Diagnosis  POA   • **Hematochezia [K92.1]  Yes   • Symptomatic anemia [D64.9]  Yes   • Severe malnutrition (HCC) [E43]  Yes   • PAF (paroxysmal atrial fibrillation) (HCC) [I48.0]  Yes   • CHF (congestive heart failure) (HCC) [I50.9]  Yes   • End stage renal disease on dialysis (HCC) [N18.6, Z99.2]  Not Applicable   • Hyperlipidemia LDL goal <70 [E78.5]  Yes   • Essential hypertension [I10]  Yes   • Type 2 diabetes mellitus (HCC) [E11.9]  Yes      Resolved Hospital Problems   No resolved problems to display.        Brief Hospital Course to date:  Esperanza Pop is a 78 y.o. female  with PMH of ESRD on HD MWF, IDDM, HTN, HLD, HFpEF, Pulm HTN, Hx of breast cancer (s/p mastectomy and radiation) PAF (on xarelto), Chronic anemia, and rectal cancer (recently diagnosed currently gettintg XRT and chemo) that presented to the ED after a fall at home. Her 's  was today and she was getting ready to go when her legs \"gave out\". She had been having loose stool and bloody rectal discharge but that had improved prior to admission.     This patient's problems and plans were partially entered by my partner and updated as appropriate by me 03/15/23.    Generalized Weakness  Acute on Chronic Anemia  Rectal bleeding, Rectal Cancer   -S/P 1 unit PRBCs 3/11 with appropriate rise in H&H  -monitor CBC, no bleeding noted, stable  -PT/OT recommending rehab    AMS -> Improved   Concern for UTI/Possible UTI  -complete course of " antibiotics  -pamelor held  -MS contin held  -monitoring, neurology following, medications adjusted  -CT head without infarct/bleed/obvious metastatic disease    Rectal Cancer  Hx breast cancer   -Follows with Dr. Cavazos, Rad/Onc  -Follows Dr. Carpenter with oncology, currently on Xeloda, held for now  -continued treatment per Heme/Onc, Rad/Onc     ESRD on HD MWF  -Renal following for HD (had dialysis Saturday due to missed session on Friday)  -on HD today 3/15     HFpEF  Pulm HTN  PAF  HTN  HLD   -Eliquis resumed, monitor H&H, bleeding  -ASA/Amiodarone   -last ECHO EF 60% with grade II diastolic dysfunction, pulmonary HTN     Elevated Troponin   Mod-Severe AS   -no chest pain  -ECHO with mod-sev AS   -Has appt with Geena Estrella, WILMA 4/27/23, can follow up    Expected Discharge Location and Transportation: Home/rehab  Expected Discharge   Expected Discharge Date and Time     Expected Discharge Date Expected Discharge Time    Mar 17, 2023            DVT prophylaxis:  Medical and mechanical DVT prophylaxis orders are present.     AM-PAC 6 Clicks Score (PT): 16 (03/14/23 2000)    CODE STATUS:   Code Status and Medical Interventions:   Ordered at: 03/10/23 1448     Medical Intervention Limits:    NO intubation (DNI)     Code Status (Patient has no pulse and is not breathing):    No CPR (Do Not Attempt to Resuscitate)     Medical Interventions (Patient has pulse or is breathing):    Limited Support       Champ Ríos MD  03/15/23

## 2023-03-15 NOTE — PLAN OF CARE
Goal Outcome Evaluation:      Pt. Returned from dialysis/radiation, upon assessment RN found two new friction skin tears on left and right buttocks. Cleaned and dressed wounds as noted in charting. WOCN aware. Family notified. Turning q2hrs and skin checks q4hrs. Pt. Now resting comfortably in bed. VSS.

## 2023-03-16 ENCOUNTER — SPECIALTY PHARMACY (OUTPATIENT)
Dept: ONCOLOGY | Facility: HOSPITAL | Age: 78
End: 2023-03-16
Payer: MEDICARE

## 2023-03-16 LAB
DEPRECATED RDW RBC AUTO: 60.3 FL (ref 37–54)
ERYTHROCYTE [DISTWIDTH] IN BLOOD BY AUTOMATED COUNT: 17.1 % (ref 12.3–15.4)
GLUCOSE BLDC GLUCOMTR-MCNC: 111 MG/DL (ref 70–130)
GLUCOSE BLDC GLUCOMTR-MCNC: 157 MG/DL (ref 70–130)
GLUCOSE BLDC GLUCOMTR-MCNC: 179 MG/DL (ref 70–130)
HAV IGM SERPL QL IA: NORMAL
HBV CORE IGM SERPL QL IA: NORMAL
HBV SURFACE AG SERPL QL IA: NORMAL
HCT VFR BLD AUTO: 27.8 % (ref 34–46.6)
HCV AB SER DONR QL: NORMAL
HGB BLD-MCNC: 8.6 G/DL (ref 12–15.9)
MCH RBC QN AUTO: 30.5 PG (ref 26.6–33)
MCHC RBC AUTO-ENTMCNC: 30.9 G/DL (ref 31.5–35.7)
MCV RBC AUTO: 98.6 FL (ref 79–97)
PLATELET # BLD AUTO: 103 10*3/MM3 (ref 140–450)
PMV BLD AUTO: 11.4 FL (ref 6–12)
RBC # BLD AUTO: 2.82 10*6/MM3 (ref 3.77–5.28)
WBC NRBC COR # BLD: 3.96 10*3/MM3 (ref 3.4–10.8)

## 2023-03-16 PROCEDURE — 97530 THERAPEUTIC ACTIVITIES: CPT

## 2023-03-16 PROCEDURE — 80074 ACUTE HEPATITIS PANEL: CPT | Performed by: INTERNAL MEDICINE

## 2023-03-16 PROCEDURE — 99231 SBSQ HOSP IP/OBS SF/LOW 25: CPT | Performed by: HOSPITALIST

## 2023-03-16 PROCEDURE — 77336 RADIATION PHYSICS CONSULT: CPT | Performed by: RADIOLOGY

## 2023-03-16 PROCEDURE — 85027 COMPLETE CBC AUTOMATED: CPT | Performed by: HOSPITALIST

## 2023-03-16 PROCEDURE — G0378 HOSPITAL OBSERVATION PER HR: HCPCS

## 2023-03-16 PROCEDURE — 82962 GLUCOSE BLOOD TEST: CPT

## 2023-03-16 PROCEDURE — 77386: CPT | Performed by: RADIOLOGY

## 2023-03-16 PROCEDURE — 97535 SELF CARE MNGMENT TRAINING: CPT

## 2023-03-16 RX ORDER — HYDROCODONE BITARTRATE AND ACETAMINOPHEN 5; 325 MG/1; MG/1
1 TABLET ORAL EVERY 8 HOURS PRN
Status: DISCONTINUED | OUTPATIENT
Start: 2023-03-16 | End: 2023-03-17

## 2023-03-16 RX ADMIN — HYDROCODONE BITARTRATE AND ACETAMINOPHEN 1 TABLET: 5; 325 TABLET ORAL at 09:10

## 2023-03-16 RX ADMIN — APIXABAN 2.5 MG: 2.5 TABLET, FILM COATED ORAL at 08:38

## 2023-03-16 RX ADMIN — ASPIRIN 81 MG: 81 TABLET, COATED ORAL at 08:38

## 2023-03-16 RX ADMIN — HYDROXYZINE HYDROCHLORIDE 25 MG: 25 TABLET ORAL at 21:39

## 2023-03-16 RX ADMIN — AMIODARONE HYDROCHLORIDE 200 MG: 200 TABLET ORAL at 08:39

## 2023-03-16 RX ADMIN — QUETIAPINE FUMARATE 25 MG: 25 TABLET ORAL at 21:39

## 2023-03-16 RX ADMIN — HYDROCORTISONE 2.5%: 25 CREAM TOPICAL at 08:48

## 2023-03-16 RX ADMIN — Medication 10 ML: at 21:40

## 2023-03-16 RX ADMIN — CLONIDINE HYDROCHLORIDE 0.4 MG: 0.1 TABLET ORAL at 21:39

## 2023-03-16 RX ADMIN — CARVEDILOL 12.5 MG: 12.5 TABLET, FILM COATED ORAL at 08:39

## 2023-03-16 RX ADMIN — Medication 10 ML: at 08:40

## 2023-03-16 RX ADMIN — HYDROCODONE BITARTRATE AND ACETAMINOPHEN 1 TABLET: 5; 325 TABLET ORAL at 21:39

## 2023-03-16 RX ADMIN — APIXABAN 2.5 MG: 2.5 TABLET, FILM COATED ORAL at 21:39

## 2023-03-16 RX ADMIN — ATORVASTATIN CALCIUM 80 MG: 40 TABLET, FILM COATED ORAL at 21:40

## 2023-03-16 RX ADMIN — Medication 5 MG: at 21:39

## 2023-03-16 RX ADMIN — CLONIDINE HYDROCHLORIDE 0.4 MG: 0.1 TABLET ORAL at 08:38

## 2023-03-16 RX ADMIN — ISOSORBIDE MONONITRATE 120 MG: 120 TABLET, EXTENDED RELEASE ORAL at 08:38

## 2023-03-16 NOTE — NURSING NOTE
Woc consulted for missing skin on buttock.  Per RN states that when coming back from dialysis they noticed that there was some pink skin visible a lot of dark skin tone patient (DST) nurse said there was some loose epithelium and when they went to palpate and push on it the epithelium or pigmented skin slid right off making the pink area larger.    Discussed with RN that sometimes in the face of renal failure chronic renal patients do have electrolyte and nutrient imbalances which affect the pigmented melanin melanocytes.  That skin is pigmented skin sloughing is sometimes normal the pink tissue underneath described by the RN is not moist or bleeding or open and is just pink.  Discussed that this is not a wound this is not pressure and we discussed maybe asking the nephrologist if patient would qualify for phosphorus binders or other kind of binders.  Discussed that patient just needs good skin care and they can apply a little bit of Z guard to the area and with turning and offloading.  Discussed cleansing with pH no rinse foam and blue wipes and applying Z guard twice daily and as needed per soiling.    Woc asked if nurse thought patient could benefit from specialty bed and she stated that patient has good mobility.    Woc will sign off.

## 2023-03-16 NOTE — DISCHARGE INSTR - APPOINTMENTS
Behavioral health outpatient clinic [facility] [phone]

## 2023-03-16 NOTE — PROGRESS NOTES
Clinical Nutrition     Patient Name: Esperanza Pop  YOB: 1945  MRN: [id number]  Date of Encounter: 23 07:35 EDT  Admission date: 3/10/2023    Reason for Visit   Follow up    EMR reviewed    Yes    Diet Nutrition Related History     Patient was sleeping but woke to my voice. Patient reports a fair appetite. No intakes have been recorded since 3/12/23. Patient states she is drinking her Mighty Shakes sent TID. No recent N/V/D and last recorded BM was on 3/8/23.    Previous RD note:  Pt and family confirm wt loss, loss of appetite. < 3/4 intake over 4-5 mo.  Allow pt would like SB flavor suppl if available.     Current Nutrition Prescription    Diet: Cardiac Diets, Diabetic Diets, Renal Diets; Healthy Heart (2-3 Na+); Consistent Carbohydrate; Low Sodium (2-3g), Low Potassium, Low Phosphorus; Texture: Regular Texture (IDDSI 7); Fluid Consistency: Thin (IDDSI 0)    Average Intake from Chartin% x 3 (insufficient data)    Actions:    Follow treatment progress, Care plan reviewed, Interview for preferences, Encourage intake    Monitor Per Protocol      Viktoriya Henderson,   Time Spent: 20 minutes

## 2023-03-16 NOTE — CASE MANAGEMENT/SOCIAL WORK
Continued Stay Note  [facility]     Patient Name: Esperanza Pop  MRN: [id number]  Today's Date: 3/16/2023    Admit Date: 3/10/2023    Plan: Home with HH   Discharge Plan     Row Name 03/16/23 4676       Plan    Plan Home with HH    Patient/Family in Agreement with Plan yes    Plan Comments I spoke with the pts sister Indigo Herman. The family wants to try to care for the pt at home. She is concerned about the pts mobility and lack of TX in the hospital as the pt has refused TX at times or has been off the floor for HD or RAD TX. She requests a WC and HH at [place]. I called the WC order to Je[facility] at [facility]. I will arrange HH in the am. The sister states they are arranging for family to stay with the pt at [place]. Her nephew will be available to help Saturday and this may be a better day for [place]. The pt wants to go home after HD on Friday. The pts sister will update the rest of the family regarding our conversation.    Final Discharge Disposition Code 06 - home with home health care    Row Name 03/16/23 1535       Plan    Plan Comments The pt requires a wheelchair to safely perform her ADLs. She is able to self proper a gissel chair. Her knees buckle with ambulation attempts.    Row Name 03/16/23 1123       Plan    Plan Home with HH at [place]    Patient/Family in Agreement with Plan yes    Plan Comments I spoke with the pt. She is alert and oriented x 3. She wants to go home [place]. States she plans to go home after HD tomorrow. States she did not want to get up with PT as she was going off the floor. She knows she is weak but states she is better than she was. We discussed inpt rehab, but she wants to go home and continue with her RAD TXs. She is aware of the challenges of inpt rehab facility placement and RAD Tx. States she thinks her spouse had [facility]. She is open to HH at [place]. States her family will assist her at [place]. I have called and left a VM on her sisters phone to discuss the plan.    Final Discharge Disposition  Code 06 - home with home health care               Discharge Codes    No documentation.               Expected Discharge Date and Time     Expected Discharge Date Expected Discharge Time    Mar 17, 2023             Emily Banks RN

## 2023-03-16 NOTE — PLAN OF CARE
Goal Outcome Evaluation:  Plan of Care Reviewed With: patient           Outcome Evaluation: Pt progressed from mod A to min A sup to sit,  min A transfer to chair using RW with R knee buckling and pt sitting without warning, setup to wash face, ind self feeding, dep LBD, min A UBD. Pt continues below baseline with self care d/t weakness, decreased balance and activity tolerance. Follow with chair for safety with ambulation.  Recommend SNF upon d/c.

## 2023-03-16 NOTE — PROGRESS NOTES
" LOS: 3 days   Patient Care Team:  Meghaan Rodgers MD as PCP - General  Demetrius Sagastume MD as Consulting Physician (Cardiology)  Kevan Lerma MD as Consulting Physician (Nephrology)  Geena Estrella APRN as Nurse Practitioner (Cardiology)  Frank Mandujano MD as Referring Physician (Colon and Rectal Surgery)  Kevan Cavazos MD as Consulting Physician (Radiation Oncology)  Yue Reeves RN as Nurse Navigator  Darryn Burk MD as Consulting Physician (Nephrology)    Chief Complaint: F/U ESRD    Subjective :          Subjective:  Symptoms:  Stable.  No shortness of breath or chest pain.    Diet:  Adequate intake.        History taken from: patient    Objective     Vital Sign Min/Max for last 24 hours  Temp  Min: 97.5 °F (36.4 °C)  Max: 98.6 °F (37 °C)   BP  Min: 130/32  Max: 183/65   Pulse  Min: 54  Max: 68   Resp  Min: 16  Max: 18   SpO2  Min: 97 %  Max: 100 %   Flow (L/min)  Min: 0  Max: 0   No data recorded     Flowsheet Rows    Flowsheet Row First Filed Value   Admission Height 167.6 cm (66\") Documented at 03/10/2023 1122   Admission Weight 76.7 kg (169 lb) Documented at 03/10/2023 1122          No intake/output data recorded.  I/O last 3 completed shifts:  In: -   Out: 2270 [Other:2270]    Objective:  General Appearance:  Comfortable.    Vital signs: (most recent): Blood pressure (!) 130/32, pulse 54, temperature 98.3 °F (36.8 °C), temperature source Oral, resp. rate 18, height 167.6 cm (65.98\"), weight 79.1 kg (174 lb 6.1 oz), SpO2 97 %, not currently breastfeeding.  Vital signs are normal.    Output: Minimal urine output.    HEENT: Normal HEENT exam.    Lungs:  Normal effort and normal respiratory rate.  Breath sounds clear to auscultation.  She is not in respiratory distress.  No decreased breath sounds or wheezes.    Heart: Normal rate.    Extremities: There is no dependent edema.  (AVF)  Pulses: Distal pulses are intact.    Neurological: Patient is alert and oriented to person, place " and time.  Normal strength.              Results Review:     I reviewed the patient's new clinical results.    WBC WBC   Date Value Ref Range Status   03/16/2023 3.96 3.40 - 10.80 10*3/mm3 Final   03/15/2023 3.95 3.40 - 10.80 10*3/mm3 Final   03/14/2023 4.73 3.40 - 10.80 10*3/mm3 Final      HGB Hemoglobin   Date Value Ref Range Status   03/16/2023 8.6 (L) 12.0 - 15.9 g/dL Final   03/15/2023 8.5 (L) 12.0 - 15.9 g/dL Final   03/14/2023 8.3 (L) 12.0 - 15.9 g/dL Final      HCT Hematocrit   Date Value Ref Range Status   03/16/2023 27.8 (L) 34.0 - 46.6 % Final   03/15/2023 28.0 (L) 34.0 - 46.6 % Final   03/14/2023 27.1 (L) 34.0 - 46.6 % Final      Platlets No results found for: LABPLAT   MCV MCV   Date Value Ref Range Status   03/16/2023 98.6 (H) 79.0 - 97.0 fL Final   03/15/2023 99.6 (H) 79.0 - 97.0 fL Final   03/14/2023 98.9 (H) 79.0 - 97.0 fL Final          Sodium Sodium   Date Value Ref Range Status   03/15/2023 134 (L) 136 - 145 mmol/L Final   03/14/2023 133 (L) 136 - 145 mmol/L Final      Potassium Potassium   Date Value Ref Range Status   03/15/2023 3.9 3.5 - 5.2 mmol/L Final   03/14/2023 4.3 3.5 - 5.2 mmol/L Final      Chloride Chloride   Date Value Ref Range Status   03/15/2023 97 (L) 98 - 107 mmol/L Final   03/14/2023 97 (L) 98 - 107 mmol/L Final      CO2 CO2   Date Value Ref Range Status   03/15/2023 23.0 22.0 - 29.0 mmol/L Final   03/14/2023 24.0 22.0 - 29.0 mmol/L Final      BUN BUN   Date Value Ref Range Status   03/15/2023 30 (H) 8 - 23 mg/dL Final   03/14/2023 24 (H) 8 - 23 mg/dL Final      Creatinine Creatinine   Date Value Ref Range Status   03/15/2023 6.70 (H) 0.57 - 1.00 mg/dL Final   03/14/2023 5.11 (H) 0.57 - 1.00 mg/dL Final      Calcium Calcium   Date Value Ref Range Status   03/15/2023 8.7 8.6 - 10.5 mg/dL Final   03/14/2023 8.7 8.6 - 10.5 mg/dL Final      PO4 No results found for: CAPO4   Albumin Albumin   Date Value Ref Range Status   03/14/2023 3.1 (L) 3.5 - 5.2 g/dL Final      Magnesium No  results found for: MG   Uric Acid No results found for: URICACID     Medication Review: yes    Assessment & Plan       Hematochezia    Type 2 diabetes mellitus (HCC)    Essential hypertension    Hyperlipidemia LDL goal <70    End stage renal disease on dialysis (HCC)    CHF (congestive heart failure) (HCC)    PAF (paroxysmal atrial fibrillation) (HCC)    Severe malnutrition (HCC)    Symptomatic anemia      Assessment & Plan     ESRD: MWF [facility]. Missed HD before admission      Access: AV fistula      Anemia: Transfuse at Hb 7 or less. On chemo for anal ca     Volume status: Dependent edema noted.    HTN: Bp elevated.   HYPONATREMIA. MILD. SHOULD CORRECT WITH HD.   I discussed the patients findings and my recommendations with patient  PLAN. HD  3/17/23.   Zeb Owusu MD  03/16/23  13:18 EDT

## 2023-03-16 NOTE — CASE MANAGEMENT/SOCIAL WORK
Continued Stay Note  [facility]     Patient Name: Esperanza Pop  MRN: [id number]  Today's Date: 3/16/2023    Admit Date: 3/10/2023    Plan: Home with HH at [place]   Discharge Plan     Row Name 03/16/23 3862       Plan    Plan Comments The pt requires a wheelchair to safely perform her ADLs. She is able to self proper a gissel chair. Her knees buckle with ambulation attempts.    Row Name 03/16/23 7584       Plan    Plan Home with HH at [place]    Patient/Family in Agreement with Plan yes    Plan Comments I spoke with the pt. She is alert and oriented x 3. She wants to go home [place]. States she plans to go home after HD tomorrow. States she did not want to get up with PT as she was going off the floor. She knows she is weak but states she is better than she was. We discussed inpt rehab, but she wants to go home and continue with her RAD TXs. She is aware of the challenges of inpt rehab facility placement and RAD Tx. States she thinks her spouse had [facility]. She is open to HH at [place]. States her family will assist her at [place]. I have called and left a VM on her sisters phone to discuss the plan.    Final Discharge Disposition Code 06 - home with home health care               Discharge Codes    No documentation.               Expected Discharge Date and Time     Expected Discharge Date Expected Discharge Time    Mar 17, 2023             Emily Banks RN

## 2023-03-16 NOTE — PROGRESS NOTES
[facility] Medicine Services  PROGRESS NOTE    Patient Name: Esperanza Pop  : 1945  MRN: [id number]    Date of Admission: 3/10/2023  Primary Care Physician: Meghana Rodgers MD    Subjective   Subjective     CC:  F/U generalized weakness     HPI:  Notes skin breakdown and pain on her bottom. No f/c. Tolerating PO. Denies dyspnea. No n/v.     ROS:  Gen- No fevers, chills  CV- No chest pain, palpitations  Resp- No cough, dyspnea  GI- No N/V/D, abd pain  No change from 3/15    Objective   Objective     Vital Signs:   Temp:  [97.5 °F (36.4 °C)-98.6 °F (37 °C)] 98.6 °F (37 °C)  Heart Rate:  [52-68] 63  Resp:  [16-18] 18  BP: (126-183)/() 167/80  Flow (L/min):  [0] 0     Physical Exam:  NAD, alert and oriented x 3  OP clear, MMM  Neck supple  No LAD  RRR, 3/6 murmur, systolic  CTAB  +BS, ND, NT, soft  DONG  Normal affect  No rashes  B LE edema  No change from 3/15    Results Reviewed:  LAB RESULTS:      Lab 23  0451 03/15/23  0551 23  0838 23  1031 23  0524 23  1229 23  0741 03/10/23  1809 03/10/23  1145   WBC 3.96 3.95 4.73 3.95 3.51  --  4.54  --  5.04   HEMOGLOBIN 8.6* 8.5* 8.3* 9.2* 8.2*   < > 7.1*   < > 7.8*   HEMATOCRIT 27.8* 28.0* 27.1* 31.3* 27.9*   < > 24.0*   < > 26.8*   PLATELETS 103* 83* 85* 71* 113*  --  129*  --  143   NEUTROS ABS  --   --   --  2.90 2.51  --  3.32  --  3.48   IMMATURE GRANS (ABS)  --   --   --  0.04 0.03  --  0.05  --  0.07*   LYMPHS ABS  --   --   --  0.43* 0.42*  --  0.48*  --  0.78   MONOS ABS  --   --   --  0.57 0.55  --  0.66  --  0.68   EOS ABS  --   --   --  0.00 0.00  --  0.02  --  0.01   MCV 98.6* 99.6* 98.9* 101.6* 102.6*  --  101.3*  --  103.1*    < > = values in this interval not displayed.         Lab 03/15/23  0551 23  0838 23  1031 23  0524 23  0741 03/10/23  1145   SODIUM 134* 133* 134* 136 138 136   POTASSIUM 3.9 4.3 4.2 3.5 3.4* 4.0   CHLORIDE 97* 97* 94* 97* 93* 90*   CO2  23.0 24.0 20.0* 23.0 29.0 24.0   ANION GAP 14.0 12.0 20.0* 16.0* 16.0* 22.0*   BUN 30* 24* 34* 29* 43* 38*   CREATININE 6.70* 5.11* 6.82* 5.55* 9.37* 7.43*   EGFR 6.0* 8.3* 5.8* 7.5* 4.0* 5.3*   GLUCOSE 116* 143* 89 85 100* 196*   CALCIUM 8.7 8.7 8.7 8.7 9.1 9.2   MAGNESIUM  --   --   --   --   --  2.2   PHOSPHORUS  --  3.5  --   --   --   --          Lab 03/14/23  0838 03/11/23  0741 03/10/23  1145   TOTAL PROTEIN  --  7.3 7.7   ALBUMIN 3.1* 3.3* 3.6   GLOBULIN  --  4.0 4.1   ALT (SGPT)  --  5 5   AST (SGOT)  --  11 17   BILIRUBIN  --  0.7 0.7   ALK PHOS  --  80 86         Lab 03/10/23  2123 03/10/23  1809 03/10/23  1145   HSTROP T 176* 173* 172*             Lab 03/10/23  1418   ABO TYPING B   RH TYPING Positive   ANTIBODY SCREEN Negative         Brief Urine Lab Results     None          Microbiology Results Abnormal     None          No radiology results from the last 24 hrs    Results for orders placed during the hospital encounter of 03/10/23    Adult Transthoracic Echo Complete W/ Cont if Necessary Per Protocol    Interpretation Summary  •  Left ventricular systolic function is normal. Estimated left ventricular EF = 55%  •  Left ventricular wall thickness is consistent with moderate concentric hypertrophy.  •  The right ventricular cavity is mildly dilated.  •  Mild to moderate biatrial enlargement.  •  Moderate to severe aortic valve stenosis is present.  Mean gradient 34 mmHg, DOUG 0.9 cm².  •  Mild aortic insufficiency.  •  Mild mitral regurgitation.  •  Mild to moderate tricuspid regurgitation with RVSP 48 mmHg.      Current medications:  Scheduled Meds:amiodarone, 200 mg, Oral, Daily  apixaban, 2.5 mg, Oral, Q12H  aspirin, 81 mg, Oral, Daily  atorvastatin, 80 mg, Oral, Nightly  carvedilol, 12.5 mg, Oral, Q12H  cloNIDine, 0.4 mg, Oral, Q12H  epoetin orquidea/orquidea-epbx, 20,000 Units, Subcutaneous, Once per day on Mon Wed Fri  Hydrocortisone (Perianal), , Rectal, BID  insulin lispro, 0-9 Units, Subcutaneous, TID  "With Meals  Insulin Lispro, 5 Units, Subcutaneous, TID With Meals  isosorbide mononitrate, 120 mg, Oral, Daily  melatonin, 5 mg, Oral, Nightly  QUEtiapine, 25 mg, Oral, Nightly  sodium chloride, 10 mL, Intravenous, Q12H      Continuous Infusions:   PRN Meds:.•  benzocaine-menthol  •  dextrose  •  dextrose  •  glucagon (human recombinant)  •  haloperidol lactate  •  hydrOXYzine  •  ondansetron **OR** ondansetron  •  sodium chloride  •  sodium chloride  •  sodium chloride    Assessment & Plan   Assessment & Plan     Active Hospital Problems    Diagnosis  POA   • **Hematochezia [K92.1]  Yes   • Symptomatic anemia [D64.9]  Yes   • Severe malnutrition (HCC) [E43]  Yes   • PAF (paroxysmal atrial fibrillation) (HCC) [I48.0]  Yes   • CHF (congestive heart failure) (HCC) [I50.9]  Yes   • End stage renal disease on dialysis (HCC) [N18.6, Z99.2]  Not Applicable   • Hyperlipidemia LDL goal <70 [E78.5]  Yes   • Essential hypertension [I10]  Yes   • Type 2 diabetes mellitus (HCC) [E11.9]  Yes      Resolved Hospital Problems   No resolved problems to display.        Brief Hospital Course to date:  Esperanza Pop is a 78 y.o. female  with PMH of ESRD on HD MWF, IDDM, HTN, HLD, HFpEF, Pulm HTN, Hx of breast cancer (s/p mastectomy and radiation) PAF (on xarelto), Chronic anemia, and rectal cancer (recently diagnosed currently gettintg XRT and chemo) that presented to the ED after a fall at home. Her 's  was today and she was getting ready to go when her legs \"gave out\". She had been having loose stool and bloody rectal discharge but that had improved prior to admission.       Generalized Weakness  Acute on Chronic Anemia  Rectal bleeding, Rectal Cancer   -S/P 1 unit PRBCs 3/11 with appropriate rise in H&H  -monitor CBC, no bleeding noted, stable  -PT/OT recommending rehab    AMS -> Improved   Concern for UTI/Possible UTI  -complete course of antibiotics  -pamelor held  -MS contin held  -monitoring, neurology following, " medications adjusted  -CT head without infarct/bleed/obvious metastatic disease  -currently receiving scheduled seroquel/melatonin at night only  -haldol/atarax are prn only    Rectal Cancer  Hx breast cancer   -Follows with Dr. Cavazos, Rad/Onc  -Follows Dr. Carpenter with oncology, currently on Xeloda, held for now  -continued treatment per Heme/Onc, Rad/Onc, oncology recommending reduced dose of xeloda and radiation/oncology recommending continued treatment     ESRD on HD MWF  -Renal following for HD (had dialysis Saturday due to missed session on Friday)  -on HD today 3/15     HFpEF  Pulm HTN  PAF  HTN  HLD   -Eliquis resumed, monitor H&H, bleeding  -ASA/Amiodarone   -last ECHO EF 60% with grade II diastolic dysfunction, pulmonary HTN     Elevated Troponin   Mod-Severe AS   -no chest pain  -ECHO with mod-sev AS   -Has appt with Geena Estrella, APRN 4/27/23, can follow up    Expected Discharge Location and Transportation: Home/rehab  Expected Discharge   Expected Discharge Date and Time     Expected Discharge Date Expected Discharge Time    Mar 17, 2023            DVT prophylaxis:  Medical and mechanical DVT prophylaxis orders are present.     AM-PAC 6 Clicks Score (PT): 14 (03/15/23 1330)    CODE STATUS:   Code Status and Medical Interventions:   Ordered at: 03/10/23 1448     Medical Intervention Limits:    NO intubation (DNI)     Code Status (Patient has no pulse and is not breathing):    No CPR (Do Not Attempt to Resuscitate)     Medical Interventions (Patient has pulse or is breathing):    Limited Support       Champ Ríos MD  03/16/23

## 2023-03-16 NOTE — CASE MANAGEMENT/SOCIAL WORK
Continued Stay Note  [facility]     Patient Name: Esperanza Pop  MRN: [id number]  Today's Date: 3/16/2023    Admit Date: 3/10/2023    Plan: [place] with HH [place]   Discharge Plan     Row Name 03/16/23 1408       Plan    Plan [place] with HH [place]    Patient/Family in Agreement with Plan yes    Plan Comments I spoke with the pt. She is alert and oriented x 3. She [place] go [place]. States she plans to go home after HD tomorrow. States she did not want to get up with PT as she was going off the floor. She knows she is weak but states she is better than she was. We discussed inpt rehab, but she wants to go home and continue with her RAD TXs. She is aware of the challenges of inpt rehab facility placement and RAD Tx. States she thinks her spouse had [facility]. She is open to HH [place]. States her family will assist her at [place]. I have called and left a VM on her sisters phone to discuss the plan.    Final Discharge Disposition Code 06 - home with home health care               Discharge Codes    No documentation.               Expected Discharge Date and Time     Expected Discharge Date Expected Discharge Time    Mar 17, 2023             Emily Banks RN

## 2023-03-16 NOTE — THERAPY TREATMENT NOTE
Patient Name: Esperanza Pop  : 1945    MRN: [id number]                              Today's Date: 3/16/2023       Admit Date: 3/10/2023    Visit Dx:     ICD-10-CM ICD-9-CM   1. Symptomatic anemia  D64.9 285.9   2. Generalized weakness  R53.1 780.79   3. ESRD on dialysis ([facility])  N18.6 585.6    Z99.2 V45.11   4. Impaired mobility  Z74.09 799.89   5. Fall, initial encounter  W19.XXXA E888.9   6. Elevated troponin  R77.8 790.6     Patient Active Problem List   Diagnosis   • Acute on chronic diastolic heart failure ([facility])   • Type 2 diabetes mellitus ([facility])   • Essential hypertension   • Coronary artery disease involving native coronary artery of native heart with angina pectoris ([facility])   • Breast cancer, female, right   • Seasonal allergic rhinitis   • Right carotid bruit   • Vitamin B12 deficiency   • Hyperlipidemia LDL goal <70   • End stage renal disease on dialysis ([facility])   • Nonrheumatic aortic valve stenosis   • NSTEMI (non-ST elevated myocardial infarction) ([facility])   • UTI (urinary tract infection)   • Ischemic heart disease   • CHF (congestive heart failure) ([facility])   • Acute non-ST segment elevation myocardial infarction (STEMI) following previous myocardial infarction   • Dyslipidemia   • Diabetes mellitus ([facility])   • Mild obesity   • Elevated troponin   • Screen for colon cancer   • Acute midline low back pain without sciatica   • Hypertensive emergency   • PAF (paroxysmal atrial fibrillation) ([facility])   • Malignant neoplasm of anal canal ([facility])   • Hematochezia   • Severe malnutrition ([facility])   • Symptomatic anemia     Past Medical History:   Diagnosis Date   • Acute bronchitis    • Acute conjunctivitis    • Acute kidney injury ([facility])     Admission from 2013 to 2014, now resolved with latest creatinine 1.4 on 2014.   • Acute non-ST segment elevation myocardial infarction (STEMI) following previous myocardial infarction    • Anal cancer (HCC) 2023   • Anal cancer (HCC) 2023   • Arthritis    •  Breast cancer, female, right     2005 mastectomy right   • Cancer (HCC)    • CHF (congestive heart failure) (HCC)    • Chronic kidney disease     dialysis MWF, f/u [facility]    • Clotting disorder (HCC)    • COVID-19    • Diabetes mellitus (HCC)     diagnosed ~1992, checks fsbg 2-3x/day   • Diarrhea    • Dyslipidemia    • Dyspepsia    • Dyspnea    • Edema    • History of transfusion     ~2013 no reaction    • Hx of radiation therapy    • Hyperlipidemia    • Hypertension     Severe   • Ischemic heart disease    • Moderate obesity     BMI 36.2   • Myocardial infarction (HCC)    • Pneumonia    • Pneumonia 02/2019   • Radiation 2005   • Seasonal allergies    • Wears glasses      Past Surgical History:   Procedure Laterality Date   • AV FISTULA PLACEMENT, BRACHIOBASILIC     • BREAST BIOPSY Left 2010   • BREAST CYST EXCISION     • BREAST LUMPECTOMY Right 2005   • BREAST SURGERY     • CARDIAC CATHETERIZATION N/A 10/10/2016    Procedure: Left Heart Cath;  Surgeon: Scooter Zhao MD;  Location:  [facility];  Service:    • CARDIAC CATHETERIZATION  10/2016   • CARDIAC CATHETERIZATION N/A 1/21/2021    Procedure: Right and Left Heart Cath;  Surgeon: Hugh Tidwell MD;  Location:  [email or web address];  Service: Cardiology;  Laterality: N/A;   • COLONOSCOPY N/A 7/23/2020    Procedure: COLONOSCOPY;  Surgeon: Rubén Rosas MD;  Location:  [facility];  Service: Gastroenterology;  Laterality: N/A;   • CORONARY STENT PLACEMENT  2016   • HERNIA REPAIR     • HYSTERECTOMY  2000   • INSERTION HEMODIALYSIS CATHETER Right 6/29/2016    Procedure: HEMODIALYSIS CATHETER INSERTION TUNNELLED;  Surgeon: Ramón Chavez MD;  Location: [facility] OR;  Service:    • MASTECTOMY Right 2006   • OOPHORECTOMY     • REDUCTION MAMMAPLASTY Left 2005      General Information     Row Name 03/16/23 0735          OT Time and Intention    Document Type therapy note (daily note)  -AC     Mode of  Treatment occupational therapy  -     Row Name 03/16/23 0735          General Information    Patient Profile Reviewed yes  -     Existing Precautions/Restrictions fall  -     Row Name 03/16/23 0735          Cognition    Orientation Status (Cognition) oriented to;person;verbal cues/prompts needed for orientation;place;disoriented to;time  -     Row Name 03/16/23 0735          Safety Issues, Functional Mobility    Safety Issues Affecting Function (Mobility) friction/shear risk;insight into deficits/self-awareness;judgment;safety precaution awareness;safety precautions follow-through/compliance;sequencing abilities  -     Impairments Affecting Function (Mobility) balance;cognition;endurance/activity tolerance;pain;postural/trunk control;strength  -     Cognitive Impairments, Mobility Safety/Performance insight into deficits/self-awareness;judgment;safety precaution awareness;safety precaution follow-through;sequencing abilities  -           User Key  (r) = Recorded By, (t) = Taken By, (c) = Cosigned By    Initials Name Provider Type     Radha Carson OT Occupational Therapist                 Mobility/ADL's     Row Name 03/16/23 0943          Bed Mobility    Bed Mobility supine-sit  -     Supine-Sit [place] (Bed Mobility) verbal cues;minimum assist (75% patient effort)  -     Bed Mobility, Safety Issues decreased use of legs for bridging/pushing  -     Assistive Device (Bed Mobility) bed rails;head of bed elevated  -     Comment, (Bed Mobility) VCs for sequencing  -     Row Name 03/16/23 0943          Transfers    Transfers sit-stand transfer  -     Row Name 03/16/23 0943          Bed-Chair Transfer    Bed-Chair [place] (Transfers) verbal cues;nonverbal cues (demo/gesture);minimum assist (75% patient effort)  -     Assistive Device (Bed-Chair Transfers) walker, front-wheeled  -     Comment, (Bed-Chair Transfer) pt required VCs for safety.  once at chair, pt sat without warning  d/t R knee buckling  -     Row Name 03/16/23 0943          Sit-Stand Transfer    Sit-Stand [place] (Transfers) verbal cues;minimum assist (75% patient effort)  -     Assistive Device (Sit-Stand Transfers) walker, front-wheeled  -     Row Name 03/16/23 0943          Activities of Daily Living    BADL Assessment/Intervention upper body dressing;feeding;grooming  -     Row Name 03/16/23 0943          Lower Body Dressing Assessment/Training    [place] Level (Lower Body Dressing) don;socks;dependent (less than 25% patient effort)  -     Position (Lower Body Dressing) edge of bed sitting  -     Row Name 03/16/23 0943          Grooming Assessment/Training    [place] Level (Grooming) wash face, hands;set up  -     Position (Grooming) sitting up in bed  -     Row Name 03/16/23 0943          Upper Body Dressing Assessment/Training    [place] Level (Upper Body Dressing) don;doff;pajama/robe;minimum assist (75% patient effort)  -     Position (Upper Body Dressing) edge of bed sitting  -     Row Name 03/16/23 0943          Self-Feeding Assessment/Training    [place] Level (Feeding) set up;feeding skills  -           User Key  (r) = Recorded By, (t) = Taken By, (c) = Cosigned By    Initials Name Provider Type    Radha Nava, OT Occupational Therapist               Obj/Interventions    No documentation.                Goals/Plan    No documentation.                Clinical Impression     Row Name 03/16/23 0949          Pain Assessment    Pretreatment Pain Rating 8/10  -     Posttreatment Pain Rating 8/10  -     Pain Location - buttock  -     Pain Intervention(s) Repositioned;Ambulation/increased activity;Nursing Notified  -     Row Name 03/16/23 0949          Plan of Care Review    Plan of Care Reviewed With patient  -     Outcome Evaluation Pt progressed from mod A to min A sup to sit,  min A transfer to chair using RW with R knee buckling and pt sitting without  warning, setup to wash face, ind self feeding, dep LBD, min A UBD. Pt continues below baseline with self care d/t weakness, decreased balance and activity tolerance. Follow with chair for safety with ambulation.  Recommend SNF upon d/c.  -AC     Row Name 03/16/23 0949          Vital Signs    Pre Systolic BP Rehab 156  -AC     Pre Treatment Diastolic BP 67  -AC     Pretreatment Heart Rate (beats/min) 60  -AC     Posttreatment Heart Rate (beats/min) 62  -AC     Pre SpO2 (%) 97  -AC     O2 Delivery Pre Treatment room air  -AC     O2 Delivery Intra Treatment room air  -AC     Post SpO2 (%) 98  -AC     O2 Delivery Post Treatment room air  -AC     Pre Patient Position Supine  -AC     Post Patient Position Sitting  -AC     Row Name 03/16/23 0949          Positioning and Restraints    Pre-Treatment Position in bed  -AC     Post Treatment Position chair  -AC     In Chair notified nsg;reclined;call light within reach;encouraged to call for assist;exit alarm on;waffle cushion  -AC           User Key  (r) = Recorded By, (t) = Taken By, (c) = Cosigned By    Initials Name Provider Type    Radha Nava, OT Occupational Therapist               Outcome Measures     Row Name 03/16/23 0955          How much help from another is currently needed...    Putting on and taking off regular lower body clothing? 2  -AC     Bathing (including washing, rinsing, and drying) 2  -AC     Toileting (which includes using toilet bed pan or urinal) 2  -AC     Putting on and taking off regular upper body clothing 3  -AC     Taking care of personal grooming (such as brushing teeth) 3  -AC     Eating meals 4  -AC     AM-PAC 6 Clicks Score (OT) 16  -AC     Row Name 03/16/23 0955          Functional Assessment    Outcome Measure Options AM-PAC 6 Clicks Daily Activity (OT)  -AC           User Key  (r) = Recorded By, (t) = Taken By, (c) = Cosigned By    Initials Name Provider Type    Radha Nava, OT Occupational Therapist                 Occupational Therapy Education     Title: PT OT SLP Therapies (In Progress)     Topic: Occupational Therapy (In Progress)     Point: ADL training (In Progress)     Description:   Instruct learner(s) on proper safety adaptation and remediation techniques during self care or transfers.   Instruct in proper use of assistive devices.              Learning Progress Summary           Patient Acceptance, E, NR by AC at 3/16/2023 0956    Acceptance, E, NL,NR by JOESPH at 3/13/2023 1013    Comment: OT POC; fall prevention; orientation                   Point: Home exercise program (Not Started)     Description:   Instruct learner(s) on appropriate technique for monitoring, assisting and/or progressing therapeutic exercises/activities.              Learner Progress:  Not documented in this visit.          Point: Precautions (In Progress)     Description:   Instruct learner(s) on prescribed precautions during self-care and functional transfers.              Learning Progress Summary           Patient Acceptance, E, NL,NR by JOESPH at 3/13/2023 1013    Comment: OT POC; fall prevention; orientation                   Point: Body mechanics (In Progress)     Description:   Instruct learner(s) on proper positioning and spine alignment during self-care, functional mobility activities and/or exercises.              Learning Progress Summary           Patient Acceptance, E, NL,NR by JOESPH at 3/13/2023 1013    Comment: OT POC; fall prevention; orientation                               User Key     Initials Effective Dates Name Provider Type Discipline     02/03/23 -  Radha Carson, OT Occupational Therapist OT    JOESPH 06/16/21 -  Merline Aguila OT Occupational Therapist OT              OT Recommendation and Plan     Plan of Care Review  Plan of Care Reviewed With: patient  Outcome Evaluation: Pt progressed from mod A to min A sup to sit,  min A transfer to chair using RW with R knee buckling and pt sitting without warning, setup to wash  face, ind self feeding, dep LBD, min A UBD. Pt continues below baseline with self care d/t weakness, decreased balance and activity tolerance. Follow with chair for safety with ambulation.  Recommend SNF upon d/c.     Time Calculation:    Time Calculation- OT     Row Name 03/16/23 0735             Time Calculation- OT    OT Start Time 0735  -AC      OT Received On 03/16/23  -      OT Goal Re-Cert Due Date 03/23/23  -         Timed Charges    68129 - OT Therapeutic Activity Minutes 12  -AC      80967 - OT Self Care/Mgmt Minutes 12  -AC         Total Minutes    Timed Charges Total Minutes 24  -AC       Total Minutes 24  -AC            User Key  (r) = Recorded By, (t) = Taken By, (c) = Cosigned By    Initials Name Provider Type    AC Radha Carson, OT Occupational Therapist              Therapy Charges for Today     Code Description Service Date Service Provider Modifiers Qty    10689544289 HC OT THERAPEUTIC ACT EA 15 MIN 3/16/2023 Radha Carson OT GO 1    25986358561 HC OT SELF CARE/MGMT/TRAIN EA 15 MIN 3/16/2023 Radha Carson OT GO 1               Radha Carson OT  3/16/2023

## 2023-03-17 ENCOUNTER — APPOINTMENT (OUTPATIENT)
Dept: NEPHROLOGY | Facility: HOSPITAL | Age: 78
End: 2023-03-17
Payer: MEDICARE

## 2023-03-17 LAB
GLUCOSE BLDC GLUCOMTR-MCNC: 115 MG/DL (ref 70–130)
GLUCOSE BLDC GLUCOMTR-MCNC: 136 MG/DL (ref 70–130)
GLUCOSE BLDC GLUCOMTR-MCNC: 141 MG/DL (ref 70–130)

## 2023-03-17 PROCEDURE — 97530 THERAPEUTIC ACTIVITIES: CPT

## 2023-03-17 PROCEDURE — G0378 HOSPITAL OBSERVATION PER HR: HCPCS

## 2023-03-17 PROCEDURE — 82962 GLUCOSE BLOOD TEST: CPT

## 2023-03-17 PROCEDURE — 99231 SBSQ HOSP IP/OBS SF/LOW 25: CPT | Performed by: HOSPITALIST

## 2023-03-17 PROCEDURE — 25010000002 EPOETIN ALFA-EPBX 10000 UNIT/ML SOLUTION: Performed by: INTERNAL MEDICINE

## 2023-03-17 PROCEDURE — G0257 UNSCHED DIALYSIS ESRD PT HOS: HCPCS

## 2023-03-17 RX ORDER — HYDROCODONE BITARTRATE AND ACETAMINOPHEN 10; 325 MG/1; MG/1
1 TABLET ORAL EVERY 6 HOURS PRN
Status: DISCONTINUED | OUTPATIENT
Start: 2023-03-17 | End: 2023-03-29 | Stop reason: HOSPADM

## 2023-03-17 RX ADMIN — Medication 10 ML: at 23:28

## 2023-03-17 RX ADMIN — ISOSORBIDE MONONITRATE 120 MG: 120 TABLET, EXTENDED RELEASE ORAL at 13:52

## 2023-03-17 RX ADMIN — APIXABAN 2.5 MG: 2.5 TABLET, FILM COATED ORAL at 13:52

## 2023-03-17 RX ADMIN — APIXABAN 2.5 MG: 2.5 TABLET, FILM COATED ORAL at 23:18

## 2023-03-17 RX ADMIN — CARVEDILOL 12.5 MG: 12.5 TABLET, FILM COATED ORAL at 23:18

## 2023-03-17 RX ADMIN — HYDROCODONE BITARTRATE AND ACETAMINOPHEN 1 TABLET: 10; 325 TABLET ORAL at 14:52

## 2023-03-17 RX ADMIN — CLONIDINE HYDROCHLORIDE 0.1 MG: 0.1 TABLET ORAL at 23:18

## 2023-03-17 RX ADMIN — AMIODARONE HYDROCHLORIDE 200 MG: 200 TABLET ORAL at 13:52

## 2023-03-17 RX ADMIN — ATORVASTATIN CALCIUM 80 MG: 40 TABLET, FILM COATED ORAL at 23:18

## 2023-03-17 RX ADMIN — CLONIDINE HYDROCHLORIDE 0.4 MG: 0.1 TABLET ORAL at 13:52

## 2023-03-17 RX ADMIN — CARVEDILOL 12.5 MG: 12.5 TABLET, FILM COATED ORAL at 13:52

## 2023-03-17 RX ADMIN — HYDROXYZINE HYDROCHLORIDE 25 MG: 25 TABLET ORAL at 13:52

## 2023-03-17 RX ADMIN — Medication 5 MG: at 23:18

## 2023-03-17 RX ADMIN — HYDROCODONE BITARTRATE AND ACETAMINOPHEN 0.5 TABLET: 10; 325 TABLET ORAL at 23:17

## 2023-03-17 RX ADMIN — ASPIRIN 81 MG: 81 TABLET, COATED ORAL at 13:52

## 2023-03-17 RX ADMIN — EPOETIN ALFA-EPBX 20000 UNITS: 10000 INJECTION, SOLUTION INTRAVENOUS; SUBCUTANEOUS at 16:02

## 2023-03-17 NOTE — PLAN OF CARE
Problem: Device-Related Complication Risk (Hemodialysis)  Goal: Safe, Effective Therapy Delivery  Outcome: Ongoing, Progressing     Problem: Hemodynamic Instability (Hemodialysis)  Goal: Effective Tissue Perfusion  Outcome: Ongoing, Progressing     Problem: Infection (Hemodialysis)  Goal: Absence of Infection Signs and Symptoms  Outcome: Ongoing, Progressing   Goal Outcome Evaluation:      HD tx today, goal UF 2 liters.

## 2023-03-17 NOTE — THERAPY TREATMENT NOTE
Patient Name: Esperanza Pop  : 1945    MRN: [id number]                              Today's Date: 3/17/2023       Admit Date: 3/10/2023    Visit Dx:     ICD-10-CM ICD-9-CM   1. Symptomatic anemia  D64.9 285.9   2. Generalized weakness  R53.1 780.79   3. ESRD on dialysis ([facility])  N18.6 585.6    Z99.2 V45.11   4. Impaired mobility  Z74.09 799.89   5. Fall, initial encounter  W19.XXXA E888.9   6. Elevated troponin  R77.8 790.6   7. Acute on chronic diastolic heart failure ([facility])  I50.33 428.33   8. Type 2 diabetes mellitus with chronic kidney disease on chronic dialysis, with long-term current use of insulin ([facility])  E11.22 250.40    N18.6 585.9    Z99.2 V45.11    Z79.4 V58.67   9. Severe malnutrition ([facility])  E43 261   10. Malignant neoplasm of anal canal ([facility])  C21.1 154.2   11. PAF (paroxysmal atrial fibrillation) ([facility])  I48.0 427.31   12. Coronary artery disease involving native coronary artery of native heart with angina pectoris ([facility])  I25.119 414.01     413.9   13. End stage renal disease on dialysis ([facility])  N18.6 585.6    Z99.2 V45.11   14. Nonrheumatic aortic valve stenosis  I35.0 424.1   15. NSTEMI (non-ST elevated myocardial infarction) ([facility])  I21.4 410.70   16. Acute cystitis without hematuria  N30.00 595.0   17. Ischemic heart disease  I25.9 414.9   18. Congestive heart failure, unspecified HF chronicity, unspecified heart failure type ([facility])  I50.9 428.0   19. Malignant neoplasm of right female breast, unspecified estrogen receptor status, unspecified site of breast ([facility])  C50.911 174.9   20. Hematochezia  K92.1 578.1   21. Acute midline low back pain without sciatica  M54.50 724.2     Patient Active Problem List   Diagnosis   • Acute on chronic diastolic heart failure (HCC)   • Type 2 diabetes mellitus ([facility])   • Essential hypertension   • Coronary artery disease involving native coronary artery of native heart with angina pectoris (HCC)   • Breast cancer, female, right   • Seasonal allergic rhinitis   •  Right carotid bruit   • Vitamin B12 deficiency   • Hyperlipidemia LDL goal <70   • End stage renal disease on dialysis (HCC)   • Nonrheumatic aortic valve stenosis   • NSTEMI (non-ST elevated myocardial infarction) (HCC)   • UTI (urinary tract infection)   • Ischemic heart disease   • CHF (congestive heart failure) (HCC)   • Acute non-ST segment elevation myocardial infarction (STEMI) following previous myocardial infarction   • Dyslipidemia   • Diabetes mellitus (HCC)   • Mild obesity   • Elevated troponin   • Screen for colon cancer   • Acute midline low back pain without sciatica   • Hypertensive emergency   • PAF (paroxysmal atrial fibrillation) (HCC)   • Malignant neoplasm of anal canal (HCC)   • Hematochezia   • Severe malnutrition (HCC)   • Symptomatic anemia     Past Medical History:   Diagnosis Date   • Acute bronchitis    • Acute conjunctivitis    • Acute kidney injury (HCC)     Admission from 12/26/2013 to 01/02/2014, now resolved with latest creatinine 1.4 on 01/08/2014.   • Acute non-ST segment elevation myocardial infarction (STEMI) following previous myocardial infarction    • Anal cancer (HCC) 2/8/2023   • Anal cancer (HCC) 2/8/2023   • Arthritis    • Breast cancer, female, right     2005 mastectomy right   • Cancer (HCC)    • CHF (congestive heart failure) (HCC)    • Chronic kidney disease     dialysis MW, f/u [facility]    • Clotting disorder (HCC)    • COVID-19    • Diabetes mellitus (HCC)     diagnosed ~1992, checks fsbg 2-3x/day   • Diarrhea    • Dyslipidemia    • Dyspepsia    • Dyspnea    • Edema    • History of transfusion     ~2013 no reaction    • Hx of radiation therapy    • Hyperlipidemia    • Hypertension     Severe   • Ischemic heart disease    • Moderate obesity     BMI 36.2   • Myocardial infarction (HCC)    • Pneumonia    • Pneumonia 02/2019   • Radiation 2005   • Seasonal allergies    • Wears glasses      Past Surgical History:   Procedure Laterality Date   • AV FISTULA  PLACEMENT, BRACHIOBASILIC     • BREAST BIOPSY Left 2010   • BREAST CYST EXCISION     • BREAST LUMPECTOMY Right 2005   • BREAST SURGERY     • CARDIAC CATHETERIZATION N/A 10/10/2016    Procedure: Left Heart Cath;  Surgeon: Scooter Zhao MD;  Location:  [facility];  Service:    • CARDIAC CATHETERIZATION  10/2016   • CARDIAC CATHETERIZATION N/A 1/21/2021    Procedure: Right and Left Heart Cath;  Surgeon: Hugh Tidwell MD;  Location:  [facility];  Service: Cardiology;  Laterality: N/A;   • COLONOSCOPY N/A 7/23/2020    Procedure: COLONOSCOPY;  Surgeon: Rubén Rosas MD;  Location: [facility] ENDOSCOPY;  Service: Gastroenterology;  Laterality: N/A;   • CORONARY STENT PLACEMENT  2016   • HERNIA REPAIR     • HYSTERECTOMY  2000   • INSERTION HEMODIALYSIS CATHETER Right 6/29/2016    Procedure: HEMODIALYSIS CATHETER INSERTION TUNNELLED;  Surgeon: Ramón Chavez MD;  Location: [facility] OR;  Service:    • MASTECTOMY Right 2006   • OOPHORECTOMY     • REDUCTION MAMMAPLASTY Left 2005      General Information     Row Name 03/17/23 1523          Physical Therapy Time and Intention    Document Type therapy note (daily note)  -     Mode of Treatment physical therapy  -     Row Name 03/17/23 1523          General Information    Patient Profile Reviewed yes  -     Existing Precautions/Restrictions fall;orthostatic hypotension  -     Barriers to Rehab medically complex;previous functional deficit;cognitive status;visual deficit  -     Row Name 03/17/23 1523          Cognition    Orientation Status (Cognition) oriented x 4  -     Row Name 03/17/23 1523          Safety Issues, Functional Mobility    Safety Issues Affecting Function (Mobility) insight into deficits/self-awareness;friction/shear risk;safety precaution awareness;judgment;safety precautions follow-through/compliance;sequencing abilities  -     Impairments Affecting Function (Mobility)  balance;cognition;endurance/activity tolerance;pain;postural/trunk control;strength  -HM     Cognitive Impairments, Mobility Safety/Performance insight into deficits/self-awareness;judgment;safety precaution awareness;safety precaution follow-through;sequencing abilities  -HM           User Key  (r) = Recorded By, (t) = Taken By, (c) = Cosigned By    Initials Name Provider Type     Heydi Burnett PT Physical Therapist               Mobility     Row Name 03/17/23 1524          Bed Mobility    Bed Mobility supine-sit  -HM     Supine-Sit [place] (Bed Mobility) verbal cues;minimum assist (75% patient effort);2 person assist  -HM     Assistive Device (Bed Mobility) bed rails;head of bed elevated  -HM     Comment, (Bed Mobility) cues for sequencing  -HM     Row Name 03/17/23 1524          Bed-Chair Transfer    Bed-Chair [place] (Transfers) verbal cues;nonverbal cues (demo/gesture);minimum assist (75% patient effort);2 person assist  -HM     Assistive Device (Bed-Chair Transfers) walker, front-wheeled  -HM     Comment, (Bed-Chair Transfer) cues for sequencing and hand placement on chair, pt took 3-4 lateral steps to chair with unsteadiness throughout  -HM     Row Name 03/17/23 1524          Sit-Stand Transfer    Sit-Stand [place] (Transfers) verbal cues;minimum assist (75% patient effort);2 person assist  -HM     Assistive Device (Sit-Stand Transfers) walker, front-wheeled  -HM     Comment, (Sit-Stand Transfer) 2 reps, cues for upright posture and sequencing  -HM     Row Name 03/17/23 1524          Gait/Stairs (Locomotion)    Comment, (Gait/Stairs) further mobility deferred d/t dizziness  -HM           User Key  (r) = Recorded By, (t) = Taken By, (c) = Cosigned By    Initials Name Provider Type     Heydi Burnett PT Physical Therapist               Obj/Interventions     Row Name 03/17/23 1524          Balance    Balance Assessment sitting static balance;sitting dynamic balance;sit to stand dynamic  balance;standing static balance;standing dynamic balance  -     Static Sitting Balance supervision  -     Dynamic Sitting Balance contact guard  -     Position, Sitting Balance unsupported;sitting edge of bed  -     Sit to Stand Dynamic Balance minimal assist;2-person assist  -     Static Standing Balance contact guard  -     Dynamic Standing Balance minimal assist;2-person assist  -     Position/Device Used, Standing Balance supported;walker, front-wheeled  -           User Key  (r) = Recorded By, (t) = Taken By, (c) = Cosigned By    Initials Name Provider Type     Heydi Burnett, PT Physical Therapist               Goals/Plan    No documentation.                Clinical Impression     Row Name 03/17/23 1527          Pain    Pretreatment Pain Rating 10/10  -     Posttreatment Pain Rating 10/10  -     Pain Location - buttock  -     Pre/Posttreatment Pain Comment RN aware  -     Pain Intervention(s) Ambulation/increased activity;Repositioned  -     Row Name 03/17/23 1527          Plan of Care Review    Plan of Care Reviewed With patient  -     Progress no change  -     Outcome Evaluation Pt completed a bed to chair transfer Min A x2 with FWx with cues for sequencing and upright posture. Mobility limited d/t dizziness. BP drop from 106/74 sitting EOB to 99/48 sitting in chair after transfer. Pt demonstrated decreased activity tolerance, balance deficits, and generalized weakness warranting continued IPPT POC. Continue to rec SNF upon d/c.  -     Row Name 03/17/23 1527          Therapy Assessment/Plan (PT)    Rehab Potential (PT) good, to achieve stated therapy goals  -     Criteria for Skilled Interventions Met (PT) yes;skilled treatment is necessary  -     Therapy Frequency (PT) daily  -     Row Name 03/17/23 1527          Vital Signs    Pre Systolic BP Rehab 160  -HM     Pre Treatment Diastolic BP 86  -HM     Intra Systolic BP Rehab --   sitting /74, sitting in chair  after transfer 99/48  -HM     Post Systolic BP Rehab 110  -HM     Post Treatment Diastolic BP 53  -HM     Pre SpO2 (%) 99  -HM     O2 Delivery Pre Treatment room air  -HM     O2 Delivery Intra Treatment room air  -HM     Post SpO2 (%) 98  -HM     O2 Delivery Post Treatment room air  -HM     Pre Patient Position Supine  -HM     Intra Patient Position Standing  -HM     Post Patient Position Sitting  -HM     Row Name 03/17/23 1527          Positioning and Restraints    Pre-Treatment Position in bed  -HM     Post Treatment Position chair  -HM     In Chair notified nsg;reclined;sitting;call light within reach;encouraged to call for assist;exit alarm on;with nsg;waffle cushion  -HM           User Key  (r) = Recorded By, (t) = Taken By, (c) = Cosigned By    Initials Name Provider Type     Heydi Burnett PT Physical Therapist               Outcome Measures     Row Name 03/17/23 1533          How much help from another person do you currently need...    Turning from your back to your side while in flat bed without using bedrails? 3  -HM     Moving from lying on back to sitting on the side of a flat bed without bedrails? 3  -HM     Moving to and from a bed to a chair (including a wheelchair)? 3  -HM     Standing up from a chair using your arms (e.g., wheelchair, bedside chair)? 3  -HM     Climbing 3-5 steps with a railing? 2  -HM     To walk in hospital room? 2  -HM     AM-PAC 6 Clicks Score (PT) 16  -HM     Highest level of mobility 5 --> Static standing  -HM     Row Name 03/17/23 1533          Functional Assessment    Outcome Measure Options AM-PAC 6 Clicks Basic Mobility (PT)  -HM           User Key  (r) = Recorded By, (t) = Taken By, (c) = Cosigned By    Initials Name Provider Type    Heydi Gill PT Physical Therapist                             Physical Therapy Education     Title: PT OT SLP Therapies (In Progress)     Topic: Physical Therapy (In Progress)     Point: Mobility training (Done)     Learning  Progress Summary           Patient Acceptance, E,TB, VU,NR by HM at 3/17/2023 1534    Acceptance, E, NR by KR at 3/14/2023 1532    Acceptance, E, NR by KR at 3/13/2023 1011                   Point: Home exercise program (In Progress)     Learning Progress Summary           Patient Acceptance, E, NR by KR at 3/14/2023 1532    Acceptance, E, NR by KR at 3/13/2023 1011                   Point: Body mechanics (Done)     Learning Progress Summary           Patient Acceptance, E,TB, VU,NR by HM at 3/17/2023 1534    Acceptance, E, NR by KR at 3/14/2023 1532    Acceptance, E, NR by KR at 3/13/2023 1011                   Point: Precautions (Done)     Learning Progress Summary           Patient Acceptance, E,TB, VU,NR by HM at 3/17/2023 1534    Acceptance, E, NR by KR at 3/14/2023 1532    Acceptance, E, NR by KR at 3/13/2023 1011                               User Key     Initials Effective Dates Name Provider Type Discipline     09/22/22 -  Heydi Burnett, PT Physical Therapist PT    KR 12/30/22 -  Starla Nicholas, PT Physical Therapist PT              PT Recommendation and Plan     Plan of Care Reviewed With: patient  Progress: no change  Outcome Evaluation: Pt completed a bed to chair transfer Min A x2 with FWx with cues for sequencing and upright posture. Mobility limited d/t dizziness. BP drop from 106/74 sitting EOB to 99/48 sitting in chair after transfer. Pt demonstrated decreased activity tolerance, balance deficits, and generalized weakness warranting continued IPPT POC. Continue to rec SNF upon d/c.     Time Calculation:    PT Charges     Row Name 03/17/23 1535             Time Calculation    Start Time 1425  -HM      PT Received On 03/17/23  -HM         Timed Charges    81249 - PT Therapeutic Activity Minutes 25  -HM         Total Minutes    Timed Charges Total Minutes 25  -HM       Total Minutes 25  -HM            User Key  (r) = Recorded By, (t) = Taken By, (c) = Cosigned By    Initials Name Provider Type      Heydi Burnett, PT Physical Therapist              Therapy Charges for Today     Code Description Service Date Service Provider Modifiers Qty    41872492042 HC PT THERAPEUTIC ACT EA 15 MIN 3/17/2023 Heydi Burnett, PT GP 2    08573465167 HC PT THER SUPP EA 15 MIN 3/17/2023 Heydi Burnett, PT GP 2          PT G-Codes  Outcome Measure Options: AM-PAC 6 Clicks Basic Mobility (PT)  AM-PAC 6 Clicks Score (PT): 16  AM-PAC 6 Clicks Score (OT): 16       Heydi Burnett, PT  3/17/2023

## 2023-03-17 NOTE — CONSULTS
Meghana Rodgers MD  Consulting physician: [place]    Chief Complaint   Patient presents with   • Weakness - Generalized       Reason for consult: [facility]    HPI: Patient is 78-year-old female brought hospital due to generalized weakness.  Patient has a history of multiple issues including end-stage renal disease on dialysis as well as rectal cancer, has been getting treatment for the cancer.  Patient states that she remains extremely weak even since coming into the hospital.  Does state that she has talked to both oncology as well as hospitalist about options and states that oncology has offered to continue her on her chemotherapy, however it does sound to be at a reduced dose.  As far symptoms are concerned patient does state that she has some rectal pain.  States that the current regimen of 5 mg Norco does not seem to be sufficient in controlling her pain, states that she has been on 10 mg in the past.  States that the pain is fairly constant in nature and mild to moderate in severity.    Pain assesment: See above    Dyspnea: Denies    N/V: Denies 30    PPS: 30      Past Medical History:   Diagnosis Date   • Acute bronchitis    • Acute conjunctivitis    • Acute kidney injury (HCC)     Admission from 12/26/2013 to 01/02/2014, now resolved with latest creatinine 1.4 on 01/08/2014.   • Acute non-ST segment elevation myocardial infarction (STEMI) following previous myocardial infarction    • Anal cancer (HCC) 2/8/2023   • Anal cancer (HCC) 2/8/2023   • Arthritis    • Breast cancer, female, right     2005 mastectomy right   • Cancer (HCC)    • CHF (congestive heart failure) (HCC)    • Chronic kidney disease     dialysis MWF, f/u [facility]    • Clotting disorder (HCC)    • COVID-19    • Diabetes mellitus (HCC)     diagnosed ~1992, checks fsbg 2-3x/day   • Diarrhea    • Dyslipidemia    • Dyspepsia    • Dyspnea    • Edema    • History of transfusion     ~2013 no reaction    • Hx of radiation therapy    •  Hyperlipidemia    • Hypertension     Severe   • Ischemic heart disease    • Moderate obesity     BMI 36.2   • Myocardial infarction (HCC)    • Pneumonia    • Pneumonia 02/2019   • Radiation 2005   • Seasonal allergies    • Wears glasses      Past Surgical History:   Procedure Laterality Date   • AV FISTULA PLACEMENT, BRACHIOBASILIC     • BREAST BIOPSY Left 2010   • BREAST CYST EXCISION     • BREAST LUMPECTOMY Right 2005   • BREAST SURGERY     • CARDIAC CATHETERIZATION N/A 10/10/2016    Procedure: Left Heart Cath;  Surgeon: Scooter Zhao MD;  Location:  [facility];  Service:    • CARDIAC CATHETERIZATION  10/2016   • CARDIAC CATHETERIZATION N/A 1/21/2021    Procedure: Right and Left Heart Cath;  Surgeon: Hugh Tidwell MD;  Location:  [facility];  Service: Cardiology;  Laterality: N/A;   • COLONOSCOPY N/A 7/23/2020    Procedure: COLONOSCOPY;  Surgeon: Rubén Rosas MD;  Location:  [facility];  Service: Gastroenterology;  Laterality: N/A;   • CORONARY STENT PLACEMENT  2016   • HERNIA REPAIR     • HYSTERECTOMY  2000   • INSERTION HEMODIALYSIS CATHETER Right 6/29/2016    Procedure: HEMODIALYSIS CATHETER INSERTION TUNNELLED;  Surgeon: Ramón Chavez MD;  Location:  [facility];  Service:    • MASTECTOMY Right 2006   • OOPHORECTOMY     • REDUCTION MAMMAPLASTY Left 2005     Current Code Status     Date Active Code Status Order ID Comments User Context       3/10/2023 1448 No CPR (Do Not Attempt to Resuscitate) [id number]  Connie Whitfield DO ED      Question Answer    Code Status (Patient has no pulse and is not breathing) No CPR (Do Not Attempt to Resuscitate)    Medical Interventions (Patient has pulse or is breathing) Limited Support    Medical Intervention Limits: NO intubation (DNI)              Current Facility-Administered Medications   Medication Dose Route Frequency Provider Last Rate Last Admin   • amiodarone (PACERONE) tablet 200 mg  200  mg Oral Daily Connie Whitfield DO   200 mg at 03/17/23 1352   • apixaban (ELIQUIS) tablet 2.5 mg  2.5 mg Oral Q12H Viv Kapadia DO   2.5 mg at 03/17/23 1352   • aspirin EC tablet 81 mg  81 mg Oral Daily Connie Whitfield DO   81 mg at 03/17/23 1352   • atorvastatin (LIPITOR) tablet 80 mg  80 mg Oral Nightly Connie Whitfield DO   80 mg at 03/16/23 2140   • benzocaine-menthol (DERMOPLAST) 20-0.5 % topical spray   Topical TID PRN Connie Whitfield DO   Given at 03/14/23 0905   • carvedilol (COREG) tablet 12.5 mg  12.5 mg Oral Q12H Connie Whitfield DO   12.5 mg at 03/17/23 1352   • cloNIDine (CATAPRES) tablet 0.4 mg  0.4 mg Oral Q12H Connie Whitfield DO   0.4 mg at 03/17/23 1352   • dextrose (D50W) (25 g/50 mL) IV injection 25 g  25 g Intravenous Q15 Min PRN Connie Whitfield DO       • dextrose (GLUTOSE) oral gel 15 g  15 g Oral Q15 Min PRN Connie Whitfield DO       • epoetin orquidea-epbx (RETACRIT) injection 20,000 Units  20,000 Units Subcutaneous Once per day on Mon Wed Fri oTny Carpenter MD   20,000 Units at 03/13/23 1630   • glucagon (GLUCAGEN) injection 1 mg  1 mg Intramuscular Q15 Min PRN Connie Whitfield DO       • haloperidol lactate (HALDOL) injection 0.5 mg  0.5 mg Intravenous Q6H PRN Viv Kapadia DO   0.5 mg at 03/13/23 2215   • HYDROcodone-acetaminophen (NORCO)  MG per tablet 1 tablet  1 tablet Oral Q6H PRN Trent Niño DO   1 tablet at 03/17/23 1452   • Hydrocortisone (Perianal) (ANUSOL-HC) 2.5 % rectal cream   Rectal BID Connie Whitfield DO   Given at 03/16/23 0848   • hydrOXYzine (ATARAX) tablet 25 mg  25 mg Oral TID PRN Viv Kapadia DO   25 mg at 03/17/23 1352   • Insulin Lispro (humaLOG) injection 0-9 Units  0-9 Units Subcutaneous TID With Meals Connie Whitfield DO   4 Units at 03/12/23 1829   • Insulin Lispro (humaLOG) injection 5 Units  5 Units  Subcutaneous TID With Meals Connie Whitfield DO   5 Units at 03/14/23 0853   • isosorbide mononitrate (IMDUR) 24 hr tablet 120 mg  120 mg Oral Daily Connie Whitfield DO   120 mg at 03/17/23 1352   • melatonin tablet 5 mg  5 mg Oral Nightly Butler, April K, APRN   5 mg at 03/16/23 2139   • ondansetron (ZOFRAN) tablet 4 mg  4 mg Oral Q6H PRN Connie Whitfield DO        Or   • ondansetron (ZOFRAN) injection 4 mg  4 mg Intravenous Q6H PRN Connie Whitfield DO       • QUEtiapine (SEROquel) tablet 25 mg  25 mg Oral Nightly Butler, April K, APRN   25 mg at 03/16/23 2139   • sodium chloride 0.9 % flush 10 mL  10 mL Intravenous PRN Darrell Reyes MD       • sodium chloride 0.9 % flush 10 mL  10 mL Intravenous Q12H Connie Whitfield DO   10 mL at 03/16/23 2140   • sodium chloride 0.9 % flush 10 mL  10 mL Intravenous PRN Connie Whitfield DO       • sodium chloride 0.9 % infusion 40 mL  40 mL Intravenous PRN Connie Whitfield DO            •  benzocaine-menthol  •  dextrose  •  dextrose  •  glucagon (human recombinant)  •  haloperidol lactate  •  HYDROcodone-acetaminophen  •  hydrOXYzine  •  ondansetron **OR** ondansetron  •  sodium chloride  •  sodium chloride  •  sodium chloride  Allergies   Allergen Reactions   • Mircera [Methoxy Polyethylene Glycol-Epoetin Beta] Anaphylaxis     Pt stopped breathing   • Venofer [Iron Sucrose] Anaphylaxis     Pt stopped breathing   • Albuterol Other (See Comments)     Increased blood pressure  Used right before heart attack   • Nifedipine Er Swelling   • Penicillins Hives   • Prednisone Other (See Comments)     Makes jittery/anxious     Family History   Problem Relation Age of Onset   • Stroke Mother    • Cancer Mother    • Pancreatic cancer Mother    • Coronary artery disease Father    • Heart failure Father    • Breast cancer Sister 55   • Throat cancer Daughter    • Colon cancer Maternal Grandmother    • Ovarian  "cancer Neg Hx    • Endometrial cancer Neg Hx      Social History     Socioeconomic History   • Marital status:    Tobacco Use   • Smoking status: Never   • Smokeless tobacco: Never   • Tobacco comments:     Michael second hand smoke   Vaping Use   • Vaping Use: Never used   Substance and Sexual Activity   • Alcohol use: Yes     Comment: rarely   • Drug use: No   • Sexual activity: Defer     Review of Systems -all others reviewed and found negative except as mentioned in the HPI      /66 (BP Location: Left arm, Patient Position: Lying)   Pulse 59   Temp 97.9 °F (36.6 °C) (Axillary)   Resp 18   Ht 167.6 cm (65.98\")   Wt 79.1 kg (174 lb 6.1 oz)   LMP  (LMP Unknown)   SpO2 99%   BMI 28.16 kg/m²     Intake/Output Summary (Last 24 hours) at 3/17/2023 1511  Last data filed at 3/17/2023 1215  Gross per 24 hour   Intake --   Output 2080 ml   Net -2080 ml     Physical Exam:      General Appearance:    Alert, cooperative, in no acute distress   Head:    Normocephalic, without obvious abnormality, atraumatic   Eyes:            Lids and lashes normal, conjunctivae and sclerae normal, no   icterus, no pallor, corneas clear, PERRLA   Ears:    Ears appear intact with no abnormalities noted   Throat:   No oral lesions, no thrush, oral mucosa moist   Neck:   No adenopathy, supple, trachea midline, no thyromegaly, no     carotid bruit, no JVD   Back:     No kyphosis present, no scoliosis present, no skin lesions,       erythema or scars, no tenderness to percussion or                   palpation,   range of motion normal   Lungs:     Clear to auscultation,respirations regular, even and                   unlabored    Heart:    Regular rhythm and normal rate, normal S1 and S2, no            murmur, no gallop, no rub, no click   Breast Exam:    Deferred   Abdomen:     Normal bowel sounds, no masses, no organomegaly, soft        non-tender, non-distended, no guarding, no rebound                 tenderness   Genitalia:    " Deferred   Extremities:   Moves all extremities well, no edema, no cyanosis, no              redness   Pulses:   Pulses palpable and equal bilaterally   Skin:   No bleeding, bruising or rash   Lymph nodes:   No palpable adenopathy   Neurologic:   Cranial nerves 2 - 12 grossly intact, sensation intact, DTR        present and equal bilaterally       Results from last 7 days   Lab Units 03/16/23  0451   WBC 10*3/mm3 3.96   HEMOGLOBIN g/dL 8.6*   HEMATOCRIT % 27.8*   PLATELETS 10*3/mm3 103*     Results from last 7 days   Lab Units 03/15/23  0551 03/12/23  0524 03/11/23  0741   SODIUM mmol/L 134*   < > 138   POTASSIUM mmol/L 3.9   < > 3.4*   CHLORIDE mmol/L 97*   < > 93*   CO2 mmol/L 23.0   < > 29.0   BUN mg/dL 30*   < > 43*   CREATININE mg/dL 6.70*   < > 9.37*   CALCIUM mg/dL 8.7   < > 9.1   BILIRUBIN mg/dL  --   --  0.7   ALK PHOS U/L  --   --  80   ALT (SGPT) U/L  --   --  5   AST (SGOT) U/L  --   --  11   GLUCOSE mg/dL 116*   < > 100*    < > = values in this interval not displayed.     Results from last 7 days   Lab Units 03/15/23  0551   SODIUM mmol/L 134*   POTASSIUM mmol/L 3.9   CHLORIDE mmol/L 97*   CO2 mmol/L 23.0   BUN mg/dL 30*   CREATININE mg/dL 6.70*   GLUCOSE mg/dL 116*   CALCIUM mg/dL 8.7     Imaging Results (Last 72 Hours)     ** No results found for the last 72 hours. **        Impression: End-stage renal disease  Rectal cancer  Neoplastic pain  Debility  Goals of care  Plan: Did discuss with patient about her overall plans and goals of care.  Patient states that she is looking at going home to live with her sister who is offered to help take care of her.  States that she will continue doing chemotherapy as long as it is going to be offered to her.  With this in mind infected chemo has already been offered to the patient I did not have any further goals of care conversations as talking about hospice would be a mute point.  Will increase the patient's opioids.        Trent Niño DO  03/17/23  15:11  EDT

## 2023-03-17 NOTE — PROGRESS NOTES
" LOS: 3 days   Patient Care Team:  Meghana Rodgers MD as PCP - General  Demetrius Sagastume MD as Consulting Physician (Cardiology)  Kevan Lerma MD as Consulting Physician (Nephrology)  Geena Estrella APRN as Nurse Practitioner (Cardiology)  Frank Mandujano MD as Referring Physician (Colon and Rectal Surgery)  Kevan Cavazos MD as Consulting Physician (Radiation Oncology)  Yue Reeves RN as Nurse Navigator  Darryn Burk MD as Consulting Physician (Nephrology)    Chief Complaint: F/U ESRD    Subjective : SEEN ON DIALYSIS         Subjective:  Symptoms:  Stable.  No shortness of breath or chest pain.    Diet:  Adequate intake.        History taken from: patient    Objective     Vital Sign Min/Max for last 24 hours  Temp  Min: 98 °F (36.7 °C)  Max: 98.4 °F (36.9 °C)   BP  Min: 129/53  Max: 138/54   Pulse  Min: 54  Max: 56   Resp  Min: 16  Max: 20   SpO2  Min: 94 %  Max: 99 %   No data recorded   No data recorded     Flowsheet Rows    Flowsheet Row First Filed Value   Admission Height 167.6 cm (66\") Documented at 03/10/2023 1122   Admission Weight 76.7 kg (169 lb) Documented at 03/10/2023 1122          No intake/output data recorded.  No intake/output data recorded.    Objective:  General Appearance:  Comfortable.    Vital signs: (most recent): Blood pressure 138/54, pulse 55, temperature 98 °F (36.7 °C), resp. rate 20, height 167.6 cm (65.98\"), weight 79.1 kg (174 lb 6.1 oz), SpO2 97 %, not currently breastfeeding.  Vital signs are normal.    Output: Minimal urine output.    HEENT: Normal HEENT exam.    Lungs:  Normal effort and normal respiratory rate.  Breath sounds clear to auscultation.  She is not in respiratory distress.  No decreased breath sounds or wheezes.    Heart: Normal rate.    Extremities: There is no dependent edema.  (AVF)  Pulses: Distal pulses are intact.    Neurological: Patient is alert.  Normal strength.  (A LITTLE CONFUSED).              Results Review:     I reviewed " the patient's new clinical results.    WBC WBC   Date Value Ref Range Status   03/16/2023 3.96 3.40 - 10.80 10*3/mm3 Final   03/15/2023 3.95 3.40 - 10.80 10*3/mm3 Final      HGB Hemoglobin   Date Value Ref Range Status   03/16/2023 8.6 (L) 12.0 - 15.9 g/dL Final   03/15/2023 8.5 (L) 12.0 - 15.9 g/dL Final      HCT Hematocrit   Date Value Ref Range Status   03/16/2023 27.8 (L) 34.0 - 46.6 % Final   03/15/2023 28.0 (L) 34.0 - 46.6 % Final      Platlets No results found for: LABPLAT   MCV MCV   Date Value Ref Range Status   03/16/2023 98.6 (H) 79.0 - 97.0 fL Final   03/15/2023 99.6 (H) 79.0 - 97.0 fL Final          Sodium Sodium   Date Value Ref Range Status   03/15/2023 134 (L) 136 - 145 mmol/L Final      Potassium Potassium   Date Value Ref Range Status   03/15/2023 3.9 3.5 - 5.2 mmol/L Final      Chloride Chloride   Date Value Ref Range Status   03/15/2023 97 (L) 98 - 107 mmol/L Final      CO2 CO2   Date Value Ref Range Status   03/15/2023 23.0 22.0 - 29.0 mmol/L Final      BUN BUN   Date Value Ref Range Status   03/15/2023 30 (H) 8 - 23 mg/dL Final      Creatinine Creatinine   Date Value Ref Range Status   03/15/2023 6.70 (H) 0.57 - 1.00 mg/dL Final      Calcium Calcium   Date Value Ref Range Status   03/15/2023 8.7 8.6 - 10.5 mg/dL Final      PO4 No results found for: CAPO4   Albumin No results found for: ALBUMIN   Magnesium No results found for: MG   Uric Acid No results found for: URICACID     Medication Review: yes    Assessment & Plan       Hematochezia    Type 2 diabetes mellitus (HCC)    Essential hypertension    Hyperlipidemia LDL goal <70    End stage renal disease on dialysis (HCC)    CHF (congestive heart failure) (HCC)    PAF (paroxysmal atrial fibrillation) (HCC)    Severe malnutrition (HCC)    Symptomatic anemia      Assessment & Plan     ESRD: MWF [facility]. Missed HD before admission      Access: AV fistula      Anemia: Transfuse at Hb 7 or less. On chemo for anal ca     Volume status: Dependent edema  noted.    HTN: Bp elevated.   HYPONATREMIA. MILD. SHOULD CORRECT WITH HD.   I discussed the patients findings and my recommendations with patient  PLAN. HD TODAY 3/17/23.   Zeb Owusu MD  03/17/23  09:08 EDT

## 2023-03-17 NOTE — NURSING NOTE
WOC RN called by dialysis RN regarding her concern for patient's peeling red painful skin/radiation burn to bilateral gluteals.  RN stated patient came to dialysis today with a silicone foam border dressing which patient stated was was painful with removal even of white foam part.  Educated RN that barrier cream is not to be used under silicone foam border dressings.  Recommended trying crusting via stoma powder and skin protectant and covering with a silicone foam border dressing and educated her on technique of crusting.  Also recommended utilizing adhesive remover to remove all dressings and cleansing with pH balance foaming cleanser and barrier wipes prior to utilizing the crusting technique.    See revised order for recommendation.    Thank you for the phone call.  WOC team will sign off.  If alteration to skin integrity or change in wound bed presentation please consult the WOC team.

## 2023-03-17 NOTE — CASE MANAGEMENT/SOCIAL WORK
Continued Stay Note  [facility]     Patient Name: Esperanza Pop  MRN: [id number]  Today's Date: 3/17/2023    Admit Date: 3/10/2023    Plan: Home with HH   Discharge Plan     Row Name 03/17/23 4672       Plan    Plan Home with     Patient/Family in Agreement with Plan yes    Plan Comments [facility]  has accepted the pt. A wheelchair has been delivered to the room by [facility]. I have spoken with the pts sister Indigo Herman and her daughter Jeri Pop by phone today. They are trying to arrange family care at home as the pt is currently getting RAD Tx and wants to continue this. If RAD Tx can be placed on hold, an SNF for rehab may be an option if the pt can participate with Tx and make progress to gain insurance approval. The pts sister is questioning if these treatments are doing any good or causing a decline. Palliative Care has been consulted to assist the pt and family with possible goals of care conversations. I spoke with Michelle at her outpt HD clinic. If the pt goes home on the weekend she can resume her outpt chair on Monday. Will need to fax the DC summary to [facility] at [phone].    Final Discharge Disposition Code 06 - home with home health care               Discharge Codes    No documentation.               Expected Discharge Date and Time     Expected Discharge Date Expected Discharge Time    Mar 18, 2023             Emily Banks RN

## 2023-03-17 NOTE — PROGRESS NOTES
Spoke with daughter Indigo. She is agreeable to [facility] if the patient is not placed in rehab. Verified PCP. Dot STONER, [facility] [place]-Liaison

## 2023-03-17 NOTE — PROGRESS NOTES
[facility] Medicine Services  PROGRESS NOTE    Patient Name: Esperanza Pop  : 1945  MRN: [id number]    Date of Admission: 3/10/2023  Primary Care Physician: Meghana Rodgers MD    Subjective   Subjective     CC:  F/U generalized weakness     HPI:  Notes skin breakdown and pain on her bottom, worse today. Denies B LE pain. No dyspnea. Tired.    ROS:  Gen- No fevers, chills  CV- No chest pain, palpitations  Resp- No cough, dyspnea  GI- No N/V/D, abd pain  No change from 3/16    Objective   Objective     Vital Signs:   Temp:  [98 °F (36.7 °C)-98.4 °F (36.9 °C)] 98 °F (36.7 °C)  Heart Rate:  [54-56] 55  Resp:  [16-20] 20  BP: (129-138)/(32-70) 138/54     Physical Exam:  NAD, alert and oriented x 3  OP clear, MMM  Neck supple  No LAD  RRR, 3/6 murmur, systolic  CTAB  +BS, ND, NT, soft  DONG  Normal affect  No rashes  B LE edema  No change from 3/16    Results Reviewed:  LAB RESULTS:      Lab 23  0451 03/15/23  0551 23  0838 23  1031 23  0524 23  1229 23  0741 03/10/23  1809 03/10/23  1145   WBC 3.96 3.95 4.73 3.95 3.51  --  4.54  --  5.04   HEMOGLOBIN 8.6* 8.5* 8.3* 9.2* 8.2*   < > 7.1*   < > 7.8*   HEMATOCRIT 27.8* 28.0* 27.1* 31.3* 27.9*   < > 24.0*   < > 26.8*   PLATELETS 103* 83* 85* 71* 113*  --  129*  --  143   NEUTROS ABS  --   --   --  2.90 2.51  --  3.32  --  3.48   IMMATURE GRANS (ABS)  --   --   --  0.04 0.03  --  0.05  --  0.07*   LYMPHS ABS  --   --   --  0.43* 0.42*  --  0.48*  --  0.78   MONOS ABS  --   --   --  0.57 0.55  --  0.66  --  0.68   EOS ABS  --   --   --  0.00 0.00  --  0.02  --  0.01   MCV 98.6* 99.6* 98.9* 101.6* 102.6*  --  101.3*  --  103.1*    < > = values in this interval not displayed.         Lab 03/15/23  0551 23  0838 23  1031 23  0524 23  0741 03/10/23  1145   SODIUM 134* 133* 134* 136 138 136   POTASSIUM 3.9 4.3 4.2 3.5 3.4* 4.0   CHLORIDE 97* 97* 94* 97* 93* 90*   CO2 23.0 24.0 20.0* 23.0  29.0 24.0   ANION GAP 14.0 12.0 20.0* 16.0* 16.0* 22.0*   BUN 30* 24* 34* 29* 43* 38*   CREATININE 6.70* 5.11* 6.82* 5.55* 9.37* 7.43*   EGFR 6.0* 8.3* 5.8* 7.5* 4.0* 5.3*   GLUCOSE 116* 143* 89 85 100* 196*   CALCIUM 8.7 8.7 8.7 8.7 9.1 9.2   MAGNESIUM  --   --   --   --   --  2.2   PHOSPHORUS  --  3.5  --   --   --   --          Lab 03/14/23  0838 03/11/23  0741 03/10/23  1145   TOTAL PROTEIN  --  7.3 7.7   ALBUMIN 3.1* 3.3* 3.6   GLOBULIN  --  4.0 4.1   ALT (SGPT)  --  5 5   AST (SGOT)  --  11 17   BILIRUBIN  --  0.7 0.7   ALK PHOS  --  80 86         Lab 03/10/23  2123 03/10/23  1809 03/10/23  1145   HSTROP T 176* 173* 172*             Lab 03/10/23  1418   ABO TYPING B   RH TYPING Positive   ANTIBODY SCREEN Negative         Brief Urine Lab Results     None          Microbiology Results Abnormal     None          No radiology results from the last 24 hrs    Results for orders placed during the hospital encounter of 03/10/23    Adult Transthoracic Echo Complete W/ Cont if Necessary Per Protocol    Interpretation Summary  •  Left ventricular systolic function is normal. Estimated left ventricular EF = 55%  •  Left ventricular wall thickness is consistent with moderate concentric hypertrophy.  •  The right ventricular cavity is mildly dilated.  •  Mild to moderate biatrial enlargement.  •  Moderate to severe aortic valve stenosis is present.  Mean gradient 34 mmHg, DOUG 0.9 cm².  •  Mild aortic insufficiency.  •  Mild mitral regurgitation.  •  Mild to moderate tricuspid regurgitation with RVSP 48 mmHg.      Current medications:  Scheduled Meds:amiodarone, 200 mg, Oral, Daily  apixaban, 2.5 mg, Oral, Q12H  aspirin, 81 mg, Oral, Daily  atorvastatin, 80 mg, Oral, Nightly  carvedilol, 12.5 mg, Oral, Q12H  cloNIDine, 0.4 mg, Oral, Q12H  epoetin orquidea/orquidea-epbx, 20,000 Units, Subcutaneous, Once per day on Mon Wed Fri  Hydrocortisone (Perianal), , Rectal, BID  insulin lispro, 0-9 Units, Subcutaneous, TID With Meals  Insulin  "Lispro, 5 Units, Subcutaneous, TID With Meals  isosorbide mononitrate, 120 mg, Oral, Daily  melatonin, 5 mg, Oral, Nightly  QUEtiapine, 25 mg, Oral, Nightly  sodium chloride, 10 mL, Intravenous, Q12H      Continuous Infusions:   PRN Meds:.•  benzocaine-menthol  •  dextrose  •  dextrose  •  glucagon (human recombinant)  •  haloperidol lactate  •  HYDROcodone-acetaminophen  •  hydrOXYzine  •  ondansetron **OR** ondansetron  •  sodium chloride  •  sodium chloride  •  sodium chloride    Assessment & Plan   Assessment & Plan     Active Hospital Problems    Diagnosis  POA   • **Hematochezia [K92.1]  Yes   • Symptomatic anemia [D64.9]  Yes   • Severe malnutrition (HCC) [E43]  Yes   • PAF (paroxysmal atrial fibrillation) (HCC) [I48.0]  Yes   • CHF (congestive heart failure) (HCC) [I50.9]  Yes   • End stage renal disease on dialysis (HCC) [N18.6, Z99.2]  Not Applicable   • Hyperlipidemia LDL goal <70 [E78.5]  Yes   • Essential hypertension [I10]  Yes   • Type 2 diabetes mellitus (HCC) [E11.9]  Yes      Resolved Hospital Problems   No resolved problems to display.        Brief Hospital Course to date:  Esperanza Pop is a 78 y.o. female  with PMH of ESRD on HD MWF, IDDM, HTN, HLD, HFpEF, Pulm HTN, Hx of breast cancer (s/p mastectomy and radiation) PAF (on xarelto), Chronic anemia, and rectal cancer (recently diagnosed currently gettintg XRT and chemo) that presented to the ED after a fall at home. Her 's  was today and she was getting ready to go when her legs \"gave out\". She had been having loose stool and bloody rectal discharge but that had improved prior to admission.       Generalized Weakness  Acute on Chronic Anemia  Rectal bleeding, Rectal Cancer   -S/P 1 unit PRBCs 3/11 with appropriate rise in H&H  -monitor CBC, no bleeding noted, stable  -PT/OT recommending rehab, patient wishes to pursue continued chemotherapy and XRT    AMS -> Improved   Concern for UTI/Possible UTI  -complete course of " antibiotics  -pamelor held  -MS contin held  -monitoring, neurology following, medications adjusted  -CT head without infarct/bleed/obvious metastatic disease  -currently receiving scheduled seroquel/melatonin at night only  -haldol/atarax are prn only    Rectal Cancer  Hx breast cancer   -Follows with Dr. Cavazos, Rad/Onc  -Follows Dr. Carpenter with oncology, currently on Xeloda, held for now  -continued treatment per Heme/Onc, Rad/Onc, oncology recommending reduced dose of xeloda and radiation/oncology recommending continued treatment, patient wishes to continue cancer treatment for now    Buttock wound  -PT wound care consulted today     ESRD on HD MWF  -Renal following for HD (had dialysis Saturday due to missed session on Friday)  -on HD today 3/15     HFpEF  Pulm HTN  PAF  HTN  HLD   -Eliquis resumed, monitor H&H, bleeding  -ASA/Amiodarone   -last ECHO EF 60% with grade II diastolic dysfunction, pulmonary HTN     Elevated Troponin   Mod-Severe AS   -no chest pain  -ECHO with mod-sev AS   -Has appt with Geena Estrella, APRN 4/27/23, can follow up    Expected Discharge Location and Transportation: Home/rehab  Expected Discharge   Expected Discharge Date and Time     Expected Discharge Date Expected Discharge Time    Mar 18, 2023            DVT prophylaxis:  Medical and mechanical DVT prophylaxis orders are present.     AM-PAC 6 Clicks Score (PT): 14 (03/15/23 1330)    CODE STATUS:   Code Status and Medical Interventions:   Ordered at: 03/10/23 1448     Medical Intervention Limits:    NO intubation (DNI)     Code Status (Patient has no pulse and is not breathing):    No CPR (Do Not Attempt to Resuscitate)     Medical Interventions (Patient has pulse or is breathing):    Limited Support       Champ Ríos MD  03/17/23

## 2023-03-17 NOTE — PLAN OF CARE
Goal Outcome Evaluation:  Plan of Care Reviewed With: patient        Progress: no change  Outcome Evaluation: Pt completed a bed to chair transfer Min A x2 with FWx with cues for sequencing and upright posture. Mobility limited d/t dizziness. BP drop from 106/74 sitting EOB to 99/48 sitting in chair after transfer. Pt demonstrated decreased activity tolerance, balance deficits, and generalized weakness warranting continued IPPT POC. Continue to rec SNF upon d/c.

## 2023-03-17 NOTE — NURSING NOTE
"Just arrived back from HD. Hateful but answered orientation questions appropriately. Is trying to call family so she can\"get out of her\" because she \"isnt getting any better and we aren't helping her\". VSS. Will attempt to give morning medications. Call bell within reach. Bed alarm on.  "

## 2023-03-18 LAB
ALBUMIN SERPL-MCNC: 2.7 G/DL (ref 3.5–5.2)
ANION GAP SERPL CALCULATED.3IONS-SCNC: 13 MMOL/L (ref 5–15)
BUN SERPL-MCNC: 19 MG/DL (ref 8–23)
BUN/CREAT SERPL: 4.3 (ref 7–25)
CALCIUM SPEC-SCNC: 8.6 MG/DL (ref 8.6–10.5)
CHLORIDE SERPL-SCNC: 100 MMOL/L (ref 98–107)
CO2 SERPL-SCNC: 21 MMOL/L (ref 22–29)
CREAT SERPL-MCNC: 4.41 MG/DL (ref 0.57–1)
DEPRECATED RDW RBC AUTO: 64.9 FL (ref 37–54)
EGFRCR SERPLBLD CKD-EPI 2021: 9.9 ML/MIN/1.73
ERYTHROCYTE [DISTWIDTH] IN BLOOD BY AUTOMATED COUNT: 17.7 % (ref 12.3–15.4)
GLUCOSE BLDC GLUCOMTR-MCNC: 116 MG/DL (ref 70–130)
GLUCOSE BLDC GLUCOMTR-MCNC: 161 MG/DL (ref 70–130)
GLUCOSE BLDC GLUCOMTR-MCNC: 180 MG/DL (ref 70–130)
GLUCOSE SERPL-MCNC: 125 MG/DL (ref 65–99)
HCT VFR BLD AUTO: 31.4 % (ref 34–46.6)
HGB BLD-MCNC: 9.3 G/DL (ref 12–15.9)
MCH RBC QN AUTO: 30.3 PG (ref 26.6–33)
MCHC RBC AUTO-ENTMCNC: 29.6 G/DL (ref 31.5–35.7)
MCV RBC AUTO: 102.3 FL (ref 79–97)
PHOSPHATE SERPL-MCNC: 2.8 MG/DL (ref 2.5–4.5)
PLATELET # BLD AUTO: 111 10*3/MM3 (ref 140–450)
PMV BLD AUTO: 11.5 FL (ref 6–12)
POTASSIUM SERPL-SCNC: 4.5 MMOL/L (ref 3.5–5.2)
RBC # BLD AUTO: 3.07 10*6/MM3 (ref 3.77–5.28)
SODIUM SERPL-SCNC: 134 MMOL/L (ref 136–145)
WBC NRBC COR # BLD: 5.48 10*3/MM3 (ref 3.4–10.8)

## 2023-03-18 PROCEDURE — 85027 COMPLETE CBC AUTOMATED: CPT | Performed by: HOSPITALIST

## 2023-03-18 PROCEDURE — 80069 RENAL FUNCTION PANEL: CPT | Performed by: INTERNAL MEDICINE

## 2023-03-18 PROCEDURE — 82962 GLUCOSE BLOOD TEST: CPT

## 2023-03-18 PROCEDURE — G0378 HOSPITAL OBSERVATION PER HR: HCPCS

## 2023-03-18 PROCEDURE — 97530 THERAPEUTIC ACTIVITIES: CPT

## 2023-03-18 PROCEDURE — 63710000001 INSULIN LISPRO (HUMAN) PER 5 UNITS: Performed by: FAMILY MEDICINE

## 2023-03-18 PROCEDURE — 99231 SBSQ HOSP IP/OBS SF/LOW 25: CPT | Performed by: HOSPITALIST

## 2023-03-18 RX ADMIN — HYDROCODONE BITARTRATE AND ACETAMINOPHEN 1 TABLET: 10; 325 TABLET ORAL at 06:44

## 2023-03-18 RX ADMIN — APIXABAN 2.5 MG: 2.5 TABLET, FILM COATED ORAL at 08:46

## 2023-03-18 RX ADMIN — AMIODARONE HYDROCHLORIDE 200 MG: 200 TABLET ORAL at 08:46

## 2023-03-18 RX ADMIN — ISOSORBIDE MONONITRATE 120 MG: 120 TABLET, EXTENDED RELEASE ORAL at 08:46

## 2023-03-18 RX ADMIN — ATORVASTATIN CALCIUM 80 MG: 40 TABLET, FILM COATED ORAL at 20:29

## 2023-03-18 RX ADMIN — QUETIAPINE FUMARATE 25 MG: 25 TABLET ORAL at 20:28

## 2023-03-18 RX ADMIN — CLONIDINE HYDROCHLORIDE 0.4 MG: 0.1 TABLET ORAL at 08:46

## 2023-03-18 RX ADMIN — INSULIN LISPRO 2 UNITS: 100 INJECTION, SOLUTION INTRAVENOUS; SUBCUTANEOUS at 12:23

## 2023-03-18 RX ADMIN — APIXABAN 2.5 MG: 2.5 TABLET, FILM COATED ORAL at 20:28

## 2023-03-18 RX ADMIN — Medication 10 ML: at 08:56

## 2023-03-18 RX ADMIN — CARVEDILOL 12.5 MG: 12.5 TABLET, FILM COATED ORAL at 08:52

## 2023-03-18 RX ADMIN — Medication 5 MG: at 20:28

## 2023-03-18 RX ADMIN — HYDROCORTISONE 2.5%: 25 CREAM TOPICAL at 20:29

## 2023-03-18 RX ADMIN — HYDROCODONE BITARTRATE AND ACETAMINOPHEN 1 TABLET: 10; 325 TABLET ORAL at 13:34

## 2023-03-18 RX ADMIN — Medication 10 ML: at 20:29

## 2023-03-18 NOTE — PROGRESS NOTES
"    [facility] Medicine Services  PROGRESS NOTE    Patient Name: Esperanza Pop  : 1945  MRN: [id number]    Date of Admission: 3/10/2023  Primary Care Physician: Meghana Rodgers MD    Subjective   Subjective     CC:  F/U generalized weakness     HPI:  Notes skin breakdown and pain on her bottom, \"worse in the crack\". Legs still very weak. No f/c. No n/v. Tolerating PO. S/P HD 3/17    ROS:  Gen- No fevers, chills  CV- No chest pain, palpitations  Resp- No cough, dyspnea  GI- No N/V/D, abd pain  No change from 3/17    Objective   Objective     Vital Signs:   Temp:  [97.9 °F (36.6 °C)-99 °F (37.2 °C)] 99 °F (37.2 °C)  Heart Rate:  [53-61] 61  Resp:  [18-20] 18  BP: ()/(48-86) 155/65     Physical Exam:  NAD, alert and oriented x 3  OP clear, MMM  Neck supple  No LAD  RRR, 3/6 murmur, systolic  CTAB  +BS, ND, NT, soft  DONG  Normal affect  No rashes  B LE edema  No change from 3/17    Results Reviewed:  LAB RESULTS:      Lab 23  0447 23  0451 03/15/23  0551 23  0838 23  1031 23  0524 23  1229 23  0741   WBC 5.48 3.96 3.95 4.73 3.95 3.51  --  4.54   HEMOGLOBIN 9.3* 8.6* 8.5* 8.3* 9.2* 8.2*   < > 7.1*   HEMATOCRIT 31.4* 27.8* 28.0* 27.1* 31.3* 27.9*   < > 24.0*   PLATELETS 111* 103* 83* 85* 71* 113*  --  129*   NEUTROS ABS  --   --   --   --  2.90 2.51  --  3.32   IMMATURE GRANS (ABS)  --   --   --   --  0.04 0.03  --  0.05   LYMPHS ABS  --   --   --   --  0.43* 0.42*  --  0.48*   MONOS ABS  --   --   --   --  0.57 0.55  --  0.66   EOS ABS  --   --   --   --  0.00 0.00  --  0.02   .3* 98.6* 99.6* 98.9* 101.6* 102.6*  --  101.3*    < > = values in this interval not displayed.         Lab 23  0447 03/15/23  0551 23  0838 23  1031 23  0524   SODIUM 134* 134* 133* 134* 136   POTASSIUM 4.5 3.9 4.3 4.2 3.5   CHLORIDE 100 97* 97* 94* 97*   CO2 21.0* 23.0 24.0 20.0* 23.0   ANION GAP 13.0 14.0 12.0 20.0* 16.0*   BUN 19 30* " 24* 34* 29*   CREATININE 4.41* 6.70* 5.11* 6.82* 5.55*   EGFR 9.9* 6.0* 8.3* 5.8* 7.5*   GLUCOSE 125* 116* 143* 89 85   CALCIUM 8.6 8.7 8.7 8.7 8.7   PHOSPHORUS 2.8  --  3.5  --   --          Lab 03/18/23  0447 03/14/23  0838 03/11/23  0741   TOTAL PROTEIN  --   --  7.3   ALBUMIN 2.7* 3.1* 3.3*   GLOBULIN  --   --  4.0   ALT (SGPT)  --   --  5   AST (SGOT)  --   --  11   BILIRUBIN  --   --  0.7   ALK PHOS  --   --  80                     Brief Urine Lab Results     None          Microbiology Results Abnormal     None          No radiology results from the last 24 hrs    Results for orders placed during the hospital encounter of 03/10/23    Adult Transthoracic Echo Complete W/ Cont if Necessary Per Protocol    Interpretation Summary  •  Left ventricular systolic function is normal. Estimated left ventricular EF = 55%  •  Left ventricular wall thickness is consistent with moderate concentric hypertrophy.  •  The right ventricular cavity is mildly dilated.  •  Mild to moderate biatrial enlargement.  •  Moderate to severe aortic valve stenosis is present.  Mean gradient 34 mmHg, DOUG 0.9 cm².  •  Mild aortic insufficiency.  •  Mild mitral regurgitation.  •  Mild to moderate tricuspid regurgitation with RVSP 48 mmHg.      Current medications:  Scheduled Meds:amiodarone, 200 mg, Oral, Daily  apixaban, 2.5 mg, Oral, Q12H  aspirin, 81 mg, Oral, Daily  atorvastatin, 80 mg, Oral, Nightly  carvedilol, 12.5 mg, Oral, Q12H  cloNIDine, 0.4 mg, Oral, Q12H  epoetin orquidea/orquidea-epbx, 20,000 Units, Subcutaneous, Once per day on Mon Wed Fri  Hydrocortisone (Perianal), , Rectal, BID  insulin lispro, 0-9 Units, Subcutaneous, TID With Meals  Insulin Lispro, 5 Units, Subcutaneous, TID With Meals  isosorbide mononitrate, 120 mg, Oral, Daily  melatonin, 5 mg, Oral, Nightly  QUEtiapine, 25 mg, Oral, Nightly  sodium chloride, 10 mL, Intravenous, Q12H      Continuous Infusions:   PRN Meds:.•  benzocaine-menthol  •  dextrose  •  dextrose  •   "glucagon (human recombinant)  •  haloperidol lactate  •  HYDROcodone-acetaminophen  •  hydrOXYzine  •  ondansetron **OR** ondansetron  •  sodium chloride  •  sodium chloride  •  sodium chloride    Assessment & Plan   Assessment & Plan     Active Hospital Problems    Diagnosis  POA   • **Hematochezia [K92.1]  Yes   • Symptomatic anemia [D64.9]  Yes   • Severe malnutrition (HCC) [E43]  Yes   • PAF (paroxysmal atrial fibrillation) (HCC) [I48.0]  Yes   • CHF (congestive heart failure) (HCC) [I50.9]  Yes   • End stage renal disease on dialysis (HCC) [N18.6, Z99.2]  Not Applicable   • Hyperlipidemia LDL goal <70 [E78.5]  Yes   • Essential hypertension [I10]  Yes   • Type 2 diabetes mellitus (HCC) [E11.9]  Yes      Resolved Hospital Problems   No resolved problems to display.        Brief Hospital Course to date:  Esperanza Pop is a 78 y.o. female  with PMH of ESRD on HD MWF, IDDM, HTN, HLD, HFpEF, Pulm HTN, Hx of breast cancer (s/p mastectomy and radiation) PAF (on xarelto), Chronic anemia, and rectal cancer (recently diagnosed currently gettintg XRT and chemo) that presented to the ED after a fall at home. Her 's  was today and she was getting ready to go when her legs \"gave out\". She had been having loose stool and bloody rectal discharge but that had improved prior to admission.       Generalized Weakness  Acute on Chronic Anemia  Rectal bleeding, Rectal Cancer   -S/P 1 unit PRBCs 3/11 with appropriate rise in H&H  -monitor CBC, no bleeding noted, stable  -PT/OT continue recommending rehab, patient wishes to pursue continued chemotherapy and XRT  -remains quite weak and difficult to transfer, d/w family ability to care for her at home, but patient wishes to return home and continue chemotherapy and XRT    AMS -> Improved   Concern for UTI/Possible UTI  -complete course of antibiotics  -pamelor held  -MS contin held  -monitoring, neurology following, medications adjusted  -CT head without " infarct/bleed/obvious metastatic disease  -currently receiving scheduled seroquel/melatonin at night only  -haldol/atarax are prn only    Rectal Cancer  Hx breast cancer   -Follows with Dr. Cavazos, Rad/Onc  -Follows Dr. Carpenter with oncology, currently on Xeloda, held for now  -continued treatment per Heme/Onc, Rad/Onc, oncology recommending reduced dose of xeloda (500 mg BID) and radiation/oncology recommending continued treatment, patient wishes to continue cancer treatment for now    Buttock wound  -wound care/pain control     ESRD on HD MWF  -Renal following for HD (had dialysis Saturday due to missed session on Friday)  -on HD today 3/15     HFpEF  Pulm HTN  PAF  HTN  HLD   -Eliquis resumed, monitor H&H, bleeding  -ASA/Amiodarone   -last ECHO EF 60% with grade II diastolic dysfunction, pulmonary HTN     Elevated Troponin   Mod-Severe AS   -no chest pain  -ECHO with mod-sev AS   -Has appt with Geena Estrella, APRN 4/27/23, can follow up    Expected Discharge Location and Transportation: Home/rehab  Expected Discharge   Expected Discharge Date and Time     Expected Discharge Date Expected Discharge Time    Mar 18, 2023            DVT prophylaxis:  Medical and mechanical DVT prophylaxis orders are present.     AM-PAC 6 Clicks Score (PT): 16 (03/17/23 1533)    CODE STATUS:   Code Status and Medical Interventions:   Ordered at: 03/10/23 1448     Medical Intervention Limits:    NO intubation (DNI)     Code Status (Patient has no pulse and is not breathing):    No CPR (Do Not Attempt to Resuscitate)     Medical Interventions (Patient has pulse or is breathing):    Limited Support       Champ Roís MD  03/18/23

## 2023-03-18 NOTE — PLAN OF CARE
Problem: Palliative Care  Goal: Enhanced Quality of Life  Outcome: Ongoing, Progressing  Intervention: Promote Advance Care Planning  Flowsheets (Taken 3/18/2023 1109)  Life Transition/Adjustment:   palliative care initiated   palliative care discussed   decision-making facilitated  Intervention: Maximize Comfort  Flowsheets  Taken 3/18/2023 1109  Pain Management Interventions:   care clustered   position adjusted   pillow support provided   quiet environment facilitated   prayer utilized  Taken 3/18/2023 1000  Pain Management Interventions:   care clustered   pain management plan reviewed with patient/caregiver   position adjusted   pillow support provided   quiet environment facilitated  Intervention: Optimize Function  Flowsheets (Taken 3/18/2023 1109)  Sensory Stimulation Regulation:   quiet environment promoted   care clustered   television on  Fatigue Management: activity assistance provided  Sleep/Rest Enhancement:   awakenings minimized   consistent schedule promoted   natural light exposure provided   regular sleep/rest pattern promoted  Intervention: Optimize Psychosocial Wellbeing  Flowsheets  Taken 3/18/2023 1109  Supportive Measures:   active listening utilized   verbalization of feelings encouraged   positive reinforcement provided  Family/Support System Care: support provided  Taken 3/18/2023 1000  Supportive Measures:   active listening utilized   decision-making supported   positive reinforcement provided   verbalization of feelings encouraged  Family/Support System Care: support provided   Goal Outcome Evaluation:      Patient was seen at bedside.  Therapy was present to work with patient to get up to the recliner.  Patient denied pain, SOB, N/V at this time.  No needs or concerns voiced at this time.  Plan for patient is to continue with her cancer treatment and to move in with her sister to assist her with her care needs.  DC is TBD.  Palliative will continue to follow for symptom management  and support.

## 2023-03-18 NOTE — THERAPY TREATMENT NOTE
Patient Name: Esperanza Pop  : 1945    MRN: [id number]                              Today's Date: 3/18/2023       Admit Date: 3/10/2023    Visit Dx:     ICD-10-CM ICD-9-CM   1. Symptomatic anemia  D64.9 285.9   2. Generalized weakness  R53.1 780.79   3. ESRD on dialysis ([facility])  N18.6 585.6    Z99.2 V45.11   4. Impaired mobility  Z74.09 799.89   5. Fall, initial encounter  W19.XXXA E888.9   6. Elevated troponin  R77.8 790.6   7. Acute on chronic diastolic heart failure ([facility])  I50.33 428.33   8. Type 2 diabetes mellitus with chronic kidney disease on chronic dialysis, with long-term current use of insulin ([facility])  E11.22 250.40    N18.6 585.9    Z99.2 V45.11    Z79.4 V58.67   9. Severe malnutrition ([facility])  E43 261   10. Malignant neoplasm of anal canal ([facility])  C21.1 154.2   11. PAF (paroxysmal atrial fibrillation) ([facility])  I48.0 427.31   12. Coronary artery disease involving native coronary artery of native heart with angina pectoris ([facility])  I25.119 414.01     413.9   13. End stage renal disease on dialysis ([facility])  N18.6 585.6    Z99.2 V45.11   14. Nonrheumatic aortic valve stenosis  I35.0 424.1   15. NSTEMI (non-ST elevated myocardial infarction) ([facility])  I21.4 410.70   16. Acute cystitis without hematuria  N30.00 595.0   17. Ischemic heart disease  I25.9 414.9   18. Congestive heart failure, unspecified HF chronicity, unspecified heart failure type ([facility])  I50.9 428.0   19. Malignant neoplasm of right female breast, unspecified estrogen receptor status, unspecified site of breast ([facility])  C50.911 174.9   20. Hematochezia  K92.1 578.1   21. Acute midline low back pain without sciatica  M54.50 724.2     Patient Active Problem List   Diagnosis   • Acute on chronic diastolic heart failure (HCC)   • Type 2 diabetes mellitus ([facility])   • Essential hypertension   • Coronary artery disease involving native coronary artery of native heart with angina pectoris (HCC)   • Breast cancer, female, right   • Seasonal allergic rhinitis   •  Right carotid bruit   • Vitamin B12 deficiency   • Hyperlipidemia LDL goal <70   • End stage renal disease on dialysis (HCC)   • Nonrheumatic aortic valve stenosis   • NSTEMI (non-ST elevated myocardial infarction) (HCC)   • UTI (urinary tract infection)   • Ischemic heart disease   • CHF (congestive heart failure) (HCC)   • Acute non-ST segment elevation myocardial infarction (STEMI) following previous myocardial infarction   • Dyslipidemia   • Diabetes mellitus (HCC)   • Mild obesity   • Elevated troponin   • Screen for colon cancer   • Acute midline low back pain without sciatica   • Hypertensive emergency   • PAF (paroxysmal atrial fibrillation) (HCC)   • Malignant neoplasm of anal canal (HCC)   • Hematochezia   • Severe malnutrition (HCC)   • Symptomatic anemia     Past Medical History:   Diagnosis Date   • Acute bronchitis    • Acute conjunctivitis    • Acute kidney injury (HCC)     Admission from 12/26/2013 to 01/02/2014, now resolved with latest creatinine 1.4 on 01/08/2014.   • Acute non-ST segment elevation myocardial infarction (STEMI) following previous myocardial infarction    • Anal cancer (HCC) 2/8/2023   • Anal cancer (HCC) 2/8/2023   • Arthritis    • Breast cancer, female, right     2005 mastectomy right   • Cancer (HCC)    • CHF (congestive heart failure) (HCC)    • Chronic kidney disease     dialysis MW, f/u [facility]    • Clotting disorder (HCC)    • COVID-19    • Diabetes mellitus (HCC)     diagnosed ~1992, checks fsbg 2-3x/day   • Diarrhea    • Dyslipidemia    • Dyspepsia    • Dyspnea    • Edema    • History of transfusion     ~2013 no reaction    • Hx of radiation therapy    • Hyperlipidemia    • Hypertension     Severe   • Ischemic heart disease    • Moderate obesity     BMI 36.2   • Myocardial infarction (HCC)    • Pneumonia    • Pneumonia 02/2019   • Radiation 2005   • Seasonal allergies    • Wears glasses      Past Surgical History:   Procedure Laterality Date   • AV FISTULA  PLACEMENT, BRACHIOBASILIC     • BREAST BIOPSY Left 2010   • BREAST CYST EXCISION     • BREAST LUMPECTOMY Right 2005   • BREAST SURGERY     • CARDIAC CATHETERIZATION N/A 10/10/2016    Procedure: Left Heart Cath;  Surgeon: Scooter Zhao MD;  Location:  [facility];  Service:    • CARDIAC CATHETERIZATION  10/2016   • CARDIAC CATHETERIZATION N/A 1/21/2021    Procedure: Right and Left Heart Cath;  Surgeon: Hugh Tidwell MD;  Location:  [facility];  Service: Cardiology;  Laterality: N/A;   • COLONOSCOPY N/A 7/23/2020    Procedure: COLONOSCOPY;  Surgeon: Rubén Rosas MD;  Location: [facility] ENDOSCOPY;  Service: Gastroenterology;  Laterality: N/A;   • CORONARY STENT PLACEMENT  2016   • HERNIA REPAIR     • HYSTERECTOMY  2000   • INSERTION HEMODIALYSIS CATHETER Right 6/29/2016    Procedure: HEMODIALYSIS CATHETER INSERTION TUNNELLED;  Surgeon: Ramón Chavez MD;  Location: [facility] OR;  Service:    • MASTECTOMY Right 2006   • OOPHORECTOMY     • REDUCTION MAMMAPLASTY Left 2005      General Information     Row Name 03/18/23 1103          Physical Therapy Time and Intention    Document Type therapy note (daily note)  -ML     Mode of Treatment physical therapy  -ML     Row Name 03/18/23 1103          General Information    Patient Profile Reviewed yes  -ML     Existing Precautions/Restrictions fall;orthostatic hypotension  -ML     Barriers to Rehab medically complex;previous functional deficit;cognitive status;visual deficit;ineffective coping  -ML     Row Name 03/18/23 1103          Cognition    Orientation Status (Cognition) oriented to;person;place;verbal cues/prompts needed for orientation;time;other (see comments)  confusion noted as treatment session time increased, patient more argumentative  -ML     Row Name 03/18/23 1103          Safety Issues, Functional Mobility    Safety Issues Affecting Function (Mobility) awareness of need for assistance;insight into  deficits/self-awareness;judgment;safety precaution awareness;safety precautions follow-through/compliance;sequencing abilities  -ML     Impairments Affecting Function (Mobility) balance;cognition;endurance/activity tolerance;pain;postural/trunk control;strength  -ML     Cognitive Impairments, Mobility Safety/Performance awareness, need for assistance;attention;insight into deficits/self-awareness;judgment;problem-solving/reasoning;safety precaution awareness;safety precaution follow-through;sequencing abilities  -ML           User Key  (r) = Recorded By, (t) = Taken By, (c) = Cosigned By    Initials Name Provider Type    ML Siria Shirley Physical Therapist               Mobility     Row Name 03/18/23 1105          Bed Mobility    Bed Mobility supine-sit;sit-supine;rolling left;scooting/bridging  -ML     Rolling Left [place] (Bed Mobility) verbal cues;maximum assist (25% patient effort);1 person assist  -ML     Scooting/Bridging [place] (Bed Mobility) verbal cues;dependent (less than 25% patient effort);2 person assist  -ML     Supine-Sit [place] (Bed Mobility) verbal cues;nonverbal cues (demo/gesture);moderate assist (50% patient effort);1 person assist  assist required with trunk  -ML     Sit-Supine [place] (Bed Mobility) verbal cues;nonverbal cues (demo/gesture);minimum assist (75% patient effort);1 person assist  assist required with BLE  -ML     Assistive Device (Bed Mobility) bed rails;head of bed elevated;draw sheet  -ML     Comment, (Bed Mobility) cues for sequencing  -ML     Row Name 03/18/23 1105          Transfers    Comment, (Transfers) Patient declined OOB transfers due to LE weakness despite education  -ML     Row Name 03/18/23 1105          Bed-Chair Transfer    Bed-Chair [place] (Transfers) not tested;unable to assess  -ML     Row Name 03/18/23 1105          Sit-Stand Transfer    Sit-Stand [place] (Transfers) unable to assess;not tested  -ML           User Key  (r) =  Recorded By, (t) = Taken By, (c) = Cosigned By    Initials Name Provider Type    ML Siria Shirley Physical Therapist               Obj/Interventions     Row Name 03/18/23 1108          Motor Skills    Motor Skills functional endurance  -ML     Functional Endurance decreased activity tolerance  -ML     Therapeutic Exercise knee  -ML     Row Name 03/18/23 1108          Knee (Therapeutic Exercise)    Knee (Therapeutic Exercise) strengthening exercise  -ML     Knee Strengthening (Therapeutic Exercise) bilateral;LAQ (long arc quad);10 repetitions  -ML     Row Name 03/18/23 1108          Balance    Balance Assessment sitting static balance;sitting dynamic balance  -ML     Static Sitting Balance standby assist  -ML     Dynamic Sitting Balance supervision;verbal cues  -ML     Position, Sitting Balance unsupported;sitting edge of bed  -ML     Balance Interventions sitting;static;dynamic  -ML     Comment, Balance Patient tolerated sittin at EOB for approx 15 minutes, with increased time patient with lateral truk flexion towards the left. Patient required verbal and tactile cues for upright sitting posture.  -ML           User Key  (r) = Recorded By, (t) = Taken By, (c) = Cosigned By    Initials Name Provider Type    Siria Fiore Physical Therapist               Goals/Plan    No documentation.                Clinical Impression     Row Name 03/18/23 1110          Pain    Pretreatment Pain Rating 5/10  -ML     Posttreatment Pain Rating 5/10  -ML     Pain Location - Side/Orientation Bilateral  -ML     Pain Location lower  -ML     Pain Location - extremity  -ML     Pre/Posttreatment Pain Comment Patient reports LE stiffness  -ML     Pain Intervention(s) Repositioned;Ambulation/increased activity;Rest  -ML     Row Name 03/18/23 1110          Plan of Care Review    Plan of Care Reviewed With patient  -ML     Progress declining  -ML     Outcome Evaluation Physical therapy treatment complete. The patient tolerated sitting at the  EOB for approx 15 minutes. Patient declined sit to stand or transfer to chair due to LE weakness, despite education. Patient with increased confusion/agitation with increased time spent with patient during treatment session. The patient presents below baseline for functional mobiltiy. Patient would continue to benefit from skilled PT to address strength, balance and activity tolerance deficits. Overall PT rehab potential is fair due to co-morbidities. Continue current PT POC.  -ML     Row Name 03/18/23 1110          Therapy Assessment/Plan (PT)    Rehab Potential (PT) fair, will monitor progress closely  -ML     Row Name 03/18/23 1110          Vital Signs    Pre Patient Position Supine  -ML     Intra Patient Position Sitting  -ML     Post Patient Position Supine  -ML     Row Name 03/18/23 1110          Positioning and Restraints    Pre-Treatment Position in bed  -ML     Post Treatment Position bed  -ML     In Bed notified nsg;fowlers;call light within reach;encouraged to call for assist;exit alarm on;side lying left;side rails up x3  -ML           User Key  (r) = Recorded By, (t) = Taken By, (c) = Cosigned By    Initials Name Provider Type    Siria Fiore Physical Therapist               Outcome Measures     Row Name 03/18/23 1116          How much help from another person do you currently need...    Turning from your back to your side while in flat bed without using bedrails? 2  -ML     Moving from lying on back to sitting on the side of a flat bed without bedrails? 2  -ML     Moving to and from a bed to a chair (including a wheelchair)? 2  -ML     Standing up from a chair using your arms (e.g., wheelchair, bedside chair)? 2  -ML     Climbing 3-5 steps with a railing? 1  -ML     To walk in hospital room? 1  -ML     AM-PAC 6 Clicks Score (PT) 10  -ML     Highest level of mobility 4 --> Transferred to chair/commode  -ML     Row Name 03/18/23 1116          Functional Assessment    Outcome Measure Options AM-PAC 6  Clicks Basic Mobility (PT)  -           User Key  (r) = Recorded By, (t) = Taken By, (c) = Cosigned By    Initials Name Provider Type    ML Siria Shirley Physical Therapist                             Physical Therapy Education     Title: PT OT SLP Therapies (In Progress)     Topic: Physical Therapy (In Progress)     Point: Mobility training (In Progress)     Learning Progress Summary           Patient Acceptance, E, NR by  at 3/18/2023 1116    Acceptance, E,TB, VU,NR by  at 3/17/2023 1534    Acceptance, E, NR by KR at 3/14/2023 1532    Acceptance, E, NR by KR at 3/13/2023 1011                   Point: Home exercise program (In Progress)     Learning Progress Summary           Patient Acceptance, E, NR by KR at 3/14/2023 1532    Acceptance, E, NR by KR at 3/13/2023 1011                   Point: Body mechanics (Done)     Learning Progress Summary           Patient Acceptance, E,TB, VU,NR by  at 3/17/2023 1534    Acceptance, E, NR by KR at 3/14/2023 1532    Acceptance, E, NR by KR at 3/13/2023 1011                   Point: Precautions (In Progress)     Learning Progress Summary           Patient Acceptance, E, NR by  at 3/18/2023 1116    Acceptance, E,TB, VU,NR by  at 3/17/2023 1534    Acceptance, E, NR by KR at 3/14/2023 1532    Acceptance, E, NR by KR at 3/13/2023 1011                               User Key     Initials Effective Dates Name Provider Type Discipline     04/22/21 -  Siria Shirley Physical Therapist PT     09/22/22 -  Heydi Burnett, PT Physical Therapist PT    KR 12/30/22 -  Starla Nicholas, PT Physical Therapist PT              PT Recommendation and Plan     Plan of Care Reviewed With: patient  Progress: declining  Outcome Evaluation: Physical therapy treatment complete. The patient tolerated sitting at the EOB for approx 15 minutes. Patient declined sit to stand or transfer to chair due to LE weakness, despite education. Patient with increased confusion/agitation with increased time  spent with patient during treatment session. The patient presents below baseline for functional mobiltiy. Patient would continue to benefit from skilled PT to address strength, balance and activity tolerance deficits. Overall PT rehab potential is fair due to co-morbidities. Continue current PT POC.     Time Calculation:    PT Charges     Row Name 03/18/23 1117             Time Calculation    Start Time 0955  -ML      PT Received On 03/18/23  -ML         Timed Charges    33872 - PT Therapeutic Activity Minutes 30  -ML         Total Minutes    Timed Charges Total Minutes 30  -ML       Total Minutes 30  -ML            User Key  (r) = Recorded By, (t) = Taken By, (c) = Cosigned By    Initials Name Provider Type    Siria Fiore Physical Therapist              Therapy Charges for Today     Code Description Service Date Service Provider Modifiers Qty    08973819345 HC PT THERAPEUTIC ACT EA 15 MIN 3/18/2023 Siria Shirley GP 2          PT G-Codes  Outcome Measure Options: AM-PAC 6 Clicks Basic Mobility (PT)  AM-PAC 6 Clicks Score (PT): 10  AM-PAC 6 Clicks Score (OT): 16       Siria Shirley  3/18/2023

## 2023-03-18 NOTE — PLAN OF CARE
Goal Outcome Evaluation:  Plan of Care Reviewed With: patient        Progress: no change  Outcome Evaluation: pt. transfered to floor around 1330, gave PRN norco for pain, changed dressings, resting well, HD MWF, confused at times, will continue to monitor

## 2023-03-18 NOTE — PLAN OF CARE
Goal Outcome Evaluation:  Plan of Care Reviewed With: patient        Progress: declining  Outcome Evaluation: Physical therapy treatment complete. The patient tolerated sitting at the EOB for approx 15 minutes. Patient declined sit to stand or transfer to chair due to LE weakness, despite education. Patient with increased confusion/agitation with increased time spent with patient during treatment session. The patient presents below baseline for functional mobility. Patient would continue to benefit from skilled PT to address strength, balance and activity tolerance deficits. Overall PT rehab potential is fair due to co-morbidities. Continue current PT POC.

## 2023-03-18 NOTE — PROGRESS NOTES
visit as part of palliative care.  This  had visited with patient prior (3/10). At this visit, patient presented as confused, whispering that she does not trust her care.  Patient is very active in her [employer], appreciated pastoral visit and prayer.  Will continue to follow on 5B.

## 2023-03-18 NOTE — PLAN OF CARE
Goal Outcome Evaluation:           Progress: no change  Outcome Evaluation: periods of confusion through the night, pt refused some of her oral medications.

## 2023-03-18 NOTE — NURSING NOTE
"Called and spoke with Jeri Pop, patients daughter, regarding discharge plans whether or not they're going to be able to get patient to her radiation treatments and dialysis. Ms. Pop stated \"we would rather have her stay at the hospital, until her treatments are completed. She's not moving well enough for us to be able to care for her and to transport her every day.\" This RN informed Ms. Pop that the primary nurse, Alicia, would update her when the moved was completed.  She verbalized understanding and was given the room number.     This information was relayed to Dr. Ríos and he said[facility] Updated Alicia, primary nurse of this conversation.  "

## 2023-03-18 NOTE — PROGRESS NOTES
" LOS: 3 days   Patient Care Team:  Meghana Rodgers MD as PCP - General  Demetrius Sagastume MD as Consulting Physician (Cardiology)  Kevan Lerma MD as Consulting Physician (Nephrology)  Geena Estrella APRN as Nurse Practitioner (Cardiology)  Frank Mandujano MD as Referring Physician (Colon and Rectal Surgery)  Kevan Cavazos MD as Consulting Physician (Radiation Oncology)  Yue Reeves RN as Nurse Navigator  Darryn Burk MD as Consulting Physician (Nephrology)    Chief Complaint: F/U ESRD    Subjective :         Subjective:  Symptoms:  Stable.  No shortness of breath or chest pain.    Diet:  Adequate intake.        History taken from: patient    Objective     Vital Sign Min/Max for last 24 hours  Temp  Min: 98.1 °F (36.7 °C)  Max: 99 °F (37.2 °C)   BP  Min: 99/48  Max: 160/86   Pulse  Min: 55  Max: 61   Resp  Min: 16  Max: 20   SpO2  Min: 97 %  Max: 98 %   No data recorded   No data recorded     Flowsheet Rows    Flowsheet Row First Filed Value   Admission Height 167.6 cm (66\") Documented at 03/10/2023 1122   Admission Weight 76.7 kg (169 lb) Documented at 03/10/2023 1122          No intake/output data recorded.  I/O last 3 completed shifts:  In: -   Out: 2080 [Other:2080]    Objective:  General Appearance:  Comfortable.    Vital signs: (most recent): Blood pressure 132/59, pulse 60, temperature 98.8 °F (37.1 °C), resp. rate 16, height 167.6 cm (65.98\"), weight 79.1 kg (174 lb 6.1 oz), SpO2 97 %, not currently breastfeeding.  Vital signs are normal.    Output: Minimal urine output.    HEENT: Normal HEENT exam.    Lungs:  Normal effort and normal respiratory rate.  Breath sounds clear to auscultation.  She is not in respiratory distress.  No decreased breath sounds or wheezes.    Heart: Normal rate.    Extremities: There is no dependent edema.  (AVF)  Pulses: Distal pulses are intact.    Neurological: Patient is alert.  Normal strength.  (A LITTLE CONFUSED).              Results Review:  "    I reviewed the patient's new clinical results.    WBC WBC   Date Value Ref Range Status   03/18/2023 5.48 3.40 - 10.80 10*3/mm3 Final   03/16/2023 3.96 3.40 - 10.80 10*3/mm3 Final      HGB Hemoglobin   Date Value Ref Range Status   03/18/2023 9.3 (L) 12.0 - 15.9 g/dL Final   03/16/2023 8.6 (L) 12.0 - 15.9 g/dL Final      HCT Hematocrit   Date Value Ref Range Status   03/18/2023 31.4 (L) 34.0 - 46.6 % Final   03/16/2023 27.8 (L) 34.0 - 46.6 % Final      Platlets No results found for: LABPLAT   MCV MCV   Date Value Ref Range Status   03/18/2023 102.3 (H) 79.0 - 97.0 fL Final   03/16/2023 98.6 (H) 79.0 - 97.0 fL Final          Sodium Sodium   Date Value Ref Range Status   03/18/2023 134 (L) 136 - 145 mmol/L Final      Potassium Potassium   Date Value Ref Range Status   03/18/2023 4.5 3.5 - 5.2 mmol/L Final      Chloride Chloride   Date Value Ref Range Status   03/18/2023 100 98 - 107 mmol/L Final      CO2 CO2   Date Value Ref Range Status   03/18/2023 21.0 (L) 22.0 - 29.0 mmol/L Final      BUN BUN   Date Value Ref Range Status   03/18/2023 19 8 - 23 mg/dL Final      Creatinine Creatinine   Date Value Ref Range Status   03/18/2023 4.41 (H) 0.57 - 1.00 mg/dL Final      Calcium Calcium   Date Value Ref Range Status   03/18/2023 8.6 8.6 - 10.5 mg/dL Final      PO4 No results found for: CAPO4   Albumin Albumin   Date Value Ref Range Status   03/18/2023 2.7 (L) 3.5 - 5.2 g/dL Final      Magnesium No results found for: MG   Uric Acid No results found for: URICACID     Medication Review: yes    Assessment & Plan       Hematochezia    Type 2 diabetes mellitus (HCC)    Essential hypertension    Hyperlipidemia LDL goal <70    End stage renal disease on dialysis (HCC)    CHF (congestive heart failure) (HCC)    PAF (paroxysmal atrial fibrillation) (HCC)    Severe malnutrition (HCC)    Symptomatic anemia      Assessment & Plan     ESRD: MWF [facility]. Missed HD before admission      Access: AV fistula      Anemia: Transfuse at Hb 7 or  less. On chemo for anal ca     Volume status: Dependent edema noted.    HTN: Bp elevated.   HYPONATREMIA. MILD. SHOULD CORRECT WITH HD.   I discussed the patients findings and my recommendations with patient  PLAN. NEXT HD 3/20/23.   Zeb Owusu MD  03/18/23  12:38 EDT

## 2023-03-19 LAB
GLUCOSE BLDC GLUCOMTR-MCNC: 124 MG/DL (ref 70–130)
GLUCOSE BLDC GLUCOMTR-MCNC: 133 MG/DL (ref 70–130)
GLUCOSE BLDC GLUCOMTR-MCNC: 189 MG/DL (ref 70–130)

## 2023-03-19 PROCEDURE — G0378 HOSPITAL OBSERVATION PER HR: HCPCS

## 2023-03-19 PROCEDURE — 97530 THERAPEUTIC ACTIVITIES: CPT

## 2023-03-19 PROCEDURE — 96376 TX/PRO/DX INJ SAME DRUG ADON: CPT

## 2023-03-19 PROCEDURE — 99231 SBSQ HOSP IP/OBS SF/LOW 25: CPT | Performed by: HOSPITALIST

## 2023-03-19 PROCEDURE — 97535 SELF CARE MNGMENT TRAINING: CPT

## 2023-03-19 PROCEDURE — 25010000002 HALOPERIDOL LACTATE PER 5 MG: Performed by: FAMILY MEDICINE

## 2023-03-19 PROCEDURE — 63710000001 INSULIN LISPRO (HUMAN) PER 5 UNITS: Performed by: FAMILY MEDICINE

## 2023-03-19 PROCEDURE — 82962 GLUCOSE BLOOD TEST: CPT

## 2023-03-19 RX ORDER — DIAPER,BRIEF,INFANT-TODD,DISP
1 EACH MISCELLANEOUS 2 TIMES DAILY PRN
Status: DISCONTINUED | OUTPATIENT
Start: 2023-03-19 | End: 2023-03-29 | Stop reason: HOSPADM

## 2023-03-19 RX ADMIN — HYDROCODONE BITARTRATE AND ACETAMINOPHEN 0.5 TABLET: 10; 325 TABLET ORAL at 15:49

## 2023-03-19 RX ADMIN — APIXABAN 2.5 MG: 2.5 TABLET, FILM COATED ORAL at 20:22

## 2023-03-19 RX ADMIN — HYDROCORTISONE 1 APPLICATION: 1 CREAM TOPICAL at 18:48

## 2023-03-19 RX ADMIN — INSULIN LISPRO 2 UNITS: 100 INJECTION, SOLUTION INTRAVENOUS; SUBCUTANEOUS at 13:00

## 2023-03-19 RX ADMIN — ISOSORBIDE MONONITRATE 120 MG: 120 TABLET, EXTENDED RELEASE ORAL at 09:41

## 2023-03-19 RX ADMIN — CLONIDINE HYDROCHLORIDE 0.4 MG: 0.1 TABLET ORAL at 20:22

## 2023-03-19 RX ADMIN — CARVEDILOL 12.5 MG: 12.5 TABLET, FILM COATED ORAL at 09:42

## 2023-03-19 RX ADMIN — QUETIAPINE FUMARATE 25 MG: 25 TABLET ORAL at 20:22

## 2023-03-19 RX ADMIN — HALOPERIDOL LACTATE 0.5 MG: 5 INJECTION, SOLUTION INTRAMUSCULAR at 23:35

## 2023-03-19 RX ADMIN — CLONIDINE HYDROCHLORIDE 0.4 MG: 0.1 TABLET ORAL at 09:42

## 2023-03-19 RX ADMIN — HYDROXYZINE HYDROCHLORIDE 25 MG: 25 TABLET ORAL at 03:16

## 2023-03-19 RX ADMIN — Medication 10 ML: at 09:43

## 2023-03-19 RX ADMIN — ASPIRIN 81 MG: 81 TABLET, COATED ORAL at 09:42

## 2023-03-19 RX ADMIN — APIXABAN 2.5 MG: 2.5 TABLET, FILM COATED ORAL at 09:42

## 2023-03-19 RX ADMIN — Medication 5 MG: at 20:22

## 2023-03-19 RX ADMIN — HYDROCODONE BITARTRATE AND ACETAMINOPHEN 0.5 TABLET: 10; 325 TABLET ORAL at 07:50

## 2023-03-19 RX ADMIN — Medication 10 ML: at 20:23

## 2023-03-19 RX ADMIN — ATORVASTATIN CALCIUM 80 MG: 40 TABLET, FILM COATED ORAL at 20:22

## 2023-03-19 RX ADMIN — AMIODARONE HYDROCHLORIDE 200 MG: 200 TABLET ORAL at 09:41

## 2023-03-19 RX ADMIN — HYDROCORTISONE 2.5%: 25 CREAM TOPICAL at 09:42

## 2023-03-19 NOTE — PLAN OF CARE
Goal Outcome Evaluation:  Plan of Care Reviewed With: patient, family        Progress: no change  Outcome Evaluation: Pt. rested throughout the night, no c/o pain, pt. is restless and expresses anxiety, VSS, PRN given, pt. confused at times, will continue to monitor

## 2023-03-19 NOTE — THERAPY TREATMENT NOTE
Patient Name: Esperanza Pop  : 1945    MRN: [id number]                              Today's Date: 3/19/2023       Admit Date: 3/10/2023    Visit Dx:     ICD-10-CM ICD-9-CM   1. Symptomatic anemia  D64.9 285.9   2. Generalized weakness  R53.1 780.79   3. ESRD on dialysis ([facility])  N18.6 585.6    Z99.2 V45.11   4. Impaired mobility  Z74.09 799.89   5. Fall, initial encounter  W19.XXXA E888.9   6. Elevated troponin  R77.8 790.6   7. Acute on chronic diastolic heart failure ([facility])  I50.33 428.33   8. Type 2 diabetes mellitus with chronic kidney disease on chronic dialysis, with long-term current use of insulin ([facility])  E11.22 250.40    N18.6 585.9    Z99.2 V45.11    Z79.4 V58.67   9. Severe malnutrition ([facility])  E43 261   10. Malignant neoplasm of anal canal ([facility])  C21.1 154.2   11. PAF (paroxysmal atrial fibrillation) ([facility])  I48.0 427.31   12. Coronary artery disease involving native coronary artery of native heart with angina pectoris ([facility])  I25.119 414.01     413.9   13. End stage renal disease on dialysis ([facility])  N18.6 585.6    Z99.2 V45.11   14. Nonrheumatic aortic valve stenosis  I35.0 424.1   15. NSTEMI (non-ST elevated myocardial infarction) ([facility])  I21.4 410.70   16. Acute cystitis without hematuria  N30.00 595.0   17. Ischemic heart disease  I25.9 414.9   18. Congestive heart failure, unspecified HF chronicity, unspecified heart failure type ([facility])  I50.9 428.0   19. Malignant neoplasm of right female breast, unspecified estrogen receptor status, unspecified site of breast ([facility])  C50.911 174.9   20. Hematochezia  K92.1 578.1   21. Acute midline low back pain without sciatica  M54.50 724.2     Patient Active Problem List   Diagnosis   • Acute on chronic diastolic heart failure (HCC)   • Type 2 diabetes mellitus ([facility])   • Essential hypertension   • Coronary artery disease involving native coronary artery of native heart with angina pectoris (HCC)   • Breast cancer, female, right   • Seasonal allergic rhinitis   •  Right carotid bruit   • Vitamin B12 deficiency   • Hyperlipidemia LDL goal <70   • End stage renal disease on dialysis (HCC)   • Nonrheumatic aortic valve stenosis   • NSTEMI (non-ST elevated myocardial infarction) (HCC)   • UTI (urinary tract infection)   • Ischemic heart disease   • CHF (congestive heart failure) (HCC)   • Acute non-ST segment elevation myocardial infarction (STEMI) following previous myocardial infarction   • Dyslipidemia   • Diabetes mellitus (HCC)   • Mild obesity   • Elevated troponin   • Screen for colon cancer   • Acute midline low back pain without sciatica   • Hypertensive emergency   • PAF (paroxysmal atrial fibrillation) (HCC)   • Malignant neoplasm of anal canal (HCC)   • Hematochezia   • Severe malnutrition (HCC)   • Symptomatic anemia     Past Medical History:   Diagnosis Date   • Acute bronchitis    • Acute conjunctivitis    • Acute kidney injury (HCC)     Admission from 12/26/2013 to 01/02/2014, now resolved with latest creatinine 1.4 on 01/08/2014.   • Acute non-ST segment elevation myocardial infarction (STEMI) following previous myocardial infarction    • Anal cancer (HCC) 2/8/2023   • Anal cancer (HCC) 2/8/2023   • Arthritis    • Breast cancer, female, right     2005 mastectomy right   • Cancer (HCC)    • CHF (congestive heart failure) (HCC)    • Chronic kidney disease     dialysis MW, f/u [facility]    • Clotting disorder (HCC)    • COVID-19    • Diabetes mellitus (HCC)     diagnosed ~1992, checks fsbg 2-3x/day   • Diarrhea    • Dyslipidemia    • Dyspepsia    • Dyspnea    • Edema    • History of transfusion     ~2013 no reaction    • Hx of radiation therapy    • Hyperlipidemia    • Hypertension     Severe   • Ischemic heart disease    • Moderate obesity     BMI 36.2   • Myocardial infarction (HCC)    • Pneumonia    • Pneumonia 02/2019   • Radiation 2005   • Seasonal allergies    • Wears glasses      Past Surgical History:   Procedure Laterality Date   • AV FISTULA  PLACEMENT, BRACHIOBASILIC     • BREAST BIOPSY Left 2010   • BREAST CYST EXCISION     • BREAST LUMPECTOMY Right 2005   • BREAST SURGERY     • CARDIAC CATHETERIZATION N/A 10/10/2016    Procedure: Left Heart Cath;  Surgeon: Scooter Zhao MD;  Location:  [facility];  Service:    • CARDIAC CATHETERIZATION  10/2016   • CARDIAC CATHETERIZATION N/A 1/21/2021    Procedure: Right and Left Heart Cath;  Surgeon: Hugh Tidwell MD;  Location:  [facility];  Service: Cardiology;  Laterality: N/A;   • COLONOSCOPY N/A 7/23/2020    Procedure: COLONOSCOPY;  Surgeon: Rubén Rosas MD;  Location: [facility];  Service: Gastroenterology;  Laterality: N/A;   • CORONARY STENT PLACEMENT  2016   • HERNIA REPAIR     • HYSTERECTOMY  2000   • INSERTION HEMODIALYSIS CATHETER Right 6/29/2016    Procedure: HEMODIALYSIS CATHETER INSERTION TUNNELLED;  Surgeon: Ramón Chavez MD;  Location: [facility] OR;  Service:    • MASTECTOMY Right 2006   • OOPHORECTOMY     • REDUCTION MAMMAPLASTY Left 2005      General Information     Row Name 03/19/23 1427          OT Time and Intention    Document Type therapy note (daily note)  -     Mode of Treatment occupational therapy  -     Row Name 03/19/23 1427          General Information    Patient Profile Reviewed yes  -JR     Existing Precautions/Restrictions fall  orthostatic HTN  -JR     Barriers to Rehab medically complex;previous functional deficit;cognitive status  -     Row Name 03/19/23 1427          Cognition    Orientation Status (Cognition) oriented to;person;place  Pt oriented to month, reported year as 2022. Pt lethargic, confused/mumbling, difficult to maintain alert level this date  -     Row Name 03/19/23 1427          Safety Issues, Functional Mobility    Safety Issues Affecting Function (Mobility) ability to follow commands;awareness of need for assistance;insight into deficits/self-awareness;judgment;problem-solving;safety  precaution awareness;safety precautions follow-through/compliance  -     Impairments Affecting Function (Mobility) balance;cognition;coordination;endurance/activity tolerance;strength;pain  -     Cognitive Impairments, Mobility Safety/Performance attention;awareness, need for assistance;safety precaution awareness;insight into deficits/self-awareness;judgment;problem-solving/reasoning;safety precaution follow-through  -           User Key  (r) = Recorded By, (t) = Taken By, (c) = Cosigned By    Initials Name Provider Type    JR Bernadette Aguirre OT Occupational Therapist                 Mobility/ADL's     Row Name 03/19/23 1429          Bed Mobility    Bed Mobility supine-sit;sit-supine  -     Supine-Sit [place] (Bed Mobility) minimum assist (75% patient effort);verbal cues  -     Sit-Supine [place] (Bed Mobility) moderate assist (50% patient effort);verbal cues  -     Bed Mobility, Safety Issues cognitive deficits limit understanding;decreased use of arms for pushing/pulling;decreased use of legs for bridging/pushing;impaired trunk control for bed mobility  -     Assistive Device (Bed Mobility) bed rails;draw sheet;head of bed elevated  -     Comment, (Bed Mobility) Pt required verbal cues for sequencing and increased time for supine to sit on EOB. Pt required assist to bring LE's to EOB and bring trunk into sitting.  -     Row Name 03/19/23 1429          Transfers    Transfers sit-stand transfer  -     Row Name 03/19/23 1429          Sit-Stand Transfer    Sit-Stand [place] (Transfers) moderate assist (50% patient effort);verbal cues  -     Assistive Device (Sit-Stand Transfers) other (see comments)  UE support  -     Comment, (Sit-Stand Transfer) Pt completed sit to stand x 3 from EOB with UE support and verbal cues for technique and safety. Pt leaning posterior in standing.  -     Row Name 03/19/23 1429          Activities of Daily Living    BADL Assessment/Intervention  feeding  -[facility] Name 03/19/23 1429          Grooming Assessment/Training    Comment, (Grooming) Pt declined grooming this date  -[facility] Name 03/19/23 1429          Self-Feeding Assessment/Training    Assistive Devices (Feeding) adapted cup   -     Comment, (Feeding) RN requested cup with lid and handle. Family arrived at end of treatment session. Family educated regarding adaptive cup.  -           User Key  (r) = Recorded By, (t) = Taken By, (c) = Cosigned By    Initials Name Provider Type    Bernadette Smith, OT Occupational Therapist               Obj/Interventions     [facility] Name 03/19/23 1432          Shoulder (Therapeutic Exercise)    Shoulder (Therapeutic Exercise) AROM (active range of motion)  -     Shoulder AROM (Therapeutic Exercise) bilateral;flexion;extension;10 repetitions  -[facility] Name 03/19/23 1432          Elbow/Forearm (Therapeutic Exercise)    Elbow/Forearm (Therapeutic Exercise) AROM (active range of motion)  -     Elbow/Forearm AROM (Therapeutic Exercise) bilateral;flexion;extension;supination;pronation;10 repetitions  -[facility] Name 03/19/23 1432          Motor Skills    Therapeutic Exercise shoulder;elbow/forearm  -[facility] Name 03/19/23 1432          Balance    Balance Assessment sitting static balance;standing dynamic balance  -     Static Sitting Balance contact guard  -     Static Standing Balance moderate assist  -     Position/Device Used, Standing Balance supported  -           User Key  (r) = Recorded By, (t) = Taken By, (c) = Cosigned By    Initials Name Provider Type    Bernadette Smith, OT Occupational Therapist               Goals/Plan    No documentation.                Clinical Impression     [facility] Name 03/19/23 1433          Pain Assessment    Pain Intervention(s) Repositioned  -     Additional Documentation Pain Scale: FACES Pre/Post-Treatment (Group)  -[facility] Name 03/19/23 1433          Pain Scale: FACES Pre/Post-Treatment    Pain:  FACES Scale, Pretreatment 2-->hurts little bit  -JR     Posttreatment Pain Rating 4-->hurts little more  -JR     Pain Location - perineum  -JR     Row Name 03/19/23 1433          Plan of Care Review    Plan of Care Reviewed With patient  -JR     Outcome Evaluation Pt improving with mobility this date and willingness to participate, however pt lethargic and confused. Pt would benefit from continued skilled OT services to assist in returning pt to her baseline ADL status. Recommend SNF at d/c to ensure safety.  -JR     Row Name 03/19/23 1433          Therapy Assessment/Plan (OT)    Patient/Family Therapy Goal Statement (OT) decrease sacral pain  -JR     Row Name 03/19/23 1433          Therapy Plan Review/Discharge Plan (OT)    Anticipated Discharge Disposition (OT) skilled nursing facility  -JR     Row Name 03/19/23 1433          Positioning and Restraints    Pre-Treatment Position in bed  -JR     Post Treatment Position bed  -JR     In Bed notified nsg;supine;call light within reach;encouraged to call for assist;exit alarm on;with family/caregiver  -JR           User Key  (r) = Recorded By, (t) = Taken By, (c) = Cosigned By    Initials Name Provider Type    JR Bernadette Aguirre, OT Occupational Therapist               Outcome Measures     Row Name 03/19/23 1436          How much help from another is currently needed...    Putting on and taking off regular lower body clothing? 1  -JR     Bathing (including washing, rinsing, and drying) 2  -JR     Toileting (which includes using toilet bed pan or urinal) 1  -JR     Putting on and taking off regular upper body clothing 2  -JR     Taking care of personal grooming (such as brushing teeth) 2  -JR     Eating meals 3  -JR     AM-PAC 6 Clicks Score (OT) 11  -JR     Row Name 03/19/23 0800          How much help from another person do you currently need...    Turning from your back to your side while in flat bed without using bedrails? 2  -KF     Moving from lying on back to  sitting on the side of a flat bed without bedrails? 2  -KF     Moving to and from a bed to a chair (including a wheelchair)? 2  -KF     Standing up from a chair using your arms (e.g., wheelchair, bedside chair)? 2  -KF     Climbing 3-5 steps with a railing? 2  -KF     To walk in hospital room? 2  -KF     AM-PAC 6 Clicks Score (PT) 12  -KF     Highest level of mobility 4 --> Transferred to chair/commode  -KF     Row Name 03/19/23 1436          Functional Assessment    Outcome Measure Options AM-PAC 6 Clicks Daily Activity (OT)  -           User Key  (r) = Recorded By, (t) = Taken By, (c) = Cosigned By    Initials Name Provider Type    Bernadette Smith OT Occupational Therapist    Allyson Banerjee RN Registered Nurse                Occupational Therapy Education     Title: PT OT SLP Therapies (In Progress)     Topic: Occupational Therapy (In Progress)     Point: ADL training (In Progress)     Description:   Instruct learner(s) on proper safety adaptation and remediation techniques during self care or transfers.   Instruct in proper use of assistive devices.              Learning Progress Summary           Patient Acceptance, E, NR by  at 3/19/2023 1313    Acceptance, E, NR by AC at 3/16/2023 0956    Acceptance, E, NL,NR by JOESPH at 3/13/2023 1013    Comment: OT POC; fall prevention; orientation   Family Acceptance, E, NR by  at 3/19/2023 1313                   Point: Home exercise program (In Progress)     Description:   Instruct learner(s) on appropriate technique for monitoring, assisting and/or progressing therapeutic exercises/activities.              Learning Progress Summary           Patient Acceptance, E, NR by JR at 3/19/2023 1313   Family Acceptance, E, NR by JR at 3/19/2023 1313                   Point: Precautions (In Progress)     Description:   Instruct learner(s) on prescribed precautions during self-care and functional transfers.              Learning Progress Summary           Patient  Acceptance, E, NL,NR by JOESPH at 3/13/2023 1013    Comment: OT POC; fall prevention; orientation                   Point: Body mechanics (In Progress)     Description:   Instruct learner(s) on proper positioning and spine alignment during self-care, functional mobility activities and/or exercises.              Learning Progress Summary           Patient Acceptance, E, NL,NR by JOESPH at 3/13/2023 1013    Comment: OT POC; fall prevention; orientation                               User Key     Initials Effective Dates Name Provider Type Discipline     02/03/23 -  Radha Carson, OT Occupational Therapist OT    JR 02/03/23 -  Bernadette Aguirre, OT Occupational Therapist OT    JOESPH 06/16/21 -  Merline Aguila, OT Occupational Therapist OT              OT Recommendation and Plan     Plan of Care Review  Plan of Care Reviewed With: patient  Outcome Evaluation: Pt improving with mobility this date and willingness to participate, however pt lethargic and confused. Pt would benefit from continued skilled OT services to assist in returning pt to her baseline ADL status. Recommend SNF at d/c to ensure safety.     Time Calculation:    Time Calculation- OT     Row Name 03/19/23 1438             Time Calculation- OT    OT Start Time 1313  -JR      OT Received On 03/19/23  -JR         Timed Charges    72957 - OT Therapeutic Activity Minutes 14  -JR      16550 - OT Self Care/Mgmt Minutes 18  -JR         Total Minutes    Timed Charges Total Minutes 32  -JR       Total Minutes 32  -JR            User Key  (r) = Recorded By, (t) = Taken By, (c) = Cosigned By    Initials Name Provider Type     Bernadette Aguirre OT Occupational Therapist              Therapy Charges for Today     Code Description Service Date Service Provider Modifiers Qty    43499861286 HC OT THERAPEUTIC ACT EA 15 MIN 3/19/2023 Bernadette Aguirre OT GO 1    08534466859 HC OT SELF CARE/MGMT/TRAIN EA 15 MIN 3/19/2023 Bernadette Aguirre OT GO 1               Bernadette Aguirre,  OT  3/19/2023

## 2023-03-19 NOTE — PLAN OF CARE
Goal Outcome Evaluation:  Plan of Care Reviewed With: patient           Outcome Evaluation: Pt improving with mobility this date and willingness to participate, however pt lethargic and confused. Pt would benefit from continued skilled OT services to assist in returning pt to her baseline ADL status. Recommend SNF at d/c to ensure safety.

## 2023-03-19 NOTE — PROGRESS NOTES
[facility] Medicine Services  PROGRESS NOTE    Patient Name: Esperanza Pop  : 1945  MRN: [id number]    Date of Admission: 3/10/2023  Primary Care Physician: Meghana Rodgers MD    Subjective   Subjective     CC:  F/U generalized weakness     HPI:  Notes buttock pain/sacral pain. Hungry. No f/c. No dyspnea. Tired.    ROS:  Gen- No fevers, chills  CV- No chest pain, palpitations  Resp- No cough, dyspnea  GI- No N/V/D, abd pain  No change from 3/18 otherwise    Objective   Objective     Vital Signs:   Temp:  [97.9 °F (36.6 °C)-98.8 °F (37.1 °C)] 97.9 °F (36.6 °C)  Heart Rate:  [51-67] 64  Resp:  [16-17] 16  BP: ()/(48-69) 133/69     Physical Exam:  NAD, alert and oriented x 3  OP clear, MMM  Neck supple  No LAD  RRR, 3/6 murmur, systolic  CTAB  +BS, ND, NT, soft  DONG  Normal affect  No rashes  B LE edema  Buttock/sacral wounds dressed/packed  No change from 3/18    Results Reviewed:  LAB RESULTS:      Lab 03/18/23  0447 03/16/23  0451 03/15/23  0551 03/14/23  0823  1031   WBC 5.48 3.96 3.95 4.73 3.95   HEMOGLOBIN 9.3* 8.6* 8.5* 8.3* 9.2*   HEMATOCRIT 31.4* 27.8* 28.0* 27.1* 31.3*   PLATELETS 111* 103* 83* 85* 71*   NEUTROS ABS  --   --   --   --  2.90   IMMATURE GRANS (ABS)  --   --   --   --  0.04   LYMPHS ABS  --   --   --   --  0.43*   MONOS ABS  --   --   --   --  0.57   EOS ABS  --   --   --   --  0.00   .3* 98.6* 99.6* 98.9* 101.6*         Lab 03/18/23  0447 03/15/23  0551 23  0838 23  1031   SODIUM 134* 134* 133* 134*   POTASSIUM 4.5 3.9 4.3 4.2   CHLORIDE 100 97* 97* 94*   CO2 21.0* 23.0 24.0 20.0*   ANION GAP 13.0 14.0 12.0 20.0*   BUN 19 30* 24* 34*   CREATININE 4.41* 6.70* 5.11* 6.82*   EGFR 9.9* 6.0* 8.3* 5.8*   GLUCOSE 125* 116* 143* 89   CALCIUM 8.6 8.7 8.7 8.7   PHOSPHORUS 2.8  --  3.5  --          Lab 23  0447 23  0838   ALBUMIN 2.7* 3.1*                     Brief Urine Lab Results     None          Microbiology Results  Abnormal     None          No radiology results from the last 24 hrs    Results for orders placed during the hospital encounter of 03/10/23    Adult Transthoracic Echo Complete W/ Cont if Necessary Per Protocol    Interpretation Summary  •  Left ventricular systolic function is normal. Estimated left ventricular EF = 55%  •  Left ventricular wall thickness is consistent with moderate concentric hypertrophy.  •  The right ventricular cavity is mildly dilated.  •  Mild to moderate biatrial enlargement.  •  Moderate to severe aortic valve stenosis is present.  Mean gradient 34 mmHg, DOUG 0.9 cm².  •  Mild aortic insufficiency.  •  Mild mitral regurgitation.  •  Mild to moderate tricuspid regurgitation with RVSP 48 mmHg.      Current medications:  Scheduled Meds:amiodarone, 200 mg, Oral, Daily  apixaban, 2.5 mg, Oral, Q12H  aspirin, 81 mg, Oral, Daily  atorvastatin, 80 mg, Oral, Nightly  carvedilol, 12.5 mg, Oral, Q12H  cloNIDine, 0.4 mg, Oral, Q12H  epoetin orquidea/orquidea-epbx, 20,000 Units, Subcutaneous, Once per day on Mon Wed Fri  Hydrocortisone (Perianal), , Rectal, BID  insulin lispro, 0-9 Units, Subcutaneous, TID With Meals  Insulin Lispro, 5 Units, Subcutaneous, TID With Meals  isosorbide mononitrate, 120 mg, Oral, Daily  melatonin, 5 mg, Oral, Nightly  QUEtiapine, 25 mg, Oral, Nightly  sodium chloride, 10 mL, Intravenous, Q12H      Continuous Infusions:   PRN Meds:.•  benzocaine-menthol  •  dextrose  •  dextrose  •  glucagon (human recombinant)  •  haloperidol lactate  •  HYDROcodone-acetaminophen  •  hydrOXYzine  •  ondansetron **OR** ondansetron  •  sodium chloride  •  sodium chloride  •  sodium chloride    Assessment & Plan   Assessment & Plan     Active Hospital Problems    Diagnosis  POA   • **Hematochezia [K92.1]  Yes   • Symptomatic anemia [D64.9]  Yes   • Severe malnutrition (HCC) [E43]  Yes   • PAF (paroxysmal atrial fibrillation) (HCC) [I48.0]  Yes   • CHF (congestive heart failure) (HCC) [I50.9]  Yes   •  "End stage renal disease on dialysis (HCC) [N18.6, Z99.2]  Not Applicable   • Hyperlipidemia LDL goal <70 [E78.5]  Yes   • Essential hypertension [I10]  Yes   • Type 2 diabetes mellitus (HCC) [E11.9]  Yes      Resolved Hospital Problems   No resolved problems to display.        Brief Hospital Course to date:  Esperanza Pop is a 78 y.o. female  with PMH of ESRD on HD MWF, IDDM, HTN, HLD, HFpEF, Pulm HTN, Hx of breast cancer (s/p mastectomy and radiation) PAF (on xarelto), Chronic anemia, and rectal cancer (recently diagnosed currently gettintg XRT and chemo) that presented to the ED after a fall at home. Her 's  was today and she was getting ready to go when her legs \"gave out\". She had been having loose stool and bloody rectal discharge but that had improved prior to admission.       Generalized Weakness  Acute on Chronic Anemia  Rectal bleeding, Rectal Cancer   -S/P 1 unit PRBCs 3/11 with appropriate rise in H&H  -monitor CBC, no bleeding noted, stable  -PT/OT continue recommending rehab, patient wishes to pursue continued chemotherapy and XRT  -remains quite weak and difficult to transfer, d/w family ability to care for her at home, but patient wishes to return home and continue chemotherapy and XRT    AMS -> Improved   Concern for UTI/Possible UTI  -complete course of antibiotics  -pamelor held  -MS contin held  -monitoring, neurology following, medications adjusted  -CT head without infarct/bleed/obvious metastatic disease  -currently receiving scheduled seroquel/melatonin at night only  -haldol/atarax are prn only  -on prn NORCO for pain, tolerating well    Rectal Cancer  Hx breast cancer   -Follows with Dr. Cavazos, Rad/Onc  -Follows Dr. Carpenter with oncology, currently on Xeloda, held for now  -continued treatment per Heme/Onc, Rad/Onc, oncology recommending reduced dose of xeloda (500 mg BID) and radiation/oncology recommending continued treatment, patient wishes to continue cancer treatment " for now    Buttock wound  -wound care/pain control     ESRD on HD MWF  -Renal following for HD     HFpEF  Pulm HTN  PAF  HTN  HLD   -Eliquis resumed, monitor H&H, bleeding  -ASA/Amiodarone   -last ECHO EF 60% with grade II diastolic dysfunction, pulmonary HTN     Elevated Troponin   Mod-Severe AS   -no chest pain  -ECHO with mod-sev AS   -Has appt with Geena Estrella, APRN 4/27/23, can follow up    Challenging clinical scenario, patient remains weak in need of assistance and wants to continue treatment for cancer, prohibiting rehab placement. Difficult to care for at home and will need MWF travel to HD and continued travel for XRT. Will re-address with family today.    Expected Discharge Location and Transportation: Home/rehab  Expected Discharge   Expected Discharge Date and Time     Expected Discharge Date Expected Discharge Time    Mar 20, 2023            DVT prophylaxis:  Medical and mechanical DVT prophylaxis orders are present.     AM-PAC 6 Clicks Score (PT): 10 (03/18/23 2000)    CODE STATUS:   Code Status and Medical Interventions:   Ordered at: 03/10/23 1448     Medical Intervention Limits:    NO intubation (DNI)     Code Status (Patient has no pulse and is not breathing):    No CPR (Do Not Attempt to Resuscitate)     Medical Interventions (Patient has pulse or is breathing):    Limited Support       Champ Ríos MD  03/19/23

## 2023-03-20 ENCOUNTER — APPOINTMENT (OUTPATIENT)
Dept: NEPHROLOGY | Facility: HOSPITAL | Age: 78
End: 2023-03-20
Payer: MEDICARE

## 2023-03-20 LAB
ANION GAP SERPL CALCULATED.3IONS-SCNC: 14 MMOL/L (ref 5–15)
BUN SERPL-MCNC: 41 MG/DL (ref 8–23)
BUN/CREAT SERPL: 5.7 (ref 7–25)
CALCIUM SPEC-SCNC: 8.6 MG/DL (ref 8.6–10.5)
CHLORIDE SERPL-SCNC: 93 MMOL/L (ref 98–107)
CO2 SERPL-SCNC: 20 MMOL/L (ref 22–29)
CREAT SERPL-MCNC: 7.22 MG/DL (ref 0.57–1)
EGFRCR SERPLBLD CKD-EPI 2021: 5.5 ML/MIN/1.73
GLUCOSE BLDC GLUCOMTR-MCNC: 111 MG/DL (ref 70–130)
GLUCOSE BLDC GLUCOMTR-MCNC: 117 MG/DL (ref 70–130)
GLUCOSE BLDC GLUCOMTR-MCNC: 137 MG/DL (ref 70–130)
GLUCOSE SERPL-MCNC: 128 MG/DL (ref 65–99)
POTASSIUM SERPL-SCNC: 5.1 MMOL/L (ref 3.5–5.2)
SODIUM SERPL-SCNC: 127 MMOL/L (ref 136–145)

## 2023-03-20 PROCEDURE — 77386: CPT | Performed by: RADIOLOGY

## 2023-03-20 PROCEDURE — 80048 BASIC METABOLIC PNL TOTAL CA: CPT | Performed by: INTERNAL MEDICINE

## 2023-03-20 PROCEDURE — 82962 GLUCOSE BLOOD TEST: CPT

## 2023-03-20 PROCEDURE — G0378 HOSPITAL OBSERVATION PER HR: HCPCS

## 2023-03-20 PROCEDURE — 99232 SBSQ HOSP IP/OBS MODERATE 35: CPT | Performed by: INTERNAL MEDICINE

## 2023-03-20 PROCEDURE — G0257 UNSCHED DIALYSIS ESRD PT HOS: HCPCS

## 2023-03-20 PROCEDURE — 25010000002 EPOETIN ALFA-EPBX 10000 UNIT/ML SOLUTION: Performed by: INTERNAL MEDICINE

## 2023-03-20 PROCEDURE — 99231 SBSQ HOSP IP/OBS SF/LOW 25: CPT | Performed by: INTERNAL MEDICINE

## 2023-03-20 RX ADMIN — Medication 10 ML: at 12:03

## 2023-03-20 RX ADMIN — HYDROCODONE BITARTRATE AND ACETAMINOPHEN 1 TABLET: 10; 325 TABLET ORAL at 12:08

## 2023-03-20 RX ADMIN — HYDROXYZINE HYDROCHLORIDE 25 MG: 25 TABLET ORAL at 21:37

## 2023-03-20 RX ADMIN — APIXABAN 2.5 MG: 2.5 TABLET, FILM COATED ORAL at 21:37

## 2023-03-20 RX ADMIN — HYDROCORTISONE 2.5%: 25 CREAM TOPICAL at 21:00

## 2023-03-20 RX ADMIN — Medication 5 MG: at 21:00

## 2023-03-20 RX ADMIN — CLONIDINE HYDROCHLORIDE 0.4 MG: 0.1 TABLET ORAL at 12:02

## 2023-03-20 RX ADMIN — HYDROCORTISONE 2.5%: 25 CREAM TOPICAL at 12:03

## 2023-03-20 RX ADMIN — ASPIRIN 81 MG: 81 TABLET, COATED ORAL at 12:02

## 2023-03-20 RX ADMIN — QUETIAPINE FUMARATE 25 MG: 25 TABLET ORAL at 21:36

## 2023-03-20 RX ADMIN — ATORVASTATIN CALCIUM 80 MG: 40 TABLET, FILM COATED ORAL at 21:37

## 2023-03-20 RX ADMIN — ISOSORBIDE MONONITRATE 120 MG: 120 TABLET, EXTENDED RELEASE ORAL at 12:02

## 2023-03-20 RX ADMIN — HYDROCODONE BITARTRATE AND ACETAMINOPHEN 1 TABLET: 10; 325 TABLET ORAL at 21:36

## 2023-03-20 RX ADMIN — AMIODARONE HYDROCHLORIDE 200 MG: 200 TABLET ORAL at 12:02

## 2023-03-20 RX ADMIN — APIXABAN 2.5 MG: 2.5 TABLET, FILM COATED ORAL at 12:02

## 2023-03-20 RX ADMIN — CLONIDINE HYDROCHLORIDE 0.4 MG: 0.1 TABLET ORAL at 21:36

## 2023-03-20 RX ADMIN — CARVEDILOL 12.5 MG: 12.5 TABLET, FILM COATED ORAL at 12:02

## 2023-03-20 RX ADMIN — EPOETIN ALFA-EPBX 20000 UNITS: 10000 INJECTION, SOLUTION INTRAVENOUS; SUBCUTANEOUS at 10:18

## 2023-03-20 RX ADMIN — Medication 10 ML: at 21:00

## 2023-03-20 RX ADMIN — CARVEDILOL 12.5 MG: 12.5 TABLET, FILM COATED ORAL at 21:37

## 2023-03-20 NOTE — PLAN OF CARE
Problem: Grieving  Goal: Expression of Grief  Intervention: Support the Grieving Process  Recent Flowsheet Documentation  Taken 3/20/2023 1430 by María Maier RN  Supportive Measures: (Palliative IDT: RNDO APRN, OVI) other (see comments)     Problem: Grieving  Goal: Expression of Grief  Intervention: Support the Grieving Process  Recent Flowsheet Documentation  Taken 3/20/2023 1430 by María Maier RN  Supportive Measures: (Palliative IDT: RNDO, WILMA, SW) other (see comments)   Goal Outcome Evaluation:           Progress: no change  Outcome Evaluation: Nursing reports pt has had some confusion. pt completed dialysis today and tolerated. Pt had some pain in gluteal crease and was given norco with relief. Pt is eating small amts. Goals to continue treatment of cancer and to continue dialysis. Pt is no cpr/intubation

## 2023-03-20 NOTE — PLAN OF CARE
Problem: Adult Inpatient Plan of Care  Goal: Optimal Comfort and Wellbeing  Outcome: Ongoing, Not Progressing  Intervention: Monitor Pain and Promote Comfort  Recent Flowsheet Documentation  Taken 3/20/2023 1208 by Demarco Tam, RN  Pain Management Interventions: see MAR  Intervention: Provide Person-Centered Care  Recent Flowsheet Documentation  Taken 3/20/2023 1208 by Demarco Tam, RN  Trust Relationship/Rapport:   care explained   choices provided   Goal Outcome Evaluation:  Plan of Care Reviewed With: patient        Progress: no change     Pills whole in applesauce. Confused. Went for dialysis and radiation today. PRN given for pain.

## 2023-03-20 NOTE — PROGRESS NOTES
[facility] Medicine Services  PROGRESS NOTE    Patient Name: Esperanza Pop  : 1945  MRN: [id number]    Date of Admission: 3/10/2023  Primary Care Physician: Meghana Rodgers MD    Subjective   Subjective     CC:  F/U generalized weakness     HPI:    Very weak - continued rectal pain. Poor appetite, voice also very soft.  Meet her in dialysis suite and she agrees she is too weak for home  Unable to tolerate radiation on Friday, will reattempt today    Objective   Objective     Vital Signs:   Temp:  [96.8 °F (36 °C)-99 °F (37.2 °C)] 96.8 °F (36 °C)  Heart Rate:  [56-64] 64  Resp:  [16-20] 16  BP: (100-164)/(45-78) 121/47     Physical Exam:  Constitutional: No acute distress, awake, alert thin frail female sitting up in dialysis bed  HENT: NCAT, mucous membranes dry  Respiratory: Clear to auscultation bilaterally, respiratory effort normal on r/a  Cardiovascular: RRR, no murmurs, rubs, or gallops  Gastrointestinal: Soft, nontender, nondistended  Musculoskeletal: Muscle tone decreased throughout, no joint effusions appreciated  Psychiatric: Appropriate affect, cooperative  Neurologic: Alert and oriented, very faint voice but speech clear  Skin: No rashes    Results Reviewed:  LAB RESULTS:      Lab 23  0447 23  0451 03/15/23  0551 23  0838   WBC 5.48 3.96 3.95 4.73   HEMOGLOBIN 9.3* 8.6* 8.5* 8.3*   HEMATOCRIT 31.4* 27.8* 28.0* 27.1*   PLATELETS 111* 103* 83* 85*   .3* 98.6* 99.6* 98.9*         Lab 23  0642 23  0447 03/15/23  0551 23  0838   SODIUM 127* 134* 134* 133*   POTASSIUM 5.1 4.5 3.9 4.3   CHLORIDE 93* 100 97* 97*   CO2 20.0* 21.0* 23.0 24.0   ANION GAP 14.0 13.0 14.0 12.0   BUN 41* 19 30* 24*   CREATININE 7.22* 4.41* 6.70* 5.11*   EGFR 5.5* 9.9* 6.0* 8.3*   GLUCOSE 128* 125* 116* 143*   CALCIUM 8.6 8.6 8.7 8.7   PHOSPHORUS  --  2.8  --  3.5         Lab 23  0447 23  0838   ALBUMIN 2.7* 3.1*                     Brief Urine  Lab Results     None          Microbiology Results Abnormal     None          No radiology results from the last 24 hrs    Results for orders placed during the hospital encounter of 03/10/23    Adult Transthoracic Echo Complete W/ Cont if Necessary Per Protocol    Interpretation Summary  •  Left ventricular systolic function is normal. Estimated left ventricular EF = 55%  •  Left ventricular wall thickness is consistent with moderate concentric hypertrophy.  •  The right ventricular cavity is mildly dilated.  •  Mild to moderate biatrial enlargement.  •  Moderate to severe aortic valve stenosis is present.  Mean gradient 34 mmHg, DOUG 0.9 cm².  •  Mild aortic insufficiency.  •  Mild mitral regurgitation.  •  Mild to moderate tricuspid regurgitation with RVSP 48 mmHg.      Current medications:  Scheduled Meds:amiodarone, 200 mg, Oral, Daily  apixaban, 2.5 mg, Oral, Q12H  aspirin, 81 mg, Oral, Daily  atorvastatin, 80 mg, Oral, Nightly  carvedilol, 12.5 mg, Oral, Q12H  cloNIDine, 0.4 mg, Oral, Q12H  epoetin orquidea/orquidea-epbx, 20,000 Units, Subcutaneous, Once per day on Mon Wed Fri  Hydrocortisone (Perianal), , Rectal, BID  insulin lispro, 0-9 Units, Subcutaneous, TID With Meals  isosorbide mononitrate, 120 mg, Oral, Daily  melatonin, 5 mg, Oral, Nightly  QUEtiapine, 25 mg, Oral, Nightly  sodium chloride, 10 mL, Intravenous, Q12H      Continuous Infusions:   PRN Meds:.•  benzocaine-menthol  •  dextrose  •  dextrose  •  glucagon (human recombinant)  •  haloperidol lactate  •  HYDROcodone-acetaminophen  •  hydrocortisone  •  hydrOXYzine  •  ondansetron **OR** ondansetron  •  sodium chloride  •  sodium chloride  •  sodium chloride    Assessment & Plan   Assessment & Plan     Active Hospital Problems    Diagnosis  POA   • **Hematochezia [K92.1]  Yes   • Symptomatic anemia [D64.9]  Yes   • Severe malnutrition (HCC) [E43]  Yes   • PAF (paroxysmal atrial fibrillation) (HCC) [I48.0]  Yes   • CHF (congestive heart failure) (HCC)  [I50.9]  Yes   • End stage renal disease on dialysis (HCC) [N18.6, Z99.2]  Not Applicable   • Hyperlipidemia LDL goal <70 [E78.5]  Yes   • Essential hypertension [I10]  Yes   • Type 2 diabetes mellitus (HCC) [E11.9]  Yes      Resolved Hospital Problems   No resolved problems to display.        Brief Hospital Course to date:  Esperanza Pop is a 78 y.o. female  with PMH of ESRD on HD MWF, IDDM, HTN, HLD, HFpEF, breast cancer (s/p mastectomy and radiation), PAF (on xarelto), chronic anemia, and rectal cancer (recently diagnosed currently gettintg XRT and chemo) that presented to the ED after a fall at home. Found to have acute on chronic anemia, s/p 1 unit pRBCs on 3/11.   Patient wishing to pursue continued chemo/radiation but very very weak with SNF recs. Unable to complete radiation/chemo in SNF, but very weak with many treatments needed for home. Complicated picture. Will try to re-attempt radiation today, if tolerating, remain here for radiation + chemo this week. If unable to tolerate, should likely pursue SNF placement and reattempt later.    Acute on Chronic Anemia 2/2 rectal bleeding 2/2 known rectal cx - stabilized  Profound Debility, SNF recs  -s/p 1 unit PRBCs 3/11 with appropriate rise in H&H, stabilized  -follows with Dr. Cavazos, Rad/Onc, Dr. Carpenter with oncology  -in regards to her rectal cancer, wants to pursue chemo/radiation but very very weak for home discharge as above  -reattempt radiation 3/20, if tolerated, start xeloda 1 pill twice daily (home supply). D/w radiation + onc 3/20. If not, would hold on chemo/radiation and pursue SNF placement.   -if able to tolerate radiation/chemo, may need to remain inpatient a while longer as coordination for home discharge seems challenging w patient's profound debility      Acute metabolic encephalopathy - resolved  -completed course of abx for possible UTI, CT head without acute disease  -continue seroquel qhs + melatonin with available PRNs    Buttock  wound -wound care/pain control  ESRD on HD MWF-Renal following for HD     HFpEF [facility] HTN - dialysis for volume control as above  PAF on eliquis - continue eliquis + amiodarone, H&H stablized as above  HTN - clonidine, coreg, imdur  Mod-Severe aortic stenosis -Has appt with WILMA Lopez 4/27/23, can follow up    Expected Discharge Location and Transportation: TBD, see above  Expected Discharge   Expected Discharge Date and Time     Expected Discharge Date Expected Discharge Time    Mar 24, 2023            DVT prophylaxis:  Medical and mechanical DVT prophylaxis orders are present.     AM-PAC 6 Clicks Score (PT): 12 (03/20/23 0900)    CODE STATUS:   Code Status and Medical Interventions:   Ordered at: 03/10/23 1448     Medical Intervention Limits:    NO intubation (DNI)     Code Status (Patient has no pulse and is not breathing):    No CPR (Do Not Attempt to Resuscitate)     Medical Interventions (Patient has pulse or is breathing):    Limited Support       Melani Rodríguez MD  03/20/23

## 2023-03-20 NOTE — PROGRESS NOTES
"HEMATOLOGY/ONCOLOGY PROGRESS NOTE    S: Continues to be profoundly weak.  Underwent dialysis today and radiation.        Past medical history, social history and family history was reviewed and unchanged from prior visit.    Review of Systems:    Review of Systems   Unable to perform ROS: Mental status change            Medications:  The current medication list was reviewed in the EMR    ALLERGIES:    Allergies   Allergen Reactions   • Mircera [Methoxy Polyethylene Glycol-Epoetin Beta] Anaphylaxis     Pt stopped breathing   • Venofer [Iron Sucrose] Anaphylaxis     Pt stopped breathing   • Albuterol Other (See Comments)     Increased blood pressure  Used right before heart attack   • Nifedipine Er Swelling   • Penicillins Hives   • Prednisone Other (See Comments)     Makes jittery/anxious         Physical Exam    VITAL SIGNS:  /63 (BP Location: Left arm, Patient Position: Lying)   Pulse 55   Temp 97.6 °F (36.4 °C) (Temporal)   Resp 16   Ht 167.6 cm (65.98\")   Wt 79.1 kg (174 lb 6.1 oz)   LMP  (LMP Unknown)   SpO2 92%   BMI 28.16 kg/m²   Temp:  [96.8 °F (36 °C)-99 °F (37.2 °C)] 97.6 °F (36.4 °C)      Performance Status:                Physical Exam    General: Frail confused appearing, in no acute distress  HEENT: sclerae anicteric, neck is supple  Lymphatics: no cervical, supraclavicular, or axillary adenopathy  Extremities: no lower extremity edema  Skin: no rashes, lesions, bruising, or petechiae  Msk:  Shows no weakness of the large muscle groups  Psych: Mood is stable        RECENT LABS:    Lab Results   Component Value Date    HGB 9.3 (L) 03/18/2023    HCT 31.4 (L) 03/18/2023    .3 (H) 03/18/2023     (L) 03/18/2023    WBC 5.48 03/18/2023    NEUTROABS 2.90 03/13/2023    LYMPHSABS 0.43 (L) 03/13/2023    MONOSABS 0.57 03/13/2023    EOSABS 0.00 03/13/2023    BASOSABS 0.01 03/13/2023       Lab Results   Component Value Date    GLUCOSE 128 (H) 03/20/2023    BUN 41 (H) 03/20/2023    " CREATININE 7.22 (H) 03/20/2023     (L) 03/20/2023    K 5.1 03/20/2023    CL 93 (L) 03/20/2023    CO2 20.0 (L) 03/20/2023    CALCIUM 8.6 03/20/2023    PROTEINTOT 7.3 03/11/2023    ALBUMIN 2.7 (L) 03/18/2023    BILITOT 0.7 03/11/2023    ALKPHOS 80 03/11/2023    AST 11 03/11/2023    ALT 5 03/11/2023         Assessment/Plan    1.  Anal cancer.  Restarted her radiation.  She can use her home supply of Xeloda and take 1 pill twice a day.  The pill is only taken on days of radiation.  She is profoundly weak and tired.  Hopefully she will start improving in the next several days.    2.  End-stage renal disease.  On dialysis.  Hopefully getting dialyzed in the hospital will start making her feel somewhat better.  She was having hard time sitting for long periods of time due to the anal cancer.                Tony Carpenter MD  [facility]    3/20/2023

## 2023-03-20 NOTE — PLAN OF CARE
Goal Outcome Evaluation:  Plan of Care Reviewed With: patient, daughter        Progress: no change  Outcome Evaluation: VSS; pt confused and disoriented to time and place; no c/o pain; family at bedside, prn's given for agitation. Pt resting but when awakening becomes agitated and uncooperative. Will continue to monitor.

## 2023-03-20 NOTE — PROGRESS NOTES
" LOS: 3 days   Patient Care Team:  Meghana Rodgers MD as PCP - General  Demetrius Sagastume MD as Consulting Physician (Cardiology)  Kevan Lerma MD as Consulting Physician (Nephrology)  Geena Estrella APRN as Nurse Practitioner (Cardiology)  Frank Mandujano MD as Referring Physician (Colon and Rectal Surgery)  Kevan Cavazos MD as Consulting Physician (Radiation Oncology)  Yue Reeves RN as Nurse Navigator  Darryn Burk MD as Consulting Physician (Nephrology)    Chief Complaint: F/U ESRD  SEEN ON DIALYSIS  Subjective :         Subjective:  Symptoms:  Stable.  No shortness of breath or chest pain.    Diet:  Adequate intake.        History taken from: patient    Objective     Vital Sign Min/Max for last 24 hours  Temp  Min: 97.2 °F (36.2 °C)  Max: 99 °F (37.2 °C)   BP  Min: 106/55  Max: 164/62   Pulse  Min: 56  Max: 61   Resp  Min: 16  Max: 20   SpO2  Min: 93 %  Max: 96 %   No data recorded   No data recorded     Flowsheet Rows    Flowsheet Row First Filed Value   Admission Height 167.6 cm (66\") Documented at 03/10/2023 1122   Admission Weight 76.7 kg (169 lb) Documented at 03/10/2023 1122          No intake/output data recorded.  I/O last 3 completed shifts:  In: 340 [P.O.:340]  Out: 0     Objective:  General Appearance:  Comfortable.    Vital signs: (most recent): Blood pressure 139/47, pulse 57, temperature 97.2 °F (36.2 °C), temperature source Temporal, resp. rate 18, height 167.6 cm (65.98\"), weight 79.1 kg (174 lb 6.1 oz), SpO2 96 %, not currently breastfeeding.  Vital signs are normal.    Output: Minimal urine output.    HEENT: Normal HEENT exam.    Lungs:  Normal effort and normal respiratory rate.  Breath sounds clear to auscultation.  She is not in respiratory distress.  No decreased breath sounds or wheezes.    Heart: Normal rate.    Extremities: There is no dependent edema.  (AVF)  Pulses: Distal pulses are intact.    Neurological: Patient is alert.  Normal strength.  (A LITTLE " CONFUSED).              Results Review:     I reviewed the patient's new clinical results.    WBC WBC   Date Value Ref Range Status   03/18/2023 5.48 3.40 - 10.80 10*3/mm3 Final      HGB Hemoglobin   Date Value Ref Range Status   03/18/2023 9.3 (L) 12.0 - 15.9 g/dL Final      HCT Hematocrit   Date Value Ref Range Status   03/18/2023 31.4 (L) 34.0 - 46.6 % Final      Platlets No results found for: LABPLAT   MCV MCV   Date Value Ref Range Status   03/18/2023 102.3 (H) 79.0 - 97.0 fL Final          Sodium Sodium   Date Value Ref Range Status   03/20/2023 127 (L) 136 - 145 mmol/L Final   03/18/2023 134 (L) 136 - 145 mmol/L Final      Potassium Potassium   Date Value Ref Range Status   03/20/2023 5.1 3.5 - 5.2 mmol/L Final     Comment:     Slight hemolysis detected by analyzer. Results may be affected.   03/18/2023 4.5 3.5 - 5.2 mmol/L Final      Chloride Chloride   Date Value Ref Range Status   03/20/2023 93 (L) 98 - 107 mmol/L Final   03/18/2023 100 98 - 107 mmol/L Final      CO2 CO2   Date Value Ref Range Status   03/20/2023 20.0 (L) 22.0 - 29.0 mmol/L Final   03/18/2023 21.0 (L) 22.0 - 29.0 mmol/L Final      BUN BUN   Date Value Ref Range Status   03/20/2023 41 (H) 8 - 23 mg/dL Final   03/18/2023 19 8 - 23 mg/dL Final      Creatinine Creatinine   Date Value Ref Range Status   03/20/2023 7.22 (H) 0.57 - 1.00 mg/dL Final   03/18/2023 4.41 (H) 0.57 - 1.00 mg/dL Final      Calcium Calcium   Date Value Ref Range Status   03/20/2023 8.6 8.6 - 10.5 mg/dL Final   03/18/2023 8.6 8.6 - 10.5 mg/dL Final      PO4 No results found for: CAPO4   Albumin Albumin   Date Value Ref Range Status   03/18/2023 2.7 (L) 3.5 - 5.2 g/dL Final      Magnesium No results found for: MG   Uric Acid No results found for: URICACID     Medication Review: yes    Assessment & Plan       Hematochezia    Type 2 diabetes mellitus (HCC)    Essential hypertension    Hyperlipidemia LDL goal <70    End stage renal disease on dialysis (HCC)    CHF (congestive  heart failure) (HCC)    PAF (paroxysmal atrial fibrillation) (HCC)    Severe malnutrition (HCC)    Symptomatic anemia      Assessment & Plan     ESRD: MWF [facility]. Missed HD before admission      Access: AV fistula      Anemia: Transfuse at Hb 7 or less. On chemo for anal ca     Volume status: Dependent edema noted.    HTN: Bp elevated.   HYPONATREMIA. MILD. SHOULD CORRECT WITH HD.   I discussed the patients findings and my recommendations with patient  PLAN.  HD TODAY 3/20/23.   Zeb Owusu MD  03/20/23  09:09 EDT

## 2023-03-20 NOTE — CASE MANAGEMENT/SOCIAL WORK
Continued Stay Note   [place]     Patient Name: Esperanza Pop  MRN: [id number]  Today's Date: 3/20/2023    Admit Date: 3/10/2023    Plan: radiation treatment today   Discharge Plan     Row Name 03/20/23 1622       Plan    Plan radiation treatment today    Plan Comments Pt transferred from  over the weekend.  Pt is to have radiation therapy today and will be assessed to see how she tolerates treatment.  SW spoke with pt’s sister and daughter “CJ”.  Plan prior to the weekend was for pt to discharge to her home with Jehovah's witness  and family to assist with her care needs; ultimately this remains the family's plan.  Family thinks pt will be here through the completion of her radiation treatment.  SW will continue to follow for discharge needs.  Pt's daughter has requested update from oncology and radiologist about pt's status with treatment.    Final Discharge Disposition Code 06 - home with home health care               Discharge Codes    No documentation.               Expected Discharge Date and Time     Expected Discharge Date Expected Discharge Time    Mar 24, 2023             SELIN Carrillo

## 2023-03-21 LAB
GLUCOSE BLDC GLUCOMTR-MCNC: 144 MG/DL (ref 70–130)
GLUCOSE BLDC GLUCOMTR-MCNC: 150 MG/DL (ref 70–130)

## 2023-03-21 PROCEDURE — 77386: CPT | Performed by: RADIOLOGY

## 2023-03-21 PROCEDURE — 97535 SELF CARE MNGMENT TRAINING: CPT

## 2023-03-21 PROCEDURE — G0378 HOSPITAL OBSERVATION PER HR: HCPCS

## 2023-03-21 PROCEDURE — 99231 SBSQ HOSP IP/OBS SF/LOW 25: CPT | Performed by: INTERNAL MEDICINE

## 2023-03-21 PROCEDURE — 82962 GLUCOSE BLOOD TEST: CPT

## 2023-03-21 PROCEDURE — 97530 THERAPEUTIC ACTIVITIES: CPT

## 2023-03-21 RX ORDER — FLUCONAZOLE 100 MG/1
150 TABLET ORAL ONCE
Status: COMPLETED | OUTPATIENT
Start: 2023-03-21 | End: 2023-03-21

## 2023-03-21 RX ADMIN — AMIODARONE HYDROCHLORIDE 200 MG: 200 TABLET ORAL at 09:39

## 2023-03-21 RX ADMIN — Medication 10 ML: at 21:09

## 2023-03-21 RX ADMIN — HYDROCORTISONE 1 APPLICATION: 1 CREAM TOPICAL at 21:41

## 2023-03-21 RX ADMIN — CLONIDINE HYDROCHLORIDE 0.4 MG: 0.1 TABLET ORAL at 09:39

## 2023-03-21 RX ADMIN — APIXABAN 2.5 MG: 2.5 TABLET, FILM COATED ORAL at 09:39

## 2023-03-21 RX ADMIN — HYDROCODONE BITARTRATE AND ACETAMINOPHEN 1 TABLET: 10; 325 TABLET ORAL at 16:32

## 2023-03-21 RX ADMIN — FLUCONAZOLE 150 MG: 100 TABLET ORAL at 21:08

## 2023-03-21 RX ADMIN — HYDROCODONE BITARTRATE AND ACETAMINOPHEN 1 TABLET: 10; 325 TABLET ORAL at 09:39

## 2023-03-21 RX ADMIN — CARVEDILOL 12.5 MG: 12.5 TABLET, FILM COATED ORAL at 09:39

## 2023-03-21 RX ADMIN — APIXABAN 2.5 MG: 2.5 TABLET, FILM COATED ORAL at 21:08

## 2023-03-21 RX ADMIN — Medication 10 ML: at 09:40

## 2023-03-21 RX ADMIN — QUETIAPINE FUMARATE 25 MG: 25 TABLET ORAL at 21:09

## 2023-03-21 RX ADMIN — ATORVASTATIN CALCIUM 80 MG: 40 TABLET, FILM COATED ORAL at 21:09

## 2023-03-21 RX ADMIN — CLONIDINE HYDROCHLORIDE 0.4 MG: 0.1 TABLET ORAL at 21:09

## 2023-03-21 RX ADMIN — HYDROCORTISONE 2.5%: 25 CREAM TOPICAL at 09:40

## 2023-03-21 RX ADMIN — ASPIRIN 81 MG: 81 TABLET, COATED ORAL at 09:39

## 2023-03-21 RX ADMIN — Medication 5 MG: at 21:09

## 2023-03-21 RX ADMIN — HYDROCORTISONE 1 APPLICATION: 1 CREAM TOPICAL at 09:39

## 2023-03-21 NOTE — PLAN OF CARE
Goal Outcome Evaluation:  Plan of Care Reviewed With: patient        Progress: no change  Outcome Evaluation: VSS.  78 year old black female admitted on 3/10/2023, she is on day 11 of this hospitalization.  She has an IV in place and patent to saline lock.  She has not voided this shift (she completed dialysis today and is not expected.)  She has a dialysis shunt in [facility] and covered with guaze.  Have administered scheduled and prn medication as indicated.  Patient calls out frequently loudly and is confused related to her being in hospital.  Will continue to monitor patient throughout the rest of this shift.

## 2023-03-21 NOTE — THERAPY TREATMENT NOTE
Patient Name: Esperanza Pop  : 1945    MRN: [id number]                              Today's Date: 3/21/2023       Admit Date: 3/10/2023    Visit Dx:     ICD-10-CM ICD-9-CM   1. Symptomatic anemia  D64.9 285.9   2. Generalized weakness  R53.1 780.79   3. ESRD on dialysis ([facility])  N18.6 585.6    Z99.2 V45.11   4. Impaired mobility  Z74.09 799.89   5. Fall, initial encounter  W19.XXXA E888.9   6. Elevated troponin  R77.8 790.6   7. Acute on chronic diastolic heart failure ([facility])  I50.33 428.33   8. Type 2 diabetes mellitus with chronic kidney disease on chronic dialysis, with long-term current use of insulin ([facility])  E11.22 250.40    N18.6 585.9    Z99.2 V45.11    Z79.4 V58.67   9. Severe malnutrition ([facility])  E43 261   10. Malignant neoplasm of anal canal ([facility])  C21.1 154.2   11. PAF (paroxysmal atrial fibrillation) ([facility])  I48.0 427.31   12. Coronary artery disease involving native coronary artery of native heart with angina pectoris ([facility])  I25.119 414.01     413.9   13. End stage renal disease on dialysis ([facility])  N18.6 585.6    Z99.2 V45.11   14. Nonrheumatic aortic valve stenosis  I35.0 424.1   15. NSTEMI (non-ST elevated myocardial infarction) ([facility])  I21.4 410.70   16. Acute cystitis without hematuria  N30.00 595.0   17. Ischemic heart disease  I25.9 414.9   18. Congestive heart failure, unspecified HF chronicity, unspecified heart failure type ([facility])  I50.9 428.0   19. Malignant neoplasm of right female breast, unspecified estrogen receptor status, unspecified site of breast ([facility])  C50.911 174.9   20. Hematochezia  K92.1 578.1   21. Acute midline low back pain without sciatica  M54.50 724.2     Patient Active Problem List   Diagnosis   • Acute on chronic diastolic heart failure (HCC)   • Type 2 diabetes mellitus ([facility])   • Essential hypertension   • Coronary artery disease involving native coronary artery of native heart with angina pectoris (HCC)   • Breast cancer, female, right   • Seasonal allergic rhinitis   •  Right carotid bruit   • Vitamin B12 deficiency   • Hyperlipidemia LDL goal <70   • End stage renal disease on dialysis (HCC)   • Nonrheumatic aortic valve stenosis   • NSTEMI (non-ST elevated myocardial infarction) (HCC)   • UTI (urinary tract infection)   • Ischemic heart disease   • CHF (congestive heart failure) (HCC)   • Acute non-ST segment elevation myocardial infarction (STEMI) following previous myocardial infarction   • Dyslipidemia   • Diabetes mellitus (HCC)   • Mild obesity   • Elevated troponin   • Screen for colon cancer   • Acute midline low back pain without sciatica   • Hypertensive emergency   • PAF (paroxysmal atrial fibrillation) (HCC)   • Malignant neoplasm of anal canal (HCC)   • Hematochezia   • Severe malnutrition (HCC)   • Symptomatic anemia     Past Medical History:   Diagnosis Date   • Acute bronchitis    • Acute conjunctivitis    • Acute kidney injury (HCC)     Admission from 12/26/2013 to 01/02/2014, now resolved with latest creatinine 1.4 on 01/08/2014.   • Acute non-ST segment elevation myocardial infarction (STEMI) following previous myocardial infarction    • Anal cancer (HCC) 2/8/2023   • Anal cancer (HCC) 2/8/2023   • Arthritis    • Breast cancer, female, right     2005 mastectomy right   • Cancer (HCC)    • CHF (congestive heart failure) (HCC)    • Chronic kidney disease     dialysis MW, f/u [facility]    • Clotting disorder (HCC)    • COVID-19    • Diabetes mellitus (HCC)     diagnosed ~1992, checks fsbg 2-3x/day   • Diarrhea    • Dyslipidemia    • Dyspepsia    • Dyspnea    • Edema    • History of transfusion     ~2013 no reaction    • Hx of radiation therapy    • Hyperlipidemia    • Hypertension     Severe   • Ischemic heart disease    • Moderate obesity     BMI 36.2   • Myocardial infarction (HCC)    • Pneumonia    • Pneumonia 02/2019   • Radiation 2005   • Seasonal allergies    • Wears glasses      Past Surgical History:   Procedure Laterality Date   • AV FISTULA  PLACEMENT, BRACHIOBASILIC     • BREAST BIOPSY Left 2010   • BREAST CYST EXCISION     • BREAST LUMPECTOMY Right 2005   • BREAST SURGERY     • CARDIAC CATHETERIZATION N/A 10/10/2016    Procedure: Left Heart Cath;  Surgeon: Scooter Zhao MD;  Location:  [facility];  Service:    • CARDIAC CATHETERIZATION  10/2016   • CARDIAC CATHETERIZATION N/A 1/21/2021    Procedure: Right and Left Heart Cath;  Surgeon: Hugh Tidwell MD;  Location:  [facility];  Service: Cardiology;  Laterality: N/A;   • COLONOSCOPY N/A 7/23/2020    Procedure: COLONOSCOPY;  Surgeon: Rubén Rosas MD;  Location: [facility] ENDOSCOPY;  Service: Gastroenterology;  Laterality: N/A;   • CORONARY STENT PLACEMENT  2016   • HERNIA REPAIR     • HYSTERECTOMY  2000   • INSERTION HEMODIALYSIS CATHETER Right 6/29/2016    Procedure: HEMODIALYSIS CATHETER INSERTION TUNNELLED;  Surgeon: Ramón Chavez MD;  Location: [facility] OR;  Service:    • MASTECTOMY Right 2006   • OOPHORECTOMY     • REDUCTION MAMMAPLASTY Left 2005      General Information     Row Name 03/21/23 1519          OT Time and Intention    Document Type therapy note (daily note)  -     Mode of Treatment individual therapy;occupational therapy  -     Row Name 03/21/23 1519          General Information    Patient Profile Reviewed yes  -JOESPH     Existing Precautions/Restrictions fall  watch for orthostatic hypotension, buttock wounds with anal cancer  -     Barriers to Rehab medically complex;previous functional deficit;cognitive status  -     Row Name 03/21/23 1519          Cognition    Orientation Status (Cognition) oriented to;person;place  -     Row Name 03/21/23 1519          Safety Issues, Functional Mobility    Safety Issues Affecting Function (Mobility) ability to follow commands;insight into deficits/self-awareness;judgment;problem-solving;safety precaution awareness;safety precautions follow-through/compliance;sequencing abilities   -JOESPH     Impairments Affecting Function (Mobility) balance;cognition;coordination;endurance/activity tolerance;postural/trunk control;strength  -JOESPH           User Key  (r) = Recorded By, (t) = Taken By, (c) = Cosigned By    Initials Name Provider Type    Maria Fernanda Keen, OT Occupational Therapist                 Mobility/ADL's     Row Name 03/21/23 1521          Bed Mobility    Bed Mobility scooting/bridging  -JOESPH     Scooting/Bridging Landing (Bed Mobility) dependent (less than 25% patient effort);2 person assist  to HOB  -JOESPH     Supine-Sit Landing (Bed Mobility) moderate assist (50% patient effort);verbal cues;nonverbal cues (demo/gesture)  -JOESPH     Sit-Supine Landing (Bed Mobility) maximum assist (25% patient effort);2 person assist;verbal cues;nonverbal cues (demo/gesture)  -JOESPH     Bed Mobility, Safety Issues cognitive deficits limit understanding;decreased use of arms for pushing/pulling;decreased use of legs for bridging/pushing;impaired trunk control for bed mobility  -JOESPH     Assistive Device (Bed Mobility) bed rails;draw sheet;head of bed elevated  -JOESPH     Comment, (Bed Mobility) pt. needed step by step cues with extra time and effort for mvmt to EOB, HOB raised to assist, Pt. fatigued at end of session needing significant assist to return supine.  -JOESPH     Row Name 03/21/23 1521          Transfers    Transfers sit-stand transfer;stand-sit transfer  -JOESPH     Row Name 03/21/23 1521          Sit-Stand Transfer    Sit-Stand Landing (Transfers) moderate assist (50% patient effort);2 person assist;verbal cues;nonverbal cues (demo/gesture)  -     Assistive Device (Sit-Stand Transfers) walker, front-wheeled  -JOESPH     Row Name 03/21/23 1521          Stand-Sit Transfer    Stand-Sit Landing (Transfers) moderate assist (50% patient effort);2 person assist;verbal cues;nonverbal cues (demo/gesture)  -     Assistive Device (Stand-Sit Transfers) walker, front-wheeled  -JOESPH     Row Name 03/21/23  1521          Activities of Daily Living    BADL Assessment/Intervention grooming;feeding  -JOESPH     Row Name 03/21/23 1521          Grooming Assessment/Training    [place] Level (Grooming) oral care regimen;wash face, hands;moderate assist (50% patient effort)  -JOESPH     Position (Grooming) edge of bed sitting  -JOESPH     Comment, (Grooming) pt. needed balance assist, assist to load utensil and initial assist to get toothbrush into mouth, but improved accuracy as attempted, periods of confusion taking brush to table as if dipping in water and then returning to brush, cues to sequence rinsing, assist with water and spit osorio  -JOESPH     Row Name 03/21/23 1521          Self-Feeding Assessment/Training    [place] Level (Feeding) maximum assist (25% patient effort);prepare tray/open items;scoop food and bring to mouth;liquids to mouth;minimum assist (75% patient effort)  -JOESPH     Position (Self-Feeding) sitting up in bed  -JOESPH     Comment, (Feeding) pt. needed assist to setup tray and position sitting, but partially to side with wedge for comfort, some initial assist to load untensil and bring to mouth and then progressed to perfoming on own.  -JOESPH           User Key  (r) = Recorded By, (t) = Taken By, (c) = Cosigned By    Initials Name Provider Type    Maria Fernanda Keen, OT Occupational Therapist               Obj/Interventions     Row Name 03/21/23 1525          Motor Skills    Therapeutic Exercise --  pt. fatigued and working on late lunch, issued UE TE program and reviewed with dtr. so could assist pt. throughout day, educated on importance of nutrition to build strength and endurance and heal  -     Row Name 03/21/23 1525          Balance    Balance Assessment sitting static balance;sitting dynamic balance;standing static balance  -JOESPH     Static Sitting Balance moderate assist  Pt. sat from SBA up to mod A, pt. with periods of leaning to right.  -JOESPH     Dynamic Sitting Balance moderate assist  up to SBA  -JOESPH      Position, Sitting Balance unsupported;supported;sitting edge of bed  intermittent use of mattress and bed railing to balance  -JOESPH     Static Standing Balance moderate assist;2-person assist  -JOESPH     Position/Device Used, Standing Balance supported;walker, rolling  -JOESPH     Balance Interventions sit to stand;occupation based/functional task  -JOESPH     Comment, Balance Pt. sat at EOB grossly 20 minutes.  Varying balance with intermittent R lean.  Pt. was unable to weight shift or take side steps at EOB.  Cues for midline and upright posture.  -JOESPH           User Key  (r) = Recorded By, (t) = Taken By, (c) = Cosigned By    Initials Name Provider Type    Maria Fernanda Keen, OT Occupational Therapist               Goals/Plan     Row Name 03/21/23 1537          Transfer Goal 1 (OT)    Progress/Outcome (Transfer Goal 1, OT) continuing progress toward goal  -JOESPH     Row Name 03/21/23 1537          Toileting Goal 1 (OT)    Progress/Outcome (Toileting Goal 1, OT) goal ongoing  -JOESPH     Row Name 03/21/23 1537          Grooming Goal 1 (OT)    Progress/Outcome (Grooming Goal 1, OT) goal ongoing  -JOESPH           User Key  (r) = Recorded By, (t) = Taken By, (c) = Cosigned By    Initials Name Provider Type    Maria Fernanda Keen, OT Occupational Therapist               Clinical Impression     Row Name 03/21/23 1529          Pain Assessment    Pain Intervention(s) Ambulation/increased activity;Repositioned  -JOESPH     Row Name 03/21/23 1529          Pain Scale: FACES Pre/Post-Treatment    Pain: FACES Scale, Pretreatment 4-->hurts little more  -JOESPH     Posttreatment Pain Rating 4-->hurts little more  -JOESPH     Pain Location - perineum  -JOESPH     Pre/Posttreatment Pain Comment B buttocks  -JOESPH     Row Name 03/21/23 152          Plan of Care Review    Plan of Care Reviewed With patient;daughter  -     Outcome Evaluation Pt. with some increased engagement with ADL task performance and continues to increase sitting and standing endurance. Pt. will  continued from skilled OT services to address deficits of balance, endurance, cognition, ROM, strength, and coordination to promote return to higher [facility] independence level.  UE TE program issued and reviewed with dtr. to assist with patient throughout the day.  -JOESPH     Row Name 03/21/23 1529          Therapy Assessment/Plan (OT)    Therapy Frequency (OT) daily  -JOESPH     Row Name 03/21/23 1529          Therapy Plan Review/Discharge Plan (OT)    Anticipated Discharge Disposition (OT) skilled nursing facility  -JOESPH     Row Name 03/21/23 1529          Vital Signs    Pre Systolic BP Rehab --  nurse cleared for treatment sesson  -JOESPH     O2 Delivery Pre Treatment room air  -JOESPH     O2 Delivery Intra Treatment room air  -JOESPH     O2 Delivery Post Treatment room air  -JOESPH     Pre Patient Position Supine  -JOESPH     Intra Patient Position Sitting  -JOESPH     Post Patient Position Supine  -JOESPH     Row Name 03/21/23 1529          Positioning and Restraints    Pre-Treatment Position in bed  -JOESPH     Post Treatment Position bed  -JOESPH     In Bed side lying left;sitting;side rails up x3;exit alarm on;call light within reach;with family/caregiver  -JOESPH           User Key  (r) = Recorded By, (t) = Taken By, (c) = Cosigned By    Initials Name Provider Type    Maria Fernanda Keen, OT Occupational Therapist               Outcome Measures     Row Name 03/21/23 1537          How much help from another is currently needed...    Putting on and taking off regular lower body clothing? 1  -JOESPH     Bathing (including washing, rinsing, and drying) 2  -JOESPH     Toileting (which includes using toilet bed pan or urinal) 1  -JOESPH     Putting on and taking off regular upper body clothing 2  -JOESPH     Taking care of personal grooming (such as brushing teeth) 2  -JOESPH     Eating meals 3  -JOESPH     AM-PAC 6 Clicks Score (OT) 11  -JOESPH     Row Name 03/21/23 0800          How much help from another person do you currently need...    Turning from your back to your side while in flat  bed without using bedrails? 1 (P)   -MD     Moving from lying on back to sitting on the side of a flat bed without bedrails? 1 (P)   -MD     Moving to and from a bed to a chair (including a wheelchair)? 1 (P)   -MD     Standing up from a chair using your arms (e.g., wheelchair, bedside chair)? 1 (P)   -MD     Climbing 3-5 steps with a railing? 1 (P)   -MD     To walk in hospital room? 1 (P)   -MD     AM-PAC 6 Clicks Score (PT) 6 (P)   -MD     Highest level of mobility 2 --> Bed activities/dependent transfer (P)   -MD     Row Name 03/21/23 1537          Functional Assessment    Outcome Measure Options AM-PAC 6 Clicks Daily Activity (OT)  -JOESPH           User Key  (r) = Recorded By, (t) = Taken By, (c) = Cosigned By    Initials Name Provider Type    Maria Fernanda Keen, OT Occupational Therapist    Siria Tate, Nursing Student Nursing Student                Occupational Therapy Education     Title: PT OT SLP Therapies (In Progress)     Topic: Occupational Therapy (Done)     Point: ADL training (Done)     Description:   Instruct learner(s) on proper safety adaptation and remediation techniques during self care or transfers.   Instruct in proper use of assistive devices.              Learning Progress Summary           Patient Acceptance, E,D,H, VU,NR by JOESPH at 3/21/2023 1538    Comment: UE TE AROM/strengthening program, midline sitting, transfer technique, posture, bed mobility, ADL sequencing, orientation    Acceptance, E, NR by JR at 3/19/2023 1313    Acceptance, E, NR by AC at 3/16/2023 0956    Acceptance, E, NL,NR by JB1 at 3/13/2023 1013    Comment: OT POC; fall prevention; orientation   Family Acceptance, E,D,H, VU,NR by JOESPH at 3/21/2023 1538    Comment: UE TE AROM/strengthening program, midline sitting, transfer technique, posture, bed mobility, ADL sequencing, orientation    Acceptance, E, NR by  at 3/19/2023 1313                   Point: Home exercise program (Done)     Description:   Instruct  learner(s) on appropriate technique for monitoring, assisting and/or progressing therapeutic exercises/activities.              Learning Progress Summary           Patient Acceptance, E,D,H, VU,NR by JOESPH at 3/21/2023 1538    Comment: UE TE AROM/strengthening program, midline sitting, transfer technique, posture, bed mobility, ADL sequencing, orientation    Acceptance, E, NR by JR at 3/19/2023 1313   Family Acceptance, E,D,H, VU,NR by JOESPH at 3/21/2023 1538    Comment: UE TE AROM/strengthening program, midline sitting, transfer technique, posture, bed mobility, ADL sequencing, orientation    Acceptance, E, NR by JR at 3/19/2023 1313                   Point: Precautions (Done)     Description:   Instruct learner(s) on prescribed precautions during self-care and functional transfers.              Learning Progress Summary           Patient Acceptance, E,D,H, VU,NR by JOESPH at 3/21/2023 1538    Comment: UE TE AROM/strengthening program, midline sitting, transfer technique, posture, bed mobility, ADL sequencing, orientation    Acceptance, E, NL,NR by JB1 at 3/13/2023 1013    Comment: OT POC; fall prevention; orientation   Family Acceptance, E,D,H, VU,NR by JOESPH at 3/21/2023 1538    Comment: UE TE AROM/strengthening program, midline sitting, transfer technique, posture, bed mobility, ADL sequencing, orientation                   Point: Body mechanics (Done)     Description:   Instruct learner(s) on proper positioning and spine alignment during self-care, functional mobility activities and/or exercises.              Learning Progress Summary           Patient Acceptance, E,D,H, VU,NR by JOESPH at 3/21/2023 1538    Comment: UE TE AROM/strengthening program, midline sitting, transfer technique, posture, bed mobility, ADL sequencing, orientation    Acceptance, E, NL,NR by JB1 at 3/13/2023 1013    Comment: OT POC; fall prevention; orientation   Family Acceptance, E,D,H, VU,NR by JOESPH at 3/21/2023 1538    Comment: UE TE AROM/strengthening  program, midline sitting, transfer technique, posture, bed mobility, ADL sequencing, orientation                               User Key     Initials Effective Dates Name Provider Type Discipline    JOESPH 02/03/23 -  Maria Fernanda Ortiz, OT Occupational Therapist OT    AC 02/03/23 -  Radha Carson, OT Occupational Therapist OT    JR 02/03/23 -  Bernadette Aguirre, OT Occupational Therapist OT    JB1 06/16/21 -  Merline Aguila OT Occupational Therapist OT              OT Recommendation and Plan  Therapy Frequency (OT): daily  Plan of Care Review  Plan of Care Reviewed With: patient, daughter  Outcome Evaluation: Pt. with some increased engagement with ADL task performance and continues to increase sitting and standing endurance. Pt. will continued from skilled OT services to address deficits of balance, endurance, cognition, ROM, strength, and coordination to promote return to higher BADL independence level.  UE TE program issued and reviewed with dtr. to assist with patient throughout the day.     Time Calculation:    Time Calculation- OT     Row Name 03/21/23 1539             Time Calculation- OT    OT Start Time 1323  -JOESPH      OT Received On 03/21/23  -JOESPH      OT Goal Re-Cert Due Date 03/23/23  -JOESPH         Timed Charges    77291 - OT Therapeutic Exercise Minutes 5  -JOESPH      98055 - OT Therapeutic Activity Minutes 26  -JOESPH      11016 - OT Self Care/Mgmt Minutes 12  -JOESPH         Total Minutes    Timed Charges Total Minutes 43  -JOESPH       Total Minutes 43  -JOESPH            User Key  (r) = Recorded By, (t) = Taken By, (c) = Cosigned By    Initials Name Provider Type    Maria Fernanda Keen, OT Occupational Therapist              Therapy Charges for Today     Code Description Service Date Service Provider Modifiers Qty    33512877075 HC OT THERAPEUTIC ACT EA 15 MIN 3/21/2023 Maria Fernanda Ortiz OT GO 2    79384928532 HC OT SELF CARE/MGMT/TRAIN EA 15 MIN 3/21/2023 Maria Fernanda Ortiz OT GO 1               Maria Fernanda Ortiz OT  3/21/2023

## 2023-03-21 NOTE — PROGRESS NOTES
RADIATION ONCOLOGY TREATMENT MANAGEMENT NOTE    PATIENT:                                                      Esperanza Pop  MEDICAL RECORD #:                        [id number]  :                                                          1945  DIAGNOSIS:     Anal cancer      CURRENT DOSE OF RADIATION: 3240 cGy out of a planned 5940 cGy  NUMBER OF REMAINING TREATMENTS: 12    INTERVAL HISTORY: The patient is doing quite poorly in general.  The patient has a fair amount of pain.  She is somewhat disoriented today.  She is having a great deal difficulty tolerating laying flat on the treatment table.    MEDICATIONS: Medication reconciliation for the patient was reviewed and confirmed in the electronic medical record.    KPS 60%    Physical Exam  Vitals and nursing note reviewed.   Constitutional:       Appearance: She is well-developed.      Comments: Somewhat confused   HENT:      Head: Normocephalic and atraumatic.      Mouth/Throat:      Mouth: Mucous membranes are dry.   Eyes:      Conjunctiva/sclera: Conjunctivae normal.      Pupils: Pupils are equal, round, and reactive to light.   Neck:      Comments: No obviously enlarged cervical or supraclavicular LAD.  Cardiovascular:      Comments: Patient well perfused. Non cyanotic. No prominent JVD. No pedal edema  Pulmonary:      Effort: Pulmonary effort is normal.      Breath sounds: Normal breath sounds. No stridor. No wheezing.   Abdominal:      General: There is no distension.      Palpations: Abdomen is soft.   Musculoskeletal:         General: Normal range of motion.      Cervical back: Normal range of motion and neck supple.      Comments: Patient moves all extremities spontaneously.    Skin:     General: Skin is warm and dry.      Comments: Appears to be developing sacral ulcers   Neurological:      Mental Status: She is alert.      Comments: Coordination intact.  Somewhat confused   Psychiatric:         Behavior: Behavior normal.         Thought Content:  Thought content normal.         Judgment: Judgment normal.         VITAL SIGNS:   Vitals:    03/20/23 1300 03/20/23 1917 03/21/23 0030 03/21/23 0700   BP: 102/63 108/68 95/58 100/50   BP Location: Left arm      Patient Position: Lying      Pulse: 55 55 55 57   Resp: 16 20 20 18   Temp: 97.6 °F (36.4 °C) 98 °F (36.7 °C) 98 °F (36.7 °C) 97.5 °F (36.4 °C)   TempSrc: Temporal Temporal Temporal Temporal   SpO2: 92% 94% 96% 93%   Weight:       Height:           The following portions of the patient's history were reviewed and updated as appropriate: allergies, current medications, past family history, past medical history, past social history, past surgical history and problem list.  I have personally requested reviewed and interpreted the patient's images and radiology reports and pathology reports listed below:  CBC    CBC 3/15/23 3/16/23 3/18/23   WBC 3.95 3.96 5.48   RBC 2.81 (A) 2.82 (A) 3.07 (A)   Hemoglobin 8.5 (A) 8.6 (A) 9.3 (A)   Hematocrit 28.0 (A) 27.8 (A) 31.4 (A)   MCV 99.6 (A) 98.6 (A) 102.3 (A)   MCH 30.2 30.5 30.3   MCHC 30.4 (A) 30.9 (A) 29.6 (A)   RDW 17.1 (A) 17.1 (A) 17.7 (A)   Platelets 83 (A) 103 (A) 111 (A)   (A) Abnormal value            CMP    CMP 3/15/23 3/18/23 3/20/23   Glucose 116 (A) 125 (A) 128 (A)   BUN 30 (A) 19 41 (A)   Creatinine 6.70 (A) 4.41 (A) 7.22 (A)   eGFR 6.0 (A) 9.9 (A) 5.5 (A)   Sodium 134 (A) 134 (A) 127 (A)   Potassium 3.9 4.5 5.1   Chloride 97 (A) 100 93 (A)   Calcium 8.7 8.6 8.6   Albumin  2.7 (A)    BUN/Creatinine Ratio 4.5 (A) 4.3 (A) 5.7 (A)   Anion Gap 14.0 13.0 14.0   (A) Abnormal value       Comments are available for some flowsheets but are not being displayed.                    IMPRESSION/PLAN:  Anal cancer  -T3 N1 M0  -Positive inguinal lymph node on CT scan  -Bulky primary 6 cm at least  -Discussed with Dr. Knott patient is not in acute danger of obstruction  -Lesion is painful and foul-smelling.  -Recommend PET scan  -Recommend IGR T and IMRT dose of 5940cGy  versus 5400 to primary with lower dose to involved adenopathy and lower dose yet to elective nodes.  -Patient still has a significant amount of residual carcinoma causing symptoms  -Holding Xeloda  -Patient's tumor is responding well  -Unfortunately I would recommend a treatment break given the patient's overall decline in her condition.  The patient has a great deal of difficulty with the treatments and is deteriorating from a systemic standpoint.  Additionally the patient has developed some ulcers that did not need to begin to heal before proceeding with treatment.      Confusion  -Likely multifactorial  -Limits patient compliance      Sacral ulcers  -Prohibit patient's ability to to lay on the table comfortably  -Recommend wound care     CKD  -On dialysis  -Complicates care     Diabetes  -Complicates care  -We will have issues with wound healing due to this.    Kevan Cavazos MD

## 2023-03-21 NOTE — PROGRESS NOTES
Goals are set and sxs are managed.  Palliative will sign off.    Trent Niño DO  13:08 EDT  3/21/23

## 2023-03-21 NOTE — PLAN OF CARE
Goal Outcome Evaluation:  Plan of Care Reviewed With: patient, daughter           Outcome Evaluation: Pt. with some increased engagement with ADL task performance and continues to increase sitting and standing endurance. Pt. will continued from skilled OT services to address deficits of balance, endurance, cognition, ROM, strength, and coordination to promote return to higher BADL independence level.  UE TE program issued and reviewed with dtr. to assist with patient throughout the day.

## 2023-03-21 NOTE — NURSING NOTE
[facility] team consulted for coccyx pressure injury with open area, drainage, white spots and bleeding and also consulted for wound as indicated    Patient known to [facility] team as previously orders have been written for radiation burn care and pressure injury prevention.  Patient had literally just been returned to her room from dialysis when WOC RN ready to see patient.  WOC RN and 5B staff member assisted with turning and noted copious drainage to disposable pad under patient which is yellow, serosanguineous.  Patient's bilateral gluteal wounds and intergluteal cleft wound are extremely patient painful to the patient with assessment.  Wound beds are pink and red to intergluteal cleft and left gluteal, wound to right gluteal is yellow, pink, and red -all 3 sites are blanchable.  Wound edges and periwound skin is peeling.  See below photos and wound measurement.  Cleansed by spraying with normal saline flushes, patted dry gently, hemostasis achieved.  Applied cut to fit Mepitel One to the wound beds, covered with cut to fit Mepilex Ag foam and covered with a silicone foam border dressing and secured with pink tape.    Left gluteal measures 8 x 6 x 0.2 cm; right gluteal measures 8.5 x 5.5 x 0.2 cm; intergluteal cleft measures 6 x 1 x 0.2 cm          Identified maceration to edges of anus with slough within anus.  Cleanse gently with normal saline, patted dry, applied crusting technique to anus and surrounding skin to protect it from further moisture.  Stoma powder and skin protectant at bedside.  Recommended to nurse extern to tuck  2-3 pieces of 4 x 4 gauze over anus to determine if any drainage coming from anus in addition or instead of wounds.  If moderate to copious anal drainage, recommend placement of Flexiseal external fecal management system.    Anus          Patient's skin is dry, flaky.   Aquaphor recommended for the prevention of radiation dermatitis.  Requested Aquaphor to be applied twice daily especially to  patient's abdomen, bilateral flanks, back and bilateral buttocks avoiding wound and wound dressings.      Given patient anuric and hasn't had a bowel movement in several days, per Nurse Extern, recommended application on one disposable pad under patient at all times.      Sensory Perception: (P) 2-->very limited  Moisture: (P) 4-->rarely moist  Activity: (P) 1-->bedfast  Mobility: (P) 2-->very limited  Nutrition: (P) 2-->probably inadequate  Friction and Shear: (P) 2-->potential problem  Jayce Score: (P) 13 (03/21/23 0800)     Low-air-loss bed ordered.  Recommend only applying flat sheet and one incontinence pad to low-air-loss beds.    Please implement pressure injury prevention interventions for patients with a Jayce score of 18 or less per protocol.  See orders for recommendations.    Thank you for the consult.  WOC team will plan to follow-up.  If alteration to skin integrity or change in wound bed presentation please consult the WOC team.

## 2023-03-21 NOTE — PROGRESS NOTES
" LOS: 3 days   Patient Care Team:  Meghana Rodgers MD as PCP - General  Demetrius Sagastume MD as Consulting Physician (Cardiology)  Kevan Lerma MD as Consulting Physician (Nephrology)  Geena Estrella APRN as Nurse Practitioner (Cardiology)  Frank Mandujano MD as Referring Physician (Colon and Rectal Surgery)  Kevan Cavazos MD as Consulting Physician (Radiation Oncology)  Yue Reeves RN as Nurse Navigator  Darryn Burk MD as Consulting Physician (Nephrology)    Chief Complaint: F/U ESRD  Subjective :         Subjective:  Symptoms:  Stable.  No shortness of breath or chest pain.    Diet:  Adequate intake.        History taken from: patient    Objective     Vital Sign Min/Max for last 24 hours  Temp  Min: 97.5 °F (36.4 °C)  Max: 98 °F (36.7 °C)   BP  Min: 95/58  Max: 108/68   Pulse  Min: 55  Max: 57   Resp  Min: 18  Max: 20   SpO2  Min: 93 %  Max: 96 %   No data recorded   No data recorded     Flowsheet Rows    Flowsheet Row First Filed Value   Admission Height 167.6 cm (66\") Documented at 03/10/2023 1122   Admission Weight 76.7 kg (169 lb) Documented at 03/10/2023 1122          No intake/output data recorded.  I/O last 3 completed shifts:  In: 220 [P.O.:220]  Out: 2080 [Other:2080]    Objective:  General Appearance:  Comfortable.    Vital signs: (most recent): Blood pressure 100/50, pulse 57, temperature 97.5 °F (36.4 °C), temperature source Temporal, resp. rate 18, height 167.6 cm (65.98\"), weight 79.1 kg (174 lb 6.1 oz), SpO2 93 %, not currently breastfeeding.  Vital signs are normal.    Output: Minimal urine output.    HEENT: Normal HEENT exam.    Lungs:  Normal effort and normal respiratory rate.  Breath sounds clear to auscultation.  She is not in respiratory distress.  No decreased breath sounds or wheezes.    Heart: Normal rate.    Extremities: There is no dependent edema.  (AVF)  Pulses: Distal pulses are intact.    Neurological: Patient is alert.  Normal strength.  (A LITTLE " CONFUSED).              Results Review:     I reviewed the patient's new clinical results.    WBC No results found for: WBC   HGB No results found for: HGB   HCT No results found for: HCT   Platlets No results found for: LABPLAT   MCV No results found for: MCV       Sodium Sodium   Date Value Ref Range Status   03/20/2023 127 (L) 136 - 145 mmol/L Final      Potassium Potassium   Date Value Ref Range Status   03/20/2023 5.1 3.5 - 5.2 mmol/L Final     Comment:     Slight hemolysis detected by analyzer. Results may be affected.      Chloride Chloride   Date Value Ref Range Status   03/20/2023 93 (L) 98 - 107 mmol/L Final      CO2 CO2   Date Value Ref Range Status   03/20/2023 20.0 (L) 22.0 - 29.0 mmol/L Final      BUN BUN   Date Value Ref Range Status   03/20/2023 41 (H) 8 - 23 mg/dL Final      Creatinine Creatinine   Date Value Ref Range Status   03/20/2023 7.22 (H) 0.57 - 1.00 mg/dL Final      Calcium Calcium   Date Value Ref Range Status   03/20/2023 8.6 8.6 - 10.5 mg/dL Final      PO4 No results found for: CAPO4   Albumin No results found for: ALBUMIN   Magnesium No results found for: MG   Uric Acid No results found for: URICACID     Medication Review: yes    Assessment & Plan       Hematochezia    Type 2 diabetes mellitus (HCC)    Essential hypertension    Hyperlipidemia LDL goal <70    End stage renal disease on dialysis (HCC)    CHF (congestive heart failure) (HCC)    PAF (paroxysmal atrial fibrillation) (HCC)    Severe malnutrition (HCC)    Symptomatic anemia      Assessment & Plan     ESRD: MWF [facility]. Missed HD before admission      Access: AV fistula      Anemia: Transfuse at Hb 7 or less. On chemo for anal ca     Volume status: Dependent edema noted.    HTN: Bp LOW.   HYPONATREMIA. MILD. SHOULD CORRECT WITH HD.   I discussed the patients findings and my recommendations with patient  PLAN.  HD  3/22/23.   Zeb Owusu MD  03/21/23  13:49 EDT

## 2023-03-21 NOTE — PLAN OF CARE
Problem: Palliative Care  Goal: Enhanced Quality of Life  Outcome: Adequate for Care Transition  Intervention: Optimize Psychosocial Wellbeing  Recent Flowsheet Documentation  Taken 3/21/2023 1300 by Mandy Pulliam MSW  Supportive Measures: (Palliative IDT: WILMA HILTON, OVI, RN) --  Taken 3/21/2023 1213 by Mandy Pulliam MSW  Supportive Measures: (symptom assessment)   active listening utilized   positive reinforcement provided   other (see comments)   verbalization of feelings encouraged  Patient wishes to continue treatment, dialysis and XRT with oral chemotherapy.  Per HONEY Niño DO, Palliative Medicine will sign off at this time.  Please reconsult as needed.

## 2023-03-22 ENCOUNTER — APPOINTMENT (OUTPATIENT)
Dept: GENERAL RADIOLOGY | Facility: HOSPITAL | Age: 78
End: 2023-03-22
Payer: MEDICARE

## 2023-03-22 ENCOUNTER — APPOINTMENT (OUTPATIENT)
Dept: NEPHROLOGY | Facility: HOSPITAL | Age: 78
End: 2023-03-22
Payer: MEDICARE

## 2023-03-22 LAB
ALBUMIN SERPL-MCNC: 3 G/DL (ref 3.5–5.2)
ALBUMIN/GLOB SERPL: 0.7 G/DL
ALP SERPL-CCNC: 127 U/L (ref 39–117)
ALT SERPL W P-5'-P-CCNC: 6 U/L (ref 1–33)
ANION GAP SERPL CALCULATED.3IONS-SCNC: 15 MMOL/L (ref 5–15)
AST SERPL-CCNC: 17 U/L (ref 1–32)
BASOPHILS # BLD AUTO: 0.03 10*3/MM3 (ref 0–0.2)
BASOPHILS NFR BLD AUTO: 0.4 % (ref 0–1.5)
BILIRUB SERPL-MCNC: 0.6 MG/DL (ref 0–1.2)
BUN SERPL-MCNC: 36 MG/DL (ref 8–23)
BUN/CREAT SERPL: 5.4 (ref 7–25)
CALCIUM SPEC-SCNC: 8.6 MG/DL (ref 8.6–10.5)
CHLORIDE SERPL-SCNC: 95 MMOL/L (ref 98–107)
CO2 SERPL-SCNC: 21 MMOL/L (ref 22–29)
CREAT SERPL-MCNC: 6.66 MG/DL (ref 0.57–1)
DEPRECATED RDW RBC AUTO: 62.2 FL (ref 37–54)
EGFRCR SERPLBLD CKD-EPI 2021: 6 ML/MIN/1.73
EOSINOPHIL # BLD AUTO: 0.47 10*3/MM3 (ref 0–0.4)
EOSINOPHIL NFR BLD AUTO: 6.6 % (ref 0.3–6.2)
ERYTHROCYTE [DISTWIDTH] IN BLOOD BY AUTOMATED COUNT: 17.4 % (ref 12.3–15.4)
GEN 5 2HR TROPONIN T REFLEX: 158 NG/L
GLOBULIN UR ELPH-MCNC: 4.6 GM/DL
GLUCOSE BLDC GLUCOMTR-MCNC: 132 MG/DL (ref 70–130)
GLUCOSE BLDC GLUCOMTR-MCNC: 88 MG/DL (ref 70–130)
GLUCOSE SERPL-MCNC: 111 MG/DL (ref 65–99)
HCT VFR BLD AUTO: 29.3 % (ref 34–46.6)
HGB BLD-MCNC: 8.9 G/DL (ref 12–15.9)
IMM GRANULOCYTES # BLD AUTO: 0.12 10*3/MM3 (ref 0–0.05)
IMM GRANULOCYTES NFR BLD AUTO: 1.7 % (ref 0–0.5)
LYMPHOCYTES # BLD AUTO: 0.67 10*3/MM3 (ref 0.7–3.1)
LYMPHOCYTES NFR BLD AUTO: 9.4 % (ref 19.6–45.3)
MCH RBC QN AUTO: 30.1 PG (ref 26.6–33)
MCHC RBC AUTO-ENTMCNC: 30.4 G/DL (ref 31.5–35.7)
MCV RBC AUTO: 99 FL (ref 79–97)
MONOCYTES # BLD AUTO: 1.18 10*3/MM3 (ref 0.1–0.9)
MONOCYTES NFR BLD AUTO: 16.6 % (ref 5–12)
NEUTROPHILS NFR BLD AUTO: 4.65 10*3/MM3 (ref 1.7–7)
NEUTROPHILS NFR BLD AUTO: 65.3 % (ref 42.7–76)
NRBC BLD AUTO-RTO: 0.3 /100 WBC (ref 0–0.2)
PLATELET # BLD AUTO: 153 10*3/MM3 (ref 140–450)
PMV BLD AUTO: 11.6 FL (ref 6–12)
POTASSIUM SERPL-SCNC: 4.5 MMOL/L (ref 3.5–5.2)
PROT SERPL-MCNC: 7.6 G/DL (ref 6–8.5)
QT INTERVAL: 510 MS
QTC INTERVAL: 510 MS
RBC # BLD AUTO: 2.96 10*6/MM3 (ref 3.77–5.28)
SODIUM SERPL-SCNC: 131 MMOL/L (ref 136–145)
TROPONIN T DELTA: 2 NG/L
TROPONIN T SERPL HS-MCNC: 156 NG/L
WBC NRBC COR # BLD: 7.12 10*3/MM3 (ref 3.4–10.8)

## 2023-03-22 PROCEDURE — G0378 HOSPITAL OBSERVATION PER HR: HCPCS

## 2023-03-22 PROCEDURE — G0257 UNSCHED DIALYSIS ESRD PT HOS: HCPCS

## 2023-03-22 PROCEDURE — 85025 COMPLETE CBC W/AUTO DIFF WBC: CPT | Performed by: NURSE PRACTITIONER

## 2023-03-22 PROCEDURE — 97530 THERAPEUTIC ACTIVITIES: CPT

## 2023-03-22 PROCEDURE — 92610 EVALUATE SWALLOWING FUNCTION: CPT

## 2023-03-22 PROCEDURE — 82962 GLUCOSE BLOOD TEST: CPT

## 2023-03-22 PROCEDURE — 84484 ASSAY OF TROPONIN QUANT: CPT | Performed by: NURSE PRACTITIONER

## 2023-03-22 PROCEDURE — 93005 ELECTROCARDIOGRAM TRACING: CPT | Performed by: NURSE PRACTITIONER

## 2023-03-22 PROCEDURE — 93010 ELECTROCARDIOGRAM REPORT: CPT | Performed by: STUDENT IN AN ORGANIZED HEALTH CARE EDUCATION/TRAINING PROGRAM

## 2023-03-22 PROCEDURE — 80053 COMPREHEN METABOLIC PANEL: CPT | Performed by: NURSE PRACTITIONER

## 2023-03-22 PROCEDURE — 25010000002 HALOPERIDOL LACTATE PER 5 MG: Performed by: FAMILY MEDICINE

## 2023-03-22 PROCEDURE — 71045 X-RAY EXAM CHEST 1 VIEW: CPT

## 2023-03-22 PROCEDURE — 97602 WOUND(S) CARE NON-SELECTIVE: CPT

## 2023-03-22 PROCEDURE — 96376 TX/PRO/DX INJ SAME DRUG ADON: CPT

## 2023-03-22 PROCEDURE — 99232 SBSQ HOSP IP/OBS MODERATE 35: CPT | Performed by: INTERNAL MEDICINE

## 2023-03-22 PROCEDURE — 25010000002 EPOETIN ALFA-EPBX 10000 UNIT/ML SOLUTION: Performed by: INTERNAL MEDICINE

## 2023-03-22 RX ORDER — DORZOLAMIDE HYDROCHLORIDE AND TIMOLOL MALEATE 20; 5 MG/ML; MG/ML
1 SOLUTION/ DROPS OPHTHALMIC DAILY
Status: DISCONTINUED | OUTPATIENT
Start: 2023-03-22 | End: 2023-03-24

## 2023-03-22 RX ADMIN — HYDROXYZINE HYDROCHLORIDE 25 MG: 25 TABLET ORAL at 19:19

## 2023-03-22 RX ADMIN — CLONIDINE HYDROCHLORIDE 0.4 MG: 0.1 TABLET ORAL at 12:19

## 2023-03-22 RX ADMIN — ASPIRIN 81 MG: 81 TABLET, COATED ORAL at 12:20

## 2023-03-22 RX ADMIN — QUETIAPINE FUMARATE 25 MG: 25 TABLET ORAL at 19:50

## 2023-03-22 RX ADMIN — APIXABAN 2.5 MG: 2.5 TABLET, FILM COATED ORAL at 19:50

## 2023-03-22 RX ADMIN — HALOPERIDOL LACTATE 0.5 MG: 5 INJECTION, SOLUTION INTRAMUSCULAR at 02:30

## 2023-03-22 RX ADMIN — HYDROCODONE BITARTRATE AND ACETAMINOPHEN 1 TABLET: 10; 325 TABLET ORAL at 21:50

## 2023-03-22 RX ADMIN — EPOETIN ALFA-EPBX 20000 UNITS: 10000 INJECTION, SOLUTION INTRAVENOUS; SUBCUTANEOUS at 08:02

## 2023-03-22 RX ADMIN — Medication 5 MG: at 19:50

## 2023-03-22 RX ADMIN — CARVEDILOL 12.5 MG: 12.5 TABLET, FILM COATED ORAL at 21:50

## 2023-03-22 RX ADMIN — CARVEDILOL 12.5 MG: 12.5 TABLET, FILM COATED ORAL at 19:51

## 2023-03-22 RX ADMIN — ATORVASTATIN CALCIUM 80 MG: 40 TABLET, FILM COATED ORAL at 19:50

## 2023-03-22 RX ADMIN — ISOSORBIDE MONONITRATE 120 MG: 120 TABLET, EXTENDED RELEASE ORAL at 12:22

## 2023-03-22 RX ADMIN — APIXABAN 2.5 MG: 2.5 TABLET, FILM COATED ORAL at 12:21

## 2023-03-22 RX ADMIN — CARVEDILOL 12.5 MG: 12.5 TABLET, FILM COATED ORAL at 12:20

## 2023-03-22 RX ADMIN — AMIODARONE HYDROCHLORIDE 200 MG: 200 TABLET ORAL at 12:21

## 2023-03-22 NOTE — CASE MANAGEMENT/SOCIAL WORK
Continued Stay Note  [facility]     Patient Name: Esperanza Pop  MRN: [id number]  Today's Date: 3/22/2023    Admit Date: 3/10/2023    Plan: Ongoing   Discharge Plan     Row Name 03/22/23 7047       Plan    Plan Ongoing    Plan Comments Case Management continues to follow for discharge planning.  Unable to tolerate radiation tx, will tentatively plan SNF for PT/OT, speech and wound care.  Daughter interested in guardianship in order to pay bills, will plan to discuss guardianship process/SNF options with patient/daughter tomorrow.  Discussed with Dr. Nascimento today and in unit multidisciplinary rounds this morning.  Bernadette Herman, Ext. [phone]    Final Discharge Disposition Code 30 - still a patient               Discharge Codes    No documentation.               Expected Discharge Date and Time     Expected Discharge Date Expected Discharge Time    Mar 24, 2023             SELIN Alberto

## 2023-03-22 NOTE — PLAN OF CARE
Goal Outcome Evaluation:   Pt yelling this PM. Pt stated that she was anxious and she wanted to leave. Haldol given for anxiety. Pt began yelling again. When pt was asked why she stated that she was having pain in her chest. When asked if it was when she was breathing, she stated that it was. I confirmed this with her multiple times. Patient's HOB was raised and she felt some relief. Provider notified of pain. EKG, VS, and CXR obtained. Awaiting labs at this time. Pt is resting with her head elevated and appears to be much more comfortable. Pt is scheduled for dialysis this AM.

## 2023-03-22 NOTE — SIGNIFICANT NOTE
Called per nursing secondary to pt having chest pain with inspiration and SOB.   EKG, CXR  CBC, CMP, troponin pending

## 2023-03-22 NOTE — PROGRESS NOTES
Psychiatric Medicine Services  PROGRESS NOTE    Patient Name: Esperanza Pop  : 1945  MRN: [id number]    Date of Admission: 3/10/2023  Primary Care Physician: Meghana Rodgers MD    Subjective   Subjective     CC:  F/U generalized weakness     HPI:    I have seen and evaluated the patient this morning.  Appears comfortable in bed.  Reported that she kept having nightmares overnight did not rest well.  De had some chest pain overnight and troponin was checked and was slightly elevated but remained flat.  Currently without chest pain.  Still hurting in her buttocks.    Objective   Objective     Vital Signs:   Temp:  [97 °F (36.1 °C)-98.3 °F (36.8 °C)] 97 °F (36.1 °C)  Heart Rate:  [54-66] 61  Resp:  [14-20] 16  BP: (107-167)/(49-71) 153/62  Flow (L/min):  [2] 2     Physical Exam:  General: Chronically ill looking.  Debilitated  Head: Atraumatic and normocephalic  Eyes: No Icterus. No pallor  Ears:  Ears appear intact with no abnormalities noted  Throat: No oral lesions, no thrush  Neck: Supple, trachea midline  Lungs: Clear to auscultation bilaterally, equal air entry, no wheezing or crackles  Heart:  Normal S1 and S2, no murmur, no gallop, No JVD, no lower extremity swelling  Abdomen:  Soft, no tenderness, no organomegaly, normal bowel sounds, no organomegaly  Extremities: pulses equal bilaterally  Skin: Bilateral ulceration of both buttocks  Neurologic: Cranial nerves appear intact with no evidence of facial asymmetry, normal motor and sensory functions in all 4 extremities  Psych: Alert and oriented x 3, normal mood    Results Reviewed:  LAB RESULTS:      Lab 23  0326 23  0447 23  0451   WBC 7.12 5.48 3.96   HEMOGLOBIN 8.9* 9.3* 8.6*   HEMATOCRIT 29.3* 31.4* 27.8*   PLATELETS 153 111* 103*   NEUTROS ABS 4.65  --   --    IMMATURE GRANS (ABS) 0.12*  --   --    LYMPHS ABS 0.67*  --   --    MONOS ABS 1.18*  --   --    EOS ABS 0.47*  --   --    MCV 99.0* 102.3* 98.6*          Lab 03/22/23  0326 03/20/23  0642 03/18/23  0447   SODIUM 131* 127* 134*   POTASSIUM 4.5 5.1 4.5   CHLORIDE 95* 93* 100   CO2 21.0* 20.0* 21.0*   ANION GAP 15.0 14.0 13.0   BUN 36* 41* 19   CREATININE 6.66* 7.22* 4.41*   EGFR 6.0* 5.5* 9.9*   GLUCOSE 111* 128* 125*   CALCIUM 8.6 8.6 8.6   PHOSPHORUS  --   --  2.8         Lab 03/22/23  0326 03/18/23  0447   TOTAL PROTEIN 7.6  --    ALBUMIN 3.0* 2.7*   GLOBULIN 4.6  --    ALT (SGPT) 6  --    AST (SGOT) 17  --    BILIRUBIN 0.6  --    ALK PHOS 127*  --          Lab 03/22/23  0743 03/22/23  0326   HSTROP T 158* 156*                 Brief Urine Lab Results     None          Microbiology Results Abnormal     None          XR Chest 1 View    Result Date: 3/22/2023  Exam:  Single view chest Date: March 22, 2023 History: Shortness of breath Comparison: March 10, 2023 Technique: Single frontal radiograph of the chest was obtained. The study is degraded by patient rotation. Findings: The heart is enlarged. There is mild vascular congestion. There is no pneumothorax or sizable pleural fluid collection.     Impression: 1. Cardiomegaly with mild vascular congestion. There may be an underlying pericardial effusion. Slot 63 Electronically signed by:  Abdulaziz Mcwilliams M.D.  3/22/2023 2:25 AM Mountain Time      Results for orders placed during the hospital encounter of 03/10/23    Adult Transthoracic Echo Complete W/ Cont if Necessary Per Protocol    Interpretation Summary  •  Left ventricular systolic function is normal. Estimated left ventricular EF = 55%  •  Left ventricular wall thickness is consistent with moderate concentric hypertrophy.  •  The right ventricular cavity is mildly dilated.  •  Mild to moderate biatrial enlargement.  •  Moderate to severe aortic valve stenosis is present.  Mean gradient 34 mmHg, DOUG 0.9 cm².  •  Mild aortic insufficiency.  •  Mild mitral regurgitation.  •  Mild to moderate tricuspid regurgitation with RVSP 48 mmHg.      Current  medications:  Scheduled Meds:amiodarone, 200 mg, Oral, Daily  apixaban, 2.5 mg, Oral, Q12H  aspirin, 81 mg, Oral, Daily  atorvastatin, 80 mg, Oral, Nightly  carvedilol, 12.5 mg, Oral, Q12H  cloNIDine, 0.4 mg, Oral, Q12H  epoetin orquidea/orquidea-epbx, 20,000 Units, Subcutaneous, Once per day on Mon Wed Fri  insulin lispro, 0-9 Units, Subcutaneous, TID With Meals  isosorbide mononitrate, 120 mg, Oral, Daily  melatonin, 5 mg, Oral, Nightly  QUEtiapine, 25 mg, Oral, Nightly  sodium chloride, 10 mL, Intravenous, Q12H      Continuous Infusions:   PRN Meds:.•  benzocaine-menthol  •  dextrose  •  dextrose  •  glucagon (human recombinant)  •  haloperidol lactate  •  HYDROcodone-acetaminophen  •  hydrocortisone  •  hydrOXYzine  •  ondansetron **OR** ondansetron  •  sodium chloride  •  sodium chloride  •  sodium chloride    Assessment & Plan   Assessment & Plan     Active Hospital Problems    Diagnosis  POA   • **Hematochezia [K92.1]  Yes   • Symptomatic anemia [D64.9]  Yes   • Severe malnutrition (HCC) [E43]  Yes   • PAF (paroxysmal atrial fibrillation) (HCC) [I48.0]  Yes   • CHF (congestive heart failure) (HCC) [I50.9]  Yes   • End stage renal disease on dialysis (HCC) [N18.6, Z99.2]  Not Applicable   • Hyperlipidemia LDL goal <70 [E78.5]  Yes   • Essential hypertension [I10]  Yes   • Type 2 diabetes mellitus (HCC) [E11.9]  Yes      Resolved Hospital Problems   No resolved problems to display.        Brief Hospital Course to date:  Esperanza Pop is a 78 y.o. female  with PMH of ESRD on HD MWF, IDDM, HTN, HLD, HFpEF, breast cancer (s/p mastectomy and radiation), PAF (on xarelto), chronic anemia, and rectal cancer (recently diagnosed currently gettintg XRT and chemo) that presented to the ED after a fall at home. Found to have acute on chronic anemia, s/p 1 unit pRBCs on 3/11.   Patient wishing to pursue continued chemo/radiation but very very weak with SNF recs. Unable to complete radiation/chemo in SNF, but very weak with many  treatments needed for home. Complicated picture. Will try to re-attempt radiation today, if tolerating, remain here for radiation + chemo this week. If unable to tolerate, should likely pursue SNF placement and reattempt later.    Assessment and plan:  Acute on Chronic Anemia 2/2 rectal bleeding 2/2 known rectal cx - stabilized  Profound Debility, SNF recs  · Status post she 1 unit PRBCs 3/11 with appropriate rise in H&H, stabilized  · She follows with Dr. Cavazos, Rad/Onc, Dr. Carpenter with oncology  · In regards to her rectal cancer, could not tolerate chemotherapy or radiation therapy because of severe debility.  Plan for now is to hold both therapies (chemoradiation) and transition to SNF and if she improves over the next few weeks, she can resume treatment otherwise, family might need to consider hospice care  · Case management consulted for SNF placement    Acute metabolic encephalopathy - resolved  · She completed course of abx for possible UTI, CT head without acute disease  · Continue seroquel qhs + melatonin with available PRNs    Chest pain  · Had an episode of chest pain early morning 3/22/2023.  Troponin was mildly elevated but flat  · Currently chest pain-free.  Unfortunately because of her age, severe debility and advanced rectal cancer, not candidate for ischemic work-up.  Medical management for now  · Already on aspirin and Eliquis and Coreg  · We will continue to monitor    Buttock wound   · Wound care/pain control    ESRD on HD MWF  · Renal following for HD     HFpEF   Essential hypertension  · Continue dialysis for volume control as above  · Continue clonidine, Coreg and Imdur    PAF on eliquis   · Continue eliquis + amiodarone, H&H stablized as above    Mod-Severe aortic stenosis   · Has appt with Geena Estrella, APRN 4/27/23, can follow up    Expected Discharge Location and Transportation: TBD, see above  Expected Discharge   Expected Discharge Date and Time     Expected Discharge Date  Expected Discharge Time    Mar 24, 2023            DVT prophylaxis:  Medical and mechanical DVT prophylaxis orders are present.     AM-PAC 6 Clicks Score (PT): 10 (03/22/23 4699)    CODE STATUS:   Code Status and Medical Interventions:   Ordered at: 03/10/23 1448     Medical Intervention Limits:    NO intubation (DNI)     Code Status (Patient has no pulse and is not breathing):    No CPR (Do Not Attempt to Resuscitate)     Medical Interventions (Patient has pulse or is breathing):    Limited Support       Kenny Nascimento MD  03/22/23    Copied text in this note has been reviewed and is accurate as of 03/22/23.

## 2023-03-22 NOTE — PLAN OF CARE
Goal Outcome Evaluation:  Plan of Care Reviewed With: (P) patient        Progress: (P) no change  Outcome Evaluation: (P) Pt performed 2x STS with max Ax2 with limitations of lethargy, back pain, generalized weakness, and decreased endurance. Rec skilled PT to increase ind with mobility and d/c to SNF.

## 2023-03-22 NOTE — NURSING NOTE
Patient family concerned about possible sundowners this evening please discuss with family when able. Call  [phone].

## 2023-03-22 NOTE — PLAN OF CARE
Goal Outcome Evaluation:  Plan of Care Reviewed With: patient        Progress: no change   SLP evaluation completed. Will address swallowing concerns w/ FEES. Please see note for further details and recommendations.

## 2023-03-22 NOTE — THERAPY EVALUATION
Acute Care - Speech Language Pathology   Swallow Initial Evaluation [facility]   Clinical Swallow Evaluation     Patient Name: Esperanza Pop  : 1945  MRN: [id number]  Today's Date: 3/22/2023               Admit Date: 3/10/2023    Visit Dx:     ICD-10-CM ICD-9-CM   1. Symptomatic anemia  D64.9 285.9   2. Generalized weakness  R53.1 780.79   3. ESRD on dialysis ([facility])  N18.6 585.6    Z99.2 V45.11   4. Impaired mobility  Z74.09 799.89   5. Fall, initial encounter  W19.XXXA E888.9   6. Elevated troponin  R77.8 790.6   7. Acute on chronic diastolic heart failure ([facility])  I50.33 428.33   8. Type 2 diabetes mellitus with chronic kidney disease on chronic dialysis, with long-term current use of insulin ([facility])  E11.22 250.40    N18.6 585.9    Z99.2 V45.11    Z79.4 V58.67   9. Severe malnutrition ([facility])  E43 261   10. Malignant neoplasm of anal canal ([facility])  C21.1 154.2   11. PAF (paroxysmal atrial fibrillation) ([facility])  I48.0 427.31   12. Coronary artery disease involving native coronary artery of native heart with angina pectoris ([facility])  I25.119 414.01     413.9   13. End stage renal disease on dialysis ([facility])  N18.6 585.6    Z99.2 V45.11   14. Nonrheumatic aortic valve stenosis  I35.0 424.1   15. NSTEMI (non-ST elevated myocardial infarction) ([facility])  I21.4 410.70   16. Acute cystitis without hematuria  N30.00 595.0   17. Ischemic heart disease  I25.9 414.9   18. Congestive heart failure, unspecified HF chronicity, unspecified heart failure type ([facility])  I50.9 428.0   19. Malignant neoplasm of right female breast, unspecified estrogen receptor status, unspecified site of breast ([facility])  C50.911 174.9   20. Hematochezia  K92.1 578.1   21. Acute midline low back pain without sciatica  M54.50 724.2   22. Dysphagia, unspecified type  R13.10 787.20     Patient Active Problem List   Diagnosis   • Acute on chronic diastolic heart failure (HCC)   • Type 2 diabetes mellitus (HCC)   • Essential hypertension   • Coronary artery disease  involving native coronary artery of native heart with angina pectoris (HCC)   • Breast cancer, female, right   • Seasonal allergic rhinitis   • Right carotid bruit   • Vitamin B12 deficiency   • Hyperlipidemia LDL goal <70   • End stage renal disease on dialysis (HCC)   • Nonrheumatic aortic valve stenosis   • NSTEMI (non-ST elevated myocardial infarction) (HCC)   • UTI (urinary tract infection)   • Ischemic heart disease   • CHF (congestive heart failure) (HCC)   • Acute non-ST segment elevation myocardial infarction (STEMI) following previous myocardial infarction   • Dyslipidemia   • Diabetes mellitus (HCC)   • Mild obesity   • Elevated troponin   • Screen for colon cancer   • Acute midline low back pain without sciatica   • Hypertensive emergency   • PAF (paroxysmal atrial fibrillation) (HCC)   • Malignant neoplasm of anal canal (HCC)   • Hematochezia   • Severe malnutrition (HCC)   • Symptomatic anemia     Past Medical History:   Diagnosis Date   • Acute bronchitis    • Acute conjunctivitis    • Acute kidney injury (HCC)     Admission from 12/26/2013 to 01/02/2014, now resolved with latest creatinine 1.4 on 01/08/2014.   • Acute non-ST segment elevation myocardial infarction (STEMI) following previous myocardial infarction    • Anal cancer (HCC) 2/8/2023   • Anal cancer (HCC) 2/8/2023   • Arthritis    • Breast cancer, female, right     2005 mastectomy right   • Cancer (HCC)    • CHF (congestive heart failure) (HCC)    • Chronic kidney disease     dialysis MW, f/u [facility]    • Clotting disorder (HCC)    • COVID-19    • Diabetes mellitus (HCC)     diagnosed ~1992, checks fsbg 2-3x/day   • Diarrhea    • Dyslipidemia    • Dyspepsia    • Dyspnea    • Edema    • History of transfusion     ~2013 no reaction    • Hx of radiation therapy    • Hyperlipidemia    • Hypertension     Severe   • Ischemic heart disease    • Moderate obesity     BMI 36.2   • Myocardial infarction (HCC)    • Pneumonia    •  Pneumonia 02/2019   • Radiation 2005   • Seasonal allergies    • Wears glasses      Past Surgical History:   Procedure Laterality Date   • AV FISTULA PLACEMENT, BRACHIOBASILIC     • BREAST BIOPSY Left 2010   • BREAST CYST EXCISION     • BREAST LUMPECTOMY Right 2005   • BREAST SURGERY     • CARDIAC CATHETERIZATION N/A 10/10/2016    Procedure: Left Heart Cath;  Surgeon: Scooter Zhao MD;  Location:  [facility];  Service:    • CARDIAC CATHETERIZATION  10/2016   • CARDIAC CATHETERIZATION N/A 1/21/2021    Procedure: Right and Left Heart Cath;  Surgeon: Hugh Tidwell MD;  Location:  [facility];  Service: Cardiology;  Laterality: N/A;   • COLONOSCOPY N/A 7/23/2020    Procedure: COLONOSCOPY;  Surgeon: Rubén Rosas MD;  Location:  [facility];  Service: Gastroenterology;  Laterality: N/A;   • CORONARY STENT PLACEMENT  2016   • HERNIA REPAIR     • HYSTERECTOMY  2000   • INSERTION HEMODIALYSIS CATHETER Right 6/29/2016    Procedure: HEMODIALYSIS CATHETER INSERTION TUNNELLED;  Surgeon: Ramón Chavez MD;  Location:  [facility];  Service:    • MASTECTOMY Right 2006   • OOPHORECTOMY     • REDUCTION MAMMAPLASTY Left 2005       SLP Recommendation and Plan  SLP Swallowing Diagnosis: R/O pharyngeal dysphagia, oral dysphagia (03/22/23 1515)  SLP Diet Recommendation: puree, nectar thick liquids (03/22/23 1515)  Recommended Precautions and Strategies: upright posture during/after eating, no straw, general aspiration precautions (03/22/23 1515)  SLP Rec. for Method of Medication Administration: meds whole, with puree, as tolerated (03/22/23 1515)     Monitor for Signs of Aspiration: yes, notify SLP if any concerns (03/22/23 1515)  Recommended Diagnostics: FEES (03/22/23 1515)  Swallow Criteria for Skilled Therapeutic Interventions Met: demonstrates skilled criteria (03/22/23 1515)  Anticipated Discharge Disposition (SLP): anticipate therapy at next level of care, skilled  nursing facility (03/22/23 2502)  Rehab Potential/Prognosis, Swallowing: re-evaluate goals as necessary (03/22/23 1515)           Plan of Care Reviewed With: patient  Progress: no change      SWALLOW EVALUATION (last 72 hours)     SLP Adult Swallow Evaluation     Row Name 03/22/23 2970                   Rehab Evaluation    Document Type evaluation  -AC        Subjective Information complains of;pain  -AC        Patient Observations alert;cooperative  -AC        Patient/Family/Caregiver Comments/Observations No family present.  -AC        Patient Effort fair  -AC           General Information    Patient Profile Reviewed yes  -AC        Pertinent History Of Current Problem Hematochezia. Recently dx'd anal CA, undergoing XRT & oral chemo. Hx ESRD--on dialysis, severe malnutrition, breast CA, arthritis, pna. C/o SOA & CP recently per chart. Also new swallowing difficulty w/ liquids & solids reported.  -AC        Current Method of Nutrition regular textures;thin liquids  -AC        Precautions/Limitations, Vision WFL;for purposes of eval  -AC        Precautions/Limitations, Hearing WFL;for purposes of eval  -AC        Prior Level of Function-Communication unknown  -AC        Prior Level of Function-Swallowing no diet consistency restrictions  -AC        Plans/Goals Discussed with patient;agreed upon  -AC        Barriers to Rehab medically complex  -AC        Patient's Goals for Discharge eat/drink without coughing/choking  -AC           Pain Scale: FACES Pre/Post-Treatment    Pain: FACES Scale, Pretreatment 6-->hurts even more  -AC        Posttreatment Pain Rating 6-->hurts even more  -AC        Pain Location - perineum  -AC        Pre/Posttreatment Pain Comment PCT/RN notified. Respositioned & addressed concerns.  -AC           Oral Motor Structure and Function    Oral Lesions or Structural Abnormalities and/or variants Extensive oral care provided--pt exhibited significant oral pocketing, presumably from lunch.  -AC         Dentition Assessment natural, present and adequate;other (see comments)  1-2 loose teeth  -AC        Secretion Management WNL/WFL  -AC        Mucosal Quality dry  -AC           Oral Musculature and Cranial Nerve Assessment    Oral Motor General Assessment generalized oral motor weakness  -AC           General Eating/Swallowing Observations    Eating/Swallowing Skills fed by SLP;needed assist  -AC        Positioning During Eating semi-reclined;other (see comments)  difficulty sitting upright 2' buttocks pain/discomfort  -AC        Utensils Used spoon;cup;straw  -AC        Consistencies Trialed thin liquids;nectar/syrup-thick liquids;pureed;soft to chew textures;chopped  -AC           Clinical Swallow Eval    Oral Prep Phase impaired  -AC        Oral Transit WFL  -AC        Oral Residue impaired  -AC        Pharyngeal Phase suspected pharyngeal impairment  -AC        Clinical Swallow Evaluation Summary --  -AC           Oral Prep Concerns    Oral Prep Concerns prolonged mastication  -AC        Prolonged Mastication mechanical soft  -AC           Oral Residue Concerns    Oral Residue Concerns diffuse residue throughout oral cavity  -AC        Diffuse Residue Throughout Oral Cavity mechanical soft  -AC           Pharyngeal Phase Concerns    Pharyngeal Phase Concerns cough;throat clear  -AC        Cough thin  -AC        Throat Clear nectar  -AC        Pharyngeal Phase Concerns, Comment Immediate persistent cough w/ thin via cup. Delayed throat clear noted w/ nectar-thick liquid via consecutive straw drinks. Evidence of significant pocketing of green beans from lunch. Reported that she's been avoiding solids 2' swallowing difficulty. Suspect xerostomia in addition to weakness. Pt agreed to modified diet/liquids & plans for FEES to further assess swallowing.  -           Swallowing Quality of Life Assessment    Education and counseling provided Signs of aspiration;Risks of aspiration;Safest diet options  -            SLP Evaluation Clinical Impression    SLP Swallowing Diagnosis R/O pharyngeal dysphagia;oral dysphagia  -        Functional Impact risk of aspiration/pneumonia  -        Rehab Potential/Prognosis, Swallowing re-evaluate goals as necessary  -        Swallow Criteria for Skilled Therapeutic Interventions Met demonstrates skilled criteria  -AC           Recommendations    SLP Diet Recommendation puree;nectar thick liquids  -        Recommended Diagnostics FEES  -        Recommended Precautions and Strategies upright posture during/after eating;no straw;general aspiration precautions  -        Oral Care Recommendations Oral Care BID/PRN  -AC        SLP Rec. for Method of Medication Administration meds whole;with puree;as tolerated  -        Monitor for Signs of Aspiration yes;notify SLP if any concerns  -        Anticipated Discharge Disposition (SLP) anticipate therapy at next level of care;skilled nursing facility  -              User Key  (r) = Recorded By, (t) = Taken By, (c) = Cosigned By    Initials Name Effective Dates    Fadumo Tesfaye MS CCC-SLP 02/03/23 -                 EDUCATION  The patient has been educated in the following areas:   Dysphagia (Swallowing Impairment) Oral Care/Hydration Modified Diet Instruction.              Time Calculation:    Time Calculation- SLP     Row Name 03/22/23 1553             Time Calculation- SLP    SLP Start Time 1515  -      SLP Received On 03/22/23  -         Untimed Charges    62224-GW Eval Oral Pharyng Swallow Minutes 51  -AC         Total Minutes    Untimed Charges Total Minutes 51  -AC       Total Minutes 51  -            User Key  (r) = Recorded By, (t) = Taken By, (c) = Cosigned By    Initials Name Provider Type    Fadumo Tesfaye MS CCC-SLP Speech and Language Pathologist                Therapy Charges for Today     Code Description Service Date Service Provider Modifiers Qty    63021006273 HC ST EVAL ORAL PHARYNG SWALLOW 3  3/22/2023 Fadumo Collado, MS CCC-SLP GN 1               Fadumo Collado, MS ANGY-SLP  3/22/2023

## 2023-03-22 NOTE — THERAPY TREATMENT NOTE
Patient Name: Esperanza Pop  : 1945    MRN: [id number]                              Today's Date: 3/22/2023       Admit Date: 3/10/2023    Visit Dx:     ICD-10-CM ICD-9-CM   1. Symptomatic anemia  D64.9 285.9   2. Generalized weakness  R53.1 780.79   3. ESRD on dialysis ([facility])  N18.6 585.6    Z99.2 V45.11   4. Impaired mobility  Z74.09 799.89   5. Fall, initial encounter  W19.XXXA E888.9   6. Elevated troponin  R77.8 790.6   7. Acute on chronic diastolic heart failure ([facility])  I50.33 428.33   8. Type 2 diabetes mellitus with chronic kidney disease on chronic dialysis, with long-term current use of insulin ([facility])  E11.22 250.40    N18.6 585.9    Z99.2 V45.11    Z79.4 V58.67   9. Severe malnutrition ([facility])  E43 261   10. Malignant neoplasm of anal canal ([facility])  C21.1 154.2   11. PAF (paroxysmal atrial fibrillation) ([facility])  I48.0 427.31   12. Coronary artery disease involving native coronary artery of native heart with angina pectoris ([facility])  I25.119 414.01     413.9   13. End stage renal disease on dialysis ([facility])  N18.6 585.6    Z99.2 V45.11   14. Nonrheumatic aortic valve stenosis  I35.0 424.1   15. NSTEMI (non-ST elevated myocardial infarction) ([facility])  I21.4 410.70   16. Acute cystitis without hematuria  N30.00 595.0   17. Ischemic heart disease  I25.9 414.9   18. Congestive heart failure, unspecified HF chronicity, unspecified heart failure type ([facility])  I50.9 428.0   19. Malignant neoplasm of right female breast, unspecified estrogen receptor status, unspecified site of breast ([facility])  C50.911 174.9   20. Hematochezia  K92.1 578.1   21. Acute midline low back pain without sciatica  M54.50 724.2     Patient Active Problem List   Diagnosis   • Acute on chronic diastolic heart failure (HCC)   • Type 2 diabetes mellitus ([facility])   • Essential hypertension   • Coronary artery disease involving native coronary artery of native heart with angina pectoris (HCC)   • Breast cancer, female, right   • Seasonal allergic rhinitis   •  Right carotid bruit   • Vitamin B12 deficiency   • Hyperlipidemia LDL goal <70   • End stage renal disease on dialysis (HCC)   • Nonrheumatic aortic valve stenosis   • NSTEMI (non-ST elevated myocardial infarction) (HCC)   • UTI (urinary tract infection)   • Ischemic heart disease   • CHF (congestive heart failure) (HCC)   • Acute non-ST segment elevation myocardial infarction (STEMI) following previous myocardial infarction   • Dyslipidemia   • Diabetes mellitus (HCC)   • Mild obesity   • Elevated troponin   • Screen for colon cancer   • Acute midline low back pain without sciatica   • Hypertensive emergency   • PAF (paroxysmal atrial fibrillation) (HCC)   • Malignant neoplasm of anal canal (HCC)   • Hematochezia   • Severe malnutrition (HCC)   • Symptomatic anemia     Past Medical History:   Diagnosis Date   • Acute bronchitis    • Acute conjunctivitis    • Acute kidney injury (HCC)     Admission from 12/26/2013 to 01/02/2014, now resolved with latest creatinine 1.4 on 01/08/2014.   • Acute non-ST segment elevation myocardial infarction (STEMI) following previous myocardial infarction    • Anal cancer (HCC) 2/8/2023   • Anal cancer (HCC) 2/8/2023   • Arthritis    • Breast cancer, female, right     2005 mastectomy right   • Cancer (HCC)    • CHF (congestive heart failure) (HCC)    • Chronic kidney disease     dialysis MW, f/u [facility]    • Clotting disorder (HCC)    • COVID-19    • Diabetes mellitus (HCC)     diagnosed ~1992, checks fsbg 2-3x/day   • Diarrhea    • Dyslipidemia    • Dyspepsia    • Dyspnea    • Edema    • History of transfusion     ~2013 no reaction    • Hx of radiation therapy    • Hyperlipidemia    • Hypertension     Severe   • Ischemic heart disease    • Moderate obesity     BMI 36.2   • Myocardial infarction (HCC)    • Pneumonia    • Pneumonia 02/2019   • Radiation 2005   • Seasonal allergies    • Wears glasses      Past Surgical History:   Procedure Laterality Date   • AV FISTULA  PLACEMENT, BRACHIOBASILIC     • BREAST BIOPSY Left 2010   • BREAST CYST EXCISION     • BREAST LUMPECTOMY Right 2005   • BREAST SURGERY     • CARDIAC CATHETERIZATION N/A 10/10/2016    Procedure: Left Heart Cath;  Surgeon: Scooter Zhao MD;  Location:  [facility];  Service:    • CARDIAC CATHETERIZATION  10/2016   • CARDIAC CATHETERIZATION N/A 1/21/2021    Procedure: Right and Left Heart Cath;  Surgeon: Hugh Tidwell MD;  Location:  [facility];  Service: Cardiology;  Laterality: N/A;   • COLONOSCOPY N/A 7/23/2020    Procedure: COLONOSCOPY;  Surgeon: Rubén Rosas MD;  Location:  [facility];  Service: Gastroenterology;  Laterality: N/A;   • CORONARY STENT PLACEMENT  2016   • HERNIA REPAIR     • HYSTERECTOMY  2000   • INSERTION HEMODIALYSIS CATHETER Right 6/29/2016    Procedure: HEMODIALYSIS CATHETER INSERTION TUNNELLED;  Surgeon: Ramón Chavez MD;  Location: [facility] OR;  Service:    • MASTECTOMY Right 2006   • OOPHORECTOMY     • REDUCTION MAMMAPLASTY Left 2005      General Information     Row Name 03/22/23 [facility]          Physical Therapy Time and Intention    Document Type therapy note (daily note) (P)   -HT     Mode of Treatment physical therapy (P)   -HT     Row Name 03/22/23 [facility]          General Information    Patient Profile Reviewed yes (P)   -HT     Existing Precautions/Restrictions fall (P)   -HT     Barriers to Rehab medically complex;previous functional deficit;cognitive status (P)   -HT     Row Name 03/22/23 1351          Cognition    Orientation Status (Cognition) oriented to;person;disoriented to;place;time;other (see comments) (P)   pt difficult to understand d/t lethargy  -HT     Row Name 03/22/23 [facility]          Safety Issues, Functional Mobility    Safety Issues Affecting Function (Mobility) awareness of need for assistance;insight into deficits/self-awareness;safety precaution awareness;safety precautions  follow-through/compliance;sequencing abilities (P)   -HT     Impairments Affecting Function (Mobility) balance;cognition;coordination;endurance/activity tolerance;postural/trunk control;strength (P)   -HT     Cognitive Impairments, Mobility Safety/Performance awareness, need for assistance;insight into deficits/self-awareness;problem-solving/reasoning;safety precaution awareness;safety precaution follow-through;sequencing abilities (P)   -HT           User Key  (r) = Recorded By, (t) = Taken By, (c) = Cosigned By    Initials Name Provider Type    HT Heydi Maier, PT Student PT Student               Mobility     Row Name 03/22/23 1359          Bed Mobility    Bed Mobility supine-sit;sit-supine (P)   -HT     Supine-Sit [place] (Bed Mobility) maximum assist (25% patient effort);2 person assist;verbal cues (P)   -HT     Sit-Supine [place] (Bed Mobility) maximum assist (25% patient effort);2 person assist;verbal cues;nonverbal cues (demo/gesture) (P)   -HT     Assistive Device (Bed Mobility) bed rails;draw sheet;head of bed elevated (P)   -HT     Comment, (Bed Mobility) pt did not respond to cues for sequencing d/t lethargy. (P)   -HT     Row Name 03/22/23 1272          Sit-Stand Transfer    Sit-Stand [place] (Transfers) maximum assist (25% patient effort);2 person assist (P)   -HT     Assistive Device (Sit-Stand Transfers) other (see comments) (P)   BUE support  -HT     Comment, (Sit-Stand Transfer) STS x 2 from EOB, 1x with RW and 1x with BUE support requiring cues for hand placement and upright posture once standing. Pt stood 15 sec at most before sitting d/t back pain and lethargy. (P)   -HT     Row Name 03/22/23 1355          Gait/Stairs (Locomotion)    [place] Level (Gait) not tested (P)   -HT     Comment, (Gait/Stairs) ambulation deferred d/t poor standing tolerance. (P)   -HT           User Key  (r) = Recorded By, (t) = Taken By, (c) = Cosigned By    Initials Name Provider Type    HT  Heydi Maier, PT Student PT Student               Obj/Interventions     Row Name 03/22/23 1424          Balance    Balance Assessment sitting static balance;sitting dynamic balance;standing static balance (P)   -HT     Static Sitting Balance contact guard (P)   -HT     Dynamic Sitting Balance minimal assist (P)   -HT     Position, Sitting Balance sitting edge of bed (P)   -HT     Static Standing Balance moderate assist;2-person assist (P)   -HT     Position/Device Used, Standing Balance other (see comments) (P)   BUE support  -HT     Balance Interventions sitting;dynamic reaching (P)   -HT     Comment, Balance Pt sat EOB performing dynamic reaching but had difficulty due to lethargy. (P)   -HT           User Key  (r) = Recorded By, (t) = Taken By, (c) = Cosigned By    Initials Name Provider Type    HT Heydi Maier, PT Student PT Student               Goals/Plan    No documentation.                Clinical Impression     Row Name 03/22/23 1426          Pain    Pretreatment Pain Rating 6/10 (P)   -HT     Posttreatment Pain Rating 2/10 (P)   -HT     Pain Location - back (P)   -HT     Pre/Posttreatment Pain Comment Pt c/o back pain throughout session that seemed to ease with rest. (P)   -HT     Pain Intervention(s) Ambulation/increased activity (P)   -HT     Additional Documentation Pain Scale: Numbers Pre/Post-Treatment (Group) (P)   -HT     Row Name 03/22/23 1426          Plan of Care Review    Plan of Care Reviewed With patient (P)   -HT     Progress no change (P)   -HT     Outcome Evaluation Pt performed 2x STS with max Ax2 with limitations of lethargy, back pain, generalized weakness, and decreased endurance. Rec skilled PT to increase ind with mobility and d/c to SNF. (P)   -HT     Row Name 03/22/23 1426          Therapy Assessment/Plan (PT)    Criteria for Skilled Interventions Met (PT) yes;skilled treatment is necessary;meets criteria (P)   -HT     Row Name 03/22/23 1426          Vital Signs    O2  Delivery Pre Treatment room air (P)   -HT     O2 Delivery Intra Treatment room air (P)   -HT     O2 Delivery Post Treatment room air (P)   -HT     Pre Patient Position Supine (P)   -HT     Intra Patient Position Standing (P)   -HT     Post Patient Position Supine (P)   -HT     Row Name 03/22/23 1426          Positioning and Restraints    Pre-Treatment Position in bed (P)   -HT     Post Treatment Position bed (P)   -HT     In Bed notified nsg;fowlers;call light within reach;encouraged to call for assist;exit alarm on;RUE elevated;LUE elevated (P)   -HT           User Key  (r) = Recorded By, (t) = Taken By, (c) = Cosigned By    Initials Name Provider Type    Heydi Valdez, PT Student PT Student               Outcome Measures     Row Name 03/22/23 1429 03/22/23 1153       How much help from another person do you currently need...    Turning from your back to your side while in flat bed without using bedrails? 2 (P)   -HT 2  -KF    Moving from lying on back to sitting on the side of a flat bed without bedrails? 2 (P)   -HT 2  -KF    Moving to and from a bed to a chair (including a wheelchair)? 2 (P)   -HT 2  -KF    Standing up from a chair using your arms (e.g., wheelchair, bedside chair)? 2 (P)   -HT 2  -KF    Climbing 3-5 steps with a railing? 1 (P)   -HT 2  -KF    To walk in hospital room? 1 (P)   -HT 2  -KF    AM-PAC 6 Clicks Score (PT) 10 (P)   -HT 12  -KF    Highest level of mobility 4 --> Transferred to chair/commode (P)   -HT 4 --> Transferred to chair/commode  -KF    Row Name 03/22/23 1429          Functional Assessment    Outcome Measure Options AM-PAC 6 Clicks Basic Mobility (PT) (P)   -HT           User Key  (r) = Recorded By, (t) = Taken By, (c) = Cosigned By    Initials Name Provider Type    Allyson Banerjee RN Registered Nurse    Heydi Valdez, PT Student PT Student                             Physical Therapy Education     Title: PT OT SLP Therapies (In Progress)     Topic: Physical Therapy  (In Progress)     Point: Mobility training (Done)     Learning Progress Summary           Patient Acceptance, E, VU,NR by HT at 3/22/2023 1429    Eager, E, NR by MD at 3/21/2023 1808    Acceptance, E, NR by ML at 3/18/2023 1116    Acceptance, E,TB, VU,NR by HM at 3/17/2023 1534    Acceptance, E, NR by KR at 3/14/2023 1532    Acceptance, E, NR by KR at 3/13/2023 1011                   Point: Home exercise program (In Progress)     Learning Progress Summary           Patient Eager, E, NR by MD at 3/21/2023 1808    Acceptance, E, NR by KR at 3/14/2023 1532    Acceptance, E, NR by KR at 3/13/2023 1011                   Point: Body mechanics (Done)     Learning Progress Summary           Patient Acceptance, E, VU,NR by HT at 3/22/2023 1429    Eager, E, NR by MD at 3/21/2023 1808    Acceptance, E,TB, VU,NR by  at 3/17/2023 1534    Acceptance, E, NR by KR at 3/14/2023 1532    Acceptance, E, NR by KR at 3/13/2023 1011                   Point: Precautions (Done)     Learning Progress Summary           Patient Acceptance, E, VU,NR by HT at 3/22/2023 1429    Eager, E, NR by MD at 3/21/2023 1808    Acceptance, E, NR by  at 3/18/2023 1116    Acceptance, E,TB, VU,NR by  at 3/17/2023 1534    Acceptance, E, NR by KR at 3/14/2023 1532    Acceptance, E, NR by KR at 3/13/2023 1011                               User Key     Initials Effective Dates Name Provider Type Discipline     04/22/21 -  Siria Shirley Physical Therapist PT    MD 01/25/23 -  Siria Pascual, Nursing Student Nursing Student Nurse     09/22/22 -  Heydi Burnett, PT Physical Therapist PT    KR 12/30/22 -  Starla Nicholas, PT Physical Therapist PT    HT 01/12/23 -  Heydi Maier, PT Student PT Student PT              PT Recommendation and Plan     Plan of Care Reviewed With: (P) patient  Progress: (P) no change  Outcome Evaluation: (P) Pt performed 2x STS with max Ax2 with limitations of lethargy, back pain, generalized weakness, and decreased  endurance. Rec skilled PT to increase ind with mobility and d/c to SNF.     Time Calculation:    PT Charges     Row Name 03/22/23 1430             Time Calculation    Start Time 1320 (P)   -HT      PT Received On 03/22/23 (P)   -HT         Timed Charges    68110 - PT Therapeutic Activity Minutes 16 (P)   -HT         Total Minutes    Timed Charges Total Minutes 16 (P)   -HT       Total Minutes 16 (P)   -HT            User Key  (r) = Recorded By, (t) = Taken By, (c) = Cosigned By    Initials Name Provider Type    HT Heydi Maier, PT Student PT Student              Therapy Charges for Today     Code Description Service Date Service Provider Modifiers Qty    59953130514 HC PT THERAPEUTIC ACT EA 15 MIN 3/22/2023 Heydi Maier, PT Student GP 1          PT G-Codes  Outcome Measure Options: (P) AM-PAC 6 Clicks Basic Mobility (PT)  AM-PAC 6 Clicks Score (PT): (P) 10  AM-PAC 6 Clicks Score (OT): 11       Heydi Maier PT Student  3/22/2023

## 2023-03-22 NOTE — PLAN OF CARE
HD completed. Tolerated fair. UF goal not reached. Blood returned. Report given to primary RN.   Problem: Device-Related Complication Risk (Hemodialysis)  Goal: Safe, Effective Therapy Delivery  Outcome: Ongoing, Progressing     Problem: Hemodynamic Instability (Hemodialysis)  Goal: Effective Tissue Perfusion  Outcome: Ongoing, Progressing     Problem: Infection (Hemodialysis)  Goal: Absence of Infection Signs and Symptoms  Outcome: Ongoing, Progressing   Goal Outcome Evaluation:

## 2023-03-22 NOTE — PROGRESS NOTES
" LOS: 3 days   Patient Care Team:  Meghana Rodgers MD as PCP - General  TanikaDemetrius rg MD as Consulting Physician (Cardiology)  Kevan Lerma MD as Consulting Physician (Nephrology)  Geena Estrella APRN as Nurse Practitioner (Cardiology)  Frank Mandujano MD as Referring Physician (Colon and Rectal Surgery)  Kevan Cavazos MD as Consulting Physician (Radiation Oncology)  Yue Reeves RN as Nurse Navigator  Darryn Burk MD as Consulting Physician (Nephrology)    Chief Complaint: F/U ESRD. SEEN ON DIALYSIS  Subjective :         Subjective:  Symptoms:  Stable.  No shortness of breath or chest pain.    Diet:  Poor intake.        History taken from: patient    Objective     Vital Sign Min/Max for last 24 hours  Temp  Min: 98 °F (36.7 °C)  Max: 98.3 °F (36.8 °C)   BP  Min: 119/65  Max: 130/61   Pulse  Min: 54  Max: 66   Resp  Min: 16  Max: 20   SpO2  Min: 90 %  Max: 99 %   Flow (L/min)  Min: 2  Max: 2   No data recorded     Flowsheet Rows    Flowsheet Row First Filed Value   Admission Height 167.6 cm (66\") Documented at 03/10/2023 1122   Admission Weight 76.7 kg (169 lb) Documented at 03/10/2023 1122          No intake/output data recorded.  I/O last 3 completed shifts:  In: 120 [P.O.:120]  Out: 0     Objective:  General Appearance:  Comfortable.    Vital signs: (most recent): Blood pressure 123/58, pulse 58, temperature 98.3 °F (36.8 °C), temperature source Temporal, resp. rate 16, height 167.6 cm (65.98\"), weight 79.1 kg (174 lb 6.1 oz), SpO2 98 %, not currently breastfeeding.  Vital signs are normal.    Output: Minimal urine output.    HEENT: Normal HEENT exam.    Lungs:  Normal effort and normal respiratory rate.  Breath sounds clear to auscultation.  She is not in respiratory distress.  No decreased breath sounds or wheezes.    Heart: Normal rate.    Extremities: There is no dependent edema.  (AVF)  Pulses: Distal pulses are intact.    Neurological: Patient is alert.  Normal strength.  (A " LITTLE CONFUSED).              Results Review:     I reviewed the patient's new clinical results.    WBC WBC   Date Value Ref Range Status   03/22/2023 7.12 3.40 - 10.80 10*3/mm3 Final      HGB Hemoglobin   Date Value Ref Range Status   03/22/2023 8.9 (L) 12.0 - 15.9 g/dL Final      HCT Hematocrit   Date Value Ref Range Status   03/22/2023 29.3 (L) 34.0 - 46.6 % Final      Platlets No results found for: LABPLAT   MCV MCV   Date Value Ref Range Status   03/22/2023 99.0 (H) 79.0 - 97.0 fL Final          Sodium Sodium   Date Value Ref Range Status   03/22/2023 131 (L) 136 - 145 mmol/L Final   03/20/2023 127 (L) 136 - 145 mmol/L Final      Potassium Potassium   Date Value Ref Range Status   03/22/2023 4.5 3.5 - 5.2 mmol/L Final   03/20/2023 5.1 3.5 - 5.2 mmol/L Final     Comment:     Slight hemolysis detected by analyzer. Results may be affected.      Chloride Chloride   Date Value Ref Range Status   03/22/2023 95 (L) 98 - 107 mmol/L Final   03/20/2023 93 (L) 98 - 107 mmol/L Final      CO2 CO2   Date Value Ref Range Status   03/22/2023 21.0 (L) 22.0 - 29.0 mmol/L Final   03/20/2023 20.0 (L) 22.0 - 29.0 mmol/L Final      BUN BUN   Date Value Ref Range Status   03/22/2023 36 (H) 8 - 23 mg/dL Final   03/20/2023 41 (H) 8 - 23 mg/dL Final      Creatinine Creatinine   Date Value Ref Range Status   03/22/2023 6.66 (H) 0.57 - 1.00 mg/dL Final   03/20/2023 7.22 (H) 0.57 - 1.00 mg/dL Final      Calcium Calcium   Date Value Ref Range Status   03/22/2023 8.6 8.6 - 10.5 mg/dL Final   03/20/2023 8.6 8.6 - 10.5 mg/dL Final      PO4 No results found for: CAPO4   Albumin Albumin   Date Value Ref Range Status   03/22/2023 3.0 (L) 3.5 - 5.2 g/dL Final      Magnesium No results found for: MG   Uric Acid No results found for: URICACID     Medication Review: yes    Assessment & Plan       Hematochezia    Type 2 diabetes mellitus (HCC)    Essential hypertension    Hyperlipidemia LDL goal <70    End stage renal disease on dialysis (HCC)    CHF  (congestive heart failure) (HCC)    PAF (paroxysmal atrial fibrillation) (HCC)    Severe malnutrition (HCC)    Symptomatic anemia      Assessment & Plan     ESRD: MWF [facility]. Missed HD before admission      Access: AV fistula      Anemia: Transfuse at Hb 7 or less. On chemo for anal ca     Volume status: Dependent edema noted.    HTN: Bp LOW AT TIMES.   HYPONATREMIA. MILD. SHOULD CORRECT WITH HD.   I discussed the patients findings and my recommendations with patient  PLAN.  HD TODAY 3/22/23.   Zeb Owusu MD  03/22/23  08:15 EDT

## 2023-03-22 NOTE — PLAN OF CARE
Goal Outcome Evaluation:  Plan of Care Reviewed With: patient        Progress: no change  Outcome Evaluation: VSS, c/o pain in the bottom, dressing changed which relieved some comfort, no c/o n/v, pt. was at dialysis all morning, swallow evaluation done (reccommended pureed diet and nectar thick liquids), will continue to monitor

## 2023-03-23 ENCOUNTER — ANCILLARY PROCEDURE (OUTPATIENT)
Dept: SPEECH THERAPY | Facility: HOSPITAL | Age: 78
End: 2023-03-23
Payer: MEDICARE

## 2023-03-23 LAB
GLUCOSE BLDC GLUCOMTR-MCNC: 111 MG/DL (ref 70–130)
GLUCOSE BLDC GLUCOMTR-MCNC: 117 MG/DL (ref 70–130)
GLUCOSE BLDC GLUCOMTR-MCNC: 144 MG/DL (ref 70–130)

## 2023-03-23 PROCEDURE — G0378 HOSPITAL OBSERVATION PER HR: HCPCS

## 2023-03-23 PROCEDURE — 99231 SBSQ HOSP IP/OBS SF/LOW 25: CPT | Performed by: NURSE PRACTITIONER

## 2023-03-23 PROCEDURE — 96376 TX/PRO/DX INJ SAME DRUG ADON: CPT

## 2023-03-23 PROCEDURE — 82962 GLUCOSE BLOOD TEST: CPT

## 2023-03-23 PROCEDURE — 92612 ENDOSCOPY SWALLOW (FEES) VID: CPT

## 2023-03-23 PROCEDURE — 25010000002 HALOPERIDOL LACTATE PER 5 MG: Performed by: FAMILY MEDICINE

## 2023-03-23 RX ORDER — TERAZOSIN 1 MG/1
1 CAPSULE ORAL NIGHTLY
Status: DISCONTINUED | OUTPATIENT
Start: 2023-03-23 | End: 2023-03-25

## 2023-03-23 RX ORDER — CARVEDILOL 3.12 MG/1
3.12 TABLET ORAL EVERY 12 HOURS SCHEDULED
Status: DISCONTINUED | OUTPATIENT
Start: 2023-03-23 | End: 2023-03-29 | Stop reason: HOSPADM

## 2023-03-23 RX ADMIN — HYDROXYZINE HYDROCHLORIDE 25 MG: 25 TABLET ORAL at 21:22

## 2023-03-23 RX ADMIN — TERAZOSIN HYDROCHLORIDE 1 MG: 1 CAPSULE ORAL at 21:22

## 2023-03-23 RX ADMIN — APIXABAN 2.5 MG: 2.5 TABLET, FILM COATED ORAL at 09:46

## 2023-03-23 RX ADMIN — CLONIDINE HYDROCHLORIDE 0.4 MG: 0.1 TABLET ORAL at 21:22

## 2023-03-23 RX ADMIN — QUETIAPINE FUMARATE 25 MG: 25 TABLET ORAL at 21:22

## 2023-03-23 RX ADMIN — HALOPERIDOL LACTATE 0.5 MG: 5 INJECTION, SOLUTION INTRAMUSCULAR at 02:00

## 2023-03-23 RX ADMIN — Medication 5 MG: at 21:22

## 2023-03-23 RX ADMIN — AMIODARONE HYDROCHLORIDE 200 MG: 200 TABLET ORAL at 09:46

## 2023-03-23 RX ADMIN — ASPIRIN 81 MG: 81 TABLET, COATED ORAL at 09:46

## 2023-03-23 RX ADMIN — APIXABAN 2.5 MG: 2.5 TABLET, FILM COATED ORAL at 21:22

## 2023-03-23 RX ADMIN — HYDROCODONE BITARTRATE AND ACETAMINOPHEN 1 TABLET: 10; 325 TABLET ORAL at 16:24

## 2023-03-23 RX ADMIN — ATORVASTATIN CALCIUM 80 MG: 40 TABLET, FILM COATED ORAL at 21:22

## 2023-03-23 NOTE — CONSULTS
Clinical Nutrition     Nutrition Support Assessment  Reason for Visit: Follow-up protocol    Patient Name: Esperanza Pop  YOB: 1945  MRN: [id number]  Date of Encounter: 03/23/23 11:14 EDT  Admission date: 3/10/2023      Nutrition Assessment   Admission Diagnosis:  Hematochezia [K92.1]  Symptomatic anemia [D64.9]      Problem List:    Hematochezia    Type 2 diabetes mellitus (HCC)    Essential hypertension    Hyperlipidemia LDL goal <70    End stage renal disease on dialysis (HCC)    CHF (congestive heart failure) (HCC)    PAF (paroxysmal atrial fibrillation) (HCC)    Severe malnutrition (HCC)    Symptomatic anemia    Fall PTA   A/C Anemia   Rectal CA w rxt chemo      PMH:   She  has a past medical history of Acute bronchitis, Acute conjunctivitis, Acute kidney injury (HCC), Acute non-ST segment elevation myocardial infarction (STEMI) following previous myocardial infarction, 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    PSH:  She  has a past surgical history that includes Hernia repair; AV fistula placement, brachiobasilic; Hemodialysis Catheter (Right, 6/29/2016); Hysterectomy (2000); Oophorectomy; Breast surgery; Breast cyst excision; Reduction mammaplasty (Left, 2005); Breast biopsy (Left, 2010); Breast lumpectomy (Right, 2005); Mastectomy (Right, 2006); Cardiac catheterization (N/A, 10/10/2016); Cardiac catheterization (10/2016); Coronary stent placement (2016); Colonoscopy (N/A, 7/23/2020); and Cardiac  catheterization (N/A, 1/21/2021).      Applicable Nutrition Concerns:   Skin:  Oral:  GI:      Applicable Interval History:         Reported/Observed/Food/Nutrition Related History:     3/23) Pt sleeping at time visit. Per EMR more lethargic today, declined FEES this morning. Pt had clinical SLP eval yesterday and diet was downgraded to pureed/NT pending FEES. Difficult to determine intakes from limited documentation but appears intakes inconsistent.     3/16) Patient was sleeping but woke to my voice. Patient reports a fair appetite. No intakes have been recorded since 3/12/23. Patient states she is drinking her Mighty Shakes sent TID. No recent N/V/D and last recorded BM was on 3/8/23.     Previous RD note:  Pt and family confirm wt loss, loss of appetite. < 3/4 intake over 4-5 mo.  Allow pt would like SB flavor suppl if available.     3/11) Pt and family confirm wt loss, loss of appetite. < 3/4 intake over 4-5 mo.  Allow pt would like SB flavor suppl if available.     Labs    Labs Reviewed: Yes     Results from last 7 days   Lab Units 03/22/23  0326 03/20/23  0642 03/18/23  0447   GLUCOSE mg/dL 111* 128* 125*   BUN mg/dL 36* 41* 19   CREATININE mg/dL 6.66* 7.22* 4.41*   SODIUM mmol/L 131* 127* 134*   CHLORIDE mmol/L 95* 93* 100   POTASSIUM mmol/L 4.5 5.1 4.5   PHOSPHORUS mg/dL  --   --  2.8   ALT (SGPT) U/L 6  --   --        Results from last 7 days   Lab Units 03/22/23  0326 03/18/23  0447   ALBUMIN g/dL 3.0* 2.7*       Results from last 7 days   Lab Units 03/23/23  0819 03/22/23 2010 03/22/23  1156 03/21/23  1658 03/21/23  1227 03/20/23  1718   GLUCOSE mg/dL 111 132* 88 150* 144* 137*     Lab Results   Lab Value Date/Time    HGBA1C 5.8 02/07/2023 1346    HGBA1C 6.3 06/14/2022 1101    HGBA1C 8.60 (H) 04/19/2019 0401    HGBA1C 7.60 (H) 10/11/2016 0518               Medications    Medications Reviewed: Yes  Pertinent: anusol, insulin  Infusion:  PRN:  Norco, atarax    Intake/Ouptut 24 hrs (0701 - 0700)   I&O's  "Reviewed: Yes     Intake & Output (last day)       03/22 0701  03/23 0700 03/23 0701  03/24 0700    P.O. 180     I.V. (mL/kg) 500 (6.3)     Total Intake(mL/kg) 680 (8.6)     Urine (mL/kg/hr)      Other 0     Stool      Total Output 0     Net +680           Urine Unmeasured Occurrence 1 x           Anthropometrics     Height: Height: 167.6 cm (65.98\")  Last Filed Weight: Weight: 79.1 kg (174 lb 6.1 oz) (03/12/23 1601)  Method: Weight Method: Stated  BMI: BMI (Calculated): 28.2  BMI classification: Overweight: 25.0-29.9kg/m2     UBW: Per EMR wt of 192 lbs in July 2022 and loss to 177 lbs on 2/9/23 - no wt recovery  Weight change: 15 lbs loss of 8% body wt over 7 mo w no recovery     Nutrition Focused Physical Exam     Date: 3/11    Patient meets criteria for severe chronic malnutrition diagnosis, see MSA note.    Current Nutrition Prescription     PO: Diet: Cardiac Diets, Diabetic Diets, Renal Diets; Healthy Heart (2-3 Na+); Consistent Carbohydrate; Low Sodium (2-3g), Low Potassium, Low Phosphorus; No Straw; Texture: Pureed (NDD 1); Fluid Consistency: [place] Thick  Oral Nutrition Supplement: mighty shake TID    Intake: Insufficient data 22% X 8 meals documented    Nutrition Diagnosis   Date: 3/11 Updated:   Problem Malnutrition  severe chronic   Etiology Effect CA tx   Signs/Symptoms Intake hx wt loss w/o recovery w wasting   Status: ongoing    Goal:   General: Nutrition to support treatment  PO: Establish PO  EN/PN: N/A    Nutrition Intervention      Follow treatment progress, Care plan reviewed, Menu provided    Continue mighty shakes all meals  Monitor FEES findings, alter diet/supplements as indicated    Monitoring/Evaluation:   Per protocol, I&O, PO intake, Supplement intake, Pertinent labs, Weight, Symptoms, Swallow function    Makenna Barnes RD  Time Spent: 30 min    "

## 2023-03-23 NOTE — PLAN OF CARE
Goal Outcome Evaluation:    SLP re-evaluation completed. Will continue to address dysphagia in tx. Please see note for further details and recommendations.

## 2023-03-23 NOTE — NURSING NOTE
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    Patient in bed, daughter and RN at bedside.  RN was getting ready to change dressings and WOC RN assisted.    Patient's bilateral gluteal wounds and intergluteal cleft wound are extremely patient painful to the patient with assessment.  Wound beds are pink and red to left gluteal, wound to right gluteal and intergluteal cleft are yellow, pink, and red -all 3 sites are blanchable.  Wound edges and periwound skin is peeling.  See below photos and wound measurement.  Cleansed by spraying with normal saline flushes, patted dry gently, hemostasis achieved.  Applied cut to fit Mepitel One to the wound beds, covered with cut to fit Mepilex Ag foam and covered with a silicone foam border dressing and secured with pink tape.    Bilateral gluteals and intergluteal:  Right gluteal wound measures 9 x 6 x 0.2 cm,  left gluteal wound measures 7 x 6 x 0.2 cm, intergluteal cleft measures 6 x 1.5 x 0.25 cm            Patient has moisture associated skin damage with partial-thickness skin loss to her right labia which is red and pink and painful to the patient when applied stoma powder and skin protectant spray to anus.  Discussed with bedside RN and recommended Hydraguard cream around the anus and over labia to protect skin from further breakdown from moisture.    Noted that patient had not ever received any of the Aquaphor recommended for prevention of radiation skin damage/dermatitis and bedside RN stated that patient is not scheduled for any more radiation at this time.  Changed order from Aquaphor to Hydraguard silicone cream for patient's body/skin as it is extremely dry.    Sensory Perception: 4-->no impairment  Moisture: 3-->occasionally moist  Activity: 2-->chairfast  Mobility: 2-->very limited  Nutrition: 2-->probably inadequate  Friction and Shear: 2-->potential problem  Jayce Score: 15 (03/23/23 0800)    Please implement  pressure injury prevention interventions per protocol for a patient with a Jayce score of 18 or less.    WOC team will plan to follow-up.  If alteration to skin integrity or change in wound bed presentation please consult WOC team.

## 2023-03-23 NOTE — PROGRESS NOTES
"   LOS: 3 days    Patient Care Team:  Meghana Rodgers MD as PCP - General  Tanika, Demetrius BOLDEN MD as Consulting Physician (Cardiology)  Kevan Lerma MD as Consulting Physician (Nephrology)  Geena Estrella APRN as Nurse Practitioner (Cardiology)  Frank Mandujano MD as Referring Physician (Colon and Rectal Surgery)  Kevan Cavazos MD as Consulting Physician (Radiation Oncology)  Yeu Reeves RN as Nurse Navigator  Darryn Burk MD as Consulting Physician (Nephrology)    Chief Complaint:   ESRD on Monday Wednesday Friday, history of anal CA on chemo, admitted after missing her dialysis.    Subjective     Interval History:   No new complaints      Review of Systems:   Patient denies shortness of breath, chest pain, dysuria, hematuria, nausea, vomiting.      Objective     Vital Sign Min/Max for last 24 hours  Temp  Min: 97 °F (36.1 °C)  Max: 98.7 °F (37.1 °C)   BP  Min: 105/52  Max: 153/62   Pulse  Min: 56  Max: 61   Resp  Min: 16  Max: 18   SpO2  Min: 91 %  Max: 99 %   Flow (L/min)  Min: 2  Max: 2   No data recorded     Flowsheet Rows    Flowsheet Row First Filed Value   Admission Height 167.6 cm (66\") Documented at 03/10/2023 1122   Admission Weight 76.7 kg (169 lb) Documented at 03/10/2023 1122          No intake/output data recorded.  I/O last 3 completed shifts:  In: 780 [P.O.:280; I.V.:500]  Out: 0     Physical Exam:  General Appearance: Alert, oriented, no obvious distress.  Eyes: PER, EOMI.  Neck: Supple no JVD.  Lungs: Clear auscultation, no rales rhonchi's, equal chest movement, nonlabored.  Heart: Positive murmur, no, rub, RRR.  Abdomen: Soft, nontender, positive bowel sounds, no organomegaly.  Extremities: No edema, no cyanosis.  Neuro: No focal deficit, moving all extremities, alert oriented X 3  Right upper arm AV fistula  WBC WBC   Date Value Ref Range Status   03/22/2023 7.12 3.40 - 10.80 10*3/mm3 Final      HGB Hemoglobin   Date Value Ref Range Status   03/22/2023 8.9 (L) 12.0 - " 15.9 g/dL Final      HCT Hematocrit   Date Value Ref Range Status   03/22/2023 29.3 (L) 34.0 - 46.6 % Final      Platlets No results found for: LABPLAT   MCV MCV   Date Value Ref Range Status   03/22/2023 99.0 (H) 79.0 - 97.0 fL Final          Sodium Sodium   Date Value Ref Range Status   03/22/2023 131 (L) 136 - 145 mmol/L Final      Potassium Potassium   Date Value Ref Range Status   03/22/2023 4.5 3.5 - 5.2 mmol/L Final      Chloride Chloride   Date Value Ref Range Status   03/22/2023 95 (L) 98 - 107 mmol/L Final      CO2 CO2   Date Value Ref Range Status   03/22/2023 21.0 (L) 22.0 - 29.0 mmol/L Final      BUN BUN   Date Value Ref Range Status   03/22/2023 36 (H) 8 - 23 mg/dL Final      Creatinine Creatinine   Date Value Ref Range Status   03/22/2023 6.66 (H) 0.57 - 1.00 mg/dL Final      Calcium Calcium   Date Value Ref Range Status   03/22/2023 8.6 8.6 - 10.5 mg/dL Final      PO4 No results found for: CAPO4   Albumin Albumin   Date Value Ref Range Status   03/22/2023 3.0 (L) 3.5 - 5.2 g/dL Final      Magnesium No results found for: MG   Uric Acid No results found for: URICACID        Results Review:     I reviewed the patient's new clinical results.    amiodarone, 200 mg, Oral, Daily  apixaban, 2.5 mg, Oral, Q12H  aspirin, 81 mg, Oral, Daily  atorvastatin, 80 mg, Oral, Nightly  carvedilol, 3.125 mg, Oral, Q12H  cloNIDine, 0.4 mg, Oral, Q12H  dorzolamide-timolol, 1 drop, Both Eyes, Daily  epoetin orquidea/orquidea-epbx, 20,000 Units, Subcutaneous, Once per day on Mon Wed Fri  insulin lispro, 0-9 Units, Subcutaneous, TID With Meals  isosorbide mononitrate, 120 mg, Oral, Daily  melatonin, 5 mg, Oral, Nightly  QUEtiapine, 25 mg, Oral, Nightly  terazosin, 1 mg, Oral, Nightly           Medication Review: Reviewed    Assessment & Plan       Hematochezia    Type 2 diabetes mellitus (HCC)    Essential hypertension    Hyperlipidemia LDL goal <70    End stage renal disease on dialysis (HCC)    CHF (congestive heart failure)  (HCC)    PAF (paroxysmal atrial fibrillation) (HCC)    Severe malnutrition (HCC)    Symptomatic anemia    1.  ESRD on Monday Wednesday Friday.  2.  Anemia of chronic kidney disease: Also on chemotherapy.  3.  Volume status: Dialysis ultrafiltration will be done.  4.  Access AV fistula.  5.  Hypertension  6.  Hyponatremia corrected with dialysis  Plan:  Dialysis will be done tomorrow       Darryn Burk MD  03/23/23  11:50 EDT

## 2023-03-23 NOTE — MBS/VFSS/FEES
Acute Care - Speech Language Pathology   Swallow Re-Evaluation [facility]     Fiberoptic Endoscopic Evaluation of Swallowing (FEES)       Patient Name: Esperanza Pop  : 1945  MRN: [id number]  Today's Date: 3/23/2023               Admit Date: 3/10/2023    Visit Dx:     ICD-10-CM ICD-9-CM   1. Symptomatic anemia  D64.9 285.9   2. Generalized weakness  R53.1 780.79   3. ESRD on dialysis ([facility])  N18.6 585.6    Z99.2 V45.11   4. Impaired mobility  Z74.09 799.89   5. Fall, initial encounter  W19.XXXA E888.9   6. Elevated troponin  R77.8 790.6   7. Acute on chronic diastolic heart failure ([facility])  I50.33 428.33   8. Type 2 diabetes mellitus with chronic kidney disease on chronic dialysis, with long-term current use of insulin ([facility])  E11.22 250.40    N18.6 585.9    Z99.2 V45.11    Z79.4 V58.67   9. Severe malnutrition ([facility])  E43 261   10. Malignant neoplasm of anal canal ([facility])  C21.1 154.2   11. PAF (paroxysmal atrial fibrillation) ([facility])  I48.0 427.31   12. Coronary artery disease involving native coronary artery of native heart with angina pectoris ([facility])  I25.119 414.01     413.9   13. End stage renal disease on dialysis ([facility])  N18.6 585.6    Z99.2 V45.11   14. Nonrheumatic aortic valve stenosis  I35.0 424.1   15. NSTEMI (non-ST elevated myocardial infarction) ([facility])  I21.4 410.70   16. Acute cystitis without hematuria  N30.00 595.0   17. Ischemic heart disease  I25.9 414.9   18. Congestive heart failure, unspecified HF chronicity, unspecified heart failure type ([facility])  I50.9 428.0   19. Malignant neoplasm of right female breast, unspecified estrogen receptor status, unspecified site of breast ([facility])  C50.911 174.9   20. Hematochezia  K92.1 578.1   21. Acute midline low back pain without sciatica  M54.50 724.2   22. Oropharyngeal dysphagia  R13.12 787.22     Patient Active Problem List   Diagnosis   • Acute on chronic diastolic heart failure (HCC)   • Type 2 diabetes mellitus (HCC)   • Essential hypertension   •  Coronary artery disease involving native coronary artery of native heart with angina pectoris (HCC)   • Breast cancer, female, right   • Seasonal allergic rhinitis   • Right carotid bruit   • Vitamin B12 deficiency   • Hyperlipidemia LDL goal <70   • End stage renal disease on dialysis (HCC)   • Nonrheumatic aortic valve stenosis   • NSTEMI (non-ST elevated myocardial infarction) (HCC)   • UTI (urinary tract infection)   • Ischemic heart disease   • CHF (congestive heart failure) (HCC)   • Acute non-ST segment elevation myocardial infarction (STEMI) following previous myocardial infarction   • Dyslipidemia   • Diabetes mellitus (HCC)   • Mild obesity   • Elevated troponin   • Screen for colon cancer   • Acute midline low back pain without sciatica   • Hypertensive emergency   • PAF (paroxysmal atrial fibrillation) (HCC)   • Malignant neoplasm of anal canal (HCC)   • Hematochezia   • Severe malnutrition (HCC)   • Symptomatic anemia     Past Medical History:   Diagnosis Date   • Acute bronchitis    • Acute conjunctivitis    • Acute kidney injury (HCC)     Admission from 12/26/2013 to 01/02/2014, now resolved with latest creatinine 1.4 on 01/08/2014.   • Acute non-ST segment elevation myocardial infarction (STEMI) following previous myocardial infarction    • Anal cancer (HCC) 2/8/2023   • Anal cancer (HCC) 2/8/2023   • Arthritis    • Breast cancer, female, right     2005 mastectomy right   • Cancer (HCC)    • CHF (congestive heart failure) (HCC)    • Chronic kidney disease     dialysis MW, f/u [facility]    • Clotting disorder (HCC)    • COVID-19    • Diabetes mellitus (HCC)     diagnosed ~1992, checks fsbg 2-3x/day   • Diarrhea    • Dyslipidemia    • Dyspepsia    • Dyspnea    • Edema    • History of transfusion     ~2013 no reaction    • Hx of radiation therapy    • Hyperlipidemia    • Hypertension     Severe   • Ischemic heart disease    • Moderate obesity     BMI 36.2   • Myocardial infarction (HCC)     • Pneumonia    • Pneumonia 02/2019   • Radiation 2005   • Seasonal allergies    • Wears glasses      Past Surgical History:   Procedure Laterality Date   • AV FISTULA PLACEMENT, BRACHIOBASILIC     • BREAST BIOPSY Left 2010   • BREAST CYST EXCISION     • BREAST LUMPECTOMY Right 2005   • BREAST SURGERY     • CARDIAC CATHETERIZATION N/A 10/10/2016    Procedure: Left Heart Cath;  Surgeon: Scooter Zhao MD;  Location:  [facility];  Service:    • CARDIAC CATHETERIZATION  10/2016   • CARDIAC CATHETERIZATION N/A 1/21/2021    Procedure: Right and Left Heart Cath;  Surgeon: Hugh Tidwell MD;  Location:  [facility];  Service: Cardiology;  Laterality: N/A;   • COLONOSCOPY N/A 7/23/2020    Procedure: COLONOSCOPY;  Surgeon: Rubén Rosas MD;  Location:  [facility];  Service: Gastroenterology;  Laterality: N/A;   • CORONARY STENT PLACEMENT  2016   • HERNIA REPAIR     • HYSTERECTOMY  2000   • INSERTION HEMODIALYSIS CATHETER Right 6/29/2016    Procedure: HEMODIALYSIS CATHETER INSERTION TUNNELLED;  Surgeon: Ramón Chavez MD;  Location:  [facility];  Service:    • MASTECTOMY Right 2006   • OOPHORECTOMY     • REDUCTION MAMMAPLASTY Left 2005       SLP Recommendation and Plan  SLP Swallowing Diagnosis: mild, oral dysphagia, severe, pharyngeal dysphagia (03/23/23 1245)  SLP Diet Recommendation: puree, pudding thick liquids, ice chips between meals after oral care, with supervision, other (see comments) (4-5 ice chips per hour per MD discretion) (03/23/23 1245)  Recommended Precautions and Strategies: upright posture during/after eating, no straw, general aspiration precautions (03/23/23 1245)  SLP Rec. for Method of Medication Administration: meds whole, with puree, as tolerated (03/23/23 1245)     Monitor for Signs of Aspiration: yes, notify SLP if any concerns (03/23/23 1245)     Swallow Criteria for Skilled Therapeutic Interventions Met: demonstrates skilled criteria  (03/23/23 1245)  Anticipated Discharge Disposition (SLP): anticipate therapy at next level of care, skilled nursing facility (03/23/23 1245)  Rehab Potential/Prognosis, Swallowing: re-evaluate goals as necessary (03/23/23 1245)  Therapy Frequency (Swallow): 5 days per week (03/23/23 1245)                                                  SWALLOW EVALUATION (last 72 hours)     SLP Adult Swallow Evaluation     Row Name 03/23/23 1245 03/22/23 0230                Rehab Evaluation    Document Type evaluation  - evaluation  -AC       Subjective Information complains of;pain  - complains of;pain  -AC       Patient Observations alert;cooperative;agree to therapy  - alert;cooperative  -AC       Patient/Family/Caregiver Comments/Observations no family present  - No family present.  -AC       Patient Effort good  -CH fair  -AC       Symptoms Noted During/After Treatment none  -CH --          General Information    Patient Profile Reviewed yes  -CH yes  -AC       Pertinent History Of Current Problem see prior evaluation  - Hematochezia. Recently dx'd anal CA, undergoing XRT & oral chemo. Hx ESRD--on dialysis, severe malnutrition, breast CA, arthritis, pna. C/o SOA & CP recently per chart. Also new swallowing difficulty w/ liquids & solids reported.  -AC       Current Method of Nutrition pureed;nectar/syrup-thick liquids  - regular textures;thin liquids  -AC       Precautions/Limitations, Vision WFL;for purposes of eval  -CH WFL;for purposes of eval  -AC       Precautions/Limitations, Hearing WFL;for purposes of eval  -CH WFL;for purposes of eval  -AC       Prior Level of Function-Communication unknown  -CH unknown  -AC       Prior Level of Function-Swallowing no diet consistency restrictions  - no diet consistency restrictions  -AC       Plans/Goals Discussed with patient;agreed upon  -CH patient;agreed upon  -AC       Barriers to Rehab medically complex  -CH medically complex  -AC       Patient's Goals for  Discharge eat/drink without coughing/choking  - eat/drink without coughing/choking  -          Pain    Additional Documentation Pain Scale: FACES Pre/Post-Treatment (Group)  - --          Pain Scale: FACES Pre/Post-Treatment    Pain: FACES Scale, Pretreatment 2-->hurts little bit  -CH 6-->hurts even more  -       Posttreatment Pain Rating 6-->hurts even more  -CH 6-->hurts even more  -AC       Pain Location - Side/Orientation Right  - --       Pain Location lower  - --       Pain Location - perineum  - perineum  -AC       Pre/Posttreatment Pain Comment repositioned  - PCT/RN notified. Respositioned & addressed concerns.  -          Oral Motor Structure and Function    Oral Lesions or Structural Abnormalities and/or variants -- Extensive oral care provided--pt exhibited significant oral pocketing, presumably from lunch.  -AC       Dentition Assessment -- natural, present and adequate;other (see comments)  1-2 loose teeth  -AC       Secretion Management -- WNL/WFL  -AC       Mucosal Quality -- dry  -AC          Oral Musculature and Cranial Nerve Assessment    Oral Motor General Assessment -- generalized oral motor weakness  -          General Eating/Swallowing Observations    Eating/Swallowing Skills -- fed by SLP;needed assist  -AC       Positioning During Eating -- semi-reclined;other (see comments)  difficulty sitting upright 2' buttocks pain/discomfort  -       Utensils Used -- spoon;cup;straw  -       Consistencies Trialed -- thin liquids;nectar/syrup-thick liquids;pureed;soft to chew textures;chopped  -          Clinical Swallow Eval    Oral Prep Phase -- impaired  -AC       Oral Transit -- WFL  -AC       Oral Residue -- impaired  -AC       Pharyngeal Phase -- suspected pharyngeal impairment  -       Clinical Swallow Evaluation Summary -- --  -AC          Oral Prep Concerns    Oral Prep Concerns -- prolonged mastication  -       Prolonged Mastication -- mechanical soft  -AC           Oral Residue Concerns    Oral Residue Concerns -- diffuse residue throughout oral cavity  -AC       Diffuse Residue Throughout Oral Cavity -- mechanical soft  -AC          Pharyngeal Phase Concerns    Pharyngeal Phase Concerns -- cough;throat clear  -AC       Cough -- thin  -AC       Throat Clear -- nectar  -AC       Pharyngeal Phase Concerns, Comment -- Immediate persistent cough w/ thin via cup. Delayed throat clear noted w/ nectar-thick liquid via consecutive straw drinks. Evidence of significant pocketing of green beans from lunch. Reported that she's been avoiding solids 2' swallowing difficulty. Suspect xerostomia in addition to weakness. Pt agreed to modified diet/liquids & plans for FEES to further assess swallowing.  -AC          Fiberoptic Endoscopic Evaluation of Swallowing (FEES)    Risks/Benefits Reviewed risks/benefits explained;patient;agreed to eval  -CH --       Nasal Entry right:  -CH --       Scope serial number/identification [id number]  -CH --          Anatomy and Physiology    Anatomic Considerations edema;vocal folds  -CH --       Velopharyngeal WFL  -CH --       Base of Tongue symmetrical  -CH --       Epiglottis rests posteriorly  -CH --       Laryngeal Function Breathing symmetrical  -CH --       Laryngeal Function Phonation symmetrical  -CH --       Laryngeal Function to Breath Hold TVF contact  -CH --       Secretion Rating Scale (Rafael et al. 1996) 0- normal, no visible secretions  -CH --       Spontaneous Swallow frequency adequate  -CH --       Sensory sensed scope  -CH --       Utensils Used Spoon;Cup;Straw  -CH --       Consistencies Trialed thin liquids;nectar-thick liquids;honey-thick liquids;pudding/puree;regular textures  -CH --          FEES Interpretation    Oral Phase prolonged manipulation;prespill of liquids into pharynx  -CH --          Initiation of Pharyngeal Swallow    Initiation of Pharyngeal Swallow bolus in pyriform sinuses  -CH --       Pharyngeal Phase impaired pharyngeal  phase of swallowing  -CH --       Penetration During the Swallow pudding/puree;secondary to delayed swallow initiation or mistiming;secondary to reduced laryngeal elevation;secondary to reduced vestibular closure  -CH --       Aspiration During the Swallow thin liquids;nectar-thick liquids;honey-thick liquids;secondary to delayed swallow initiation or mistiming;secondary to reduced laryngeal elevation;secondary to reduced vestibular closure;secondary to reduced laryngeal (VF) closure  -CH --       Aspiration After the Swallow thin liquids;nectar-thick liquids;honey-thick liquids;secondary to residue;in valleculae;in pyriform sinuses;in laryngeal vestibule  -CH --       Depth of Penetration shallow  pudding  -CH --       Response to Penetration No  -CH --       Response to Aspiration Yes  -CH --       Responsed to aspiration with throat clear;non-effective  -CH --       Rosenbek's Scale thin:;nectar:;honey:;7-->Level 7;pudding/puree:;3-->Level 3  -CH --       Residue thin liquids;nectar-thick liquids;honey-thick liquids;valleculae;pyriform sinuses;laryngeal vestibule;secondary to reduced base of tongue retraction;secondary to reduced posterior pharyngeal wall stripping;secondary to reduced laryngeal elevation;secondary to reduced hyolaryngeal excursion  -CH --       Response to Residue unable to clear residue;with spontaneous subsequent swallow;with cued swallow  -CH --       Attempted Compensatory Maneuvers bolus size;bolus presentation style;additional subsequent swallow;throat clear after swallow  -CH --       Response to Attempted Compensatory Maneuvers did not prevent aspiration;did not reduce residue  -CH --       FEES Summary Mild oral dysphagia with prolonged manipulation and prespill of liquids into the pharynx. Severe pharyngeal dysphagia with aspiration of thin, nectar and honey thick consistencies during and after the swallow. Vallecula residue pooled over to the pyriform sinuses and spilled over the  posterior comissure. Patient sensed aspiration episodes and consistently responded with a throat clear. No consistent response to penetration. compensations were not successful in clearing aspirated material.  - --          Swallowing Quality of Life Assessment    Education and counseling provided Signs of aspiration;Risks of aspiration;Safest diet options;Oral care recommendations and rationale;Aspiration precautions  - Signs of aspiration;Risks of aspiration;Safest diet options  -          SLP Evaluation Clinical Impression    SLP Swallowing Diagnosis mild;oral dysphagia;severe;pharyngeal dysphagia  - R/O pharyngeal dysphagia;oral dysphagia  -       Functional Impact risk of aspiration/pneumonia;risk of malnutrition;risk of dehydration  - risk of aspiration/pneumonia  -       Rehab Potential/Prognosis, Swallowing re-evaluate goals as necessary  - re-evaluate goals as necessary  -       Swallow Criteria for Skilled Therapeutic Interventions Met demonstrates skilled criteria  - demonstrates skilled criteria  -          Recommendations    Therapy Frequency (Swallow) 5 days per week  - --       SLP Diet Recommendation puree;pudding thick liquids;ice chips between meals after oral care, with supervision;other (see comments)  4-5 ice chips per hour per MD discretion  - puree;nectar thick liquids  -       Recommended Diagnostics -- FEES  -       Recommended Precautions and Strategies upright posture during/after eating;no straw;general aspiration precautions  - upright posture during/after eating;no straw;general aspiration precautions  -       Oral Care Recommendations Oral Care BID/PRN  - Oral Care BID/PRN  -       SLP Rec. for Method of Medication Administration meds whole;with puree;as tolerated  - meds whole;with puree;as tolerated  -       Monitor for Signs of Aspiration yes;notify SLP if any concerns  - yes;notify SLP if any concerns  -       Anticipated Discharge  Disposition (SLP) anticipate therapy at next level of care;skilled nursing facility  - anticipate therapy at next level of care;skilled nursing facility  -             User Key  (r) = Recorded By, (t) = Taken By, (c) = Cosigned By    Initials Name Effective Dates    AC Fadumo Collado, MS CCC-SLP 02/03/23 -     CH Maxine Myrtle, MS CCC-SLP 06/16/21 -                 EDUCATION  The patient has been educated in the following areas:   Home Exercise Program (HEP) Dysphagia (Swallowing Impairment) Oral Care/Hydration Modified Diet Instruction.        SLP GOALS     Row Name 03/23/23 1245             (LTG) Patient will demonstrate functional swallow for    Diet Texture (Demonstrate functional swallow) soft to chew (whole) textures  -CH      Liquid viscosity (Demonstrate functional swallow) thin liquids  -CH      [place] (Demonstrate functional swallow) with minimal cues (75-90% accuracy)  -CH      Time Frame (Demonstrate functional swallow) by discharge  -CH         (STG) Patient will tolerate therapeutic trials of    Consistencies Trialed (Tolerate therapeutic trials) thin liquids  -CH      Desired Outcome (Tolerate therapeutic trials) without signs/symptoms of aspiration  -CH      [place] (Tolerate therapeutic trials) with minimal cues (75-90% accuracy)  -CH      Time Frame (Tolerate therapeutic trials) by discharge  -CH         (STG) Lingual Strengthening Goal 1 (SLP)    Activity (Lingual Strengthening Goal 1, SLP) increase tongue back strength  -CH      Increase Tongue Back Strength swallow trials;lingual resistance exercises  -CH      [place]/Accuracy (Lingual Strengthening Goal 1, SLP) with minimal cues (75-90% accuracy)  -CH      Time Frame (Lingual Strengthening Goal 1, SLP) by discharge  -CH         (STG) Pharyngeal Strengthening Exercise Goal 1 (SLP)    Activity (Pharyngeal Strengthening Goal 1, SLP) increase timing;increase superior movement of the hyolaryngeal complex;increase anterior  movement of the hyolaryngeal complex;increase epiglottic inversion and retroflexion;increase closure at entrance to airway/closure of airway at glottis;increase squeeze/positive pressure generation  -CH      Increase Timing prepping - 3 second prep or suck swallow or 3-step swallow  -CH      Increase Superior Movement of the Hyolaryngeal Complex falsetto  -CH      Increase Anterior Movement of the Hyolaryngeal Complex chin tuck against resistance (CTAR)  -CH      Increase Closure at Entrance to Airway/Closure of Airway at Glottis super-supraglottic swallow;breath hold exercises  -CH      Increase Squeeze/Positive Pressure Generation hard effortful swallow  -CH      [place]/Accuracy (Pharyngeal Strengthening Goal 1, SLP) with minimal cues (75-90% accuracy)  -CH      Time Frame (Pharyngeal Strengthening Goal 1, SLP) by discharge  -CH            User Key  (r) = Recorded By, (t) = Taken By, (c) = Cosigned By    Initials Name Provider Type     Myrtle Goodman MS CCC-SLP Speech and Language Pathologist                   Time Calculation:    Time Calculation- SLP     Row Name 03/23/23 1444             Time Calculation- SLP    SLP Start Time 1245  -CH      SLP Received On 03/23/23  -         Untimed Charges    SLP Eval/Re-eval  ST Fiberoptic Endo Eval Swallow - 90520  -CH      36985-[id number] Fiberoptic Endo Eval Swallow Minutes 115  -CH         Total Minutes    Untimed Charges Total Minutes 115  -CH       Total Minutes 115  -CH            User Key  (r) = Recorded By, (t) = Taken By, (c) = Cosigned By    Initials Name Provider Type     Myrtle Goodman MS CCC-SLP Speech and Language Pathologist                Therapy Charges for Today     Code Description Service Date Service Provider Modifiers Qty    [id number]  ST FIBEROPTIC ENDO EVAL SWALL 8 3/23/2023 Myrtle Goodman MS CCC-SLP GN 1               Myrtle Goodman MS CCC-SLP  3/23/2023

## 2023-03-23 NOTE — SIGNIFICANT NOTE
03/23/23 1105   SLP Deferred Reason   SLP Deferred Reason Patient/family declined evaluation, not feeling well  (Unable to complete FEES this am d/t patient lethargy. Will reattempt this pm as appropriate.)

## 2023-03-23 NOTE — PLAN OF CARE
Goal Outcome Evaluation:  Plan of Care Reviewed With: patient        Progress: no change  Outcome Evaluation: VSS, pt. seems more confused towards end of day, PRN norco given, dressing changed by WOC (change q3days unless soiled), no c/o n/v, will continue to monitor

## 2023-03-23 NOTE — SIGNIFICANT NOTE
At approx 0220 pt slid attempted to get up out of bed through the gap in side rails on the right side of the bed. Patient slid to the ground onto her bottom. No injuries were noted. Patient is disoriented x3 which is baseline for patient. WILMA Mattson notified. No new orders given. Patient's daughter Indigo has been updated on event and patient status.

## 2023-03-23 NOTE — CASE MANAGEMENT/SOCIAL WORK
Continued Stay Note  [facility]     Patient Name: Esperanza Pop  MRN: [id number]  Today's Date: 3/23/2023    Admit Date: 3/10/2023    Plan: SNF   Discharge Plan     Row Name 03/23/23 6358       Plan    Plan SNF    Patient/Family in Agreement with Plan yes  Patient and daughter    Plan Comments Met with patient and daughter, Jeri (ATTILA) at bedside.  Patient alert and oriented, follows conversation appropriately and involved.  Discussed SNF placement for rehab, both patient/daughter agreeable.  Wishes to continue dialysis.  Will make referrals to [facility] SNF's for PT/OT, speech and wound care.  Daughter does not have a preference.  Patient instructed daughter on paying her bills and where to locate the things she will need in order to do this-most can be done by telephone or on-line.  Daughter agreeable to pay bills per her mothers request vs. obtaining guardianship, as patient continues to have capacity to make her own decisions.  Daughter aware hospital SW available to assist her as needed, very appreciative.  Discussed in unit multidisciplinary rounds this morning.  Bernadette Herman, Ext. [phone]    Final Discharge Disposition Code 03 - skilled nursing facility (SNF)               Discharge Codes    No documentation.               Expected Discharge Date and Time     Expected Discharge Date Expected Discharge Time    Mar 24, 2023             SELIN Alberto

## 2023-03-23 NOTE — PROGRESS NOTES
[facility] Medicine Services  PROGRESS NOTE    Patient Name: Esperanza Pop  : 1945  MRN: [id number]    Date of Admission: 3/10/2023  Primary Care Physician: Meghana Rodgers MD    Subjective   Subjective     CC:  F/U generalized weakness     HPI:    Patient resting in bed.  Patient reports to be at home.  Reoriented patient to place, time.  Patient denies pain.  Patient still experiencing nightmares.  She had a nightmare last night was trying to get out of the bed and slid to the ground on her bottom. No injury reported. We will try Minipress to see if that will improve nightmares.  Nightmares started recently after her  passed away.     Copied text in this note has been reviewed and is accurate as of 23.       Objective   Objective     Vital Signs:   Temp:  [97 °F (36.1 °C)-98.7 °F (37.1 °C)] 98.3 °F (36.8 °C)  Heart Rate:  [56-61] 56  Resp:  [16-18] 18  BP: (105-153)/(52-63) 105/52  Flow (L/min):  [2] 2     Physical Exam:  Constitutional: Awake, alert, NAD, chronically frail and ill-appearing  HENT: NCAT, mucous membranes moist  Respiratory: Clear to auscultation bilaterally, nonlabored respirations   Cardiovascular: RRR, 3/6 systolic murmurs, no rubs, or gallops  Gastrointestinal: Positive bowel sounds, soft, nontender, nondistended  Musculoskeletal: No bilateral ankle edema  Psychiatric: Appropriate affect, cooperative  Neurologic: Oriented to person, moves all extremities, speech clear  Skin: Dsg to buttocks.     Results Reviewed:  LAB RESULTS:      Lab 23  0326 23   WBC 7.12 5.48   HEMOGLOBIN 8.9* 9.3*   HEMATOCRIT 29.3* 31.4*   PLATELETS 153 111*   NEUTROS ABS 4.65  --    IMMATURE GRANS (ABS) 0.12*  --    LYMPHS ABS 0.67*  --    MONOS ABS 1.18*  --    EOS ABS 0.47*  --    MCV 99.0* 102.3*         Lab 23  0326 23  0642 23   SODIUM 131* 127* 134*   POTASSIUM 4.5 5.1 4.5   CHLORIDE 95* 93* 100   CO2 21.0* 20.0* 21.0*    ANION GAP 15.0 14.0 13.0   BUN 36* 41* 19   CREATININE 6.66* 7.22* 4.41*   EGFR 6.0* 5.5* 9.9*   GLUCOSE 111* 128* 125*   CALCIUM 8.6 8.6 8.6   PHOSPHORUS  --   --  2.8         Lab 03/22/23  0326 03/18/23  0447   TOTAL PROTEIN 7.6  --    ALBUMIN 3.0* 2.7*   GLOBULIN 4.6  --    ALT (SGPT) 6  --    AST (SGOT) 17  --    BILIRUBIN 0.6  --    ALK PHOS 127*  --          Lab 03/22/23  0743 03/22/23  0326   HSTROP T 158* 156*                 Brief Urine Lab Results     None          Microbiology Results Abnormal     None          XR Chest 1 View    Result Date: 3/22/2023  Exam:  Single view chest Date: March 22, 2023 History: Shortness of breath Comparison: March 10, 2023 Technique: Single frontal radiograph of the chest was obtained. The study is degraded by patient rotation. Findings: The heart is enlarged. There is mild vascular congestion. There is no pneumothorax or sizable pleural fluid collection.     Impression: 1. Cardiomegaly with mild vascular congestion. There may be an underlying pericardial effusion. Slot 63 Electronically signed by:  Abdulaziz Mcwilliams M.D.  3/22/2023 2:25 AM Mountain Time      Results for orders placed during the hospital encounter of 03/10/23    Adult Transthoracic Echo Complete W/ Cont if Necessary Per Protocol    Interpretation Summary  •  Left ventricular systolic function is normal. Estimated left ventricular EF = 55%  •  Left ventricular wall thickness is consistent with moderate concentric hypertrophy.  •  The right ventricular cavity is mildly dilated.  •  Mild to moderate biatrial enlargement.  •  Moderate to severe aortic valve stenosis is present.  Mean gradient 34 mmHg, DOUG 0.9 cm².  •  Mild aortic insufficiency.  •  Mild mitral regurgitation.  •  Mild to moderate tricuspid regurgitation with RVSP 48 mmHg.      Current medications:  Scheduled Meds:amiodarone, 200 mg, Oral, Daily  apixaban, 2.5 mg, Oral, Q12H  aspirin, 81 mg, Oral, Daily  atorvastatin, 80 mg, Oral,  Nightly  carvedilol, 3.125 mg, Oral, Q12H  cloNIDine, 0.4 mg, Oral, Q12H  dorzolamide-timolol, 1 drop, Both Eyes, Daily  epoetin orquidea/orquidea-epbx, 20,000 Units, Subcutaneous, Once per day on Mon Wed Fri  insulin lispro, 0-9 Units, Subcutaneous, TID With Meals  isosorbide mononitrate, 120 mg, Oral, Daily  melatonin, 5 mg, Oral, Nightly  QUEtiapine, 25 mg, Oral, Nightly  terazosin, 1 mg, Oral, Nightly      Continuous Infusions:   PRN Meds:.•  benzocaine-menthol  •  dextrose  •  dextrose  •  glucagon (human recombinant)  •  haloperidol lactate  •  HYDROcodone-acetaminophen  •  hydrocortisone  •  hydrOXYzine  •  ondansetron **OR** ondansetron  •  sodium chloride  •  sodium chloride  •  sodium chloride    Assessment & Plan   Assessment & Plan     Active Hospital Problems    Diagnosis  POA   • **Hematochezia [K92.1]  Yes   • Symptomatic anemia [D64.9]  Yes   • Severe malnutrition (HCC) [E43]  Yes   • PAF (paroxysmal atrial fibrillation) (HCC) [I48.0]  Yes   • CHF (congestive heart failure) (HCC) [I50.9]  Yes   • End stage renal disease on dialysis (HCC) [N18.6, Z99.2]  Not Applicable   • Hyperlipidemia LDL goal <70 [E78.5]  Yes   • Essential hypertension [I10]  Yes   • Type 2 diabetes mellitus (HCC) [E11.9]  Yes      Resolved Hospital Problems   No resolved problems to display.        Brief Hospital Course to date:  Esperanza Pop is a 78 y.o. female  with PMH of ESRD on HD MWF, IDDM, HTN, HLD, HFpEF, breast cancer (s/p mastectomy and radiation), PAF (on xarelto), chronic anemia, and rectal cancer (recently diagnosed currently gettintg XRT and chemo) that presented to the ED after a fall at home. Found to have acute on chronic anemia, s/p 1 unit pRBCs on 3/11.   Patient wishing to pursue continued chemo/radiation but very very weak with SNF recs. Unable to complete radiation/chemo in SNF, but very weak with many treatments needed for home. Complicated picture. Will try to re-attempt radiation today, if tolerating, remain here  for radiation + chemo this week. If unable to tolerate, should likely pursue SNF placement and reattempt later.    Assessment and plan:  Acute on Chronic Anemia 2/2 rectal bleeding 2/2 known rectal cx - stabilized  Profound Debility, SNF recs  · Status post she 1 unit PRBCs 3/11 with appropriate rise in H&H, stabilized  · She follows with Dr. Cavazos, Rad/Onc, Dr. Carpenter with oncology  · In regards to her rectal cancer, could not tolerate chemotherapy or radiation therapy because of severe debility.  Plan for now is to hold both therapies (chemoradiation) and transition to SNF and if she improves over the next few weeks, she can resume treatment otherwise, family might need to consider hospice care  · Case management consulted for SNF placement    Acute metabolic encephalopathy - resolved  · She completed course of abx for possible UTI, CT head without acute disease  · Continue seroquel qhs + melatonin with available PRNs    Chest pain  · Had an episode of chest pain early morning 3/22/2023.  Troponin was mildly elevated but flat  · Denies chest pain today.  Unfortunately because of her age, severe debility and advanced rectal cancer, not candidate for ischemic work-up.  Medical management for now  · Already on aspirin and Eliquis and Coreg  · We will continue to monitor    Buttock wound   · Wound care/pain control    ESRD on HD MWF  · Renal following for HD     HFpEF   Essential hypertension  · Continue dialysis for volume control as above  · Continue clonidine, Coreg and Imdur    PAF on eliquis   · Continue eliquis + amiodarone, H&H stablized as above    Mod-Severe aortic stenosis   · Has appt with Geena Estrella, WILMA 4/27/23, can follow up    Expected Discharge Location and Transportation: TBD, see above  Expected Discharge   Expected Discharge Date and Time     Expected Discharge Date Expected Discharge Time    Mar 24, 2023            DVT prophylaxis:  Medical and mechanical DVT prophylaxis orders are  present.     AM-PAC 6 Clicks Score (PT): 10 (03/23/23 0800)    CODE STATUS:   Code Status and Medical Interventions:   Ordered at: 03/10/23 1448     Medical Intervention Limits:    NO intubation (DNI)     Code Status (Patient has no pulse and is not breathing):    No CPR (Do Not Attempt to Resuscitate)     Medical Interventions (Patient has pulse or is breathing):    Limited Support       WILMA Saunders  03/23/23    Copied text in this note has been reviewed and is accurate as of 03/23/23.

## 2023-03-24 ENCOUNTER — APPOINTMENT (OUTPATIENT)
Dept: NEPHROLOGY | Facility: HOSPITAL | Age: 78
End: 2023-03-24
Payer: MEDICARE

## 2023-03-24 LAB
ANION GAP SERPL CALCULATED.3IONS-SCNC: 11 MMOL/L (ref 5–15)
BASOPHILS # BLD AUTO: 0.05 10*3/MM3 (ref 0–0.2)
BASOPHILS NFR BLD AUTO: 0.8 % (ref 0–1.5)
BUN SERPL-MCNC: 27 MG/DL (ref 8–23)
BUN/CREAT SERPL: 4.9 (ref 7–25)
CALCIUM SPEC-SCNC: 8.4 MG/DL (ref 8.6–10.5)
CHLORIDE SERPL-SCNC: 98 MMOL/L (ref 98–107)
CO2 SERPL-SCNC: 25 MMOL/L (ref 22–29)
CREAT SERPL-MCNC: 5.46 MG/DL (ref 0.57–1)
DEPRECATED RDW RBC AUTO: 63.7 FL (ref 37–54)
EGFRCR SERPLBLD CKD-EPI 2021: 7.6 ML/MIN/1.73
EOSINOPHIL # BLD AUTO: 0.45 10*3/MM3 (ref 0–0.4)
EOSINOPHIL NFR BLD AUTO: 6.9 % (ref 0.3–6.2)
ERYTHROCYTE [DISTWIDTH] IN BLOOD BY AUTOMATED COUNT: 17.5 % (ref 12.3–15.4)
GLUCOSE BLDC GLUCOMTR-MCNC: 119 MG/DL (ref 70–130)
GLUCOSE BLDC GLUCOMTR-MCNC: 122 MG/DL (ref 70–130)
GLUCOSE BLDC GLUCOMTR-MCNC: 96 MG/DL (ref 70–130)
GLUCOSE SERPL-MCNC: 143 MG/DL (ref 65–99)
HCT VFR BLD AUTO: 27.8 % (ref 34–46.6)
HGB BLD-MCNC: 8.4 G/DL (ref 12–15.9)
IMM GRANULOCYTES # BLD AUTO: 0.1 10*3/MM3 (ref 0–0.05)
IMM GRANULOCYTES NFR BLD AUTO: 1.5 % (ref 0–0.5)
LYMPHOCYTES # BLD AUTO: 0.64 10*3/MM3 (ref 0.7–3.1)
LYMPHOCYTES NFR BLD AUTO: 9.8 % (ref 19.6–45.3)
MCH RBC QN AUTO: 30.2 PG (ref 26.6–33)
MCHC RBC AUTO-ENTMCNC: 30.2 G/DL (ref 31.5–35.7)
MCV RBC AUTO: 100 FL (ref 79–97)
MONOCYTES # BLD AUTO: 0.9 10*3/MM3 (ref 0.1–0.9)
MONOCYTES NFR BLD AUTO: 13.8 % (ref 5–12)
NEUTROPHILS NFR BLD AUTO: 4.36 10*3/MM3 (ref 1.7–7)
NEUTROPHILS NFR BLD AUTO: 67.2 % (ref 42.7–76)
NRBC BLD AUTO-RTO: 0 /100 WBC (ref 0–0.2)
PLATELET # BLD AUTO: 124 10*3/MM3 (ref 140–450)
PMV BLD AUTO: 11.1 FL (ref 6–12)
POTASSIUM SERPL-SCNC: 4.7 MMOL/L (ref 3.5–5.2)
RBC # BLD AUTO: 2.78 10*6/MM3 (ref 3.77–5.28)
SODIUM SERPL-SCNC: 134 MMOL/L (ref 136–145)
WBC NRBC COR # BLD: 6.5 10*3/MM3 (ref 3.4–10.8)

## 2023-03-24 PROCEDURE — 25010000002 EPOETIN ALFA-EPBX 10000 UNIT/ML SOLUTION: Performed by: INTERNAL MEDICINE

## 2023-03-24 PROCEDURE — G0378 HOSPITAL OBSERVATION PER HR: HCPCS

## 2023-03-24 PROCEDURE — 80048 BASIC METABOLIC PNL TOTAL CA: CPT | Performed by: NURSE PRACTITIONER

## 2023-03-24 PROCEDURE — 82962 GLUCOSE BLOOD TEST: CPT

## 2023-03-24 PROCEDURE — 99231 SBSQ HOSP IP/OBS SF/LOW 25: CPT | Performed by: NURSE PRACTITIONER

## 2023-03-24 PROCEDURE — 85025 COMPLETE CBC W/AUTO DIFF WBC: CPT | Performed by: NURSE PRACTITIONER

## 2023-03-24 PROCEDURE — G0257 UNSCHED DIALYSIS ESRD PT HOS: HCPCS

## 2023-03-24 RX ORDER — ALBUMIN (HUMAN) 12.5 G/50ML
12.5 SOLUTION INTRAVENOUS AS NEEDED
Status: ACTIVE | OUTPATIENT
Start: 2023-03-24 | End: 2023-03-25

## 2023-03-24 RX ORDER — DORZOLAMIDE HYDROCHLORIDE AND TIMOLOL MALEATE 20; 5 MG/ML; MG/ML
1 SOLUTION/ DROPS OPHTHALMIC DAILY
Status: DISCONTINUED | OUTPATIENT
Start: 2023-03-24 | End: 2023-03-29 | Stop reason: HOSPADM

## 2023-03-24 RX ADMIN — APIXABAN 2.5 MG: 2.5 TABLET, FILM COATED ORAL at 22:56

## 2023-03-24 RX ADMIN — CLONIDINE HYDROCHLORIDE 0.4 MG: 0.1 TABLET ORAL at 12:15

## 2023-03-24 RX ADMIN — EPOETIN ALFA-EPBX 20000 UNITS: 10000 INJECTION, SOLUTION INTRAVENOUS; SUBCUTANEOUS at 10:24

## 2023-03-24 RX ADMIN — CARVEDILOL 3.12 MG: 3.12 TABLET, FILM COATED ORAL at 12:16

## 2023-03-24 RX ADMIN — APIXABAN 2.5 MG: 2.5 TABLET, FILM COATED ORAL at 12:16

## 2023-03-24 RX ADMIN — ASPIRIN 81 MG: 81 TABLET, COATED ORAL at 12:16

## 2023-03-24 RX ADMIN — Medication 5 MG: at 22:56

## 2023-03-24 RX ADMIN — ISOSORBIDE MONONITRATE 120 MG: 120 TABLET, EXTENDED RELEASE ORAL at 12:15

## 2023-03-24 RX ADMIN — AMIODARONE HYDROCHLORIDE 200 MG: 200 TABLET ORAL at 12:15

## 2023-03-24 RX ADMIN — DORZOLAMIDE HYDROCHLORIDE AND TIMOLOL MALEATE 1 DROP: 20; 5 SOLUTION/ DROPS OPHTHALMIC at 12:15

## 2023-03-24 RX ADMIN — QUETIAPINE FUMARATE 25 MG: 25 TABLET ORAL at 22:56

## 2023-03-24 RX ADMIN — ATORVASTATIN CALCIUM 80 MG: 40 TABLET, FILM COATED ORAL at 22:55

## 2023-03-24 RX ADMIN — HYDROCODONE BITARTRATE AND ACETAMINOPHEN 1 TABLET: 10; 325 TABLET ORAL at 04:37

## 2023-03-24 NOTE — PROGRESS NOTES
"   LOS: 3 days    Patient Care Team:  Meghana Rodgers MD as PCP - General  Demetrius Sagastume MD as Consulting Physician (Cardiology)  Kevan Lerma MD as Consulting Physician (Nephrology)  Geena Estrella APRN as Nurse Practitioner (Cardiology)  Frank Mandujano MD as Referring Physician (Colon and Rectal Surgery)  Kevan Cavazos MD as Consulting Physician (Radiation Oncology)  Yue Reeves RN as Nurse Navigator  Darryn Burk MD as Consulting Physician (Nephrology)    Chief Complaint:   ESRD on Monday Wednesday Friday, history of anal CA on chemo, admitted after missing her dialysis.    Subjective     Interval History:   No new complaints  Patient seen on dialysis    Review of Systems:   No complaints      Objective     Vital Sign Min/Max for last 24 hours  Temp  Min: 97 °F (36.1 °C)  Max: 98.7 °F (37.1 °C)   BP  Min: 105/52  Max: 153/62   Pulse  Min: 56  Max: 61   Resp  Min: 16  Max: 18   SpO2  Min: 91 %  Max: 99 %   Flow (L/min)  Min: 2  Max: 2   No data recorded     Flowsheet Rows    Flowsheet Row First Filed Value   Admission Height 167.6 cm (66\") Documented at 03/10/2023 1122   Admission Weight 76.7 kg (169 lb) Documented at 03/10/2023 1122          No intake/output data recorded.  I/O last 3 completed shifts:  In: 780 [P.O.:280; I.V.:500]  Out: 0     Physical Exam:  General Appearance: -American female comfortable in bed  Eyes: PER, EOMI.  Neck: Supple no JVD.  Lungs: Clear auscultation, no rales rhonchi's, equal chest movement, nonlabored.  Heart: Positive murmur, no, rub, RRR.  Abdomen: Soft, nontender, positive bowel sounds, no organomegaly.  Extremities: No edema, no cyanosis.  Neuro: No focal deficit, moving all extremities, alert oriented X 3  Right upper arm AV fistula  WBC WBC   Date Value Ref Range Status   03/22/2023 7.12 3.40 - 10.80 10*3/mm3 Final      HGB Hemoglobin   Date Value Ref Range Status   03/22/2023 8.9 (L) 12.0 - 15.9 g/dL Final      HCT Hematocrit   Date " Value Ref Range Status   03/22/2023 29.3 (L) 34.0 - 46.6 % Final      Platlets No results found for: LABPLAT   MCV MCV   Date Value Ref Range Status   03/22/2023 99.0 (H) 79.0 - 97.0 fL Final          Sodium Sodium   Date Value Ref Range Status   03/22/2023 131 (L) 136 - 145 mmol/L Final      Potassium Potassium   Date Value Ref Range Status   03/22/2023 4.5 3.5 - 5.2 mmol/L Final      Chloride Chloride   Date Value Ref Range Status   03/22/2023 95 (L) 98 - 107 mmol/L Final      CO2 CO2   Date Value Ref Range Status   03/22/2023 21.0 (L) 22.0 - 29.0 mmol/L Final      BUN BUN   Date Value Ref Range Status   03/22/2023 36 (H) 8 - 23 mg/dL Final      Creatinine Creatinine   Date Value Ref Range Status   03/22/2023 6.66 (H) 0.57 - 1.00 mg/dL Final      Calcium Calcium   Date Value Ref Range Status   03/22/2023 8.6 8.6 - 10.5 mg/dL Final      PO4 No results found for: CAPO4   Albumin Albumin   Date Value Ref Range Status   03/22/2023 3.0 (L) 3.5 - 5.2 g/dL Final      Magnesium No results found for: MG   Uric Acid No results found for: URICACID        Results Review:     I reviewed the patient's new clinical results.    amiodarone, 200 mg, Oral, Daily  apixaban, 2.5 mg, Oral, Q12H  aspirin, 81 mg, Oral, Daily  atorvastatin, 80 mg, Oral, Nightly  carvedilol, 3.125 mg, Oral, Q12H  cloNIDine, 0.4 mg, Oral, Q12H  dorzolamide-timolol, 1 drop, Both Eyes, Daily  epoetin orquidea/orquidea-epbx, 20,000 Units, Subcutaneous, Once per day on Mon Wed Fri  insulin lispro, 0-9 Units, Subcutaneous, TID With Meals  isosorbide mononitrate, 120 mg, Oral, Daily  melatonin, 5 mg, Oral, Nightly  QUEtiapine, 25 mg, Oral, Nightly  terazosin, 1 mg, Oral, Nightly           Medication Review: Reviewed    Assessment & Plan       Hematochezia    Type 2 diabetes mellitus (HCC)    Essential hypertension    Hyperlipidemia LDL goal <70    End stage renal disease on dialysis (HCC)    CHF (congestive heart failure) (HCC)    PAF (paroxysmal atrial fibrillation)  (HCC)    Severe malnutrition (HCC)    Symptomatic anemia    1.  ESRD on Monday Wednesday Friday.  2.  Anemia of chronic kidney disease: Also on chemotherapy.  3.  Volume status: Dialysis ultrafiltration will be done.  4.  Access AV fistula.  5.  Hypertension  6.  Hyponatremia corrected with dialysis  Plan:  Dialysis in progress.  Monitor blood pressure and volume status.       Darryn Burk MD  03/23/23  11:50 EDT

## 2023-03-24 NOTE — PROGRESS NOTES
[facility] Medicine Services  PROGRESS NOTE    Patient Name: Esperanza Pop  : 1945  MRN: [id number]    Date of Admission: 3/10/2023  Primary Care Physician: Meghana Rodgers MD    Subjective   Subjective     CC:  F/U generalized weakness     HPI:    Pt had dialysis this morning and is now back in her room, awake and interactive. She states she is ready to try really hard with physical therapy. She is A&O today to self, place. She reports nightmares were better on the minipress that was started yesterday. Updated the patient's daughter.     Copied text in this note has been reviewed and is accurate as of 23.       Objective   Objective     Vital Signs:   Temp:  [97.5 °F (36.4 °C)-98.2 °F (36.8 °C)] 97.6 °F (36.4 °C)  Heart Rate:  [50-63] 54  Resp:  [16-20] 20  BP: (113-144)/(54-75) 114/75  Flow (L/min):  [2] 2     Physical Exam:  Constitutional: Awake, alert, NAD, chronically ill and frail appearing  HENT: NCAT, mucous membranes moist  Respiratory: Clear to auscultation bilaterally, nonlabored respirations   Cardiovascular: RRR, 3/6 murmurs, no rubs, or gallop  Gastrointestinal: Positive bowel sounds, soft, nontender, nondistended  Musculoskeletal: No bilateral ankle edema  Psychiatric: Appropriate affect, cooperative  Neurologic: Oriented to person/place, moves all extremities, follows commands, speech clear  Skin: wound to buttocks.       Results Reviewed:  LAB RESULTS:      Lab 23  0655 23  0326 23  0447   WBC 6.50 7.12 5.48   HEMOGLOBIN 8.4* 8.9* 9.3*   HEMATOCRIT 27.8* 29.3* 31.4*   PLATELETS 124* 153 111*   NEUTROS ABS 4.36 4.65  --    IMMATURE GRANS (ABS) 0.10* 0.12*  --    LYMPHS ABS 0.64* 0.67*  --    MONOS ABS 0.90 1.18*  --    EOS ABS 0.45* 0.47*  --    .0* 99.0* 102.3*         Lab 23  0655 23  0326 23  0642 23  0447   SODIUM 134* 131* 127* 134*   POTASSIUM 4.7 4.5 5.1 4.5   CHLORIDE 98 95* 93* 100   CO2 25.0 21.0*  20.0* 21.0*   ANION GAP 11.0 15.0 14.0 13.0   BUN 27* 36* 41* 19   CREATININE 5.46* 6.66* 7.22* 4.41*   EGFR 7.6* 6.0* 5.5* 9.9*   GLUCOSE 143* 111* 128* 125*   CALCIUM 8.4* 8.6 8.6 8.6   PHOSPHORUS  --   --   --  2.8         Lab 03/22/23  0326 03/18/23  0447   TOTAL PROTEIN 7.6  --    ALBUMIN 3.0* 2.7*   GLOBULIN 4.6  --    ALT (SGPT) 6  --    AST (SGOT) 17  --    BILIRUBIN 0.6  --    ALK PHOS 127*  --          Lab 03/22/23  0743 03/22/23  0326   HSTROP T 158* 156*                 Brief Urine Lab Results     None          Microbiology Results Abnormal     None          SLP FEES - Fiberoptic Endo Eval Swallow    Result Date: 3/23/2023  This procedure was auto-finalized with no dictation required.      Results for orders placed during the hospital encounter of 03/10/23    Adult Transthoracic Echo Complete W/ Cont if Necessary Per Protocol    Interpretation Summary  •  Left ventricular systolic function is normal. Estimated left ventricular EF = 55%  •  Left ventricular wall thickness is consistent with moderate concentric hypertrophy.  •  The right ventricular cavity is mildly dilated.  •  Mild to moderate biatrial enlargement.  •  Moderate to severe aortic valve stenosis is present.  Mean gradient 34 mmHg, DOUG 0.9 cm².  •  Mild aortic insufficiency.  •  Mild mitral regurgitation.  •  Mild to moderate tricuspid regurgitation with RVSP 48 mmHg.      Current medications:  Scheduled Meds:amiodarone, 200 mg, Oral, Daily  apixaban, 2.5 mg, Oral, Q12H  aspirin, 81 mg, Oral, Daily  atorvastatin, 80 mg, Oral, Nightly  carvedilol, 3.125 mg, Oral, Q12H  cloNIDine, 0.4 mg, Oral, Q12H  dorzolamide-timolol, 1 drop, Both Eyes, Daily  epoetin orquidea/orquidea-epbx, 20,000 Units, Subcutaneous, Once per day on Mon Wed Fri  insulin lispro, 0-9 Units, Subcutaneous, TID With Meals  isosorbide mononitrate, 120 mg, Oral, Daily  melatonin, 5 mg, Oral, Nightly  QUEtiapine, 25 mg, Oral, Nightly  terazosin, 1 mg, Oral, Nightly      Continuous  Infusions:   PRN Meds:.•  albumin human  •  benzocaine-menthol  •  dextrose  •  dextrose  •  glucagon (human recombinant)  •  haloperidol lactate  •  HYDROcodone-acetaminophen  •  hydrocortisone  •  hydrOXYzine  •  ondansetron **OR** ondansetron  •  sodium chloride  •  sodium chloride  •  sodium chloride    Assessment & Plan   Assessment & Plan     Active Hospital Problems    Diagnosis  POA   • **Hematochezia [K92.1]  Yes   • Symptomatic anemia [D64.9]  Yes   • Severe malnutrition (HCC) [E43]  Yes   • PAF (paroxysmal atrial fibrillation) (HCC) [I48.0]  Yes   • CHF (congestive heart failure) (HCC) [I50.9]  Yes   • End stage renal disease on dialysis (HCC) [N18.6, Z99.2]  Not Applicable   • Hyperlipidemia LDL goal <70 [E78.5]  Yes   • Essential hypertension [I10]  Yes   • Type 2 diabetes mellitus (HCC) [E11.9]  Yes      Resolved Hospital Problems   No resolved problems to display.        Brief Hospital Course to date:  Esperanza Pop is a 78 y.o. female  with PMH of ESRD on HD MWF, IDDM, HTN, HLD, HFpEF, breast cancer (s/p mastectomy and radiation), PAF (on xarelto), chronic anemia, and rectal cancer (recently diagnosed currently gettintg XRT and chemo) that presented to the ED after a fall at home. Found to have acute on chronic anemia, s/p 1 unit pRBCs on 3/11.   Patient wishing to pursue continued chemo/radiation but very very weak with SNF recs. Unable to complete radiation/chemo in SNF, but very weak with many treatments needed for home. Complicated picture, planning to hold radiation/chemo and transition to SNF and if she improves over the next few weeks, she can resume treatment; otherwise, family may need to consider hospice care.     Assessment and plan:  Acute on Chronic Anemia 2/2 rectal bleeding 2/2 known rectal cx - stabilized  Profound Debility, SNF recs  · Status post she 1 unit PRBCs 3/11 with appropriate rise in H&H, stabilized  · She follows with Dr. Cavazos, Rad/Onc, Dr. Carpenter with oncology  · In  regards to her rectal cancer, could not tolerate chemotherapy or radiation therapy because of severe debility.  Plan for now is to hold both therapies (chemoradiation) and transition to SNF and if she improves over the next few weeks, she can resume treatment otherwise, family might need to consider hospice care  · Case management consulted for SNF placement    Acute metabolic encephalopathy - resolved  · She completed course of abx for possible UTI, CT head without acute disease  · Continue seroquel qhs + melatonin with available PRNs    Chest pain  · Had an episode of chest pain early morning 3/22/2023.  Troponin was mildly elevated but flat  · Denies chest pain today.  Unfortunately because of her age, severe debility and advanced rectal cancer, not candidate for ischemic work-up.  Medical management for now  · Already on aspirin and Eliquis and Coreg  · We will continue to monitor    Buttock wound   · Wound care/pain control    ESRD on HD MWF  · Renal following for HD     HFpEF   Essential hypertension  · Continue dialysis for volume control as above  · Continue clonidine, Coreg and Imdur    PAF on eliquis   · Continue eliquis + amiodarone, H&H stablized as above    Mod-Severe aortic stenosis   · Has appt with Geena Estrella, APRN 4/27/23, can follow up    Nightmares (started after the recent loss of her )  - started minipress, appears to be helping.       Expected Discharge Location and Transportation: TBD, see above  Expected Discharge   03/27/2023     DVT prophylaxis:  Medical and mechanical DVT prophylaxis orders are present.     AM-PAC 6 Clicks Score (PT): 10 (03/24/23 1215)    CODE STATUS:   Code Status and Medical Interventions:   Ordered at: 03/10/23 8347     Medical Intervention Limits:    NO intubation (DNI)     Code Status (Patient has no pulse and is not breathing):    No CPR (Do Not Attempt to Resuscitate)     Medical Interventions (Patient has pulse or is breathing):    Limited Support        Izzy Guaman, APRN  03/24/23    Copied text in this note has been reviewed and is accurate as of 03/24/23.

## 2023-03-24 NOTE — CASE MANAGEMENT/SOCIAL WORK
Continued Stay Note  [facility]     Patient Name: Esperanza Pop  MRN: [id number]  Today's Date: 3/24/2023    Admit Date: 3/10/2023    Plan: SNF referrals made today   Discharge Plan     Row Name 03/24/23 1546       Plan    Plan SNF referrals made today    Plan Comments SW made SNF referrals to all of [facility] today.  SW was notified that referrals to [facility] and [facility] were declined as pt's insurance is out of network.  SW spoke with pt's daughter about consideration outside of [facility], specifically [facility] as they can do hemodialysis in house.  Daughter, wanted pt to make that decision.  SW met with pt and her sister, Indigo for approx 45 minutes this afternoon.  Pt is aware of the differences in [facility] versus the facilites in [facility] related to hemodialysis.  Pt wishes to think about this.  No bed offers at this time, only declinations.  [facility] will meet with pt again on Monday and follow-up with facilites regarding potiential bed offers.    Final Discharge Disposition Code 03 - skilled nursing facility (SNF)               Discharge Codes    No documentation.               Expected Discharge Date and Time     Expected Discharge Date Expected Discharge Time    Mar 28, 2023             SELIN Carrillo

## 2023-03-24 NOTE — DISCHARGE PLACEMENT REQUEST
"Rosa Ayoub (78 y.o. Female)     Please consider for Skilled Rehab services.  Pt also requires hemodialysis on MWF.    [facility] Contact:  Binta Domingo SELIN @ [phone]          Date of Birth   1945    Social Security Number       Address   [address] [place] [address]    Home Phone   [phone]    MRN   [id number]       Congregation   [employer]    Marital Status                               Admission Date   3/10/23    Admission Type   Emergency    Admitting Provider   Kenny Nascimento MD    Attending Provider   Kenny Nascimento MD    Department, Room/Bed   [address], [address]/1       Discharge Date       Discharge Disposition       Discharge Destination                               Attending Provider: Kenny Nascimento MD    Allergies: Mircera [Methoxy Polyethylene Glycol-epoetin Beta], Venofer [Iron Sucrose], Albuterol, Nifedipine Er, Penicillins, Prednisone    Isolation: None   Infection: None   Code Status: No CPR    Ht: 167.6 cm (65.98\")   Wt: 79.1 kg (174 lb 6.1 oz)    Admission Cmt: None   Principal Problem: Hematochezia [K92.1]                 Active Insurance as of 3/10/2023     Primary Coverage     Payor Plan Insurance Group Employer/Plan Group    [facility]      Payor Plan Address Payor Plan Phone Number Payor Plan Fax Number Effective Dates    [address] [address] [phone]  12/1/2021 - None Entered    [facility] [id number]       Subscriber Name Subscriber Birth Date Member ID       ROSA AYOUB 1945 [id number]                 Emergency Contacts      (Rel.) Home Phone Work Phone Mobile Phone    felicity ayoubhitom (Daughter) -- -- [phone]    anitradanelle (Daughter) -- -- [phone]    danelle martin (Sister) -- -- [phone]               History & Physical      Connie Whitfield DO at 03/10/23 1451              [facility] Medicine " "Services  HISTORY AND PHYSICAL    Patient Name: Esperanza Pop  : 1945  MRN: [id number]  Primary Care Physician: Meghana Rodgers MD  Date of admission: 3/10/2023      Subjective   Subjective     Chief Complaint:  Generalized Weakness     HPI:  Esperanza Pop is a 78 y.o. female with PMH of ESRD on HD MWF, IDDM, HTN, HLD, HFpEF, Pulm HTN, Hx of breast cancer (s/p mastectomy and radiation) PAF (on xarelto), Chronic anemia, and rectal cancer (recently diagnosed currently gettintg XRT and chemo) that presented to the ED after a fall at home. Her 's  was today and she was getting ready to go when her legs \"gave out\". She has been having rectal bleeding and loose stools but reports that both of those are better the past few days. She has held her AC for the past four days. She also reports improvement in her rectal pain and attributes that to her radiation treatment. She denies any LOC or hitting her head. She reports that her lower extremities have gotten weaker. She also reports about a one month history of lower extremity edema. She denies any chest pain or shortness of breath.   In the ED labs are consistent with ESRD, and Hgb is at 7.8. CXR shows cardiomegaly and pulmonary edema.   She will be admitted to the hospitalist service for further treatment and evaluation.       Review of Systems   Gen- No fevers, chills  CV- No chest pain, palpitations  Resp- No cough, dyspnea  GI- No N/V/D, abd pain        Personal History     Past Medical History:   Diagnosis Date   • Acute bronchitis    • Acute conjunctivitis    • Acute kidney injury (HCC)     Admission from 2013 to 2014, now resolved with latest creatinine 1.4 on 2014.   • Acute non-ST segment elevation myocardial infarction (STEMI) following previous myocardial infarction    • Anal cancer (HCC) 2023   • Anal cancer (HCC) 2023   • Arthritis    • Breast cancer, female, right      mastectomy right   • Cancer (HCC)    • " CHF (congestive heart failure) (HCC)    • Chronic kidney disease     dialysis MWF, f/u [facility]    • Clotting disorder (HCC)    • COVID-19    • Diabetes mellitus (HCC)     diagnosed ~1992, checks fsbg 2-3x/day   • Diarrhea    • Dyslipidemia    • Dyspepsia    • Dyspnea    • Edema    • History of transfusion     ~2013 no reaction    • Hx of radiation therapy    • Hyperlipidemia    • Hypertension     Severe   • Ischemic heart disease    • Moderate obesity     BMI 36.2   • Myocardial infarction (HCC)    • Pneumonia    • Pneumonia 02/2019   • Radiation 2005   • Seasonal allergies    • Wears glasses          Oncology Problem List:  Malignant neoplasm of anal canal (HCC) (02/08/2023; Status: Active)  Breast cancer, female, right (05/20/2016; Status: Active)    Oncology/Hematology History   Breast cancer, female, right   5/20/2016 Initial Diagnosis    Breast cancer, female, right     Malignant neoplasm of anal canal (HCC)   2/8/2023 Initial Diagnosis    Anal cancer (HCC)     2/15/2023 -  Chemotherapy    OP ANAL  Capecitabine 825 mg/m2 M-F + XRT         Past Surgical History:   Procedure Laterality Date   • AV FISTULA PLACEMENT, BRACHIOBASILIC     • BREAST BIOPSY Left 2010   • BREAST CYST EXCISION     • BREAST LUMPECTOMY Right 2005   • BREAST SURGERY     • CARDIAC CATHETERIZATION N/A 10/10/2016    Procedure: Left Heart Cath;  Surgeon: Scooter Zhao MD;  Location:  [facility] CATH INVASIVE LOCATION;  Service:    • CARDIAC CATHETERIZATION  10/2016   • CARDIAC CATHETERIZATION N/A 1/21/2021    Procedure: Right and Left Heart Cath;  Surgeon: Hugh Tidwell MD;  Location:  [facility] CATH INVASIVE LOCATION;  Service: Cardiology;  Laterality: N/A;   • COLONOSCOPY N/A 7/23/2020    Procedure: COLONOSCOPY;  Surgeon: Rubén Rosas MD;  Location:  [facility] ENDOSCOPY;  Service: Gastroenterology;  Laterality: N/A;   • CORONARY STENT PLACEMENT  2016   • HERNIA REPAIR     • HYSTERECTOMY  2000   • INSERTION HEMODIALYSIS  CATHETER Right 6/29/2016    Procedure: HEMODIALYSIS CATHETER INSERTION TUNNELLED;  Surgeon: Ramón Chavez MD;  Location[facility];  Service:    • MASTECTOMY Right 2006   • OOPHORECTOMY     • REDUCTION MAMMAPLASTY Left 2005       Family History: family history includes Breast cancer (age of onset: 55) in her sister; Cancer in her mother; Colon cancer in her maternal grandmother; Coronary artery disease in her father; Heart failure in her father; Pancreatic cancer in her mother; Stroke in her mother; Throat cancer in her daughter.     Social History:  reports that she has never smoked. She has never used smokeless tobacco. She reports current alcohol use. She reports that she does not use drugs.  Social History     Social History Narrative    Pt consumes 1 serving of caffeine once every 2 weeks.     Lost grandson in Feb. 2022       Medications:  Available home medication information reviewed.  Medications Prior to Admission   Medication Sig Dispense Refill Last Dose   • acetaminophen (TYLENOL) 500 MG tablet Take 500 mg by mouth.      • amiodarone (PACERONE) 200 MG tablet Take 1 tablet by mouth Daily. 90 tablet 1    • ammonium lactate (LAC-HYDRIN) 12 % lotion Apply  topically to the appropriate area as directed As Needed for Dry Skin. (Patient taking differently: Apply 1 application topically to the appropriate area as directed 2 (Two) Times a Day As Needed for Dry Skin.) 500 g 3    • apixaban (ELIQUIS) 2.5 MG tablet tablet Take 1 tablet by mouth 2 (Two) Times a Day. 180 tablet 3    • aspirin 81 MG EC tablet Take 1 tablet by mouth Daily.      • atorvastatin (LIPITOR) 80 MG tablet Take 1 tablet by mouth Every Night. 90 tablet 3    • B Complex-C-Folic Acid (DIALYVITE 800 PO) Take 1 tablet by mouth 3 (Three) Times a Week. On dialysis says MWF      • bisacodyl (DULCOLAX) 5 MG EC tablet Take 5 mg by mouth Daily As Needed for Constipation.      • capecitabine (XELODA) 500 MG chemo tablet Take 2 tablets by mouth 2  (Two) Times a Day on Monday-Friday with radiation. 120 tablet 0    • Capsaicin 0.1 % cream Apply 2-4 gms to feet nightly. Use gloves 60 g 3    • carvedilol (COREG) 12.5 MG tablet Take 1 tablet by mouth Every 12 (Twelve) Hours. 180 tablet 1    • cloNIDine (CATAPRES) 0.2 MG tablet Take 2 tablets by mouth Every 12 (Twelve) Hours. 120 tablet 5    • dorzolamide-timolol (COSOPT) 22.3-6.8 MG/ML ophthalmic solution Administer 1 drop to both eyes Daily. 10 mL 3    • Durezol 0.05 % ophthalmic emulsion INSTILL 1 DROP 4 TIMES DAILY INTO SURGICAL EYE STARTING AFTER SURGERY.      • Epoetin Rusty (EPOGEN IJ) Epoetin Rusty (Epogen)      • fluocinonide (LIDEX) 0.05 % external solution Apply 0.05 application topically to the appropriate area as directed 2 (Two) Times a Day. Scalp  2    • glucose blood test strip Use as instructed to monitor blood glucose 1-2 times daily 100 each 12    • glucose monitor monitoring kit Use as directed to monitor blood glucose 1-2 times daily 1 each 0    • Hydrocortisone, Perianal, (ANUSOL-HC) 2.5 % rectal cream Insert  into the rectum 2 (Two) Times a Day. 28 g 0    • HYDROmorphone (DILAUDID) 2 MG tablet Take 1 tablet by mouth Every 6 (Six) Hours As Needed for Moderate Pain. 60 tablet 0    • IRON DEXTRAN IJ Inject  as directed 3 (Three) Times a Week.      • isosorbide mononitrate (IMDUR) 120 MG 24 hr tablet Take 1 tablet by mouth Daily. 90 tablet 3    • Lancets Ultra Fine misc Use as directed to check blood sugar 1-2 times daily 100 each 11    • Lidocaine 5 % cream Apply 1 g topically 2 (Two) Times a Day As Needed (pain). 30 g 0    • lidocaine-prilocaine (EMLA) 2.5-2.5 % cream Apply 1 application topically to the appropriate area as directed As Needed (dialysis access).  6    • megestrol (MEGACE) 40 MG tablet Take 40 mg by mouth Daily.      • metroNIDAZOLE (Flagyl) 500 MG tablet Take 1 tablet by mouth 3 (Three) Times a Day. 30 tablet 0    • Morphine (MS CONTIN) 15 MG 12 hr tablet Take 1 tablet by mouth 2  (Two) Times a Day. 60 tablet 0    • nitroglycerin (Nitrolingual) 0.4 MG/SPRAY spray Place 1 spray under the tongue Every 5 (Five) Minutes As Needed for Chest Pain. 1 each 12    • nortriptyline (PAMELOR) 10 MG capsule Take 1 capsule by mouth Every Night. 30 capsule 3    • NovoLIN 70/30 (70-30) 100 UNIT/ML injection INJECT 20 UNITS SUBCUTANEOUSLY BEFORE SUPPER AND 17 UNITS BEFORE BREAKFAST 10 mL 0    • ondansetron (ZOFRAN) 8 MG tablet Take 1 tablet by mouth 3 (Three) Times a Day As Needed for Nausea or Vomiting. 30 tablet 5    • terazosin (HYTRIN) 2 MG capsule Take 2 capsules by mouth Every Night. 180 capsule 3    • torsemide (Demadex) 20 MG tablet 1 PO QAM and 2 PO QPM 90 tablet 5    • VITAMIN D PO Take 1 mcg by mouth.          Allergies   Allergen Reactions   • Mircera [Methoxy Polyethylene Glycol-Epoetin Beta] Anaphylaxis     Pt stopped breathing   • Venofer [Iron Sucrose] Anaphylaxis     Pt stopped breathing   • Albuterol Other (See Comments)     Increased blood pressure  Used right before heart attack   • Nifedipine Er Swelling   • Penicillins Hives   • Prednisone Other (See Comments)     Makes jittery/anxious       Objective   Objective     Vital Signs:   Temp:  [97.8 °F (36.6 °C)-98.4 °F (36.9 °C)] 97.8 °F (36.6 °C)  Heart Rate:  [67] 67  Resp:  [14-18] 14  BP: (193)/(77) 193/77       Physical Exam   Constitutional: Awake, alert, chronically ill sitting up in bed   Eyes: PERRLA, sclerae anicteric, no conjunctival injection  HENT: NCAT, mucous membranes moist  Neck: Supple,  Respiratory: Clear to auscultation bilaterally, nonlabored respirations   Cardiovascular: RRR, + murmurs, rubs, or gallops, vascular access in right upper extremity   Gastrointestinal: Positive bowel sounds, soft, nontender, nondistended  Musculoskeletal: + bilateral ankle edema,   Psychiatric: Appropriate affect, cooperative  Neurologic: Oriented x 3, bilateral lower extremity edema, speech clear  Skin: No rashes      Result Review:  I have  personally reviewed the results from the time of this admission to 3/10/2023 15:23 EST and agree with these findings:  [x]  Laboratory list / accordion  []  Microbiology  []  Radiology  []  EKG/Telemetry   []  Cardiology/Vascular   []  Pathology  [x]  Old records  []  Other:  Most notable findings include:       LAB RESULTS:      Lab 03/10/23  1145   WBC 5.04   HEMOGLOBIN 7.8*   HEMATOCRIT 26.8*   PLATELETS 143   NEUTROS ABS 3.48   IMMATURE GRANS (ABS) 0.07*   LYMPHS ABS 0.78   MONOS ABS 0.68   EOS ABS 0.01   .1*         Lab 03/10/23  1145   SODIUM 136   POTASSIUM 4.0   CHLORIDE 90*   CO2 24.0   ANION GAP 22.0*   BUN 38*   CREATININE 7.43*   EGFR 5.3*   GLUCOSE 196*   CALCIUM 9.2   MAGNESIUM 2.2         Lab 03/10/23  1145   TOTAL PROTEIN 7.7   ALBUMIN 3.6   GLOBULIN 4.1   ALT (SGPT) 5   AST (SGOT) 17   BILIRUBIN 0.7   ALK PHOS 86         Lab 03/10/23  1145   HSTROP T 172*             Lab 03/10/23  1418   ABO TYPING B   RH TYPING Positive   ANTIBODY SCREEN Negative             Microbiology Results (last 10 days)     ** No results found for the last 240 hours. **          XR Chest 1 View    Result Date: 3/10/2023  XR CHEST 1 VW Date of Exam: 3/10/2023 12:05 PM EST Indication: Weak/Dizzy/AMS triage protocol. Comparison: CT chest with contrast 2/1/2023, chest radiograph 7/21/2021 Findings: Cardiomegaly. Lungs normally expanded. Mild groundglass opacity and septal thickening lower lobe predominant. No pneumothorax or pleural effusion.     Impression: Impression: Questionable pulmonary edema. Cardiomegaly. Electronically Signed: Shannan Erickson  3/10/2023 12:20 PM EST  Workstation ID: [id number]      Results for orders placed during the hospital encounter of 01/19/21    Adult Transthoracic Echo Complete W/ Cont if Necessary Per Protocol    Interpretation Summary  · Estimated left ventricular EF = 60% Left ventricular ejection fraction appears to be 56 - 60%. Left ventricular systolic function is normal.  · Left  ventricular diastolic function is consistent with (grade II w/high LAP) pseudonormalization.  · Left atrial volume is severely increased.  · Moderate aortic valve stenosis is present.  · Estimated right ventricular systolic pressure from tricuspid regurgitation is moderately elevated (45-55 mmHg). Calculated right ventricular systolic pressure from tricuspid regurgitation is 51 mmHg.  · Moderate pulmonary hypertension is present.  · The right atrial cavity is mild to moderately dilated.  · Mild mitral valve stenosis is present with functional MAC.  · Moderate tricuspid valve regurgitation is present.  · Normal right ventricular wall thickness and septal motion noted with the right ventricular cavity mildly dilated; mildly reduced right ventricular systolic function noted.  · There is no evidence of pericardial effusion.      Assessment & Plan   Assessment & Plan     Active Hospital Problems    Diagnosis  POA   • **Hematochezia [K92.1]  Yes   • PAF (paroxysmal atrial fibrillation) ([facility]) [I48.0]  Yes   • CHF (congestive heart failure) ([facility]) [I50.9]  Yes   • End stage renal disease on dialysis ([facility]) [N18.6, Z99.2]  Not Applicable   • Hyperlipidemia LDL goal <70 [E78.5]  Yes   • Essential hypertension [I10]  Yes   • Type 2 diabetes mellitus ([facility]) [E11.9]  Yes       Generalized Weakness  Acute on Chronic Anemia  -hgb is at 7.8, appears to be baseline  -serial H&H   -hold off on transfusion now, should probably be done at dialysis   -rectal bleeding is apparently improving  -this is probably multi factorial, give she was going to her 's     Rectal Cancer  Hx breast cancer   -sees Dr Cavazos with rad onc  -sees Dr Rojo with oncology, currently on Xeloda  -consult in am for both   -continued home pain regimen     ESRD on HD  -nephrology consulted     HFpEF  Pulm HTN  PAF  HTN  HLD   -holding eliquis  -ASA  -amiodarone   -last echo EF 60% with grade II diastolic dysfunction pulm htn     Elevated Troponin  -no  chest pain  -will trend  -could consider repeat ECHO as last one is from 2021           DVT prophylaxis:  Mechanical       CODE STATUS:    Code Status and Medical Interventions:   Ordered at: 03/10/23 1448     Medical Intervention Limits:    NO intubation (DNI)     Code Status (Patient has no pulse and is not breathing):    No CPR (Do Not Attempt to Resuscitate)     Medical Interventions (Patient has pulse or is breathing):    Limited Support       Expected Discharge   Expected Discharge Date and Time     Expected Discharge Date Expected Discharge Time    Mar 12, 2023            Connie Whitfield DO  03/10/23      Electronically signed by Connie Whitfield DO at 03/10/23 1523         Current Facility-Administered Medications   Medication Dose Route Frequency Provider Last Rate Last Admin   • albumin human 25 % IV SOLN 12.5 g  12.5 g Intravenous PRN Darryn Burk MD       • amiodarone (PACERONE) tablet 200 mg  200 mg Oral Daily Connie Whitfield DO   200 mg at 03/23/23 0946   • apixaban (ELIQUIS) tablet 2.5 mg  2.5 mg Oral Q12H Viv Kapadia DO   2.5 mg at 03/23/23 2122   • aspirin EC tablet 81 mg  81 mg Oral Daily Connie Whitfield DO   81 mg at 03/23/23 0946   • atorvastatin (LIPITOR) tablet 80 mg  80 mg Oral Nightly Connie Whitfield DO   80 mg at 03/23/23 2122   • benzocaine-menthol (DERMOPLAST) 20-0.5 % topical spray   Topical TID PRN Connie Whitfield DO   Given at 03/14/23 0905   • carvedilol (COREG) tablet 3.125 mg  3.125 mg Oral Q12H Izzy Guaman APRN       • cloNIDine (CATAPRES) tablet 0.4 mg  0.4 mg Oral Q12H Connie Whitfield DO   0.4 mg at 03/23/23 2122   • dextrose (D50W) (25 g/50 mL) IV injection 25 g  25 g Intravenous Q15 Min PRN Connie Whitfield DO       • dextrose (GLUTOSE) oral gel 15 g  15 g Oral Q15 Min PRN Connie Whitfield,        • dorzolamide-timolol (COSOPT) ophthalmic solution 1 drop   1 drop Both Eyes Daily Kenny Nascimento MD       • epoetin orquidea-epbx (RETACRIT) injection 20,000 Units  20,000 Units Subcutaneous Once per day on Mon Wed Fri Tony Carpenter MD   20,000 Units at 03/24/23 1024   • glucagon (GLUCAGEN) injection 1 mg  1 mg Intramuscular Q15 Min PRN Connie Whitfield DO       • haloperidol lactate (HALDOL) injection 0.5 mg  0.5 mg Intravenous Q6H PRN Viv Kapadia DO   0.5 mg at 03/23/23 0200   • HYDROcodone-acetaminophen (NORCO)  MG per tablet 1 tablet  1 tablet Oral Q6H PRN Izzy Guaman APRN   1 tablet at 03/24/23 0437   • hydrocortisone 1 % cream 1 application  1 application Topical BID PRN Champ Ríos MD   1 application at 03/21/23 2141   • hydrOXYzine (ATARAX) tablet 25 mg  25 mg Oral TID PRN Viv Kapadia DO   25 mg at 03/23/23 2122   • Insulin Lispro (humaLOG) injection 0-9 Units  0-9 Units Subcutaneous TID With Meals Connie Whitfield DO   2 Units at 03/19/23 1300   • isosorbide mononitrate (IMDUR) 24 hr tablet 120 mg  120 mg Oral Daily Connie Whitfield DO   120 mg at 03/22/23 1222   • melatonin tablet 5 mg  5 mg Oral Nightly Sultana, April ELINOR, APRN   5 mg at 03/23/23 2122   • ondansetron (ZOFRAN) tablet 4 mg  4 mg Oral Q6H PRN Connie Whitfield DO        Or   • ondansetron (ZOFRAN) injection 4 mg  4 mg Intravenous Q6H PRN Connie Whitfield DO       • QUEtiapine (SEROquel) tablet 25 mg  25 mg Oral Nightly Sultana, April ELINOR, APRN   25 mg at 03/23/23 2122   • sodium chloride 0.9 % flush 10 mL  10 mL Intravenous PRN Darrell Reyes MD       • sodium chloride 0.9 % flush 10 mL  10 mL Intravenous PRN Connie Whitfield,        • sodium chloride 0.9 % infusion 40 mL  40 mL Intravenous PRN Connie Whitfield,        • terazosin (HYTRIN) capsule 1 mg  1 mg Oral Nightly Izzy Guaman APRN   1 mg at 03/23/23 2122        Physician Progress Notes (most recent note)     "  Darryn Burk MD at 03/24/23 1016             LOS: 3 days    Patient Care Team:  Meghana Rodgers MD as PCP - General  Demetrius Sagastume MD as Consulting Physician (Cardiology)  Kevan Lerma MD as Consulting Physician (Nephrology)  Geena Estrella APRN as Nurse Practitioner (Cardiology)  Frank Mandujano MD as Referring Physician (Colon and Rectal Surgery)  Kevan Cavazos MD as Consulting Physician (Radiation Oncology)  Yue Reeves RN as Nurse Navigator  Darryn Burk MD as Consulting Physician (Nephrology)    Chief Complaint:   ESRD on Monday Wednesday Friday, history of anal CA on chemo, admitted after missing her dialysis.    Subjective     Interval History:   No new complaints  Patient seen on dialysis    Review of Systems:   No complaints      Objective     Vital Sign Min/Max for last 24 hours  Temp  Min: 97 °F (36.1 °C)  Max: 98.7 °F (37.1 °C)   BP  Min: 105/52  Max: 153/62   Pulse  Min: 56  Max: 61   Resp  Min: 16  Max: 18   SpO2  Min: 91 %  Max: 99 %   Flow (L/min)  Min: 2  Max: 2   No data recorded     Flowsheet Rows    Flowsheet Row First Filed Value   Admission Height 167.6 cm (66\") Documented at 03/10/2023 1122   Admission Weight 76.7 kg (169 lb) Documented at 03/10/2023 1122          No intake/output data recorded.  I/O last 3 completed shifts:  In: 780 [P.O.:280; I.V.:500]  Out: 0     Physical Exam:  General Appearance: -American female comfortable in bed  Eyes: PER, EOMI.  Neck: Supple no JVD.  Lungs: Clear auscultation, no rales rhonchi's, equal chest movement, nonlabored.  Heart: Positive murmur, no, rub, RRR.  Abdomen: Soft, nontender, positive bowel sounds, no organomegaly.  Extremities: No edema, no cyanosis.  Neuro: No focal deficit, moving all extremities, alert oriented X 3  Right upper arm AV fistula  WBC WBC   Date Value Ref Range Status   03/22/2023 7.12 3.40 - 10.80 10*3/mm3 Final      HGB Hemoglobin   Date Value Ref Range Status   03/22/2023 8.9 (L) " 12.0 - 15.9 g/dL Final      HCT Hematocrit   Date Value Ref Range Status   03/22/2023 29.3 (L) 34.0 - 46.6 % Final      Platlets No results found for: LABPLAT   MCV MCV   Date Value Ref Range Status   03/22/2023 99.0 (H) 79.0 - 97.0 fL Final          Sodium Sodium   Date Value Ref Range Status   03/22/2023 131 (L) 136 - 145 mmol/L Final      Potassium Potassium   Date Value Ref Range Status   03/22/2023 4.5 3.5 - 5.2 mmol/L Final      Chloride Chloride   Date Value Ref Range Status   03/22/2023 95 (L) 98 - 107 mmol/L Final      CO2 CO2   Date Value Ref Range Status   03/22/2023 21.0 (L) 22.0 - 29.0 mmol/L Final      BUN BUN   Date Value Ref Range Status   03/22/2023 36 (H) 8 - 23 mg/dL Final      Creatinine Creatinine   Date Value Ref Range Status   03/22/2023 6.66 (H) 0.57 - 1.00 mg/dL Final      Calcium Calcium   Date Value Ref Range Status   03/22/2023 8.6 8.6 - 10.5 mg/dL Final      PO4 No results found for: CAPO4   Albumin Albumin   Date Value Ref Range Status   03/22/2023 3.0 (L) 3.5 - 5.2 g/dL Final      Magnesium No results found for: MG   Uric Acid No results found for: URICACID        Results Review:     I reviewed the patient's new clinical results.    amiodarone, 200 mg, Oral, Daily  apixaban, 2.5 mg, Oral, Q12H  aspirin, 81 mg, Oral, Daily  atorvastatin, 80 mg, Oral, Nightly  carvedilol, 3.125 mg, Oral, Q12H  cloNIDine, 0.4 mg, Oral, Q12H  dorzolamide-timolol, 1 drop, Both Eyes, Daily  epoetin orquidea/orquidea-epbx, 20,000 Units, Subcutaneous, Once per day on Mon Wed Fri  insulin lispro, 0-9 Units, Subcutaneous, TID With Meals  isosorbide mononitrate, 120 mg, Oral, Daily  melatonin, 5 mg, Oral, Nightly  QUEtiapine, 25 mg, Oral, Nightly  terazosin, 1 mg, Oral, Nightly           Medication Review: Reviewed    Assessment & Plan       Hematochezia    Type 2 diabetes mellitus (HCC)    Essential hypertension    Hyperlipidemia LDL goal <70    End stage renal disease on dialysis (HCC)    CHF (congestive heart failure)  (HCC)    PAF (paroxysmal atrial fibrillation) (HCC)    Severe malnutrition (HCC)    Symptomatic anemia    1.  ESRD on .  2.  Anemia of chronic kidney disease: Also on chemotherapy.  3.  Volume status: Dialysis ultrafiltration will be done.  4.  Access AV fistula.  5.  Hypertension  6.  Hyponatremia corrected with dialysis  Plan:  Dialysis in progress.  Monitor blood pressure and volume status.       Darryn Burk MD  23  11:50 EDT         Electronically signed by Darryn Burk MD at 23 1016          Physical Therapy Notes (most recent note)      Heydi Maier, PT Student at 23 1320  Version 1 of 1    Attestation signed by Yajaira Smith, PT at 23 1432    I attest that I was present during the treatment session and not engaged in other tasks.   Yajaira Smith, PT 3/22/2023 14:32 EDT                  Patient Name: Esperanza Pop  : 1945    MRN: [id number]                              Today's Date: 3/22/2023       Admit Date: 3/10/2023    Visit Dx:     ICD-10-CM ICD-9-CM   1. Symptomatic anemia  D64.9 285.9   2. Generalized weakness  R53.1 780.79   3. ESRD on dialysis (HCC)  N18.6 585.6    Z99.2 V45.11   4. Impaired mobility  Z74.09 799.89   5. Fall, initial encounter  W19.XXXA E888.9   6. Elevated troponin  R77.8 790.6   7. Acute on chronic diastolic heart failure (HCC)  I50.33 428.33   8. Type 2 diabetes mellitus with chronic kidney disease on chronic dialysis, with long-term current use of insulin (formerly [facility])  E11.22 250.40    N18.6 585.9    Z99.2 V45.11    Z79.4 V58.67   9. Severe malnutrition (HCC)  E43 261   10. Malignant neoplasm of anal canal (formerly [facility])  C21.1 154.2   11. PAF (paroxysmal atrial fibrillation) (formerly [facility])  I48.0 427.31   12. Coronary artery disease involving native coronary artery of native heart with angina pectoris (formerly [facility])  I25.119 414.01     413.9   13. End stage renal disease on dialysis (HCC)  N18.6 585.6    Z99.2 V45.11   14. Nonrheumatic aortic  valve stenosis  I35.0 424.1   15. NSTEMI (non-ST elevated myocardial infarction) (Former[facility])  I21.4 410.70   16. Acute cystitis without hematuria  N30.00 595.0   17. Ischemic heart disease  I25.9 414.9   18. Congestive heart failure, unspecified HF chronicity, unspecified heart failure type ([facility])  I50.9 428.0   19. Malignant neoplasm of right female breast, unspecified estrogen receptor status, unspecified site of breast ([facility])  C50.911 174.9   20. Hematochezia  K92.1 578.1   21. Acute midline low back pain without sciatica  M54.50 724.2     Patient Active Problem List   Diagnosis   • Acute on chronic diastolic heart failure ([facility])   • Type 2 diabetes mellitus ([facility])   • Essential hypertension   • Coronary artery disease involving native coronary artery of native heart with angina pectoris ([facility])   • Breast cancer, female, right   • Seasonal allergic rhinitis   • Right carotid bruit   • Vitamin B12 deficiency   • Hyperlipidemia LDL goal <70   • End stage renal disease on dialysis ([facility])   • Nonrheumatic aortic valve stenosis   • NSTEMI (non-ST elevated myocardial infarction) ([facility])   • UTI (urinary tract infection)   • Ischemic heart disease   • CHF (congestive heart failure) ([facility])   • Acute non-ST segment elevation myocardial infarction (STEMI) following previous myocardial infarction   • Dyslipidemia   • Diabetes mellitus ([facility])   • Mild obesity   • Elevated troponin   • Screen for colon cancer   • Acute midline low back pain without sciatica   • Hypertensive emergency   • PAF (paroxysmal atrial fibrillation) ([facility])   • Malignant neoplasm of anal canal ([facility])   • Hematochezia   • Severe malnutrition ([facility])   • Symptomatic anemia     Past Medical History:   Diagnosis Date   • Acute bronchitis    • Acute conjunctivitis    • Acute kidney injury ([facility])     Admission from 12/26/2013 to 01/02/2014, now resolved with latest creatinine 1.4 on 01/08/2014.   • Acute non-ST segment elevation myocardial infarction (STEMI) following previous  myocardial infarction    • Anal cancer (HCC) 2/8/2023   • Anal cancer (HCC) 2/8/2023   • Arthritis    • Breast cancer, female, right     2005 mastectomy right   • Cancer (HCC)    • CHF (congestive heart failure) (HCC)    • Chronic kidney disease     dialysis MWF, [facility]    • Clotting disorder (HCC)    • COVID-19    • Diabetes mellitus (HCC)     diagnosed ~1992, checks fsbg 2-3x/day   • Diarrhea    • Dyslipidemia    • Dyspepsia    • Dyspnea    • Edema    • History of transfusion     ~2013 no reaction    • Hx of radiation therapy    • Hyperlipidemia    • Hypertension     Severe   • Ischemic heart disease    • Moderate obesity     BMI 36.2   • Myocardial infarction (HCC)    • Pneumonia    • Pneumonia 02/2019   • Radiation 2005   • Seasonal allergies    • Wears glasses      Past Surgical History:   Procedure Laterality Date   • AV FISTULA PLACEMENT, BRACHIOBASILIC     • BREAST BIOPSY Left 2010   • BREAST CYST EXCISION     • BREAST LUMPECTOMY Right 2005   • BREAST SURGERY     • CARDIAC CATHETERIZATION N/A 10/10/2016    Procedure: Left Heart Cath;  Surgeon: Scooter Zhao MD;  Location:  [facility];  Service:    • CARDIAC CATHETERIZATION  10/2016   • CARDIAC CATHETERIZATION N/A 1/21/2021    Procedure: Right and Left Heart Cath;  Surgeon: Hugh Tidwell MD;  Location:  [facility];  Service: Cardiology;  Laterality: N/A;   • COLONOSCOPY N/A 7/23/2020    Procedure: COLONOSCOPY;  Surgeon: Rubén Rosas MD;  Location:  [facility];  Service: Gastroenterology;  Laterality: N/A;   • CORONARY STENT PLACEMENT  2016   • HERNIA REPAIR     • HYSTERECTOMY  2000   • INSERTION HEMODIALYSIS CATHETER Right 6/29/2016    Procedure: HEMODIALYSIS CATHETER INSERTION TUNNELLED;  Surgeon: Ramón Chavez MD;  Location:  [facility];  Service:    • MASTECTOMY Right 2006   • OOPHORECTOMY     • REDUCTION MAMMAPLASTY Left 2005      General Information     Row Name  03/22/23 1351          Physical Therapy Time and Intention    Document Type therapy note (daily note) (P)   -HT     Mode of Treatment physical therapy (P)   -HT     Row Name 03/22/23 1351          General Information    Patient Profile Reviewed yes (P)   -HT     Existing Precautions/Restrictions fall (P)   -HT     Barriers to Rehab medically complex;previous functional deficit;cognitive status (P)   -HT     Row Name 03/22/23 1351          Cognition    Orientation Status (Cognition) oriented to;person;disoriented to;place;time;other (see comments) (P)   pt difficult to understand d/t lethargy  -HT     Row Name 03/22/23 1351          Safety Issues, Functional Mobility    Safety Issues Affecting Function (Mobility) awareness of need for assistance;insight into deficits/self-awareness;safety precaution awareness;safety precautions follow-through/compliance;sequencing abilities (P)   -HT     Impairments Affecting Function (Mobility) balance;cognition;coordination;endurance/activity tolerance;postural/trunk control;strength (P)   -HT     Cognitive Impairments, Mobility Safety/Performance awareness, need for assistance;insight into deficits/self-awareness;problem-solving/reasoning;safety precaution awareness;safety precaution follow-through;sequencing abilities (P)   -HT           User Key  (r) = Recorded By, (t) = Taken By, (c) = Cosigned By    Initials Name Provider Type    HT Heydi Maier, PT Student PT Student               Mobility     Row Name 03/22/23 9651          Bed Mobility    Bed Mobility supine-sit;sit-supine (P)   -HT     Supine-Sit [place] (Bed Mobility) maximum assist (25% patient effort);2 person assist;verbal cues (P)   -HT     Sit-Supine [place] (Bed Mobility) maximum assist (25% patient effort);2 person assist;verbal cues;nonverbal cues (demo/gesture) (P)   -HT     Assistive Device (Bed Mobility) bed rails;draw sheet;head of bed elevated (P)   -HT     Comment, (Bed Mobility) pt did not  respond to cues for sequencing d/t lethargy. (P)   -HT     Row Name 03/22/23 1353          Sit-Stand Transfer    Sit-Stand [place] (Transfers) maximum assist (25% patient effort);2 person assist (P)   -HT     Assistive Device (Sit-Stand Transfers) other (see comments) (P)   BUE support  -HT     Comment, (Sit-Stand Transfer) STS x 2 from EOB, 1x with RW and 1x with BUE support requiring cues for hand placement and upright posture once standing. Pt stood 15 sec at most before sitting d/t back pain and lethargy. (P)   -HT     Row Name 03/22/23 1353          Gait/Stairs (Locomotion)    [place] Level (Gait) not tested (P)   -HT     Comment, (Gait/Stairs) ambulation deferred d/t poor standing tolerance. (P)   -HT           User Key  (r) = Recorded By, (t) = Taken By, (c) = Cosigned By    Initials Name Provider Type     Heydi Maier, PT Student PT Student               Obj/Interventions     Row Name 03/22/23 1424          Balance    Balance Assessment sitting static balance;sitting dynamic balance;standing static balance (P)   -HT     Static Sitting Balance contact guard (P)   -HT     Dynamic Sitting Balance minimal assist (P)   -HT     Position, Sitting Balance sitting edge of bed (P)   -HT     Static Standing Balance moderate assist;2-person assist (P)   -HT     Position/Device Used, Standing Balance other (see comments) (P)   BUE support  -HT     Balance Interventions sitting;dynamic reaching (P)   -HT     Comment, Balance Pt sat EOB performing dynamic reaching but had difficulty due to lethargy. (P)   -HT           User Key  (r) = Recorded By, (t) = Taken By, (c) = Cosigned By    Initials Name Provider Type     Heydi Maier, PT Student PT Student               Goals/Plan    No documentation.                Clinical Impression     Row Name 03/22/23 1426          Pain    Pretreatment Pain Rating 6/10 (P)   -HT     Posttreatment Pain Rating 2/10 (P)   -HT     Pain Location - back (P)   -HT      Pre/Posttreatment Pain Comment Pt c/o back pain throughout session that seemed to ease with rest. (P)   -HT     Pain Intervention(s) Ambulation/increased activity (P)   -HT     Additional Documentation Pain Scale: Numbers Pre/Post-Treatment (Group) (P)   -HT     Row Name 03/22/23 1426          Plan of Care Review    Plan of Care Reviewed With patient (P)   -HT     Progress no change (P)   -HT     Outcome Evaluation Pt performed 2x STS with max Ax2 with limitations of lethargy, back pain, generalized weakness, and decreased endurance. Rec skilled PT to increase ind with mobility and d/c to SNF. (P)   -HT     Row Name 03/22/23 1426          Therapy Assessment/Plan (PT)    Criteria for Skilled Interventions Met (PT) yes;skilled treatment is necessary;meets criteria (P)   -HT     Row Name 03/22/23 1426          Vital Signs    O2 Delivery Pre Treatment room air (P)   -HT     O2 Delivery Intra Treatment room air (P)   -HT     O2 Delivery Post Treatment room air (P)   -HT     Pre Patient Position Supine (P)   -HT     Intra Patient Position Standing (P)   -HT     Post Patient Position Supine (P)   -HT     Row Name 03/22/23 1426          Positioning and Restraints    Pre-Treatment Position in bed (P)   -HT     Post Treatment Position bed (P)   -HT     In Bed notified nsg;fowlers;call light within reach;encouraged to call for assist;exit alarm on;RUE elevated;LUE elevated (P)   -HT           User Key  (r) = Recorded By, (t) = Taken By, (c) = Cosigned By    Initials Name Provider Type    HT Heydi Maier, PT Student PT Student               Outcome Measures     Row Name 03/22/23 1429 03/22/23 1153       How much help from another person do you currently need...    Turning from your back to your side while in flat bed without using bedrails? 2 (P)   -HT 2  -KF    Moving from lying on back to sitting on the side of a flat bed without bedrails? 2 (P)   -HT 2  -KF    Moving to and from a bed to a chair (including a wheelchair)?  2 (P)   -HT 2  -KF    Standing up from a chair using your arms (e.g., wheelchair, bedside chair)? 2 (P)   -HT 2  -KF    Climbing 3-5 steps with a railing? 1 (P)   -HT 2  -KF    To walk in hospital room? 1 (P)   -HT 2  -KF    AM-PAC 6 Clicks Score (PT) 10 (P)   -HT 12  -KF    Highest level of mobility 4 --> Transferred to chair/commode (P)   -HT 4 --> Transferred to chair/commode  -KF    Row Name 03/22/23 1429          Functional Assessment    Outcome Measure Options AM-PAC 6 Clicks Basic Mobility (PT) (P)   -HT           User Key  (r) = Recorded By, (t) = Taken By, (c) = Cosigned By    Initials Name Provider Type    Allyson Banerjee RN Registered Nurse    Heydi Valdez, PT Student PT Student                             Physical Therapy Education     Title: PT OT SLP Therapies (In Progress)     Topic: Physical Therapy (In Progress)     Point: Mobility training (Done)     Learning Progress Summary           Patient Acceptance, E, VU,NR by  at 3/22/2023 1429    Eager, E, NR by MD at 3/21/2023 1808    Acceptance, E, NR by ML at 3/18/2023 1116    Acceptance, E,TB, VU,NR by HM at 3/17/2023 1534    Acceptance, E, NR by KR at 3/14/2023 1532    Acceptance, E, NR by KR at 3/13/2023 1011                   Point: Home exercise program (In Progress)     Learning Progress Summary           Patient Eager, E, NR by MD at 3/21/2023 1808    Acceptance, E, NR by KR at 3/14/2023 1532    Acceptance, E, NR by KR at 3/13/2023 1011                   Point: Body mechanics (Done)     Learning Progress Summary           Patient Acceptance, E, VU,NR by HT at 3/22/2023 1429    Eager, E, NR by MD at 3/21/2023 1808    Acceptance, E,TB, VU,NR by HM at 3/17/2023 1534    Acceptance, E, NR by KR at 3/14/2023 1532    Acceptance, E, NR by KR at 3/13/2023 1011                   Point: Precautions (Done)     Learning Progress Summary           Patient Acceptance, SHARDA AVINA,NR by DEMOND at 3/22/2023 1429    FABRICE Silvestre NR by MD at 3/21/2023 8370     Acceptance, E, NR by ML at 3/18/2023 1116    Acceptance, E,TB, VU,NR by  at 3/17/2023 1534    Acceptance, E, NR by KR at 3/14/2023 1532    Acceptance, E, NR by KR at 3/13/2023 1011                               User Key     Initials Effective Dates Name Provider Type Discipline    ML 04/22/21 -  Siria Shirley Physical Therapist PT    MD 01/25/23 -  Sirai Pascual, Nursing Student Nursing Student Nurse     09/22/22 -  Hedyi Burnett, PT Physical Therapist PT    KR 12/30/22 -  Starla Nicholas, PT Physical Therapist PT    HT 01/12/23 -  Heydi Maier, PT Student PT Student PT              PT Recommendation and Plan     Plan of Care Reviewed With: (P) patient  Progress: (P) no change  Outcome Evaluation: (P) Pt performed 2x STS with max Ax2 with limitations of lethargy, back pain, generalized weakness, and decreased endurance. Rec skilled PT to increase ind with mobility and d/c to SNF.     Time Calculation:    PT Charges     Row Name 03/22/23 1430             Time Calculation    Start Time 1320 (P)   -HT      PT Received On 03/22/23 (P)   -HT         Timed Charges    02184 - PT Therapeutic Activity Minutes 16 (P)   -HT         Total Minutes    Timed Charges Total Minutes 16 (P)   -HT       Total Minutes 16 (P)   -HT            User Key  (r) = Recorded By, (t) = Taken By, (c) = Cosigned By    Initials Name Provider Type    HT Heydi Maier, PT Student PT Student              Therapy Charges for Today     Code Description Service Date Service Provider Modifiers Qty    [id number]  PT THERAPEUTIC ACT EA 15 MIN 3/22/2023 Heydi Maier, PT Student GP 1          PT G-Codes  Outcome Measure Options: (P) AM-PAC 6 Clicks Basic Mobility (PT)  AM-PAC 6 Clicks Score (PT): (P) 10  AM-PAC 6 Clicks Score (OT): 11       Heydi Maier PT Student  3/22/2023      Electronically signed by Yajaira Smith, PT at 03/22/23 1432          Occupational Therapy Notes (most recent note)      Maria Fernanda Ortiz, OT at 03/21/23  1323          Patient Name: Esperanza Pop  : 1945    MRN: [id number]                              Today's Date: 3/21/2023       Admit Date: 3/10/2023    Visit Dx:     ICD-10-CM ICD-9-CM   1. Symptomatic anemia  D64.9 285.9   2. Generalized weakness  R53.1 780.79   3. ESRD on dialysis ([facility])  N18.6 585.6    Z99.2 V45.11   4. Impaired mobility  Z74.09 799.89   5. Fall, initial encounter  W19.XXXA E888.9   6. Elevated troponin  R77.8 790.6   7. Acute on chronic diastolic heart failure ([facility])  I50.33 428.33   8. Type 2 diabetes mellitus with chronic kidney disease on chronic dialysis, with long-term current use of insulin ([facility])  E11.22 250.40    N18.6 585.9    Z99.2 V45.11    Z79.4 V58.67   9. Severe malnutrition ([facility])  E43 261   10. Malignant neoplasm of anal canal ([facility])  C21.1 154.2   11. PAF (paroxysmal atrial fibrillation) ([facility])  I48.0 427.31   12. Coronary artery disease involving native coronary artery of native heart with angina pectoris ([facility])  I25.119 414.01     413.9   13. End stage renal disease on dialysis ([facility])  N18.6 585.6    Z99.2 V45.11   14. Nonrheumatic aortic valve stenosis  I35.0 424.1   15. NSTEMI (non-ST elevated myocardial infarction) ([facility])  I21.4 410.70   16. Acute cystitis without hematuria  N30.00 595.0   17. Ischemic heart disease  I25.9 414.9   18. Congestive heart failure, unspecified HF chronicity, unspecified heart failure type ([facility])  I50.9 428.0   19. Malignant neoplasm of right female breast, unspecified estrogen receptor status, unspecified site of breast ([facility])  C50.911 174.9   20. Hematochezia  K92.1 578.1   21. Acute midline low back pain without sciatica  M54.50 724.2     Patient Active Problem List   Diagnosis   • Acute on chronic diastolic heart failure (HCC)   • Type 2 diabetes mellitus ([facility])   • Essential hypertension   • Coronary artery disease involving native coronary artery of native heart with angina pectoris (HCC)   • Breast cancer, female, right   • Seasonal allergic  rhinitis   • Right carotid bruit   • Vitamin B12 deficiency   • Hyperlipidemia LDL goal <70   • End stage renal disease on dialysis (HCC)   • Nonrheumatic aortic valve stenosis   • NSTEMI (non-ST elevated myocardial infarction) (HCC)   • UTI (urinary tract infection)   • Ischemic heart disease   • CHF (congestive heart failure) (HCC)   • Acute non-ST segment elevation myocardial infarction (STEMI) following previous myocardial infarction   • Dyslipidemia   • Diabetes mellitus (HCC)   • Mild obesity   • Elevated troponin   • Screen for colon cancer   • Acute midline low back pain without sciatica   • Hypertensive emergency   • PAF (paroxysmal atrial fibrillation) (HCC)   • Malignant neoplasm of anal canal (HCC)   • Hematochezia   • Severe malnutrition (HCC)   • Symptomatic anemia     Past Medical History:   Diagnosis Date   • Acute bronchitis    • Acute conjunctivitis    • Acute kidney injury (HCC)     Admission from 12/26/2013 to 01/02/2014, now resolved with latest creatinine 1.4 on 01/08/2014.   • Acute non-ST segment elevation myocardial infarction (STEMI) following previous myocardial infarction    • Anal cancer (HCC) 2/8/2023   • Anal cancer (HCC) 2/8/2023   • Arthritis    • Breast cancer, female, right     2005 mastectomy right   • Cancer (HCC)    • CHF (congestive heart failure) (HCC)    • Chronic kidney disease     dialysis MW, [facility]/u [facility]    • Clotting disorder (HCC)    • COVID-19    • Diabetes mellitus (HCC)     diagnosed ~1992, checks fsbg 2-3x/day   • Diarrhea    • Dyslipidemia    • Dyspepsia    • Dyspnea    • Edema    • History of transfusion     ~2013 no reaction    • Hx of radiation therapy    • Hyperlipidemia    • Hypertension     Severe   • Ischemic heart disease    • Moderate obesity     BMI 36.2   • Myocardial infarction (HCC)    • Pneumonia    • Pneumonia 02/2019   • Radiation 2005   • Seasonal allergies    • Wears glasses      Past Surgical History:   Procedure Laterality Date   •  AV FISTULA PLACEMENT, BRACHIOBASILIC     • BREAST BIOPSY Left 2010   • BREAST CYST EXCISION     • BREAST LUMPECTOMY Right 2005   • BREAST SURGERY     • CARDIAC CATHETERIZATION N/A 10/10/2016    Procedure: Left Heart Cath;  Surgeon: Scooter Zhao MD;  Location:  [facility];  Service:    • CARDIAC CATHETERIZATION  10/2016   • CARDIAC CATHETERIZATION N/A 1/21/2021    Procedure: Right and Left Heart Cath;  Surgeon: Hugh Tidwell MD;  Location:  [facility];  Service: Cardiology;  Laterality: N/A;   • COLONOSCOPY N/A 7/23/2020    Procedure: COLONOSCOPY;  Surgeon: Rubén Rosas MD;  Location: [facility] ENDOSCOPY;  Service: Gastroenterology;  Laterality: N/A;   • CORONARY STENT PLACEMENT  2016   • HERNIA REPAIR     • HYSTERECTOMY  2000   • INSERTION HEMODIALYSIS CATHETER Right 6/29/2016    Procedure: HEMODIALYSIS CATHETER INSERTION TUNNELLED;  Surgeon: Ramón Chavez MD;  Location: [facility] OR;  Service:    • MASTECTOMY Right 2006   • OOPHORECTOMY     • REDUCTION MAMMAPLASTY Left 2005      General Information     Row Name 03/21/23 1519          OT Time and Intention    Document Type therapy note (daily note)  -     Mode of Treatment individual therapy;occupational therapy  -     Row Name 03/21/23 1519          General Information    Patient Profile Reviewed yes  -JOESPH     Existing Precautions/Restrictions fall  watch for orthostatic hypotension, buttock wounds with anal cancer  -     Barriers to Rehab medically complex;previous functional deficit;cognitive status  -     Row Name 03/21/23 1519          Cognition    Orientation Status (Cognition) oriented to;person;place  -     Row Name 03/21/23 1519          Safety Issues, Functional Mobility    Safety Issues Affecting Function (Mobility) ability to follow commands;insight into deficits/self-awareness;judgment;problem-solving;safety precaution awareness;safety precautions follow-through/compliance;sequencing  abilities  -JOESPH     Impairments Affecting Function (Mobility) balance;cognition;coordination;endurance/activity tolerance;postural/trunk control;strength  -JOESPH           User Key  (r) = Recorded By, (t) = Taken By, (c) = Cosigned By    Initials Name Provider Type    Maria Fernanda Keen, OT Occupational Therapist                 Mobility/ADL's     Row Name 03/21/23 1521          Bed Mobility    Bed Mobility scooting/bridging  -JOESPH     Scooting/Bridging Hand (Bed Mobility) dependent (less than 25% patient effort);2 person assist  to HOB  -JOESPH     Supine-Sit Hand (Bed Mobility) moderate assist (50% patient effort);verbal cues;nonverbal cues (demo/gesture)  -JOESPH     Sit-Supine Hand (Bed Mobility) maximum assist (25% patient effort);2 person assist;verbal cues;nonverbal cues (demo/gesture)  -JOESPH     Bed Mobility, Safety Issues cognitive deficits limit understanding;decreased use of arms for pushing/pulling;decreased use of legs for bridging/pushing;impaired trunk control for bed mobility  -JOESPH     Assistive Device (Bed Mobility) bed rails;draw sheet;head of bed elevated  -JOESPH     Comment, (Bed Mobility) pt. needed step by step cues with extra time and effort for mvmt to EOB, HOB raised to assist, Pt. fatigued at end of session needing significant assist to return supine.  -JOESPH     Row Name 03/21/23 1521          Transfers    Transfers sit-stand transfer;stand-sit transfer  -JOESPH     Row Name 03/21/23 1521          Sit-Stand Transfer    Sit-Stand Hand (Transfers) moderate assist (50% patient effort);2 person assist;verbal cues;nonverbal cues (demo/gesture)  -     Assistive Device (Sit-Stand Transfers) walker, front-wheeled  -JOESPH     Row Name 03/21/23 1521          Stand-Sit Transfer    Stand-Sit Hand (Transfers) moderate assist (50% patient effort);2 person assist;verbal cues;nonverbal cues (demo/gesture)  -     Assistive Device (Stand-Sit Transfers) walker, front-wheeled  -JOESPH     Row Name  03/21/23 1521          Activities of Daily Living    BADL Assessment/Intervention grooming;feeding  -JOESPH     Row Name 03/21/23 1521          Grooming Assessment/Training    [place] Level (Grooming) oral care regimen;wash face, hands;moderate assist (50% patient effort)  -JOESPH     Position (Grooming) edge of bed sitting  -JOESPH     Comment, (Grooming) pt. needed balance assist, assist to load utensil and initial assist to get toothbrush into mouth, but improved accuracy as attempted, periods of confusion taking brush to table as if dipping in water and then returning to brush, cues to sequence rinsing, assist with water and spit osorio  -JOESPH     Row Name 03/21/23 1521          Self-Feeding Assessment/Training    [place] Level (Feeding) maximum assist (25% patient effort);prepare tray/open items;scoop food and bring to mouth;liquids to mouth;minimum assist (75% patient effort)  -JOESPH     Position (Self-Feeding) sitting up in bed  -JOESPH     Comment, (Feeding) pt. needed assist to setup tray and position sitting, but partially to side with wedge for comfort, some initial assist to load untensil and bring to mouth and then progressed to perfoming on own.  -JOESPH           User Key  (r) = Recorded By, (t) = Taken By, (c) = Cosigned By    Initials Name Provider Type    Maria Fernanda Keen, OT Occupational Therapist               Obj/Interventions     Row Name 03/21/23 1525          Motor Skills    Therapeutic Exercise --  pt. fatigued and working on late lunch, issued UE TE program and reviewed with dtr. so could assist pt. throughout day, educated on importance of nutrition to build strength and endurance and heal  -     Row Name 03/21/23 1525          Balance    Balance Assessment sitting static balance;sitting dynamic balance;standing static balance  -JOESPH     Static Sitting Balance moderate assist  Pt. sat from SBA up to mod A, pt. with periods of leaning to right.  -JOESPH     Dynamic Sitting Balance moderate assist  up to SBA   -JOESPH     Position, Sitting Balance unsupported;supported;sitting edge of bed  intermittent use of mattress and bed railing to balance  -JOESPH     Static Standing Balance moderate assist;2-person assist  -JOESPH     Position/Device Used, Standing Balance supported;walker, rolling  -JOESPH     Balance Interventions sit to stand;occupation based/functional task  -JOESPH     Comment, Balance Pt. sat at EOB grossly 20 minutes.  Varying balance with intermittent R lean.  Pt. was unable to weight shift or take side steps at EOB.  Cues for midline and upright posture.  -JOESPH           User Key  (r) = Recorded By, (t) = Taken By, (c) = Cosigned By    Initials Name Provider Type    Maria Fernanda Keen, OT Occupational Therapist               Goals/Plan     Row Name 03/21/23 1537          Transfer Goal 1 (OT)    Progress/Outcome (Transfer Goal 1, OT) continuing progress toward goal  -JOESPH     Row Name 03/21/23 1537          Toileting Goal 1 (OT)    Progress/Outcome (Toileting Goal 1, OT) goal ongoing  -JOESPH     Row Name 03/21/23 1537          Grooming Goal 1 (OT)    Progress/Outcome (Grooming Goal 1, OT) goal ongoing  -JOESPH           User Key  (r) = Recorded By, (t) = Taken By, (c) = Cosigned By    Initials Name Provider Type    Maria Fernanda Keen, OT Occupational Therapist               Clinical Impression     Row Name 03/21/23 1529          Pain Assessment    Pain Intervention(s) Ambulation/increased activity;Repositioned  -JOESPH     Row Name 03/21/23 1529          Pain Scale: FACES Pre/Post-Treatment    Pain: FACES Scale, Pretreatment 4-->hurts little more  -JOESPH     Posttreatment Pain Rating 4-->hurts little more  -JOESPH     Pain Location - perineum  -JOESPH     Pre/Posttreatment Pain Comment B buttocks  -JOESPH     Row Name 03/21/23 1529          Plan of Care Review    Plan of Care Reviewed With patient;daughter  -JOESPH     Outcome Evaluation Pt. with some increased engagement with ADL task performance and continues to increase sitting and standing endurance. Pt.  will continued from skilled OT services to address deficits of balance, endurance, cognition, ROM, strength, and coordination to promote return to higher [facility] independence level.  UE TE program issued and reviewed with dtr. to assist with patient throughout the day.  -JOESPH     Row Name 03/21/23 1529          Therapy Assessment/Plan (OT)    Therapy Frequency (OT) daily  -JOESPH     Row Name 03/21/23 1529          Therapy Plan Review/Discharge Plan (OT)    Anticipated Discharge Disposition (OT) skilled nursing facility  -JOESPH     Row Name 03/21/23 1529          Vital Signs    Pre Systolic BP Rehab --  nurse cleared for treatment sesson  -JOESPH     O2 Delivery Pre Treatment room air  -JOESPH     O2 Delivery Intra Treatment room air  -JOESPH     O2 Delivery Post Treatment room air  -JOESPH     Pre Patient Position Supine  -JOESPH     Intra Patient Position Sitting  -JOESPH     Post Patient Position Supine  -JOESPH     Row Name 03/21/23 1529          Positioning and Restraints    Pre-Treatment Position in bed  -JOESPH     Post Treatment Position bed  -JOESPH     In Bed side lying left;sitting;side rails up x3;exit alarm on;call light within reach;with family/caregiver  -JOESPH           User Key  (r) = Recorded By, (t) = Taken By, (c) = Cosigned By    Initials Name Provider Type    Maria Fernanda Keen, OT Occupational Therapist               Outcome Measures     Row Name 03/21/23 1537          How much help from another is currently needed...    Putting on and taking off regular lower body clothing? 1  -JOESPH     Bathing (including washing, rinsing, and drying) 2  -JOESPH     Toileting (which includes using toilet bed pan or urinal) 1  -JOESPH     Putting on and taking off regular upper body clothing 2  -JOESPH     Taking care of personal grooming (such as brushing teeth) 2  -JOESPH     Eating meals 3  -JOESPH     AM-PAC 6 Clicks Score (OT) 11  -JOESPH     Row Name 03/21/23 0800          How much help from another person do you currently need...    Turning from your back to your side while in  flat bed without using bedrails? 1 (P)   -MD     Moving from lying on back to sitting on the side of a flat bed without bedrails? 1 (P)   -MD     Moving to and from a bed to a chair (including a wheelchair)? 1 (P)   -MD     Standing up from a chair using your arms (e.g., wheelchair, bedside chair)? 1 (P)   -MD     Climbing 3-5 steps with a railing? 1 (P)   -MD     To walk in hospital room? 1 (P)   -MD     AM-PAC 6 Clicks Score (PT) 6 (P)   -MD     Highest level of mobility 2 --> Bed activities/dependent transfer (P)   -MD     Row Name 03/21/23 1537          Functional Assessment    Outcome Measure Options AM-PAC 6 Clicks Daily Activity (OT)  -JOESPH           User Key  (r) = Recorded By, (t) = Taken By, (c) = Cosigned By    Initials Name Provider Type    Maria Fernanda Keen, OT Occupational Therapist    Siria Tate, Nursing Student Nursing Student                Occupational Therapy Education     Title: PT OT SLP Therapies (In Progress)     Topic: Occupational Therapy (Done)     Point: ADL training (Done)     Description:   Instruct learner(s) on proper safety adaptation and remediation techniques during self care or transfers.   Instruct in proper use of assistive devices.              Learning Progress Summary           Patient Acceptance, E,D,H, VU,NR by JOESPH at 3/21/2023 1538    Comment: UE TE AROM/strengthening program, midline sitting, transfer technique, posture, bed mobility, ADL sequencing, orientation    Acceptance, E, NR by JR at 3/19/2023 1313    Acceptance, E, NR by AC at 3/16/2023 0956    Acceptance, E, NL,NR by JB1 at 3/13/2023 1013    Comment: OT POC; fall prevention; orientation   Family Acceptance, E,D,H, VU,NR by JOESPH at 3/21/2023 1538    Comment: UE TE AROM/strengthening program, midline sitting, transfer technique, posture, bed mobility, ADL sequencing, orientation    Acceptance, E, NR by  at 3/19/2023 1313                   Point: Home exercise program (Done)     Description:   Instruct  learner(s) on appropriate technique for monitoring, assisting and/or progressing therapeutic exercises/activities.              Learning Progress Summary           Patient Acceptance, E,D,H, VU,NR by JOESPH at 3/21/2023 1538    Comment: UE TE AROM/strengthening program, midline sitting, transfer technique, posture, bed mobility, ADL sequencing, orientation    Acceptance, E, NR by JR at 3/19/2023 1313   Family Acceptance, E,D,H, VU,NR by JOESPH at 3/21/2023 1538    Comment: UE TE AROM/strengthening program, midline sitting, transfer technique, posture, bed mobility, ADL sequencing, orientation    Acceptance, E, NR by JR at 3/19/2023 1313                   Point: Precautions (Done)     Description:   Instruct learner(s) on prescribed precautions during self-care and functional transfers.              Learning Progress Summary           Patient Acceptance, E,D,H, VU,NR by JOESPH at 3/21/2023 1538    Comment: UE TE AROM/strengthening program, midline sitting, transfer technique, posture, bed mobility, ADL sequencing, orientation    Acceptance, E, NL,NR by JB1 at 3/13/2023 1013    Comment: OT POC; fall prevention; orientation   Family Acceptance, E,D,H, VU,NR by JOESPH at 3/21/2023 1538    Comment: UE TE AROM/strengthening program, midline sitting, transfer technique, posture, bed mobility, ADL sequencing, orientation                   Point: Body mechanics (Done)     Description:   Instruct learner(s) on proper positioning and spine alignment during self-care, functional mobility activities and/or exercises.              Learning Progress Summary           Patient Acceptance, E,D,H, VU,NR by JOESPH at 3/21/2023 1538    Comment: UE TE AROM/strengthening program, midline sitting, transfer technique, posture, bed mobility, ADL sequencing, orientation    Acceptance, E, NL,NR by JB1 at 3/13/2023 1013    Comment: OT POC; fall prevention; orientation   Family Acceptance, E,D,H, VU,NR by JOESPH at 3/21/2023 1538    Comment: UE TE AROM/strengthening  program, midline sitting, transfer technique, posture, bed mobility, ADL sequencing, orientation                               User Key     Initials Effective Dates Name Provider Type Discipline     02/03/23 -  Maria Fernanda Ortiz, OT Occupational Therapist OT    AC 02/03/23 -  Radha Carson, OT Occupational Therapist OT    JR 02/03/23 -  Bernadette Aguirre, OT Occupational Therapist OT    JB1 06/16/21 -  Merline Aguila OT Occupational Therapist OT              OT Recommendation and Plan  Therapy Frequency (OT): daily  Plan of Care Review  Plan of Care Reviewed With: patient, daughter  Outcome Evaluation: Pt. with some increased engagement with ADL task performance and continues to increase sitting and standing endurance. Pt. will continued from skilled OT services to address deficits of balance, endurance, cognition, ROM, strength, and coordination to promote return to higher B[facility] independence level.  UE TE program issued and reviewed with dtr. to assist with patient throughout the day.     Time Calculation:    Time Calculation- OT     Row Name 03/21/23 1539             Time Calculation- OT    OT Start Time 1323  -JOESPH      OT Received On 03/21/23  -JOESPH      OT Goal Re-Cert Due Date 03/23/23  -JOESPH         Timed Charges    29084 - OT Therapeutic Exercise Minutes 5  -JOESPH      81378 - OT Therapeutic Activity Minutes 26  -JOESPH      88989 - OT Self Care/Mgmt Minutes 12  -JOESPH         Total Minutes    Timed Charges Total Minutes 43  -JOESPH       Total Minutes 43  -JOESPH            User Key  (r) = Recorded By, (t) = Taken By, (c) = Cosigned By    Initials Name Provider Type    Maria Fernanda Keen, OT Occupational Therapist              Therapy Charges for Today     Code Description Service Date Service Provider Modifiers Qty    28111481345 HC OT THERAPEUTIC ACT EA 15 MIN 3/21/2023 Maria Fernanda Ortiz OT GO 2    82144039917 HC OT SELF CARE/MGMT/TRAIN EA 15 MIN 3/21/2023 Maria Fernanda Ortiz OT GO 1               Maria Fernanda Ortiz  OT  3/21/2023    Electronically signed by Maria Fernanda Ortiz, OT at 03/21/23 4443

## 2023-03-24 NOTE — PLAN OF CARE
Goal Outcome Evaluation:Tolerated hemodialysis today. FSBS levels are untreatable. She is able to make her needs known to staff. Upon conversation, she is confused as to the time, place & situation.  She has verbalized inaccurate situations to  today.    Continue with fall precautions and comfort care.

## 2023-03-24 NOTE — PLAN OF CARE
Problem: Device-Related Complication Risk (Hemodialysis)  Goal: Safe, Effective Therapy Delivery  Outcome: Ongoing, Progressing  Intervention: Optimize Device Care and Function  Recent Flowsheet Documentation  Taken 3/24/2023 1120 by Merline Fontanez, RN  Medication Review/Management:   medications reviewed   high-risk medications identified  Taken 3/24/2023 0753 by Merline Fontanez, RN  Medication Review/Management:   medications reviewed   high-risk medications identified     Problem: Hemodynamic Instability (Hemodialysis)  Goal: Effective Tissue Perfusion  Outcome: Ongoing, Progressing     Problem: Infection (Hemodialysis)  Goal: Absence of Infection Signs and Symptoms  Outcome: Ongoing, Progressing   Goal Outcome Evaluation:      HD complete, blood reinfused, report given to primary RN Delvis

## 2023-03-25 LAB
GLUCOSE BLDC GLUCOMTR-MCNC: 109 MG/DL (ref 70–130)
GLUCOSE BLDC GLUCOMTR-MCNC: 121 MG/DL (ref 70–130)
GLUCOSE BLDC GLUCOMTR-MCNC: 132 MG/DL (ref 70–130)
GLUCOSE BLDC GLUCOMTR-MCNC: 200 MG/DL (ref 70–130)

## 2023-03-25 PROCEDURE — 99232 SBSQ HOSP IP/OBS MODERATE 35: CPT | Performed by: NURSE PRACTITIONER

## 2023-03-25 PROCEDURE — 82962 GLUCOSE BLOOD TEST: CPT

## 2023-03-25 PROCEDURE — 63710000001 INSULIN LISPRO (HUMAN) PER 5 UNITS: Performed by: FAMILY MEDICINE

## 2023-03-25 PROCEDURE — G0378 HOSPITAL OBSERVATION PER HR: HCPCS

## 2023-03-25 RX ORDER — TERAZOSIN 1 MG/1
1 CAPSULE ORAL NIGHTLY
Status: DISCONTINUED | OUTPATIENT
Start: 2023-03-25 | End: 2023-03-26

## 2023-03-25 RX ORDER — CLONIDINE HYDROCHLORIDE 0.1 MG/1
0.2 TABLET ORAL EVERY 12 HOURS SCHEDULED
Status: DISCONTINUED | OUTPATIENT
Start: 2023-03-25 | End: 2023-03-25

## 2023-03-25 RX ORDER — CLONIDINE HYDROCHLORIDE 0.1 MG/1
0.2 TABLET ORAL EVERY 12 HOURS SCHEDULED
Status: DISCONTINUED | OUTPATIENT
Start: 2023-03-25 | End: 2023-03-26

## 2023-03-25 RX ADMIN — ASPIRIN 81 MG: 81 TABLET, COATED ORAL at 10:50

## 2023-03-25 RX ADMIN — DORZOLAMIDE HYDROCHLORIDE AND TIMOLOL MALEATE 1 DROP: 20; 5 SOLUTION/ DROPS OPHTHALMIC at 10:57

## 2023-03-25 RX ADMIN — CLONIDINE HYDROCHLORIDE 0.2 MG: 0.1 TABLET ORAL at 13:00

## 2023-03-25 RX ADMIN — CARVEDILOL 3.12 MG: 3.12 TABLET, FILM COATED ORAL at 10:51

## 2023-03-25 RX ADMIN — INSULIN LISPRO 4 UNITS: 100 INJECTION, SOLUTION INTRAVENOUS; SUBCUTANEOUS at 18:44

## 2023-03-25 RX ADMIN — AMIODARONE HYDROCHLORIDE 200 MG: 200 TABLET ORAL at 10:51

## 2023-03-25 RX ADMIN — ATORVASTATIN CALCIUM 80 MG: 40 TABLET, FILM COATED ORAL at 22:17

## 2023-03-25 RX ADMIN — QUETIAPINE FUMARATE 25 MG: 25 TABLET ORAL at 22:17

## 2023-03-25 RX ADMIN — HYDROCODONE BITARTRATE AND ACETAMINOPHEN 1 TABLET: 10; 325 TABLET ORAL at 22:27

## 2023-03-25 RX ADMIN — Medication 5 MG: at 22:16

## 2023-03-25 RX ADMIN — APIXABAN 2.5 MG: 2.5 TABLET, FILM COATED ORAL at 22:17

## 2023-03-25 RX ADMIN — APIXABAN 2.5 MG: 2.5 TABLET, FILM COATED ORAL at 10:51

## 2023-03-25 RX ADMIN — ISOSORBIDE MONONITRATE 120 MG: 120 TABLET, EXTENDED RELEASE ORAL at 10:50

## 2023-03-25 NOTE — PROGRESS NOTES
[facility] Medicine Services  PROGRESS NOTE    Patient Name: Esperanza Pop  : 1945  MRN: [id number]    Date of Admission: 3/10/2023  Primary Care Physician: Meghana Rodgers MD    Subjective   Subjective     CC:  F/U generalized weakness     HPI:    Patient resting in bed. States she is hungry when seen this morning and thinks she missed breakfast. No pain, soa, nausea    ROS:  Gen- No fevers, chills  CV- No chest pain, palpitations  Resp- No cough, dyspnea  GI- No N/V/D, abd pain          Objective   Objective     Vital Signs:   Temp:  [97 °F (36.1 °C)-98 °F (36.7 °C)] 98 °F (36.7 °C)  Heart Rate:  [51-67] 67  Resp:  [16-20] 16  BP: ()/(52-75) 144/60  Flow (L/min):  [2] 2     Physical Exam:  Constitutional: Awake, alert, NAD, chronically ill appearing   HENT: NCAT, mucous membranes moist  Respiratory: Clear to auscultation bilaterally, nonlabored respirations   Cardiovascular: RRR, + systolic murmur 3/6  Gastrointestinal: Positive bowel sounds, soft, nontender, nondistended  Musculoskeletal: No bilateral ankle edema  Psychiatric: Appropriate affect, cooperative  Neurologic: Oriented x3, DONG freely, speech clear  Skin: no rashes.       Results Reviewed:  LAB RESULTS:      Lab 23  0655 23  0326   WBC 6.50 7.12   HEMOGLOBIN 8.4* 8.9*   HEMATOCRIT 27.8* 29.3*   PLATELETS 124* 153   NEUTROS ABS 4.36 4.65   IMMATURE GRANS (ABS) 0.10* 0.12*   LYMPHS ABS 0.64* 0.67*   MONOS ABS 0.90 1.18*   EOS ABS 0.45* 0.47*   .0* 99.0*         Lab 23  0655 23  0326 23  0642   SODIUM 134* 131* 127*   POTASSIUM 4.7 4.5 5.1   CHLORIDE 98 95* 93*   CO2 25.0 21.0* 20.0*   ANION GAP 11.0 15.0 14.0   BUN 27* 36* 41*   CREATININE 5.46* 6.66* 7.22*   EGFR 7.6* 6.0* 5.5*   GLUCOSE 143* 111* 128*   CALCIUM 8.4* 8.6 8.6         Lab 23  0326   TOTAL PROTEIN 7.6   ALBUMIN 3.0*   GLOBULIN 4.6   ALT (SGPT) 6   AST (SGOT) 17   BILIRUBIN 0.6   ALK PHOS 127*         Lab  03/22/23  0743 03/22/23  0326   HSTROP T 158* 156*                 Brief Urine Lab Results     None          Microbiology Results Abnormal     None          SLP FEES - Fiberoptic Endo Eval Swallow    Result Date: 3/23/2023  This procedure was auto-finalized with no dictation required.      Results for orders placed during the hospital encounter of 03/10/23    Adult Transthoracic Echo Complete W/ Cont if Necessary Per Protocol    Interpretation Summary  •  Left ventricular systolic function is normal. Estimated left ventricular EF = 55%  •  Left ventricular wall thickness is consistent with moderate concentric hypertrophy.  •  The right ventricular cavity is mildly dilated.  •  Mild to moderate biatrial enlargement.  •  Moderate to severe aortic valve stenosis is present.  Mean gradient 34 mmHg, DOUG 0.9 cm².  •  Mild aortic insufficiency.  •  Mild mitral regurgitation.  •  Mild to moderate tricuspid regurgitation with RVSP 48 mmHg.      Current medications:  Scheduled Meds:amiodarone, 200 mg, Oral, Daily  apixaban, 2.5 mg, Oral, Q12H  aspirin, 81 mg, Oral, Daily  atorvastatin, 80 mg, Oral, Nightly  carvedilol, 3.125 mg, Oral, Q12H  cloNIDine, 0.2 mg, Oral, Q12H  dorzolamide-timolol, 1 drop, Both Eyes, Daily  epoetin orquidea/orquidea-epbx, 20,000 Units, Subcutaneous, Once per day on Mon Wed Fri  insulin lispro, 0-9 Units, Subcutaneous, TID With Meals  isosorbide mononitrate, 120 mg, Oral, Daily  melatonin, 5 mg, Oral, Nightly  QUEtiapine, 25 mg, Oral, Nightly      Continuous Infusions:   PRN Meds:.•  albumin human  •  benzocaine-menthol  •  dextrose  •  dextrose  •  glucagon (human recombinant)  •  haloperidol lactate  •  HYDROcodone-acetaminophen  •  hydrocortisone  •  hydrOXYzine  •  ondansetron **OR** ondansetron  •  sodium chloride  •  sodium chloride  •  sodium chloride    Assessment & Plan   Assessment & Plan     Active Hospital Problems    Diagnosis  POA   • **Hematochezia [K92.1]  Yes   • Symptomatic anemia [D64.9]   Yes   • Severe malnutrition (HCC) [E43]  Yes   • PAF (paroxysmal atrial fibrillation) (HCC) [I48.0]  Yes   • CHF (congestive heart failure) (HCC) [I50.9]  Yes   • End stage renal disease on dialysis (HCC) [N18.6, Z99.2]  Not Applicable   • Hyperlipidemia LDL goal <70 [E78.5]  Yes   • Essential hypertension [I10]  Yes   • Type 2 diabetes mellitus (HCC) [E11.9]  Yes      Resolved Hospital Problems   No resolved problems to display.        Brief Hospital Course to date:  Esperanza Pop is a 78 y.o. female  with PMH of ESRD on HD MWF, IDDM, HTN, HLD, HFpEF, breast cancer (s/p mastectomy and radiation), PAF (on xarelto), chronic anemia, and rectal cancer (recently diagnosed currently gettintg XRT and chemo) that presented to the ED after a fall at home. Found to have acute on chronic anemia, s/p 1 unit pRBCs on 3/11.   Patient wishing to pursue continued chemo/radiation but very very weak with SNF recs. Unable to complete radiation/chemo in SNF, but very weak with many treatments needed for home. Complicated picture, planning to hold radiation/chemo and transition to SNF and if she improves over the next few weeks, she can resume treatment; otherwise, family may need to consider hospice care.     This patient's problems and plans were partially entered by my partner and updated as appropriate by me 03/25/23.        Assessment and plan:  Acute on Chronic Anemia 2/2 rectal bleeding 2/2 known rectal cx - stabilized  Profound Debility, SNF recs  · Status post she 1 unit PRBCs 3/11 with appropriate rise in H&H, remains stable  · She follows with Dr. Cavazos, Rad/Onc, Dr. Carpenter with oncology  · In regards to her rectal cancer, could not tolerate chemotherapy or radiation therapy because of severe debility.  Plan for now is to hold both therapies (chemoradiation) and transition to SNF and if she improves over the next few weeks, she can resume treatmen. Otherwise, family might need to consider hospice care  · Case management  consulted for SNF placement pending    Acute metabolic encephalopathy - resolved  · She completed course of abx for possible UTI, CT head without acute disease  · Continue seroquel qhs + melatonin with available PRNs    Chest pain  · Had an episode of chest pain early morning 3/22/2023.  Troponin was mildly elevated but flat  · Denies chest pain further.  Unfortunately because of her age, severe debility and advanced rectal cancer, not candidate for ischemic work-up.  Medical management for now  · Already on aspirin and Eliquis and Coreg    Buttock wound   · Wound care/pain control    ESRD on HD MWF  · Renal following for HD     HFpEF   Essential hypertension  · Continue dialysis for volume control as above  · Continue clonidine, Coreg and Imdur. Will reduce clonidine to 0.2mg bid for lower BP reading and held dosing. Monitor for further adjustments    PAF on eliquis   · Continue eliquis + amiodarone, H&H stablized as above    Mod-Severe aortic stenosis   · Has appt with WILMA Lopez 4/27/23    Nightmares (started after the recent loss of her )  - started minipress, appears to be helping. Monitor BP      Expected Discharge Location and Transportation: SNF when bed available  Expected Discharge   03/27/2023     DVT prophylaxis:  Medical and mechanical DVT prophylaxis orders are present.     AM-PAC 6 Clicks Score (PT): 10 (03/24/23 1215)    CODE STATUS:   Code Status and Medical Interventions:   Ordered at: 03/10/23 3913     Medical Intervention Limits:    NO intubation (DNI)     Code Status (Patient has no pulse and is not breathing):    No CPR (Do Not Attempt to Resuscitate)     Medical Interventions (Patient has pulse or is breathing):    Limited Support       Jo Diallo, WILMA  03/25/23

## 2023-03-25 NOTE — PLAN OF CARE
Problem: Adult Inpatient Plan of Care  Goal: Plan of Care Review  Outcome: Ongoing, Progressing  Flowsheets (Taken 3/25/2023 5478)  Progress: no change  Outcome Evaluation: HS bp meds held d/t low BP. Denied need for pain meds. Intermittent confusion. Rested some. Awaiting placement.   Goal Outcome Evaluation:           Progress: no change  Outcome Evaluation: HS bp meds held d/t low BP. Denied need for pain meds. Intermittent confusion. Rested some. Awaiting placement.

## 2023-03-25 NOTE — PROGRESS NOTES
"   LOS: 3 days    Patient Care Team:  Meghana Rodgers MD as PCP - General  Demetrius Sagastume MD as Consulting Physician (Cardiology)  Kevan Lerma MD as Consulting Physician (Nephrology)  Geena Estrella APRN as Nurse Practitioner (Cardiology)  Frank Mandujano MD as Referring Physician (Colon and Rectal Surgery)  Kevan Cavazos MD as Consulting Physician (Radiation Oncology)  Yue Reeves RN as Nurse Navigator  Darryn Burk MD as Consulting Physician (Nephrology)    Chief Complaint:   ESRD on Monday Wednesday Friday, history of rectal CA on chemo, admitted after missing her dialysis.    Subjective     Interval History:   No new complaints       Review of Systems:   No complaints      Objective     Vital Sign Min/Max for last 24 hours  Temp  Min: 97 °F (36.1 °C)  Max: 98.7 °F (37.1 °C)   BP  Min: 105/52  Max: 153/62   Pulse  Min: 56  Max: 61   Resp  Min: 16  Max: 18   SpO2  Min: 91 %  Max: 99 %   Flow (L/min)  Min: 2  Max: 2   No data recorded     Flowsheet Rows    Flowsheet Row First Filed Value   Admission Height 167.6 cm (66\") Documented at 03/10/2023 1122   Admission Weight 76.7 kg (169 lb) Documented at 03/10/2023 1122          No intake/output data recorded.  I/O last 3 completed shifts:  In: 780 [P.O.:280; I.V.:500]  Out: 0     Physical Exam:  General Appearance: -American female comfortable in bed  Eyes: PER, EOMI.  Neck: Supple no JVD.  Lungs: Clear auscultation, no rales rhonchi's, equal chest movement, nonlabored.  Heart: Positive murmur, no, rub, RRR.  Abdomen: Soft, nontender, positive bowel sounds, no organomegaly.  Extremities: No edema, no cyanosis.  Neuro: No focal deficit, moving all extremities, alert oriented X 3  Right upper arm AV fistula  WBC WBC   Date Value Ref Range Status   03/22/2023 7.12 3.40 - 10.80 10*3/mm3 Final      HGB Hemoglobin   Date Value Ref Range Status   03/22/2023 8.9 (L) 12.0 - 15.9 g/dL Final      HCT Hematocrit   Date Value Ref Range " Status   03/22/2023 29.3 (L) 34.0 - 46.6 % Final      Platlets No results found for: LABPLAT   MCV MCV   Date Value Ref Range Status   03/22/2023 99.0 (H) 79.0 - 97.0 fL Final          Sodium Sodium   Date Value Ref Range Status   03/22/2023 131 (L) 136 - 145 mmol/L Final      Potassium Potassium   Date Value Ref Range Status   03/22/2023 4.5 3.5 - 5.2 mmol/L Final      Chloride Chloride   Date Value Ref Range Status   03/22/2023 95 (L) 98 - 107 mmol/L Final      CO2 CO2   Date Value Ref Range Status   03/22/2023 21.0 (L) 22.0 - 29.0 mmol/L Final      BUN BUN   Date Value Ref Range Status   03/22/2023 36 (H) 8 - 23 mg/dL Final      Creatinine Creatinine   Date Value Ref Range Status   03/22/2023 6.66 (H) 0.57 - 1.00 mg/dL Final      Calcium Calcium   Date Value Ref Range Status   03/22/2023 8.6 8.6 - 10.5 mg/dL Final      PO4 No results found for: CAPO4   Albumin Albumin   Date Value Ref Range Status   03/22/2023 3.0 (L) 3.5 - 5.2 g/dL Final      Magnesium No results found for: MG   Uric Acid No results found for: URICACID        Results Review:     I reviewed the patient's new clinical results.    amiodarone, 200 mg, Oral, Daily  apixaban, 2.5 mg, Oral, Q12H  aspirin, 81 mg, Oral, Daily  atorvastatin, 80 mg, Oral, Nightly  carvedilol, 3.125 mg, Oral, Q12H  cloNIDine, 0.4 mg, Oral, Q12H  dorzolamide-timolol, 1 drop, Both Eyes, Daily  epoetin orquidea/orquidea-epbx, 20,000 Units, Subcutaneous, Once per day on Mon Wed Fri  insulin lispro, 0-9 Units, Subcutaneous, TID With Meals  isosorbide mononitrate, 120 mg, Oral, Daily  melatonin, 5 mg, Oral, Nightly  QUEtiapine, 25 mg, Oral, Nightly  terazosin, 1 mg, Oral, Nightly           Medication Review: Reviewed    Assessment & Plan       Hematochezia    Type 2 diabetes mellitus (HCC)    Essential hypertension    Hyperlipidemia LDL goal <70    End stage renal disease on dialysis (HCC)    CHF (congestive heart failure) (HCC)    PAF (paroxysmal atrial fibrillation) (HCC)    Severe  malnutrition (HCC)    Symptomatic anemia    1.  ESRD on Monday Wednesday Friday.  2.  Anemia of chronic kidney disease: Also on chemotherapy.  3.  Volume status: Dialysis ultrafiltration will be done.  4.  Access AV fistula.  5.  Hypertension  6.  Hyponatremia corrected with dialysis  Plan:  Next dialysis Monday.  Check labs in the morning  Monitor H&H.  If needed blood transfusions can be given during dialysis.  Monitor blood pressure and volume status.  Awaiting rehab placement.  Darryn Burk MD  03/23/23  11:50 EDT

## 2023-03-26 LAB
ALBUMIN SERPL-MCNC: 2.8 G/DL (ref 3.5–5.2)
ANION GAP SERPL CALCULATED.3IONS-SCNC: 14 MMOL/L (ref 5–15)
BUN SERPL-MCNC: 24 MG/DL (ref 8–23)
BUN/CREAT SERPL: 3.8 (ref 7–25)
CALCIUM SPEC-SCNC: 8.7 MG/DL (ref 8.6–10.5)
CHLORIDE SERPL-SCNC: 99 MMOL/L (ref 98–107)
CO2 SERPL-SCNC: 22 MMOL/L (ref 22–29)
CREAT SERPL-MCNC: 6.24 MG/DL (ref 0.57–1)
DEPRECATED RDW RBC AUTO: 63.2 FL (ref 37–54)
EGFRCR SERPLBLD CKD-EPI 2021: 6.5 ML/MIN/1.73
ERYTHROCYTE [DISTWIDTH] IN BLOOD BY AUTOMATED COUNT: 17.8 % (ref 12.3–15.4)
GLUCOSE BLDC GLUCOMTR-MCNC: 108 MG/DL (ref 70–130)
GLUCOSE BLDC GLUCOMTR-MCNC: 109 MG/DL (ref 70–130)
GLUCOSE BLDC GLUCOMTR-MCNC: 123 MG/DL (ref 70–130)
GLUCOSE BLDC GLUCOMTR-MCNC: 127 MG/DL (ref 70–130)
GLUCOSE SERPL-MCNC: 96 MG/DL (ref 65–99)
HCT VFR BLD AUTO: 27.9 % (ref 34–46.6)
HGB BLD-MCNC: 8.2 G/DL (ref 12–15.9)
MCH RBC QN AUTO: 29.2 PG (ref 26.6–33)
MCHC RBC AUTO-ENTMCNC: 29.4 G/DL (ref 31.5–35.7)
MCV RBC AUTO: 99.3 FL (ref 79–97)
PHOSPHATE SERPL-MCNC: 3.2 MG/DL (ref 2.5–4.5)
PLATELET # BLD AUTO: 146 10*3/MM3 (ref 140–450)
PMV BLD AUTO: 10.5 FL (ref 6–12)
POTASSIUM SERPL-SCNC: 4.3 MMOL/L (ref 3.5–5.2)
RBC # BLD AUTO: 2.81 10*6/MM3 (ref 3.77–5.28)
SODIUM SERPL-SCNC: 135 MMOL/L (ref 136–145)
WBC NRBC COR # BLD: 7.39 10*3/MM3 (ref 3.4–10.8)

## 2023-03-26 PROCEDURE — 80069 RENAL FUNCTION PANEL: CPT | Performed by: INTERNAL MEDICINE

## 2023-03-26 PROCEDURE — 82962 GLUCOSE BLOOD TEST: CPT

## 2023-03-26 PROCEDURE — 97530 THERAPEUTIC ACTIVITIES: CPT

## 2023-03-26 PROCEDURE — 85027 COMPLETE CBC AUTOMATED: CPT | Performed by: INTERNAL MEDICINE

## 2023-03-26 PROCEDURE — G0378 HOSPITAL OBSERVATION PER HR: HCPCS

## 2023-03-26 PROCEDURE — 97110 THERAPEUTIC EXERCISES: CPT

## 2023-03-26 PROCEDURE — 99232 SBSQ HOSP IP/OBS MODERATE 35: CPT | Performed by: NURSE PRACTITIONER

## 2023-03-26 RX ORDER — ISOSORBIDE MONONITRATE 60 MG/1
60 TABLET, EXTENDED RELEASE ORAL DAILY
Status: DISCONTINUED | OUTPATIENT
Start: 2023-03-27 | End: 2023-03-27

## 2023-03-26 RX ORDER — CLONIDINE HYDROCHLORIDE 0.1 MG/1
0.1 TABLET ORAL 3 TIMES DAILY PRN
Status: DISCONTINUED | OUTPATIENT
Start: 2023-03-26 | End: 2023-03-29 | Stop reason: HOSPADM

## 2023-03-26 RX ORDER — QUETIAPINE FUMARATE 25 MG/1
12.5 TABLET, FILM COATED ORAL NIGHTLY
Status: DISCONTINUED | OUTPATIENT
Start: 2023-03-26 | End: 2023-03-27

## 2023-03-26 RX ADMIN — APIXABAN 2.5 MG: 2.5 TABLET, FILM COATED ORAL at 20:52

## 2023-03-26 RX ADMIN — HYDROXYZINE HYDROCHLORIDE 25 MG: 25 TABLET ORAL at 02:14

## 2023-03-26 RX ADMIN — DORZOLAMIDE HYDROCHLORIDE AND TIMOLOL MALEATE 1 DROP: 20; 5 SOLUTION/ DROPS OPHTHALMIC at 10:30

## 2023-03-26 RX ADMIN — ISOSORBIDE MONONITRATE 120 MG: 120 TABLET, EXTENDED RELEASE ORAL at 10:30

## 2023-03-26 RX ADMIN — ASPIRIN 81 MG: 81 TABLET, COATED ORAL at 10:30

## 2023-03-26 RX ADMIN — APIXABAN 2.5 MG: 2.5 TABLET, FILM COATED ORAL at 10:30

## 2023-03-26 RX ADMIN — AMIODARONE HYDROCHLORIDE 200 MG: 200 TABLET ORAL at 10:30

## 2023-03-26 RX ADMIN — HYDROXYZINE HYDROCHLORIDE 25 MG: 25 TABLET ORAL at 21:45

## 2023-03-26 RX ADMIN — Medication 5 MG: at 20:53

## 2023-03-26 RX ADMIN — QUETIAPINE FUMARATE 12.5 MG: 25 TABLET ORAL at 20:53

## 2023-03-26 RX ADMIN — HYDROCODONE BITARTRATE AND ACETAMINOPHEN 1 TABLET: 10; 325 TABLET ORAL at 12:36

## 2023-03-26 RX ADMIN — ATORVASTATIN CALCIUM 80 MG: 40 TABLET, FILM COATED ORAL at 20:53

## 2023-03-26 RX ADMIN — HYDROCODONE BITARTRATE AND ACETAMINOPHEN 1 TABLET: 10; 325 TABLET ORAL at 21:45

## 2023-03-26 RX ADMIN — CARVEDILOL 3.12 MG: 3.12 TABLET, FILM COATED ORAL at 10:30

## 2023-03-26 NOTE — PLAN OF CARE
Problem: Adult Inpatient Plan of Care  Goal: Plan of Care Review  Flowsheets  Taken 3/26/2023 0754  Progress: no change  Plan of Care Reviewed With: patient  Taken 3/25/2023 0755  Outcome Evaluation: HS bp meds held d/t low BP. Norco x1. Atarax x1. Intermittent confusion. Hallucinations. Rested some. Awaiting placement.   Goal Outcome Evaluation:  Plan of Care Reviewed With: patient        Progress: no change  Outcome Evaluation: HS bp meds held d/t low BP. Denied need for pain meds. Intermittent confusion. Rested some. Awaiting placement.

## 2023-03-26 NOTE — PLAN OF CARE
Goal Outcome Evaluation:  Plan of Care Reviewed With: patient, family        Progress: no change     Med crushed in pudding. Dressing changed today. Prn given for pain. Repositioned multiple times today. Family visited. PT worked w/ pt. Awaiting further orders .

## 2023-03-26 NOTE — PROGRESS NOTES
[facility] Medicine Services  PROGRESS NOTE    Patient Name: Esperanza Pop  : 1945  MRN: [id number]    Date of Admission: 3/10/2023  Primary Care Physician: Meghana Rodgers MD    Subjective   Subjective     CC:  F/U generalized weakness     HPI:    Patient resting in bed. States her butt is sore and repositioned with improvement. No overnight issues and no other complaints    ROS:  Gen- No fevers, chills  CV- No chest pain, palpitations  Resp- No cough, dyspnea  GI- No N/V/D, abd pain          Objective   Objective     Vital Signs:   Temp:  [97.6 °F (36.4 °C)-98.7 °F (37.1 °C)] 97.6 °F (36.4 °C)  Heart Rate:  [62-63] 63  Resp:  [16-18] 18  BP: (101-123)/(50-56) 108/50     Physical Exam:  Constitutional: Awake, alert, NAD, chronically ill appearing   HENT: NCAT, mucous membranes moist  Respiratory: Clear to auscultation bilaterally, nonlabored respirations   Cardiovascular: RRR, + systolic murmur 3/6  Gastrointestinal: Positive bowel sounds, soft, nontender, nondistended  Musculoskeletal: No bilateral ankle edema  Psychiatric: Appropriate affect, cooperative  Neurologic: Oriented x3, DONG freely, speech clear  Skin: no rashes.       Results Reviewed:  LAB RESULTS:      Lab 23  0708 23  0655 23  0326   WBC 7.39 6.50 7.12   HEMOGLOBIN 8.2* 8.4* 8.9*   HEMATOCRIT 27.9* 27.8* 29.3*   PLATELETS 146 124* 153   NEUTROS ABS  --  4.36 4.65   IMMATURE GRANS (ABS)  --  0.10* 0.12*   LYMPHS ABS  --  0.64* 0.67*   MONOS ABS  --  0.90 1.18*   EOS ABS  --  0.45* 0.47*   MCV 99.3* 100.0* 99.0*         Lab 23  0708 23  0655 23  0326 23  0642   SODIUM 135* 134* 131* 127*   POTASSIUM 4.3 4.7 4.5 5.1   CHLORIDE 99 98 95* 93*   CO2 22.0 25.0 21.0* 20.0*   ANION GAP 14.0 11.0 15.0 14.0   BUN 24* 27* 36* 41*   CREATININE 6.24* 5.46* 6.66* 7.22*   EGFR 6.5* 7.6* 6.0* 5.5*   GLUCOSE 96 143* 111* 128*   CALCIUM 8.7 8.4* 8.6 8.6   PHOSPHORUS 3.2  --   --   --           Lab 03/26/23  0708 03/22/23  0326   TOTAL PROTEIN  --  7.6   ALBUMIN 2.8* 3.0*   GLOBULIN  --  4.6   ALT (SGPT)  --  6   AST (SGOT)  --  17   BILIRUBIN  --  0.6   ALK PHOS  --  127*         Lab 03/22/23  0743 03/22/23  0326   HSTROP T 158* 156*                 Brief Urine Lab Results     None          Microbiology Results Abnormal     None          No radiology results from the last 24 hrs    Results for orders placed during the hospital encounter of 03/10/23    Adult Transthoracic Echo Complete W/ Cont if Necessary Per Protocol    Interpretation Summary  •  Left ventricular systolic function is normal. Estimated left ventricular EF = 55%  •  Left ventricular wall thickness is consistent with moderate concentric hypertrophy.  •  The right ventricular cavity is mildly dilated.  •  Mild to moderate biatrial enlargement.  •  Moderate to severe aortic valve stenosis is present.  Mean gradient 34 mmHg, DOUG 0.9 cm².  •  Mild aortic insufficiency.  •  Mild mitral regurgitation.  •  Mild to moderate tricuspid regurgitation with RVSP 48 mmHg.      Current medications:  Scheduled Meds:amiodarone, 200 mg, Oral, Daily  apixaban, 2.5 mg, Oral, Q12H  aspirin, 81 mg, Oral, Daily  atorvastatin, 80 mg, Oral, Nightly  carvedilol, 3.125 mg, Oral, Q12H  dorzolamide-timolol, 1 drop, Both Eyes, Daily  epoetin orquidea/orquidea-epbx, 20,000 Units, Subcutaneous, Once per day on Mon Wed Fri  insulin lispro, 0-9 Units, Subcutaneous, TID With Meals  [START ON 3/27/2023] isosorbide mononitrate, 60 mg, Oral, Daily  melatonin, 5 mg, Oral, Nightly  QUEtiapine, 25 mg, Oral, Nightly      Continuous Infusions:   PRN Meds:.•  benzocaine-menthol  •  cloNIDine  •  dextrose  •  dextrose  •  glucagon (human recombinant)  •  haloperidol lactate  •  HYDROcodone-acetaminophen  •  hydrocortisone  •  hydrOXYzine  •  ondansetron **OR** ondansetron  •  sodium chloride  •  sodium chloride  •  sodium chloride    Assessment & Plan   Assessment & Plan     [facility]  Problems    Diagnosis  POA   • **Hematochezia [K92.1]  Yes   • Symptomatic anemia [D64.9]  Yes   • Severe malnutrition (HCC) [E43]  Yes   • PAF (paroxysmal atrial fibrillation) (HCC) [I48.0]  Yes   • CHF (congestive heart failure) (HCC) [I50.9]  Yes   • End stage renal disease on dialysis (HCC) [N18.6, Z99.2]  Not Applicable   • Hyperlipidemia LDL goal <70 [E78.5]  Yes   • Essential hypertension [I10]  Yes   • Type 2 diabetes mellitus (HCC) [E11.9]  Yes      Resolved Hospital Problems   No resolved problems to display.        Brief Hospital Course to date:  Esperanza Pop is a 78 y.o. female  with PMH of ESRD on HD MWF, IDDM, HTN, HLD, HFpEF, breast cancer (s/p mastectomy and radiation), PAF (on xarelto), chronic anemia, and rectal cancer (recently diagnosed currently gettintg XRT and chemo) that presented to the ED after a fall at home. Found to have acute on chronic anemia, s/p 1 unit pRBCs on 3/11.   Patient wishing to pursue continued chemo/radiation but very very weak with SNF recs. Unable to complete radiation/chemo in SNF, but very weak with many treatments needed for home. Complicated picture, planning to hold radiation/chemo and transition to SNF and if she improves over the next few weeks, she can resume treatment; otherwise, family may need to consider hospice care.     This patient's problems and plans were partially entered by my partner and updated as appropriate by me 03/26/23.    Assessment and plan:  Acute on Chronic Anemia 2/2 rectal bleeding 2/2 known rectal cancer - stabilized  Profound Debility, SNF recs  · Status post she 1 unit PRBCs 3/11 with appropriate rise in H&H, remains stable  · She follows with Dr. Cavazos, Rad/Onc, Dr. Carpenter with oncology  · In regards to her rectal cancer, could not tolerate chemotherapy or radiation therapy because of severe debility.  Plan for now is to hold both therapies (chemoradiation) and transition to SNF and if she improves over the next few weeks, she  can resume treatment. Otherwise, family might need to consider hospice care  · Case management consulted for SNF placement pending    Acute metabolic encephalopathy - resolved  · She completed course of abx for possible UTI, CT head without acute disease  · Continue seroquel qhs but decrease to 12.5mg with decreasing BP    Chest pain  · Had an episode of chest pain early morning 3/22/2023.  Troponin was mildly elevated but flat  · Denies chest pain further.  Unfortunately because of her age, severe debility and advanced rectal cancer, not candidate for ischemic work-up.  Medical management for now  · Already on aspirin and Eliquis and Coreg    Buttock wound   · Wound care/pain control    ESRD on HD MWF  · Renal following for HD     HFpEF   Essential hypertension  hypotension  · Continue dialysis for volume control as above  · BP steadily declining. ? Seroquel. Have decrease seroquel dose this evening and will monitor  · Stop hytrin nightly. Decrease Imdur to 60mg daily. Change clonidine to PRN. Hold parameters on carvedilol    PAF on eliquis   · Continue eliquis + amiodarone, H&H stablized as above    Mod-Severe aortic stenosis   · Has appt with Geena Estrella, APRN 4/27/23    Nightmares (started after the recent loss of her )  - started minipress,  But unable to receive due to low BP. stop      Expected Discharge Location and Transportation: SNF when bed available  Expected Discharge   03/27/2023     DVT prophylaxis:  Medical and mechanical DVT prophylaxis orders are present.     AM-PAC 6 Clicks Score (PT): 12 (03/25/23 0800)    CODE STATUS:   Code Status and Medical Interventions:   Ordered at: 03/10/23 1448     Medical Intervention Limits:    NO intubation (DNI)     Code Status (Patient has no pulse and is not breathing):    No CPR (Do Not Attempt to Resuscitate)     Medical Interventions (Patient has pulse or is breathing):    Limited Support       Jo Diallo, APRN  03/26/23

## 2023-03-26 NOTE — PLAN OF CARE
Goal Outcome Evaluation:  Plan of Care Reviewed With: patient        Progress: improving  Outcome Evaluation: Pt demonstrated increased indep with STS transfer (min x2) and progressed to sidesteps and forward//backwards steps from EOB (mod x2). Cont to be limited by pain at buttock wound site and being fearful of falling, but pt very motivated to participate today. Progressing towards goals (goals modified to reflect current functional status). Would benefit from further skilled PT services to promote PLOF and safe d/c. May benefit from trial of gait with RW and close chair to follow next session. Continue to recommend SNF at d/c.

## 2023-03-26 NOTE — PROGRESS NOTES
"   LOS: 3 days    Patient Care Team:  Meghana Rodgers MD as PCP - General  Demetrius Sagastume MD as Consulting Physician (Cardiology)  Kevan Lerma MD as Consulting Physician (Nephrology)  Geena Estrella APRN as Nurse Practitioner (Cardiology)  Frank Mandujano MD as Referring Physician (Colon and Rectal Surgery)  Kevan Cavazos MD as Consulting Physician (Radiation Oncology)  Yue Reeves RN as Nurse Navigator  Darryn Burk MD as Consulting Physician (Nephrology)    Chief Complaint:   ESRD on Monday Wednesday Friday, history of rectal CA on chemo, admitted after missing her dialysis.    Subjective     Interval History:   No new events no added distress.  Patient was able to walk a little bit physical therapy today       Review of Systems:   No complaints      Objective     Vital Sign Min/Max for last 24 hours  Temp  Min: 97 °F (36.1 °C)  Max: 98.7 °F (37.1 °C)   BP  Min: 105/52  Max: 153/62   Pulse  Min: 56  Max: 61   Resp  Min: 16  Max: 18   SpO2  Min: 91 %  Max: 99 %   Flow (L/min)  Min: 2  Max: 2   No data recorded     Flowsheet Rows    Flowsheet Row First Filed Value   Admission Height 167.6 cm (66\") Documented at 03/10/2023 1122   Admission Weight 76.7 kg (169 lb) Documented at 03/10/2023 1122          No intake/output data recorded.  I/O last 3 completed shifts:  In: 780 [P.O.:280; I.V.:500]  Out: 0     Physical Exam:  General Appearance: -American female comfortable in bed.  Eyes: PER, EOMI.  Neck: Supple no JVD.  Lungs: Clear auscultation, no rales rhonchi's, equal chest movement, nonlabored.  Heart: Positive murmur, no, rub, RRR.  Abdomen: Soft, nontender, positive bowel sounds, no organomegaly.  Extremities: No edema, no cyanosis.  Neuro: No focal deficit, moving all extremities, alert oriented X 3  Right upper arm AV fistula  WBC WBC   Date Value Ref Range Status   03/22/2023 7.12 3.40 - 10.80 10*3/mm3 Final      HGB Hemoglobin   Date Value Ref Range Status   03/22/2023 " 8.9 (L) 12.0 - 15.9 g/dL Final      HCT Hematocrit   Date Value Ref Range Status   03/22/2023 29.3 (L) 34.0 - 46.6 % Final      Platlets No results found for: LABPLAT   MCV MCV   Date Value Ref Range Status   03/22/2023 99.0 (H) 79.0 - 97.0 fL Final          Sodium Sodium   Date Value Ref Range Status   03/22/2023 131 (L) 136 - 145 mmol/L Final      Potassium Potassium   Date Value Ref Range Status   03/22/2023 4.5 3.5 - 5.2 mmol/L Final      Chloride Chloride   Date Value Ref Range Status   03/22/2023 95 (L) 98 - 107 mmol/L Final      CO2 CO2   Date Value Ref Range Status   03/22/2023 21.0 (L) 22.0 - 29.0 mmol/L Final      BUN BUN   Date Value Ref Range Status   03/22/2023 36 (H) 8 - 23 mg/dL Final      Creatinine Creatinine   Date Value Ref Range Status   03/22/2023 6.66 (H) 0.57 - 1.00 mg/dL Final      Calcium Calcium   Date Value Ref Range Status   03/22/2023 8.6 8.6 - 10.5 mg/dL Final      PO4 No results found for: CAPO4   Albumin Albumin   Date Value Ref Range Status   03/22/2023 3.0 (L) 3.5 - 5.2 g/dL Final      Magnesium No results found for: MG   Uric Acid No results found for: URICACID        Results Review:     I reviewed the patient's new clinical results.    amiodarone, 200 mg, Oral, Daily  apixaban, 2.5 mg, Oral, Q12H  aspirin, 81 mg, Oral, Daily  atorvastatin, 80 mg, Oral, Nightly  carvedilol, 3.125 mg, Oral, Q12H  cloNIDine, 0.4 mg, Oral, Q12H  dorzolamide-timolol, 1 drop, Both Eyes, Daily  epoetin orquidea/orquidea-epbx, 20,000 Units, Subcutaneous, Once per day on Mon Wed Fri  insulin lispro, 0-9 Units, Subcutaneous, TID With Meals  isosorbide mononitrate, 120 mg, Oral, Daily  melatonin, 5 mg, Oral, Nightly  QUEtiapine, 25 mg, Oral, Nightly  terazosin, 1 mg, Oral, Nightly           Medication Review: Reviewed    Assessment & Plan       Hematochezia    Type 2 diabetes mellitus (HCC)    Essential hypertension    Hyperlipidemia LDL goal <70    End stage renal disease on dialysis (HCC)    CHF (congestive heart  failure) (HCC)    PAF (paroxysmal atrial fibrillation) (HCC)    Severe malnutrition (HCC)    Symptomatic anemia    1.  ESRD on Monday Wednesday Friday.  2.  Anemia of chronic kidney disease: Also on chemotherapy.  3.  Volume status: Dialysis ultrafiltration will be done.  4.  Access AV fistula.  5.  Hypertension  6.  Hyponatremia corrected with dialysis  Plan:  Plan for dialysis tomorrow orders written.  Monitor H&H.  If needed blood transfusions can be given during dialysis.  Monitor blood pressure and volume status.  Awaiting rehab placement.  Discussed with the sisters in the room.  Darryn Burk MD  03/23/23  11:50 EDT

## 2023-03-26 NOTE — THERAPY PROGRESS REPORT/RE-CERT
Patient Name: Esperanza Pop  : 1945    MRN: [id number]                              Today's Date: 3/26/2023       Admit Date: 3/10/2023    Visit Dx:     ICD-10-CM ICD-9-CM   1. Symptomatic anemia  D64.9 285.9   2. Generalized weakness  R53.1 780.79   3. ESRD on dialysis ([facility])  N18.6 585.6    Z99.2 V45.11   4. Impaired mobility  Z74.09 799.89   5. Fall, initial encounter  W19.XXXA E888.9   6. Elevated troponin  R77.8 790.6   7. Acute on chronic diastolic heart failure ([facility])  I50.33 428.33   8. Type 2 diabetes mellitus with chronic kidney disease on chronic dialysis, with long-term current use of insulin ([facility])  E11.22 250.40    N18.6 585.9    Z99.2 V45.11    Z79.4 V58.67   9. Severe malnutrition ([facility])  E43 261   10. Malignant neoplasm of anal canal ([facility])  C21.1 154.2   11. PAF (paroxysmal atrial fibrillation) ([facility])  I48.0 427.31   12. Coronary artery disease involving native coronary artery of native heart with angina pectoris ([facility])  I25.119 414.01     413.9   13. End stage renal disease on dialysis ([facility])  N18.6 585.6    Z99.2 V45.11   14. Nonrheumatic aortic valve stenosis  I35.0 424.1   15. NSTEMI (non-ST elevated myocardial infarction) ([facility])  I21.4 410.70   16. Acute cystitis without hematuria  N30.00 595.0   17. Ischemic heart disease  I25.9 414.9   18. Congestive heart failure, unspecified HF chronicity, unspecified heart failure type ([facility])  I50.9 428.0   19. Malignant neoplasm of right female breast, unspecified estrogen receptor status, unspecified site of breast ([facility])  C50.911 174.9   20. Hematochezia  K92.1 578.1   21. Acute midline low back pain without sciatica  M54.50 724.2   22. Oropharyngeal dysphagia  R13.12 787.22     Patient Active Problem List   Diagnosis   • Acute on chronic diastolic heart failure (HCC)   • Type 2 diabetes mellitus ([facility])   • Essential hypertension   • Coronary artery disease involving native coronary artery of native heart with angina pectoris (HCC)   • Breast cancer,  female, right   • Seasonal allergic rhinitis   • Right carotid bruit   • Vitamin B12 deficiency   • Hyperlipidemia LDL goal <70   • End stage renal disease on dialysis (HCC)   • Nonrheumatic aortic valve stenosis   • NSTEMI (non-ST elevated myocardial infarction) (HCC)   • UTI (urinary tract infection)   • Ischemic heart disease   • CHF (congestive heart failure) (HCC)   • Acute non-ST segment elevation myocardial infarction (STEMI) following previous myocardial infarction   • Dyslipidemia   • Diabetes mellitus (HCC)   • Mild obesity   • Elevated troponin   • Screen for colon cancer   • Acute midline low back pain without sciatica   • Hypertensive emergency   • PAF (paroxysmal atrial fibrillation) (HCC)   • Malignant neoplasm of anal canal (HCC)   • Hematochezia   • Severe malnutrition (HCC)   • Symptomatic anemia     Past Medical History:   Diagnosis Date   • Acute bronchitis    • Acute conjunctivitis    • Acute kidney injury (HCC)     Admission from 12/26/2013 to 01/02/2014, now resolved with latest creatinine 1.4 on 01/08/2014.   • Acute non-ST segment elevation myocardial infarction (STEMI) following previous myocardial infarction    • Anal cancer (HCC) 2/8/2023   • Anal cancer (HCC) 2/8/2023   • Arthritis    • Breast cancer, female, right     2005 mastectomy right   • Cancer (HCC)    • CHF (congestive heart failure) (HCC)    • Chronic kidney disease     dialysis MWF, f/u [facility]    • Clotting disorder (HCC)    • COVID-19    • Diabetes mellitus (HCC)     diagnosed ~1992, checks fsbg 2-3x/day   • Diarrhea    • Dyslipidemia    • Dyspepsia    • Dyspnea    • Edema    • History of transfusion     ~2013 no reaction    • Hx of radiation therapy    • Hyperlipidemia    • Hypertension     Severe   • Ischemic heart disease    • Moderate obesity     BMI 36.2   • Myocardial infarction (HCC)    • Pneumonia    • Pneumonia 02/2019   • Radiation 2005   • Seasonal allergies    • Wears glasses      Past Surgical  History:   Procedure Laterality Date   • AV FISTULA PLACEMENT, BRACHIOBASILIC     • BREAST BIOPSY Left 2010   • BREAST CYST EXCISION     • BREAST LUMPECTOMY Right 2005   • BREAST SURGERY     • CARDIAC CATHETERIZATION N/A 10/10/2016    Procedure: Left Heart Cath;  Surgeon: Scooter Zhao MD;  Location:  [facility];  Service:    • CARDIAC CATHETERIZATION  10/2016   • CARDIAC CATHETERIZATION N/A 1/21/2021    Procedure: Right and Left Heart Cath;  Surgeon: Hugh Tidwell MD;  Location:  [facility];  Service: Cardiology;  Laterality: N/A;   • COLONOSCOPY N/A 7/23/2020    Procedure: COLONOSCOPY;  Surgeon: Rubén Rosas MD;  Location:  [facility];  Service: Gastroenterology;  Laterality: N/A;   • CORONARY STENT PLACEMENT  2016   • HERNIA REPAIR     • HYSTERECTOMY  2000   • INSERTION HEMODIALYSIS CATHETER Right 6/29/2016    Procedure: HEMODIALYSIS CATHETER INSERTION TUNNELLED;  Surgeon: Ramón Chavez MD;  Location: [facility] OR;  Service:    • MASTECTOMY Right 2006   • OOPHORECTOMY     • REDUCTION MAMMAPLASTY Left 2005      General Information     Row Name 03/26/23 1557          Physical Therapy Time and Intention    Document Type progress note/recertification  -     Mode of Treatment physical therapy  -     Row Name 03/26/23 1557          General Information    Existing Precautions/Restrictions fall;other (see comments)  buttock wound; fearful of falling  -     Row Name 03/26/23 1557          Cognition    Orientation Status (Cognition) oriented to;person;place;situation  -     Row Name 03/26/23 1557          Safety Issues, Functional Mobility    Impairments Affecting Function (Mobility) balance;cognition;coordination;endurance/activity tolerance;postural/trunk control;strength;pain  -           User Key  (r) = Recorded By, (t) = Taken By, (c) = Cosigned By    Initials Name Provider Type    LS Charley Alejandre, PT Physical Therapist                Mobility     Row Name 03/26/23 1600          Bed Mobility    Supine-Sit [place] (Bed Mobility) moderate assist (50% patient effort);2 person assist;verbal cues  -LS     Sit-Supine [place] (Bed Mobility) maximum assist (25% patient effort);2 person assist;verbal cues  -LS     Assistive Device (Bed Mobility) bed rails;draw sheet;head of bed elevated  -LS     Row Name 03/26/23 1600          Sit-Stand Transfer    Sit-Stand [place] (Transfers) minimum assist (75% patient effort);2 person assist;verbal cues  -LS     Comment, (Sit-Stand Transfer) BUE support; blocking knees bilat.  -LS     Row Name 03/26/23 1600          Gait/Stairs (Locomotion)    [place] Level (Gait) moderate assist (50% patient effort);2 person assist  -LS     Assistive Device (Gait) other (see comments)  BUE support  -LS     Distance in Feet (Gait) 6 total  -LS     Deviations/Abnormal Patterns (Gait) weight shifting decreased  -LS     Bilateral Gait Deviations forward flexed posture  -LS     Comment, (Gait/Stairs) 2 ft at EOB towards HOB; then 2 ft forward and backwards at EOB. Cues for upright posture and appropriate LE advancement.  -           User Key  (r) = Recorded By, (t) = Taken By, (c) = Cosigned By    Initials Name Provider Type    LS Charley Alejandre, PT Physical Therapist               Obj/Interventions     Row Name 03/26/23 1601          Motor Skills    Therapeutic Exercise knee;ankle  -     Row Name 03/26/23 1601          Knee (Therapeutic Exercise)    Knee Strengthening (Therapeutic Exercise) bilateral;LAQ (long arc quad);sitting;10 repetitions  -     Row Name 03/26/23 1601          Ankle (Therapeutic Exercise)    Ankle (Therapeutic Exercise) AROM (active range of motion)  -     Ankle AROM (Therapeutic Exercise) bilateral;dorsiflexion;plantarflexion;10 repetitions;sitting  -     Row Name 03/26/23 1601          Balance    Static Sitting Balance contact guard  -     Position, Sitting Balance sitting edge of  bed  -LS     Static Standing Balance minimal assist;2-person assist  -LS     Position/Device Used, Standing Balance supported  -LS     Balance Interventions standing;dynamic;weight shifting activity  -LS     Comment, Balance R/L weightshifting x20s prior to stepping  -LS           User Key  (r) = Recorded By, (t) = Taken By, (c) = Cosigned By    Initials Name Provider Type    LS Charley Alejandre, PT Physical Therapist               Goals/Plan     Row Name 03/26/23 1605          Bed Mobility Goal 1 (PT)    Activity/Assistive Device (Bed Mobility Goal 1, PT) bed mobility activities, all  -LS     Colbert Level/Cues Needed (Bed Mobility Goal 1, PT) contact guard required  -LS     Time Frame (Bed Mobility Goal 1, PT) long term goal (LTG);2 weeks  -LS     Progress/Outcomes (Bed Mobility Goal 1, PT) continuing progress toward goal  -LS     Row Name 03/26/23 1605          Transfer Goal 1 (PT)    Activity/Assistive Device (Transfer Goal 1, PT) sit-to-stand/stand-to-sit;bed-to-chair/chair-to-bed;walker, rolling  -LS     Colbert Level/Cues Needed (Transfer Goal 1, PT) contact guard required  -LS     Time Frame (Transfer Goal 1, PT) long term goal (LTG);2 weeks  -LS     Progress/Outcome (Transfer Goal 1, PT) continuing progress toward goal  -LS     Row Name 03/26/23 1605          Gait Training Goal 1 (PT)    Activity/Assistive Device (Gait Training Goal 1, PT) gait (walking locomotion);increase endurance/gait distance;walker, rolling  -LS     Colbert Level (Gait Training Goal 1, PT) minimum assist (75% or more patient effort)  -LS     Distance (Gait Training Goal 1, PT) 50  -LS     Time Frame (Gait Training Goal 1, PT) 2 weeks  -LS     Progress/Outcome (Gait Training Goal 1, PT) goal revised this date  -LS     Row Name 03/26/23 1605          Stairs Goal 1 (PT)    Activity/Assistive Device (Stairs Goal 1, PT) stairs, all skills;using handrail, right  -LS     Colbert Level/Cues Needed (Stairs Goal 1, PT)  moderate assist (50-74% patient effort)  -LS     Number of Stairs (Stairs Goal 1, PT) 2  -LS     Time Frame (Stairs Goal 1, PT) long term goal (LTG);2 weeks  -LS     Progress/Outcome (Stairs Goal 1, PT) medical status inhibiting progress  -     Row Name 03/26/23 1605          Therapy Assessment/Plan (PT)    Planned Therapy Interventions (PT) balance training;bed mobility training;gait training;strengthening;stair training;transfer training;patient/family education  -           User Key  (r) = Recorded By, (t) = Taken By, (c) = Cosigned By    Initials Name Provider Type    LS Charley Alejandre, PT Physical Therapist               Clinical Impression     Row Name 03/26/23 1607          Pain    Pretreatment Pain Rating 10/10  -LS     Posttreatment Pain Rating 0/10 - no pain  -LS     Pain Location - buttock  -LS     Pre/Posttreatment Pain Comment tolerated  -LS     Pain Intervention(s) Repositioned;Ambulation/increased activity  -     Row Name 03/26/23 1601          Plan of Care Review    Plan of Care Reviewed With patient  -LS     Progress improving  -LS     Outcome Evaluation Pt demonstrated increased indep with STS transfer (min x2) and progressed to sidesteps and forward//backwards steps from EOB (mod x2). Cont to be limited by pain at buttock wound site and being fearful of falling, but pt very motivated to participate today. Progressing towards goals (goals modified to reflect current functional status). Would benefit from further skilled PT services to promote PLOF and safe d/c. May benefit from trial of gait with RW and close chair to follow next session. Continue to recommend SNF at d/c.  -     Row Name 03/26/23 1607          Vital Signs    Pre Systolic BP Rehab --  no tele present  -     Row Name 03/26/23 1607          Positioning and Restraints    Pre-Treatment Position in bed  -LS     Post Treatment Position bed  -LS     In Bed notified nsg;fowlers;side lying right;exit alarm on;encouraged to call for  assist;call light within reach  -LS           User Key  (r) = Recorded By, (t) = Taken By, (c) = Cosigned By    Initials Name Provider Type    Charley Vasquez, PT Physical Therapist               Outcome Measures     Row Name 03/26/23 1612 03/26/23 0800       How much help from another person do you currently need...    Turning from your back to your side while in flat bed without using bedrails? 2  -LS 2  -CP    Moving from lying on back to sitting on the side of a flat bed without bedrails? 2  -LS 2  -CP    Moving to and from a bed to a chair (including a wheelchair)? 2  -LS 2  -CP    Standing up from a chair using your arms (e.g., wheelchair, bedside chair)? 3  -LS 2  -CP    Climbing 3-5 steps with a railing? 1  -LS 1  -CP    To walk in hospital room? 2  -LS 1  -CP    AM-PAC 6 Clicks Score (PT) 12  -LS 10  -CP    Highest level of mobility 4 --> Transferred to chair/commode  -LS 4 --> Transferred to chair/commode  -CP          User Key  (r) = Recorded By, (t) = Taken By, (c) = Cosigned By    Initials Name Provider Type    Charley Vasquez, PT Physical Therapist    CP Bertha Troy RN Registered Nurse                             Physical Therapy Education     Title: PT OT SLP Therapies (In Progress)     Topic: Physical Therapy (Done)     Point: Mobility training (Done)     Learning Progress Summary           Patient Acceptance, E,D, VU,NR by LS at 3/26/2023 1612    Acceptance, E, VU,NR by HT at 3/22/2023 1429    Eager, E, NR by MD at 3/21/2023 1808    Acceptance, E, NR by ML at 3/18/2023 1116    Acceptance, E,TB, VU,NR by HM at 3/17/2023 1534    Acceptance, E, NR by KR at 3/14/2023 1532    Acceptance, E, NR by KR at 3/13/2023 1011                   Point: Home exercise program (Done)     Learning Progress Summary           Patient Acceptance, E,D, VU,NR by LS at 3/26/2023 1612    Eager, E, NR by MD at 3/21/2023 1808    Acceptance, E, NR by KR at 3/14/2023 1532    Acceptance, E, NR by KR at 3/13/2023 1011                    Point: Body mechanics (Done)     Learning Progress Summary           Patient Acceptance, E,D, VU,NR by LS at 3/26/2023 1612    Acceptance, E, VU,NR by HT at 3/22/2023 1429    Eager, E, NR by MD at 3/21/2023 1808    Acceptance, E,TB, VU,NR by HM at 3/17/2023 1534    Acceptance, E, NR by KR at 3/14/2023 1532    Acceptance, E, NR by KR at 3/13/2023 1011                   Point: Precautions (Done)     Learning Progress Summary           Patient Acceptance, E,D, VU,NR by LS at 3/26/2023 1612    Acceptance, E, VU,NR by HT at 3/22/2023 1429    Eager, E, NR by MD at 3/21/2023 1808    Acceptance, E, NR by ML at 3/18/2023 1116    Acceptance, E,TB, VU,NR by HM at 3/17/2023 1534    Acceptance, E, NR by KR at 3/14/2023 1532    Acceptance, E, NR by KR at 3/13/2023 1011                               User Key     Initials Effective Dates Name Provider Type Discipline    LS 02/03/23 -  Charley Alejandre, PT Physical Therapist PT    ML 04/22/21 -  Siria Shirley Physical Therapist PT    MD 01/25/23 -  Siria Pascual, Nursing Student Nursing Student Nurse     09/22/22 -  Heydi Burnett, PT Physical Therapist PT    KR 12/30/22 -  Starla Nicholas, PT Physical Therapist PT    HT 01/12/23 -  Heydi Maier, PT Student PT Student PT              PT Recommendation and Plan  Planned Therapy Interventions (PT): balance training, bed mobility training, gait training, strengthening, stair training, transfer training, patient/family education  Plan of Care Reviewed With: patient  Progress: improving  Outcome Evaluation: Pt demonstrated increased indep with STS transfer (min x2) and progressed to sidesteps and forward//backwards steps from EOB (mod x2). Cont to be limited by pain at buttock wound site and being fearful of falling, but pt very motivated to participate today. Progressing towards goals (goals modified to reflect current functional status). Would benefit from further skilled PT services to promote PLOF and safe  d/c. May benefit from trial of gait with RW and close chair to follow next session. Continue to recommend SNF at d/c.     Time Calculation:    PT Charges     Row Name 03/26/23 1613             Time Calculation    Start Time 1456  -LS      PT Received On 03/26/23  -LS      PT Goal Re-Cert Due Date 04/05/23  -LS         Timed Charges    79658 - PT Therapeutic Exercise Minutes 4  -LS      10572 - PT Therapeutic Activity Minutes 19  -LS         Total Minutes    Timed Charges Total Minutes 23  -LS       Total Minutes 23  -LS            User Key  (r) = Recorded By, (t) = Taken By, (c) = Cosigned By    Initials Name Provider Type    LS Charley Alejandre, PT Physical Therapist              Therapy Charges for Today     Code Description Service Date Service Provider Modifiers Qty    80067204013 HC PT THERAPEUTIC ACT EA 15 MIN 3/26/2023 Charley Alejandre, PT GP 1    83506119285 HC PT THER PROC EA 15 MIN 3/26/2023 Charley Alejandre, PT GP 1    11453873014 HC PT THER SUPP EA 15 MIN 3/26/2023 Charley Alejandre, PT GP 2          PT G-Codes  Outcome Measure Options: AM-PAC 6 Clicks Basic Mobility (PT)  AM-PAC 6 Clicks Score (PT): 12  AM-PAC 6 Clicks Score (OT): 11       Charley Alejandre, PT  3/26/2023

## 2023-03-27 ENCOUNTER — APPOINTMENT (OUTPATIENT)
Dept: NEPHROLOGY | Facility: HOSPITAL | Age: 78
End: 2023-03-27
Payer: MEDICARE

## 2023-03-27 LAB
GLUCOSE BLDC GLUCOMTR-MCNC: 100 MG/DL (ref 70–130)
GLUCOSE BLDC GLUCOMTR-MCNC: 117 MG/DL (ref 70–130)
GLUCOSE BLDC GLUCOMTR-MCNC: 99 MG/DL (ref 70–130)

## 2023-03-27 PROCEDURE — G0378 HOSPITAL OBSERVATION PER HR: HCPCS

## 2023-03-27 PROCEDURE — 99232 SBSQ HOSP IP/OBS MODERATE 35: CPT | Performed by: NURSE PRACTITIONER

## 2023-03-27 PROCEDURE — G0257 UNSCHED DIALYSIS ESRD PT HOS: HCPCS

## 2023-03-27 PROCEDURE — 97602 WOUND(S) CARE NON-SELECTIVE: CPT

## 2023-03-27 PROCEDURE — 25010000002 EPOETIN ALFA-EPBX 10000 UNIT/ML SOLUTION: Performed by: INTERNAL MEDICINE

## 2023-03-27 PROCEDURE — 82962 GLUCOSE BLOOD TEST: CPT

## 2023-03-27 RX ORDER — ISOSORBIDE MONONITRATE 60 MG/1
60 TABLET, EXTENDED RELEASE ORAL ONCE
Status: DISCONTINUED | OUTPATIENT
Start: 2023-03-27 | End: 2023-03-27

## 2023-03-27 RX ORDER — OXYCODONE HYDROCHLORIDE 5 MG/1
5 TABLET ORAL EVERY 8 HOURS PRN
Status: DISCONTINUED | OUTPATIENT
Start: 2023-03-27 | End: 2023-03-29 | Stop reason: HOSPADM

## 2023-03-27 RX ORDER — QUETIAPINE FUMARATE 25 MG/1
12.5 TABLET, FILM COATED ORAL NIGHTLY PRN
Status: DISCONTINUED | OUTPATIENT
Start: 2023-03-27 | End: 2023-03-29 | Stop reason: HOSPADM

## 2023-03-27 RX ORDER — ISOSORBIDE MONONITRATE 60 MG/1
120 TABLET, EXTENDED RELEASE ORAL DAILY
Status: DISCONTINUED | OUTPATIENT
Start: 2023-03-27 | End: 2023-03-29 | Stop reason: HOSPADM

## 2023-03-27 RX ORDER — ISOSORBIDE MONONITRATE 60 MG/1
120 TABLET, EXTENDED RELEASE ORAL DAILY
Status: DISCONTINUED | OUTPATIENT
Start: 2023-03-28 | End: 2023-03-27

## 2023-03-27 RX ADMIN — HYDROCODONE BITARTRATE AND ACETAMINOPHEN 1 TABLET: 10; 325 TABLET ORAL at 08:12

## 2023-03-27 RX ADMIN — HYDROCODONE BITARTRATE AND ACETAMINOPHEN 1 TABLET: 10; 325 TABLET ORAL at 23:57

## 2023-03-27 RX ADMIN — CARVEDILOL 3.12 MG: 3.12 TABLET, FILM COATED ORAL at 20:51

## 2023-03-27 RX ADMIN — EPOETIN ALFA-EPBX 20000 UNITS: 10000 INJECTION, SOLUTION INTRAVENOUS; SUBCUTANEOUS at 11:06

## 2023-03-27 RX ADMIN — ATORVASTATIN CALCIUM 80 MG: 40 TABLET, FILM COATED ORAL at 20:52

## 2023-03-27 RX ADMIN — APIXABAN 2.5 MG: 2.5 TABLET, FILM COATED ORAL at 20:51

## 2023-03-27 RX ADMIN — OXYCODONE 5 MG: 5 TABLET ORAL at 13:44

## 2023-03-27 RX ADMIN — HYDROCODONE BITARTRATE AND ACETAMINOPHEN 1 TABLET: 10; 325 TABLET ORAL at 16:37

## 2023-03-27 RX ADMIN — CLONIDINE HYDROCHLORIDE 0.1 MG: 0.1 TABLET ORAL at 10:22

## 2023-03-27 RX ADMIN — ISOSORBIDE MONONITRATE 120 MG: 60 TABLET, EXTENDED RELEASE ORAL at 13:46

## 2023-03-27 RX ADMIN — Medication 5 MG: at 20:52

## 2023-03-27 NOTE — PROGRESS NOTES
[facility] Medicine Services  PROGRESS NOTE    Patient Name: Esperanza Pop  : 1945  MRN: [id number]    Date of Admission: 3/10/2023  Primary Care Physician: Meghana Rodgers MD    Subjective   Subjective     CC:  F/U generalized weakness     HPI:    Patient resting in bed in HD. States bilateral feet sore and butt hurts. No appetite and not able to eat as well as she knows she needs to. Patient want rehab and get better    ROS:  Gen- No fevers, chills  CV- No chest pain, palpitations  Resp- No cough, dyspnea  GI- No N/V/D, abd pain          Objective   Objective     Vital Signs:   Temp:  [97.9 °F (36.6 °C)-98.7 °F (37.1 °C)] 97.9 °F (36.6 °C)  Heart Rate:  [68-79] 73  Resp:  [18-20] 20  BP: (115-224)/() 171/64     Physical Exam:  Constitutional: Awake, alert, NAD, chronically ill appearing   HENT: NCAT, mucous membranes moist  Respiratory: Clear to auscultation bilaterally, nonlabored respirations   Cardiovascular: RRR, + systolic murmur 3/6  Gastrointestinal: Positive bowel sounds, soft, nontender, nondistended  Musculoskeletal: No bilateral ankle edema  Psychiatric: Appropriate affect, cooperative  Neurologic: Oriented x3, DONG freely, speech clear  Skin: no rashes.       Results Reviewed:  LAB RESULTS:      Lab 23  0708 23  0655 23  0326   WBC 7.39 6.50 7.12   HEMOGLOBIN 8.2* 8.4* 8.9*   HEMATOCRIT 27.9* 27.8* 29.3*   PLATELETS 146 124* 153   NEUTROS ABS  --  4.36 4.65   IMMATURE GRANS (ABS)  --  0.10* 0.12*   LYMPHS ABS  --  0.64* 0.67*   MONOS ABS  --  0.90 1.18*   EOS ABS  --  0.45* 0.47*   MCV 99.3* 100.0* 99.0*         Lab 23  0708 23  0655 23  0326   SODIUM 135* 134* 131*   POTASSIUM 4.3 4.7 4.5   CHLORIDE 99 98 95*   CO2 22.0 25.0 21.0*   ANION GAP 14.0 11.0 15.0   BUN 24* 27* 36*   CREATININE 6.24* 5.46* 6.66*   EGFR 6.5* 7.6* 6.0*   GLUCOSE 96 143* 111*   CALCIUM 8.7 8.4* 8.6   PHOSPHORUS 3.2  --   --          Lab 23  0708  03/22/23  0326   TOTAL PROTEIN  --  7.6   ALBUMIN 2.8* 3.0*   GLOBULIN  --  4.6   ALT (SGPT)  --  6   AST (SGOT)  --  17   BILIRUBIN  --  0.6   ALK PHOS  --  127*         Lab 03/22/23  0743 03/22/23  0326   HSTROP T 158* 156*                 Brief Urine Lab Results     None          Microbiology Results Abnormal     None          No radiology results from the last 24 hrs    Results for orders placed during the hospital encounter of 03/10/23    Adult Transthoracic Echo Complete W/ Cont if Necessary Per Protocol    Interpretation Summary  •  Left ventricular systolic function is normal. Estimated left ventricular EF = 55%  •  Left ventricular wall thickness is consistent with moderate concentric hypertrophy.  •  The right ventricular cavity is mildly dilated.  •  Mild to moderate biatrial enlargement.  •  Moderate to severe aortic valve stenosis is present.  Mean gradient 34 mmHg, DOUG 0.9 cm².  •  Mild aortic insufficiency.  •  Mild mitral regurgitation.  •  Mild to moderate tricuspid regurgitation with RVSP 48 mmHg.      Current medications:  Scheduled Meds:amiodarone, 200 mg, Oral, Daily  apixaban, 2.5 mg, Oral, Q12H  aspirin, 81 mg, Oral, Daily  atorvastatin, 80 mg, Oral, Nightly  carvedilol, 3.125 mg, Oral, Q12H  dorzolamide-timolol, 1 drop, Both Eyes, Daily  epoetin orquidea/orquidea-epbx, 20,000 Units, Subcutaneous, Once per day on Mon Wed Fri  insulin lispro, 0-9 Units, Subcutaneous, TID With Meals  isosorbide mononitrate, 120 mg, Oral, Daily  melatonin, 5 mg, Oral, Nightly      Continuous Infusions:   PRN Meds:.•  benzocaine-menthol  •  cloNIDine  •  dextrose  •  dextrose  •  glucagon (human recombinant)  •  haloperidol lactate  •  HYDROcodone-acetaminophen  •  hydrocortisone  •  hydrOXYzine  •  ondansetron **OR** ondansetron  •  QUEtiapine  •  sodium chloride  •  sodium chloride  •  sodium chloride    Assessment & Plan   Assessment & Plan     Active Hospital Problems    Diagnosis  POA   • **Hematochezia [K92.1]  Yes    • Symptomatic anemia [D64.9]  Yes   • Severe malnutrition (HCC) [E43]  Yes   • PAF (paroxysmal atrial fibrillation) (HCC) [I48.0]  Yes   • CHF (congestive heart failure) (HCC) [I50.9]  Yes   • End stage renal disease on dialysis (HCC) [N18.6, Z99.2]  Not Applicable   • Hyperlipidemia LDL goal <70 [E78.5]  Yes   • Essential hypertension [I10]  Yes   • Type 2 diabetes mellitus (HCC) [E11.9]  Yes      Resolved Hospital Problems   No resolved problems to display.        Brief Hospital Course to date:  Esperanza Pop is a 78 y.o. female  with PMH of ESRD on HD MWF, IDDM, HTN, HLD, HFpEF, breast cancer (s/p mastectomy and radiation), PAF (on xarelto), chronic anemia, and rectal cancer (recently diagnosed currently gettintg XRT and chemo) that presented to the ED after a fall at home. Found to have acute on chronic anemia, s/p 1 unit pRBCs on 3/11.   Patient wishing to pursue continued chemo/radiation but very very weak with SNF recs. Unable to complete radiation/chemo in SNF, but very weak with many treatments needed for home. Complicated picture, planning to hold radiation/chemo and transition to SNF and if she improves over the next few weeks, she can resume treatment; otherwise, family may need to consider hospice care.     This patient's problems and plans were partially entered by my partner and updated as appropriate by me 03/27/23.    Assessment and plan:  Acute on Chronic Anemia 2/2 rectal bleeding 2/2 known rectal cancer - stabilized  Profound Debility, SNF recs  · Status post she 1 unit PRBCs 3/11 with appropriate rise in H&H, remains stable  · She follows with Dr. Cavazos, Rad/Onc, Dr. Carpenter with oncology  · In regards to her rectal cancer, could not tolerate chemotherapy or radiation therapy because of severe debility.  Plan for now is to hold both therapies (chemoradiation) and transition to SNF and if she improves over the next few weeks, she can resume treatment. Otherwise, family might need to  consider hospice care. Discussed with patient today who wants to remain full care and proceed with rehab  · Case management consulted for SNF placement pending    Acute metabolic encephalopathy - resolved  · She completed course of abx for possible UTI, CT head without acute disease  · Continue seroquel qhs but decrease to 12.5mg with decreasing BP and change to prn    Chest pain  · Had an episode of chest pain early morning 3/22/2023.  Troponin was mildly elevated but flat  · Denies chest pain further.  Unfortunately because of her age, severe debility and advanced rectal cancer, not candidate for ischemic work-up.  Medical management for now  · Already on aspirin and Eliquis and Coreg    Buttock wound   · Wound care/pain control  · woc consult as well as palliative medicine. Continues to have pain and will likely benefit from palliative to follow at rehab    ESRD on HD MWF  · Renal following for HD today     HFpEF   Essential hypertension  hypotension  · Continue dialysis for volume control as above  · BP steadily declining for several days. ? Seroquel. Have decrease seroquel dose and changed to prn.  · Today BP elevated during dialysis clonidine given. Increase imdur back to 120mg  · Stop hytrin nightly. Change clonidine to PRN. Hold parameters on carvedilol    PAF on eliquis   · Continue eliquis + amiodarone, H&H stablized as above    Mod-Severe aortic stenosis   · Has appt with WILMA Lopez 4/27/23    Nightmares (started after the recent loss of her )  - started minipress,  But unable to receive due to low BP. stop      Expected Discharge Location and Transportation: SNF when bed available  Expected Discharge   03/27/2023     DVT prophylaxis:  Medical and mechanical DVT prophylaxis orders are present.     AM-PAC 6 Clicks Score (PT): 10 (03/26/23 2000)    CODE STATUS:   Code Status and Medical Interventions:   Ordered at: 03/10/23 3699     Medical Intervention Limits:    NO intubation (DNI)      Code Status (Patient has no pulse and is not breathing):    No CPR (Do Not Attempt to Resuscitate)     Medical Interventions (Patient has pulse or is breathing):    Limited Support       Jo Diallo, APRN  03/27/23

## 2023-03-27 NOTE — PROGRESS NOTES
"   LOS: 3 days    Patient Care Team:  Meghana Rodgers MD as PCP - General  TanikaDemetrius rg MD as Consulting Physician (Cardiology)  Kevan Lerma MD as Consulting Physician (Nephrology)  Geena Estrella APRN as Nurse Practitioner (Cardiology)  Frank Mandujano MD as Referring Physician (Colon and Rectal Surgery)  Kevan Cavazos MD as Consulting Physician (Radiation Oncology)  Yue Reeves RN as Nurse Navigator  Darryn Burk MD as Consulting Physician (Nephrology)    Chief Complaint:   ESRD on Monday Wednesday Friday, history of rectal CA on chemo, admitted after missing her dialysis.    Subjective SEEN ON DIALYSIS    Interval History:   No new events no added distress.  Patient was able to walk a little bit physical therapy today       Review of Systems:   No complaints      Objective     Vital Sign Min/Max for last 24 hours  Temp  Min: 97 °F (36.1 °C)  Max: 98.7 °F (37.1 °C)   BP  Min: 105/52  Max: 153/62   Pulse  Min: 56  Max: 61   Resp  Min: 16  Max: 18   SpO2  Min: 91 %  Max: 99 %   Flow (L/min)  Min: 2  Max: 2   No data recorded     Flowsheet Rows    Flowsheet Row First Filed Value   Admission Height 167.6 cm (66\") Documented at 03/10/2023 1122   Admission Weight 76.7 kg (169 lb) Documented at 03/10/2023 1122          No intake/output data recorded.  I/O last 3 completed shifts:  In: 780 [P.O.:280; I.V.:500]  Out: 0     Physical Exam:  General Appearance: -American female comfortable in bed.  Eyes: PER, EOMI.  Neck: Supple no JVD.  Lungs: Clear auscultation, no rales rhonchi's, equal chest movement, nonlabored.  Heart: Positive murmur, no, rub, RRR.  Abdomen: Soft, nontender, positive bowel sounds, no organomegaly.  Extremities: No edema, no cyanosis.  Neuro: No focal deficit, moving all extremities, alert oriented X 3  Right upper arm AV fistula  WBC WBC   Date Value Ref Range Status   03/22/2023 7.12 3.40 - 10.80 10*3/mm3 Final      HGB Hemoglobin   Date Value Ref Range " Status   03/22/2023 8.9 (L) 12.0 - 15.9 g/dL Final      HCT Hematocrit   Date Value Ref Range Status   03/22/2023 29.3 (L) 34.0 - 46.6 % Final      Platlets No results found for: LABPLAT   MCV MCV   Date Value Ref Range Status   03/22/2023 99.0 (H) 79.0 - 97.0 fL Final          Sodium Sodium   Date Value Ref Range Status   03/22/2023 131 (L) 136 - 145 mmol/L Final      Potassium Potassium   Date Value Ref Range Status   03/22/2023 4.5 3.5 - 5.2 mmol/L Final      Chloride Chloride   Date Value Ref Range Status   03/22/2023 95 (L) 98 - 107 mmol/L Final      CO2 CO2   Date Value Ref Range Status   03/22/2023 21.0 (L) 22.0 - 29.0 mmol/L Final      BUN BUN   Date Value Ref Range Status   03/22/2023 36 (H) 8 - 23 mg/dL Final      Creatinine Creatinine   Date Value Ref Range Status   03/22/2023 6.66 (H) 0.57 - 1.00 mg/dL Final      Calcium Calcium   Date Value Ref Range Status   03/22/2023 8.6 8.6 - 10.5 mg/dL Final      PO4 No results found for: CAPO4   Albumin Albumin   Date Value Ref Range Status   03/22/2023 3.0 (L) 3.5 - 5.2 g/dL Final      Magnesium No results found for: MG   Uric Acid No results found for: URICACID        Results Review:     I reviewed the patient's new clinical results.    amiodarone, 200 mg, Oral, Daily  apixaban, 2.5 mg, Oral, Q12H  aspirin, 81 mg, Oral, Daily  atorvastatin, 80 mg, Oral, Nightly  carvedilol, 3.125 mg, Oral, Q12H  cloNIDine, 0.4 mg, Oral, Q12H  dorzolamide-timolol, 1 drop, Both Eyes, Daily  epoetin orquidea/orquidea-epbx, 20,000 Units, Subcutaneous, Once per day on Mon Wed Fri  insulin lispro, 0-9 Units, Subcutaneous, TID With Meals  isosorbide mononitrate, 120 mg, Oral, Daily  melatonin, 5 mg, Oral, Nightly  QUEtiapine, 25 mg, Oral, Nightly  terazosin, 1 mg, Oral, Nightly           Medication Review: Reviewed    Assessment & Plan       Hematochezia    Type 2 diabetes mellitus (HCC)    Essential hypertension    Hyperlipidemia LDL goal <70    End stage renal disease on dialysis (HCC)     CHF (congestive heart failure) (HCC)    PAF (paroxysmal atrial fibrillation) (HCC)    Severe malnutrition (HCC)    Symptomatic anemia    1.  ESRD on Monday Wednesday Friday.  2.  Anemia of chronic kidney disease: Also on chemotherapy.  3.  Volume status: Dialysis ultrafiltration will be done.  4.  Access AV fistula.  5.  Hypertension  6.  Hyponatremia corrected with dialysis  Plan:  Plan for dialysis tomorrow orders written.  Monitor H&H.  If needed blood transfusions can be given during dialysis.  Monitor blood pressure and volume status.  Awaiting rehab placement.  HD TODAY  Darryn Burk MD  03/23/23  11:50 EDT

## 2023-03-27 NOTE — PLAN OF CARE
Goal Outcome Evaluation:              Outcome Evaluation: Pt VSS on room air other than elevated BP. PRN doses of HTN medications given during dialysis. Pt tolerated Dialysis well this AM. Decreased oral intake. Pt has taken medications whole this shift. Pt complaints of uncontrolled pain at the buttock radiation wound site. Extra PRN medication given with consult requested for palliative care. Pt has been pleasant and cooperative. Dressing changes occured 2X this shift as pt has been uncomfortable with drainage burning. Continue POC and report as needed.

## 2023-03-27 NOTE — SIGNIFICANT NOTE
03/27/23 1452   SLP Deferred Reason   SLP Deferred Reason Routine  (Pt in dialysis in AM during SLP availability for therapy. Will f/u as able.)

## 2023-03-27 NOTE — CONSULTS
Nutrition Services    Patient Name:  Esperanza Pop  YOB: 1945  MRN: [id number]  Admit Date:  3/10/2023    Consult received for Malnutrition Severity Assessment.    RD has been following and assessed pt to meet criteria severe/chronic malnutrition, see MSA completed 3/11 for full assessment and additional notes for further nutrition hx.    Nutrition intervention in place of magic cups all meals. Pt not candidate for liquid ONS due to pudding thick restriction.     Will continue to follow per protocol and attempt f/u visit as soon as feasible.     Electronically signed by:  Makenna Barnes RD  03/27/23 14:00 EDT

## 2023-03-27 NOTE — PLAN OF CARE
Problem: Device-Related Complication Risk (Hemodialysis)  Goal: Safe, Effective Therapy Delivery  Outcome: Ongoing, Progressing  Intervention: Optimize Device Care and Function  Recent Flowsheet Documentation  Taken 3/27/2023 0821 by Merline Fontanez, RN  Medication Review/Management:  • medications reviewed  • high-risk medications identified     Problem: Hemodynamic Instability (Hemodialysis)  Goal: Effective Tissue Perfusion  Outcome: Ongoing, Progressing     Problem: Infection (Hemodialysis)  Goal: Absence of Infection Signs and Symptoms  Outcome: Ongoing, Progressing   Goal Outcome Evaluation:         HD complete, blood reinfused, report given to primary RN Dre.   Pt hypertensive during tx, PRN given. Dr. Owusu aware. Pt complain of pain, PRN given.

## 2023-03-27 NOTE — PLAN OF CARE
Goal Outcome Evaluation:                  Problem: Adult Inpatient Plan of Care  Goal: Optimal Comfort and Wellbeing  Intervention: Provide Person-Centered Care  Recent Flowsheet Documentation  Taken 3/26/2023 2000 by Schilder, Claire, RN  Trust Relationship/Rapport:   care explained   thoughts/feelings acknowledged   questions encouraged     Patient resting throughout most of shift. Pleasant and pain managed with PRN. Pills crushed in pudding. Patient on room air.

## 2023-03-27 NOTE — CASE MANAGEMENT/SOCIAL WORK
Continued Stay Note  [facility]     Patient Name: Esperanza Pop  MRN: [id number]  Today's Date: 3/27/2023    Admit Date: 3/10/2023    Plan: precert started at [facility]   Discharge Plan     Row Name 03/27/23 1558       Plan    Plan precert started at [facility]    Plan Comments SW met with pt at bedside.  Pt's sister, Karen, was present with permission.  Pt was alert, oriented, and participated in conversation about discharge planning.  SW explained that no additional bed offers have been received.  Pt expressed concern about transportation from a rehab facility to/from  and is agreeable to accepting bed offer at [facility] and initiating insurance pre-cert.  SW notified facility liaison Adia.  HepB panel and therapy notes are current and can be used for referral.  [facility] will  update pt and team as soon as insurance decision has been made.    Final Discharge Disposition Code 03 - skilled nursing facility (SNF)    Row Name 03/27/23 1556       Plan    Plan Per-cert started at    Final Discharge Disposition Code 03 - skilled nursing facility (SNF)               Discharge Codes    No documentation.               Expected Discharge Date and Time     Expected Discharge Date Expected Discharge Time    Mar 29, 2023             SELIN Carrillo

## 2023-03-28 LAB
GLUCOSE BLDC GLUCOMTR-MCNC: 167 MG/DL (ref 70–130)
GLUCOSE BLDC GLUCOMTR-MCNC: 73 MG/DL (ref 70–130)
GLUCOSE BLDC GLUCOMTR-MCNC: 81 MG/DL (ref 70–130)
GLUCOSE BLDC GLUCOMTR-MCNC: 88 MG/DL (ref 70–130)

## 2023-03-28 PROCEDURE — G0378 HOSPITAL OBSERVATION PER HR: HCPCS

## 2023-03-28 PROCEDURE — 97530 THERAPEUTIC ACTIVITIES: CPT

## 2023-03-28 PROCEDURE — 97535 SELF CARE MNGMENT TRAINING: CPT | Performed by: OCCUPATIONAL THERAPIST

## 2023-03-28 PROCEDURE — 63710000001 INSULIN LISPRO (HUMAN) PER 5 UNITS: Performed by: FAMILY MEDICINE

## 2023-03-28 PROCEDURE — 99232 SBSQ HOSP IP/OBS MODERATE 35: CPT | Performed by: INTERNAL MEDICINE

## 2023-03-28 PROCEDURE — 97530 THERAPEUTIC ACTIVITIES: CPT | Performed by: OCCUPATIONAL THERAPIST

## 2023-03-28 PROCEDURE — 82962 GLUCOSE BLOOD TEST: CPT

## 2023-03-28 RX ORDER — AMOXICILLIN 250 MG
2 CAPSULE ORAL 2 TIMES DAILY
Status: DISCONTINUED | OUTPATIENT
Start: 2023-03-28 | End: 2023-03-29 | Stop reason: HOSPADM

## 2023-03-28 RX ORDER — MIRTAZAPINE 15 MG/1
15 TABLET, FILM COATED ORAL NIGHTLY
Status: DISCONTINUED | OUTPATIENT
Start: 2023-03-28 | End: 2023-03-29 | Stop reason: HOSPADM

## 2023-03-28 RX ADMIN — INSULIN LISPRO 2 UNITS: 100 INJECTION, SOLUTION INTRAVENOUS; SUBCUTANEOUS at 12:47

## 2023-03-28 RX ADMIN — HYDROCODONE BITARTRATE AND ACETAMINOPHEN 1 TABLET: 10; 325 TABLET ORAL at 18:22

## 2023-03-28 RX ADMIN — ASPIRIN 81 MG: 81 TABLET, COATED ORAL at 08:54

## 2023-03-28 RX ADMIN — SENNOSIDES AND DOCUSATE SODIUM 2 TABLET: 50; 8.6 TABLET ORAL at 12:47

## 2023-03-28 RX ADMIN — APIXABAN 2.5 MG: 2.5 TABLET, FILM COATED ORAL at 08:54

## 2023-03-28 RX ADMIN — APIXABAN 2.5 MG: 2.5 TABLET, FILM COATED ORAL at 20:13

## 2023-03-28 RX ADMIN — ATORVASTATIN CALCIUM 80 MG: 40 TABLET, FILM COATED ORAL at 20:13

## 2023-03-28 RX ADMIN — AMIODARONE HYDROCHLORIDE 200 MG: 200 TABLET ORAL at 08:54

## 2023-03-28 RX ADMIN — SENNOSIDES AND DOCUSATE SODIUM 2 TABLET: 50; 8.6 TABLET ORAL at 20:14

## 2023-03-28 RX ADMIN — HYDROCODONE BITARTRATE AND ACETAMINOPHEN 1 TABLET: 10; 325 TABLET ORAL at 08:53

## 2023-03-28 RX ADMIN — Medication 5 MG: at 20:13

## 2023-03-28 RX ADMIN — CARVEDILOL 3.12 MG: 3.12 TABLET, FILM COATED ORAL at 08:53

## 2023-03-28 RX ADMIN — ISOSORBIDE MONONITRATE 120 MG: 60 TABLET, EXTENDED RELEASE ORAL at 08:53

## 2023-03-28 RX ADMIN — OXYCODONE 5 MG: 5 TABLET ORAL at 12:47

## 2023-03-28 RX ADMIN — DORZOLAMIDE HYDROCHLORIDE AND TIMOLOL MALEATE 1 DROP: 20; 5 SOLUTION/ DROPS OPHTHALMIC at 08:54

## 2023-03-28 RX ADMIN — MIRTAZAPINE 15 MG: 15 TABLET, FILM COATED ORAL at 20:14

## 2023-03-28 NOTE — PROGRESS NOTES
Palliative Care Progress Note    Date of Admission: 3/10/2023    Subjective: Patient feels that her symptoms are mostly controlled, however nursing staff notes that whenever she is moved she has a significant amount of pain.  Staff also notes patient has been having some problems with constipation as well.  Current Code Status     Date Active Code Status Order ID Comments User Context       3/10/2023 1448 No CPR (Do Not Attempt to Resuscitate) [id number]  Connie Whitfield, DO ED      Question Answer    Code Status (Patient has no pulse and is not breathing) No CPR (Do Not Attempt to Resuscitate)    Medical Interventions (Patient has pulse or is breathing) Limited Support    Medical Intervention Limits: NO intubation (DNI)              No current facility-administered medications on file prior to encounter.     Current Outpatient Medications on File Prior to Encounter   Medication Sig Dispense Refill   • acetaminophen (TYLENOL) 500 MG tablet Take 500 mg by mouth.     • amiodarone (PACERONE) 200 MG tablet Take 1 tablet by mouth Daily. 90 tablet 1   • ammonium lactate (LAC-HYDRIN) 12 % lotion Apply  topically to the appropriate area as directed As Needed for Dry Skin. (Patient taking differently: Apply 1 application topically to the appropriate area as directed 2 (Two) Times a Day As Needed for Dry Skin.) 500 g 3   • apixaban (ELIQUIS) 2.5 MG tablet tablet Take 1 tablet by mouth 2 (Two) Times a Day. 180 tablet 3   • aspirin 81 MG EC tablet Take 1 tablet by mouth Daily.     • atorvastatin (LIPITOR) 80 MG tablet Take 1 tablet by mouth Every Night. 90 tablet 3   • B Complex-C-Folic Acid (DIALYVITE 800 PO) Take 1 tablet by mouth 3 (Three) Times a Week. On dialysis says MWF     • bisacodyl (DULCOLAX) 5 MG EC tablet Take 5 mg by mouth Daily As Needed for Constipation.     • capecitabine (XELODA) 500 MG chemo tablet Take 2 tablets by mouth 2 (Two) Times a Day on Monday-Friday with radiation. 120 tablet 0   •  Capsaicin 0.1 % cream Apply 2-4 gms to feet nightly. Use gloves 60 g 3   • carvedilol (COREG) 12.5 MG tablet Take 1 tablet by mouth Every 12 (Twelve) Hours. 180 tablet 1   • cloNIDine (CATAPRES) 0.2 MG tablet Take 2 tablets by mouth Every 12 (Twelve) Hours. 120 tablet 5   • dorzolamide-timolol (COSOPT) 22.3-6.8 MG/ML ophthalmic solution Administer 1 drop to both eyes Daily. 10 mL 3   • Durezol 0.05 % ophthalmic emulsion INSTILL 1 DROP 4 TIMES DAILY INTO SURGICAL EYE STARTING AFTER SURGERY.     • Epoetin Rusty (EPOGEN IJ) Epoetin Rusty (Epogen)     • fluocinonide (LIDEX) 0.05 % external solution Apply 0.05 application topically to the appropriate area as directed 2 (Two) Times a Day. Scalp  2   • glucose blood test strip Use as instructed to monitor blood glucose 1-2 times daily 100 each 12   • glucose monitor monitoring kit Use as directed to monitor blood glucose 1-2 times daily 1 each 0   • Hydrocortisone, Perianal, (ANUSOL-HC) 2.5 % rectal cream Insert  into the rectum 2 (Two) Times a Day. 28 g 0   • HYDROmorphone (DILAUDID) 2 MG tablet Take 1 tablet by mouth Every 6 (Six) Hours As Needed for Moderate Pain. 60 tablet 0   • IRON DEXTRAN IJ Inject  as directed 3 (Three) Times a Week.     • isosorbide mononitrate (IMDUR) 120 MG 24 hr tablet Take 1 tablet by mouth Daily. 90 tablet 3   • Lancets Ultra Fine misc Use as directed to check blood sugar 1-2 times daily 100 each 11   • Lidocaine 5 % cream Apply 1 g topically 2 (Two) Times a Day As Needed (pain). 30 g 0   • lidocaine-prilocaine (EMLA) 2.5-2.5 % cream Apply 1 application topically to the appropriate area as directed As Needed (dialysis access).  6   • megestrol (MEGACE) 40 MG tablet Take 40 mg by mouth Daily.     • metroNIDAZOLE (Flagyl) 500 MG tablet Take 1 tablet by mouth 3 (Three) Times a Day. 30 tablet 0   • Morphine (MS CONTIN) 15 MG 12 hr tablet Take 1 tablet by mouth 2 (Two) Times a Day. 60 tablet 0   • nitroglycerin (Nitrolingual) 0.4 MG/SPRAY spray  "Place 1 spray under the tongue Every 5 (Five) Minutes As Needed for Chest Pain. 1 each 12   • nortriptyline (PAMELOR) 10 MG capsule Take 1 capsule by mouth Every Night. 30 capsule 3   • NovoLIN 70/30 (70-30) 100 UNIT/ML injection INJECT 20 UNITS SUBCUTANEOUSLY BEFORE SUPPER AND 17 UNITS BEFORE BREAKFAST 10 mL 0   • ondansetron (ZOFRAN) 8 MG tablet Take 1 tablet by mouth 3 (Three) Times a Day As Needed for Nausea or Vomiting. 30 tablet 5   • terazosin (HYTRIN) 2 MG capsule Take 2 capsules by mouth Every Night. 180 capsule 3   • torsemide (Demadex) 20 MG tablet 1 PO QAM and 2 PO QPM 90 tablet 5   • VITAMIN D PO Take 1 mcg by mouth.          •  benzocaine-menthol  •  cloNIDine  •  dextrose  •  dextrose  •  glucagon (human recombinant)  •  haloperidol lactate  •  HYDROcodone-acetaminophen  •  hydrocortisone  •  hydrOXYzine  •  ondansetron **OR** ondansetron  •  oxyCODONE  •  QUEtiapine  •  sodium chloride  •  sodium chloride  •  sodium chloride    Objective: /67 (BP Location: Left arm, Patient Position: Lying)   Pulse 66   Temp 98.8 °F (37.1 °C) (Oral)   Resp 18   Ht 167.6 cm (65.98\")   Wt 79.1 kg (174 lb 6.1 oz)   LMP  (LMP Unknown)   SpO2 93%   BMI 28.16 kg/m²      Intake/Output Summary (Last 24 hours) at 3/28/2023 1147  Last data filed at 3/28/2023 0913  Gross per 24 hour   Intake 50 ml   Output 360 ml   Net -310 ml     Physical Exam:      General Appearance:    Alert, cooperative, in no acute distress   Head:    Normocephalic, without obvious abnormality, atraumatic   Eyes:            Lids and lashes normal, conjunctivae and sclerae normal, no   icterus, no pallor, corneas clear, PERRLA   Ears:    Ears appear intact with no abnormalities noted   Throat:   No oral lesions, no thrush, oral mucosa moist   Neck:   No adenopathy, supple, trachea midline, no thyromegaly, no     carotid bruit, no JVD   Back:     No kyphosis present, no scoliosis present, no skin lesions,       erythema or scars, no tenderness " to percussion or                   palpation,   range of motion normal   Lungs:     Clear to auscultation,respirations regular, even and                   unlabored    Heart:    Regular rhythm and normal rate, normal S1 and S2, no            murmur, no gallop, no rub, no click   Breast Exam:    Deferred   Abdomen:     Normal bowel sounds, no masses, no organomegaly, soft        non-tender, non-distended, no guarding, no rebound                 tenderness   Genitalia:    Deferred   Extremities:   Moves all extremities well, no edema, no cyanosis, no              redness   Pulses:   Pulses palpable and equal bilaterally   Skin:   No bleeding, bruising or rash   Lymph nodes:   No palpable adenopathy   Neurologic:   Cranial nerves 2 - 12 grossly intact, sensation intact, DTR        present and equal bilaterally     Results from last 7 days   Lab Units 03/26/23  0708   WBC 10*3/mm3 7.39   HEMOGLOBIN g/dL 8.2*   HEMATOCRIT % 27.9*   PLATELETS 10*3/mm3 146     Results from last 7 days   Lab Units 03/26/23  0708 03/24/23  0655 03/22/23  0326   SODIUM mmol/L 135*   < > 131*   POTASSIUM mmol/L 4.3   < > 4.5   CHLORIDE mmol/L 99   < > 95*   CO2 mmol/L 22.0   < > 21.0*   BUN mg/dL 24*   < > 36*   CREATININE mg/dL 6.24*   < > 6.66*   CALCIUM mg/dL 8.7   < > 8.6   BILIRUBIN mg/dL  --   --  0.6   ALK PHOS U/L  --   --  127*   ALT (SGPT) U/L  --   --  6   AST (SGOT) U/L  --   --  17   GLUCOSE mg/dL 96   < > 111*    < > = values in this interval not displayed.       Impression: End-stage renal disease  Rectal cancer  Neoplastic pain  Debility  Goals of care  Plan: We will adjust the patient's bowel regimen.  We will continue the patient on current symptom management regimen otherwise.  Patient states that her plan is to go to a rehab facility at discharge.  Would recommend palliative medicine to be consulted to follow her there if available, however I do not feel that palliative is available in [place] where the patient is  planning on going to.    Palliative medicine will sign off.        Trent Niño,   03/28/23  11:47 EDT

## 2023-03-28 NOTE — PROGRESS NOTES
"   LOS: 3 days    Patient Care Team:  Meghana Rodgers MD as PCP - General  Demetrius Sagastume MD as Consulting Physician (Cardiology)  Kevan Lerma MD as Consulting Physician (Nephrology)  Geena Estrella APRN as Nurse Practitioner (Cardiology)  Frank Mandujano MD as Referring Physician (Colon and Rectal Surgery)  Kevan Cavazos MD as Consulting Physician (Radiation Oncology)  Yue Reeves RN as Nurse Navigator  Darryn Burk MD as Consulting Physician (Nephrology)    Chief Complaint:   ESRD on Monday Wednesday Friday, history of rectal CA on chemo, admitted after missing her dialysis.    Subjective     Interval History:   No new events no added distress.  Patient was able to walk a little bit physical therapy today       Review of Systems:   No complaints      Objective     Vital Sign Min/Max for last 24 hours  Temp  Min: 97 °F (36.1 °C)  Max: 98.7 °F (37.1 °C)   BP  Min: 105/52  Max: 153/62   Pulse  Min: 56  Max: 61   Resp  Min: 16  Max: 18   SpO2  Min: 91 %  Max: 99 %   Flow (L/min)  Min: 2  Max: 2   No data recorded     Flowsheet Rows    Flowsheet Row First Filed Value   Admission Height 167.6 cm (66\") Documented at 03/10/2023 1122   Admission Weight 76.7 kg (169 lb) Documented at 03/10/2023 1122          No intake/output data recorded.  I/O last 3 completed shifts:  In: 780 [P.O.:280; I.V.:500]  Out: 0     Physical Exam:  General Appearance: -American female comfortable in bed.  Eyes: PER, EOMI.  Neck: Supple no JVD.  Lungs: Clear auscultation, no rales rhonchi's, equal chest movement, nonlabored.  Heart: Positive murmur, no, rub, RRR.  Abdomen: Soft, nontender, positive bowel sounds, no organomegaly.  Extremities: No edema, no cyanosis.  Neuro: No focal deficit, moving all extremities, alert oriented X 3  Right upper arm AV fistula  WBC WBC   Date Value Ref Range Status   03/22/2023 7.12 3.40 - 10.80 10*3/mm3 Final      HGB Hemoglobin   Date Value Ref Range Status   03/22/2023 " 8.9 (L) 12.0 - 15.9 g/dL Final      HCT Hematocrit   Date Value Ref Range Status   03/22/2023 29.3 (L) 34.0 - 46.6 % Final      Platlets No results found for: LABPLAT   MCV MCV   Date Value Ref Range Status   03/22/2023 99.0 (H) 79.0 - 97.0 fL Final          Sodium Sodium   Date Value Ref Range Status   03/22/2023 131 (L) 136 - 145 mmol/L Final      Potassium Potassium   Date Value Ref Range Status   03/22/2023 4.5 3.5 - 5.2 mmol/L Final      Chloride Chloride   Date Value Ref Range Status   03/22/2023 95 (L) 98 - 107 mmol/L Final      CO2 CO2   Date Value Ref Range Status   03/22/2023 21.0 (L) 22.0 - 29.0 mmol/L Final      BUN BUN   Date Value Ref Range Status   03/22/2023 36 (H) 8 - 23 mg/dL Final      Creatinine Creatinine   Date Value Ref Range Status   03/22/2023 6.66 (H) 0.57 - 1.00 mg/dL Final      Calcium Calcium   Date Value Ref Range Status   03/22/2023 8.6 8.6 - 10.5 mg/dL Final      PO4 No results found for: CAPO4   Albumin Albumin   Date Value Ref Range Status   03/22/2023 3.0 (L) 3.5 - 5.2 g/dL Final      Magnesium No results found for: MG   Uric Acid No results found for: URICACID        Results Review:     I reviewed the patient's new clinical results.    amiodarone, 200 mg, Oral, Daily  apixaban, 2.5 mg, Oral, Q12H  aspirin, 81 mg, Oral, Daily  atorvastatin, 80 mg, Oral, Nightly  carvedilol, 3.125 mg, Oral, Q12H  cloNIDine, 0.4 mg, Oral, Q12H  dorzolamide-timolol, 1 drop, Both Eyes, Daily  epoetin orquidea/orquidea-epbx, 20,000 Units, Subcutaneous, Once per day on Mon Wed Fri  insulin lispro, 0-9 Units, Subcutaneous, TID With Meals  isosorbide mononitrate, 120 mg, Oral, Daily  melatonin, 5 mg, Oral, Nightly  QUEtiapine, 25 mg, Oral, Nightly  terazosin, 1 mg, Oral, Nightly           Medication Review: Reviewed    Assessment & Plan       Hematochezia    Type 2 diabetes mellitus (HCC)    Essential hypertension    Hyperlipidemia LDL goal <70    End stage renal disease on dialysis (HCC)    CHF (congestive heart  failure) (HCC)    PAF (paroxysmal atrial fibrillation) (HCC)    Severe malnutrition (HCC)    Symptomatic anemia    1.  ESRD on Monday Wednesday Friday.  2.  Anemia of chronic kidney disease: Also on chemotherapy.  3.  Volume status: Dialysis ultrafiltration will be done.  4.  Access AV fistula.  5.  Hypertension  6.  Hyponatremia corrected with dialysis  Plan:  Plan for dialysis tomorrow orders written.  Monitor H&H.  If needed blood transfusions can be given during dialysis.  Monitor blood pressure and volume status.  Awaiting rehab placement.  HD 3/29/23.  Darryn uBrk MD  03/23/23  11:50 EDT

## 2023-03-28 NOTE — PLAN OF CARE
Goal Outcome Evaluation:  Plan of Care Reviewed With: patient        Progress: improving         Problem: Adult Inpatient Plan of Care  Goal: Absence of Hospital-Acquired Illness or Injury  Intervention: Prevent Infection  Recent Flowsheet Documentation  Taken 3/28/2023 0000 by Schilder, Claire, RN  Infection Prevention:   rest/sleep promoted   single patient room provided   environmental surveillance performed  Taken 3/27/2023 2200 by Schilder, Claire, RN  Infection Prevention:   rest/sleep promoted   single patient room provided   environmental surveillance performed  Taken 3/27/2023 2000 by Schilder, Claire, RN  Infection Prevention:   rest/sleep promoted   single patient room provided   environmental surveillance performed    Patient pleasant and pain controlled with PRN dose. Room air. Meds crushed in applesauce. Aox4. Awaiting D/C placement to facility.

## 2023-03-28 NOTE — PLAN OF CARE
Problem: Adult Inpatient Plan of Care  Goal: Plan of Care Review  Recent Flowsheet Documentation  Taken 3/28/2023 1017 by Brenda Mcelroy OT  Progress: improving  Plan of Care Reviewed With: patient  Outcome Evaluation: Pt. with improving functional mobility independence, increasing activity tolerance, and improving sitting & standing balance. Pt. tolerated participating in w/c mobility in hallway & required less assist with functional mobility. Pt. participating with LBD task. Will cont to progress pt as able per POC. Recommend SNF upon d/c.   Goal Outcome Evaluation:  Plan of Care Reviewed With: patient        Progress: improving  Outcome Evaluation: Pt. with improving functional mobility independence, increasing activity tolerance, and improving sitting & standing balance. Pt. tolerated participating in w/c mobility in hallway & required less assist with functional mobility. Pt. participating with LBD task. Will cont to progress pt as able per POC. Recommend SNF upon d/c.

## 2023-03-28 NOTE — PLAN OF CARE
Goal Outcome Evaluation:  Plan of Care Reviewed With: patient        Progress: no change  Outcome Evaluation: Palliative reconsulted for symptom management; seen by Dr. Niño, pt reported pain managed on current regimen, but staff reported pt calls out in pain when repositioned or moved. Pt reported that she also has pain/pressure in her rectum; per documentation, only BMs since admission on 3/10m are 3/21 (small), 3/24 (smear), and 3/27 (smear). Pt has no bowel regimen; Dr. Niño initiated Senna/Docusate; monitor, adjust interventions as needed to promote evacuation and regular BMs. Plan is to go to [facility], pending insurance approval. Dr. Niño recommends Palliative to follow if available at facility; [facility] does not follow at [facility]. Several [facility] facilities have in-house palliative services; recommend Hospitalist include Palliative follow-up as part of discharge summary. Palliative will sign off per Dr. Niño.    1300 Palliative IDT meeting: , RN, APRN, SW    After hours, weekends and holidays, contact Palliative Provider by calling [phone]

## 2023-03-28 NOTE — PROGRESS NOTES
follow up visit, provided empathic and narrative listening as she continues to grieve the death of her .

## 2023-03-28 NOTE — THERAPY TREATMENT NOTE
Patient Name: Esperanza Pop  : 1945    MRN: [id number]                              Today's Date: 3/28/2023       Admit Date: 3/10/2023    Visit Dx:     ICD-10-CM ICD-9-CM   1. Symptomatic anemia  D64.9 285.9   2. Generalized weakness  R53.1 780.79   3. ESRD on dialysis (Formerly [facility])  N18.6 585.6    Z99.2 V45.11   4. Impaired mobility  Z74.09 799.89   5. Fall, initial encounter  W19.XXXA E888.9   6. Elevated troponin  R77.8 790.6   7. Acute on chronic diastolic heart failure (Formerly [facility])  I50.33 428.33   8. Type 2 diabetes mellitus with chronic kidney disease on chronic dialysis, with long-term current use of insulin (Formerly [facility])  E11.22 250.40    N18.6 585.9    Z99.2 V45.11    Z79.4 V58.67   9. Severe malnutrition (Formerly [facility])  E43 261   10. Malignant neoplasm of anal canal (Formerly [facility])  C21.1 154.2   11. PAF (paroxysmal atrial fibrillation) (Formerly [facility])  I48.0 427.31   12. Coronary artery disease involving native coronary artery of native heart with angina pectoris (Formerly [facility])  I25.119 414.01     413.9   13. End stage renal disease on dialysis (Formerly [facility])  N18.6 585.6    Z99.2 V45.11   14. Nonrheumatic aortic valve stenosis  I35.0 424.1   15. NSTEMI (non-ST elevated myocardial infarction) (Formerly [facility])  I21.4 410.70   16. Acute cystitis without hematuria  N30.00 595.0   17. Ischemic heart disease  I25.9 414.9   18. Congestive heart failure, unspecified HF chronicity, unspecified heart failure type (Formerly [facility])  I50.9 428.0   19. Malignant neoplasm of right female breast, unspecified estrogen receptor status, unspecified site of breast (Formerly [facility])  C50.911 174.9   20. Hematochezia  K92.1 578.1   21. Acute midline low back pain without sciatica  M54.50 724.2   22. Oropharyngeal dysphagia  R13.12 787.22     Patient Active Problem List   Diagnosis   • Acute on chronic diastolic heart failure (HCC)   • Type 2 diabetes mellitus (Formerly [facility])   • Essential hypertension   • Coronary artery disease involving native coronary artery of native heart with angina pectoris (HCC)   • Breast cancer,  female, right   • Seasonal allergic rhinitis   • Right carotid bruit   • Vitamin B12 deficiency   • Hyperlipidemia LDL goal <70   • End stage renal disease on dialysis (HCC)   • Nonrheumatic aortic valve stenosis   • NSTEMI (non-ST elevated myocardial infarction) (HCC)   • UTI (urinary tract infection)   • Ischemic heart disease   • CHF (congestive heart failure) (HCC)   • Acute non-ST segment elevation myocardial infarction (STEMI) following previous myocardial infarction   • Dyslipidemia   • Diabetes mellitus (HCC)   • Mild obesity   • Elevated troponin   • Screen for colon cancer   • Acute midline low back pain without sciatica   • Hypertensive emergency   • PAF (paroxysmal atrial fibrillation) (HCC)   • Malignant neoplasm of anal canal (HCC)   • Hematochezia   • Severe malnutrition (HCC)   • Symptomatic anemia     Past Medical History:   Diagnosis Date   • Acute bronchitis    • Acute conjunctivitis    • Acute kidney injury (HCC)     Admission from 12/26/2013 to 01/02/2014, now resolved with latest creatinine 1.4 on 01/08/2014.   • Acute non-ST segment elevation myocardial infarction (STEMI) following previous myocardial infarction    • Anal cancer (HCC) 2/8/2023   • Anal cancer (HCC) 2/8/2023   • Arthritis    • Breast cancer, female, right     2005 mastectomy right   • Cancer (HCC)    • CHF (congestive heart failure) (HCC)    • Chronic kidney disease     dialysis MWF, [facility]    • Clotting disorder (HCC)    • COVID-19    • Diabetes mellitus (HCC)     diagnosed ~1992, checks fsbg 2-3x/day   • Diarrhea    • Dyslipidemia    • Dyspepsia    • Dyspnea    • Edema    • History of transfusion     ~2013 no reaction    • Hx of radiation therapy    • Hyperlipidemia    • Hypertension     Severe   • Ischemic heart disease    • Moderate obesity     BMI 36.2   • Myocardial infarction (HCC)    • Pneumonia    • Pneumonia 02/2019   • Radiation 2005   • Seasonal allergies    • Wears glasses      Past Surgical  History:   Procedure Laterality Date   • AV FISTULA PLACEMENT, BRACHIOBASILIC     • BREAST BIOPSY Left 2010   • BREAST CYST EXCISION     • BREAST LUMPECTOMY Right 2005   • BREAST SURGERY     • CARDIAC CATHETERIZATION N/A 10/10/2016    Procedure: Left Heart Cath;  Surgeon: Scooter Zhao MD;  Location:  [facility];  Service:    • CARDIAC CATHETERIZATION  10/2016   • CARDIAC CATHETERIZATION N/A 1/21/2021    Procedure: Right and Left Heart Cath;  Surgeon: Hugh Tidwell MD;  Location:  [facility];  Service: Cardiology;  Laterality: N/A;   • COLONOSCOPY N/A 7/23/2020    Procedure: COLONOSCOPY;  Surgeon: Rubén Rosas MD;  Location:  [facility];  Service: Gastroenterology;  Laterality: N/A;   • CORONARY STENT PLACEMENT  2016   • HERNIA REPAIR     • HYSTERECTOMY  2000   • INSERTION HEMODIALYSIS CATHETER Right 6/29/2016    Procedure: HEMODIALYSIS CATHETER INSERTION TUNNELLED;  Surgeon: Ramón Chavez MD;  Location: [facility] OR;  Service:    • MASTECTOMY Right 2006   • OOPHORECTOMY     • REDUCTION MAMMAPLASTY Left 2005      General Information     Row Name 03/28/23 1025          Physical Therapy Time and Intention    Document Type therapy note (daily note)  -ML     Mode of Treatment physical therapy  -     Row Name 03/28/23 1025          General Information    Patient Profile Reviewed yes  -ML     Existing Precautions/Restrictions fall;other (see comments)  buttock wound  -ML     Barriers to Rehab medically complex;previous functional deficit  -ML     Row Name 03/28/23 1025          Cognition    Orientation Status (Cognition) oriented to;person;place;situation;verbal cues/prompts needed for orientation;time  -ML     Row Name 03/28/23 1025          Safety Issues, Functional Mobility    Safety Issues Affecting Function (Mobility) insight into deficits/self-awareness;safety precaution awareness;safety precautions follow-through/compliance;sequencing abilities   -ML     Impairments Affecting Function (Mobility) balance;coordination;endurance/activity tolerance;pain;strength;postural/trunk control  -ML           User Key  (r) = Recorded By, (t) = Taken By, (c) = Cosigned By    Initials Name Provider Type    ML Siria Shirley Physical Therapist               Mobility     Row Name 03/28/23 1027          Bed Mobility    Bed Mobility rolling left;rolling right;supine-sit;sit-supine  -ML     Rolling Left [place] (Bed Mobility) minimum assist (75% patient effort);verbal cues;1 person assist  -ML     Rolling Right [place] (Bed Mobility) minimum assist (75% patient effort);1 person assist;verbal cues  -ML     Supine-Sit [place] (Bed Mobility) moderate assist (50% patient effort);2 person assist;verbal cues  -ML     Sit-Supine [place] (Bed Mobility) moderate assist (50% patient effort);2 person assist;verbal cues  -ML     Assistive Device (Bed Mobility) bed rails;draw sheet;head of bed elevated  -ML     Comment, (Bed Mobility) verbal cues for sequecning  -ML     Row Name 03/28/23 1027          Bed-Chair Transfer    Bed-Chair [place] (Transfers) verbal cues;nonverbal cues (demo/gesture);moderate assist (50% patient effort);2 person assist  -ML     Assistive Device (Bed-Chair Transfers) walker, front-wheeled  -ML     Comment, (Bed-Chair Transfer) Luis completed stand pivot transfer from EOB to WC  -ML     Row Name 03/28/23 1027          Sit-Stand Transfer    Sit-Stand [place] (Transfers) minimum assist (75% patient effort);2 person assist;verbal cues  with increased fatigue patient required modAx2  -ML     Assistive Device (Sit-Stand Transfers) walker, front-wheeled  -ML     Comment, (Sit-Stand Transfer) Fallon completed multiple sit to tand transfer during treatment session, with increased fatigue patient required modAx2 to complete sit to stand transfer  -ML     Row Name 03/28/23 1027          Gait/Stairs (Locomotion)    Comment, (Gait/Stairs) Patient  able to self propel WC approx 10 feet with BUE and BLE assist and supervision, verbal cues for correct hand placement. Patient required additional time to complete WC mobility. Patient required Keegan to turn WC towards the left.  -ML           User Key  (r) = Recorded By, (t) = Taken By, (c) = Cosigned By    Initials Name Provider Type    Siria Fiore Physical Therapist               Obj/Interventions     Row Name 03/28/23 1032          Balance    Balance Assessment sitting static balance;sitting dynamic balance;sit to stand dynamic balance;standing static balance;standing dynamic balance  -ML     Static Sitting Balance supervision  -ML     Dynamic Sitting Balance contact guard  -ML     Position, Sitting Balance unsupported;sitting edge of bed  supported sitting in WC  -ML     Sit to Stand Dynamic Balance verbal cues;minimal assist;moderate assist;2-person assist  with increased fatigue patient required modAx2  -ML     Static Standing Balance verbal cues;minimal assist;2-person assist  -ML     Dynamic Standing Balance verbal cues;moderate assist;2-person assist  -ML     Position/Device Used, Standing Balance supported;walker, rolling;other (see comments)  BUE support  -ML     Balance Interventions sitting;standing;sit to stand;supported;dynamic;static;weight shifting activity  -ML           User Key  (r) = Recorded By, (t) = Taken By, (c) = Cosigned By    Initials Name Provider Type    Siria Fiore Physical Therapist               Goals/Plan    No documentation.                Clinical Impression     Row Name 03/28/23 1033          Pain    Pretreatment Pain Rating 8/10  -ML     Posttreatment Pain Rating 2/10  -ML     Pain Location - Side/Orientation Bilateral  -ML     Pain Location - buttock  -ML     Pain Intervention(s) Repositioned;Ambulation/increased activity;Nursing Notified;Rest  -ML     Row Name 03/28/23 1033          Plan of Care Review    Plan of Care Reviewed With patient  -ML     Progress improving   -ML     Outcome Evaluation Patient with increased tolerance to therapy today. Patient able to complete multiple sit to stand transfers with min-modAx2. Patient participated in WC mobility, requiring additional time to complete. The patient continues to present below baseline for functional mobility and at increased risk for falls. The patient would continue to benefit from skilled PT to address strength, balance and activity tolerance. Continue to recommend SNF at discharge.  -ML     Row Name 03/28/23 1033          Vital Signs    Pre Patient Position Supine  -ML     Intra Patient Position Standing  -ML     Post Patient Position Side Lying  -ML     Row Name 03/28/23 1033          Positioning and Restraints    Pre-Treatment Position in bed  -ML     Post Treatment Position bed  -ML     In Bed notified nsg;side lying left;fowlers;call light within reach;encouraged to call for assist;exit alarm on;side rails up x3  -ML           User Key  (r) = Recorded By, (t) = Taken By, (c) = Cosigned By    Initials Name Provider Type     Siria Shirley Physical Therapist               Outcome Measures     Row Name 03/28/23 1037          How much help from another person do you currently need...    Turning from your back to your side while in flat bed without using bedrails? 2  -ML     Moving from lying on back to sitting on the side of a flat bed without bedrails? 2  -ML     Moving to and from a bed to a chair (including a wheelchair)? 2  -ML     Standing up from a chair using your arms (e.g., wheelchair, bedside chair)? 3  -ML     Climbing 3-5 steps with a railing? 1  -ML     To walk in hospital room? 2  -ML     AM-PAC 6 Clicks Score (PT) 12  -ML     Highest level of mobility 4 --> Transferred to chair/commode  -ML     Row Name 03/28/23 1037 03/28/23 0929       Functional Assessment    Outcome Measure Options AM-PAC 6 Clicks Basic Mobility (PT)  -ML AM-PAC 6 Clicks Daily Activity (OT)  -SD          User Key  (r) = Recorded By, (t) =  Taken By, (c) = Cosigned By    Initials Name Provider Type    Brenda Rojas, OT Occupational Therapist    Siria Fiore Physical Therapist                             Physical Therapy Education     Title: PT OT SLP Therapies (In Progress)     Topic: Physical Therapy (Done)     Point: Mobility training (Done)     Learning Progress Summary           Patient Acceptance, E, VU,NR by ML at 3/28/2023 1038    Acceptance, E,D, VU,NR by LS at 3/26/2023 1612    Acceptance, E, VU,NR by HT at 3/22/2023 1429    Eager, E, NR by MD at 3/21/2023 1808    Acceptance, E, NR by ML at 3/18/2023 1116    Acceptance, E,TB, VU,NR by HM at 3/17/2023 1534    Acceptance, E, NR by KR at 3/14/2023 1532    Acceptance, E, NR by KR at 3/13/2023 1011                   Point: Home exercise program (Done)     Learning Progress Summary           Patient Acceptance, E,D, VU,NR by LS at 3/26/2023 1612    Eager, E, NR by MD at 3/21/2023 1808    Acceptance, E, NR by KR at 3/14/2023 1532    Acceptance, E, NR by KR at 3/13/2023 1011                   Point: Body mechanics (Done)     Learning Progress Summary           Patient Acceptance, E, VU,NR by ML at 3/28/2023 1038    Acceptance, E,D, VU,NR by LS at 3/26/2023 1612    Acceptance, E, VU,NR by HT at 3/22/2023 1429    Eager, E, NR by MD at 3/21/2023 1808    Acceptance, E,TB, VU,NR by HM at 3/17/2023 1534    Acceptance, E, NR by KR at 3/14/2023 1532    Acceptance, E, NR by KR at 3/13/2023 1011                   Point: Precautions (Done)     Learning Progress Summary           Patient Acceptance, E, VU,NR by ML at 3/28/2023 1038    Acceptance, E,D, VU,NR by LS at 3/26/2023 1612    Acceptance, E, VU,NR by HT at 3/22/2023 1429    Eager, E, NR by MD at 3/21/2023 1808    Acceptance, E, NR by ML at 3/18/2023 1116    Acceptance, E,TB, VU,NR by HM at 3/17/2023 1534    Acceptance, E, NR by KR at 3/14/2023 1532    Acceptance, E, NR by KR at 3/13/2023 1011                               User Key      Initials Effective Dates Name Provider Type Discipline    LS 02/03/23 -  Charley Alejandre, PT Physical Therapist PT    ML 04/22/21 -  Siria Shirley Physical Therapist PT    MD 01/25/23 -  Siria Pascual, Nursing Student Nursing Student Nurse     09/22/22 -  Heydi Burnett, PT Physical Therapist PT    KR 12/30/22 -  Starla Nicholas, PT Physical Therapist PT    HT 01/12/23 -  Heydi Maier, PT Student PT Student PT              PT Recommendation and Plan     Plan of Care Reviewed With: patient  Progress: improving  Outcome Evaluation: Patient with increased tolerance to therapy today. Patient able to complete multiple sit to stand transfers with min-modAx2. Patient participated in WC mobility, requiring additional time to complete. The patient continues to present below baseline for functional mobility and at increased risk for falls. The patient would continue to benefit from skilled PT to address strength, balance and activity tolerance. Continue to recommend SNF at discharge.     Time Calculation:    PT Charges     Row Name 03/28/23 1038             Time Calculation    Start Time 0933  -ML      PT Received On 03/28/23  -ML         Timed Charges    52376 - PT Therapeutic Activity Minutes 32  -ML         Total Minutes    Timed Charges Total Minutes 32  -ML       Total Minutes 32  -ML            User Key  (r) = Recorded By, (t) = Taken By, (c) = Cosigned By    Initials Name Provider Type    ML Siria Shirley Physical Therapist              Therapy Charges for Today     Code Description Service Date Service Provider Modifiers Qty    [id number]  PT THERAPEUTIC ACT EA 15 MIN 3/28/2023 Siria Shirley GP 2          PT G-Codes  Outcome Measure Options: AM-PAC 6 Clicks Basic Mobility (PT)  AM-PAC 6 Clicks Score (PT): 12  AM-PAC 6 Clicks Score (OT): 14       Siria Shirley  3/28/2023

## 2023-03-28 NOTE — NURSING NOTE
WOC team received consult for radiation burns    Called and spoke with bedside RN who stated that WOC team had seen this patient multiple times for radiation burns and that orders were in place for dressing changes.  Night shift nurse agreed and was not sure why order was put in but stated that patient did need Mepitel 1 for dressing changes but that Mepitel AG was at the bedside.  Upon arrival to unit, called bedside RN who was unaware of any shortages regarding dressings.  WOC RN to look in 2 additional sheets of MepitelOne so now there are three Mepitel One dressings in place which would provide at least 6 dressing changes for all wounds and two Opticell AG patches, which should provide two dressing changes.  Opticell AG patches available from materials management.  If additional Mepitel One is available, please contact the WOC team.    Patient in bed with palliative care physician at bedside when WOC RN first went to patient's room to deliver Mepitel One and next when checked on patient in [place] in wheelchair with PT.    Thank you for the consult.  WOC team will continue to follow-up.  If alteration in skin integrity or change in wound bed presentation please consult the WOC team.

## 2023-03-28 NOTE — PROGRESS NOTES
[facility] Medicine Services  PROGRESS NOTE    Patient Name: Esperanza Pop  : 1945  MRN: [id number]    Date of Admission: 3/10/2023  Primary Care Physician: Meghana Rodgers MD    Subjective   Subjective     CC:  F/U generalized weakness     HPI:    I have seen and evaluated the patient this morning.  Comfortable in bed.  Reporting that she does not have much appetite and asking for something to increase her appetite some.  No acute complaints    ROS:  General : no fevers, chills  CVS: No chest pain, palpitations  Respiratory: No cough, dyspnea  GI: No N/V/D, abd pain  10 point review of systems is negative except for what is mentioned in HPI      Objective   Objective     Vital Signs:   Temp:  [97.7 °F (36.5 °C)-98.8 °F (37.1 °C)] 98.8 °F (37.1 °C)  Heart Rate:  [66-68] 66  Resp:  [18] 18  BP: (126-135)/(67-73) 126/67     Physical Exam:  General: Chronically ill looking.  Debilitated  Head: Atraumatic and normocephalic  Eyes: No Icterus. No pallor  Ears:  Ears appear intact with no abnormalities noted  Throat: No oral lesions, no thrush  Neck: Supple, trachea midline  Lungs: Clear to auscultation bilaterally, equal air entry, no wheezing or crackles  Heart:  Normal S1 and S2, no murmur, no gallop, No JVD, no lower extremity swelling  Abdomen:  Soft, no tenderness, no organomegaly, normal bowel sounds, no organomegaly  Extremities: pulses equal bilaterally  Skin: Healing bilateral ulceration of both buttocks  Neurologic: Cranial nerves appear intact with no evidence of facial asymmetry, normal motor and sensory functions in all 4 extremities  Psych: Alert and oriented x 3, normal mood    Results Reviewed:  LAB RESULTS:      Lab 23  0708 23  0655 23  0326   WBC 7.39 6.50 7.12   HEMOGLOBIN 8.2* 8.4* 8.9*   HEMATOCRIT 27.9* 27.8* 29.3*   PLATELETS 146 124* 153   NEUTROS ABS  --  4.36 4.65   IMMATURE GRANS (ABS)  --  0.10* 0.12*   LYMPHS ABS  --  0.64* 0.67*   MONOS  ABS  --  0.90 1.18*   EOS ABS  --  0.45* 0.47*   MCV 99.3* 100.0* 99.0*         Lab 03/26/23  0708 03/24/23  0655 03/22/23  0326   SODIUM 135* 134* 131*   POTASSIUM 4.3 4.7 4.5   CHLORIDE 99 98 95*   CO2 22.0 25.0 21.0*   ANION GAP 14.0 11.0 15.0   BUN 24* 27* 36*   CREATININE 6.24* 5.46* 6.66*   EGFR 6.5* 7.6* 6.0*   GLUCOSE 96 143* 111*   CALCIUM 8.7 8.4* 8.6   PHOSPHORUS 3.2  --   --          Lab 03/26/23  0708 03/22/23  0326   TOTAL PROTEIN  --  7.6   ALBUMIN 2.8* 3.0*   GLOBULIN  --  4.6   ALT (SGPT)  --  6   AST (SGOT)  --  17   BILIRUBIN  --  0.6   ALK PHOS  --  127*         Lab 03/22/23  0743 03/22/23  0326   HSTROP T 158* 156*                 Brief Urine Lab Results     None          Microbiology Results Abnormal     None          No radiology results from the last 24 hrs    Results for orders placed during the hospital encounter of 03/10/23    Adult Transthoracic Echo Complete W/ Cont if Necessary Per Protocol    Interpretation Summary  •  Left ventricular systolic function is normal. Estimated left ventricular EF = 55%  •  Left ventricular wall thickness is consistent with moderate concentric hypertrophy.  •  The right ventricular cavity is mildly dilated.  •  Mild to moderate biatrial enlargement.  •  Moderate to severe aortic valve stenosis is present.  Mean gradient 34 mmHg, DOUG 0.9 cm².  •  Mild aortic insufficiency.  •  Mild mitral regurgitation.  •  Mild to moderate tricuspid regurgitation with RVSP 48 mmHg.      Current medications:  Scheduled Meds:amiodarone, 200 mg, Oral, Daily  apixaban, 2.5 mg, Oral, Q12H  aspirin, 81 mg, Oral, Daily  atorvastatin, 80 mg, Oral, Nightly  carvedilol, 3.125 mg, Oral, Q12H  dorzolamide-timolol, 1 drop, Both Eyes, Daily  epoetin orquidea/orquidea-epbx, 20,000 Units, Subcutaneous, Once per day on Mon Wed Fri  insulin lispro, 0-9 Units, Subcutaneous, TID With Meals  isosorbide mononitrate, 120 mg, Oral, Daily  melatonin, 5 mg, Oral, Nightly  senna-docusate sodium, 2 tablet,  Oral, BID      Continuous Infusions:   PRN Meds:.•  benzocaine-menthol  •  cloNIDine  •  dextrose  •  dextrose  •  glucagon (human recombinant)  •  haloperidol lactate  •  HYDROcodone-acetaminophen  •  hydrocortisone  •  hydrOXYzine  •  ondansetron **OR** ondansetron  •  oxyCODONE  •  QUEtiapine  •  sodium chloride  •  sodium chloride  •  sodium chloride    Assessment & Plan   Assessment & Plan     Active Hospital Problems    Diagnosis  POA   • **Hematochezia [K92.1]  Yes   • Symptomatic anemia [D64.9]  Yes   • Severe malnutrition (HCC) [E43]  Yes   • PAF (paroxysmal atrial fibrillation) (HCC) [I48.0]  Yes   • CHF (congestive heart failure) (HCC) [I50.9]  Yes   • End stage renal disease on dialysis (HCC) [N18.6, Z99.2]  Not Applicable   • Hyperlipidemia LDL goal <70 [E78.5]  Yes   • Essential hypertension [I10]  Yes   • Type 2 diabetes mellitus (HCC) [E11.9]  Yes      Resolved Hospital Problems   No resolved problems to display.        Brief Hospital Course to date:  Esperanza Pop is a 78 y.o. female  with PMH of ESRD on HD MWF, IDDM, HTN, HLD, HFpEF, breast cancer (s/p mastectomy and radiation), PAF (on xarelto), chronic anemia, and rectal cancer (recently diagnosed currently gettintg XRT and chemo) that presented to the ED after a fall at home. Found to have acute on chronic anemia, s/p 1 unit pRBCs on 3/11.  Patient wishing to pursue continued chemo/radiation but very very weak with SNF recs. Unable to complete radiation/chemo in SNF, but very weak with many treatments needed for home. Complicated picture, planning to hold radiation/chemo and transition to SNF and if she improves over the next few weeks, she can resume treatment; otherwise, family may need to consider hospice care.     Assessment and plan:  Acute on Chronic Anemia 2/2 rectal bleeding 2/2 known rectal cancer - stabilized  Profound Debility, SNF recs  · Status post she 1 unit PRBCs 3/11 with appropriate rise in H&H, remains stable  · She follows  with Dr. Cavazos, Rad/Onc, Dr. Carpenter with oncology  · In regards to her rectal cancer, could not tolerate chemotherapy or radiation therapy because of severe debility.  Plan for now is to hold both therapies (chemoradiation) and transition to SNF and if she improves over the next few weeks, she can resume treatment. Otherwise, family might need to consider hospice care. Discussed with patient today who wants to remain full care and proceed with rehab  · Case management consulted for SNF placement pending  · Tentative plan for the patient to go to [facility] for SNF.  Just waiting insurance approval, per , palliative care service in the hospital does not follow at [facility].  It is a [facility] facility and it has in-house palliative services.  At time of discharge, will include palliative follow-up request ine her discharge summary     Poor appetite  · Will add mirtazapine    Acute metabolic encephalopathy - resolved  · She completed course of abx for possible UTI, CT head without acute disease  · Continue seroquel qhs but decrease to 12.5mg with decreasing BP and change to prn    Chest pain  · Had an episode of chest pain early morning 3/22/2023.  Troponin was mildly elevated but flat  · Denies chest pain further.  Unfortunately because of her age, severe debility and advanced rectal cancer, not candidate for ischemic work-up.  Medical management for now  · Already on aspirin and Eliquis and Coreg    Buttock wound   · Wound care/pain control  · woc consult as well as palliative medicine. Continues to have pain and will likely benefit from palliative to follow at rehab    ESRD on HD MWF  · Renal following for HD today     HFpEF   Essential hypertension  hypotension  · Continue dialysis for volume control as above  · BP steadily declining for several days. ? Seroquel. Have decrease seroquel dose and changed to prn.  · Today BP elevated during dialysis clonidine given. Increase imdur back to  120mg  · Stop hytrin nightly. Change clonidine to PRN. Hold parameters on carvedilol    PAF on eliquis   · Continue eliquis + amiodarone, H&H stablized as above    Mod-Severe aortic stenosis   · Has appt with WILMA Lopez 4/27/23      Expected Discharge Location and Transportation: SNF when bed available  Expected Discharge   03/27/2023     DVT prophylaxis:  Medical and mechanical DVT prophylaxis orders are present.     AM-PAC 6 Clicks Score (PT): 12 (03/28/23 1037)    CODE STATUS:   Code Status and Medical Interventions:   Ordered at: 03/10/23 1448     Medical Intervention Limits:    NO intubation (DNI)     Code Status (Patient has no pulse and is not breathing):    No CPR (Do Not Attempt to Resuscitate)     Medical Interventions (Patient has pulse or is breathing):    Limited Support     Copied text in this note has been reviewed and is accurate as of 03/28/23.     Kenny Nascimento MD  03/28/23

## 2023-03-28 NOTE — PLAN OF CARE
Goal Outcome Evaluation:      VSS, alert and orientated. Pain controlled with PRN meds. Will continue to monitor.

## 2023-03-28 NOTE — CASE MANAGEMENT/SOCIAL WORK
Continued Stay Note  [facility]     Patient Name: Esperanza Pop  MRN: [id number]  Today's Date: 3/28/2023    Admit Date: 3/10/2023    Plan: SNF   Discharge Plan     Row Name 03/28/23 1828       Plan    Plan SNF    Patient/Family in Agreement with Plan yes    Plan Comments Continuing to await insurance authorization for rehab [facility].  I spoke with admissions coordinator for [facility] who reports they did receive authorization for dialysis, however not skilled rehab yet.  Tentatively scheduled [facility] ambulance for 3/29/23 at 4:30pm in case receive tomorrow.  Bernadette Herman, Ext. [phone]    Final Discharge Disposition Code 03 - skilled nursing facility (SNF)               Discharge Codes    No documentation.               Expected Discharge Date and Time     Expected Discharge Date Expected Discharge Time    Mar 29, 2023             SELIN Alberto

## 2023-03-28 NOTE — PLAN OF CARE
Goal Outcome Evaluation:  Plan of Care Reviewed With: patient        Progress: improving  Outcome Evaluation: Patient with increased tolerance to therapy today. Patient able to complete multiple sit to stand transfers with min-modAx2. Patient participated in WC mobility, requiring additional time to complete. The patient continues to present below baseline for functional mobility and at increased risk for falls. The patient would continue to benefit from skilled PT to address strength, balance and activity tolerance. Continue to recommend SNF at discharge.

## 2023-03-28 NOTE — THERAPY PROGRESS REPORT/RE-CERT
Patient Name: Esperanza Pop  : 1945    MRN: [id number]                              Today's Date: 3/28/2023       Admit Date: 3/10/2023    Visit Dx:     ICD-10-CM ICD-9-CM   1. Symptomatic anemia  D64.9 285.9   2. Generalized weakness  R53.1 780.79   3. ESRD on dialysis ([facility])  N18.6 585.6    Z99.2 V45.11   4. Impaired mobility  Z74.09 799.89   5. Fall, initial encounter  W19.XXXA E888.9   6. Elevated troponin  R77.8 790.6   7. Acute on chronic diastolic heart failure ([facility])  I50.33 428.33   8. Type 2 diabetes mellitus with chronic kidney disease on chronic dialysis, with long-term current use of insulin ([facility])  E11.22 250.40    N18.6 585.9    Z99.2 V45.11    Z79.4 V58.67   9. Severe malnutrition ([facility])  E43 261   10. Malignant neoplasm of anal canal ([facility])  C21.1 154.2   11. PAF (paroxysmal atrial fibrillation) ([facility])  I48.0 427.31   12. Coronary artery disease involving native coronary artery of native heart with angina pectoris ([facility])  I25.119 414.01     413.9   13. End stage renal disease on dialysis ([facility])  N18.6 585.6    Z99.2 V45.11   14. Nonrheumatic aortic valve stenosis  I35.0 424.1   15. NSTEMI (non-ST elevated myocardial infarction) ([facility])  I21.4 410.70   16. Acute cystitis without hematuria  N30.00 595.0   17. Ischemic heart disease  I25.9 414.9   18. Congestive heart failure, unspecified HF chronicity, unspecified heart failure type ([facility])  I50.9 428.0   19. Malignant neoplasm of right female breast, unspecified estrogen receptor status, unspecified site of breast ([facility])  C50.911 174.9   20. Hematochezia  K92.1 578.1   21. Acute midline low back pain without sciatica  M54.50 724.2   22. Oropharyngeal dysphagia  R13.12 787.22     Patient Active Problem List   Diagnosis   • Acute on chronic diastolic heart failure (HCC)   • Type 2 diabetes mellitus ([facility])   • Essential hypertension   • Coronary artery disease involving native coronary artery of native heart with angina pectoris (HCC)   • Breast cancer,  female, right   • Seasonal allergic rhinitis   • Right carotid bruit   • Vitamin B12 deficiency   • Hyperlipidemia LDL goal <70   • End stage renal disease on dialysis (HCC)   • Nonrheumatic aortic valve stenosis   • NSTEMI (non-ST elevated myocardial infarction) (HCC)   • UTI (urinary tract infection)   • Ischemic heart disease   • CHF (congestive heart failure) (HCC)   • Acute non-ST segment elevation myocardial infarction (STEMI) following previous myocardial infarction   • Dyslipidemia   • Diabetes mellitus (HCC)   • Mild obesity   • Elevated troponin   • Screen for colon cancer   • Acute midline low back pain without sciatica   • Hypertensive emergency   • PAF (paroxysmal atrial fibrillation) (HCC)   • Malignant neoplasm of anal canal (HCC)   • Hematochezia   • Severe malnutrition (HCC)   • Symptomatic anemia     Past Medical History:   Diagnosis Date   • Acute bronchitis    • Acute conjunctivitis    • Acute kidney injury (HCC)     Admission from 12/26/2013 to 01/02/2014, now resolved with latest creatinine 1.4 on 01/08/2014.   • Acute non-ST segment elevation myocardial infarction (STEMI) following previous myocardial infarction    • Anal cancer (HCC) 2/8/2023   • Anal cancer (HCC) 2/8/2023   • Arthritis    • Breast cancer, female, right     2005 mastectomy right   • Cancer (HCC)    • CHF (congestive heart failure) (HCC)    • Chronic kidney disease     dialysis MWF, [facility]    • Clotting disorder (HCC)    • COVID-19    • Diabetes mellitus (HCC)     diagnosed ~1992, checks fsbg 2-3x/day   • Diarrhea    • Dyslipidemia    • Dyspepsia    • Dyspnea    • Edema    • History of transfusion     ~2013 no reaction    • Hx of radiation therapy    • Hyperlipidemia    • Hypertension     Severe   • Ischemic heart disease    • Moderate obesity     BMI 36.2   • Myocardial infarction (HCC)    • Pneumonia    • Pneumonia 02/2019   • Radiation 2005   • Seasonal allergies    • Wears glasses      Past Surgical  History:   Procedure Laterality Date   • AV FISTULA PLACEMENT, BRACHIOBASILIC     • BREAST BIOPSY Left 2010   • BREAST CYST EXCISION     • BREAST LUMPECTOMY Right 2005   • BREAST SURGERY     • CARDIAC CATHETERIZATION N/A 10/10/2016    Procedure: Left Heart Cath;  Surgeon: Scooter Zhao MD;  Location:  [facility];  Service:    • CARDIAC CATHETERIZATION  10/2016   • CARDIAC CATHETERIZATION N/A 1/21/2021    Procedure: Right and Left Heart Cath;  Surgeon: Hugh Tidwell MD;  Location:  [facility];  Service: Cardiology;  Laterality: N/A;   • COLONOSCOPY N/A 7/23/2020    Procedure: COLONOSCOPY;  Surgeon: Rubén Rosas MD;  Location:  [facility];  Service: Gastroenterology;  Laterality: N/A;   • CORONARY STENT PLACEMENT  2016   • HERNIA REPAIR     • HYSTERECTOMY  2000   • INSERTION HEMODIALYSIS CATHETER Right 6/29/2016    Procedure: HEMODIALYSIS CATHETER INSERTION TUNNELLED;  Surgeon: Ramón Chavez MD;  Location: [facility] OR;  Service:    • MASTECTOMY Right 2006   • OOPHORECTOMY     • REDUCTION MAMMAPLASTY Left 2005      General Information     Row Name 03/28/23 0929          OT Time and Intention    Document Type progress note/recertification  -SD     Mode of Treatment occupational therapy  -SD     Row Name 03/28/23 0929          General Information    Patient Profile Reviewed yes  -SD     Prior Level of Function independent:;ADL's;bed mobility;transfer;all household mobility  -SD     Existing Precautions/Restrictions fall;other (see comments)  buttock wound  -SD     Barriers to Rehab medically complex;previous functional deficit  -SD     Row Name 03/28/23 0929          Cognition    Orientation Status (Cognition) oriented to;person;place;situation;verbal cues/prompts needed for orientation;time  -SD     Row Name 03/28/23 0929          Safety Issues, Functional Mobility    Safety Issues Affecting Function (Mobility) insight into  deficits/self-awareness;safety precaution awareness;safety precautions follow-through/compliance;sequencing abilities  -SD     Impairments Affecting Function (Mobility) balance;coordination;endurance/activity tolerance;pain;strength;postural/trunk control  -SD     Cognitive Impairments, Mobility Safety/Performance insight into deficits/self-awareness;safety precaution awareness;safety precaution follow-through;sequencing abilities  -SD           User Key  (r) = Recorded By, (t) = Taken By, (c) = Cosigned By    Initials Name Provider Type    Brenda Rojas OT Occupational Therapist                 Mobility/ADL's     Row Name 03/28/23 1013          Bed Mobility    Bed Mobility rolling left;rolling right;supine-sit;sit-supine  -SD     Rolling Left [place] (Bed Mobility) minimum assist (75% patient effort);verbal cues;1 person assist  -SD     Rolling Right [place] (Bed Mobility) minimum assist (75% patient effort);1 person assist;verbal cues  -SD     Supine-Sit [place] (Bed Mobility) moderate assist (50% patient effort);2 person assist;verbal cues  -SD     Sit-Supine [place] (Bed Mobility) moderate assist (50% patient effort);2 person assist;verbal cues  -SD     Bed Mobility, Safety Issues decreased use of legs for bridging/pushing  -SD     Assistive Device (Bed Mobility) bed rails;draw sheet;head of bed elevated  -SD     Row Name 03/28/23 1013          Transfers    Transfers sit-stand transfer;stand-sit transfer;other (see comments)  -SD     Row Name 03/28/23 1013          Sit-Stand Transfer    Sit-Stand [place] (Transfers) minimum assist (75% patient effort);2 person assist;verbal cues  -SD     Assistive Device (Sit-Stand Transfers) walker, front-wheeled  -SD     Row Name 03/28/23 1013          Stand-Sit Transfer    Stand-Sit [place] (Transfers) minimum assist (75% patient effort);2 person assist;verbal cues  -SD     Assistive Device (Stand-Sit Transfers) walker, front-wheeled   -SD     Row Name 03/28/23 1013          Activities of Daily Living    BADL Assessment/Intervention upper body dressing;grooming;lower body dressing  -SD     Row Name 03/28/23 1013          Lower Body Dressing Assessment/Training    [place] Level (Lower Body Dressing) doff;don;socks;maximum assist (25% patient effort);verbal cues  -SD     Position (Lower Body Dressing) supported sitting  -SD     Row Name 03/28/23 1013          Grooming Assessment/Training    [place] Level (Grooming) wash face, hands;set up  -SD     Position (Grooming) supported sitting  -SD     [facility] Name 03/28/23 1013          Upper Body Dressing Assessment/Training    [place] Level (Upper Body Dressing) don;doff;pajama/robe;minimum assist (75% patient effort)  -SD     Position (Upper Body Dressing) edge of bed sitting  -SD     [facility] Name 03/28/23 1013          Self-Feeding Assessment/Training    [place] Level (Feeding) liquids to mouth;set up  -SD     Position (Self-Feeding) sitting up in bed  -SD     Row Name 03/28/23 1013          Wheelchair Transfer    Type (Wheelchair Transfer) stand pivot/stand step  -SD     [place] Level (Wheelchair Transfer) minimum assist (75% patient effort);2 person assist;verbal cues  -SD     Assistive Device (Wheelchair Transfer) walker, front-wheeled  -SD           User Key  (r) = Recorded By, (t) = Taken By, (c) = Cosigned By    Initials Name Provider Type    Brenda Rojas OT Occupational Therapist               Obj/Interventions     Row Name 03/28/23 1015          Range of Motion Comprehensive    General Range of Motion bilateral upper extremity ROM WFL  -SD     Row Name 03/28/23 1015          Strength Comprehensive (MMT)    Comment, General Manual Muscle Testing (MMT) Assessment B UE Strength: 4-/5 grossly  -SD     [facility] Name 03/28/23 1015          Balance    Static Sitting Balance supervision  -SD     Dynamic Sitting Balance contact guard  -SD     Position, Sitting Balance sitting  edge of bed;other (see comments)  sitting in w/c  -SD     Static Standing Balance minimal assist;2-person assist  -SD     Dynamic Standing Balance minimal assist;2-person assist  -SD     Position/Device Used, Standing Balance supported;walker, front-wheeled  -SD     Balance Interventions standing;weight shifting activity;dynamic reaching  -SD     Comment, Balance STS from w/c @window in hallway, holding to chair rail  -SD           User Key  (r) = Recorded By, (t) = Taken By, (c) = Cosigned By    Initials Name Provider Type    Brenda Rojas, OT Occupational Therapist               Goals/Plan     Row Name 03/28/23 1020          Bed Mobility Goal 1 (OT)    Activity/Assistive Device (Bed Mobility Goal 1, OT) rolling to left;rolling to right;sit to supine/supine to sit  -SD     [place] Level/Cues Needed (Bed Mobility Goal 1, OT) minimum assist (75% or more patient effort);verbal cues required  -SD     Time Frame (Bed Mobility Goal 1, OT) long term goal (LTG);10 days  -SD     Progress/Outcomes (Bed Mobility Goal 1, OT) goal ongoing;new goal  -SD     Row Name 03/28/23 1020          Transfer Goal 1 (OT)    Activity/Assistive Device (Transfer Goal 1, OT) sit-to-stand/stand-to-sit;toilet;wheelchair transfer;walker, rolling  -SD     [place] Level/Cues Needed (Transfer Goal 1, OT) minimum assist (75% or more patient effort);verbal cues required  -SD     Time Frame (Transfer Goal 1, OT) long term goal (LTG);10 days  -SD     Progress/Outcome (Transfer Goal 1, OT) goal ongoing;continuing progress toward goal  -SD     Row Name 03/28/23 1020          Toileting Goal 1 (OT)    Activity/Device (Toileting Goal 1, OT) adjust/manage clothing;perform perineal hygiene;commode, bedside without drop arms  -SD     [place] Level/Cues Needed (Toileting Goal 1, OT) moderate assist (50-74% patient effort);verbal cues required  -SD     Time Frame (Toileting Goal 1, OT) long term goal (LTG);10 days  -SD      Progress/Outcome (Toileting Goal 1, OT) goal revised this date;goal ongoing  -SD     Row Name 03/28/23 1020          Grooming Goal 1 (OT)    Activity/Device (Grooming Goal 1, OT) oral care;wash face, hands  -SD     [place] (Grooming Goal 1, OT) standby assist  -SD     Time Frame (Grooming Goal 1, OT) long term goal (LTG);10 days  -SD     Strategies/Barriers (Grooming Goal 1, OT) from w/c level or standing sink side  -SD     Progress/Outcome (Grooming Goal 1, OT) goal ongoing;goal revised this date  -SD     Row Name 03/28/23 1020          Therapy Assessment/Plan (OT)    Planned Therapy Interventions (OT) activity tolerance training;BADL retraining;functional balance retraining;occupation/activity based interventions;ROM/therapeutic exercise;strengthening exercise;transfer/mobility retraining;patient/caregiver education/training  -SD           User Key  (r) = Recorded By, (t) = Taken By, (c) = Cosigned By    Initials Name Provider Type    Brenda Rojas, OT Occupational Therapist               Clinical Impression     Row Name 03/28/23 1017          Pain Assessment    Pretreatment Pain Rating 8/10  -SD     Posttreatment Pain Rating 2/10  -SD     Pain Location - Side/Orientation Bilateral  -SD     Pain Location lower  -SD     Pain Location - buttock  -SD     Pain Intervention(s) Ambulation/increased activity;Emotional support;Nursing Notified;Repositioned  -SD     Row Name 03/28/23 1017          Plan of Care Review    Plan of Care Reviewed With patient  -SD     Progress improving  -SD     Outcome Evaluation Pt. with improving functional mobility independence, increasing activity tolerance, and improving sitting & standing balance. Pt. tolerated participating in w/c mobility in hallway & required less assist with functional mobility. Pt. participating with LBD task. Will cont to progress pt as able per POC. Recommend SNF upon d/c.  -SD     Row Name 03/28/23 1017          Therapy Assessment/Plan (OT)     Rehab Potential (OT) good, to achieve stated therapy goals  -SD     Criteria for Skilled Therapeutic Interventions Met (OT) yes;meets criteria;skilled treatment is necessary  -SD     Therapy Frequency (OT) daily  -SD     Row Name 03/28/23 1017          Therapy Plan Review/Discharge Plan (OT)    Anticipated Discharge Disposition (OT) skilled nursing facility  -SD     Row Name 03/28/23 1017          Vital Signs    O2 Delivery Pre Treatment room air  -SD     O2 Delivery Intra Treatment room air  -SD     O2 Delivery Post Treatment room air  -SD     Pre Patient Position Supine  -SD     Intra Patient Position Standing  -SD     Post Patient Position Side Lying  -SD     Row Name 03/28/23 1017          Positioning and Restraints    Pre-Treatment Position in bed  -SD     Post Treatment Position bed  -SD     In Bed notified nsg;side lying left;call light within reach;encouraged to call for assist;exit alarm on;side rails up x3  -SD           User Key  (r) = Recorded By, (t) = Taken By, (c) = Cosigned By    Initials Name Provider Type    Brenda Rojas OT Occupational Therapist               Outcome Measures     Row Name 03/28/23 0929          How much help from another is currently needed...    Putting on and taking off regular lower body clothing? 2  -SD     Bathing (including washing, rinsing, and drying) 2  -SD     Toileting (which includes using toilet bed pan or urinal) 1  -SD     Putting on and taking off regular upper body clothing 3  -SD     Taking care of personal grooming (such as brushing teeth) 3  -SD     Eating meals 3  -SD     AM-PAC 6 Clicks Score (OT) 14  -SD     Row Name 03/28/23 0929          Functional Assessment    Outcome Measure Options AM-PAC 6 Clicks Daily Activity (OT)  -SD           User Key  (r) = Recorded By, (t) = Taken By, (c) = Cosigned By    Initials Name Provider Type    Brenda Rojas OT Occupational Therapist                Occupational Therapy Education     Title: PT  OT SLP Therapies (In Progress)     Topic: Occupational Therapy (In Progress)     Point: ADL training (In Progress)     Description:   Instruct learner(s) on proper safety adaptation and remediation techniques during self care or transfers.   Instruct in proper use of assistive devices.              Learning Progress Summary           Patient Eager, E, NR by MD at 3/21/2023 1808    Acceptance, E,D,H, VU,NR by JOESPH at 3/21/2023 1538    Comment: UE TE AROM/strengthening program, midline sitting, transfer technique, posture, bed mobility, ADL sequencing, orientation    Acceptance, E, NR by JR at 3/19/2023 1313    Acceptance, E, NR by AC at 3/16/2023 0956    Acceptance, E, NL,NR by JB1 at 3/13/2023 1013    Comment: OT POC; fall prevention; orientation   Family Acceptance, E,D,H, VU,NR by JOESPH at 3/21/2023 1538    Comment: UE TE AROM/strengthening program, midline sitting, transfer technique, posture, bed mobility, ADL sequencing, orientation    Acceptance, E, NR by JR at 3/19/2023 1313                   Point: Home exercise program (In Progress)     Description:   Instruct learner(s) on appropriate technique for monitoring, assisting and/or progressing therapeutic exercises/activities.              Learning Progress Summary           Patient Eager, E, NR by MD at 3/21/2023 1808    Acceptance, E,D,H, VU,NR by JOESPH at 3/21/2023 1538    Comment: UE TE AROM/strengthening program, midline sitting, transfer technique, posture, bed mobility, ADL sequencing, orientation    Acceptance, E, NR by JR at 3/19/2023 1313   Family Acceptance, E,D,H, VU,NR by JOESPH at 3/21/2023 1538    Comment: UE TE AROM/strengthening program, midline sitting, transfer technique, posture, bed mobility, ADL sequencing, orientation    Acceptance, E, NR by  at 3/19/2023 1313                   Point: Precautions (In Progress)     Description:   Instruct learner(s) on prescribed precautions during self-care and functional transfers.              Learning Progress  Summary           Patient FABRICE Silvestre, NR by MD at 3/21/2023 1808    Acceptance, E,D,H, VU,NR by JOESPH at 3/21/2023 1538    Comment: UE TE AROM/strengthening program, midline sitting, transfer technique, posture, bed mobility, ADL sequencing, orientation    Acceptance, E, NL,NR by [facility] at 3/13/2023 1013    Comment: OT POC; fall prevention; orientation   Family Acceptance, E,D,H, VU,NR by JOESPH at 3/21/2023 1538    Comment: UE TE AROM/strengthening program, midline sitting, transfer technique, posture, bed mobility, ADL sequencing, orientation                   Point: Body mechanics (In Progress)     Description:   Instruct learner(s) on proper positioning and spine alignment during self-care, functional mobility activities and/or exercises.              Learning Progress Summary           Patient FABRICE Silvestre, NR by MD at 3/21/2023 1808    Acceptance, E,D,H, VU,NR by JOESPH at 3/21/2023 1538    Comment: UE TE AROM/strengthening program, midline sitting, transfer technique, posture, bed mobility, ADL sequencing, orientation    Acceptance, E, NL,NR by [facility] at 3/13/2023 1013    Comment: OT POC; fall prevention; orientation   Family Acceptance, E,D,H, VU,NR by JOESPH at 3/21/2023 1538    Comment: UE TE AROM/strengthening program, midline sitting, transfer technique, posture, bed mobility, ADL sequencing, orientation                               User Key     Initials Effective Dates Name Provider Type Discipline     02/03/23 -  Maria Fernanda Ortiz, OT Occupational Therapist OT     02/03/23 -  Radha Carson, OT Occupational Therapist OT    JR 02/03/23 -  Bernadette Aguirre, OT Occupational Therapist OT    JB 06/16/21 -  Merline Aguila OT Occupational Therapist OT    MD 01/25/23 -  Siria Pascual, Nursing Student Nursing Student Nurse              OT Recommendation and Plan  Planned Therapy Interventions (OT): activity tolerance training, BADL retraining, functional balance retraining, occupation/activity based interventions,  ROM/therapeutic exercise, strengthening exercise, transfer/mobility retraining, patient/caregiver education/training  Therapy Frequency (OT): daily  Plan of Care Review  Plan of Care Reviewed With: patient  Progress: improving  Outcome Evaluation: Pt. with improving functional mobility independence, increasing activity tolerance, and improving sitting & standing balance. Pt. tolerated participating in w/c mobility in hallway & required less assist with functional mobility. Pt. participating with LBD task. Will cont to progress pt as able per POC. Recommend SNF upon d/c.     Time Calculation:    Time Calculation- OT     Row Name 03/28/23 1023             Time Calculation- OT    OT Received On 03/28/23  -SD      OT Goal Re-Cert Due Date 04/07/23  -SD         Timed Charges    82082 - OT Therapeutic Activity Minutes 10  -SD      26824 - OT Self Care/Mgmt Minutes 15  -SD         Total Minutes    Timed Charges Total Minutes 25  -SD       Total Minutes 25  -SD            User Key  (r) = Recorded By, (t) = Taken By, (c) = Cosigned By    Initials Name Provider Type    Brenda Rojas OT Occupational Therapist              Therapy Charges for Today     Code Description Service Date Service Provider Modifiers Qty    35595159593 HC OT THERAPEUTIC ACT EA 15 MIN 3/28/2023 Brenda Mcelroy OT GO 1    20010879463 HC OT SELF CARE/MGMT/TRAIN EA 15 MIN 3/28/2023 Brenda Mcelroy OT GO 1               Brenda Mcelroy OT  3/28/2023

## 2023-03-29 ENCOUNTER — APPOINTMENT (OUTPATIENT)
Dept: NEPHROLOGY | Facility: HOSPITAL | Age: 78
End: 2023-03-29
Payer: MEDICARE

## 2023-03-29 ENCOUNTER — HOME HEALTH ADMISSION (OUTPATIENT)
Dept: HOME HEALTH SERVICES | Facility: HOME HEALTHCARE | Age: 78
End: 2023-03-29
Payer: MEDICARE

## 2023-03-29 VITALS
HEIGHT: 66 IN | SYSTOLIC BLOOD PRESSURE: 144 MMHG | OXYGEN SATURATION: 95 % | TEMPERATURE: 97.7 F | HEART RATE: 78 BPM | RESPIRATION RATE: 18 BRPM | WEIGHT: 174.38 LBS | DIASTOLIC BLOOD PRESSURE: 64 MMHG | BODY MASS INDEX: 28.03 KG/M2

## 2023-03-29 LAB
ALBUMIN SERPL-MCNC: 2.8 G/DL (ref 3.5–5.2)
ALBUMIN/GLOB SERPL: 0.6 G/DL
ALP SERPL-CCNC: 153 U/L (ref 39–117)
ALT SERPL W P-5'-P-CCNC: 5 U/L (ref 1–33)
ANION GAP SERPL CALCULATED.3IONS-SCNC: 15 MMOL/L (ref 5–15)
AST SERPL-CCNC: 20 U/L (ref 1–32)
BILIRUB SERPL-MCNC: 0.7 MG/DL (ref 0–1.2)
BUN SERPL-MCNC: 22 MG/DL (ref 8–23)
BUN/CREAT SERPL: 3.7 (ref 7–25)
CALCIUM SPEC-SCNC: 8.6 MG/DL (ref 8.6–10.5)
CHLORIDE SERPL-SCNC: 96 MMOL/L (ref 98–107)
CO2 SERPL-SCNC: 20 MMOL/L (ref 22–29)
CREAT SERPL-MCNC: 5.91 MG/DL (ref 0.57–1)
EGFRCR SERPLBLD CKD-EPI 2021: 6.9 ML/MIN/1.73
GLOBULIN UR ELPH-MCNC: 4.6 GM/DL
GLUCOSE BLDC GLUCOMTR-MCNC: 108 MG/DL (ref 70–130)
GLUCOSE BLDC GLUCOMTR-MCNC: 110 MG/DL (ref 70–130)
GLUCOSE BLDC GLUCOMTR-MCNC: 69 MG/DL (ref 70–130)
GLUCOSE SERPL-MCNC: 79 MG/DL (ref 65–99)
POTASSIUM SERPL-SCNC: 4 MMOL/L (ref 3.5–5.2)
PROT SERPL-MCNC: 7.4 G/DL (ref 6–8.5)
SODIUM SERPL-SCNC: 131 MMOL/L (ref 136–145)

## 2023-03-29 PROCEDURE — G0378 HOSPITAL OBSERVATION PER HR: HCPCS

## 2023-03-29 PROCEDURE — G0257 UNSCHED DIALYSIS ESRD PT HOS: HCPCS

## 2023-03-29 PROCEDURE — 99239 HOSP IP/OBS DSCHRG MGMT >30: CPT | Performed by: NURSE PRACTITIONER

## 2023-03-29 PROCEDURE — 80053 COMPREHEN METABOLIC PANEL: CPT | Performed by: INTERNAL MEDICINE

## 2023-03-29 PROCEDURE — 82962 GLUCOSE BLOOD TEST: CPT

## 2023-03-29 PROCEDURE — 92526 ORAL FUNCTION THERAPY: CPT

## 2023-03-29 RX ORDER — CARVEDILOL 3.12 MG/1
3.12 TABLET ORAL EVERY 12 HOURS SCHEDULED
Qty: 60 TABLET | Refills: 0 | Status: ON HOLD | OUTPATIENT
Start: 2023-03-29

## 2023-03-29 RX ORDER — INSULIN LISPRO 100 [IU]/ML
0-9 INJECTION, SOLUTION INTRAVENOUS; SUBCUTANEOUS
Qty: 10 ML | Refills: 0 | Status: ON HOLD | OUTPATIENT
Start: 2023-03-29

## 2023-03-29 RX ORDER — DIAPER,BRIEF,INFANT-TODD,DISP
1 EACH MISCELLANEOUS 2 TIMES DAILY PRN
Qty: 30 G | Refills: 0 | Status: ON HOLD | OUTPATIENT
Start: 2023-03-29 | End: 2023-04-06

## 2023-03-29 RX ORDER — MIRTAZAPINE 15 MG/1
15 TABLET, FILM COATED ORAL NIGHTLY
Qty: 30 TABLET | Refills: 0 | Status: ON HOLD | OUTPATIENT
Start: 2023-03-29

## 2023-03-29 RX ORDER — HYDROXYZINE HYDROCHLORIDE 25 MG/1
25 TABLET, FILM COATED ORAL 3 TIMES DAILY PRN
Qty: 30 TABLET | Refills: 0 | Status: ON HOLD | OUTPATIENT
Start: 2023-03-29

## 2023-03-29 RX ORDER — NALOXONE HYDROCHLORIDE 4 MG/.1ML
SPRAY NASAL
Qty: 2 EACH | Refills: 0 | Status: ON HOLD | OUTPATIENT
Start: 2023-03-29 | End: 2023-04-06

## 2023-03-29 RX ORDER — CLONIDINE HYDROCHLORIDE 0.1 MG/1
0.1 TABLET ORAL 3 TIMES DAILY PRN
Qty: 30 TABLET | Refills: 0 | Status: ON HOLD | OUTPATIENT
Start: 2023-03-29

## 2023-03-29 RX ORDER — HYDROCODONE BITARTRATE AND ACETAMINOPHEN 10; 325 MG/1; MG/1
1 TABLET ORAL EVERY 6 HOURS PRN
Qty: 12 TABLET | Refills: 0 | Status: SHIPPED | OUTPATIENT
Start: 2023-03-29 | End: 2023-04-01

## 2023-03-29 RX ORDER — OXYCODONE HYDROCHLORIDE 5 MG/1
5 TABLET ORAL EVERY 8 HOURS PRN
Qty: 9 TABLET | Refills: 0 | Status: SHIPPED | OUTPATIENT
Start: 2023-03-29 | End: 2023-04-01

## 2023-03-29 RX ADMIN — HYDROCODONE BITARTRATE AND ACETAMINOPHEN 1 TABLET: 10; 325 TABLET ORAL at 18:00

## 2023-03-29 RX ADMIN — HYDROCODONE BITARTRATE AND ACETAMINOPHEN 1 TABLET: 10; 325 TABLET ORAL at 04:30

## 2023-03-29 NOTE — PLAN OF CARE
Goal Outcome Evaluation:   Pt at dialysis until 1200. VSS, alert and orientated. Pain well controlled with PRN meds. Report given to [facility] [place]. Ambulance is scheduled to arrive at 1930.

## 2023-03-29 NOTE — PLAN OF CARE
Problem: Hemodynamic Instability (Hemodialysis)  Goal: Effective Tissue Perfusion  Outcome: Ongoing, Progressing     Problem: Infection (Hemodialysis)  Goal: Absence of Infection Signs and Symptoms  Outcome: Ongoing, Progressing     Problem: Device-Related Complication Risk (Hemodialysis)  Goal: Safe, Effective Therapy Delivery  Outcome: Ongoing, Progressing          HD today, goal UF 1 liter

## 2023-03-29 NOTE — PROGRESS NOTES
"   LOS: 3 days    Patient Care Team:  Meghana Rodgers MD as PCP - General  Demetrius Sagastume MD as Consulting Physician (Cardiology)  Kevan Lerma MD as Consulting Physician (Nephrology)  Geena Estrella APRN as Nurse Practitioner (Cardiology)  Frank Mandujaon MD as Referring Physician (Colon and Rectal Surgery)  Kevan Cavazos MD as Consulting Physician (Radiation Oncology)  Yue Reeves RN as Nurse Navigator  Darryn Burk MD as Consulting Physician (Nephrology)    Chief Complaint:   ESRD on Monday Wednesday Friday, history of rectal CA on chemo, admitted after missing her dialysis.     Subjective     Interval History:   Seen on HD tolerating well so far. Goal UF 1.4 liter as tolerated. Bp elevated. Good access pressure.          Review of Systems:   No complaints      Objective     Vital Sign Min/Max for last 24 hours  Temp  Min: 97 °F (36.1 °C)  Max: 98.7 °F (37.1 °C)   BP  Min: 105/52  Max: 153/62   Pulse  Min: 56  Max: 61   Resp  Min: 16  Max: 18   SpO2  Min: 91 %  Max: 99 %   Flow (L/min)  Min: 2  Max: 2   No data recorded     Flowsheet Rows    Flowsheet Row First Filed Value   Admission Height 167.6 cm (66\") Documented at 03/10/2023 1122   Admission Weight 76.7 kg (169 lb) Documented at 03/10/2023 1122          No intake/output data recorded.  I/O last 3 completed shifts:  In: 780 [P.O.:280; I.V.:500]  Out: 0     Physical Exam:  General Appearance: -American female comfortable in bed.  Eyes: PER, EOMI.  Neck: Supple no JVD.  Lungs: Clear auscultation, no rales rhonchi's, equal chest movement, nonlabored.  Heart: Positive murmur, no, rub, RRR.  Abdomen: Soft, nontender, positive bowel sounds, no organomegaly.  Extremities: No edema, no cyanosis.  Neuro: No focal deficit, moving all extremities, alert oriented X 3  Right upper arm AV fistula  WBC WBC   Date Value Ref Range Status   03/22/2023 7.12 3.40 - 10.80 10*3/mm3 Final      HGB Hemoglobin   Date Value Ref Range Status "   03/22/2023 8.9 (L) 12.0 - 15.9 g/dL Final      HCT Hematocrit   Date Value Ref Range Status   03/22/2023 29.3 (L) 34.0 - 46.6 % Final      Platlets No results found for: LABPLAT   MCV MCV   Date Value Ref Range Status   03/22/2023 99.0 (H) 79.0 - 97.0 fL Final          Sodium Sodium   Date Value Ref Range Status   03/22/2023 131 (L) 136 - 145 mmol/L Final      Potassium Potassium   Date Value Ref Range Status   03/22/2023 4.5 3.5 - 5.2 mmol/L Final      Chloride Chloride   Date Value Ref Range Status   03/22/2023 95 (L) 98 - 107 mmol/L Final      CO2 CO2   Date Value Ref Range Status   03/22/2023 21.0 (L) 22.0 - 29.0 mmol/L Final      BUN BUN   Date Value Ref Range Status   03/22/2023 36 (H) 8 - 23 mg/dL Final      Creatinine Creatinine   Date Value Ref Range Status   03/22/2023 6.66 (H) 0.57 - 1.00 mg/dL Final      Calcium Calcium   Date Value Ref Range Status   03/22/2023 8.6 8.6 - 10.5 mg/dL Final      PO4 No results found for: CAPO4   Albumin Albumin   Date Value Ref Range Status   03/22/2023 3.0 (L) 3.5 - 5.2 g/dL Final      Magnesium No results found for: MG   Uric Acid No results found for: URICACID        Results Review:     I reviewed the patient's new clinical results.    amiodarone, 200 mg, Oral, Daily  apixaban, 2.5 mg, Oral, Q12H  aspirin, 81 mg, Oral, Daily  atorvastatin, 80 mg, Oral, Nightly  carvedilol, 3.125 mg, Oral, Q12H  cloNIDine, 0.4 mg, Oral, Q12H  dorzolamide-timolol, 1 drop, Both Eyes, Daily  epoetin orquidea/orquidea-epbx, 20,000 Units, Subcutaneous, Once per day on Mon Wed Fri  insulin lispro, 0-9 Units, Subcutaneous, TID With Meals  isosorbide mononitrate, 120 mg, Oral, Daily  melatonin, 5 mg, Oral, Nightly  QUEtiapine, 25 mg, Oral, Nightly  terazosin, 1 mg, Oral, Nightly           Medication Review: Reviewed    Assessment & Plan       Hematochezia    Type 2 diabetes mellitus (HCC)    Essential hypertension    Hyperlipidemia LDL goal <70    End stage renal disease on dialysis (HCC)    CHF  (congestive heart failure) (HCC)    PAF (paroxysmal atrial fibrillation) (HCC)    Severe malnutrition (HCC)    Symptomatic anemia    1.  ESRD on Monday Wednesday Friday.  2.  Anemia of chronic kidney disease: Also on chemotherapy.  3.  Volume status: Dialysis ultrafiltration will be done.  4.  Access AV fistula.  5.  Hypertension  6.  Hyponatremia corrected with dialysis  Plan:  HD per MW grayson.   Monitor H&H.  If needed blood transfusions can be given during dialysis.  Monitor blood pressure and volume status.  Awaiting rehab placement.  HD 3/29/23.  Darryn Burk MD  03/23/23  11:50 EDT

## 2023-03-29 NOTE — THERAPY TREATMENT NOTE
Acute Care - Speech Language Pathology   Swallow Treatment Note [facility]     Patient Name: Esperanza Pop  : 1945  MRN: [id number]  Today's Date: 3/29/2023               Admit Date: 3/10/2023    Visit Dx:     ICD-10-CM ICD-9-CM   1. Symptomatic anemia  D64.9 285.9   2. Generalized weakness  R53.1 780.79   3. ESRD on dialysis ([facility])  N18.6 585.6    Z99.2 V45.11   4. Impaired mobility  Z74.09 799.89   5. Fall, initial encounter  W19.XXXA E888.9   6. Elevated troponin  R77.8 790.6   7. Acute on chronic diastolic heart failure ([facility])  I50.33 428.33   8. Type 2 diabetes mellitus with chronic kidney disease on chronic dialysis, with long-term current use of insulin ([facility])  E11.22 250.40    N18.6 585.9    Z99.2 V45.11    Z79.4 V58.67   9. Severe malnutrition ([facility])  E43 261   10. Malignant neoplasm of anal canal ([facility])  C21.1 154.2   11. PAF (paroxysmal atrial fibrillation) ([facility])  I48.0 427.31   12. Coronary artery disease involving native coronary artery of native heart with angina pectoris ([facility])  I25.119 414.01     413.9   13. End stage renal disease on dialysis ([facility])  N18.6 585.6    Z99.2 V45.11   14. Nonrheumatic aortic valve stenosis  I35.0 424.1   15. NSTEMI (non-ST elevated myocardial infarction) ([facility])  I21.4 410.70   16. Acute cystitis without hematuria  N30.00 595.0   17. Ischemic heart disease  I25.9 414.9   18. Congestive heart failure, unspecified HF chronicity, unspecified heart failure type ([facility])  I50.9 428.0   19. Malignant neoplasm of right female breast, unspecified estrogen receptor status, unspecified site of breast ([facility])  C50.911 174.9   20. Hematochezia  K92.1 578.1   21. Acute midline low back pain without sciatica  M54.50 724.2   22. Oropharyngeal dysphagia  R13.12 787.22     Patient Active Problem List   Diagnosis   • Acute on chronic diastolic heart failure ([facility])   • Type 2 diabetes mellitus (HCC)   • Essential hypertension   • Coronary artery disease involving native coronary artery of native  heart with angina pectoris (HCC)   • Breast cancer, female, right   • Seasonal allergic rhinitis   • Right carotid bruit   • Vitamin B12 deficiency   • Hyperlipidemia LDL goal <70   • End stage renal disease on dialysis (HCC)   • Nonrheumatic aortic valve stenosis   • NSTEMI (non-ST elevated myocardial infarction) (HCC)   • UTI (urinary tract infection)   • Ischemic heart disease   • CHF (congestive heart failure) (HCC)   • Acute non-ST segment elevation myocardial infarction (STEMI) following previous myocardial infarction   • Dyslipidemia   • Diabetes mellitus (HCC)   • Mild obesity   • Elevated troponin   • Screen for colon cancer   • Acute midline low back pain without sciatica   • Hypertensive emergency   • PAF (paroxysmal atrial fibrillation) (HCC)   • Malignant neoplasm of anal canal (HCC)   • Hematochezia   • Severe malnutrition (HCC)   • Symptomatic anemia     Past Medical History:   Diagnosis Date   • Acute bronchitis    • Acute conjunctivitis    • Acute kidney injury (HCC)     Admission from 12/26/2013 to 01/02/2014, now resolved with latest creatinine 1.4 on 01/08/2014.   • Acute non-ST segment elevation myocardial infarction (STEMI) following previous myocardial infarction    • Anal cancer (HCC) 2/8/2023   • Anal cancer (HCC) 2/8/2023   • Arthritis    • Breast cancer, female, right     2005 mastectomy right   • Cancer (HCC)    • CHF (congestive heart failure) (HCC)    • Chronic kidney disease     dialysis MW, f/u [facility]    • Clotting disorder (HCC)    • COVID-19    • Diabetes mellitus (HCC)     diagnosed ~1992, checks fsbg 2-3x/day   • Diarrhea    • Dyslipidemia    • Dyspepsia    • Dyspnea    • Edema    • History of transfusion     ~2013 no reaction    • Hx of radiation therapy    • Hyperlipidemia    • Hypertension     Severe   • Ischemic heart disease    • Moderate obesity     BMI 36.2   • Myocardial infarction (HCC)    • Pneumonia    • Pneumonia 02/2019   • Radiation 2005   • Seasonal  allergies    • Wears glasses      Past Surgical History:   Procedure Laterality Date   • AV FISTULA PLACEMENT, BRACHIOBASILIC     • BREAST BIOPSY Left 2010   • BREAST CYST EXCISION     • BREAST LUMPECTOMY Right 2005   • BREAST SURGERY     • CARDIAC CATHETERIZATION N/A 10/10/2016    Procedure: Left Heart Cath;  Surgeon: Scooter Zhao MD;  Location:  [facility];  Service:    • CARDIAC CATHETERIZATION  10/2016   • CARDIAC CATHETERIZATION N/A 1/21/2021    Procedure: Right and Left Heart Cath;  Surgeon: Hugh Tidwell MD;  Location:  [facility];  Service: Cardiology;  Laterality: N/A;   • COLONOSCOPY N/A 7/23/2020    Procedure: COLONOSCOPY;  Surgeon: Rubén Rosas MD;  Location:  [facility];  Service: Gastroenterology;  Laterality: N/A;   • CORONARY STENT PLACEMENT  2016   • HERNIA REPAIR     • HYSTERECTOMY  2000   • INSERTION HEMODIALYSIS CATHETER Right 6/29/2016    Procedure: HEMODIALYSIS CATHETER INSERTION TUNNELLED;  Surgeon: Ramón Chavez MD;  Location:  [facility];  Service:    • MASTECTOMY Right 2006   • OOPHORECTOMY     • REDUCTION MAMMAPLASTY Left 2005       SLP Recommendation and Plan     SLP Diet Recommendation: puree, pudding thick liquids, ice chips between meals after oral care, with supervision, other (see comments) (03/29/23 1320)  Recommended Precautions and Strategies: upright posture during/after eating, no straw, general aspiration precautions (03/29/23 1320)  SLP Rec. for Method of Medication Administration: meds whole, with puree, as tolerated (03/29/23 1320)     Monitor for Signs of Aspiration: yes, notify SLP if any concerns (03/29/23 1320)        Anticipated Discharge Disposition (SLP): anticipate therapy at next level of care, skilled nursing facility (03/29/23 1320)     Therapy Frequency (Swallow): 5 days per week (03/29/23 1320)  Predicted Duration Therapy Intervention (Days): until discharge (03/29/23 1320)        Daily  Summary of Progress (SLP): progress toward functional goals is good (03/29/23 1320)               Treatment Assessment (SLP): continued, mild, oral dysphagia, severe, pharyngeal dysphagia (03/29/23 1320)  Treatment Assessment Comments (SLP): Reviewed exercises w pt and sandy. Handout left in room for indepenent practice. (03/29/23 1320)  Plan for Continued Treatment (SLP): continue treatment per plan of care (03/29/23 1320)         Plan of Care Reviewed With: (P) patient, grandchild(evelyn)      SWALLOW EVALUATION (last 72 hours)     SLP Adult Swallow Evaluation     Row Name 03/29/23 1320                   Rehab Evaluation    Document Type therapy note (daily note)  -MP (r) LL (t) MP (c)        Subjective Information no complaints  -MP (r) LL (t) MP (c)        Patient Observations alert;cooperative  -MP (r) LL (t) MP (c)        Patient/Family/Caregiver Comments/Observations Granddaughter present  -MP (r) LL (t) MP (c)        Patient Effort good  -MP (r) LL (t) MP (c)           Pain    Additional Documentation Pain Scale: Numbers Pre/Post-Treatment (Group)  -MP (r) LL (t) MP (c)           Pain Scale: Numbers Pre/Post-Treatment    Pretreatment Pain Rating 0/10 - no pain  -MP (r) LL (t) MP (c)        Posttreatment Pain Rating 0/10 - no pain  -MP (r) LL (t) MP (c)           SLP Treatment Clinical Impressions    Treatment Assessment (SLP) continued;mild;oral dysphagia;severe;pharyngeal dysphagia  -MP (r) LL (t) MP (c)        Treatment Assessment Comments (SLP) Reviewed exercises w pt and sandy. Handout left in room for indepenent practice.  -MP (r) LL (t) MP (c)        Daily Summary of Progress (SLP) progress toward functional goals is good  -MP (r) LL (t) MP (c)        Plan for Continued Treatment (SLP) continue treatment per plan of care  -MP (r) LL (t) MP (c)        Care Plan Review evaluation/treatment results reviewed  -MP (r) LL (t) MP (c)        Care Plan Review, Other Participant(s) family  -MP (r) LL  (t) MP (c)           Recommendations    Therapy Frequency (Swallow) 5 days per week  -MP (r) LL (t) MP (c)        Predicted Duration Therapy Intervention (Days) until discharge  -MP (r) LL (t) MP (c)        SLP Diet Recommendation puree;pudding thick liquids;ice chips between meals after oral care, with supervision;other (see comments)  -MP (r) LL (t) MP (c)        Recommended Precautions and Strategies upright posture during/after eating;no straw;general aspiration precautions  -MP (r) LL (t) MP (c)        Oral Care Recommendations Oral Care BID/PRN  -MP (r) LL (t) MP (c)        SLP Rec. for Method of Medication Administration meds whole;with puree;as tolerated  -MP (r) LL (t) MP (c)        Monitor for Signs of Aspiration yes;notify SLP if any concerns  -MP (r) LL (t) MP (c)        Anticipated Discharge Disposition (SLP) anticipate therapy at next level of care;skilled nursing facility  -MP (r) LL (t) MP (c)              User Key  (r) = Recorded By, (t) = Taken By, (c) = Cosigned By    Initials Name Effective Dates    MP Demarco Chavez, MS CCC-SLP 12/28/21 -     Shandra Le, Speech Therapy Student 01/20/23 -                 EDUCATION  The patient has been educated in the following areas:   Dysphagia (Swallowing Impairment).        SLP GOALS     Row Name 03/29/23 1320             (LTG) Patient will demonstrate functional swallow for    Diet Texture (Demonstrate functional swallow) soft to chew (whole) textures  -MP (r) LL (t) MP (c)      Liquid viscosity (Demonstrate functional swallow) thin liquids  -MP (r) LL (t) MP (c)      [place] (Demonstrate functional swallow) with minimal cues (75-90% accuracy)  -MP (r) LL (t) MP (c)      Time Frame (Demonstrate functional swallow) by discharge  -MP (r) LL (t) MP (c)      Progress/Outcomes (Demonstrate functional swallow) goal ongoing  -MP (r) LL (t) MP (c)         (STG) Patient will tolerate therapeutic trials of    Consistencies Trialed (Tolerate  therapeutic trials) thin liquids  -MP (r) LL (t) MP (c)      Desired Outcome (Tolerate therapeutic trials) without signs/symptoms of aspiration  -MP (r) LL (t) MP (c)      [place] (Tolerate therapeutic trials) with minimal cues (75-90% accuracy)  -MP (r) LL (t) MP (c)      Time Frame (Tolerate therapeutic trials) by discharge  -MP (r) LL (t) MP (c)      Progress/Outcomes (Tolerate therapeutic trials) continuing progress toward goal  -MP (r) LL (t) MP (c)      Comment (Tolerate therapeutic trials) Coughed x1 w/ trials of thin  -MP (r) LL (t) MP (c)         (STG) Lingual Strengthening Goal 1 (SLP)    Activity (Lingual Strengthening Goal 1, SLP) increase tongue back strength  -MP (r) LL (t) MP (c)      Increase Tongue Back Strength swallow trials;lingual resistance exercises  -MP (r) LL (t) MP (c)      [place]/Accuracy (Lingual Strengthening Goal 1, SLP) with minimal cues (75-90% accuracy)  -MP (r) LL (t) MP (c)      Time Frame (Lingual Strengthening Goal 1, SLP) by discharge  -MP (r) LL (t) MP (c)      Progress/Outcomes (Lingual Strengthening Goal 1, SLP) continuing progress toward goal  -MP (r) LL (t) MP (c)      Comment (Lingual Strengthening Goal 1, SLP) Reviewed excerises w /t pt. Pt practice x4 each. Handout in room.  -MP (r) LL (t) MP (c)         (STG) Pharyngeal Strengthening Exercise Goal 1 (SLP)    Activity (Pharyngeal Strengthening Goal 1, SLP) increase timing;increase superior movement of the hyolaryngeal complex;increase anterior movement of the hyolaryngeal complex;increase epiglottic inversion and retroflexion;increase closure at entrance to airway/closure of airway at glottis;increase squeeze/positive pressure generation  -MP (r) LL (t) MP (c)      Increase Timing prepping - 3 second prep or suck swallow or 3-step swallow  -MP (r) LL (t) MP (c)      Increase Superior Movement of the Hyolaryngeal Complex falsetto  -MP (r) LL (t) MP (c)      Increase Anterior Movement of the Hyolaryngeal Complex  chin tuck against resistance (CTAR)  -MP (r) LL (t) MP (c)      Increase Closure at Entrance to Airway/Closure of Airway at Glottis super-supraglottic swallow;breath hold exercises  -MP (r) LL (t) MP (c)      Increase Squeeze/Positive Pressure Generation hard effortful swallow  -MP (r) LL (t) MP (c)      [place]/Accuracy (Pharyngeal Strengthening Goal 1, SLP) with minimal cues (75-90% accuracy)  -MP (r) LL (t) MP (c)      Time Frame (Pharyngeal Strengthening Goal 1, SLP) by discharge  -MP (r) LL (t) MP (c)      Progress/Outcomes (Pharyngeal Strengthening Goal 1, SLP) continuing progress toward goal  -MP (r) LL (t) MP (c)      Comment (Pharyngeal Strengthening Goal 1, SLP) Reviewed exercises w/ pt. Pt practiced x4 each. Handout in room for independent practice.  -MP (r) LL (t) MP (c)            User Key  (r) = Recorded By, (t) = Taken By, (c) = Cosigned By    Initials Name Provider Type    Demarco Saha, MS CCC-SLP Speech and Language Pathologist     Shandra Arita, Speech Therapy Student SLP Student                   Time Calculation:    Time Calculation- SLP     Row Name 03/29/23 1413             Time Calculation- SLP    SLP Start Time 1320 (P)   -LL      SLP Received On 03/29/23 (P)   -LL         Untimed Charges    90386-TC Treatment Swallow Minutes 40 (P)   -LL         Total Minutes    Untimed Charges Total Minutes 40 (P)   -LL       Total Minutes 40 (P)   -LL            User Key  (r) = Recorded By, (t) = Taken By, (c) = Cosigned By    Initials Name Provider Type     Shandra Arita, Speech Therapy Student SLP Student                Therapy Charges for Today     Code Description Service Date Service Provider Modifiers Qty    [id number] HC ST TREATMENT SWALLOW 3 3/29/2023 Shandra Arita Speech Therapy Student GN 1               Shandra Arita Speech Therapy Student  3/29/2023

## 2023-03-29 NOTE — DISCHARGE SUMMARY
[facility] Medicine Services  DISCHARGE SUMMARY    Patient Name: Esperanza Pop  : 1945  MRN: [id number]    Date of Admission: 3/10/2023  Date of Discharge:  3/29/2023  Primary Care Physician: Meghana Rodgers MD    Consults     Date and Time Order Name Status Description    3/27/2023 12:40 PM Inpatient Palliative Care MD Consult Completed     3/17/2023 11:43 AM Inpatient Palliative Care MD Consult Completed     3/14/2023 10:05 AM Inpatient Neurology Consult General Completed     3/11/2023  9:37 AM Inpatient Nephrology Consult      3/11/2023 12:34 AM Inpatient Hematology & Oncology Consult Completed     3/11/2023 12:34 AM Inpatient Radiation Oncology Consult            Hospital Course     Presenting Problem:   Hematochezia [K92.1]  Symptomatic anemia [D64.9]    Active Hospital Problems    Diagnosis  POA   • **Hematochezia [K92.1]  Yes   • Symptomatic anemia [D64.9]  Yes   • Severe malnutrition (HCC) [E43]  Yes   • PAF (paroxysmal atrial fibrillation) (HCC) [I48.0]  Yes   • CHF (congestive heart failure) (HCC) [I50.9]  Yes   • End stage renal disease on dialysis (HCC) [N18.6, Z99.2]  Not Applicable   • Hyperlipidemia LDL goal <70 [E78.5]  Yes   • Essential hypertension [I10]  Yes   • Type 2 diabetes mellitus (HCC) [E11.9]  Yes      Resolved Hospital Problems   No resolved problems to display.        Hospital Course:  Esperanza Pop is a 78 y.o. female PMH ESRD on HD MWF, IDDM, HTN, HLD, HFpEF, breast cancer (s/p mastectomy/radiation), PAF (on Xarelto), chronic anemia, rectal cancer (recently diagnosed currently getting XRT and chemo) presented to ED after fall at home.  Patient found to have acute on chronic anemia, s/p 1 unit of PRBCs on 3/11.  Patient wished to pursue continued chemo/radiation but very weak with SNF recommendations.  Unable to complete radiation/chemo in SNF, but very weak and many treatments needed for home.  Complicated picture, planning to hold radiation/chemo  transition to SNF.  If she continues to prove over the next 2 weeks she can resume treatment; otherwise, patient may need to consider hospice care.      Discharge Follow Up Recommendations for labs/diagnostics:  Follow-up with PCP in 1 week  Follow-up with oncology in 2 weeks  Wound care to buttocks: Apply Mepitel to bilateral gluteal and intergluteal wounds    Day of Discharge     HPI:   Patient resting in bed, no issues reported overnight.  Patient received dialysis today.      Review of Systems  Gen- No fevers, chills  CV- No chest pain, palpitations  Resp- No cough, dyspnea  GI- No N/V/D, abd pain  Otherwise ROS is negative except as mentioned in the HPI.    Vital Signs:   Temp:  [97.4 °F (36.3 °C)-97.9 °F (36.6 °C)] 97.4 °F (36.3 °C)  Heart Rate:  [67-78] 77  Resp:  [16-18] 18  BP: (116-184)/() 184/76     Physical Exam:  Constitutional: sleepy, but awake, chronically ill and frail appearing.   HENT: NCAT, mucous membranes moist  Respiratory: Clear to auscultation bilaterally, nonlabored respirations   Cardiovascular: RRR, no murmurs, rubs, or gallops  Gastrointestinal: Positive bowel sounds, soft, nontender, nondistended  Musculoskeletal: No bilateral ankle edema  Psychiatric: Appropriate affect, cooperative  Neurologic: Oriented x 3, no focal deficit, speech clear  Skin: healing ulcers to buttocks.     Plan:  Acute on chronic anemia secondary to rectal bleed secondary to known rectal cancer-stable  Profound debility, NSF recommendations  - Patient s/p 1 unit PRBC on 3/11.  H&H remained stable.  Patient follows with Dr. Dirk harman/onc, Dr. Carpenter with oncology.  In regards to rectal cancer, patient cannot tolerate chemotherapy or radiation therapy because of severe debility.  Presently, both therapies are on hold.  Patient transitioning to SNF and if she improves over the next few weeks may resume treatment; otherwise, family may need to consider hospice care.    Poor appetite  - Mirtazapine    Acute  metabolic encephalopathy-resolved  - Completed a course of antibiotics for UTI.  CT head with no acute disease.    Chest pain-resolved  - Had an episode on 3/22/2023.  Troponin mildly elevated but flat.  Unfortunately, due to her severe debility, age, rectal cancer, patient not a candidate for ischemic work-up.  Chest pain resolved.  Patient continues on aspirin, Eliquis, carvedilol    Buttocks wound-improving  - Wound care consulted    ESRD on HD MWF      HFpEF  HTN  Hypertension  - Medications adjusted meds made during hospitalization.    PAF on Eliquis  - Eliquis, amiodarone    Moderate to severe aortic stenosis  - Follows with WILMA Lopez 04/27/23      Pertinent  and/or Most Recent Results     Results from last 7 days   Lab Units 03/29/23  0752 03/26/23  0708 03/24/23  0655   WBC 10*3/mm3  --  7.39 6.50   HEMOGLOBIN g/dL  --  8.2* 8.4*   HEMATOCRIT %  --  27.9* 27.8*   PLATELETS 10*3/mm3  --  146 124*   SODIUM mmol/L 131* 135* 134*   POTASSIUM mmol/L 4.0 4.3 4.7   CHLORIDE mmol/L 96* 99 98   CO2 mmol/L 20.0* 22.0 25.0   BUN mg/dL 22 24* 27*   CREATININE mg/dL 5.91* 6.24* 5.46*   GLUCOSE mg/dL 79 96 143*   CALCIUM mg/dL 8.6 8.7 8.4*     Results from last 7 days   Lab Units 03/29/23  0752   BILIRUBIN mg/dL 0.7   ALK PHOS U/L 153*   ALT (SGPT) U/L 5   AST (SGOT) U/L 20           Invalid input(s): TG, LDLCALC, LDLREALC        Brief Urine Lab Results     None          Microbiology Results Abnormal     None          Imaging Results (All)     Procedure Component Value Units Date/Time    SLP FEES - Fiberoptic Endo Eval Swallow [[id number]] Resulted: 03/23/23 1414     Updated: 03/23/23 1414    Narrative:      This procedure was auto-finalized with no dictation required.    XR Chest 1 View [[id number]] Collected: 03/22/23 0224     Updated: 03/22/23 0426    Narrative:      Exam:  Single view chest    Date: March 22, 2023    History: Shortness of breath    Comparison: March 10, 2023    Technique: Single frontal  radiograph of the chest was obtained. The study is degraded by patient rotation.    Findings:    The heart is enlarged. There is mild vascular congestion. There is no pneumothorax or sizable pleural fluid collection.      Impression:      1. Cardiomegaly with mild vascular congestion. There may be an underlying pericardial effusion.    Slot 63      Electronically signed by:  Abdulaziz Mcwilliams M.D.    3/22/2023 2:25 AM Mountain Time    CT Head Without Contrast [[id number]] Collected: 03/12/23 1130     Updated: 03/12/23 1138    Narrative:      CT HEAD WO CONTRAST    Date of Exam: 3/12/2023 11:19 AM EDT    Indication: AMS.    Comparison: MRI brain from April 18, 2019    Technique: Axial CT images were obtained of the head without contrast administration.  Reconstructed coronal and sagittal images were also obtained. Automated exposure control and iterative construction methods were used.    Findings:  No acute intracranial hemorrhage or extra-axial collection is identified. The ventricles appear normal in caliber, with no evidence of mass effect or midline shift. The basal cisterns appear patent. The gray-white differentiation appears preserved.    The calvarium appear intact. The paranasal sinuses are clear. The mastoid air cells are well-aerated. Scattered foci of periventricular and subcortical white matter hypodensities are nonspecific, but likely the sequela of mild chronic small vessel   ischemic disease. Some degenerative mineralization is noted within the bilateral basal ganglia.      Impression:      Impression:  1.No acute intracranial process identified.  2.Findings suggestive of mild chronic small vessel ischemic disease.     Electronically Signed: Darrell Gonzalez    3/12/2023 11:35 AM EDT    Workstation ID: [id number]    XR Chest 1 View [[id number]] Collected: 03/10/23 1219     Updated: 03/10/23 1223    Narrative:      XR CHEST 1 VW    Date of Exam: 3/10/2023 12:05 PM EST    Indication: Weak/Dizzy/AMS triage  protocol.    Comparison: CT chest with contrast 2/1/2023, chest radiograph 7/21/2021    Findings:  Cardiomegaly. Lungs normally expanded. Mild groundglass opacity and septal thickening lower lobe predominant. No pneumothorax or pleural effusion.      Impression:      Impression:  Questionable pulmonary edema. Cardiomegaly.    Electronically Signed: Shannan Erickson    3/10/2023 12:20 PM EST    Workstation ID: [id number]          Results for orders placed during the hospital encounter of 02/09/23    Duplex Venous Lower Extremity - Right CAR    Interpretation Summary  •  Chronic right lower extremity superficial thrombophlebitis noted in the small saphenous.  •  All other right sided veins appeared normal.      Results for orders placed during the hospital encounter of 02/09/23    Duplex Venous Lower Extremity - Right CAR    Interpretation Summary  •  Chronic right lower extremity superficial thrombophlebitis noted in the small saphenous.  •  All other right sided veins appeared normal.      Results for orders placed during the hospital encounter of 03/10/23    Adult Transthoracic Echo Complete W/ Cont if Necessary Per Protocol    Interpretation Summary  •  Left ventricular systolic function is normal. Estimated left ventricular EF = 55%  •  Left ventricular wall thickness is consistent with moderate concentric hypertrophy.  •  The right ventricular cavity is mildly dilated.  •  Mild to moderate biatrial enlargement.  •  Moderate to severe aortic valve stenosis is present.  Mean gradient 34 mmHg, DOUG 0.9 cm².  •  Mild aortic insufficiency.  •  Mild mitral regurgitation.  •  Mild to moderate tricuspid regurgitation with RVSP 48 mmHg.        Discharge Details        Discharge Medications      New Medications      Instructions Start Date   benzocaine-menthol 20-0.5 % aerosol topical spray  Commonly known as: DERMOPLAST   Topical, 3 Times Daily PRN      epoetin orquidea-epbx 21337 UNIT/ML injection  Commonly known as: RETACRIT    20,000 Units, Subcutaneous, 3 Times Weekly      HYDROcodone-acetaminophen  MG per tablet  Commonly known as: NORCO   1 tablet, Oral, Every 6 Hours PRN      hydrocortisone 1 % cream   1 application, Topical, 2 Times Daily PRN      hydrOXYzine 25 MG tablet  Commonly known as: ATARAX   25 mg, Oral, 3 Times Daily PRN      Insulin Lispro 100 UNIT/ML injection  Commonly known as: humaLOG   0-9 Units, Subcutaneous, 3 Times Daily With Meals      mirtazapine 15 MG tablet  Commonly known as: REMERON   15 mg, Oral, Nightly      naloxone 4 MG/0.1ML nasal spray  Commonly known as: NARCAN   CALL 911. Do not prime. Spray into nostril upon signs of opioid overdose. May repeat in 2 to 3 minutes in opposite nostril if no or minimal breathing and responsiveness, then as needed (if doses are available) every 2 to 3 minutes.      oxyCODONE 5 MG immediate release tablet  Commonly known as: ROXICODONE   5 mg, Oral, Every 8 Hours PRN         Changes to Medications      Instructions Start Date   carvedilol 3.125 MG tablet  Commonly known as: COREG  What changed:   · medication strength  · how much to take   3.125 mg, Oral, Every 12 Hours Scheduled      cloNIDine 0.1 MG tablet  Commonly known as: CATAPRES  What changed:   · medication strength  · how much to take  · when to take this  · reasons to take this   0.1 mg, Oral, 3 Times Daily PRN         Continue These Medications      Instructions Start Date   acetaminophen 500 MG tablet  Commonly known as: TYLENOL   500 mg, Oral      amiodarone 200 MG tablet  Commonly known as: PACERONE   200 mg, Oral, Daily      apixaban 2.5 MG tablet tablet  Commonly known as: ELIQUIS   2.5 mg, Oral, 2 Times Daily      aspirin 81 MG EC tablet   81 mg, Oral, Daily      atorvastatin 80 MG tablet  Commonly known as: LIPITOR   80 mg, Oral, Nightly      bisacodyl 5 MG EC tablet  Commonly known as: DULCOLAX   5 mg, Oral, Daily PRN      Capsaicin 0.1 % cream   Apply 2-4 gms to feet nightly. Use gloves       DIALYVITE 800 PO   1 tablet, Oral, 3 Times Weekly, On dialysis says MWF      dorzolamide-timolol 22.3-6.8 MG/ML ophthalmic solution  Commonly known as: COSOPT   1 drop, Both Eyes, Daily      Durezol 0.05 % ophthalmic emulsion  Generic drug: difluprednate   INSTILL 1 DROP 4 TIMES DAILY INTO SURGICAL EYE STARTING AFTER SURGERY.      fluocinonide 0.05 % external solution  Commonly known as: LIDEX   0.05 application, Topical, 2 Times Daily, Scalp      glucose blood test strip   Use as instructed to monitor blood glucose 1-2 times daily      glucose monitor monitoring kit   Use as directed to monitor blood glucose 1-2 times daily      Hydrocortisone (Perianal) 2.5 % rectal cream  Commonly known as: ANUSOL-HC   Rectal, 2 Times Daily      IRON DEXTRAN IJ   Injection, 3 Times Weekly      isosorbide mononitrate 120 MG 24 hr tablet  Commonly known as: IMDUR   120 mg, Oral, Daily      Lancets Ultra Fine misc   Use as directed to check blood sugar 1-2 times daily      lidocaine-prilocaine 2.5-2.5 % cream  Commonly known as: EMLA   1 application, Topical, As Needed      nitroglycerin 0.4 MG/SPRAY spray  Commonly known as: Nitrolingual   1 spray, Sublingual, Every 5 Minutes PRN      VITAMIN D PO   1 mcg, Oral         Stop These Medications    ammonium lactate 12 % lotion  Commonly known as: LAC-HYDRIN     capecitabine 500 MG chemo tablet  Commonly known as: XELODA     EPOGEN IJ     HYDROmorphone 2 MG tablet  Commonly known as: DILAUDID     Lidocaine 5 % cream     megestrol 40 MG tablet  Commonly known as: MEGACE     metroNIDAZOLE 500 MG tablet  Commonly known as: Flagyl     Morphine 15 MG 12 hr tablet  Commonly known as: MS CONTIN     nortriptyline 10 MG capsule  Commonly known as: PAMELOR     NovoLIN 70/30 (70-30) 100 UNIT/ML injection  Generic drug: insulin NPH-insulin regular     ondansetron 8 MG tablet  Commonly known as: ZOFRAN     terazosin 2 MG capsule  Commonly known as: HYTRIN     torsemide 20 MG tablet  Commonly known  as: Demadex            Allergies   Allergen Reactions   • Mircera [Methoxy Polyethylene Glycol-Epoetin Beta] Anaphylaxis     Pt stopped breathing   • Venofer [Iron Sucrose] Anaphylaxis     Pt stopped breathing   • Albuterol Other (See Comments)     Increased blood pressure  Used right before heart attack   • Nifedipine Er Swelling   • Penicillins Hives   • Prednisone Other (See Comments)     Makes jittery/anxious         Discharge Disposition:  Skilled Nursing Facility (DC - External)    Discharge Diet:  Diet Order   Procedures   • Diet: Cardiac Diets, Diabetic Diets, Renal Diets; Healthy Heart (2-3 Na+); Consistent Carbohydrate; Low Sodium (2-3g), Low Potassium, Low Phosphorus; No Straw; Texture: Pureed (NDD 1); Fluid Consistency: Pudding Thick         Discharge Activity:   Activity Instructions     Activity as Tolerated      Up WIth Assist              CODE STATUS:    Code Status and Medical Interventions:   Ordered at: 03/10/23 1448     Medical Intervention Limits:    NO intubation (DNI)     Code Status (Patient has no pulse and is not breathing):    No CPR (Do Not Attempt to Resuscitate)     Medical Interventions (Patient has pulse or is breathing):    Limited Support         Future Appointments   Date Time Provider Department Center   3/29/2023  7:30 PM [facility]   4/27/2023 11:00 AM Geena Estrella APRN [facility]   8/15/2023  9:45 AM Meghana Rodgers MD [facility]       Additional Instructions for the Follow-ups that You Need to Schedule     Ambulatory Referral to Home Health   As directed      needs good skin care and they can apply a little bit of Z guard to the area and with turning and offloading.  Discussed cleansing with pH no rinse foam and blue wipes and applying Z guard twice daily and as needed per soiling.    Order Comments: needs good skin care and they can apply a little bit of Z guard to the area and with turning and offloading.  Discussed cleansing with pH no rinse  foam and blue wipes and applying Z guard twice daily and as needed per soiling.     Face to Face Visit Date: 3/16/2023    Follow-up provider for Plan of Care?: I treated the patient in an acute care facility and will not continue treatment after discharge.    Follow-up provider: MEGHANA PATRICK [[id number]]    Reason/Clinical Findings: sp hospital stay    Describe mobility limitations that make leaving home difficult: weakness, rectal CA    Nursing/Therapeutic Services Requested: Skilled Nursing Physical Therapy Occupational Therapy    Skilled nursing orders: Medication education Neurovascular assessments Wound care dressing/changes    PT orders: Therapeutic exercise Gait Training Transfer training Strengthening    Weight Bearing Status: As Tolerated    Occupational orders: Activities of daily living Home safety assessment Energy conservation Strengthening Fine motor Cognition    Frequency: 1 Week 1         Discharge Follow-up with PCP   As directed       Currently Documented PCP:    Meghana Patrick MD    PCP Phone Number:    [phone]     Follow Up Details: Follow up in 1 week         Discharge Follow-up with Specified Provider: Follow up with Oncology; 2 Weeks   As directed      To: Follow up with Oncology    Follow Up: 2 Weeks               Time Spent on Discharge:  50 minutes    Electronically signed by WILMA Saunders, 03/29/23, 11:39 AM EDT.

## 2023-03-29 NOTE — CASE MANAGEMENT/SOCIAL WORK
Continued Stay Note  [facility]     Patient Name: Esperanza Pop  MRN: [id number]  Today's Date: 3/29/2023    Admit Date: 3/10/2023    Plan: SNF   Discharge Plan     Row Name 03/29/23 1208       Plan    Plan SNF    Patient/Family in Agreement with Plan yes  Patient, daughter, granddaughter    Plan Comments Received insurance authorization for skilled rehab at [facility] in [place], planning transfer today.  Arrangements made for [facility] to transport at 7:30pm--CMN & facesheet on chartlett for crew.  RN may call report to #[phone].  Admissions Coordinator will obtain transfer summary from Epic.  Plan discussed this morning with patient, daughter CJ and granddaughter, all in agreement.  Discussed in unit multidisciplinary rounds, with  Dr. Nascimento and WILMA Browning.  Bernadette Herman, Ext. [phone]    Final Discharge Disposition Code 03 - skilled nursing facility (SNF)               Discharge Codes    No documentation.               Expected Discharge Date and Time     Expected Discharge Date Expected Discharge Time    Mar 29, 2023             SELIN Alberto

## 2023-03-29 NOTE — PLAN OF CARE
Goal Outcome Evaluation:  Plan of Care Reviewed With: patient        Progress: no change       Problem: Adult Inpatient Plan of Care  Goal: Optimal Comfort and Wellbeing  Intervention: Provide Person-Centered Care  Recent Flowsheet Documentation  Taken 3/28/2023 2000 by Schilder, Claire, RN  Trust Relationship/Rapport:  • care explained  • thoughts/feelings acknowledged     Patient resting well throughout night. PRN given for pain. No BM. Room air. Awaiting placement plans.

## 2023-04-04 ENCOUNTER — TELEPHONE (OUTPATIENT)
Dept: INTERNAL MEDICINE | Facility: CLINIC | Age: 78
End: 2023-04-04
Payer: MEDICARE

## 2023-04-04 NOTE — TELEPHONE ENCOUNTER
Caller: isreal ayoub    Relationship: Emergency Contact    Best call back number: [phone]    What was the call regarding: PATIENT IS IN A REHAB CENTER, COMLETHIA STATES SHE HAD FALLEN OUT OF BED LAST NIGHT. PATIENT IS IN A LOT OF PAIN, HAS RECTAL CANCER AND COMLETHIA STATES SHE FEELS LIKE THEY MAY NOT BE TREATING HER PAIN. COMLETHIA STATES SHE DOESN'T KNOW IF PATIENT NEEDS TO GO TO HOSPITAL AGAIN. ASKING FOR PCP RECOMMENDATION.     Do you require a callback: YES

## 2023-04-04 NOTE — TELEPHONE ENCOUNTER
Daughter states that pt has been in much more pain since being at rehab in [place].  Pt was on Morphine when inpat at [facility], now only getting Lortab q8hrs.  Wanting to know if pt needs to be readmitted and about her chemo and radiation.  Advised daughter to talk to MD at Rehab and call Hem/Oc.  Daughter states verbal understanding.

## 2023-04-05 ENCOUNTER — APPOINTMENT (OUTPATIENT)
Dept: CT IMAGING | Facility: HOSPITAL | Age: 78
DRG: 935 | End: 2023-04-05
Payer: MEDICARE

## 2023-04-05 ENCOUNTER — HOSPITAL ENCOUNTER (INPATIENT)
Facility: HOSPITAL | Age: 78
LOS: 17 days | DRG: 935 | End: 2023-04-22
Attending: EMERGENCY MEDICINE | Admitting: INTERNAL MEDICINE
Payer: MEDICARE

## 2023-04-05 ENCOUNTER — APPOINTMENT (OUTPATIENT)
Dept: NEPHROLOGY | Facility: HOSPITAL | Age: 78
DRG: 935 | End: 2023-04-05
Payer: MEDICARE

## 2023-04-05 DIAGNOSIS — R13.10 DYSPHAGIA, UNSPECIFIED TYPE: ICD-10-CM

## 2023-04-05 DIAGNOSIS — E88.09 HYPOALBUMINEMIA: ICD-10-CM

## 2023-04-05 DIAGNOSIS — Z99.2 ESRD ON HEMODIALYSIS: ICD-10-CM

## 2023-04-05 DIAGNOSIS — R52 INTRACTABLE PAIN: Primary | ICD-10-CM

## 2023-04-05 DIAGNOSIS — R62.7 ADULT FAILURE TO THRIVE: ICD-10-CM

## 2023-04-05 DIAGNOSIS — L89.303 PRESSURE INJURY OF BUTTOCK, STAGE 3, UNSPECIFIED LATERALITY: ICD-10-CM

## 2023-04-05 DIAGNOSIS — C20 RECTAL CANCER: ICD-10-CM

## 2023-04-05 DIAGNOSIS — N18.6 ESRD ON HEMODIALYSIS: ICD-10-CM

## 2023-04-05 LAB
ALBUMIN SERPL-MCNC: 2.9 G/DL (ref 3.5–5.2)
ALBUMIN/GLOB SERPL: 0.5 G/DL
ALP SERPL-CCNC: 238 U/L (ref 39–117)
ALT SERPL W P-5'-P-CCNC: 12 U/L (ref 1–33)
ANION GAP SERPL CALCULATED.3IONS-SCNC: 15 MMOL/L (ref 5–15)
AST SERPL-CCNC: 37 U/L (ref 1–32)
BASOPHILS # BLD AUTO: 0.04 10*3/MM3 (ref 0–0.2)
BASOPHILS NFR BLD AUTO: 0.4 % (ref 0–1.5)
BILIRUB SERPL-MCNC: 0.7 MG/DL (ref 0–1.2)
BUN SERPL-MCNC: 39 MG/DL (ref 8–23)
BUN/CREAT SERPL: 5.6 (ref 7–25)
CALCIUM SPEC-SCNC: 9 MG/DL (ref 8.6–10.5)
CHLORIDE SERPL-SCNC: 91 MMOL/L (ref 98–107)
CO2 SERPL-SCNC: 29 MMOL/L (ref 22–29)
CREAT SERPL-MCNC: 6.93 MG/DL (ref 0.57–1)
DEPRECATED RDW RBC AUTO: 67.1 FL (ref 37–54)
EGFRCR SERPLBLD CKD-EPI 2021: 5.7 ML/MIN/1.73
EOSINOPHIL # BLD AUTO: 0.62 10*3/MM3 (ref 0–0.4)
EOSINOPHIL NFR BLD AUTO: 6.8 % (ref 0.3–6.2)
ERYTHROCYTE [DISTWIDTH] IN BLOOD BY AUTOMATED COUNT: 18.2 % (ref 12.3–15.4)
GLOBULIN UR ELPH-MCNC: 5.5 GM/DL
GLUCOSE BLDC GLUCOMTR-MCNC: 106 MG/DL (ref 70–130)
GLUCOSE SERPL-MCNC: 169 MG/DL (ref 65–99)
HCT VFR BLD AUTO: 30.9 % (ref 34–46.6)
HGB BLD-MCNC: 9 G/DL (ref 12–15.9)
IMM GRANULOCYTES # BLD AUTO: 0.05 10*3/MM3 (ref 0–0.05)
IMM GRANULOCYTES NFR BLD AUTO: 0.5 % (ref 0–0.5)
LYMPHOCYTES # BLD AUTO: 1.1 10*3/MM3 (ref 0.7–3.1)
LYMPHOCYTES NFR BLD AUTO: 12 % (ref 19.6–45.3)
MAGNESIUM SERPL-MCNC: 2.3 MG/DL (ref 1.6–2.4)
MCH RBC QN AUTO: 29.6 PG (ref 26.6–33)
MCHC RBC AUTO-ENTMCNC: 29.1 G/DL (ref 31.5–35.7)
MCV RBC AUTO: 101.6 FL (ref 79–97)
MONOCYTES # BLD AUTO: 1.27 10*3/MM3 (ref 0.1–0.9)
MONOCYTES NFR BLD AUTO: 13.9 % (ref 5–12)
NEUTROPHILS NFR BLD AUTO: 6.08 10*3/MM3 (ref 1.7–7)
NEUTROPHILS NFR BLD AUTO: 66.4 % (ref 42.7–76)
NRBC BLD AUTO-RTO: 0 /100 WBC (ref 0–0.2)
PHOSPHATE SERPL-MCNC: 3.2 MG/DL (ref 2.5–4.5)
PLATELET # BLD AUTO: 203 10*3/MM3 (ref 140–450)
PMV BLD AUTO: 10.8 FL (ref 6–12)
POTASSIUM SERPL-SCNC: 4.3 MMOL/L (ref 3.5–5.2)
PROT SERPL-MCNC: 8.4 G/DL (ref 6–8.5)
RBC # BLD AUTO: 3.04 10*6/MM3 (ref 3.77–5.28)
SODIUM SERPL-SCNC: 135 MMOL/L (ref 136–145)
WBC NRBC COR # BLD: 9.16 10*3/MM3 (ref 3.4–10.8)

## 2023-04-05 PROCEDURE — 25010000002 ONDANSETRON PER 1 MG: Performed by: EMERGENCY MEDICINE

## 2023-04-05 PROCEDURE — 74176 CT ABD & PELVIS W/O CONTRAST: CPT

## 2023-04-05 PROCEDURE — 83735 ASSAY OF MAGNESIUM: CPT | Performed by: EMERGENCY MEDICINE

## 2023-04-05 PROCEDURE — 84100 ASSAY OF PHOSPHORUS: CPT | Performed by: EMERGENCY MEDICINE

## 2023-04-05 PROCEDURE — 25010000002 HYDROMORPHONE PER 4 MG: Performed by: EMERGENCY MEDICINE

## 2023-04-05 PROCEDURE — 85025 COMPLETE CBC W/AUTO DIFF WBC: CPT | Performed by: EMERGENCY MEDICINE

## 2023-04-05 PROCEDURE — 5A1D70Z PERFORMANCE OF URINARY FILTRATION, INTERMITTENT, LESS THAN 6 HOURS PER DAY: ICD-10-PCS | Performed by: INTERNAL MEDICINE

## 2023-04-05 PROCEDURE — 80053 COMPREHEN METABOLIC PANEL: CPT | Performed by: EMERGENCY MEDICINE

## 2023-04-05 PROCEDURE — 99285 EMERGENCY DEPT VISIT HI MDM: CPT

## 2023-04-05 PROCEDURE — 25010000002 HYDROMORPHONE PER 4 MG: Performed by: STUDENT IN AN ORGANIZED HEALTH CARE EDUCATION/TRAINING PROGRAM

## 2023-04-05 PROCEDURE — 99222 1ST HOSP IP/OBS MODERATE 55: CPT | Performed by: STUDENT IN AN ORGANIZED HEALTH CARE EDUCATION/TRAINING PROGRAM

## 2023-04-05 PROCEDURE — 82962 GLUCOSE BLOOD TEST: CPT

## 2023-04-05 RX ORDER — HYDROXYZINE HYDROCHLORIDE 25 MG/1
25 TABLET, FILM COATED ORAL 3 TIMES DAILY PRN
Status: DISCONTINUED | OUTPATIENT
Start: 2023-04-05 | End: 2023-04-22 | Stop reason: HOSPADM

## 2023-04-05 RX ORDER — ALBUMIN (HUMAN) 12.5 G/50ML
25 SOLUTION INTRAVENOUS AS NEEDED
Status: ACTIVE | OUTPATIENT
Start: 2023-04-05 | End: 2023-04-06

## 2023-04-05 RX ORDER — ASPIRIN 81 MG/1
81 TABLET ORAL DAILY
Status: DISCONTINUED | OUTPATIENT
Start: 2023-04-05 | End: 2023-04-21

## 2023-04-05 RX ORDER — MIRTAZAPINE 15 MG/1
15 TABLET, FILM COATED ORAL NIGHTLY
Status: DISCONTINUED | OUTPATIENT
Start: 2023-04-05 | End: 2023-04-21

## 2023-04-05 RX ORDER — ISOSORBIDE MONONITRATE 120 MG/1
120 TABLET, EXTENDED RELEASE ORAL DAILY
Status: DISCONTINUED | OUTPATIENT
Start: 2023-04-06 | End: 2023-04-21

## 2023-04-05 RX ORDER — ACETAMINOPHEN 325 MG/1
650 TABLET ORAL EVERY 4 HOURS PRN
Status: DISCONTINUED | OUTPATIENT
Start: 2023-04-05 | End: 2023-04-05

## 2023-04-05 RX ORDER — HYDROMORPHONE HYDROCHLORIDE 1 MG/ML
0.5 INJECTION, SOLUTION INTRAMUSCULAR; INTRAVENOUS; SUBCUTANEOUS
Status: DISCONTINUED | OUTPATIENT
Start: 2023-04-05 | End: 2023-04-05

## 2023-04-05 RX ORDER — SODIUM CHLORIDE 9 MG/ML
40 INJECTION, SOLUTION INTRAVENOUS AS NEEDED
Status: DISCONTINUED | OUTPATIENT
Start: 2023-04-05 | End: 2023-04-22 | Stop reason: HOSPADM

## 2023-04-05 RX ORDER — ATORVASTATIN CALCIUM 40 MG/1
80 TABLET, FILM COATED ORAL NIGHTLY
Status: DISCONTINUED | OUTPATIENT
Start: 2023-04-05 | End: 2023-04-21

## 2023-04-05 RX ORDER — ACETAMINOPHEN 325 MG/1
650 TABLET ORAL 3 TIMES DAILY
Status: DISCONTINUED | OUTPATIENT
Start: 2023-04-05 | End: 2023-04-16 | Stop reason: SDUPTHER

## 2023-04-05 RX ORDER — CLONIDINE HYDROCHLORIDE 0.1 MG/1
0.1 TABLET ORAL 3 TIMES DAILY PRN
Status: DISCONTINUED | OUTPATIENT
Start: 2023-04-05 | End: 2023-04-22 | Stop reason: HOSPADM

## 2023-04-05 RX ORDER — HYDROMORPHONE HYDROCHLORIDE 1 MG/ML
0.25 INJECTION, SOLUTION INTRAMUSCULAR; INTRAVENOUS; SUBCUTANEOUS EVERY 4 HOURS PRN
Status: DISCONTINUED | OUTPATIENT
Start: 2023-04-05 | End: 2023-04-22 | Stop reason: HOSPADM

## 2023-04-05 RX ORDER — SODIUM CHLORIDE 0.9 % (FLUSH) 0.9 %
10 SYRINGE (ML) INJECTION EVERY 12 HOURS SCHEDULED
Status: DISCONTINUED | OUTPATIENT
Start: 2023-04-05 | End: 2023-04-17

## 2023-04-05 RX ORDER — AMIODARONE HYDROCHLORIDE 200 MG/1
200 TABLET ORAL DAILY
Status: DISCONTINUED | OUTPATIENT
Start: 2023-04-05 | End: 2023-04-21

## 2023-04-05 RX ORDER — SODIUM CHLORIDE 0.9 % (FLUSH) 0.9 %
10 SYRINGE (ML) INJECTION AS NEEDED
Status: DISCONTINUED | OUTPATIENT
Start: 2023-04-05 | End: 2023-04-22 | Stop reason: HOSPADM

## 2023-04-05 RX ORDER — BISACODYL 10 MG
10 SUPPOSITORY, RECTAL RECTAL DAILY PRN
Status: DISCONTINUED | OUTPATIENT
Start: 2023-04-05 | End: 2023-04-21

## 2023-04-05 RX ORDER — CHOLECALCIFEROL (VITAMIN D3) 125 MCG
5 CAPSULE ORAL NIGHTLY PRN
Status: DISCONTINUED | OUTPATIENT
Start: 2023-04-05 | End: 2023-04-22 | Stop reason: HOSPADM

## 2023-04-05 RX ORDER — ONDANSETRON 2 MG/ML
4 INJECTION INTRAMUSCULAR; INTRAVENOUS EVERY 6 HOURS PRN
Status: DISCONTINUED | OUTPATIENT
Start: 2023-04-05 | End: 2023-04-22 | Stop reason: HOSPADM

## 2023-04-05 RX ORDER — CAPSAICIN 0.75 MG/G
CREAM TOPICAL EVERY 12 HOURS SCHEDULED
Status: DISCONTINUED | OUTPATIENT
Start: 2023-04-05 | End: 2023-04-21

## 2023-04-05 RX ORDER — HYDROCODONE BITARTRATE AND ACETAMINOPHEN 5; 325 MG/1; MG/1
1 TABLET ORAL EVERY 4 HOURS PRN
Status: DISCONTINUED | OUTPATIENT
Start: 2023-04-05 | End: 2023-04-06

## 2023-04-05 RX ORDER — LIDOCAINE HYDROCHLORIDE 20 MG/ML
JELLY TOPICAL ONCE
Status: COMPLETED | OUTPATIENT
Start: 2023-04-05 | End: 2023-04-05

## 2023-04-05 RX ORDER — BISACODYL 5 MG/1
5 TABLET, DELAYED RELEASE ORAL DAILY PRN
Status: DISCONTINUED | OUTPATIENT
Start: 2023-04-05 | End: 2023-04-21

## 2023-04-05 RX ORDER — AMOXICILLIN 250 MG
2 CAPSULE ORAL 2 TIMES DAILY
Status: DISCONTINUED | OUTPATIENT
Start: 2023-04-05 | End: 2023-04-21

## 2023-04-05 RX ORDER — CARVEDILOL 6.25 MG/1
3.12 TABLET ORAL EVERY 12 HOURS SCHEDULED
Status: DISCONTINUED | OUTPATIENT
Start: 2023-04-05 | End: 2023-04-21

## 2023-04-05 RX ORDER — ONDANSETRON 2 MG/ML
4 INJECTION INTRAMUSCULAR; INTRAVENOUS ONCE
Status: COMPLETED | OUTPATIENT
Start: 2023-04-05 | End: 2023-04-05

## 2023-04-05 RX ADMIN — LIDOCAINE HYDROCHLORIDE: 20 JELLY TOPICAL at 08:52

## 2023-04-05 RX ADMIN — CARVEDILOL 3.12 MG: 6.25 TABLET, FILM COATED ORAL at 21:29

## 2023-04-05 RX ADMIN — HYDROMORPHONE HYDROCHLORIDE 0.25 MG: 1 INJECTION, SOLUTION INTRAMUSCULAR; INTRAVENOUS; SUBCUTANEOUS at 17:12

## 2023-04-05 RX ADMIN — APIXABAN 2.5 MG: 2.5 TABLET, FILM COATED ORAL at 21:29

## 2023-04-05 RX ADMIN — Medication 1 APPLICATION: at 08:55

## 2023-04-05 RX ADMIN — ATORVASTATIN CALCIUM 80 MG: 40 TABLET, FILM COATED ORAL at 21:29

## 2023-04-05 RX ADMIN — MIRTAZAPINE 15 MG: 15 TABLET, FILM COATED ORAL at 21:29

## 2023-04-05 RX ADMIN — ASPIRIN 81 MG: 81 TABLET, COATED ORAL at 17:11

## 2023-04-05 RX ADMIN — Medication 10 ML: at 17:12

## 2023-04-05 RX ADMIN — HYDROMORPHONE HYDROCHLORIDE 0.5 MG: 1 INJECTION, SOLUTION INTRAMUSCULAR; INTRAVENOUS; SUBCUTANEOUS at 08:54

## 2023-04-05 RX ADMIN — ONDANSETRON 4 MG: 2 INJECTION INTRAMUSCULAR; INTRAVENOUS at 08:51

## 2023-04-05 RX ADMIN — HYDROMORPHONE HYDROCHLORIDE 0.5 MG: 1 INJECTION, SOLUTION INTRAMUSCULAR; INTRAVENOUS; SUBCUTANEOUS at 13:12

## 2023-04-05 RX ADMIN — HYDROCODONE BITARTRATE AND ACETAMINOPHEN 1 TABLET: 5; 325 TABLET ORAL at 21:28

## 2023-04-05 RX ADMIN — ACETAMINOPHEN 325MG 650 MG: 325 TABLET ORAL at 17:11

## 2023-04-05 RX ADMIN — Medication 10 ML: at 21:29

## 2023-04-05 RX ADMIN — AMIODARONE HYDROCHLORIDE 200 MG: 200 TABLET ORAL at 17:11

## 2023-04-05 NOTE — CONSULTS
Patient Care Team:  Meghana Rodgers MD as PCP - General  Demetrius Sagastume MD as Consulting Physician (Cardiology)  Kevan Lerma MD as Consulting Physician (Nephrology)  Geena Estrella APRN as Nurse Practitioner (Cardiology)  Frank Mandujano MD as Referring Physician (Colon and Rectal Surgery)  Kevan Cavazos MD as Consulting Physician (Radiation Oncology)  Yue Reeves RN as Nurse Navigator  Darryn Burk MD as Consulting Physician (Nephrology)    Chief complaint: ESRD    Abdominal pain    History of Present Illness: Ms Pop is a 79 yo lady with hx of ESRD on MWF grayson, She is presenting with severe abdominal pain. She has hx of chronic pain in the setting of rectal cancer. Patient missed her dialysis on Monday as she wasn't feeling well. She has poor po intake and c/o severe cramping abdominal pain. Labs reviewed on this admission. She is seen in dialysis clinic tolerating treatment well. UF only 1.5 liter. 3K bath. Tolerating well so far.      Review of Systems   Constitutional: Negative.    HENT: Negative.    Respiratory: Negative.    Cardiovascular: Negative.    Gastrointestinal: Negative.    Genitourinary: Negative.    Musculoskeletal: Negative.    Skin: Negative.    Neurological: Negative.    Hematological: Negative.    Psychiatric/Behavioral: Negative.         Past Medical History:   Diagnosis Date   • Acute bronchitis    • Acute conjunctivitis    • Acute kidney injury     Admission from 12/26/2013 to 01/02/2014, now resolved with latest creatinine 1.4 on 01/08/2014.   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   • Anal cancer 2/8/2023   • Arthritis    • Breast cancer, female, right     2005 mastectomy right   • Cancer    • CHF (congestive heart failure)    • Chronic kidney disease     dialysis MWF, [facility]    • Clotting disorder    • COVID-19    • Diabetes mellitus      diagnosed ~1992, checks fsbg 2-3x/day   • Diarrhea    • Dyslipidemia    • Dyspepsia    • Dyspnea    • Edema    • History of transfusion     ~2013 no reaction    • Hx of radiation therapy    • Hyperlipidemia    • Hypertension     Severe   • Ischemic heart disease    • Moderate obesity     BMI 36.2   • Myocardial infarction    • Pneumonia    • Pneumonia 02/2019   • Radiation 2005   • Seasonal allergies    • Wears glasses    ,   Past Surgical History:   Procedure Laterality Date   • AV FISTULA PLACEMENT, BRACHIOBASILIC     • BREAST BIOPSY Left 2010   • BREAST CYST EXCISION     • BREAST LUMPECTOMY Right 2005   • BREAST SURGERY     • CARDIAC CATHETERIZATION N/A 10/10/2016    Procedure: Left Heart Cath;  Surgeon: Scooter Zhao MD;  Location:  [facility];  Service:    • CARDIAC CATHETERIZATION  10/2016   • CARDIAC CATHETERIZATION N/A 1/21/2021    Procedure: Right and Left Heart Cath;  Surgeon: Hugh Tidwell MD;  Location:  [facility];  Service: Cardiology;  Laterality: N/A;   • COLONOSCOPY N/A 7/23/2020    Procedure: COLONOSCOPY;  Surgeon: Rubén Rosas MD;  Location:  [facility];  Service: Gastroenterology;  Laterality: N/A;   • CORONARY STENT PLACEMENT  2016   • HERNIA REPAIR     • HYSTERECTOMY  2000   • INSERTION HEMODIALYSIS CATHETER Right 6/29/2016    Procedure: HEMODIALYSIS CATHETER INSERTION TUNNELLED;  Surgeon: Ramón Chavez MD;  Location:  [facility];  Service:    • MASTECTOMY Right 2006   • OOPHORECTOMY     • REDUCTION MAMMAPLASTY Left 2005   ,   Family History   Problem Relation Age of Onset   • Stroke Mother    • Cancer Mother    • Pancreatic cancer Mother    • Coronary artery disease Father    • Heart failure Father    • Breast cancer Sister 55   • Throat cancer Daughter    • Colon cancer Maternal Grandmother    • Ovarian cancer Neg Hx    • Endometrial cancer Neg Hx    ,   Social History     Socioeconomic History   • Marital status:     Tobacco Use   • Smoking status: Never   • Smokeless tobacco: Never   • Tobacco comments:     Michael second hand smoke   Vaping Use   • Vaping Use: Never used   Substance and Sexual Activity   • Alcohol use: Not Currently   • Drug use: No   • Sexual activity: Defer     E-cigarette/Vaping   • E-cigarette/Vaping Use Never User      E-cigarette/Vaping Substances   • Nicotine No    • THC No    • CBD No    • Flavoring No      E-cigarette/Vaping Devices   • Disposable No    • Pre-filled or Refillable Cartridge No    • Refillable Tank No    • Pre-filled Pod No        , (Not in a hospital admission)   and Scheduled Meds:  acetaminophen, 650 mg, Oral, TID  amiodarone, 200 mg, Oral, Daily  apixaban, 2.5 mg, Oral, BID  aspirin, 81 mg, Oral, Daily  atorvastatin, 80 mg, Oral, Nightly  capsaicin, , Topical, Q12H  carvedilol, 3.125 mg, Oral, Q12H  [START ON 4/6/2023] isosorbide mononitrate, 120 mg, Oral, Daily  mirtazapine, 15 mg, Oral, Nightly  senna-docusate sodium, 2 tablet, Oral, BID  sodium chloride, 10 mL, Intravenous, Q12H        Objective     Vital Signs  Temp:  [97 °F (36.1 °C)-99.5 °F (37.5 °C)] 97.4 °F (36.3 °C)  Heart Rate:  [64-89] 77  Resp:  [16-18] 16  BP: ()/() 186/71    No intake/output data recorded.  No intake/output data recorded.    Physical Exam  Constitutional:       General: She is not in acute distress.     Appearance: Normal appearance. She is not ill-appearing.   HENT:      Head: Normocephalic and atraumatic.      Nose: Nose normal.      Mouth/Throat:      Mouth: Mucous membranes are moist.   Eyes:      Extraocular Movements: Extraocular movements intact.      Pupils: Pupils are equal, round, and reactive to light.   Cardiovascular:      Rate and Rhythm: Normal rate and regular rhythm.   Pulmonary:      Effort: Pulmonary effort is normal. No respiratory distress.      Breath sounds: Normal breath sounds. No stridor.   Abdominal:      General: Abdomen is flat.   Musculoskeletal:          General: Normal range of motion.      Cervical back: Normal range of motion.      Right lower leg: No edema.      Left lower leg: No edema.   Skin:     General: Skin is warm.   Neurological:      General: No focal deficit present.      Mental Status: She is alert and oriented to person, place, and time.   Psychiatric:         Mood and Affect: Mood normal.         Behavior: Behavior normal.         Results Review:    I reviewed the patient's new clinical results.    WBC WBC   Date Value Ref Range Status   04/05/2023 9.16 3.40 - 10.80 10*3/mm3 Final      HGB Hemoglobin   Date Value Ref Range Status   04/05/2023 9.0 (L) 12.0 - 15.9 g/dL Final      HCT Hematocrit   Date Value Ref Range Status   04/05/2023 30.9 (L) 34.0 - 46.6 % Final      Platlets No results found for: LABPLAT   MCV MCV   Date Value Ref Range Status   04/05/2023 101.6 (H) 79.0 - 97.0 fL Final          Sodium Sodium   Date Value Ref Range Status   04/05/2023 135 (L) 136 - 145 mmol/L Final      Potassium Potassium   Date Value Ref Range Status   04/05/2023 4.3 3.5 - 5.2 mmol/L Final      Chloride Chloride   Date Value Ref Range Status   04/05/2023 91 (L) 98 - 107 mmol/L Final      CO2 CO2   Date Value Ref Range Status   04/05/2023 29.0 22.0 - 29.0 mmol/L Final      BUN BUN   Date Value Ref Range Status   04/05/2023 39 (H) 8 - 23 mg/dL Final      Creatinine Creatinine   Date Value Ref Range Status   04/05/2023 6.93 (H) 0.57 - 1.00 mg/dL Final      Calcium Calcium   Date Value Ref Range Status   04/05/2023 9.0 8.6 - 10.5 mg/dL Final      PO4 No results found for: CAPO4   Albumin Albumin   Date Value Ref Range Status   04/05/2023 2.9 (L) 3.5 - 5.2 g/dL Final      Magnesium Magnesium   Date Value Ref Range Status   04/05/2023 2.3 1.6 - 2.4 mg/dL Final      Uric Acid No results found for: URICACID         Assessment & Plan       Intractable pain      Assessment & Plan     ESRD: MWF grayson    Volume status: No significant anemia    Abdominal pain: In the setting  of rectal cancer.    Failure to thrive    Hypoalbuminemia     Anemia: ACD due to CKD.    I discussed the patients findings and my recommendations with patient    Gabe Butler MD  04/05/23  16:53 EDT

## 2023-04-05 NOTE — NURSING NOTE
Obtained report from ed, instructed that patient will be going to have HD performed at this time prior to coming or arriving to unit.

## 2023-04-05 NOTE — H&P (VIEW-ONLY)
[facility] Medicine Services  HISTORY AND PHYSICAL    Patient Name: Esperanza oPp  : 1945  MRN: [id number]  Primary Care Physician: Meghana Rodgers MD  Date of admission: 2023    Subjective   Subjective     Chief Complaint:  Rectal pain    HPI:  Esperanza Pop is a 78 y.o. female with ESRD on HD , IDDM, HTN, HLD, HFpEF, breast cancer status postmastectomy and radiation, paroxysmal A-fib on Xarelto previously, chronic anemia, rectal cancer recently diagnosed and was getting radiation and chemo, recent admission from 3/10 to 3/29, who presented for intractable rectal pain from SNF.  She reports that since she left the hospital, she has continued to have rectal pain such that she did not tolerate any dialysis on Monday so her last dialysis session was on Friday.  She has not had much of an appetite and has had minimal oral intake for the past several weeks.  The patient denied any other symptoms besides rectal pain and poor appetite today.  Denies fevers, chills, headache, dizziness, nausea, vomiting, diarrhea.    Per the patient's daughter, the patient has had severe pain from her radiation burns on her buttocks.  They also state that she fell most recently yesterday morning while at the SNF.  They state that there is no more radiation or chemotherapy planned at this time due to her debilitation and radiation burns.    The ER, patient was afebrile, heart rate 66, respiratory rate 18, blood pressure 136/57, satting 100% on 2 L nasal cannula.  Labs on admission notable for creatinine 6.93, BUN 39, potassium 4.3, sodium 135, hemoglobin 9, platelets 203.  She was given lidocaine topically and Zofran.  She was admitted for further management.      Review of Systems   Constitutional: Positive for appetite change and fatigue. Negative for fever.   HENT: Negative for congestion and rhinorrhea.    Eyes: Negative for discharge and redness.   Respiratory: Negative for  cough and shortness of breath.    Cardiovascular: Negative for chest pain and leg swelling.   Gastrointestinal: Positive for abdominal pain and rectal pain. Negative for diarrhea, nausea and vomiting.   Genitourinary: Negative for dysuria and hematuria.   Musculoskeletal: Negative for gait problem and joint swelling.   Skin: Positive for wound. Negative for pallor.   Neurological: Negative for dizziness and headaches.   Psychiatric/Behavioral: Negative for confusion and self-injury.            Personal History     Past Medical History:   Diagnosis Date   • Acute bronchitis    • Acute conjunctivitis    • Acute kidney injury     Admission from 12/26/2013 to 01/02/2014, now resolved with latest creatinine 1.4 on 01/08/2014.   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   • Anal cancer 2/8/2023   • Arthritis    • Breast cancer, female, right     2005 mastectomy right   • Cancer    • CHF (congestive heart failure)    • Chronic kidney disease     dialysis MW, [facility]    • Clotting disorder    • COVID-19    • Diabetes mellitus     diagnosed ~1992, checks fsbg 2-3x/day   • Diarrhea    • Dyslipidemia    • Dyspepsia    • Dyspnea    • Edema    • History of transfusion     ~2013 no reaction    • Hx of radiation therapy    • Hyperlipidemia    • Hypertension     Severe   • Ischemic heart disease    • Moderate obesity     BMI 36.2   • Myocardial infarction    • Pneumonia    • Pneumonia 02/2019   • Radiation 2005   • Seasonal allergies    • Wears glasses          Oncology Problem List:  Malignant neoplasm of anal canal (02/08/2023; Status: Active)  Breast cancer, female, right (05/20/2016; Status: Active)    Oncology/Hematology History   Breast cancer, female, right   5/20/2016 Initial Diagnosis    Breast cancer, female, right     Malignant neoplasm of anal canal   2/8/2023 Initial Diagnosis    Anal cancer (HCC)     2/15/2023 -   Chemotherapy    OP ANAL  Capecitabine 825 mg/m2 M-F + XRT         Past Surgical History:   Procedure Laterality Date   • AV FISTULA PLACEMENT, BRACHIOBASILIC     • BREAST BIOPSY Left 2010   • BREAST CYST EXCISION     • BREAST LUMPECTOMY Right 2005   • BREAST SURGERY     • CARDIAC CATHETERIZATION N/A 10/10/2016    Procedure: Left Heart Cath;  Surgeon: Scooter Zhao MD;  Location:  [facility];  Service:    • CARDIAC CATHETERIZATION  10/2016   • CARDIAC CATHETERIZATION N/A 1/21/2021    Procedure: Right and Left Heart Cath;  Surgeon: Hugh Tidwell MD;  Location:  [facility];  Service: Cardiology;  Laterality: N/A;   • COLONOSCOPY N/A 7/23/2020    Procedure: COLONOSCOPY;  Surgeon: Rubén Rosas MD;  Location:  [facility];  Service: Gastroenterology;  Laterality: N/A;   • CORONARY STENT PLACEMENT  2016   • HERNIA REPAIR     • HYSTERECTOMY  2000   • INSERTION HEMODIALYSIS CATHETER Right 6/29/2016    Procedure: HEMODIALYSIS CATHETER INSERTION TUNNELLED;  Surgeon: Ramón Chavez MD;  Location: [facility];  Service:    • MASTECTOMY Right 2006   • OOPHORECTOMY     • REDUCTION MAMMAPLASTY Left 2005       Family History:  family history includes Breast cancer (age of onset: 55) in her sister; Cancer in her mother; Colon cancer in her maternal grandmother; Coronary artery disease in her father; Heart failure in her father; Pancreatic cancer in her mother; Stroke in her mother; Throat cancer in her daughter.     Social History:  reports that she has never smoked. She has never used smokeless tobacco. She reports current alcohol use. She reports that she does not use drugs.  Social History     Social History Narrative    Pt consumes 1 serving of caffeine once every 2 weeks.     Lost grandson in Feb. 2022       Medications:  B Complex-C-Folic Acid, Capsaicin, Hydrocortisone (Perianal), Insulin Lispro, Iron Dextran, Lancets Ultra Fine, Vitamin D, acetaminophen,  amiodarone, apixaban, aspirin, atorvastatin, benzocaine-menthol, bisacodyl, carvedilol, cloNIDine, difluprednate, dorzolamide-timolol, epoetin orquidea-epbx, fluocinonide, glucose blood, glucose monitor, hydrOXYzine, hydrocortisone, isosorbide mononitrate, lidocaine-prilocaine, mirtazapine, naloxone, and nitroglycerin    Allergies   Allergen Reactions   • Mircera [Methoxy Polyethylene Glycol-Epoetin Beta] Anaphylaxis     Pt stopped breathing   • Venofer [Iron Sucrose] Anaphylaxis     Pt stopped breathing   • Albuterol Other (See Comments)     Increased blood pressure  Used right before heart attack   • Nifedipine Er Swelling   • Penicillins Hives   • Prednisone Other (See Comments)     Makes jittery/anxious       Objective   Objective     Vital Signs:   Temp:  [97 °F (36.1 °C)-99.5 °F (37.5 °C)] 97 °F (36.1 °C)  Heart Rate:  [64-89] 77  Resp:  [18] 18  BP: ()/() 105/70  Flow (L/min):  [2] 2    Physical Exam   Constitutional: No acute distress, sleeping, but awakens with verbal stimulation, alert  HENT: NCAT, mucous membranes moist  Respiratory: Clear to auscultation bilaterally, respiratory effort normal   Cardiovascular: RRR, no murmurs, rubs, or gallops, right AV fistula accessed for dialysis  Gastrointestinal: Normoactive bowel sounds, soft, nontender, nondistended  Musculoskeletal: No bilateral ankle edema  Psychiatric: Appropriate affect, cooperative  Neurologic: PERRL, symmetric facies, symmetric palate rise, tongue midline, moving all extremities, speech clear  Skin: Examined with dialysis nurse present as a chaperone; has gluteal radiation burns that are tender to palpation    Result Review:  I have personally reviewed the results from the time of this admission to 4/5/2023 15:37 EDT and agree with these findings:  []  Laboratory list / accordion  []  Microbiology  []  Radiology  [x]  EKG/Telemetry   []  Cardiology/Vascular   []  Pathology  [x]  Old records  []  Other:  Most notable findings include:  as per hpi    LAB RESULTS:      Lab 04/05/23  0851   WBC 9.16   HEMOGLOBIN 9.0*   HEMATOCRIT 30.9*   PLATELETS 203   NEUTROS ABS 6.08   IMMATURE GRANS (ABS) 0.05   LYMPHS ABS 1.10   MONOS ABS 1.27*   EOS ABS 0.62*   .6*         Lab 04/05/23  0851   SODIUM 135*   POTASSIUM 4.3   CHLORIDE 91*   CO2 29.0   ANION GAP 15.0   BUN 39*   CREATININE 6.93*   EGFR 5.7*   GLUCOSE 169*   CALCIUM 9.0   MAGNESIUM 2.3   PHOSPHORUS 3.2         Lab 04/05/23  0851   TOTAL PROTEIN 8.4   ALBUMIN 2.9*   GLOBULIN 5.5   ALT (SGPT) 12   AST (SGOT) 37*   BILIRUBIN 0.7   ALK PHOS 238*                     Brief Urine Lab Results     None        Microbiology Results (last 10 days)     ** No results found for the last 240 hours. **          No radiology results from the last 24 hrs    Results for orders placed during the hospital encounter of 03/10/23    Adult Transthoracic Echo Complete W/ Cont if Necessary Per Protocol    Interpretation Summary  •  Left ventricular systolic function is normal. Estimated left ventricular EF = 55%  •  Left ventricular wall thickness is consistent with moderate concentric hypertrophy.  •  The right ventricular cavity is mildly dilated.  •  Mild to moderate biatrial enlargement.  •  Moderate to severe aortic valve stenosis is present.  Mean gradient 34 mmHg, DOUG 0.9 cm².  •  Mild aortic insufficiency.  •  Mild mitral regurgitation.  •  Mild to moderate tricuspid regurgitation with RVSP 48 mmHg.      Assessment & Plan   Assessment & Plan       Intractable pain      78 y.o. female with ESRD on HD Monday Wednesday Friday, IDDM, HTN, HLD, HFpEF, breast cancer status postmastectomy and radiation, paroxysmal A-fib on Xarelto previously, chronic anemia, rectal cancer recently diagnosed and was getting radiation and chemo, recent admission from 3/10 to 3/29, who presented for intractable rectal pain from SNF.  She reports that since she left the hospital, she has continued to have rectal pain such that she did not  tolerate any dialysis on Monday so her last dialysis session was on Friday. She has not had much of an appetite and has had minimal oral intake for the past several weeks.  Patient has continued to decline while at the SNF - based on her labs, despite having her last dialysis session on Friday, she has had minimal oral intake.    Buttock wound, appears to be from radiation  Metastatic rectal cancer, was on chemoradiation  • Wound care consulted  • Pain control with scheduled Tylenol, as needed Norco, as needed Dilaudid  • Palliative care consult  • CT abdomen and pelvis pending for lower abdominal pain and rectal pain    Profound Debility  • She follows with Dr. Cavazos, Rad/Onc, Dr. Carpenter with oncology  • In regards to her rectal cancer, could not tolerate chemotherapy or radiation therapy because of severe debility.    • Plan from her last admission (discharged on 3/29) was to hold both therapies (chemoradiation) and transition to SNF and if she improves over the next few weeks, she can resume treatment.   • Patient has continued to decline with continued weakness, poor appetite; patient appears to be hospice appropriate  • PT and OT consulted  • Discussed the patient's poor functional status and need to discuss palliative and hospice.  Patient was amenable to talking with palliative, but was not ready to discuss hospice today.  She requested I speak with her daughters.  I was only able to reach Jeri over the phone. I discussed with her the patient has continued to decline and that we will obtain imaging, palliative consult and discuss further.    • Discussed I believe that she would likely continue to decline and that we needed to further talk about how the patient will spend the time that she has left. Comlethia to get patient's pain under control first    Poor appetite  • Continue mirtazapine    Dysphagia  · Was on a modified diet with purées and pudding thickened liquids during her last  admission  · Speech consulted     ESRD on HD MWF  • Renal following for HD today  • Discussed with Dr. Butler who feels patient is likely hospice appropriate     HFpEF   Essential hypertension  hypotension  • Continue dialysis for volume control as above  • Continue imdur 120mg     PAF on eliquis   • Continue eliquis + amiodarone     Mod-Severe aortic stenosis   • Has appt with Geena Estrella, APRN 4/27/23    DVT prophylaxis:  apixaban    CODE STATUS:  FULL per discussion with the patient on 4/5; need to reassess again the AM, as patient has had various code status orders on past admissions       Expected Discharge  Expected Discharge Date and Time     Expected Discharge Date Expected Discharge Time    Apr 10, 2023             This note has been completed as part of a split-shared workflow.     Signature: Electronically signed by Nuria Lynch MD, 04/05/23, 3:36 PM EDT

## 2023-04-05 NOTE — Clinical Note
Level of Care: Telemetry [5]   Diagnosis: Intractable pain [898895]   Admitting Physician: ROBERTO CARLOS MOSQUERA [[id number]]

## 2023-04-05 NOTE — LETTER
[facility]  [address] [facility] [address]  [phone]        April 15, 2023      Patient: Esperanza Pop  YOB: 1945  Date of Visit: 4/5/2023      For [facility] inpatient hospice    Attending: Stephanie DILLON  Certifying: Dr. Rohini Oconnor  Referring: Dr. Trent Humphreys RN

## 2023-04-05 NOTE — PLAN OF CARE
HD completed. Tolerated well. UF goal reached. Blood returned. Report given to primary RN.   Problem: Device-Related Complication Risk (Hemodialysis)  Goal: Safe, Effective Therapy Delivery  Outcome: Ongoing, Progressing  Intervention: Optimize Device Care and Function  Description: Maintain flow rate, anticoagulation parameters, pressure ranges and prescribed fluid balance with gradual adjustments to maintain hemodynamic stability and achieve therapy goals.  Monitor laboratory results (e.g., electrolytes, glucose, albumin, coagulation studies, hemoglobin, hematocrit) and clinical status (e.g., cramping; nausea, vomiting, blood pressure fluctuations) for response to therapy; advocate for treatment or adju  Assess vascular access site for patency, securement and position to meet flow demands. Assess perfusion distal to access site to ensure adequate tissue oxygenation.  For arteriovenous fistula, assess presence of thrill to ensure presence of blood flow. Avoid blood pressure readings, lab draws, tight clothing or jewelry on the AV (arteriovenous) fistula extremity to prevent trauma.  Maintain circuit monitoring (e.g., arterial, venous and transmembrane pressure; ultrafiltrate removal; dialysate flow, conductivity and temperature); address alarms promptly to decrease risk to patient and preserve circuit function.  Evaluate circuit for disconnections, cracks, clotting, malfunction, leaks or rupture of hemofilter; intervene promptly to prevent risk to patient.  Consider distal vascular access for antibiotic or vasoactive medication administration to prevent filtration of medication effect prior to being delivered systemically to the patient.  Maintain infusion of anticoagulation; adjust to keep laboratory values within ordered range.  Provide emergency equipment, such as clamps, replacement devices and resuscitative supplies, if malfunction, tubing rupture, clot formation or migration occur.  Recent Flowsheet  Documentation  Taken 4/5/2023 1600 by Issac Hinojosa, RN  Medication Review/Management: medications reviewed  Taken 4/5/2023 1230 by Issac Hinojosa, RN  Medication Review/Management: medications reviewed     Problem: Hemodynamic Instability (Hemodialysis)  Goal: Effective Tissue Perfusion  Outcome: Ongoing, Progressing     Problem: Infection (Hemodialysis)  Goal: Absence of Infection Signs and Symptoms  Outcome: Ongoing, Progressing   Goal Outcome Evaluation:

## 2023-04-05 NOTE — ED PROVIDER NOTES
Subjective   History of Present Illness  This is a pleasant, unfortunate, 78-year-old female who was accompanied by her daughter.  Prior to February and her diagnosis of rectal cancer she was fairly active.  She has end-stage renal disease and dialyzes Monday Wednesday and Friday was able to drive herself to dialysis and got to the store.    She had rectal pain in February diagnosed with rectal cancer and started radiation and chemotherapy.  I reviewed those records.  She got increasingly weak therapy was held and her strength declined.  She was discharged from the hospital on the 29th of last month to a skilled nursing facility in [place] where she has been in really has declined even further.  Now she cannot walk.  She has rectal pain that is constant and episodes that come about every 8 minutes like a contraction and that are just miserable for her.  She is passing soft stool.  It is quite painful.  P.o. intake has been decreased.  She has been losing weight.    She missed dialysis yesterday as she could not sit forward toes her buttocks hurt so badly.  She was scheduled dialyze today but the pain got so bad she was brought in by EMS here for further evaluation.    She has had no vomiting.  She has had no fever.  She is actually quite a good historian.  Her daughter gives the rest the history.        All other systems reviewed and are negative except as noted above.        Review of Systems   All other systems reviewed and are negative.      Past Medical History:   Diagnosis Date   • Acute bronchitis    • Acute conjunctivitis    • Acute kidney injury     Admission from 12/26/2013 to 01/02/2014, now resolved with latest creatinine 1.4 on 01/08/2014.   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   • Anal cancer 2/8/2023   • Arthritis    • Breast cancer, female, right     2005 mastectomy right   • Cancer    • CHF (congestive  heart failure)    • Chronic kidney disease     dialysis MWF, [facility]    • Clotting disorder    • COVID-19    • Diabetes mellitus     diagnosed ~1992, checks fsbg 2-3x/day   • Diarrhea    • Dyslipidemia    • Dyspepsia    • Dyspnea    • Edema    • History of transfusion     ~2013 no reaction    • Hx of radiation therapy    • Hyperlipidemia    • Hypertension     Severe   • Ischemic heart disease    • Moderate obesity     BMI 36.2   • Myocardial infarction    • Pneumonia    • Pneumonia 02/2019   • Radiation 2005   • Seasonal allergies    • Wears glasses        Allergies   Allergen Reactions   • Mircera [Methoxy Polyethylene Glycol-Epoetin Beta] Anaphylaxis     Pt stopped breathing   • Venofer [Iron Sucrose] Anaphylaxis     Pt stopped breathing   • Albuterol Other (See Comments)     Increased blood pressure  Used right before heart attack   • Nifedipine Er Swelling   • Penicillins Hives   • Prednisone Other (See Comments)     Makes jittery/anxious       Past Surgical History:   Procedure Laterality Date   • AV FISTULA PLACEMENT, BRACHIOBASILIC     • BREAST BIOPSY Left 2010   • BREAST CYST EXCISION     • BREAST LUMPECTOMY Right 2005   • BREAST SURGERY     • CARDIAC CATHETERIZATION N/A 10/10/2016    Procedure: Left Heart Cath;  Surgeon: Scooter Zhao MD;  Location:  [facility];  Service:    • CARDIAC CATHETERIZATION  10/2016   • CARDIAC CATHETERIZATION N/A 1/21/2021    Procedure: Right and Left Heart Cath;  Surgeon: Hugh Tidwell MD;  Location:  [facility];  Service: Cardiology;  Laterality: N/A;   • COLONOSCOPY N/A 7/23/2020    Procedure: COLONOSCOPY;  Surgeon: Rubén Rosas MD;  Location:  [facility];  Service: Gastroenterology;  Laterality: N/A;   • CORONARY STENT PLACEMENT  2016   • HERNIA REPAIR     • HYSTERECTOMY  2000   • INSERTION HEMODIALYSIS CATHETER Right 6/29/2016    Procedure: HEMODIALYSIS CATHETER INSERTION TUNNELLED;  Surgeon:  Ramón Chavez MD;  Location: [facility];  Service:    • MASTECTOMY Right 2006   • OOPHORECTOMY     • REDUCTION MAMMAPLASTY Left 2005       Family History   Problem Relation Age of Onset   • Stroke Mother    • Cancer Mother    • Pancreatic cancer Mother    • Coronary artery disease Father    • Heart failure Father    • Breast cancer Sister 55   • Throat cancer Daughter    • Colon cancer Maternal Grandmother    • Ovarian cancer Neg Hx    • Endometrial cancer Neg Hx        Social History     Socioeconomic History   • Marital status:    Tobacco Use   • Smoking status: Never   • Smokeless tobacco: Never   • Tobacco comments:     Michael second hand smoke   Vaping Use   • Vaping Use: Never used   Substance and Sexual Activity   • Alcohol use: Not Currently   • Drug use: No   • Sexual activity: Defer           Objective   Physical Exam  Vitals and nursing note reviewed. Exam conducted with a chaperone present.   Constitutional:       Comments: Is a very pleasant 78-year-old she looks thin and gaunt and frail.  She is alert eyes are closed but she gives a good history when you talk to her.   HENT:      Head: Normocephalic and atraumatic.      Right Ear: External ear normal.      Left Ear: External ear normal.      Nose: Nose normal.      Mouth/Throat:      Mouth: Mucous membranes are moist.      Pharynx: Oropharynx is clear.   Eyes:      Extraocular Movements: Extraocular movements intact.      Conjunctiva/sclera: Conjunctivae normal.      Pupils: Pupils are equal, round, and reactive to light.   Neck:      Vascular: No carotid bruit.   Cardiovascular:      Rate and Rhythm: Normal rate and regular rhythm.      Comments: 3/6 systolic murmur at the base which the patient tells me she is known about  Pulmonary:      Effort: Pulmonary effort is normal.      Breath sounds: Normal breath sounds.   Abdominal:      Comments: She is markers on her skin.  Positive bowel sounds soft really nontender no organomegaly,  masses, or guarding.   Genitourinary:     Comments: Buttocks examined.  She has stool.  This is carefully wiped away by my chaperone and she has grade 2 breakdown on both buttocks with redness and may be grade 3 and a small area.  The perineum is fairly unremarkable of the rectum itself she has brown stool and a mass at the rectal verge that is tender.  Musculoskeletal:      Cervical back: Normal range of motion and neck supple. No rigidity or tenderness.      Comments: Fistula in right arm for dialysis.  Equal pulses.  No edema.   Lymphadenopathy:      Cervical: No cervical adenopathy.   Skin:     General: Skin is warm and dry.      Capillary Refill: Capillary refill takes less than 2 seconds.   Neurological:      Comments: Face symmetric, voice soft, tongue midline.  Vision, hearing, and speech are preserved.  She has severe global weakness         Procedures           ED Course            Recent Results (from the past 24 hour(s))   Comprehensive Metabolic Panel    Collection Time: 04/05/23  8:51 AM    Specimen: Blood   Result Value Ref Range    Glucose 169 (H) 65 - 99 mg/dL    BUN 39 (H) 8 - 23 mg/dL    Creatinine 6.93 (H) 0.57 - 1.00 mg/dL    Sodium 135 (L) 136 - 145 mmol/L    Potassium 4.3 3.5 - 5.2 mmol/L    Chloride 91 (L) 98 - 107 mmol/L    CO2 29.0 22.0 - 29.0 mmol/L    Calcium 9.0 8.6 - 10.5 mg/dL    Total Protein 8.4 6.0 - 8.5 g/dL    Albumin 2.9 (L) 3.5 - 5.2 g/dL    ALT (SGPT) 12 1 - 33 U/L    AST (SGOT) 37 (H) 1 - 32 U/L    Alkaline Phosphatase 238 (H) 39 - 117 U/L    Total Bilirubin 0.7 0.0 - 1.2 mg/dL    Globulin 5.5 gm/dL    A/G Ratio 0.5 g/dL    BUN/Creatinine Ratio 5.6 (L) 7.0 - 25.0    Anion Gap 15.0 5.0 - 15.0 mmol/L    eGFR 5.7 (L) >60.0 mL/min/1.73   CBC Auto Differential    Collection Time: 04/05/23  8:51 AM    Specimen: Blood   Result Value Ref Range    WBC 9.16 3.40 - 10.80 10*3/mm3    RBC 3.04 (L) 3.77 - 5.28 10*6/mm3    Hemoglobin 9.0 (L) 12.0 - 15.9 g/dL    Hematocrit 30.9 (L) 34.0 -  46.6 %    .6 (H) 79.0 - 97.0 fL    MCH 29.6 26.6 - 33.0 pg    MCHC 29.1 (L) 31.5 - 35.7 g/dL    RDW 18.2 (H) 12.3 - 15.4 %    RDW-SD 67.1 (H) 37.0 - 54.0 fl    MPV 10.8 6.0 - 12.0 fL    Platelets 203 140 - 450 10*3/mm3    Neutrophil % 66.4 42.7 - 76.0 %    Lymphocyte % 12.0 (L) 19.6 - 45.3 %    Monocyte % 13.9 (H) 5.0 - 12.0 %    Eosinophil % 6.8 (H) 0.3 - 6.2 %    Basophil % 0.4 0.0 - 1.5 %    Immature Grans % 0.5 0.0 - 0.5 %    Neutrophils, Absolute 6.08 1.70 - 7.00 10*3/mm3    Lymphocytes, Absolute 1.10 0.70 - 3.10 10*3/mm3    Monocytes, Absolute 1.27 (H) 0.10 - 0.90 10*3/mm3    Eosinophils, Absolute 0.62 (H) 0.00 - 0.40 10*3/mm3    Basophils, Absolute 0.04 0.00 - 0.20 10*3/mm3    Immature Grans, Absolute 0.05 0.00 - 0.05 10*3/mm3    nRBC 0.0 0.0 - 0.2 /100 WBC   Magnesium    Collection Time: 04/05/23  8:51 AM    Specimen: Blood   Result Value Ref Range    Magnesium 2.3 1.6 - 2.4 mg/dL   Phosphorus    Collection Time: 04/05/23  8:51 AM    Specimen: Blood   Result Value Ref Range    Phosphorus 3.2 2.5 - 4.5 mg/dL   POC Glucose Once    Collection Time: 04/05/23  4:39 PM    Specimen: Blood   Result Value Ref Range    Glucose 106 70 - 130 mg/dL     Note: In addition to lab results from this visit, the labs listed above may include labs taken at another facility or during a different encounter within the last 24 hours. Please correlate lab times with ED admission and discharge times for further clarification of the services performed during this visit.    CT Abdomen Pelvis Without Contrast    (Results Pending)     Vitals:    04/05/23 1530 04/05/23 1545 04/05/23 1600 04/05/23 1640   BP: 150/84 117/62 121/69 (!) 186/71   BP Location:   Left arm Left arm   Patient Position:   Lying Lying   Pulse: 77 79 80 77   Resp:   16 16   Temp:   97 °F (36.1 °C) 97.4 °F (36.3 °C)   TempSrc:   Temporal Oral   SpO2: 96% 99% 96% 94%   Weight:       Height:         Medications   sodium chloride 0.9 % flush 10 mL (10 mL Intravenous  Given 4/5/23 1712)   sodium chloride 0.9 % flush 10 mL (10 mL Intravenous Not Given 4/5/23 1715)   sodium chloride 0.9 % flush 10 mL (has no administration in time range)   sodium chloride 0.9 % infusion 40 mL (has no administration in time range)   HYDROcodone-acetaminophen (NORCO) 5-325 MG per tablet 1 tablet (has no administration in time range)   melatonin tablet 5 mg (has no administration in time range)   sennosides-docusate (PERICOLACE) 8.6-50 MG per tablet 2 tablet (has no administration in time range)     And   bisacodyl (DULCOLAX) EC tablet 5 mg (has no administration in time range)     And   bisacodyl (DULCOLAX) suppository 10 mg (has no administration in time range)   ondansetron (ZOFRAN) injection 4 mg (has no administration in time range)   albumin human 25 % IV SOLN 25 g (has no administration in time range)   HYDROmorphone (DILAUDID) injection 0.25 mg (0.25 mg Intravenous Given 4/5/23 1712)   amiodarone (PACERONE) tablet 200 mg (200 mg Oral Given 4/5/23 1711)   apixaban (ELIQUIS) tablet 2.5 mg (has no administration in time range)   aspirin EC tablet 81 mg (81 mg Oral Given 4/5/23 1711)   atorvastatin (LIPITOR) tablet 80 mg (has no administration in time range)   capsaicin (ZOSTRIX) 0.075 % topical cream (has no administration in time range)   carvedilol (COREG) tablet 3.125 mg (has no administration in time range)   cloNIDine (CATAPRES) tablet 0.1 mg (has no administration in time range)   hydrOXYzine (ATARAX) tablet 25 mg (has no administration in time range)   isosorbide mononitrate (IMDUR) 24 hr tablet 120 mg (has no administration in time range)   mirtazapine (REMERON) tablet 15 mg (has no administration in time range)   acetaminophen (TYLENOL) tablet 650 mg (650 mg Oral Given 4/5/23 1711)   ondansetron (ZOFRAN) injection 4 mg (4 mg Intravenous Given 4/5/23 0881)   Lidocaine HCl gel (XYLOCAINE) urethral/mucosal syringe ( Topical Given 4/5/23 8311)   zinc oxide (DESITIN) 40 % paste 1 application  (1 application Topical Given 4/5/23 0855)     ECG/EMG Results (last 24 hours)     ** No results found for the last 24 hours. **        No orders to display                                       Medical Decision Making        I reviewed all available studies the bedside with the patient and her daughter.  Patient really is ill with this.  Her mentation is still good.    Unfortunately she is really between a rock and a hard place with her cancer.  She needs to be strong enough to continue chemotherapy and radiation therapy but unfortunately I do not think this is can happen for her.    I treated her pain here.  She is now on oxygen.  I started palliative care discussions with the patient and her family which they have had previously.  She very well may need to transition to palliative or hospice care in order to treat her symptoms and give her the best life we can because I do not know medically if there is an anything else to be done for this.  Patient and family understand this and will take it under advisement.    I spoke with Dr. Kapadia, on-call hospital medicine, her colleagues admit the patient.    All are agreeable with plan    Adult failure to thrive: undiagnosed new problem with uncertain prognosis  ESRD on hemodialysis: chronic illness or injury with exacerbation, progression, or side effects of treatment  Hypoalbuminemia: undiagnosed new problem with uncertain prognosis  Intractable pain: complicated acute illness or injury with systemic symptoms that poses a threat to life or bodily functions  Pressure injury of buttock, stage 3, unspecified laterality: undiagnosed new problem with uncertain prognosis  Rectal cancer: chronic illness or injury with exacerbation, progression, or side effects of treatment that poses a threat to life or bodily functions  Amount and/or Complexity of Data Reviewed  Independent Historian: guardian  External Data Reviewed: labs, radiology and notes.  Labs: ordered.  Decision-making details documented in ED Course.      Risk  OTC drugs.  Prescription drug management.  Parenteral controlled substances.  Decision regarding hospitalization.  Decision not to resuscitate or to de-escalate care because of poor prognosis.          Final diagnoses:   Intractable pain   Rectal cancer   Adult failure to thrive   Hypoalbuminemia   ESRD on hemodialysis   Pressure injury of buttock, stage 3, unspecified laterality       ED Disposition  ED Disposition     ED Disposition   Decision to Admit    Condition   --    Comment   Level of Care: Telemetry [5]   Diagnosis: Intractable pain [053725]   Admitting Physician: ROBERTO CARLOS MOSQUERA [[id number]]   Certification: I Certify That Inpatient Hospital Services Are Medically Necessary For Greater Than 2 Midnights               No follow-up provider specified.       Medication List      No changes were made to your prescriptions during this visit.          Rodolfo Fairchild MD  04/05/23 [id number]

## 2023-04-05 NOTE — H&P
[facility] Medicine Services  HISTORY AND PHYSICAL    Patient Name: Esperanza Pop  : 1945  MRN: [id number]  Primary Care Physician: Meghana Rodgers MD  Date of admission: 2023    Subjective   Subjective     Chief Complaint:  Rectal pain    HPI:  Esperanza Pop is a 78 y.o. female with ESRD on HD , IDDM, HTN, HLD, HFpEF, breast cancer status postmastectomy and radiation, paroxysmal A-fib on Xarelto previously, chronic anemia, rectal cancer recently diagnosed and was getting radiation and chemo, recent admission from 3/10 to 3/29, who presented for intractable rectal pain from SNF.  She reports that since she left the hospital, she has continued to have rectal pain such that she did not tolerate any dialysis on Monday so her last dialysis session was on Friday.  She has not had much of an appetite and has had minimal oral intake for the past several weeks.  The patient denied any other symptoms besides rectal pain and poor appetite today.  Denies fevers, chills, headache, dizziness, nausea, vomiting, diarrhea.    Per the patient's daughter, the patient has had severe pain from her radiation burns on her buttocks.  They also state that she fell most recently yesterday morning while at the SNF.  They state that there is no more radiation or chemotherapy planned at this time due to her debilitation and radiation burns.    The ER, patient was afebrile, heart rate 66, respiratory rate 18, blood pressure 136/57, satting 100% on 2 L nasal cannula.  Labs on admission notable for creatinine 6.93, BUN 39, potassium 4.3, sodium 135, hemoglobin 9, platelets 203.  She was given lidocaine topically and Zofran.  She was admitted for further management.      Review of Systems   Constitutional: Positive for appetite change and fatigue. Negative for fever.   HENT: Negative for congestion and rhinorrhea.    Eyes: Negative for discharge and redness.   Respiratory: Negative for  cough and shortness of breath.    Cardiovascular: Negative for chest pain and leg swelling.   Gastrointestinal: Positive for abdominal pain and rectal pain. Negative for diarrhea, nausea and vomiting.   Genitourinary: Negative for dysuria and hematuria.   Musculoskeletal: Negative for gait problem and joint swelling.   Skin: Positive for wound. Negative for pallor.   Neurological: Negative for dizziness and headaches.   Psychiatric/Behavioral: Negative for confusion and self-injury.            Personal History     Past Medical History:   Diagnosis Date   • Acute bronchitis    • Acute conjunctivitis    • Acute kidney injury     Admission from 12/26/2013 to 01/02/2014, now resolved with latest creatinine 1.4 on 01/08/2014.   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   • Anal cancer 2/8/2023   • Arthritis    • Breast cancer, female, right     2005 mastectomy right   • Cancer    • CHF (congestive heart failure)    • Chronic kidney disease     dialysis [facility], [facility]/u [facility]    • Clotting disorder    • COVID-19    • Diabetes mellitus     diagnosed ~1992, checks fsbg 2-3x/day   • Diarrhea    • Dyslipidemia    • Dyspepsia    • Dyspnea    • Edema    • History of transfusion     ~2013 no reaction    • Hx of radiation therapy    • Hyperlipidemia    • Hypertension     Severe   • Ischemic heart disease    • Moderate obesity     BMI 36.2   • Myocardial infarction    • Pneumonia    • Pneumonia 02/2019   • Radiation 2005   • Seasonal allergies    • Wears glasses          Oncology Problem List:  Malignant neoplasm of anal canal (02/08/2023; Status: Active)  Breast cancer, female, right (05/20/2016; Status: Active)    Oncology/Hematology History   Breast cancer, female, right   5/20/2016 Initial Diagnosis    Breast cancer, female, right     Malignant neoplasm of anal canal   2/8/2023 Initial Diagnosis    Anal cancer (HCC)     2/15/2023 -   Chemotherapy    OP ANAL  Capecitabine 825 mg/m2 M-F + XRT         Past Surgical History:   Procedure Laterality Date   • AV FISTULA PLACEMENT, BRACHIOBASILIC     • BREAST BIOPSY Left 2010   • BREAST CYST EXCISION     • BREAST LUMPECTOMY Right 2005   • BREAST SURGERY     • CARDIAC CATHETERIZATION N/A 10/10/2016    Procedure: Left Heart Cath;  Surgeon: Scooter Zhao MD;  Location:  [facility];  Service:    • CARDIAC CATHETERIZATION  10/2016   • CARDIAC CATHETERIZATION N/A 1/21/2021    Procedure: Right and Left Heart Cath;  Surgeon: Hugh Tidwell MD;  Location:  [facility];  Service: Cardiology;  Laterality: N/A;   • COLONOSCOPY N/A 7/23/2020    Procedure: COLONOSCOPY;  Surgeon: Rubén Rosas MD;  Location:  [facility];  Service: Gastroenterology;  Laterality: N/A;   • CORONARY STENT PLACEMENT  2016   • HERNIA REPAIR     • HYSTERECTOMY  2000   • INSERTION HEMODIALYSIS CATHETER Right 6/29/2016    Procedure: HEMODIALYSIS CATHETER INSERTION TUNNELLED;  Surgeon: Ramón Chavez MD;  Location[facility];  Service:    • MASTECTOMY Right 2006   • OOPHORECTOMY     • REDUCTION MAMMAPLASTY Left 2005       Family History:  family history includes Breast cancer (age of onset: 55) in her sister; Cancer in her mother; Colon cancer in her maternal grandmother; Coronary artery disease in her father; Heart failure in her father; Pancreatic cancer in her mother; Stroke in her mother; Throat cancer in her daughter.     Social History:  reports that she has never smoked. She has never used smokeless tobacco. She reports current alcohol use. She reports that she does not use drugs.  Social History     Social History Narrative    Pt consumes 1 serving of caffeine once every 2 weeks.     Lost grandson in Feb. 2022       Medications:  B Complex-C-Folic Acid, Capsaicin, Hydrocortisone (Perianal), Insulin Lispro, Iron Dextran, Lancets Ultra Fine, Vitamin D, acetaminophen,  amiodarone, apixaban, aspirin, atorvastatin, benzocaine-menthol, bisacodyl, carvedilol, cloNIDine, difluprednate, dorzolamide-timolol, epoetin orquidea-epbx, fluocinonide, glucose blood, glucose monitor, hydrOXYzine, hydrocortisone, isosorbide mononitrate, lidocaine-prilocaine, mirtazapine, naloxone, and nitroglycerin    Allergies   Allergen Reactions   • Mircera [Methoxy Polyethylene Glycol-Epoetin Beta] Anaphylaxis     Pt stopped breathing   • Venofer [Iron Sucrose] Anaphylaxis     Pt stopped breathing   • Albuterol Other (See Comments)     Increased blood pressure  Used right before heart attack   • Nifedipine Er Swelling   • Penicillins Hives   • Prednisone Other (See Comments)     Makes jittery/anxious       Objective   Objective     Vital Signs:   Temp:  [97 °F (36.1 °C)-99.5 °F (37.5 °C)] 97 °F (36.1 °C)  Heart Rate:  [64-89] 77  Resp:  [18] 18  BP: ()/() 105/70  Flow (L/min):  [2] 2    Physical Exam   Constitutional: No acute distress, sleeping, but awakens with verbal stimulation, alert  HENT: NCAT, mucous membranes moist  Respiratory: Clear to auscultation bilaterally, respiratory effort normal   Cardiovascular: RRR, no murmurs, rubs, or gallops, right AV fistula accessed for dialysis  Gastrointestinal: Normoactive bowel sounds, soft, nontender, nondistended  Musculoskeletal: No bilateral ankle edema  Psychiatric: Appropriate affect, cooperative  Neurologic: PERRL, symmetric facies, symmetric palate rise, tongue midline, moving all extremities, speech clear  Skin: Examined with dialysis nurse present as a chaperone; has gluteal radiation burns that are tender to palpation    Result Review:  I have personally reviewed the results from the time of this admission to 4/5/2023 15:37 EDT and agree with these findings:  []  Laboratory list / accordion  []  Microbiology  []  Radiology  [x]  EKG/Telemetry   []  Cardiology/Vascular   []  Pathology  [x]  Old records  []  Other:  Most notable findings include:  as per hpi    LAB RESULTS:      Lab 04/05/23  0851   WBC 9.16   HEMOGLOBIN 9.0*   HEMATOCRIT 30.9*   PLATELETS 203   NEUTROS ABS 6.08   IMMATURE GRANS (ABS) 0.05   LYMPHS ABS 1.10   MONOS ABS 1.27*   EOS ABS 0.62*   .6*         Lab 04/05/23  0851   SODIUM 135*   POTASSIUM 4.3   CHLORIDE 91*   CO2 29.0   ANION GAP 15.0   BUN 39*   CREATININE 6.93*   EGFR 5.7*   GLUCOSE 169*   CALCIUM 9.0   MAGNESIUM 2.3   PHOSPHORUS 3.2         Lab 04/05/23  0851   TOTAL PROTEIN 8.4   ALBUMIN 2.9*   GLOBULIN 5.5   ALT (SGPT) 12   AST (SGOT) 37*   BILIRUBIN 0.7   ALK PHOS 238*                     Brief Urine Lab Results     None        Microbiology Results (last 10 days)     ** No results found for the last 240 hours. **          No radiology results from the last 24 hrs    Results for orders placed during the hospital encounter of 03/10/23    Adult Transthoracic Echo Complete W/ Cont if Necessary Per Protocol    Interpretation Summary  •  Left ventricular systolic function is normal. Estimated left ventricular EF = 55%  •  Left ventricular wall thickness is consistent with moderate concentric hypertrophy.  •  The right ventricular cavity is mildly dilated.  •  Mild to moderate biatrial enlargement.  •  Moderate to severe aortic valve stenosis is present.  Mean gradient 34 mmHg, DOUG 0.9 cm².  •  Mild aortic insufficiency.  •  Mild mitral regurgitation.  •  Mild to moderate tricuspid regurgitation with RVSP 48 mmHg.      Assessment & Plan   Assessment & Plan       Intractable pain      78 y.o. female with ESRD on HD Monday Wednesday Friday, IDDM, HTN, HLD, HFpEF, breast cancer status postmastectomy and radiation, paroxysmal A-fib on Xarelto previously, chronic anemia, rectal cancer recently diagnosed and was getting radiation and chemo, recent admission from 3/10 to 3/29, who presented for intractable rectal pain from SNF.  She reports that since she left the hospital, she has continued to have rectal pain such that she did not  tolerate any dialysis on Monday so her last dialysis session was on Friday. She has not had much of an appetite and has had minimal oral intake for the past several weeks.  Patient has continued to decline while at the SNF - based on her labs, despite having her last dialysis session on Friday, she has had minimal oral intake.    Buttock wound, appears to be from radiation  Metastatic rectal cancer, was on chemoradiation  • Wound care consulted  • Pain control with scheduled Tylenol, as needed Norco, as needed Dilaudid  • Palliative care consult  • CT abdomen and pelvis pending for lower abdominal pain and rectal pain    Profound Debility  • She follows with Dr. Cavazos, Rad/Onc, Dr. Carpenter with oncology  • In regards to her rectal cancer, could not tolerate chemotherapy or radiation therapy because of severe debility.    • Plan from her last admission (discharged on 3/29) was to hold both therapies (chemoradiation) and transition to SNF and if she improves over the next few weeks, she can resume treatment.   • Patient has continued to decline with continued weakness, poor appetite; patient appears to be hospice appropriate  • PT and OT consulted  • Discussed the patient's poor functional status and need to discuss palliative and hospice.  Patient was amenable to talking with palliative, but was not ready to discuss hospice today.  She requested I speak with her daughters.  I was only able to reach Jeri over the phone. I discussed with her the patient has continued to decline and that we will obtain imaging, palliative consult and discuss further.    • Discussed I believe that she would likely continue to decline and that we needed to further talk about how the patient will spend the time that she has left. [place]le[place] to get patient's pain under control first    Poor appetite  • Continue mirtazapine    Dysphagia  · Was on a modified diet with purées and pudding thickened liquids during her last  admission  · Speech consulted     ESRD on HD MWF  • Renal following for HD today  • Discussed with Dr. Butler who feels patient is likely hospice appropriate     HFpEF   Essential hypertension  hypotension  • Continue dialysis for volume control as above  • Continue imdur 120mg     PAF on eliquis   • Continue eliquis + amiodarone     Mod-Severe aortic stenosis   • Has appt with Geena Estrella, APRN 4/27/23    DVT prophylaxis:  apixaban    CODE STATUS:  FULL per discussion with the patient on 4/5; need to reassess again the AM, as patient has had various code status orders on past admissions       Expected Discharge  Expected Discharge Date and Time     Expected Discharge Date Expected Discharge Time    Apr 10, 2023             This note has been completed as part of a split-shared workflow.     Signature: Electronically signed by Nuria Lynch MD, 04/05/23, 3:36 PM EDT

## 2023-04-06 ENCOUNTER — ANCILLARY PROCEDURE (OUTPATIENT)
Dept: SPEECH THERAPY | Facility: HOSPITAL | Age: 78
DRG: 935 | End: 2023-04-06
Payer: MEDICARE

## 2023-04-06 LAB
ANION GAP SERPL CALCULATED.3IONS-SCNC: 14 MMOL/L (ref 5–15)
BUN SERPL-MCNC: 20 MG/DL (ref 8–23)
BUN/CREAT SERPL: 5 (ref 7–25)
CALCIUM SPEC-SCNC: 8.2 MG/DL (ref 8.6–10.5)
CHLORIDE SERPL-SCNC: 97 MMOL/L (ref 98–107)
CO2 SERPL-SCNC: 25 MMOL/L (ref 22–29)
CREAT SERPL-MCNC: 4.02 MG/DL (ref 0.57–1)
EGFRCR SERPLBLD CKD-EPI 2021: 11 ML/MIN/1.73
GLUCOSE BLDC GLUCOMTR-MCNC: 132 MG/DL (ref 70–130)
GLUCOSE BLDC GLUCOMTR-MCNC: 172 MG/DL (ref 70–130)
GLUCOSE BLDC GLUCOMTR-MCNC: 97 MG/DL (ref 70–130)
GLUCOSE SERPL-MCNC: 94 MG/DL (ref 65–99)
MAGNESIUM SERPL-MCNC: 2 MG/DL (ref 1.6–2.4)
PHOSPHATE SERPL-MCNC: 3.2 MG/DL (ref 2.5–4.5)
POTASSIUM SERPL-SCNC: 4.9 MMOL/L (ref 3.5–5.2)
SODIUM SERPL-SCNC: 136 MMOL/L (ref 136–145)

## 2023-04-06 PROCEDURE — 97162 PT EVAL MOD COMPLEX 30 MIN: CPT

## 2023-04-06 PROCEDURE — 82962 GLUCOSE BLOOD TEST: CPT

## 2023-04-06 PROCEDURE — 80048 BASIC METABOLIC PNL TOTAL CA: CPT | Performed by: STUDENT IN AN ORGANIZED HEALTH CARE EDUCATION/TRAINING PROGRAM

## 2023-04-06 PROCEDURE — 92610 EVALUATE SWALLOWING FUNCTION: CPT

## 2023-04-06 PROCEDURE — 25010000002 HYDROMORPHONE PER 4 MG: Performed by: STUDENT IN AN ORGANIZED HEALTH CARE EDUCATION/TRAINING PROGRAM

## 2023-04-06 PROCEDURE — 63710000001 INSULIN LISPRO (HUMAN) PER 5 UNITS: Performed by: INTERNAL MEDICINE

## 2023-04-06 PROCEDURE — 92612 ENDOSCOPY SWALLOW (FEES) VID: CPT

## 2023-04-06 PROCEDURE — 84100 ASSAY OF PHOSPHORUS: CPT | Performed by: STUDENT IN AN ORGANIZED HEALTH CARE EDUCATION/TRAINING PROGRAM

## 2023-04-06 PROCEDURE — 99232 SBSQ HOSP IP/OBS MODERATE 35: CPT | Performed by: INTERNAL MEDICINE

## 2023-04-06 PROCEDURE — 83735 ASSAY OF MAGNESIUM: CPT | Performed by: STUDENT IN AN ORGANIZED HEALTH CARE EDUCATION/TRAINING PROGRAM

## 2023-04-06 RX ORDER — HYDROCODONE BITARTRATE AND ACETAMINOPHEN 10; 325 MG/1; MG/1
1 TABLET ORAL EVERY 6 HOURS PRN
COMMUNITY

## 2023-04-06 RX ORDER — ERGOCALCIFEROL (VITAMIN D2) 10 MCG
400 TABLET ORAL DAILY
COMMUNITY

## 2023-04-06 RX ORDER — NUT.TX.GLUCOSE INTOLERANCE,SOY
30 BAR ORAL 2 TIMES DAILY
COMMUNITY

## 2023-04-06 RX ORDER — OXYCODONE HYDROCHLORIDE 5 MG/1
5 CAPSULE ORAL EVERY 6 HOURS PRN
COMMUNITY

## 2023-04-06 RX ORDER — ASCORBIC ACID 500 MG
500 TABLET ORAL DAILY
COMMUNITY

## 2023-04-06 RX ORDER — FENTANYL 25 UG/H
1 PATCH TRANSDERMAL
Status: COMPLETED | OUTPATIENT
Start: 2023-04-06 | End: 2023-04-15

## 2023-04-06 RX ORDER — OXYCODONE HYDROCHLORIDE 5 MG/1
5 TABLET ORAL EVERY 6 HOURS PRN
Status: DISPENSED | OUTPATIENT
Start: 2023-04-06 | End: 2023-04-13

## 2023-04-06 RX ORDER — IBUPROFEN 600 MG/1
1 TABLET ORAL
Status: DISCONTINUED | OUTPATIENT
Start: 2023-04-06 | End: 2023-04-22 | Stop reason: HOSPADM

## 2023-04-06 RX ORDER — INSULIN LISPRO 100 [IU]/ML
0-7 INJECTION, SOLUTION INTRAVENOUS; SUBCUTANEOUS
Status: DISCONTINUED | OUTPATIENT
Start: 2023-04-06 | End: 2023-04-21

## 2023-04-06 RX ORDER — DEXTROSE MONOHYDRATE 25 G/50ML
25 INJECTION, SOLUTION INTRAVENOUS
Status: DISCONTINUED | OUTPATIENT
Start: 2023-04-06 | End: 2023-04-22 | Stop reason: HOSPADM

## 2023-04-06 RX ORDER — DOCUSATE SODIUM 100 MG/1
100 CAPSULE, LIQUID FILLED ORAL DAILY
COMMUNITY

## 2023-04-06 RX ORDER — ZINC SULFATE 50(220)MG
220 CAPSULE ORAL DAILY
COMMUNITY

## 2023-04-06 RX ORDER — NICOTINE POLACRILEX 4 MG
15 LOZENGE BUCCAL
Status: DISCONTINUED | OUTPATIENT
Start: 2023-04-06 | End: 2023-04-22 | Stop reason: HOSPADM

## 2023-04-06 RX ORDER — MIDODRINE HYDROCHLORIDE 5 MG/1
5 TABLET ORAL AS NEEDED
COMMUNITY

## 2023-04-06 RX ADMIN — SENNOSIDES AND DOCUSATE SODIUM 2 TABLET: 8.6; 5 TABLET ORAL at 09:17

## 2023-04-06 RX ADMIN — CARVEDILOL 3.12 MG: 6.25 TABLET, FILM COATED ORAL at 09:18

## 2023-04-06 RX ADMIN — ATORVASTATIN CALCIUM 80 MG: 40 TABLET, FILM COATED ORAL at 20:05

## 2023-04-06 RX ADMIN — HYDROMORPHONE HYDROCHLORIDE 0.25 MG: 1 INJECTION, SOLUTION INTRAMUSCULAR; INTRAVENOUS; SUBCUTANEOUS at 09:18

## 2023-04-06 RX ADMIN — CAPSAICIN: 0.75 CREAM TOPICAL at 09:18

## 2023-04-06 RX ADMIN — OXYCODONE HYDROCHLORIDE 5 MG: 5 TABLET ORAL at 21:33

## 2023-04-06 RX ADMIN — APIXABAN 2.5 MG: 2.5 TABLET, FILM COATED ORAL at 20:05

## 2023-04-06 RX ADMIN — Medication 5 MG: at 21:33

## 2023-04-06 RX ADMIN — MIRTAZAPINE 15 MG: 15 TABLET, FILM COATED ORAL at 20:06

## 2023-04-06 RX ADMIN — Medication 10 ML: at 09:19

## 2023-04-06 RX ADMIN — AMIODARONE HYDROCHLORIDE 200 MG: 200 TABLET ORAL at 09:18

## 2023-04-06 RX ADMIN — CAPSAICIN: 0.75 CREAM TOPICAL at 22:04

## 2023-04-06 RX ADMIN — ASPIRIN 81 MG: 81 TABLET, COATED ORAL at 09:18

## 2023-04-06 RX ADMIN — INSULIN LISPRO 2 UNITS: 100 INJECTION, SOLUTION INTRAVENOUS; SUBCUTANEOUS at 13:25

## 2023-04-06 RX ADMIN — APIXABAN 2.5 MG: 2.5 TABLET, FILM COATED ORAL at 09:17

## 2023-04-06 RX ADMIN — FENTANYL 1 PATCH: 25 PATCH TRANSDERMAL at 13:26

## 2023-04-06 RX ADMIN — ACETAMINOPHEN 325MG 650 MG: 325 TABLET ORAL at 09:18

## 2023-04-06 RX ADMIN — ISOSORBIDE MONONITRATE 120 MG: 120 TABLET, EXTENDED RELEASE ORAL at 09:18

## 2023-04-06 RX ADMIN — SENNOSIDES AND DOCUSATE SODIUM 2 TABLET: 8.6; 5 TABLET ORAL at 20:05

## 2023-04-06 RX ADMIN — CARVEDILOL 3.12 MG: 6.25 TABLET, FILM COATED ORAL at 20:06

## 2023-04-06 RX ADMIN — ACETAMINOPHEN 325MG 650 MG: 325 TABLET ORAL at 20:06

## 2023-04-06 NOTE — PLAN OF CARE
Goal Outcome Evaluation:      Visited with pt and family. PT was sleepy but still talking. PT stated her   here last month. No spiritual care needs expressed at this time.

## 2023-04-06 NOTE — NURSING NOTE
WOC team consulted for: Rectal wound/skin    Patient known to WOC team from previous admission.  Patient able to turn with minimal assistance, daughter at bedside.  PT therapist entered room and assisted with turning as well.    Identified radiation dermatitis present on last admission.  Wounds have improved with more epithelization and are somewhat smaller.  Intergluteal cleft wound measure 3.5 x 1.5 x .0.24 cm, left gluteal measures 10 x 5.5 x 0.2 cm, right gluteal is 13.5 x 9.5 x 0.2 cm.  Wounds are painful with cleansing and dressing placement.  Wound beds are moist, yellow, pink, red; intergluteal cleft wound is full thickness and white is in wound bed.    Periwound skin is dry, blanchable.      Cleansed by spraying with normal saline flushes and patting dry with gauze.  Applied Mepitel One silicone mesh dressing cut to fit over open wound beds, covered with cut to fit Opticell AG and secured with sacral silicone foam border dressing.  Supplies left at bedside.    Bilateral gluteals, intergluteal cleft, sacrococcygeal region:                Patient's anus is swollen but no evidence of slough within as was present during last admission.  Patient still suffering from fecal incontinence and anus is painful when cleansed.    Anus      Sensory Perception: 3-->slightly limited  Moisture: 3-->occasionally moist  Activity: 1-->bedfast  Mobility: 2-->very limited  Nutrition: 2-->probably inadequate  Friction and Shear: 1-->problem  Jayce Score: 12 (04/05/23 2000)    MORGAN mattress ordered.    Please implement pressure injury prevention interventions for patients with a Jayce score of 18 or less per protocol.    Thank you for the consult.  WO team will plan to follow-up.  If alteration to skin integrity or change in wound bed presentation please consult WOC team.

## 2023-04-06 NOTE — PLAN OF CARE
Goal Outcome Evaluation:  Plan of Care Reviewed With: patient, family        Progress: no change  Outcome Evaluation: New palliative consult for assisance with GOC/ACP per Dr. Lynch.  Pt. known to palliative service from previous admissions.  No ACP on file.  NOK are children Jeri Pop, Indigo Pop, Juan Pop and sister Indigo Herman.  Dr. MANUEL Anaya saw pt today and discussed GOC with family.  They do not wish to stop HD at this time.  Fentanyl patch added along with oxy for breakthrough pain.  Pt. rated pain at 6/10 during palliative nursing visit.  Pt. appeared very drowsy as she was recently medicated per family report.  Pt. did not demonstrate any visible signs of discomfort and fell asleep after answering a couple questions.  Per chart review, pt co suming approx 25% of meals.  Family states that pt is having rectal, back and bilateral leg pain from calf muscles down.  Pt. does not appear short of breath.  Palliative to continue to follow for support, POC and ongoing GOC.      Problem: Palliative Care  Goal: Enhanced Quality of Life  Intervention: Promote Advance Care Planning  Flowsheets (Taken 4/6/2023 1128)  Life Transition/Adjustment:   palliative care initiated   palliative care discussed

## 2023-04-06 NOTE — PROGRESS NOTES
RADIATION ONCOLOGY TREATMENT MANAGEMENT NOTE    PATIENT:                                                      Esperanza Pop  MEDICAL RECORD #:                        [id number]  :                                                          1945  DIAGNOSIS:     T3 N1 M0 anal cancer          INTERVAL HISTORY:    Ms. Pop is a very pleasant female with multiple comorbidities including renal failure and on dialysis as well as diabetes who was diagnosed with advanced anal cancer.  The patient attempted treatments with concurrent chemotherapy and radiation.  She unfortunately did not tolerate treatments well and was hospitalized midway through her treatments.  The patient completed a total dose of 32.4 Alba at her planned 59.4 Gray before discontinuing due to poor tolerance and skin breakdown.  The patient was hospitalized and was discharged to rehab where she struggled to recover.  The patient has declined again and is back in the hospital.    Today the patient reports that she feels very tired and is very afraid.  She reports that she feels very ill.  Despite having a very good early response to chemotherapy and radiation treatments, she does not feel that it was necessarily worth it for her based on the what she feels right now.  The patient reports that she is not interested in pursuing additional treatments at this time.    The patient also requested that I call one of her daughters to discuss things further.    I did call her daughter Jeri and we discussed the patient's case today.    MEDICATIONS:  Current Facility-Administered Medications:   •  acetaminophen (TYLENOL) tablet 650 mg, 650 mg, Oral, TID, Nuria Lynch MD, 650 mg at 23  •  albumin human 25 % IV SOLN 25 g, 25 g, Intravenous, PRN, Gabe Butler MD  •  amiodarone (PACERONE) tablet 200 mg, 200 mg, Oral, Daily, Nuria Lynch MD, 200 mg at 23  •  apixaban (ELIQUIS) tablet 2.5 mg, 2.5 mg, Oral, BID, Nuria Lynch MD, 2.5 mg at  04/06/23 0917  •  aspirin EC tablet 81 mg, 81 mg, Oral, Daily, Nuria Lynch MD, 81 mg at 04/06/23 0918  •  atorvastatin (LIPITOR) tablet 80 mg, 80 mg, Oral, Nightly, Nuria Lynch MD, 80 mg at 04/05/23 2129  •  sennosides-docusate (PERICOLACE) 8.6-50 MG per tablet 2 tablet, 2 tablet, Oral, BID, 2 tablet at 04/06/23 0917 **AND** bisacodyl (DULCOLAX) EC tablet 5 mg, 5 mg, Oral, Daily PRN **AND** bisacodyl (DULCOLAX) suppository 10 mg, 10 mg, Rectal, Daily PRN, Nuria Lynch MD  •  capsaicin (ZOSTRIX) 0.075 % topical cream, , Topical, Q12H, Nuria Lynch MD, Given at 04/06/23 0918  •  carvedilol (COREG) tablet 3.125 mg, 3.125 mg, Oral, Q12H, Nuria Lynch MD, 3.125 mg at 04/06/23 0918  •  fentaNYL (DURAGESIC) 25 MCG/HR patch 1 patch, 1 patch, Transdermal, Q72H **AND** Check Fentanyl Patch Placement, 1 each, Does not apply, Q12H, Kayla Anaya MD  •  cloNIDine (CATAPRES) tablet 0.1 mg, 0.1 mg, Oral, TID PRN, Nuria Lynch MD  •  dextrose (D50W) (25 g/50 mL) IV injection 25 g, 25 g, Intravenous, Q15 Min PRN, Ranulfo Thomas DO  •  dextrose (GLUTOSE) oral gel 15 g, 15 g, Oral, Q15 Min PRN, Ranulfo Thomas DO  •  glucagon (GLUCAGEN) injection 1 mg, 1 mg, Intramuscular, Q15 Min PRN, Ranulfo Thomas DO  •  HYDROmorphone (DILAUDID) injection 0.25 mg, 0.25 mg, Intravenous, Q4H PRN, Nuria Lynch MD, 0.25 mg at 04/06/23 0918  •  hydrOXYzine (ATARAX) tablet 25 mg, 25 mg, Oral, TID PRN, Nuria Lynch MD  •  Insulin Lispro (humaLOG) injection 0-7 Units, 0-7 Units, Subcutaneous, TID AC, Ranulfo Thomas DO, 2 Units at 04/06/23 1325  •  isosorbide mononitrate (IMDUR) 24 hr tablet 120 mg, 120 mg, Oral, Daily, Nuria Lynch MD, 120 mg at 04/06/23 0918  •  melatonin tablet 5 mg, 5 mg, Oral, Nightly PRN, Nuria Lynch MD  •  mirtazapine (REMERON) tablet 15 mg, 15 mg, Oral, Nightly, Nuria Lynch MD, 15 mg at 04/05/23 2129  •  ondansetron (ZOFRAN) injection 4 mg, 4 mg, Intravenous, Q6H PRN, Nuria Lynch  MD  •  oxyCODONE (ROXICODONE) immediate release tablet 5 mg, 5 mg, Oral, Q6H PRN, Kayla Anaya MD  •  [COMPLETED] Insert Peripheral IV, , , Once **AND** sodium chloride 0.9 % flush 10 mL, 10 mL, Intravenous, PRN, Nuria Lynch MD, 10 mL at 04/05/23 1712  •  sodium chloride 0.9 % flush 10 mL, 10 mL, Intravenous, Q12H, Nruia Lynch MD, 10 mL at 04/06/23 0919  •  sodium chloride 0.9 % flush 10 mL, 10 mL, Intravenous, PRN, Nuria Lynch MD  •  sodium chloride 0.9 % infusion 40 mL, 40 mL, Intravenous, PRN, Nuria Lynch MD    KPS 50%    Physical Exam  Vitals and nursing note reviewed.   Constitutional:       General: She is in acute distress.      Appearance: She is well-developed. She is ill-appearing.      Comments: Appears very tired and ill   HENT:      Head: Normocephalic and atraumatic.      Mouth/Throat:      Comments: Mucous membranes appear dry  Eyes:      Conjunctiva/sclera: Conjunctivae normal.      Pupils: Pupils are equal, round, and reactive to light.   Neck:      Comments: No obviously enlarged cervical or supraclavicular LAD.  Cardiovascular:      Comments: Fistula in place.  No pedal edema  Pulmonary:      Breath sounds: No stridor. No wheezing.   Abdominal:      General: There is no distension.      Palpations: Abdomen is soft.   Musculoskeletal:         General: Normal range of motion.      Cervical back: Normal range of motion and neck supple.      Comments: Patient moves all extremities spontaneously.    Skin:     General: Skin is warm.      Comments: Skin very dry.   Neurological:      Mental Status: She is alert and oriented to person, place, and time.      Comments: Coordination intact.  Very tired     Psychiatric:         Judgment: Judgment normal.      Comments: Mood depressed  Scared/fearful for the future.            VITAL SIGNS:   Vitals:    04/06/23 0801 04/06/23 1126 04/06/23 1457 04/06/23 1500   BP: 141/59 94/48  97/44   BP Location: Left arm Left arm  Left arm   Patient  Position: Lying Lying  Lying   Pulse: 64 66  67   Resp: 16 18  18   Temp: 98.4 °F (36.9 °C) 98.2 °F (36.8 °C)  98.3 °F (36.8 °C)   TempSrc: Oral Oral  Oral   SpO2: 100% 98%  100%   Weight:   63.5 kg (140 lb 1.6 oz)    Height:           The following portions of the patient's history were reviewed and updated as appropriate: allergies, current medications, past family history, past medical history, past social history, past surgical history and problem list.  Adult Transthoracic Echo Complete W/ Cont if Necessary Per Protocol    Result Date: 3/13/2023  •  Left ventricular systolic function is normal. Estimated left ventricular EF = 55% •  Left ventricular wall thickness is consistent with moderate concentric hypertrophy. •  The right ventricular cavity is mildly dilated. •  Mild to moderate biatrial enlargement. •  Moderate to severe aortic valve stenosis is present.  Mean gradient 34 mmHg, DOUG 0.9 cm². •  Mild aortic insufficiency. •  Mild mitral regurgitation. •  Mild to moderate tricuspid regurgitation with RVSP 48 mmHg.     CT Abdomen Pelvis Without Contrast    Result Date: 4/5/2023  CT ABDOMEN PELVIS WO CONTRAST Date of Exam: 4/5/2023 6:26 PM EDT Indication: intractable rectal/abdominal pain. Comparison: 2/1/2023 abdomen pelvis CT scan Technique: Axial CT images were obtained of the abdomen and pelvis without the administration of contrast. Reconstructed coronal and sagittal images were also obtained. Automated exposure control and iterative construction methods were used. Findings: Prior scan report noted abnormal soft tissue thickening involving the anus, a somewhat patient's known tumor plus/minus postsurgical changes. Enlarged, left inguinal node presumably metastatic disease. History today indicates intractable rectal pain, abdominal pain. The included lower lungs appear clear except for what appears to be a small area of granulomatous scarring in the right middle lobe and minimal linear lung scarring  elsewhere. No pleural effusion is seen. Gallbladder is distended, but similar to the prior study, and with no visible inflammation or gallstones. Pancreas is relatively atrophic, not unusual for age. There is mild fatty liver change. Liver morphology appears grossly normal. Spleen is not enlarged. Adrenal glands appear normal. Kidneys are relatively small. No obstructive uropathy is seen. No upper abdominal adenopathy, ascites or acute inflammatory change is seen. There appears to be a mild degree of fecal stasis. Small bowel loops are normal in caliber and appearance. Regarding the lower abdomen pelvis, the terminal ileum and cecum appear normal. Appendix is not definitely identified, but no right lower quadrant inflammation is seen. Bladder is normally distended and normal in appearance. No intrapelvic free fluid is appreciated. Uterus and ovaries appear to be surgically absent. Area of the vaginal cuff appears normal. Patient's soft tissue mass previously seen extending from the dorsal margin to the upper gluteal fold is significantly reduced in size, although there appears to be a fistula from the lower anorectal region to near the skin surface along the upper gluteal fold. This is also less prominent than on the prior exam. No new inflammatory change is seen. There is no evidence of a drainable abscess. Small amount of dense material in the residual mass could represent iodine or trace hemorrhage. As on the prior study, it is uncertain if mass involves the mid vagina, or merely compresses it. Left inguinal adenopathy is improved, the largest node decreased from 31 mm to 22 mm. No new mass is seen. Bony structures appear to be intact.      Impression: 1. Persistent but decreased perianal mass compared to prior study, and improved left inguinal adenopathy. 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 No evidence of drainable fluid collection. Mild fecal stasis  noted. 2. No evidence of new inflammatory focus elsewhere. 3. No other evidence of new intra-abdominal or intrapelvic disease. 4.. Persistent distention of the gallbladder, as on prior exam, but again no evidence of inflammation or biliary ductal dilatation. Electronically Signed: Champ Lee  4/5/2023 7:13 PM EDT  Workstation ID: [id number]    CT Head Without Contrast    Result Date: 3/12/2023  CT HEAD WO CONTRAST Date of Exam: 3/12/2023 11:19 AM EDT Indication: AMS. Comparison: MRI brain from April 18, 2019 Technique: Axial CT images were obtained of the head without contrast administration.  Reconstructed coronal and sagittal images were also obtained. Automated exposure control and iterative construction methods were used. Findings: No acute intracranial hemorrhage or extra-axial collection is identified. The ventricles appear normal in caliber, with no evidence of mass effect or midline shift. The basal cisterns appear patent. The gray-white differentiation appears preserved. The calvarium appear intact. The paranasal sinuses are clear. The mastoid air cells are well-aerated. Scattered foci of periventricular and subcortical white matter hypodensities are nonspecific, but likely the sequela of mild chronic small vessel ischemic disease. Some degenerative mineralization is noted within the bilateral basal ganglia.     Impression: 1.No acute intracranial process identified. 2.Findings suggestive of mild chronic small vessel ischemic disease. Electronically Signed: Darrell Gonzalez  3/12/2023 11:35 AM EDT  Workstation ID: [id number]    SLP FEES - Fiberoptic Endo Eval Swallow    Result Date: 4/6/2023  This procedure was auto-finalized with no dictation required.    SLP FEES - Fiberoptic Endo Eval Swallow    Result Date: 3/23/2023  This procedure was auto-finalized with no dictation required.    XR Chest 1 View    Result Date: 3/22/2023  Exam:  Single view chest Date: March 22, 2023 History: Shortness of breath Comparison:  March 10, 2023 Technique: Single frontal radiograph of the chest was obtained. The study is degraded by patient rotation. Findings: The heart is enlarged. There is mild vascular congestion. There is no pneumothorax or sizable pleural fluid collection.     1. Cardiomegaly with mild vascular congestion. There may be an underlying pericardial effusion. Slot 63 Electronically signed by:  Abdulaziz Mcwilliams M.D.  3/22/2023 2:25 AM Mountain Time    XR Chest 1 View    Result Date: 3/10/2023  XR CHEST 1 VW Date of Exam: 3/10/2023 12:05 PM EST Indication: Weak/Dizzy/AMS triage protocol. Comparison: CT chest with contrast 2/1/2023, chest radiograph 7/21/2021 Findings: Cardiomegaly. Lungs normally expanded. Mild groundglass opacity and septal thickening lower lobe predominant. No pneumothorax or pleural effusion.     Impression: Questionable pulmonary edema. Cardiomegaly. Electronically Signed: Shannan Erickson  3/10/2023 12:20 PM EST  Workstation ID: [id number]    CBC    CBC 3/24/23 3/26/23 4/5/23   WBC 6.50 7.39 9.16   RBC 2.78 (A) 2.81 (A) 3.04 (A)   Hemoglobin 8.4 (A) 8.2 (A) 9.0 (A)   Hematocrit 27.8 (A) 27.9 (A) 30.9 (A)   .0 (A) 99.3 (A) 101.6 (A)   MCH 30.2 29.2 29.6   MCHC 30.2 (A) 29.4 (A) 29.1 (A)   RDW 17.5 (A) 17.8 (A) 18.2 (A)   Platelets 124 (A) 146 203   (A) Abnormal value            CMP    CMP 3/29/23 4/5/23 4/6/23   Glucose 79 169 (A) 94   BUN 22 39 (A) 20   Creatinine 5.91 (A) 6.93 (A) 4.02 (A)   eGFR 6.9 (A) 5.7 (A) 11.0 (A)   Sodium 131 (A) 135 (A) 136   Potassium 4.0 4.3 4.9   Chloride 96 (A) 91 (A) 97 (A)   Calcium 8.6 9.0 8.2 (A)   Total Protein 7.4 8.4    Albumin 2.8 (A) 2.9 (A)    Globulin 4.6 5.5    Total Bilirubin 0.7 0.7    Alkaline Phosphatase 153 (A) 238 (A)    AST (SGOT) 20 37 (A)    ALT (SGPT) 5 12    Albumin/Globulin Ratio 0.6 0.5    BUN/Creatinine Ratio 3.7 (A) 5.6 (A) 5.0 (A)   Anion Gap 15.0 15.0 14.0   (A) Abnormal value       Comments are available for some flowsheets but are not being  displayed.                  IMPRESSION/PLAN:      Anal cancer  -T3 N1 M0  -Positive inguinal lymph node on CT scan  -Bulky primary 6 cm at least  -Discussed with Dr. Knott patient is not in acute danger of obstruction at onset of treatment  -Lesion is painful and foul-smelling with difficulty from pressure ulcers in skin denudement from lack of mobility posteriorly  -Holding Xeloda  -Patient's tumor is responding well based on scans  -The patient's condition is quite serious.  She has not recovered.  Patient is back in the hospital with further collapse of her reserve.  -Discussed with the patient and some of her family, and I would not recommend proceeding with radiation treatments at this time, given her general debilitated state and specifically her trouble with her skin integrity.  -The patient has not received very high dose of radiotherapy so primary desquamation from her radiation treatments are unlikely however when coupled with diabetes, renal failure, protein malnutrition, limited mobility it is appear the patient has developed some sacral wound issues that are multifactorial in nature  -Recommended palliative care  -Discussed with Dr. Wilson and Dr. Thomas.  -We will stop by tomorrow to discuss this further with additional family members.        Confusion  -Likely multifactorial  -Limits patient compliance        Sacral ulcers  -Prohibit patient's ability to to lay on the table comfortably  -Recommend wound care     CKD  -On dialysis  -Complicates care     Diabetes  -Complicates care  -We will have issues with wound healing due to this.       Kevan Cavazos MD

## 2023-04-06 NOTE — PLAN OF CARE
Goal Outcome Evaluation:  Plan of Care Reviewed With: (P) patient and family     SLP evaluation completed. Will continue to address dysphagia. Please see note for further details and recommendations.

## 2023-04-06 NOTE — PLAN OF CARE
Goal Outcome Evaluation:  Plan of Care Reviewed With: (P) patient, family      SLP evaluation completed. Will continue to address dysphagia. Please see note for further details and recommendations.

## 2023-04-06 NOTE — PROGRESS NOTES
[facility] Medicine Services  PROGRESS NOTE    Patient Name: Esperanza Pop  : 1945  MRN: [id number]    Date of Admission: 2023  Primary Care Physician: Meghana Rodgers MD    Subjective   Subjective     CC:  Follow up rectal pain    HPI:  Patient lethargic post pain med administration. Complains of pain on her bottom. Family at bedside, unsure about goals of care currently, patient's spouse just  early March and they are processing. They did raise concern with family about patient being full code, stating she was previously DNR    ROS:  Gen- No fevers, chills  CV- No chest pain, palpitations  Resp- No cough, dyspnea  GI- No N/V/D, abd pain     Objective   Objective     Vital Signs:   Temp:  [97 °F (36.1 °C)-98.4 °F (36.9 °C)] 98.2 °F (36.8 °C)  Heart Rate:  [62-80] 66  Resp:  [14-18] 18  BP: ()/(48-84) 94/48  Flow (L/min):  [2-4] 3     Physical Exam:  Constitutional: Lethargic, ill appearing, elderly female laying in bed  HENT: NCAT, mucous membranes moist  Respiratory: Clear to auscultation bilaterally, respiratory effort normal   Cardiovascular: RRR, palpable radial pulses  Gastrointestinal: Positive bowel sounds, soft, nontender, nondistended  Musculoskeletal: BL calves tender to light touch  Psychiatric: Unable to reliably assess  Neurologic: Moving extremities spontaneously    Results Reviewed:  LAB RESULTS:      Lab 23  0851   WBC 9.16   HEMOGLOBIN 9.0*   HEMATOCRIT 30.9*   PLATELETS 203   NEUTROS ABS 6.08   IMMATURE GRANS (ABS) 0.05   LYMPHS ABS 1.10   MONOS ABS 1.27*   EOS ABS 0.62*   .6*         Lab 23  0442 23  0851   SODIUM 136 135*   POTASSIUM 4.9 4.3   CHLORIDE 97* 91*   CO2 25.0 29.0   ANION GAP 14.0 15.0   BUN 20 39*   CREATININE 4.02* 6.93*   EGFR 11.0* 5.7*   GLUCOSE 94 169*   CALCIUM 8.2* 9.0   MAGNESIUM 2.0 2.3   PHOSPHORUS 3.2 3.2         Lab 23  0851   TOTAL PROTEIN 8.4   ALBUMIN 2.9*   GLOBULIN 5.5   ALT (SGPT) 12    AST (SGOT) 37*   BILIRUBIN 0.7   ALK PHOS 238*                     Brief Urine Lab Results     None          Microbiology Results Abnormal     None          CT Abdomen Pelvis Without Contrast    Result Date: 4/5/2023  CT ABDOMEN PELVIS WO CONTRAST Date of Exam: 4/5/2023 6:26 PM EDT Indication: intractable rectal/abdominal pain. Comparison: 2/1/2023 abdomen pelvis CT scan Technique: Axial CT images were obtained of the abdomen and pelvis without the administration of contrast. Reconstructed coronal and sagittal images were also obtained. Automated exposure control and iterative construction methods were used. Findings: Prior scan report noted abnormal soft tissue thickening involving the anus, a somewhat patient's known tumor plus/minus postsurgical changes. Enlarged, left inguinal node presumably metastatic disease. History today indicates intractable rectal pain, abdominal pain. The included lower lungs appear clear except for what appears to be a small area of granulomatous scarring in the right middle lobe and minimal linear lung scarring elsewhere. No pleural effusion is seen. Gallbladder is distended, but similar to the prior study, and with no visible inflammation or gallstones. Pancreas is relatively atrophic, not unusual for age. There is mild fatty liver change. Liver morphology appears grossly normal. Spleen is not enlarged. Adrenal glands appear normal. Kidneys are relatively small. No obstructive uropathy is seen. No upper abdominal adenopathy, ascites or acute inflammatory change is seen. There appears to be a mild degree of fecal stasis. Small bowel loops are normal in caliber and appearance. Regarding the lower abdomen pelvis, the terminal ileum and cecum appear normal. Appendix is not definitely identified, but no right lower quadrant inflammation is seen. Bladder is normally distended and normal in appearance. No intrapelvic free fluid is appreciated. Uterus and ovaries appear to be surgically  absent. Area of the vaginal cuff appears normal. Patient's soft tissue mass previously seen extending from the dorsal margin to the upper gluteal fold is significantly reduced in size, although there appears to be a fistula from the lower anorectal region to near the skin surface along the upper gluteal fold. This is also less prominent than on the prior exam. No new inflammatory change is seen. There is no evidence of a drainable abscess. Small amount of dense material in the residual mass could represent iodine or trace hemorrhage. As on the prior study, it is uncertain if mass involves the mid vagina, or merely compresses it. Left inguinal adenopathy is improved, the largest node decreased from 31 mm to 22 mm. No new mass is seen. Bony structures appear to be intact.      Impression: Impression: 1. Persistent but decreased perianal mass compared to prior study, and improved left inguinal adenopathy. 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 No evidence of drainable fluid collection. Mild fecal stasis noted. 2. No evidence of new inflammatory focus elsewhere. 3. No other evidence of new intra-abdominal or intrapelvic disease. 4.. Persistent distention of the gallbladder, as on prior exam, but again no evidence of inflammation or biliary ductal dilatation. Electronically Signed: Champ Lee  4/5/2023 7:13 PM EDT  Workstation ID: [id number]    SLP FEES - Fiberoptic Endo Eval Swallow    Result Date: 4/6/2023  This procedure was auto-finalized with no dictation required.      Results for orders placed during the hospital encounter of 03/10/23    Adult Transthoracic Echo Complete W/ Cont if Necessary Per Protocol    Interpretation Summary  •  Left ventricular systolic function is normal. Estimated left ventricular EF = 55%  •  Left ventricular wall thickness is consistent with moderate concentric hypertrophy.  •  The right ventricular cavity is mildly dilated.  •   Mild to moderate biatrial enlargement.  •  Moderate to severe aortic valve stenosis is present.  Mean gradient 34 mmHg, DOUG 0.9 cm².  •  Mild aortic insufficiency.  •  Mild mitral regurgitation.  •  Mild to moderate tricuspid regurgitation with RVSP 48 mmHg.      Current medications:  Scheduled Meds:acetaminophen, 650 mg, Oral, TID  amiodarone, 200 mg, Oral, Daily  apixaban, 2.5 mg, Oral, BID  aspirin, 81 mg, Oral, Daily  atorvastatin, 80 mg, Oral, Nightly  capsaicin, , Topical, Q12H  carvedilol, 3.125 mg, Oral, Q12H  fentaNYL, 1 patch, Transdermal, Q72H   And  Check Fentanyl Patch Placement, 1 each, Does not apply, Q12H  isosorbide mononitrate, 120 mg, Oral, Daily  mirtazapine, 15 mg, Oral, Nightly  senna-docusate sodium, 2 tablet, Oral, BID  sodium chloride, 10 mL, Intravenous, Q12H      Continuous Infusions:   PRN Meds:.•  albumin human  •  senna-docusate sodium **AND** bisacodyl **AND** bisacodyl  •  cloNIDine  •  HYDROmorphone  •  hydrOXYzine  •  melatonin  •  ondansetron  •  oxyCODONE  •  [COMPLETED] Insert Peripheral IV **AND** sodium chloride  •  sodium chloride  •  sodium chloride    Assessment & Plan   Assessment & Plan     Active Hospital Problems    Diagnosis  POA   • **Intractable pain [R52]  Yes      Resolved Hospital Problems   No resolved problems to display.        Brief Hospital Course to date:  Esperanza Pop is a 78 y.o. female w/ ESRD (HD-MWF), DM2, HTN, HLD, HFpEF, pAfib (xarelto), anemia, prior breast cancer, active rectal cancer intolerant of chemo/XRT, recently admitted 3/10-3/29 and NY'ed to SNF to trial improvement of functional status, who returns w/ intractable rectal pain    The following problems are new to me today    Assessment/Plan    Intractable pain, multifactorial  Wounds of the buttocks, recent radiotherapy  -wound care consulted on admission  -palliative care follows, adjusting pain meds    Metastatic rectal cancer  Anorectal fistula  -s/p partial treatment with radiotherapy and  chemo  -CT a/p w/ slight dec in size of tumor; persistent anorectal fistula  -unable to tolerate chemo/radiotherapy last admission; appears to only have deteriorated further  -onc consult to assist family w/ decisions on Tx/hospice  -palliative following    Anorexia  -cont mirtazapine    Dysphagia  -seen by SLP, s/p FEES, diet upgraded    ESRD on HD  -HD MWF  -missed sessions pta  -nephro follows    DM type 2, a1c 5.8%, w/ insulin use  -added SSI protocol    HFpEF  HTN  pAfib  -cont amio, apixiban, asa, statin, coreg, imdur     Mod-Sev AS  -has appt w/ Geena Estrella, APRN 4/27/23    Expected Discharge Location and Transportation: SNF once pain controlled vs hospice  Expected Discharge   Expected Discharge Date and Time     Expected Discharge Date Expected Discharge Time    Apr 10, 2023            DVT prophylaxis:  Medical DVT prophylaxis orders are present.          CODE STATUS:   Code Status and Medical Interventions:   Ordered at: 04/06/23 1151     Medical Intervention Limits:    NO intubation (DNI)     Code Status (Patient has no pulse and is not breathing):    No CPR (Do Not Attempt to Resuscitate)     Medical Interventions (Patient has pulse or is breathing):    Limited Support     Comments:    Per family       Ranulfo Thomas, DO  04/06/23

## 2023-04-06 NOTE — THERAPY EVALUATION
Acute Care - Speech Language Pathology   Swallow Initial Evaluation Psychiatric   Clinical Swallow Evaluation     Patient Name: Esperanza Pop  : 1945  MRN: [id number]  Today's Date: 2023               Admit Date: 2023    Visit Dx:     ICD-10-CM ICD-9-CM   1. Intractable pain  R52 780.96   2. Rectal cancer  C20 154.1   3. Adult failure to thrive  R62.7 783.7   4. Hypoalbuminemia  E88.09 273.8   5. ESRD on hemodialysis  N18.6 585.6    Z99.2 V45.11   6. Pressure injury of buttock, stage 3, unspecified laterality  L89.303 707.05     707.23   7. Dysphagia, unspecified type  R13.10 787.20     Patient Active Problem List   Diagnosis   • Acute on chronic diastolic heart failure   • Type 2 diabetes mellitus   • Essential hypertension   • Coronary artery disease involving native coronary artery of native heart with angina pectoris   • Breast cancer, female, right   • Seasonal allergic rhinitis   • Right carotid bruit   • Vitamin B12 deficiency   • Hyperlipidemia LDL goal <70   • End stage renal disease on dialysis (HCC)   • Nonrheumatic aortic valve stenosis   • NSTEMI (non-ST elevated myocardial infarction)   • UTI (urinary tract infection)   • Ischemic heart disease   • CHF (congestive heart failure)   • Acute non-ST segment elevation myocardial infarction (STEMI) following previous myocardial infarction   • Dyslipidemia   • Diabetes mellitus   • Mild obesity   • Elevated troponin   • Screen for colon cancer   • Acute midline low back pain without sciatica   • Hypertensive emergency   • PAF (paroxysmal atrial fibrillation) ([facility])   • Malignant neoplasm of anal canal   • Hematochezia   • Severe malnutrition   • Symptomatic anemia   • Intractable pain     Past Medical History:   Diagnosis Date   • Acute bronchitis    • Acute conjunctivitis    • Acute kidney injury     Admission from 2013 to 2014, now resolved with latest creatinine 1.4 on 2014.   • Acute non-ST segment elevation myocardial  infarction (STEMI) following previous myocardial infarction    • Anal cancer 2/8/2023   • Anal cancer 2/8/2023   • Arthritis    • Breast cancer, female, right     2005 mastectomy right   • Cancer    • CHF (congestive heart failure)    • Chronic kidney disease     dialysis MWF, [facility]/u [facility]    • Clotting disorder    • COVID-19    • Diabetes mellitus     diagnosed ~1992, checks fsbg 2-3x/day   • Diarrhea    • Dyslipidemia    • Dyspepsia    • Dyspnea    • Edema    • History of transfusion     ~2013 no reaction    • Hx of radiation therapy    • Hyperlipidemia    • Hypertension     Severe   • Ischemic heart disease    • Moderate obesity     BMI 36.2   • Myocardial infarction    • Pneumonia    • Pneumonia 02/2019   • Radiation 2005   • Seasonal allergies    • Wears glasses      Past Surgical History:   Procedure Laterality Date   • AV FISTULA PLACEMENT, BRACHIOBASILIC     • BREAST BIOPSY Left 2010   • BREAST CYST EXCISION     • BREAST LUMPECTOMY Right 2005   • BREAST SURGERY     • CARDIAC CATHETERIZATION N/A 10/10/2016    Procedure: Left Heart Cath;  Surgeon: Scooter Zhao MD;  Location:  [facility];  Service:    • CARDIAC CATHETERIZATION  10/2016   • CARDIAC CATHETERIZATION N/A 1/21/2021    Procedure: Right and Left Heart Cath;  Surgeon: Hugh Tidwell MD;  Location:  [facility];  Service: Cardiology;  Laterality: N/A;   • COLONOSCOPY N/A 7/23/2020    Procedure: COLONOSCOPY;  Surgeon: Rubén Rosas MD;  Location:  [facility];  Service: Gastroenterology;  Laterality: N/A;   • CORONARY STENT PLACEMENT  2016   • HERNIA REPAIR     • HYSTERECTOMY  2000   • INSERTION HEMODIALYSIS CATHETER Right 6/29/2016    Procedure: HEMODIALYSIS CATHETER INSERTION TUNNELLED;  Surgeon: Ramón Chavez MD;  Location[facility];  Service:    • MASTECTOMY Right 2006   • OOPHORECTOMY     • REDUCTION MAMMAPLASTY Left 2005       SLP Recommendation and Plan  SLP  Swallowing Diagnosis: (P) R/O pharyngeal dysphagia (04/06/23 0900)  SLP Diet Recommendation: (P) puree, pudding thick liquids (04/06/23 0900)  Recommended Precautions and Strategies: (P) upright posture during/after eating, general aspiration precautions (04/06/23 0900)  SLP Rec. for Method of Medication Administration: (P) meds whole, meds crushed, with puree, as tolerated (04/06/23 0900)     Monitor for Signs of Aspiration: (P) yes, notify SLP if any concerns (04/06/23 0900)  Recommended Diagnostics: (P) FEES (04/06/23 0900)     Anticipated Discharge Disposition (SLP): (P) anticipate therapy at next level of care, home with home health (04/06/23 0900)        Predicted Duration Therapy Intervention (Days): (P) until discharge (04/06/23 0900)                                        Plan of Care Reviewed With: (P) patient, family      SWALLOW EVALUATION (last 72 hours)     SLP Adult Swallow Evaluation     Row Name 04/06/23 0900                   Rehab Evaluation    Document Type evaluation (P)   -LL        Subjective Information no complaints (P)   -LL        Patient Observations alert;cooperative (P)   -LL        Patient/Family/Caregiver Comments/Observations Family present (P)   -LL        Patient Effort good (P)   -LL           General Information    Patient Profile Reviewed yes (P)   -LL        Pertinent History Of Current Problem Adm. 4/5 for intractable pain. Was recently adm. 3/19-3/29. Hx of rectal ca and end stage renal disease, LD, paroxysmal A fib, HTN, HFpEF. FEES 3/23: mild oral and severe pharyngeal dyspaghia, rec: puree/pudding thick liquid. (P)   -LL        Current Method of Nutrition pudding thick;pureed (P)   -LL        Precautions/Limitations, Vision WFL;for purposes of eval (P)   -LL        Precautions/Limitations, Hearing WFL (P)   -LL        Prior Level of Function-Communication WFL (P)   -LL        Prior Level of Function-Swallowing pudding thick liquids;puree (P)   FEES 3/23  -LL         Plans/Goals Discussed with patient;family;agreed upon (P)   -LL        Barriers to Rehab none identified (P)   -LL        Patient's Goals for Discharge patient did not state (P)   -LL        Family Goals for Discharge family did not state (P)   -LL           Pain    Additional Documentation Pain Scale: Numbers Pre/Post-Treatment (Group) (P)   -LL           Pain Scale: Numbers Pre/Post-Treatment    Pretreatment Pain Rating 0/10 - no pain (P)   -LL        Posttreatment Pain Rating 0/10 - no pain (P)   -LL           Oral Motor Structure and Function    Dentition Assessment natural, present and adequate (P)   -LL        Secretion Management WNL/WFL (P)   -LL        Mucosal Quality moist, healthy (P)   -LL           Oral Musculature and Cranial Nerve Assessment    Oral Motor General Assessment WFL (P)   -LL           General Eating/Swallowing Observations    Respiratory Support Currently in Use nasal cannula (P)   -LL        Eating/Swallowing Skills self-fed;fed by SLP (P)   -LL        Positioning During Eating upright in bed (P)   -LL        Utensils Used spoon;cup;straw (P)   -LL        Consistencies Trialed ice chips;thin liquids;pureed (P)   -LL           Clinical Swallow Eval    Pharyngeal Phase no overt signs/symptoms of pharyngeal impairment (P)   -LL        Clinical Swallow Evaluation Summary No overt s/s during trials of ice chips, thin or puree, though given pt's hx of severe dysphagia, plan for FEES today (4/6) to further assess. Did begins GOC discussion and family perfers to proceed w/ FEES and then discuss. (P)   -LL           SLP Evaluation Clinical Impression    SLP Swallowing Diagnosis R/O pharyngeal dysphagia (P)   -LL        Functional Impact risk of aspiration/pneumonia (P)   -LL           Recommendations    Predicted Duration Therapy Intervention (Days) until discharge (P)   -LL        SLP Diet Recommendation puree;pudding thick liquids (P)   -LL        Recommended Diagnostics FEES (P)   -LL         Recommended Precautions and Strategies upright posture during/after eating;general aspiration precautions (P)   -LL        Oral Care Recommendations Oral Care BID/PRN (P)   -LL        SLP Rec. for Method of Medication Administration meds whole;meds crushed;with puree;as tolerated (P)   -LL        Monitor for Signs of Aspiration yes;notify SLP if any concerns (P)   -LL        Anticipated Discharge Disposition (SLP) anticipate therapy at next level of care;home with home health (P)   -LL              User Key  (r) = Recorded By, (t) = Taken By, (c) = Cosigned By    Initials Name Effective Dates     Shandra Arita, Speech Therapy Student 01/20/23 -                 EDUCATION  The patient has been educated in the following areas:   Dysphagia (Swallowing Impairment).              Time Calculation:    Time Calculation- SLP     Row Name 04/06/23 0954             Time Calculation- SLP    SLP Start Time 0900 (P)   -LL      SLP Received On 04/06/23 (P)   -LL         Untimed Charges    72925-TY Eval Oral Pharyng Swallow Minutes 55 (P)   -LL         Total Minutes    Untimed Charges Total Minutes 55 (P)   -LL       Total Minutes 55 (P)   -LL            User Key  (r) = Recorded By, (t) = Taken By, (c) = Cosigned By    Initials Name Provider Type    LL Shandra Arita, Speech Therapy Student SLP Student                Therapy Charges for Today     Code Description Service Date Service Provider Modifiers Qty    [id number]  ST EVAL ORAL PHARYNG SWALLOW 4 4/6/2023 Shandra Arita, Speech Therapy Student GN 1               Shandra Arita Speech Therapy Student  4/6/2023

## 2023-04-06 NOTE — PLAN OF CARE
Goal Outcome Evaluation:  Plan of Care Reviewed With: patient        Progress: no change  Outcome Evaluation: PT eval completed. Patient sat at EOB with supervision however became very dizzy and demonstrated poor tolerance. Patient ased to return to supine. Patient's BP measured at EOB read 78/50 (141/59 supine) Patient presents below baseline for functional mobility. Patient demonstrates decreased activity tolerance, deconditioning and reduced dynamic balance. Patient would continue to benefit from skilled PT services to improve dynamic balance with gait, transfers strength, and activity tolerance training in order to build endurance and reduce risk of falls.

## 2023-04-06 NOTE — MBS/VFSS/FEES
Acute Care - Speech Language Pathology   Swallow Initial Evaluation [facility]   Fiberoptic Endoscopic Evaluation of Swallowing (FEES)     Patient Name: Esperanza Pop  : 1945  MRN: [id number]  Today's Date: 2023               Admit Date: 2023    Visit Dx:     ICD-10-CM ICD-9-CM   1. Intractable pain  R52 780.96   2. Rectal cancer  C20 154.1   3. Adult failure to thrive  R62.7 783.7   4. Hypoalbuminemia  E88.09 273.8   5. ESRD on hemodialysis  N18.6 585.6    Z99.2 V45.11   6. Pressure injury of buttock, stage 3, unspecified laterality  L89.303 707.05     707.23   7. Dysphagia, unspecified type  R13.10 787.20     Patient Active Problem List   Diagnosis   • Acute on chronic diastolic heart failure   • Type 2 diabetes mellitus   • Essential hypertension   • Coronary artery disease involving native coronary artery of native heart with angina pectoris   • Breast cancer, female, right   • Seasonal allergic rhinitis   • Right carotid bruit   • Vitamin B12 deficiency   • Hyperlipidemia LDL goal <70   • End stage renal disease on dialysis (HCC)   • Nonrheumatic aortic valve stenosis   • NSTEMI (non-ST elevated myocardial infarction)   • UTI (urinary tract infection)   • Ischemic heart disease   • CHF (congestive heart failure)   • Acute non-ST segment elevation myocardial infarction (STEMI) following previous myocardial infarction   • Dyslipidemia   • Diabetes mellitus   • Mild obesity   • Elevated troponin   • Screen for colon cancer   • Acute midline low back pain without sciatica   • Hypertensive emergency   • PAF (paroxysmal atrial fibrillation) ([facility])   • Malignant neoplasm of anal canal   • Hematochezia   • Severe malnutrition   • Symptomatic anemia   • Intractable pain     Past Medical History:   Diagnosis Date   • Acute bronchitis    • Acute conjunctivitis    • Acute kidney injury     Admission from 2013 to 2014, now resolved with latest creatinine 1.4 on 2014.   • Acute non-ST  segment elevation myocardial infarction (STEMI) following previous myocardial infarction    • Anal cancer 2/8/2023   • Anal cancer 2/8/2023   • Arthritis    • Breast cancer, female, right     2005 mastectomy right   • Cancer    • CHF (congestive heart failure)    • Chronic kidney disease     dialysis MWF, f/u [facility]    • Clotting disorder    • COVID-19    • Diabetes mellitus     diagnosed ~1992, checks fsbg 2-3x/day   • Diarrhea    • Dyslipidemia    • Dyspepsia    • Dyspnea    • Edema    • History of transfusion     ~2013 no reaction    • Hx of radiation therapy    • Hyperlipidemia    • Hypertension     Severe   • Ischemic heart disease    • Moderate obesity     BMI 36.2   • Myocardial infarction    • Pneumonia    • Pneumonia 02/2019   • Radiation 2005   • Seasonal allergies    • Wears glasses      Past Surgical History:   Procedure Laterality Date   • AV FISTULA PLACEMENT, BRACHIOBASILIC     • BREAST BIOPSY Left 2010   • BREAST CYST EXCISION     • BREAST LUMPECTOMY Right 2005   • BREAST SURGERY     • CARDIAC CATHETERIZATION N/A 10/10/2016    Procedure: Left Heart Cath;  Surgeon: Scooter Zhao MD;  Location:  [facility];  Service:    • CARDIAC CATHETERIZATION  10/2016   • CARDIAC CATHETERIZATION N/A 1/21/2021    Procedure: Right and Left Heart Cath;  Surgeon: Hugh Tidwell MD;  Location:  [facility];  Service: Cardiology;  Laterality: N/A;   • COLONOSCOPY N/A 7/23/2020    Procedure: COLONOSCOPY;  Surgeon: Rubén Rosas MD;  Location:  [facility];  Service: Gastroenterology;  Laterality: N/A;   • CORONARY STENT PLACEMENT  2016   • HERNIA REPAIR     • HYSTERECTOMY  2000   • INSERTION HEMODIALYSIS CATHETER Right 6/29/2016    Procedure: HEMODIALYSIS CATHETER INSERTION TUNNELLED;  Surgeon: Ramón Chavez MD;  Location:  [facility];  Service:    • MASTECTOMY Right 2006   • OOPHORECTOMY     • REDUCTION MAMMAPLASTY Left 2005       SLP  Recommendation and Plan  SLP Swallowing Diagnosis: (P) mild, oral dysphagia, pharyngeal dysphagia (04/06/23 1000)  SLP Diet Recommendation: (P) regular textures, thin liquids (04/06/23 1000)  Recommended Precautions and Strategies: (P) upright posture during/after eating, general aspiration precautions (04/06/23 1000)  SLP Rec. for Method of Medication Administration: (P) meds whole, with puree, as tolerated (04/06/23 1000)     Monitor for Signs of Aspiration: (P) yes, notify SLP if any concerns (04/06/23 1000)  Recommended Diagnostics: FEES (04/06/23 0900)     Anticipated Discharge Disposition (SLP): (P) anticipate therapy at next level of care, home with home health (04/06/23 1000)     Therapy Frequency (Swallow): (P) 5 days per week (04/06/23 1000)  Predicted Duration Therapy Intervention (Days): (P) until discharge (04/06/23 1000)                                        Plan of Care Reviewed With: (P) patient      SWALLOW EVALUATION (last 72 hours)     SLP Adult Swallow Evaluation     Row Name 04/06/23 1000 04/06/23 0900                Rehab Evaluation    Document Type evaluation (P)   -LL evaluation  -SM (r) LL (t) SM (c)       Subjective Information no complaints (P)   -LL no complaints  -SM (r) LL (t) SM (c)       Patient Observations alert;cooperative (P)   -LL alert;cooperative  -SM (r) LL (t) SM (c)       Patient/Family/Caregiver Comments/Observations Family present (P)   -LL Family present  -SM (r) LL (t) SM (c)       Patient Effort good (P)   -LL good  -SM (r) LL (t) SM (c)          General Information    Patient Profile Reviewed yes (P)   -LL yes  -SM (r) LL (t) SM (c)       Pertinent History Of Current Problem See previous eval (P)   -LL Adm. 4/5 for intractable pain. Was recently adm. 3/19-3/29. Hx of rectal ca and end stage renal disease, LD, paroxysmal A fib, HTN, HFpEF. FEES 3/23: mild oral and severe pharyngeal dyspaghia, rec: puree/pudding thick liquid.  -SM (r) LL (t) SM (c)       Current Method  of Nutrition pudding thick;pureed (P)   -LL pudding thick;pureed  -SM (r) LL (t) SM (c)       Precautions/Limitations, Vision WFL;for purposes of eval (P)   -LL WFL;for purposes of eval  -SM (r) LL (t) SM (c)       Precautions/Limitations, Hearing WFL (P)   -LL WFL  -SM (r) LL (t) SM (c)       Prior Level of Function-Communication WFL (P)   -LL WFL  -SM (r) LL (t) SM (c)       Prior Level of Function-Swallowing pudding thick liquids;puree (P)   -LL pudding thick liquids;puree  FEES 3/23  -SM (r) LL (t) SM (c)       Plans/Goals Discussed with patient;family;agreed upon (P)   -LL patient;family;agreed upon  -SM (r) LL (t) SM (c)       Barriers to Rehab none identified (P)   -LL none identified  -SM (r) LL (t) SM (c)       Patient's Goals for Discharge patient did not state (P)   -LL patient did not state  -SM (r) LL (t) SM (c)       Family Goals for Discharge family did not state (P)   -LL family did not state  -SM (r) LL (t) SM (c)          Pain    Additional Documentation Pain Scale: FACES Pre/Post-Treatment (Group) (P)   -LL Pain Scale: Numbers Pre/Post-Treatment (Group)  -SM (r) LL (t) SM (c)          Pain Scale: Numbers Pre/Post-Treatment    Pretreatment Pain Rating -- 0/10 - no pain  -SM (r) LL (t) SM (c)       Posttreatment Pain Rating -- 0/10 - no pain  -SM (r) LL (t) SM (c)          Pain Scale: FACES Pre/Post-Treatment    Pain: FACES Scale, Pretreatment 0-->no hurt (P)   -LL --       Posttreatment Pain Rating 0-->no hurt (P)   -LL --          Oral Motor Structure and Function    Dentition Assessment natural, present and adequate (P)   -LL natural, present and adequate  -SM (r) LL (t) SM (c)       Secretion Management WNL/WFL (P)   -LL WNL/WFL  -SM (r) LL (t) SM (c)       Mucosal Quality moist, healthy (P)   -LL moist, healthy  -SM (r) LL (t) SM (c)          Oral Musculature and Cranial Nerve Assessment    Oral Motor General Assessment WFL (P)   -LL WFL  -SM (r) LL (t) SM (c)          General Eating/Swallowing  Observations    Respiratory Support Currently in Use -- nasal cannula  - (r)  (t)  (c)       Eating/Swallowing Skills -- self-fed;fed by SLP  - (r)  (t)  (c)       Positioning During Eating -- upright in bed  - (r)  (t)  (c)       Utensils Used -- spoon;cup;straw  - (r)  (t)  (c)       Consistencies Trialed -- ice chips;thin liquids;pureed  - (r)  (t)  (c)          Clinical Swallow Eval    Pharyngeal Phase -- no overt signs/symptoms of pharyngeal impairment  - (r)  (t)  (c)       Clinical Swallow Evaluation Summary -- No overt s/s during trials of ice chips, thin or puree, though given pt's hx of severe dysphagia, plan for FEES today (4/6) to further assess. Did begins GOC discussion and family perfers to proceed w/ FEES and then discuss.  - (r)  (t)  (c)          Fiberoptic Endoscopic Evaluation of Swallowing (FEES)    Risks/Benefits Reviewed patient;family;risks/benefits explained;agreed to eval (P)   -LL --       Nasal Entry right: (P)   -LL --       Scope serial number/identification [id number] (P)   -LL --          Anatomy and Physiology    Anatomic Considerations no anatomic structural deviation (P)   -LL --       Velopharyngeal WFL (P)   -LL --       Base of Tongue symmetrical (P)   -LL --       Epiglottis WFL (P)   -LL --       Laryngeal Function Breathing symmetrical (P)   -LL --       Laryngeal Function Phonation symmetrical (P)   -LL --       Secretion Rating Scale (Rafael et al. 1996) 1- secretions present around the laryngeal vestibule (P)   -LL --       Ice Chips elicited swallow;fully cleared secretions;not aspirated (P)   -LL --       Sensory sensed scope (P)   -LL --       Utensils Used Spoon;Cup;Straw (P)   -LL --       Consistencies Trialed ice chips;thin liquids;nectar-thick liquids;honey-thick liquids;pudding/puree;regular textures;spoon;cup;straw (P)   -LL --          FEES Interpretation    Oral Phase prolonged manipulation;with solids only (P)   -LL --        Oral Phase, Comment Benefited from liquid mix (P)   -LL --          Initiation of Pharyngeal Swallow    Initiation of Pharyngeal Swallow bolus in pyriform sinuses (P)   -LL --       Pharyngeal Phase impaired pharyngeal phase of swallowing (P)   -LL --       Aspiration During the Swallow thin liquids;secondary to delayed swallow initiation or mistiming (P)   -LL --       Response to Aspiration No (P)   -LL --       Rosenbek's Scale 8-->Level 8 (P)   -LL --       FEES Summary Pt silented aspirated with large consecutive sips of thin liquid during the swallow, no aspiration with small single sips of thin during eval. Rec: reg/thin with small sips. F/u w/ family they want to participate in therapy. ? enderance throughout the meal, if showing any concerns will discuss nectar thick w/ meals and thin liquid between meals. (P)   -LL --          SLP Evaluation Clinical Impression    SLP Swallowing Diagnosis mild;oral dysphagia;pharyngeal dysphagia (P)   -LL R/O pharyngeal dysphagia  -SM (r) LL (t) SM (c)       Functional Impact risk of aspiration/pneumonia (P)   -LL risk of aspiration/pneumonia  -SM (r) LL (t) SM (c)          Recommendations    Therapy Frequency (Swallow) 5 days per week (P)   -LL --       Predicted Duration Therapy Intervention (Days) until discharge (P)   -LL until discharge  -SM (r) LL (t) SM (c)       SLP Diet Recommendation regular textures;thin liquids (P)   -LL puree;pudding thick liquids  -SM (r) LL (t) SM (c)       Recommended Diagnostics -- FEES  -SM (r) LL (t) SM (c)       Recommended Precautions and Strategies upright posture during/after eating;general aspiration precautions (P)   -LL upright posture during/after eating;general aspiration precautions  -SM (r) LL (t) SM (c)       Oral Care Recommendations Oral Care BID/PRN (P)   -LL Oral Care BID/PRN  -SM (r) LL (t) SM (c)       SLP Rec. for Method of Medication Administration meds whole;with puree;as tolerated (P)   -LL meds whole;meds  crushed;with puree;as tolerated  -SM (r) LL (t) SM (c)       Monitor for Signs of Aspiration yes;notify SLP if any concerns (P)   -LL yes;notify SLP if any concerns  -SM (r) LL (t) SM (c)       Anticipated Discharge Disposition (SLP) anticipate therapy at next level of care;home with home health (P)   -LL anticipate therapy at next level of care;home with home health  -SM (r) LL (t) SM (c)             User Key  (r) = Recorded By, (t) = Taken By, (c) = Cosigned By    Initials Name Effective Dates    Sujata Patel, MS CCC-SLP 02/03/23 -     LL Shandra Arita, Speech Therapy Student 01/20/23 -                 EDUCATION  The patient has been educated in the following areas:   Dysphagia (Swallowing Impairment).        SLP GOALS     Row Name 04/06/23 1000             (LTG) Patient will demonstrate functional swallow for    Diet Texture (Demonstrate functional swallow) regular textures (P)   -LL      Liquid viscosity (Demonstrate functional swallow) thin liquids (P)   -LL      [place] (Demonstrate functional swallow) independently (over 90% accuracy) (P)   -LL      Time Frame (Demonstrate functional swallow) by discharge (P)   -LL         (STG) Patient will tolerate trials of    Consistencies Trialed (Tolerate trials) regular textures;thin liquids (P)   -LL      Desired Outcome (Tolerate trials) without signs/symptoms of aspiration;with use of compensatory strategies (see comments) (P)   -LL      [place] (Tolerate trials) independently (over 90% accuracy) (P)   -LL      Time Frame (Tolerate trials) by discharge (P)   -LL         (STG) Swallow Compensatory Strategies Goal 1 (SLP)    Activity (Swallow Compensatory Strategies/Techniques Goal 1, SLP) aspiration precautions;fatigue precautions;small cup sips;small straw sips (P)   -LL      [place]/Accuracy (Swallow Compensatory Strategies/Techniques Goal 1, SLP) with minimal cues (75-90% accuracy) (P)   -LL      Time Frame (Swallow Compensatory  Strategies/Techniques Goal 1, SLP) by discharge (P)   -LL            User Key  (r) = Recorded By, (t) = Taken By, (c) = Cosigned By    Initials Name Provider Type     Shandra Arita, Speech Therapy Student SLP Student                   Time Calculation:    Time Calculation- SLP     Row Name 04/06/23 1117 04/06/23 0954          Time Calculation- SLP    SLP Start Time 1000 (P)   -LL 0900  -SM (r) LL (t) SM (c)     SLP Received On 04/06/23 (P)   -LL 04/06/23  -SM (r) LL (t) SM (c)        Untimed Charges    75483-[id number] Eval Oral Pharyng Swallow Minutes -- 55  -SM (r) LL (t) SM (c)     61705-[facility] Fiberoptic Endo Eval Swallow Minutes 70 (P)   -LL --        Total Minutes    Untimed Charges Total Minutes 70 (P)   -LL 55  -SM (r) LL (t)      Total Minutes 70 (P)   -LL 55  -SM (r) LL (t)           User Key  (r) = Recorded By, (t) = Taken By, (c) = Cosigned By    Initials Name Provider Type    Sujata Patel, MS CCC-SLP Speech and Language Pathologist     Shandra Arita, Speech Therapy Student SLP Student                Therapy Charges for Today     Code Description Service Date Service Provider Modifiers Qty    [id number] HC ST EVAL ORAL PHARYNG SWALLOW 4 4/6/2023 Shandra Arita, Speech Therapy Student GN 1    [id number] HC ST FIBEROPTIC ENDO EVAL SWALL 5 4/6/2023 Shandra Arita, Speech Therapy Student GN 1               Shandra Arita Speech Therapy Student  4/6/2023

## 2023-04-06 NOTE — THERAPY EVALUATION
Patient Name: Esperanza Pop  : 1945    MRN: [id number]                              Today's Date: 2023       Admit Date: 2023    Visit Dx:     ICD-10-CM ICD-9-CM   1. Intractable pain  R52 780.96   2. Rectal cancer  C20 154.1   3. Adult failure to thrive  R62.7 783.7   4. Hypoalbuminemia  E88.09 273.8   5. ESRD on hemodialysis  N18.6 585.6    Z99.2 V45.11   6. Pressure injury of buttock, stage 3, unspecified laterality  L89.303 707.05     707.23   7. Dysphagia, unspecified type  R13.10 787.20     Patient Active Problem List   Diagnosis   • Acute on chronic diastolic heart failure   • Type 2 diabetes mellitus   • Essential hypertension   • Coronary artery disease involving native coronary artery of native heart with angina pectoris   • Breast cancer, female, right   • Seasonal allergic rhinitis   • Right carotid bruit   • Vitamin B12 deficiency   • Hyperlipidemia LDL goal <70   • End stage renal disease on dialysis (HCC)   • Nonrheumatic aortic valve stenosis   • NSTEMI (non-ST elevated myocardial infarction)   • UTI (urinary tract infection)   • Ischemic heart disease   • CHF (congestive heart failure)   • Acute non-ST segment elevation myocardial infarction (STEMI) following previous myocardial infarction   • Dyslipidemia   • Diabetes mellitus   • Mild obesity   • Elevated troponin   • Screen for colon cancer   • Acute midline low back pain without sciatica   • Hypertensive emergency   • PAF (paroxysmal atrial fibrillation) (HCC)   • Malignant neoplasm of anal canal   • Hematochezia   • Severe malnutrition   • Symptomatic anemia   • Intractable pain     Past Medical History:   Diagnosis Date   • Acute bronchitis    • Acute conjunctivitis    • Acute kidney injury     Admission from 2013 to 2014, now resolved with latest creatinine 1.4 on 2014.   • Acute non-ST segment elevation myocardial infarction (STEMI) following previous myocardial infarction    • Anal cancer 2023   • Anal  cancer 2/8/2023   • Arthritis    • Breast cancer, female, right     2005 mastectomy right   • Cancer    • CHF (congestive heart failure)    • Chronic kidney disease     dialysis MWF, [facility]/u [facility]    • Clotting disorder    • COVID-19    • Diabetes mellitus     diagnosed ~1992, checks fsbg 2-3x/day   • Diarrhea    • Dyslipidemia    • Dyspepsia    • Dyspnea    • Edema    • History of transfusion     ~2013 no reaction    • Hx of radiation therapy    • Hyperlipidemia    • Hypertension     Severe   • Ischemic heart disease    • Moderate obesity     BMI 36.2   • Myocardial infarction    • Pneumonia    • Pneumonia 02/2019   • Radiation 2005   • Seasonal allergies    • Wears glasses      Past Surgical History:   Procedure Laterality Date   • AV FISTULA PLACEMENT, BRACHIOBASILIC     • BREAST BIOPSY Left 2010   • BREAST CYST EXCISION     • BREAST LUMPECTOMY Right 2005   • BREAST SURGERY     • CARDIAC CATHETERIZATION N/A 10/10/2016    Procedure: Left Heart Cath;  Surgeon: Scooter Zhao MD;  Location:  [facility];  Service:    • CARDIAC CATHETERIZATION  10/2016   • CARDIAC CATHETERIZATION N/A 1/21/2021    Procedure: Right and Left Heart Cath;  Surgeon: Hugh Tidwell MD;  Location:  [facility];  Service: Cardiology;  Laterality: N/A;   • COLONOSCOPY N/A 7/23/2020    Procedure: COLONOSCOPY;  Surgeon: Rubén Rosas MD;  Location:  [facility];  Service: Gastroenterology;  Laterality: N/A;   • CORONARY STENT PLACEMENT  2016   • HERNIA REPAIR     • HYSTERECTOMY  2000   • INSERTION HEMODIALYSIS CATHETER Right 6/29/2016    Procedure: HEMODIALYSIS CATHETER INSERTION TUNNELLED;  Surgeon: Ramón Chavez MD;  Location: [facility];  Service:    • MASTECTOMY Right 2006   • OOPHORECTOMY     • REDUCTION MAMMAPLASTY Left 2005      General Information     Row Name 04/06/23 1135          Physical Therapy Time and Intention    Document Type evaluation  -KW     Mode of  Treatment individual therapy;physical therapy  -KW     Row Name 04/06/23 1135          General Information    Patient Profile Reviewed yes  -KW     Prior Level of Function independent:;community mobility;gait;transfer;bed mobility  has cane but did not use it; fell around early march  -KW     Existing Precautions/Restrictions fall  sacral wound, orthostatic hypotension  -KW     Barriers to Rehab medically complex  -KW     Row Name 04/06/23 1135          Living Environment    People in Home child(evelyn), adult  -KW     Row Name 04/06/23 1135          Stairs Within Home, Primary    Stairs, Within Home, Primary daughter is building a ramp  -KW     Row Name 04/06/23 1135          Cognition    Orientation Status (Cognition) disoriented to;time;oriented to;person;place  -KW     Row Name 04/06/23 1135          Safety Issues, Functional Mobility    Safety Issues Affecting Function (Mobility) awareness of need for assistance;insight into deficits/self-awareness;judgment;positioning of assistive device;safety precaution awareness  -KW     Impairments Affecting Function (Mobility) balance;endurance/activity tolerance;shortness of breath;strength;pain  -KW           User Key  (r) = Recorded By, (t) = Taken By, (c) = Cosigned By    Initials Name Provider Type    Richa Johnson PT Physical Therapist               Mobility     Row Name 04/06/23 1135          Bed Mobility    Bed Mobility rolling left;rolling right;supine-sit-supine  -KW     Rolling Left [place] (Bed Mobility) maximum assist (25% patient effort)  -KW     Rolling Right [place] (Bed Mobility) maximum assist (25% patient effort)  -KW     Supine-Sit-Supine [place] (Bed Mobility) maximum assist (25% patient effort);verbal cues  -KW     Assistive Device (Bed Mobility) bed rails;head of bed elevated  -KW           User Key  (r) = Recorded By, (t) = Taken By, (c) = Cosigned By    Initials Name Provider Type    Richa Johnson PT Physical  Therapist               Obj/Interventions     Row Name 04/06/23 1135          Strength Comprehensive (MMT)    General Manual Muscle Testing (MMT) Assessment lower extremity strength deficits identified  -KW     Row Name 04/06/23 1135          Balance    Balance Assessment sitting static balance;sitting dynamic balance  -KW     Static Sitting Balance contact guard  -KW     Dynamic Sitting Balance minimal assist  -KW     Balance Interventions sitting  -KW           User Key  (r) = Recorded By, (t) = Taken By, (c) = Cosigned By    Initials Name Provider Type    Richa Johnson PT Physical Therapist               Goals/Plan     Row Name 04/06/23 1135          Bed Mobility Goal 1 (PT)    Activity/Assistive Device (Bed Mobility Goal 1, PT) sit to supine/supine to sit  -KW     [place] Level/Cues Needed (Bed Mobility Goal 1, PT) minimum assist (75% or more patient effort)  -KW     Time Frame (Bed Mobility Goal 1, PT) 10 days  -KW     Row Name 04/06/23 1135          Transfer Goal 1 (PT)    Activity/Assistive Device (Transfer Goal 1, PT) sit-to-stand/stand-to-sit;bed-to-chair/chair-to-bed;walker, rolling  -KW     [place] Level/Cues Needed (Transfer Goal 1, PT) minimum assist (75% or more patient effort)  -KW     Time Frame (Transfer Goal 1, PT) 10 days  -KW     Row Name 04/06/23 1135          Gait Training Goal 1 (PT)    Activity/Assistive Device (Gait Training Goal 1, PT) gait (walking locomotion);assistive device use;decrease fall risk;diminish gait deviation;improve balance and speed;increase endurance/gait distance  -KW     [place] Level (Gait Training Goal 1, PT) minimum assist (75% or more patient effort)  -KW     Distance (Gait Training Goal 1, PT) 50  -KW     Time Frame (Gait Training Goal 1, PT) 10 days  -KW           User Key  (r) = Recorded By, (t) = Taken By, (c) = Cosigned By    Initials Name Provider Type    Richa Johnson PT Physical Therapist               Clinical Impression      Row Name 04/06/23 1135          Pain    Pretreatment Pain Rating 7/10  -KW     Pain Location - Side/Orientation Bilateral  -KW     Pain Location - buttock  -KW     Pain Intervention(s) Repositioned  -KW     Row Name 04/06/23 1135          Plan of Care Review    Plan of Care Reviewed With patient  -KW     Progress no change  -KW     Outcome Evaluation PT eval completed. Patient sat at EOB with supervision however became very dizzy and demonstrated poor tolerance. Patient ased to return to supine. Patient's BP measured at EOB read 78/50 (141/59 supine) Patient presents below baseline for functional mobility. Patient demonstrates decreased activity tolerance, deconditioning and reduced dynamic balance. Patient would continue to benefit from skilled PT services to improve dynamic balance with gait, transfers strength, and activity tolerance training in order to build endurance and reduce risk of falls.  -KW     Row Name 04/06/23 1135          Therapy Assessment/Plan (PT)    Rehab Potential (PT) good, to achieve stated therapy goals  -     Criteria for Skilled Interventions Met (PT) yes;skilled treatment is necessary  -KW     Therapy Frequency (PT) daily  -KW     Row Name 04/06/23 1135          Positioning and Restraints    Pre-Treatment Position in bed  -KW     Post Treatment Position bed  -KW     In Bed supine;encouraged to call for assist;exit alarm on;call light within reach;with family/caregiver  -KW           User Key  (r) = Recorded By, (t) = Taken By, (c) = Cosigned By    Initials Name Provider Type    Richa Johnson, PT Physical Therapist               Outcome Measures     Row Name 04/06/23 1135          How much help from another person do you currently need...    Turning from your back to your side while in flat bed without using bedrails? 2  -KW     Moving from lying on back to sitting on the side of a flat bed without bedrails? 2  -KW     Moving to and from a bed to a chair (including a  wheelchair)? 2  -KW     Standing up from a chair using your arms (e.g., wheelchair, bedside chair)? 2  -KW     Climbing 3-5 steps with a railing? 1  -KW     To walk in hospital room? 2  -KW     AM-PAC 6 Clicks Score (PT) 11  -KW     Highest level of mobility 4 --> Transferred to chair/commode  -KW     Row Name 04/06/23 1135          Functional Assessment    Outcome Measure Options AM-PAC 6 Clicks Basic Mobility (PT)  -           User Key  (r) = Recorded By, (t) = Taken By, (c) = Cosigned By    Initials Name Provider Type    Richa Johnson, [place] Physical Therapist                             Physical Therapy Education     Title: PT OT SLP Therapies (In Progress)     Topic: Physical Therapy (Done)     Point: Mobility training (Done)     Learning Progress Summary           Patient Acceptance, E,TB, VU by  at 4/6/2023 1135    Comment: continued benefit of skilled PT services                   Point: Home exercise program (Done)     Learning Progress Summary           Patient Acceptance, E,TB, VU by  at 4/6/2023 1135    Comment: continued benefit of skilled PT services                   Point: Body mechanics (Done)     Learning Progress Summary           Patient Acceptance, E,TB, VU by  at 4/6/2023 1135    Comment: continued benefit of skilled PT services                   Point: Precautions (Done)     Learning Progress Summary           Patient Acceptance, E,TB, VU by  at 4/6/2023 1135    Comment: continued benefit of skilled PT services                               User Key     Initials Effective Dates Name Provider Type [facility] 01/27/22 -  Richa Chand PT Physical Therapist PT              PT Recommendation and Plan     Plan of Care Reviewed With: patient  Progress: no change  Outcome Evaluation: PT eval completed. Patient sat at EOB with supervision however became very dizzy and demonstrated poor tolerance. Patient ased to return to supine. Patient's BP measured at EOB read 78/50  (141/59 supine) Patient presents below baseline for functional mobility. Patient demonstrates decreased activity tolerance, deconditioning and reduced dynamic balance. Patient would continue to benefit from skilled PT services to improve dynamic balance with gait, transfers strength, and activity tolerance training in order to build endurance and reduce risk of falls.     Time Calculation:    PT Charges     Row Name 04/06/23 1135             Time Calculation    Start Time 1135  -KW      PT Received On 04/06/23  -KW      PT Goal Re-Cert Due Date 04/16/23  -KW         Untimed Charges    PT Eval/Re-eval Minutes 52  -KW         Total Minutes    Untimed Charges Total Minutes 52  -KW       Total Minutes 52  -KW            User Key  (r) = Recorded By, (t) = Taken By, (c) = Cosigned By    Initials Name Provider Type    Richa Johnson PT Physical Therapist              Therapy Charges for Today     Code Description Service Date Service Provider Modifiers Qty    [id number] HC PT EVAL MOD COMPLEXITY 4 4/6/2023 Richa Chand PT GP 1          PT G-Codes  Outcome Measure Options: AM-PAC 6 Clicks Basic Mobility (PT)  AM-PAC 6 Clicks Score (PT): 11  PT Discharge Summary  Anticipated Discharge Disposition (PT): home with home health, home with 24/7 care (patient and daughter's goal is to return home)    Richa Chand PT  4/6/2023

## 2023-04-06 NOTE — PROGRESS NOTES
" LOS: 1 day   Patient Care Team:  Meghana Rodgers MD as PCP - General  Demetrius Sagastume MD as Consulting Physician (Cardiology)  Kevan Lerma MD as Consulting Physician (Nephrology)  Geena Estrella APRN as Nurse Practitioner (Cardiology)  Frank Mandujano MD as Referring Physician (Colon and Rectal Surgery)  Kevan Cavazos MD as Consulting Physician (Radiation Oncology)  Yue Reeves RN as Nurse Navigator  Darryn Burk MD as Consulting Physician (Nephrology)    Chief Complaint: ESRD    Subjective       Subjective:  Symptoms:  Stable.  No shortness of breath, chest pain, chest pressure or anxiety.    Pain:  She complains of pain that is moderate.        History taken from: patient    Objective     Vital Sign Min/Max for last 24 hours  Temp  Min: 97.9 °F (36.6 °C)  Max: 98.4 °F (36.9 °C)   BP  Min: 94/48  Max: 141/59   Pulse  Min: 62  Max: 69   Resp  Min: 14  Max: 18   SpO2  Min: 98 %  Max: 100 %   Flow (L/min)  Min: 2  Max: 4   Weight  Min: 63.5 kg (140 lb 1.6 oz)  Max: 63.5 kg (140 lb 1.6 oz)     Flowsheet Rows    Flowsheet Row First Filed Value   Admission Height 167.6 cm (66\") Documented at 04/05/2023 0702   Admission Weight 78.9 kg (174 lb) Documented at 04/05/2023 0702          I/O this shift:  In: 120 [P.O.:120]  Out: -   I/O last 3 completed shifts:  In: 180 [P.O.:180]  Out: 2000 [Other:2000]    Objective:  General Appearance:  Ill-appearing.    Vital signs: (most recent): Blood pressure 97/44, pulse 63, temperature 98.3 °F (36.8 °C), temperature source Oral, resp. rate 18, height 167.6 cm (66\"), weight 63.5 kg (140 lb 1.6 oz), SpO2 100 %, not currently breastfeeding.  Vital signs are normal.    HEENT: Normal HEENT exam.    Lungs:  Normal effort and normal respiratory rate.  Breath sounds clear to auscultation.  She is not in respiratory distress.  No decreased breath sounds or wheezes.    Heart: Normal rate.  Regular rhythm.  S1 normal and S2 normal.  No murmur or gallop. "   Abdomen: Abdomen is soft.    Extremities: There is no dependent edema or local swelling.    Pulses: Distal pulses are intact.    Neurological: Patient is alert and oriented to person, place and time.  Normal strength.    Pupils:  Pupils are equal, round, and reactive to light.              Results Review:     I reviewed the patient's new clinical results.    WBC WBC   Date Value Ref Range Status   04/05/2023 9.16 3.40 - 10.80 10*3/mm3 Final      HGB Hemoglobin   Date Value Ref Range Status   04/05/2023 9.0 (L) 12.0 - 15.9 g/dL Final      HCT Hematocrit   Date Value Ref Range Status   04/05/2023 30.9 (L) 34.0 - 46.6 % Final      Platlets No results found for: LABPLAT   MCV MCV   Date Value Ref Range Status   04/05/2023 101.6 (H) 79.0 - 97.0 fL Final          Sodium Sodium   Date Value Ref Range Status   04/06/2023 136 136 - 145 mmol/L Final   04/05/2023 135 (L) 136 - 145 mmol/L Final      Potassium Potassium   Date Value Ref Range Status   04/06/2023 4.9 3.5 - 5.2 mmol/L Final     Comment:     Slight hemolysis detected by analyzer. Results may be affected.   04/05/2023 4.3 3.5 - 5.2 mmol/L Final      Chloride Chloride   Date Value Ref Range Status   04/06/2023 97 (L) 98 - 107 mmol/L Final   04/05/2023 91 (L) 98 - 107 mmol/L Final      CO2 CO2   Date Value Ref Range Status   04/06/2023 25.0 22.0 - 29.0 mmol/L Final   04/05/2023 29.0 22.0 - 29.0 mmol/L Final      BUN BUN   Date Value Ref Range Status   04/06/2023 20 8 - 23 mg/dL Final   04/05/2023 39 (H) 8 - 23 mg/dL Final      Creatinine Creatinine   Date Value Ref Range Status   04/06/2023 4.02 (H) 0.57 - 1.00 mg/dL Final   04/05/2023 6.93 (H) 0.57 - 1.00 mg/dL Final      Calcium Calcium   Date Value Ref Range Status   04/06/2023 8.2 (L) 8.6 - 10.5 mg/dL Final   04/05/2023 9.0 8.6 - 10.5 mg/dL Final      PO4 No results found for: CAPO4   Albumin Albumin   Date Value Ref Range Status   04/05/2023 2.9 (L) 3.5 - 5.2 g/dL Final      Magnesium Magnesium   Date Value Ref  Range Status   04/06/2023 2.0 1.6 - 2.4 mg/dL Final   04/05/2023 2.3 1.6 - 2.4 mg/dL Final      Uric Acid No results found for: URICACID     Medication Review: yes    Assessment & Plan       Intractable pain      Assessment & Plan        ESRD: MWF [facility]     Volume status: No significant anemia     Abdominal pain: In the setting of rectal cancer.     Failure to thrive     Hypoalbuminemia      Anemia: ACD due to CKD.     I discussed the patients findings and my recommendations with patient       Gabe Butler MD  04/06/23  17:41 EDT

## 2023-04-06 NOTE — PAYOR COMM NOTE
"Lisandra Ayouba YUAN (78 y.o. Female)     Bernadette Almaguer, RN  Utilization Review  Bincr-[phone]  [phone]      Date of Birth   1945    Social Security Number       Address   [address] [place] [id number]    Home Phone   [phone]    MRN   [id number]       Pentecostalism   Shinto    Marital Status                               Admission Date   4/5/23    Admission Type   Emergency    Admitting Provider   Ranulfo Thomas DO    Attending Provider   Ranulfo Thomas DO    Department, Room/Bed   [facility] 5H, S572/1       Discharge Date       Discharge Disposition       Discharge Destination                               Attending Provider: Ranulfo Thomas DO    Allergies: Mircera [Methoxy Polyethylene Glycol-epoetin Beta], Venofer [Iron Sucrose], Albuterol, Nifedipine Er, Penicillins, Prednisone    Isolation: None   Infection: None   Code Status: No CPR    Ht: 167.6 cm (66\")   Wt: 78.9 kg (174 lb)    Admission Cmt: None   Principal Problem: Intractable pain [R52]                 Active Insurance as of 4/5/2023     Primary Coverage     Payor Plan Insurance Group Employer/Plan Group    [facility] MEDICARE REPLACEMENT [employer] MEDICARE REPLACEMENT      Payor Plan Address Payor Plan Phone Number Payor Plan Fax Number Effective Dates    [address] [address] [phone]  12/1/2021 - None Entered    [facility] [id number]       Subscriber Name Subscriber Birth Date Member ID       ROSA AYOUB 1945 [id number]                 Emergency Contacts      (Rel.) Home Phone Work Phone Mobile Phone    isreal ayoub (Daughter) -- -- [phone]    danelle ayoub (Daughter) -- -- [phone]    danelle martin (Sister) -- -- [phone]    Juan Ayoub\ (Son) [phone] -- [phone]               History & Physical      Nuria Lynch MD at 04/05/23 1148              [facility] Medicine Services  HISTORY AND " PHYSICAL    Patient Name: Esperanza Pop  : 1945  MRN: [id number]  Primary Care Physician: Meghana Rodgers MD  Date of admission: 2023    Subjective    Subjective     Chief Complaint:  Rectal pain    HPI:  Esperanza Pop is a 78 y.o. female with ESRD on HD , IDDM, HTN, HLD, HFpEF, breast cancer status postmastectomy and radiation, paroxysmal A-fib on Xarelto previously, chronic anemia, rectal cancer recently diagnosed and was getting radiation and chemo, recent admission from 3/10 to 3/29, who presented for intractable rectal pain from SNF.  She reports that since she left the hospital, she has continued to have rectal pain such that she did not tolerate any dialysis on Monday so her last dialysis session was on Friday.  She has not had much of an appetite and has had minimal oral intake for the past several weeks.  The patient denied any other symptoms besides rectal pain and poor appetite today.  Denies fevers, chills, headache, dizziness, nausea, vomiting, diarrhea.    Per the patient's daughter, the patient has had severe pain from her radiation burns on her buttocks.  They also state that she fell most recently yesterday morning while at the SNF.  They state that there is no more radiation or chemotherapy planned at this time due to her debilitation and radiation burns.    The ER, patient was afebrile, heart rate 66, respiratory rate 18, blood pressure 136/57, satting 100% on 2 L nasal cannula.  Labs on admission notable for creatinine 6.93, BUN 39, potassium 4.3, sodium 135, hemoglobin 9, platelets 203.  She was given lidocaine topically and Zofran.  She was admitted for further management.      Review of Systems   Constitutional: Positive for appetite change and fatigue. Negative for fever.   HENT: Negative for congestion and rhinorrhea.    Eyes: Negative for discharge and redness.   Respiratory: Negative for cough and shortness of breath.    Cardiovascular: Negative for chest  pain and leg swelling.   Gastrointestinal: Positive for abdominal pain and rectal pain. Negative for diarrhea, nausea and vomiting.   Genitourinary: Negative for dysuria and hematuria.   Musculoskeletal: Negative for gait problem and joint swelling.   Skin: Positive for wound. Negative for pallor.   Neurological: Negative for dizziness and headaches.   Psychiatric/Behavioral: Negative for confusion and self-injury.            Personal History     Past Medical History:   Diagnosis Date   • Acute bronchitis    • Acute conjunctivitis    • Acute kidney injury     Admission from 12/26/2013 to 01/02/2014, now resolved with latest creatinine 1.4 on 01/08/2014.   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   • Anal cancer 2/8/2023   • Arthritis    • Breast cancer, female, right     2005 mastectomy right   • Cancer    • CHF (congestive heart failure)    • Chronic kidney disease     dialysis [facility], f/u [facility]    • Clotting disorder    • COVID-19    • Diabetes mellitus     diagnosed ~1992, checks fsbg 2-3x/day   • Diarrhea    • Dyslipidemia    • Dyspepsia    • Dyspnea    • Edema    • History of transfusion     ~2013 no reaction    • Hx of radiation therapy    • Hyperlipidemia    • Hypertension     Severe   • Ischemic heart disease    • Moderate obesity     BMI 36.2   • Myocardial infarction    • Pneumonia    • Pneumonia 02/2019   • Radiation 2005   • Seasonal allergies    • Wears glasses          Oncology Problem List:  Malignant neoplasm of anal canal (02/08/2023; Status: Active)  Breast cancer, female, right (05/20/2016; Status: Active)    Oncology/Hematology History   Breast cancer, female, right   5/20/2016 Initial Diagnosis    Breast cancer, female, right     Malignant neoplasm of anal canal   2/8/2023 Initial Diagnosis    Anal cancer (HCC)     2/15/2023 -  Chemotherapy    OP ANAL  Capecitabine 825 mg/m2 M-F + XRT          Past Surgical History:   Procedure Laterality Date   • AV FISTULA PLACEMENT, BRACHIOBASILIC     • BREAST BIOPSY Left 2010   • BREAST CYST EXCISION     • BREAST LUMPECTOMY Right 2005   • BREAST SURGERY     • CARDIAC CATHETERIZATION N/A 10/10/2016    Procedure: Left Heart Cath;  Surgeon: Scooter Zhao MD;  Location:  [facility];  Service:    • CARDIAC CATHETERIZATION  10/2016   • CARDIAC CATHETERIZATION N/A 1/21/2021    Procedure: Right and Left Heart Cath;  Surgeon: Hugh Tidwell MD;  Location:  [facility];  Service: Cardiology;  Laterality: N/A;   • COLONOSCOPY N/A 7/23/2020    Procedure: COLONOSCOPY;  Surgeon: Rubén Rosas MD;  Location:  [facility];  Service: Gastroenterology;  Laterality: N/A;   • CORONARY STENT PLACEMENT  2016   • HERNIA REPAIR     • HYSTERECTOMY  2000   • INSERTION HEMODIALYSIS CATHETER Right 6/29/2016    Procedure: HEMODIALYSIS CATHETER INSERTION TUNNELLED;  Surgeon: Ramón Chavez MD;  Location:  [facility];  Service:    • MASTECTOMY Right 2006   • OOPHORECTOMY     • REDUCTION MAMMAPLASTY Left 2005       Family History:  family history includes Breast cancer (age of onset: 55) in her sister; Cancer in her mother; Colon cancer in her maternal grandmother; Coronary artery disease in her father; Heart failure in her father; Pancreatic cancer in her mother; Stroke in her mother; Throat cancer in her daughter.     Social History:  reports that she has never smoked. She has never used smokeless tobacco. She reports current alcohol use. She reports that she does not use drugs.  Social History     Social History Narrative    Pt consumes 1 serving of caffeine once every 2 weeks.     Lost grandson in Feb. 2022       Medications:  B Complex-C-Folic Acid, Capsaicin, Hydrocortisone (Perianal), Insulin Lispro, Iron Dextran, Lancets Ultra Fine, Vitamin D, acetaminophen, amiodarone, apixaban, aspirin, atorvastatin, benzocaine-menthol,  bisacodyl, carvedilol, cloNIDine, difluprednate, dorzolamide-timolol, epoetin orquidea-epbx, fluocinonide, glucose blood, glucose monitor, hydrOXYzine, hydrocortisone, isosorbide mononitrate, lidocaine-prilocaine, mirtazapine, naloxone, and nitroglycerin    Allergies   Allergen Reactions   • Mircera [Methoxy Polyethylene Glycol-Epoetin Beta] Anaphylaxis     Pt stopped breathing   • Venofer [Iron Sucrose] Anaphylaxis     Pt stopped breathing   • Albuterol Other (See Comments)     Increased blood pressure  Used right before heart attack   • Nifedipine Er Swelling   • Penicillins Hives   • Prednisone Other (See Comments)     Makes jittery/anxious       Objective    Objective     Vital Signs:   Temp:  [97 °F (36.1 °C)-99.5 °F (37.5 °C)] 97 °F (36.1 °C)  Heart Rate:  [64-89] 77  Resp:  [18] 18  BP: ()/() 105/70  Flow (L/min):  [2] 2    Physical Exam   Constitutional: No acute distress, sleeping, but awakens with verbal stimulation, alert  HENT: NCAT, mucous membranes moist  Respiratory: Clear to auscultation bilaterally, respiratory effort normal   Cardiovascular: RRR, no murmurs, rubs, or gallops, right AV fistula accessed for dialysis  Gastrointestinal: Normoactive bowel sounds, soft, nontender, nondistended  Musculoskeletal: No bilateral ankle edema  Psychiatric: Appropriate affect, cooperative  Neurologic: PERRL, symmetric facies, symmetric palate rise, tongue midline, moving all extremities, speech clear  Skin: Examined with dialysis nurse present as a chaperone; has gluteal radiation burns that are tender to palpation    Result Review:  I have personally reviewed the results from the time of this admission to 4/5/2023 15:37 EDT and agree with these findings:  []  Laboratory list / accordion  []  Microbiology  []  Radiology  [x]  EKG/Telemetry   []  Cardiology/Vascular   []  Pathology  [x]  Old records  []  Other:  Most notable findings include: as per hpi    LAB RESULTS:      Lab 04/05/23  0851   WBC 9.16    HEMOGLOBIN 9.0*   HEMATOCRIT 30.9*   PLATELETS 203   NEUTROS ABS 6.08   IMMATURE GRANS (ABS) 0.05   LYMPHS ABS 1.10   MONOS ABS 1.27*   EOS ABS 0.62*   .6*         Lab 04/05/23  0851   SODIUM 135*   POTASSIUM 4.3   CHLORIDE 91*   CO2 29.0   ANION GAP 15.0   BUN 39*   CREATININE 6.93*   EGFR 5.7*   GLUCOSE 169*   CALCIUM 9.0   MAGNESIUM 2.3   PHOSPHORUS 3.2         Lab 04/05/23  0851   TOTAL PROTEIN 8.4   ALBUMIN 2.9*   GLOBULIN 5.5   ALT (SGPT) 12   AST (SGOT) 37*   BILIRUBIN 0.7   ALK PHOS 238*                     Brief Urine Lab Results     None        Microbiology Results (last 10 days)     ** No results found for the last 240 hours. **          No radiology results from the last 24 hrs    Results for orders placed during the hospital encounter of 03/10/23    Adult Transthoracic Echo Complete W/ Cont if Necessary Per Protocol    Interpretation Summary  •  Left ventricular systolic function is normal. Estimated left ventricular EF = 55%  •  Left ventricular wall thickness is consistent with moderate concentric hypertrophy.  •  The right ventricular cavity is mildly dilated.  •  Mild to moderate biatrial enlargement.  •  Moderate to severe aortic valve stenosis is present.  Mean gradient 34 mmHg, DOUG 0.9 cm².  •  Mild aortic insufficiency.  •  Mild mitral regurgitation.  •  Mild to moderate tricuspid regurgitation with RVSP 48 mmHg.      Assessment & Plan   Assessment & Plan       Intractable pain      78 y.o. female with ESRD on HD Monday Wednesday Friday, IDDM, HTN, HLD, HFpEF, breast cancer status postmastectomy and radiation, paroxysmal A-fib on Xarelto previously, chronic anemia, rectal cancer recently diagnosed and was getting radiation and chemo, recent admission from 3/10 to 3/29, who presented for intractable rectal pain from SNF.  She reports that since she left the hospital, she has continued to have rectal pain such that she did not tolerate any dialysis on Monday so her last dialysis session  was on Friday. She has not had much of an appetite and has had minimal oral intake for the past several weeks.  Patient has continued to decline while at the SNF - based on her labs, despite having her last dialysis session on Friday, she has had minimal oral intake.    Buttock wound, appears to be from radiation  Metastatic rectal cancer, was on chemoradiation  • Wound care consulted  • Pain control with scheduled Tylenol, as needed Norco, as needed Dilaudid  • Palliative care consult  • CT abdomen and pelvis pending for lower abdominal pain and rectal pain    Profound Debility  • She follows with Dr. Cavazos, Rad/Onc, Dr. Carpenter with oncology  • In regards to her rectal cancer, could not tolerate chemotherapy or radiation therapy because of severe debility.    • Plan from her last admission (discharged on 3/29) was to hold both therapies (chemoradiation) and transition to SNF and if she improves over the next few weeks, she can resume treatment.   • Patient has continued to decline with continued weakness, poor appetite; patient appears to be hospice appropriate  • PT and OT consulted  • Discussed the patient's poor functional status and need to discuss palliative and hospice.  Patient was amenable to talking with palliative, but was not ready to discuss hospice today.  She requested I speak with her daughters.  I was only able to reach [place] over the phone. I discussed with her the patient has continued to decline and that we will obtain imaging, palliative consult and discuss further.    • Discussed I believe that she would likely continue to decline and that we needed to further talk about how the patient will spend the time that she has left. Comlethia to get patient's pain under control first    Poor appetite  • Continue mirtazapine    Dysphagia  · Was on a modified diet with purées and pudding thickened liquids during her last admission  · Speech consulted     ESRD on HD MWF  • Renal following for HD  today  • Discussed with Dr. Butler who feels patient is likely hospice appropriate     HFpEF   Essential hypertension  hypotension  • Continue dialysis for volume control as above  • Continue imdur 120mg     PAF on eliquis   • Continue eliquis + amiodarone     Mod-Severe aortic stenosis   • Has appt with Geena Estrella, APRN 4/27/23    DVT prophylaxis:  apixaban    CODE STATUS:  FULL per discussion with the patient on 4/5; need to reassess again the AM, as patient has had various code status orders on past admissions       Expected Discharge  Expected Discharge Date and Time     Expected Discharge Date Expected Discharge Time    Apr 10, 2023             This note has been completed as part of a split-shared workflow.     Signature: Electronically signed by Nuria Lynch MD, 04/05/23, 3:36 PM EDT                Electronically signed by Nuria Lynch MD at 04/05/23 1556          Emergency Department Notes      Rodolfo Fairchild MD at 04/05/23 0841          Subjective   History of Present Illness  This is a pleasant, unfortunate, 78-year-old female who was accompanied by her daughter.  Prior to February and her diagnosis of rectal cancer she was fairly active.  She has end-stage renal disease and dialyzes Monday Wednesday and Friday was able to drive herself to dialysis and got to the store.    She had rectal pain in February diagnosed with rectal cancer and started radiation and chemotherapy.  I reviewed those records.  She got increasingly weak therapy was held and her strength declined.  She was discharged from the hospital on the 29th of last month to a skilled nursing facility in [place] where she has been in really has declined even further.  Now she cannot walk.  She has rectal pain that is constant and episodes that come about every 8 minutes like a contraction and that are just miserable for her.  She is passing soft stool.  It is quite painful.  P.o. intake has been decreased.  She has been losing  weight.    She missed dialysis yesterday as she could not sit forward toes her buttocks hurt so badly.  She was scheduled dialyze today but the pain got so bad she was brought in by EMS here for further evaluation.    She has had no vomiting.  She has had no fever.  She is actually quite a good historian.  Her daughter gives the rest the history.        All other systems reviewed and are negative except as noted above.        Review of Systems   All other systems reviewed and are negative.      Past Medical History:   Diagnosis Date   • Acute bronchitis    • Acute conjunctivitis    • Acute kidney injury     Admission from 12/26/2013 to 01/02/2014, now resolved with latest creatinine 1.4 on 01/08/2014.   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   • Anal cancer 2/8/2023   • Arthritis    • Breast cancer, female, right     2005 mastectomy right   • Cancer    • CHF (congestive heart failure)    • Chronic kidney disease     dialysis MWF, f/u [facility]    • Clotting disorder    • COVID-19    • Diabetes mellitus     diagnosed ~1992, checks fsbg 2-3x/day   • Diarrhea    • Dyslipidemia    • Dyspepsia    • Dyspnea    • Edema    • History of transfusion     ~2013 no reaction    • Hx of radiation therapy    • Hyperlipidemia    • Hypertension     Severe   • Ischemic heart disease    • Moderate obesity     BMI 36.2   • Myocardial infarction    • Pneumonia    • Pneumonia 02/2019   • Radiation 2005   • Seasonal allergies    • Wears glasses        Allergies   Allergen Reactions   • Mircera [Methoxy Polyethylene Glycol-Epoetin Beta] Anaphylaxis     Pt stopped breathing   • Venofer [Iron Sucrose] Anaphylaxis     Pt stopped breathing   • Albuterol Other (See Comments)     Increased blood pressure  Used right before heart attack   • Nifedipine Er Swelling   • Penicillins Hives   • Prednisone Other (See Comments)     Makes jittery/anxious        Past Surgical History:   Procedure Laterality Date   • AV FISTULA PLACEMENT, BRACHIOBASILIC     • BREAST BIOPSY Left 2010   • BREAST CYST EXCISION     • BREAST LUMPECTOMY Right 2005   • BREAST SURGERY     • CARDIAC CATHETERIZATION N/A 10/10/2016    Procedure: Left Heart Cath;  Surgeon: Scooter Zhao MD;  Location:  [facility];  Service:    • CARDIAC CATHETERIZATION  10/2016   • CARDIAC CATHETERIZATION N/A 1/21/2021    Procedure: Right and Left Heart Cath;  Surgeon: Hugh Tidwell MD;  Location:  [facility];  Service: Cardiology;  Laterality: N/A;   • COLONOSCOPY N/A 7/23/2020    Procedure: COLONOSCOPY;  Surgeon: Rubén Rosas MD;  Location:  [facility];  Service: Gastroenterology;  Laterality: N/A;   • CORONARY STENT PLACEMENT  2016   • HERNIA REPAIR     • HYSTERECTOMY  2000   • INSERTION HEMODIALYSIS CATHETER Right 6/29/2016    Procedure: HEMODIALYSIS CATHETER INSERTION TUNNELLED;  Surgeon: Ramón Chavez MD;  Location:  [facility];  Service:    • MASTECTOMY Right 2006   • OOPHORECTOMY     • REDUCTION MAMMAPLASTY Left 2005       Family History   Problem Relation Age of Onset   • Stroke Mother    • Cancer Mother    • Pancreatic cancer Mother    • Coronary artery disease Father    • Heart failure Father    • Breast cancer Sister 55   • Throat cancer Daughter    • Colon cancer Maternal Grandmother    • Ovarian cancer Neg Hx    • Endometrial cancer Neg Hx        Social History     Socioeconomic History   • Marital status:    Tobacco Use   • Smoking status: Never   • Smokeless tobacco: Never   • Tobacco comments:     Michael second hand smoke   Vaping Use   • Vaping Use: Never used   Substance and Sexual Activity   • Alcohol use: Not Currently   • Drug use: No   • Sexual activity: Defer           Objective   Physical Exam  Vitals and nursing note reviewed. Exam conducted with a chaperone present.   Constitutional:       Comments: Is a very  pleasant 78-year-old she looks thin and gaunt and frail.  She is alert eyes are closed but she gives a good history when you talk to her.   HENT:      Head: Normocephalic and atraumatic.      Right Ear: External ear normal.      Left Ear: External ear normal.      Nose: Nose normal.      Mouth/Throat:      Mouth: Mucous membranes are moist.      Pharynx: Oropharynx is clear.   Eyes:      Extraocular Movements: Extraocular movements intact.      Conjunctiva/sclera: Conjunctivae normal.      Pupils: Pupils are equal, round, and reactive to light.   Neck:      Vascular: No carotid bruit.   Cardiovascular:      Rate and Rhythm: Normal rate and regular rhythm.      Comments: 3/6 systolic murmur at the base which the patient tells me she is known about  Pulmonary:      Effort: Pulmonary effort is normal.      Breath sounds: Normal breath sounds.   Abdominal:      Comments: She is markers on her skin.  Positive bowel sounds soft really nontender no organomegaly, masses, or guarding.   Genitourinary:     Comments: Buttocks examined.  She has stool.  This is carefully wiped away by my chaperone and she has grade 2 breakdown on both buttocks with redness and may be grade 3 and a small area.  The perineum is fairly unremarkable of the rectum itself she has brown stool and a mass at the rectal verge that is tender.  Musculoskeletal:      Cervical back: Normal range of motion and neck supple. No rigidity or tenderness.      Comments: Fistula in right arm for dialysis.  Equal pulses.  No edema.   Lymphadenopathy:      Cervical: No cervical adenopathy.   Skin:     General: Skin is warm and dry.      Capillary Refill: Capillary refill takes less than 2 seconds.   Neurological:      Comments: Face symmetric, voice soft, tongue midline.  Vision, hearing, and speech are preserved.  She has severe global weakness         Procedures          ED Course            Recent Results (from the past 24 hour(s))   Comprehensive Metabolic Panel     Collection Time: 04/05/23  8:51 AM    Specimen: Blood   Result Value Ref Range    Glucose 169 (H) 65 - 99 mg/dL    BUN 39 (H) 8 - 23 mg/dL    Creatinine 6.93 (H) 0.57 - 1.00 mg/dL    Sodium 135 (L) 136 - 145 mmol/L    Potassium 4.3 3.5 - 5.2 mmol/L    Chloride 91 (L) 98 - 107 mmol/L    CO2 29.0 22.0 - 29.0 mmol/L    Calcium 9.0 8.6 - 10.5 mg/dL    Total Protein 8.4 6.0 - 8.5 g/dL    Albumin 2.9 (L) 3.5 - 5.2 g/dL    ALT (SGPT) 12 1 - 33 U/L    AST (SGOT) 37 (H) 1 - 32 U/L    Alkaline Phosphatase 238 (H) 39 - 117 U/L    Total Bilirubin 0.7 0.0 - 1.2 mg/dL    Globulin 5.5 gm/dL    A/G Ratio 0.5 g/dL    BUN/Creatinine Ratio 5.6 (L) 7.0 - 25.0    Anion Gap 15.0 5.0 - 15.0 mmol/L    eGFR 5.7 (L) >60.0 mL/min/1.73   CBC Auto Differential    Collection Time: 04/05/23  8:51 AM    Specimen: Blood   Result Value Ref Range    WBC 9.16 3.40 - 10.80 10*3/mm3    RBC 3.04 (L) 3.77 - 5.28 10*6/mm3    Hemoglobin 9.0 (L) 12.0 - 15.9 g/dL    Hematocrit 30.9 (L) 34.0 - 46.6 %    .6 (H) 79.0 - 97.0 fL    MCH 29.6 26.6 - 33.0 pg    MCHC 29.1 (L) 31.5 - 35.7 g/dL    RDW 18.2 (H) 12.3 - 15.4 %    RDW-SD 67.1 (H) 37.0 - 54.0 fl    MPV 10.8 6.0 - 12.0 fL    Platelets 203 140 - 450 10*3/mm3    Neutrophil % 66.4 42.7 - 76.0 %    Lymphocyte % 12.0 (L) 19.6 - 45.3 %    Monocyte % 13.9 (H) 5.0 - 12.0 %    Eosinophil % 6.8 (H) 0.3 - 6.2 %    Basophil % 0.4 0.0 - 1.5 %    Immature Grans % 0.5 0.0 - 0.5 %    Neutrophils, Absolute 6.08 1.70 - 7.00 10*3/mm3    Lymphocytes, Absolute 1.10 0.70 - 3.10 10*3/mm3    Monocytes, Absolute 1.27 (H) 0.10 - 0.90 10*3/mm3    Eosinophils, Absolute 0.62 (H) 0.00 - 0.40 10*3/mm3    Basophils, Absolute 0.04 0.00 - 0.20 10*3/mm3    Immature Grans, Absolute 0.05 0.00 - 0.05 10*3/mm3    nRBC 0.0 0.0 - 0.2 /100 WBC   Magnesium    Collection Time: 04/05/23  8:51 AM    Specimen: Blood   Result Value Ref Range    Magnesium 2.3 1.6 - 2.4 mg/dL   Phosphorus    Collection Time: 04/05/23  8:51 AM    Specimen: Blood    Result Value Ref Range    Phosphorus 3.2 2.5 - 4.5 mg/dL   POC Glucose Once    Collection Time: 04/05/23  4:39 PM    Specimen: Blood   Result Value Ref Range    Glucose 106 70 - 130 mg/dL     Note: In addition to lab results from this visit, the labs listed above may include labs taken at another facility or during a different encounter within the last 24 hours. Please correlate lab times with ED admission and discharge times for further clarification of the services performed during this visit.    CT Abdomen Pelvis Without Contrast    (Results Pending)     Vitals:    04/05/23 1530 04/05/23 1545 04/05/23 1600 04/05/23 1640   BP: 150/84 117/62 121/69 (!) 186/71   BP Location:   Left arm Left arm   Patient Position:   Lying Lying   Pulse: 77 79 80 77   Resp:   16 16   Temp:   97 °F (36.1 °C) 97.4 °F (36.3 °C)   TempSrc:   Temporal Oral   SpO2: 96% 99% 96% 94%   Weight:       Height:         Medications   sodium chloride 0.9 % flush 10 mL (10 mL Intravenous Given 4/5/23 1712)   sodium chloride 0.9 % flush 10 mL (10 mL Intravenous Not Given 4/5/23 1715)   sodium chloride 0.9 % flush 10 mL (has no administration in time range)   sodium chloride 0.9 % infusion 40 mL (has no administration in time range)   HYDROcodone-acetaminophen (NORCO) 5-325 MG per tablet 1 tablet (has no administration in time range)   melatonin tablet 5 mg (has no administration in time range)   sennosides-docusate (PERICOLACE) 8.6-50 MG per tablet 2 tablet (has no administration in time range)     And   bisacodyl (DULCOLAX) EC tablet 5 mg (has no administration in time range)     And   bisacodyl (DULCOLAX) suppository 10 mg (has no administration in time range)   ondansetron (ZOFRAN) injection 4 mg (has no administration in time range)   albumin human 25 % IV SOLN 25 g (has no administration in time range)   HYDROmorphone (DILAUDID) injection 0.25 mg (0.25 mg Intravenous Given 4/5/23 1712)   amiodarone (PACERONE) tablet 200 mg (200 mg Oral Given  4/5/23 1711)   apixaban (ELIQUIS) tablet 2.5 mg (has no administration in time range)   aspirin EC tablet 81 mg (81 mg Oral Given 4/5/23 1711)   atorvastatin (LIPITOR) tablet 80 mg (has no administration in time range)   capsaicin (ZOSTRIX) 0.075 % topical cream (has no administration in time range)   carvedilol (COREG) tablet 3.125 mg (has no administration in time range)   cloNIDine (CATAPRES) tablet 0.1 mg (has no administration in time range)   hydrOXYzine (ATARAX) tablet 25 mg (has no administration in time range)   isosorbide mononitrate (IMDUR) 24 hr tablet 120 mg (has no administration in time range)   mirtazapine (REMERON) tablet 15 mg (has no administration in time range)   acetaminophen (TYLENOL) tablet 650 mg (650 mg Oral Given 4/5/23 1711)   ondansetron (ZOFRAN) injection 4 mg (4 mg Intravenous Given 4/5/23 0878)   Lidocaine HCl gel (XYLOCAINE) urethral/mucosal syringe ( Topical Given 4/5/23 0886)   zinc oxide (DESITIN) 40 % paste 1 application (1 application Topical Given 4/5/23 0886)     ECG/EMG Results (last 24 hours)     ** No results found for the last 24 hours. **        No orders to display                                       Medical Decision Making        I reviewed all available studies the bedside with the patient and her daughter.  Patient really is ill with this.  Her mentation is still good.    Unfortunately she is really between a rock and a hard place with her cancer.  She needs to be strong enough to continue chemotherapy and radiation therapy but unfortunately I do not think this is can happen for her.    I treated her pain here.  She is now on oxygen.  I started palliative care discussions with the patient and her family which they have had previously.  She very well may need to transition to palliative or hospice care in order to treat her symptoms and give her the best life we can because I do not know medically if there is an anything else to be done for this.  Patient and family  understand this and will take it under advisement.    I spoke with Dr. Kapadia, on-call hospital medicine, her colleagues admit the patient.    All are agreeable with plan    Adult failure to thrive: undiagnosed new problem with uncertain prognosis  ESRD on hemodialysis: chronic illness or injury with exacerbation, progression, or side effects of treatment  Hypoalbuminemia: undiagnosed new problem with uncertain prognosis  Intractable pain: complicated acute illness or injury with systemic symptoms that poses a threat to life or bodily functions  Pressure injury of buttock, stage 3, unspecified laterality: undiagnosed new problem with uncertain prognosis  Rectal cancer: chronic illness or injury with exacerbation, progression, or side effects of treatment that poses a threat to life or bodily functions  Amount and/or Complexity of Data Reviewed  Independent Historian: guardian  External Data Reviewed: labs, radiology and notes.  Labs: ordered. Decision-making details documented in ED Course.      Risk  OTC drugs.  Prescription drug management.  Parenteral controlled substances.  Decision regarding hospitalization.  Decision not to resuscitate or to de-escalate care because of poor prognosis.          Final diagnoses:   Intractable pain   Rectal cancer   Adult failure to thrive   Hypoalbuminemia   ESRD on hemodialysis   Pressure injury of buttock, stage 3, unspecified laterality       ED Disposition  ED Disposition     ED Disposition   Decision to Admit    Condition   --    Comment   Level of Care: Telemetry [5]   Diagnosis: Intractable pain [358023]   Admitting Physician: ROBERTO CARLOS MOSQUERA [[id number]]   Certification: I Certify That Inpatient Hospital Services Are Medically Necessary For Greater Than 2 Midnights               No follow-up provider specified.       Medication List      No changes were made to your prescriptions during this visit.          Rodolfo Fairchild MD  04/05/23 0140      Electronically signed by  Rodolfo Fairchild MD at 04/05/23 1818       Vital Signs (last day)     Date/Time Temp Temp src Pulse Resp BP Patient Position SpO2    04/06/23 1126 98.2 (36.8) Oral 66 18 94/48 Lying 98    04/06/23 0801 98.4 (36.9) Oral 64 16 141/59 Lying 100    04/06/23 0326 98.3 (36.8) Oral 62 14 134/55 Lying 100    04/05/23 2338 97.9 (36.6) Oral 67 16 138/65 Lying 100    04/05/23 2023 97.9 (36.6) Oral 69 16 138/64 Lying 100    04/05/23 1640 97.4 (36.3) Oral 77 16 186/71 Lying 94    04/05/23 1600 97 (36.1) Temporal 80 16 121/69 Lying 96    04/05/23 1545 -- -- 79 -- 117/62 -- 99    04/05/23 1530 -- -- 77 -- 150/84 -- 96    04/05/23 1515 -- -- 77 -- 105/70 -- 100    04/05/23 1500 -- -- 76 -- 139/51 -- 98    04/05/23 1445 -- -- 70 -- 134/57 -- 93    04/05/23 1430 -- -- 70 -- 120/60 -- 100    04/05/23 1415 -- -- 71 -- 120/55 -- 91    04/05/23 1400 -- -- 72 -- 126/66 -- 94    04/05/23 1330 -- -- 67 -- 116/73 -- 100    04/05/23 1315 -- -- 69 -- 101/49 -- 98    04/05/23 1300 -- -- 68 -- 111/61 -- 98    04/05/23 1245 -- -- 67 -- 139/70 -- 100    04/05/23 1230 97 (36.1) Temporal 69 18 146/54 Lying 100    04/05/23 1158 -- -- 65 -- 124/52 -- 100    04/05/23 1130 -- -- 66 -- 136/57 -- 100    04/05/23 1101 -- -- 66 -- 125/53 -- 100    04/05/23 1030 -- -- 64 -- 101/48 -- 100    04/05/23 0958 -- -- 68 -- 99/45 -- 100    04/05/23 0928 -- -- 68 -- 98/47 -- 100    04/05/23 0828 -- -- 72 -- 130/56 -- 98    04/05/23 0702 99.5 (37.5) Oral 89 18 182/102 Sitting 95          Oxygen Therapy (last day)     Date/Time SpO2 Device (Oxygen Therapy) Flow (L/min) Oxygen Concentration (%) ETCO2 (mmHg)    04/06/23 1126 98 nasal cannula 3 -- --    04/06/23 0801 100 nasal cannula 3 -- --    04/06/23 0400 -- nasal cannula 3 -- --    04/06/23 0326 100 -- -- -- --    04/06/23 0200 -- nasal cannula 3 -- --    04/06/23 0000 -- nasal cannula 3 -- --    04/05/23 2338 100 nasal cannula 3 -- --    04/05/23 2200 -- nasal cannula 2 -- --    04/05/23 2023 100 -- -- -- --     04/05/23 2000 -- nasal cannula 3 -- --    04/05/23 1800 -- nasal cannula 4 -- --    04/05/23 1640 94 nasal cannula 2 -- --    04/05/23 1600 96 room air -- -- --    04/05/23 1545 99 -- -- -- --    04/05/23 1530 96 -- -- -- --    04/05/23 1515 100 -- -- -- --    04/05/23 1500 98 -- -- -- --    04/05/23 1445 93 -- -- -- --    04/05/23 1430 100 -- -- -- --    04/05/23 1415 91 -- -- -- --    04/05/23 1400 94 -- -- -- --    04/05/23 1330 100 -- -- -- --    04/05/23 1315 98 -- -- -- --    04/05/23 1300 98 -- -- -- --    04/05/23 1245 100 -- -- -- --    04/05/23 1230 100 nasal cannula 2 -- --    04/05/23 1158 100 -- -- -- --    04/05/23 1130 100 -- -- -- --    04/05/23 1101 100 -- -- -- --    04/05/23 1030 100 -- -- -- --    04/05/23 0958 100 -- -- -- --    04/05/23 0928 100 -- -- -- --    04/05/23 08:38:41 -- nasal cannula 2 -- --    04/05/23 0828 98 -- -- -- --    04/05/23 0702 95 -- -- -- --          Lines, Drains & Airways     Active LDAs     Name Placement date Placement time Site Days    Peripheral IV 04/05/23 0851 Anterior;Left;Proximal Forearm 04/05/23  0851  Forearm  1                  Current Facility-Administered Medications   Medication Dose Route Frequency Provider Last Rate Last Admin   • acetaminophen (TYLENOL) tablet 650 mg  650 mg Oral TID Nuria Lynch MD   650 mg at 04/06/23 0918   • albumin human 25 % IV SOLN 25 g  25 g Intravenous PRN Gabe Butler MD       • amiodarone (PACERONE) tablet 200 mg  200 mg Oral Daily Nuria Lynch MD   200 mg at 04/06/23 0918   • apixaban (ELIQUIS) tablet 2.5 mg  2.5 mg Oral BID Nuria Lynch MD   2.5 mg at 04/06/23 0917   • aspirin EC tablet 81 mg  81 mg Oral Daily Nuria Lynch MD   81 mg at 04/06/23 0918   • atorvastatin (LIPITOR) tablet 80 mg  80 mg Oral Nightly Nuria Lynch MD   80 mg at 04/05/23 2129   • sennosides-docusate (PERICOLACE) 8.6-50 MG per tablet 2 tablet  2 tablet Oral BID Nuria Lynch MD   2 tablet at 04/06/23 0917    And   • bisacodyl (DULCOLAX) EC  tablet 5 mg  5 mg Oral Daily PRN Nuria Lynch MD        And   • bisacodyl (DULCOLAX) suppository 10 mg  10 mg Rectal Daily PRN Nuria Lynch MD       • capsaicin (ZOSTRIX) 0.075 % topical cream   Topical Q12H Nuria Lynch MD   Given at 04/06/23 0918   • carvedilol (COREG) tablet 3.125 mg  3.125 mg Oral Q12H Nuria Lynch MD   3.125 mg at 04/06/23 0918   • fentaNYL (DURAGESIC) 25 MCG/HR patch 1 patch  1 patch Transdermal Q72H Kayla Anaya MD        And   • Check Fentanyl Patch Placement  1 each Does not apply Q12H Kayla Anaya MD       • cloNIDine (CATAPRES) tablet 0.1 mg  0.1 mg Oral TID PRN Nuria Lynch MD       • HYDROmorphone (DILAUDID) injection 0.25 mg  0.25 mg Intravenous Q4H PRN Nuria Lynch MD   0.25 mg at 04/06/23 0918   • hydrOXYzine (ATARAX) tablet 25 mg  25 mg Oral TID PRN Nuria Lynch MD       • isosorbide mononitrate (IMDUR) 24 hr tablet 120 mg  120 mg Oral Daily Nuria Lynch MD   120 mg at 04/06/23 0918   • melatonin tablet 5 mg  5 mg Oral Nightly PRN Nuria Lynch MD       • mirtazapine (REMERON) tablet 15 mg  15 mg Oral Nightly Nuria Lynch MD   15 mg at 04/05/23 2129   • ondansetron (ZOFRAN) injection 4 mg  4 mg Intravenous Q6H PRN Nuria Lynch MD       • oxyCODONE (ROXICODONE) immediate release tablet 5 mg  5 mg Oral Q6H PRN Kayla Anaya MD       • sodium chloride 0.9 % flush 10 mL  10 mL Intravenous PRN Nuria Lynch MD   10 mL at 04/05/23 1712   • sodium chloride 0.9 % flush 10 mL  10 mL Intravenous Q12H Nuria Lynch MD   10 mL at 04/06/23 0919   • sodium chloride 0.9 % flush 10 mL  10 mL Intravenous PRN Nuria Lynch MD       • sodium chloride 0.9 % infusion 40 mL  40 mL Intravenous PRN Nuria Lynch MD           Lab Results (last 24 hours)     Procedure Component Value Units Date/Time    Basic Metabolic Panel [[id number]]  (Abnormal) Collected: 04/06/23 0442    Specimen: Blood Updated: 04/06/23 0546     Glucose 94 mg/dL      BUN 20 mg/dL      Creatinine  4.02 mg/dL      Sodium 136 mmol/L      Potassium 4.9 mmol/L      Comment: Slight hemolysis detected by analyzer. Results may be affected.        Chloride 97 mmol/L      CO2 25.0 mmol/L      Calcium 8.2 mg/dL      BUN/Creatinine Ratio 5.0     Anion Gap 14.0 mmol/L      eGFR 11.0 mL/min/1.73      Comment: <15 Indicative of kidney failure       Narrative:      GFR Normal >60  Chronic Kidney Disease <60  Kidney Failure <15    The GFR formula is only valid for adults with stable renal function between ages 18 and 70.    Magnesium [[id number]]  (Normal) Collected: 04/06/23 0442    Specimen: Blood Updated: 04/06/23 0546     Magnesium 2.0 mg/dL     Phosphorus [[id number]]  (Normal) Collected: 04/06/23 0442    Specimen: Blood Updated: 04/06/23 0546     Phosphorus 3.2 mg/dL     POC Glucose Once [[id number]]  (Normal) Collected: 04/05/23 1639    Specimen: Blood Updated: 04/05/23 1641     Glucose 106 mg/dL      Comment: Meter: [id number] : [id number] Kane Vergara           Imaging Results (Last 24 Hours)     Procedure Component Value Units Date/Time    SLP FEES - Fiberoptic Endo Eval Swallow [[id number]] Resulted: 04/06/23 1114     Updated: 04/06/23 1114    Narrative:      This procedure was auto-finalized with no dictation required.    CT Abdomen Pelvis Without Contrast [[id number]] Collected: 04/05/23 1900     Updated: 04/05/23 1938    Narrative:      CT ABDOMEN PELVIS WO CONTRAST    Date of Exam: 4/5/2023 6:26 PM EDT    Indication: intractable rectal/abdominal pain.    Comparison: 2/1/2023 abdomen pelvis CT scan    Technique: Axial CT images were obtained of the abdomen and pelvis without the administration of contrast. Reconstructed coronal and sagittal images were also obtained. Automated exposure control and iterative construction methods were used.    Findings:  Prior scan report noted abnormal soft tissue thickening involving the anus, a somewhat patient's known tumor plus/minus postsurgical changes. Enlarged, left  inguinal node presumably metastatic disease. History today indicates intractable rectal pain,   abdominal pain.    The included lower lungs appear clear except for what appears to be a small area of granulomatous scarring in the right middle lobe and minimal linear lung scarring elsewhere. No pleural effusion is seen. Gallbladder is distended, but similar to the   prior study, and with no visible inflammation or gallstones. Pancreas is relatively atrophic, not unusual for age. There is mild fatty liver change. Liver morphology appears grossly normal. Spleen is not enlarged. Adrenal glands appear normal. Kidneys   are relatively small. No obstructive uropathy is seen. No upper abdominal adenopathy, ascites or acute inflammatory change is seen. There appears to be a mild degree of fecal stasis. Small bowel loops are normal in caliber and appearance.    Regarding the lower abdomen pelvis, the terminal ileum and cecum appear normal. Appendix is not definitely identified, but no right lower quadrant inflammation is seen. Bladder is normally distended and normal in appearance. No intrapelvic free fluid is   appreciated. Uterus and ovaries appear to be surgically absent. Area of the vaginal cuff appears normal.     Patient's soft tissue mass previously seen extending from the dorsal margin to the upper gluteal fold is significantly reduced in size, although there appears to be a fistula from the lower anorectal region to near the skin surface along the upper   gluteal fold. This is also less prominent than on the prior exam. No new inflammatory change is seen. There is no evidence of a drainable abscess. Small amount of dense material in the residual mass could represent iodine or trace hemorrhage. As on the   prior study, it is uncertain if mass involves the mid vagina, or merely compresses it. Left inguinal adenopathy is improved, the largest node decreased from 31 mm to 22 mm. No new mass is seen. Bony structures appear  to be intact.         Impression:      Impression:    1. Persistent but decreased perianal mass compared to prior study, and improved left inguinal adenopathy. 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 No evidence of drainable fluid collection. Mild   fecal stasis noted.    2. No evidence of new inflammatory focus elsewhere.    3. No other evidence of new intra-abdominal or intrapelvic disease.    4.. Persistent distention of the gallbladder, as on prior exam, but again no evidence of inflammation or biliary ductal dilatation.      Electronically Signed: Champ Deed    2023 7:13 PM EDT    Workstation ID: [id number]             Consult Notes (last 24 hours)      Kayla Anaya MD at 23 0955      Consult Orders    1. Inpatient Palliative Care MD Consult [[id number]] ordered by Nuria Lynch MD at 23 1149               Palliative Care Initial Consult Note    Patient Name:  Esperanza Pop   : 1945   Sex: female    Patient Care Team:  Meghana oRdgers MD as PCP - General  TanikaDemetrius rg MD as Consulting Physician (Cardiology)  Kevan Lerma MD as Consulting Physician (Nephrology)  Geena Estrella APRN as Nurse Practitioner (Cardiology)  Frank Mandujano MD as Referring Physician (Colon and Rectal Surgery)  Kevan Cavazos MD as Consulting Physician (Radiation Oncology)  Yue Reeves, [place] as Nurse Navigator  Darryn Burk MD as Consulting Physician (Nephrology)    Advance care planning discussed:with pt and dtr in room  Code Status: No CPR  Advance Directive: none  Surrogate decision maker:NOK dtr    Referring Provider:Ranulfo Moreno  Reason for Consult:    Subjective     Patient is a 78 y.o. female presented to hospital with uncontrolled pain.  Reported as shaking due to pain every few minutes.  Reported with significant radiation burns to buttocks from XRT previously.  Family able to convey no therapy for CA offered  at this time.  Pain is much improved from prior to hospitalization.  Calculated MME is 35.        Review of Systems  Review of Systems   Constitutional: Negative for appetite change, fatigue and unexpected weight change.   HENT: Negative.    Eyes: Negative.    Respiratory: Negative.    Cardiovascular: Negative.    Gastrointestinal: Positive for rectal pain. Negative for constipation.        C/O of pain 12/10 preior to hospital with pain in abd, rectal region, abdomen.  Has at times had bleeding from rectom but not currently   Genitourinary:        On HD   Musculoskeletal:        C/O pain BLE below knees described as sudden and severe, no tingling or numbness, no electric sensation.  Has had no edema   Skin: Positive for wound.        Reports improved buttock wounds   Neurological: Positive for weakness. Negative for dizziness, seizures, syncope, facial asymmetry, speech difficulty, light-headedness and numbness.       History  Past Medical History:   Diagnosis Date   • Acute bronchitis    • Acute conjunctivitis    • Acute kidney injury     Admission from 12/26/2013 to 01/02/2014, now resolved with latest creatinine 1.4 on 01/08/2014.   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   • Anal cancer 2/8/2023   • Arthritis    • Breast cancer, female, right     2005 mastectomy right   • Cancer    • CHF (congestive heart failure)    • Chronic kidney disease     dialysis MWF, f/u [facility]    • Clotting disorder    • COVID-19    • Diabetes mellitus     diagnosed ~1992, checks fsbg 2-3x/day   • Diarrhea    • Dyslipidemia    • Dyspepsia    • Dyspnea    • Edema    • History of transfusion     ~2013 no reaction    • Hx of radiation therapy    • Hyperlipidemia    • Hypertension     Severe   • Ischemic heart disease    • Moderate obesity     BMI 36.2   • Myocardial infarction    • Pneumonia    • Pneumonia 02/2019   • Radiation 2005    • Seasonal allergies    • Wears glasses      Past Surgical History:   Procedure Laterality Date   • AV FISTULA PLACEMENT, BRACHIOBASILIC     • BREAST BIOPSY Left 2010   • BREAST CYST EXCISION     • BREAST LUMPECTOMY Right 2005   • BREAST SURGERY     • CARDIAC CATHETERIZATION N/A 10/10/2016    Procedure: Left Heart Cath;  Surgeon: Scooter Zhao MD;  Location:  [facility];  Service:    • CARDIAC CATHETERIZATION  10/2016   • CARDIAC CATHETERIZATION N/A 1/21/2021    Procedure: Right and Left Heart Cath;  Surgeon: Hugh Tidwell MD;  Location:  [facility];  Service: Cardiology;  Laterality: N/A;   • COLONOSCOPY N/A 7/23/2020    Procedure: COLONOSCOPY;  Surgeon: Rubén Rosas MD;  Location:  [facility];  Service: Gastroenterology;  Laterality: N/A;   • CORONARY STENT PLACEMENT  2016   • HERNIA REPAIR     • HYSTERECTOMY  2000   • INSERTION HEMODIALYSIS CATHETER Right 6/29/2016    Procedure: HEMODIALYSIS CATHETER INSERTION TUNNELLED;  Surgeon: Ramón Chavez MD;  Location:  [facility];  Service:    • MASTECTOMY Right 2006   • OOPHORECTOMY     • REDUCTION MAMMAPLASTY Left 2005     Current Facility-Administered Medications   Medication Dose Route Frequency Provider Last Rate Last Admin   • acetaminophen (TYLENOL) tablet 650 mg  650 mg Oral TID Nuria Lynch MD   650 mg at 04/06/23 0918   • albumin human 25 % IV SOLN 25 g  25 g Intravenous PRN Gabe Butler MD       • amiodarone (PACERONE) tablet 200 mg  200 mg Oral Daily Nuria Lynch MD   200 mg at 04/06/23 0918   • apixaban (ELIQUIS) tablet 2.5 mg  2.5 mg Oral BID Nuria Lynch MD   2.5 mg at 04/06/23 0917   • aspirin EC tablet 81 mg  81 mg Oral Daily Nuria Lynch MD   81 mg at 04/06/23 0918   • atorvastatin (LIPITOR) tablet 80 mg  80 mg Oral Nightly Nuria Lynch MD   80 mg at 04/05/23 2129   • sennosides-docusate (PERICOLACE) 8.6-50 MG per tablet 2 tablet  2 tablet Oral BID Nuria Lynch MD   2 tablet  at 04/06/23 0917    And   • bisacodyl (DULCOLAX) EC tablet 5 mg  5 mg Oral Daily PRN Nuria Lynch MD        And   • bisacodyl (DULCOLAX) suppository 10 mg  10 mg Rectal Daily PRN Nuria Lynch MD       • capsaicin (ZOSTRIX) 0.075 % topical cream   Topical Q12H Nuria Lynch MD   Given at 04/06/23 0918   • carvedilol (COREG) tablet 3.125 mg  3.125 mg Oral Q12H Nuria Lynch MD   3.125 mg at 04/06/23 0918   • cloNIDine (CATAPRES) tablet 0.1 mg  0.1 mg Oral TID PRN Nuria Lynch MD       • HYDROcodone-acetaminophen (NORCO) 5-325 MG per tablet 1 tablet  1 tablet Oral Q4H PRN Nuria Lynch MD   1 tablet at 04/05/23 2128   • HYDROmorphone (DILAUDID) injection 0.25 mg  0.25 mg Intravenous Q4H PRN Nuria Lynch MD   0.25 mg at 04/06/23 0918   • hydrOXYzine (ATARAX) tablet 25 mg  25 mg Oral TID PRN Nuria Lynch MD       • isosorbide mononitrate (IMDUR) 24 hr tablet 120 mg  120 mg Oral Daily Nuria Lynch MD   120 mg at 04/06/23 0918   • melatonin tablet 5 mg  5 mg Oral Nightly PRN Nuria Lynch MD       • mirtazapine (REMERON) tablet 15 mg  15 mg Oral Nightly Nuria Lynch MD   15 mg at 04/05/23 2129   • ondansetron (ZOFRAN) injection 4 mg  4 mg Intravenous Q6H PRN Nuria Lynch MD       • sodium chloride 0.9 % flush 10 mL  10 mL Intravenous PRN Nuria Lynch MD   10 mL at 04/05/23 1712   • sodium chloride 0.9 % flush 10 mL  10 mL Intravenous Q12H Nuria Lynch MD   10 mL at 04/06/23 0919   • sodium chloride 0.9 % flush 10 mL  10 mL Intravenous PRN Nuria Lynch MD       • sodium chloride 0.9 % infusion 40 mL  40 mL Intravenous PRN Nuria Lynch MD            •  albumin human  •  senna-docusate sodium **AND** bisacodyl **AND** bisacodyl  •  cloNIDine  •  HYDROcodone-acetaminophen  •  HYDROmorphone  •  hydrOXYzine  •  melatonin  •  ondansetron  •  [COMPLETED] Insert Peripheral IV **AND** sodium chloride  •  sodium chloride  •  sodium chloride  Allergies   Allergen Reactions   • Mircera [Methoxy Polyethylene Glycol-Epoetin  Beta] Anaphylaxis     Pt stopped breathing   • Venofer [Iron Sucrose] Anaphylaxis     Pt stopped breathing   • Albuterol Other (See Comments)     Increased blood pressure  Used right before heart attack   • Nifedipine Er Swelling   • Penicillins Hives   • Prednisone Other (See Comments)     Makes jittery/anxious     Family History   Problem Relation Age of Onset   • Stroke Mother    • Cancer Mother    • Pancreatic cancer Mother    • Coronary artery disease Father    • Heart failure Father    • Breast cancer Sister 55   • Throat cancer Daughter    • Colon cancer Maternal Grandmother    • Ovarian cancer Neg Hx    • Endometrial cancer Neg Hx      Social History     Socioeconomic History   • Marital status:    Tobacco Use   • Smoking status: Never   • Smokeless tobacco: Never   • Tobacco comments:     Michael second hand smoke   Vaping Use   • Vaping Use: Never used   Substance and Sexual Activity   • Alcohol use: Not Currently   • Drug use: No   • Sexual activity: Defer       Objective     Vital Signs  Temp:  [97 °F (36.1 °C)-98.4 °F (36.9 °C)] 98.4 °F (36.9 °C)  Heart Rate:  [62-80] 64  Resp:  [14-18] 16  BP: ()/(45-84) 141/59    PPS:Palliative Performance Scale score as of 4/6/2023, 10:58 EDT is 30% based on the following measures:   Ambulation: Totally bed bound  Activity and Evidence of Disease: Unable to do any work, extensive evidence of disease  Self-Care: Total care  Intake: Reduced   LOC: Full, drowsy or confusion      Physical Exam:  Physical Exam  Constitutional:       General: She is not in acute distress.     Appearance: She is ill-appearing. She is not toxic-appearing or diaphoretic.   HENT:      Head: Normocephalic and atraumatic.      Nose: Nose normal.      Mouth/Throat:      Mouth: Mucous membranes are moist.      Pharynx: Oropharynx is clear. No oropharyngeal exudate or posterior oropharyngeal erythema.   Eyes:      General: No scleral icterus.     Extraocular Movements: Extraocular  movements intact.      Conjunctiva/sclera: Conjunctivae normal.      Pupils: Pupils are equal, round, and reactive to light.   Cardiovascular:      Rate and Rhythm: Normal rate and regular rhythm.      Pulses: Normal pulses.      Heart sounds: Murmur heard.     No friction rub. No gallop.   Pulmonary:      Effort: Pulmonary effort is normal. No respiratory distress.      Breath sounds: Normal breath sounds. No wheezing, rhonchi or rales.   Abdominal:      General: Bowel sounds are normal. There is no distension.      Palpations: Abdomen is soft.      Tenderness: There is no abdominal tenderness.   Musculoskeletal:      Cervical back: Normal range of motion.      Comments: No acute joint erythema or effusion   Skin:     General: Skin is warm and dry.   Neurological:      Mental Status: She is alert and oriented to person, place, and time.      Motor: Weakness present.      Comments: Gait unable to be assessed, weakness nonfocal   Psychiatric:         Mood and Affect: Mood normal.         Behavior: Behavior normal.         Results Review:   Lab Results   Component Value Date    HGBA1C 5.8 02/07/2023       Lab Results   Component Value Date    GLUCOSE 94 04/06/2023    BUN 20 04/06/2023    CREATININE 4.02 (H) 04/06/2023    EGFRIFNONA  07/21/2021      Comment:      <15 Indicative of kidney failure.    EGFRIFAFRI 14 (L) 07/21/2021    BCR 5.0 (L) 04/06/2023    K 4.9 04/06/2023    CO2 25.0 04/06/2023    CALCIUM 8.2 (L) 04/06/2023    PROTENTOTREF 7.5 09/22/2020    ALBUMIN 2.9 (L) 04/05/2023    LABIL2 1.1 09/22/2020    AST 37 (H) 04/05/2023    ALT 12 04/05/2023       WBC   Date Value Ref Range Status   04/05/2023 9.16 3.40 - 10.80 10*3/mm3 Final     RBC   Date Value Ref Range Status   04/05/2023 3.04 (L) 3.77 - 5.28 10*6/mm3 Final     Hemoglobin   Date Value Ref Range Status   04/05/2023 9.0 (L) 12.0 - 15.9 g/dL Final     Hematocrit   Date Value Ref Range Status   04/05/2023 30.9 (L) 34.0 - 46.6 % Final     MCV   Date Value  Ref Range Status   04/05/2023 101.6 (H) 79.0 - 97.0 fL Final     MCH   Date Value Ref Range Status   04/05/2023 29.6 26.6 - 33.0 pg Final     MCHC   Date Value Ref Range Status   04/05/2023 29.1 (L) 31.5 - 35.7 g/dL Final     RDW   Date Value Ref Range Status   04/05/2023 18.2 (H) 12.3 - 15.4 % Final     RDW-SD   Date Value Ref Range Status   04/05/2023 67.1 (H) 37.0 - 54.0 fl Final     MPV   Date Value Ref Range Status   04/05/2023 10.8 6.0 - 12.0 fL Final     Platelets   Date Value Ref Range Status   04/05/2023 203 140 - 450 10*3/mm3 Final     Neutrophil %   Date Value Ref Range Status   04/05/2023 66.4 42.7 - 76.0 % Final     Lymphocyte %   Date Value Ref Range Status   04/05/2023 12.0 (L) 19.6 - 45.3 % Final     Monocyte %   Date Value Ref Range Status   04/05/2023 13.9 (H) 5.0 - 12.0 % Final     Eosinophil %   Date Value Ref Range Status   04/05/2023 6.8 (H) 0.3 - 6.2 % Final     Basophil %   Date Value Ref Range Status   04/05/2023 0.4 0.0 - 1.5 % Final     Immature Grans %   Date Value Ref Range Status   04/05/2023 0.5 0.0 - 0.5 % Final     Neutrophils, Absolute   Date Value Ref Range Status   04/05/2023 6.08 1.70 - 7.00 10*3/mm3 Final     Lymphocytes, Absolute   Date Value Ref Range Status   04/05/2023 1.10 0.70 - 3.10 10*3/mm3 Final     Monocytes, Absolute   Date Value Ref Range Status   04/05/2023 1.27 (H) 0.10 - 0.90 10*3/mm3 Final     Eosinophils, Absolute   Date Value Ref Range Status   04/05/2023 0.62 (H) 0.00 - 0.40 10*3/mm3 Final     Basophils, Absolute   Date Value Ref Range Status   04/05/2023 0.04 0.00 - 0.20 10*3/mm3 Final     Immature Grans, Absolute   Date Value Ref Range Status   04/05/2023 0.05 0.00 - 0.05 10*3/mm3 Final     nRBC   Date Value Ref Range Status   04/05/2023 0.0 0.0 - 0.2 /100 WBC Final       Impression:  Recta; CA  ESRD  Valvular heart dz  CAD  DM - A1c 5.8    Symptoms:  Rectal/Cancer pain  Debility    Recommendations:  Pt feels that she has adequate quality of life when pain  "is controlled. Does not want to stop HD at this time.  Discussed with pt and dtr that without treatment that her cancer will progress and there would not be a \"cure.\"  Discussed there would likely come a time when HD would no longer prolong life but rather prolong death and they needed to have a discussion about what the patient wants, about what things would make her not want HD.  Based on MME required for pain control, Initiated fentanyl 25 mcg patch with oxycodone for breakthrough pain.    Could consider gabapentin 100 mg bid if LE pain below knees not improving due to potential for neuropathic component. Team will continue to follow.      Total Visit Time:58 min  Face to Face Time:      Kayla Anaya MD  Hospice and Palliative Care Physician  [facility]  04/06/23  09:55 EDT    Electronically signed by Kayla Anaya MD at 04/06/23 1115     Gabe Butler MD at 04/05/23 9223            Patient Care Team:  Meghana Rodgers MD as PCP - General  Demetrius Sagastume MD as Consulting Physician (Cardiology)  Kevan Lerma MD as Consulting Physician (Nephrology)  Geena Estrella APRN as Nurse Practitioner (Cardiology)  Frank Mandujano MD as Referring Physician (Colon and Rectal Surgery)  Kevan Cavazos MD as Consulting Physician (Radiation Oncology)  Yue Reeves RN as Nurse Navigator  Darryn Burk MD as Consulting Physician (Nephrology)    Chief complaint: ESRD    Abdominal pain    History of Present Illness: Ms Pop is a 79 yo lady with hx of ESRD on MWF [facility], She is presenting with severe abdominal pain. She has hx of chronic pain in the setting of rectal cancer. Patient missed her dialysis on Monday as she wasn't feeling well. She has poor po intake and c/o severe cramping abdominal pain. Labs reviewed on this admission. She is seen in dialysis clinic tolerating treatment well. UF only 1.5 liter. 3K bath. Tolerating well so far.      Review of Systems "   Constitutional: Negative.    HENT: Negative.    Respiratory: Negative.    Cardiovascular: Negative.    Gastrointestinal: Negative.    Genitourinary: Negative.    Musculoskeletal: Negative.    Skin: Negative.    Neurological: Negative.    Hematological: Negative.    Psychiatric/Behavioral: Negative.         Past Medical History:   Diagnosis Date   • Acute bronchitis    • Acute conjunctivitis    • Acute kidney injury     Admission from 12/26/2013 to 01/02/2014, now resolved with latest creatinine 1.4 on 01/08/2014.   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   • Anal cancer 2/8/2023   • Arthritis    • Breast cancer, female, right     2005 mastectomy right   • Cancer    • CHF (congestive heart failure)    • Chronic kidney disease     dialysis [facility], [facility]    • Clotting disorder    • COVID-19    • Diabetes mellitus     diagnosed ~1992, checks fsbg 2-3x/day   • Diarrhea    • Dyslipidemia    • Dyspepsia    • Dyspnea    • Edema    • History of transfusion     ~2013 no reaction    • Hx of radiation therapy    • Hyperlipidemia    • Hypertension     Severe   • Ischemic heart disease    • Moderate obesity     BMI 36.2   • Myocardial infarction    • Pneumonia    • Pneumonia 02/2019   • Radiation 2005   • Seasonal allergies    • Wears glasses    ,   Past Surgical History:   Procedure Laterality Date   • AV FISTULA PLACEMENT, BRACHIOBASILIC     • BREAST BIOPSY Left 2010   • BREAST CYST EXCISION     • BREAST LUMPECTOMY Right 2005   • BREAST SURGERY     • CARDIAC CATHETERIZATION N/A 10/10/2016    Procedure: Left Heart Cath;  Surgeon: Scooter Zhao MD;  Location:  [facility];  Service:    • CARDIAC CATHETERIZATION  10/2016   • CARDIAC CATHETERIZATION N/A 1/21/2021    Procedure: Right and Left Heart Cath;  Surgeon: Hugh Tidwell MD;  Location:  [facility];  Service: Cardiology;   Laterality: N/A;   • COLONOSCOPY N/A 7/23/2020    Procedure: COLONOSCOPY;  Surgeon: Rubén Rosas MD;  Location:  [facility];  Service: Gastroenterology;  Laterality: N/A;   • CORONARY STENT PLACEMENT  2016   • HERNIA REPAIR     • HYSTERECTOMY  2000   • INSERTION HEMODIALYSIS CATHETER Right 6/29/2016    Procedure: HEMODIALYSIS CATHETER INSERTION TUNNELLED;  Surgeon: Ramón Chavez MD;  Location:  [facility];  Service:    • MASTECTOMY Right 2006   • OOPHORECTOMY     • REDUCTION MAMMAPLASTY Left 2005   ,   Family History   Problem Relation Age of Onset   • Stroke Mother    • Cancer Mother    • Pancreatic cancer Mother    • Coronary artery disease Father    • Heart failure Father    • Breast cancer Sister 55   • Throat cancer Daughter    • Colon cancer Maternal Grandmother    • Ovarian cancer Neg Hx    • Endometrial cancer Neg Hx    ,   Social History     Socioeconomic History   • Marital status:    Tobacco Use   • Smoking status: Never   • Smokeless tobacco: Never   • Tobacco comments:     Michael second hand smoke   Vaping Use   • Vaping Use: Never used   Substance and Sexual Activity   • Alcohol use: Not Currently   • Drug use: No   • Sexual activity: Defer     E-cigarette/Vaping   • E-cigarette/Vaping Use Never User      E-cigarette/Vaping Substances   • Nicotine No    • THC No    • CBD No    • Flavoring No      E-cigarette/Vaping Devices   • Disposable No    • Pre-filled or Refillable Cartridge No    • Refillable Tank No    • Pre-filled Pod No        , (Not in a hospital admission)   and Scheduled Meds:  acetaminophen, 650 mg, Oral, TID  amiodarone, 200 mg, Oral, Daily  apixaban, 2.5 mg, Oral, BID  aspirin, 81 mg, Oral, Daily  atorvastatin, 80 mg, Oral, Nightly  capsaicin, , Topical, Q12H  carvedilol, 3.125 mg, Oral, Q12H  [START ON 4/6/2023] isosorbide mononitrate, 120 mg, Oral, Daily  mirtazapine, 15 mg, Oral, Nightly  senna-docusate sodium, 2 tablet, Oral, BID  sodium chloride, 10 mL,  Intravenous, Q12H        Objective     Vital Signs  Temp:  [97 °F (36.1 °C)-99.5 °F (37.5 °C)] 97.4 °F (36.3 °C)  Heart Rate:  [64-89] 77  Resp:  [16-18] 16  BP: ()/() 186/71    No intake/output data recorded.  No intake/output data recorded.    Physical Exam  Constitutional:       General: She is not in acute distress.     Appearance: Normal appearance. She is not ill-appearing.   HENT:      Head: Normocephalic and atraumatic.      Nose: Nose normal.      Mouth/Throat:      Mouth: Mucous membranes are moist.   Eyes:      Extraocular Movements: Extraocular movements intact.      Pupils: Pupils are equal, round, and reactive to light.   Cardiovascular:      Rate and Rhythm: Normal rate and regular rhythm.   Pulmonary:      Effort: Pulmonary effort is normal. No respiratory distress.      Breath sounds: Normal breath sounds. No stridor.   Abdominal:      General: Abdomen is flat.   Musculoskeletal:         General: Normal range of motion.      Cervical back: Normal range of motion.      Right lower leg: No edema.      Left lower leg: No edema.   Skin:     General: Skin is warm.   Neurological:      General: No focal deficit present.      Mental Status: She is alert and oriented to person, place, and time.   Psychiatric:         Mood and Affect: Mood normal.         Behavior: Behavior normal.         Results Review:    I reviewed the patient's new clinical results.    WBC WBC   Date Value Ref Range Status   04/05/2023 9.16 3.40 - 10.80 10*3/mm3 Final      HGB Hemoglobin   Date Value Ref Range Status   04/05/2023 9.0 (L) 12.0 - 15.9 g/dL Final      HCT Hematocrit   Date Value Ref Range Status   04/05/2023 30.9 (L) 34.0 - 46.6 % Final      Platlets No results found for: LABPLAT   MCV MCV   Date Value Ref Range Status   04/05/2023 101.6 (H) 79.0 - 97.0 fL Final          Sodium Sodium   Date Value Ref Range Status   04/05/2023 135 (L) 136 - 145 mmol/L Final      Potassium Potassium   Date Value Ref Range Status    04/05/2023 4.3 3.5 - 5.2 mmol/L Final      Chloride Chloride   Date Value Ref Range Status   04/05/2023 91 (L) 98 - 107 mmol/L Final      CO2 CO2   Date Value Ref Range Status   04/05/2023 29.0 22.0 - 29.0 mmol/L Final      BUN BUN   Date Value Ref Range Status   04/05/2023 39 (H) 8 - 23 mg/dL Final      Creatinine Creatinine   Date Value Ref Range Status   04/05/2023 6.93 (H) 0.57 - 1.00 mg/dL Final      Calcium Calcium   Date Value Ref Range Status   04/05/2023 9.0 8.6 - 10.5 mg/dL Final      PO4 No results found for: CAPO4   Albumin Albumin   Date Value Ref Range Status   04/05/2023 2.9 (L) 3.5 - 5.2 g/dL Final      Magnesium Magnesium   Date Value Ref Range Status   04/05/2023 2.3 1.6 - 2.4 mg/dL Final      Uric Acid No results found for: URICACID         Assessment & Plan       Intractable pain      Assessment & Plan     ESRD: MWF [facility]    Volume status: No significant anemia    Abdominal pain: In the setting of rectal cancer.    Failure to thrive    Hypoalbuminemia     Anemia: ACD due to CKD.    I discussed the patients findings and my recommendations with patient    Gabe Butler MD  04/05/23  16:53 EDT              Electronically signed by Gabe Butler MD at 04/05/23 2469

## 2023-04-06 NOTE — CASE MANAGEMENT/SOCIAL WORK
Discharge Planning Assessment  [facility]     Patient Name: Esperanza Pop  MRN: [id number]  Today's Date: 4/6/2023    Admit Date: 4/5/2023    Plan: home   Discharge Needs Assessment     Row Name 04/06/23 0842       Living Environment    People in Home child(evelyn), adult    Current Living Arrangements home    Primary Care Provided by child(evelyn)       Transition Planning    Patient/Family Anticipates Transition to home with family       Discharge Needs Assessment    Readmission Within the Last 30 Days previous discharge plan unsuccessful    Equipment Currently Used at Home walker, rolling;cane, straight;shower chair    Concerns to be Addressed discharge planning;basic needs               Discharge Plan     Row Name 04/06/23 0843       Plan    Plan home    Patient/Family in Agreement with Plan yes    Plan Comments I met with the patient and her daughter at the bedside. She lives with her adult children in [facility]. She was independent with activities of daily living and mobility prior to 3/10/23, but is now needing assistance with both. PT and OT have been consulted. DME include a rolling walker, straight cane and shower bench. She does go to  on Mon, Wed and Fridays at [facility]. She anticipates returning home after this hospitalization, but the daughter states that they want to possibly set up home health services. Her family will transport. Case management will continue to follow her plan of care and assist with any discharge planning needs.    Final Discharge Disposition Code 01 - home or self-care              Continued Care and Services - Admitted Since 4/5/2023    Coordination has not been started for this encounter.     Selected Continued Care - Episodes Includes continued care and service providers with selected services from the active episodes listed below    Oncology Episode start date: 2/9/2023   There are no active outsourced providers for this episode.             Selected Continued Care - Prior  Encounters Includes continued care and service providers with selected services from prior encounters from 1/5/2023 to 4/6/2023    Discharged on 3/29/2023 Admission date: 3/10/2023 - Discharge disposition: Skilled Nursing Facility (DC - External)    Destination     Service Provider Selected Services Address Phone Fax Patient Preferred    [facility] [address] [address] [facility] [address] [phone] --       Internal Comment last updated by Binta Lane, MSW 3/27/2023 1557    Pre-cert started today per Adia.  HepB panel in Epic is current.                     Durable Medical Equipment     Service Provider Selected Services Address Phone Fax Patient Preferred    [facility] [address], [place] [id number] [phone] --          Dialysis/Infusion     Service Provider Selected Services Address Phone Fax Patient Preferred    [facility] [address]  [address], [place] [id number] [phone] --                    Expected Discharge Date and Time     Expected Discharge Date Expected Discharge Time    Apr 10, 2023          Demographic Summary     Row Name 04/06/23 0841       General Information    General Information Comments I confirmed that Meghana Warren is Ms Pop' PCP and she has [employer] [place] Medicare for insurance               Functional Status     Row Name 04/06/23 0842       Functional Status, IADL    Medications completely dependent    Meal Preparation completely dependent    Housekeeping completely dependent    Laundry completely dependent    Shopping completely dependent    IADL Comments She was able to be do these things independently prior to March 10, 2023               Psychosocial    No documentation.                Abuse/Neglect    No documentation.                Legal    No documentation.                Substance Abuse     No documentation.                Patient Forms    No documentation.                   Patria Ingram RN

## 2023-04-06 NOTE — CONSULTS
Clinical Nutrition     Nutrition Support Assessment  Reason for Visit: Consult, Nonhealing wound or pressure ulcer, Reduced oral intake      Patient Name: Esperanza Pop  YOB: 1945  MRN: [id number]  Date of Encounter: 04/06/23 14:33 EDT  Admission date: 4/5/2023    Comments:    Pt likely meets criteria for malnutrition though unable to complete NFPE at time of visit. Will complete as able. Pending verified wt from nsg.    Recommend daily MVI, 500mg Vit C and 220mg Zinc sulfate daily    ONS in place BID    Nutrition Assessment   Admission Diagnosis:  Intractable pain [R52]      Problem List:    Intractable pain        PMH:   She  has a past medical history of Acute bronchitis, Acute conjunctivitis, Acute kidney injury, Acute non-ST segment elevation myocardial infarction (STEMI) following previous myocardial infarction, Anal cancer (2/8/2023), Anal cancer (2/8/2023), Arthritis, Breast cancer, female, right, Cancer, CHF (congestive heart failure), Chronic kidney disease, Clotting disorder, COVID-19, Diabetes mellitus, Diarrhea, Dyslipidemia, Dyspepsia, Dyspnea, Edema, History of transfusion, radiation therapy, Hyperlipidemia, Hypertension, Ischemic heart disease, Moderate obesity, Myocardial infarction, Pneumonia, Pneumonia (02/2019), Radiation (2005), Seasonal allergies, and Wears glasses.    PSH:  She  has a past surgical history that includes Hernia repair; AV fistula placement, brachiobasilic; Hemodialysis Catheter (Right, 6/29/2016); Hysterectomy (2000); Oophorectomy; Breast surgery; Breast cyst excision; Reduction mammaplasty (Left, 2005); Breast biopsy (Left, 2010); Breast lumpectomy (Right, 2005); Mastectomy (Right, 2006); Cardiac catheterization (N/A, 10/10/2016); Cardiac catheterization (10/2016); Coronary stent placement (2016); Colonoscopy (N/A, 7/23/2020); and Cardiac catheterization (N/A, 1/21/2021).      Applicable Nutrition Concerns:   Skin:  Oral:  GI:      Applicable  "Interval History:         Reported/Observed/Food/Nutrition Related History:     Pt highly agitated during RD visit-not interested in answering nutrition-related questions. Dtr at bedside states pt diet advanced this afternoon and pt ate some of her tray. Dtr states pt has been 'doing fine'. RD will provide ONS BID and monitor for pt acceptance.       Labs    Labs Reviewed: Yes     Results from last 7 days   Lab Units 04/06/23  0442 04/05/23  0851   GLUCOSE mg/dL 94 169*   BUN mg/dL 20 39*   CREATININE mg/dL 4.02* 6.93*   SODIUM mmol/L 136 135*   CHLORIDE mmol/L 97* 91*   POTASSIUM mmol/L 4.9 4.3   PHOSPHORUS mg/dL 3.2 3.2   MAGNESIUM mg/dL 2.0 2.3   ALT (SGPT) U/L  --  12       Results from last 7 days   Lab Units 04/05/23  0851   ALBUMIN g/dL 2.9*       Results from last 7 days   Lab Units 04/06/23  1320 04/05/23  1639   GLUCOSE mg/dL 172* 106     Lab Results   Lab Value Date/Time    HGBA1C 5.8 02/07/2023 1346    HGBA1C 6.3 06/14/2022 1101    HGBA1C 8.60 (H) 04/19/2019 0401    HGBA1C 7.60 (H) 10/11/2016 0518               Medications    Medications Reviewed: Yes  Pertinent: amio, insulin, remeron, pericolace  Infusion:  PRN: pain      Intake/Ouptut 24 hrs (0701 - 0700)   I&O's Reviewed: Yes   +BM 4/5    Anthropometrics     Flowsheet Rows    Flowsheet Row First Filed Value   Admission Height 167.6 cm (66\") Documented at 04/05/2023 0702   Admission Weight 78.9 kg (174 lb) Documented at 04/05/2023 0702            Height: Height: 167.6 cm (66\")  Last Filed Weight: Weight: 78.9 kg (174 lb) (04/05/23 0702)  Method: Weight Method: Stated  BMI: BMI (Calculated): 28.1  BMI classification: Overweight: 25.0-29.9kg/m2     UBW: ~170-175lbs per EMR  Weight change: NICK-requested measured wt from [facility]    Nutrition Focused Physical Exam     Date: 4/6    Unable to perform exam due to: Potential for patient discomfort, Pt unable to participate at time of visit, pt agitated, will complete NFPE as able.     Of note, pt met criteria for " severe malnutrition on 3/11/23.     Current Nutrition Prescription     PO: Diet: Renal Diets; Low Sodium (2-3g), Low Potassium, Low Phosphorus; Texture: Regular Texture (IDDSI 7); Fluid Consistency: Thin (IDDSI 0)  Oral Nutrition Supplement:   Intake: 1 Day:25% x 2 meals      Nutrition Diagnosis   Date: 4/6 Updated:   Problem Increased nutrient needs   Etiology Skin integrity   Signs/Symptoms gluteal wound, protein needs ~94g/day (1.2g/kg using 78kg)   Status:     Goal:   General: Nutrition to support treatment  PO: Increase intake  EN/PN: N/A    Nutrition Intervention      Follow treatment progress, Care plan reviewed, Encourage intake, Supplement provided    Novasource renal BID    Recommend daily MVI, 500mg Vit C and 220mg Zinc sulfate daily    Monitoring/Evaluation:   Per protocol, PO intake, Supplement intake, Weight, Skin status      Crystal Cano RD  Time Spent: 25

## 2023-04-07 ENCOUNTER — APPOINTMENT (OUTPATIENT)
Dept: NEPHROLOGY | Facility: HOSPITAL | Age: 78
DRG: 935 | End: 2023-04-07
Payer: MEDICARE

## 2023-04-07 LAB
GLUCOSE BLDC GLUCOMTR-MCNC: 130 MG/DL (ref 70–130)
GLUCOSE BLDC GLUCOMTR-MCNC: 98 MG/DL (ref 70–130)

## 2023-04-07 PROCEDURE — 25010000002 HYDROMORPHONE PER 4 MG: Performed by: STUDENT IN AN ORGANIZED HEALTH CARE EDUCATION/TRAINING PROGRAM

## 2023-04-07 PROCEDURE — 99231 SBSQ HOSP IP/OBS SF/LOW 25: CPT | Performed by: INTERNAL MEDICINE

## 2023-04-07 PROCEDURE — 82962 GLUCOSE BLOOD TEST: CPT

## 2023-04-07 PROCEDURE — 25010000002 DIAZEPAM PER 5 MG: Performed by: INTERNAL MEDICINE

## 2023-04-07 RX ORDER — DIAZEPAM 5 MG/ML
2.5 INJECTION, SOLUTION INTRAMUSCULAR; INTRAVENOUS EVERY 4 HOURS PRN
Status: DISPENSED | OUTPATIENT
Start: 2023-04-07 | End: 2023-04-14

## 2023-04-07 RX ADMIN — DIAZEPAM 2.5 MG: 5 INJECTION, SOLUTION INTRAMUSCULAR; INTRAVENOUS at 19:39

## 2023-04-07 RX ADMIN — ACETAMINOPHEN 325MG 650 MG: 325 TABLET ORAL at 16:37

## 2023-04-07 RX ADMIN — CARVEDILOL 3.12 MG: 6.25 TABLET, FILM COATED ORAL at 19:49

## 2023-04-07 RX ADMIN — ATORVASTATIN CALCIUM 80 MG: 40 TABLET, FILM COATED ORAL at 19:48

## 2023-04-07 RX ADMIN — ACETAMINOPHEN 325MG 650 MG: 325 TABLET ORAL at 19:49

## 2023-04-07 RX ADMIN — HYDROMORPHONE HYDROCHLORIDE 0.25 MG: 1 INJECTION, SOLUTION INTRAMUSCULAR; INTRAVENOUS; SUBCUTANEOUS at 16:37

## 2023-04-07 RX ADMIN — Medication 10 ML: at 19:50

## 2023-04-07 RX ADMIN — APIXABAN 2.5 MG: 2.5 TABLET, FILM COATED ORAL at 19:49

## 2023-04-07 RX ADMIN — MIRTAZAPINE 15 MG: 15 TABLET, FILM COATED ORAL at 19:48

## 2023-04-07 NOTE — PLAN OF CARE
Goal Outcome Evaluation:  Plan of Care Reviewed With: patient        Progress: improving       Problem: Fall Injury Risk  Goal: Absence of Fall and Fall-Related Injury  Intervention: Promote Injury-Free Environment  Recent Flowsheet Documentation  Taken 4/7/2023 0000 by Kaitlin Moyer RN  Safety Promotion/Fall Prevention: activity supervised  Taken 4/6/2023 2000 by Kaitlin Moyer RN  Safety Promotion/Fall Prevention: activity supervised     Problem: Pain Acute  Goal: Acceptable Pain Control and Functional Ability  Intervention: Optimize Psychosocial Wellbeing  Recent Flowsheet Documentation  Taken 4/6/2023 2000 by Kaitlin Moyer RN  Diversional Activities: television

## 2023-04-07 NOTE — PROGRESS NOTES
"Palliative Care Progress Note    Date of Admission: 4/5/2023    Subjective:   Seen with daughter Indigo present.  Tired, feeling dry since hemodialysis today.  Sat in the chair much of the afternoon.  Pain overall improved from arrival but still bothersome.  Pain mainly from her bottom and tailbone.    Recently passed stool. Bottom irritated.    No appetite.    Meds reviewed:  PRN dilaudid given x1 today.      Current Code Status     Date Active Code Status Order ID Comments User Context       4/6/2023 1151 No CPR (Do Not Attempt to Resuscitate) [id number]  Ranulfo Thomas, DO Inpatient      Question Answer    Code Status (Patient has no pulse and is not breathing) No CPR (Do Not Attempt to Resuscitate)    Medical Interventions (Patient has pulse or is breathing) Limited Support    Medical Intervention Limits: NO intubation (DNI)    Comments Per family              Scheduled Meds:acetaminophen, 650 mg, Oral, TID  amiodarone, 200 mg, Oral, Daily  apixaban, 2.5 mg, Oral, BID  aspirin, 81 mg, Oral, Daily  atorvastatin, 80 mg, Oral, Nightly  capsaicin, , Topical, Q12H  carvedilol, 3.125 mg, Oral, Q12H  fentaNYL, 1 patch, Transdermal, Q72H   And  Check Fentanyl Patch Placement, 1 each, Does not apply, Q12H  insulin lispro, 0-7 Units, Subcutaneous, TID AC  isosorbide mononitrate, 120 mg, Oral, Daily  mirtazapine, 15 mg, Oral, Nightly  senna-docusate sodium, 2 tablet, Oral, BID  sodium chloride, 10 mL, Intravenous, Q12H      PRN Meds:.•  senna-docusate sodium **AND** bisacodyl **AND** bisacodyl  •  cloNIDine  •  dextrose  •  dextrose  •  diazePAM  •  glucagon (human recombinant)  •  HYDROmorphone  •  hydrOXYzine  •  melatonin  •  ondansetron  •  oxyCODONE  •  [COMPLETED] Insert Peripheral IV **AND** sodium chloride  •  sodium chloride  •  sodium chloride      Objective: /46 (BP Location: Left arm, Patient Position: Lying)   Pulse 69   Temp 97.6 °F (36.4 °C) (Oral)   Resp 18   Ht 167.6 cm (66\")   Wt 63.5 kg " (140 lb 1.6 oz)   LMP  (LMP Unknown)   SpO2 100%   BMI 22.61 kg/m²      Intake/Output Summary (Last 24 hours) at 4/7/2023 1840  Last data filed at 4/7/2023 1823  Gross per 24 hour   Intake 120 ml   Output 1830 ml   Net -1710 ml     Physical Exam:  Frail, elderly black Female in bed  Head NC/AT  EOMI  Neck supple  Breathing unlabored    Abs soft  Awake, alert, appropriate  Frequent waves of pain with facial grimacing, fisting hands in sheet      Results from last 7 days   Lab Units 04/05/23  0851   WBC 10*3/mm3 9.16   HEMOGLOBIN g/dL 9.0*   HEMATOCRIT % 30.9*   PLATELETS 10*3/mm3 203     Results from last 7 days   Lab Units 04/06/23  0442 04/05/23  0851   SODIUM mmol/L 136 135*   POTASSIUM mmol/L 4.9 4.3   CHLORIDE mmol/L 97* 91*   CO2 mmol/L 25.0 29.0   BUN mg/dL 20 39*   CREATININE mg/dL 4.02* 6.93*   CALCIUM mg/dL 8.2* 9.0   BILIRUBIN mg/dL  --  0.7   ALK PHOS U/L  --  238*   ALT (SGPT) U/L  --  12   AST (SGOT) U/L  --  37*   GLUCOSE mg/dL 94 169*       Impression:  Rectal CA  ESRD  Valvular heart dz  CAD  DM - A1c 5.8     Symptoms:  Rectal/Cancer pain  Debility     Plan:  -May be having rectal spasm, will add PRN valium 2.5mg.    -Continue fentanyl patch with PRN PO oxycodone and IV dilaudid.        Time: 15 min F2F, 30 min total      Rohini Oconnor MD   Hospice and Palliative Care Physician  [facility]  04/07/23

## 2023-04-07 NOTE — PROGRESS NOTES
" LOS: 2 days   Patient Care Team:  Meghana Rodgers MD as PCP - General  Demetrius Sagastume MD as Consulting Physician (Cardiology)  Kevan Lerma MD as Consulting Physician (Nephrology)  Geena Estrella APRN as Nurse Practitioner (Cardiology)  Frank Mandujano MD as Referring Physician (Colon and Rectal Surgery)  Kevan Cavazos MD as Consulting Physician (Radiation Oncology)  Yue Reeves RN as Nurse Navigator  Darryn Burk MD as Consulting Physician (Nephrology)    Chief Complaint: ESRD    Subjective     Seen on HD tolerating well so far. Goal UF 1.5 liter as tolerated. Bp stable. Good access pressure.       Subjective:  Symptoms:  Stable.  No shortness of breath, chest pain, chest pressure or anxiety.    Pain:  She complains of pain that is moderate.        History taken from: patient    Objective     Vital Sign Min/Max for last 24 hours  Temp  Min: 97.2 °F (36.2 °C)  Max: 98.4 °F (36.9 °C)   BP  Min: 108/53  Max: 160/63   Pulse  Min: 57  Max: 79   Resp  Min: 16  Max: 18   SpO2  Min: 95 %  Max: 100 %   Flow (L/min)  Min: 2  Max: 3   No data recorded     Flowsheet Rows    Flowsheet Row First Filed Value   Admission Height 167.6 cm (66\") Documented at 04/05/2023 0702   Admission Weight 78.9 kg (174 lb) Documented at 04/05/2023 0702          I/O this shift:  In: -   Out: 1830 [Other:1830]  I/O last 3 completed shifts:  In: 180 [P.O.:180]  Out: -     Objective:  General Appearance:  Ill-appearing.    Vital signs: (most recent): Blood pressure 126/46, pulse 69, temperature 97.6 °F (36.4 °C), temperature source Oral, resp. rate 18, height 167.6 cm (66\"), weight 63.5 kg (140 lb 1.6 oz), SpO2 100 %, not currently breastfeeding.  Vital signs are normal.    HEENT: Normal HEENT exam.    Lungs:  Normal effort and normal respiratory rate.  Breath sounds clear to auscultation.  She is not in respiratory distress.  No decreased breath sounds or wheezes.    Heart: Normal rate.  Regular rhythm.  S1 normal " and S2 normal.  No murmur or gallop.   Abdomen: Abdomen is soft.    Extremities: There is no dependent edema or local swelling.    Pulses: Distal pulses are intact.    Neurological: Patient is alert and oriented to person, place and time.  Normal strength.    Pupils:  Pupils are equal, round, and reactive to light.              Results Review:     I reviewed the patient's new clinical results.    WBC WBC   Date Value Ref Range Status   04/05/2023 9.16 3.40 - 10.80 10*3/mm3 Final      HGB Hemoglobin   Date Value Ref Range Status   04/05/2023 9.0 (L) 12.0 - 15.9 g/dL Final      HCT Hematocrit   Date Value Ref Range Status   04/05/2023 30.9 (L) 34.0 - 46.6 % Final      Platlets No results found for: LABPLAT   MCV MCV   Date Value Ref Range Status   04/05/2023 101.6 (H) 79.0 - 97.0 fL Final          Sodium Sodium   Date Value Ref Range Status   04/06/2023 136 136 - 145 mmol/L Final   04/05/2023 135 (L) 136 - 145 mmol/L Final      Potassium Potassium   Date Value Ref Range Status   04/06/2023 4.9 3.5 - 5.2 mmol/L Final     Comment:     Slight hemolysis detected by analyzer. Results may be affected.   04/05/2023 4.3 3.5 - 5.2 mmol/L Final      Chloride Chloride   Date Value Ref Range Status   04/06/2023 97 (L) 98 - 107 mmol/L Final   04/05/2023 91 (L) 98 - 107 mmol/L Final      CO2 CO2   Date Value Ref Range Status   04/06/2023 25.0 22.0 - 29.0 mmol/L Final   04/05/2023 29.0 22.0 - 29.0 mmol/L Final      BUN BUN   Date Value Ref Range Status   04/06/2023 20 8 - 23 mg/dL Final   04/05/2023 39 (H) 8 - 23 mg/dL Final      Creatinine Creatinine   Date Value Ref Range Status   04/06/2023 4.02 (H) 0.57 - 1.00 mg/dL Final   04/05/2023 6.93 (H) 0.57 - 1.00 mg/dL Final      Calcium Calcium   Date Value Ref Range Status   04/06/2023 8.2 (L) 8.6 - 10.5 mg/dL Final   04/05/2023 9.0 8.6 - 10.5 mg/dL Final      PO4 No results found for: CAPO4   Albumin Albumin   Date Value Ref Range Status   04/05/2023 2.9 (L) 3.5 - 5.2 g/dL Final       Magnesium Magnesium   Date Value Ref Range Status   04/06/2023 2.0 1.6 - 2.4 mg/dL Final   04/05/2023 2.3 1.6 - 2.4 mg/dL Final      Uric Acid No results found for: URICACID     Medication Review: yes    Assessment & Plan       Intractable pain      Assessment & Plan        ESRD: MWF grayson     Volume status: No significant anemia     Abdominal pain: In the setting of rectal cancer.     Failure to thrive     Hypoalbuminemia      Anemia: ACD due to CKD.     I discussed the patients findings and my recommendations with patient       Gabe uBtler MD  04/07/23  18:10 EDT

## 2023-04-07 NOTE — CASE MANAGEMENT/SOCIAL WORK
Continued Stay Note  [facility]     Patient Name: Esperanza Pop  MRN: [id number]  Today's Date: 4/7/2023    Admit Date: 4/5/2023    Plan: undecided   Discharge Plan     Row Name 04/07/23 0828       Plan    Plan undecided    Plan Comments I met with the patient at the bedside. She was extremely weak and tired. Per the family, they are awaiting PT/OT recommendations. She is currently receiving HD MWF. Palliative is involved. CM will continue to follow her    Final Discharge Disposition Code 30 - still a patient               Discharge Codes    No documentation.               Expected Discharge Date and Time     Expected Discharge Date Expected Discharge Time    Apr 10, 2023             Patria Ingram RN

## 2023-04-07 NOTE — PROGRESS NOTES
[facility] Medicine Services  PROGRESS NOTE    Patient Name: Esperanza Pop  : 1945  MRN: [id number]    Date of Admission: 2023  Primary Care Physician: Meghana Rodgers MD    Subjective   Subjective     CC:  Follow up rectal pain    HPI:  Daughter at bedside notes new pain patch is making the patient slightly lethargic.  However pain control does appear effective    ROS:  Unable to assess    Objective   Objective     Vital Signs:   Temp:  [97.2 °F (36.2 °C)-98.4 °F (36.9 °C)] 97.6 °F (36.4 °C)  Heart Rate:  [57-79] 69  Resp:  [16-18] 18  BP: (108-160)/(45-69) 126/46  Flow (L/min):  [2-3] 3     Physical Exam:  Constitutional: Ill-appearing, lethargic elderly female laying in bed in no acute distress  HENT: NCAT, mucous membranes moist  Respiratory: Clear to auscultation bilaterally, respiratory effort normal   Cardiovascular: RRR, palpable radial pulses  Gastrointestinal: Positive bowel sounds, soft, nontender, nondistended  Musculoskeletal: BL calves tender to light touch  Psychiatric: Unable to reliably assess  Neurologic: Moving extremities spontaneously    Results Reviewed:  LAB RESULTS:      Lab 23  0851   WBC 9.16   HEMOGLOBIN 9.0*   HEMATOCRIT 30.9*   PLATELETS 203   NEUTROS ABS 6.08   IMMATURE GRANS (ABS) 0.05   LYMPHS ABS 1.10   MONOS ABS 1.27*   EOS ABS 0.62*   .6*         Lab 23  0442 23  0851   SODIUM 136 135*   POTASSIUM 4.9 4.3   CHLORIDE 97* 91*   CO2 25.0 29.0   ANION GAP 14.0 15.0   BUN 20 39*   CREATININE 4.02* 6.93*   EGFR 11.0* 5.7*   GLUCOSE 94 169*   CALCIUM 8.2* 9.0   MAGNESIUM 2.0 2.3   PHOSPHORUS 3.2 3.2         Lab 23  0851   TOTAL PROTEIN 8.4   ALBUMIN 2.9*   GLOBULIN 5.5   ALT (SGPT) 12   AST (SGOT) 37*   BILIRUBIN 0.7   ALK PHOS 238*                     Brief Urine Lab Results     None          Microbiology Results Abnormal     None          CT Abdomen Pelvis Without Contrast    Result Date: 2023  CT ABDOMEN PELVIS  WO CONTRAST Date of Exam: 4/5/2023 6:26 PM EDT Indication: intractable rectal/abdominal pain. Comparison: 2/1/2023 abdomen pelvis CT scan Technique: Axial CT images were obtained of the abdomen and pelvis without the administration of contrast. Reconstructed coronal and sagittal images were also obtained. Automated exposure control and iterative construction methods were used. Findings: Prior scan report noted abnormal soft tissue thickening involving the anus, a somewhat patient's known tumor plus/minus postsurgical changes. Enlarged, left inguinal node presumably metastatic disease. History today indicates intractable rectal pain, abdominal pain. The included lower lungs appear clear except for what appears to be a small area of granulomatous scarring in the right middle lobe and minimal linear lung scarring elsewhere. No pleural effusion is seen. Gallbladder is distended, but similar to the prior study, and with no visible inflammation or gallstones. Pancreas is relatively atrophic, not unusual for age. There is mild fatty liver change. Liver morphology appears grossly normal. Spleen is not enlarged. Adrenal glands appear normal. Kidneys are relatively small. No obstructive uropathy is seen. No upper abdominal adenopathy, ascites or acute inflammatory change is seen. There appears to be a mild degree of fecal stasis. Small bowel loops are normal in caliber and appearance. Regarding the lower abdomen pelvis, the terminal ileum and cecum appear normal. Appendix is not definitely identified, but no right lower quadrant inflammation is seen. Bladder is normally distended and normal in appearance. No intrapelvic free fluid is appreciated. Uterus and ovaries appear to be surgically absent. Area of the vaginal cuff appears normal. Patient's soft tissue mass previously seen extending from the dorsal margin to the upper gluteal fold is significantly reduced in size, although there appears to be a fistula from the lower  anorectal region to near the skin surface along the upper gluteal fold. This is also less prominent than on the prior exam. No new inflammatory change is seen. There is no evidence of a drainable abscess. Small amount of dense material in the residual mass could represent iodine or trace hemorrhage. As on the prior study, it is uncertain if mass involves the mid vagina, or merely compresses it. Left inguinal adenopathy is improved, the largest node decreased from 31 mm to 22 mm. No new mass is seen. Bony structures appear to be intact.      Impression: Impression: 1. Persistent but decreased perianal mass compared to prior study, and improved left inguinal adenopathy. 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 No evidence of drainable fluid collection. Mild fecal stasis noted. 2. No evidence of new inflammatory focus elsewhere. 3. No other evidence of new intra-abdominal or intrapelvic disease. 4.. Persistent distention of the gallbladder, as on prior exam, but again no evidence of inflammation or biliary ductal dilatation. Electronically Signed: Champ Lee  4/5/2023 7:13 PM EDT  Workstation ID: [id number]    [facility] FEES - Fiberoptic Endo Eval Swallow    Result Date: 4/6/2023  This procedure was auto-finalized with no dictation required.      Results for orders placed during the hospital encounter of 03/10/23    Adult Transthoracic Echo Complete W/ Cont if Necessary Per Protocol    Interpretation Summary  •  Left ventricular systolic function is normal. Estimated left ventricular EF = 55%  •  Left ventricular wall thickness is consistent with moderate concentric hypertrophy.  •  The right ventricular cavity is mildly dilated.  •  Mild to moderate biatrial enlargement.  •  Moderate to severe aortic valve stenosis is present.  Mean gradient 34 mmHg, DOUG 0.9 cm².  •  Mild aortic insufficiency.  •  Mild mitral regurgitation.  •  Mild to moderate tricuspid regurgitation  with RVSP 48 mmHg.      Current medications:  Scheduled Meds:acetaminophen, 650 mg, Oral, TID  amiodarone, 200 mg, Oral, Daily  apixaban, 2.5 mg, Oral, BID  aspirin, 81 mg, Oral, Daily  atorvastatin, 80 mg, Oral, Nightly  capsaicin, , Topical, Q12H  carvedilol, 3.125 mg, Oral, Q12H  fentaNYL, 1 patch, Transdermal, Q72H   And  Check Fentanyl Patch Placement, 1 each, Does not apply, Q12H  insulin lispro, 0-7 Units, Subcutaneous, TID AC  isosorbide mononitrate, 120 mg, Oral, Daily  mirtazapine, 15 mg, Oral, Nightly  senna-docusate sodium, 2 tablet, Oral, BID  sodium chloride, 10 mL, Intravenous, Q12H      Continuous Infusions:   PRN Meds:.•  senna-docusate sodium **AND** bisacodyl **AND** bisacodyl  •  cloNIDine  •  dextrose  •  dextrose  •  glucagon (human recombinant)  •  HYDROmorphone  •  hydrOXYzine  •  melatonin  •  ondansetron  •  oxyCODONE  •  [COMPLETED] Insert Peripheral IV **AND** sodium chloride  •  sodium chloride  •  sodium chloride    Assessment & Plan   Assessment & Plan     Active Hospital Problems    Diagnosis  POA   • **Intractable pain [R52]  Yes      Resolved Hospital Problems   No resolved problems to display.        Brief Hospital Course to date:  Esperanza Pop is a 78 y.o. female w/ ESRD (HD-MWF), DM2, HTN, HLD, HFpEF, pAfib (xarelto), anemia, prior breast cancer, active anal cancer intolerant of chemo/XRT, recently admitted 3/10-3/29 and VA'ed to SNF to trial improvement of functional status, who returns w/ intractable rectal pain    Assessment/Plan    Intractable pain, multifactorial  Wounds of the buttocks, recent radiotherapy  -wound care following  -palliative care follows, managing pain meds    Metastatic anal cancer  Anorectal fistula  -s/p partial treatment with radiotherapy and chemo  -CT a/p w/ slight dec in size of tumor; persistent anorectal fistula  -unable to tolerate chemo/radiotherapy last admission; appears to only have deteriorated further  -onc consult to assist family w/  decisions on Tx/hospice pending  -palliative following  -Family continues to decide on goals of care    Anorexia  -cont mirtazapine    Dysphagia  -seen by SLP, s/p FEES, diet upgraded    ESRD on HD  -HD MWF  -missed sessions pta  -nephro follows    DM type 2, a1c 5.8%, w/ insulin use  -SSI    HFpEF  HTN  pAfib  -cont amio, apixiban, asa, statin, coreg, imdur     Mod-Sev AS  -has appt w/ Geena Estrella, APRN 4/27/23    Expected Discharge Location and Transportation: SNF once pain controlled vs hospice  Expected Discharge   Expected Discharge Date and Time     Expected Discharge Date Expected Discharge Time    Apr 10, 2023            DVT prophylaxis:  Medical DVT prophylaxis orders are present.     AM-PAC 6 Clicks Score (PT): 8 (04/07/23 0800)    CODE STATUS:   Code Status and Medical Interventions:   Ordered at: 04/06/23 1151     Medical Intervention Limits:    NO intubation (DNI)     Code Status (Patient has no pulse and is not breathing):    No CPR (Do Not Attempt to Resuscitate)     Medical Interventions (Patient has pulse or is breathing):    Limited Support     Comments:    Per family       Ranulfo Thomas, DO  04/07/23

## 2023-04-08 LAB
GLUCOSE BLDC GLUCOMTR-MCNC: 135 MG/DL (ref 70–130)
GLUCOSE BLDC GLUCOMTR-MCNC: 211 MG/DL (ref 70–130)
GLUCOSE BLDC GLUCOMTR-MCNC: 214 MG/DL (ref 70–130)

## 2023-04-08 PROCEDURE — 99231 SBSQ HOSP IP/OBS SF/LOW 25: CPT | Performed by: INTERNAL MEDICINE

## 2023-04-08 PROCEDURE — 82962 GLUCOSE BLOOD TEST: CPT

## 2023-04-08 PROCEDURE — 97166 OT EVAL MOD COMPLEX 45 MIN: CPT

## 2023-04-08 RX ADMIN — ACETAMINOPHEN 325MG 650 MG: 325 TABLET ORAL at 17:20

## 2023-04-08 RX ADMIN — ASPIRIN 81 MG: 81 TABLET, COATED ORAL at 09:19

## 2023-04-08 RX ADMIN — SENNOSIDES AND DOCUSATE SODIUM 2 TABLET: 8.6; 5 TABLET ORAL at 20:35

## 2023-04-08 RX ADMIN — ACETAMINOPHEN 325MG 650 MG: 325 TABLET ORAL at 09:19

## 2023-04-08 RX ADMIN — SENNOSIDES AND DOCUSATE SODIUM 2 TABLET: 8.6; 5 TABLET ORAL at 09:24

## 2023-04-08 RX ADMIN — MIRTAZAPINE 15 MG: 15 TABLET, FILM COATED ORAL at 20:36

## 2023-04-08 RX ADMIN — CAPSAICIN: 0.75 CREAM TOPICAL at 09:30

## 2023-04-08 RX ADMIN — ACETAMINOPHEN 325MG 650 MG: 325 TABLET ORAL at 20:35

## 2023-04-08 RX ADMIN — APIXABAN 2.5 MG: 2.5 TABLET, FILM COATED ORAL at 20:36

## 2023-04-08 RX ADMIN — ATORVASTATIN CALCIUM 80 MG: 40 TABLET, FILM COATED ORAL at 20:36

## 2023-04-08 RX ADMIN — Medication 10 ML: at 20:37

## 2023-04-08 RX ADMIN — OXYCODONE HYDROCHLORIDE 5 MG: 5 TABLET ORAL at 12:16

## 2023-04-08 RX ADMIN — ISOSORBIDE MONONITRATE 120 MG: 120 TABLET, EXTENDED RELEASE ORAL at 09:19

## 2023-04-08 RX ADMIN — APIXABAN 2.5 MG: 2.5 TABLET, FILM COATED ORAL at 09:20

## 2023-04-08 RX ADMIN — AMIODARONE HYDROCHLORIDE 200 MG: 200 TABLET ORAL at 09:19

## 2023-04-08 RX ADMIN — Medication 10 ML: at 09:31

## 2023-04-08 RX ADMIN — OXYCODONE HYDROCHLORIDE 5 MG: 5 TABLET ORAL at 18:10

## 2023-04-08 RX ADMIN — CARVEDILOL 3.12 MG: 6.25 TABLET, FILM COATED ORAL at 09:20

## 2023-04-08 NOTE — PLAN OF CARE
Goal Outcome Evaluation:  Plan of Care Reviewed With: patient, daughter       VSS. Confused at times. Daughter at bedside. RA to 2 L n/c. Gave valium at start of shift, pt complained of restlessness--improved. Turned throughout shift. No BM. No c/o pain or nausea.         Progress: no change     Problem: Skin Injury Risk Increased  Goal: Skin Health and Integrity  Outcome: Met  Intervention: Optimize Skin Protection  Recent Flowsheet Documentation  Taken 4/8/2023 0000 by Prabha Diggs RN  Pressure Reduction Techniques: weight shift assistance provided  Pressure Reduction Devices: positioning supports utilized  Skin Protection:  • adhesive use limited  • skin-to-device areas padded  • skin-to-skin areas padded  • tubing/devices free from skin contact  Taken 4/7/2023 2200 by Prabha Diggs RN  Pressure Reduction Techniques: weight shift assistance provided  Pressure Reduction Devices: positioning supports utilized  Skin Protection:  • adhesive use limited  • skin-to-skin areas padded  • skin-to-device areas padded  • tubing/devices free from skin contact  Taken 4/7/2023 2000 by Prabha Diggs RN  Pressure Reduction Techniques: weight shift assistance provided  Head of Bed (HOB) Positioning: HOB at 30-45 degrees  Pressure Reduction Devices: positioning supports utilized  Skin Protection:  • adhesive use limited  • skin-to-skin areas padded  • skin-to-device areas padded  • tubing/devices free from skin contact

## 2023-04-08 NOTE — THERAPY EVALUATION
Patient Name: Esperanza Pop  : 1945    MRN: [id number]                              Today's Date: 2023       Admit Date: 2023    Visit Dx:     ICD-10-CM ICD-9-CM   1. Intractable pain  R52 780.96   2. Rectal cancer  C20 154.1   3. Adult failure to thrive  R62.7 783.7   4. Hypoalbuminemia  E88.09 273.8   5. ESRD on hemodialysis  N18.6 585.6    Z99.2 V45.11   6. Pressure injury of buttock, stage 3, unspecified laterality  L89.303 707.05     707.23   7. Dysphagia, unspecified type  R13.10 787.20     Patient Active Problem List   Diagnosis   • Acute on chronic diastolic heart failure   • Type 2 diabetes mellitus   • Essential hypertension   • Coronary artery disease involving native coronary artery of native heart with angina pectoris   • Breast cancer, female, right   • Seasonal allergic rhinitis   • Right carotid bruit   • Vitamin B12 deficiency   • Hyperlipidemia LDL goal <70   • End stage renal disease on dialysis (HCC)   • Nonrheumatic aortic valve stenosis   • NSTEMI (non-ST elevated myocardial infarction)   • UTI (urinary tract infection)   • Ischemic heart disease   • CHF (congestive heart failure)   • Acute non-ST segment elevation myocardial infarction (STEMI) following previous myocardial infarction   • Dyslipidemia   • Diabetes mellitus   • Mild obesity   • Elevated troponin   • Screen for colon cancer   • Acute midline low back pain without sciatica   • Hypertensive emergency   • PAF (paroxysmal atrial fibrillation) (HCC)   • Malignant neoplasm of anal canal   • Hematochezia   • Severe malnutrition   • Symptomatic anemia   • Intractable pain     Past Medical History:   Diagnosis Date   • Acute bronchitis    • Acute conjunctivitis    • Acute kidney injury     Admission from 2013 to 2014, now resolved with latest creatinine 1.4 on 2014.   • Acute non-ST segment elevation myocardial infarction (STEMI) following previous myocardial infarction    • Anal cancer 2023   • Anal  cancer 2/8/2023   • Arthritis    • Breast cancer, female, right     2005 mastectomy right   • Cancer    • CHF (congestive heart failure)    • Chronic kidney disease     dialysis MWF, [facility]    • Clotting disorder    • COVID-19    • Diabetes mellitus     diagnosed ~1992, checks fsbg 2-3x/day   • Diarrhea    • Dyslipidemia    • Dyspepsia    • Dyspnea    • Edema    • History of transfusion     ~2013 no reaction    • Hx of radiation therapy    • Hyperlipidemia    • Hypertension     Severe   • Ischemic heart disease    • Moderate obesity     BMI 36.2   • Myocardial infarction    • Pneumonia    • Pneumonia 02/2019   • Radiation 2005   • Seasonal allergies    • Wears glasses      Past Surgical History:   Procedure Laterality Date   • AV FISTULA PLACEMENT, BRACHIOBASILIC     • BREAST BIOPSY Left 2010   • BREAST CYST EXCISION     • BREAST LUMPECTOMY Right 2005   • BREAST SURGERY     • CARDIAC CATHETERIZATION N/A 10/10/2016    Procedure: Left Heart Cath;  Surgeon: Scooter Zhao MD;  Location:  [facility];  Service:    • CARDIAC CATHETERIZATION  10/2016   • CARDIAC CATHETERIZATION N/A 1/21/2021    Procedure: Right and Left Heart Cath;  Surgeon: Hugh Tidwell MD;  Location:  [facility];  Service: Cardiology;  Laterality: N/A;   • COLONOSCOPY N/A 7/23/2020    Procedure: COLONOSCOPY;  Surgeon: Rubén Rosas MD;  Location:  [facility];  Service: Gastroenterology;  Laterality: N/A;   • CORONARY STENT PLACEMENT  2016   • HERNIA REPAIR     • HYSTERECTOMY  2000   • INSERTION HEMODIALYSIS CATHETER Right 6/29/2016    Procedure: HEMODIALYSIS CATHETER INSERTION TUNNELLED;  Surgeon: Ramón Chavez MD;  Location: [facility] OR;  Service:    • MASTECTOMY Right 2006   • OOPHORECTOMY     • REDUCTION MAMMAPLASTY Left 2005      General Information     Row Name 04/08/23 1130          OT Time and Intention    Document Type evaluation  -AC     Mode of Treatment  occupational therapy  -     Row Name 04/08/23 1130          General Information    Patient Profile Reviewed yes  -     Prior Level of Function independent:;all household mobility;ADL's  has SPC, but did not use all the time  -     Existing Precautions/Restrictions fall;orthostatic hypotension  sacral wound  -     Barriers to Rehab medically complex;cognitive status  -     Row Name 04/08/23 1130          Occupational Profile    Environmental Supports and Barriers (Occupational Profile) walk in shower  -     Row Name 04/08/23 1130          Living Environment    People in Home alone  spouse recently passed away per last admit note.  pt reports she lives alone, but states she has children and siblings that can assist as needed  -     Row Name 04/08/23 1130          Home Main Entrance    Number of Stairs, Main Entrance two;other (see comments)  entry ramp is being built  -     Row Name 04/08/23 1130          Stairs Within Home, Primary    Number of Stairs, Within Home, Primary --  -     Row Name 04/08/23 1130          Cognition    Orientation Status (Cognition) oriented to;person;place;disoriented to;situation;time  -     Row Name 04/08/23 1130          Safety Issues, Functional Mobility    Safety Issues Affecting Function (Mobility) insight into deficits/self-awareness;judgment;safety precaution awareness;sequencing abilities  -     Impairments Affecting Function (Mobility) balance;cognition;endurance/activity tolerance;pain;strength  -     Cognitive Impairments, Mobility Safety/Performance insight into deficits/self-awareness;judgment;safety precaution awareness;sequencing abilities;attention  -           User Key  (r) = Recorded By, (t) = Taken By, (c) = Cosigned By    Initials Name Provider Type    AC Radha Carson OT Occupational Therapist                 Mobility/ADL's     Row Name 04/08/23 1316          Bed Mobility    Bed Mobility supine-sit;sit-supine  -AC     Supine-Sit [place]  (Bed Mobility) verbal cues;maximum assist (25% patient effort);2 person assist  -AC     Sit-Supine [place] (Bed Mobility) verbal cues;dependent (less than 25% patient effort);2 person assist  -AC     Assistive Device (Bed Mobility) bed rails;head of bed elevated;draw sheet  -     Row Name 04/08/23 1316          Transfers    Transfers sit-stand transfer;bed-chair transfer  -     Row Name 04/08/23 1316          Bed-Chair Transfer    Bed-Chair [place] (Transfers) verbal cues;maximum assist (25% patient effort)  -     Assistive Device (Bed-Chair Transfers) other (see comments)  BUE support  -     Comment, (Bed-Chair Transfer) bed to chair and chair to bed.  pt reporting she wanted to sit in chair, but once up in chair, pt very uncomfortable and returned to bed  -[facility] Name 04/08/23 1316          Sit-Stand Transfer    Sit-Stand [place] (Transfers) verbal cues;maximum assist (25% patient effort)  -     Assistive Device (Sit-Stand Transfers) other (see comments)  BUE support  -     Row Name 04/08/23 1316          Activities of Daily Living    BADL Assessment/Intervention lower body dressing;grooming  -[facility] Name 04/08/23 1316          Lower Body Dressing Assessment/Training    [place] Level (Lower Body Dressing) don;doff;socks;dependent (less than 25% patient effort)  -     Position (Lower Body Dressing) supine  -[facility] Name 04/08/23 1316          Grooming Assessment/Training    [place] Level (Grooming) wash face, hands;set up;supervision;verbal cues  -AC     Position (Grooming) sitting up in bed  -           User Key  (r) = Recorded By, (t) = Taken By, (c) = Cosigned By    Initials Name Provider Type     Radha Carson, OT Occupational Therapist               Obj/Interventions     Row Name 04/08/23 1318          Sensory Assessment (Somatosensory)    Sensory Assessment (Somatosensory) UE sensation intact  -     Row Name 04/08/23 1318          Vision  Assessment/Intervention    Visual Impairment/Limitations other (see comments)  wears corrective lenses, but glasses unavailable  -     Row Name 04/08/23 1318          Range of Motion Comprehensive    General Range of Motion bilateral upper extremity ROM WNL  -     Row Name 04/08/23 1318          Strength Comprehensive (MMT)    Comment, General Manual Muscle Testing (MMT) Assessment BUE grossly 3+/5  -     Row Name 04/08/23 1318          Balance    Balance Assessment sitting static balance;standing static balance  -     Static Sitting Balance contact guard  -AC     Static Standing Balance verbal cues;maximum assist  -AC           User Key  (r) = Recorded By, (t) = Taken By, (c) = Cosigned By    Initials Name Provider Type    AC Radha Carson OT Occupational Therapist               Goals/Plan     Row Name 04/08/23 1324          Bed Mobility Goal 1 (OT)    Activity/Assistive Device (Bed Mobility Goal 1, OT) rolling to left;rolling to right;sit to supine;supine to sit  -AC     [place] Level/Cues Needed (Bed Mobility Goal 1, OT) verbal cues required;moderate assist (50-74% patient effort)  -AC     Time Frame (Bed Mobility Goal 1, OT) by discharge  -AC     Progress/Outcomes (Bed Mobility Goal 1, OT) goal ongoing  -     Row Name 04/08/23 1324          Transfer Goal 1 (OT)    Activity/Assistive Device (Transfer Goal 1, OT) bed-to-chair/chair-to-bed;toilet;walker, rolling  -AC     [place] Level/Cues Needed (Transfer Goal 1, OT) moderate assist (50-74% patient effort)  -AC     Time Frame (Transfer Goal 1, OT) by discharge  -AC     Progress/Outcome (Transfer Goal 1, OT) goal ongoing  -     Row Name 04/08/23 1324          Bathing Goal 1 (OT)    Activity/Device (Bathing Goal 1, OT) upper body bathing  -AC     [place] Level/Cues Needed (Bathing Goal 1, OT) verbal cues required;minimum assist (75% or more patient effort)  -AC     Time Frame (Bathing Goal 1, OT) by discharge  -AC     Progress/Outcomes  (Bathing Goal 1, OT) goal ongoing  -AC     Row Name 04/08/23 1324          Grooming Goal 1 (OT)    Activity/Device (Grooming Goal 1, OT) oral care  -AC     [place] (Grooming Goal 1, OT) set-up required;supervision required  -AC     Time Frame (Grooming Goal 1, OT) by discharge  -AC     Strategies/Barriers (Grooming Goal 1, OT) seated EOB  -AC     Progress/Outcome (Grooming Goal 1, OT) goal ongoing  -AC     Row Name 04/08/23 1324          Therapy Assessment/Plan (OT)    Planned Therapy Interventions (OT) activity tolerance training;adaptive equipment training;BADL retraining;occupation/activity based interventions;patient/caregiver education/training;strengthening exercise;transfer/mobility retraining  -AC           User Key  (r) = Recorded By, (t) = Taken By, (c) = Cosigned By    Initials Name Provider Type    Radha Nava, OT Occupational Therapist               Clinical Impression     Row Name 04/08/23 1319          Pain Assessment    Pain Location - rectal;buttock  -AC     Pain Intervention(s) Repositioned;Ambulation/increased activity;Nursing Notified  -     Row Name 04/08/23 1319          Pain Scale: FACES Pre/Post-Treatment    Pain: FACES Scale, Pretreatment 4-->hurts little more  -AC     Posttreatment Pain Rating 8-->hurts whole lot  -AC     Row Name 04/08/23 1319          Plan of Care Review    Plan of Care Reviewed With patient  -AC     Outcome Evaluation Pt presents below baseline fucntion with self care/mobility d/t pain, confusion,  weakness, decreased balance and activity tolerance. OT will follow to advance pt toward PLOF.  Recomemnd SNF upon d/c.  -     Row Name 04/08/23 1319          Therapy Assessment/Plan (OT)    Rehab Potential (OT) fair, will monitor progress closely  -AC     Criteria for Skilled Therapeutic Interventions Met (OT) yes;skilled treatment is necessary  -AC     Therapy Frequency (OT) daily  -AC     Row Name 04/08/23 1319          Therapy Plan Review/Discharge Plan (OT)     Anticipated Discharge Disposition (OT) skilled nursing facility  -     Row Name 04/08/23 1319          Vital Signs    Pre Systolic BP Rehab 88  -AC     Pre Treatment Diastolic BP 47  -AC     Intra Systolic BP Rehab 90  pt not symptomatic and RN agreeable for pt to get to chair  -AC     Intra Treatment Diastolic BP 40  -AC     Post Systolic BP Rehab 83  -AC     Post Treatment Diastolic BP 45  -AC     Pretreatment Heart Rate (beats/min) 64  -AC     Posttreatment Heart Rate (beats/min) 74  -AC     Pre SpO2 (%) 100  -AC     O2 Delivery Pre Treatment room air  -AC     O2 Delivery Intra Treatment room air  -AC     Post SpO2 (%) 100  -AC     O2 Delivery Post Treatment room air  -AC     Pre Patient Position Supine  -AC     Intra Patient Position Sitting  -AC     Post Patient Position Supine  -AC     Row Name 04/08/23 1319          Positioning and Restraints    Pre-Treatment Position in bed  -AC     Post Treatment Position bed  -AC     In Bed notified nsg;call light within reach;encouraged to call for assist;exit alarm on;side lying left;waffle boots/both  -AC           User Key  (r) = Recorded By, (t) = Taken By, (c) = Cosigned By    Initials Name Provider Type    AC Radha Carson, OT Occupational Therapist               Outcome Measures     Row Name 04/08/23 1326          How much help from another is currently needed...    Putting on and taking off regular lower body clothing? 1  -AC     Bathing (including washing, rinsing, and drying) 2  -AC     Toileting (which includes using toilet bed pan or urinal) 1  -AC     Putting on and taking off regular upper body clothing 2  -AC     Taking care of personal grooming (such as brushing teeth) 2  -AC     Eating meals 2  -AC     AM-PAC 6 Clicks Score (OT) 10  -AC     Row Name 04/08/23 1326          Functional Assessment    Outcome Measure Options AM-PAC 6 Clicks Daily Activity (OT)  -AC           User Key  (r) = Recorded By, (t) = Taken By, (c) = Cosigned By    Initials Name  Provider Type     Radha Carson OT Occupational Therapist                Occupational Therapy Education     Title: PT OT SLP Therapies (In Progress)     Topic: Occupational Therapy (In Progress)     Point: ADL training (In Progress)     Description:   Instruct learner(s) on proper safety adaptation and remediation techniques during self care or transfers.   Instruct in proper use of assistive devices.              Learning Progress Summary           Patient Acceptance, E, NR by  at 4/8/2023 1326                   Point: Home exercise program (Not Started)     Description:   Instruct learner(s) on appropriate technique for monitoring, assisting and/or progressing therapeutic exercises/activities.              Learner Progress:  Not documented in this visit.          Point: Precautions (Not Started)     Description:   Instruct learner(s) on prescribed precautions during self-care and functional transfers.              Learner Progress:  Not documented in this visit.          Point: Body mechanics (Not Started)     Description:   Instruct learner(s) on proper positioning and spine alignment during self-care, functional mobility activities and/or exercises.              Learner Progress:  Not documented in this visit.                      User Key     Initials Effective Dates Name Provider Type Discipline     02/03/23 -  Radha Carson OT Occupational Therapist OT              OT Recommendation and Plan  Planned Therapy Interventions (OT): activity tolerance training, adaptive equipment training, BADL retraining, occupation/activity based interventions, patient/caregiver education/training, strengthening exercise, transfer/mobility retraining  Therapy Frequency (OT): daily  Plan of Care Review  Plan of Care Reviewed With: patient  Outcome Evaluation: Pt presents below baseline fucntion with self care/mobility d/t pain, confusion,  weakness, decreased balance and activity tolerance. OT will follow to advance pt  toward PLOF.  Recomemnd SNF upon d/c.     Time Calculation:    Time Calculation- OT     Row Name 04/08/23 1130             Time Calculation- OT    OT Start Time 1130  -AC      OT Received On 04/08/23  -AC      OT Goal Re-Cert Due Date 04/18/23  -AC         Untimed Charges    OT Eval/Re-eval Minutes 55  -AC         Total Minutes    Untimed Charges Total Minutes 55  -AC       Total Minutes 55  -AC            User Key  (r) = Recorded By, (t) = Taken By, (c) = Cosigned By    Initials Name Provider Type    AC Radha Carson, OT Occupational Therapist              Therapy Charges for Today     Code Description Service Date Service Provider Modifiers Qty    13318609802 HC OT EVAL MOD COMPLEXITY 4 4/8/2023 Radha Carson OT GO 1               Radha Carson OT  4/8/2023

## 2023-04-08 NOTE — PLAN OF CARE
Goal Outcome Evaluation:  Plan of Care Reviewed With: patient           Outcome Evaluation: Pt presents below baseline fucntion with self care/mobility d/t pain, confusion,  weakness, decreased balance and activity tolerance. OT will follow to advance pt toward PLOF.  Recomemnd SNF upon d/c.

## 2023-04-08 NOTE — PROGRESS NOTES
[facility] Medicine Services  PROGRESS NOTE    Patient Name: Esperanza Pop  : 1945  MRN: [id number]    Date of Admission: 2023  Primary Care Physician: Meghana Rodgers MD    Subjective   Subjective     CC:  Follow up anal cancer    HPI:  Prn valium ordered by palliative was helpful with rectal spasms, says she is limiting use because she does not want to get addicted.    We had a private conversation without family at bedside, she states understanding of untreated anal cancer without further treatment options. She is hesitant to give up dialysis. I encouraged her to make her wishes known to her children now because she may reach a point where she is not able to communicate clearly, she expressed understanding     ROS:  Gen- No fevers, chills  CV- No chest pain, palpitations  Resp- No cough, dyspnea  GI- No N/V/D, abd pain    Objective   Objective     Vital Signs:   Temp:  [97.3 °F (36.3 °C)-98 °F (36.7 °C)] 97.5 °F (36.4 °C)  Heart Rate:  [62-79] 66  Resp:  [16-18] 18  BP: ()/(46-63) 83/56  Flow (L/min):  [2-3] 3     Physical Exam:  Constitutional: Ill appearing elderly female sitting up in bed eating breakfast, NAD  HENT: NCAT, mucous membranes moist  Respiratory: Clear to auscultation bilaterally, respiratory effort normal   Cardiovascular: RRR, palpable radial pulses  Gastrointestinal: Positive bowel sounds, soft, nontender, nondistended  Musculoskeletal: BL calves tender to touch  Psychiatric: Appropriate affect, cooperative  Neurologic: Moving extremities spontaneously    Results Reviewed:  LAB RESULTS:      Lab 23  0851   WBC 9.16   HEMOGLOBIN 9.0*   HEMATOCRIT 30.9*   PLATELETS 203   NEUTROS ABS 6.08   IMMATURE GRANS (ABS) 0.05   LYMPHS ABS 1.10   MONOS ABS 1.27*   EOS ABS 0.62*   .6*         Lab 23  0442 23  0851   SODIUM 136 135*   POTASSIUM 4.9 4.3   CHLORIDE 97* 91*   CO2 25.0 29.0   ANION GAP 14.0 15.0   BUN 20 39*   CREATININE 4.02*  6.93*   EGFR 11.0* 5.7*   GLUCOSE 94 169*   CALCIUM 8.2* 9.0   MAGNESIUM 2.0 2.3   PHOSPHORUS 3.2 3.2         Lab 04/05/23  0851   TOTAL PROTEIN 8.4   ALBUMIN 2.9*   GLOBULIN 5.5   ALT (SGPT) 12   AST (SGOT) 37*   BILIRUBIN 0.7   ALK PHOS 238*                     Brief Urine Lab Results     None          Microbiology Results Abnormal     None          No radiology results from the last 24 hrs    Results for orders placed during the hospital encounter of 03/10/23    Adult Transthoracic Echo Complete W/ Cont if Necessary Per Protocol    Interpretation Summary  •  Left ventricular systolic function is normal. Estimated left ventricular EF = 55%  •  Left ventricular wall thickness is consistent with moderate concentric hypertrophy.  •  The right ventricular cavity is mildly dilated.  •  Mild to moderate biatrial enlargement.  •  Moderate to severe aortic valve stenosis is present.  Mean gradient 34 mmHg, DOUG 0.9 cm².  •  Mild aortic insufficiency.  •  Mild mitral regurgitation.  •  Mild to moderate tricuspid regurgitation with RVSP 48 mmHg.      Current medications:  Scheduled Meds:acetaminophen, 650 mg, Oral, TID  amiodarone, 200 mg, Oral, Daily  apixaban, 2.5 mg, Oral, BID  aspirin, 81 mg, Oral, Daily  atorvastatin, 80 mg, Oral, Nightly  capsaicin, , Topical, Q12H  carvedilol, 3.125 mg, Oral, Q12H  fentaNYL, 1 patch, Transdermal, Q72H   And  Check Fentanyl Patch Placement, 1 each, Does not apply, Q12H  insulin lispro, 0-7 Units, Subcutaneous, TID AC  isosorbide mononitrate, 120 mg, Oral, Daily  mirtazapine, 15 mg, Oral, Nightly  senna-docusate sodium, 2 tablet, Oral, BID  sodium chloride, 10 mL, Intravenous, Q12H      Continuous Infusions:   PRN Meds:.•  senna-docusate sodium **AND** bisacodyl **AND** bisacodyl  •  cloNIDine  •  dextrose  •  dextrose  •  diazePAM  •  glucagon (human recombinant)  •  HYDROmorphone  •  hydrOXYzine  •  melatonin  •  ondansetron  •  oxyCODONE  •  [COMPLETED] Insert Peripheral IV **AND**  sodium chloride  •  sodium chloride  •  sodium chloride    Assessment & Plan   Assessment & Plan     Active Hospital Problems    Diagnosis  POA   • **Intractable pain [R52]  Yes      Resolved Hospital Problems   No resolved problems to display.        Brief Hospital Course to date:  Esperanza Pop is a 78 y.o. female w/ ESRD (HD-MWF), DM2, HTN, HLD, HFpEF, pAfib (xarelto), anemia, prior breast cancer, active anal cancer intolerant of chemo/XRT, recently admitted 3/10-3/29 and TX'ed to SNF to trial improvement of functional status, who returned w/ intractable rectal pain    Assessment/Plan    Intractable pain, multifactorial  Wounds of the buttocks, recent radiotherapy  -wound care following  -palliative care follows, managing pain meds, symptoms appear to be improving    Metastatic anal cancer  Anorectal fistula  -s/p partial treatment with radiotherapy and chemo  -CT a/p w/ slight dec in size of tumor; persistent anorectal fistula  -unable to tolerate chemo/radiotherapy last admission; appears to only have deteriorated further  -onc consulted to assist family w/ decisions on Tx/hospice pending, primary oncologist has been out of town?  -palliative following  -Family/patient continue to decide on goals of care    Anorexia  -cont mirtazapine    Dysphagia  -seen by SLP, s/p FEES, diet upgraded    ESRD on HD  -HD MWF  -missed sessions pta  -nephro follows    DM type 2, a1c 5.8%, w/ insulin use  -SSI    HFpEF  HTN  pAfib  -cont amio, apixiban, asa, statin, coreg, imdur     Mod-Sev AS  -has appt w/ Geena Estrella, APRN 4/27/23    Expected Discharge Location and Transportation: SNF w/ palliative care vs hospice  Expected Discharge   Expected Discharge Date and Time     Expected Discharge Date Expected Discharge Time    Apr 10, 2023            DVT prophylaxis:  Medical DVT prophylaxis orders are present.     AM-PAC 6 Clicks Score (PT): 8 (04/07/23 2000)    CODE STATUS:   Code Status and Medical Interventions:   Ordered at:  04/06/23 1151     Medical Intervention Limits:    NO intubation (DNI)     Code Status (Patient has no pulse and is not breathing):    No CPR (Do Not Attempt to Resuscitate)     Medical Interventions (Patient has pulse or is breathing):    Limited Support     Comments:    Per family       Ranulfo Thomas, DO  04/08/23

## 2023-04-08 NOTE — PROGRESS NOTES
" LOS: 3 days   Patient Care Team:  Meghana Rodgers MD as PCP - General  Demetrius Sagastume MD as Consulting Physician (Cardiology)  Kevan Lerma MD as Consulting Physician (Nephrology)  Geena Estrella APRN as Nurse Practitioner (Cardiology)  Frank Mandujano MD as Referring Physician (Colon and Rectal Surgery)  Kevan Cavazos MD as Consulting Physician (Radiation Oncology)  Yue Reeves RN as Nurse Navigator  Darryn Burk MD as Consulting Physician (Nephrology)    Chief Complaint: ESRD    Subjective     HD yesterday tolerated UF 1.5liter.       Subjective:  Symptoms:  Stable.  No shortness of breath, chest pain, chest pressure or anxiety.    Pain:  She complains of pain that is moderate.        History taken from: patient    Objective     Vital Sign Min/Max for last 24 hours  Temp  Min: 97.3 °F (36.3 °C)  Max: 98 °F (36.7 °C)   BP  Min: 83/56  Max: 160/63   Pulse  Min: 62  Max: 79   Resp  Min: 16  Max: 18   SpO2  Min: 90 %  Max: 100 %   Flow (L/min)  Min: 2  Max: 3   No data recorded     Flowsheet Rows    Flowsheet Row First Filed Value   Admission Height 167.6 cm (66\") Documented at 04/05/2023 0702   Admission Weight 78.9 kg (174 lb) Documented at 04/05/2023 0702          I/O this shift:  In: 120 [P.O.:120]  Out: -   I/O last 3 completed shifts:  In: 480 [P.O.:480]  Out: 1830 [Other:1830]    Objective:  General Appearance:  Ill-appearing.    Vital signs: (most recent): Blood pressure (!) 83/56, pulse 66, temperature 97.5 °F (36.4 °C), temperature source Oral, resp. rate 18, height 167.6 cm (66\"), weight 63.5 kg (140 lb 1.6 oz), SpO2 100 %, not currently breastfeeding.  Vital signs are normal.    HEENT: Normal HEENT exam.    Lungs:  Normal effort and normal respiratory rate.  Breath sounds clear to auscultation.  She is not in respiratory distress.  No decreased breath sounds or wheezes.    Heart: Normal rate.  Regular rhythm.  S1 normal and S2 normal.  No murmur or gallop.   Abdomen: " Abdomen is soft.    Extremities: There is no dependent edema or local swelling.    Pulses: Distal pulses are intact.    Neurological: Patient is alert and oriented to person, place and time.  Normal strength.    Pupils:  Pupils are equal, round, and reactive to light.              Results Review:     I reviewed the patient's new clinical results.    WBC No results found for: WBC   HGB No results found for: HGB   HCT No results found for: HCT   Platlets No results found for: LABPLAT   MCV No results found for: MCV       Sodium Sodium   Date Value Ref Range Status   04/06/2023 136 136 - 145 mmol/L Final      Potassium Potassium   Date Value Ref Range Status   04/06/2023 4.9 3.5 - 5.2 mmol/L Final     Comment:     Slight hemolysis detected by analyzer. Results may be affected.      Chloride Chloride   Date Value Ref Range Status   04/06/2023 97 (L) 98 - 107 mmol/L Final      CO2 CO2   Date Value Ref Range Status   04/06/2023 25.0 22.0 - 29.0 mmol/L Final      BUN BUN   Date Value Ref Range Status   04/06/2023 20 8 - 23 mg/dL Final      Creatinine Creatinine   Date Value Ref Range Status   04/06/2023 4.02 (H) 0.57 - 1.00 mg/dL Final      Calcium Calcium   Date Value Ref Range Status   04/06/2023 8.2 (L) 8.6 - 10.5 mg/dL Final      PO4 No results found for: CAPO4   Albumin No results found for: ALBUMIN   Magnesium Magnesium   Date Value Ref Range Status   04/06/2023 2.0 1.6 - 2.4 mg/dL Final      Uric Acid No results found for: URICACID     Medication Review: yes    Assessment & Plan       Intractable pain      Assessment & Plan        ESRD: MWF grayson     Volume status: No significant anemia     Abdominal pain: In the setting of rectal cancer.     Failure to thrive     Hypoalbuminemia      Anemia: ACD due to CKD.     I discussed the patients findings and my recommendations with patient       Gabe Butler MD  04/08/23  13:29 EDT

## 2023-04-09 ENCOUNTER — APPOINTMENT (OUTPATIENT)
Dept: GENERAL RADIOLOGY | Facility: HOSPITAL | Age: 78
DRG: 935 | End: 2023-04-09
Payer: MEDICARE

## 2023-04-09 ENCOUNTER — APPOINTMENT (OUTPATIENT)
Dept: CT IMAGING | Facility: HOSPITAL | Age: 78
DRG: 935 | End: 2023-04-09
Payer: MEDICARE

## 2023-04-09 LAB
ANION GAP SERPL CALCULATED.3IONS-SCNC: 14 MMOL/L (ref 5–15)
ARTERIAL PATENCY WRIST A: ABNORMAL
ATMOSPHERIC PRESS: ABNORMAL MM[HG]
BASE EXCESS BLDA CALC-SCNC: -0.9 MMOL/L (ref 0–2)
BASOPHILS # BLD AUTO: 0.03 10*3/MM3 (ref 0–0.2)
BASOPHILS NFR BLD AUTO: 0.4 % (ref 0–1.5)
BDY SITE: ABNORMAL
BODY TEMPERATURE: 37 C
BUN SERPL-MCNC: 44 MG/DL (ref 8–23)
BUN/CREAT SERPL: 7.5 (ref 7–25)
CALCIUM SPEC-SCNC: 8.5 MG/DL (ref 8.6–10.5)
CHLORIDE SERPL-SCNC: 97 MMOL/L (ref 98–107)
CO2 BLDA-SCNC: 24.2 MMOL/L (ref 22–33)
CO2 SERPL-SCNC: 22 MMOL/L (ref 22–29)
COHGB MFR BLD: 1.7 % (ref 0–2)
CREAT SERPL-MCNC: 5.86 MG/DL (ref 0.57–1)
D-LACTATE SERPL-SCNC: 2 MMOL/L (ref 0.5–2)
DEPRECATED RDW RBC AUTO: 72.3 FL (ref 37–54)
EGFRCR SERPLBLD CKD-EPI 2021: 7 ML/MIN/1.73
EOSINOPHIL # BLD AUTO: 0.24 10*3/MM3 (ref 0–0.4)
EOSINOPHIL NFR BLD AUTO: 2.9 % (ref 0.3–6.2)
EPAP: 0
ERYTHROCYTE [DISTWIDTH] IN BLOOD BY AUTOMATED COUNT: 18 % (ref 12.3–15.4)
GLUCOSE BLDC GLUCOMTR-MCNC: 127 MG/DL (ref 70–130)
GLUCOSE BLDC GLUCOMTR-MCNC: 144 MG/DL (ref 70–130)
GLUCOSE BLDC GLUCOMTR-MCNC: 158 MG/DL (ref 70–130)
GLUCOSE SERPL-MCNC: 120 MG/DL (ref 65–99)
HCO3 BLDA-SCNC: 23.2 MMOL/L (ref 20–26)
HCT VFR BLD AUTO: 31.7 % (ref 34–46.6)
HCT VFR BLD CALC: 25.1 % (ref 38–51)
HGB BLD-MCNC: 8.7 G/DL (ref 12–15.9)
HGB BLDA-MCNC: 8.2 G/DL (ref 14–18)
IMM GRANULOCYTES # BLD AUTO: 0.06 10*3/MM3 (ref 0–0.05)
IMM GRANULOCYTES NFR BLD AUTO: 0.7 % (ref 0–0.5)
INHALED O2 CONCENTRATION: 28 %
IPAP: 0
LYMPHOCYTES # BLD AUTO: 1.37 10*3/MM3 (ref 0.7–3.1)
LYMPHOCYTES NFR BLD AUTO: 16.4 % (ref 19.6–45.3)
MCH RBC QN AUTO: 29.8 PG (ref 26.6–33)
MCHC RBC AUTO-ENTMCNC: 27.4 G/DL (ref 31.5–35.7)
MCV RBC AUTO: 108.6 FL (ref 79–97)
METHGB BLD QL: 0.4 % (ref 0–1.5)
MODALITY: ABNORMAL
MONOCYTES # BLD AUTO: 1.2 10*3/MM3 (ref 0.1–0.9)
MONOCYTES NFR BLD AUTO: 14.4 % (ref 5–12)
NEUTROPHILS NFR BLD AUTO: 5.46 10*3/MM3 (ref 1.7–7)
NEUTROPHILS NFR BLD AUTO: 65.2 % (ref 42.7–76)
NOTE: ABNORMAL
NRBC BLD AUTO-RTO: 0 /100 WBC (ref 0–0.2)
OXYHGB MFR BLDV: 98.2 % (ref 94–99)
PAW @ PEAK INSP FLOW SETTING VENT: 0 CMH2O
PCO2 BLDA: 34.9 MM HG (ref 35–45)
PCO2 TEMP ADJ BLD: 34.9 MM HG (ref 35–45)
PH BLDA: 7.43 PH UNITS (ref 7.35–7.45)
PH, TEMP CORRECTED: 7.43 PH UNITS
PLATELET # BLD AUTO: 254 10*3/MM3 (ref 140–450)
PMV BLD AUTO: 9.9 FL (ref 6–12)
PO2 BLDA: 176 MM HG (ref 83–108)
PO2 TEMP ADJ BLD: 176 MM HG (ref 83–108)
POTASSIUM SERPL-SCNC: 4.8 MMOL/L (ref 3.5–5.2)
RBC # BLD AUTO: 2.92 10*6/MM3 (ref 3.77–5.28)
SODIUM SERPL-SCNC: 133 MMOL/L (ref 136–145)
TOTAL RATE: 0 BREATHS/MINUTE
WBC NRBC COR # BLD: 8.36 10*3/MM3 (ref 3.4–10.8)

## 2023-04-09 PROCEDURE — 87040 BLOOD CULTURE FOR BACTERIA: CPT | Performed by: HOSPITALIST

## 2023-04-09 PROCEDURE — 36600 WITHDRAWAL OF ARTERIAL BLOOD: CPT

## 2023-04-09 PROCEDURE — 82805 BLOOD GASES W/O2 SATURATION: CPT

## 2023-04-09 PROCEDURE — 82375 ASSAY CARBOXYHB QUANT: CPT

## 2023-04-09 PROCEDURE — 85025 COMPLETE CBC W/AUTO DIFF WBC: CPT | Performed by: HOSPITALIST

## 2023-04-09 PROCEDURE — 25010000002 HYDROMORPHONE PER 4 MG: Performed by: STUDENT IN AN ORGANIZED HEALTH CARE EDUCATION/TRAINING PROGRAM

## 2023-04-09 PROCEDURE — 83050 HGB METHEMOGLOBIN QUAN: CPT

## 2023-04-09 PROCEDURE — 83605 ASSAY OF LACTIC ACID: CPT | Performed by: HOSPITALIST

## 2023-04-09 PROCEDURE — 99231 SBSQ HOSP IP/OBS SF/LOW 25: CPT | Performed by: HOSPITALIST

## 2023-04-09 PROCEDURE — 70450 CT HEAD/BRAIN W/O DYE: CPT

## 2023-04-09 PROCEDURE — 25010000002 DIAZEPAM PER 5 MG: Performed by: INTERNAL MEDICINE

## 2023-04-09 PROCEDURE — 80048 BASIC METABOLIC PNL TOTAL CA: CPT | Performed by: HOSPITALIST

## 2023-04-09 PROCEDURE — 82962 GLUCOSE BLOOD TEST: CPT

## 2023-04-09 PROCEDURE — 63710000001 INSULIN LISPRO (HUMAN) PER 5 UNITS: Performed by: INTERNAL MEDICINE

## 2023-04-09 PROCEDURE — 71045 X-RAY EXAM CHEST 1 VIEW: CPT

## 2023-04-09 RX ORDER — POVIDONE-IODINE 10 MG/ML
1 SOLUTION TOPICAL DAILY
Status: DISCONTINUED | OUTPATIENT
Start: 2023-04-09 | End: 2023-04-21

## 2023-04-09 RX ADMIN — Medication 10 ML: at 20:07

## 2023-04-09 RX ADMIN — CARVEDILOL 3.12 MG: 6.25 TABLET, FILM COATED ORAL at 09:24

## 2023-04-09 RX ADMIN — ISOSORBIDE MONONITRATE 120 MG: 120 TABLET, EXTENDED RELEASE ORAL at 09:25

## 2023-04-09 RX ADMIN — ACETAMINOPHEN 325MG 650 MG: 325 TABLET ORAL at 09:24

## 2023-04-09 RX ADMIN — SODIUM CHLORIDE 500 ML: 9 INJECTION, SOLUTION INTRAVENOUS at 19:25

## 2023-04-09 RX ADMIN — CAPSAICIN 1 APPLICATION: 0.75 CREAM TOPICAL at 09:27

## 2023-04-09 RX ADMIN — APIXABAN 2.5 MG: 2.5 TABLET, FILM COATED ORAL at 09:27

## 2023-04-09 RX ADMIN — INSULIN LISPRO 2 UNITS: 100 INJECTION, SOLUTION INTRAVENOUS; SUBCUTANEOUS at 09:27

## 2023-04-09 RX ADMIN — HYDROMORPHONE HYDROCHLORIDE 0.25 MG: 1 INJECTION, SOLUTION INTRAMUSCULAR; INTRAVENOUS; SUBCUTANEOUS at 09:34

## 2023-04-09 RX ADMIN — ASPIRIN 81 MG: 81 TABLET, COATED ORAL at 09:24

## 2023-04-09 RX ADMIN — Medication 10 ML: at 09:24

## 2023-04-09 RX ADMIN — OXYCODONE HYDROCHLORIDE 5 MG: 5 TABLET ORAL at 04:48

## 2023-04-09 RX ADMIN — DIAZEPAM 2.5 MG: 5 INJECTION, SOLUTION INTRAMUSCULAR; INTRAVENOUS at 21:17

## 2023-04-09 RX ADMIN — SENNOSIDES AND DOCUSATE SODIUM 2 TABLET: 8.6; 5 TABLET ORAL at 09:24

## 2023-04-09 RX ADMIN — POVIDONE-IODINE 1 EACH: 10 SOLUTION TOPICAL at 18:23

## 2023-04-09 RX ADMIN — AMIODARONE HYDROCHLORIDE 200 MG: 200 TABLET ORAL at 09:24

## 2023-04-09 RX ADMIN — FENTANYL 1 PATCH: 25 PATCH TRANSDERMAL at 13:35

## 2023-04-09 RX ADMIN — DIAZEPAM 2.5 MG: 5 INJECTION, SOLUTION INTRAMUSCULAR; INTRAVENOUS at 01:50

## 2023-04-09 RX ADMIN — CAPSAICIN: 0.75 CREAM TOPICAL at 21:21

## 2023-04-09 NOTE — PROGRESS NOTES
Stroke Navigator CODE STROKE Note    TIME NOTIFIED OF PATIENTS ARRIVAL 1615, TIME OF PATIENT EVALUATION 1620    HPI    Esperanza Pop is a 78 y.o. -American female with known medical diagnoses of PAF (Xarelto), rectal CA (a recent diagnosis treated with chemo and radiation), ESRD (HD MWF), HTN, HLD, HF, breast cancer s/p mastectomy and radiation, and IDDM who was admitted 4/5/2023 for intractable rectal pain who I am evaluating as a Code Stroke.  Per nursing, at baseline patient is alert/drowsy but easily arousable, she engages in conversation but is disoriented, she is typically able to follow commands.  This afternoon she has been notably less interactive from her baseline.  She was apparently not following commands or speaking; she was evaluated by hospitalist WILMA, code stroke was called.    On my exam patient is drowsy but arousable to verbal.  She is able to tell me her name but does not engage in much other conversation.  The speech that she is able to produce is clear.  Exam is nonfocal.  Her face is symmetrical.  She does appear to have a slight gaze palsy to the left, all visual fields are intact to visual threat.  She has purposeful, antigravity strength of her bilateral upper extremities.  She squeezes my hands to command.  No antigravity strength of her lower extremities bilaterally, suspect this is baseline.  It appears that her blood pressure this morning was 130s-140s systolic.  This afternoon her blood pressure is in the 80s to 90s systolic.  She is afebrile.  She reportedly has had poor p.o. intake and has missed several HD sessions prior to admission.      Code Stroke location: In House    · Last known well: < 3 hours    · GCS: 14  · Baseline level of function known: yes. Modified [place]: 4   · Patient is not a candidate for thrombolytic therapy due to Current anticoagulation use, symptoms not consistent with acute stroke      Stroke risk factors: atrial fibrillation, hypercoagulable state,  hyperlipidemia and hypertension.     Prior stroke history: no  Antiplatelet therapy: Aspirin 81mg  Anticoagulation: Eliquis     · Imaging performed: CT head      NIHSS    Interval: baseline  1a. Level of Consciousness: 1-->Not alert, but arousable by minor stimulation to obey, answer, or respond  1b. LOC Questions: 1-->Answers one question correctly  1c. LOC Commands: 1-->Performs one task correctly  2. Best Gaze: 1-->Partial gaze palsy, gaze is abnormal in one or both eyes, but forced deviation or total gaze paresis is not present  3. Visual: 0-->No visual loss  4. Facial Palsy: 0-->Normal symmetrical movements  5a. Motor Arm, Left: 1-->Drift, limb holds 90 (or 45) degrees, but drifts down before full 10 seconds, does not hit bed or other support  5b. Motor Arm, Right: 1-->Drift, limb holds 90 (or 45) degrees, but drifts down before full 10 secs, does not hit bed or other support  6a. Motor Leg, Left: 3-->No effort against gravity, leg falls to bed immediately  6b. Motor Leg, Right: 3-->No effort against gravity, leg falls to bed immediately  7. Limb Ataxia: 0-->Absent  8. Sensory: 0-->Normal, no sensory loss  9. Best Language: 1-->Mild-to-moderate aphasia, some obvious loss of fluency or facility of comprehension, without significant limitation on ideas expressed or form of expression. Reduction of speech and/or comprehension, however, makes conversation. . . (see row details)  10. Dysarthria: 0-->Normal  11. Extinction and Inattention (formerly Neglect): 0-->No abnormality    Total (NIH Stroke Scale): 13         Results  WBC   Date Value Ref Range Status   04/09/2023 8.36 3.40 - 10.80 10*3/mm3 Final     RBC   Date Value Ref Range Status   04/09/2023 2.92 (L) 3.77 - 5.28 10*6/mm3 Final     Hemoglobin   Date Value Ref Range Status   04/09/2023 8.7 (L) 12.0 - 15.9 g/dL Final     Hematocrit   Date Value Ref Range Status   04/09/2023 31.7 (L) 34.0 - 46.6 % Final     MCV   Date Value Ref Range Status   04/09/2023 108.6  (H) 79.0 - 97.0 fL Final     MCH   Date Value Ref Range Status   04/09/2023 29.8 26.6 - 33.0 pg Final     MCHC   Date Value Ref Range Status   04/09/2023 27.4 (L) 31.5 - 35.7 g/dL Final     RDW   Date Value Ref Range Status   04/09/2023 18.0 (H) 12.3 - 15.4 % Final     RDW-SD   Date Value Ref Range Status   04/09/2023 72.3 (H) 37.0 - 54.0 fl Final     MPV   Date Value Ref Range Status   04/09/2023 9.9 6.0 - 12.0 fL Final     Platelets   Date Value Ref Range Status   04/09/2023 254 140 - 450 10*3/mm3 Final     Neutrophil %   Date Value Ref Range Status   04/09/2023 65.2 42.7 - 76.0 % Final     Lymphocyte %   Date Value Ref Range Status   04/09/2023 16.4 (L) 19.6 - 45.3 % Final     Monocyte %   Date Value Ref Range Status   04/09/2023 14.4 (H) 5.0 - 12.0 % Final     Eosinophil %   Date Value Ref Range Status   04/09/2023 2.9 0.3 - 6.2 % Final     Basophil %   Date Value Ref Range Status   04/09/2023 0.4 0.0 - 1.5 % Final     Immature Grans %   Date Value Ref Range Status   04/09/2023 0.7 (H) 0.0 - 0.5 % Final     Neutrophils, Absolute   Date Value Ref Range Status   04/09/2023 5.46 1.70 - 7.00 10*3/mm3 Final     Lymphocytes, Absolute   Date Value Ref Range Status   04/09/2023 1.37 0.70 - 3.10 10*3/mm3 Final     Monocytes, Absolute   Date Value Ref Range Status   04/09/2023 1.20 (H) 0.10 - 0.90 10*3/mm3 Final     Eosinophils, Absolute   Date Value Ref Range Status   04/09/2023 0.24 0.00 - 0.40 10*3/mm3 Final     Basophils, Absolute   Date Value Ref Range Status   04/09/2023 0.03 0.00 - 0.20 10*3/mm3 Final     Immature Grans, Absolute   Date Value Ref Range Status   04/09/2023 0.06 (H) 0.00 - 0.05 10*3/mm3 Final     nRBC   Date Value Ref Range Status   04/09/2023 0.0 0.0 - 0.2 /100 WBC Final     Lab Results   Component Value Date    GLUCOSE 94 04/06/2023    BUN 20 04/06/2023    CREATININE 4.02 (H) 04/06/2023    EGFR 11.0 (L) 04/06/2023    BCR 5.0 (L) 04/06/2023    K 4.9 04/06/2023    CO2 25.0 04/06/2023    CALCIUM 8.2  (L) 04/06/2023    PROTENTOTREF 7.5 09/22/2020    ALBUMIN 2.9 (L) 04/05/2023    BILITOT 0.7 04/05/2023    AST 37 (H) 04/05/2023    ALT 12 04/05/2023     CT Head Without Contrast    Result Date: 4/9/2023  Impression: No evidence of acute intracranial hemorrhage or large territorial infarct. Chronic changes as above. Electronically Signed: Juan Carlos Martinez  4/9/2023 5:33 PM EDT  Workstation ID: [id number]    XR Chest 1 View    Result Date: 4/9/2023  Impression: No evidence of acute cardiopulmonary process. Cardiomegaly. Electronically Signed: Juan Carlos Martinez  4/9/2023 5:54 PM EDT  Workstation ID: [id number]      PLAN  78-year-old, -American female who presented with intractable rectal pain secondary to recently diagnosed rectal cancer; previously unable to tolerate treatment, now with decreased level of alertness for which a code stroke was called.  Patient is drowsy but arousable, she is globally weak; nonfocal exam and CT head is negative for acute abnormality.  Low suspicion that this represents a cerebrovascular event.  I think an MRI would be relatively low yield and probably very uncomfortable for the patient.  Discussed with Dr. Ríos and hospitalist WILMA.  It is possible that her symptoms are secondary to a metabolic source versus hypotension.  Labs are pending.  No further stroke work-up needed.        WILMA Carranza

## 2023-04-09 NOTE — PROGRESS NOTES
[facility] Medicine Services  PROGRESS NOTE    Patient Name: Esperanza Pop  : 1945  MRN: [id number]    Date of Admission: 2023  Primary Care Physician: Meghana Rodgers MD    Subjective   Subjective     CC:  Follow up anal cancer    HPI: Up in bed. Had some rectal pain, but better this AM.     ROS:  No change in ROS from     Objective   Objective     Vital Signs:   Temp:  [97.4 °F (36.3 °C)-98.2 °F (36.8 °C)] 98.2 °F (36.8 °C)  Heart Rate:  [62-69] 69  Resp:  [13-18] 16  BP: ()/(47-62) 140/57     Physical Exam:  NAD, chronically ill appearing  OP clear, dry   Neck supple  No LAD  RRR  CTAB  +BS, soft  DONG  Normal affect  No rashes    Results Reviewed:  LAB RESULTS:      Lab 23  0851   WBC 9.16   HEMOGLOBIN 9.0*   HEMATOCRIT 30.9*   PLATELETS 203   NEUTROS ABS 6.08   IMMATURE GRANS (ABS) 0.05   LYMPHS ABS 1.10   MONOS ABS 1.27*   EOS ABS 0.62*   .6*         Lab 23  0442 23  0851   SODIUM 136 135*   POTASSIUM 4.9 4.3   CHLORIDE 97* 91*   CO2 25.0 29.0   ANION GAP 14.0 15.0   BUN 20 39*   CREATININE 4.02* 6.93*   EGFR 11.0* 5.7*   GLUCOSE 94 169*   CALCIUM 8.2* 9.0   MAGNESIUM 2.0 2.3   PHOSPHORUS 3.2 3.2         Lab 23  0851   TOTAL PROTEIN 8.4   ALBUMIN 2.9*   GLOBULIN 5.5   ALT (SGPT) 12   AST (SGOT) 37*   BILIRUBIN 0.7   ALK PHOS 238*                     Brief Urine Lab Results     None          Microbiology Results Abnormal     None          No radiology results from the last 24 hrs    Results for orders placed during the hospital encounter of 03/10/23    Adult Transthoracic Echo Complete W/ Cont if Necessary Per Protocol    Interpretation Summary  •  Left ventricular systolic function is normal. Estimated left ventricular EF = 55%  •  Left ventricular wall thickness is consistent with moderate concentric hypertrophy.  •  The right ventricular cavity is mildly dilated.  •  Mild to moderate biatrial enlargement.  •  Moderate to severe  aortic valve stenosis is present.  Mean gradient 34 mmHg, DOUG 0.9 cm².  •  Mild aortic insufficiency.  •  Mild mitral regurgitation.  •  Mild to moderate tricuspid regurgitation with RVSP 48 mmHg.      Current medications:  Scheduled Meds:acetaminophen, 650 mg, Oral, TID  amiodarone, 200 mg, Oral, Daily  apixaban, 2.5 mg, Oral, BID  aspirin, 81 mg, Oral, Daily  atorvastatin, 80 mg, Oral, Nightly  capsaicin, , Topical, Q12H  carvedilol, 3.125 mg, Oral, Q12H  fentaNYL, 1 patch, Transdermal, Q72H   And  Check Fentanyl Patch Placement, 1 each, Does not apply, Q12H  insulin lispro, 0-7 Units, Subcutaneous, TID AC  isosorbide mononitrate, 120 mg, Oral, Daily  mirtazapine, 15 mg, Oral, Nightly  senna-docusate sodium, 2 tablet, Oral, BID  sodium chloride, 10 mL, Intravenous, Q12H      Continuous Infusions:   PRN Meds:.•  senna-docusate sodium **AND** bisacodyl **AND** bisacodyl  •  cloNIDine  •  dextrose  •  dextrose  •  diazePAM  •  glucagon (human recombinant)  •  HYDROmorphone  •  hydrOXYzine  •  melatonin  •  ondansetron  •  oxyCODONE  •  [COMPLETED] Insert Peripheral IV **AND** sodium chloride  •  sodium chloride  •  sodium chloride    Assessment & Plan   Assessment & Plan     Active Hospital Problems    Diagnosis  POA   • **Intractable pain [R52]  Yes      Resolved Hospital Problems   No resolved problems to display.        Brief Hospital Course to date:  Esperanza Pop is a 78 y.o. female w/ ESRD (HD-MWF), DM2, HTN, HLD, HFpEF, pAfib (xarelto), anemia, prior breast cancer, active anal cancer intolerant of chemo/XRT, recently admitted 3/10-3/29 and OR'ed to SNF to trial improvement of functional status, who returned w/ intractable rectal pain    Assessment/Plan    Intractable pain, multifactorial  Wounds of the buttocks, recent radiotherapy  -continue with wound care, palliative care following    Metastatic anal cancer  Anorectal fistula  -s/p partial treatment with chemotherapy and radiation  -CT with slight decrease  in size, with anorectal fistual noted  -did not tolerate treatment before and went to SNF    Anorexia  -cont mirtazapine    Dysphagia  -seen by SLP, s/p FEES, diet upgraded    ESRD on HD  -HD MWF  -missed sessions pta  -nephro follows    DM type 2, a1c 5.8%, w/ insulin use  -SSI    HFpEF  HTN  pAfib  -cont amio, apixiban, asa, statin, coreg, imdur     Mod-Sev AS  -has appt w/ Geena Estrella, APRN 4/27/23    Expected Discharge Location and Transportation: SNF w/ palliative care vs hospice  Expected Discharge   Expected Discharge Date and Time     Expected Discharge Date Expected Discharge Time    Apr 10, 2023            DVT prophylaxis:  Medical DVT prophylaxis orders are present.     AM-PAC 6 Clicks Score (PT): 10 (04/08/23 2000)    CODE STATUS:   Code Status and Medical Interventions:   Ordered at: 04/06/23 1151     Medical Intervention Limits:    NO intubation (DNI)     Code Status (Patient has no pulse and is not breathing):    No CPR (Do Not Attempt to Resuscitate)     Medical Interventions (Patient has pulse or is breathing):    Limited Support     Comments:    Per family       Champ Ríos MD  04/09/23

## 2023-04-09 NOTE — SIGNIFICANT NOTE
04/09/23 1511   SLP Deferred Reason   SLP Deferred Reason Routine  (Attempted to see for re-eval this PM. Pt lethargic & dtr requested come back tomorrow.)

## 2023-04-09 NOTE — NURSING NOTE
[facility] consulted for: Pressure injuries not present on admission black/maroon nonblanching right heel and right lateral ankle    Patient well-known to WOC RN from this and previous admission, in bed, drowsy but awakens appears unwell.  RN entered room during assessment and assisted.    Identified suspected deep tissue pressure injuries to patient's right lateral malleolus and right posterior heel.  Wound to right lateral malleolus is nonblanchable, dark maroon/purple/dark brown, dry wound bed and periwound skin is intact.  Wound measures 2.5 x 2 x 0 cm.  Suspected deep tissue pressure injury to patient's right posterior heel measures 5 x 8 x 0 cm, wound bed is purple/maroon, nonblanchable, dry.  Periwound skin is blanchable and intact.    Right lateral malleolus              Right heel              Removed heel boots.  Left heel is blanchable as is left lateral malleolus.  Cleansed wounds with normal saline, patted dry, applied betadine to periwound skin.  Applied Hydraguard to patient's lower legs and feet avoiding wounds.    Sensory Perception: 3-->slightly limited  Moisture: 3-->occasionally moist  Activity: 2-->chairfast  Mobility: 2-->very limited  Nutrition: 2-->probably inadequate  Friction and Shear: 1-->problem  Jayce Score: 13 (04/08/23 2000)    Patient is on a specialty bed ordered previously by WOC RN during this admission.  Please implement pressure injury prevention interventions per protocol for patients with a Jayce score of 18 or less.    Thank you for the consult.  WO team will plan to follow-up.  If alteration to skin integrity or change in wound bed presentation please consult the WO team.

## 2023-04-09 NOTE — PLAN OF CARE
Goal Outcome Evaluation:  Plan of Care Reviewed With: patient, daughter          IZABEL. RA. Confused at times.Was anxious about BP being low, BP was WNL and was worried about missing dialysis, offered valium, helped pt. Turned Q2. Changed coccyx dressing twice due to BM, next dressing change due 4/12. No urine output. Had a BM. Slept well. Gave oxy after dressing change.      Progress: no change     Problem: Skin Injury Risk Increased  Goal: Skin Health and Integrity  Intervention: Optimize Skin Protection  Recent Flowsheet Documentation  Taken 4/9/2023 0020 by Prabha Diggs, RN  Head of Bed (HOB) Positioning: HOB at 15 degrees  Taken 4/9/2023 0000 by Prabha Diggs RN  Pressure Reduction Techniques: weight shift assistance provided  Pressure Reduction Devices: positioning supports utilized  Skin Protection:  • adhesive use limited  • skin-to-device areas padded  • skin-to-skin areas padded  • tubing/devices free from skin contact  Taken 4/8/2023 2200 by Prabha Diggs RN  Pressure Reduction Techniques: weight shift assistance provided  Head of Bed (HOB) Positioning: HOB at 15 degrees  Pressure Reduction Devices: positioning supports utilized  Skin Protection:  • adhesive use limited  • skin-to-skin areas padded  • skin-to-device areas padded  Taken 4/8/2023 2000 by Prabha Diggs RN  Pressure Reduction Techniques: weight shift assistance provided  Pressure Reduction Devices: positioning supports utilized  Skin Protection:  • adhesive use limited  • skin-to-skin areas padded  • skin-to-device areas padded  • tubing/devices free from skin contact     Problem: Impaired Wound Healing  Goal: Optimal Wound Healing  Intervention: Promote Wound Healing  Recent Flowsheet Documentation  Taken 4/9/2023 0000 by Prabha Diggs, RN  Activity Management: activity encouraged  Taken 4/8/2023 2200 by Prabha Diggs RN  Activity Management: activity encouraged  Taken 4/8/2023 2000 by Prabha Diggs,  RN  Activity Management: activity encouraged     Problem: Pain Acute  Goal: Acceptable Pain Control and Functional Ability  Intervention: Prevent or Manage Pain  Recent Flowsheet Documentation  Taken 4/8/2023 2200 by Prabha Diggs RN  Sensory Stimulation Regulation:  • care clustered  • lighting decreased  • quiet environment promoted  Taken 4/8/2023 2000 by Prabha Diggs RN  Sensory Stimulation Regulation: care clustered  Intervention: Optimize Psychosocial Wellbeing  Recent Flowsheet Documentation  Taken 4/9/2023 0000 by Prabha Diggs RN  Supportive Measures: active listening utilized  Taken 4/8/2023 2200 by Prabha Diggs RN  Supportive Measures:  • active listening utilized  • self-care encouraged  Taken 4/8/2023 2000 by Prabha Diggs RN  Supportive Measures: active listening utilized     Problem: Adult Inpatient Plan of Care  Goal: Absence of Hospital-Acquired Illness or Injury  Intervention: Identify and Manage Fall Risk  Recent Flowsheet Documentation  Taken 4/9/2023 0000 by Prabha Diggs RN  Safety Promotion/Fall Prevention:  • activity supervised  • assistive device/personal items within reach  Taken 4/8/2023 2200 by Prabha Diggs RN  Safety Promotion/Fall Prevention:  • activity supervised  • assistive device/personal items within reach  • clutter free environment maintained  • room organization consistent  • safety round/check completed  Taken 4/8/2023 2000 by Prabha Diggs RN  Safety Promotion/Fall Prevention:  • activity supervised  • assistive device/personal items within reach  • clutter free environment maintained  Intervention: Prevent Skin Injury  Recent Flowsheet Documentation  Taken 4/9/2023 0020 by Prabha Diggs RN  Body Position:  • turned  • left  Taken 4/9/2023 0000 by Prabha Diggs RN  Body Position: weight shifting  Skin Protection:  • adhesive use limited  • skin-to-device areas padded  • skin-to-skin areas padded  • tubing/devices free from  skin contact  Taken 4/8/2023 2200 by Prabha Diggs RN  Body Position:  • side-lying  • right  Skin Protection:  • adhesive use limited  • skin-to-skin areas padded  • skin-to-device areas padded  Taken 4/8/2023 2000 by Prabha Diggs RN  Body Position:  • side-lying  • right  Skin Protection:  • adhesive use limited  • skin-to-skin areas padded  • skin-to-device areas padded  • tubing/devices free from skin contact  Intervention: Prevent and Manage VTE (Venous Thromboembolism) Risk  Recent Flowsheet Documentation  Taken 4/9/2023 0000 by Prabha Diggs RN  Activity Management: activity encouraged  Taken 4/8/2023 2200 by Prabha Diggs RN  Activity Management: activity encouraged  VTE Prevention/Management: (po eliquis)  • bilateral  • sequential compression devices off  Taken 4/8/2023 2000 by Prabha Diggs RN  Activity Management: activity encouraged  Goal: Optimal Comfort and Wellbeing  Intervention: Provide Person-Centered Care  Recent Flowsheet Documentation  Taken 4/9/2023 0000 by Prabha Diggs RN  Trust Relationship/Rapport: care explained  Taken 4/8/2023 2200 by Prabha Diggs RN  Trust Relationship/Rapport: care explained  Taken 4/8/2023 2000 by Prabha Diggs RN  Trust Relationship/Rapport: care explained

## 2023-04-09 NOTE — PROGRESS NOTES
" LOS: 4 days   Patient Care Team:  Meghana Rodgers MD as PCP - General  Demetrius Sagastume MD as Consulting Physician (Cardiology)  Kevan Lerma MD as Consulting Physician (Nephrology)  Geena Estrella APRN as Nurse Practitioner (Cardiology)  Frank Mandujano MD as Referring Physician (Colon and Rectal Surgery)  Kevan Cavazos MD as Consulting Physician (Radiation Oncology)  Yue Reeves RN as Nurse Navigator  Darryn Burk MD as Consulting Physician (Nephrology)    Chief Complaint: ESRD    Subjective     Plan for HD tomorrow       Subjective:  Symptoms:  Stable.  No shortness of breath, chest pain, chest pressure or anxiety.    Pain:  She complains of pain that is moderate.        History taken from: patient    Objective     Vital Sign Min/Max for last 24 hours  Temp  Min: 97.4 °F (36.3 °C)  Max: 98.9 °F (37.2 °C)   BP  Min: 90/47  Max: 140/57   Pulse  Min: 67  Max: 83   Resp  Min: 13  Max: 18   SpO2  Min: 95 %  Max: 98 %   No data recorded   No data recorded     Flowsheet Rows    Flowsheet Row First Filed Value   Admission Height 167.6 cm (66\") Documented at 04/05/2023 0702   Admission Weight 78.9 kg (174 lb) Documented at 04/05/2023 0702          No intake/output data recorded.  I/O last 3 completed shifts:  In: 960 [P.O.:960]  Out: -     Objective:  General Appearance:  Ill-appearing.    Vital signs: (most recent): Blood pressure 138/63, pulse 83, temperature 98.9 °F (37.2 °C), temperature source Axillary, resp. rate 16, height 167.6 cm (66\"), weight 63.5 kg (140 lb 1.6 oz), SpO2 95 %, not currently breastfeeding.  Vital signs are normal.    HEENT: Normal HEENT exam.    Lungs:  Normal effort and normal respiratory rate.  Breath sounds clear to auscultation.  She is not in respiratory distress.  No decreased breath sounds or wheezes.    Heart: Normal rate.  Regular rhythm.  S1 normal and S2 normal.  No murmur or gallop.   Abdomen: Abdomen is soft.    Extremities: There is no dependent " edema or local swelling.    Pulses: Distal pulses are intact.    Neurological: Patient is alert and oriented to person, place and time.  Normal strength.    Pupils:  Pupils are equal, round, and reactive to light.              Results Review:     I reviewed the patient's new clinical results.    WBC No results found for: WBC   HGB No results found for: HGB   HCT No results found for: HCT   Platlets No results found for: LABPLAT   MCV No results found for: MCV       Sodium No results found for: NA   Potassium No results found for: K   Chloride No results found for: CL   CO2 No results found for: CO2   BUN No results found for: BUN   Creatinine No results found for: CREATININE   Calcium No results found for: CALCIUM   PO4 No results found for: CAPO4   Albumin No results found for: ALBUMIN   Magnesium No results found for: MG   Uric Acid No results found for: URICACID     Medication Review: yes    Assessment & Plan       Intractable pain      Assessment & Plan        ESRD: MWF [facility]     Volume status: No significant anemia     Abdominal pain: In the setting of rectal cancer.     Failure to thrive     Hypoalbuminemia      Anemia: ACD due to CKD.     I discussed the patients findings and my recommendations with patient       Gabe Butler MD  04/09/23  14:26 EDT

## 2023-04-10 ENCOUNTER — APPOINTMENT (OUTPATIENT)
Dept: NEPHROLOGY | Facility: HOSPITAL | Age: 78
DRG: 935 | End: 2023-04-10
Payer: MEDICARE

## 2023-04-10 LAB
ANION GAP SERPL CALCULATED.3IONS-SCNC: 18 MMOL/L (ref 5–15)
BUN SERPL-MCNC: 47 MG/DL (ref 8–23)
BUN/CREAT SERPL: 7.1 (ref 7–25)
CALCIUM SPEC-SCNC: 8.6 MG/DL (ref 8.6–10.5)
CHLORIDE SERPL-SCNC: 96 MMOL/L (ref 98–107)
CO2 SERPL-SCNC: 19 MMOL/L (ref 22–29)
CREAT SERPL-MCNC: 6.58 MG/DL (ref 0.57–1)
DEPRECATED RDW RBC AUTO: 68 FL (ref 37–54)
EGFRCR SERPLBLD CKD-EPI 2021: 6.1 ML/MIN/1.73
ERYTHROCYTE [DISTWIDTH] IN BLOOD BY AUTOMATED COUNT: 17.8 % (ref 12.3–15.4)
GLUCOSE BLDC GLUCOMTR-MCNC: 80 MG/DL (ref 70–130)
GLUCOSE BLDC GLUCOMTR-MCNC: 82 MG/DL (ref 70–130)
GLUCOSE SERPL-MCNC: 73 MG/DL (ref 65–99)
HCT VFR BLD AUTO: 28.7 % (ref 34–46.6)
HGB BLD-MCNC: 8 G/DL (ref 12–15.9)
MCH RBC QN AUTO: 29.3 PG (ref 26.6–33)
MCHC RBC AUTO-ENTMCNC: 27.9 G/DL (ref 31.5–35.7)
MCV RBC AUTO: 105.1 FL (ref 79–97)
PLATELET # BLD AUTO: 279 10*3/MM3 (ref 140–450)
PMV BLD AUTO: 10.6 FL (ref 6–12)
POTASSIUM SERPL-SCNC: 4.9 MMOL/L (ref 3.5–5.2)
RBC # BLD AUTO: 2.73 10*6/MM3 (ref 3.77–5.28)
SODIUM SERPL-SCNC: 133 MMOL/L (ref 136–145)
WBC NRBC COR # BLD: 8.4 10*3/MM3 (ref 3.4–10.8)

## 2023-04-10 PROCEDURE — 85027 COMPLETE CBC AUTOMATED: CPT | Performed by: HOSPITALIST

## 2023-04-10 PROCEDURE — 25010000002 ALBUMIN HUMAN 25% PER 50 ML

## 2023-04-10 PROCEDURE — 99231 SBSQ HOSP IP/OBS SF/LOW 25: CPT | Performed by: HOSPITALIST

## 2023-04-10 PROCEDURE — 25010000002 HYDROMORPHONE PER 4 MG: Performed by: STUDENT IN AN ORGANIZED HEALTH CARE EDUCATION/TRAINING PROGRAM

## 2023-04-10 PROCEDURE — 80048 BASIC METABOLIC PNL TOTAL CA: CPT | Performed by: HOSPITALIST

## 2023-04-10 PROCEDURE — P9047 ALBUMIN (HUMAN), 25%, 50ML: HCPCS

## 2023-04-10 PROCEDURE — 82962 GLUCOSE BLOOD TEST: CPT

## 2023-04-10 PROCEDURE — 25010000002 DIAZEPAM PER 5 MG: Performed by: INTERNAL MEDICINE

## 2023-04-10 PROCEDURE — 25010000002 DIPHENHYDRAMINE PER 50 MG

## 2023-04-10 RX ORDER — ALBUMIN (HUMAN) 12.5 G/50ML
SOLUTION INTRAVENOUS
Status: COMPLETED
Start: 2023-04-10 | End: 2023-04-10

## 2023-04-10 RX ORDER — DIPHENHYDRAMINE HYDROCHLORIDE 50 MG/ML
INJECTION INTRAMUSCULAR; INTRAVENOUS
Status: COMPLETED
Start: 2023-04-10 | End: 2023-04-10

## 2023-04-10 RX ADMIN — APIXABAN 2.5 MG: 2.5 TABLET, FILM COATED ORAL at 08:11

## 2023-04-10 RX ADMIN — ACETAMINOPHEN 325MG 650 MG: 325 TABLET ORAL at 17:04

## 2023-04-10 RX ADMIN — DIPHENHYDRAMINE HYDROCHLORIDE 6.25 MG: 50 INJECTION, SOLUTION INTRAMUSCULAR; INTRAVENOUS at 10:14

## 2023-04-10 RX ADMIN — HYDROMORPHONE HYDROCHLORIDE 0.25 MG: 1 INJECTION, SOLUTION INTRAMUSCULAR; INTRAVENOUS; SUBCUTANEOUS at 03:47

## 2023-04-10 RX ADMIN — APIXABAN 2.5 MG: 2.5 TABLET, FILM COATED ORAL at 21:31

## 2023-04-10 RX ADMIN — CAPSAICIN: 0.75 CREAM TOPICAL at 21:31

## 2023-04-10 RX ADMIN — Medication 10 ML: at 08:20

## 2023-04-10 RX ADMIN — CARVEDILOL 3.12 MG: 6.25 TABLET, FILM COATED ORAL at 08:10

## 2023-04-10 RX ADMIN — MIRTAZAPINE 15 MG: 15 TABLET, FILM COATED ORAL at 21:30

## 2023-04-10 RX ADMIN — Medication 10 ML: at 21:32

## 2023-04-10 RX ADMIN — OXYCODONE HYDROCHLORIDE 5 MG: 5 TABLET ORAL at 06:32

## 2023-04-10 RX ADMIN — POVIDONE-IODINE 1 EACH: 10 SOLUTION TOPICAL at 08:20

## 2023-04-10 RX ADMIN — CAPSAICIN: 0.75 CREAM TOPICAL at 08:13

## 2023-04-10 RX ADMIN — ISOSORBIDE MONONITRATE 120 MG: 120 TABLET, EXTENDED RELEASE ORAL at 08:11

## 2023-04-10 RX ADMIN — CARVEDILOL 3.12 MG: 6.25 TABLET, FILM COATED ORAL at 21:31

## 2023-04-10 RX ADMIN — ATORVASTATIN CALCIUM 80 MG: 40 TABLET, FILM COATED ORAL at 21:30

## 2023-04-10 RX ADMIN — ASPIRIN 81 MG: 81 TABLET, COATED ORAL at 08:10

## 2023-04-10 RX ADMIN — AMIODARONE HYDROCHLORIDE 200 MG: 200 TABLET ORAL at 08:10

## 2023-04-10 RX ADMIN — SENNOSIDES AND DOCUSATE SODIUM 2 TABLET: 8.6; 5 TABLET ORAL at 08:10

## 2023-04-10 RX ADMIN — ACETAMINOPHEN 325MG 650 MG: 325 TABLET ORAL at 21:30

## 2023-04-10 RX ADMIN — HYDROMORPHONE HYDROCHLORIDE 0.25 MG: 1 INJECTION, SOLUTION INTRAMUSCULAR; INTRAVENOUS; SUBCUTANEOUS at 23:07

## 2023-04-10 RX ADMIN — ACETAMINOPHEN 325MG 650 MG: 325 TABLET ORAL at 08:11

## 2023-04-10 RX ADMIN — SENNOSIDES AND DOCUSATE SODIUM 2 TABLET: 8.6; 5 TABLET ORAL at 21:31

## 2023-04-10 RX ADMIN — DIAZEPAM 2.5 MG: 5 INJECTION, SOLUTION INTRAMUSCULAR; INTRAVENOUS at 19:55

## 2023-04-10 RX ADMIN — DIAZEPAM 2.5 MG: 5 INJECTION, SOLUTION INTRAMUSCULAR; INTRAVENOUS at 03:00

## 2023-04-10 RX ADMIN — ALBUMIN (HUMAN) 25 G: 0.25 INJECTION, SOLUTION INTRAVENOUS at 09:54

## 2023-04-10 NOTE — PLAN OF CARE
Goal Outcome Evaluation:  Plan of Care Reviewed With: patient        Progress: no change  Outcome Evaluation: Pt. sitting up in bed on RA and did not demonstrate any visible signs of discomfort s/p HD this afternoon.  Family at BS.  Daughter requested that Dr. Cavazos have family meeting on Wed.  Dr. Cavazos not available on Wed.  Waiting to hear back from him so that I may inform family of his availability.  Pt. denied pain,nausea and dyspnea.  Family looking for more treatment/radiaiton options for pt.  Palliative care to continue to follow for support, POC nd ongoing GOC.

## 2023-04-10 NOTE — PLAN OF CARE
Goal Outcome Evaluation:  Plan of Care Reviewed With: family        Progress: declining  Outcome Evaluation: Pt VSS, Dilaudid IV needed once at beginning of shift (see EMAR). Pt in and out of orientation. Pt became nonresponse at 1540, Dr. Ríos was paged. Charge nurse, House supervisor, WILMA Dupree came to bedside. Code stroke per Dr. Ríos order to confirm no neurologic involvment. Stroke team came to bedside. Pt was then able to open eyes, say her name,and lightly  hands, though very lethargic. Family at bedside and became slightly agitated, plan of care and education given. CT performed, labs obtained, Vital signs remained stable. Pt now resting, arousing to gentle touch and voice, Alert to self only, then falls right back asleep. Turned throughout the shift. Will cont to monitor.

## 2023-04-10 NOTE — PROGRESS NOTES
RADIATION ONCOLOGY TREATMENT MANAGEMENT NOTE     PATIENT:                                                             Esperanza Pop  MEDICAL RECORD #:                                 [id number]  :                                                            1945  DIAGNOSIS:                                                   T3 N1 M0 anal cancer              INTERVAL HISTORY:     Ms. Pop is a very pleasant female with multiple comorbidities including renal failure and on dialysis as well as diabetes who was diagnosed with advanced anal cancer.  The patient attempted treatments with concurrent chemotherapy and radiation.  She unfortunately did not tolerate treatments well and was hospitalized midway through her treatments.  The patient completed a total dose of 32.4 Alba at her planned 59.4 Gray before discontinuing due to poor tolerance and skin breakdown.  The patient was hospitalized and was discharged to rehab where she struggled to recover.  The patient has declined again and is back in the hospital.    Patient reports that she is still very tired.  She is a still very sore.  She is not terribly interested in resuming radiotherapy treatments    The patient's daughter is present at bedside today.      Current Facility-Administered Medications:   •  acetaminophen (TYLENOL) tablet 650 mg, 650 mg, Oral, TID, Nuria Lynch MD, 650 mg at 04/10/23 0811  •  amiodarone (PACERONE) tablet 200 mg, 200 mg, Oral, Daily, Nuria Lynch MD, 200 mg at 04/10/23 0810  •  apixaban (ELIQUIS) tablet 2.5 mg, 2.5 mg, Oral, BID, Nuria Lynch MD, 2.5 mg at 04/10/23 0811  •  aspirin EC tablet 81 mg, 81 mg, Oral, Daily, Nuria Lynch MD, 81 mg at 04/10/23 0810  •  atorvastatin (LIPITOR) tablet 80 mg, 80 mg, Oral, Nightly, Nuria Lynch MD, 80 mg at 23  •  sennosides-docusate (PERICOLACE) 8.6-50 MG per tablet 2 tablet, 2 tablet, Oral, BID, 2 tablet at 04/10/23 0810 **AND** bisacodyl (DULCOLAX) EC tablet 5 mg, 5 mg, Oral,  Daily PRN **AND** bisacodyl (DULCOLAX) suppository 10 mg, 10 mg, Rectal, Daily PRN, Nuria Lynch MD  •  capsaicin (ZOSTRIX) 0.075 % topical cream, , Topical, Q12H, Nuria Lynch MD, Given at 04/10/23 0813  •  carvedilol (COREG) tablet 3.125 mg, 3.125 mg, Oral, Q12H, Nuria Lynch MD, 3.125 mg at 04/10/23 0810  •  fentaNYL (DURAGESIC) 25 MCG/HR patch 1 patch, 1 patch, Transdermal, Q72H, 1 patch at 04/09/23 1335 **AND** Check Fentanyl Patch Placement, 1 each, Does not apply, Q12H, Kayla Anaya MD  •  cloNIDine (CATAPRES) tablet 0.1 mg, 0.1 mg, Oral, TID PRN, Nuria Lynch MD  •  dextrose (D50W) (25 g/50 mL) IV injection 25 g, 25 g, Intravenous, Q15 Min PRN, Ranulfo Thomas DO  •  dextrose (GLUTOSE) oral gel 15 g, 15 g, Oral, Q15 Min PRN, Ranulfo Thomas DO  •  diazePAM (VALIUM) injection 2.5 mg, 2.5 mg, Intravenous, Q4H PRN, Rohini Oconnor MD, 2.5 mg at 04/10/23 0300  •  glucagon (GLUCAGEN) injection 1 mg, 1 mg, Intramuscular, Q15 Min PRN, Ranulfo Thomas DO  •  HYDROmorphone (DILAUDID) injection 0.25 mg, 0.25 mg, Intravenous, Q4H PRN, Nuria Lynch MD, 0.25 mg at 04/10/23 0347  •  hydrOXYzine (ATARAX) tablet 25 mg, 25 mg, Oral, TID PRN, Nuria Lynch MD  •  Insulin Lispro (humaLOG) injection 0-7 Units, 0-7 Units, Subcutaneous, TID AC, Ranulfo Thomas DO, 2 Units at 04/09/23 0927  •  isosorbide mononitrate (IMDUR) 24 hr tablet 120 mg, 120 mg, Oral, Daily, Nuria Lynch MD, 120 mg at 04/10/23 0811  •  melatonin tablet 5 mg, 5 mg, Oral, Nightly PRN, Nuria Lynch MD, 5 mg at 04/06/23 2133  •  mirtazapine (REMERON) tablet 15 mg, 15 mg, Oral, Nightly, Nuria yLnch MD, 15 mg at 04/08/23 2036  •  ondansetron (ZOFRAN) injection 4 mg, 4 mg, Intravenous, Q6H PRN, Nuria Lynch MD  •  oxyCODONE (ROXICODONE) immediate release tablet 5 mg, 5 mg, Oral, Q6H PRN, Kayla nAaya MD, 5 mg at 04/10/23 0632  •  povidone-iodine 10 % swab 1 each, 1 application, Topical, Daily, Champ Ríos  MD DALJIT, 1 each at 04/10/23 0820  •  [COMPLETED] Insert Peripheral IV, , , Once **AND** sodium chloride 0.9 % flush 10 mL, 10 mL, Intravenous, PRN, Nuria Lynch MD, 10 mL at 04/05/23 1712  •  sodium chloride 0.9 % flush 10 mL, 10 mL, Intravenous, Q12H, Nuria Lynch MD, 10 mL at 04/10/23 0820  •  sodium chloride 0.9 % flush 10 mL, 10 mL, Intravenous, PRN, Nuria Lynch MD  •  sodium chloride 0.9 % infusion 40 mL, 40 mL, Intravenous, PRN, Nuria Lynch MD     KPS 50%     Physical Exam  Vitals and nursing note reviewed.   Constitutional:       General: She is in acute distress.      Appearance: She is well-developed. She is ill-appearing.      Comments: Appears very tired and ill   HENT:      Head: Normocephalic and atraumatic.      Mouth/Throat:      Comments: Mucous membranes appear dry  Eyes:      Conjunctiva/sclera: Conjunctivae normal.      Pupils: Pupils are equal, round, and reactive to light.   Neck:      Comments: No obviously enlarged cervical or supraclavicular LAD.  Cardiovascular:      Comments: Fistula in place.  No pedal edema  Pulmonary:      Breath sounds: No stridor. No wheezing.   Abdominal:      General: There is no distension.      Palpations: Abdomen is soft.   Musculoskeletal:         General: Normal range of motion.      Cervical back: Normal range of motion and neck supple.      Comments: Patient moves all extremities spontaneously.    Skin:     General: Skin is warm.      Comments: Skin very dry.   Neurological:      Mental Status: She is alert and oriented to person, place, and time.      Comments: Coordination intact.  Very tired     Psychiatric:         Judgment: Judgment normal.      Comments: Mood depressed    Vitals:    04/10/23 1351   BP: 138/60   Pulse: 77   Resp: 16   Temp:    SpO2: 97%     I have personally requested reviewed and interpreted the patient's images and radiology reports and pathology reports listed below:  Adult Transthoracic Echo Complete W/ Cont if Necessary Per  Protocol    Result Date: 3/13/2023  •  Left ventricular systolic function is normal. Estimated left ventricular EF = 55% •  Left ventricular wall thickness is consistent with moderate concentric hypertrophy. •  The right ventricular cavity is mildly dilated. •  Mild to moderate biatrial enlargement. •  Moderate to severe aortic valve stenosis is present.  Mean gradient 34 mmHg, DOUG 0.9 cm². •  Mild aortic insufficiency. •  Mild mitral regurgitation. •  Mild to moderate tricuspid regurgitation with RVSP 48 mmHg.     CT Abdomen Pelvis Without Contrast    Result Date: 4/5/2023  CT ABDOMEN PELVIS WO CONTRAST Date of Exam: 4/5/2023 6:26 PM EDT Indication: intractable rectal/abdominal pain. Comparison: 2/1/2023 abdomen pelvis CT scan Technique: Axial CT images were obtained of the abdomen and pelvis without the administration of contrast. Reconstructed coronal and sagittal images were also obtained. Automated exposure control and iterative construction methods were used. Findings: Prior scan report noted abnormal soft tissue thickening involving the anus, a somewhat patient's known tumor plus/minus postsurgical changes. Enlarged, left inguinal node presumably metastatic disease. History today indicates intractable rectal pain, abdominal pain. The included lower lungs appear clear except for what appears to be a small area of granulomatous scarring in the right middle lobe and minimal linear lung scarring elsewhere. No pleural effusion is seen. Gallbladder is distended, but similar to the prior study, and with no visible inflammation or gallstones. Pancreas is relatively atrophic, not unusual for age. There is mild fatty liver change. Liver morphology appears grossly normal. Spleen is not enlarged. Adrenal glands appear normal. Kidneys are relatively small. No obstructive uropathy is seen. No upper abdominal adenopathy, ascites or acute inflammatory change is seen. There appears to be a mild degree of fecal stasis. Small  bowel loops are normal in caliber and appearance. Regarding the lower abdomen pelvis, the terminal ileum and cecum appear normal. Appendix is not definitely identified, but no right lower quadrant inflammation is seen. Bladder is normally distended and normal in appearance. No intrapelvic free fluid is appreciated. Uterus and ovaries appear to be surgically absent. Area of the vaginal cuff appears normal. Patient's soft tissue mass previously seen extending from the dorsal margin to the upper gluteal fold is significantly reduced in size, although there appears to be a fistula from the lower anorectal region to near the skin surface along the upper gluteal fold. This is also less prominent than on the prior exam. No new inflammatory change is seen. There is no evidence of a drainable abscess. Small amount of dense material in the residual mass could represent iodine or trace hemorrhage. As on the prior study, it is uncertain if mass involves the mid vagina, or merely compresses it. Left inguinal adenopathy is improved, the largest node decreased from 31 mm to 22 mm. No new mass is seen. Bony structures appear to be intact.      Impression: 1. Persistent but decreased perianal mass compared to prior study, and improved left inguinal adenopathy. 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 No evidence of drainable fluid collection. Mild fecal stasis noted. 2. No evidence of new inflammatory focus elsewhere. 3. No other evidence of new intra-abdominal or intrapelvic disease. 4.. Persistent distention of the gallbladder, as on prior exam, but again no evidence of inflammation or biliary ductal dilatation. Electronically Signed: Champ Lee  4/5/2023 7:13 PM EDT  Workstation ID: [id number]    CT Head Without Contrast    Result Date: 4/9/2023  CT HEAD WO CONTRAST Date of Exam: 4/9/2023 4:37 PM EDT Indication: Mental status change, unknown cause. Comparison: 3/12/2023  Technique: Axial CT images were obtained of the head without contrast administration.  Reconstructed coronal and sagittal images were also obtained. Automated exposure control and iterative construction methods were used. Findings: Parenchyma:No acute intraparenchymal hemorrhage. No loss of gray-white differentiation to suggest large territory infarct. Mild parenchymal volume loss. Scattered periventricular and subcortical white matter hypodensities, nonspecific, but most often consistent with small vessel ischemic changes. No midline shift or herniation. Mineralization of the basal ganglia. Ventricles and extra axial spaces:Prominent ventricles and sulci secondary to volume loss. No extra axial fluid collection seen. Other:Lens replacements. Paranasal sinuses are clear. Mastoid air cells are clear. Calvarium is intact. Intracranial atherosclerotic calcification is present.     Impression: No evidence of acute intracranial hemorrhage or large territorial infarct. Chronic changes as above. Electronically Signed: Juan Carlos Martinez  4/9/2023 5:33 PM EDT  Workstation ID: [id number]    CT Head Without Contrast    Result Date: 3/12/2023  CT HEAD WO CONTRAST Date of Exam: 3/12/2023 11:19 AM EDT Indication: AMS. Comparison: MRI brain from April 18, 2019 Technique: Axial CT images were obtained of the head without contrast administration.  Reconstructed coronal and sagittal images were also obtained. Automated exposure control and iterative construction methods were used. Findings: No acute intracranial hemorrhage or extra-axial collection is identified. The ventricles appear normal in caliber, with no evidence of mass effect or midline shift. The basal cisterns appear patent. The gray-white differentiation appears preserved. The calvarium appear intact. The paranasal sinuses are clear. The mastoid air cells are well-aerated. Scattered foci of periventricular and subcortical white matter hypodensities are nonspecific, but  likely the sequela of mild chronic small vessel ischemic disease. Some degenerative mineralization is noted within the bilateral basal ganglia.     Impression: 1.No acute intracranial process identified. 2.Findings suggestive of mild chronic small vessel ischemic disease. Electronically Signed: Darrell Gonzalez  3/12/2023 11:35 AM EDT  Workstation ID: [id number]    SLP FEES - Fiberoptic Endo Eval Swallow    Result Date: 4/6/2023  This procedure was auto-finalized with no dictation required.    SLP FEES - Fiberoptic Endo Eval Swallow    Result Date: 3/23/2023  This procedure was auto-finalized with no dictation required.    XR Chest 1 View    Result Date: 4/9/2023  XR CHEST 1 VW Date of Exam: 4/9/2023 4:14 PM EDT Indication: Ams. Comparison: 3/23/2023 Findings: Cardiomegaly. No focal opacity, pleural effusion or pneumothorax. Similar likely right hilar granulomas. No evidence of acute osseous abnormalities. Visualized upper abdomen is unremarkable.     Impression: No evidence of acute cardiopulmonary process. Cardiomegaly. Electronically Signed: Juan Carlos Martinez  4/9/2023 5:54 PM EDT  Workstation ID: [id number]    XR Chest 1 View    Result Date: 3/22/2023  Exam:  Single view chest Date: March 22, 2023 History: Shortness of breath Comparison: March 10, 2023 Technique: Single frontal radiograph of the chest was obtained. The study is degraded by patient rotation. Findings: The heart is enlarged. There is mild vascular congestion. There is no pneumothorax or sizable pleural fluid collection.     1. Cardiomegaly with mild vascular congestion. There may be an underlying pericardial effusion. Slot 63 Electronically signed by:  Abdulaziz Mcwilliams M.D.  3/22/2023 2:25 AM Mountain Time    WBC   Date Value Ref Range Status   04/10/2023 8.40 3.40 - 10.80 10*3/mm3 Final     RBC   Date Value Ref Range Status   04/10/2023 2.73 (L) 3.77 - 5.28 10*6/mm3 Final     Hemoglobin   Date Value Ref Range Status   04/10/2023 8.0 (L) 12.0 - 15.9 g/dL  Final     Hematocrit   Date Value Ref Range Status   04/10/2023 28.7 (L) 34.0 - 46.6 % Final     MCV   Date Value Ref Range Status   04/10/2023 105.1 (H) 79.0 - 97.0 fL Final     MCH   Date Value Ref Range Status   04/10/2023 29.3 26.6 - 33.0 pg Final     MCHC   Date Value Ref Range Status   04/10/2023 27.9 (L) 31.5 - 35.7 g/dL Final     RDW   Date Value Ref Range Status   04/10/2023 17.8 (H) 12.3 - 15.4 % Final     RDW-SD   Date Value Ref Range Status   04/10/2023 68.0 (H) 37.0 - 54.0 fl Final     MPV   Date Value Ref Range Status   04/10/2023 10.6 6.0 - 12.0 fL Final     Platelets   Date Value Ref Range Status   04/10/2023 279 140 - 450 10*3/mm3 Final     Neutrophil %   Date Value Ref Range Status   04/09/2023 65.2 42.7 - 76.0 % Final     Lymphocyte %   Date Value Ref Range Status   04/09/2023 16.4 (L) 19.6 - 45.3 % Final     Monocyte %   Date Value Ref Range Status   04/09/2023 14.4 (H) 5.0 - 12.0 % Final     Eosinophil %   Date Value Ref Range Status   04/09/2023 2.9 0.3 - 6.2 % Final     Basophil %   Date Value Ref Range Status   04/09/2023 0.4 0.0 - 1.5 % Final     Immature Grans %   Date Value Ref Range Status   04/09/2023 0.7 (H) 0.0 - 0.5 % Final     Neutrophils, Absolute   Date Value Ref Range Status   04/09/2023 5.46 1.70 - 7.00 10*3/mm3 Final     Lymphocytes, Absolute   Date Value Ref Range Status   04/09/2023 1.37 0.70 - 3.10 10*3/mm3 Final     Monocytes, Absolute   Date Value Ref Range Status   04/09/2023 1.20 (H) 0.10 - 0.90 10*3/mm3 Final     Eosinophils, Absolute   Date Value Ref Range Status   04/09/2023 0.24 0.00 - 0.40 10*3/mm3 Final     Basophils, Absolute   Date Value Ref Range Status   04/09/2023 0.03 0.00 - 0.20 10*3/mm3 Final     Immature Grans, Absolute   Date Value Ref Range Status   04/09/2023 0.06 (H) 0.00 - 0.05 10*3/mm3 Final     nRBC   Date Value Ref Range Status   04/09/2023 0.0 0.0 - 0.2 /100 WBC Final     Lab Results   Component Value Date    GLUCOSE 73 04/10/2023    BUN 47 (H)  04/10/2023    CREATININE 6.58 (H) 04/10/2023    EGFR 6.1 (L) 04/10/2023    BCR 7.1 04/10/2023    K 4.9 04/10/2023    CO2 19.0 (L) 04/10/2023    CALCIUM 8.6 04/10/2023    PROTENTOTREF 7.5 09/22/2020    ALBUMIN 2.9 (L) 04/05/2023    BILITOT 0.7 04/05/2023    AST 37 (H) 04/05/2023    ALT 12 04/05/2023     Anal cancer  -T3 N1 M0  -Positive inguinal lymph node on CT scan  -Bulky primary 6 cm at least  -Discussed with Dr. Knott patient is not in acute danger of obstruction at onset of treatment  -Lesion is painful and foul-smelling with difficulty from pressure ulcers in skin denudement from lack of mobility   -Holding Xeloda  -Patient's tumor is responding well based on scans  -The patient's condition is quite serious.  She has not recovered.  Patient is back in the hospital with further collapse of her reserve.  -Discussed with the patient and some of her family, and I would not recommend proceeding with radiation treatments at this time, given her general debilitated state and specifically her trouble with her skin integrity.  -The patient has not received very high dose of radiotherapy so primary desquamation from her radiation treatments are unlikely however when coupled with diabetes, renal failure, protein malnutrition, limited mobility it is appear the patient has developed some sacral wound issues that are multifactorial in nature  -Recommended palliative care  Sacral ulcers  -Prohibit patient's ability to to lay on the table comfortably  -Recommend wound care     CKD  -On dialysis  -Complicates care     Diabetes  -Complicates care  -We will have issues with wound healing due to this.    Thank you for allowing me to be involved in the care of the patient. If you have any questions or concerns, please feel free to call or contact me at your earliest convenience.     Kevan Cavazos  Radiation Oncology

## 2023-04-10 NOTE — SIGNIFICANT NOTE
04/10/23 1440   SLP Deferred Reason   SLP Deferred Reason Patient unavailable for evaluation  (Attempted to f/u to re-assess swallowing. Pt w/ wound care, then palliative care team. Noted GOC discussions are ongoing. RN reported pt appears to be tolerating current diet/liquids. SLP will f/u.)

## 2023-04-10 NOTE — PROGRESS NOTES
" LOS: 5 days   Patient Care Team:  Meghana Rodgers MD as PCP - General  Demetrius Sagastume MD as Consulting Physician (Cardiology)  Kevan Lerma MD as Consulting Physician (Nephrology)  Geena Estrella APRN as Nurse Practitioner (Cardiology)  Frank Mandujano MD as Referring Physician (Colon and Rectal Surgery)  Kevan Cavazos MD as Consulting Physician (Radiation Oncology)  Yue Reeves RN as Nurse Navigator  Darryn Burk MD as Consulting Physician (Nephrology)    Chief Complaint: ESRD    Subjective     Seen on HD tolerating well so far. Minimal US as bp is labile and she has no significant edema.Good access pressure.         Subjective:  Symptoms:  Stable.  No shortness of breath, chest pain, chest pressure or anxiety.    Pain:  She complains of pain that is moderate.        History taken from: patient    Objective     Vital Sign Min/Max for last 24 hours  Temp  Min: 98 °F (36.7 °C)  Max: 98.3 °F (36.8 °C)   BP  Min: 100/51  Max: 170/68   Pulse  Min: 64  Max: 87   Resp  Min: 10  Max: 18   SpO2  Min: 93 %  Max: 100 %   No data recorded   No data recorded     Flowsheet Rows    Flowsheet Row First Filed Value   Admission Height 167.6 cm (66\") Documented at 04/05/2023 0702   Admission Weight 78.9 kg (174 lb) Documented at 04/05/2023 0702          I/O this shift:  In: -   Out: 10 [Other:10]  I/O last 3 completed shifts:  In: 200 [P.O.:200]  Out: -     Objective:  General Appearance:  Ill-appearing.    Vital signs: (most recent): Blood pressure 138/60, pulse 77, temperature 98 °F (36.7 °C), temperature source Temporal, resp. rate 16, height 167.6 cm (66\"), weight 63.5 kg (140 lb 1.6 oz), SpO2 97 %, not currently breastfeeding.  Vital signs are normal.    HEENT: Normal HEENT exam.    Lungs:  Normal effort and normal respiratory rate.  Breath sounds clear to auscultation.  She is not in respiratory distress.  No decreased breath sounds or wheezes.    Heart: Normal rate.  Regular rhythm.  S1 normal " and S2 normal.  No murmur or gallop.   Abdomen: Abdomen is soft.    Extremities: There is no dependent edema or local swelling.    Pulses: Distal pulses are intact.    Neurological: Patient is alert and oriented to person, place and time.  Normal strength.    Pupils:  Pupils are equal, round, and reactive to light.              Results Review:     I reviewed the patient's new clinical results.    WBC WBC   Date Value Ref Range Status   04/10/2023 8.40 3.40 - 10.80 10*3/mm3 Final   04/09/2023 8.36 3.40 - 10.80 10*3/mm3 Final      HGB Hemoglobin   Date Value Ref Range Status   04/10/2023 8.0 (L) 12.0 - 15.9 g/dL Final   04/09/2023 8.7 (L) 12.0 - 15.9 g/dL Final      HCT Hematocrit   Date Value Ref Range Status   04/10/2023 28.7 (L) 34.0 - 46.6 % Final   04/09/2023 31.7 (L) 34.0 - 46.6 % Final      Platlets No results found for: LABPLAT   MCV MCV   Date Value Ref Range Status   04/10/2023 105.1 (H) 79.0 - 97.0 fL Final   04/09/2023 108.6 (H) 79.0 - 97.0 fL Final          Sodium Sodium   Date Value Ref Range Status   04/10/2023 133 (L) 136 - 145 mmol/L Final   04/09/2023 133 (L) 136 - 145 mmol/L Final      Potassium Potassium   Date Value Ref Range Status   04/10/2023 4.9 3.5 - 5.2 mmol/L Final   04/09/2023 4.8 3.5 - 5.2 mmol/L Final      Chloride Chloride   Date Value Ref Range Status   04/10/2023 96 (L) 98 - 107 mmol/L Final   04/09/2023 97 (L) 98 - 107 mmol/L Final      CO2 CO2   Date Value Ref Range Status   04/10/2023 19.0 (L) 22.0 - 29.0 mmol/L Final   04/09/2023 22.0 22.0 - 29.0 mmol/L Final      BUN BUN   Date Value Ref Range Status   04/10/2023 47 (H) 8 - 23 mg/dL Final   04/09/2023 44 (H) 8 - 23 mg/dL Final      Creatinine Creatinine   Date Value Ref Range Status   04/10/2023 6.58 (H) 0.57 - 1.00 mg/dL Final   04/09/2023 5.86 (H) 0.57 - 1.00 mg/dL Final      Calcium Calcium   Date Value Ref Range Status   04/10/2023 8.6 8.6 - 10.5 mg/dL Final   04/09/2023 8.5 (L) 8.6 - 10.5 mg/dL Final      PO4 No results  found for: CAPO4   Albumin No results found for: ALBUMIN   Magnesium No results found for: MG   Uric Acid No results found for: URICACID     Medication Review: yes    Assessment & Plan       Intractable pain      Assessment & Plan        ESRD: MWF [facility]     Volume status: No significant anemia     Abdominal pain: In the setting of rectal cancer.     Failure to thrive     Hypoalbuminemia      Anemia: ACD due to CKD.     I discussed the patients findings and my recommendations with patient       Gabe Butler MD  04/10/23  16:02 EDT

## 2023-04-10 NOTE — CASE MANAGEMENT/SOCIAL WORK
Continued Stay Note  [facility]     Patient Name: Esperanza Pop  MRN: [id number]  Today's Date: 4/10/2023    Admit Date: 4/5/2023    Plan: Ongoing   Discharge Plan     Row Name 04/10/23 1201       Plan    Plan Ongoing    Patient/Family in Agreement with Plan yes    Plan Comments Binta/OVI Manager gave  paperwork to give to pt's daughter at bedside, per daughter's request. I stopped by Ms. Pop room and spoke with pt and her daughter Indigo. I gave Indigo the paperwork, she states that CJ/dtr will be here later today. Indigo states that she and her brother drove pt from [facility] to [facility] because rehab facility stated there was no other medication they could give the pt for her pain.  I asked Indigo if the family had discussed discharge plans. She states that they have decided on comfort care, likely home with Hospice. She states they understand that home with hospice is the best option, at this time. She states she just spoke with Dr. Ríos, at the bedside. I explained that I would be here today and would try to stop back by later. I spoke with Dr. Ríos, he confirmed comfort care and that he was going to speak with the Palliative team. I spoke with Emma/Hospice, she states that Inpatient Hospice team is going to see pt and speak with family today. Pt will have HD today. D/C plan is ongoing.  will continue to follow.    Final Discharge Disposition Code 30 - still a patient               Discharge Codes    No documentation.               Expected Discharge Date and Time     Expected Discharge Date Expected Discharge Time    Apr 12, 2023             Vilma Weaver RN

## 2023-04-10 NOTE — NURSING NOTE
"Dialysis called, informed this RN that they will come get her for dialysis. This RN asked the pt if she wanted to go and pt stated \"no\". She is more alert than she has been all shift.  "

## 2023-04-10 NOTE — NURSING NOTE
Patient decided to have treatment today and thus was brought to the treatment area.    Refused treatment this morning.  Discussed risks of missing treatment with patient.

## 2023-04-10 NOTE — PLAN OF CARE
Goal Outcome Evaluation:  Plan of Care Reviewed With: patient       VSS. Lethargic at start of shift, only aroused to voice. Around 2100, pt woke up and attempted to pull out IV and jump out of bed, daughter and this RN tried to redirect. Unable to, daughter gave this RN permission to give valium. Pt slept well after that. Moaned intermittently.    No BM or urine output. Turned throughout shift. Did not give meds at nighttime due to lack of alertness. Gave valium once more in the AM. Woke up around 0340, wanting to leave, opened eyes spontaneously at that point. Gave dilaudid. This RN called daughter who spoke with pt over phone and redirected her.    Gave pt oxy at end of shift and drank some liquid.       Progress: improving     Problem: Skin Injury Risk Increased  Goal: Skin Health and Integrity  Intervention: Optimize Skin Protection  Recent Flowsheet Documentation  Taken 4/10/2023 0000 by Prabha Diggs RN  Pressure Reduction Techniques: weight shift assistance provided  Head of Bed (HOB) Positioning: HOB at 30 degrees  Pressure Reduction Devices: positioning supports utilized  Skin Protection:  • adhesive use limited  • skin-to-skin areas padded  • skin-to-device areas padded  • tubing/devices free from skin contact  Taken 4/9/2023 2200 by Prabha Diggs RN  Pressure Reduction Techniques: weight shift assistance provided  Pressure Reduction Devices: positioning supports utilized  Skin Protection:  • adhesive use limited  • skin-to-skin areas padded  • skin-to-device areas padded  • tubing/devices free from skin contact  Taken 4/9/2023 1956 by Prabha Diggs RN  Pressure Reduction Techniques: weight shift assistance provided  Pressure Reduction Devices: positioning supports utilized  Skin Protection:  • adhesive use limited  • drying agents applied  • skin-to-device areas padded  • skin-to-skin areas padded  • tubing/devices free from skin contact     Problem: Pain Acute  Goal: Acceptable Pain  Control and Functional Ability  Intervention: Prevent or Manage Pain  Recent Flowsheet Documentation  Taken 4/10/2023 0000 by Prabha Diggs RN  Sensory Stimulation Regulation: care clustered  Taken 4/9/2023 2200 by Prabha Diggs RN  Sensory Stimulation Regulation: care clustered  Taken 4/9/2023 1956 by Prabha Diggs RN  Sensory Stimulation Regulation:  • care clustered  • lighting decreased  Intervention: Optimize Psychosocial Wellbeing  Recent Flowsheet Documentation  Taken 4/10/2023 0000 by Prabha Diggs RN  Supportive Measures: active listening utilized  Taken 4/9/2023 2200 by Prabha Diggs RN  Supportive Measures: active listening utilized  Diversional Activities: television  Taken 4/9/2023 1956 by Prabha Diggs, RN  Supportive Measures: active listening utilized  Diversional Activities: television

## 2023-04-10 NOTE — PROGRESS NOTES
[facility] Medicine Services  PROGRESS NOTE    Patient Name: Esperanza Pop  : 1945  MRN: [id number]    Date of Admission: 2023  Primary Care Physician: Meghana Rodgers MD    Subjective   Subjective     CC:  Follow up anal cancer    HPI: Up in bed. Had some abdominal pain this AM. Unresponsive spell yesterday. S/P IVF and improved. Awake and alert today.     ROS:  No change in ROS from     Objective   Objective     Vital Signs:   Temp:  [97.6 °F (36.4 °C)-98.9 °F (37.2 °C)] (P) 98.3 °F (36.8 °C)  Heart Rate:  [66-87] (P) 72  Resp:  [10-18] (P) 10  BP: ()/(41-73) (P) 130/52     Physical Exam:  NAD, chronically ill appearing  OP clear, dry   Neck supple  No LAD  RRR  CTAB  +BS, soft  DONG  Normal affect  No rashes  No change from     Results Reviewed:  LAB RESULTS:      Lab 04/10/23  0400 23  1723 23  0851   WBC 8.40 8.36 9.16   HEMOGLOBIN 8.0* 8.7* 9.0*   HEMATOCRIT 28.7* 31.7* 30.9*   PLATELETS 279 254 203   NEUTROS ABS  --  5.46 6.08   IMMATURE GRANS (ABS)  --  0.06* 0.05   LYMPHS ABS  --  1.37 1.10   MONOS ABS  --  1.20* 1.27*   EOS ABS  --  0.24 0.62*   .1* 108.6* 101.6*   LACTATE  --  2.0  --          Lab 04/10/23  0359 23  1843 23  0442 23  0851   SODIUM 133* 133* 136 135*   POTASSIUM 4.9 4.8 4.9 4.3   CHLORIDE 96* 97* 97* 91*   CO2 19.0* 22.0 25.0 29.0   ANION GAP 18.0* 14.0 14.0 15.0   BUN 47* 44* 20 39*   CREATININE 6.58* 5.86* 4.02* 6.93*   EGFR 6.1* 7.0* 11.0* 5.7*   GLUCOSE 73 120* 94 169*   CALCIUM 8.6 8.5* 8.2* 9.0   MAGNESIUM  --   --  2.0 2.3   PHOSPHORUS  --   --  3.2 3.2         Lab 23  0851   TOTAL PROTEIN 8.4   ALBUMIN 2.9*   GLOBULIN 5.5   ALT (SGPT) 12   AST (SGOT) 37*   BILIRUBIN 0.7   ALK PHOS 238*                     Lab 23  1617   PH, ARTERIAL 7.430   PCO2, ARTERIAL 34.9*   PO2 .0*   FIO2 28   HCO3 ART 23.2   BASE EXCESS ART -0.9*   CARBOXYHEMOGLOBIN 1.7     Brief Urine Lab Results      None          Microbiology Results Abnormal     None          CT Head Without Contrast    Result Date: 4/9/2023  CT HEAD WO CONTRAST Date of Exam: 4/9/2023 4:37 PM EDT Indication: Mental status change, unknown cause. Comparison: 3/12/2023 Technique: Axial CT images were obtained of the head without contrast administration.  Reconstructed coronal and sagittal images were also obtained. Automated exposure control and iterative construction methods were used. Findings: Parenchyma:No acute intraparenchymal hemorrhage. No loss of gray-white differentiation to suggest large territory infarct. Mild parenchymal volume loss. Scattered periventricular and subcortical white matter hypodensities, nonspecific, but most often consistent with small vessel ischemic changes. No midline shift or herniation. Mineralization of the basal ganglia. Ventricles and extra axial spaces:Prominent ventricles and sulci secondary to volume loss. No extra axial fluid collection seen. Other:Lens replacements. Paranasal sinuses are clear. Mastoid air cells are clear. Calvarium is intact. Intracranial atherosclerotic calcification is present.     Impression: Impression: No evidence of acute intracranial hemorrhage or large territorial infarct. Chronic changes as above. Electronically Signed: Juan Carlos Martinez  4/9/2023 5:33 PM EDT  Workstation ID: [id number]    XR Chest 1 View    Result Date: 4/9/2023  XR CHEST 1 VW Date of Exam: 4/9/2023 4:14 PM EDT Indication: Ams. Comparison: 3/23/2023 Findings: Cardiomegaly. No focal opacity, pleural effusion or pneumothorax. Similar likely right hilar granulomas. No evidence of acute osseous abnormalities. Visualized upper abdomen is unremarkable.     Impression: Impression: No evidence of acute cardiopulmonary process. Cardiomegaly. Electronically Signed: Juan Carlos Martinez  4/9/2023 5:54 PM EDT  Workstation ID: [id number]      Results for orders placed during the hospital encounter of 03/10/23    Adult Transthoracic  Echo Complete W/ Cont if Necessary Per Protocol    Interpretation Summary  •  Left ventricular systolic function is normal. Estimated left ventricular EF = 55%  •  Left ventricular wall thickness is consistent with moderate concentric hypertrophy.  •  The right ventricular cavity is mildly dilated.  •  Mild to moderate biatrial enlargement.  •  Moderate to severe aortic valve stenosis is present.  Mean gradient 34 mmHg, DOUG 0.9 cm².  •  Mild aortic insufficiency.  •  Mild mitral regurgitation.  •  Mild to moderate tricuspid regurgitation with RVSP 48 mmHg.      Current medications:  Scheduled Meds:acetaminophen, 650 mg, Oral, TID  amiodarone, 200 mg, Oral, Daily  apixaban, 2.5 mg, Oral, BID  aspirin, 81 mg, Oral, Daily  atorvastatin, 80 mg, Oral, Nightly  capsaicin, , Topical, Q12H  carvedilol, 3.125 mg, Oral, Q12H  fentaNYL, 1 patch, Transdermal, Q72H   And  Check Fentanyl Patch Placement, 1 each, Does not apply, Q12H  insulin lispro, 0-7 Units, Subcutaneous, TID AC  isosorbide mononitrate, 120 mg, Oral, Daily  mirtazapine, 15 mg, Oral, Nightly  povidone-iodine, 1 application, Topical, Daily  senna-docusate sodium, 2 tablet, Oral, BID  sodium chloride, 10 mL, Intravenous, Q12H      Continuous Infusions:   PRN Meds:.•  senna-docusate sodium **AND** bisacodyl **AND** bisacodyl  •  cloNIDine  •  dextrose  •  dextrose  •  diazePAM  •  glucagon (human recombinant)  •  HYDROmorphone  •  hydrOXYzine  •  melatonin  •  ondansetron  •  oxyCODONE  •  [COMPLETED] Insert Peripheral IV **AND** sodium chloride  •  sodium chloride  •  sodium chloride    Assessment & Plan   Assessment & Plan     Active Hospital Problems    Diagnosis  POA   • **Intractable pain [R52]  Yes      Resolved Hospital Problems   No resolved problems to display.        Brief Hospital Course to date:  Esperanza Pop is a 78 y.o. female w/ ESRD (HD-MWF), DM2, HTN, HLD, HFpEF, pAfib (xarelto), anemia, prior breast cancer, active anal cancer intolerant of  chemo/XRT, recently admitted 3/10-3/29 and DC'ed to SNF to trial improvement of functional status, who returned w/ intractable rectal pain    Assessment/Plan    Intractable pain, multifactorial  Wounds of the buttocks, recent radiotherapy  -continue with wound care, palliative care following    Metastatic anal cancer  Anorectal fistula  -s/p partial treatment with chemotherapy and radiation  -CT with slight decrease in size, with anorectal fistual noted  -did not tolerate treatment before and went to SNF  -palliative care and oncology consulted, d/w daughter and doesn't feel that any more cancer treatment would be worthwhile, may be appropriate to transition to comfort care    Anorexia  -cont mirtazapine    Dysphagia  -seen by SLP, s/p FEES, diet upgraded    ESRD on HD  -HD MWF  -missed sessions pta  -nephro follows    DM type 2, a1c 5.8%, w/ insulin use  -SSI    HFpEF  HTN  pAfib  -cont amio, apixiban, asa, statin, coreg, imdur     Mod-Sev AS  -has appt w/ Geena Estrella, APRN 4/27/23    Expected Discharge Location and Transportation: SNF w/ palliative care vs hospice  Expected Discharge   Expected Discharge Date and Time     Expected Discharge Date Expected Discharge Time    Apr 12, 2023            DVT prophylaxis:  Medical DVT prophylaxis orders are present.     AM-PAC 6 Clicks Score (PT): 9 (04/09/23 1956)    CODE STATUS:   Code Status and Medical Interventions:   Ordered at: 04/06/23 1151     Medical Intervention Limits:    NO intubation (DNI)     Code Status (Patient has no pulse and is not breathing):    No CPR (Do Not Attempt to Resuscitate)     Medical Interventions (Patient has pulse or is breathing):    Limited Support     Comments:    Per family       Champ Ríos MD  04/10/23

## 2023-04-11 LAB
GLUCOSE BLDC GLUCOMTR-MCNC: 107 MG/DL (ref 70–130)
GLUCOSE BLDC GLUCOMTR-MCNC: 149 MG/DL (ref 70–130)
GLUCOSE BLDC GLUCOMTR-MCNC: 68 MG/DL (ref 70–130)
GLUCOSE BLDC GLUCOMTR-MCNC: 76 MG/DL (ref 70–130)
GLUCOSE BLDC GLUCOMTR-MCNC: 90 MG/DL (ref 70–130)

## 2023-04-11 PROCEDURE — 82962 GLUCOSE BLOOD TEST: CPT

## 2023-04-11 PROCEDURE — 92610 EVALUATE SWALLOWING FUNCTION: CPT

## 2023-04-11 PROCEDURE — 25010000002 DIAZEPAM PER 5 MG: Performed by: INTERNAL MEDICINE

## 2023-04-11 PROCEDURE — 99231 SBSQ HOSP IP/OBS SF/LOW 25: CPT | Performed by: HOSPITALIST

## 2023-04-11 RX ADMIN — POVIDONE-IODINE 1 EACH: 10 SOLUTION TOPICAL at 08:56

## 2023-04-11 RX ADMIN — ACETAMINOPHEN 325MG 650 MG: 325 TABLET ORAL at 08:55

## 2023-04-11 RX ADMIN — ISOSORBIDE MONONITRATE 120 MG: 120 TABLET, EXTENDED RELEASE ORAL at 08:55

## 2023-04-11 RX ADMIN — DEXTROSE MONOHYDRATE 25 G: 25 INJECTION, SOLUTION INTRAVENOUS at 19:46

## 2023-04-11 RX ADMIN — DIAZEPAM 2.5 MG: 5 INJECTION, SOLUTION INTRAMUSCULAR; INTRAVENOUS at 15:16

## 2023-04-11 RX ADMIN — ACETAMINOPHEN 325MG 650 MG: 325 TABLET ORAL at 20:01

## 2023-04-11 RX ADMIN — CARVEDILOL 3.12 MG: 6.25 TABLET, FILM COATED ORAL at 08:55

## 2023-04-11 RX ADMIN — CAPSAICIN: 0.75 CREAM TOPICAL at 08:55

## 2023-04-11 RX ADMIN — SENNOSIDES AND DOCUSATE SODIUM 2 TABLET: 8.6; 5 TABLET ORAL at 20:00

## 2023-04-11 RX ADMIN — MIRTAZAPINE 15 MG: 15 TABLET, FILM COATED ORAL at 20:02

## 2023-04-11 RX ADMIN — DIAZEPAM 2.5 MG: 5 INJECTION, SOLUTION INTRAMUSCULAR; INTRAVENOUS at 01:16

## 2023-04-11 RX ADMIN — Medication 10 ML: at 08:56

## 2023-04-11 RX ADMIN — CARVEDILOL 3.12 MG: 6.25 TABLET, FILM COATED ORAL at 20:01

## 2023-04-11 RX ADMIN — ASPIRIN 81 MG: 81 TABLET, COATED ORAL at 08:55

## 2023-04-11 RX ADMIN — AMIODARONE HYDROCHLORIDE 200 MG: 200 TABLET ORAL at 08:55

## 2023-04-11 RX ADMIN — APIXABAN 2.5 MG: 2.5 TABLET, FILM COATED ORAL at 08:55

## 2023-04-11 RX ADMIN — SENNOSIDES AND DOCUSATE SODIUM 2 TABLET: 8.6; 5 TABLET ORAL at 08:55

## 2023-04-11 RX ADMIN — ATORVASTATIN CALCIUM 80 MG: 40 TABLET, FILM COATED ORAL at 20:00

## 2023-04-11 RX ADMIN — Medication 10 ML: at 19:48

## 2023-04-11 RX ADMIN — OXYCODONE HYDROCHLORIDE 5 MG: 5 TABLET ORAL at 10:56

## 2023-04-11 RX ADMIN — APIXABAN 2.5 MG: 2.5 TABLET, FILM COATED ORAL at 20:01

## 2023-04-11 NOTE — NURSING NOTE
Woc visited patient for follow-up to confirm suspected deep tissue injuries on right lateral malleolus and right heel.    Right heel appears to be dark purple underneath dry epithelium, outside skin intact.  Extremely painful.  Upon further chart review it was noted that the LDA had been deleted but the original wound was documented on April 5 in the evening.  The right lateral malleolus has a irregular shape it is a dark reddish blackish purple and it is right where you would palpate for the tibial pulse.  There is a new spot of nonblanching purple-black on the anterior aspect of the right foot where the ankle meets the foot.  Given the fact that the patient is in so much pain and more pain when you lift the leg above the heart Woc suspects arterial involvement with this wound.  Pedal pulse was not palpable.  Feet are cold.  Patient asking that Woc not touch feet and put socks back on because they are called.  When [facility] was doing palpation assessment Woc did not touch the heel when patient screamed out she said her heel hurt same with the left foot although there was no damage or skin integrity on the left foot it was extremely painful.  Patient did say that she likes her feet to be left in the boots as they are comfortable and relieve the pressure.    Woc believes this to be arterial with a pressure component.

## 2023-04-11 NOTE — THERAPY RE-EVALUATION
Acute Care - Speech Language Pathology   Swallow Re-Assessment [facility]   Clinical Swallow Evaluation     Patient Name: Esperanza Pop  : 1945  MRN: [id number]  Today's Date: 2023               Admit Date: 2023    Visit Dx:     ICD-10-CM ICD-9-CM   1. Intractable pain  R52 780.96   2. Rectal cancer  C20 154.1   3. Adult failure to thrive  R62.7 783.7   4. Hypoalbuminemia  E88.09 273.8   5. ESRD on hemodialysis  N18.6 585.6    Z99.2 V45.11   6. Pressure injury of buttock, stage 3, unspecified laterality  L89.303 707.05     707.23   7. Dysphagia, unspecified type  R13.10 787.20     Patient Active Problem List   Diagnosis   • Acute on chronic diastolic heart failure   • Type 2 diabetes mellitus   • Essential hypertension   • Coronary artery disease involving native coronary artery of native heart with angina pectoris   • Breast cancer, female, right   • Seasonal allergic rhinitis   • Right carotid bruit   • Vitamin B12 deficiency   • Hyperlipidemia LDL goal <70   • End stage renal disease on dialysis (HCC)   • Nonrheumatic aortic valve stenosis   • NSTEMI (non-ST elevated myocardial infarction)   • UTI (urinary tract infection)   • Ischemic heart disease   • CHF (congestive heart failure)   • Acute non-ST segment elevation myocardial infarction (STEMI) following previous myocardial infarction   • Dyslipidemia   • Diabetes mellitus   • Mild obesity   • Elevated troponin   • Screen for colon cancer   • Acute midline low back pain without sciatica   • Hypertensive emergency   • PAF (paroxysmal atrial fibrillation) ([facility])   • Malignant neoplasm of anal canal   • Hematochezia   • Severe malnutrition   • Symptomatic anemia   • Intractable pain     Past Medical History:   Diagnosis Date   • Acute bronchitis    • Acute conjunctivitis    • Acute kidney injury     Admission from 2013 to 2014, now resolved with latest creatinine 1.4 on 2014.   • Acute non-ST segment elevation myocardial  infarction (STEMI) following previous myocardial infarction    • Anal cancer 2/8/2023   • Anal cancer 2/8/2023   • Arthritis    • Breast cancer, female, right     2005 mastectomy right   • Cancer    • CHF (congestive heart failure)    • Chronic kidney disease     dialysis MWF, f/u [facility]    • Clotting disorder    • COVID-19    • Diabetes mellitus     diagnosed ~1992, checks fsbg 2-3x/day   • Diarrhea    • Dyslipidemia    • Dyspepsia    • Dyspnea    • Edema    • History of transfusion     ~2013 no reaction    • Hx of radiation therapy    • Hyperlipidemia    • Hypertension     Severe   • Ischemic heart disease    • Moderate obesity     BMI 36.2   • Myocardial infarction    • Pneumonia    • Pneumonia 02/2019   • Radiation 2005   • Seasonal allergies    • Wears glasses      Past Surgical History:   Procedure Laterality Date   • AV FISTULA PLACEMENT, BRACHIOBASILIC     • BREAST BIOPSY Left 2010   • BREAST CYST EXCISION     • BREAST LUMPECTOMY Right 2005   • BREAST SURGERY     • CARDIAC CATHETERIZATION N/A 10/10/2016    Procedure: Left Heart Cath;  Surgeon: Scooter Zhao MD;  Location:  [facility];  Service:    • CARDIAC CATHETERIZATION  10/2016   • CARDIAC CATHETERIZATION N/A 1/21/2021    Procedure: Right and Left Heart Cath;  Surgeon: Hugh Tidwell MD;  Location:  [facility];  Service: Cardiology;  Laterality: N/A;   • COLONOSCOPY N/A 7/23/2020    Procedure: COLONOSCOPY;  Surgeon: Rubén Rosas MD;  Location:  [facility];  Service: Gastroenterology;  Laterality: N/A;   • CORONARY STENT PLACEMENT  2016   • HERNIA REPAIR     • HYSTERECTOMY  2000   • INSERTION HEMODIALYSIS CATHETER Right 6/29/2016    Procedure: HEMODIALYSIS CATHETER INSERTION TUNNELLED;  Surgeon: Ramón Chavez MD;  Location: [facility] OR;  Service:    • MASTECTOMY Right 2006   • OOPHORECTOMY     • REDUCTION MAMMAPLASTY Left 2005       SLP Recommendation and Plan  SLP  Swallowing Diagnosis: mild, oral dysphagia, suspected pharyngeal dysphagia (04/11/23 0830)  SLP Diet Recommendation: soft to chew textures, ground, nectar thick liquids, water between meals after oral care, with supervision, other (see comments) (MD okayed water between meals) (04/11/23 0830)  Recommended Precautions and Strategies: general aspiration precautions (04/11/23 0830)  SLP Rec. for Method of Medication Administration: meds whole, meds crushed, with puree, as tolerated (04/11/23 0830)     Monitor for Signs of Aspiration: notify SLP if any concerns (04/11/23 0830)     Swallow Criteria for Skilled Therapeutic Interventions Met: demonstrates skilled criteria (04/11/23 0830)  Anticipated Discharge Disposition (SLP): home with home health, anticipate therapy at next level of care (04/11/23 0830)  Rehab Potential/Prognosis, Swallowing: re-evaluate goals as necessary (04/11/23 0830)  Therapy Frequency (Swallow): 5 days per week (04/11/23 0830)  Predicted Duration Therapy Intervention (Days): until discharge (04/11/23 0830)              Plan of Care Reviewed With: patient      SWALLOW EVALUATION (last 72 hours)     SLP Adult Swallow Evaluation     Row Name 04/11/23 0830          Document Type therapy note (daily note)  -SM    Subjective Information no complaints  -SM    Patient Observations alert;cooperative  -SM    Patient Effort good  -SM          Current Method of Nutrition soft to chew textures;ground;nectar/syrup-thick liquids  -SM          Pain: FACES Scale, Pretreatment 2-->hurts little bit  -SM    Posttreatment Pain Rating 2-->hurts little bit  -SM          Clinical Swallow Evaluation Summary No s/s aspiration with trials of thin liquids though known component silent aspiration. Pt with dislike of thickened liquids. Discussed GOC/POC options to include making decisions based on safety vs QOL vs balance between the two. Pt not able to fully discuss/decide and no family present. Will continue discussions.   -          Aversion noted with  nectar  -    Education and counseling provided Signs of aspiration;Silent aspiration;Risks of aspiration;Safest diet options;Comfort diet options;Aspiration precautions;Compensatory strategy recommendations and rationale  -          SLP Swallowing Diagnosis mild;oral dysphagia;suspected pharyngeal dysphagia  -    Functional Impact risk of aspiration/pneumonia  -    Rehab Potential/Prognosis, Swallowing re-evaluate goals as necessary  -    Swallow Criteria for Skilled Therapeutic Interventions Met demonstrates skilled criteria  -          Treatment Assessment (SLP) --  -    Treatment Assessment Comments (SLP) --  -    Plan for Continued Treatment (SLP) --  -    Care Plan Review --  -          Therapy Frequency (Swallow) 5 days per week  -    Predicted Duration Therapy Intervention (Days) until discharge  -    SLP Diet Recommendation soft to chew textures;ground;nectar thick liquids;water between meals after oral care, with supervision;other (see comments)  MD okayed water between meals  -    Recommended Precautions and Strategies general aspiration precautions  -    Oral Care Recommendations Oral Care BID/PRN;Toothbrush  -    SLP Rec. for Method of Medication Administration meds whole;meds crushed;with puree;as tolerated  -    Monitor for Signs of Aspiration notify SLP if any concerns  -    Anticipated Discharge Disposition (SLP) home with home health;anticipate therapy at next level of care  -          User Key  (r) = Recorded By, (t) = Taken By, (c) = Cosigned By    Initials Name Effective Dates    Sujata Patel MS CCC-SLP 02/03/23 -                 EDUCATION  The patient has been educated in the following areas:   Dysphagia (Swallowing Impairment) Oral Care/Hydration Modified Diet Instruction.        SLP GOALS     Row Name 04/11/23 0830          Liquid viscosity (Demonstrate functional swallow) nectar/ mildly thick liquids  -     [place] (Demonstrate functional swallow) independently (over 90% accuracy)  -SM    Time Frame (Demonstrate functional swallow) by discharge  -SM    Progress/Outcomes (Demonstrate functional swallow) goal revised this date  -SM          Consistencies Trialed (Tolerate trials) soft to chew (ground) textures;nectar/ mildly thick liquids  -SM    Desired Outcome (Tolerate trials) without signs/symptoms of aspiration  -SM    [place] (Tolerate trials) independently (over 90% accuracy)  -SM    Time Frame (Tolerate trials) by discharge  -SM    Progress/Outcomes (Tolerate trials) goal revised this date  -SM          Consistencies Trialed (Tolerate therapeutic trials) soft to chew (whole) textures;thin liquids  -SM    Desired Outcome (Tolerate therapeutic trials) without signs/symptoms of aspiration;with adequate oral prep/transit/clearance  -SM    [place] (Tolerate therapeutic trials) independently (over 90% accuracy)  -SM    Time Frame (Tolerate therapeutic trials) by discharge  -SM    Progress/Outcomes (Tolerate therapeutic trials) new goal  -SM          Activity (Swallow Compensatory Strategies/Techniques Goal 1, SLP) aspiration precautions;fatigue precautions;small cup sips;small straw sips  -SM    [place]/Accuracy (Swallow Compensatory Strategies/Techniques Goal 1, SLP) with minimal cues (75-90% accuracy)  -SM    Time Frame (Swallow Compensatory Strategies/Techniques Goal 1, SLP) by discharge  -SM          User Key  (r) = Recorded By, (t) = Taken By, (c) = Cosigned By    Initials Name Provider Type    Sujata Patel MS CCC-SLP Speech and Language Pathologist                   Time Calculation:    Time Calculation- SLP     Row Name 04/11/23 1130             Time Calculation- SLP    SLP Start Time 0830  -      SLP Received On 04/11/23  -         Untimed Charges    22863-WC Eval Oral Pharyng Swallow Minutes 56  -SM         Total Minutes    Untimed Charges Total Minutes 56  -SM       Total  Minutes 56  -SM            User Key  (r) = Recorded By, (t) = Taken By, (c) = Cosigned By    Initials Name Provider Type    Sujata Patel, MS CCC-SLP Speech and Language Pathologist                Therapy Charges for Today     Code Description Service Date Service Provider Modifiers Qty    [id number]  ST EVAL ORAL PHARYNG SWALLOW 4 4/11/2023 Sujata Sandhu MS CCC-SLP GN 1               Sujata Sandhu MS CCC-SLP  4/11/2023

## 2023-04-11 NOTE — PROGRESS NOTES
" LOS: 6 days   Patient Care Team:  Meghana Rodgers MD as PCP - General  Demetrius Sagastume MD as Consulting Physician (Cardiology)  Kevan Lerma MD as Consulting Physician (Nephrology)  Geena Estrella APRN as Nurse Practitioner (Cardiology)  Frank Mandujano MD as Referring Physician (Colon and Rectal Surgery)  Kevan Cavazos MD as Consulting Physician (Radiation Oncology)  Yue Reeves RN as Nurse Navigator  Darryn Burk MD as Consulting Physician (Nephrology)    Chief Complaint: ESRD    Subjective     HD yesterday minimal UF as she has no significant edema and appetite is poor.          Subjective:  Symptoms:  Stable.  No shortness of breath, chest pain, chest pressure or anxiety.    Pain:  She complains of pain that is moderate.        History taken from: patient    Objective     Vital Sign Min/Max for last 24 hours  Temp  Min: 97.9 °F (36.6 °C)  Max: 98.5 °F (36.9 °C)   BP  Min: 120/70  Max: 164/67   Pulse  Min: 68  Max: 76   Resp  Min: 16  Max: 16   SpO2  Min: 94 %  Max: 94 %   No data recorded   No data recorded     Flowsheet Rows    Flowsheet Row First Filed Value   Admission Height 167.6 cm (66\") Documented at 04/05/2023 0702   Admission Weight 78.9 kg (174 lb) Documented at 04/05/2023 0702          No intake/output data recorded.  I/O last 3 completed shifts:  In: 80 [P.O.:80]  Out: 10 [Other:10]    Objective:  General Appearance:  Ill-appearing.    Vital signs: (most recent): Blood pressure 164/67, pulse 71, temperature 97.9 °F (36.6 °C), temperature source Oral, resp. rate 16, height 167.6 cm (66\"), weight 63.5 kg (140 lb 1.6 oz), SpO2 94 %, not currently breastfeeding.  Vital signs are normal.    HEENT: Normal HEENT exam.    Lungs:  Normal effort and normal respiratory rate.  Breath sounds clear to auscultation.  She is not in respiratory distress.  No decreased breath sounds or wheezes.    Heart: Normal rate.  Regular rhythm.  S1 normal and S2 normal.  No murmur or gallop. "   Abdomen: Abdomen is soft.    Extremities: There is no dependent edema or local swelling.    Pulses: Distal pulses are intact.    Neurological: Patient is alert and oriented to person, place and time.  Normal strength.    Pupils:  Pupils are equal, round, and reactive to light.              Results Review:     I reviewed the patient's new clinical results.    WBC WBC   Date Value Ref Range Status   04/10/2023 8.40 3.40 - 10.80 10*3/mm3 Final   04/09/2023 8.36 3.40 - 10.80 10*3/mm3 Final      HGB Hemoglobin   Date Value Ref Range Status   04/10/2023 8.0 (L) 12.0 - 15.9 g/dL Final   04/09/2023 8.7 (L) 12.0 - 15.9 g/dL Final      HCT Hematocrit   Date Value Ref Range Status   04/10/2023 28.7 (L) 34.0 - 46.6 % Final   04/09/2023 31.7 (L) 34.0 - 46.6 % Final      Platlets No results found for: LABPLAT   MCV MCV   Date Value Ref Range Status   04/10/2023 105.1 (H) 79.0 - 97.0 fL Final   04/09/2023 108.6 (H) 79.0 - 97.0 fL Final          Sodium Sodium   Date Value Ref Range Status   04/10/2023 133 (L) 136 - 145 mmol/L Final   04/09/2023 133 (L) 136 - 145 mmol/L Final      Potassium Potassium   Date Value Ref Range Status   04/10/2023 4.9 3.5 - 5.2 mmol/L Final   04/09/2023 4.8 3.5 - 5.2 mmol/L Final      Chloride Chloride   Date Value Ref Range Status   04/10/2023 96 (L) 98 - 107 mmol/L Final   04/09/2023 97 (L) 98 - 107 mmol/L Final      CO2 CO2   Date Value Ref Range Status   04/10/2023 19.0 (L) 22.0 - 29.0 mmol/L Final   04/09/2023 22.0 22.0 - 29.0 mmol/L Final      BUN BUN   Date Value Ref Range Status   04/10/2023 47 (H) 8 - 23 mg/dL Final   04/09/2023 44 (H) 8 - 23 mg/dL Final      Creatinine Creatinine   Date Value Ref Range Status   04/10/2023 6.58 (H) 0.57 - 1.00 mg/dL Final   04/09/2023 5.86 (H) 0.57 - 1.00 mg/dL Final      Calcium Calcium   Date Value Ref Range Status   04/10/2023 8.6 8.6 - 10.5 mg/dL Final   04/09/2023 8.5 (L) 8.6 - 10.5 mg/dL Final      PO4 No results found for: CAPO4   Albumin No results  found for: ALBUMIN   Magnesium No results found for: MG   Uric Acid No results found for: URICACID     Medication Review: yes    Assessment & Plan       Intractable pain      Assessment & Plan        ESRD: MWF [facility]     Volume status: No significant anemia     Abdominal pain: In the setting of rectal cancer.     Failure to thrive     Hypoalbuminemia      Anemia: ACD due to CKD.     I discussed the patients findings and my recommendations with patient       Gabe Butler MD  04/11/23  17:13 EDT

## 2023-04-11 NOTE — PLAN OF CARE
"Goal Outcome Evaluation:  Plan of Care Reviewed With: patient        Progress: no change  Outcome Evaluation: Pt. sitting up in bed endorsing buttock pain this morning.  Pt. asked for help with repositioning as her bottom was \"burning\".  Asked for help at nurses station with repositioning.  Pt. did not appear to be dyspneic nor nauseated.  No family at BS this am.  Went into room to talk to daughter. Plan to attempt to talk to daughter or family later this afternoon regarding their hoped for family meeting with Dr. Cavazos.  Palliative care to follow for support, POC and ongoing GOC.  "

## 2023-04-11 NOTE — PAYOR COMM NOTE
"Esperanza Ayoub (78 y.o. Female)     [id number]    Bernadette Almaguer, [place]  Utilization Review  Fkbst-[phone]  Per-[phone]      Date of Birth   1945    Social Security Number       Address   [address] [address]    Home Phone   [phone]    MRN   [id number]       Adventist   Jew    Marital Status                               Admission Date   4/5/23    Admission Type   Emergency    Admitting Provider   Champ Ríos MD    Attending Provider   Champ Ríos MD    Department, Room/Bed   [facility] 5H, S572/1       Discharge Date       Discharge Disposition       Discharge Destination                               Attending Provider: Champ Ríos MD    Allergies: Mircera [Methoxy Polyethylene Glycol-epoetin Beta], Venofer [Iron Sucrose], Albuterol, Nifedipine Er, Penicillins, Prednisone    Isolation: None   Infection: None   Code Status: No CPR    Ht: 167.6 cm (66\")   Wt: 63.5 kg (140 lb 1.6 oz)    Admission Cmt: None   Principal Problem: Intractable pain [R52]                 Active Insurance as of 4/5/2023     Primary Coverage     Payor Plan Insurance Group Employer/Plan Group    [facility] MEDICARE REPLACEMENT [employer] MEDICARE REPLACEMENT      Payor Plan Address Payor Plan Phone Number Payor Plan Fax Number Effective Dates    [address] [address] [phone]  12/1/2021 - None Entered    [facility] [id number]       Subscriber Name Subscriber Birth Date Member ID       ESPERANZA AYOUB 1945 [id number]                 Emergency Contacts      (Rel.) Home Phone Work Phone Mobile Phone    anitraisreal (Daughter) -- -- [phone]    danelle ayoub (Daughter) -- -- [phone]    danelle martin (Sister) -- -- [phone]    AnitraJuan\ (Son) [phone] -- [phone]            Vital Signs (last day)     Date/Time Temp Temp src Pulse Resp BP Patient Position SpO2    04/11/23 0854 97.9 (36.6) Oral 71 16 164/67 Lying --    " 04/11/23 0347 98.3 (36.8) Oral 68 16 136/71 Lying --    04/11/23 0027 98.3 (36.8) Oral 71 16 120/70 Lying --    04/10/23 2122 98.5 (36.9) Oral 76 16 134/53 Lying 94    04/10/23 1642 -- -- 74 -- 120/54 -- 96    04/10/23 1351 -- -- 77 16 138/60 Lying 97    04/10/23 1330 -- -- 74 14 170/68 Lying --    04/10/23 1300 -- -- 70 14 159/65 Lying --    04/10/23 1230 -- -- 67 14 137/47 Lying --    04/10/23 1130 -- -- 64 14 105/50 Lying --    04/10/23 1100 -- -- 65 14 100/51 Lying --    04/10/23 1030 -- -- 64 14 103/40 Lying --    04/10/23 1000 98 (36.7) Temporal 71 14 111/66 Lying 95    04/10/23 0846 98.3 (36.8) Oral 72 10 130/52 Lying 93    04/10/23 0741 -- -- 71 18 130/52 Lying 94    04/10/23 0258 98.3 (36.8) Oral 73 16 131/73 Lying 93          Oxygen Therapy (last day)     Date/Time SpO2 Device (Oxygen Therapy) Flow (L/min) Oxygen Concentration (%) ETCO2 (mmHg)    04/11/23 1200 -- room air -- -- --    04/11/23 1000 -- room air -- -- --    04/11/23 0854 -- room air -- -- --    04/11/23 0800 -- room air -- -- --    04/11/23 0400 -- room air -- -- --    04/11/23 0347 -- room air -- -- --    04/11/23 0200 -- room air -- -- --    04/11/23 0027 -- room air -- -- --    04/11/23 0000 -- room air -- -- --    04/10/23 2122 94 room air -- -- --    04/10/23 2000 -- room air -- -- --    04/10/23 1800 -- room air -- -- --    04/10/23 1642 96 -- -- -- --    04/10/23 1600 -- room air -- -- --    04/10/23 1400 -- room air -- -- --    04/10/23 1351 97 room air -- -- --    04/10/23 1000 95 -- -- -- --    04/10/23 0846 93 room air -- -- --    04/10/23 0800 -- room air -- -- --    04/10/23 0741 94 room air -- -- --    04/10/23 0600 -- room air -- -- --    04/10/23 0400 -- room air -- -- --    04/10/23 0258 93 room air -- -- --    04/10/23 0000 -- room air -- -- --          Lines, Drains & Airways     Active LDAs     Name Placement date Placement time Site Days    Peripheral IV 04/09/23 1815 Left;Posterior Forearm 04/09/23 1815  Forearm  1                 Current Facility-Administered Medications   Medication Dose Route Frequency Provider Last Rate Last Admin   • acetaminophen (TYLENOL) tablet 650 mg  650 mg Oral TID Nuria Lynch MD   650 mg at 04/11/23 0855   • amiodarone (PACERONE) tablet 200 mg  200 mg Oral Daily Nuria Lynch MD   200 mg at 04/11/23 0855   • apixaban (ELIQUIS) tablet 2.5 mg  2.5 mg Oral BID Nuria Lynch MD   2.5 mg at 04/11/23 0855   • aspirin EC tablet 81 mg  81 mg Oral Daily Nuria Lynch MD   81 mg at 04/11/23 0855   • atorvastatin (LIPITOR) tablet 80 mg  80 mg Oral Nightly Nuria Lynch MD   80 mg at 04/10/23 2130   • sennosides-docusate (PERICOLACE) 8.6-50 MG per tablet 2 tablet  2 tablet Oral BID Nuria Lynch MD   2 tablet at 04/11/23 0855    And   • bisacodyl (DULCOLAX) EC tablet 5 mg  5 mg Oral Daily PRN Nuria Lynch MD        And   • bisacodyl (DULCOLAX) suppository 10 mg  10 mg Rectal Daily PRN Nuria Lynch MD       • capsaicin (ZOSTRIX) 0.075 % topical cream   Topical Q12H Nuria Lynch MD   Given at 04/11/23 0855   • carvedilol (COREG) tablet 3.125 mg  3.125 mg Oral Q12H Nuria Lynch MD   3.125 mg at 04/11/23 0855   • fentaNYL (DURAGESIC) 25 MCG/HR patch 1 patch  1 patch Transdermal Q72H Kayla Anaya MD   1 patch at 04/09/23 1335    And   • Check Fentanyl Patch Placement  1 each Does not apply Q12H Kayla Anaya MD       • cloNIDine (CATAPRES) tablet 0.1 mg  0.1 mg Oral TID PRN Nuria Lynch MD       • dextrose (D50W) (25 g/50 mL) IV injection 25 g  25 g Intravenous Q15 Min PRN Ranulfo Thomas DO       • dextrose (GLUTOSE) oral gel 15 g  15 g Oral Q15 Min PRN Ranulfo Thomas DO       • diazePAM (VALIUM) injection 2.5 mg  2.5 mg Intravenous Q4H PRN Rohini Oconnor MD   2.5 mg at 04/11/23 0116   • glucagon (GLUCAGEN) injection 1 mg  1 mg Intramuscular Q15 Min PRN Ranulfo Thomas DO       • HYDROmorphone (DILAUDID) injection 0.25 mg  0.25 mg Intravenous Q4H PRN Nuria Lynch MD    0.25 mg at 04/10/23 2307   • hydrOXYzine (ATARAX) tablet 25 mg  25 mg Oral TID PRN Nuria Lynch MD       • Insulin Lispro (humaLOG) injection 0-7 Units  0-7 Units Subcutaneous TID Ranulfo Montes DO   2 Units at 04/09/23 0927   • isosorbide mononitrate (IMDUR) 24 hr tablet 120 mg  120 mg Oral Daily Nuria Lynch MD   120 mg at 04/11/23 0855   • melatonin tablet 5 mg  5 mg Oral Nightly PRN Nuria Lynch MD   5 mg at 04/06/23 2133   • mirtazapine (REMERON) tablet 15 mg  15 mg Oral Nightly Nuria Lynch MD   15 mg at 04/10/23 2130   • ondansetron (ZOFRAN) injection 4 mg  4 mg Intravenous Q6H PRN Nuria Lynch MD       • oxyCODONE (ROXICODONE) immediate release tablet 5 mg  5 mg Oral Q6H PRN Kayla Anaya MD   5 mg at 04/11/23 1056   • povidone-iodine 10 % swab 1 each  1 application Topical Daily Champ Ríos MD   1 each at 04/11/23 0856   • sodium chloride 0.9 % flush 10 mL  10 mL Intravenous PRN Nuria Lynch MD   10 mL at 04/05/23 1712   • sodium chloride 0.9 % flush 10 mL  10 mL Intravenous Q12H Nuria Lynch MD   10 mL at 04/11/23 0856   • sodium chloride 0.9 % flush 10 mL  10 mL Intravenous PRN Nuria Lynch MD       • sodium chloride 0.9 % infusion 40 mL  40 mL Intravenous PRN Nuria Lynch MD         Lab Results (last 24 hours)     Procedure Component Value Units Date/Time    POC Glucose Once [[id number]]  (Normal) Collected: 04/11/23 1142    Specimen: Blood Updated: 04/11/23 1150     Glucose 107 mg/dL      Comment: Meter: [id number] : [id number] [facility]       POC Glucose Once [[id number]]  (Normal) Collected: 04/11/23 0746    Specimen: Blood Updated: 04/11/23 0748     Glucose 90 mg/dL      Comment: Meter: [id number] : [id number] Yumiko Diazadityazina       Blood Culture - Blood, Arm, Left [[id number]]  (Normal) Collected: 04/09/23 1723    Specimen: Blood from Arm, Left Updated: 04/10/23 1745     Blood Culture No growth at 24 hours    Narrative:      AEROBIC BOTTLE ONLY    Blood  Culture - Blood, Hand, Left [[id number]]  (Normal) Collected: 23 172    Specimen: Blood from Hand, Left Updated: 04/10/23 174     Blood Culture No growth at 24 hours    Narrative:      AEROBIC BOTTLE ONLY    POC Glucose Once [[id number]]  (Normal) Collected: 04/10/23 164    Specimen: Blood Updated: 04/10/23 164     Glucose 80 mg/dL      Comment: Meter: [id number] : [id number] [facility]           Imaging Results (Last 24 Hours)     ** No results found for the last 24 hours. **        ECG/EMG Results (last 24 hours)     ** No results found for the last 24 hours. **           Physician Progress Notes (last 24 hours)      Champ Ríos MD at 23 0941              [facility] Medicine Services  PROGRESS NOTE    Patient Name: Esperanza Pop  : 1945  MRN: [id number]    Date of Admission: 2023  Primary Care Physician: Meghana Rodgers MD    Subjective   Subjective     CC:  Follow up anal cancer    HPI: Up in bed. Had some abdominal pain/buttock pain. No f/c. No n/v. No shortness of air.    ROS:  No change in ROS from     Objective   Objective     Vital Signs:   Temp:  [97.9 °F (36.6 °C)-98.5 °F (36.9 °C)] 97.9 °F (36.6 °C)  Heart Rate:  [64-77] 71  Resp:  [14-16] 16  BP: (100-170)/(40-71) 164/67     Physical Exam:  NAD, chronically ill appearing  OP clear, dry   Neck supple  No LAD  RRR  CTAB  +BS, soft  DONG  Normal affect  No rashes  No change from 4/10    Results Reviewed:  LAB RESULTS:      Lab 04/10/23  0400 23  1723 23  0851   WBC 8.40 8.36 9.16   HEMOGLOBIN 8.0* 8.7* 9.0*   HEMATOCRIT 28.7* 31.7* 30.9*   PLATELETS 279 254 203   NEUTROS ABS  --  5.46 6.08   IMMATURE GRANS (ABS)  --  0.06* 0.05   LYMPHS ABS  --  1.37 1.10   MONOS ABS  --  1.20* 1.27*   EOS ABS  --  0.24 0.62*   .1* 108.6* 101.6*   LACTATE  --  2.0  --          Lab 04/10/23  0359 23  1843 23  0442 23  0851   SODIUM 133* 133* 136 135*   POTASSIUM 4.9 4.8 4.9  4.3   CHLORIDE 96* 97* 97* 91*   CO2 19.0* 22.0 25.0 29.0   ANION GAP 18.0* 14.0 14.0 15.0   BUN 47* 44* 20 39*   CREATININE 6.58* 5.86* 4.02* 6.93*   EGFR 6.1* 7.0* 11.0* 5.7*   GLUCOSE 73 120* 94 169*   CALCIUM 8.6 8.5* 8.2* 9.0   MAGNESIUM  --   --  2.0 2.3   PHOSPHORUS  --   --  3.2 3.2         Lab 04/05/23  0851   TOTAL PROTEIN 8.4   ALBUMIN 2.9*   GLOBULIN 5.5   ALT (SGPT) 12   AST (SGOT) 37*   BILIRUBIN 0.7   ALK PHOS 238*                     Lab 04/09/23  1617   PH, ARTERIAL 7.430   PCO2, ARTERIAL 34.9*   PO2 .0*   FIO2 28   HCO3 ART 23.2   BASE EXCESS ART -0.9*   CARBOXYHEMOGLOBIN 1.7     Brief Urine Lab Results     None          Microbiology Results Abnormal     Procedure Component Value - Date/Time    Blood Culture - Blood, Arm, Left [[id number]]  (Normal) Collected: 04/09/23 1723    Lab Status: Preliminary result Specimen: Blood from Arm, Left Updated: 04/10/23 1745     Blood Culture No growth at 24 hours    Narrative:      AEROBIC BOTTLE ONLY    Blood Culture - Blood, Hand, Left [[id number]]  (Normal) Collected: 04/09/23 1724    Lab Status: Preliminary result Specimen: Blood from Hand, Left Updated: 04/10/23 1745     Blood Culture No growth at 24 hours    Narrative:      AEROBIC BOTTLE ONLY          CT Head Without Contrast    Result Date: 4/9/2023  CT HEAD WO CONTRAST Date of Exam: 4/9/2023 4:37 PM EDT Indication: Mental status change, unknown cause. Comparison: 3/12/2023 Technique: Axial CT images were obtained of the head without contrast administration.  Reconstructed coronal and sagittal images were also obtained. Automated exposure control and iterative construction methods were used. Findings: Parenchyma:No acute intraparenchymal hemorrhage. No loss of gray-white differentiation to suggest large territory infarct. Mild parenchymal volume loss. Scattered periventricular and subcortical white matter hypodensities, nonspecific, but most often consistent with small vessel ischemic changes. No  midline shift or herniation. Mineralization of the basal ganglia. Ventricles and extra axial spaces:Prominent ventricles and sulci secondary to volume loss. No extra axial fluid collection seen. Other:Lens replacements. Paranasal sinuses are clear. Mastoid air cells are clear. Calvarium is intact. Intracranial atherosclerotic calcification is present.     Impression: Impression: No evidence of acute intracranial hemorrhage or large territorial infarct. Chronic changes as above. Electronically Signed: Juan Carlos Martinez  4/9/2023 5:33 PM EDT  Workstation ID: [id number]    XR Chest 1 View    Result Date: 4/9/2023  XR CHEST 1 VW Date of Exam: 4/9/2023 4:14 PM EDT Indication: Ams. Comparison: 3/23/2023 Findings: Cardiomegaly. No focal opacity, pleural effusion or pneumothorax. Similar likely right hilar granulomas. No evidence of acute osseous abnormalities. Visualized upper abdomen is unremarkable.     Impression: Impression: No evidence of acute cardiopulmonary process. Cardiomegaly. Electronically Signed: Juan Carlos ChauhanMichelle  4/9/2023 5:54 PM EDT  Workstation ID: [id number]      Results for orders placed during the hospital encounter of 03/10/23    Adult Transthoracic Echo Complete W/ Cont if Necessary Per Protocol    Interpretation Summary  •  Left ventricular systolic function is normal. Estimated left ventricular EF = 55%  •  Left ventricular wall thickness is consistent with moderate concentric hypertrophy.  •  The right ventricular cavity is mildly dilated.  •  Mild to moderate biatrial enlargement.  •  Moderate to severe aortic valve stenosis is present.  Mean gradient 34 mmHg, DOUG 0.9 cm².  •  Mild aortic insufficiency.  •  Mild mitral regurgitation.  •  Mild to moderate tricuspid regurgitation with RVSP 48 mmHg.      Current medications:  Scheduled Meds:acetaminophen, 650 mg, Oral, TID  amiodarone, 200 mg, Oral, Daily  apixaban, 2.5 mg, Oral, BID  aspirin, 81 mg, Oral, Daily  atorvastatin, 80 mg, Oral,  Nightly  capsaicin, , Topical, Q12H  carvedilol, 3.125 mg, Oral, Q12H  fentaNYL, 1 patch, Transdermal, Q72H   And  Check Fentanyl Patch Placement, 1 each, Does not apply, Q12H  insulin lispro, 0-7 Units, Subcutaneous, TID AC  isosorbide mononitrate, 120 mg, Oral, Daily  mirtazapine, 15 mg, Oral, Nightly  povidone-iodine, 1 application, Topical, Daily  senna-docusate sodium, 2 tablet, Oral, BID  sodium chloride, 10 mL, Intravenous, Q12H      Continuous Infusions:   PRN Meds:.•  senna-docusate sodium **AND** bisacodyl **AND** bisacodyl  •  cloNIDine  •  dextrose  •  dextrose  •  diazePAM  •  glucagon (human recombinant)  •  HYDROmorphone  •  hydrOXYzine  •  melatonin  •  ondansetron  •  oxyCODONE  •  [COMPLETED] Insert Peripheral IV **AND** sodium chloride  •  sodium chloride  •  sodium chloride    Assessment & Plan   Assessment & Plan     Active Hospital Problems    Diagnosis  POA   • **Intractable pain [R52]  Yes      Resolved Hospital Problems   No resolved problems to display.        Brief Hospital Course to date:  Esperanza Pop is a 78 y.o. female w/ ESRD (HD-MWF), DM2, HTN, HLD, HFpEF, pAfib (xarelto), anemia, prior breast cancer, active anal cancer intolerant of chemo/XRT, recently admitted 3/10-3/29 and NM'ed to SNF to trial improvement of functional status, who returned w/ intractable rectal pain    Intractable pain, multifactorial  Wounds of the buttocks, recent radiotherapy  -continue with wound care, palliative care following    Metastatic anal cancer  Anorectal fistula  -s/p partial treatment with chemotherapy and radiation  -CT with slight decrease in size, with anorectal fistual noted  -did not tolerate treatment before and went to SNF  -palliative care and oncology consulted, d/w daughter and doesn't feel that any more cancer treatment would be worthwhile, may be appropriate to transition to comfort care, d/w patient    Anorexia  -cont mirtazapine    Dysphagia  -seen by SLP, s/p FEES, diet  upgraded    ESRD on HD  -HD MWF  -missed sessions pta  -nephro follows    DM type 2, a1c 5.8%, w/ insulin use  -SSI    HFpEF  HTN  pAfib  -cont amio, apixiban, asa, statin, coreg, imdur     Mod-Sev AS  -has appt w/ Geena Estrella, APRN 23    Expected Discharge Location and Transportation: SNF w/ palliative care vs hospice  Expected Discharge   Expected Discharge Date and Time     Expected Discharge Date Expected Discharge Time    2023            DVT prophylaxis:  Medical DVT prophylaxis orders are present.     AM-PAC 6 Clicks Score (PT): 9 (23 0800)    CODE STATUS:   Code Status and Medical Interventions:   Ordered at: 23 1151     Medical Intervention Limits:    NO intubation (DNI)     Code Status (Patient has no pulse and is not breathing):    No CPR (Do Not Attempt to Resuscitate)     Medical Interventions (Patient has pulse or is breathing):    Limited Support     Comments:    Per family       Champ Ríos MD  23        Electronically signed by Champ Ríos MD at 23 0913     Kevan Cavazos MD at 04/10/23 1649          RADIATION ONCOLOGY TREATMENT MANAGEMENT NOTE     PATIENT:                                                             Esperanza Pop  MEDICAL RECORD #:                                 [id number]  :                                                            1945  DIAGNOSIS:                                                   T3 N1 M0 anal cancer              INTERVAL HISTORY:     Ms. Pop is a very pleasant female with multiple comorbidities including renal failure and on dialysis as well as diabetes who was diagnosed with advanced anal cancer.  The patient attempted treatments with concurrent chemotherapy and radiation.  She unfortunately did not tolerate treatments well and was hospitalized midway through her treatments.  The patient completed a total dose of 32.4 Alba at her planned 59.4 Gray before discontinuing due to poor tolerance and skin  breakdown.  The patient was hospitalized and was discharged to rehab where she struggled to recover.  The patient has declined again and is back in the hospital.    Patient reports that she is still very tired.  She is a still very sore.  She is not terribly interested in resuming radiotherapy treatments    The patient's daughter is present at bedside today.      Current Facility-Administered Medications:   •  acetaminophen (TYLENOL) tablet 650 mg, 650 mg, Oral, TID, Nuria Lynch MD, 650 mg at 04/10/23 0811  •  amiodarone (PACERONE) tablet 200 mg, 200 mg, Oral, Daily, Nuria Lynch MD, 200 mg at 04/10/23 0810  •  apixaban (ELIQUIS) tablet 2.5 mg, 2.5 mg, Oral, BID, Nuria Lynch MD, 2.5 mg at 04/10/23 0811  •  aspirin EC tablet 81 mg, 81 mg, Oral, Daily, Nuria Lynch MD, 81 mg at 04/10/23 0810  •  atorvastatin (LIPITOR) tablet 80 mg, 80 mg, Oral, Nightly, Nuria Lynch MD, 80 mg at 04/08/23 2036  •  sennosides-docusate (PERICOLACE) 8.6-50 MG per tablet 2 tablet, 2 tablet, Oral, BID, 2 tablet at 04/10/23 0810 **AND** bisacodyl (DULCOLAX) EC tablet 5 mg, 5 mg, Oral, Daily PRN **AND** bisacodyl (DULCOLAX) suppository 10 mg, 10 mg, Rectal, Daily PRN, Nuria Lynch MD  •  capsaicin (ZOSTRIX) 0.075 % topical cream, , Topical, Q12H, Nuria Lynch MD, Given at 04/10/23 0813  •  carvedilol (COREG) tablet 3.125 mg, 3.125 mg, Oral, Q12H, Nuria Lynch MD, 3.125 mg at 04/10/23 0810  •  fentaNYL (DURAGESIC) 25 MCG/HR patch 1 patch, 1 patch, Transdermal, Q72H, 1 patch at 04/09/23 1335 **AND** Check Fentanyl Patch Placement, 1 each, Does not apply, Q12H, Kayla Anaya MD  •  cloNIDine (CATAPRES) tablet 0.1 mg, 0.1 mg, Oral, TID PRN, Nuria Lynch MD  •  dextrose (D50W) (25 g/50 mL) IV injection 25 g, 25 g, Intravenous, Q15 Min PRN, Ranulfo Thomas,   •  dextrose (GLUTOSE) oral gel 15 g, 15 g, Oral, Q15 Min PRN, Ranulfo Thomas DO  •  diazePAM (VALIUM) injection 2.5 mg, 2.5 mg, Intravenous, Q4H PRN,  Rohini Oconnor MD, 2.5 mg at 04/10/23 0300  •  glucagon (GLUCAGEN) injection 1 mg, 1 mg, Intramuscular, Q15 Min PRN, Ranulfo Thomas DO  •  HYDROmorphone (DILAUDID) injection 0.25 mg, 0.25 mg, Intravenous, Q4H PRN, Nuria Lynch MD, 0.25 mg at 04/10/23 0347  •  hydrOXYzine (ATARAX) tablet 25 mg, 25 mg, Oral, TID PRN, Nuria Lynch MD  •  Insulin Lispro (humaLOG) injection 0-7 Units, 0-7 Units, Subcutaneous, TID AC, Ranulfo Thomas DO, 2 Units at 04/09/23 0927  •  isosorbide mononitrate (IMDUR) 24 hr tablet 120 mg, 120 mg, Oral, Daily, Nuria Lynch MD, 120 mg at 04/10/23 0811  •  melatonin tablet 5 mg, 5 mg, Oral, Nightly PRN, Nuria Lynch MD, 5 mg at 04/06/23 2133  •  mirtazapine (REMERON) tablet 15 mg, 15 mg, Oral, Nightly, Nuria Lynch MD, 15 mg at 04/08/23 2036  •  ondansetron (ZOFRAN) injection 4 mg, 4 mg, Intravenous, Q6H PRN, Nuria Lynch MD  •  oxyCODONE (ROXICODONE) immediate release tablet 5 mg, 5 mg, Oral, Q6H PRN, Kayla Anaya MD, 5 mg at 04/10/23 0632  •  povidone-iodine 10 % swab 1 each, 1 application, Topical, Daily, Champ Ríos MD, 1 each at 04/10/23 0820  •  [COMPLETED] Insert Peripheral IV, , , Once **AND** sodium chloride 0.9 % flush 10 mL, 10 mL, Intravenous, PRN, Nuria Lynch MD, 10 mL at 04/05/23 1712  •  sodium chloride 0.9 % flush 10 mL, 10 mL, Intravenous, Q12H, Nuria Lynch MD, 10 mL at 04/10/23 0820  •  sodium chloride 0.9 % flush 10 mL, 10 mL, Intravenous, PRN, Nuria Lynch MD  •  sodium chloride 0.9 % infusion 40 mL, 40 mL, Intravenous, PRN, Nuria Lynch MD     KPS 50%     Physical Exam  Vitals and nursing note reviewed.   Constitutional:       General: She is in acute distress.      Appearance: She is well-developed. She is ill-appearing.      Comments: Appears very tired and ill   HENT:      Head: Normocephalic and atraumatic.      Mouth/Throat:      Comments: Mucous membranes appear dry  Eyes:      Conjunctiva/sclera: Conjunctivae normal.       Pupils: Pupils are equal, round, and reactive to light.   Neck:      Comments: No obviously enlarged cervical or supraclavicular LAD.  Cardiovascular:      Comments: Fistula in place.  No pedal edema  Pulmonary:      Breath sounds: No stridor. No wheezing.   Abdominal:      General: There is no distension.      Palpations: Abdomen is soft.   Musculoskeletal:         General: Normal range of motion.      Cervical back: Normal range of motion and neck supple.      Comments: Patient moves all extremities spontaneously.    Skin:     General: Skin is warm.      Comments: Skin very dry.   Neurological:      Mental Status: She is alert and oriented to person, place, and time.      Comments: Coordination intact.  Very tired     Psychiatric:         Judgment: Judgment normal.      Comments: Mood depressed    Vitals:    04/10/23 1351   BP: 138/60   Pulse: 77   Resp: 16   Temp:    SpO2: 97%     I have personally requested reviewed and interpreted the patient's images and radiology reports and pathology reports listed below:  Adult Transthoracic Echo Complete W/ Cont if Necessary Per Protocol    Result Date: 3/13/2023  •  Left ventricular systolic function is normal. Estimated left ventricular EF = 55% •  Left ventricular wall thickness is consistent with moderate concentric hypertrophy. •  The right ventricular cavity is mildly dilated. •  Mild to moderate biatrial enlargement. •  Moderate to severe aortic valve stenosis is present.  Mean gradient 34 mmHg, DOUG 0.9 cm². •  Mild aortic insufficiency. •  Mild mitral regurgitation. •  Mild to moderate tricuspid regurgitation with RVSP 48 mmHg.     CT Abdomen Pelvis Without Contrast    Result Date: 4/5/2023  CT ABDOMEN PELVIS WO CONTRAST Date of Exam: 4/5/2023 6:26 PM EDT Indication: intractable rectal/abdominal pain. Comparison: 2/1/2023 abdomen pelvis CT scan Technique: Axial CT images were obtained of the abdomen and pelvis without the administration of contrast. Reconstructed  coronal and sagittal images were also obtained. Automated exposure control and iterative construction methods were used. Findings: Prior scan report noted abnormal soft tissue thickening involving the anus, a somewhat patient's known tumor plus/minus postsurgical changes. Enlarged, left inguinal node presumably metastatic disease. History today indicates intractable rectal pain, abdominal pain. The included lower lungs appear clear except for what appears to be a small area of granulomatous scarring in the right middle lobe and minimal linear lung scarring elsewhere. No pleural effusion is seen. Gallbladder is distended, but similar to the prior study, and with no visible inflammation or gallstones. Pancreas is relatively atrophic, not unusual for age. There is mild fatty liver change. Liver morphology appears grossly normal. Spleen is not enlarged. Adrenal glands appear normal. Kidneys are relatively small. No obstructive uropathy is seen. No upper abdominal adenopathy, ascites or acute inflammatory change is seen. There appears to be a mild degree of fecal stasis. Small bowel loops are normal in caliber and appearance. Regarding the lower abdomen pelvis, the terminal ileum and cecum appear normal. Appendix is not definitely identified, but no right lower quadrant inflammation is seen. Bladder is normally distended and normal in appearance. No intrapelvic free fluid is appreciated. Uterus and ovaries appear to be surgically absent. Area of the vaginal cuff appears normal. Patient's soft tissue mass previously seen extending from the dorsal margin to the upper gluteal fold is significantly reduced in size, although there appears to be a fistula from the lower anorectal region to near the skin surface along the upper gluteal fold. This is also less prominent than on the prior exam. No new inflammatory change is seen. There is no evidence of a drainable abscess. Small amount of dense material in the residual mass  could represent iodine or trace hemorrhage. As on the prior study, it is uncertain if mass involves the mid vagina, or merely compresses it. Left inguinal adenopathy is improved, the largest node decreased from 31 mm to 22 mm. No new mass is seen. Bony structures appear to be intact.      Impression: 1. Persistent but decreased perianal mass compared to prior study, and improved left inguinal adenopathy. 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 No evidence of drainable fluid collection. Mild fecal stasis noted. 2. No evidence of new inflammatory focus elsewhere. 3. No other evidence of new intra-abdominal or intrapelvic disease. 4.. Persistent distention of the gallbladder, as on prior exam, but again no evidence of inflammation or biliary ductal dilatation. Electronically Signed: Champ Lee  4/5/2023 7:13 PM EDT  Workstation ID: [id number]    CT Head Without Contrast    Result Date: 4/9/2023  CT HEAD WO CONTRAST Date of Exam: 4/9/2023 4:37 PM EDT Indication: Mental status change, unknown cause. Comparison: 3/12/2023 Technique: Axial CT images were obtained of the head without contrast administration.  Reconstructed coronal and sagittal images were also obtained. Automated exposure control and iterative construction methods were used. Findings: Parenchyma:No acute intraparenchymal hemorrhage. No loss of gray-white differentiation to suggest large territory infarct. Mild parenchymal volume loss. Scattered periventricular and subcortical white matter hypodensities, nonspecific, but most often consistent with small vessel ischemic changes. No midline shift or herniation. Mineralization of the basal ganglia. Ventricles and extra axial spaces:Prominent ventricles and sulci secondary to volume loss. No extra axial fluid collection seen. Other:Lens replacements. Paranasal sinuses are clear. Mastoid air cells are clear. Calvarium is intact. Intracranial atherosclerotic  calcification is present.     Impression: No evidence of acute intracranial hemorrhage or large territorial infarct. Chronic changes as above. Electronically Signed: Juan Carlosrosario Martinez  4/9/2023 5:33 PM EDT  Workstation ID: [id number]    CT Head Without Contrast    Result Date: 3/12/2023  CT HEAD WO CONTRAST Date of Exam: 3/12/2023 11:19 AM EDT Indication: AMS. Comparison: MRI brain from April 18, 2019 Technique: Axial CT images were obtained of the head without contrast administration.  Reconstructed coronal and sagittal images were also obtained. Automated exposure control and iterative construction methods were used. Findings: No acute intracranial hemorrhage or extra-axial collection is identified. The ventricles appear normal in caliber, with no evidence of mass effect or midline shift. The basal cisterns appear patent. The gray-white differentiation appears preserved. The calvarium appear intact. The paranasal sinuses are clear. The mastoid air cells are well-aerated. Scattered foci of periventricular and subcortical white matter hypodensities are nonspecific, but likely the sequela of mild chronic small vessel ischemic disease. Some degenerative mineralization is noted within the bilateral basal ganglia.     Impression: 1.No acute intracranial process identified. 2.Findings suggestive of mild chronic small vessel ischemic disease. Electronically Signed: Darrell Gonzalez  3/12/2023 11:35 AM EDT  Workstation ID: [id number]    SLP FEES - Fiberoptic Endo Eval Swallow    Result Date: 4/6/2023  This procedure was auto-finalized with no dictation required.    SLP FEES - Fiberoptic Endo Eval Swallow    Result Date: 3/23/2023  This procedure was auto-finalized with no dictation required.    XR Chest 1 View    Result Date: 4/9/2023  XR CHEST 1 VW Date of Exam: 4/9/2023 4:14 PM EDT Indication: Ams. Comparison: 3/23/2023 Findings: Cardiomegaly. No focal opacity, pleural effusion or pneumothorax. Similar likely right hilar  granulomas. No evidence of acute osseous abnormalities. Visualized upper abdomen is unremarkable.     Impression: No evidence of acute cardiopulmonary process. Cardiomegaly. Electronically Signed: Juan Carlos McdonnellMichelle  4/9/2023 5:54 PM EDT  Workstation ID: [id number]    XR Chest 1 View    Result Date: 3/22/2023  Exam:  Single view chest Date: March 22, 2023 History: Shortness of breath Comparison: March 10, 2023 Technique: Single frontal radiograph of the chest was obtained. The study is degraded by patient rotation. Findings: The heart is enlarged. There is mild vascular congestion. There is no pneumothorax or sizable pleural fluid collection.     1. Cardiomegaly with mild vascular congestion. There may be an underlying pericardial effusion. Slot 63 Electronically signed by:  Abdulaziz Mcwilliams M.D.  3/22/2023 2:25 AM Mountain Time    WBC   Date Value Ref Range Status   04/10/2023 8.40 3.40 - 10.80 10*3/mm3 Final     RBC   Date Value Ref Range Status   04/10/2023 2.73 (L) 3.77 - 5.28 10*6/mm3 Final     Hemoglobin   Date Value Ref Range Status   04/10/2023 8.0 (L) 12.0 - 15.9 g/dL Final     Hematocrit   Date Value Ref Range Status   04/10/2023 28.7 (L) 34.0 - 46.6 % Final     MCV   Date Value Ref Range Status   04/10/2023 105.1 (H) 79.0 - 97.0 fL Final     MCH   Date Value Ref Range Status   04/10/2023 29.3 26.6 - 33.0 pg Final     MCHC   Date Value Ref Range Status   04/10/2023 27.9 (L) 31.5 - 35.7 g/dL Final     RDW   Date Value Ref Range Status   04/10/2023 17.8 (H) 12.3 - 15.4 % Final     RDW-SD   Date Value Ref Range Status   04/10/2023 68.0 (H) 37.0 - 54.0 fl Final     MPV   Date Value Ref Range Status   04/10/2023 10.6 6.0 - 12.0 fL Final     Platelets   Date Value Ref Range Status   04/10/2023 279 140 - 450 10*3/mm3 Final     Neutrophil %   Date Value Ref Range Status   04/09/2023 65.2 42.7 - 76.0 % Final     Lymphocyte %   Date Value Ref Range Status   04/09/2023 16.4 (L) 19.6 - 45.3 % Final     Monocyte %   Date  Value Ref Range Status   04/09/2023 14.4 (H) 5.0 - 12.0 % Final     Eosinophil %   Date Value Ref Range Status   04/09/2023 2.9 0.3 - 6.2 % Final     Basophil %   Date Value Ref Range Status   04/09/2023 0.4 0.0 - 1.5 % Final     Immature Grans %   Date Value Ref Range Status   04/09/2023 0.7 (H) 0.0 - 0.5 % Final     Neutrophils, Absolute   Date Value Ref Range Status   04/09/2023 5.46 1.70 - 7.00 10*3/mm3 Final     Lymphocytes, Absolute   Date Value Ref Range Status   04/09/2023 1.37 0.70 - 3.10 10*3/mm3 Final     Monocytes, Absolute   Date Value Ref Range Status   04/09/2023 1.20 (H) 0.10 - 0.90 10*3/mm3 Final     Eosinophils, Absolute   Date Value Ref Range Status   04/09/2023 0.24 0.00 - 0.40 10*3/mm3 Final     Basophils, Absolute   Date Value Ref Range Status   04/09/2023 0.03 0.00 - 0.20 10*3/mm3 Final     Immature Grans, Absolute   Date Value Ref Range Status   04/09/2023 0.06 (H) 0.00 - 0.05 10*3/mm3 Final     nRBC   Date Value Ref Range Status   04/09/2023 0.0 0.0 - 0.2 /100 WBC Final     Lab Results   Component Value Date    GLUCOSE 73 04/10/2023    BUN 47 (H) 04/10/2023    CREATININE 6.58 (H) 04/10/2023    EGFR 6.1 (L) 04/10/2023    BCR 7.1 04/10/2023    K 4.9 04/10/2023    CO2 19.0 (L) 04/10/2023    CALCIUM 8.6 04/10/2023    PROTENTOTREF 7.5 09/22/2020    ALBUMIN 2.9 (L) 04/05/2023    BILITOT 0.7 04/05/2023    AST 37 (H) 04/05/2023    ALT 12 04/05/2023     Anal cancer  -T3 N1 M0  -Positive inguinal lymph node on CT scan  -Bulky primary 6 cm at least  -Discussed with Dr. Knott patient is not in acute danger of obstruction at onset of treatment  -Lesion is painful and foul-smelling with difficulty from pressure ulcers in skin denudement from lack of mobility   -Holding Xeloda  -Patient's tumor is responding well based on scans  -The patient's condition is quite serious.  She has not recovered.  Patient is back in the hospital with further collapse of her reserve.  -Discussed with the patient and some of  her family, and I would not recommend proceeding with radiation treatments at this time, given her general debilitated state and specifically her trouble with her skin integrity.  -The patient has not received very high dose of radiotherapy so primary desquamation from her radiation treatments are unlikely however when coupled with diabetes, renal failure, protein malnutrition, limited mobility it is appear the patient has developed some sacral wound issues that are multifactorial in nature  -Recommended palliative care  Sacral ulcers  -Prohibit patient's ability to to lay on the table comfortably  -Recommend wound care     CKD  -On dialysis  -Complicates care     Diabetes  -Complicates care  -We will have issues with wound healing due to this.    Thank you for allowing me to be involved in the care of the patient. If you have any questions or concerns, please feel free to call or contact me at your earliest convenience.     Kevan Cavazos  Radiation Oncology            Electronically signed by Kevan Cavazos MD at 04/10/23 1651     Gabe Butler MD at 04/10/23 1602           LOS: 5 days   Patient Care Team:  Meghana Rodgers MD as PCP - General  Demetrius Sagastume MD as Consulting Physician (Cardiology)  Kevan Lerma MD as Consulting Physician (Nephrology)  Geena Estrella APRN as Nurse Practitioner (Cardiology)  Frank Mandujano MD as Referring Physician (Colon and Rectal Surgery)  Kevan Cavazos MD as Consulting Physician (Radiation Oncology)  Yue Reeves RN as Nurse Navigator  Darryn Burk MD as Consulting Physician (Nephrology)    Chief Complaint: ESRD    Subjective     Seen on HD tolerating well so far. Minimal US as bp is labile and she has no significant edema.Good access pressure.         Subjective:  Symptoms:  Stable.  No shortness of breath, chest pain, chest pressure or anxiety.    Pain:  She complains of pain that is moderate.        History taken from:  "patient    Objective     Vital Sign Min/Max for last 24 hours  Temp  Min: 98 °F (36.7 °C)  Max: 98.3 °F (36.8 °C)   BP  Min: 100/51  Max: 170/68   Pulse  Min: 64  Max: 87   Resp  Min: 10  Max: 18   SpO2  Min: 93 %  Max: 100 %   No data recorded   No data recorded     Flowsheet Rows    Flowsheet Row First Filed Value   Admission Height 167.6 cm (66\") Documented at 04/05/2023 0702   Admission Weight 78.9 kg (174 lb) Documented at 04/05/2023 0702          I/O this shift:  In: -   Out: 10 [Other:10]  I/O last 3 completed shifts:  In: 200 [P.O.:200]  Out: -     Objective:  General Appearance:  Ill-appearing.    Vital signs: (most recent): Blood pressure 138/60, pulse 77, temperature 98 °F (36.7 °C), temperature source Temporal, resp. rate 16, height 167.6 cm (66\"), weight 63.5 kg (140 lb 1.6 oz), SpO2 97 %, not currently breastfeeding.  Vital signs are normal.    HEENT: Normal HEENT exam.    Lungs:  Normal effort and normal respiratory rate.  Breath sounds clear to auscultation.  She is not in respiratory distress.  No decreased breath sounds or wheezes.    Heart: Normal rate.  Regular rhythm.  S1 normal and S2 normal.  No murmur or gallop.   Abdomen: Abdomen is soft.    Extremities: There is no dependent edema or local swelling.    Pulses: Distal pulses are intact.    Neurological: Patient is alert and oriented to person, place and time.  Normal strength.    Pupils:  Pupils are equal, round, and reactive to light.              Results Review:     I reviewed the patient's new clinical results.    WBC WBC   Date Value Ref Range Status   04/10/2023 8.40 3.40 - 10.80 10*3/mm3 Final   04/09/2023 8.36 3.40 - 10.80 10*3/mm3 Final      HGB Hemoglobin   Date Value Ref Range Status   04/10/2023 8.0 (L) 12.0 - 15.9 g/dL Final   04/09/2023 8.7 (L) 12.0 - 15.9 g/dL Final      HCT Hematocrit   Date Value Ref Range Status   04/10/2023 28.7 (L) 34.0 - 46.6 % Final   04/09/2023 31.7 (L) 34.0 - 46.6 % Final      Platlets No results found " for: LABPLAT   MCV MCV   Date Value Ref Range Status   04/10/2023 105.1 (H) 79.0 - 97.0 fL Final   04/09/2023 108.6 (H) 79.0 - 97.0 fL Final          Sodium Sodium   Date Value Ref Range Status   04/10/2023 133 (L) 136 - 145 mmol/L Final   04/09/2023 133 (L) 136 - 145 mmol/L Final      Potassium Potassium   Date Value Ref Range Status   04/10/2023 4.9 3.5 - 5.2 mmol/L Final   04/09/2023 4.8 3.5 - 5.2 mmol/L Final      Chloride Chloride   Date Value Ref Range Status   04/10/2023 96 (L) 98 - 107 mmol/L Final   04/09/2023 97 (L) 98 - 107 mmol/L Final      CO2 CO2   Date Value Ref Range Status   04/10/2023 19.0 (L) 22.0 - 29.0 mmol/L Final   04/09/2023 22.0 22.0 - 29.0 mmol/L Final      BUN BUN   Date Value Ref Range Status   04/10/2023 47 (H) 8 - 23 mg/dL Final   04/09/2023 44 (H) 8 - 23 mg/dL Final      Creatinine Creatinine   Date Value Ref Range Status   04/10/2023 6.58 (H) 0.57 - 1.00 mg/dL Final   04/09/2023 5.86 (H) 0.57 - 1.00 mg/dL Final      Calcium Calcium   Date Value Ref Range Status   04/10/2023 8.6 8.6 - 10.5 mg/dL Final   04/09/2023 8.5 (L) 8.6 - 10.5 mg/dL Final      PO4 No results found for: CAPO4   Albumin No results found for: ALBUMIN   Magnesium No results found for: MG   Uric Acid No results found for: URICACID     Medication Review: yes    Assessment & Plan       Intractable pain      Assessment & Plan        ESRD: MWF grayson     Volume status: No significant anemia     Abdominal pain: In the setting of rectal cancer.     Failure to thrive     Hypoalbuminemia      Anemia: ACD due to CKD.     I discussed the patients findings and my recommendations with patient       Gabe Butler MD  04/10/23  16:02 EDT      Electronically signed by Gabe Butler MD at 04/10/23 8545

## 2023-04-11 NOTE — PLAN OF CARE
Goal Outcome Evaluation:  Plan of Care Reviewed With: patient            SLP re-evaluation completed. Pt with dislike of thickened liquids, GOC/POC options discussed with pt though no family present. For now, continue nectar-thick liquids with meals though allow for regular H2O between meals as tolerated - remind pt for alejandra, single sips as additional precaution. Will continue to address dysphagia. Please see note for further details and recommendations.

## 2023-04-11 NOTE — PROGRESS NOTES
[facility] Medicine Services  PROGRESS NOTE    Patient Name: Esperanza Pop  : 1945  MRN: [id number]    Date of Admission: 2023  Primary Care Physician: Meghana Rodgers MD    Subjective   Subjective     CC:  Follow up anal cancer    HPI: Up in bed. Had some abdominal pain/buttock pain. No f/c. No n/v. No shortness of air.    ROS:  No change in ROS from     Objective   Objective     Vital Signs:   Temp:  [97.9 °F (36.6 °C)-98.5 °F (36.9 °C)] 97.9 °F (36.6 °C)  Heart Rate:  [64-77] 71  Resp:  [14-16] 16  BP: (100-170)/(40-71) 164/67     Physical Exam:  NAD, chronically ill appearing  OP clear, dry   Neck supple  No LAD  RRR  CTAB  +BS, soft  DONG  Normal affect  No rashes  No change from 4/10    Results Reviewed:  LAB RESULTS:      Lab 04/10/23  0400 23  1723 23  0851   WBC 8.40 8.36 9.16   HEMOGLOBIN 8.0* 8.7* 9.0*   HEMATOCRIT 28.7* 31.7* 30.9*   PLATELETS 279 254 203   NEUTROS ABS  --  5.46 6.08   IMMATURE GRANS (ABS)  --  0.06* 0.05   LYMPHS ABS  --  1.37 1.10   MONOS ABS  --  1.20* 1.27*   EOS ABS  --  0.24 0.62*   .1* 108.6* 101.6*   LACTATE  --  2.0  --          Lab 04/10/23  0359 23  1843 23  0442 23  0851   SODIUM 133* 133* 136 135*   POTASSIUM 4.9 4.8 4.9 4.3   CHLORIDE 96* 97* 97* 91*   CO2 19.0* 22.0 25.0 29.0   ANION GAP 18.0* 14.0 14.0 15.0   BUN 47* 44* 20 39*   CREATININE 6.58* 5.86* 4.02* 6.93*   EGFR 6.1* 7.0* 11.0* 5.7*   GLUCOSE 73 120* 94 169*   CALCIUM 8.6 8.5* 8.2* 9.0   MAGNESIUM  --   --  2.0 2.3   PHOSPHORUS  --   --  3.2 3.2         Lab 23  0851   TOTAL PROTEIN 8.4   ALBUMIN 2.9*   GLOBULIN 5.5   ALT (SGPT) 12   AST (SGOT) 37*   BILIRUBIN 0.7   ALK PHOS 238*                     Lab 23  1617   PH, ARTERIAL 7.430   PCO2, ARTERIAL 34.9*   PO2 .0*   FIO2 28   HCO3 ART 23.2   BASE EXCESS ART -0.9*   CARBOXYHEMOGLOBIN 1.7     Brief Urine Lab Results     None          Microbiology Results Abnormal      Procedure Component Value - Date/Time    Blood Culture - Blood, Arm, Left [[id number]]  (Normal) Collected: 04/09/23 1723    Lab Status: Preliminary result Specimen: Blood from Arm, Left Updated: 04/10/23 1745     Blood Culture No growth at 24 hours    Narrative:      AEROBIC BOTTLE ONLY    Blood Culture - Blood, Hand, Left [[id number]]  (Normal) Collected: 04/09/23 1724    Lab Status: Preliminary result Specimen: Blood from Hand, Left Updated: 04/10/23 1745     Blood Culture No growth at 24 hours    Narrative:      AEROBIC BOTTLE ONLY          CT Head Without Contrast    Result Date: 4/9/2023  CT HEAD WO CONTRAST Date of Exam: 4/9/2023 4:37 PM EDT Indication: Mental status change, unknown cause. Comparison: 3/12/2023 Technique: Axial CT images were obtained of the head without contrast administration.  Reconstructed coronal and sagittal images were also obtained. Automated exposure control and iterative construction methods were used. Findings: Parenchyma:No acute intraparenchymal hemorrhage. No loss of gray-white differentiation to suggest large territory infarct. Mild parenchymal volume loss. Scattered periventricular and subcortical white matter hypodensities, nonspecific, but most often consistent with small vessel ischemic changes. No midline shift or herniation. Mineralization of the basal ganglia. Ventricles and extra axial spaces:Prominent ventricles and sulci secondary to volume loss. No extra axial fluid collection seen. Other:Lens replacements. Paranasal sinuses are clear. Mastoid air cells are clear. Calvarium is intact. Intracranial atherosclerotic calcification is present.     Impression: Impression: No evidence of acute intracranial hemorrhage or large territorial infarct. Chronic changes as above. Electronically Signed: Juan Carlos Martinez  4/9/2023 5:33 PM EDT  Workstation ID: [id number]    XR Chest 1 View    Result Date: 4/9/2023  XR CHEST 1 VW Date of Exam: 4/9/2023 4:14 PM EDT Indication: Ams.  Comparison: 3/23/2023 Findings: Cardiomegaly. No focal opacity, pleural effusion or pneumothorax. Similar likely right hilar granulomas. No evidence of acute osseous abnormalities. Visualized upper abdomen is unremarkable.     Impression: Impression: No evidence of acute cardiopulmonary process. Cardiomegaly. Electronically Signed: Juan Carlos Menardy  4/9/2023 5:54 PM EDT  Workstation ID: [id number]      Results for orders placed during the hospital encounter of 03/10/23    Adult Transthoracic Echo Complete W/ Cont if Necessary Per Protocol    Interpretation Summary  •  Left ventricular systolic function is normal. Estimated left ventricular EF = 55%  •  Left ventricular wall thickness is consistent with moderate concentric hypertrophy.  •  The right ventricular cavity is mildly dilated.  •  Mild to moderate biatrial enlargement.  •  Moderate to severe aortic valve stenosis is present.  Mean gradient 34 mmHg, DOUG 0.9 cm².  •  Mild aortic insufficiency.  •  Mild mitral regurgitation.  •  Mild to moderate tricuspid regurgitation with RVSP 48 mmHg.      Current medications:  Scheduled Meds:acetaminophen, 650 mg, Oral, TID  amiodarone, 200 mg, Oral, Daily  apixaban, 2.5 mg, Oral, BID  aspirin, 81 mg, Oral, Daily  atorvastatin, 80 mg, Oral, Nightly  capsaicin, , Topical, Q12H  carvedilol, 3.125 mg, Oral, Q12H  fentaNYL, 1 patch, Transdermal, Q72H   And  Check Fentanyl Patch Placement, 1 each, Does not apply, Q12H  insulin lispro, 0-7 Units, Subcutaneous, TID AC  isosorbide mononitrate, 120 mg, Oral, Daily  mirtazapine, 15 mg, Oral, Nightly  povidone-iodine, 1 application, Topical, Daily  senna-docusate sodium, 2 tablet, Oral, BID  sodium chloride, 10 mL, Intravenous, Q12H      Continuous Infusions:   PRN Meds:.•  senna-docusate sodium **AND** bisacodyl **AND** bisacodyl  •  cloNIDine  •  dextrose  •  dextrose  •  diazePAM  •  glucagon (human recombinant)  •  HYDROmorphone  •  hydrOXYzine  •  melatonin  •  ondansetron  •   oxyCODONE  •  [COMPLETED] Insert Peripheral IV **AND** sodium chloride  •  sodium chloride  •  sodium chloride    Assessment & Plan   Assessment & Plan     Active Hospital Problems    Diagnosis  POA   • **Intractable pain [R52]  Yes      Resolved Hospital Problems   No resolved problems to display.        Brief Hospital Course to date:  Esperanza Pop is a 78 y.o. female w/ ESRD (HD-MWF), DM2, HTN, HLD, HFpEF, pAfib (xarelto), anemia, prior breast cancer, active anal cancer intolerant of chemo/XRT, recently admitted 3/10-3/29 and DC'ed to SNF to trial improvement of functional status, who returned w/ intractable rectal pain    Intractable pain, multifactorial  Wounds of the buttocks, recent radiotherapy  -continue with wound care, palliative care following    Metastatic anal cancer  Anorectal fistula  -s/p partial treatment with chemotherapy and radiation  -CT with slight decrease in size, with anorectal fistual noted  -did not tolerate treatment before and went to SNF  -palliative care and oncology consulted, d/w daughter and doesn't feel that any more cancer treatment would be worthwhile, may be appropriate to transition to comfort care, d/w patient    Anorexia  -cont mirtazapine    Dysphagia  -seen by SLP, s/p FEES, diet upgraded    ESRD on HD  -HD MWF  -missed sessions pta  -nephro follows    DM type 2, a1c 5.8%, w/ insulin use  -SSI    HFpEF  HTN  pAfib  -cont amio, apixiban, asa, statin, coreg, imdur     Mod-Sev AS  -has appt w/ Geena Estrella, APRN 4/27/23    Expected Discharge Location and Transportation: SNF w/ palliative care vs hospice  Expected Discharge   Expected Discharge Date and Time     Expected Discharge Date Expected Discharge Time    Apr 12, 2023            DVT prophylaxis:  Medical DVT prophylaxis orders are present.     AM-PAC 6 Clicks Score (PT): 9 (04/11/23 0800)    CODE STATUS:   Code Status and Medical Interventions:   Ordered at: 04/06/23 1151     Medical Intervention Limits:    NO  intubation (DNI)     Code Status (Patient has no pulse and is not breathing):    No CPR (Do Not Attempt to Resuscitate)     Medical Interventions (Patient has pulse or is breathing):    Limited Support     Comments:    Per family       Champ Ríos MD  04/11/23

## 2023-04-12 ENCOUNTER — APPOINTMENT (OUTPATIENT)
Dept: NEPHROLOGY | Facility: HOSPITAL | Age: 78
DRG: 935 | End: 2023-04-12
Payer: MEDICARE

## 2023-04-12 LAB
GLUCOSE BLDC GLUCOMTR-MCNC: 108 MG/DL (ref 70–130)
GLUCOSE BLDC GLUCOMTR-MCNC: 72 MG/DL (ref 70–130)
GLUCOSE BLDC GLUCOMTR-MCNC: 88 MG/DL (ref 70–130)

## 2023-04-12 PROCEDURE — 99231 SBSQ HOSP IP/OBS SF/LOW 25: CPT | Performed by: HOSPITALIST

## 2023-04-12 PROCEDURE — 25010000002 HYDROMORPHONE PER 4 MG: Performed by: STUDENT IN AN ORGANIZED HEALTH CARE EDUCATION/TRAINING PROGRAM

## 2023-04-12 PROCEDURE — 25010000002 DIAZEPAM PER 5 MG: Performed by: INTERNAL MEDICINE

## 2023-04-12 PROCEDURE — 97535 SELF CARE MNGMENT TRAINING: CPT

## 2023-04-12 PROCEDURE — 82962 GLUCOSE BLOOD TEST: CPT

## 2023-04-12 PROCEDURE — 97530 THERAPEUTIC ACTIVITIES: CPT | Performed by: PHYSICAL THERAPIST

## 2023-04-12 RX ORDER — MANNITOL 250 MG/ML
25 INJECTION, SOLUTION INTRAVENOUS AS NEEDED
Status: ACTIVE | OUTPATIENT
Start: 2023-04-12 | End: 2023-04-12

## 2023-04-12 RX ADMIN — CARVEDILOL 3.12 MG: 6.25 TABLET, FILM COATED ORAL at 21:24

## 2023-04-12 RX ADMIN — APIXABAN 2.5 MG: 2.5 TABLET, FILM COATED ORAL at 11:55

## 2023-04-12 RX ADMIN — SENNOSIDES AND DOCUSATE SODIUM 2 TABLET: 8.6; 5 TABLET ORAL at 11:55

## 2023-04-12 RX ADMIN — APIXABAN 2.5 MG: 2.5 TABLET, FILM COATED ORAL at 20:03

## 2023-04-12 RX ADMIN — CARVEDILOL 3.12 MG: 6.25 TABLET, FILM COATED ORAL at 11:55

## 2023-04-12 RX ADMIN — ACETAMINOPHEN 325MG 650 MG: 325 TABLET ORAL at 11:55

## 2023-04-12 RX ADMIN — Medication 10 ML: at 20:03

## 2023-04-12 RX ADMIN — ACETAMINOPHEN 325MG 650 MG: 325 TABLET ORAL at 20:02

## 2023-04-12 RX ADMIN — SENNOSIDES AND DOCUSATE SODIUM 2 TABLET: 8.6; 5 TABLET ORAL at 20:03

## 2023-04-12 RX ADMIN — MIRTAZAPINE 15 MG: 15 TABLET, FILM COATED ORAL at 20:03

## 2023-04-12 RX ADMIN — FENTANYL 1 PATCH: 25 PATCH TRANSDERMAL at 17:15

## 2023-04-12 RX ADMIN — ISOSORBIDE MONONITRATE 120 MG: 120 TABLET, EXTENDED RELEASE ORAL at 11:55

## 2023-04-12 RX ADMIN — OXYCODONE HYDROCHLORIDE 5 MG: 5 TABLET ORAL at 20:03

## 2023-04-12 RX ADMIN — CAPSAICIN: 0.75 CREAM TOPICAL at 11:56

## 2023-04-12 RX ADMIN — DIAZEPAM 2.5 MG: 5 INJECTION, SOLUTION INTRAMUSCULAR; INTRAVENOUS at 22:41

## 2023-04-12 RX ADMIN — ASPIRIN 81 MG: 81 TABLET, COATED ORAL at 11:56

## 2023-04-12 RX ADMIN — HYDROMORPHONE HYDROCHLORIDE 0.25 MG: 1 INJECTION, SOLUTION INTRAMUSCULAR; INTRAVENOUS; SUBCUTANEOUS at 12:21

## 2023-04-12 RX ADMIN — HYDROMORPHONE HYDROCHLORIDE 0.25 MG: 1 INJECTION, SOLUTION INTRAMUSCULAR; INTRAVENOUS; SUBCUTANEOUS at 17:15

## 2023-04-12 RX ADMIN — Medication 10 ML: at 11:58

## 2023-04-12 RX ADMIN — CAPSAICIN: 0.75 CREAM TOPICAL at 20:03

## 2023-04-12 RX ADMIN — HYDROMORPHONE HYDROCHLORIDE 0.25 MG: 1 INJECTION, SOLUTION INTRAMUSCULAR; INTRAVENOUS; SUBCUTANEOUS at 21:25

## 2023-04-12 RX ADMIN — ATORVASTATIN CALCIUM 80 MG: 40 TABLET, FILM COATED ORAL at 20:03

## 2023-04-12 RX ADMIN — AMIODARONE HYDROCHLORIDE 200 MG: 200 TABLET ORAL at 11:55

## 2023-04-12 RX ADMIN — ACETAMINOPHEN 325MG 650 MG: 325 TABLET ORAL at 17:15

## 2023-04-12 NOTE — PLAN OF CARE
Goal Outcome Evaluation:      Pt rested well this PM. VSS on RA. Pt refusing turns but will allow shifting. Pt refuses full skin assessment. Educated pt on why skin assessments need to be done and pt refuses still. JHONATAN planned for this AM.

## 2023-04-12 NOTE — THERAPY TREATMENT NOTE
Patient Name: Esperanza Pop  : 1945    MRN: [id number]                              Today's Date: 2023       Admit Date: 2023    Visit Dx:     ICD-10-CM ICD-9-CM   1. Intractable pain  R52 780.96   2. Rectal cancer  C20 154.1   3. Adult failure to thrive  R62.7 783.7   4. Hypoalbuminemia  E88.09 273.8   5. ESRD on hemodialysis  N18.6 585.6    Z99.2 V45.11   6. Pressure injury of buttock, stage 3, unspecified laterality  L89.303 707.05     707.23   7. Dysphagia, unspecified type  R13.10 787.20     Patient Active Problem List   Diagnosis   • Acute on chronic diastolic heart failure   • Type 2 diabetes mellitus   • Essential hypertension   • Coronary artery disease involving native coronary artery of native heart with angina pectoris   • Breast cancer, female, right   • Seasonal allergic rhinitis   • Right carotid bruit   • Vitamin B12 deficiency   • Hyperlipidemia LDL goal <70   • End stage renal disease on dialysis (HCC)   • Nonrheumatic aortic valve stenosis   • NSTEMI (non-ST elevated myocardial infarction)   • UTI (urinary tract infection)   • Ischemic heart disease   • CHF (congestive heart failure)   • Acute non-ST segment elevation myocardial infarction (STEMI) following previous myocardial infarction   • Dyslipidemia   • Diabetes mellitus   • Mild obesity   • Elevated troponin   • Screen for colon cancer   • Acute midline low back pain without sciatica   • Hypertensive emergency   • PAF (paroxysmal atrial fibrillation) (HCC)   • Malignant neoplasm of anal canal   • Hematochezia   • Severe malnutrition   • Symptomatic anemia   • Intractable pain     Past Medical History:   Diagnosis Date   • Acute bronchitis    • Acute conjunctivitis    • Acute kidney injury     Admission from 2013 to 2014, now resolved with latest creatinine 1.4 on 2014.   • Acute non-ST segment elevation myocardial infarction (STEMI) following previous myocardial infarction    • Anal cancer 2023   • Anal  cancer 2/8/2023   • Arthritis    • Breast cancer, female, right     2005 mastectomy right   • Cancer    • CHF (congestive heart failure)    • Chronic kidney disease     dialysis MWF, [facility]    • Clotting disorder    • COVID-19    • Diabetes mellitus     diagnosed ~1992, checks fsbg 2-3x/day   • Diarrhea    • Dyslipidemia    • Dyspepsia    • Dyspnea    • Edema    • History of transfusion     ~2013 no reaction    • Hx of radiation therapy    • Hyperlipidemia    • Hypertension     Severe   • Ischemic heart disease    • Moderate obesity     BMI 36.2   • Myocardial infarction    • Pneumonia    • Pneumonia 02/2019   • Radiation 2005   • Seasonal allergies    • Wears glasses      Past Surgical History:   Procedure Laterality Date   • AV FISTULA PLACEMENT, BRACHIOBASILIC     • BREAST BIOPSY Left 2010   • BREAST CYST EXCISION     • BREAST LUMPECTOMY Right 2005   • BREAST SURGERY     • CARDIAC CATHETERIZATION N/A 10/10/2016    Procedure: Left Heart Cath;  Surgeon: Scooter Zhao MD;  Location:  [facility];  Service:    • CARDIAC CATHETERIZATION  10/2016   • CARDIAC CATHETERIZATION N/A 1/21/2021    Procedure: Right and Left Heart Cath;  Surgeon: Hugh Tidwell MD;  Location:  [facility];  Service: Cardiology;  Laterality: N/A;   • COLONOSCOPY N/A 7/23/2020    Procedure: COLONOSCOPY;  Surgeon: Rubén Rosas MD;  Location:  [facility];  Service: Gastroenterology;  Laterality: N/A;   • CORONARY STENT PLACEMENT  2016   • HERNIA REPAIR     • HYSTERECTOMY  2000   • INSERTION HEMODIALYSIS CATHETER Right 6/29/2016    Procedure: HEMODIALYSIS CATHETER INSERTION TUNNELLED;  Surgeon: Ramón Chavez MD;  Location: [facility] OR;  Service:    • MASTECTOMY Right 2006   • OOPHORECTOMY     • REDUCTION MAMMAPLASTY Left 2005      General Information     Row Name 04/12/23 1415          Physical Therapy Time and Intention    Document Type therapy note (daily note)   -LM     Mode of Treatment physical therapy  -LM     Row Name 04/12/23 1415          General Information    Patient Profile Reviewed yes  -LM     Existing Precautions/Restrictions fall;other (see comments)  Sacral Wound  -LM     Row Name 04/12/23 1415          Cognition    Orientation Status (Cognition) oriented to;person;place;verbal cues/prompts needed for orientation;time  Knew the year;  Needed cues for the month  -LM     Row Name 04/12/23 1415          Safety Issues, Functional Mobility    Safety Issues Affecting Function (Mobility) insight into deficits/self-awareness;judgment;problem-solving;safety precaution awareness;safety precautions follow-through/compliance;sequencing abilities  -LM     Impairments Affecting Function (Mobility) balance;cognition;endurance/activity tolerance;pain;range of motion (ROM);strength  -LM           User Key  (r) = Recorded By, (t) = Taken By, (c) = Cosigned By    Initials Name Provider Type    LM Christina Melendez, PT Physical Therapist               Mobility     Row Name 04/12/23 1420          Bed Mobility    Supine-Sit [place] (Bed Mobility) maximum assist (25% patient effort);2 person assist;verbal cues  -LM     Assistive Device (Bed Mobility) bed rails;head of bed elevated  -LM     Comment, (Bed Mobility) Vc's for sequencing.  Pt reported feeling dizzy once sitting EOB.  BP taken and stable.  -LM     Row Name 04/12/23 1420          Bed-Chair Transfer    Bed-Chair [place] (Transfers) maximum assist (25% patient effort);2 person assist;verbal cues  -LM     Assistive Device (Bed-Chair Transfers) other (see comments)  BUE Support  -LM     Comment, (Bed-Chair Transfer) Stand pivot completed from bed-->recliner.  -LM     Row Name 04/12/23 1420          Sit-Stand Transfer    Sit-Stand [place] (Transfers) maximum assist (25% patient effort);2 person assist;verbal cues  -LM     Assistive Device (Sit-Stand Transfers) walker, front-wheeled  -LM     Comment, (Sit-Stand  Transfer) Stood x 3 reps from EOB.  Pt has difficulty standing fully upright and fully extending knees.  -LM     Row Name 04/12/23 1420          Gait/Stairs (Locomotion)    [place] Level (Gait) not tested  -LM     Comment, (Gait/Stairs) Not safe to attempt at this time.  -LM           User Key  (r) = Recorded By, (t) = Taken By, (c) = Cosigned By    Initials Name Provider Type    LM Christina Melendez PT Physical Therapist               Obj/Interventions     Row Name 04/12/23 1427          Motor Skills    Therapeutic Exercise knee;ankle  -LM     Row Name 04/12/23 1427          Knee (Therapeutic Exercise)    Knee (Therapeutic Exercise) AROM (active range of motion)  -LM     Knee AROM (Therapeutic Exercise) bilateral;heel slides;supine;5 repetitions  -LM     Row Name 04/12/23 1427          Ankle (Therapeutic Exercise)    Ankle (Therapeutic Exercise) AROM (active range of motion)  -LM     Ankle AROM (Therapeutic Exercise) bilateral;dorsiflexion;plantarflexion;supine;5 repetitions  -LM     Row Name 04/12/23 1427          Balance    Static Sitting Balance contact guard  -LM     Position, Sitting Balance unsupported;sitting edge of bed  -LM     Static Standing Balance maximum assist;2-person assist;verbal cues  -LM     Dynamic Standing Balance maximum assist;2-person assist;verbal cues  -LM     Position/Device Used, Standing Balance supported  -LM           User Key  (r) = Recorded By, (t) = Taken By, (c) = Cosigned By    Initials Name Provider Type    LM Christina Melendez PT Physical Therapist               Goals/Plan    No documentation.                Clinical Impression     Row Name 04/12/23 1433          Pain    Pretreatment Pain Rating 8/10  -LM     Posttreatment Pain Rating 6/10  -LM     Pain Location - buttock  -LM     Pain Intervention(s) Repositioned;Ambulation/increased activity  -     Row Name 04/12/23 1433          Plan of Care Review    Plan of Care Reviewed With patient  -LM     Progress no change  -LM      Outcome Evaluation Pt progressing as expected.  Pt required MaxAx2 for all mobility including standing from EOB and completing a stand pivot to recliner.  Pt continues to be limited by weakness, decreased activity tolerance, pain, and balance deficits.  Recommend SNF at d/c.  -LM     Row Name 04/12/23 1433          Vital Signs    Pre Systolic BP Rehab 143  -LM     Pre Treatment Diastolic BP 59  -LM     Intra Systolic BP Rehab 145  Sitting EOB  -LM     Intra Treatment Diastolic BP 72  -LM     Pretreatment Heart Rate (beats/min) 75  -LM     Posttreatment Heart Rate (beats/min) 81  -LM     Pre Patient Position Supine  -LM     Intra Patient Position Sitting  -LM     Post Patient Position Sitting  -LM     Row Name 04/12/23 1433          Positioning and Restraints    Pre-Treatment Position in bed  -LM     Post Treatment Position chair  -LM     In Chair reclined;call light within reach;encouraged to call for assist;exit alarm on;waffle cushion;heels elevated;notified nsg  -LM           User Key  (r) = Recorded By, (t) = Taken By, (c) = Cosigned By    Initials Name Provider Type    LM Christina Melendez, PT Physical Therapist               Outcome Measures     Row Name 04/12/23 1437          How much help from another person do you currently need...    Turning from your back to your side while in flat bed without using bedrails? 2  -LM     Moving from lying on back to sitting on the side of a flat bed without bedrails? 2  -LM     Moving to and from a bed to a chair (including a wheelchair)? 2  -LM     Standing up from a chair using your arms (e.g., wheelchair, bedside chair)? 2  -LM     Climbing 3-5 steps with a railing? 1  -LM     To walk in hospital room? 1  -LM     AM-PAC 6 Clicks Score (PT) 10  -LM     Highest level of mobility 4 --> Transferred to chair/commode  -LM     Row Name 04/12/23 1437          Functional Assessment    Outcome Measure Options AM-PAC 6 Clicks Basic Mobility (PT)  -LM           User Key  (r) = Recorded  By, (t) = Taken By, (c) = Cosigned By    Initials Name Provider Type    LM Christina Melendez PT Physical Therapist                             Physical Therapy Education     Title: PT OT SLP Therapies (In Progress)     Topic: Physical Therapy (Done)     Point: Mobility training (Done)     Learning Progress Summary           Patient Acceptance, E,TB, VU by  at 4/6/2023 1135    Comment: continued benefit of skilled PT services                   Point: Home exercise program (Done)     Learning Progress Summary           Patient Acceptance, E,TB, VU by  at 4/6/2023 1135    Comment: continued benefit of skilled PT services                   Point: Body mechanics (Done)     Learning Progress Summary           Patient Acceptance, E,TB, VU by  at 4/6/2023 1135    Comment: continued benefit of skilled PT services                   Point: Precautions (Done)     Learning Progress Summary           Patient Acceptance, E,TB, VU by  at 4/6/2023 1135    Comment: continued benefit of skilled PT services                               User Key     Initials Effective Dates Name Provider Type Discipline     01/27/22 -  Richa Chand PT Physical Therapist PT              PT Recommendation and Plan     Plan of Care Reviewed With: patient  Progress: no change  Outcome Evaluation: Pt progressing as expected.  Pt required MaxAx2 for all mobility including standing from EOB and completing a stand pivot to recliner.  Pt continues to be limited by weakness, decreased activity tolerance, pain, and balance deficits.  Recommend SNF at d/c.     Time Calculation:    PT Charges     Row Name 04/12/23 1437             Time Calculation    Start Time 1335  -LM      PT Received On 04/12/23  -LM      PT Goal Re-Cert Due Date 04/16/23  -LM         Timed Charges    17641 - PT Therapeutic Exercise Minutes 2  -LM      11883 - PT Therapeutic Activity Minutes 10  -LM         Total Minutes    Timed Charges Total Minutes 12  -LM       Total Minutes  12  -LM            User Key  (r) = Recorded By, (t) = Taken By, (c) = Cosigned By    Initials Name Provider Type    LM Christina Melendez, PT Physical Therapist              Therapy Charges for Today     Code Description Service Date Service Provider Modifiers Qty    [id number]  PT THERAPEUTIC ACT EA 15 MIN 4/12/2023 Christina Melendez, PT GP 1          PT G-Codes  Outcome Measure Options: AM-PAC 6 Clicks Basic Mobility (PT)  AM-PAC 6 Clicks Score (PT): 10  AM-PAC 6 Clicks Score (OT): 10  PT Discharge Summary  Anticipated Discharge Disposition (PT): skilled nursing facility    Christina Melendez PT  4/12/2023

## 2023-04-12 NOTE — THERAPY TREATMENT NOTE
Patient Name: Esperanza Pop  : 1945    MRN: [id number]                              Today's Date: 2023       Admit Date: 2023    Visit Dx:     ICD-10-CM ICD-9-CM   1. Intractable pain  R52 780.96   2. Rectal cancer  C20 154.1   3. Adult failure to thrive  R62.7 783.7   4. Hypoalbuminemia  E88.09 273.8   5. ESRD on hemodialysis  N18.6 585.6    Z99.2 V45.11   6. Pressure injury of buttock, stage 3, unspecified laterality  L89.303 707.05     707.23   7. Dysphagia, unspecified type  R13.10 787.20     Patient Active Problem List   Diagnosis   • Acute on chronic diastolic heart failure   • Type 2 diabetes mellitus   • Essential hypertension   • Coronary artery disease involving native coronary artery of native heart with angina pectoris   • Breast cancer, female, right   • Seasonal allergic rhinitis   • Right carotid bruit   • Vitamin B12 deficiency   • Hyperlipidemia LDL goal <70   • End stage renal disease on dialysis (HCC)   • Nonrheumatic aortic valve stenosis   • NSTEMI (non-ST elevated myocardial infarction)   • UTI (urinary tract infection)   • Ischemic heart disease   • CHF (congestive heart failure)   • Acute non-ST segment elevation myocardial infarction (STEMI) following previous myocardial infarction   • Dyslipidemia   • Diabetes mellitus   • Mild obesity   • Elevated troponin   • Screen for colon cancer   • Acute midline low back pain without sciatica   • Hypertensive emergency   • PAF (paroxysmal atrial fibrillation) (HCC)   • Malignant neoplasm of anal canal   • Hematochezia   • Severe malnutrition   • Symptomatic anemia   • Intractable pain     Past Medical History:   Diagnosis Date   • Acute bronchitis    • Acute conjunctivitis    • Acute kidney injury     Admission from 2013 to 2014, now resolved with latest creatinine 1.4 on 2014.   • Acute non-ST segment elevation myocardial infarction (STEMI) following previous myocardial infarction    • Anal cancer 2023   • Anal  cancer 2/8/2023   • Arthritis    • Breast cancer, female, right     2005 mastectomy right   • Cancer    • CHF (congestive heart failure)    • Chronic kidney disease     dialysis MWF, [facility]    • Clotting disorder    • COVID-19    • Diabetes mellitus     diagnosed ~1992, checks fsbg 2-3x/day   • Diarrhea    • Dyslipidemia    • Dyspepsia    • Dyspnea    • Edema    • History of transfusion     ~2013 no reaction    • Hx of radiation therapy    • Hyperlipidemia    • Hypertension     Severe   • Ischemic heart disease    • Moderate obesity     BMI 36.2   • Myocardial infarction    • Pneumonia    • Pneumonia 02/2019   • Radiation 2005   • Seasonal allergies    • Wears glasses      Past Surgical History:   Procedure Laterality Date   • AV FISTULA PLACEMENT, BRACHIOBASILIC     • BREAST BIOPSY Left 2010   • BREAST CYST EXCISION     • BREAST LUMPECTOMY Right 2005   • BREAST SURGERY     • CARDIAC CATHETERIZATION N/A 10/10/2016    Procedure: Left Heart Cath;  Surgeon: Scooter Zhao MD;  Location:  [facility];  Service:    • CARDIAC CATHETERIZATION  10/2016   • CARDIAC CATHETERIZATION N/A 1/21/2021    Procedure: Right and Left Heart Cath;  Surgeon: Hugh Tidwell MD;  Location:  [facility];  Service: Cardiology;  Laterality: N/A;   • COLONOSCOPY N/A 7/23/2020    Procedure: COLONOSCOPY;  Surgeon: Rubén Rosas MD;  Location: [facility];  Service: Gastroenterology;  Laterality: N/A;   • CORONARY STENT PLACEMENT  2016   • HERNIA REPAIR     • HYSTERECTOMY  2000   • INSERTION HEMODIALYSIS CATHETER Right 6/29/2016    Procedure: HEMODIALYSIS CATHETER INSERTION TUNNELLED;  Surgeon: Ramón Chavez MD;  Location: [facility] OR;  Service:    • MASTECTOMY Right 2006   • OOPHORECTOMY     • REDUCTION MAMMAPLASTY Left 2005      General Information     Row Name 04/12/23 1438          OT Time and Intention    Document Type therapy note (daily note)  -     Mode of  Treatment occupational therapy  -     Row Name 04/12/23 1438          General Information    Patient Profile Reviewed yes  -     Prior Level of Function independent:;all household mobility;transfer;ADL's  -     Existing Precautions/Restrictions fall;other (see comments)  Sacral Wound  -     Barriers to Rehab medically complex;cognitive status  -     Row Name 04/12/23 1438          Cognition    Orientation Status (Cognition) oriented to;person;place;verbal cues/prompts needed for orientation;time  -     Row Name 04/12/23 1438          Safety Issues, Functional Mobility    Safety Issues Affecting Function (Mobility) insight into deficits/self-awareness;awareness of need for assistance;judgment;safety precaution awareness;problem-solving;safety precautions follow-through/compliance;sequencing abilities  -     Impairments Affecting Function (Mobility) balance;cognition;endurance/activity tolerance;pain;range of motion (ROM);strength  -     Cognitive Impairments, Mobility Safety/Performance insight into deficits/self-awareness;safety precaution awareness;safety precaution follow-through;sequencing abilities  -           User Key  (r) = Recorded By, (t) = Taken By, (c) = Cosigned By    Initials Name Provider Type     Shandra Galaviz OT Occupational Therapist                 Mobility/ADL's     Row Name 04/12/23 1440          Bed Mobility    Bed Mobility scooting/bridging;supine-sit  -     Scooting/Bridging [place] (Bed Mobility) maximum assist (25% patient effort);2 person assist;verbal cues  -     Supine-Sit [place] (Bed Mobility) maximum assist (25% patient effort);2 person assist;verbal cues  -     Assistive Device (Bed Mobility) bed rails;draw sheet;head of bed elevated  -     Comment, (Bed Mobility) VC's to sequence transitioning from supine to sit EOB. Assist to bring BLE to EOB and trunk into upright position. Initial dizziness, BP stable.  -     Row Name 04/12/23 144           Transfers    Transfers sit-stand transfer;bed-chair transfer  -     Comment, (Transfers) VC's for hand placement and sequencing. Flexed posture in standing, BLE blocked. STS x 2.  -     Row Name 04/12/23 1440          Bed-Chair Transfer    Bed-Chair Prentiss (Transfers) maximum assist (25% patient effort);2 person assist;verbal cues  -LC     Assistive Device (Bed-Chair Transfers) other (see comments)  BUE support  -     Row Name 04/12/23 1440          Sit-Stand Transfer    Sit-Stand Prentiss (Transfers) maximum assist (25% patient effort);2 person assist;verbal cues  -     Assistive Device (Sit-Stand Transfers) walker, front-wheeled  -     Row Name 04/12/23 1440          Activities of Daily Living    BADL Assessment/Intervention lower body dressing;grooming  -     Row Name 04/12/23 1440          Lower Body Dressing Assessment/Training    Prentiss Level (Lower Body Dressing) don;socks;dependent (less than 25% patient effort)  -     Position (Lower Body Dressing) supine  -     Row Name 04/12/23 1440          Grooming Assessment/Training    Prentiss Level (Grooming) wash face, hands;set up;verbal cues  -     Position (Grooming) supported sitting  -           User Key  (r) = Recorded By, (t) = Taken By, (c) = Cosigned By    Initials Name Provider Type     Shandra Galaviz OT Occupational Therapist               Obj/Interventions     Row Name 04/12/23 1457          Balance    Balance Assessment sitting static balance;sitting dynamic balance;standing static balance;standing dynamic balance  -     Static Sitting Balance contact guard;verbal cues  -LC     Dynamic Sitting Balance minimal assist;verbal cues  -LC     Position, Sitting Balance unsupported;sitting edge of bed  -     Static Standing Balance maximum assist;2-person assist;verbal cues  -LC     Dynamic Standing Balance maximum assist;2-person assist;verbal cues  -LC     Position/Device Used, Standing Balance supported  -      Balance Interventions sitting;standing;supported;sit to stand;weight shifting activity  -     Comment, Balance Flexed posture in standing, consistent cues needed to self correct.  -           User Key  (r) = Recorded By, (t) = Taken By, (c) = Cosigned By    Initials Name Provider Type    Shandra Oro OT Occupational Therapist               Goals/Plan    No documentation.                Clinical Impression     Row Name 04/12/23 1457          Pain Assessment    Pretreatment Pain Rating 8/10  -     Posttreatment Pain Rating 6/10  -     Pain Location - buttock  -     Pain Intervention(s) Repositioned;Ambulation/increased activity  -     Additional Documentation Pain Scale: Word Pre/Post-Treatment (Group)  -     Row Name 04/12/23 1457          Plan of Care Review    Plan of Care Reviewed With patient  -     Progress no change  -     Outcome Evaluation Pt. continues to be below baseline with ADLs and functional mobility. Slow progress towards baseline. Limited by decreased activity tolerance, balance, and strength. Will continue to progress as able to promote return to PLOF. Recommend SNF at discharge.  -     Row Name 04/12/23 1457          Therapy Plan Review/Discharge Plan (OT)    Anticipated Discharge Disposition (OT) skilled nursing facility  -     Row Name 04/12/23 1457          Vital Signs    Pre Systolic BP Rehab --  VSS  -     Pre Patient Position Supine  -     Intra Patient Position Sitting  -     Post Patient Position Sitting  -     Row Name 04/12/23 1457          Positioning and Restraints    Pre-Treatment Position in bed  -     Post Treatment Position chair  -     In Chair notified nsg;reclined;call light within reach;encouraged to call for assist;exit alarm on;with family/caregiver;waffle cushion;legs elevated  -           User Key  (r) = Recorded By, (t) = Taken By, (c) = Cosigned By    Initials Name Provider Type    Shandra Oro OT Occupational Therapist                Outcome Measures     Row Name 04/12/23 1503          How much help from another is currently needed...    Putting on and taking off regular lower body clothing? 1  -LC     Bathing (including washing, rinsing, and drying) 2  -LC     Toileting (which includes using toilet bed pan or urinal) 1  -LC     Putting on and taking off regular upper body clothing 2  -LC     Taking care of personal grooming (such as brushing teeth) 2  -LC     Eating meals 2  -LC     AM-PAC 6 Clicks Score (OT) 10  -LC     Row Name 04/12/23 1437          How much help from another person do you currently need...    Turning from your back to your side while in flat bed without using bedrails? 2  -LM     Moving from lying on back to sitting on the side of a flat bed without bedrails? 2  -LM     Moving to and from a bed to a chair (including a wheelchair)? 2  -LM     Standing up from a chair using your arms (e.g., wheelchair, bedside chair)? 2  -LM     Climbing 3-5 steps with a railing? 1  -LM     To walk in hospital room? 1  -LM     AM-PAC 6 Clicks Score (PT) 10  -LM     Highest level of mobility 4 --> Transferred to chair/commode  -LM     Row Name 04/12/23 1503 04/12/23 1437       Functional Assessment    Outcome Measure Options AM-PAC 6 Clicks Daily Activity (OT)  - AM-PAC 6 Clicks Basic Mobility (PT)  -LM          User Key  (r) = Recorded By, (t) = Taken By, (c) = Cosigned By    Initials Name Provider Type    Christina Man PT Physical Therapist    Shandra Oro OT Occupational Therapist                Occupational Therapy Education     Title: PT OT SLP Therapies (In Progress)     Topic: Occupational Therapy (In Progress)     Point: ADL training (In Progress)     Description:   Instruct learner(s) on proper safety adaptation and remediation techniques during self care or transfers.   Instruct in proper use of assistive devices.              Learning Progress Summary           Patient Acceptance, E, NR by ANNABELLE at 4/12/2023  1321    Acceptance, E, NR by  at 4/8/2023 1326                   Point: Home exercise program (Not Started)     Description:   Instruct learner(s) on appropriate technique for monitoring, assisting and/or progressing therapeutic exercises/activities.              Learner Progress:  Not documented in this visit.          Point: Precautions (In Progress)     Description:   Instruct learner(s) on prescribed precautions during self-care and functional transfers.              Learning Progress Summary           Patient Acceptance, E, NR by  at 4/12/2023 1321                   Point: Body mechanics (In Progress)     Description:   Instruct learner(s) on proper positioning and spine alignment during self-care, functional mobility activities and/or exercises.              Learning Progress Summary           Patient Acceptance, E, NR by  at 4/12/2023 1321                               User Key     Initials Effective Dates Name Provider Type Discipline     02/03/23 -  Radha Carson, OT Occupational Therapist OT     06/16/21 -  Shandra Galaviz, OT Occupational Therapist OT              OT Recommendation and Plan     Plan of Care Review  Plan of Care Reviewed With: patient  Progress: no change  Outcome Evaluation: Pt. continues to be below baseline with ADLs and functional mobility. Slow progress towards baseline. Limited by decreased activity tolerance, balance, and strength. Will continue to progress as able to promote return to PLOF. Recommend SNF at discharge.     Time Calculation:    Time Calculation- OT     Row Name 04/12/23 1505             Time Calculation- OT    OT Start Time 1321  -LC      OT Received On 04/12/23  -      OT Goal Re-Cert Due Date 04/18/23  -         Timed Charges    53134 - OT Therapeutic Activity Minutes 4  -LC      03576 - OT Self Care/Mgmt Minutes 10  -LC         Total Minutes    Timed Charges Total Minutes 14  -       Total Minutes 14  -LC            User Key  (r) = Recorded By, (t)  = Taken By, (c) = Cosigned By    Initials Name Provider Type     Shandra Galaviz OT Occupational Therapist              Therapy Charges for Today     Code Description Service Date Service Provider Modifiers Qty    [id number] HC OT SELF CARE/MGMT/TRAIN EA 15 MIN 4/12/2023 Shandra Galaviz OT GO 1               Shandra Galaviz OT  4/12/2023

## 2023-04-12 NOTE — CASE MANAGEMENT/SOCIAL WORK
Continued Stay Note  [facility]     Patient Name: Esperanza Pop  MRN: [id number]  Today's Date: 4/12/2023    Admit Date: 4/5/2023    Plan: Home   Discharge Plan     Row Name 04/12/23 1622       Plan    Plan Comments Social work met with the patients siobhan Sood about paperwork from the [facility]. SW explained that the family is the petitioner and that the family will fill out the paperwork and social work explained that the psychology evaluation will need to be an outside psychologist that the family would hire to complete the evaluation and gave one option and explained that the court may have another option. Social work explained that the social work evaluation is a court appointed  that is usually a state  that works for adult protection. Social work explained that the doctor evaluation is completed by a physician or nurse practionier and social work can assist in checking on this being filled out if the provider is able to sign the form.    Row Name 04/12/23 7358       Plan    Plan Home    Patient/Family in Agreement with Plan yes    Plan Comments I spoke with the patient and family at the bedside. Per daughter, Indigo, the goal is for the patient to go home at discharge with home health. She is requesting a hospital bed. I will place a call to David at [facility]. During the conversation, it was brought up to me that the patient has paperwork in her room. Daughter, ATTILA, said it was not being addressed by case management as she felt it was our responsibility. I have contacted Ursula from Social Work to address the paperwork that I actually believe to be the responsibility of the family. Ursula to look into this matter. CM to follow and assist as needed.    Final Discharge Disposition Code 30 - still a patient               Discharge Codes    No documentation.               Expected Discharge Date and Time     Expected Discharge Date Expected Discharge Time    Apr 14,  2023             SELIN Maravilla

## 2023-04-12 NOTE — PLAN OF CARE
Goal Outcome Evaluation:  Plan of Care Reviewed With: patient        Progress: no change  Outcome Evaluation: Saw. pt in dialysis this afternoon.  Pt. denied pain, nausea and dyspnea.  Per nursing report, pt told HD RN that she no longer wants dialysis.  Pt. did not tell this writer that she does not want dialysis.  Pt. did not demonstrate any visible signs of discomfort.  Palliative care to followfor support, POC and ongoing GOC.     Spoke with family at .  They indicated that Dr. Cavazos has spoken with them and stated that no further radiation is an opition.  Spoke with daughter Jeri Pop on the phone and she would like to know if immunotherapy is a viable treatment option for her mother.

## 2023-04-12 NOTE — PLAN OF CARE
Goal Outcome Evaluation:  Plan of Care Reviewed With: patient        Progress: no change  Outcome Evaluation: Pt. continues to be below baseline with ADLs and functional mobility. Slow progress towards baseline. Limited by decreased activity tolerance, balance, and strength. Will continue to progress as able to promote return to PLOF. Recommend SNF at discharge.

## 2023-04-12 NOTE — PLAN OF CARE
Goal Outcome Evaluation:  Plan of Care Reviewed With: patient        Progress: no change  Outcome Evaluation: Pt progressing as expected.  Pt required MaxAx2 for all mobility including standing from EOB and completing a stand pivot to recliner.  Pt continues to be limited by weakness, decreased activity tolerance, pain, and balance deficits.  Recommend SNF at d/c.

## 2023-04-12 NOTE — PROGRESS NOTES
[facility] Medicine Services  PROGRESS NOTE    Patient Name: Esperanza Pop  : 1945  MRN: [id number]    Date of Admission: 2023  Primary Care Physician: Meghana Rodgers MD    Subjective   Subjective     CC:  Follow up anal cancer    HPI: Up in bed in dialysis. Notes rectal pain.    ROS:  No change in ROS from     Objective   Objective     Vital Signs:   Temp:  [95.8 °F (35.4 °C)-97.3 °F (36.3 °C)] 95.8 °F (35.4 °C)  Heart Rate:  [60-72] 72  Resp:  [14-16] 16  BP: (124-153)/(49-65) 153/55     Physical Exam:  NAD, chronically ill appearing  OP clear, dry   Neck supple  No LAD  RRR  CTAB  +BS, soft  DONG  Normal affect  No rashes  No change from     Results Reviewed:  LAB RESULTS:      Lab 04/10/23  0400 23  1723   WBC 8.40 8.36   HEMOGLOBIN 8.0* 8.7*   HEMATOCRIT 28.7* 31.7*   PLATELETS 279 254   NEUTROS ABS  --  5.46   IMMATURE GRANS (ABS)  --  0.06*   LYMPHS ABS  --  1.37   MONOS ABS  --  1.20*   EOS ABS  --  0.24   .1* 108.6*   LACTATE  --  2.0         Lab 04/10/23  0359 23  1843 23  0442   SODIUM 133* 133* 136   POTASSIUM 4.9 4.8 4.9   CHLORIDE 96* 97* 97*   CO2 19.0* 22.0 25.0   ANION GAP 18.0* 14.0 14.0   BUN 47* 44* 20   CREATININE 6.58* 5.86* 4.02*   EGFR 6.1* 7.0* 11.0*   GLUCOSE 73 120* 94   CALCIUM 8.6 8.5* 8.2*   MAGNESIUM  --   --  2.0   PHOSPHORUS  --   --  3.2                         Lab 23  1617   PH, ARTERIAL 7.430   PCO2, ARTERIAL 34.9*   PO2 .0*   FIO2 28   HCO3 ART 23.2   BASE EXCESS ART -0.9*   CARBOXYHEMOGLOBIN 1.7     Brief Urine Lab Results     None          Microbiology Results Abnormal     Procedure Component Value - Date/Time    Blood Culture - Blood, Arm, Left [[id number]]  (Normal) Collected: 23 1723    Lab Status: Preliminary result Specimen: Blood from Arm, Left Updated: 23 174     Blood Culture No growth at 2 days    Narrative:      AEROBIC BOTTLE ONLY    Blood Culture - Blood, Hand, Left  [[id number]]  (Normal) Collected: 04/09/23 1724    Lab Status: Preliminary result Specimen: Blood from Hand, Left Updated: 04/11/23 1745     Blood Culture No growth at 2 days    Narrative:      AEROBIC BOTTLE ONLY          No radiology results from the last 24 hrs    Results for orders placed during the hospital encounter of 03/10/23    Adult Transthoracic Echo Complete W/ Cont if Necessary Per Protocol    Interpretation Summary  •  Left ventricular systolic function is normal. Estimated left ventricular EF = 55%  •  Left ventricular wall thickness is consistent with moderate concentric hypertrophy.  •  The right ventricular cavity is mildly dilated.  •  Mild to moderate biatrial enlargement.  •  Moderate to severe aortic valve stenosis is present.  Mean gradient 34 mmHg, DOUG 0.9 cm².  •  Mild aortic insufficiency.  •  Mild mitral regurgitation.  •  Mild to moderate tricuspid regurgitation with RVSP 48 mmHg.      Current medications:  Scheduled Meds:acetaminophen, 650 mg, Oral, TID  amiodarone, 200 mg, Oral, Daily  apixaban, 2.5 mg, Oral, BID  aspirin, 81 mg, Oral, Daily  atorvastatin, 80 mg, Oral, Nightly  capsaicin, , Topical, Q12H  carvedilol, 3.125 mg, Oral, Q12H  fentaNYL, 1 patch, Transdermal, Q72H   And  Check Fentanyl Patch Placement, 1 each, Does not apply, Q12H  insulin lispro, 0-7 Units, Subcutaneous, TID AC  isosorbide mononitrate, 120 mg, Oral, Daily  mirtazapine, 15 mg, Oral, Nightly  povidone-iodine, 1 application, Topical, Daily  senna-docusate sodium, 2 tablet, Oral, BID  sodium chloride, 10 mL, Intravenous, Q12H      Continuous Infusions:   PRN Meds:.•  senna-docusate sodium **AND** bisacodyl **AND** bisacodyl  •  cloNIDine  •  dextrose  •  dextrose  •  diazePAM  •  glucagon (human recombinant)  •  HYDROmorphone  •  hydrOXYzine  •  mannitol  •  melatonin  •  ondansetron  •  oxyCODONE  •  [COMPLETED] Insert Peripheral IV **AND** sodium chloride  •  sodium chloride  •  sodium chloride    Assessment &  Plan   Assessment & Plan     Active Hospital Problems    Diagnosis  POA   • **Intractable pain [R52]  Yes      Resolved Hospital Problems   No resolved problems to display.        Brief Hospital Course to date:  Esperanza Pop is a 78 y.o. female w/ ESRD (HD-MWF), DM2, HTN, HLD, HFpEF, pAfib (xarelto), anemia, prior breast cancer, active anal cancer intolerant of chemo/XRT, recently admitted 3/10-3/29 and DC'ed to SNF to trial improvement of functional status, who returned w/ intractable rectal pain    Intractable pain, multifactorial  Wounds of the buttocks, recent radiotherapy  -continue with wound care, palliative care following    Metastatic anal cancer  Anorectal fistula  -s/p partial treatment with chemotherapy and radiation  -CT with slight decrease in size, with anorectal fistual noted  -did not tolerate treatment before and went to SNF  -palliative care and oncology consulted, d/w daughter and doesn't feel that any more cancer treatment would be worthwhile, may be appropriate to transition to comfort care, d/w patient, awaiting patient/family discussions with oncology, Rad/onc    Anorexia  -cont mirtazapine    Dysphagia  -tolerating PO    ESRD on HD  -HD MWF    DM type 2, a1c 5.8%, w/ insulin use  -SSI    HFpEF  HTN  pAfib  -cont amio, apixiban, asa, statin, coreg, imdur     Mod-Sev AS  -has appt w/ Geena Estrella, [facility] 4/27/23    Expected Discharge Location and Transportation: SNF w/ palliative care vs hospice  Expected Discharge   Expected Discharge Date and Time     Expected Discharge Date Expected Discharge Time    Apr 14, 2023            DVT prophylaxis:  Medical DVT prophylaxis orders are present.     AM-PAC 6 Clicks Score (PT): 9 (04/11/23 0800)    CODE STATUS:   Code Status and Medical Interventions:   Ordered at: 04/06/23 1151     Medical Intervention Limits:    NO intubation (DNI)     Code Status (Patient has no pulse and is not breathing):    No CPR (Do Not Attempt to Resuscitate)     Medical  Interventions (Patient has pulse or is breathing):    Limited Support     Comments:    Per family       Champ Ríos MD  04/12/23

## 2023-04-12 NOTE — CASE MANAGEMENT/SOCIAL WORK
Continued Stay Note  [facility]     Patient Name: Esperanza Pop  MRN: [id number]  Today's Date: 4/12/2023    Admit Date: 4/5/2023    Plan: Home   Discharge Plan     Row Name 04/12/23 1610       Plan    Plan Home    Patient/Family in Agreement with Plan yes    Plan Comments I spoke with the patient and family at the bedside. Per daughter, Indigo, the goal is for the patient to go home at discharge with home health. She is requesting a hospital bed. I will place a call to David at [facility]. During the conversation, it was brought up to me that the patient has paperwork in her room. Daughter, ATTILA, said it was not being addressed by case management as she felt it was our responsibility. I have contacted Ursula from Social Work to address the paperwork that I actually believe to be the responsibility of the family. Ursula to look into this matter. The patient has given conflicting information to staff about her HD. During a brief visit after she returned from HD, the patient stated that she does not want to continue HD. Later during a separate visit, the palliative nurse asked her if she knew what would happen if she stopped her dialysis. The patient verbalized that she would pass away, and then said she wanted to do it for a few more weeks. CM to follow and assist as needed.    Final Discharge Disposition Code 30 - still a patient               Discharge Codes    No documentation.               Expected Discharge Date and Time     Expected Discharge Date Expected Discharge Time    Apr 14, 2023             Diana Owusu RN

## 2023-04-13 LAB
GLUCOSE BLDC GLUCOMTR-MCNC: 109 MG/DL (ref 70–130)
GLUCOSE BLDC GLUCOMTR-MCNC: 119 MG/DL (ref 70–130)
GLUCOSE BLDC GLUCOMTR-MCNC: 122 MG/DL (ref 70–130)
GLUCOSE BLDC GLUCOMTR-MCNC: 148 MG/DL (ref 70–130)
GLUCOSE BLDC GLUCOMTR-MCNC: 97 MG/DL (ref 70–130)

## 2023-04-13 PROCEDURE — 99231 SBSQ HOSP IP/OBS SF/LOW 25: CPT | Performed by: HOSPITALIST

## 2023-04-13 PROCEDURE — 25010000002 HYDROMORPHONE 1 MG/ML SOLUTION: Performed by: INTERNAL MEDICINE

## 2023-04-13 PROCEDURE — 25010000002 DIAZEPAM PER 5 MG: Performed by: INTERNAL MEDICINE

## 2023-04-13 PROCEDURE — 82962 GLUCOSE BLOOD TEST: CPT

## 2023-04-13 PROCEDURE — 25010000002 HYDROMORPHONE PER 4 MG: Performed by: STUDENT IN AN ORGANIZED HEALTH CARE EDUCATION/TRAINING PROGRAM

## 2023-04-13 RX ADMIN — Medication 10 ML: at 09:20

## 2023-04-13 RX ADMIN — OXYCODONE HYDROCHLORIDE 5 MG: 5 TABLET ORAL at 09:17

## 2023-04-13 RX ADMIN — HYDROMORPHONE HYDROCHLORIDE 1 MG: 1 INJECTION, SOLUTION INTRAMUSCULAR; INTRAVENOUS; SUBCUTANEOUS at 00:36

## 2023-04-13 RX ADMIN — HYDROMORPHONE HYDROCHLORIDE 0.25 MG: 1 INJECTION, SOLUTION INTRAMUSCULAR; INTRAVENOUS; SUBCUTANEOUS at 10:40

## 2023-04-13 RX ADMIN — CARVEDILOL 3.12 MG: 6.25 TABLET, FILM COATED ORAL at 09:17

## 2023-04-13 RX ADMIN — ACETAMINOPHEN 325MG 650 MG: 325 TABLET ORAL at 09:16

## 2023-04-13 RX ADMIN — ASPIRIN 81 MG: 81 TABLET, COATED ORAL at 09:17

## 2023-04-13 RX ADMIN — AMIODARONE HYDROCHLORIDE 200 MG: 200 TABLET ORAL at 09:18

## 2023-04-13 RX ADMIN — POVIDONE-IODINE 1 EACH: 10 SOLUTION TOPICAL at 09:19

## 2023-04-13 RX ADMIN — ISOSORBIDE MONONITRATE 120 MG: 120 TABLET, EXTENDED RELEASE ORAL at 09:17

## 2023-04-13 RX ADMIN — DIAZEPAM 2.5 MG: 5 INJECTION, SOLUTION INTRAMUSCULAR; INTRAVENOUS at 07:18

## 2023-04-13 RX ADMIN — CAPSAICIN: 0.75 CREAM TOPICAL at 09:19

## 2023-04-13 RX ADMIN — APIXABAN 2.5 MG: 2.5 TABLET, FILM COATED ORAL at 09:17

## 2023-04-13 RX ADMIN — Medication 10 ML: at 21:02

## 2023-04-13 NOTE — PROGRESS NOTES
" LOS: 7 days   Patient Care Team:  Meghana Rodgers MD as PCP - General  Demetrius Sagastume MD as Consulting Physician (Cardiology)  Kevan Lerma MD as Consulting Physician (Nephrology)  Geena Estrella APRN as Nurse Practitioner (Cardiology)  Frank Mandujano MD as Referring Physician (Colon and Rectal Surgery)  Kevan Cavazos MD as Consulting Physician (Radiation Oncology)  Yue Reeves RN as Nurse Navigator  Darryn uBrk MD as Consulting Physician (Nephrology)    Chief Complaint: ESRD    Subjective     Seen on HD tolerating well so far. Minimal UF Bp stable. Good access pressure.            Subjective:  Symptoms:  Stable.  No shortness of breath, chest pain, chest pressure or anxiety.    Pain:  She complains of pain that is moderate.        History taken from: patient    Objective     Vital Sign Min/Max for last 24 hours  Temp  Min: 95.4 °F (35.2 °C)  Max: 97.6 °F (36.4 °C)   BP  Min: 113/52  Max: 174/54   Pulse  Min: 60  Max: 87   Resp  Min: 14  Max: 18   SpO2  Min: 94 %  Max: 98 %   No data recorded   No data recorded     Flowsheet Rows    Flowsheet Row First Filed Value   Admission Height 167.6 cm (66\") Documented at 04/05/2023 0702   Admission Weight 78.9 kg (174 lb) Documented at 04/05/2023 0702          No intake/output data recorded.  I/O last 3 completed shifts:  In: 60 [P.O.:60]  Out: 350 [Other:350]    Objective:  General Appearance:  Ill-appearing.    Vital signs: (most recent): Blood pressure 171/65, pulse 85, temperature 97.6 °F (36.4 °C), temperature source Axillary, resp. rate 14, height 167.6 cm (66\"), weight 63.5 kg (140 lb 1.6 oz), SpO2 97 %, not currently breastfeeding.  Vital signs are normal.    HEENT: Normal HEENT exam.    Lungs:  Normal effort and normal respiratory rate.  Breath sounds clear to auscultation.  She is not in respiratory distress.  No decreased breath sounds or wheezes.    Heart: Normal rate.  Regular rhythm.  S1 normal and S2 normal.  No murmur or " gallop.   Abdomen: Abdomen is soft.    Extremities: There is no dependent edema or local swelling.    Pulses: Distal pulses are intact.    Neurological: Patient is alert and oriented to person, place and time.  Normal strength.    Pupils:  Pupils are equal, round, and reactive to light.              Results Review:     I reviewed the patient's new clinical results.    WBC WBC   Date Value Ref Range Status   04/10/2023 8.40 3.40 - 10.80 10*3/mm3 Final      HGB Hemoglobin   Date Value Ref Range Status   04/10/2023 8.0 (L) 12.0 - 15.9 g/dL Final      HCT Hematocrit   Date Value Ref Range Status   04/10/2023 28.7 (L) 34.0 - 46.6 % Final      Platlets No results found for: LABPLAT   MCV MCV   Date Value Ref Range Status   04/10/2023 105.1 (H) 79.0 - 97.0 fL Final          Sodium Sodium   Date Value Ref Range Status   04/10/2023 133 (L) 136 - 145 mmol/L Final      Potassium Potassium   Date Value Ref Range Status   04/10/2023 4.9 3.5 - 5.2 mmol/L Final      Chloride Chloride   Date Value Ref Range Status   04/10/2023 96 (L) 98 - 107 mmol/L Final      CO2 CO2   Date Value Ref Range Status   04/10/2023 19.0 (L) 22.0 - 29.0 mmol/L Final      BUN BUN   Date Value Ref Range Status   04/10/2023 47 (H) 8 - 23 mg/dL Final      Creatinine Creatinine   Date Value Ref Range Status   04/10/2023 6.58 (H) 0.57 - 1.00 mg/dL Final      Calcium Calcium   Date Value Ref Range Status   04/10/2023 8.6 8.6 - 10.5 mg/dL Final      PO4 No results found for: CAPO4   Albumin No results found for: ALBUMIN   Magnesium No results found for: MG   Uric Acid No results found for: URICACID     Medication Review: yes    Assessment & Plan       Intractable pain      Assessment & Plan        ESRD: MWF [facility]ayson     Volume status: No significant anemia     Abdominal pain: In the setting of rectal cancer.     Failure to thrive     Hypoalbuminemia      Anemia: ACD due to CKD. Unable to add AMY due to rectal cancer     I discussed the patients findings and my  recommendations with patient       Gabe Butler MD  04/12/23  20:32 EDT

## 2023-04-13 NOTE — PLAN OF CARE
"Goal Outcome Evaluation:      Pt very depressed this AM and is asking why we would let her die like this. Pt stated, \"please let me die.\" Pt experiencing rectal spasms this AM. Valium given. Tried comforting patient. Repositioned her and gave warm blankets.            "

## 2023-04-13 NOTE — CONSULTS
"Continued Stay Note  [facility]     Patient Name: Esperanza Pop  MRN: [id number]  Today's Date: 4/13/2023    Admit Date: 4/5/2023    Plan: Ongoing   Discharge Plan     Row Name 04/13/23 1647       Plan    Plan Ongoing    Plan Comments Hospice referral received, chart reviewed. Visit made to pt, pt appeared to be sleeping. Met with pt's daughter Indigo regarding the referral. Indigo stated is familiar with hospice as pt's spouse passed away in July with hospice. Informed Indigo that pt told the palliative care team does not want any more dialysis. Indigo stated \"what momma wants momma gets. She (pt) is in her right mind to make her own decisions. She had hospice for daddy.\" Indigo stated does want hospice services for pt. Pt is currently Limited Support, teaching done on Limited Support vs comfort measures. Pt is currently receiving IV dilaudid and IV valium for symptom management. Discussed home with hospice vs inpatient hospice. Indigo stated will discuss comfort measures and discharge plan with the family. Will follow up with Indigo tomorrow. Please call [phone] if can be of further assistance.               Discharge Codes    No documentation.               Expected Discharge Date and Time     Expected Discharge Date Expected Discharge Time    Apr 14, 2023             Emma Spangler RN    "

## 2023-04-13 NOTE — CASE MANAGEMENT/SOCIAL WORK
Continued Stay Note  [facility]     Patient Name: Esperanza Pop  MRN: [id number]  Today's Date: 4/13/2023    Admit Date: 4/5/2023    Plan: Home   Discharge Plan     Row Name 04/13/23 0841       Plan    Plan Home    Patient/Family in Agreement with Plan yes    Plan Comments I spoke with Ms. Pop, at the bedside, this morning. We discussed all of the different discussions that have gone on during this hospital stay about her discharge plan. Those being Home, Home with Hospice, and recommendation from PT/OT for returning to skilled rehab. Pt states she is in pain and just wants to go home. We discussed home with Hospice. Pt states again that she wants to go home and she is agreeable with home with Hospice. I asked Ms. Pop if she wanted to continue to have Hemodiaysis, she states no. She said I want to go home. I asked Ms. Pop if she was okay with me asking the Hospice nurse to come see her today and she stated yes. I attempted to call Hospice RN, left a voicemail for her to call me.  will continue to follow.    Final Discharge Disposition Code 30 - still a patient               Discharge Codes    No documentation.               Expected Discharge Date and Time     Expected Discharge Date Expected Discharge Time    Apr 14, 2023             Vilma Weaver RN

## 2023-04-13 NOTE — DISCHARGE PLACEMENT REQUEST
"Esperanza Ayoub (78 y.o. Female)   Referred by Dr. HONEY Niño  PCP-Dr. Meghana Rodgers  Dx-End stage renal disease, comorbidity rectal cancer    Date of Birth   1945    Social Security Number       Address   [address] [place], [place] [address]    Home Phone   [phone]    MRN   [id number]       [employer]   [employer]    Marital Status                               Admission Date   4/5/23    Admission Type   Emergency    Admitting Provider   Champ Ríos MD    Attending Provider   Champ Ríos MD    Department, Room/Bed   [facility] 5H, S572/1       Discharge Date       Discharge Disposition       Discharge Destination                               Attending Provider: Champ Ríos MD    Allergies: Mircera [Methoxy Polyethylene Glycol-epoetin Beta], Venofer [Iron Sucrose], Albuterol, Nifedipine Er, Penicillins, Prednisone    Isolation: None   Infection: None   Code Status: No CPR    Ht: 167.6 cm (66\")   Wt: 63.5 kg (140 lb 1.6 oz)    Admission Cmt: None   Principal Problem: Intractable pain [R52]                 Active Insurance as of 4/5/2023     Primary Coverage     Payor Plan Insurance Group Employer/Plan Group    [facility] MEDICARE REPLACEMENT WELLCARE MEDICARE REPLACEMENT      Payor Plan Address Payor Plan Phone Number Payor Plan Fax Number Effective Dates    [address] [address] [phone]  12/1/2021 - None Entered    [facility] [address]       Subscriber Name Subscriber Birth Date Member ID       ESPERANZA AYOUB 1945 [id number]                 Emergency Contacts      (Rel.) Home Phone Work Phone Mobile Phone    isreal ayoub (Daughter) -- -- [phone]    anitradanelle (Daughter) -- -- [phone]    mariodanelle (Sister) -- -- [phone]    AyoubJuan\ (Son) [phone] -- [phone]            Emergency Contact Information     Name Relation Home Work Mobile    isreal ayoub Daughter   [phone]    danelle ayoub Daughter   " [phone]    danelle martin Sister   [phone]    Vinnie Pop Son [phone]          Insurance Information                [facility]LLCARE [facility] MEDICARE REPLACEMENT/WELLCARE MEDICARE REPLACEMENT Phone: [phone]    Subscriber: Esperanza Pop Subscriber#: [id number]    Group#:  Precert#: --             History & Physical      Nuria Lynch MD at [address] [place] [address] Medicine Services  HISTORY AND PHYSICAL    Patient Name: Esperanza Pop  : 1945  MRN: [id number]  Primary Care Physician: Meghana Rodgers MD  Date of admission: 2023    Subjective    Subjective     Chief Complaint:  Rectal pain    HPI:  Esperanza Pop is a 78 y.o. female with ESRD on HD , IDDM, HTN, HLD, HFpEF, breast cancer status postmastectomy and radiation, paroxysmal A-fib on Xarelto previously, chronic anemia, rectal cancer recently diagnosed and was getting radiation and chemo, recent admission from 3/10 to 3/29, who presented for intractable rectal pain from SNF.  She reports that since she left the hospital, she has continued to have rectal pain such that she did not tolerate any dialysis on Monday so her last dialysis session was on Friday.  She has not had much of an appetite and has had minimal oral intake for the past several weeks.  The patient denied any other symptoms besides rectal pain and poor appetite today.  Denies fevers, chills, headache, dizziness, nausea, vomiting, diarrhea.    Per the patient's daughter, the patient has had severe pain from her radiation burns on her buttocks.  They also state that she fell most recently yesterday morning while at the SNF.  They state that there is no more radiation or chemotherapy planned at this time due to her debilitation and radiation burns.    The ER, patient was afebrile, heart rate 66, respiratory rate 18, blood pressure 136/57, satting 100% on 2 L nasal cannula.  Labs on admission notable  for creatinine 6.93, BUN 39, potassium 4.3, sodium 135, hemoglobin 9, platelets 203.  She was given lidocaine topically and Zofran.  She was admitted for further management.      Review of Systems   Constitutional: Positive for appetite change and fatigue. Negative for fever.   HENT: Negative for congestion and rhinorrhea.    Eyes: Negative for discharge and redness.   Respiratory: Negative for cough and shortness of breath.    Cardiovascular: Negative for chest pain and leg swelling.   Gastrointestinal: Positive for abdominal pain and rectal pain. Negative for diarrhea, nausea and vomiting.   Genitourinary: Negative for dysuria and hematuria.   Musculoskeletal: Negative for gait problem and joint swelling.   Skin: Positive for wound. Negative for pallor.   Neurological: Negative for dizziness and headaches.   Psychiatric/Behavioral: Negative for confusion and self-injury.            Personal History     Past Medical History:   Diagnosis Date   • Acute bronchitis    • Acute conjunctivitis    • Acute kidney injury     Admission from 12/26/2013 to 01/02/2014, now resolved with latest creatinine 1.4 on 01/08/2014.   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   • Anal cancer 2/8/2023   • Arthritis    • Breast cancer, female, right     2005 mastectomy right   • Cancer    • CHF (congestive heart failure)    • Chronic kidney disease     dialysis MWF, [facility]/u [facility]    • Clotting disorder    • COVID-19    • Diabetes mellitus     diagnosed ~1992, checks fsbg 2-3x/day   • Diarrhea    • Dyslipidemia    • Dyspepsia    • Dyspnea    • Edema    • History of transfusion     ~2013 no reaction    • Hx of radiation therapy    • Hyperlipidemia    • Hypertension     Severe   • Ischemic heart disease    • Moderate obesity     BMI 36.2   • Myocardial infarction    • Pneumonia    • Pneumonia 02/2019   • Radiation 2005   • Seasonal allergies     • Wears glasses          Oncology Problem List:  Malignant neoplasm of anal canal (02/08/2023; Status: Active)  Breast cancer, female, right (05/20/2016; Status: Active)    Oncology/Hematology History   Breast cancer, female, right   5/20/2016 Initial Diagnosis    Breast cancer, female, right     Malignant neoplasm of anal canal   2/8/2023 Initial Diagnosis    Anal cancer (HCC)     2/15/2023 -  Chemotherapy    OP ANAL  Capecitabine 825 mg/m2 M-F + XRT         Past Surgical History:   Procedure Laterality Date   • AV FISTULA PLACEMENT, BRACHIOBASILIC     • BREAST BIOPSY Left 2010   • BREAST CYST EXCISION     • BREAST LUMPECTOMY Right 2005   • BREAST SURGERY     • CARDIAC CATHETERIZATION N/A 10/10/2016    Procedure: Left Heart Cath;  Surgeon: Scooter Zhao MD;  Location:  [facility];  Service:    • CARDIAC CATHETERIZATION  10/2016   • CARDIAC CATHETERIZATION N/A 1/21/2021    Procedure: Right and Left Heart Cath;  Surgeon: Hugh Tidwell MD;  Location:  [facility];  Service: Cardiology;  Laterality: N/A;   • COLONOSCOPY N/A 7/23/2020    Procedure: COLONOSCOPY;  Surgeon: Rubén Rosas MD;  Location:  [facility];  Service: Gastroenterology;  Laterality: N/A;   • CORONARY STENT PLACEMENT  2016   • HERNIA REPAIR     • HYSTERECTOMY  2000   • INSERTION HEMODIALYSIS CATHETER Right 6/29/2016    Procedure: HEMODIALYSIS CATHETER INSERTION TUNNELLED;  Surgeon: Ramón Chavez MD;  Location:  [facility];  Service:    • MASTECTOMY Right 2006   • OOPHORECTOMY     • REDUCTION MAMMAPLASTY Left 2005       Family History:  family history includes Breast cancer (age of onset: 55) in her sister; Cancer in her mother; Colon cancer in her maternal grandmother; Coronary artery disease in her father; Heart failure in her father; Pancreatic cancer in her mother; Stroke in her mother; Throat cancer in her daughter.     Social History:  reports that she has never smoked. She has  never used smokeless tobacco. She reports current alcohol use. She reports that she does not use drugs.  Social History     Social History Narrative    Pt consumes 1 serving of caffeine once every 2 weeks.     Lost grandson in Feb. 2022       Medications:  B Complex-C-Folic Acid, Capsaicin, Hydrocortisone (Perianal), Insulin Lispro, Iron Dextran, Lancets Ultra Fine, Vitamin D, acetaminophen, amiodarone, apixaban, aspirin, atorvastatin, benzocaine-menthol, bisacodyl, carvedilol, cloNIDine, difluprednate, dorzolamide-timolol, epoetin orquidea-epbx, fluocinonide, glucose blood, glucose monitor, hydrOXYzine, hydrocortisone, isosorbide mononitrate, lidocaine-prilocaine, mirtazapine, naloxone, and nitroglycerin    Allergies   Allergen Reactions   • Mircera [Methoxy Polyethylene Glycol-Epoetin Beta] Anaphylaxis     Pt stopped breathing   • Venofer [Iron Sucrose] Anaphylaxis     Pt stopped breathing   • Albuterol Other (See Comments)     Increased blood pressure  Used right before heart attack   • Nifedipine Er Swelling   • Penicillins Hives   • Prednisone Other (See Comments)     Makes jittery/anxious       Objective    Objective     Vital Signs:   Temp:  [97 °F (36.1 °C)-99.5 °F (37.5 °C)] 97 °F (36.1 °C)  Heart Rate:  [64-89] 77  Resp:  [18] 18  BP: ()/() 105/70  Flow (L/min):  [2] 2    Physical Exam   Constitutional: No acute distress, sleeping, but awakens with verbal stimulation, alert  HENT: NCAT, mucous membranes moist  Respiratory: Clear to auscultation bilaterally, respiratory effort normal   Cardiovascular: RRR, no murmurs, rubs, or gallops, right AV fistula accessed for dialysis  Gastrointestinal: Normoactive bowel sounds, soft, nontender, nondistended  Musculoskeletal: No bilateral ankle edema  Psychiatric: Appropriate affect, cooperative  Neurologic: PERRL, symmetric facies, symmetric palate rise, tongue midline, moving all extremities, speech clear  Skin: Examined with dialysis nurse present as a  chaperone; has gluteal radiation burns that are tender to palpation    Result Review:  I have personally reviewed the results from the time of this admission to 4/5/2023 15:37 EDT and agree with these findings:  []  Laboratory list / accordion  []  Microbiology  []  Radiology  [x]  EKG/Telemetry   []  Cardiology/Vascular   []  Pathology  [x]  Old records  []  Other:  Most notable findings include: as per hpi    LAB RESULTS:      Lab 04/05/23  0851   WBC 9.16   HEMOGLOBIN 9.0*   HEMATOCRIT 30.9*   PLATELETS 203   NEUTROS ABS 6.08   IMMATURE GRANS (ABS) 0.05   LYMPHS ABS 1.10   MONOS ABS 1.27*   EOS ABS 0.62*   .6*         Lab 04/05/23  0851   SODIUM 135*   POTASSIUM 4.3   CHLORIDE 91*   CO2 29.0   ANION GAP 15.0   BUN 39*   CREATININE 6.93*   EGFR 5.7*   GLUCOSE 169*   CALCIUM 9.0   MAGNESIUM 2.3   PHOSPHORUS 3.2         Lab 04/05/23  0851   TOTAL PROTEIN 8.4   ALBUMIN 2.9*   GLOBULIN 5.5   ALT (SGPT) 12   AST (SGOT) 37*   BILIRUBIN 0.7   ALK PHOS 238*                     Brief Urine Lab Results     None        Microbiology Results (last 10 days)     ** No results found for the last 240 hours. **          No radiology results from the last 24 hrs    Results for orders placed during the hospital encounter of 03/10/23    Adult Transthoracic Echo Complete W/ Cont if Necessary Per Protocol    Interpretation Summary  •  Left ventricular systolic function is normal. Estimated left ventricular EF = 55%  •  Left ventricular wall thickness is consistent with moderate concentric hypertrophy.  •  The right ventricular cavity is mildly dilated.  •  Mild to moderate biatrial enlargement.  •  Moderate to severe aortic valve stenosis is present.  Mean gradient 34 mmHg, DOUG 0.9 cm².  •  Mild aortic insufficiency.  •  Mild mitral regurgitation.  •  Mild to moderate tricuspid regurgitation with RVSP 48 mmHg.      Assessment & Plan   Assessment & Plan       Intractable pain      78 y.o. female with ESRD on HD Monday Wednesday  Friday, IDDM, HTN, HLD, HFpEF, breast cancer status postmastectomy and radiation, paroxysmal A-fib on Xarelto previously, chronic anemia, rectal cancer recently diagnosed and was getting radiation and chemo, recent admission from 3/10 to 3/29, who presented for intractable rectal pain from SNF.  She reports that since she left the hospital, she has continued to have rectal pain such that she did not tolerate any dialysis on Monday so her last dialysis session was on Friday. She has not had much of an appetite and has had minimal oral intake for the past several weeks.  Patient has continued to decline while at the SNF - based on her labs, despite having her last dialysis session on Friday, she has had minimal oral intake.    Buttock wound, appears to be from radiation  Metastatic rectal cancer, was on chemoradiation  • Wound care consulted  • Pain control with scheduled Tylenol, as needed Norco, as needed Dilaudid  • Palliative care consult  • CT abdomen and pelvis pending for lower abdominal pain and rectal pain    Profound Debility  • She follows with Dr. Cavazos, Rad/Onc, Dr. Carpenter with oncology  • In regards to her rectal cancer, could not tolerate chemotherapy or radiation therapy because of severe debility.    • Plan from her last admission (discharged on 3/29) was to hold both therapies (chemoradiation) and transition to SNF and if she improves over the next few weeks, she can resume treatment.   • Patient has continued to decline with continued weakness, poor appetite; patient appears to be hospice appropriate  • PT and OT consulted  • Discussed the patient's poor functional status and need to discuss palliative and hospice.  Patient was amenable to talking with palliative, but was not ready to discuss hospice today.  She requested I speak with her daughters.  I was only able to reach Jeri over the phone. I discussed with her the patient has continued to decline and that we will obtain imaging,  palliative consult and discuss further.    • Discussed I believe that she would likely continue to decline and that we needed to further talk about how the patient will spend the time that she has left. Comlethia to get patient's pain under control first    Poor appetite  • Continue mirtazapine    Dysphagia  · Was on a modified diet with purées and pudding thickened liquids during her last admission  · Speech consulted     ESRD on HD MWF  • Renal following for HD today  • Discussed with Dr. Butler who feels patient is likely hospice appropriate     HFpEF   Essential hypertension  hypotension  • Continue dialysis for volume control as above  • Continue imdur 120mg     PAF on eliquis   • Continue eliquis + amiodarone     Mod-Severe aortic stenosis   • Has appt with Geena Estrella, [facility] 4/27/23    DVT prophylaxis:  apixaban    CODE STATUS:  FULL per discussion with the patient on 4/5; need to reassess again the AM, as patient has had various code status orders on past admissions       Expected Discharge  Expected Discharge Date and Time     Expected Discharge Date Expected Discharge Time    Apr 10, 2023             This note has been completed as part of a split-shared workflow.     Signature: Electronically signed by Nuria Lynch MD, 04/05/23, 3:36 PM EDT                Electronically signed by Nuria Lynch MD at 04/05/23 1558         Current Facility-Administered Medications   Medication Dose Route Frequency Provider Last Rate Last Admin   • acetaminophen (TYLENOL) tablet 650 mg  650 mg Oral TID Nuria Lynch MD   650 mg at 04/13/23 0916   • amiodarone (PACERONE) tablet 200 mg  200 mg Oral Daily Nuria Lynch MD   200 mg at 04/13/23 0918   • apixaban (ELIQUIS) tablet 2.5 mg  2.5 mg Oral BID Nuria Lynch MD   2.5 mg at 04/13/23 0917   • aspirin EC tablet 81 mg  81 mg Oral Daily Nuria Lynch MD   81 mg at 04/13/23 0917   • atorvastatin (LIPITOR) tablet 80 mg  80 mg Oral Nightly Nuria Lynch MD   80 mg at 04/12/23  2003   • sennosides-docusate (PERICOLACE) 8.6-50 MG per tablet 2 tablet  2 tablet Oral BID Nuria Lynch MD   2 tablet at 04/12/23 2003    And   • bisacodyl (DULCOLAX) EC tablet 5 mg  5 mg Oral Daily PRN Nuria Lynch MD        And   • bisacodyl (DULCOLAX) suppository 10 mg  10 mg Rectal Daily PRN Nuria Lynch MD       • capsaicin (ZOSTRIX) 0.075 % topical cream   Topical Q12H Nuria Lynch MD   Given at 04/13/23 0919   • carvedilol (COREG) tablet 3.125 mg  3.125 mg Oral Q12H Nuria Lynch MD   3.125 mg at 04/13/23 0917   • fentaNYL (DURAGESIC) 25 MCG/HR patch 1 patch  1 patch Transdermal Q72H Kayla Anaya MD   1 patch at 04/12/23 1715    And   • Check Fentanyl Patch Placement  1 each Does not apply Q12H Kayla Anaya MD       • cloNIDine (CATAPRES) tablet 0.1 mg  0.1 mg Oral TID PRN Nuria Lynch MD       • dextrose (D50W) (25 g/50 mL) IV injection 25 g  25 g Intravenous Q15 Min PRN Ranulfo Thomas DO   25 g at 04/11/23 1946   • dextrose (GLUTOSE) oral gel 15 g  15 g Oral Q15 Min PRN Ranulfo Thomas DO       • diazePAM (VALIUM) injection 2.5 mg  2.5 mg Intravenous Q4H PRN Rohini Oconnor MD   2.5 mg at 04/13/23 0718   • glucagon (GLUCAGEN) injection 1 mg  1 mg Intramuscular Q15 Min PRN Ranulfo Thomas DO       • HYDROmorphone (DILAUDID) injection 0.25 mg  0.25 mg Intravenous Q4H PRN Nuria Lynch MD   0.25 mg at 04/13/23 1040   • hydrOXYzine (ATARAX) tablet 25 mg  25 mg Oral TID PRN Nuria Lynch MD       • Insulin Lispro (humaLOG) injection 0-7 Units  0-7 Units Subcutaneous TID AC Ranulfo Thomas DO   2 Units at 04/09/23 0927   • isosorbide mononitrate (IMDUR) 24 hr tablet 120 mg  120 mg Oral Daily Nuria Lynch MD   120 mg at 04/13/23 0917   • melatonin tablet 5 mg  5 mg Oral Nightly PRN Nuria Lynch MD   5 mg at 04/06/23 2133   • mirtazapine (REMERON) tablet 15 mg  15 mg Oral Nightly Nuria Lynch MD   15 mg at 04/12/23 2003   • ondansetron (ZOFRAN) injection 4 mg   4 mg Intravenous Q6H PRN Nuria Lynch MD       • povidone-iodine 10 % swab 1 each  1 application Topical Daily Champ íRos MD   1 each at 04/13/23 0919   • sodium chloride 0.9 % flush 10 mL  10 mL Intravenous PRN Nuria Lynch MD   10 mL at 04/05/23 1712   • sodium chloride 0.9 % flush 10 mL  10 mL Intravenous Q12H Nuria Lynch MD   10 mL at 04/13/23 0920   • sodium chloride 0.9 % flush 10 mL  10 mL Intravenous PRN Nuria Lynch MD       • sodium chloride 0.9 % infusion 40 mL  40 mL Intravenous PRN Nuria Lynch MD            Physician Progress Notes (last 72 hours)      Trent Niño DO at 04/13/23 1243          Palliative Care Progress Note    Date of Admission: 4/5/2023    Subjective: Patient with no significant complaints at this point in time.  Current Code Status     Date Active Code Status Order ID Comments User Context       4/6/2023 1151 No CPR (Do Not Attempt to Resuscitate) [id number]  Ranulfo Thomas DO Inpatient      Question Answer    Code Status (Patient has no pulse and is not breathing) No CPR (Do Not Attempt to Resuscitate)    Medical Interventions (Patient has pulse or is breathing) Limited Support    Medical Intervention Limits: NO intubation (DNI)    Comments Per family              No current facility-administered medications on file prior to encounter.     Current Outpatient Medications on File Prior to Encounter   Medication Sig Dispense Refill   • acetaminophen (TYLENOL) 500 MG tablet Take 1 tablet by mouth Every 6 (Six) Hours As Needed for Mild Pain, Fever or Headache.     • amiodarone (PACERONE) 200 MG tablet Take 1 tablet by mouth Daily. 90 tablet 1   • apixaban (ELIQUIS) 2.5 MG tablet tablet Take 1 tablet by mouth 2 (Two) Times a Day. 180 tablet 3   • aspirin 81 MG EC tablet Take 1 tablet by mouth Daily.     • atorvastatin (LIPITOR) 80 MG tablet Take 1 tablet by mouth Every Night. 90 tablet 3   • B Complex-C-Folic Acid (DIALYVITE 800 PO) Take 1 tablet by mouth 3 (Three)  Times a Week. On dialysis days Mon-Wed-Fri     • bisacodyl (DULCOLAX) 5 MG EC tablet Take 1 tablet by mouth Daily As Needed for Constipation.     • Capsaicin 0.1 % cream Apply 2-4 gms to feet nightly. Use gloves 60 g 3   • carvedilol (COREG) 3.125 MG tablet Take 1 tablet by mouth Every 12 (Twelve) Hours. 60 tablet 0   • docusate sodium (COLACE) 100 MG capsule Take 1 capsule by mouth Daily.     • dorzolamide-timolol (COSOPT) 22.3-6.8 MG/ML ophthalmic solution Administer 1 drop to both eyes Daily. 10 mL 3   • Durezol 0.05 % ophthalmic emulsion INSTILL 1 DROP 4 TIMES DAILY INTO SURGICAL EYE STARTING AFTER SURGERY.     • epoetin orquidea-epbx (RETACRIT) 18313 UNIT/ML injection Inject 2 mL under the skin into the appropriate area as directed 3 (Three) Times a Week. Indications: Anemia associated with Chronic Kidney Failure 6.6 mL 0   • fluocinonide (LIDEX) 0.05 % ointment Apply 1 application topically to the appropriate area as directed 2 (Two) Times a Day. Scalp  2   • HYDROcodone-acetaminophen (NORCO)  MG per tablet Take 1 tablet by mouth Every 6 (Six) Hours As Needed for Moderate Pain.     • Hydrocortisone, Perianal, (ANUSOL-HC) 2.5 % rectal cream Insert  into the rectum 2 (Two) Times a Day. 28 g 0   • hydrOXYzine (ATARAX) 25 MG tablet Take 1 tablet by mouth 3 (Three) Times a Day As Needed for Anxiety. 30 tablet 0   • Insulin Lispro (humaLOG) 100 UNIT/ML injection Inject 0-9 Units under the skin into the appropriate area as directed 3 (Three) Times a Day With Meals. (Patient taking differently: Inject 0-9 Units under the skin into the appropriate area as directed 3 (Three) Times a Day With Meals. Sliding Scale:  2 units- 150-199  4 units- 200-249  6 units- 250-299  7 units- 300-349  8 units- 350-400  9 units- Over 400) 10 mL 0   • isosorbide mononitrate (IMDUR) 120 MG 24 hr tablet Take 1 tablet by mouth Daily. 90 tablet 3   • mirtazapine (REMERON) 15 MG tablet Take 1 tablet by mouth Every Night. 30 tablet 0   •  "Nutritional Supplements (ProMod) liquid Take 30 mL by mouth 2 (Two) Times a Day.     • oxyCODONE (OXY-IR) 5 MG capsule Take 1 capsule by mouth Every 6 (Six) Hours As Needed for Moderate Pain.     • silver sulfadiazine (SILVADENE, SSD) 1 % cream Apply 1 application topically to the appropriate area as directed 2 (Two) Times a Day. Buttocks, sacrum area     • vitamin C (ASCORBIC ACID) 500 MG tablet Take 1 tablet by mouth Daily.     • Vitamin D, Cholecalciferol, (CHOLECALCIFEROL) 10 MCG (400 UNIT) tablet Take 1 tablet by mouth Daily.     • zinc sulfate (Zinc-220) 220 (50 Zn) MG capsule Take 1 capsule by mouth Daily.     • cloNIDine (CATAPRES) 0.1 MG tablet Take 1 tablet by mouth 3 (Three) Times a Day As Needed for High Blood Pressure (SBP>160). 30 tablet 0   • midodrine (PROAMATINE) 5 MG tablet Take 1 tablet by mouth As Needed. For Systolic BP below 90          •  senna-docusate sodium **AND** bisacodyl **AND** bisacodyl  •  cloNIDine  •  dextrose  •  dextrose  •  diazePAM  •  glucagon (human recombinant)  •  HYDROmorphone  •  hydrOXYzine  •  melatonin  •  ondansetron  •  [COMPLETED] Insert Peripheral IV **AND** sodium chloride  •  sodium chloride  •  sodium chloride    Objective: BP 90/44 (BP Location: Left arm, Patient Position: Lying)   Pulse 65   Temp 98.2 °F (36.8 °C) (Oral)   Resp 17   Ht 167.6 cm (66\")   Wt 63.5 kg (140 lb 1.6 oz)   LMP  (LMP Unknown)   SpO2 98%   BMI 22.61 kg/m²    No intake or output data in the 24 hours ending 04/13/23 1243  Physical Exam:      General Appearance:    Alert, cooperative, frail-appearing   Head:    Normocephalic, without obvious abnormality, atraumatic   Eyes:            Lids and lashes normal, conjunctivae and sclerae normal, no   icterus, no pallor, corneas clear, PERRLA   Ears:    Ears appear intact with no abnormalities noted   Throat:   No oral lesions, no thrush, oral mucosa moist   Neck:   No adenopathy, supple, trachea midline, no thyromegaly, no     carotid " bruit, no JVD   Back:     No kyphosis present, no scoliosis present, no skin lesions,       erythema or scars, no tenderness to percussion or                   palpation,   range of motion normal   Lungs:     Clear to auscultation,respirations regular, even and                   unlabored    Heart:    Regular rhythm and normal rate, normal S1 and S2, no            murmur, no gallop, no rub, no click   Breast Exam:    Deferred   Abdomen:     Normal bowel sounds, no masses, no organomegaly, soft        non-tender, non-distended, no guarding, no rebound                 tenderness   Genitalia:    Deferred   Extremities:   Moves all extremities well, no edema, no cyanosis, no              redness   Pulses:   Pulses palpable and equal bilaterally   Skin:   No bleeding, bruising or rash   Lymph nodes:   No palpable adenopathy   Neurologic:   Cranial nerves 2 - 12 grossly intact, sensation intact, DTR        present and equal bilaterally     Results from last 7 days   Lab Units 04/10/23  0400   WBC 10*3/mm3 8.40   HEMOGLOBIN g/dL 8.0*   HEMATOCRIT % 28.7*   PLATELETS 10*3/mm3 279     Results from last 7 days   Lab Units 04/10/23  0359   SODIUM mmol/L 133*   POTASSIUM mmol/L 4.9   CHLORIDE mmol/L 96*   CO2 mmol/L 19.0*   BUN mg/dL 47*   CREATININE mg/dL 6.58*   CALCIUM mg/dL 8.6   GLUCOSE mg/dL 73       Impression: End-stage renal disease  Rectal cancer  Anorexia  Neoplastic pain  Debility  Goals of care  Plan: Patient states that she is looking at going home with hospice if at all possible.  States that she will be stopping dialysis wants hospice has been initiated.  Hospice case manager is following.        Trent Niño DO  23  12:43 EDT        Electronically signed by Trent Niño DO at 23 1245     Champ Ríos MD at 23 0956              [facility] Medicine Services  PROGRESS NOTE    Patient Name: Esperanza Pop  : 1945  MRN: [id number]    Date of Admission:  4/5/2023  Primary Care Physician: Meghana Rodgers MD    Subjective   Subjective     CC:  Follow up anal cancer    HPI: Up in bed. Notes intermittent pain, controlled with medication. Increasingly expresses that she doesn't want to do anything anymore and just go home.    ROS:  No change in ROS from 4/11    Objective   Objective     Vital Signs:   Temp:  [95.4 °F (35.2 °C)-97.9 °F (36.6 °C)] 97 °F (36.1 °C)  Heart Rate:  [67-87] 69  Resp:  [14-18] 16  BP: (113-175)/(43-90) 130/53     Physical Exam:  NAD, chronically ill appearing  OP clear, dry   Neck supple  No LAD  RRR  CTAB  +BS, soft  DONG  Normal affect  No rashes  No change from 4/12    Results Reviewed:  LAB RESULTS:      Lab 04/10/23  0400 04/09/23  1723   WBC 8.40 8.36   HEMOGLOBIN 8.0* 8.7*   HEMATOCRIT 28.7* 31.7*   PLATELETS 279 254   NEUTROS ABS  --  5.46   IMMATURE GRANS (ABS)  --  0.06*   LYMPHS ABS  --  1.37   MONOS ABS  --  1.20*   EOS ABS  --  0.24   .1* 108.6*   LACTATE  --  2.0         Lab 04/10/23  0359 04/09/23  1843   SODIUM 133* 133*   POTASSIUM 4.9 4.8   CHLORIDE 96* 97*   CO2 19.0* 22.0   ANION GAP 18.0* 14.0   BUN 47* 44*   CREATININE 6.58* 5.86*   EGFR 6.1* 7.0*   GLUCOSE 73 120*   CALCIUM 8.6 8.5*                         Lab 04/09/23  1617   PH, ARTERIAL 7.430   PCO2, ARTERIAL 34.9*   PO2 .0*   FIO2 28   HCO3 ART 23.2   BASE EXCESS ART -0.9*   CARBOXYHEMOGLOBIN 1.7     Brief Urine Lab Results     None          Microbiology Results Abnormal     Procedure Component Value - Date/Time    Blood Culture - Blood, Arm, Left [[id number]]  (Normal) Collected: 04/09/23 1723    Lab Status: Preliminary result Specimen: Blood from Arm, Left Updated: 04/12/23 1745     Blood Culture No growth at 3 days    Narrative:      AEROBIC BOTTLE ONLY    Blood Culture - Blood, Hand, Left [[id number]]  (Normal) Collected: 04/09/23 1724    Lab Status: Preliminary result Specimen: Blood from Hand, Left Updated: 04/12/23 1745     Blood Culture No growth at  3 days    Narrative:      AEROBIC BOTTLE ONLY          No radiology results from the last 24 hrs    Results for orders placed during the hospital encounter of 03/10/23    Adult Transthoracic Echo Complete W/ Cont if Necessary Per Protocol    Interpretation Summary  •  Left ventricular systolic function is normal. Estimated left ventricular EF = 55%  •  Left ventricular wall thickness is consistent with moderate concentric hypertrophy.  •  The right ventricular cavity is mildly dilated.  •  Mild to moderate biatrial enlargement.  •  Moderate to severe aortic valve stenosis is present.  Mean gradient 34 mmHg, DOUG 0.9 cm².  •  Mild aortic insufficiency.  •  Mild mitral regurgitation.  •  Mild to moderate tricuspid regurgitation with RVSP 48 mmHg.      Current medications:  Scheduled Meds:acetaminophen, 650 mg, Oral, TID  amiodarone, 200 mg, Oral, Daily  apixaban, 2.5 mg, Oral, BID  aspirin, 81 mg, Oral, Daily  atorvastatin, 80 mg, Oral, Nightly  capsaicin, , Topical, Q12H  carvedilol, 3.125 mg, Oral, Q12H  fentaNYL, 1 patch, Transdermal, Q72H   And  Check Fentanyl Patch Placement, 1 each, Does not apply, Q12H  insulin lispro, 0-7 Units, Subcutaneous, TID AC  isosorbide mononitrate, 120 mg, Oral, Daily  mirtazapine, 15 mg, Oral, Nightly  povidone-iodine, 1 application, Topical, Daily  senna-docusate sodium, 2 tablet, Oral, BID  sodium chloride, 10 mL, Intravenous, Q12H      Continuous Infusions:   PRN Meds:.•  senna-docusate sodium **AND** bisacodyl **AND** bisacodyl  •  cloNIDine  •  dextrose  •  dextrose  •  diazePAM  •  glucagon (human recombinant)  •  HYDROmorphone  •  hydrOXYzine  •  melatonin  •  ondansetron  •  oxyCODONE  •  [COMPLETED] Insert Peripheral IV **AND** sodium chloride  •  sodium chloride  •  sodium chloride    Assessment & Plan   Assessment & Plan     Active Hospital Problems    Diagnosis  POA   • **Intractable pain [R52]  Yes      Resolved Hospital Problems   No resolved problems to display.         Brief Hospital Course to date:  Esperanza Pop is a 78 y.o. female w/ ESRD (HD-MWF), DM2, HTN, HLD, HFpEF, pAfib (xarelto), anemia, prior breast cancer, active anal cancer intolerant of chemo/XRT, recently admitted 3/10-3/29 and AK'ed to SNF to trial improvement of functional status, who returned w/ intractable rectal pain    Intractable pain, multifactorial  Wounds of the buttocks, recent radiotherapy  -continue with wound care, palliative care following    Metastatic anal cancer  Anorectal fistula  -s/p partial treatment with chemotherapy and radiation  -CT with slight decrease in size, with anorectal fistual noted  -did not tolerate treatment before and went to SNF  -patient expresses desire to just go home/not pursue any more treatment  -hospice consulted    Anorexia  -cont mirtazapine    Dysphagia  -tolerating PO    ESRD on HD  -HD MWF    DM type 2, a1c 5.8%, w/ insulin use  -SSI    HFpEF  HTN  pAfib  -cont amio, apixiban, asa, statin, coreg, imdur     Mod-Sev AS  -has appt w/ Geena Estrella, APRN 4/27/23    Expected Discharge Location and Transportation: SNF w/ palliative care vs hospice  Expected Discharge   Expected Discharge Date and Time     Expected Discharge Date Expected Discharge Time    Apr 14, 2023            DVT prophylaxis:  Medical DVT prophylaxis orders are present.     AM-PAC 6 Clicks Score (PT): 10 (04/12/23 6507)    CODE STATUS:   Code Status and Medical Interventions:   Ordered at: 04/06/23 1151     Medical Intervention Limits:    NO intubation (DNI)     Code Status (Patient has no pulse and is not breathing):    No CPR (Do Not Attempt to Resuscitate)     Medical Interventions (Patient has pulse or is breathing):    Limited Support     Comments:    Per family       Champ Ríos MD  04/13/23        Electronically signed by Champ Ríos MD at 04/13/23 0094     Gabe Butler MD at 04/12/23 2032           LOS: 7 days   Patient Care Team:  Meghana Rodgers MD as PCP - General  Tanika,  "Demetruis BOLDEN MD as Consulting Physician (Cardiology)  Kevan Lerma MD as Consulting Physician (Nephrology)  Geena Estrella APRN as Nurse Practitioner (Cardiology)  Frank Mandujano MD as Referring Physician (Colon and Rectal Surgery)  Kevan Cavazos MD as Consulting Physician (Radiation Oncology)  Yue Reeves RN as Nurse Navigator  Darryn Burk MD as Consulting Physician (Nephrology)    Chief Complaint: ESRD    Subjective     Seen on HD tolerating well so far. Minimal UF Bp stable. Good access pressure.            Subjective:  Symptoms:  Stable.  No shortness of breath, chest pain, chest pressure or anxiety.    Pain:  She complains of pain that is moderate.        History taken from: patient    Objective     Vital Sign Min/Max for last 24 hours  Temp  Min: 95.4 °F (35.2 °C)  Max: 97.6 °F (36.4 °C)   BP  Min: 113/52  Max: 174/54   Pulse  Min: 60  Max: 87   Resp  Min: 14  Max: 18   SpO2  Min: 94 %  Max: 98 %   No data recorded   No data recorded     Flowsheet Rows    Flowsheet Row First Filed Value   Admission Height 167.6 cm (66\") Documented at 04/05/2023 0702   Admission Weight 78.9 kg (174 lb) Documented at 04/05/2023 0702          No intake/output data recorded.  I/O last 3 completed shifts:  In: 60 [P.O.:60]  Out: 350 [Other:350]    Objective:  General Appearance:  Ill-appearing.    Vital signs: (most recent): Blood pressure 171/65, pulse 85, temperature 97.6 °F (36.4 °C), temperature source Axillary, resp. rate 14, height 167.6 cm (66\"), weight 63.5 kg (140 lb 1.6 oz), SpO2 97 %, not currently breastfeeding.  Vital signs are normal.    HEENT: Normal HEENT exam.    Lungs:  Normal effort and normal respiratory rate.  Breath sounds clear to auscultation.  She is not in respiratory distress.  No decreased breath sounds or wheezes.    Heart: Normal rate.  Regular rhythm.  S1 normal and S2 normal.  No murmur or gallop.   Abdomen: Abdomen is soft.    Extremities: There is no dependent edema or " local swelling.    Pulses: Distal pulses are intact.    Neurological: Patient is alert and oriented to person, place and time.  Normal strength.    Pupils:  Pupils are equal, round, and reactive to light.              Results Review:     I reviewed the patient's new clinical results.    WBC WBC   Date Value Ref Range Status   04/10/2023 8.40 3.40 - 10.80 10*3/mm3 Final      HGB Hemoglobin   Date Value Ref Range Status   04/10/2023 8.0 (L) 12.0 - 15.9 g/dL Final      HCT Hematocrit   Date Value Ref Range Status   04/10/2023 28.7 (L) 34.0 - 46.6 % Final      Platlets No results found for: LABPLAT   MCV MCV   Date Value Ref Range Status   04/10/2023 105.1 (H) 79.0 - 97.0 fL Final          Sodium Sodium   Date Value Ref Range Status   04/10/2023 133 (L) 136 - 145 mmol/L Final      Potassium Potassium   Date Value Ref Range Status   04/10/2023 4.9 3.5 - 5.2 mmol/L Final      Chloride Chloride   Date Value Ref Range Status   04/10/2023 96 (L) 98 - 107 mmol/L Final      CO2 CO2   Date Value Ref Range Status   04/10/2023 19.0 (L) 22.0 - 29.0 mmol/L Final      BUN BUN   Date Value Ref Range Status   04/10/2023 47 (H) 8 - 23 mg/dL Final      Creatinine Creatinine   Date Value Ref Range Status   04/10/2023 6.58 (H) 0.57 - 1.00 mg/dL Final      Calcium Calcium   Date Value Ref Range Status   04/10/2023 8.6 8.6 - 10.5 mg/dL Final      PO4 No results found for: CAPO4   Albumin No results found for: ALBUMIN   Magnesium No results found for: MG   Uric Acid No results found for: URICACID     Medication Review: yes    Assessment & Plan       Intractable pain      Assessment & Plan        ESRD: MWF grayson     Volume status: No significant anemia     Abdominal pain: In the setting of rectal cancer.     Failure to thrive     Hypoalbuminemia      Anemia: ACD due to CKD. Unable to add AMY due to rectal cancer     I discussed the patients findings and my recommendations with patient       Gabe Butler MD  04/12/23  20:32  EDT      Electronically signed by Gabe Butler MD at 23     Champ Ríos MD at 23 0853              [facility] Medicine Services  PROGRESS NOTE    Patient Name: Esperanza Pop  : 1945  MRN: [id number]    Date of Admission: 2023  Primary Care Physician: Meghana Rodgers MD    Subjective   Subjective     CC:  Follow up anal cancer    HPI: Up in bed in dialysis. Notes rectal pain.    ROS:  No change in ROS from     Objective   Objective     Vital Signs:   Temp:  [95.8 °F (35.4 °C)-97.3 °F (36.3 °C)] 95.8 °F (35.4 °C)  Heart Rate:  [60-72] 72  Resp:  [14-16] 16  BP: (124-153)/(49-65) 153/55     Physical Exam:  NAD, chronically ill appearing  OP clear, dry   Neck supple  No LAD  RRR  CTAB  +BS, soft  DONG  Normal affect  No rashes  No change from     Results Reviewed:  LAB RESULTS:      Lab 04/10/23  0400 23  1723   WBC 8.40 8.36   HEMOGLOBIN 8.0* 8.7*   HEMATOCRIT 28.7* 31.7*   PLATELETS 279 254   NEUTROS ABS  --  5.46   IMMATURE GRANS (ABS)  --  0.06*   LYMPHS ABS  --  1.37   MONOS ABS  --  1.20*   EOS ABS  --  0.24   .1* 108.6*   LACTATE  --  2.0         Lab 04/10/23  0359 23  1843 23  0442   SODIUM 133* 133* 136   POTASSIUM 4.9 4.8 4.9   CHLORIDE 96* 97* 97*   CO2 19.0* 22.0 25.0   ANION GAP 18.0* 14.0 14.0   BUN 47* 44* 20   CREATININE 6.58* 5.86* 4.02*   EGFR 6.1* 7.0* 11.0*   GLUCOSE 73 120* 94   CALCIUM 8.6 8.5* 8.2*   MAGNESIUM  --   --  2.0   PHOSPHORUS  --   --  3.2                         Lab 23  1617   PH, ARTERIAL 7.430   PCO2, ARTERIAL 34.9*   PO2 .0*   FIO2 28   HCO3 ART 23.2   BASE EXCESS ART -0.9*   CARBOXYHEMOGLOBIN 1.7     Brief Urine Lab Results     None          Microbiology Results Abnormal     Procedure Component Value - Date/Time    Blood Culture - Blood, Arm, Left [[id number]]  (Normal) Collected: 23 1723    Lab Status: Preliminary result Specimen: Blood from Arm, Left Updated: 23  1745     Blood Culture No growth at 2 days    Narrative:      AEROBIC BOTTLE ONLY    Blood Culture - Blood, Hand, Left [[id number]]  (Normal) Collected: 04/09/23 1724    Lab Status: Preliminary result Specimen: Blood from Hand, Left Updated: 04/11/23 1745     Blood Culture No growth at 2 days    Narrative:      AEROBIC BOTTLE ONLY          No radiology results from the last 24 hrs    Results for orders placed during the hospital encounter of 03/10/23    Adult Transthoracic Echo Complete W/ Cont if Necessary Per Protocol    Interpretation Summary  •  Left ventricular systolic function is normal. Estimated left ventricular EF = 55%  •  Left ventricular wall thickness is consistent with moderate concentric hypertrophy.  •  The right ventricular cavity is mildly dilated.  •  Mild to moderate biatrial enlargement.  •  Moderate to severe aortic valve stenosis is present.  Mean gradient 34 mmHg, DOUG 0.9 cm².  •  Mild aortic insufficiency.  •  Mild mitral regurgitation.  •  Mild to moderate tricuspid regurgitation with RVSP 48 mmHg.      Current medications:  Scheduled Meds:acetaminophen, 650 mg, Oral, TID  amiodarone, 200 mg, Oral, Daily  apixaban, 2.5 mg, Oral, BID  aspirin, 81 mg, Oral, Daily  atorvastatin, 80 mg, Oral, Nightly  capsaicin, , Topical, Q12H  carvedilol, 3.125 mg, Oral, Q12H  fentaNYL, 1 patch, Transdermal, Q72H   And  Check Fentanyl Patch Placement, 1 each, Does not apply, Q12H  insulin lispro, 0-7 Units, Subcutaneous, TID AC  isosorbide mononitrate, 120 mg, Oral, Daily  mirtazapine, 15 mg, Oral, Nightly  povidone-iodine, 1 application, Topical, Daily  senna-docusate sodium, 2 tablet, Oral, BID  sodium chloride, 10 mL, Intravenous, Q12H      Continuous Infusions:   PRN Meds:.•  senna-docusate sodium **AND** bisacodyl **AND** bisacodyl  •  cloNIDine  •  dextrose  •  dextrose  •  diazePAM  •  glucagon (human recombinant)  •  HYDROmorphone  •  hydrOXYzine  •  mannitol  •  melatonin  •  ondansetron  •   oxyCODONE  •  [COMPLETED] Insert Peripheral IV **AND** sodium chloride  •  sodium chloride  •  sodium chloride    Assessment & Plan   Assessment & Plan     Active Hospital Problems    Diagnosis  POA   • **Intractable pain [R52]  Yes      Resolved Hospital Problems   No resolved problems to display.        Brief Hospital Course to date:  Esperanza Pop is a 78 y.o. female w/ ESRD (HD-MWF), DM2, HTN, HLD, HFpEF, pAfib (xarelto), anemia, prior breast cancer, active anal cancer intolerant of chemo/XRT, recently admitted 3/10-3/29 and DC'ed to SNF to trial improvement of functional status, who returned w/ intractable rectal pain    Intractable pain, multifactorial  Wounds of the buttocks, recent radiotherapy  -continue with wound care, palliative care following    Metastatic anal cancer  Anorectal fistula  -s/p partial treatment with chemotherapy and radiation  -CT with slight decrease in size, with anorectal fistual noted  -did not tolerate treatment before and went to SNF  -palliative care and oncology consulted, d/w daughter and doesn't feel that any more cancer treatment would be worthwhile, may be appropriate to transition to comfort care, d/w patient, awaiting patient/family discussions with oncology, Rad/onc    Anorexia  -cont mirtazapine    Dysphagia  -tolerating PO    ESRD on HD  -HD MWF    DM type 2, a1c 5.8%, w/ insulin use  -SSI    HFpEF  HTN  pAfib  -cont amio, apixiban, asa, statin, coreg, imdur     Mod-Sev AS  -has appt w/ Geena Estrella, APRN 4/27/23    Expected Discharge Location and Transportation: SNF w/ palliative care vs hospice  Expected Discharge   Expected Discharge Date and Time     Expected Discharge Date Expected Discharge Time    Apr 14, 2023            DVT prophylaxis:  Medical DVT prophylaxis orders are present.     AM-PAC 6 Clicks Score (PT): 9 (04/11/23 0800)    CODE STATUS:   Code Status and Medical Interventions:   Ordered at: 04/06/23 1151     Medical Intervention Limits:    NO intubation  "(DNI)     Code Status (Patient has no pulse and is not breathing):    No CPR (Do Not Attempt to Resuscitate)     Medical Interventions (Patient has pulse or is breathing):    Limited Support     Comments:    Per family       Champ Ríos MD  04/12/23        Electronically signed by Champ Ríos MD at 04/12/23 0894     Gabe Butler MD at 04/11/23 3323           LOS: 6 days   Patient Care Team:  Meghana Rodgers MD as PCP - General  Demetrius Sagastume MD as Consulting Physician (Cardiology)  Kevan Lerma MD as Consulting Physician (Nephrology)  Geena Estrella APRN as Nurse Practitioner (Cardiology)  Frank Mandujano MD as Referring Physician (Colon and Rectal Surgery)  Kevan Cavazos MD as Consulting Physician (Radiation Oncology)  Yue Reeves RN as Nurse Navigator  Darryn Burk MD as Consulting Physician (Nephrology)    Chief Complaint: ESRD    Subjective     HD yesterday minimal UF as she has no significant edema and appetite is poor.          Subjective:  Symptoms:  Stable.  No shortness of breath, chest pain, chest pressure or anxiety.    Pain:  She complains of pain that is moderate.        History taken from: patient    Objective     Vital Sign Min/Max for last 24 hours  Temp  Min: 97.9 °F (36.6 °C)  Max: 98.5 °F (36.9 °C)   BP  Min: 120/70  Max: 164/67   Pulse  Min: 68  Max: 76   Resp  Min: 16  Max: 16   SpO2  Min: 94 %  Max: 94 %   No data recorded   No data recorded     Flowsheet Rows    Flowsheet Row First Filed Value   Admission Height 167.6 cm (66\") Documented at 04/05/2023 0702   Admission Weight 78.9 kg (174 lb) Documented at 04/05/2023 0702          No intake/output data recorded.  I/O last 3 completed shifts:  In: 80 [P.O.:80]  Out: 10 [Other:10]    Objective:  General Appearance:  Ill-appearing.    Vital signs: (most recent): Blood pressure 164/67, pulse 71, temperature 97.9 °F (36.6 °C), temperature source Oral, resp. rate 16, height 167.6 cm (66\"), weight 63.5 kg " (140 lb 1.6 oz), SpO2 94 %, not currently breastfeeding.  Vital signs are normal.    HEENT: Normal HEENT exam.    Lungs:  Normal effort and normal respiratory rate.  Breath sounds clear to auscultation.  She is not in respiratory distress.  No decreased breath sounds or wheezes.    Heart: Normal rate.  Regular rhythm.  S1 normal and S2 normal.  No murmur or gallop.   Abdomen: Abdomen is soft.    Extremities: There is no dependent edema or local swelling.    Pulses: Distal pulses are intact.    Neurological: Patient is alert and oriented to person, place and time.  Normal strength.    Pupils:  Pupils are equal, round, and reactive to light.              Results Review:     I reviewed the patient's new clinical results.    WBC WBC   Date Value Ref Range Status   04/10/2023 8.40 3.40 - 10.80 10*3/mm3 Final   04/09/2023 8.36 3.40 - 10.80 10*3/mm3 Final      HGB Hemoglobin   Date Value Ref Range Status   04/10/2023 8.0 (L) 12.0 - 15.9 g/dL Final   04/09/2023 8.7 (L) 12.0 - 15.9 g/dL Final      HCT Hematocrit   Date Value Ref Range Status   04/10/2023 28.7 (L) 34.0 - 46.6 % Final   04/09/2023 31.7 (L) 34.0 - 46.6 % Final      Platlets No results found for: LABPLAT   MCV MCV   Date Value Ref Range Status   04/10/2023 105.1 (H) 79.0 - 97.0 fL Final   04/09/2023 108.6 (H) 79.0 - 97.0 fL Final          Sodium Sodium   Date Value Ref Range Status   04/10/2023 133 (L) 136 - 145 mmol/L Final   04/09/2023 133 (L) 136 - 145 mmol/L Final      Potassium Potassium   Date Value Ref Range Status   04/10/2023 4.9 3.5 - 5.2 mmol/L Final   04/09/2023 4.8 3.5 - 5.2 mmol/L Final      Chloride Chloride   Date Value Ref Range Status   04/10/2023 96 (L) 98 - 107 mmol/L Final   04/09/2023 97 (L) 98 - 107 mmol/L Final      CO2 CO2   Date Value Ref Range Status   04/10/2023 19.0 (L) 22.0 - 29.0 mmol/L Final   04/09/2023 22.0 22.0 - 29.0 mmol/L Final      BUN BUN   Date Value Ref Range Status   04/10/2023 47 (H) 8 - 23 mg/dL Final   04/09/2023 44  (H) 8 - 23 mg/dL Final      Creatinine Creatinine   Date Value Ref Range Status   04/10/2023 6.58 (H) 0.57 - 1.00 mg/dL Final   2023 5.86 (H) 0.57 - 1.00 mg/dL Final      Calcium Calcium   Date Value Ref Range Status   04/10/2023 8.6 8.6 - 10.5 mg/dL Final   2023 8.5 (L) 8.6 - 10.5 mg/dL Final      PO4 No results found for: CAPO4   Albumin No results found for: ALBUMIN   Magnesium No results found for: MG   Uric Acid No results found for: URICACID     Medication Review: yes    Assessment & Plan       Intractable pain      Assessment & Plan        ESRD: MWF grayson     Volume status: No significant anemia     Abdominal pain: In the setting of rectal cancer.     Failure to thrive     Hypoalbuminemia      Anemia: ACD due to CKD.     I discussed the patients findings and my recommendations with patient       Gabe Butler MD  23  17:13 EDT      Electronically signed by Gabe Butler MD at 23 1713     Champ Ríos MD at 23 0941              [facility] Medicine Services  PROGRESS NOTE    Patient Name: Esperanza Pop  : 1945  MRN: [id number]    Date of Admission: 2023  Primary Care Physician: Meghana Rodgers MD    Subjective   Subjective     CC:  Follow up anal cancer    HPI: Up in bed. Had some abdominal pain/buttock pain. No f/c. No n/v. No shortness of air.    ROS:  No change in ROS from     Objective   Objective     Vital Signs:   Temp:  [97.9 °F (36.6 °C)-98.5 °F (36.9 °C)] 97.9 °F (36.6 °C)  Heart Rate:  [64-77] 71  Resp:  [14-16] 16  BP: (100-170)/(40-71) 164/67     Physical Exam:  NAD, chronically ill appearing  OP clear, dry   Neck supple  No LAD  RRR  CTAB  +BS, soft  DONG  Normal affect  No rashes  No change from 4/10    Results Reviewed:  LAB RESULTS:      Lab 04/10/23  0400 23  1723 23  0851   WBC 8.40 8.36 9.16   HEMOGLOBIN 8.0* 8.7* 9.0*   HEMATOCRIT 28.7* 31.7* 30.9*   PLATELETS 279 254 203   NEUTROS ABS  --  5.46 6.08    IMMATURE GRANS (ABS)  --  0.06* 0.05   LYMPHS ABS  --  1.37 1.10   MONOS ABS  --  1.20* 1.27*   EOS ABS  --  0.24 0.62*   .1* 108.6* 101.6*   LACTATE  --  2.0  --          Lab 04/10/23  0359 04/09/23  1843 04/06/23  0442 04/05/23  0851   SODIUM 133* 133* 136 135*   POTASSIUM 4.9 4.8 4.9 4.3   CHLORIDE 96* 97* 97* 91*   CO2 19.0* 22.0 25.0 29.0   ANION GAP 18.0* 14.0 14.0 15.0   BUN 47* 44* 20 39*   CREATININE 6.58* 5.86* 4.02* 6.93*   EGFR 6.1* 7.0* 11.0* 5.7*   GLUCOSE 73 120* 94 169*   CALCIUM 8.6 8.5* 8.2* 9.0   MAGNESIUM  --   --  2.0 2.3   PHOSPHORUS  --   --  3.2 3.2         Lab 04/05/23  0851   TOTAL PROTEIN 8.4   ALBUMIN 2.9*   GLOBULIN 5.5   ALT (SGPT) 12   AST (SGOT) 37*   BILIRUBIN 0.7   ALK PHOS 238*                     Lab 04/09/23  1617   PH, ARTERIAL 7.430   PCO2, ARTERIAL 34.9*   PO2 .0*   FIO2 28   HCO3 ART 23.2   BASE EXCESS ART -0.9*   CARBOXYHEMOGLOBIN 1.7     Brief Urine Lab Results     None          Microbiology Results Abnormal     Procedure Component Value - Date/Time    Blood Culture - Blood, Arm, Left [[id number]]  (Normal) Collected: 04/09/23 1723    Lab Status: Preliminary result Specimen: Blood from Arm, Left Updated: 04/10/23 1745     Blood Culture No growth at 24 hours    Narrative:      AEROBIC BOTTLE ONLY    Blood Culture - Blood, Hand, Left [[id number]]  (Normal) Collected: 04/09/23 1724    Lab Status: Preliminary result Specimen: Blood from Hand, Left Updated: 04/10/23 1745     Blood Culture No growth at 24 hours    Narrative:      AEROBIC BOTTLE ONLY          CT Head Without Contrast    Result Date: 4/9/2023  CT HEAD WO CONTRAST Date of Exam: 4/9/2023 4:37 PM EDT Indication: Mental status change, unknown cause. Comparison: 3/12/2023 Technique: Axial CT images were obtained of the head without contrast administration.  Reconstructed coronal and sagittal images were also obtained. Automated exposure control and iterative construction methods were used. Findings:  Parenchyma:No acute intraparenchymal hemorrhage. No loss of gray-white differentiation to suggest large territory infarct. Mild parenchymal volume loss. Scattered periventricular and subcortical white matter hypodensities, nonspecific, but most often consistent with small vessel ischemic changes. No midline shift or herniation. Mineralization of the basal ganglia. Ventricles and extra axial spaces:Prominent ventricles and sulci secondary to volume loss. No extra axial fluid collection seen. Other:Lens replacements. Paranasal sinuses are clear. Mastoid air cells are clear. Calvarium is intact. Intracranial atherosclerotic calcification is present.     Impression: Impression: No evidence of acute intracranial hemorrhage or large territorial infarct. Chronic changes as above. Electronically Signed: Shuttlerockorty  4/9/2023 5:33 PM EDT  Workstation ID: [id number]    XR Chest 1 View    Result Date: 4/9/2023  XR CHEST 1 VW Date of Exam: 4/9/2023 4:14 PM EDT Indication: Ams. Comparison: 3/23/2023 Findings: Cardiomegaly. No focal opacity, pleural effusion or pneumothorax. Similar likely right hilar granulomas. No evidence of acute osseous abnormalities. Visualized upper abdomen is unremarkable.     Impression: Impression: No evidence of acute cardiopulmonary process. Cardiomegaly. Electronically Signed: Onavo  4/9/2023 5:54 PM EDT  Workstation ID: [id number]      Results for orders placed during the hospital encounter of 03/10/23    Adult Transthoracic Echo Complete W/ Cont if Necessary Per Protocol    Interpretation Summary  •  Left ventricular systolic function is normal. Estimated left ventricular EF = 55%  •  Left ventricular wall thickness is consistent with moderate concentric hypertrophy.  •  The right ventricular cavity is mildly dilated.  •  Mild to moderate biatrial enlargement.  •  Moderate to severe aortic valve stenosis is present.  Mean gradient 34 mmHg, DOUG 0.9 cm².  •  Mild aortic insufficiency.  •   Mild mitral regurgitation.  •  Mild to moderate tricuspid regurgitation with RVSP 48 mmHg.      Current medications:  Scheduled Meds:acetaminophen, 650 mg, Oral, TID  amiodarone, 200 mg, Oral, Daily  apixaban, 2.5 mg, Oral, BID  aspirin, 81 mg, Oral, Daily  atorvastatin, 80 mg, Oral, Nightly  capsaicin, , Topical, Q12H  carvedilol, 3.125 mg, Oral, Q12H  fentaNYL, 1 patch, Transdermal, Q72H   And  Check Fentanyl Patch Placement, 1 each, Does not apply, Q12H  insulin lispro, 0-7 Units, Subcutaneous, TID AC  isosorbide mononitrate, 120 mg, Oral, Daily  mirtazapine, 15 mg, Oral, Nightly  povidone-iodine, 1 application, Topical, Daily  senna-docusate sodium, 2 tablet, Oral, BID  sodium chloride, 10 mL, Intravenous, Q12H      Continuous Infusions:   PRN Meds:.•  senna-docusate sodium **AND** bisacodyl **AND** bisacodyl  •  cloNIDine  •  dextrose  •  dextrose  •  diazePAM  •  glucagon (human recombinant)  •  HYDROmorphone  •  hydrOXYzine  •  melatonin  •  ondansetron  •  oxyCODONE  •  [COMPLETED] Insert Peripheral IV **AND** sodium chloride  •  sodium chloride  •  sodium chloride    Assessment & Plan   Assessment & Plan     Active Hospital Problems    Diagnosis  POA   • **Intractable pain [R52]  Yes      Resolved Hospital Problems   No resolved problems to display.        Brief Hospital Course to date:  Esperanza Pop is a 78 y.o. female w/ ESRD (HD-MWF), DM2, HTN, HLD, HFpEF, pAfib (xarelto), anemia, prior breast cancer, active anal cancer intolerant of chemo/XRT, recently admitted 3/10-3/29 and MI'ed to SNF to trial improvement of functional status, who returned w/ intractable rectal pain    Intractable pain, multifactorial  Wounds of the buttocks, recent radiotherapy  -continue with wound care, palliative care following    Metastatic anal cancer  Anorectal fistula  -s/p partial treatment with chemotherapy and radiation  -CT with slight decrease in size, with anorectal fistual noted  -did not tolerate treatment before and  went to SNF  -palliative care and oncology consulted, d/w daughter and doesn't feel that any more cancer treatment would be worthwhile, may be appropriate to transition to comfort care, d/w patient    Anorexia  -cont mirtazapine    Dysphagia  -seen by SLP, s/p FEES, diet upgraded    ESRD on HD  -HD MWF  -missed sessions pta  -nephro follows    DM type 2, a1c 5.8%, w/ insulin use  -SSI    HFpEF  HTN  pAfib  -cont amio, apixiban, asa, statin, coreg, imdur     Mod-Sev AS  -has appt w/ Geena Estrella, APRN 4/27/23    Expected Discharge Location and Transportation: SNF w/ palliative care vs hospice  Expected Discharge   Expected Discharge Date and Time     Expected Discharge Date Expected Discharge Time    Apr 12, 2023            DVT prophylaxis:  Medical DVT prophylaxis orders are present.     AM-PAC 6 Clicks Score (PT): 9 (04/11/23 0800)    CODE STATUS:   Code Status and Medical Interventions:   Ordered at: 04/06/23 1151     Medical Intervention Limits:    NO intubation (DNI)     Code Status (Patient has no pulse and is not breathing):    No CPR (Do Not Attempt to Resuscitate)     Medical Interventions (Patient has pulse or is breathing):    Limited Support     Comments:    Per family       Champ Ríos MD  04/11/23        Electronically signed by Champ Ríos MD at 04/11/23 0942       Consult Notes (last 72 hours)  Notes from 04/10/23 1706 through 04/13/23 1706   No notes of this type exist for this encounter.

## 2023-04-13 NOTE — PLAN OF CARE
Goal Outcome Evaluation:  Plan of Care Reviewed With: patient        Progress: no change  Outcome Evaluation: Pt. asleep and did not demonstrate any visible signs of distress during palliative nursing visit.  Did not attempt to rouse patient.  Per nursing report, pt only seems comfortable when medicated.  Pt. told Milla Calles that she was tired of doing dialysis and just wants to go home but was worried about who would take care of her at home.  Per CM note, pt is interested in home with hospice.  Palliative care to follow for support, POC and ongoing GOC.

## 2023-04-13 NOTE — PLAN OF CARE
Problem: Skin Injury Risk Increased  Goal: Skin Health and Integrity  Intervention: Promote and Optimize Oral Intake  Recent Flowsheet Documentation  Taken 4/13/2023 1600 by Asia Jensen RN  Oral Nutrition Promotion: rest periods promoted  Taken 4/13/2023 1400 by Asia Jensen RN  Oral Nutrition Promotion: rest periods promoted  Taken 4/13/2023 1200 by Asia Jensen RN  Oral Nutrition Promotion: rest periods promoted  Taken 4/13/2023 1000 by Asia Jensen RN  Oral Nutrition Promotion: rest periods promoted  Taken 4/13/2023 0800 by Asia Jensen RN  Oral Nutrition Promotion: rest periods promoted  Intervention: Optimize Skin Protection  Recent Flowsheet Documentation  Taken 4/13/2023 1600 by Asia Jensen RN  Pressure Reduction Techniques:   frequent weight shift encouraged   weight shift assistance provided  Pressure Reduction Devices:   heel offloading device utilized   positioning supports utilized   pressure-redistributing mattress utilized  Skin Protection: adhesive use limited  Taken 4/13/2023 1200 by Asia Jensen RN  Pressure Reduction Techniques:   frequent weight shift encouraged   weight shift assistance provided  Pressure Reduction Devices:   heel offloading device utilized   positioning supports utilized   pressure-redistributing mattress utilized  Skin Protection: adhesive use limited  Taken 4/13/2023 1000 by Asia Jensen RN  Pressure Reduction Devices:   heel offloading device utilized   positioning supports utilized   specialty bed utilized  Taken 4/13/2023 0800 by Asia Jensen RN  Pressure Reduction Techniques:   frequent weight shift encouraged   weight shift assistance provided  Pressure Reduction Devices: specialty bed utilized  Skin Protection: adhesive use limited     Problem: Fall Injury Risk  Goal: Absence of Fall and Fall-Related Injury  Intervention: Identify and Manage Contributors  Recent Flowsheet Documentation  Taken 4/13/2023 1600 by Asia Jensen RN  Medication  Review/Management: medications reviewed  Taken 4/13/2023 1400 by Asia Jensen RN  Medication Review/Management: medications reviewed  Taken 4/13/2023 1200 by Asia Jensen RN  Medication Review/Management: medications reviewed  Taken 4/13/2023 1000 by Asia Jensen RN  Medication Review/Management: medications reviewed  Taken 4/13/2023 0800 by Asia Jensen RN  Medication Review/Management: medications reviewed  Intervention: Promote Injury-Free Environment  Recent Flowsheet Documentation  Taken 4/13/2023 1600 by Asia Jensen RN  Safety Promotion/Fall Prevention:   activity supervised   assistive device/personal items within reach   clutter free environment maintained   room organization consistent   safety round/check completed  Taken 4/13/2023 1400 by Asia Jensen RN  Safety Promotion/Fall Prevention:   activity supervised   assistive device/personal items within reach   clutter free environment maintained   nonskid shoes/slippers when out of bed   room organization consistent   safety round/check completed  Taken 4/13/2023 1200 by Asia Jensen RN  Safety Promotion/Fall Prevention:   activity supervised   assistive device/personal items within reach   clutter free environment maintained   room organization consistent   safety round/check completed  Taken 4/13/2023 1000 by Asia Jensen RN  Safety Promotion/Fall Prevention:   activity supervised   assistive device/personal items within reach   clutter free environment maintained   room organization consistent   safety round/check completed  Taken 4/13/2023 0800 by Asia Jensen RN  Safety Promotion/Fall Prevention:   activity supervised   assistive device/personal items within reach   clutter free environment maintained   room organization consistent   safety round/check completed     Problem: Device-Related Complication Risk (Hemodialysis)  Goal: Safe, Effective Therapy Delivery  Intervention: Optimize Device Care and Function  Recent Flowsheet  Documentation  Taken 4/13/2023 1600 by Asia Jensen RN  Medication Review/Management: medications reviewed  Taken 4/13/2023 1400 by Asia Jensen RN  Medication Review/Management: medications reviewed  Taken 4/13/2023 1200 by Asia Jensen RN  Medication Review/Management: medications reviewed  Taken 4/13/2023 1000 by Asia Jensen RN  Medication Review/Management: medications reviewed  Taken 4/13/2023 0800 by Asia Jensen RN  Medication Review/Management: medications reviewed     Problem: Pain Acute  Goal: Acceptable Pain Control and Functional Ability  Intervention: Prevent or Manage Pain  Recent Flowsheet Documentation  Taken 4/13/2023 1600 by Asia Jensen RN  Sensory Stimulation Regulation:   care clustered   quiet environment promoted  Sleep/Rest Enhancement:   awakenings minimized   noise level reduced   room darkened  Medication Review/Management: medications reviewed  Taken 4/13/2023 1400 by Asia Jensen RN  Sensory Stimulation Regulation:   care clustered   quiet environment promoted  Sleep/Rest Enhancement:   awakenings minimized   noise level reduced   room darkened  Medication Review/Management: medications reviewed  Taken 4/13/2023 1200 by Asia Jensen RN  Sensory Stimulation Regulation:   care clustered   quiet environment promoted  Sleep/Rest Enhancement:   awakenings minimized   natural light exposure provided   noise level reduced   room darkened  Medication Review/Management: medications reviewed  Taken 4/13/2023 1000 by Asia Jensen RN  Sensory Stimulation Regulation:   care clustered   lighting decreased   quiet environment promoted  Sleep/Rest Enhancement: awakenings minimized  Medication Review/Management: medications reviewed  Taken 4/13/2023 0800 by Asia Jensen RN  Sensory Stimulation Regulation:   care clustered   quiet environment promoted  Sleep/Rest Enhancement: awakenings minimized  Medication Review/Management: medications reviewed  Intervention: Develop Pain  Management Plan  Recent Flowsheet Documentation  Taken 4/13/2023 1200 by Asia Jensen RN  Pain Management Interventions:   care clustered   quiet environment facilitated  Taken 4/13/2023 1000 by Asia Jensen RN  Pain Management Interventions:   care clustered   quiet environment facilitated  Taken 4/13/2023 0800 by Asia Jensen RN  Pain Management Interventions:   care clustered   quiet environment facilitated  Intervention: Optimize Psychosocial Wellbeing  Recent Flowsheet Documentation  Taken 4/13/2023 1600 by Asia Jensen RN  Supportive Measures: active listening utilized  Taken 4/13/2023 1400 by Asia Jensen RN  Supportive Measures: active listening utilized  Taken 4/13/2023 1200 by Asia Jensen RN  Supportive Measures: active listening utilized  Taken 4/13/2023 1000 by Asia Jensen RN  Supportive Measures: active listening utilized  Taken 4/13/2023 0800 by Asia Jensen RN  Supportive Measures: active listening utilized     Problem: Impaired Wound Healing  Goal: Optimal Wound Healing  Intervention: Promote Wound Healing  Recent Flowsheet Documentation  Taken 4/13/2023 1600 by Asia Jensen RN  Activity Management: activity encouraged  Oral Nutrition Promotion: rest periods promoted  Sleep/Rest Enhancement:   awakenings minimized   noise level reduced   room darkened  Taken 4/13/2023 1400 by Asia Jensen RN  Activity Management: activity encouraged  Oral Nutrition Promotion: rest periods promoted  Sleep/Rest Enhancement:   awakenings minimized   noise level reduced   room darkened  Taken 4/13/2023 1200 by Asia Jensen RN  Activity Management: activity encouraged  Oral Nutrition Promotion: rest periods promoted  Pain Management Interventions:   care clustered   quiet environment facilitated  Sleep/Rest Enhancement:   awakenings minimized   natural light exposure provided   noise level reduced   room darkened  Taken 4/13/2023 1000 by Asia Jensen RN  Activity Management: activity  encouraged  Oral Nutrition Promotion: rest periods promoted  Pain Management Interventions:   care clustered   quiet environment facilitated  Sleep/Rest Enhancement: awakenings minimized  Taken 4/13/2023 0800 by Asia Jensen RN  Activity Management: activity encouraged  Oral Nutrition Promotion: rest periods promoted  Pain Management Interventions:   care clustered   quiet environment facilitated  Sleep/Rest Enhancement: awakenings minimized     Problem: Activity Intolerance  Goal: Enhanced Capacity and Energy  Intervention: Optimize Activity Tolerance  Recent Flowsheet Documentation  Taken 4/13/2023 1600 by Asia Jensen RN  Activity Management: activity encouraged  Environmental Support: calm environment promoted  Taken 4/13/2023 1400 by Asia Jensen RN  Activity Management: activity encouraged  Environmental Support: calm environment promoted  Taken 4/13/2023 1200 by Asia Jensen RN  Activity Management: activity encouraged  Environmental Support: calm environment promoted  Taken 4/13/2023 1000 by Asia Jensen RN  Activity Management: activity encouraged  Environmental Support: calm environment promoted  Taken 4/13/2023 0800 by Asia Jensen RN  Activity Management: activity encouraged  Environmental Support: calm environment promoted     Problem: Fluid and Electrolyte Imbalance (Acute Kidney Injury/Impairment)  Goal: Fluid and Electrolyte Balance  Intervention: Monitor and Manage Fluid and Electrolyte Balance  Recent Flowsheet Documentation  Taken 4/13/2023 1600 by Asia Jensen RN  Fluid/Electrolyte Management: fluids provided  Taken 4/13/2023 1400 by Asia Jensen RN  Fluid/Electrolyte Management: fluids provided  Taken 4/13/2023 1200 by Asia Jensen RN  Fluid/Electrolyte Management: fluids provided  Taken 4/13/2023 1000 by Asia Jensen RN  Fluid/Electrolyte Management: fluids provided  Taken 4/13/2023 0800 by Asia Jensen RN  Fluid/Electrolyte Management: fluids provided     Problem: Oral  Intake Inadequate (Acute Kidney Injury/Impairment)  Goal: Optimal Nutrition Intake  Intervention: Promote and Optimize Nutrition  Recent Flowsheet Documentation  Taken 4/13/2023 1600 by Asia Jensen RN  Oral Nutrition Promotion: rest periods promoted  Taken 4/13/2023 1400 by Asia Jensen RN  Oral Nutrition Promotion: rest periods promoted  Taken 4/13/2023 1200 by Asia Jensen RN  Oral Nutrition Promotion: rest periods promoted  Taken 4/13/2023 1000 by Asia Jensen RN  Oral Nutrition Promotion: rest periods promoted  Taken 4/13/2023 0800 by Asia Jensen RN  Oral Nutrition Promotion: rest periods promoted     Problem: Renal Function Impairment (Acute Kidney Injury/Impairment)  Goal: Effective Renal Function  Intervention: Monitor and Support Renal Function  Recent Flowsheet Documentation  Taken 4/13/2023 1600 by Asia Jensen RN  Medication Review/Management: medications reviewed  Taken 4/13/2023 1400 by Asia Jensen RN  Medication Review/Management: medications reviewed  Taken 4/13/2023 1200 by Asia Jensen RN  Medication Review/Management: medications reviewed  Taken 4/13/2023 1000 by Asia Jensen RN  Medication Review/Management: medications reviewed  Taken 4/13/2023 0800 by Asia Jensen RN  Medication Review/Management: medications reviewed     Problem: Fluid Volume Excess  Goal: Fluid Balance  Intervention: Monitor and Manage Hypervolemia  Recent Flowsheet Documentation  Taken 4/13/2023 1600 by Asia Jensen RN  Fluid/Electrolyte Management: fluids provided  Skin Protection: adhesive use limited  Taken 4/13/2023 1400 by Asia Jensen RN  Fluid/Electrolyte Management: fluids provided  Taken 4/13/2023 1200 by Asia Jensen RN  Fluid/Electrolyte Management: fluids provided  Skin Protection: adhesive use limited  Taken 4/13/2023 1000 by Asia Jensen RN  Fluid/Electrolyte Management: fluids provided  Taken 4/13/2023 0800 by Asia Jensen RN  Fluid/Electrolyte Management: fluids provided  Skin  Protection: adhesive use limited     Problem: Adult Inpatient Plan of Care  Goal: Absence of Hospital-Acquired Illness or Injury  Intervention: Identify and Manage Fall Risk  Recent Flowsheet Documentation  Taken 4/13/2023 1600 by Asia Jensen RN  Safety Promotion/Fall Prevention:   activity supervised   assistive device/personal items within reach   clutter free environment maintained   room organization consistent   safety round/check completed  Taken 4/13/2023 1400 by Asia Jensen RN  Safety Promotion/Fall Prevention:   activity supervised   assistive device/personal items within reach   clutter free environment maintained   nonskid shoes/slippers when out of bed   room organization consistent   safety round/check completed  Taken 4/13/2023 1200 by Asia Jensen RN  Safety Promotion/Fall Prevention:   activity supervised   assistive device/personal items within reach   clutter free environment maintained   room organization consistent   safety round/check completed  Taken 4/13/2023 1000 by Asia Jensen RN  Safety Promotion/Fall Prevention:   activity supervised   assistive device/personal items within reach   clutter free environment maintained   room organization consistent   safety round/check completed  Taken 4/13/2023 0800 by Asia Jensen RN  Safety Promotion/Fall Prevention:   activity supervised   assistive device/personal items within reach   clutter free environment maintained   room organization consistent   safety round/check completed  Intervention: Prevent Skin Injury  Recent Flowsheet Documentation  Taken 4/13/2023 1600 by Asia Jensen RN  Body Position: turned  Skin Protection: adhesive use limited  Taken 4/13/2023 1400 by Asia Jensen RN  Body Position: turned  Taken 4/13/2023 1200 by Asia Jensen RN  Body Position: turned  Skin Protection: adhesive use limited  Taken 4/13/2023 1000 by Asia Jensen RN  Body Position: turned  Taken 4/13/2023 0800 by Asia Jensen RN  Body Position:  turned  Skin Protection: adhesive use limited  Intervention: Prevent and Manage VTE (Venous Thromboembolism) Risk  Recent Flowsheet Documentation  Taken 4/13/2023 1600 by Asia Jensen RN  Activity Management: activity encouraged  Taken 4/13/2023 1400 by Asia Jensen RN  Activity Management: activity encouraged  Taken 4/13/2023 1200 by Asia Jensen RN  Activity Management: activity encouraged  Taken 4/13/2023 1000 by Asia Jensen RN  Activity Management: activity encouraged  Taken 4/13/2023 0800 by Asia Jensen RN  Activity Management: activity encouraged  VTE Prevention/Management: (osorio smith) other (see comments)  Range of Motion: ROM (range of motion) performed  Intervention: Prevent Infection  Recent Flowsheet Documentation  Taken 4/13/2023 1600 by Asia Jensen RN  Infection Prevention:   environmental surveillance performed   hand hygiene promoted   single patient room provided  Taken 4/13/2023 1400 by Asia Jensen RN  Infection Prevention:   environmental surveillance performed   hand hygiene promoted   single patient room provided  Taken 4/13/2023 1200 by Asia Jensen RN  Infection Prevention:   equipment surfaces disinfected   hand hygiene promoted   single patient room provided  Taken 4/13/2023 1000 by Asia Jensen RN  Infection Prevention:   environmental surveillance performed   hand hygiene promoted   single patient room provided  Taken 4/13/2023 0800 by Asia Jensen RN  Infection Prevention:   environmental surveillance performed   hand hygiene promoted   single patient room provided  Goal: Optimal Comfort and Wellbeing  Intervention: Monitor Pain and Promote Comfort  Recent Flowsheet Documentation  Taken 4/13/2023 1200 by Asia Jensen RN  Pain Management Interventions:   care clustered   quiet environment facilitated  Taken 4/13/2023 1000 by Asia Jensen RN  Pain Management Interventions:   care clustered   quiet environment facilitated  Taken 4/13/2023 0800 by Asia Jensen  RN  Pain Management Interventions:   care clustered   quiet environment facilitated  Intervention: Provide Person-Centered Care  Recent Flowsheet Documentation  Taken 4/13/2023 1600 by Asia Jensen RN  Trust Relationship/Rapport: care explained  Taken 4/13/2023 1400 by Asia Jensen RN  Trust Relationship/Rapport: care explained  Taken 4/13/2023 1000 by Asia Jensen RN  Trust Relationship/Rapport:   care explained   choices provided  Taken 4/13/2023 0800 by Asia Jensen RN  Trust Relationship/Rapport:   care explained   choices provided   reassurance provided     Problem: Palliative Care  Goal: Enhanced Quality of Life  Intervention: Maximize Comfort  Recent Flowsheet Documentation  Taken 4/13/2023 1200 by Asia Jensen RN  Pain Management Interventions:   care clustered   quiet environment facilitated  Taken 4/13/2023 1000 by Asia Jensen RN  Pain Management Interventions:   care clustered   quiet environment facilitated  Taken 4/13/2023 0800 by Asia Jensen RN  Pain Management Interventions:   care clustered   quiet environment facilitated  Intervention: Optimize Function  Recent Flowsheet Documentation  Taken 4/13/2023 1600 by Asia Jensen RN  Sensory Stimulation Regulation:   care clustered   quiet environment promoted  Fatigue Management: activity schedule adjusted  Sleep/Rest Enhancement:   awakenings minimized   noise level reduced   room darkened  Taken 4/13/2023 1400 by Asia Jensen RN  Sensory Stimulation Regulation:   care clustered   quiet environment promoted  Sleep/Rest Enhancement:   awakenings minimized   noise level reduced   room darkened  Taken 4/13/2023 1200 by Asia Jensen RN  Sensory Stimulation Regulation:   care clustered   quiet environment promoted  Fatigue Management: activity schedule adjusted  Sleep/Rest Enhancement:   awakenings minimized   natural light exposure provided   noise level reduced   room darkened  Taken 4/13/2023 1000 by Asia Jensen RN  Sensory  Stimulation Regulation:   care clustered   lighting decreased   quiet environment promoted  Fatigue Management: activity schedule adjusted  Sleep/Rest Enhancement: awakenings minimized  Taken 4/13/2023 0800 by Asia Jensen RN  Sensory Stimulation Regulation:   care clustered   quiet environment promoted  Fatigue Management: activity schedule adjusted  Sleep/Rest Enhancement: awakenings minimized  Intervention: Optimize Psychosocial Wellbeing  Recent Flowsheet Documentation  Taken 4/13/2023 1600 by Asia Jensen RN  Supportive Measures: active listening utilized  Taken 4/13/2023 1400 by Asia Jensen RN  Supportive Measures: active listening utilized  Taken 4/13/2023 1200 by Asia Jensen RN  Supportive Measures: active listening utilized  Taken 4/13/2023 1000 by Asia Jensen RN  Supportive Measures: active listening utilized  Taken 4/13/2023 0800 by Asia Jensen RN  Supportive Measures: active listening utilized     Problem: Skin Injury Risk Increased  Goal: Skin Health and Integrity  Intervention: Promote and Optimize Oral Intake  Recent Flowsheet Documentation  Taken 4/13/2023 1600 by Asia Jensen RN  Oral Nutrition Promotion: rest periods promoted  Taken 4/13/2023 1400 by Asia Jensen RN  Oral Nutrition Promotion: rest periods promoted  Taken 4/13/2023 1200 by Asia Jensen RN  Oral Nutrition Promotion: rest periods promoted  Taken 4/13/2023 1000 by Asia Jensen RN  Oral Nutrition Promotion: rest periods promoted  Taken 4/13/2023 0800 by Asia Jensen RN  Oral Nutrition Promotion: rest periods promoted  Intervention: Optimize Skin Protection  Recent Flowsheet Documentation  Taken 4/13/2023 1600 by Asia Jensen RN  Pressure Reduction Techniques:   frequent weight shift encouraged   weight shift assistance provided  Pressure Reduction Devices:   heel offloading device utilized   positioning supports utilized   pressure-redistributing mattress utilized  Skin Protection: adhesive use limited  Taken  4/13/2023 1200 by Asia Jensen RN  Pressure Reduction Techniques:   frequent weight shift encouraged   weight shift assistance provided  Pressure Reduction Devices:   heel offloading device utilized   positioning supports utilized   pressure-redistributing mattress utilized  Skin Protection: adhesive use limited  Taken 4/13/2023 1000 by Asia Jensen RN  Pressure Reduction Devices:   heel offloading device utilized   positioning supports utilized   specialty bed utilized  Taken 4/13/2023 0800 by Asia Jensen RN  Pressure Reduction Techniques:   frequent weight shift encouraged   weight shift assistance provided  Pressure Reduction Devices: specialty bed utilized  Skin Protection: adhesive use limited     Problem: Fall Injury Risk  Goal: Absence of Fall and Fall-Related Injury  Intervention: Identify and Manage Contributors  Recent Flowsheet Documentation  Taken 4/13/2023 1600 by Asia Jensen RN  Medication Review/Management: medications reviewed  Taken 4/13/2023 1400 by Asia Jensen RN  Medication Review/Management: medications reviewed  Taken 4/13/2023 1200 by Asia Jensen RN  Medication Review/Management: medications reviewed  Taken 4/13/2023 1000 by Asia Jensen RN  Medication Review/Management: medications reviewed  Taken 4/13/2023 0800 by Asia Jensen RN  Medication Review/Management: medications reviewed  Intervention: Promote Injury-Free Environment  Recent Flowsheet Documentation  Taken 4/13/2023 1600 by Asia Jensen RN  Safety Promotion/Fall Prevention:   activity supervised   assistive device/personal items within reach   clutter free environment maintained   room organization consistent   safety round/check completed  Taken 4/13/2023 1400 by Asia Jensen RN  Safety Promotion/Fall Prevention:   activity supervised   assistive device/personal items within reach   clutter free environment maintained   nonskid shoes/slippers when out of bed   room organization consistent   safety  round/check completed  Taken 4/13/2023 1200 by Asia Jensen RN  Safety Promotion/Fall Prevention:   activity supervised   assistive device/personal items within reach   clutter free environment maintained   room organization consistent   safety round/check completed  Taken 4/13/2023 1000 by Asia Jensen RN  Safety Promotion/Fall Prevention:   activity supervised   assistive device/personal items within reach   clutter free environment maintained   room organization consistent   safety round/check completed  Taken 4/13/2023 0800 by Asia Jensen RN  Safety Promotion/Fall Prevention:   activity supervised   assistive device/personal items within reach   clutter free environment maintained   room organization consistent   safety round/check completed     Problem: Adult Inpatient Plan of Care  Goal: Absence of Hospital-Acquired Illness or Injury  Intervention: Identify and Manage Fall Risk  Recent Flowsheet Documentation  Taken 4/13/2023 1600 by Asia Jensen RN  Safety Promotion/Fall Prevention:   activity supervised   assistive device/personal items within reach   clutter free environment maintained   room organization consistent   safety round/check completed  Taken 4/13/2023 1400 by Asia Jensen RN  Safety Promotion/Fall Prevention:   activity supervised   assistive device/personal items within reach   clutter free environment maintained   nonskid shoes/slippers when out of bed   room organization consistent   safety round/check completed  Taken 4/13/2023 1200 by Asia Jensen RN  Safety Promotion/Fall Prevention:   activity supervised   assistive device/personal items within reach   clutter free environment maintained   room organization consistent   safety round/check completed  Taken 4/13/2023 1000 by Asia Jensen RN  Safety Promotion/Fall Prevention:   activity supervised   assistive device/personal items within reach   clutter free environment maintained   room organization consistent   safety  round/check completed  Taken 4/13/2023 0800 by Asia Jensen RN  Safety Promotion/Fall Prevention:   activity supervised   assistive device/personal items within reach   clutter free environment maintained   room organization consistent   safety round/check completed  Intervention: Prevent Skin Injury  Recent Flowsheet Documentation  Taken 4/13/2023 1600 by Asia Jensen RN  Body Position: turned  Skin Protection: adhesive use limited  Taken 4/13/2023 1400 by Asia Jensen RN  Body Position: turned  Taken 4/13/2023 1200 by Asia Jensen RN  Body Position: turned  Skin Protection: adhesive use limited  Taken 4/13/2023 1000 by Asia Jensen RN  Body Position: turned  Taken 4/13/2023 0800 by Asia Jesnen RN  Body Position: turned  Skin Protection: adhesive use limited  Intervention: Prevent and Manage VTE (Venous Thromboembolism) Risk  Recent Flowsheet Documentation  Taken 4/13/2023 1600 by Asia Jensen RN  Activity Management: activity encouraged  Taken 4/13/2023 1400 by Asia Jensen RN  Activity Management: activity encouraged  Taken 4/13/2023 1200 by Asia Jensen RN  Activity Management: activity encouraged  Taken 4/13/2023 1000 by Asia Jensen RN  Activity Management: activity encouraged  Taken 4/13/2023 0800 by Asia Jensen RN  Activity Management: activity encouraged  VTE Prevention/Management: (eliquis sc) other (see comments)  Range of Motion: ROM (range of motion) performed  Intervention: Prevent Infection  Recent Flowsheet Documentation  Taken 4/13/2023 1600 by Asia Jensen RN  Infection Prevention:   environmental surveillance performed   hand hygiene promoted   single patient room provided  Taken 4/13/2023 1400 by Asia Jensen RN  Infection Prevention:   environmental surveillance performed   hand hygiene promoted   single patient room provided  Taken 4/13/2023 1200 by Asia Jensen RN  Infection Prevention:   equipment surfaces disinfected   hand hygiene promoted   single patient room  provided  Taken 4/13/2023 1000 by Asia Jensen RN  Infection Prevention:   environmental surveillance performed   hand hygiene promoted   single patient room provided  Taken 4/13/2023 0800 by Asia Jensen RN  Infection Prevention:   environmental surveillance performed   hand hygiene promoted   single patient room provided  Goal: Optimal Comfort and Wellbeing  Intervention: Monitor Pain and Promote Comfort  Recent Flowsheet Documentation  Taken 4/13/2023 1200 by Asia Jensen RN  Pain Management Interventions:   care clustered   quiet environment facilitated  Taken 4/13/2023 1000 by Asia Jensen RN  Pain Management Interventions:   care clustered   quiet environment facilitated  Taken 4/13/2023 0800 by Asia Jensen RN  Pain Management Interventions:   care clustered   quiet environment facilitated  Intervention: Provide Person-Centered Care  Recent Flowsheet Documentation  Taken 4/13/2023 1600 by Asia Jensen RN  Trust Relationship/Rapport: care explained  Taken 4/13/2023 1400 by Asia Jensen RN  Trust Relationship/Rapport: care explained  Taken 4/13/2023 1000 by Asia Jensen RN  Trust Relationship/Rapport:   care explained   choices provided  Taken 4/13/2023 0800 by Asia Jensen RN  Trust Relationship/Rapport:   care explained   choices provided   reassurance provided     Problem: Device-Related Complication Risk (Hemodialysis)  Goal: Safe, Effective Therapy Delivery  Intervention: Optimize Device Care and Function  Recent Flowsheet Documentation  Taken 4/13/2023 1600 by Asia Jensen RN  Medication Review/Management: medications reviewed  Taken 4/13/2023 1400 by Asia Jensen RN  Medication Review/Management: medications reviewed  Taken 4/13/2023 1200 by Asia Jensen RN  Medication Review/Management: medications reviewed  Taken 4/13/2023 1000 by Asia Jensen RN  Medication Review/Management: medications reviewed  Taken 4/13/2023 0800 by Asia Jensen RN  Medication Review/Management:  medications reviewed   Goal Outcome Evaluation:

## 2023-04-13 NOTE — NURSING NOTE
Patient verbalized that she just wants to go home but she is a little worried about who would help her at home. Advised patient that there are resources available to get help at home and that her daughter had previously stated that she would take care of her at home. Patient appeared relieved by that information. Patient also pointed to her dialysis access and stated that she did not want  to keep doing dialysis either.

## 2023-04-13 NOTE — PROGRESS NOTES
[facility] Medicine Services  PROGRESS NOTE    Patient Name: Esperanza Pop  : 1945  MRN: [id number]    Date of Admission: 2023  Primary Care Physician: Meghana Rodgers MD    Subjective   Subjective     CC:  Follow up anal cancer    HPI: Up in bed. Notes intermittent pain, controlled with medication. Increasingly expresses that she doesn't want to do anything anymore and just go home.    ROS:  No change in ROS from     Objective   Objective     Vital Signs:   Temp:  [95.4 °F (35.2 °C)-97.9 °F (36.6 °C)] 97 °F (36.1 °C)  Heart Rate:  [67-87] 69  Resp:  [14-18] 16  BP: (113-175)/(43-90) 130/53     Physical Exam:  NAD, chronically ill appearing  OP clear, dry   Neck supple  No LAD  RRR  CTAB  +BS, soft  DONG  Normal affect  No rashes  No change from     Results Reviewed:  LAB RESULTS:      Lab 04/10/23  0400 23  1723   WBC 8.40 8.36   HEMOGLOBIN 8.0* 8.7*   HEMATOCRIT 28.7* 31.7*   PLATELETS 279 254   NEUTROS ABS  --  5.46   IMMATURE GRANS (ABS)  --  0.06*   LYMPHS ABS  --  1.37   MONOS ABS  --  1.20*   EOS ABS  --  0.24   .1* 108.6*   LACTATE  --  2.0         Lab 04/10/23  0359 23  1843   SODIUM 133* 133*   POTASSIUM 4.9 4.8   CHLORIDE 96* 97*   CO2 19.0* 22.0   ANION GAP 18.0* 14.0   BUN 47* 44*   CREATININE 6.58* 5.86*   EGFR 6.1* 7.0*   GLUCOSE 73 120*   CALCIUM 8.6 8.5*                         Lab 23  1617   PH, ARTERIAL 7.430   PCO2, ARTERIAL 34.9*   PO2 .0*   FIO2 28   HCO3 ART 23.2   BASE EXCESS ART -0.9*   CARBOXYHEMOGLOBIN 1.7     Brief Urine Lab Results     None          Microbiology Results Abnormal     Procedure Component Value - Date/Time    Blood Culture - Blood, Arm, Left [[id number]]  (Normal) Collected: 23 1723    Lab Status: Preliminary result Specimen: Blood from Arm, Left Updated: 23 1745     Blood Culture No growth at 3 days    Narrative:      AEROBIC BOTTLE ONLY    Blood Culture - Blood, Hand, Left  [[id number]]  (Normal) Collected: 04/09/23 1724    Lab Status: Preliminary result Specimen: Blood from Hand, Left Updated: 04/12/23 1745     Blood Culture No growth at 3 days    Narrative:      AEROBIC BOTTLE ONLY          No radiology results from the last 24 hrs    Results for orders placed during the hospital encounter of 03/10/23    Adult Transthoracic Echo Complete W/ Cont if Necessary Per Protocol    Interpretation Summary  •  Left ventricular systolic function is normal. Estimated left ventricular EF = 55%  •  Left ventricular wall thickness is consistent with moderate concentric hypertrophy.  •  The right ventricular cavity is mildly dilated.  •  Mild to moderate biatrial enlargement.  •  Moderate to severe aortic valve stenosis is present.  Mean gradient 34 mmHg, DOUG 0.9 cm².  •  Mild aortic insufficiency.  •  Mild mitral regurgitation.  •  Mild to moderate tricuspid regurgitation with RVSP 48 mmHg.      Current medications:  Scheduled Meds:acetaminophen, 650 mg, Oral, TID  amiodarone, 200 mg, Oral, Daily  apixaban, 2.5 mg, Oral, BID  aspirin, 81 mg, Oral, Daily  atorvastatin, 80 mg, Oral, Nightly  capsaicin, , Topical, Q12H  carvedilol, 3.125 mg, Oral, Q12H  fentaNYL, 1 patch, Transdermal, Q72H   And  Check Fentanyl Patch Placement, 1 each, Does not apply, Q12H  insulin lispro, 0-7 Units, Subcutaneous, TID AC  isosorbide mononitrate, 120 mg, Oral, Daily  mirtazapine, 15 mg, Oral, Nightly  povidone-iodine, 1 application, Topical, Daily  senna-docusate sodium, 2 tablet, Oral, BID  sodium chloride, 10 mL, Intravenous, Q12H      Continuous Infusions:   PRN Meds:.•  senna-docusate sodium **AND** bisacodyl **AND** bisacodyl  •  cloNIDine  •  dextrose  •  dextrose  •  diazePAM  •  glucagon (human recombinant)  •  HYDROmorphone  •  hydrOXYzine  •  melatonin  •  ondansetron  •  oxyCODONE  •  [COMPLETED] Insert Peripheral IV **AND** sodium chloride  •  sodium chloride  •  sodium chloride    Assessment & Plan    Assessment & Plan     Active Hospital Problems    Diagnosis  POA   • **Intractable pain [R52]  Yes      Resolved Hospital Problems   No resolved problems to display.        Brief Hospital Course to date:  Esperanza Pop is a 78 y.o. female w/ ESRD (HD-MWF), DM2, HTN, HLD, HFpEF, pAfib (xarelto), anemia, prior breast cancer, active anal cancer intolerant of chemo/XRT, recently admitted 3/10-3/29 and DC'ed to SNF to trial improvement of functional status, who returned w/ intractable rectal pain    Intractable pain, multifactorial  Wounds of the buttocks, recent radiotherapy  -continue with wound care, palliative care following    Metastatic anal cancer  Anorectal fistula  -s/p partial treatment with chemotherapy and radiation  -CT with slight decrease in size, with anorectal fistual noted  -did not tolerate treatment before and went to SNF  -patient expresses desire to just go home/not pursue any more treatment  -hospice consulted    Anorexia  -cont mirtazapine    Dysphagia  -tolerating PO    ESRD on HD  -HD MWF    DM type 2, a1c 5.8%, w/ insulin use  -SSI    HFpEF  HTN  pAfib  -cont amio, apixiban, asa, statin, coreg, imdur     Mod-Sev AS  -has appt w/ Geena Estrella, APRN 4/27/23    Expected Discharge Location and Transportation: SNF w/ palliative care vs hospice  Expected Discharge   Expected Discharge Date and Time     Expected Discharge Date Expected Discharge Time    Apr 14, 2023            DVT prophylaxis:  Medical DVT prophylaxis orders are present.     AM-PAC 6 Clicks Score (PT): 10 (04/12/23 2937)    CODE STATUS:   Code Status and Medical Interventions:   Ordered at: 04/06/23 1151     Medical Intervention Limits:    NO intubation (DNI)     Code Status (Patient has no pulse and is not breathing):    No CPR (Do Not Attempt to Resuscitate)     Medical Interventions (Patient has pulse or is breathing):    Limited Support     Comments:    Per family       Champ Ríos MD  04/13/23

## 2023-04-13 NOTE — PROGRESS NOTES
Palliative Care Progress Note    Date of Admission: 4/5/2023    Subjective: Patient with no significant complaints at this point in time.  Current Code Status     Date Active Code Status Order ID Comments User Context       4/6/2023 1151 No CPR (Do Not Attempt to Resuscitate) [id number]  Ranulfo Thomas, DO Inpatient      Question Answer    Code Status (Patient has no pulse and is not breathing) No CPR (Do Not Attempt to Resuscitate)    Medical Interventions (Patient has pulse or is breathing) Limited Support    Medical Intervention Limits: NO intubation (DNI)    Comments Per family              No current facility-administered medications on file prior to encounter.     Current Outpatient Medications on File Prior to Encounter   Medication Sig Dispense Refill   • acetaminophen (TYLENOL) 500 MG tablet Take 1 tablet by mouth Every 6 (Six) Hours As Needed for Mild Pain, Fever or Headache.     • amiodarone (PACERONE) 200 MG tablet Take 1 tablet by mouth Daily. 90 tablet 1   • apixaban (ELIQUIS) 2.5 MG tablet tablet Take 1 tablet by mouth 2 (Two) Times a Day. 180 tablet 3   • aspirin 81 MG EC tablet Take 1 tablet by mouth Daily.     • atorvastatin (LIPITOR) 80 MG tablet Take 1 tablet by mouth Every Night. 90 tablet 3   • B Complex-C-Folic Acid (DIALYVITE 800 PO) Take 1 tablet by mouth 3 (Three) Times a Week. On dialysis days Mon-Wed-Fri     • bisacodyl (DULCOLAX) 5 MG EC tablet Take 1 tablet by mouth Daily As Needed for Constipation.     • Capsaicin 0.1 % cream Apply 2-4 gms to feet nightly. Use gloves 60 g 3   • carvedilol (COREG) 3.125 MG tablet Take 1 tablet by mouth Every 12 (Twelve) Hours. 60 tablet 0   • docusate sodium (COLACE) 100 MG capsule Take 1 capsule by mouth Daily.     • dorzolamide-timolol (COSOPT) 22.3-6.8 MG/ML ophthalmic solution Administer 1 drop to both eyes Daily. 10 mL 3   • Durezol 0.05 % ophthalmic emulsion INSTILL 1 DROP 4 TIMES DAILY INTO SURGICAL EYE STARTING AFTER SURGERY.     • epoetin  orquidea-epbx (RETACRIT) 28977 UNIT/ML injection Inject 2 mL under the skin into the appropriate area as directed 3 (Three) Times a Week. Indications: Anemia associated with Chronic Kidney Failure 6.6 mL 0   • fluocinonide (LIDEX) 0.05 % ointment Apply 1 application topically to the appropriate area as directed 2 (Two) Times a Day. Scalp  2   • HYDROcodone-acetaminophen (NORCO)  MG per tablet Take 1 tablet by mouth Every 6 (Six) Hours As Needed for Moderate Pain.     • Hydrocortisone, Perianal, (ANUSOL-HC) 2.5 % rectal cream Insert  into the rectum 2 (Two) Times a Day. 28 g 0   • hydrOXYzine (ATARAX) 25 MG tablet Take 1 tablet by mouth 3 (Three) Times a Day As Needed for Anxiety. 30 tablet 0   • Insulin Lispro (humaLOG) 100 UNIT/ML injection Inject 0-9 Units under the skin into the appropriate area as directed 3 (Three) Times a Day With Meals. (Patient taking differently: Inject 0-9 Units under the skin into the appropriate area as directed 3 (Three) Times a Day With Meals. Sliding Scale:  2 units- 150-199  4 units- 200-249  6 units- 250-299  7 units- 300-349  8 units- 350-400  9 units- Over 400) 10 mL 0   • isosorbide mononitrate (IMDUR) 120 MG 24 hr tablet Take 1 tablet by mouth Daily. 90 tablet 3   • mirtazapine (REMERON) 15 MG tablet Take 1 tablet by mouth Every Night. 30 tablet 0   • Nutritional Supplements (ProMod) liquid Take 30 mL by mouth 2 (Two) Times a Day.     • oxyCODONE (OXY-IR) 5 MG capsule Take 1 capsule by mouth Every 6 (Six) Hours As Needed for Moderate Pain.     • silver sulfadiazine (SILVADENE, SSD) 1 % cream Apply 1 application topically to the appropriate area as directed 2 (Two) Times a Day. Buttocks, sacrum area     • vitamin C (ASCORBIC ACID) 500 MG tablet Take 1 tablet by mouth Daily.     • Vitamin D, Cholecalciferol, (CHOLECALCIFEROL) 10 MCG (400 UNIT) tablet Take 1 tablet by mouth Daily.     • zinc sulfate (Zinc-220) 220 (50 Zn) MG capsule Take 1 capsule by mouth Daily.     • cloNIDine  "(CATAPRES) 0.1 MG tablet Take 1 tablet by mouth 3 (Three) Times a Day As Needed for High Blood Pressure (SBP>160). 30 tablet 0   • midodrine (PROAMATINE) 5 MG tablet Take 1 tablet by mouth As Needed. For Systolic BP below 90          •  senna-docusate sodium **AND** bisacodyl **AND** bisacodyl  •  cloNIDine  •  dextrose  •  dextrose  •  diazePAM  •  glucagon (human recombinant)  •  HYDROmorphone  •  hydrOXYzine  •  melatonin  •  ondansetron  •  [COMPLETED] Insert Peripheral IV **AND** sodium chloride  •  sodium chloride  •  sodium chloride    Objective: BP 90/44 (BP Location: Left arm, Patient Position: Lying)   Pulse 65   Temp 98.2 °F (36.8 °C) (Oral)   Resp 17   Ht 167.6 cm (66\")   Wt 63.5 kg (140 lb 1.6 oz)   LMP  (LMP Unknown)   SpO2 98%   BMI 22.61 kg/m²    No intake or output data in the 24 hours ending 04/13/23 1243  Physical Exam:      General Appearance:    Alert, cooperative, frail-appearing   Head:    Normocephalic, without obvious abnormality, atraumatic   Eyes:            Lids and lashes normal, conjunctivae and sclerae normal, no   icterus, no pallor, corneas clear, PERRLA   Ears:    Ears appear intact with no abnormalities noted   Throat:   No oral lesions, no thrush, oral mucosa moist   Neck:   No adenopathy, supple, trachea midline, no thyromegaly, no     carotid bruit, no JVD   Back:     No kyphosis present, no scoliosis present, no skin lesions,       erythema or scars, no tenderness to percussion or                   palpation,   range of motion normal   Lungs:     Clear to auscultation,respirations regular, even and                   unlabored    Heart:    Regular rhythm and normal rate, normal S1 and S2, no            murmur, no gallop, no rub, no click   Breast Exam:    Deferred   Abdomen:     Normal bowel sounds, no masses, no organomegaly, soft        non-tender, non-distended, no guarding, no rebound                 tenderness   Genitalia:    Deferred   Extremities:   Moves all " extremities well, no edema, no cyanosis, no              redness   Pulses:   Pulses palpable and equal bilaterally   Skin:   No bleeding, bruising or rash   Lymph nodes:   No palpable adenopathy   Neurologic:   Cranial nerves 2 - 12 grossly intact, sensation intact, DTR        present and equal bilaterally     Results from last 7 days   Lab Units 04/10/23  0400   WBC 10*3/mm3 8.40   HEMOGLOBIN g/dL 8.0*   HEMATOCRIT % 28.7*   PLATELETS 10*3/mm3 279     Results from last 7 days   Lab Units 04/10/23  0359   SODIUM mmol/L 133*   POTASSIUM mmol/L 4.9   CHLORIDE mmol/L 96*   CO2 mmol/L 19.0*   BUN mg/dL 47*   CREATININE mg/dL 6.58*   CALCIUM mg/dL 8.6   GLUCOSE mg/dL 73       Impression: End-stage renal disease  Rectal cancer  Anorexia  Neoplastic pain  Debility  Goals of care  Plan: Patient states that she is looking at going home with hospice if at all possible.  States that she will be stopping dialysis wants hospice has been initiated.  Hospice case manager is following.        Trent Niño DO  04/13/23  12:43 EDT

## 2023-04-14 LAB
BACTERIA SPEC AEROBE CULT: NORMAL
BACTERIA SPEC AEROBE CULT: NORMAL
GLUCOSE BLDC GLUCOMTR-MCNC: 89 MG/DL (ref 70–130)
GLUCOSE BLDC GLUCOMTR-MCNC: 96 MG/DL (ref 70–130)
GLUCOSE BLDC GLUCOMTR-MCNC: 98 MG/DL (ref 70–130)

## 2023-04-14 PROCEDURE — 25010000002 DIAZEPAM PER 5 MG: Performed by: INTERNAL MEDICINE

## 2023-04-14 PROCEDURE — 82962 GLUCOSE BLOOD TEST: CPT

## 2023-04-14 PROCEDURE — 25010000002 HYDROMORPHONE PER 4 MG: Performed by: STUDENT IN AN ORGANIZED HEALTH CARE EDUCATION/TRAINING PROGRAM

## 2023-04-14 PROCEDURE — 97530 THERAPEUTIC ACTIVITIES: CPT

## 2023-04-14 RX ADMIN — MIRTAZAPINE 15 MG: 15 TABLET, FILM COATED ORAL at 21:19

## 2023-04-14 RX ADMIN — ASPIRIN 81 MG: 81 TABLET, COATED ORAL at 08:08

## 2023-04-14 RX ADMIN — ISOSORBIDE MONONITRATE 120 MG: 120 TABLET, EXTENDED RELEASE ORAL at 08:09

## 2023-04-14 RX ADMIN — CAPSAICIN: 0.75 CREAM TOPICAL at 08:08

## 2023-04-14 RX ADMIN — ACETAMINOPHEN 325MG 650 MG: 325 TABLET ORAL at 08:08

## 2023-04-14 RX ADMIN — CAPSAICIN: 0.75 CREAM TOPICAL at 21:20

## 2023-04-14 RX ADMIN — HYDROMORPHONE HYDROCHLORIDE 0.25 MG: 1 INJECTION, SOLUTION INTRAMUSCULAR; INTRAVENOUS; SUBCUTANEOUS at 00:37

## 2023-04-14 RX ADMIN — HYDROMORPHONE HYDROCHLORIDE 0.25 MG: 1 INJECTION, SOLUTION INTRAMUSCULAR; INTRAVENOUS; SUBCUTANEOUS at 16:31

## 2023-04-14 RX ADMIN — HYDROMORPHONE HYDROCHLORIDE 0.25 MG: 1 INJECTION, SOLUTION INTRAMUSCULAR; INTRAVENOUS; SUBCUTANEOUS at 11:41

## 2023-04-14 RX ADMIN — AMIODARONE HYDROCHLORIDE 200 MG: 200 TABLET ORAL at 08:08

## 2023-04-14 RX ADMIN — DIAZEPAM 2.5 MG: 5 INJECTION, SOLUTION INTRAMUSCULAR; INTRAVENOUS at 18:02

## 2023-04-14 RX ADMIN — DIAZEPAM 2.5 MG: 5 INJECTION, SOLUTION INTRAMUSCULAR; INTRAVENOUS at 05:44

## 2023-04-14 RX ADMIN — CARVEDILOL 3.12 MG: 6.25 TABLET, FILM COATED ORAL at 08:08

## 2023-04-14 RX ADMIN — APIXABAN 2.5 MG: 2.5 TABLET, FILM COATED ORAL at 08:08

## 2023-04-14 RX ADMIN — DIAZEPAM 2.5 MG: 5 INJECTION, SOLUTION INTRAMUSCULAR; INTRAVENOUS at 13:45

## 2023-04-14 RX ADMIN — HYDROMORPHONE HYDROCHLORIDE 0.25 MG: 1 INJECTION, SOLUTION INTRAMUSCULAR; INTRAVENOUS; SUBCUTANEOUS at 05:03

## 2023-04-14 RX ADMIN — POVIDONE-IODINE 1 EACH: 10 SOLUTION TOPICAL at 08:10

## 2023-04-14 RX ADMIN — SENNOSIDES AND DOCUSATE SODIUM 2 TABLET: 8.6; 5 TABLET ORAL at 21:19

## 2023-04-14 RX ADMIN — ATORVASTATIN CALCIUM 80 MG: 40 TABLET, FILM COATED ORAL at 21:19

## 2023-04-14 RX ADMIN — APIXABAN 2.5 MG: 2.5 TABLET, FILM COATED ORAL at 21:19

## 2023-04-14 RX ADMIN — Medication 10 ML: at 21:20

## 2023-04-14 RX ADMIN — HYDROXYZINE HYDROCHLORIDE 25 MG: 25 TABLET, FILM COATED ORAL at 23:22

## 2023-04-14 RX ADMIN — ACETAMINOPHEN 325MG 650 MG: 325 TABLET ORAL at 21:19

## 2023-04-14 NOTE — PROGRESS NOTES
Clinical Nutrition     Patient Name: Esperanza Pop  YOB: 1945  MRN: [id number]  Date of Encounter: 23 14:44 EDT  Admission date: 2023    Reason for Visit   Follow up    EMR reviewed    Yes    Diet Nutrition Related History     Per Nephrology pt wants to stop dialysis and go home hospice. Pt has plans to discuss with hospice today to determine to go home with hospice or inpatient hospice. Pt and family endorses minimum intakes. Pt reports getting Boost+ instead of Nepro/Novasource Renal. [employer] went to kitchen and brought Nepro mixed berry to pt to have. Attempted to get food preferences but pt only wanted ONS.     Current Nutrition Prescription    Diet: Renal Diets; Low Sodium (2-3g), Low Potassium, Low Phosphorus; No Straw, No Mixed Consistencies; Texture: Mechanical Ground (NDD 2); Fluid Consistency: Sharon Thick    ONS: Novasource Renal BID (Will adjust to allow Nepro d/t distribution issues)    Average Intake from [employer]% x 12 meals    Actions:    Follow treatment progress, Care plan reviewed, Interview for preferences, Adjusted supplement, Encourage intake    Monitor Per Protocol    Connie Mcihel,   Time Spent: 25 min

## 2023-04-14 NOTE — PROGRESS NOTES
DATE OF COMPLETION: 3/21/2023  DIAGNOSIS: Anus and lymph nodes    REFERRING: Dr. Rojo          Dear Dr. Darrel Pop,Esperanza BOLDEN completed radiation therapy today.      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  The patient had multiple comorbidities including renal failure requiring dialysis and diabetes.  The patient underwent an attempt at curative intent therapy with Xeloda and radiation as below:    Treatment Summary     Dates of Therapy: 2/20/2023-3/21/2023  Treatment Site: Anus and lymph nodes  Dose: 32.4 Gy in 18 fractions of 1.8 Gy each  Technique: Helical TomoTherapy Daily IGR T6 MV photons    Treatment Course and Tolerance: The patient tolerated treatment very poorly.  The patient's had an an excellent response to treatment initially however the patient declined systemically.  Her renal function dropped precipitously and the patient has been in and out of the hospital since.  The patient's skin integrity also was extremely compromised which would be well outside of the expected side effects and included areas of denudement which were outside of the high-dose radiation field.  This is likely due to the patient's high doses of Xeloda with her renal failure, chronic immobility and pressure ulcers, and protein calorie malnutrition.  The patient has been in the hospital and we have discontinued therapy for her.  If the patient recovers well or needs additional palliative treatments in the future the family is not terribly interested in that which seems reasonable considering how poorly she has done with treatment so far.    The initial follow up visit will be in 1 month.    Esperanzatom Pop knows to call if any problems or concerns develop in the meantime.     Electronically signed by: Kevan Cavazos MD                    Cc: Meghana Rodgers MD

## 2023-04-14 NOTE — NURSING NOTE
"Nursing staff went to witness patients response on the decision of hemodialysis. Dr asked all orientation questions, in which the patient answered all appropriately.  then explained the risks of stopping the treatment.  When patient was asked if she  would like to continue the treatment the patient stated, \" I would like to do the hemodialysis because I would like to live longer.\"  then said we would continue the treatment and would order it for tomorrow.  "

## 2023-04-14 NOTE — PLAN OF CARE
Goal Outcome Evaluation:  Plan of Care Reviewed With: patient        Progress: no change  Outcome Evaluation: Patient very fatigued/ lethargic but agreeable to participate in therapy. Patient sat at EOB with CGA-Keegan however continued to fall asleep and required cues to awaken and participate in therapy. PT attempted to get patient to complete exercises but she was unable to complete set without cues. Patient stated that she was dizzy and needed to return to supine. Patient also with increased soreness and sensitivity on heels. PT noted DPTI on R heel and educated patient and family in room about benefits of use of prevlon boots in room to prevent worsening of heel DTPIs. Patient would continue to benefit from skilled PT services as patient able to tolerate.

## 2023-04-14 NOTE — CASE MANAGEMENT/SOCIAL WORK
Continued Stay Note  [facility]     Patient Name: Esperanza Pop  MRN: [id number]  Today's Date: 4/14/2023    Admit Date: 4/5/2023    Plan: ongoing   Discharge Plan     Row Name 04/14/23 1300       Plan    Plan ongoing    Plan Comments Hospice team is working with pt and family. Case Management is following on the periphery. I spoke with Emma/Hospice RN today. She states that Hospice has a meeting planned today at 1600 with the pt and family. D/C plan is either home with Hospice or Inpatient Hospice. That decision will be made in the meeting today. She states that the pt's daughter/Indigo would like to spend the night with pt in her room tonight. CM will talk with Charge Nurse/Director of unit, Shivani, in Medical Rounds. I spoke with Shivani about this, she is agreeable for Indigo/dtr to spend the night. I updated staff in Medical Rounds today. I updated Emma/Hospice, by phone, she will let family know that Indigo can spend the night tonight.    Final Discharge Disposition Code 30 - still a patient               Discharge Codes    No documentation.               Expected Discharge Date and Time     Expected Discharge Date Expected Discharge Time    Apr 14, 2023             Vilma Weaver RN

## 2023-04-14 NOTE — THERAPY TREATMENT NOTE
Patient Name: Esperanza Pop  : 1945    MRN: [id number]                              Today's Date: 2023       Admit Date: 2023    Visit Dx:     ICD-10-CM ICD-9-CM   1. Intractable pain  R52 780.96   2. Rectal cancer  C20 154.1   3. Adult failure to thrive  R62.7 783.7   4. Hypoalbuminemia  E88.09 273.8   5. ESRD on hemodialysis  N18.6 585.6    Z99.2 V45.11   6. Pressure injury of buttock, stage 3, unspecified laterality  L89.303 707.05     707.23   7. Dysphagia, unspecified type  R13.10 787.20     Patient Active Problem List   Diagnosis   • Acute on chronic diastolic heart failure   • Type 2 diabetes mellitus   • Essential hypertension   • Coronary artery disease involving native coronary artery of native heart with angina pectoris   • Breast cancer, female, right   • Seasonal allergic rhinitis   • Right carotid bruit   • Vitamin B12 deficiency   • Hyperlipidemia LDL goal <70   • End stage renal disease on dialysis (HCC)   • Nonrheumatic aortic valve stenosis   • NSTEMI (non-ST elevated myocardial infarction)   • UTI (urinary tract infection)   • Ischemic heart disease   • CHF (congestive heart failure)   • Acute non-ST segment elevation myocardial infarction (STEMI) following previous myocardial infarction   • Dyslipidemia   • Diabetes mellitus   • Mild obesity   • Elevated troponin   • Screen for colon cancer   • Acute midline low back pain without sciatica   • Hypertensive emergency   • PAF (paroxysmal atrial fibrillation) (HCC)   • Malignant neoplasm of anal canal   • Hematochezia   • Severe malnutrition   • Symptomatic anemia   • Intractable pain     Past Medical History:   Diagnosis Date   • Acute bronchitis    • Acute conjunctivitis    • Acute kidney injury     Admission from 2013 to 2014, now resolved with latest creatinine 1.4 on 2014.   • Acute non-ST segment elevation myocardial infarction (STEMI) following previous myocardial infarction    • Anal cancer 2023   • Anal  cancer 2/8/2023   • Arthritis    • Breast cancer, female, right     2005 mastectomy right   • Cancer    • CHF (congestive heart failure)    • Chronic kidney disease     dialysis MWF, [facility]    • Clotting disorder    • COVID-19    • Diabetes mellitus     diagnosed ~1992, checks fsbg 2-3x/day   • Diarrhea    • Dyslipidemia    • Dyspepsia    • Dyspnea    • Edema    • History of transfusion     ~2013 no reaction    • Hx of radiation therapy    • Hyperlipidemia    • Hypertension     Severe   • Ischemic heart disease    • Moderate obesity     BMI 36.2   • Myocardial infarction    • Pneumonia    • Pneumonia 02/2019   • Radiation 2005   • Seasonal allergies    • Wears glasses      Past Surgical History:   Procedure Laterality Date   • AV FISTULA PLACEMENT, BRACHIOBASILIC     • BREAST BIOPSY Left 2010   • BREAST CYST EXCISION     • BREAST LUMPECTOMY Right 2005   • BREAST SURGERY     • CARDIAC CATHETERIZATION N/A 10/10/2016    Procedure: Left Heart Cath;  Surgeon: Scooter Zhao MD;  Location:  [facility];  Service:    • CARDIAC CATHETERIZATION  10/2016   • CARDIAC CATHETERIZATION N/A 1/21/2021    Procedure: Right and Left Heart Cath;  Surgeon: Hugh Tidwell MD;  Location:  [facility];  Service: Cardiology;  Laterality: N/A;   • COLONOSCOPY N/A 7/23/2020    Procedure: COLONOSCOPY;  Surgeon: Rubén Rosas MD;  Location:  [facility];  Service: Gastroenterology;  Laterality: N/A;   • CORONARY STENT PLACEMENT  2016   • HERNIA REPAIR     • HYSTERECTOMY  2000   • INSERTION HEMODIALYSIS CATHETER Right 6/29/2016    Procedure: HEMODIALYSIS CATHETER INSERTION TUNNELLED;  Surgeon: Ramón Chavez MD;  Location: [facility] OR;  Service:    • MASTECTOMY Right 2006   • OOPHORECTOMY     • REDUCTION MAMMAPLASTY Left 2005      General Information     Row Name 04/14/23 1130          Physical Therapy Time and Intention    Document Type therapy note (daily note)   -KW     Mode of Treatment physical therapy  -KW     Row Name 04/14/23 1130          General Information    Patient Profile Reviewed yes  -KW     Existing Precautions/Restrictions --  Sacral Wound  -KW     Row Name 04/14/23 1130          Safety Issues, Functional Mobility    Impairments Affecting Function (Mobility) balance;cognition;endurance/activity tolerance;pain;range of motion (ROM);strength  -KW           User Key  (r) = Recorded By, (t) = Taken By, (c) = Cosigned By    Initials Name Provider Type    Richa Johnson PT Physical Therapist               Mobility     Row Name 04/14/23 1130          Bed Mobility    Rolling Left [place] (Bed Mobility) maximum assist (25% patient effort)  -KW     Scooting/Bridging [place] (Bed Mobility) maximum assist (25% patient effort);verbal cues  -KW     Supine-Sit-Supine [place] (Bed Mobility) maximum assist (25% patient effort);verbal cues  -KW     Assistive Device (Bed Mobility) bed rails;draw sheet;head of bed elevated  -KW           User Key  (r) = Recorded By, (t) = Taken By, (c) = Cosigned By    Initials Name Provider Type    Richa Johnson PT Physical Therapist               Obj/Interventions    No documentation.                Goals/Plan    No documentation.                Clinical Impression     Row Name 04/14/23 1130          Pain    Pre/Posttreatment Pain Comment patient was having increased pain in sacrum but did not provide numerical pain rating  -KW     Row Name 04/14/23 1130          Plan of Care Review    Plan of Care Reviewed With patient  -KW     Progress no change  -KW     Outcome Evaluation Patient very fatigued/ lethargic but agreeable to participate in therapy. Patient sat at EOB with CGA-Keegan however continued to fall asleep and required cues to awaken and participate in therapy. PT attempted to get patient to complete exercises but she was unable to complete set without cues. Patient stated that she was dizzy and needed  to return to supine. Patient also with increased soreness and sensitivity on heels. PT noted DPTI on R heel and educated patient and family in room about benefits of use of prevlon boots in room to prevent worsening of heel DTPIs. Patient would continue to benefit from skilled PT services as patient able to tolerate.  -KW     Row Name 04/14/23 1130          Vital Signs    Pre Systolic BP Rehab 109  -KW     Pre Treatment Diastolic BP 72  -KW     Pretreatment Heart Rate (beats/min) 74  -KW     Pre SpO2 (%) 97  -KW     Row Name 04/14/23 1130          Positioning and Restraints    Pre-Treatment Position in bed  -KW     Post Treatment Position bed  -KW     In Bed supine;call light within reach;encouraged to call for assist;exit alarm on;with family/caregiver;waffle boots/both  -KW           User Key  (r) = Recorded By, (t) = Taken By, (c) = Cosigned By    Initials Name Provider Type    Richa Johnson, PT Physical Therapist               Outcome Measures    No documentation.                              Physical Therapy Education     Title: PT OT SLP Therapies (In Progress)     Topic: Physical Therapy (Done)     Point: Mobility training (Done)     Learning Progress Summary           Patient Acceptance, E,TB, VU by KW at 4/6/2023 1135    Comment: continued benefit of skilled PT services                   Point: Home exercise program (Done)     Learning Progress Summary           Patient Acceptance, E,TB, VU by KW at 4/6/2023 1135    Comment: continued benefit of skilled PT services                   Point: Body mechanics (Done)     Learning Progress Summary           Patient Acceptance, E,TB, VU by KW at 4/6/2023 1135    Comment: continued benefit of skilled PT services                   Point: Precautions (Done)     Learning Progress Summary           Patient Acceptance, E,TB, VU by KW at 4/6/2023 1135    Comment: continued benefit of skilled PT services                               User Key     Initials  Effective Dates Name Provider Type Discipline     01/27/22 -  Richa Chand PT Physical Therapist PT              PT Recommendation and Plan     Plan of Care Reviewed With: patient  Progress: no change  Outcome Evaluation: Patient very fatigued/ lethargic but agreeable to participate in therapy. Patient sat at EOB with CGA-Keegan however continued to fall asleep and required cues to awaken and participate in therapy. PT attempted to get patient to complete exercises but she was unable to complete set without cues. Patient stated that she was dizzy and needed to return to supine. Patient also with increased soreness and sensitivity on heels. PT noted DPTI on R heel and educated patient and family in room about benefits of use of prevlon boots in room to prevent worsening of heel DTPIs. Patient would continue to benefit from skilled PT services as patient able to tolerate.     Time Calculation:    PT Charges     Row Name 04/14/23 1130             Time Calculation    Start Time 1130  -KW      PT Received On 04/14/23  -KW      PT Goal Re-Cert Due Date 04/16/23  -KW         Timed Charges    76526 - PT Therapeutic Activity Minutes 19  -KW         Total Minutes    Timed Charges Total Minutes 19  -KW       Total Minutes 19  -KW            User Key  (r) = Recorded By, (t) = Taken By, (c) = Cosigned By    Initials Name Provider Type     Richa Chand PT Physical Therapist              Therapy Charges for Today     Code Description Service Date Service Provider Modifiers Qty    [id number]  PT THERAPEUTIC ACT EA 15 MIN 4/14/2023 Richa Chand PT GP 1          PT G-Codes  Outcome Measure Options: AM-PAC 6 Clicks Daily Activity (OT)  AM-PAC 6 Clicks Score (PT): 6  AM-PAC 6 Clicks Score (OT): 10  PT Discharge Summary  Anticipated Discharge Disposition (PT): skilled nursing facility    Richa Chand PT  4/14/2023

## 2023-04-14 NOTE — PROGRESS NOTES
" LOS: 9 days   Patient Care Team:  Meghana Rodgers MD as PCP - General  Demetrius Sagastume MD as Consulting Physician (Cardiology)  Kevan Lerma MD as Consulting Physician (Nephrology)  Geena Estrella APRN as Nurse Practitioner (Cardiology)  Frank Mandujano MD as Referring Physician (Colon and Rectal Surgery)  Kevan Cavazos MD as Consulting Physician (Radiation Oncology)  Yue Reeves RN as Nurse Navigator  Darryn Burk MD as Consulting Physician (Nephrology)    Chief Complaint: ESRD    Subjective     In the presence of RN discussed with the patient regards to hospice, dialysis.  Patient states that she was confused when they were asking those questions.    At this moment she is awake alert oriented in time place person.  She would like to continue with the dialysis and she wants to live.  This was confirmed by the RN taking care of the patient in the room.           Subjective:  Symptoms:  Stable.  No shortness of breath, chest pain, chest pressure or anxiety.    Pain:  She complains of pain that is moderate.        History taken from: patient    Objective     Vital Sign Min/Max for last 24 hours  Temp  Min: 97.7 °F (36.5 °C)  Max: 98.7 °F (37.1 °C)   BP  Min: 104/48  Max: 129/54   Pulse  Min: 60  Max: 70   Resp  Min: 16  Max: 16   SpO2  Min: 96 %  Max: 99 %   No data recorded   No data recorded     Flowsheet Rows    Flowsheet Row First Filed Value   Admission Height 167.6 cm (66\") Documented at 04/05/2023 0702   Admission Weight 78.9 kg (174 lb) Documented at 04/05/2023 0702          I/O this shift:  In: 20 [P.O.:20]  Out: -   I/O last 3 completed shifts:  In: 25 [P.O.:25]  Out: -     Objective:  General Appearance:  Ill-appearing, comfortable and in no acute distress.    Vital signs: (most recent): Blood pressure 127/46, pulse 70, temperature 97.8 °F (36.6 °C), temperature source Oral, resp. rate 16, height 167.6 cm (66\"), weight 63.5 kg (140 lb 1.6 oz), SpO2 99 %, not currently " breastfeeding.  Vital signs are normal.    Output: No urine output.    HEENT: Normal HEENT exam.    Lungs:  Normal effort and normal respiratory rate.  Breath sounds clear to auscultation.  She is not in respiratory distress.  No decreased breath sounds or wheezes.    Heart: Normal rate.  Regular rhythm.  S1 normal and S2 normal.  No murmur or gallop.   Abdomen: Abdomen is soft.  There is no abdominal tenderness.     Extremities: There is no dependent edema or local swelling.    Neurological: Patient is alert and oriented to person, place and time.    Pupils:  Pupils are equal, round, and reactive to light.              Results Review:     I reviewed the patient's new clinical results.    WBC No results found for: WBC   HGB No results found for: HGB   HCT No results found for: HCT   Platlets No results found for: LABPLAT   MCV No results found for: MCV       Sodium No results found for: NA   Potassium No results found for: K   Chloride No results found for: CL   CO2 No results found for: CO2   BUN No results found for: BUN   Creatinine No results found for: CREATININE   Calcium No results found for: CALCIUM   PO4 No results found for: CAPO4   Albumin No results found for: ALBUMIN   Magnesium No results found for: MG   Uric Acid No results found for: URICACID     Medication Review: yes    Assessment & Plan       Intractable pain      Assessment & Plan        ESRD: MWF [facility]     Volume status: No significant anemia     Abdominal pain: In the setting of rectal cancer.     Failure to thrive     Hypoalbuminemia      Anemia: ACD due to CKD. Unable to add AMY due to rectal cancer     I have discussed with the patient in detail regards to dialysis, and without dialysis she may die..  Patient would like to use continue with dialysis and would like to live this is her choice that she has told us today.       Darryn Burk MD  04/14/23  16:13 EDT

## 2023-04-14 NOTE — PLAN OF CARE
Problem: Skin Injury Risk Increased  Goal: Skin Health and Integrity  Outcome: Ongoing, Progressing  Intervention: Optimize Skin Protection  Recent Flowsheet Documentation  Taken 4/14/2023 0200 by Shawn Navarro RN  Head of Bed (HOB) Positioning: HOB elevated  Taken 4/14/2023 0000 by Shawn Navarro RN  Pressure Reduction Techniques:   frequent weight shift encouraged   heels elevated off bed   weight shift assistance provided  Head of Bed (HOB) Positioning: HOB elevated  Pressure Reduction Devices:   specialty bed utilized   positioning supports utilized   foam padding utilized  Skin Protection:   adhesive use limited   incontinence pads utilized   tubing/devices free from skin contact  Taken 4/13/2023 2200 by Shawn Navarro RN  Head of Bed (HOB) Positioning: HOB elevated  Taken 4/13/2023 2059 by Shawn Navarro RN  Pressure Reduction Techniques:   frequent weight shift encouraged   heels elevated off bed   weight shift assistance provided  Head of Bed (HOB) Positioning: HOB elevated  Pressure Reduction Devices:   positioning supports utilized   specialty bed utilized   foam padding utilized  Skin Protection:   adhesive use limited   incontinence pads utilized   tubing/devices free from skin contact     Problem: Fall Injury Risk  Goal: Absence of Fall and Fall-Related Injury  Outcome: Ongoing, Progressing  Intervention: Identify and Manage Contributors  Recent Flowsheet Documentation  Taken 4/14/2023 0000 by Shawn Navarro RN  Medication Review/Management: medications reviewed  Taken 4/13/2023 2059 by Shawn Navarro RN  Medication Review/Management: medications reviewed  Intervention: Promote Injury-Free Environment  Recent Flowsheet Documentation  Taken 4/14/2023 0000 by Shawn Navarro RN  Safety Promotion/Fall Prevention:   activity supervised   assistive device/personal items within reach   clutter free environment maintained   fall prevention program maintained   lighting adjusted   nonskid shoes/slippers when out of  bed   safety round/check completed  Taken 4/13/2023 2059 by Shawn Navarro RN  Safety Promotion/Fall Prevention:   activity supervised   assistive device/personal items within reach   clutter free environment maintained   fall prevention program maintained   lighting adjusted   nonskid shoes/slippers when out of bed   room organization consistent   safety round/check completed     Problem: Adult Inpatient Plan of Care  Goal: Plan of Care Review  Outcome: Ongoing, Progressing  Flowsheets (Taken 4/14/2023 0341)  Plan of Care Reviewed With: patient  Goal: Patient-Specific Goal (Individualized)  Outcome: Ongoing, Progressing  Goal: Absence of Hospital-Acquired Illness or Injury  Outcome: Ongoing, Progressing  Intervention: Identify and Manage Fall Risk  Recent Flowsheet Documentation  Taken 4/14/2023 0000 by Shawn Navarro RN  Safety Promotion/Fall Prevention:   activity supervised   assistive device/personal items within reach   clutter free environment maintained   fall prevention program maintained   lighting adjusted   nonskid shoes/slippers when out of bed   safety round/check completed  Taken 4/13/2023 2059 by Shawn Navarro RN  Safety Promotion/Fall Prevention:   activity supervised   assistive device/personal items within reach   clutter free environment maintained   fall prevention program maintained   lighting adjusted   nonskid shoes/slippers when out of bed   room organization consistent   safety round/check completed  Intervention: Prevent Skin Injury  Recent Flowsheet Documentation  Taken 4/14/2023 0200 by Shawn Navarro RN  Body Position:   left   turned   weight shifting  Taken 4/14/2023 0000 by Shawn Navarro RN  Body Position:   right   turned   weight shifting  Skin Protection:   adhesive use limited   incontinence pads utilized   tubing/devices free from skin contact  Taken 4/13/2023 2200 by Shawn Navarro RN  Body Position:   left   turned   weight shifting  Taken 4/13/2023 2059 by Shawn Navarro RN  Body  Position:   weight shifting   right   turned  Skin Protection:   adhesive use limited   incontinence pads utilized   tubing/devices free from skin contact  Intervention: Prevent and Manage VTE (Venous Thromboembolism) Risk  Recent Flowsheet Documentation  Taken 4/14/2023 0000 by Shawn Navarro RN  Activity Management: activity encouraged  Taken 4/13/2023 2059 by Shawn Navarro RN  Activity Management: activity encouraged  Intervention: Prevent Infection  Recent Flowsheet Documentation  Taken 4/14/2023 0000 by Shawn Navarro RN  Infection Prevention:   environmental surveillance performed   rest/sleep promoted  Taken 4/13/2023 2059 by Shawn Navarro RN  Infection Prevention:   environmental surveillance performed   rest/sleep promoted  Goal: Optimal Comfort and Wellbeing  Outcome: Ongoing, Progressing  Intervention: Provide Person-Centered Care  Recent Flowsheet Documentation  Taken 4/13/2023 2059 by Shawn Navarro RN  Trust Relationship/Rapport:   care explained   choices provided   emotional support provided   empathic listening provided   questions answered   questions encouraged   reassurance provided   thoughts/feelings acknowledged  Goal: Readiness for Transition of Care  Outcome: Ongoing, Progressing   Goal Outcome Evaluation:  Plan of Care Reviewed With: patient            Patient complained of pain , prn medication given , see mar, with relief.  PRN oral care.  Q2 turn. Specialty bed in place.  Bilateral heels elevated .  Patient refuses bilateral heel boots.  Patient refused scheduled 2100 medication .  Patient educated, continue to refused.  DaughterIndigo, presented to the bedside after visiting hours ended.  Bed alarm activated. Patient rested intermittently throughout shift.      Patients siobhan Indigo , with some concerns.  Charge RN at the bedside discussing with Indigo.

## 2023-04-14 NOTE — PROGRESS NOTES
Continued Stay Note  [facility]     Patient Name: Esperanza Pop  MRN: [id number]  Today's Date: 4/14/2023    Admit Date: 4/5/2023    Plan: Inpatient hospice   Discharge Plan     Row Name 04/14/23 2281       Plan    Plan Inpatient hospice    Plan Comments Family meeting with pt's 3 daughters, son, and a sister. Discussed pt's current condition and that pt has been expressing to the staff does not want any more dialysis. Discussed inpatient hospice vs home with hospice. The family has decided on inpatient hospice. Pt does not have a designated Health Care Surrogate or POA, Indigo stated will sign the hospice admission forms, other family members in  agreement. Plan made for the inpatient hospice team to meet with Indigo tomorrow, 4/15, at 1030. If any questions or concerns please call inpatient hospice at [phone].               Discharge Codes    No documentation.               Expected Discharge Date and Time     Expected Discharge Date Expected Discharge Time    Apr 14, 2023             Emma Spangler RN

## 2023-04-15 ENCOUNTER — APPOINTMENT (OUTPATIENT)
Dept: NEPHROLOGY | Facility: HOSPITAL | Age: 78
DRG: 935 | End: 2023-04-15
Payer: MEDICARE

## 2023-04-15 LAB
GLUCOSE BLDC GLUCOMTR-MCNC: 129 MG/DL (ref 70–130)
GLUCOSE BLDC GLUCOMTR-MCNC: 147 MG/DL (ref 70–130)

## 2023-04-15 PROCEDURE — 25010000002 HYDROMORPHONE PER 4 MG: Performed by: STUDENT IN AN ORGANIZED HEALTH CARE EDUCATION/TRAINING PROGRAM

## 2023-04-15 PROCEDURE — 25010000002 ALBUMIN HUMAN 25% PER 50 ML: Performed by: INTERNAL MEDICINE

## 2023-04-15 PROCEDURE — 99231 SBSQ HOSP IP/OBS SF/LOW 25: CPT | Performed by: FAMILY MEDICINE

## 2023-04-15 PROCEDURE — P9047 ALBUMIN (HUMAN), 25%, 50ML: HCPCS | Performed by: INTERNAL MEDICINE

## 2023-04-15 PROCEDURE — 82962 GLUCOSE BLOOD TEST: CPT

## 2023-04-15 RX ORDER — LORAZEPAM 2 MG/ML
1 INJECTION INTRAMUSCULAR
Status: DISCONTINUED | OUTPATIENT
Start: 2023-04-15 | End: 2023-04-22 | Stop reason: HOSPADM

## 2023-04-15 RX ORDER — ALBUMIN (HUMAN) 12.5 G/50ML
12.5 SOLUTION INTRAVENOUS AS NEEDED
Status: DISPENSED | OUTPATIENT
Start: 2023-04-15 | End: 2023-04-15

## 2023-04-15 RX ORDER — MANNITOL 250 MG/ML
25 INJECTION, SOLUTION INTRAVENOUS AS NEEDED
Status: ACTIVE | OUTPATIENT
Start: 2023-04-15 | End: 2023-04-16

## 2023-04-15 RX ADMIN — CARVEDILOL 3.12 MG: 6.25 TABLET, FILM COATED ORAL at 20:10

## 2023-04-15 RX ADMIN — APIXABAN 2.5 MG: 2.5 TABLET, FILM COATED ORAL at 20:10

## 2023-04-15 RX ADMIN — ACETAMINOPHEN 325MG 650 MG: 325 TABLET ORAL at 20:10

## 2023-04-15 RX ADMIN — HYDROMORPHONE HYDROCHLORIDE 0.25 MG: 1 INJECTION, SOLUTION INTRAMUSCULAR; INTRAVENOUS; SUBCUTANEOUS at 20:11

## 2023-04-15 RX ADMIN — HYDROXYZINE HYDROCHLORIDE 25 MG: 25 TABLET, FILM COATED ORAL at 23:05

## 2023-04-15 RX ADMIN — CAPSAICIN: 0.75 CREAM TOPICAL at 22:00

## 2023-04-15 RX ADMIN — ALBUMIN (HUMAN) 37.5 G: 0.25 INJECTION, SOLUTION INTRAVENOUS at 17:27

## 2023-04-15 RX ADMIN — CAPSAICIN: 0.75 CREAM TOPICAL at 09:26

## 2023-04-15 RX ADMIN — MIRTAZAPINE 15 MG: 15 TABLET, FILM COATED ORAL at 20:10

## 2023-04-15 RX ADMIN — ATORVASTATIN CALCIUM 80 MG: 40 TABLET, FILM COATED ORAL at 20:11

## 2023-04-15 RX ADMIN — POVIDONE-IODINE 1 EACH: 10 SOLUTION TOPICAL at 09:27

## 2023-04-15 RX ADMIN — HYDROMORPHONE HYDROCHLORIDE 0.25 MG: 1 INJECTION, SOLUTION INTRAMUSCULAR; INTRAVENOUS; SUBCUTANEOUS at 12:33

## 2023-04-15 NOTE — PLAN OF CARE
Goal Outcome Evaluation:           Progress: declining  Outcome Evaluation: PRN pain medication given. Patient was unable to take oral medications this am. Sub Q IV started for IV medications. Patient only alert to name this am. Pallative care and hospice following.

## 2023-04-15 NOTE — PROGRESS NOTES
"Continued Stay Note  [facility]     Patient Name: Esperanza Pop  MRN: [id number]  Today's Date: 4/15/2023    Admit Date: 4/5/2023    Plan: TBD   Discharge Plan     Row Name 04/15/23 1105       Plan    Plan TBD    Plan Comments Hospice RNs made visit to bedside.  Patient very lethargic.  Unable to give her name when asked.  She did wake up and says \"it's cold in here\".  Asked her if the doctor had come to see her this morning and she was unable to answer.  Asked if she had children and she was unable to answer.  Hospice meeting planned for 1030 with daughter.               Discharge Codes    No documentation.               Expected Discharge Date and Time     Expected Discharge Date Expected Discharge Time    Apr 14, 2023             Leticia Humphreys RN    "

## 2023-04-15 NOTE — PROGRESS NOTES
" LOS: 10 days   Patient Care Team:  Meghana Rodgers MD as PCP - General  Demetrius Sagastume MD as Consulting Physician (Cardiology)  Kevan Lerma MD as Consulting Physician (Nephrology)  Geena Estrella APRN as Nurse Practitioner (Cardiology)  Frank Mandujano MD as Referring Physician (Colon and Rectal Surgery)  Kevan Cavazos MD as Consulting Physician (Radiation Oncology)  Yue Reeves RN as Nurse Navigator  Darryn Burk MD as Consulting Physician (Nephrology)    Chief Complaint: ESRD    Subjective     Daughter in the room with the patient, patient requesting dialysis now, daughter is refusing to sign for hospice.  She wants to dialyze this patient.       Review of system: Denies nausea vomiting.        Objective     Vital Sign Min/Max for last 24 hours  Temp  Min: 97.2 °F (36.2 °C)  Max: 98.2 °F (36.8 °C)   BP  Min: 103/50  Max: 143/55   Pulse  Min: 60  Max: 75   Resp  Min: 16  Max: 20   SpO2  Min: 95 %  Max: 100 %   No data recorded   No data recorded     Flowsheet Rows    Flowsheet Row First Filed Value   Admission Height 167.6 cm (66\") Documented at 04/05/2023 0702   Admission Weight 78.9 kg (174 lb) Documented at 04/05/2023 0702          No intake/output data recorded.  I/O last 3 completed shifts:  In: 140 [P.O.:140]  Out: 0     Objective:  General Appearance:  Ill-appearing, comfortable and in no acute distress.    Vital signs: (most recent): Blood pressure 132/84, pulse 96, temperature 97.7 °F (36.5 °C), temperature source Oral, resp. rate 19, height 177.8 cm (70\"), weight 91.6 kg (201 lb 15.1 oz), SpO2 96 %.    HEENT: Normal HEENT exam.    Lungs:  Normal effort and normal respiratory rate.  Breath sounds clear to auscultation.  She is not in respiratory distress.  No decreased breath sounds or wheezes.    Heart: Normal rate.  Regular rhythm.  No murmur or gallop.   Abdomen: Abdomen is soft.  There is no abdominal tenderness.     Extremities: There is no dependent edema or local " swelling.    Neurological: Patient is alert.  Normal strength.            Results Review:     I reviewed the patient's new clinical results.    WBC No results found for: WBC   HGB No results found for: HGB   HCT No results found for: HCT   Platlets No results found for: LABPLAT   MCV No results found for: MCV       Sodium No results found for: NA   Potassium No results found for: K   Chloride No results found for: CL   CO2 No results found for: CO2   BUN No results found for: BUN   Creatinine No results found for: CREATININE   Calcium No results found for: CALCIUM   PO4 No results found for: CAPO4   Albumin No results found for: ALBUMIN   Magnesium No results found for: MG   Uric Acid No results found for: URICACID     Medication Review: yes    Assessment & Plan       Intractable pain      Assessment & Plan        ESRD: MWF [facility]     Volume status: No significant anemia     Abdominal pain: In the setting of rectal cancer.     Failure to thrive     Hypoalbuminemia      Anemia: ACD due to CKD. Unable to add AMY due to rectal cancer  Plan:  Earlier today will be try to dialyze her in the morning per patient request from yesterday.  She refused in the morning to be dialyzed  Daughter in the room at 2:40 PM, patient and daughter are requesting for the patient to be dialyzed.  Daughter is refusing to sign for hospice at this time.     Darryn Burk MD  04/15/23  14:38 EDT

## 2023-04-15 NOTE — NURSING NOTE
Spoke with patient at bedside to see if she wanted to have dialysis treatment today.      She did arose to verbal and tactile stimulation.  She stated she did NOT want to go to treatment today.      Spiritual care provided.    Primary RN notified.

## 2023-04-15 NOTE — DISCHARGE PLACEMENT REQUEST
"Esperanza Ayoub (78 y.o. Female)     Date of Birth   1945    Social Security Number       Address   [address] [place] [address]    Home Phone   [phone]    MRN   [id number]       Anabaptist   Rastafarian    Marital Status                               Admission Date   4/5/23    Admission Type   Emergency    Admitting Provider   Sabrina Pulliam MD    Attending Provider   Sabrina Pulliam MD    Department, Room/Bed   [facility] 5H, S572/1       Discharge Date       Discharge Disposition       Discharge Destination                               Attending Provider: Sabrina Pulliam MD    Allergies: Mircera [Methoxy Polyethylene Glycol-epoetin Beta], Venofer [Iron Sucrose], Albuterol, Nifedipine Er, Penicillins, Prednisone    Isolation: None   Infection: None   Code Status: No CPR    Ht: 167.6 cm (66\")   Wt: 63.5 kg (140 lb 1.6 oz)    Admission Cmt: None   Principal Problem: Intractable pain [R52]                 Active Insurance as of 4/5/2023     Primary Coverage     Payor Plan Insurance Group Employer/Plan Group    [facility] MEDICARE REPLACEMENT [facility] MEDICARE REPLACEMENT      Payor Plan Address Payor Plan Phone Number Payor Plan Fax Number Effective Dates    [address] [address] [phone]  12/1/2021 - None Entered    [facility] [address]       Subscriber Name Subscriber Birth Date Member ID       ESPERANZA AYOUB [id number]                 Emergency Contacts      (Rel.) Home Phone Work Phone Mobile Phone    isreal ayoub (Daughter) -- -- [phone]    anitradanelle (Daughter) -- -- [phone]    danelle martin (Sister) -- -- [phone]    AnitraJuan\ (Son) [phone] -- [phone]            Emergency Contact Information     Name Relation Home Work Mobile    isreal ayoub Daughter   [phone]    danelle ayoub Daughter   [phone]    mariodanelle Sister   [phone]    AnitraJuan\ Son [phone]          Insurance " Information                [facility]/[employer] MEDICARE [employer]PLACEMENT Phone: [phone]    Subscriber: Esperanza Pop Subscriber#: [id number]    Group#:  Precert#: --        [employer] Phone: [phone]    Subscriber: Esperanza Pop Subscriber#: [id number]    Group#: -- Precert#: --             History & Physical      Nuria Lynch MD at [address] [place] [address] Medicine Services  HISTORY AND PHYSICAL    Patient Name: Esperanza Pop  : 1945  MRN: [id number]  Primary Care Physician: Meghana Rodgers MD  Date of admission: 2023    Subjective    Subjective     Chief Complaint:  Rectal pain    HPI:  Esperanza Pop is a 78 y.o. female with ESRD on HD , IDDM, HTN, HLD, HFpEF, breast cancer status postmastectomy and radiation, paroxysmal A-fib on Xarelto previously, chronic anemia, rectal cancer recently diagnosed and was getting radiation and chemo, recent admission from 3/10 to 3/29, who presented for intractable rectal pain from SNF.  She reports that since she left the hospital, she has continued to have rectal pain such that she did not tolerate any dialysis on Monday so her last dialysis session was on Friday.  She has not had much of an appetite and has had minimal oral intake for the past several weeks.  The patient denied any other symptoms besides rectal pain and poor appetite today.  Denies fevers, chills, headache, dizziness, nausea, vomiting, diarrhea.    Per the patient's daughter, the patient has had severe pain from her radiation burns on her buttocks.  They also state that she fell most recently yesterday morning while at the SNF.  They state that there is no more radiation or chemotherapy planned at this time due to her debilitation and radiation burns.    The ER, patient was afebrile, heart rate 66, respiratory rate 18, blood pressure 136/57, satting 100% on 2 L  nasal cannula.  Labs on admission notable for creatinine 6.93, BUN 39, potassium 4.3, sodium 135, hemoglobin 9, platelets 203.  She was given lidocaine topically and Zofran.  She was admitted for further management.      Review of Systems   Constitutional: Positive for appetite change and fatigue. Negative for fever.   HENT: Negative for congestion and rhinorrhea.    Eyes: Negative for discharge and redness.   Respiratory: Negative for cough and shortness of breath.    Cardiovascular: Negative for chest pain and leg swelling.   Gastrointestinal: Positive for abdominal pain and rectal pain. Negative for diarrhea, nausea and vomiting.   Genitourinary: Negative for dysuria and hematuria.   Musculoskeletal: Negative for gait problem and joint swelling.   Skin: Positive for wound. Negative for pallor.   Neurological: Negative for dizziness and headaches.   Psychiatric/Behavioral: Negative for confusion and self-injury.            Personal History     Past Medical History:   Diagnosis Date   • Acute bronchitis    • Acute conjunctivitis    • Acute kidney injury     Admission from 12/26/2013 to 01/02/2014, now resolved with latest creatinine 1.4 on 01/08/2014.   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   • Anal cancer 2/8/2023   • Arthritis    • Breast cancer, female, right     2005 mastectomy right   • Cancer    • CHF (congestive heart failure)    • Chronic kidney disease     dialysis MWF, [facility]    • Clotting disorder    • COVID-19    • Diabetes mellitus     diagnosed ~1992, checks fsbg 2-3x/day   • Diarrhea    • Dyslipidemia    • Dyspepsia    • Dyspnea    • Edema    • History of transfusion     ~2013 no reaction    • Hx of radiation therapy    • Hyperlipidemia    • Hypertension     Severe   • Ischemic heart disease    • Moderate obesity     BMI 36.2   • Myocardial infarction    • Pneumonia    • Pneumonia 02/2019    • Radiation 2005   • Seasonal allergies    • Wears glasses          Oncology Problem List:  Malignant neoplasm of anal canal (02/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; Status: Active)  Breast cancer, female, right (05/20/2016; Status: Active)    Oncology/Hematology History   Breast cancer, female, right   5/20/2016 Initial Diagnosis    Breast cancer, female, right     Malignant neoplasm of anal canal   2/8/2023 Initial Diagnosis    Anal cancer (HCC)     2/15/2023 -  Chemotherapy    OP ANAL  Capecitabine 825 mg/m2 M-F + XRT         Past Surgical History:   Procedure Laterality Date   • AV FISTULA PLACEMENT, BRACHIOBASILIC     • BREAST BIOPSY Left 2010   • BREAST CYST EXCISION     • BREAST LUMPECTOMY Right 2005   • BREAST SURGERY     • CARDIAC CATHETERIZATION N/A 10/10/2016    Procedure: Left Heart Cath;  Surgeon: Scooter Zhao MD;  Location:  [facility];  Service:    • CARDIAC CATHETERIZATION  10/2016   • CARDIAC CATHETERIZATION N/A 1/21/2021    Procedure: Right and Left Heart Cath;  Surgeon: Hugh Tidwell MD;  Location:  [facility];  Service: Cardiology;  Laterality: N/A;   • COLONOSCOPY N/A 7/23/2020    Procedure: COLONOSCOPY;  Surgeon: Rubén Rosas MD;  Location:  [facility];  Service: Gastroenterology;  Laterality: N/A;   • CORONARY STENT PLACEMENT  2016   • HERNIA REPAIR     • HYSTERECTOMY  2000   • INSERTION HEMODIALYSIS CATHETER Right 6/29/2016    Procedure: HEMODIALYSIS CATHETER INSERTION TUNNELLED;  Surgeon: Ramón Chavez MD;  Location: [facility];  Service:    • MASTECTOMY Right 2006   • OOPHORECTOMY     • REDUCTION MAMMAPLASTY Left 2005       Family History:  family history includes Breast cancer (age of onset: 55) in her sister; Cancer in her mother; Colon cancer in her maternal grandmother; Coronary artery disease in her father; Heart failure in her father; Pancreatic cancer in her mother; Stroke in her mother; Throat cancer in her daughter.     Social History:   reports that she has never smoked. She has never used smokeless tobacco. She reports current alcohol use. She reports that she does not use drugs.  Social History     Social History Narrative    Pt consumes 1 serving of caffeine once every 2 weeks.     Lost grandson in Feb. 2022       Medications:  B Complex-C-Folic Acid, Capsaicin, Hydrocortisone (Perianal), Insulin Lispro, Iron Dextran, Lancets Ultra Fine, Vitamin D, acetaminophen, amiodarone, apixaban, aspirin, atorvastatin, benzocaine-menthol, bisacodyl, carvedilol, cloNIDine, difluprednate, dorzolamide-timolol, epoetin orquidea-epbx, fluocinonide, glucose blood, glucose monitor, hydrOXYzine, hydrocortisone, isosorbide mononitrate, lidocaine-prilocaine, mirtazapine, naloxone, and nitroglycerin    Allergies   Allergen Reactions   • Mircera [Methoxy Polyethylene Glycol-Epoetin Beta] Anaphylaxis     Pt stopped breathing   • Venofer [Iron Sucrose] Anaphylaxis     Pt stopped breathing   • Albuterol Other (See Comments)     Increased blood pressure  Used right before heart attack   • Nifedipine Er Swelling   • Penicillins Hives   • Prednisone Other (See Comments)     Makes jittery/anxious       Objective    Objective     Vital Signs:   Temp:  [97 °F (36.1 °C)-99.5 °F (37.5 °C)] 97 °F (36.1 °C)  Heart Rate:  [64-89] 77  Resp:  [18] 18  BP: ()/() 105/70  Flow (L/min):  [2] 2    Physical Exam   Constitutional: No acute distress, sleeping, but awakens with verbal stimulation, alert  HENT: NCAT, mucous membranes moist  Respiratory: Clear to auscultation bilaterally, respiratory effort normal   Cardiovascular: RRR, no murmurs, rubs, or gallops, right AV fistula accessed for dialysis  Gastrointestinal: Normoactive bowel sounds, soft, nontender, nondistended  Musculoskeletal: No bilateral ankle edema  Psychiatric: Appropriate affect, cooperative  Neurologic: PERRL, symmetric facies, symmetric palate rise, tongue midline, moving all extremities, speech clear  Skin:  Examined with dialysis nurse present as a chaperone; has gluteal radiation burns that are tender to palpation    Result Review:  I have personally reviewed the results from the time of this admission to 4/5/2023 15:37 EDT and agree with these findings:  []  Laboratory list / accordion  []  Microbiology  []  Radiology  [x]  EKG/Telemetry   []  Cardiology/Vascular   []  Pathology  [x]  Old records  []  Other:  Most notable findings include: as per hpi    LAB RESULTS:      Lab 04/05/23  0851   WBC 9.16   HEMOGLOBIN 9.0*   HEMATOCRIT 30.9*   PLATELETS 203   NEUTROS ABS 6.08   IMMATURE GRANS (ABS) 0.05   LYMPHS ABS 1.10   MONOS ABS 1.27*   EOS ABS 0.62*   .6*         Lab 04/05/23  0851   SODIUM 135*   POTASSIUM 4.3   CHLORIDE 91*   CO2 29.0   ANION GAP 15.0   BUN 39*   CREATININE 6.93*   EGFR 5.7*   GLUCOSE 169*   CALCIUM 9.0   MAGNESIUM 2.3   PHOSPHORUS 3.2         Lab 04/05/23  0851   TOTAL PROTEIN 8.4   ALBUMIN 2.9*   GLOBULIN 5.5   ALT (SGPT) 12   AST (SGOT) 37*   BILIRUBIN 0.7   ALK PHOS 238*                     Brief Urine Lab Results     None        Microbiology Results (last 10 days)     ** No results found for the last 240 hours. **          No radiology results from the last 24 hrs    Results for orders placed during the hospital encounter of 03/10/23    Adult Transthoracic Echo Complete W/ Cont if Necessary Per Protocol    Interpretation Summary  •  Left ventricular systolic function is normal. Estimated left ventricular EF = 55%  •  Left ventricular wall thickness is consistent with moderate concentric hypertrophy.  •  The right ventricular cavity is mildly dilated.  •  Mild to moderate biatrial enlargement.  •  Moderate to severe aortic valve stenosis is present.  Mean gradient 34 mmHg, DOUG 0.9 cm².  •  Mild aortic insufficiency.  •  Mild mitral regurgitation.  •  Mild to moderate tricuspid regurgitation with RVSP 48 mmHg.      Assessment & Plan   Assessment & Plan       Intractable pain      78 y.o.  female with ESRD on HD Monday Wednesday Friday, IDDM, HTN, HLD, HFpEF, breast cancer status postmastectomy and radiation, paroxysmal A-fib on Xarelto previously, chronic anemia, rectal cancer recently diagnosed and was getting radiation and chemo, recent admission from 3/10 to 3/29, who presented for intractable rectal pain from SNF.  She reports that since she left the hospital, she has continued to have rectal pain such that she did not tolerate any dialysis on Monday so her last dialysis session was on Friday. She has not had much of an appetite and has had minimal oral intake for the past several weeks.  Patient has continued to decline while at the SNF - based on her labs, despite having her last dialysis session on Friday, she has had minimal oral intake.    Buttock wound, appears to be from radiation  Metastatic rectal cancer, was on chemoradiation  • Wound care consulted  • Pain control with scheduled Tylenol, as needed Norco, as needed Dilaudid  • Palliative care consult  • CT abdomen and pelvis pending for lower abdominal pain and rectal pain    Profound Debility  • She follows with Dr. Cavazos, Stephan/Onc, Dr. Carpenter with oncology  • In regards to her rectal cancer, could not tolerate chemotherapy or radiation therapy because of severe debility.    • Plan from her last admission (discharged on 3/29) was to hold both therapies (chemoradiation) and transition to SNF and if she improves over the next few weeks, she can resume treatment.   • Patient has continued to decline with continued weakness, poor appetite; patient appears to be hospice appropriate  • PT and OT consulted  • Discussed the patient's poor functional status and need to discuss palliative and hospice.  Patient was amenable to talking with palliative, but was not ready to discuss hospice today.  She requested I speak with her daughters.  I was only able to reach Jeri over the phone. I discussed with her the patient has continued to  decline and that we will obtain imaging, palliative consult and discuss further.    • Discussed I believe that she would likely continue to decline and that we needed to further talk about how the patient will spend the time that she has left. Comlethia to get patient's pain under control first    Poor appetite  • Continue mirtazapine    Dysphagia  · Was on a modified diet with purées and pudding thickened liquids during her last admission  · Speech consulted     ESRD on HD MWF  • Renal following for HD today  • Discussed with Dr. Butler who feels patient is likely hospice appropriate     HFpEF   Essential hypertension  hypotension  • Continue dialysis for volume control as above  • Continue imdur 120mg     PAF on eliquis   • Continue eliquis + amiodarone     Mod-Severe aortic stenosis   • Has appt with WILMA Lopez 4/27/23    DVT prophylaxis:  apixaban    CODE STATUS:  FULL per discussion with the patient on 4/5; need to reassess again the AM, as patient has had various code status orders on past admissions       Expected Discharge  Expected Discharge Date and Time     Expected Discharge Date Expected Discharge Time    Apr 10, 2023             This note has been completed as part of a split-shared workflow.     Signature: Electronically signed by Nuria Lynch MD, 04/05/23, 3:36 PM EDT                Electronically signed by Nuria Lynch MD at 04/05/23 1547

## 2023-04-15 NOTE — PROGRESS NOTES
[facility] Medicine Services  Abbreviated Progress Note          Please see prior daily notes for full hospital course.       [facility] Problem List has been managed and updated to include any new diagnoses:  Active Hospital Problems    Diagnosis  POA   • **Intractable pain [R52]  Yes      Resolved Hospital Problems   No resolved problems to display.         ADDITIONAL PLAN:  Family met with Hospice today.  Plan is for admission and transition to inpatient hospice service to start tomorrow morning.  For now continue current POC and sx support.       Expected Discharge  Expected Discharge Date and Time     Expected Discharge Date Expected Discharge Time    Apr 14, 2023            Sabrina Pulliam MD  04/14/23

## 2023-04-15 NOTE — SIGNIFICANT NOTE
04/15/23 1211   SLP Deferred Reason   SLP Deferred Reason Patient unavailable for treatment  (SLP attempted to see and was unavailable, other medical staff with pt. SLP to follow up on 4/16/23.)

## 2023-04-15 NOTE — PROGRESS NOTES
[facility] Medicine Services  PROGRESS NOTE    Patient Name: Esperanza Pop  : 1945  MRN: [id number]    Date of Admission: 2023  Primary Care Physician: Meghana Rodgers MD    Subjective   Subjective     CC:  Follow up anal cancer    HPI: Resting quietly, chose not to awaken patient.  Case has been discussed with hospice CM today and they have plans for repeat family meeting on Monday.    ROS:  Deferred    Objective   Objective     Vital Signs:   Temp:  [97.2 °F (36.2 °C)-98.2 °F (36.8 °C)] 97.7 °F (36.5 °C)  Heart Rate:  [60-75] 60  Resp:  [16-20] 20  BP: (103-143)/(50-60) 134/53     Physical Exam:  Deferred for comfort  Patient resting quietly, appears comfortable, nonlabored breathing    Results Reviewed:  LAB RESULTS:      Lab 04/10/23  0400 23  1723   WBC 8.40 8.36   HEMOGLOBIN 8.0* 8.7*   HEMATOCRIT 28.7* 31.7*   PLATELETS 279 254   NEUTROS ABS  --  5.46   IMMATURE GRANS (ABS)  --  0.06*   LYMPHS ABS  --  1.37   MONOS ABS  --  1.20*   EOS ABS  --  0.24   .1* 108.6*   LACTATE  --  2.0         Lab 04/10/23  0359 23  1843   SODIUM 133* 133*   POTASSIUM 4.9 4.8   CHLORIDE 96* 97*   CO2 19.0* 22.0   ANION GAP 18.0* 14.0   BUN 47* 44*   CREATININE 6.58* 5.86*   EGFR 6.1* 7.0*   GLUCOSE 73 120*   CALCIUM 8.6 8.5*                         Lab 23  1617   PH, ARTERIAL 7.430   PCO2, ARTERIAL 34.9*   PO2 .0*   FIO2 28   HCO3 ART 23.2   BASE EXCESS ART -0.9*   CARBOXYHEMOGLOBIN 1.7     Brief Urine Lab Results     None          Microbiology Results Abnormal     Procedure Component Value - Date/Time    Blood Culture - Blood, Hand, Left [[id number]]  (Normal) Collected: 23 172    Lab Status: Final result Specimen: Blood from Hand, Left Updated: 23 174     Blood Culture No growth at 5 days    Narrative:      AEROBIC BOTTLE ONLY    Blood Culture - Blood, Arm, Left [[id number]]  (Normal) Collected: 23 2153    Lab Status: Final result Specimen:  Blood from Arm, Left Updated: 04/14/23 1745     Blood Culture No growth at 5 days    Narrative:      AEROBIC BOTTLE ONLY          No radiology results from the last 24 hrs    Results for orders placed during the hospital encounter of 03/10/23    Adult Transthoracic Echo Complete W/ Cont if Necessary Per Protocol    Interpretation Summary  •  Left ventricular systolic function is normal. Estimated left ventricular EF = 55%  •  Left ventricular wall thickness is consistent with moderate concentric hypertrophy.  •  The right ventricular cavity is mildly dilated.  •  Mild to moderate biatrial enlargement.  •  Moderate to severe aortic valve stenosis is present.  Mean gradient 34 mmHg, DOUG 0.9 cm².  •  Mild aortic insufficiency.  •  Mild mitral regurgitation.  •  Mild to moderate tricuspid regurgitation with RVSP 48 mmHg.      Current medications:  Scheduled Meds:acetaminophen, 650 mg, Oral, TID  amiodarone, 200 mg, Oral, Daily  apixaban, 2.5 mg, Oral, BID  aspirin, 81 mg, Oral, Daily  atorvastatin, 80 mg, Oral, Nightly  capsaicin, , Topical, Q12H  carvedilol, 3.125 mg, Oral, Q12H  fentaNYL, 1 patch, Transdermal, Q72H   And  Check Fentanyl Patch Placement, 1 each, Does not apply, Q12H  insulin lispro, 0-7 Units, Subcutaneous, TID AC  isosorbide mononitrate, 120 mg, Oral, Daily  mirtazapine, 15 mg, Oral, Nightly  povidone-iodine, 1 application, Topical, Daily  senna-docusate sodium, 2 tablet, Oral, BID  sodium chloride, 10 mL, Intravenous, Q12H      Continuous Infusions:   PRN Meds:.•  senna-docusate sodium **AND** bisacodyl **AND** bisacodyl  •  cloNIDine  •  dextrose  •  dextrose  •  glucagon (human recombinant)  •  HYDROmorphone  •  hydrOXYzine  •  melatonin  •  ondansetron  •  [COMPLETED] Insert Peripheral IV **AND** sodium chloride  •  sodium chloride  •  sodium chloride    Assessment & Plan   Assessment & Plan     Active Hospital Problems    Diagnosis  POA   • **Intractable pain [R52]  Yes      Resolved Hospital  Problems   No resolved problems to display.      I have reviewed the information used in this note from other sources for accuracy and reviewed current scope of care to attest to this documentation.       Brief Hospital Course to date:  Esperanza Pop is a 78 y.o. female w/ ESRD (HD-MWF), DM2, HTN, HLD, HFpEF, pAfib (xarelto), anemia, prior breast cancer, active anal cancer intolerant of chemo/XRT, recently admitted 3/10-3/29 and DC'ed to SNF to trial improvement of functional status, who returned w/ intractable rectal pain    Intractable pain, multifactorial  Wounds of the buttocks, recent radiotherapy  -continue with wound care, pain control  -Hospice discussion underway with family -- plan to meet Monday     Metastatic anal cancer  Anorectal fistula  -s/p partial treatment with chemotherapy and radiation  -CT with slight decrease in size, with anorectal fistual noted  -did not tolerate treatment before and went to SNF  -patient expresses desire to just go home/not pursue any more treatment but struggles with concept of stopping HD  -hospice consulted    Anorexia  -cont mirtazapine    Dysphagia  -tolerating PO    ESRD on HD  -HD MWF    DM type 2, a1c 5.8%, w/ insulin use  -SSI    HFpEF  HTN  pAfib  -cont amio, apixiban, asa, statin, coreg, imdur     Mod-Sev AS  -has appt w/ Geena Estrella, APRN 4/27/23 if applicable    Expected Discharge Location and Transportation:  Pending Hospice discussion Monday with family   Expected Discharge   Expected Discharge Date and Time     Expected Discharge Date Expected Discharge Time    Apr 17, 2023            DVT prophylaxis:  Medical DVT prophylaxis orders are present.     AM-PAC 6 Clicks Score (PT): 6 (04/14/23 2000)    CODE STATUS:   Code Status and Medical Interventions:   Ordered at: 04/06/23 1151     Medical Intervention Limits:    NO intubation (DNI)     Code Status (Patient has no pulse and is not breathing):    No CPR (Do Not Attempt to Resuscitate)     Medical  Interventions (Patient has pulse or is breathing):    Limited Support     Comments:    Per family       Sabrina Pulliam MD  04/15/23

## 2023-04-15 NOTE — PROGRESS NOTES
"Continued Stay Note  [facility]     Patient Name: Esperanza Pop  MRN: [id number]  Today's Date: 4/15/2023    Admit Date: 2023    Plan: TBD   Discharge Plan     Row Name 04/15/23 1105       Plan    Plan TBD    Plan Comments Hospice RN to room for 1030 appointment with daughter.  No family at bedside.  Called daughter, Indigo and she reported she is unable to come in today as she has a  to go to.  Asked her what the patient's GOC were as there are conflicting notes from yesterday.  Family had agreed to hospice during meeting with Emma Spangler.  Patient spoke with nephrology and bedside RN yesterday and told them she still wanted dialysis.  Daughter was unaware of patient's visit with nephrology.  She said \"mama can go back and forth like that\".  Daughter stated she would like to wait until Monday so the family has more time to discuss with the patient what she would like regarding hospice vs continuing dialysis.  Made dtr aware that patient is unable to have this type of discussion at this time.  Offered to follow up with daughter tomorrow and she said she wanted to wait until Monday when she is off work.  Have made appointment for inpatient hospice team to meet with daughter on  at 1000.  Updated Dr. Pulliam as well as nurse, Ursula.  If hospice can be of assist before this, please call ext [phone].               Discharge Codes    No documentation.               Expected Discharge Date and Time     Expected Discharge Date Expected Discharge Time    2023             Leticia Humphreys RN    "

## 2023-04-16 LAB
GLUCOSE BLDC GLUCOMTR-MCNC: 114 MG/DL (ref 70–130)
GLUCOSE BLDC GLUCOMTR-MCNC: 114 MG/DL (ref 70–130)
GLUCOSE BLDC GLUCOMTR-MCNC: 99 MG/DL (ref 70–130)

## 2023-04-16 PROCEDURE — 25010000002 HYDROMORPHONE PER 4 MG: Performed by: STUDENT IN AN ORGANIZED HEALTH CARE EDUCATION/TRAINING PROGRAM

## 2023-04-16 PROCEDURE — 25010000002 LORAZEPAM PER 2 MG: Performed by: FAMILY MEDICINE

## 2023-04-16 PROCEDURE — 82962 GLUCOSE BLOOD TEST: CPT

## 2023-04-16 PROCEDURE — 99231 SBSQ HOSP IP/OBS SF/LOW 25: CPT | Performed by: FAMILY MEDICINE

## 2023-04-16 RX ORDER — FENTANYL 25 UG/H
1 PATCH TRANSDERMAL
Status: DISCONTINUED | OUTPATIENT
Start: 2023-04-16 | End: 2023-04-22 | Stop reason: HOSPADM

## 2023-04-16 RX ORDER — OXYCODONE HYDROCHLORIDE 5 MG/1
5 TABLET ORAL EVERY 4 HOURS PRN
Status: DISCONTINUED | OUTPATIENT
Start: 2023-04-16 | End: 2023-04-22 | Stop reason: HOSPADM

## 2023-04-16 RX ORDER — ACETAMINOPHEN 500 MG
1000 TABLET ORAL 3 TIMES DAILY
Status: DISPENSED | OUTPATIENT
Start: 2023-04-16 | End: 2023-04-19

## 2023-04-16 RX ADMIN — Medication 10 ML: at 21:56

## 2023-04-16 RX ADMIN — POVIDONE-IODINE 1 EACH: 10 SOLUTION TOPICAL at 08:09

## 2023-04-16 RX ADMIN — CAPSAICIN: 0.75 CREAM TOPICAL at 21:56

## 2023-04-16 RX ADMIN — FENTANYL 1 PATCH: 25 PATCH TRANSDERMAL at 14:04

## 2023-04-16 RX ADMIN — HYDROMORPHONE HYDROCHLORIDE 0.25 MG: 1 INJECTION, SOLUTION INTRAMUSCULAR; INTRAVENOUS; SUBCUTANEOUS at 02:54

## 2023-04-16 RX ADMIN — LORAZEPAM 1 MG: 2 INJECTION INTRAMUSCULAR; INTRAVENOUS at 22:59

## 2023-04-16 RX ADMIN — ASPIRIN 81 MG: 81 TABLET, COATED ORAL at 08:11

## 2023-04-16 RX ADMIN — CAPSAICIN: 0.75 CREAM TOPICAL at 08:10

## 2023-04-16 RX ADMIN — LORAZEPAM 1 MG: 2 INJECTION INTRAMUSCULAR; INTRAVENOUS at 10:28

## 2023-04-16 RX ADMIN — APIXABAN 2.5 MG: 2.5 TABLET, FILM COATED ORAL at 08:11

## 2023-04-16 RX ADMIN — HYDROMORPHONE HYDROCHLORIDE 0.25 MG: 1 INJECTION, SOLUTION INTRAMUSCULAR; INTRAVENOUS; SUBCUTANEOUS at 08:19

## 2023-04-16 RX ADMIN — HYDROMORPHONE HYDROCHLORIDE 0.25 MG: 1 INJECTION, SOLUTION INTRAMUSCULAR; INTRAVENOUS; SUBCUTANEOUS at 17:48

## 2023-04-16 NOTE — PROGRESS NOTES
[facility] Medicine Services  PROGRESS NOTE    Patient Name: Esperanza Pop  : 1945  MRN: [id number]    Date of Admission: 2023  Primary Care Physician: Meghana Rodgers MD    Subjective   Subjective     CC:  Follow up anal cancer    HPI:  Asleep. Looks extremely frail and debilitated. Comfortable, nonlabored breaths while at rest. No family present at time, but per RN did visit later in day.     ROS:  deferred    Objective   Objective     Vital Signs:   Temp:  [97.5 °F (36.4 °C)-98.1 °F (36.7 °C)] 97.9 °F (36.6 °C)  Heart Rate:  [60-77] 68  Resp:  [14-20] 14  BP: ()/(35-98) 128/48     Physical Exam:  Constitutional: No acute distress, asleep, frail  HENT: Mucous membranes moist  Respiratory: Poor effort, nonlabored respirations       Results Reviewed:  LAB RESULTS:      Lab 04/10/23  0400 23  1723   WBC 8.40 8.36   HEMOGLOBIN 8.0* 8.7*   HEMATOCRIT 28.7* 31.7*   PLATELETS 279 254   NEUTROS ABS  --  5.46   IMMATURE GRANS (ABS)  --  0.06*   LYMPHS ABS  --  1.37   MONOS ABS  --  1.20*   EOS ABS  --  0.24   .1* 108.6*   LACTATE  --  2.0         Lab 04/10/23  0359 23  1843   SODIUM 133* 133*   POTASSIUM 4.9 4.8   CHLORIDE 96* 97*   CO2 19.0* 22.0   ANION GAP 18.0* 14.0   BUN 47* 44*   CREATININE 6.58* 5.86*   EGFR 6.1* 7.0*   GLUCOSE 73 120*   CALCIUM 8.6 8.5*                         Lab 23  1617   PH, ARTERIAL 7.430   PCO2, ARTERIAL 34.9*   PO2 .0*   FIO2 28   HCO3 ART 23.2   BASE EXCESS ART -0.9*   CARBOXYHEMOGLOBIN 1.7     Brief Urine Lab Results     None          Microbiology Results Abnormal     Procedure Component Value - Date/Time    Blood Culture - Blood, Hand, Left [[id number]]  (Normal) Collected: 23 172    Lab Status: Final result Specimen: Blood from Hand, Left Updated: 23 1745     Blood Culture No growth at 5 days    Narrative:      AEROBIC BOTTLE ONLY    Blood Culture - Blood, Arm, Left [[id number]]  (Normal) Collected:  04/09/23 1723    Lab Status: Final result Specimen: Blood from Arm, Left Updated: 04/14/23 1745     Blood Culture No growth at 5 days    Narrative:      AEROBIC BOTTLE ONLY          No radiology results from the last 24 hrs    Results for orders placed during the hospital encounter of 03/10/23    Adult Transthoracic Echo Complete W/ Cont if Necessary Per Protocol    Interpretation Summary  •  Left ventricular systolic function is normal. Estimated left ventricular EF = 55%  •  Left ventricular wall thickness is consistent with moderate concentric hypertrophy.  •  The right ventricular cavity is mildly dilated.  •  Mild to moderate biatrial enlargement.  •  Moderate to severe aortic valve stenosis is present.  Mean gradient 34 mmHg, DOUG 0.9 cm².  •  Mild aortic insufficiency.  •  Mild mitral regurgitation.  •  Mild to moderate tricuspid regurgitation with RVSP 48 mmHg.      Current medications:  Scheduled Meds:acetaminophen, 650 mg, Oral, TID  amiodarone, 200 mg, Oral, Daily  apixaban, 2.5 mg, Oral, BID  aspirin, 81 mg, Oral, Daily  atorvastatin, 80 mg, Oral, Nightly  capsaicin, , Topical, Q12H  carvedilol, 3.125 mg, Oral, Q12H  Check Fentanyl Patch Placement, 1 each, Does not apply, Q12H  insulin lispro, 0-7 Units, Subcutaneous, TID AC  isosorbide mononitrate, 120 mg, Oral, Daily  mirtazapine, 15 mg, Oral, Nightly  povidone-iodine, 1 application, Topical, Daily  senna-docusate sodium, 2 tablet, Oral, BID  sodium chloride, 10 mL, Intravenous, Q12H      Continuous Infusions:   PRN Meds:.•  senna-docusate sodium **AND** bisacodyl **AND** bisacodyl  •  cloNIDine  •  dextrose  •  dextrose  •  glucagon (human recombinant)  •  HYDROmorphone  •  hydrOXYzine  •  LORazepam  •  mannitol  •  melatonin  •  ondansetron  •  [COMPLETED] Insert Peripheral IV **AND** sodium chloride  •  sodium chloride  •  sodium chloride    Assessment & Plan   Assessment & Plan     Active Hospital Problems    Diagnosis  POA   • **Intractable pain  [R52]  Yes      Resolved Hospital Problems   No resolved problems to display.      I have reviewed the information used in this note from other sources for accuracy and reviewed current scope of care to attest to this documentation.       Brief Hospital Course to date:  Esperanza Pop is a 78 y.o. female w/ ESRD (HD-MWF), DM2, HTN, HLD, HFpEF, pAfib (xarelto), anemia, prior breast cancer, active anal cancer intolerant of chemo/XRT, recently admitted 3/10-3/29 and ND'ed to SNF to trial improvement of functional status, who returned w/ intractable rectal pain    Intractable pain, multifactorial  Wounds of the buttocks, recent radiotherapy  -continue with wound care, pain control  -Hospice discussion underway with family -- plan to meet Monday-- as of Saturday patient states she wants to continue with HD, indecisive, POC has been difficult to finalize    Metastatic anal cancer  Anorectal fistula  -s/p partial treatment with chemotherapy and radiation  -CT with slight decrease in size, with anorectal fistual noted  -did not tolerate treatment before and went to SNF  -patient expresses desire to just go home/not pursue any more treatment but struggles with concept of stopping HD  -hospice consulted    Anorexia  -cont mirtazapine    Dysphagia  -tolerating PO    ESRD on HD  -HD MWF    DM type 2, a1c 5.8%, w/ insulin use  -SSI    HFpEF  HTN  pAfib  -cont amio, apixiban, asa, statin, coreg, imdur     Mod-Sev AS  -has appt w/ Geena Estrella, APRN 4/27/23 if applicable    Expected Discharge Location and Transportation:  Pending Hospice discussion Monday with family   Expected Discharge   Expected Discharge Date and Time     Expected Discharge Date Expected Discharge Time    Apr 17, 2023            DVT prophylaxis:  Medical DVT prophylaxis orders are present.     AM-PAC 6 Clicks Score (PT): 6 (04/16/23 6347)    CODE STATUS:   Code Status and Medical Interventions:   Ordered at: 04/06/23 1159     Medical Intervention Limits:    NO  intubation (DNI)     Code Status (Patient has no pulse and is not breathing):    No CPR (Do Not Attempt to Resuscitate)     Medical Interventions (Patient has pulse or is breathing):    Limited Support     Comments:    Per family       Sabrina Pulliam MD  04/16/23

## 2023-04-16 NOTE — PLAN OF CARE
Problem: Device-Related Complication Risk (Hemodialysis)  Goal: Safe, Effective Therapy Delivery  Outcome: Ongoing, Progressing  Intervention: Optimize Device Care and Function  Recent Flowsheet Documentation  Taken 4/15/2023 1820 by Merline Fontanez RN  Medication Review/Management:   medications reviewed   high-risk medications identified     Problem: Hemodynamic Instability (Hemodialysis)  Goal: Effective Tissue Perfusion  Outcome: Ongoing, Progressing     Problem: Infection (Hemodialysis)  Goal: Absence of Infection Signs and Symptoms  Outcome: Ongoing, Progressing   Goal Outcome Evaluation:      HD complete, blood reinfused, report given to primary RN Ursula. Hypotensive episode during tx, albumin given and minimum UFR interventions done. Pt arousable during tx but baseline lethargic.

## 2023-04-16 NOTE — PROGRESS NOTES
" LOS: 11 days   Patient Care Team:  Meghana Rodgers MD as PCP - General  Demetrius Sagastume MD as Consulting Physician (Cardiology)  Kevan Lerma MD as Consulting Physician (Nephrology)  Geena Estrella APRN as Nurse Practitioner (Cardiology)  Frank Mandujano MD as Referring Physician (Colon and Rectal Surgery)  Kevan Cavazos MD as Consulting Physician (Radiation Oncology)  Yue Reeves RN as Nurse Navigator  Darryn Burk MD as Consulting Physician (Nephrology)    Chief Complaint: ESRD    Subjective   Very complicating situation, patient daughter came yesterday while I was in the room and requested that the her mother should be dialyzed, we dialyzed her on Saturday, we need further guidance from hospice and the family member for future dialysis.       Review of system: Sleepy today      Objective     Vital Sign Min/Max for last 24 hours  Temp  Min: 97.5 °F (36.4 °C)  Max: 98.1 °F (36.7 °C)   BP  Min: 78/49  Max: 167/60   Pulse  Min: 60  Max: 77   Resp  Min: 14  Max: 20   SpO2  Min: 90 %  Max: 100 %   No data recorded   No data recorded     Flowsheet Rows    Flowsheet Row First Filed Value   Admission Height 167.6 cm (66\") Documented at 04/05/2023 0702   Admission Weight 78.9 kg (174 lb) Documented at 04/05/2023 0702          No intake/output data recorded.  I/O last 3 completed shifts:  In: -   Out: 1540 [Other:1540]    Objective:  General Appearance:  Ill-appearing, comfortable and in no acute distress.    Vital signs: (most recent): Blood pressure 128/48, pulse 68, temperature 97.9 °F (36.6 °C), temperature source Axillary, resp. rate 14, height 167.6 cm (66\"), weight 63.5 kg (140 lb 1.6 oz), SpO2 99 %, not currently breastfeeding.    Lungs:  Normal effort.  Breath sounds clear to auscultation.  She is not in respiratory distress.  No decreased breath sounds or wheezes.    Heart: Normal rate.  Regular rhythm.  Positive for murmur.  No gallop.   Abdomen: Abdomen is soft.  There is no " abdominal tenderness.     Extremities: There is no dependent edema or local swelling.            Results Review:     I reviewed the patient's new clinical results.    WBC No results found for: WBC   HGB No results found for: HGB   HCT No results found for: HCT   Platlets No results found for: LABPLAT   MCV No results found for: MCV       Sodium No results found for: NA   Potassium No results found for: K   Chloride No results found for: CL   CO2 No results found for: CO2   BUN No results found for: BUN   Creatinine No results found for: CREATININE   Calcium No results found for: CALCIUM   PO4 No results found for: CAPO4   Albumin No results found for: ALBUMIN   Magnesium No results found for: MG   Uric Acid No results found for: URICACID     Medication Review: yes    Assessment & Plan       Intractable pain      Assessment & Plan        ESRD: MWF [facility]     Volume status: No significant anemia     Abdominal pain: In the setting of rectal cancer.     Failure to thrive     Hypoalbuminemia      Anemia: ACD due to CKD. Unable to add AMY due to rectal cancer      Recommendations:  Last dialyzed 4/15/2023  Yesterday at 2:40 PM while examining the patient's, patient's daughter was in the room who requested dialyze the patient, there is conflicting situation where patient and the family is going back and forth about hospice and dialysis.  A meeting is scheduled on Monday.  Will follow the instructions come out of that meeting prior to the next dialysis.  Yesterday daughter refused to sign hospice.      Darryn Burk MD  04/16/23  10:40 EDT

## 2023-04-17 LAB
GLUCOSE BLDC GLUCOMTR-MCNC: 103 MG/DL (ref 70–130)
GLUCOSE BLDC GLUCOMTR-MCNC: 149 MG/DL (ref 70–130)
GLUCOSE BLDC GLUCOMTR-MCNC: 59 MG/DL (ref 70–130)
GLUCOSE BLDC GLUCOMTR-MCNC: 59 MG/DL (ref 70–130)
GLUCOSE BLDC GLUCOMTR-MCNC: 89 MG/DL (ref 70–130)
GLUCOSE BLDC GLUCOMTR-MCNC: 95 MG/DL (ref 70–130)

## 2023-04-17 PROCEDURE — 99231 SBSQ HOSP IP/OBS SF/LOW 25: CPT | Performed by: FAMILY MEDICINE

## 2023-04-17 PROCEDURE — 25010000002 HYDROMORPHONE PER 4 MG: Performed by: STUDENT IN AN ORGANIZED HEALTH CARE EDUCATION/TRAINING PROGRAM

## 2023-04-17 PROCEDURE — 82962 GLUCOSE BLOOD TEST: CPT

## 2023-04-17 PROCEDURE — 25010000002 GLUCAGON (RDNA) PER 1 MG: Performed by: INTERNAL MEDICINE

## 2023-04-17 PROCEDURE — 97530 THERAPEUTIC ACTIVITIES: CPT

## 2023-04-17 PROCEDURE — 25010000002 LORAZEPAM PER 2 MG: Performed by: FAMILY MEDICINE

## 2023-04-17 RX ORDER — MANNITOL 250 MG/ML
25 INJECTION, SOLUTION INTRAVENOUS AS NEEDED
Status: CANCELLED | OUTPATIENT
Start: 2023-04-18 | End: 2023-04-18

## 2023-04-17 RX ORDER — SODIUM CHLORIDE 0.9 % (FLUSH) 0.9 %
2 SYRINGE (ML) INJECTION EVERY 12 HOURS SCHEDULED
Status: DISCONTINUED | OUTPATIENT
Start: 2023-04-18 | End: 2023-04-21

## 2023-04-17 RX ADMIN — POVIDONE-IODINE 1 EACH: 10 SOLUTION TOPICAL at 08:34

## 2023-04-17 RX ADMIN — Medication 1 MG: at 06:58

## 2023-04-17 RX ADMIN — ACETAMINOPHEN 1000 MG: 500 TABLET ORAL at 18:05

## 2023-04-17 RX ADMIN — CAPSAICIN: 0.75 CREAM TOPICAL at 11:05

## 2023-04-17 RX ADMIN — HYDROMORPHONE HYDROCHLORIDE 0.25 MG: 1 INJECTION, SOLUTION INTRAMUSCULAR; INTRAVENOUS; SUBCUTANEOUS at 14:30

## 2023-04-17 RX ADMIN — CAPSAICIN 1 APPLICATION: 0.75 CREAM TOPICAL at 21:00

## 2023-04-17 RX ADMIN — Medication 1 MG: at 07:17

## 2023-04-17 RX ADMIN — HYDROMORPHONE HYDROCHLORIDE 0.25 MG: 1 INJECTION, SOLUTION INTRAMUSCULAR; INTRAVENOUS; SUBCUTANEOUS at 00:01

## 2023-04-17 RX ADMIN — LORAZEPAM 1 MG: 2 INJECTION INTRAMUSCULAR; INTRAVENOUS at 15:33

## 2023-04-17 RX ADMIN — HYDROMORPHONE HYDROCHLORIDE 0.25 MG: 1 INJECTION, SOLUTION INTRAMUSCULAR; INTRAVENOUS; SUBCUTANEOUS at 18:59

## 2023-04-17 NOTE — PAYOR COMM NOTE
"Esperanza Ayoub (78 y.o. Female)     [id number]    Bernadette Almaguer, RN  Utilization Review  [phone]  [phone]      Date of Birth   1945    Social Security Number       Address   [address] [address]    Home Phone   [phone]    MRN   [id number]       Faith   Congregational    Marital Status                               Admission Date   4/5/23    Admission Type   Emergency    Admitting Provider   Sabrnia Pulliam MD    Attending Provider   Sabrina Pulliam MD    Department, Room/Bed   [facility] 5H, S572/1       Discharge Date       Discharge Disposition       Discharge Destination                               Attending Provider: Sabrina Pulliam MD    Allergies: Mircera [Methoxy Polyethylene Glycol-epoetin Beta], Venofer [Iron Sucrose], Albuterol, Nifedipine Er, Penicillins, Prednisone    Isolation: None   Infection: None   Code Status: No CPR    Ht: 167.6 cm (66\")   Wt: 63.5 kg (140 lb 1.6 oz)    Admission Cmt: None   Principal Problem: Intractable pain [R52]                 Active Insurance as of 4/5/2023     Primary Coverage     Payor Plan Insurance Group Employer/Plan Group    [facility] MEDICARE REPLACEMENT [employer] MEDICARE REPLACEMENT      Payor Plan Address Payor Plan Phone Number Payor Plan Fax Number Effective Dates    [address] [address] [phone]  12/1/2021 - None Entered    [facility] [address]       Subscriber Name Subscriber Birth Date Member ID       ESPERANZA AYOUB 1945 [id number]                 Emergency Contacts      (Rel.) Home Phone Work Phone Mobile Phone    isreal ayoub (Daughter) -- -- [phone]    danelle ayoub (Daughter) -- -- [phone]    danelle martin (Sister) -- -- [phone]    DonnJuan\ (Son) [phone] -- [phone]            Vital Signs (last day)     Date/Time Temp Temp src Pulse Resp BP Patient Position SpO2    04/17/23 1100 -- -- -- -- 150/61 Lying --    04/17/23 1040 95 (35) " Axillary 77 16 -- Lying 98    04/17/23 0829 96.8 (36) Axillary 65 16 162/68 Lying 99    04/17/23 0405 -- -- -- -- -- -- 98    04/17/23 0403 98 (36.7) Axillary 63 16 124/47 Lying --    04/16/23 2303 98.2 (36.8) Oral 76 16 143/72 Lying 100    04/16/23 2107 -- -- 73 -- 137/54 -- --    04/16/23 2105 98.1 (36.7) Oral 71 16 -- Lying 98    04/16/23 0806 97.9 (36.6) Axillary 68 14 128/48 -- --    04/16/23 0700 -- -- 65 -- -- -- --          Oxygen Therapy (last day)     Date/Time SpO2 Device (Oxygen Therapy) Flow (L/min) Oxygen Concentration (%) ETCO2 (mmHg)    04/17/23 1040 98 -- -- -- --    04/17/23 0829 99 room air -- -- --    04/17/23 0800 -- room air -- -- --    04/17/23 0405 98 room air -- -- --    04/16/23 2303 100 room air -- -- --    04/16/23 2105 98 room air -- -- --    04/16/23 1748 -- room air -- -- --    04/16/23 1600 -- room air -- -- --    04/16/23 1400 -- room air -- -- --    04/16/23 1200 -- room air -- -- --    04/16/23 1000 -- room air -- -- --    04/16/23 0819 -- room air -- -- --    04/16/23 0430 -- room air -- -- --    04/16/23 0000 -- room air -- -- --          Lines, Drains & Airways     Active LDAs     Name Placement date Placement time Site Days    Subcutaneous 04/15/23 Left upper arm 04/15/23  1225  Left upper arm  2                Current Facility-Administered Medications   Medication Dose Route Frequency Provider Last Rate Last Admin   • acetaminophen (TYLENOL) tablet 1,000 mg  1,000 mg Oral TID Sabrina Pulliam MD       • amiodarone (PACERONE) tablet 200 mg  200 mg Oral Daily Nuria Lynch MD   200 mg at 04/14/23 0808   • apixaban (ELIQUIS) tablet 2.5 mg  2.5 mg Oral BID Nuria Lynch MD   2.5 mg at 04/16/23 0811   • aspirin EC tablet 81 mg  81 mg Oral Daily Nuria Lynch MD   81 mg at 04/16/23 0811   • atorvastatin (LIPITOR) tablet 80 mg  80 mg Oral Nightly Nuria Lynch MD   80 mg at 04/15/23 2011   • sennosides-docusate (PERICOLACE) 8.6-50 MG per tablet 2 tablet  2 tablet Oral BID Nuria Lynch  MD   2 tablet at 04/14/23 2119    And   • bisacodyl (DULCOLAX) EC tablet 5 mg  5 mg Oral Daily PRN Nuria Lynch MD        And   • bisacodyl (DULCOLAX) suppository 10 mg  10 mg Rectal Daily PRN Nuria Lynch MD       • capsaicin (ZOSTRIX) 0.075 % topical cream   Topical Q12H Nuria Lynch MD   Given at 04/17/23 1105   • carvedilol (COREG) tablet 3.125 mg  3.125 mg Oral Q12H Nuria Lynch MD   3.125 mg at 04/15/23 2010   • fentaNYL (DURAGESIC) 25 MCG/HR patch 1 patch  1 patch Transdermal Q72H Sabrina Pulliam MD   1 patch at 04/16/23 1404    And   • Check Fentanyl Patch Placement  1 each Does not apply Q12H Sabrina Pulliam MD       • cloNIDine (CATAPRES) tablet 0.1 mg  0.1 mg Oral TID PRN Nuria Lynch MD       • dextrose (D50W) (25 g/50 mL) IV injection 25 g  25 g Intravenous Q15 Min PRN Ranulfo Thomas DO   25 g at 04/11/23 1946   • dextrose (GLUTOSE) oral gel 15 g  15 g Oral Q15 Min PRN Ranulfo Thomas DO       • glucagon (GLUCAGEN) injection 1 mg  1 mg Intramuscular Q15 Min PRN Ranulfo Thomas DO   1 mg at 04/17/23 0717   • HYDROmorphone (DILAUDID) injection 0.25 mg  0.25 mg Intravenous Q4H PRN Nuria Lynch MD   0.25 mg at 04/17/23 0001   • hydrOXYzine (ATARAX) tablet 25 mg  25 mg Oral TID PRN Nuria Lynch MD   25 mg at 04/15/23 2305   • Insulin Lispro (humaLOG) injection 0-7 Units  0-7 Units Subcutaneous TID AC Ranulfo Thomas DO   2 Units at 04/09/23 0927   • isosorbide mononitrate (IMDUR) 24 hr tablet 120 mg  120 mg Oral Daily Nuria Lynch MD   120 mg at 04/14/23 0809   • LORazepam (ATIVAN) injection 1 mg  1 mg Intravenous Q2H PRN Sabrina Pulliam MD   1 mg at 04/16/23 2259   • melatonin tablet 5 mg  5 mg Oral Nightly PRN Nuria Lynch MD   5 mg at 04/06/23 2133   • mirtazapine (REMERON) tablet 15 mg  15 mg Oral Nightly Nuria Lynch MD   15 mg at 04/15/23 2010   • ondansetron (ZOFRAN) injection 4 mg  4 mg Intravenous Q6H PRN Nuria Lynch MD       • oxyCODONE (ROXICODONE) immediate release  tablet 5 mg  5 mg Oral Q4H PRN Sabrina Pulliam MD       • povidone-iodine 10 % swab 1 each  1 application Topical Daily Champ Ríos MD   1 each at 04/17/23 0834   • sodium chloride 0.9 % flush 10 mL  10 mL Intravenous PRN Nuria Lynch MD   10 mL at 04/05/23 1712   • sodium chloride 0.9 % flush 10 mL  10 mL Intravenous Q12H Nuria Lynch MD   10 mL at 04/16/23 2156   • sodium chloride 0.9 % flush 10 mL  10 mL Intravenous PRN Nuria Lynch MD       • sodium chloride 0.9 % infusion 40 mL  40 mL Intravenous PRN Nuria Lynch MD         Lab Results (last 24 hours)     Procedure Component Value Units Date/Time    POC Glucose Once [[id number]]  (Normal) Collected: 04/17/23 1043    Specimen: Blood Updated: 04/17/23 1045     Glucose 89 mg/dL      Comment: Meter: [id number] : [id number] [place]       POC Glucose Once [[id number]]  (Normal) Collected: 04/17/23 0739    Specimen: Blood Updated: 04/17/23 0741     Glucose 95 mg/dL      Comment: Meter: [id number] : [id number] [place]       POC Glucose Once [[id number]]  (Abnormal) Collected: 04/17/23 0713    Specimen: Blood Updated: 04/17/23 0715     Glucose 59 mg/dL      Comment: Meter: [id number] : [id number] [place]       POC Glucose Once [[id number]]  (Abnormal) Collected: 04/17/23 0654    Specimen: Blood Updated: 04/17/23 0656     Glucose 59 mg/dL      Comment: Meter: [id number] : [id number] [place]       POC Glucose Once [[id number]]  (Normal) Collected: 04/16/23 1630    Specimen: Blood Updated: 04/16/23 1631     Glucose 99 mg/dL      Comment: Meter: [id number] : [id number] [place]           Imaging Results (Last 24 Hours)     ** No results found for the last 24 hours. **        ECG/EMG Results (last 24 hours)     Procedure Component Value Units Date/Time    SCANNED - TELEMETRY   [[id number]] Resulted: 04/05/23     Updated: 04/17/23 0507                Sabrina Pulliam MD   Physician  Hospitalist  Progress Notes       Signed  Date of Service:  23  Creation Time:  23     Signed        Expand All Collapse All         [facility] Medicine Services  PROGRESS NOTE     Patient Name: Esperanza Pop  : 1945  MRN: [id number]     Date of Admission: 2023  Primary Care Physician: Meghana Rodgers MD        Subjective      Subjective      CC:  Follow up anal cancer     HPI:  Asleep. Looks extremely frail and debilitated. Comfortable, nonlabored breaths while at rest. No family present at time, but per RN did visit later in day.      ROS:  deferred           Objective      Objective      Vital Signs:   Temp:  [97.5 °F (36.4 °C)-98.1 °F (36.7 °C)] 97.9 °F (36.6 °C)  Heart Rate:  [60-77] 68  Resp:  [14-20] 14  BP: ()/(35-98) 128/48     Physical Exam:  Constitutional: No acute distress, asleep, frail  HENT: Mucous membranes moist  Respiratory: Poor effort, nonlabored respirations         Results Reviewed:  LAB RESULTS:           Lab 04/10/23  0400 23  1723   WBC 8.40 8.36   HEMOGLOBIN 8.0* 8.7*   HEMATOCRIT 28.7* 31.7*   PLATELETS 279 254   NEUTROS ABS  --  5.46   IMMATURE GRANS (ABS)  --  0.06*   LYMPHS ABS  --  1.37   MONOS ABS  --  1.20*   EOS ABS  --  0.24   .1* 108.6*   LACTATE  --  2.0               Lab 04/10/23  0359 23  1843   SODIUM 133* 133*   POTASSIUM 4.9 4.8   CHLORIDE 96* 97*   CO2 19.0* 22.0   ANION GAP 18.0* 14.0   BUN 47* 44*   CREATININE 6.58* 5.86*   EGFR 6.1* 7.0*   GLUCOSE 73 120*   CALCIUM 8.6 8.5*                              Lab 23  1617   PH, ARTERIAL 7.430   PCO2, ARTERIAL 34.9*   PO2 .0*   FIO2 28   HCO3 ART 23.2   BASE EXCESS ART -0.9*   CARBOXYHEMOGLOBIN 1.7          Brief Urine Lab Results      None                     Microbiology Results Abnormal      Procedure Component Value - Date/Time     Blood Culture - Blood, Hand, Left [[id number]]  (Normal) Collected: 23 1154     Lab Status: Final result Specimen: Blood from  Hand, Left Updated: 04/14/23 1745       Blood Culture No growth at 5 days     Narrative:       AEROBIC BOTTLE ONLY     Blood Culture - Blood, Arm, Left [[id number]]  (Normal) Collected: 04/09/23 1723     Lab Status: Final result Specimen: Blood from Arm, Left Updated: 04/14/23 1745       Blood Culture No growth at 5 days     Narrative:       AEROBIC BOTTLE ONLY             Radiology results from the last 24 hours:   No radiology results from the last 24 hrs        Results for orders placed during the hospital encounter of 03/10/23     Adult Transthoracic Echo Complete W/ Cont if Necessary Per Protocol     Interpretation Summary  •  Left ventricular systolic function is normal. Estimated left ventricular EF = 55%  •  Left ventricular wall thickness is consistent with moderate concentric hypertrophy.  •  The right ventricular cavity is mildly dilated.  •  Mild to moderate biatrial enlargement.  •  Moderate to severe aortic valve stenosis is present.  Mean gradient 34 mmHg, DOUG 0.9 cm².  •  Mild aortic insufficiency.  •  Mild mitral regurgitation.  •  Mild to moderate tricuspid regurgitation with RVSP 48 mmHg.        Current medications:  Scheduled Meds:acetaminophen, 650 mg, Oral, TID  amiodarone, 200 mg, Oral, Daily  apixaban, 2.5 mg, Oral, BID  aspirin, 81 mg, Oral, Daily  atorvastatin, 80 mg, Oral, Nightly  capsaicin, , Topical, Q12H  carvedilol, 3.125 mg, Oral, Q12H  Check Fentanyl Patch Placement, 1 each, Does not apply, Q12H  insulin lispro, 0-7 Units, Subcutaneous, TID AC  isosorbide mononitrate, 120 mg, Oral, Daily  mirtazapine, 15 mg, Oral, Nightly  povidone-iodine, 1 application, Topical, Daily  senna-docusate sodium, 2 tablet, Oral, BID  sodium chloride, 10 mL, Intravenous, Q12H        Continuous Infusions:   PRN Meds:.•  senna-docusate sodium **AND** bisacodyl **AND** bisacodyl  •  cloNIDine  •  dextrose  •  dextrose  •  glucagon (human recombinant)  •  HYDROmorphone  •  hydrOXYzine  •  LORazepam  •   mannitol  •  melatonin  •  ondansetron  •  [COMPLETED] Insert Peripheral IV **AND** sodium chloride  •  sodium chloride  •  sodium chloride           Assessment & Plan     Assessment & Plan            Active Hospital Problems     Diagnosis   POA   • **Intractable pain [R52]   Yes       Resolved Hospital Problems   No resolved problems to display.       I have reviewed the information used in this note from other sources for accuracy and reviewed current scope of care to attest to this documentation.        Brief Hospital Course to date:  Esperanza Pop is a 78 y.o. female w/ ESRD (HD-MWF), DM2, HTN, HLD, HFpEF, pAfib (xarelto), anemia, prior breast cancer, active anal cancer intolerant of chemo/XRT, recently admitted 3/10-3/29 and RI'ed to SNF to trial improvement of functional status, who returned w/ intractable rectal pain     Intractable pain, multifactorial  Wounds of the buttocks, recent radiotherapy  -continue with wound care, pain control  -Hospice discussion underway with family -- plan to meet Monday-- as of Saturday patient states she wants to continue with HD, indecisive, POC has been difficult to finalize     Metastatic anal cancer  Anorectal fistula  -s/p partial treatment with chemotherapy and radiation  -CT with slight decrease in size, with anorectal fistual noted  -did not tolerate treatment before and went to SNF  -patient expresses desire to just go home/not pursue any more treatment but struggles with concept of stopping HD  -hospice consulted     Anorexia  -cont mirtazapine     Dysphagia  -tolerating PO     ESRD on HD  -HD MWF     DM type 2, a1c 5.8%, w/ insulin use  -SSI     HFpEF  HTN  pAfib  -cont amio, apixiban, asa, statin, coreg, imdur      Mod-Sev AS  -has appt w/ Geena Estrella, WIL[facility] 4/27/23 if applicable     Expected Discharge Location and Transportation:  Pending Hospice discussion Monday with family   Expected Discharge        Expected Discharge Date and Time      Expected Discharge Date  Expected Discharge Time     Apr 17, 2023               DVT prophylaxis:  Medical DVT prophylaxis orders are present.      AM-PAC 6 Clicks Score (PT): 6 (04/16/23 0819)     CODE STATUS:       Code Status and Medical Interventions:   Ordered at: 04/06/23 1151     Medical Intervention Limits:     NO intubation (DNI)     Code Status (Patient has no pulse and is not breathing):     No CPR (Do Not Attempt to Resuscitate)     Medical Interventions (Patient has pulse or is breathing):     Limited Support     Comments:     Per family         Sabrina Pulliam MD  04/16/23                               Darryn Burk MD   Physician  Nephrology  Progress Notes      Signed  Date of Service:  04/16/23 1040  Creation Time:  04/16/23 1040     Signed        Expand All Collapse All     LOS: 11 days   Patient Care Team:  Meghana Rodgers MD as PCP - General  TanikaDemetrius rg MD as Consulting Physician (Cardiology)  Kevan Lerma MD as Consulting Physician (Nephrology)  Geena Estrella APRN as Nurse Practitioner (Cardiology)  Frank Mandujano MD as Referring Physician (Colon and Rectal Surgery)  Kevan Cavazos MD as Consulting Physician (Radiation Oncology)  Yue Reeves RN as Nurse Navigator  Darryn Burk MD as Consulting Physician (Nephrology)     Chief Complaint: ESRD        Subjective      Very complicating situation, patient daughter came yesterday while I was in the room and requested that the her mother should be dialyzed, we dialyzed her on Saturday, we need further guidance from hospice and the family member for future dialysis.        Review of system: Sleepy today              Objective         Vital Sign Min/Max for last 24 hours  Temp  Min: 97.5 °F (36.4 °C)  Max: 98.1 °F (36.7 °C)   BP  Min: 78/49  Max: 167/60   Pulse  Min: 60  Max: 77   Resp  Min: 14  Max: 20   SpO2  Min: 90 %  Max: 100 %   No data recorded   No data recorded          Flowsheet Rows    Flowsheet Row First Filed Value   Admission  "Height 167.6 cm (66\") Documented at 04/05/2023 0702   Admission Weight 78.9 kg (174 lb) Documented at 04/05/2023 0702             No intake/output data recorded.  I/O last 3 completed shifts:  In: -   Out: 1540 [Other:1540]     Objective:  General Appearance:  Ill-appearing, comfortable and in no acute distress.    Vital signs: (most recent): Blood pressure 128/48, pulse 68, temperature 97.9 °F (36.6 °C), temperature source Axillary, resp. rate 14, height 167.6 cm (66\"), weight 63.5 kg (140 lb 1.6 oz), SpO2 99 %, not currently breastfeeding.    Lungs:  Normal effort.  Breath sounds clear to auscultation.  She is not in respiratory distress.  No decreased breath sounds or wheezes.    Heart: Normal rate.  Regular rhythm.  Positive for murmur.  No gallop.   Abdomen: Abdomen is soft.  There is no abdominal tenderness.     Extremities: There is no dependent edema or local swelling.                Results Review:                I reviewed the patient's new clinical results.     WBC No results found for: WBC   HGB No results found for: HGB   HCT No results found for: HCT   Platlets No results found for: LABPLAT   MCV No results found for: MCV         Sodium No results found for: NA   Potassium No results found for: K   Chloride No results found for: CL   CO2 No results found for: CO2   BUN No results found for: BUN   Creatinine No results found for: CREATININE   Calcium No results found for: CALCIUM   PO4 No results found for: CAPO4   Albumin No results found for: ALBUMIN   Magnesium No results found for: MG   Uric Acid No results found for: URICACID      Medication Review: yes           Assessment & Plan          Intractable pain        Assessment & Plan         ESRD: MWF [facility]     Volume status: No significant anemia     Abdominal pain: In the setting of rectal cancer.     Failure to thrive     Hypoalbuminemia      Anemia: ACD due to CKD. Unable to add AMY due to rectal cancer        Recommendations:  Last dialyzed " 4/15/2023  Yesterday at 2:40 PM while examining the patient's, patient's daughter was in the room who requested dialyze the patient, there is conflicting situation where patient and the family is going back and forth about hospice and dialysis.  A meeting is scheduled on Monday.  Will follow the instructions come out of that meeting prior to the next dialysis.  Yesterday daughter refused to sign hospice.      Darryn Burk MD  04/16/23  10:40 EDT

## 2023-04-17 NOTE — PLAN OF CARE
Pt required second dose of IM glucagon for BG of 59 (after 15 minutes from BG 59 and administration of IM glucagon). 15 minutes after, BG 95. Pt remained somnolent until late afternoon. PT was able to get patient to chair using lift, but patient was unable to tolerate more than 20 minutes, calling out in pain. Returned patient to bed and changed Gluteal dressing. Pt yells in pain when turning, but has been calm at rest. Pt and daughters all voice their desire to have HD and decline hospice. MD aware, Nephrology following. Pt on specialty, heels elevated on pillow, turn q2. Sores on inner thighs open, note written to [facility] to reevaluate. Currently cleansing with foam cleanser, blue wipes and apply orange barrier cream.    Problem: Skin Injury Risk Increased  Goal: Skin Health and Integrity  Outcome: Ongoing, Progressing  Intervention: Optimize Skin Protection  Recent Flowsheet Documentation  Taken 4/17/2023 0800 by Makenna Crystal RN  Pressure Reduction Techniques:   weight shift assistance provided   pressure points protected   positioned off wounds   heels elevated off bed  Head of Bed (HOB) Positioning: HOB at 20-30 degrees  Pressure Reduction Devices:   specialty bed utilized   positioning supports utilized   heel offloading device utilized  Skin Protection:   adhesive use limited   tubing/devices free from skin contact   transparent dressing maintained   skin-to-skin areas padded   skin-to-device areas padded     Problem: Fall Injury Risk  Goal: Absence of Fall and Fall-Related Injury  Outcome: Ongoing, Progressing  Intervention: Identify and Manage Contributors  Recent Flowsheet Documentation  Taken 4/17/2023 0800 by Makenna Crystal RN  Medication Review/Management: medications reviewed  Intervention: Promote Injury-Free Environment  Recent Flowsheet Documentation  Taken 4/17/2023 0800 by Makenna Crystal RN  Safety Promotion/Fall Prevention:   activity supervised   assistive device/personal items within  reach   clutter free environment maintained   toileting scheduled   safety round/check completed   room organization consistent     Problem: Adult Inpatient Plan of Care  Goal: Plan of Care Review  Outcome: Ongoing, Progressing  Goal: Patient-Specific Goal (Individualized)  Outcome: Ongoing, Progressing  Goal: Absence of Hospital-Acquired Illness or Injury  Outcome: Ongoing, Progressing  Intervention: Identify and Manage Fall Risk  Recent Flowsheet Documentation  Taken 4/17/2023 0800 by Makenna Crystal RN  Safety Promotion/Fall Prevention:   activity supervised   assistive device/personal items within reach   clutter free environment maintained   toileting scheduled   safety round/check completed   room organization consistent  Intervention: Prevent Skin Injury  Recent Flowsheet Documentation  Taken 4/17/2023 0800 by Makenna Crystal RN  Body Position:   turned   left  Skin Protection:   adhesive use limited   tubing/devices free from skin contact   transparent dressing maintained   skin-to-skin areas padded   skin-to-device areas padded  Intervention: Prevent and Manage VTE (Venous Thromboembolism) Risk  Recent Flowsheet Documentation  Taken 4/17/2023 0800 by Makenna Crystal RN  Activity Management: bedrest  Intervention: Prevent Infection  Recent Flowsheet Documentation  Taken 4/17/2023 0800 by Makenna Crystal RN  Infection Prevention:   hand hygiene promoted   personal protective equipment utilized  Goal: Optimal Comfort and Wellbeing  Outcome: Ongoing, Progressing  Intervention: Provide Person-Centered Care  Recent Flowsheet Documentation  Taken 4/17/2023 0800 by Makenna Crystal RN  Trust Relationship/Rapport: care explained  Goal: Readiness for Transition of Care  Outcome: Ongoing, Progressing     Problem: Device-Related Complication Risk (Hemodialysis)  Goal: Safe, Effective Therapy Delivery  Outcome: Ongoing, Progressing  Intervention: Optimize Device Care and Function  Recent Flowsheet  Documentation  Taken 4/17/2023 0800 by Makenna Crystal RN  Medication Review/Management: medications reviewed     Problem: Hemodynamic Instability (Hemodialysis)  Goal: Effective Tissue Perfusion  Outcome: Ongoing, Progressing     Problem: Infection (Hemodialysis)  Goal: Absence of Infection Signs and Symptoms  Outcome: Ongoing, Progressing   Goal Outcome Evaluation:

## 2023-04-17 NOTE — PLAN OF CARE
Goal Outcome Evaluation:  Plan of Care Reviewed With: patient        Progress: no change  Outcome Evaluation: Per Dr. Niño, goals established, symptoms managed.  Palliative care to sign off.  Please re-consult if necessary.

## 2023-04-17 NOTE — PROGRESS NOTES
Continued Stay Note  [facility]     Patient Name: Esperanza Pop  MRN: [id number]  Today's Date: 4/17/2023    Admit Date: 4/5/2023    Plan: To be determied -does not want hospice.   Discharge Plan     Row Name 04/17/23 1127       Plan    Plan To be determied -does not want hospice.    Plan Comments Telephone call from pt's daughter Indigo stating pt has decided to continue with dialysis and does not want hospice. Indigo stated will call hospice if needed in the future. Will close the referral. Please call [phone] if can be of further assistance.               Discharge Codes    No documentation.               Expected Discharge Date and Time     Expected Discharge Date Expected Discharge Time    Apr 14, 2023             Emma Spangler RN

## 2023-04-17 NOTE — TELEPHONE ENCOUNTER
Caller: Esperanza Pop    Relationship to patient: Self    Best call back number:     Patient is needing: PATIENT ADVISED SHE IS NEEDING SOMETHING FOR THE NEUROPATHY IN HER FEET; SHE IS CURRENTLY ADMITTED TO [facility] AND THEY ARE ONLY PUTTING THE IODINE FOR THE BOTTOM OF HER FEET; SHE IS REQUESTING AN OINTMENT    [facility] [address] [facility]

## 2023-04-17 NOTE — CASE MANAGEMENT/SOCIAL WORK
Continued Stay Note  [facility]     Patient Name: Esperanza Pop  MRN: [id number]  Today's Date: 4/17/2023    Admit Date: 4/5/2023    Plan: Ongoing   Discharge Plan     Row Name 04/17/23 1546       Plan    Plan Ongoing    Patient/Family in Agreement with Plan yes    Plan Comments Per hospice, the family has decided not to go in that direction. Family wants placement if possible. Due to her dialysis, placement will be challenging since the family does not want her to go to [facility] again. I have placed a referral with [facility] for [facility]. They do have transport to dialysis based on availability. Waiting to hear back from [facility]. CM to follow and assist.    Final Discharge Disposition Code 30 - still a patient               Discharge Codes    No documentation.               Expected Discharge Date and Time     Expected Discharge Date Expected Discharge Time    Apr 18, 2023             Diana Owusu RN

## 2023-04-17 NOTE — TELEPHONE ENCOUNTER
Patient is currently an inpatient. She needs to discuss this with the Hospitalist doctor.  I am unable to prescribe anything when she is admitted.

## 2023-04-17 NOTE — PLAN OF CARE
Problem: Skin Injury Risk Increased  Goal: Skin Health and Integrity  Outcome: Ongoing, Progressing  Intervention: Optimize Skin Protection  Recent Flowsheet Documentation  Taken 4/16/2023 2303 by Shawn Navarro RN  Pressure Reduction Techniques:  • heels elevated off bed  • weight shift assistance provided  • positioned off wounds  • pressure points protected  Head of Bed (HOB) Positioning: [facility] elevated  Pressure Reduction Devices:  • specialty bed utilized  • positioning supports utilized  • heel offloading device utilized  Skin Protection:  • adhesive use limited  • incontinence pads utilized  • tubing/devices free from skin contact  Taken 4/16/2023 2105 by Shawn Navarro RN  Pressure Reduction Techniques:  • heels elevated off bed  • weight shift assistance provided  • positioned off wounds  • pressure points protected  Head of Bed (HOB) Positioning: [facility] elevated  Pressure Reduction Devices:  • specialty bed utilized  • positioning supports utilized  • heel offloading device utilized  Skin Protection:  • adhesive use limited  • incontinence pads utilized  • tubing/devices free from skin contact     Problem: Fall Injury Risk  Goal: Absence of Fall and Fall-Related Injury  Outcome: Ongoing, Progressing  Intervention: Identify and Manage Contributors  Recent Flowsheet Documentation  Taken 4/16/2023 2303 by Shawn Navarro RN  Medication Review/Management: medications reviewed  Taken 4/16/2023 2105 by Shawn Navarro RN  Medication Review/Management: medications reviewed  Intervention: Promote Injury-Free Environment  Recent Flowsheet Documentation  Taken 4/16/2023 2303 by Shawn Navarro RN  Safety Promotion/Fall Prevention:  • activity supervised  • assistive device/personal items within reach  • clutter free environment maintained  • fall prevention program maintained  • lighting adjusted  • nonskid shoes/slippers when out of bed  • room organization consistent  • safety round/check completed  Taken 4/16/2023 2105 by  Ramon, Parion, RN  Safety Promotion/Fall Prevention:  • activity supervised  • assistive device/personal items within reach  • clutter free environment maintained  • fall prevention program maintained  • lighting adjusted  • nonskid shoes/slippers when out of bed  • safety round/check completed  • room organization consistent     Problem: Adult Inpatient Plan of Care  Goal: Plan of Care Review  Outcome: Ongoing, Progressing  Flowsheets (Taken 4/17/2023 0314)  Plan of Care Reviewed With: patient  Goal: Patient-Specific Goal (Individualized)  Outcome: Ongoing, Progressing  Goal: Absence of Hospital-Acquired Illness or Injury  Outcome: Ongoing, Progressing  Intervention: Identify and Manage Fall Risk  Recent Flowsheet Documentation  Taken 4/16/2023 2303 by Shawn Navarro RN  Safety Promotion/Fall Prevention:  • activity supervised  • assistive device/personal items within reach  • clutter free environment maintained  • fall prevention program maintained  • lighting adjusted  • nonskid shoes/slippers when out of bed  • room organization consistent  • safety round/check completed  Taken 4/16/2023 2105 by Shawn Navarro RN  Safety Promotion/Fall Prevention:  • activity supervised  • assistive device/personal items within reach  • clutter free environment maintained  • fall prevention program maintained  • lighting adjusted  • nonskid shoes/slippers when out of bed  • safety round/check completed  • room organization consistent  Intervention: Prevent Skin Injury  Recent Flowsheet Documentation  Taken 4/16/2023 2303 by Shawn Navarro RN  Body Position:  • left  • turned  • weight shifting  Skin Protection:  • adhesive use limited  • incontinence pads utilized  • tubing/devices free from skin contact  Taken 4/16/2023 2105 by Shawn Navarro RN  Body Position:  • right  • turned  • weight shifting  Skin Protection:  • adhesive use limited  • incontinence pads utilized  • tubing/devices free from skin contact  Intervention: Prevent  and Manage VTE (Venous Thromboembolism) Risk  Recent Flowsheet Documentation  Taken 4/16/2023 2303 by Shawn Navarro RN  Activity Management: bedrest  Taken 4/16/2023 2105 by Shawn Navarro RN  Activity Management: bedrest  Intervention: Prevent Infection  Recent Flowsheet Documentation  Taken 4/16/2023 2303 by Shawn Navarro RN  Infection Prevention:  • environmental surveillance performed  • rest/sleep promoted  Taken 4/16/2023 2105 by Shawn Navarro RN  Infection Prevention:  • environmental surveillance performed  • rest/sleep promoted  Goal: Optimal Comfort and Wellbeing  Outcome: Ongoing, Progressing  Intervention: Provide Person-Centered Care  Recent Flowsheet Documentation  Taken 4/16/2023 2105 by Shawn Navarro RN  Trust Relationship/Rapport:  • care explained  • choices provided  • emotional support provided  • empathic listening provided  • reassurance provided  • thoughts/feelings acknowledged  • questions answered  • questions encouraged  Goal: Readiness for Transition of Care  Outcome: Ongoing, Progressing   Goal Outcome Evaluation:  Plan of Care Reviewed With: patient         Patient with complaints of pain, prn medication given, see mar, with relief.  Patient agitated and anxious, prn medication given, patient able to rest more comfortably.  PRN oral care.  Bilateral heels elevated.  Specialty bed in place.  Bed alarm activated.  Patient drowsy at start of shift, unable to safely give scheduled PO medication .  Patient rested intermittently throughout shift.      Blood glucose 59 this morning.  PRN medication given, see mar.  Will recheck blood glucose in 15 minutes.  Will pass along to dayshift RN.

## 2023-04-17 NOTE — THERAPY PROGRESS REPORT/RE-CERT
Patient Name: Esperanza Pop  : 1945    MRN: [id number]                              Today's Date: 2023       Admit Date: 2023    Visit Dx:     ICD-10-CM ICD-9-CM   1. Intractable pain  R52 780.96   2. Rectal cancer  C20 154.1   3. Adult failure to thrive  R62.7 783.7   4. Hypoalbuminemia  E88.09 273.8   5. ESRD on hemodialysis  N18.6 585.6    Z99.2 V45.11   6. Pressure injury of buttock, stage 3, unspecified laterality  L89.303 707.05     707.23   7. Dysphagia, unspecified type  R13.10 787.20     Patient Active Problem List   Diagnosis   • Acute on chronic diastolic heart failure   • Type 2 diabetes mellitus   • Essential hypertension   • Coronary artery disease involving native coronary artery of native heart with angina pectoris   • Breast cancer, female, right   • Seasonal allergic rhinitis   • Right carotid bruit   • Vitamin B12 deficiency   • Hyperlipidemia LDL goal <70   • End stage renal disease on dialysis (HCC)   • Nonrheumatic aortic valve stenosis   • NSTEMI (non-ST elevated myocardial infarction)   • UTI (urinary tract infection)   • Ischemic heart disease   • CHF (congestive heart failure)   • Acute non-ST segment elevation myocardial infarction (STEMI) following previous myocardial infarction   • Dyslipidemia   • Diabetes mellitus   • Mild obesity   • Elevated troponin   • Screen for colon cancer   • Acute midline low back pain without sciatica   • Hypertensive emergency   • PAF (paroxysmal atrial fibrillation) (HCC)   • Malignant neoplasm of anal canal   • Hematochezia   • Severe malnutrition   • Symptomatic anemia   • Intractable pain     Past Medical History:   Diagnosis Date   • Acute bronchitis    • Acute conjunctivitis    • Acute kidney injury     Admission from 2013 to 2014, now resolved with latest creatinine 1.4 on 2014.   • Acute non-ST segment elevation myocardial infarction (STEMI) following previous myocardial infarction    • Anal cancer 2023   • Anal  cancer 2/8/2023   • Arthritis    • Breast cancer, female, right     2005 mastectomy right   • Cancer    • CHF (congestive heart failure)    • Chronic kidney disease     dialysis MWF, [facility]    • Clotting disorder    • COVID-19    • Diabetes mellitus     diagnosed ~1992, checks fsbg 2-3x/day   • Diarrhea    • Dyslipidemia    • Dyspepsia    • Dyspnea    • Edema    • History of transfusion     ~2013 no reaction    • Hx of radiation therapy    • Hyperlipidemia    • Hypertension     Severe   • Ischemic heart disease    • Moderate obesity     BMI 36.2   • Myocardial infarction    • Pneumonia    • Pneumonia 02/2019   • Radiation 2005   • Seasonal allergies    • Wears glasses      Past Surgical History:   Procedure Laterality Date   • AV FISTULA PLACEMENT, BRACHIOBASILIC     • BREAST BIOPSY Left 2010   • BREAST CYST EXCISION     • BREAST LUMPECTOMY Right 2005   • BREAST SURGERY     • CARDIAC CATHETERIZATION N/A 10/10/2016    Procedure: Left Heart Cath;  Surgeon: Scooter Zhao MD;  Location:  [facility];  Service:    • CARDIAC CATHETERIZATION  10/2016   • CARDIAC CATHETERIZATION N/A 1/21/2021    Procedure: Right and Left Heart Cath;  Surgeon: Hugh Tidwell MD;  Location:  [facility];  Service: Cardiology;  Laterality: N/A;   • COLONOSCOPY N/A 7/23/2020    Procedure: COLONOSCOPY;  Surgeon: Rubén Rosas MD;  Location:  [facility];  Service: Gastroenterology;  Laterality: N/A;   • CORONARY STENT PLACEMENT  2016   • HERNIA REPAIR     • HYSTERECTOMY  2000   • INSERTION HEMODIALYSIS CATHETER Right 6/29/2016    Procedure: HEMODIALYSIS CATHETER INSERTION TUNNELLED;  Surgeon: Ramón Chavez MD;  Location:  [facility];  Service:    • MASTECTOMY Right 2006   • OOPHORECTOMY     • REDUCTION MAMMAPLASTY Left 2005      General Information     Row Name 04/17/23 1557          Physical Therapy Time and Intention    Document Type progress  note/recertification  -LO     Mode of Treatment physical therapy  -LO     Row Name 04/17/23 1557          General Information    Patient Profile Reviewed yes  -LO     Row Name 04/17/23 1557          Cognition    Orientation Status (Cognition) oriented to;person;place;verbal cues/prompts needed for orientation;time  -LO     Row Name 04/17/23 1557          Safety Issues, Functional Mobility    Safety Issues Affecting Function (Mobility) awareness of need for assistance;friction/shear risk;insight into deficits/self-awareness;safety precautions follow-through/compliance;sequencing abilities  -LO     Impairments Affecting Function (Mobility) balance;cognition;endurance/activity tolerance;pain;range of motion (ROM);strength  -LO     Cognitive Impairments, Mobility Safety/Performance awareness, need for assistance;insight into deficits/self-awareness;problem-solving/reasoning;safety precaution awareness;sequencing abilities  -LO           User Key  (r) = Recorded By, (t) = Taken By, (c) = Cosigned By    Initials Name Provider Type    Charley Benson PT Physical Therapist               Mobility     Row Name 04/17/23 1558          Bed Mobility    Bed Mobility scooting/bridging;supine-sit  -LO     Supine-Sit [place] (Bed Mobility) maximum assist (25% patient effort);2 person assist;verbal cues  -LO     Assistive Device (Bed Mobility) bed rails;draw sheet;head of bed elevated  -LO     Comment, (Bed Mobility) vc for each step in sequencing. Assist at trunk and BLE  -LO     Row Name 04/17/23 1558          Transfers    Comment, (Transfers) SPT EOB>recliner maxA x2, vc for HP  -LO     Row Name 04/17/23 1558          Bed-Chair Transfer    Bed-Chair [place] (Transfers) maximum assist (25% patient effort);2 person assist;verbal cues  -LO     Assistive Device (Bed-Chair Transfers) other (see comments)  BUE support  -LO     Row Name 04/17/23 1558          Sit-Stand Transfer    Sit-Stand [place] (Transfers) maximum  assist (25% patient effort);2 person assist;verbal cues  -LO     Assistive Device (Sit-Stand Transfers) walker, front-wheeled  -LO     Row Name 04/17/23 1558          Gait/Stairs (Locomotion)    [place] Level (Gait) not tested  -LO     Comment, (Gait/Stairs) not safe at this time  -LO           User Key  (r) = Recorded By, (t) = Taken By, (c) = Cosigned By    Initials Name Provider Type    Charley Benson PT Physical Therapist               Obj/Interventions     Row Name 04/17/23 1559          Strength Comprehensive (MMT)    General Manual Muscle Testing (MMT) Assessment lower extremity strength deficits identified  -LO     Comment, General Manual Muscle Testing (MMT) Assessment BLE grossly 3-/5  -LO     Row Name 04/17/23 1559          Motor Skills    Motor Skills functional endurance  -LO     Functional Endurance poor  -LO     Row Name 04/17/23 1559          Balance    Balance Assessment sitting static balance;sitting dynamic balance  -LO     Static Sitting Balance contact guard;verbal cues;non-verbal cues (demo/gesture)  -LO     Dynamic Sitting Balance minimal assist;verbal cues;non-verbal cues (demo/gesture)  -LO     Position, Sitting Balance supported;sitting edge of bed  -LO     Static Standing Balance maximum assist;2-person assist;verbal cues;non-verbal cues (demo/gesture)  -LO     Position/Device Used, Standing Balance supported;other (see comments)  BUE support  -LO     Comment, Balance maxA x2 with BUE support in standing; initially requires [place] to sit EOB but able to progress to CGA with positioning and cuing  -           User Key  (r) = Recorded By, (t) = Taken By, (c) = Cosigned By    Initials Name Provider Type    Charley Benson PT Physical Therapist               Goals/Plan     Row Name 04/17/23 1609          Bed Mobility Goal 1 (PT)    Activity/Assistive Device (Bed Mobility Goal 1, PT) sit to supine/supine to sit  -LO     [place] Level/Cues Needed (Bed Mobility Goal 1, PT) moderate  assist (50-74% patient effort)  -LO     Time Frame (Bed Mobility Goal 1, PT) 10 days  -LO     Progress/Outcomes (Bed Mobility Goal 1, PT) goal revised this date  -LO     Row Name 04/17/23 1605          Transfer Goal 1 (PT)    Activity/Assistive Device (Transfer Goal 1, PT) sit-to-stand/stand-to-sit;bed-to-chair/chair-to-bed;walker, rolling  -LO     [place] Level/Cues Needed (Transfer Goal 1, PT) moderate assist (50-74% patient effort)  -LO     Time Frame (Transfer Goal 1, PT) 10 days  -LO     Progress/Outcome (Transfer Goal 1, PT) goal revised this date  -LO     Row Name 04/17/23 1605          Gait Training Goal 1 (PT)    Activity/Assistive Device (Gait Training Goal 1, PT) gait (walking locomotion);assistive device use;decrease fall risk;diminish gait deviation;improve balance and speed;increase endurance/gait distance  -LO     [place] Level (Gait Training Goal 1, PT) maximum assist (25-49% patient effort)  -LO     Distance (Gait Training Goal 1, PT) 10  -LO     Time Frame (Gait Training Goal 1, PT) 10 days  -LO     Progress/Outcome (Gait Training Goal 1, PT) goal revised this date  -     Row Name 04/17/23 160          Therapy Assessment/Plan (PT)    Planned Therapy Interventions (PT) balance training;bed mobility training;gait training;home exercise program;patient/family education;neuromuscular re-education;strengthening;transfer training;wheelchair management/propulsion training  -LO           User Key  (r) = Recorded By, (t) = Taken By, (c) = Cosigned By    Initials Name Provider Type    Charley Benson, PT Physical Therapist               Clinical Impression     Row Name 04/17/23 1601          Pain    Additional Documentation Pain Scale: FACES Pre/Post-Treatment (Group)  -     Row Name 04/17/23 1601          Pain Scale: FACES Pre/Post-Treatment    Pain: FACES Scale, Pretreatment 6-->hurts even more  -LO     Posttreatment Pain Rating 8-->hurts whole lot  -LO     Pain Location - other (see  comments)  -LO     Pre/Posttreatment Pain Comment sacral wound,pain increases with movement transitions  -     Row Name 04/17/23 1601          Plan of Care Review    Plan of Care Reviewed With patient;daughter  -     Progress no change  -     Outcome Evaluation Progress report/recert completed. Minimal changes in functional mobility since initial assessment. Continuing to require maxA x2 for all bed mobility and transfers limited by pain, generalized weakness, balance, and functional endurance. Cont IP PT POC.  -     Row Name 04/17/23 1601          Therapy Assessment/Plan (PT)    Rehab Potential (PT) fair, will monitor progress closely  -     Criteria for Skilled Interventions Met (PT) yes;skilled treatment is necessary  -     Therapy Frequency (PT) daily  -     Row Name 04/17/23 1601          Vital Signs    O2 Delivery Pre Treatment room air  -LO     O2 Delivery Intra Treatment room air  -LO     O2 Delivery Post Treatment room air  -LO     Pre Patient Position Supine  -LO     Intra Patient Position Standing  -LO     Post Patient Position Sitting  -     Row Name 04/17/23 1601          Positioning and Restraints    Pre-Treatment Position in bed  -LO     Post Treatment Position chair  -LO     In Chair notified nsg;reclined;call light within reach;encouraged to call for assist;exit alarm on;with family/caregiver;waffle cushion;legs elevated  -           User Key  (r) = Recorded By, (t) = Taken By, (c) = Cosigned By    Initials Name Provider Type    Charley Benson, PT Physical Therapist               Outcome Measures     Row Name 04/17/23 1606 04/17/23 0800       How much help from another person do you currently need...    Turning from your back to your side while in flat bed without using bedrails? 1  -LO 1  -EL    Moving from lying on back to sitting on the side of a flat bed without bedrails? 1  -LO 1  -EL    Moving to and from a bed to a chair (including a wheelchair)? 2  -LO 1  -EL    Standing  up from a chair using your arms (e.g., wheelchair, bedside chair)? 2  -LO 1  -EL    Climbing 3-5 steps with a railing? 1  -LO 1  -EL    To walk in hospital room? 1  -LO 1  -EL    AM-PAC 6 Clicks Score (PT) 8  -LO 6  -EL    Highest level of mobility 3 --> Sat at edge of bed  -LO 2 --> Bed activities/dependent transfer  -EL    Row Name 04/17/23 1606          Functional Assessment    Outcome Measure Options AM-PAC 6 Clicks Basic Mobility (PT)  -LO           User Key  (r) = Recorded By, (t) = Taken By, (c) = Cosigned By    Initials Name Provider Type    Charley Benson, PT Physical Therapist    Makenna Santana RN Registered Nurse                             Physical Therapy Education     Title: PT OT SLP Therapies (In Progress)     Topic: Physical Therapy (Done)     Point: Mobility training (Done)     Learning Progress Summary           Patient Acceptance, E, VU,NR by LO at 4/17/2023 1442    Comment: PT POC    Acceptance, E,TB, VU by KW at 4/6/2023 1135    Comment: continued benefit of skilled PT services   Family Acceptance, E, VU,NR by LO at 4/17/2023 1442    Comment: PT POC                   Point: Home exercise program (Done)     Learning Progress Summary           Patient Acceptance, E, VU,NR by LO at 4/17/2023 1442    Comment: PT POC    Acceptance, E,TB, VU by KW at 4/6/2023 1135    Comment: continued benefit of skilled PT services   Family Acceptance, E, VU,NR by LO at 4/17/2023 1442    Comment: PT POC                   Point: Body mechanics (Done)     Learning Progress Summary           Patient Acceptance, E, VU,NR by LO at 4/17/2023 1442    Comment: PT POC    Acceptance, E,TB, VU by KW at 4/6/2023 1135    Comment: continued benefit of skilled PT services   Family Acceptance, E, VU,NR by LO at 4/17/2023 1442    Comment: PT POC                   Point: Precautions (Done)     Learning Progress Summary           Patient Acceptance, E, VU,NR by LO at 4/17/2023 1442    Comment: PT POC    Acceptance, E,TB, VU by  KW at 4/6/2023 1135    Comment: continued benefit of skilled PT services   Family Acceptance, E, VU,NR by LO at 4/17/2023 1442    Comment: PT POC                               User Key     Initials Effective Dates Name Provider Type Discipline    LO 06/16/21 -  Charley Hammond, PT Physical Therapist PT    KW 01/27/22 -  Richa Chand PT Physical Therapist PT              PT Recommendation and Plan  Planned Therapy Interventions (PT): balance training, bed mobility training, gait training, home exercise program, patient/family education, neuromuscular re-education, strengthening, transfer training, wheelchair management/propulsion training  Plan of Care Reviewed With: patient, daughter  Progress: no change  Outcome Evaluation: Progress report/recert completed. Minimal changes in functional mobility since initial assessment. Continuing to require maxA x2 for all bed mobility and transfers limited by pain, generalized weakness, balance, and functional endurance. Cont IP PT POC.     Time Calculation:    PT Charges     Row Name 04/17/23 1442             Time Calculation    Start Time 1442  -LO      PT Received On 04/17/23  -      PT Goal Re-Cert Due Date 04/27/23  -         Timed Charges    14194 - PT Therapeutic Activity Minutes 28  -LO         Total Minutes    Timed Charges Total Minutes 28  -LO       Total Minutes 28  -LO            User Key  (r) = Recorded By, (t) = Taken By, (c) = Cosigned By    Initials Name Provider Type    Charley Benson, PT Physical Therapist              Therapy Charges for Today     Code Description Service Date Service Provider Modifiers Qty    29181519241 HC PT THERAPEUTIC ACT EA 15 MIN 4/17/2023 Charley Hammond, PT GP 2    19660873460 HC PT THER SUPP EA 15 MIN 4/17/2023 Charley Hammond, PT GP 2          PT G-Codes  Outcome Measure Options: AM-PAC 6 Clicks Basic Mobility (PT)  AM-PAC 6 Clicks Score (PT): 8  AM-PAC 6 Clicks Score (OT): 10  PT Discharge Summary  Anticipated Discharge  Disposition (PT): skilled nursing facility    Charley Hammond, PT  4/17/2023

## 2023-04-17 NOTE — PLAN OF CARE
Goal Outcome Evaluation:  Plan of Care Reviewed With: patient, daughter        Progress: no change  Outcome Evaluation: Progress report/recert completed. Minimal changes in functional mobility since initial assessment. Continuing to require maxA x2 for all bed mobility and transfers limited by pain, generalized weakness, balance, and functional endurance. Cont IP PT POC.

## 2023-04-17 NOTE — PROGRESS NOTES
[facility] Medicine Services  PROGRESS NOTE    Patient Name: Esperanza Pop  : 1945  MRN: [id number]    Date of Admission: 2023  Primary Care Physician: Meghana Rodgers MD    Subjective   Subjective     CC:  Follow up anal cancer    HPI:  Remains debilitated and exteremly frail, awake momentarily but dozes back to sleep.     ROS:  deferred    Objective   Objective     Vital Signs:   Temp:  [95 °F (35 °C)-98.2 °F (36.8 °C)] 95 °F (35 °C)  Heart Rate:  [63-77] 77  Resp:  [16] 16  BP: (124-162)/(47-72) 150/61     Physical Exam:  Constitutional: No acute distress, asleep, frail  HENT: Mucous membranes moist  Respiratory: Poor effort, nonlabored respirations       Results Reviewed:  LAB RESULTS:        Brief Urine Lab Results     None          Microbiology Results Abnormal     Procedure Component Value - Date/Time    Blood Culture - Blood, Hand, Left [[id number]]  (Normal) Collected: 23    Lab Status: Final result Specimen: Blood from Hand, Left Updated: 23     Blood Culture No growth at 5 days    Narrative:      AEROBIC BOTTLE ONLY    Blood Culture - Blood, Arm, Left [[id number]]  (Normal) Collected: 23    Lab Status: Final result Specimen: Blood from Arm, Left Updated: 23     Blood Culture No growth at 5 days    Narrative:      AEROBIC BOTTLE ONLY          No radiology results from the last 24 hrs    Results for orders placed during the hospital encounter of 03/10/23    Adult Transthoracic Echo Complete W/ Cont if Necessary Per Protocol    Interpretation Summary  •  Left ventricular systolic function is normal. Estimated left ventricular EF = 55%  •  Left ventricular wall thickness is consistent with moderate concentric hypertrophy.  •  The right ventricular cavity is mildly dilated.  •  Mild to moderate biatrial enlargement.  •  Moderate to severe aortic valve stenosis is present.  Mean gradient 34 mmHg, DOUG 0.9 cm².  •  Mild aortic  insufficiency.  •  Mild mitral regurgitation.  •  Mild to moderate tricuspid regurgitation with RVSP 48 mmHg.      Current medications:  Scheduled Meds:acetaminophen, 1,000 mg, Oral, TID  amiodarone, 200 mg, Oral, Daily  apixaban, 2.5 mg, Oral, BID  aspirin, 81 mg, Oral, Daily  atorvastatin, 80 mg, Oral, Nightly  capsaicin, , Topical, Q12H  carvedilol, 3.125 mg, Oral, Q12H  fentaNYL, 1 patch, Transdermal, Q72H   And  Check Fentanyl Patch Placement, 1 each, Does not apply, Q12H  insulin lispro, 0-7 Units, Subcutaneous, TID AC  isosorbide mononitrate, 120 mg, Oral, Daily  mirtazapine, 15 mg, Oral, Nightly  povidone-iodine, 1 application, Topical, Daily  senna-docusate sodium, 2 tablet, Oral, BID  sodium chloride, 10 mL, Intravenous, Q12H      Continuous Infusions:   PRN Meds:.•  senna-docusate sodium **AND** bisacodyl **AND** bisacodyl  •  cloNIDine  •  dextrose  •  dextrose  •  glucagon (human recombinant)  •  HYDROmorphone  •  hydrOXYzine  •  LORazepam  •  melatonin  •  ondansetron  •  oxyCODONE  •  [COMPLETED] Insert Peripheral IV **AND** sodium chloride  •  sodium chloride  •  sodium chloride    Assessment & Plan   Assessment & Plan     Active Hospital Problems    Diagnosis  POA   • **Intractable pain [R52]  Yes      Resolved Hospital Problems   No resolved problems to display.      I have reviewed the information used in this note from other sources for accuracy and reviewed current scope of care to attest to this documentation.       Brief Hospital Course to date:  Esperanza Pop is a 78 y.o. female w/ ESRD (HD-MWF), DM2, HTN, HLD, HFpEF, pAfib (xarelto), anemia, prior breast cancer, active anal cancer intolerant of chemo/XRT, recently admitted 3/10-3/29 and NM'ed to SNF to trial improvement of functional status, who returned w/ intractable rectal pain    Intractable pain, multifactorial  Wounds of the buttocks, recent radiotherapy  -continue with wound care, pain control  -Hospice discussion  -- as of TODAY  "patient and family states she wants to continue with HD .  Patient remains cachetic, frail, bed bound, clearly on her terminal decline with adult failure to thrive.  I anticipate she will continue to rapidly decline... goals will need to be readdressed as she becomes clinically TOO WEAK or TOO ALTERED to safely continue with HD (blood pressures often limiting diuresis and her intake is minimal).  The level of pain medications it takes to control her advanced malignant pain renders her nearly sedate.      This patient is appropriate for palliation with Hospice but not willing to stop HD.  She has been unable to tolerate much UF in her current state of health so nearing exhaustion of HD utility.  Will continue medical care as indicated and as long as FIRST DOES NO HARM, but if becomes unable to tolerate HD will need to address futility of care. Family has been indecisive and discordance between family members on goals.     Metastatic anal cancer  Anorectal fistula  -s/p partial treatment with chemotherapy and radiation  -CT with slight decrease in size, with anorectal fistual noted  -did not tolerate treatment before and went to SNF  -patient expresses desire to just go home/not pursue any more treatment but struggles with concept of stopping HD  -hospice consulted    Anorexia and cachexia  Adult FTT  -cont mirtazapine    Dysphagia  -tolerating PO but takes minimal in    ESRD on HD  -HD MWF    DM type 2, a1c 5.8%, w/ insulin use  -SSI    HFpEF  HTN  pAfib  -cont amio, apixiban, asa, statin, coreg, imdur     Mod-Sev AS  -has appt w/ WILMA Lopez 4/27/23     Expected Discharge Location and Transportation:  **TBD -- family has officially refused hospice services for now, patient supposedly \"wants to\" continue with HD**.  CM alerted to plan for SNF referrals that can accept her with HD.  NOTE:  She MAY NOT QUALIFY FOR SNF REHAB bc she is too debilitated for subacute 2hrs daily, if SNF denied will need LTC " placement...       Expected Discharge     Expected Discharge Date and Time     Expected Discharge Date Expected Discharge Time    MEDICALLY READY FOR DISCHARGE            DVT prophylaxis:  Medical DVT prophylaxis orders are present.     AM-PAC 6 Clicks Score (PT): 6 (04/16/23 0813)    CODE STATUS:   Code Status and Medical Interventions:   Ordered at: 04/06/23 1151     Medical Intervention Limits:    NO intubation (DNI)     Code Status (Patient has no pulse and is not breathing):    No CPR (Do Not Attempt to Resuscitate)     Medical Interventions (Patient has pulse or is breathing):    Limited Support     Comments:    Per family       Sabrina Pulliam MD  04/17/23

## 2023-04-18 ENCOUNTER — APPOINTMENT (OUTPATIENT)
Dept: NEPHROLOGY | Facility: HOSPITAL | Age: 78
DRG: 935 | End: 2023-04-18
Payer: MEDICARE

## 2023-04-18 LAB
ALBUMIN SERPL-MCNC: 3.4 G/DL (ref 3.5–5.2)
ANION GAP SERPL CALCULATED.3IONS-SCNC: 18 MMOL/L (ref 5–15)
BUN SERPL-MCNC: 51 MG/DL (ref 8–23)
BUN/CREAT SERPL: 7.5 (ref 7–25)
CALCIUM SPEC-SCNC: 9.2 MG/DL (ref 8.6–10.5)
CHLORIDE SERPL-SCNC: 97 MMOL/L (ref 98–107)
CO2 SERPL-SCNC: 21 MMOL/L (ref 22–29)
CREAT SERPL-MCNC: 6.81 MG/DL (ref 0.57–1)
EGFRCR SERPLBLD CKD-EPI 2021: 5.9 ML/MIN/1.73
GLUCOSE BLDC GLUCOMTR-MCNC: 71 MG/DL (ref 70–130)
GLUCOSE BLDC GLUCOMTR-MCNC: 81 MG/DL (ref 70–130)
GLUCOSE SERPL-MCNC: 108 MG/DL (ref 65–99)
PHOSPHATE SERPL-MCNC: 4.2 MG/DL (ref 2.5–4.5)
POTASSIUM SERPL-SCNC: 4.2 MMOL/L (ref 3.5–5.2)
SODIUM SERPL-SCNC: 136 MMOL/L (ref 136–145)

## 2023-04-18 PROCEDURE — 80069 RENAL FUNCTION PANEL: CPT | Performed by: INTERNAL MEDICINE

## 2023-04-18 PROCEDURE — 82962 GLUCOSE BLOOD TEST: CPT

## 2023-04-18 PROCEDURE — 99232 SBSQ HOSP IP/OBS MODERATE 35: CPT | Performed by: INTERNAL MEDICINE

## 2023-04-18 PROCEDURE — 25010000002 HYDROMORPHONE PER 4 MG: Performed by: STUDENT IN AN ORGANIZED HEALTH CARE EDUCATION/TRAINING PROGRAM

## 2023-04-18 RX ORDER — ALBUMIN (HUMAN) 12.5 G/50ML
12.5 SOLUTION INTRAVENOUS AS NEEDED
Status: DISCONTINUED | OUTPATIENT
Start: 2023-04-18 | End: 2023-04-22 | Stop reason: HOSPADM

## 2023-04-18 RX ADMIN — ATORVASTATIN CALCIUM 80 MG: 40 TABLET, FILM COATED ORAL at 21:08

## 2023-04-18 RX ADMIN — MIRTAZAPINE 15 MG: 15 TABLET, FILM COATED ORAL at 21:08

## 2023-04-18 RX ADMIN — APIXABAN 2.5 MG: 2.5 TABLET, FILM COATED ORAL at 21:08

## 2023-04-18 RX ADMIN — ACETAMINOPHEN 1000 MG: 500 TABLET ORAL at 17:15

## 2023-04-18 RX ADMIN — OXYCODONE HYDROCHLORIDE 5 MG: 5 TABLET ORAL at 18:53

## 2023-04-18 RX ADMIN — ACETAMINOPHEN 1000 MG: 500 TABLET ORAL at 21:08

## 2023-04-18 RX ADMIN — POVIDONE-IODINE 1 EACH: 10 SOLUTION TOPICAL at 13:00

## 2023-04-18 RX ADMIN — Medication 5 MG: at 21:08

## 2023-04-18 RX ADMIN — CAPSAICIN: 0.75 CREAM TOPICAL at 13:00

## 2023-04-18 RX ADMIN — CAPSAICIN: 0.75 CREAM TOPICAL at 21:08

## 2023-04-18 RX ADMIN — Medication 2 ML: at 21:09

## 2023-04-18 RX ADMIN — HYDROMORPHONE HYDROCHLORIDE 0.25 MG: 1 INJECTION, SOLUTION INTRAMUSCULAR; INTRAVENOUS; SUBCUTANEOUS at 16:57

## 2023-04-18 NOTE — PROGRESS NOTES
Malnutrition Severity Assessment    Patient Name:  Esperanza Pop  YOB: 1945  MRN: [id number]  Admit Date:  4/5/2023    Patient meets criteria for : Severe Malnutrition    Comments:  Based on patient recent wt loss and poor PO intake, patient meets criteria for severe malnutrition r/t chronic illness/injury.  .  MD please attest and include in pt diagnosis as you deem appropriate.        Malnutrition Severity Assessment  Malnutrition Type: Chronic Disease - Related Malnutrition  Malnutrition Type (last 8 hours)     Malnutrition Severity Assessment     Row Name 04/18/23 1419       Malnutrition Severity Assessment    Malnutrition Type Chronic Disease - Related Malnutrition    Row Name 04/18/23 1419       Insufficient Energy Intake     Insufficient Energy Intake Findings Severe    Insufficient Energy Intake  <75% of est. energy requirement for > or equal to 1 month    Row Name 04/18/23 1419       Unintentional Weight Loss     Unintentional Weight Loss Findings Severe    Unintentional Weight Loss  Weight loss greater than 10% in six months    Row Name 04/18/23 1419       Muscle Loss    Loss of Muscle Mass Findings --  pateint refused    Row Name 04/18/23 1419       Fat Loss    Subcutaneous Fat Loss Findings --  patient refused.    Row Name 04/18/23 1419       Criteria Met (Must meet criteria for severity in at least 2 of these categories: M Wasting, Fat Loss, Fluid, Secondary Signs, Wt. Status, Intake)    Patient meets criteria for  Severe Malnutrition                Electronically signed by:  Kimberly Mooney RD  04/18/23 14:21 EDT

## 2023-04-18 NOTE — PLAN OF CARE
Goal Outcome Evaluation:  Plan of Care Reviewed With: patient        Progress: no change  Outcome Evaluation: Pt VSS, pt most of the shift, sometimes speech is understandable at other times it is not. Pt not alert enough to take her PO medication so far this shift. Pt turned and position readjusted throughout the night. AV Fistula C, D, I. Gluteal dressing in place. Will cont to monitor.

## 2023-04-18 NOTE — NURSING NOTE
WOC consult for inner thigh wounds; Bilateral inner thighs with open areas, currently cleansing with foam, blue wipes and applying orange top barrier cream. I cannot tell if they are draining or if drainage from vaginal area...wondering if we should do something different?    Patient off floor for HMD, will plan to follow up.      Patient's nurse chatted at 12:50 stated that she saw pus to inner thigh wounds.  Recommended that she cleanse NS, pat dry and apply silicone foam border dressings.    Sensory Perception: 2-->very limited  Moisture: 3-->occasionally moist  Activity: 1-->bedfast  Mobility: 2-->very limited  Nutrition: 1-->very poor  Friction and Shear: 1-->problem  Jayce Score: 10 (04/18/23 1145)    Please implement pressure injury prevention interventions per protocol for patients with a Jayce score of 18 or less.  See orders for recommendations.    WOC team will plan to see patient tomorrow.  Thank you for the consult.  please implement pressure injury prevention interventions per protocol for patients with a Jayce scale if alteration to skin integrity or change in wound bed presentation please consult the WOC team.

## 2023-04-18 NOTE — PLAN OF CARE
Goal Outcome Evaluation: Pt tolerated tx well. Pt slept the entire tx, BP decreased, required 50 gm Albumin, pt pressure increased with intervention. UF: 0  BLP:50.4. report called to Gissel STONER  Problem: Device-Related Complication Risk (Hemodialysis)  Goal: Safe, Effective Therapy Delivery  Intervention: Optimize Device Care and Function  Recent Flowsheet Documentation  Taken 4/18/2023 0755 by Carolyn Kendrick, RN  Medication Review/Management: medications reviewed     Problem: Device-Related Complication Risk (Hemodialysis)  Goal: Safe, Effective Therapy Delivery  Outcome: Ongoing, Progressing  Intervention: Optimize Device Care and Function  Recent Flowsheet Documentation  Taken 4/18/2023 0755 by Carolyn Kendrick, RN  Medication Review/Management: medications reviewed     Problem: Hemodynamic Instability (Hemodialysis)  Goal: Effective Tissue Perfusion  Outcome: Ongoing, Progressing     Problem: Infection (Hemodialysis)  Goal: Absence of Infection Signs and Symptoms  Outcome: Ongoing, Progressing

## 2023-04-18 NOTE — PROGRESS NOTES
" LOS: 12 days   Patient Care Team:  Meghana Rodgers MD as PCP - General  Demetrius Sagastume MD as Consulting Physician (Cardiology)  Kevan Lerma MD as Consulting Physician (Nephrology)  Geena Estrella APRN as Nurse Practitioner (Cardiology)  Frank Mandujano MD as Referring Physician (Colon and Rectal Surgery)  Kevan Cavazos MD as Consulting Physician (Radiation Oncology)  Yue Reeves RN as Nurse Navigator  Darryn Burk MD as Consulting Physician (Nephrology)    Chief Complaint: ESRD    Subjective   Discussed with patient. She wants to continue with HD. Will start do HD tomorrow.        Review of system: no cp or sob.       Objective     Vital Sign Min/Max for last 24 hours  Temp  Min: 95 °F (35 °C)  Max: 98.2 °F (36.8 °C)   BP  Min: 94/52  Max: 162/68   Pulse  Min: 63  Max: 77   Resp  Min: 16  Max: 16   SpO2  Min: 94 %  Max: 100 %   No data recorded   No data recorded     Flowsheet Rows    Flowsheet Row First Filed Value   Admission Height 167.6 cm (66\") Documented at 04/05/2023 0702   Admission Weight 78.9 kg (174 lb) Documented at 04/05/2023 0702          No intake/output data recorded.  I/O last 3 completed shifts:  In: 130 [P.O.:130]  Out: -     Objective:  General Appearance:  Ill-appearing, comfortable and in no acute distress.    Vital signs: (most recent): Blood pressure 94/52, pulse 74, temperature 97.7 °F (36.5 °C), temperature source Axillary, resp. rate 16, height 167.6 cm (66\"), weight 63.5 kg (140 lb 1.6 oz), SpO2 94 %, not currently breastfeeding.    Lungs:  Normal effort.  Breath sounds clear to auscultation.  She is not in respiratory distress.  No decreased breath sounds or wheezes.    Heart: Normal rate.  Regular rhythm.  Positive for murmur.  No gallop.   Abdomen: Abdomen is soft.  There is no abdominal tenderness.     Extremities: There is no dependent edema or local swelling.              Results Review:     I reviewed the patient's new clinical results.    WBC No " results found for: WBC   HGB No results found for: HGB   HCT No results found for: HCT   Platlets No results found for: LABPLAT   MCV No results found for: MCV       Sodium No results found for: NA   Potassium No results found for: K   Chloride No results found for: CL   CO2 No results found for: CO2   BUN No results found for: BUN   Creatinine No results found for: CREATININE   Calcium No results found for: CALCIUM   PO4 No results found for: CAPO4   Albumin No results found for: ALBUMIN   Magnesium No results found for: MG   Uric Acid No results found for: URICACID     Medication Review: yes    Assessment & Plan       Intractable pain      Assessment & Plan        ESRD: MWF [facility]     Volume status: No significant anemia     Abdominal pain: In the setting of rectal cancer.     Failure to thrive     Hypoalbuminemia      Anemia: ACD due to CKD. Unable to add AMY due to rectal cancer      Recommendations:  Patient wants to continue HD for now. SW working on confirming outpatient HD chair. Plan for HD in AM.   .      Gabe Butler MD  04/17/23  20:04 EDT

## 2023-04-18 NOTE — CASE MANAGEMENT/SOCIAL WORK
Continued Stay Note  [facility]     Patient Name: Esperanza Pop  MRN: [id number]  Today's Date: 4/18/2023    Admit Date: 4/5/2023    Plan: Ongoing   Discharge Plan     Row Name 04/18/23 1504       Plan    Plan Ongoing    Patient/Family in Agreement with Plan yes    Plan Comments The patient's family has requested that CM make a referral with [facility]. I made the referral yesterday and followed up with Adia at [facility] today. They will start a pre cert as soon as possible. CM to follow and assist.    Final Discharge Disposition Code 30 - still a patient               Discharge Codes    No documentation.               Expected Discharge Date and Time     Expected Discharge Date Expected Discharge Time    Apr 18, 2023             Diana Owusu RN

## 2023-04-18 NOTE — PROGRESS NOTES
"    [facility] Medicine Services  PROGRESS NOTE    Patient Name: Esperanza Pop  : 1945  MRN: [id number]    Date of Admission: 2023  Primary Care Physician: Meghana Rodgers MD    Subjective   Subjective     CC:  Follow up anal cancer    HPI:  Seen in HD. Patient is drowsy with low blood pressures. She does not reliably follow commands or answer questions. She is able to whisper \"help me\"    ROS:  UTO due to lethargy    Objective   Objective     Vital Signs:   Temp:  [95 °F (35 °C)-98.9 °F (37.2 °C)] 98.9 °F (37.2 °C)  Heart Rate:  [63-77] 63  Resp:  [14-16] 14  BP: ()/(46-61) 115/46  Flow (L/min):  [2] 2     Physical Exam:  Constitutional: No acute distress, chronically ill and frail appearing  HENT: NCAT, mucous membranes very dry  Respiratory: Clear to auscultation bilaterally  Cardiovascular: RRR  Gastrointestinal: soft, tender to palpation  Musculoskeletal: No bilateral ankle edema, very thin extremities  Neurologic: lethargic, does not follow commands, weak/FTT  Skin: No rashes      Results Reviewed:  LAB RESULTS:        Brief Urine Lab Results     None          Microbiology Results Abnormal     Procedure Component Value - Date/Time    Blood Culture - Blood, Hand, Left [[id number]]  (Normal) Collected: 23    Lab Status: Final result Specimen: Blood from Hand, Left Updated: 23     Blood Culture No growth at 5 days    Narrative:      AEROBIC BOTTLE ONLY    Blood Culture - Blood, Arm, Left [[id number]]  (Normal) Collected: 23    Lab Status: Final result Specimen: Blood from Arm, Left Updated: 23 1745     Blood Culture No growth at 5 days    Narrative:      AEROBIC BOTTLE ONLY          No radiology results from the last 24 hrs    Results for orders placed during the hospital encounter of 03/10/23    Adult Transthoracic Echo Complete W/ Cont if Necessary Per Protocol    Interpretation Summary  •  Left ventricular systolic function is " normal. Estimated left ventricular EF = 55%  •  Left ventricular wall thickness is consistent with moderate concentric hypertrophy.  •  The right ventricular cavity is mildly dilated.  •  Mild to moderate biatrial enlargement.  •  Moderate to severe aortic valve stenosis is present.  Mean gradient 34 mmHg, DOUG 0.9 cm².  •  Mild aortic insufficiency.  •  Mild mitral regurgitation.  •  Mild to moderate tricuspid regurgitation with RVSP 48 mmHg.      Current medications:  Scheduled Meds:acetaminophen, 1,000 mg, Oral, TID  amiodarone, 200 mg, Oral, Daily  apixaban, 2.5 mg, Oral, BID  aspirin, 81 mg, Oral, Daily  atorvastatin, 80 mg, Oral, Nightly  capsaicin, , Topical, Q12H  carvedilol, 3.125 mg, Oral, Q12H  fentaNYL, 1 patch, Transdermal, Q72H   And  Check Fentanyl Patch Placement, 1 each, Does not apply, Q12H  insulin lispro, 0-7 Units, Subcutaneous, TID AC  isosorbide mononitrate, 120 mg, Oral, Daily  mirtazapine, 15 mg, Oral, Nightly  povidone-iodine, 1 application, Topical, Daily  senna-docusate sodium, 2 tablet, Oral, BID  sodium chloride, 2 mL, Intravenous, Q12H      Continuous Infusions:   PRN Meds:.•  albumin human  •  senna-docusate sodium **AND** bisacodyl **AND** bisacodyl  •  cloNIDine  •  dextrose  •  dextrose  •  glucagon (human recombinant)  •  HYDROmorphone  •  hydrOXYzine  •  LORazepam  •  melatonin  •  ondansetron  •  oxyCODONE  •  [COMPLETED] Insert Peripheral IV **AND** sodium chloride  •  sodium chloride  •  sodium chloride    Assessment & Plan   Assessment & Plan     Active Hospital Problems    Diagnosis  POA   • **Intractable pain [R52]  Yes      Resolved Hospital Problems   No resolved problems to display.      I have reviewed the information used in this note from other sources for accuracy and reviewed current scope of care to attest to this documentation.       Brief Hospital Course to date:  Esperanza Pop is a 78 y.o. female w/ ESRD (HD-MWF), DM2, HTN, HLD, HFpEF, pAfib (xarelto), anemia,  prior breast cancer, active anal cancer intolerant of chemo/XRT, recently admitted 3/10-3/29 and DC'ed to SNF to trial improvement of functional status, who returned w/ intractable rectal pain      This patient's hospital course, problems, and plans entered by my partner were reviewed and updated as appropriate by me on 4/18/2023      Intractable pain, multifactorial  Wounds of the buttocks, recent radiotherapy  Metastatic anal cancer  Anorectal fistula  FTT/Anorexia and Cachexia   -s/p partial treatment with chemotherapy and radiation  -did not tolerate treatment before and went to SNF  -patient expresses desire to just go home/not pursue any more treatment  -hospice following, but per my partners discussion with family yesterday they desire to find placement and continue HD, declining hospice at this time  -cont mirtazapine  -continue with wound care, pain control    Dysphagia  -tolerating PO but takes minimal in    ESRD on HD  -HD MWF    DM type 2, a1c 5.8%, w/ insulin use  -SSI    HFpEF  HTN  pAfib  -cont amio, apixiban, asa, statin, coreg, imdur     Mod-Sev AS  -has appt w/ Geena Estrella, APRN 4/27/23     Unfortunately it appears that patient's family has declined hospice and would like to continue with HD. Patient overall declining, appears VERY appropriate for palliative/comfort based care, however family looking into pursuing placement. CM following. Does appear patient has started terminal decline and at some point her BP may limit her ability to tolerate HD, will need to continue to readdress GOC.    Expected Discharge     Expected Discharge Date and Time     Expected Discharge Date Expected Discharge Time    MEDICALLY READY FOR DISCHARGE            DVT prophylaxis:  Medical DVT prophylaxis orders are present.     AM-PAC 6 Clicks Score (PT): 8 (04/17/23 9996)    CODE STATUS:   Code Status and Medical Interventions:   Ordered at: 04/06/23 1151     Medical Intervention Limits:    NO intubation (DNI)     Code  Status (Patient has no pulse and is not breathing):    No CPR (Do Not Attempt to Resuscitate)     Medical Interventions (Patient has pulse or is breathing):    Limited Support     Comments:    Per family       Maria Elena Rodriguez DO  04/18/23

## 2023-04-18 NOTE — CONSULTS
Clinical Nutrition     Nutrition Support Assessment  Reason for Visit: Follow-up protocol      Patient Name: Esperanza Pop  YOB: 1945  MRN: [id number]  Date of Encounter: 04/18/23 12:57 EDT  Admission date: 4/5/2023    Comments:  · Not eating any of the food provided.  · Reports drinking Nepro and wants for all meals   · Patient meets criteria for MSA- chronic/severe   · If patient continues to refuse meals and aggressive medical treatment continued, will need to consider tube feeding for nutrition support.  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    PSH:  She  has a past surgical history that includes Hernia repair; AV fistula placement, brachiobasilic; Hemodialysis Catheter (Right, 6/29/2016); Hysterectomy (2000); Oophorectomy; Breast surgery; Breast cyst excision; Reduction mammaplasty (Left, 2005); Breast biopsy (Left, 2010); Breast lumpectomy (Right, 2005); Mastectomy (Right, 2006); Cardiac catheterization (N/A, 10/10/2016); Cardiac catheterization (10/2016); Coronary stent placement (2016); Colonoscopy (N/A, 7/23/2020); and  Cardiac catheterization (N/A, 1/21/2021).      Applicable Nutrition Concerns:   Skin: Gluteal wounds   Oral: poor intake   GI: history of rectal cancer; did not tolerate treatment.     Applicable Interval History:   (4/18) MSA/ severe due to acute.     Reported/Observed/Food/Nutrition Related History:   4/18  Patient has request to continue to HD and refused hospice.  Family declined hospice at this time.  Per MD note, patient is cachetic, frail alexandra on a terminal decline.  Per documentation patient not eating any of the meals provide: documented 0% intake of all meals since last review.    S/P HD this morning.      Patient back in room at time of visit second visit.  Laying in bed, opened eyes when called her name, seemed a little irritated.  Lunch tray at bedside, unconsumed, when RD asked patient why she did not eat, she reported she is drinking her Nepro and that's all she wants.  Refused NFPE.      4/14  Per Nephrology pt wants to stop dialysis and go home hospice. Pt has plans to discuss with hospice today to determine to go home with hospice or inpatient hospice. Pt and family endorses minimum intakes. Pt reports getting Boost+ instead of Nepro/Novasource Renal. Diet tech went to kitchen and brought Nepro mixed berry to pt to have. Attempted to get food preferences but pt only wanted ONS.      4/6  Pt highly agitated during RD visit-not interested in answering nutrition-related questions. Dtr at bedside states pt diet advanced this afternoon and pt ate some of her tray. Dtr states pt has been 'doing fine'. RD will provide ONS BID and monitor for pt acceptance.       Labs    Labs Reviewed: Yes     Results from last 7 days   Lab Units 04/18/23  0802   GLUCOSE mg/dL 108*   BUN mg/dL 51*   CREATININE mg/dL 6.81*   SODIUM mmol/L 136   CHLORIDE mmol/L 97*   POTASSIUM mmol/L 4.2   PHOSPHORUS mg/dL 4.2       Results from last 7 days   Lab Units 04/18/23  0802   ALBUMIN g/dL 3.4*       Results from last 7 days   Lab  "Units 04/17/23 2009 04/17/23  1647 04/17/23  1043 04/17/23  0739 04/17/23  0713 04/17/23  0654   GLUCOSE mg/dL 103 149* 89 95 59* 59*     Lab Results   Lab Value Date/Time    HGBA1C 5.8 02/07/2023 1346    HGBA1C 6.3 06/14/2022 1101    HGBA1C 8.60 (H) 04/19/2019 0401    HGBA1C 7.60 (H) 10/11/2016 0518               Medications    Medications Reviewed: Yes  Pertinent: Fentanyl patch, insulin, remeron, pericolace  Infusion:  PRN: pain      Intake/Ouptut 24 hrs (0701 - 0700)   I&O's Reviewed: Yes     Daily BM   Anthropometrics     Flowsheet Rows    Flowsheet Row First Filed Value   Admission Height 167.6 cm (66\") Documented at 04/05/2023 0702   Admission Weight 78.9 kg (174 lb) Documented at 04/05/2023 0702        Height: Height: 167.6 cm (66\")  Last Filed Weight: Weight: 63.5 kg (140 lb 1.6 oz) (04/06/23 1457)  Method: Weight Method: Bed scale  BMI: BMI (Calculated): 22.6  BMI classification: Overweight: 25.0-29.9kg/m2     UBW: ~170-175lbs per EMR  Weight change:   Bed weight obtained at time of visit = 164lb: + 3lb weight loss x 1 month, 13lb weight loss x 2 months, unable to determine weight loss from 3 months (no data).  19lb weight loss x 6months (10.4%) - nutritionally significant.       Nutrition Focused Physical Exam     Date: 4/14 4/6: patient not able to participate   4/14: patient refused   Unable to perform exam due to: Patient/family declined     Patient meets criteria for MSA Due to chronic illness based on % intake during admission and history of weight loss.     Current Nutrition Prescription     PO: Diet: Renal Diets; Low Sodium (2-3g), Low Potassium, Low Phosphorus; No Straw, No Mixed Consistencies; Texture: Mechanical Ground (NDD 2); Fluid Consistency: [place] Thick  Oral Nutrition Supplement: Novasource renal BID   Intake: 4 Days: 0% of all documented meals        Nutrition Diagnosis   Date: 4/6 Updated:   Problem Increased nutrient needs   Etiology Skin integrity   Signs/Symptoms gluteal wound, " protein needs ~94g/day (1.2g/kg using 78kg)   Status:     Goal:   General: Nutrition to support treatment  PO: Increase intake  EN/PN: N/A    Nutrition Intervention      Follow treatment progress, Care plan reviewed, Encourage intake, Supplement provided  · Not eating any of the food provided.  · Reports drinking Nepro and wants for all meals   · Patient meets criteria for MSA- chronic/severe   · If patient continues to refuse meals and aggressive medical treatment continued, will need to consider tube feeding for nutrition support.  Patient is unlikely to meet nutrition needs via PO based on history of intake (has been refusing all meals)     Monitoring/Evaluation:   Per protocol, PO intake, Supplement intake, Weight, Skin status      Kimberly Mooney RD  Time Spent: 25

## 2023-04-18 NOTE — PROGRESS NOTES
" LOS: 13 days   Patient Care Team:  Meghana Rodgers MD as PCP - General  Demetrius Sagastume MD as Consulting Physician (Cardiology)  Kevan Lerma MD as Consulting Physician (Nephrology)  Geena Estrella APRN as Nurse Practitioner (Cardiology)  Frank Mandujano MD as Referring Physician (Colon and Rectal Surgery)  Kevan Cavazos MD as Consulting Physician (Radiation Oncology)  Yue Reeves RN as Nurse Navigator  Darryn Burk MD as Consulting Physician (Nephrology)    Chief Complaint: ESRD    Subjective   Seen on HD tolerating well so far. Goal  liter as tolerated. Bp stable. Good access pressure.       Review of system: no cp or sob.       Objective     Vital Sign Min/Max for last 24 hours  Temp  Min: 95.7 °F (35.4 °C)  Max: 98.9 °F (37.2 °C)   BP  Min: 85/46  Max: 129/47   Pulse  Min: 61  Max: 78   Resp  Min: 14  Max: 16   SpO2  Min: 94 %  Max: 100 %   Flow (L/min)  Min: 2  Max: 3   No data recorded     Flowsheet Rows    Flowsheet Row First Filed Value   Admission Height 167.6 cm (66\") Documented at 04/05/2023 0702   Admission Weight 78.9 kg (174 lb) Documented at 04/05/2023 0702          No intake/output data recorded.  I/O last 3 completed shifts:  In: 170 [P.O.:120; I.V.:50]  Out: 0     Objective:  General Appearance:  Ill-appearing, comfortable and in no acute distress.    Vital signs: (most recent): Blood pressure 126/57, pulse 78, temperature 98.1 °F (36.7 °C), temperature source Axillary, resp. rate 16, height 167.6 cm (66\"), weight 63.5 kg (140 lb 1.6 oz), SpO2 94 %, not currently breastfeeding.    Lungs:  Normal effort.  Breath sounds clear to auscultation.  She is not in respiratory distress.  No decreased breath sounds or wheezes.    Heart: Normal rate.  Regular rhythm.  Positive for murmur.  No gallop.   Abdomen: Abdomen is soft.  There is no abdominal tenderness.     Extremities: There is no dependent edema or local swelling.              Results Review:     I reviewed the " patient's new clinical results.    WBC No results found for: WBC   HGB No results found for: HGB   HCT No results found for: HCT   Platlets No results found for: LABPLAT   MCV No results found for: MCV       Sodium Sodium   Date Value Ref Range Status   04/18/2023 136 136 - 145 mmol/L Final      Potassium Potassium   Date Value Ref Range Status   04/18/2023 4.2 3.5 - 5.2 mmol/L Final     Comment:     Slight hemolysis detected by analyzer. Results may be affected.      Chloride Chloride   Date Value Ref Range Status   04/18/2023 97 (L) 98 - 107 mmol/L Final      CO2 CO2   Date Value Ref Range Status   04/18/2023 21.0 (L) 22.0 - 29.0 mmol/L Final      BUN BUN   Date Value Ref Range Status   04/18/2023 51 (H) 8 - 23 mg/dL Final      Creatinine Creatinine   Date Value Ref Range Status   04/18/2023 6.81 (H) 0.57 - 1.00 mg/dL Final      Calcium Calcium   Date Value Ref Range Status   04/18/2023 9.2 8.6 - 10.5 mg/dL Final      PO4 No results found for: CAPO4   Albumin Albumin   Date Value Ref Range Status   04/18/2023 3.4 (L) 3.5 - 5.2 g/dL Final      Magnesium No results found for: MG   Uric Acid No results found for: URICACID     Medication Review: yes    Assessment & Plan       Intractable pain      Assessment & Plan        ESRD: M[facility]     Volume status: No significant anemia     Abdominal pain: In the setting of rectal cancer.     Failure to thrive     Hypoalbuminemia      Anemia: ACD due to CKD. Unable to add AMY due to rectal cancer      Recommendations:  Patient wants to continue HD for now. OVI working on confirming outpatient HD chair. HD per TTS [facility] at present.  .      Gabe Butler MD  04/18/23  19:42 EDT

## 2023-04-19 ENCOUNTER — SPECIALTY PHARMACY (OUTPATIENT)
Dept: ONCOLOGY | Facility: HOSPITAL | Age: 78
End: 2023-04-19
Payer: MEDICARE

## 2023-04-19 LAB
GLUCOSE BLDC GLUCOMTR-MCNC: 125 MG/DL (ref 70–130)
GLUCOSE BLDC GLUCOMTR-MCNC: 138 MG/DL (ref 70–130)
GLUCOSE BLDC GLUCOMTR-MCNC: 140 MG/DL (ref 70–130)

## 2023-04-19 PROCEDURE — 25010000002 HYDROMORPHONE PER 4 MG: Performed by: STUDENT IN AN ORGANIZED HEALTH CARE EDUCATION/TRAINING PROGRAM

## 2023-04-19 PROCEDURE — 97530 THERAPEUTIC ACTIVITIES: CPT

## 2023-04-19 PROCEDURE — 99233 SBSQ HOSP IP/OBS HIGH 50: CPT | Performed by: INTERNAL MEDICINE

## 2023-04-19 PROCEDURE — 92526 ORAL FUNCTION THERAPY: CPT

## 2023-04-19 PROCEDURE — 25010000002 LORAZEPAM PER 2 MG: Performed by: FAMILY MEDICINE

## 2023-04-19 PROCEDURE — 97110 THERAPEUTIC EXERCISES: CPT

## 2023-04-19 PROCEDURE — 82962 GLUCOSE BLOOD TEST: CPT

## 2023-04-19 RX ADMIN — Medication 5 MG: at 21:20

## 2023-04-19 RX ADMIN — Medication 2 ML: at 10:06

## 2023-04-19 RX ADMIN — OXYCODONE HYDROCHLORIDE 5 MG: 5 TABLET ORAL at 18:23

## 2023-04-19 RX ADMIN — FENTANYL 1 PATCH: 25 PATCH TRANSDERMAL at 16:18

## 2023-04-19 RX ADMIN — ACETAMINOPHEN 1000 MG: 500 TABLET ORAL at 09:57

## 2023-04-19 RX ADMIN — CARVEDILOL 3.12 MG: 6.25 TABLET, FILM COATED ORAL at 21:20

## 2023-04-19 RX ADMIN — CAPSAICIN: 0.75 CREAM TOPICAL at 09:58

## 2023-04-19 RX ADMIN — APIXABAN 2.5 MG: 2.5 TABLET, FILM COATED ORAL at 09:56

## 2023-04-19 RX ADMIN — ATORVASTATIN CALCIUM 80 MG: 40 TABLET, FILM COATED ORAL at 21:20

## 2023-04-19 RX ADMIN — POVIDONE-IODINE 1 EACH: 10 SOLUTION TOPICAL at 10:07

## 2023-04-19 RX ADMIN — HYDROMORPHONE HYDROCHLORIDE 0.25 MG: 1 INJECTION, SOLUTION INTRAMUSCULAR; INTRAVENOUS; SUBCUTANEOUS at 15:33

## 2023-04-19 RX ADMIN — HYDROXYZINE HYDROCHLORIDE 25 MG: 25 TABLET, FILM COATED ORAL at 21:20

## 2023-04-19 RX ADMIN — MIRTAZAPINE 15 MG: 15 TABLET, FILM COATED ORAL at 21:20

## 2023-04-19 RX ADMIN — LORAZEPAM 1 MG: 2 INJECTION INTRAMUSCULAR; INTRAVENOUS at 11:52

## 2023-04-19 RX ADMIN — HYDROMORPHONE HYDROCHLORIDE 0.25 MG: 1 INJECTION, SOLUTION INTRAMUSCULAR; INTRAVENOUS; SUBCUTANEOUS at 04:57

## 2023-04-19 RX ADMIN — AMIODARONE HYDROCHLORIDE 200 MG: 200 TABLET ORAL at 09:57

## 2023-04-19 RX ADMIN — HYDROMORPHONE HYDROCHLORIDE 0.25 MG: 1 INJECTION, SOLUTION INTRAMUSCULAR; INTRAVENOUS; SUBCUTANEOUS at 10:02

## 2023-04-19 RX ADMIN — APIXABAN 2.5 MG: 2.5 TABLET, FILM COATED ORAL at 21:20

## 2023-04-19 RX ADMIN — OXYCODONE HYDROCHLORIDE 5 MG: 5 TABLET ORAL at 12:02

## 2023-04-19 RX ADMIN — CARVEDILOL 3.12 MG: 6.25 TABLET, FILM COATED ORAL at 09:57

## 2023-04-19 RX ADMIN — LORAZEPAM 1 MG: 2 INJECTION INTRAMUSCULAR; INTRAVENOUS at 06:45

## 2023-04-19 RX ADMIN — CAPSAICIN: 0.75 CREAM TOPICAL at 21:21

## 2023-04-19 RX ADMIN — HYDROXYZINE HYDROCHLORIDE 25 MG: 25 TABLET, FILM COATED ORAL at 12:02

## 2023-04-19 NOTE — SIGNIFICANT NOTE
ETHICS CONSULT - Subcommittee made up of Emily Giron MDiv, [facility], Travis Omalley MD, and Luisa Pulliam LCSW recommend physician consult the Palliative Care service to see patient again.  Dr. Omalley spoke with physicians to encourage coordinating messaging regarding the prognosis of the patient. Ethics committee consult is closed at this time.

## 2023-04-19 NOTE — PLAN OF CARE
Goal Outcome Evaluation:  Plan of Care Reviewed With: patient        Progress: no change  Outcome Evaluation: Pt has not met goals, goals revised to reflect current level of function.  Pt dep x 2 bed mobility, max A x 2 bed to chair with BUE support,  min A to wash face, min A cup to mouth. Pt continues below baseline with self care/mobility d/t increased confusion, weakness, decreased balance and activity tolerance.  OT will change frequency to 3x wk until pt can increase level of participation. Recommend SNF if pt can increase level of participation.

## 2023-04-19 NOTE — CASE MANAGEMENT/SOCIAL WORK
Continued Stay Note  [facility]     Patient Name: Esperanza Pop  MRN: [id number]  Today's Date: 4/19/2023    Admit Date: 4/5/2023    Plan: Ongoing   Discharge Plan     Row Name 04/19/23 1259       Plan    Plan Ongoing    Patient/Family in Agreement with Plan yes    Plan Comments Spoke to patient at bedside. Patient is lethargic but able to talk to CM. Referral is pending with [facility]. [facility] is following. CM will continue to follow.    Final Discharge Disposition Code 03 - skilled nursing facility (SNF)               Discharge Codes    No documentation.               Expected Discharge Date and Time     Expected Discharge Date Expected Discharge Time    Apr 20, 2023             Ramón Henderson RN

## 2023-04-19 NOTE — PROGRESS NOTES
" LOS: 14 days   Patient Care Team:  Meghana Rodgers MD as PCP - General  Demetrius Sagastume MD as Consulting Physician (Cardiology)  Kevan Lerma MD as Consulting Physician (Nephrology)  Geena Estrella APRN as Nurse Practitioner (Cardiology)  Frank Mandujano MD as Referring Physician (Colon and Rectal Surgery)  Kevan Cavazos MD as Consulting Physician (Radiation Oncology)  Yue Reeves RN as Nurse Navigator  Darryn Burk MD as Consulting Physician (Nephrology)    Chief Complaint: ESRD    Subjective   Plan for HD in AM    Review of system: no cp or sob.       Objective     Vital Sign Min/Max for last 24 hours  Temp  Min: 96.7 °F (35.9 °C)  Max: 98.7 °F (37.1 °C)   BP  Min: 115/58  Max: 198/100   Pulse  Min: 67  Max: 89   Resp  Min: 16  Max: 22   SpO2  Min: 92 %  Max: 100 %   Flow (L/min)  Min: 1  Max: 2   No data recorded     Flowsheet Rows    Flowsheet Row First Filed Value   Admission Height 167.6 cm (66\") Documented at 04/05/2023 0702   Admission Weight 78.9 kg (174 lb) Documented at 04/05/2023 0702          I/O this shift:  In: 60 [P.O.:60]  Out: -   I/O last 3 completed shifts:  In: 50 [I.V.:50]  Out: 0     Objective:  General Appearance:  Ill-appearing, comfortable and in no acute distress.    Vital signs: (most recent): Blood pressure 115/58, pulse 68, temperature 97 °F (36.1 °C), temperature source Axillary, resp. rate 20, height 167.6 cm (66\"), weight 63.5 kg (140 lb 1.6 oz), SpO2 98 %, not currently breastfeeding.    Lungs:  Normal effort.  Breath sounds clear to auscultation.  She is not in respiratory distress.  No decreased breath sounds or wheezes.    Heart: Normal rate.  Regular rhythm.  Positive for murmur.  No gallop.   Abdomen: Abdomen is soft.  There is no abdominal tenderness.     Extremities: There is no dependent edema or local swelling.              Results Review:     I reviewed the patient's new clinical results.    WBC No results found for: WBC   HGB No results " found for: HGB   HCT No results found for: HCT   Platlets No results found for: LABPLAT   MCV No results found for: MCV       Sodium Sodium   Date Value Ref Range Status   04/18/2023 136 136 - 145 mmol/L Final      Potassium Potassium   Date Value Ref Range Status   04/18/2023 4.2 3.5 - 5.2 mmol/L Final     Comment:     Slight hemolysis detected by analyzer. Results may be affected.      Chloride Chloride   Date Value Ref Range Status   04/18/2023 97 (L) 98 - 107 mmol/L Final      CO2 CO2   Date Value Ref Range Status   04/18/2023 21.0 (L) 22.0 - 29.0 mmol/L Final      BUN BUN   Date Value Ref Range Status   04/18/2023 51 (H) 8 - 23 mg/dL Final      Creatinine Creatinine   Date Value Ref Range Status   04/18/2023 6.81 (H) 0.57 - 1.00 mg/dL Final      Calcium Calcium   Date Value Ref Range Status   04/18/2023 9.2 8.6 - 10.5 mg/dL Final      PO4 No results found for: CAPO4   Albumin Albumin   Date Value Ref Range Status   04/18/2023 3.4 (L) 3.5 - 5.2 g/dL Final      Magnesium No results found for: MG   Uric Acid No results found for: URICACID     Medication Review: yes    Assessment & Plan       Intractable pain      Assessment & Plan        ESRD: MWF [facility]     Volume status: No significant anemia     Abdominal pain: In the setting of rectal cancer.     Failure to thrive     Hypoalbuminemia      Anemia: ACD due to CKD. Unable to add AMY due to rectal cancer      Recommendations:  Patient wants to continue HD for now. SW working on confirming outpatient HD chair. HD per TTS [facility] at present.  .      Gabe Butler MD  04/19/23  16:37 EDT

## 2023-04-19 NOTE — PLAN OF CARE
Goal Outcome Evaluation:  Plan of Care Reviewed With: (P) patient, daughter         SLP treatment completed. Will continue to address dysphagia. Please see note for further details and recommendations.

## 2023-04-19 NOTE — THERAPY PROGRESS REPORT/RE-CERT
Patient Name: Esperanza Pop  : 1945    MRN: [id number]                              Today's Date: 2023       Admit Date: 2023    Visit Dx:     ICD-10-CM ICD-9-CM   1. Intractable pain  R52 780.96   2. Rectal cancer  C20 154.1   3. Adult failure to thrive  R62.7 783.7   4. Hypoalbuminemia  E88.09 273.8   5. ESRD on hemodialysis  N18.6 585.6    Z99.2 V45.11   6. Pressure injury of buttock, stage 3, unspecified laterality  L89.303 707.05     707.23   7. Dysphagia, unspecified type  R13.10 787.20     Patient Active Problem List   Diagnosis   • Acute on chronic diastolic heart failure   • Type 2 diabetes mellitus   • Essential hypertension   • Coronary artery disease involving native coronary artery of native heart with angina pectoris   • Breast cancer, female, right   • Seasonal allergic rhinitis   • Right carotid bruit   • Vitamin B12 deficiency   • Hyperlipidemia LDL goal <70   • End stage renal disease on dialysis (HCC)   • Nonrheumatic aortic valve stenosis   • NSTEMI (non-ST elevated myocardial infarction)   • UTI (urinary tract infection)   • Ischemic heart disease   • CHF (congestive heart failure)   • Acute non-ST segment elevation myocardial infarction (STEMI) following previous myocardial infarction   • Dyslipidemia   • Diabetes mellitus   • Mild obesity   • Elevated troponin   • Screen for colon cancer   • Acute midline low back pain without sciatica   • Hypertensive emergency   • PAF (paroxysmal atrial fibrillation) (HCC)   • Malignant neoplasm of anal canal   • Hematochezia   • Severe malnutrition   • Symptomatic anemia   • Intractable pain     Past Medical History:   Diagnosis Date   • Acute bronchitis    • Acute conjunctivitis    • Acute kidney injury     Admission from 2013 to 2014, now resolved with latest creatinine 1.4 on 2014.   • Acute non-ST segment elevation myocardial infarction (STEMI) following previous myocardial infarction    • Anal cancer 2023   • Anal  cancer 2/8/2023   • Arthritis    • Breast cancer, female, right     2005 mastectomy right   • Cancer    • CHF (congestive heart failure)    • Chronic kidney disease     dialysis MWF, [facility]    • Clotting disorder    • COVID-19    • Diabetes mellitus     diagnosed ~1992, checks fsbg 2-3x/day   • Diarrhea    • Dyslipidemia    • Dyspepsia    • Dyspnea    • Edema    • History of transfusion     ~2013 no reaction    • Hx of radiation therapy    • Hyperlipidemia    • Hypertension     Severe   • Ischemic heart disease    • Moderate obesity     BMI 36.2   • Myocardial infarction    • Pneumonia    • Pneumonia 02/2019   • Radiation 2005   • Seasonal allergies    • Wears glasses      Past Surgical History:   Procedure Laterality Date   • AV FISTULA PLACEMENT, BRACHIOBASILIC     • BREAST BIOPSY Left 2010   • BREAST CYST EXCISION     • BREAST LUMPECTOMY Right 2005   • BREAST SURGERY     • CARDIAC CATHETERIZATION N/A 10/10/2016    Procedure: Left Heart Cath;  Surgeon: Scooter Zhao MD;  Location:  [facility];  Service:    • CARDIAC CATHETERIZATION  10/2016   • CARDIAC CATHETERIZATION N/A 1/21/2021    Procedure: Right and Left Heart Cath;  Surgeon: Hugh Tidwell MD;  Location:  [facility];  Service: Cardiology;  Laterality: N/A;   • COLONOSCOPY N/A 7/23/2020    Procedure: COLONOSCOPY;  Surgeon: Rubén Rosas MD;  Location:  [facility];  Service: Gastroenterology;  Laterality: N/A;   • CORONARY STENT PLACEMENT  2016   • HERNIA REPAIR     • HYSTERECTOMY  2000   • INSERTION HEMODIALYSIS CATHETER Right 6/29/2016    Procedure: HEMODIALYSIS CATHETER INSERTION TUNNELLED;  Surgeon: Ramón Chavez MD;  Location:  [facility];  Service:    • MASTECTOMY Right 2006   • OOPHORECTOMY     • REDUCTION MAMMAPLASTY Left 2005      General Information     Row Name 04/19/23 6812          OT Time and Intention    Document Type progress note/recertification  -AC      Mode of Treatment occupational therapy  -     Row Name 04/19/23 1302          General Information    Patient Profile Reviewed yes  -     Row Name 04/19/23 1302          Cognition    Orientation Status (Cognition) oriented to;person;verbal cues/prompts needed for orientation;place  choices for place, knew year, but not month  -     Row Name 04/19/23 1302          Safety Issues, Functional Mobility    Safety Issues Affecting Function (Mobility) ability to follow commands;awareness of need for assistance;friction/shear risk;insight into deficits/self-awareness;judgment;problem-solving;safety precaution awareness;safety precautions follow-through/compliance;sequencing abilities  -     Impairments Affecting Function (Mobility) balance;cognition;endurance/activity tolerance;pain;range of motion (ROM);strength;grasp  -     Cognitive Impairments, Mobility Safety/Performance attention;awareness, need for assistance;insight into deficits/self-awareness;judgment;problem-solving/reasoning;safety precaution awareness;safety precaution follow-through;sequencing abilities  -           User Key  (r) = Recorded By, (t) = Taken By, (c) = Cosigned By    Initials Name Provider Type     Radha Carson OT Occupational Therapist                 Mobility/ADL's     Row Name 04/19/23 1351          Bed Mobility    Bed Mobility supine-sit  -     Supine-Sit [place] (Bed Mobility) verbal cues;dependent (less than 25% patient effort);2 person assist  -     Assistive Device (Bed Mobility) draw sheet;head of bed elevated  -     Comment, (Bed Mobility) VCs to sequence, pt assisted to bring LLE to EOB, required assist with RLE and to bring trunk to midline  -     Row Name 04/19/23 1351          Transfers    Transfers sit-stand transfer;bed-chair transfer  -     Row Name 04/19/23 1351          Bed-Chair Transfer    Bed-Chair [place] (Transfers) maximum assist (25% patient effort);2 person assist;verbal cues  -      Assistive Device (Bed-Chair Transfers) other (see comments)  BUE support  -     Row Name 04/19/23 1351          Sit-Stand Transfer    Sit-Stand [place] (Transfers) maximum assist (25% patient effort);2 person assist;verbal cues  -     Assistive Device (Sit-Stand Transfers) other (see comments)  BUE support  -     Row Name 04/19/23 1351          Activities of Daily Living    BADL Assessment/Intervention grooming;feeding;lower body dressing  -     Row Name 04/19/23 1351          Lower Body Dressing Assessment/Training    [place] Level (Lower Body Dressing) don;socks;dependent (less than 25% patient effort)  -     Position (Lower Body Dressing) supine  -     Row Name 04/19/23 1351          Grooming Assessment/Training    [place] Level (Grooming) wash face, hands;verbal cues;minimum assist (75% patient effort)  -     Position (Grooming) sitting up in bed  -     Comment, (Grooming) sitting up in bed  -     Row Name 04/19/23 1351          Self-Feeding Assessment/Training    [place] Level (Feeding) liquids to mouth;verbal cues;nonverbal cues (demo/gesture);minimum assist (75% patient effort)  -           User Key  (r) = Recorded By, (t) = Taken By, (c) = Cosigned By    Initials Name Provider Type     Radha Carson, OT Occupational Therapist               Obj/Interventions     [facility] Name 04/19/23 1354          Range of Motion Comprehensive    General Range of Motion --  -[facility] Name 04/19/23 1354          Shoulder (Therapeutic Exercise)    Shoulder (Therapeutic Exercise) AAROM (active assistive range of motion)  -     Shoulder AAROM (Therapeutic Exercise) bilateral;flexion;extension  -[facility] Name 04/19/23 1354          Elbow/Forearm (Therapeutic Exercise)    Elbow/Forearm (Therapeutic Exercise) AAROM (active assistive range of motion)  -     Elbow/Forearm AAROM (Therapeutic Exercise) bilateral;flexion;extension;10 repetitions  -[facility] Name 04/19/23 1354          Hand  (Therapeutic Exercise)    Hand (Therapeutic Exercise) AROM (active range of motion)  -     Hand AROM/AAROM (Therapeutic Exercise) bilateral;finger flexion;finger extension;10 repetitions;AROM (active range of motion)  -     Row Name 04/19/23 1353          Motor Skills    Therapeutic Exercise shoulder;elbow/forearm;hand  -           User Key  (r) = Recorded By, (t) = Taken By, (c) = Cosigned By    Initials Name Provider Type    AC Radha Carson, OT Occupational Therapist               Goals/Plan     Row Name 04/19/23 2336          Bed Mobility Goal 1 (OT)    Activity/Assistive Device (Bed Mobility Goal 1, OT) rolling to left;rolling to right;sit to supine;supine to sit  -     [place] Level/Cues Needed (Bed Mobility Goal 1, OT) verbal cues required;moderate assist (50-74% patient effort)  -AC     Time Frame (Bed Mobility Goal 1, OT) by discharge  -AC     Progress/Outcomes (Bed Mobility Goal 1, OT) goal ongoing;progress slower than expected  -     Row Name 04/19/23 6026          Transfer Goal 1 (OT)    Activity/Assistive Device (Transfer Goal 1, OT) bed-to-chair/chair-to-bed;toilet;walker, rolling  -     [place] Level/Cues Needed (Transfer Goal 1, OT) moderate assist (50-74% patient effort)  -AC     Time Frame (Transfer Goal 1, OT) by discharge  -AC     Progress/Outcome (Transfer Goal 1, OT) goal ongoing;progress slower than expected  -     Row Name 04/19/23 0443          Bathing Goal 1 (OT)    Activity/Device (Bathing Goal 1, OT) upper body bathing  -     [place] Level/Cues Needed (Bathing Goal 1, OT) verbal cues required;minimum assist (75% or more patient effort)  -AC     Time Frame (Bathing Goal 1, OT) by discharge  -AC     Progress/Outcomes (Bathing Goal 1, OT) goal ongoing;progress slower than expected  -     Row Name 04/19/23 3236          Grooming Goal 1 (OT)    Activity/Device (Grooming Goal 1, OT) oral care  -AC     [place] (Grooming Goal 1, OT) minimum assist (75% or  more patient effort)  -AC     Time Frame (Grooming Goal 1, OT) by discharge  -AC     Strategies/Barriers (Grooming Goal 1, OT) seated EOB  -AC     Progress/Outcome (Grooming Goal 1, OT) progress slower than expected;goal revised this date  -AC           User Key  (r) = Recorded By, (t) = Taken By, (c) = Cosigned By    Initials Name Provider Type    AC Radha Carson, OT Occupational Therapist               Clinical Impression     Row Name 04/19/23 1956          Pain Assessment    Additional Documentation Pain Scale: FACES Pre/Post-Treatment (Group)  -AC     Row Name 04/19/23 3458          Pain Scale: FACES Pre/Post-Treatment    Pain: FACES Scale, Pretreatment 8-->hurts whole lot  -AC     Posttreatment Pain Rating 8-->hurts whole lot  -AC     Pain Location - --  buttocks  -AC     Row Name 04/19/23 0135          Plan of Care Review    Plan of Care Reviewed With patient  -AC     Progress no change  -AC     Outcome Evaluation Pt has not met goals, goals revised to reflect current level of function.  Pt dep x 2 bed mobility, max A x 2 bed to chair with BUE support,  min A to wash face, min A cup to mouth. Pt continues below baseline with self care/mobility d/t increased confusion, weakness, decreased balance and activity tolerance.  OT will change frequency to 3x wk until pt can increase level of participation. Recommend SNF if pt can increase level of participation.  -AC     Row Name 04/19/23 4100          Therapy Assessment/Plan (OT)    Therapy Frequency (OT) 3 times/wk  -AC     Row Name 04/19/23 9969          Therapy Plan Review/Discharge Plan (OT)    Anticipated Discharge Disposition (OT) skilled nursing facility  -AC     Row Name 04/19/23 5699          Vital Signs    Pre Patient Position Supine  -AC     Post Patient Position Sitting  -AC     Row Name 04/19/23 7601          Positioning and Restraints    Pre-Treatment Position in bed  -AC     Post Treatment Position chair  -AC     In Chair notified nsg;reclined;call  light within reach;encouraged to call for assist;exit alarm on;with family/caregiver;waffle cushion;on mechanical lift sling;heels elevated  -AC           User Key  (r) = Recorded By, (t) = Taken By, (c) = Cosigned By    Initials Name Provider Type    Radha Nava, ARVIN Occupational Therapist               Outcome Measures     Row Name 04/19/23 1403          How much help from another is currently needed...    Putting on and taking off regular lower body clothing? 1  -AC     Bathing (including washing, rinsing, and drying) 1  -AC     Toileting (which includes using toilet bed pan or urinal) 1  -AC     Putting on and taking off regular upper body clothing 2  -AC     Taking care of personal grooming (such as brushing teeth) 2  -AC     Eating meals 2  -AC     AM-PAC 6 Clicks Score (OT) 9  -AC     Row Name 04/19/23 140          Functional Assessment    Outcome Measure Options AM-PAC 6 Clicks Daily Activity (OT)  -           User Key  (r) = Recorded By, (t) = Taken By, (c) = Cosigned By    Initials Name Provider Type    Radha Nava, ARVIN Occupational Therapist                Occupational Therapy Education     Title: PT OT SLP Therapies (In Progress)     Topic: Occupational Therapy (In Progress)     Point: ADL training (In Progress)     Description:   Instruct learner(s) on proper safety adaptation and remediation techniques during self care or transfers.   Instruct in proper use of assistive devices.              Learning Progress Summary           Patient Acceptance, E, NR by  at 4/12/2023 1321    Acceptance, E, NR by  at 4/8/2023 1326                   Point: Home exercise program (Not Started)     Description:   Instruct learner(s) on appropriate technique for monitoring, assisting and/or progressing therapeutic exercises/activities.              Learner Progress:  Not documented in this visit.          Point: Precautions (In Progress)     Description:   Instruct learner(s) on prescribed precautions  during self-care and functional transfers.              Learning Progress Summary           Patient Acceptance, E, NR by  at 4/12/2023 1321                   Point: Body mechanics (In Progress)     Description:   Instruct learner(s) on proper positioning and spine alignment during self-care, functional mobility activities and/or exercises.              Learning Progress Summary           Patient Acceptance, E, NR by  at 4/12/2023 1321                               User Key     Initials Effective Dates Name Provider Type Discipline     02/03/23 -  Radha Carson, OT Occupational Therapist OT     06/16/21 -  Shandra Galaviz, ARVIN Occupational Therapist OT              OT Recommendation and Plan  Planned Therapy Interventions (OT): activity tolerance training, adaptive equipment training, BADL retraining, occupation/activity based interventions, patient/caregiver education/training, strengthening exercise, transfer/mobility retraining  Therapy Frequency (OT): 3 times/wk  Plan of Care Review  Plan of Care Reviewed With: patient  Progress: no change  Outcome Evaluation: Pt has not met goals, goals revised to reflect current level of function.  Pt dep x 2 bed mobility, max A x 2 bed to chair with BUE support,  min A to wash face, min A cup to mouth. Pt continues below baseline with self care/mobility d/t increased confusion, weakness, decreased balance and activity tolerance.  OT will change frequency to 3x wk until pt can increase level of participation. Recommend SNF if pt can increase level of participation.     Time Calculation:    Time Calculation- OT     Row Name 04/19/23 1302             Time Calculation- OT    OT Start Time 1302  -AC      OT Received On 04/19/23  -      OT Goal Re-Cert Due Date 04/29/23  -         Timed Charges    29520 - OT Therapeutic Exercise Minutes 8  -AC      08880 - OT Therapeutic Activity Minutes 10  -AC      70351 - OT Self Care/Mgmt Minutes 5  -AC         Total Minutes    Timed  Charges Total Minutes 23  -AC       Total Minutes 23  -AC            User Key  (r) = Recorded By, (t) = Taken By, (c) = Cosigned By    Initials Name Provider Type    AC Radha Carson, OT Occupational Therapist              Therapy Charges for Today     Code Description Service Date Service Provider Modifiers Qty    09134260972  OT THER PROC EA 15 MIN 4/19/2023 Radha Carson, OT GO 1    [id number]  OT THERAPEUTIC ACT EA 15 MIN 4/19/2023 Radha Carson, OT GO 1    [id number]  OT THER SUPP EA 15 MIN 4/19/2023 Radha Carson, OT GO 1               Radha Carson OT  4/19/2023

## 2023-04-19 NOTE — PROGRESS NOTES
[facility] Medicine Services  PROGRESS NOTE    Patient Name: Esperanza Pop  : 1945  MRN: [id number]    Date of Admission: 2023  Primary Care Physician: Meghana Rodgers MD    Subjective   Subjective     CC:  Follow up anal cancer    HPI:  Patient seen in her room. She is lethergic due to pain medication but does awaken and tell me that she is interested in going home and being comfortable.    ROS:  UTO due to lethargy at times    Objective   Objective     Vital Signs:   Temp:  [96.7 °F (35.9 °C)-98.7 °F (37.1 °C)] 98.3 °F (36.8 °C)  Heart Rate:  [64-89] 69  Resp:  [16-22] 20  BP: (115-198)/() 115/58  Flow (L/min):  [1-2] 1     Physical Exam:  Constitutional: No acute distress, chronically ill and frail appearing  HENT: NCAT, mucous membranes very dry  Respiratory: Clear to auscultation bilaterally  Cardiovascular: RRR  Gastrointestinal: soft, tender to palpation  Musculoskeletal: No bilateral ankle edema, very thin extremities  Neurologic: speech is clear but falls asleep mid conversation  Skin: No rashes      Results Reviewed:  LAB RESULTS:        Brief Urine Lab Results     None          Microbiology Results Abnormal     Procedure Component Value - Date/Time    Blood Culture - Blood, Hand, Left [[id number]]  (Normal) Collected: 23    Lab Status: Final result Specimen: Blood from Hand, Left Updated: 23 174     Blood Culture No growth at 5 days    Narrative:      AEROBIC BOTTLE ONLY    Blood Culture - Blood, Arm, Left [[id number]]  (Normal) Collected: 23    Lab Status: Final result Specimen: Blood from Arm, Left Updated: 23 1745     Blood Culture No growth at 5 days    Narrative:      AEROBIC BOTTLE ONLY          No radiology results from the last 24 hrs    Results for orders placed during the hospital encounter of 03/10/23    Adult Transthoracic Echo Complete W/ Cont if Necessary Per Protocol    Interpretation Summary  •  Left ventricular  systolic function is normal. Estimated left ventricular EF = 55%  •  Left ventricular wall thickness is consistent with moderate concentric hypertrophy.  •  The right ventricular cavity is mildly dilated.  •  Mild to moderate biatrial enlargement.  •  Moderate to severe aortic valve stenosis is present.  Mean gradient 34 mmHg, DOUG 0.9 cm².  •  Mild aortic insufficiency.  •  Mild mitral regurgitation.  •  Mild to moderate tricuspid regurgitation with RVSP 48 mmHg.      Current medications:  Scheduled Meds:acetaminophen, 1,000 mg, Oral, TID  amiodarone, 200 mg, Oral, Daily  apixaban, 2.5 mg, Oral, BID  aspirin, 81 mg, Oral, Daily  atorvastatin, 80 mg, Oral, Nightly  capsaicin, , Topical, Q12H  carvedilol, 3.125 mg, Oral, Q12H  fentaNYL, 1 patch, Transdermal, Q72H   And  Check Fentanyl Patch Placement, 1 each, Does not apply, Q12H  insulin lispro, 0-7 Units, Subcutaneous, TID AC  isosorbide mononitrate, 120 mg, Oral, Daily  mirtazapine, 15 mg, Oral, Nightly  povidone-iodine, 1 application, Topical, Daily  senna-docusate sodium, 2 tablet, Oral, BID  sodium chloride, 2 mL, Intravenous, Q12H      Continuous Infusions:   PRN Meds:.•  albumin human  •  senna-docusate sodium **AND** bisacodyl **AND** bisacodyl  •  cloNIDine  •  dextrose  •  dextrose  •  glucagon (human recombinant)  •  HYDROmorphone  •  hydrOXYzine  •  LORazepam  •  melatonin  •  ondansetron  •  oxyCODONE  •  [COMPLETED] Insert Peripheral IV **AND** sodium chloride  •  sodium chloride  •  sodium chloride    Assessment & Plan   Assessment & Plan     Active Hospital Problems    Diagnosis  POA   • **Intractable pain [R52]  Yes      Resolved Hospital Problems   No resolved problems to display.      I have reviewed the information used in this note from other sources for accuracy and reviewed current scope of care to attest to this documentation.       Brief Hospital Course to date:  Esperanza Pop is a 78 y.o. female w/ ESRD (HD-MWF), DM2, HTN, HLD, HFpEF, pAfib  (xarelto), anemia, prior breast cancer, active anal cancer intolerant of chemo/XRT, recently admitted 3/10-3/29 and DC'ed to SNF to trial improvement of functional status, who returned w/ intractable rectal pain    Intractable pain, multifactorial  Wounds of the buttocks, recent radiotherapy  Metastatic anal cancer  Anorectal fistula  FTT/Anorexia and Cachexia   -s/p partial treatment with chemotherapy and radiation but was not tolerating, thus no further treatment indicated at this time, too weak to tolerate any aggressive treatments  -patient expresses desire to just go home/not pursue any more treatment, I did discuss this again with her today, not sure she grasps the concept of stopping dialysis, however conversation is limited by her lethargy secondary to pain medications  -I was able to update her daughter Indigo via telephone today, she plans to come back to the hospital this afternoon and would like to readdress her mother's plan of care with palliative care again.   -cont mirtazapine  -continue with wound care, pain control    Dysphagia  -tolerating PO but takes minimal in    ESRD on HD  -HD MWF, has been limited secondary to hypotension while on dialysis    DM type 2, a1c 5.8%, w/ insulin use  -SSI    HFpEF  HTN  pAfib  -cont amio, apixiban, asa, statin, coreg, imdur     Mod-Sev AS  -has appt w/ WILMA Lopez 4/27/23     Patient remains VERY appropriate for palliative/comfort based care, I did update her daughter Indigo today via telephone, she is willing to readdress patient's care plan with palliative care. Patient herself did tell me today that she would like to go home and be comfortable. I did reach out to ethics committee who recommended re-involvement of palliative care, I plan to reach out to them today. CM following. Does appear patient has started terminal decline and at some point her BP may limit her ability to tolerate HD.     Expected Discharge     Expected Discharge Date and Time      Expected Discharge Date Expected Discharge Time    MEDICALLY READY FOR DISCHARGE            DVT prophylaxis:  Medical DVT prophylaxis orders are present.     AM-PAC 6 Clicks Score (PT): 8 (04/17/23 1606)    CODE STATUS:   Code Status and Medical Interventions:   Ordered at: 04/06/23 1151     Medical Intervention Limits:    NO intubation (DNI)     Code Status (Patient has no pulse and is not breathing):    No CPR (Do Not Attempt to Resuscitate)     Medical Interventions (Patient has pulse or is breathing):    Limited Support     Comments:    Per family       Maria Elena Rodriguez,   04/19/23

## 2023-04-19 NOTE — THERAPY TREATMENT NOTE
Acute Care - Speech Language Pathology   Swallow Treatment Note [facility]     Patient Name: Esperanza Pop  : 1945  MRN: [id number]  Today's Date: 2023               Admit Date: 2023    Visit Dx:     ICD-10-CM ICD-9-CM   1. Intractable pain  R52 780.96   2. Rectal cancer  C20 154.1   3. Adult failure to thrive  R62.7 783.7   4. Hypoalbuminemia  E88.09 273.8   5. ESRD on hemodialysis  N18.6 585.6    Z99.2 V45.11   6. Pressure injury of buttock, stage 3, unspecified laterality  L89.303 707.05     707.23   7. Dysphagia, unspecified type  R13.10 787.20     Patient Active Problem List   Diagnosis   • Acute on chronic diastolic heart failure   • Type 2 diabetes mellitus   • Essential hypertension   • Coronary artery disease involving native coronary artery of native heart with angina pectoris   • Breast cancer, female, right   • Seasonal allergic rhinitis   • Right carotid bruit   • Vitamin B12 deficiency   • Hyperlipidemia LDL goal <70   • End stage renal disease on dialysis (HCC)   • Nonrheumatic aortic valve stenosis   • NSTEMI (non-ST elevated myocardial infarction)   • UTI (urinary tract infection)   • Ischemic heart disease   • CHF (congestive heart failure)   • Acute non-ST segment elevation myocardial infarction (STEMI) following previous myocardial infarction   • Dyslipidemia   • Diabetes mellitus   • Mild obesity   • Elevated troponin   • Screen for colon cancer   • Acute midline low back pain without sciatica   • Hypertensive emergency   • PAF (paroxysmal atrial fibrillation) ([facility])   • Malignant neoplasm of anal canal   • Hematochezia   • Severe malnutrition   • Symptomatic anemia   • Intractable pain     Past Medical History:   Diagnosis Date   • Acute bronchitis    • Acute conjunctivitis    • Acute kidney injury     Admission from 2013 to 2014, now resolved with latest creatinine 1.4 on 2014.   • Acute non-ST segment elevation myocardial infarction (STEMI) following  previous myocardial infarction    • Anal cancer 2/8/2023   • Anal cancer 2/8/2023   • Arthritis    • Breast cancer, female, right     2005 mastectomy right   • Cancer    • CHF (congestive heart failure)    • Chronic kidney disease     dialysis MWF, f/u [facility]    • Clotting disorder    • COVID-19    • Diabetes mellitus     diagnosed ~1992, checks fsbg 2-3x/day   • Diarrhea    • Dyslipidemia    • Dyspepsia    • Dyspnea    • Edema    • History of transfusion     ~2013 no reaction    • Hx of radiation therapy    • Hyperlipidemia    • Hypertension     Severe   • Ischemic heart disease    • Moderate obesity     BMI 36.2   • Myocardial infarction    • Pneumonia    • Pneumonia 02/2019   • Radiation 2005   • Seasonal allergies    • Wears glasses      Past Surgical History:   Procedure Laterality Date   • AV FISTULA PLACEMENT, BRACHIOBASILIC     • BREAST BIOPSY Left 2010   • BREAST CYST EXCISION     • BREAST LUMPECTOMY Right 2005   • BREAST SURGERY     • CARDIAC CATHETERIZATION N/A 10/10/2016    Procedure: Left Heart Cath;  Surgeon: Scooter Zhao MD;  Location:  [facility];  Service:    • CARDIAC CATHETERIZATION  10/2016   • CARDIAC CATHETERIZATION N/A 1/21/2021    Procedure: Right and Left Heart Cath;  Surgeon: Hugh Tidwell MD;  Location:  [facility];  Service: Cardiology;  Laterality: N/A;   • COLONOSCOPY N/A 7/23/2020    Procedure: COLONOSCOPY;  Surgeon: Rubén Rosas MD;  Location:  [facility];  Service: Gastroenterology;  Laterality: N/A;   • CORONARY STENT PLACEMENT  2016   • HERNIA REPAIR     • HYSTERECTOMY  2000   • INSERTION HEMODIALYSIS CATHETER Right 6/29/2016    Procedure: HEMODIALYSIS CATHETER INSERTION TUNNELLED;  Surgeon: Ramón Chavez MD;  Location: [facility];  Service:    • MASTECTOMY Right 2006   • OOPHORECTOMY     • REDUCTION MAMMAPLASTY Left 2005       SLP Recommendation and Plan     SLP Diet Recommendation: soft to chew  textures, ground, nectar thick liquids, water between meals after oral care, with supervision, other (see comments) (MD okayed water between meals) (04/19/23 1340)  Recommended Precautions and Strategies: general aspiration precautions (04/19/23 1340)  SLP Rec. for Method of Medication Administration: meds whole, meds crushed, with puree, as tolerated (04/19/23 1340)     Monitor for Signs of Aspiration: yes, notify SLP if any concerns (04/19/23 1340)  Recommended Diagnostics: FEES (04/19/23 1340)     Anticipated Discharge Disposition (SLP): home with home health, anticipate therapy at next level of care (04/19/23 1340)     Therapy Frequency (Swallow): 5 days per week (04/19/23 1340)  Predicted Duration Therapy Intervention (Days): until discharge (04/19/23 1340)        Daily Summary of Progress (SLP): progress toward functional goals is good (04/19/23 1340)               Treatment Assessment (SLP): suspected, pharyngeal dysphagia (04/19/23 1340)  Treatment Assessment Comments (SLP): Trialed thin, nectar thick-liquid w/ pt. Pt immediately coughed w/ thin, and had one delayed cough w/ nectar-thick. Reviewed general aspiration precautions w/ family and pt. Plan for FEES (4/20) tomorrow to further assess. (04/19/23 1340)  Plan for Continued Treatment (SLP): continue treatment per plan of care (04/19/23 1340)         Plan of Care Reviewed With: patient, daughter      SWALLOW EVALUATION (last 72 hours)     SLP Adult Swallow Evaluation     Row Name 04/19/23 1340                   Rehab Evaluation    Document Type therapy note (daily note)  -MP (r) LL (t) MP (c)        Subjective Information no complaints  -MP (r) LL (t) MP (c)        Patient Observations alert;cooperative  -MP (r) LL (t) MP (c)        Patient/Family/Caregiver Comments/Observations Daughter present  -MP (r) LL (t) MP (c)        Patient Effort good  -MP (r) LL (t) MP (c)           Pain    Additional Documentation Pain Scale: Numbers Pre/Post-Treatment  (Group)  -MP (r) LL (t) MP (c)           Pain Scale: Numbers Pre/Post-Treatment    Pretreatment Pain Rating 10/10  -MP (r) LL (t) MP (c)        Posttreatment Pain Rating 10/10  -MP (r) LL (t) MP (c)        Pain Location - buttock  -MP (r) LL (t) MP (c)        Pre/Posttreatment Pain Comment RN aware and managing pain  -MP (r) LL (t) MP (c)           SLP Treatment Clinical Impressions    Treatment Assessment (SLP) suspected;pharyngeal dysphagia  -MP (r) LL (t) MP (c)        Treatment Assessment Comments (SLP) Trialed thin, nectar thick-liquid w/ pt. Pt immediately coughed w/ thin, and had one delayed cough w/ nectar-thick. Reviewed general aspiration precautions w/ family and pt. Plan for FEES (4/20) tomorrow to further assess.  -MP (r) LL (t) MP (c)        Daily Summary of Progress (SLP) progress toward functional goals is good  -MP (r) LL (t) MP (c)        Plan for Continued Treatment (SLP) continue treatment per plan of care  -MP (r) LL (t) MP (c)        Care Plan Review care plan/treatment goals reviewed  -MP (r) LL (t) MP (c)        Care Plan Review, Other Participant(s) daughter  -MP (r) LL (t) MP (c)           Recommendations    Therapy Frequency (Swallow) 5 days per week  -MP (r) LL (t) MP (c)        Predicted Duration Therapy Intervention (Days) until discharge  -MP (r) LL (t) MP (c)        SLP Diet Recommendation soft to chew textures;ground;nectar thick liquids;water between meals after oral care, with supervision;other (see comments)  MD okayed water between meals  -MP (r) LL (t) MP (c)        Recommended Diagnostics FEES  -MP (r) LL (t) MP (c)        Recommended Precautions and Strategies general aspiration precautions  -MP (r) LL (t) MP (c)        Oral Care Recommendations Oral Care BID/PRN;Toothbrush  -MP (r) LL (t) MP (c)        SLP Rec. for Method of Medication Administration meds whole;meds crushed;with puree;as tolerated  -MP (r) LL (t) MP (c)        Monitor for Signs of Aspiration yes;notify SLP if  any concerns  -MP (r) LL (t) MP (c)        Anticipated Discharge Disposition (SLP) home with home health;anticipate therapy at next level of care  -MP (r) LL (t) MP (c)              User Key  (r) = Recorded By, (t) = Taken By, (c) = Cosigned By    Initials Name Effective Dates    MP Demarco Chavez, MS CCC-SLP 12/28/21 -      Shandra Arita, Speech Therapy Student 01/20/23 -                 EDUCATION  The patient has been educated in the following areas:   Dysphagia (Swallowing Impairment).        SLP GOALS     Row Name 04/19/23 1340             (LTG) Patient will demonstrate functional swallow for    Diet Texture (Demonstrate functional swallow) regular textures  -MP (r) LL (t) MP (c)      Liquid viscosity (Demonstrate functional swallow) nectar/ mildly thick liquids  -MP (r) LL (t) MP (c)      [place] (Demonstrate functional swallow) independently (over 90% accuracy)  -MP (r) LL (t) MP (c)      Time Frame (Demonstrate functional swallow) by discharge  -MP (r) LL (t) MP (c)      Progress/Outcomes (Demonstrate functional swallow) continuing progress toward goal  -MP (r) LL (t) MP (c)         (STG) Patient will tolerate trials of    Consistencies Trialed (Tolerate trials) soft to chew (ground) textures;nectar/ mildly thick liquids  -MP (r) LL (t) MP (c)      Desired Outcome (Tolerate trials) without signs/symptoms of aspiration  -MP (r) LL (t) MP (c)      [place] (Tolerate trials) independently (over 90% accuracy)  -MP (r) LL (t) MP (c)      Time Frame (Tolerate trials) by discharge  -MP (r) LL (t) MP (c)      Progress/Outcomes (Tolerate trials) continuing progress toward goal  -MP (r) LL (t) MP (c)      Comment (Tolerate trials) 1 delayed cough w/ trials of nectar-thick liquid  -MP (r) LL (t) MP (c)         (STG) Patient will tolerate therapeutic trials of    Consistencies Trialed (Tolerate therapeutic trials) soft to chew (whole) textures;thin liquids  -MP (r) LL (t) MP (c)      Desired Outcome  (Tolerate therapeutic trials) without signs/symptoms of aspiration;with adequate oral prep/transit/clearance  -MP (r) LL (t) MP (c)      [place] (Tolerate therapeutic trials) independently (over 90% accuracy)  -MP (r) LL (t) MP (c)      Time Frame (Tolerate therapeutic trials) by discharge  -MP (r) LL (t) MP (c)      Progress/Outcomes (Tolerate therapeutic trials) continuing progress toward goal  -MP (r) LL (t) MP (c)      Comment (Tolerate therapeutic trials) Coughed w/ trials of thin  -MP (r) LL (t) MP (c)            User Key  (r) = Recorded By, (t) = Taken By, (c) = Cosigned By    Initials Name Provider Type    Demarco Saha MS CCC-SLP Speech and Language Pathologist    Shandra Le, Speech Therapy Student SLP Student                   Time Calculation:    Time Calculation- SLP     Row Name 04/19/23 1418             Time Calculation- SLP    SLP Start Time 1340  -MP (r) LL (t) MP (c)      SLP Received On 04/19/23  -MP (r) LL (t) MP (c)         Untimed Charges    77517-LJ Treatment Swallow Minutes 40  -MP (r) LL (t) MP (c)         Total Minutes    Untimed Charges Total Minutes 40  -MP (r) LL (t)       Total Minutes 40  -MP (r) LL (t)            User Key  (r) = Recorded By, (t) = Taken By, (c) = Cosigned By    Initials Name Provider Type    Demarco Saha MS CCC-SLP Speech and Language Pathologist    Shandra Le, Speech Therapy Student SLP Student                Therapy Charges for Today     Code Description Service Date Service Provider Modifiers Qty    [id number]  ST TREATMENT SWALLOW 3 4/19/2023 Shandra Arita, Speech Therapy Student GN 1               Shandra Arita Speech Therapy Student  4/19/2023

## 2023-04-19 NOTE — NURSING NOTE
"Reason for Wound, Ostomy and Continence (WOC) Nursing Consultation: \"Bilateral inner thigh open areas we cannot see yesterday due to patient being in dialysis\"    Patient awake.  Family/support not present.      Wounds noted by WOC: Slight worsening of bilateral gluteal/sacral wound, inner thigh abrasions likely due to rubbing and some moisture, stable right heel and ankle pressure injuries-2 images seen below                1.Wound Assessment  Wound Type: Pressure Injury Unstageable  Location: Bilateral Gluteal  Measurements: See flowsheet  Wound Bed: non-granulating, pink and slough-slight moisture to wound bed, more sloughing this time, malodorous  Wound Edges: Open  Periwound Skin: intact and dry   Drainage Characteristics/Odor: none-changed during night shift, not much drainage can be seen on Hydrofiber dressing  Drainage Amount: scant  Pain: Yes   Care provided: Vashe wound care solution gauze placed against wound bed for 5 minutes, did not have Mepitel, wanted to switch from dry Hydrofiber to something that will promote little bit more moisture as wound bed was dry with a slimy biofilm and more slough being present this visit,  Therefore used Thera honey sheet and barrier film wipe to periwound with two 6 x 6 silicone foam dressings  Notes: Extra Thera honey sheet left in room on counter.  Will order every other day dressing change by nursing  Wound Image:       2.Wound Assessment  Wound Type: Abrasion and MASD (Moisture associated skin damage) - Incontinence Associated Dermatitis (IAD)  Location: Bilateral inner thigh  Measurements: Flowsheet-left greater than right  Wound Bed: bleeding, moist and pink, left with a little bit of sloughing towards lateral edge  Wound Edges: Open  Periwound Skin: intact   Drainage Characteristics/Odor: none  Drainage Amount: none  Pain: No   Care provided: Cleanse same as above and dress same as above with Thera honey sheet to promote continuity of wound care between her " wounds  Notes: We will place orders for every other day dressing change per nursing with Thera honey sheet  Wound Image:           Recommendation(s) for management of wound:   -Refer to wound care orders for specific instructions on how to treat/manage wound.  -Practice pressure injury prevention protocol.    Most recent Jayce Scale score:  Sensory Perception: 2-->very limited  Moisture: 3-->occasionally moist  Activity: 1-->bedfast  Mobility: 2-->very limited  Nutrition: 1-->very poor  Friction and Shear: 1-->problem  Jayce Score: 10 (04/19/23 0978)   Specialty support surface: Low Air Loss (MORGAN) Mattress       Pressure Injury Prevention Protocol (initiate for Jayce Score of 18 or less):   *Keep skin dry, turn q 2 hr, keep heels elevated and offloaded with offloading heel boots.    *Apply z-guard to bottom and bony prominences daily and as needed with incontinence episodes.  *Follow C.A.R.E protocol if medical devices (Bipap, glynn, Ng tube, etc) are being used.     WOC Team will continue to follow.  Please re consult if the wound(s) worsens.

## 2023-04-19 NOTE — PROGRESS NOTES
Palliative Care Progress Note    Date of Admission: 4/5/2023    Subjective: Patient with no significant complaints at this point in time.  Current Code Status     Date Active Code Status Order ID Comments User Context       4/6/2023 1151 No CPR (Do Not Attempt to Resuscitate) [id number]  Ranulfo Thomas, DO Inpatient      Question Answer    Code Status (Patient has no pulse and is not breathing) No CPR (Do Not Attempt to Resuscitate)    Medical Interventions (Patient has pulse or is breathing) Limited Support    Medical Intervention Limits: NO intubation (DNI)    Comments Per family              No current facility-administered medications on file prior to encounter.     Current Outpatient Medications on File Prior to Encounter   Medication Sig Dispense Refill   • acetaminophen (TYLENOL) 500 MG tablet Take 1 tablet by mouth Every 6 (Six) Hours As Needed for Mild Pain, Fever or Headache.     • amiodarone (PACERONE) 200 MG tablet Take 1 tablet by mouth Daily. 90 tablet 1   • apixaban (ELIQUIS) 2.5 MG tablet tablet Take 1 tablet by mouth 2 (Two) Times a Day. 180 tablet 3   • aspirin 81 MG EC tablet Take 1 tablet by mouth Daily.     • atorvastatin (LIPITOR) 80 MG tablet Take 1 tablet by mouth Every Night. 90 tablet 3   • B Complex-C-Folic Acid (DIALYVITE 800 PO) Take 1 tablet by mouth 3 (Three) Times a Week. On dialysis days Mon-Wed-Fri     • bisacodyl (DULCOLAX) 5 MG EC tablet Take 1 tablet by mouth Daily As Needed for Constipation.     • Capsaicin 0.1 % cream Apply 2-4 gms to feet nightly. Use gloves 60 g 3   • carvedilol (COREG) 3.125 MG tablet Take 1 tablet by mouth Every 12 (Twelve) Hours. 60 tablet 0   • docusate sodium (COLACE) 100 MG capsule Take 1 capsule by mouth Daily.     • dorzolamide-timolol (COSOPT) 22.3-6.8 MG/ML ophthalmic solution Administer 1 drop to both eyes Daily. 10 mL 3   • Durezol 0.05 % ophthalmic emulsion INSTILL 1 DROP 4 TIMES DAILY INTO SURGICAL EYE STARTING AFTER SURGERY.     • epoetin  orquidea-epbx (RETACRIT) 41400 UNIT/ML injection Inject 2 mL under the skin into the appropriate area as directed 3 (Three) Times a Week. Indications: Anemia associated with Chronic Kidney Failure 6.6 mL 0   • fluocinonide (LIDEX) 0.05 % ointment Apply 1 application topically to the appropriate area as directed 2 (Two) Times a Day. Scalp  2   • HYDROcodone-acetaminophen (NORCO)  MG per tablet Take 1 tablet by mouth Every 6 (Six) Hours As Needed for Moderate Pain.     • Hydrocortisone, Perianal, (ANUSOL-HC) 2.5 % rectal cream Insert  into the rectum 2 (Two) Times a Day. 28 g 0   • hydrOXYzine (ATARAX) 25 MG tablet Take 1 tablet by mouth 3 (Three) Times a Day As Needed for Anxiety. 30 tablet 0   • Insulin Lispro (humaLOG) 100 UNIT/ML injection Inject 0-9 Units under the skin into the appropriate area as directed 3 (Three) Times a Day With Meals. (Patient taking differently: Inject 0-9 Units under the skin into the appropriate area as directed 3 (Three) Times a Day With Meals. Sliding Scale:  2 units- 150-199  4 units- 200-249  6 units- 250-299  7 units- 300-349  8 units- 350-400  9 units- Over 400) 10 mL 0   • isosorbide mononitrate (IMDUR) 120 MG 24 hr tablet Take 1 tablet by mouth Daily. 90 tablet 3   • mirtazapine (REMERON) 15 MG tablet Take 1 tablet by mouth Every Night. 30 tablet 0   • Nutritional Supplements (ProMod) liquid Take 30 mL by mouth 2 (Two) Times a Day.     • oxyCODONE (OXY-IR) 5 MG capsule Take 1 capsule by mouth Every 6 (Six) Hours As Needed for Moderate Pain.     • silver sulfadiazine (SILVADENE, SSD) 1 % cream Apply 1 application topically to the appropriate area as directed 2 (Two) Times a Day. Buttocks, sacrum area     • vitamin C (ASCORBIC ACID) 500 MG tablet Take 1 tablet by mouth Daily.     • Vitamin D, Cholecalciferol, (CHOLECALCIFEROL) 10 MCG (400 UNIT) tablet Take 1 tablet by mouth Daily.     • zinc sulfate (Zinc-220) 220 (50 Zn) MG capsule Take 1 capsule by mouth Daily.     • cloNIDine  "(CATAPRES) 0.1 MG tablet Take 1 tablet by mouth 3 (Three) Times a Day As Needed for High Blood Pressure (SBP>160). 30 tablet 0   • midodrine (PROAMATINE) 5 MG tablet Take 1 tablet by mouth As Needed. For Systolic BP below 90          •  albumin human  •  senna-docusate sodium **AND** bisacodyl **AND** bisacodyl  •  cloNIDine  •  dextrose  •  dextrose  •  glucagon (human recombinant)  •  HYDROmorphone  •  hydrOXYzine  •  LORazepam  •  melatonin  •  ondansetron  •  oxyCODONE  •  [COMPLETED] Insert Peripheral IV **AND** sodium chloride  •  sodium chloride  •  sodium chloride    Objective: /58 (BP Location: Left arm, Patient Position: Lying)   Pulse 69   Temp 98.3 °F (36.8 °C) (Axillary)   Resp 20   Ht 167.6 cm (66\")   Wt 63.5 kg (140 lb 1.6 oz)   LMP  (LMP Unknown)   SpO2 96%   BMI 22.61 kg/m²      Intake/Output Summary (Last 24 hours) at 4/19/2023 1244  Last data filed at 4/19/2023 0945  Gross per 24 hour   Intake 0 ml   Output --   Net 0 ml     Physical Exam:      General Appearance:    Alert, cooperative, frail-appearing   Head:    Normocephalic, without obvious abnormality, atraumatic   Eyes:            Lids and lashes normal, conjunctivae and sclerae normal, no   icterus, no pallor, corneas clear, PERRLA   Ears:    Ears appear intact with no abnormalities noted   Throat:   No oral lesions, no thrush, oral mucosa moist   Neck:   No adenopathy, supple, trachea midline, no thyromegaly, no     carotid bruit, no JVD   Back:     No kyphosis present, no scoliosis present, no skin lesions,       erythema or scars, no tenderness to percussion or                   palpation,   range of motion normal   Lungs:     Clear to auscultation,respirations regular, even and                   unlabored    Heart:    Regular rhythm and normal rate, normal S1 and S2, no            murmur, no gallop, no rub, no click   Breast Exam:    Deferred   Abdomen:     Normal bowel sounds, no masses, no organomegaly, soft        " non-tender, non-distended, no guarding, no rebound                 tenderness   Genitalia:    Deferred   Extremities:   Moves all extremities well, no edema, no cyanosis, no              redness   Pulses:   Pulses palpable and equal bilaterally   Skin:   No bleeding, bruising or rash   Lymph nodes:   No palpable adenopathy   Neurologic:   Cranial nerves 2 - 12 grossly intact, sensation intact, DTR        present and equal bilaterally         Results from last 7 days   Lab Units 04/18/23  0802   SODIUM mmol/L 136   POTASSIUM mmol/L 4.2   CHLORIDE mmol/L 97*   CO2 mmol/L 21.0*   BUN mg/dL 51*   CREATININE mg/dL 6.81*   CALCIUM mg/dL 9.2   GLUCOSE mg/dL 108*       Impression: End-stage renal disease  Rectal cancer  Anorexia  Neoplastic pain  Debility  Goals of care  Plan: Did talk to the patient about her wishes as far as continuing or stopping dialysis.  At this point time she states that she wants to continue dialysis for a little while longer.  My only recommendation given this fact and the fact that placement is very much an issue is to see if the patient is able to even tolerate sitting up in chair to get dialysis, as the only place that will allow her to be in a bed to get dialysis as far as facility would be found cervical.  Family apparently is very adamant about not wanting to go back to [facility], however if the patient is not able to sit up in a chair to get dialysis then that takes most other facilities in this area off the table      Trent Niño DO  04/19/23  12:44 EDT

## 2023-04-20 ENCOUNTER — APPOINTMENT (OUTPATIENT)
Dept: NEPHROLOGY | Facility: HOSPITAL | Age: 78
DRG: 935 | End: 2023-04-20
Payer: MEDICARE

## 2023-04-20 LAB
GLUCOSE BLDC GLUCOMTR-MCNC: 114 MG/DL (ref 70–130)
GLUCOSE BLDC GLUCOMTR-MCNC: 127 MG/DL (ref 70–130)

## 2023-04-20 PROCEDURE — 82962 GLUCOSE BLOOD TEST: CPT

## 2023-04-20 PROCEDURE — 25010000002 DIPHENHYDRAMINE PER 50 MG

## 2023-04-20 PROCEDURE — 99232 SBSQ HOSP IP/OBS MODERATE 35: CPT | Performed by: INTERNAL MEDICINE

## 2023-04-20 PROCEDURE — 25010000002 HYDROMORPHONE PER 4 MG: Performed by: STUDENT IN AN ORGANIZED HEALTH CARE EDUCATION/TRAINING PROGRAM

## 2023-04-20 RX ORDER — DIPHENHYDRAMINE HYDROCHLORIDE 50 MG/ML
INJECTION INTRAMUSCULAR; INTRAVENOUS
Status: COMPLETED
Start: 2023-04-20 | End: 2023-04-20

## 2023-04-20 RX ADMIN — HYDROMORPHONE HYDROCHLORIDE 0.25 MG: 1 INJECTION, SOLUTION INTRAMUSCULAR; INTRAVENOUS; SUBCUTANEOUS at 14:03

## 2023-04-20 RX ADMIN — DIPHENHYDRAMINE HYDROCHLORIDE 12.5 MG: 50 INJECTION INTRAMUSCULAR; INTRAVENOUS at 10:36

## 2023-04-20 RX ADMIN — HYDROMORPHONE HYDROCHLORIDE 0.25 MG: 1 INJECTION, SOLUTION INTRAMUSCULAR; INTRAVENOUS; SUBCUTANEOUS at 18:37

## 2023-04-20 RX ADMIN — AMIODARONE HYDROCHLORIDE 200 MG: 200 TABLET ORAL at 13:32

## 2023-04-20 RX ADMIN — SENNOSIDES AND DOCUSATE SODIUM 2 TABLET: 8.6; 5 TABLET ORAL at 23:23

## 2023-04-20 RX ADMIN — OXYCODONE HYDROCHLORIDE 5 MG: 5 TABLET ORAL at 13:32

## 2023-04-20 RX ADMIN — ASPIRIN 81 MG: 81 TABLET, COATED ORAL at 13:32

## 2023-04-20 RX ADMIN — SENNOSIDES AND DOCUSATE SODIUM 2 TABLET: 8.6; 5 TABLET ORAL at 13:32

## 2023-04-20 RX ADMIN — ATORVASTATIN CALCIUM 80 MG: 40 TABLET, FILM COATED ORAL at 23:23

## 2023-04-20 RX ADMIN — Medication 2 ML: at 23:25

## 2023-04-20 RX ADMIN — ISOSORBIDE MONONITRATE 120 MG: 120 TABLET, EXTENDED RELEASE ORAL at 13:32

## 2023-04-20 RX ADMIN — CARVEDILOL 3.12 MG: 6.25 TABLET, FILM COATED ORAL at 23:23

## 2023-04-20 RX ADMIN — CARVEDILOL 3.12 MG: 6.25 TABLET, FILM COATED ORAL at 13:33

## 2023-04-20 RX ADMIN — MIRTAZAPINE 15 MG: 15 TABLET, FILM COATED ORAL at 23:24

## 2023-04-20 RX ADMIN — APIXABAN 2.5 MG: 2.5 TABLET, FILM COATED ORAL at 13:32

## 2023-04-20 RX ADMIN — HYDROXYZINE HYDROCHLORIDE 25 MG: 25 TABLET, FILM COATED ORAL at 13:33

## 2023-04-20 RX ADMIN — APIXABAN 2.5 MG: 2.5 TABLET, FILM COATED ORAL at 23:24

## 2023-04-20 RX ADMIN — CAPSAICIN: 0.75 CREAM TOPICAL at 23:25

## 2023-04-20 NOTE — PROGRESS NOTES
Continued Stay Note  [facility]     Patient Name: Esperanza Pop  MRN: [id number]  Today's Date: 4/20/2023    Admit Date: 4/5/2023    Plan: Ongoing   Discharge Plan     Row Name 04/20/23 1731       Plan    Plan Comments Met with pt's daughter Indigo, along with Luisa RUSSO, palliative care team, and  Diana STONER, regarding discharge plan of care. Indigo stated the entire family is now all in agreement with allowing pt to stop dialysis and focus on comfort measures. Noted that pt went to dialysis today then told the dialysis staff did not want dialysis, pt brought back to the room. Indigo stated the family wants comfort measures for pt. Informed Indigo the hospice liaison will continue to follow, and will keep the hospice inpatient team updated on pt's condition. Indigo agreeable to this plan. Luisa stated will notify Dr. Rodriguez and Dr. Niño of family's decision. Please call [phone] if can be of further assistance.               Discharge Codes    No documentation.               Expected Discharge Date and Time     Expected Discharge Date Expected Discharge Time    Apr 20, 2023             Emma Spangler RN

## 2023-04-20 NOTE — PROGRESS NOTES
" LOS: 15 days   Patient Care Team:  Meghana Rodgers MD as PCP - General  Demetrius Sagastume MD as Consulting Physician (Cardiology)  Kevan Lerma MD as Consulting Physician (Nephrology)  Geena Estrella APRN as Nurse Practitioner (Cardiology)  Frank Mandujano MD as Referring Physician (Colon and Rectal Surgery)  Kevan Cavazos MD as Consulting Physician (Radiation Oncology)  Yue Reeves RN as Nurse Navigator  Darryn Burk MD as Consulting Physician (Nephrology)    Chief Complaint: ESRD    Subjective   Seen on HD tolerating well so far. Goal  ml as tolerated. Bp stable. Good access pressure.       Review of system: no cp or sob.       Objective     Vital Sign Min/Max for last 24 hours  Temp  Min: 96.8 °F (36 °C)  Max: 98.3 °F (36.8 °C)   BP  Min: 100/46  Max: 134/69   Pulse  Min: 62  Max: 69   Resp  Min: 10  Max: 20   SpO2  Min: 96 %  Max: 100 %   No data recorded   No data recorded     Flowsheet Rows    Flowsheet Row First Filed Value   Admission Height 167.6 cm (66\") Documented at 04/05/2023 0702   Admission Weight 78.9 kg (174 lb) Documented at 04/05/2023 0702          No intake/output data recorded.  I/O last 3 completed shifts:  In: 60 [P.O.:60]  Out: -     Objective:  General Appearance:  Ill-appearing, comfortable and in no acute distress.    Vital signs: (most recent): Blood pressure 134/69, pulse 69, temperature 96.8 °F (36 °C), temperature source Axillary, resp. rate 14, height 167.6 cm (66\"), weight 63.5 kg (140 lb 1.6 oz), SpO2 100 %, not currently breastfeeding.    Lungs:  Normal effort.  Breath sounds clear to auscultation.  She is not in respiratory distress.  No decreased breath sounds or wheezes.    Heart: Normal rate.  Regular rhythm.  Positive for murmur.  No gallop.   Abdomen: Abdomen is soft.  There is no abdominal tenderness.     Extremities: There is no dependent edema or local swelling.              Results Review:     I reviewed the patient's new clinical " results.    WBC No results found for: WBC   HGB No results found for: HGB   HCT No results found for: HCT   Platlets No results found for: LABPLAT   MCV No results found for: MCV       Sodium Sodium   Date Value Ref Range Status   04/18/2023 136 136 - 145 mmol/L Final      Potassium Potassium   Date Value Ref Range Status   04/18/2023 4.2 3.5 - 5.2 mmol/L Final     Comment:     Slight hemolysis detected by analyzer. Results may be affected.      Chloride Chloride   Date Value Ref Range Status   04/18/2023 97 (L) 98 - 107 mmol/L Final      CO2 CO2   Date Value Ref Range Status   04/18/2023 21.0 (L) 22.0 - 29.0 mmol/L Final      BUN BUN   Date Value Ref Range Status   04/18/2023 51 (H) 8 - 23 mg/dL Final      Creatinine Creatinine   Date Value Ref Range Status   04/18/2023 6.81 (H) 0.57 - 1.00 mg/dL Final      Calcium Calcium   Date Value Ref Range Status   04/18/2023 9.2 8.6 - 10.5 mg/dL Final      PO4 No results found for: CAPO4   Albumin Albumin   Date Value Ref Range Status   04/18/2023 3.4 (L) 3.5 - 5.2 g/dL Final      Magnesium No results found for: MG   Uric Acid No results found for: URICACID     Medication Review: yes    Assessment & Plan       Intractable pain      Assessment & Plan        ESRD: MWF [facility]     Volume status: No significant anemia     Abdominal pain: In the setting of rectal cancer.     Failure to thrive     Hypoalbuminemia      Anemia: ACD due to CKD. Unable to add AMY due to rectal cancer      Recommendations:  Patient wants to continue HD for now. OVI working on confirming outpatient HD chair. HD per TTS [facility]ayson at present.  .      Gabe Butler MD  04/20/23  10:47 EDT

## 2023-04-20 NOTE — SIGNIFICANT NOTE
04/20/23 0858   SLP Deferred Reason   SLP Deferred Reason Unable to evaluate, medical status change  (Patient not medically appropriate for FEES per RN. Will defer at this time and re-attempt as appropriate.)

## 2023-04-20 NOTE — PAYOR COMM NOTE
"Esperanza Ayoub (78 y.o. Female)     [id number]    Bernadette Almaguer, [place]  Utilization Review  Ecoav-[phone]  Mop-[phone]      Date of Birth   1945    Social Security Number       Address   [address] [facility] [address]    Home Phone   [phone]    MRN   [id number]       Cheondoism   Spiritism    Marital Status                               Admission Date   4/5/23    Admission Type   Emergency    Admitting Provider   Maria Elena Rodriguez DO    Attending Provider   Maria Elena Rodriguez DO    Department, Room/Bed   [facility] 5H, S572/1       Discharge Date       Discharge Disposition       Discharge Destination                               Attending Provider: Maria Elena Rodriguez DO    Allergies: Mircera [Methoxy Polyethylene Glycol-epoetin Beta], Venofer [Iron Sucrose], Albuterol, Nifedipine Er, Penicillins, Prednisone    Isolation: None   Infection: None   Code Status: No CPR    Ht: 167.6 cm (66\")   Wt: 63.5 kg (140 lb 1.6 oz)    Admission Cmt: None   Principal Problem: Intractable pain [R52]                 Active Insurance as of 4/5/2023     Primary Coverage     Payor Plan Insurance Group Employer/Plan Group    [facility] MEDICARE REPLACEMENT [facility] MEDICARE REPLACEMENT      Payor Plan Address Payor Plan Phone Number Payor Plan Fax Number Effective Dates    [address] [address] [phone]  12/1/2021 - None Entered    [facility] [id number]       Subscriber Name Subscriber Birth Date Member ID       ESPERANZA AYOUB 1945 [id number]                 Emergency Contacts      (Rel.) Home Phone Work Phone Mobile Phone    isreal ayoub (Daughter) -- -- [phone]    danelle ayoub (Daughter) -- -- [phone]    danelle martin (Sister) -- -- [phone]    Juan Ayoub\ (Son) [phone] -- [phone]            Vital Signs (last day)     Date/Time Temp Temp src Pulse Resp BP Patient Position SpO2    04/20/23 1130 -- -- 70 18 145/60 Lying --    04/20/23 " 1100 -- -- 64 14 130/61 Lying --    04/20/23 1030 -- -- 69 14 134/69 Lying --    04/20/23 1000 97.1 (36.2) Temporal 69 14 140/56 Lying 100    04/20/23 0801 96.8 (36) Axillary 62 12 100/46 Lying 100    04/20/23 0300 96.9 (36.1) Axillary 62 10 126/52 Lying 100    04/19/23 2340 97.5 (36.4) Axillary 65 14 113/50 Lying 98    04/19/23 1603 97 (36.1) Axillary 68 -- -- Lying 98    04/19/23 1125 98.3 (36.8) Axillary 69 20 115/58 Lying 96    04/19/23 0958 96.7 (35.9) Axillary 89 22 198/100 Lying 92    04/19/23 0442 98 (36.7) Axillary 70 18 129/63 Lying 99          Oxygen Therapy (last day)     Date/Time SpO2 Device (Oxygen Therapy) Flow (L/min) Oxygen Concentration (%) ETCO2 (mmHg)    04/20/23 1000 100 room air -- -- --    04/20/23 0801 100 room air -- -- --    04/20/23 0800 -- room air -- -- --    04/20/23 0400 -- room air -- -- --    04/20/23 0300 100 -- -- -- --    04/20/23 0200 -- room air -- -- --    04/20/23 0000 -- room air -- -- --    04/19/23 2340 98 -- -- -- --    04/19/23 2200 -- room air -- -- --    04/19/23 2000 -- room air -- -- --    04/19/23 1603 98 -- -- -- --    04/19/23 1600 -- room air -- -- --    04/19/23 1200 -- room air -- -- --    04/19/23 1125 96 -- -- -- --    04/19/23 0958 92 room air -- -- --    04/19/23 0955 -- room air -- -- --    04/19/23 0442 99 room air -- -- --          Lines, Drains & Airways     Active LDAs     Name Placement date Placement time Site Days    Subcutaneous 04/15/23 Left upper arm 04/15/23  1225  Left upper arm  4                Current Facility-Administered Medications   Medication Dose Route Frequency Provider Last Rate Last Admin   • albumin human 25 % IV SOLN 12.5 g  12.5 g Intravenous PRN Gabe Butler MD       • amiodarone (PACERONE) tablet 200 mg  200 mg Oral Daily Nuria Lynch MD   200 mg at 04/19/23 0957   • apixaban (ELIQUIS) tablet 2.5 mg  2.5 mg Oral BID Nuria Lynch MD   2.5 mg at 04/19/23 2120   • aspirin EC tablet 81 mg  81 mg Oral Daily Nuria Lynch MD   81  mg at 04/16/23 0811   • atorvastatin (LIPITOR) tablet 80 mg  80 mg Oral Nightly Nuria Lynch MD   80 mg at 04/19/23 2120   • sennosides-docusate (PERICOLACE) 8.6-50 MG per tablet 2 tablet  2 tablet Oral BID Nuria Lynch MD   2 tablet at 04/14/23 2119    And   • bisacodyl (DULCOLAX) EC tablet 5 mg  5 mg Oral Daily PRN Nuria Lynch MD        And   • bisacodyl (DULCOLAX) suppository 10 mg  10 mg Rectal Daily PRN Nuria Lynch MD       • capsaicin (ZOSTRIX) 0.075 % topical cream   Topical Q12H Nuria Lynch MD   Given at 04/19/23 2121   • carvedilol (COREG) tablet 3.125 mg  3.125 mg Oral Q12H Nuria Lynch MD   3.125 mg at 04/19/23 2120   • fentaNYL (DURAGESIC) 25 MCG/HR patch 1 patch  1 patch Transdermal Q72H Sabrina Pulliam MD   1 patch at 04/19/23 1618    And   • Check Fentanyl Patch Placement  1 each Does not apply Q12H Sabrina Pulliam MD       • cloNIDine (CATAPRES) tablet 0.1 mg  0.1 mg Oral TID PRN Nuria Lynch MD       • dextrose (D50W) (25 g/50 mL) IV injection 25 g  25 g Intravenous Q15 Min PRN Ranulfo Thomas DO   25 g at 04/11/23 1946   • dextrose (GLUTOSE) oral gel 15 g  15 g Oral Q15 Min PRN Ranulfo Thomas DO       • glucagon (GLUCAGEN) injection 1 mg  1 mg Intramuscular Q15 Min PRN Ranulfo Thomas DO   1 mg at 04/17/23 0717   • HYDROmorphone (DILAUDID) injection 0.25 mg  0.25 mg Intravenous Q4H PRN Nuria Lynch MD   0.25 mg at 04/19/23 1533   • hydrOXYzine (ATARAX) tablet 25 mg  25 mg Oral TID PRN Nuria Lynch MD   25 mg at 04/19/23 2120   • Insulin Lispro (humaLOG) injection 0-7 Units  0-7 Units Subcutaneous TID Ranulfo Montes DO   2 Units at 04/09/23 0927   • isosorbide mononitrate (IMDUR) 24 hr tablet 120 mg  120 mg Oral Daily Nuria Lynch MD   120 mg at 04/14/23 0809   • LORazepam (ATIVAN) injection 1 mg  1 mg Intravenous Q2H PRN Sabrina Pulliam MD   1 mg at 04/19/23 1152   • melatonin tablet 5 mg  5 mg Oral Nightly PRN Nruia Lynch MD   5 mg at 04/19/23 2120   •  mirtazapine (REMERON) tablet 15 mg  15 mg Oral Nightly Nuria Lynch MD   15 mg at 04/19/23 2120   • ondansetron (ZOFRAN) injection 4 mg  4 mg Intravenous Q6H PRN Nuria Lynch MD       • oxyCODONE (ROXICODONE) immediate release tablet 5 mg  5 mg Oral Q4H PRN Sabrina Pulliam MD   5 mg at 04/19/23 1823   • povidone-iodine 10 % swab 1 each  1 application Topical Daily Champ Ríos MD   1 each at 04/19/23 1007   • sodium chloride 0.9 % flush 10 mL  10 mL Intravenous PRN Nuria Lynch MD   10 mL at 04/05/23 1712   • sodium chloride 0.9 % flush 10 mL  10 mL Intravenous PRN Nuria Lynch MD       • sodium chloride 0.9 % flush 2 mL  2 mL Intravenous Q12H Sabrina Pulliam MD   2 mL at 04/19/23 1006   • sodium chloride 0.9 % infusion 40 mL  40 mL Intravenous PRN Nuria Lynch MD         Lab Results (last 24 hours)     Procedure Component Value Units Date/Time    POC Glucose Once [[id number]]  (Normal) Collected: 04/20/23 0705    Specimen: Blood Updated: 04/20/23 0706     Glucose 114 mg/dL      Comment: Meter: [id number] : [id number] Maggie Fishman       POC Glucose Once [[id number]]  (Normal) Collected: 04/19/23 1659    Specimen: Blood Updated: 04/19/23 1700     Glucose 125 mg/dL      Comment: Meter: [id number] : [id number] Maggie Patterson           Imaging Results (Last 24 Hours)     ** No results found for the last 24 hours. **        ECG/EMG Results (last 24 hours)     ** No results found for the last 24 hours. **        Operative/Procedure Notes (last 24 hours)  Notes from 04/19/23 1154 through 04/20/23 1154   No notes of this type exist for this encounter.            Physician Progress Notes (last 24 hours)      Gabe Butler MD at 04/20/23 1047           LOS: 15 days   Patient Care Team:  [facility]eg[facility] Rodgers [facility]CP [facility]ing Phys[facility] (Cardiology)  Kevan Lerma MD as Consulting Physician (Nephrology)  Geena Estrella APRN as Nurse Practitioner (Cardiology)  Delbert  "Frank FORD MD as Referring Physician (Colon and Rectal Surgery)  Kevan Cavazos MD as Consulting Physician (Radiation Oncology)  Yue Reeves, [place] as Nurse Navigator  Darryn Burk MD as Consulting Physician (Nephrology)    Chief Complaint: ESRD    Subjective   Seen on HD tolerating well so far. Goal  ml as tolerated. Bp stable. Good access pressure.       Review of system: no cp or sob.       Objective     Vital Sign Min/Max for last 24 hours  Temp  Min: 96.8 °F (36 °C)  Max: 98.3 °F (36.8 °C)   BP  Min: 100/46  Max: 134/69   Pulse  Min: 62  Max: 69   Resp  Min: 10  Max: 20   SpO2  Min: 96 %  Max: 100 %   No data recorded   No data recorded     Flowsheet Rows    Flowsheet Row First Filed Value   Admission Height 167.6 cm (66\") Documented at 04/05/2023 0702   Admission Weight 78.9 kg (174 lb) Documented at 04/05/2023 0702          No intake/output data recorded.  I/O last 3 completed shifts:  In: 60 [P.O.:60]  Out: -     Objective:  General Appearance:  Ill-appearing, comfortable and in no acute distress.    Vital signs: (most recent): Blood pressure 134/69, pulse 69, temperature 96.8 °F (36 °C), temperature source Axillary, resp. rate 14, height 167.6 cm (66\"), weight 63.5 kg (140 lb 1.6 oz), SpO2 100 %, not currently breastfeeding.    Lungs:  Normal effort.  Breath sounds clear to auscultation.  She is not in respiratory distress.  No decreased breath sounds or wheezes.    Heart: Normal rate.  Regular rhythm.  Positive for murmur.  No gallop.   Abdomen: Abdomen is soft.  There is no abdominal tenderness.     Extremities: There is no dependent edema or local swelling.              Results Review:     I reviewed the patient's new clinical results.    WBC No results found for: WBC   HGB No results found for: HGB   HCT No results found for: HCT   Platlets No results found for: LABPLAT   MCV No results found for: MCV       Sodium Sodium   Date Value Ref Range Status   04/18/2023 136 136 - 145 mmol/L Final "      Potassium Potassium   Date Value Ref Range Status   04/18/2023 4.2 3.5 - 5.2 mmol/L Final     Comment:     Slight hemolysis detected by analyzer. Results may be affected.      Chloride Chloride   Date Value Ref Range Status   04/18/2023 97 (L) 98 - 107 mmol/L Final      CO2 CO2   Date Value Ref Range Status   04/18/2023 21.0 (L) 22.0 - 29.0 mmol/L Final      BUN BUN   Date Value Ref Range Status   04/18/2023 51 (H) 8 - 23 mg/dL Final      Creatinine Creatinine   Date Value Ref Range Status   04/18/2023 6.81 (H) 0.57 - 1.00 mg/dL Final      Calcium Calcium   Date Value Ref Range Status   04/18/2023 9.2 8.6 - 10.5 mg/dL Final      PO4 No results found for: CAPO4   Albumin Albumin   Date Value Ref Range Status   04/18/2023 3.4 (L) 3.5 - 5.2 g/dL Final      Magnesium No results found for: MG   Uric Acid No results found for: URICACID     Medication Review: yes    Assessment & Plan       Intractable pain      Assessment & Plan        ESRD: MWF [facility]     Volume status: No significant anemia     Abdominal pain: In the setting of rectal cancer.     Failure to thrive     Hypoalbuminemia      Anemia: ACD due to CKD. Unable to add AMY due to rectal cancer      Recommendations:  Patient wants to continue HD for now. SW working on confirming outpatient HD chair. HD per [facility] at present.  .      Gabe Butler MD  04/20/23  10:47 EDT      Electronically signed by Gabe Butler MD at 04/20/23 1049     Gabe Butler MD at 04/19/23 1637           LOS: 14 days   Patient Care Team:  Meghana Rodgers MD as PCP - General  Demetrius Sagastume MD as Consulting Physician (Cardiology)  Kevan Lerma MD as Consulting Physician (Nephrology)  Geena Estrella APRN as Nurse Practitioner (Cardiology)  Frank Mandujano MD as Referring Physician (Colon and Rectal Surgery)  Kevan Cavazos MD as Consulting Physician (Radiation Oncology)  Yue Reeves RN as Nurse Navigator  Darryn Burk MD as Consulting Physician  "(Nephrology)    Chief Complaint: ESRD    Subjective   Plan for HD in AM    Review of system: no cp or sob.       Objective     Vital Sign Min/Max for last 24 hours  Temp  Min: 96.7 °F (35.9 °C)  Max: 98.7 °F (37.1 °C)   BP  Min: 115/58  Max: 198/100   Pulse  Min: 67  Max: 89   Resp  Min: 16  Max: 22   SpO2  Min: 92 %  Max: 100 %   Flow (L/min)  Min: 1  Max: 2   No data recorded     Flowsheet Rows    Flowsheet Row First Filed Value   Admission Height 167.6 cm (66\") Documented at 04/05/2023 0702   Admission Weight 78.9 kg (174 lb) Documented at 04/05/2023 0702          I/O this shift:  In: 60 [P.O.:60]  Out: -   I/O last 3 completed shifts:  In: 50 [I.V.:50]  Out: 0     Objective:  General Appearance:  Ill-appearing, comfortable and in no acute distress.    Vital signs: (most recent): Blood pressure 115/58, pulse 68, temperature 97 °F (36.1 °C), temperature source Axillary, resp. rate 20, height 167.6 cm (66\"), weight 63.5 kg (140 lb 1.6 oz), SpO2 98 %, not currently breastfeeding.    Lungs:  Normal effort.  Breath sounds clear to auscultation.  She is not in respiratory distress.  No decreased breath sounds or wheezes.    Heart: Normal rate.  Regular rhythm.  Positive for murmur.  No gallop.   Abdomen: Abdomen is soft.  There is no abdominal tenderness.     Extremities: There is no dependent edema or local swelling.              Results Review:     I reviewed the patient's new clinical results.    WBC No results found for: WBC   HGB No results found for: HGB   HCT No results found for: HCT   Platlets No results found for: LABPLAT   MCV No results found for: MCV       Sodium Sodium   Date Value Ref Range Status   04/18/2023 136 136 - 145 mmol/L Final      Potassium Potassium   Date Value Ref Range Status   04/18/2023 4.2 3.5 - 5.2 mmol/L Final     Comment:     Slight hemolysis detected by analyzer. Results may be affected.      Chloride Chloride   Date Value Ref Range Status   04/18/2023 97 (L) 98 - 107 mmol/L Final "      CO2 CO2   Date Value Ref Range Status   04/18/2023 21.0 (L) 22.0 - 29.0 mmol/L Final      BUN BUN   Date Value Ref Range Status   04/18/2023 51 (H) 8 - 23 mg/dL Final      Creatinine Creatinine   Date Value Ref Range Status   04/18/2023 6.81 (H) 0.57 - 1.00 mg/dL Final      Calcium Calcium   Date Value Ref Range Status   04/18/2023 9.2 8.6 - 10.5 mg/dL Final      PO4 No results found for: CAPO4   Albumin Albumin   Date Value Ref Range Status   04/18/2023 3.4 (L) 3.5 - 5.2 g/dL Final      Magnesium No results found for: MG   Uric Acid No results found for: URICACID     Medication Review: yes    Assessment & Plan       Intractable pain      Assessment & Plan        ESRD: MWF [facility]     Volume status: No significant anemia     Abdominal pain: In the setting of rectal cancer.     Failure to thrive     Hypoalbuminemia      Anemia: ACD due to CKD. Unable to add AMY due to rectal cancer      Recommendations:  Patient wants to continue HD for now. SW working on confirming outpatient HD chair. HD per [facility] at present.  .      Gabe Butler MD  04/19/23  16:37 EDT      Electronically signed by Gabe Butler MD at 04/19/23 1637     Trent Niño DO at 04/19/23 1241          Palliative Care Progress Note    Date of Admission: 4/5/2023    Subjective: Patient with no significant complaints at this point in time.  Current Code Status     Date Active Code Status Order ID Comments User Context       4/6/2023 1151 No CPR (Do Not Attempt to Resuscitate) [id number]  Ranulfo Thomas DO Inpatient      Question Answer    Code Status (Patient has no pulse and is not breathing) No CPR (Do Not Attempt to Resuscitate)    Medical Interventions (Patient has pulse or is breathing) Limited Support    Medical Intervention Limits: NO intubation (DNI)    Comments Per family              No current facility-administered medications on file prior to encounter.     Current Outpatient Medications on File Prior to Encounter   Medication Sig  Dispense Refill   • acetaminophen (TYLENOL) 500 MG tablet Take 1 tablet by mouth Every 6 (Six) Hours As Needed for Mild Pain, Fever or Headache.     • amiodarone (PACERONE) 200 MG tablet Take 1 tablet by mouth Daily. 90 tablet 1   • apixaban (ELIQUIS) 2.5 MG tablet tablet Take 1 tablet by mouth 2 (Two) Times a Day. 180 tablet 3   • aspirin 81 MG EC tablet Take 1 tablet by mouth Daily.     • atorvastatin (LIPITOR) 80 MG tablet Take 1 tablet by mouth Every Night. 90 tablet 3   • B Complex-C-Folic Acid (DIALYVITE 800 PO) Take 1 tablet by mouth 3 (Three) Times a Week. On dialysis days Mon-Wed-Fri     • bisacodyl (DULCOLAX) 5 MG EC tablet Take 1 tablet by mouth Daily As Needed for Constipation.     • Capsaicin 0.1 % cream Apply 2-4 gms to feet nightly. Use gloves 60 g 3   • carvedilol (COREG) 3.125 MG tablet Take 1 tablet by mouth Every 12 (Twelve) Hours. 60 tablet 0   • docusate sodium (COLACE) 100 MG capsule Take 1 capsule by mouth Daily.     • dorzolamide-timolol (COSOPT) 22.3-6.8 MG/ML ophthalmic solution Administer 1 drop to both eyes Daily. 10 mL 3   • Durezol 0.05 % ophthalmic emulsion INSTILL 1 DROP 4 TIMES DAILY INTO SURGICAL EYE STARTING AFTER SURGERY.     • epoetin orquidea-epbx (RETACRIT) 37542 UNIT/ML injection Inject 2 mL under the skin into the appropriate area as directed 3 (Three) Times a Week. Indications: Anemia associated with Chronic Kidney Failure 6.6 mL 0   • fluocinonide (LIDEX) 0.05 % ointment Apply 1 application topically to the appropriate area as directed 2 (Two) Times a Day. Scalp  2   • HYDROcodone-acetaminophen (NORCO)  MG per tablet Take 1 tablet by mouth Every 6 (Six) Hours As Needed for Moderate Pain.     • Hydrocortisone, Perianal, (ANUSOL-HC) 2.5 % rectal cream Insert  into the rectum 2 (Two) Times a Day. 28 g 0   • hydrOXYzine (ATARAX) 25 MG tablet Take 1 tablet by mouth 3 (Three) Times a Day As Needed for Anxiety. 30 tablet 0   • Insulin Lispro (humaLOG) 100 UNIT/ML injection  "Inject 0-9 Units under the skin into the appropriate area as directed 3 (Three) Times a Day With Meals. (Patient taking differently: Inject 0-9 Units under the skin into the appropriate area as directed 3 (Three) Times a Day With Meals. Sliding Scale:  2 units- 150-199  4 units- 200-249  6 units- 250-299  7 units- 300-349  8 units- 350-400  9 units- Over 400) 10 mL 0   • isosorbide mononitrate (IMDUR) 120 MG 24 hr tablet Take 1 tablet by mouth Daily. 90 tablet 3   • mirtazapine (REMERON) 15 MG tablet Take 1 tablet by mouth Every Night. 30 tablet 0   • Nutritional Supplements (ProMod) liquid Take 30 mL by mouth 2 (Two) Times a Day.     • oxyCODONE (OXY-IR) 5 MG capsule Take 1 capsule by mouth Every 6 (Six) Hours As Needed for Moderate Pain.     • silver sulfadiazine (SILVADENE, SSD) 1 % cream Apply 1 application topically to the appropriate area as directed 2 (Two) Times a Day. Buttocks, sacrum area     • vitamin C (ASCORBIC ACID) 500 MG tablet Take 1 tablet by mouth Daily.     • Vitamin D, Cholecalciferol, (CHOLECALCIFEROL) 10 MCG (400 UNIT) tablet Take 1 tablet by mouth Daily.     • zinc sulfate (Zinc-220) 220 (50 Zn) MG capsule Take 1 capsule by mouth Daily.     • cloNIDine (CATAPRES) 0.1 MG tablet Take 1 tablet by mouth 3 (Three) Times a Day As Needed for High Blood Pressure (SBP>160). 30 tablet 0   • midodrine (PROAMATINE) 5 MG tablet Take 1 tablet by mouth As Needed. For Systolic BP below 90          •  albumin human  •  senna-docusate sodium **AND** bisacodyl **AND** bisacodyl  •  cloNIDine  •  dextrose  •  dextrose  •  glucagon (human recombinant)  •  HYDROmorphone  •  hydrOXYzine  •  LORazepam  •  melatonin  •  ondansetron  •  oxyCODONE  •  [COMPLETED] Insert Peripheral IV **AND** sodium chloride  •  sodium chloride  •  sodium chloride    Objective: /58 (BP Location: Left arm, Patient Position: Lying)   Pulse 69   Temp 98.3 °F (36.8 °C) (Axillary)   Resp 20   Ht 167.6 cm (66\")   Wt 63.5 kg (140 lb " 1.6 oz)   LMP  (LMP Unknown)   SpO2 96%   BMI 22.61 kg/m²      Intake/Output Summary (Last 24 hours) at 4/19/2023 1244  Last data filed at 4/19/2023 0945  Gross per 24 hour   Intake 0 ml   Output --   Net 0 ml     Physical Exam:      General Appearance:    Alert, cooperative, frail-appearing   Head:    Normocephalic, without obvious abnormality, atraumatic   Eyes:            Lids and lashes normal, conjunctivae and sclerae normal, no   icterus, no pallor, corneas clear, PERRLA   Ears:    Ears appear intact with no abnormalities noted   Throat:   No oral lesions, no thrush, oral mucosa moist   Neck:   No adenopathy, supple, trachea midline, no thyromegaly, no     carotid bruit, no JVD   Back:     No kyphosis present, no scoliosis present, no skin lesions,       erythema or scars, no tenderness to percussion or                   palpation,   range of motion normal   Lungs:     Clear to auscultation,respirations regular, even and                   unlabored    Heart:    Regular rhythm and normal rate, normal S1 and S2, no            murmur, no gallop, no rub, no click   Breast Exam:    Deferred   Abdomen:     Normal bowel sounds, no masses, no organomegaly, soft        non-tender, non-distended, no guarding, no rebound                 tenderness   Genitalia:    Deferred   Extremities:   Moves all extremities well, no edema, no cyanosis, no              redness   Pulses:   Pulses palpable and equal bilaterally   Skin:   No bleeding, bruising or rash   Lymph nodes:   No palpable adenopathy   Neurologic:   Cranial nerves 2 - 12 grossly intact, sensation intact, DTR        present and equal bilaterally         Results from last 7 days   Lab Units 04/18/23  0802   SODIUM mmol/L 136   POTASSIUM mmol/L 4.2   CHLORIDE mmol/L 97*   CO2 mmol/L 21.0*   BUN mg/dL 51*   CREATININE mg/dL 6.81*   CALCIUM mg/dL 9.2   GLUCOSE mg/dL 108*       Impression: End-stage renal disease  Rectal cancer  Anorexia  Neoplastic  pain  Debility  Goals of care  Plan: Did talk to the patient about her wishes as far as continuing or stopping dialysis.  At this point time she states that she wants to continue dialysis for a little while longer.  My only recommendation given this fact and the fact that placement is very much an issue is to see if the patient is able to even tolerate sitting up in chair to get dialysis, as the only place that will allow her to be in a bed to get dialysis as far as facility would be found cervical.  Family apparently is very adamant about not wanting to go back to [facility], however if the patient is not able to sit up in a chair to get dialysis then that takes most other facilities in this area off the table      Trent Niño DO  23  12:44 EDT        Electronically signed by Trent Niño DO at 23 1246     Maria Elena Rodriguez DO at 23 1201              [facility] Medicine Services  PROGRESS NOTE    Patient Name: Esperanza Pop  : 1945  MRN: [id number]    Date of Admission: 2023  Primary Care Physician: Meghana Rodgers MD    Subjective   Subjective     CC:  Follow up anal cancer    HPI:  Patient seen in her room. She is lethergic due to pain medication but does awaken and tell me that she is interested in going home and being comfortable.    ROS:  UTO due to lethargy at times    Objective   Objective     Vital Signs:   Temp:  [96.7 °F (35.9 °C)-98.7 °F (37.1 °C)] 98.3 °F (36.8 °C)  Heart Rate:  [64-89] 69  Resp:  [16-22] 20  BP: (115-198)/() 115/58  Flow (L/min):  [1-2] 1     Physical Exam:  Constitutional: No acute distress, chronically ill and frail appearing  HENT: NCAT, mucous membranes very dry  Respiratory: Clear to auscultation bilaterally  Cardiovascular: RRR  Gastrointestinal: soft, tender to palpation  Musculoskeletal: No bilateral ankle edema, very thin extremities  Neurologic: speech is clear but falls asleep mid  conversation  Skin: No rashes      Results Reviewed:  LAB RESULTS:        Brief Urine Lab Results     None          Microbiology Results Abnormal     Procedure Component Value - Date/Time    Blood Culture - Blood, Hand, Left [[id number]]  (Normal) Collected: 04/09/23 1724    Lab Status: Final result Specimen: Blood from Hand, Left Updated: 04/14/23 1745     Blood Culture No growth at 5 days    Narrative:      AEROBIC BOTTLE ONLY    Blood Culture - Blood, Arm, Left [[id number]]  (Normal) Collected: 04/09/23 1723    Lab Status: Final result Specimen: Blood from Arm, Left Updated: 04/14/23 1745     Blood Culture No growth at 5 days    Narrative:      AEROBIC BOTTLE ONLY          No radiology results from the last 24 hrs    Results for orders placed during the hospital encounter of 03/10/23    Adult Transthoracic Echo Complete W/ Cont if Necessary Per Protocol    Interpretation Summary  •  Left ventricular systolic function is normal. Estimated left ventricular EF = 55%  •  Left ventricular wall thickness is consistent with moderate concentric hypertrophy.  •  The right ventricular cavity is mildly dilated.  •  Mild to moderate biatrial enlargement.  •  Moderate to severe aortic valve stenosis is present.  Mean gradient 34 mmHg, DOUG 0.9 cm².  •  Mild aortic insufficiency.  •  Mild mitral regurgitation.  •  Mild to moderate tricuspid regurgitation with RVSP 48 mmHg.      Current medications:  Scheduled Meds:acetaminophen, 1,000 mg, Oral, TID  amiodarone, 200 mg, Oral, Daily  apixaban, 2.5 mg, Oral, BID  aspirin, 81 mg, Oral, Daily  atorvastatin, 80 mg, Oral, Nightly  capsaicin, , Topical, Q12H  carvedilol, 3.125 mg, Oral, Q12H  fentaNYL, 1 patch, Transdermal, Q72H   And  Check Fentanyl Patch Placement, 1 each, Does not apply, Q12H  insulin lispro, 0-7 Units, Subcutaneous, TID AC  isosorbide mononitrate, 120 mg, Oral, Daily  mirtazapine, 15 mg, Oral, Nightly  povidone-iodine, 1 application, Topical, Daily  senna-docusate  sodium, 2 tablet, Oral, BID  sodium chloride, 2 mL, Intravenous, Q12H      Continuous Infusions:   PRN Meds:.•  albumin human  •  senna-docusate sodium **AND** bisacodyl **AND** bisacodyl  •  cloNIDine  •  dextrose  •  dextrose  •  glucagon (human recombinant)  •  HYDROmorphone  •  hydrOXYzine  •  LORazepam  •  melatonin  •  ondansetron  •  oxyCODONE  •  [COMPLETED] Insert Peripheral IV **AND** sodium chloride  •  sodium chloride  •  sodium chloride    Assessment & Plan   Assessment & Plan     Active Hospital Problems    Diagnosis  POA   • **Intractable pain [R52]  Yes      Resolved Hospital Problems   No resolved problems to display.      I have reviewed the information used in this note from other sources for accuracy and reviewed current scope of care to attest to this documentation.       Brief Hospital Course to date:  Esperanza Pop is a 78 y.o. female w/ ESRD (HD-MWF), DM2, HTN, HLD, HFpEF, pAfib (xarelto), anemia, prior breast cancer, active anal cancer intolerant of chemo/XRT, recently admitted 3/10-3/29 and [place]'ed to SNF to trial improvement of functional status, who returned w/ intractable rectal pain    Intractable pain, multifactorial  Wounds of the buttocks, recent radiotherapy  Metastatic anal cancer  Anorectal fistula  FTT/Anorexia and Cachexia   -s/p partial treatment with chemotherapy and radiation but was not tolerating, thus no further treatment indicated at this time, too weak to tolerate any aggressive treatments  -patient expresses desire to just go home/not pursue any more treatment, I did discuss this again with her today, not sure she grasps the concept of stopping dialysis, however conversation is limited by her lethargy secondary to pain medications  -I was able to update her daughter Indigo via telephone today, she plans to come back to the hospital this afternoon and would like to readdress her mother's plan of care with palliative care again.   -cont mirtazapine  -continue with wound care,  pain control    Dysphagia  -tolerating PO but takes minimal in    ESRD on HD  -HD MWF, has been limited secondary to hypotension while on dialysis    DM type 2, a1c 5.8%, w/ insulin use  -SSI    HFpEF  HTN  pAfib  -cont amio, apixiban, asa, statin, coreg, imdur     Mod-Sev AS  -has appt w/ Geena Estrella, APRN 4/27/23     Patient remains VERY appropriate for palliative/comfort based care, I did update her daughter Indigo today via telephone, she is willing to readdress patient's care plan with palliative care. Patient herself did tell me today that she would like to go home and be comfortable. I did reach out to ethics committee who recommended re-involvement of palliative care, I plan to reach out to them today. CM following. Does appear patient has started terminal decline and at some point her BP may limit her ability to tolerate HD.     Expected Discharge     Expected Discharge Date and Time     Expected Discharge Date Expected Discharge Time    MEDICALLY READY FOR DISCHARGE            DVT prophylaxis:  Medical DVT prophylaxis orders are present.     AM-PAC 6 Clicks Score (PT): 8 (04/17/23 1606)    CODE STATUS:   Code Status and Medical Interventions:   Ordered at: 04/06/23 1151     Medical Intervention Limits:    NO intubation (DNI)     Code Status (Patient has no pulse and is not breathing):    No CPR (Do Not Attempt to Resuscitate)     Medical Interventions (Patient has pulse or is breathing):    Limited Support     Comments:    Per family       Maria Elena Rodriguez DO  04/19/23        Electronically signed by Maria Elena Rodriguez DO at 04/19/23 1213          Consult Notes (last 24 hours)      Trent Niño DO at 04/19/23 1249      Consult Orders    1. Inpatient Palliative Care MD Consult [[id number]] ordered by Maria Elena Rodriguez DO at 04/19/23 1214             See progress note    Trent OLIVA. DO [place]  12:49 EDT  4/19/2023    Electronically signed by Trent Niño DO at 04/19/23 1243

## 2023-04-20 NOTE — DISCHARGE PLACEMENT REQUEST
"Esperanza Ayoub (78 y.o. Female)   Referred by Dr. Maria Elena Rodriguez  PCP-Dr. IWONA Rodgers    Date of Birth   1945    Social Security Number       Address   [address] [place], [place] [address]    Home Phone   [phone]    MRN   [id number]       [employer]    Marital Status                               Admission Date   4/5/23    Admission Type   Emergency    Admitting Provider   Maria Elena Rodriguez DO    Attending Provider   Maria Elena Rodriguez DO    Department, Room/Bed   [facility] 5H, S572/1       Discharge Date       Discharge Disposition       Discharge Destination                               Attending Provider: Maria Elena Rodriguez DO    Allergies: Mircera [Methoxy Polyethylene Glycol-epoetin Beta], Venofer [Iron Sucrose], Albuterol, Nifedipine Er, Penicillins, Prednisone    Isolation: None   Infection: None   Code Status: No CPR    Ht: 167.6 cm (66\")   Wt: 63.5 kg (140 lb 1.6 oz)    Admission Cmt: None   Principal Problem: Intractable pain [R52]                 Active Insurance as of 4/5/2023     Primary Coverage     Payor Plan Insurance Group Employer/Plan Group    [facility]      Payor Plan Address Payor Plan Phone Number Payor Plan Fax Number Effective Dates    [address] [address] [phone]  12/1/2021 - None Entered    [facility] [address]       Subscriber Name Subscriber Birth Date Member ID       ESPERANZA AYOUB 1945 [id number]                 Emergency Contacts      (Rel.) Home Phone Work Phone Mobile Phone    isreal ayoub (Daughter) -- -- [phone]    danelle ayoub (Daughter) -- -- [phone]    mariodanelle (Sister) -- -- [phone]    Juan Ayoub\ (Son) [phone] -- [phone]            Emergency Contact Information     Name Relation Home Work Mobile    isreal ayoub Daughter   [phone]    danelle ayoub Daughter   [phone]    danelle martin Sister   [phone]    " Juan Pop\ Son [phone]          Insurance Information                [facility] MEDICARE REPLACEMENT/WELL[employer]ARE MEDICARE REPLACEMENT Phone: [phone]    Subscriber: Esperanza Pop Subscriber#: [id number]    Group#:  Precert#: --             History & Physical      Nuria Lynch MD at [address] [place] [address] Medicine Services  HISTORY AND PHYSICAL    Patient Name: Esperanza Pop  : 1945  MRN: [id number]  Primary Care Physician: Meghana Rodgers MD  Date of admission: 2023    Subjective    Subjective     Chief Complaint:  Rectal pain    HPI:  Esperanza Pop is a 78 y.o. female with ESRD on HD , IDDM, HTN, HLD, HFpEF, breast cancer status postmastectomy and radiation, paroxysmal A-fib on Xarelto previously, chronic anemia, rectal cancer recently diagnosed and was getting radiation and chemo, recent admission from 3/10 to 3/29, who presented for intractable rectal pain from SNF.  She reports that since she left the hospital, she has continued to have rectal pain such that she did not tolerate any dialysis on Monday so her last dialysis session was on Friday.  She has not had much of an appetite and has had minimal oral intake for the past several weeks.  The patient denied any other symptoms besides rectal pain and poor appetite today.  Denies fevers, chills, headache, dizziness, nausea, vomiting, diarrhea.    Per the patient's daughter, the patient has had severe pain from her radiation burns on her buttocks.  They also state that she fell most recently yesterday morning while at the SNF.  They state that there is no more radiation or chemotherapy planned at this time due to her debilitation and radiation burns.    The ER, patient was afebrile, heart rate 66, respiratory rate 18, blood pressure 136/57, satting 100% on 2 L nasal cannula.  Labs on admission notable for creatinine 6.93, BUN 39, potassium 4.3, sodium  135, hemoglobin 9, platelets 203.  She was given lidocaine topically and Zofran.  She was admitted for further management.      Review of Systems   Constitutional: Positive for appetite change and fatigue. Negative for fever.   HENT: Negative for congestion and rhinorrhea.    Eyes: Negative for discharge and redness.   Respiratory: Negative for cough and shortness of breath.    Cardiovascular: Negative for chest pain and leg swelling.   Gastrointestinal: Positive for abdominal pain and rectal pain. Negative for diarrhea, nausea and vomiting.   Genitourinary: Negative for dysuria and hematuria.   Musculoskeletal: Negative for gait problem and joint swelling.   Skin: Positive for wound. Negative for pallor.   Neurological: Negative for dizziness and headaches.   Psychiatric/Behavioral: Negative for confusion and self-injury.            Personal History     Past Medical History:   Diagnosis Date   • Acute bronchitis    • Acute conjunctivitis    • Acute kidney injury     Admission from 12/26/2013 to 01/02/2014, now resolved with latest creatinine 1.4 on 01/08/2014.   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   • Anal cancer 2/8/2023   • Arthritis    • Breast cancer, female, right     2005 mastectomy right   • Cancer    • CHF (congestive heart failure)    • Chronic kidney disease     dialysis MW, f/u [facility]    • Clotting disorder    • COVID-19    • Diabetes mellitus     diagnosed ~1992, checks fsbg 2-3x/day   • Diarrhea    • Dyslipidemia    • Dyspepsia    • Dyspnea    • Edema    • History of transfusion     ~2013 no reaction    • Hx of radiation therapy    • Hyperlipidemia    • Hypertension     Severe   • Ischemic heart disease    • Moderate obesity     BMI 36.2   • Myocardial infarction    • Pneumonia    • Pneumonia 02/2019   • Radiation 2005   • Seasonal allergies    • Wears glasses          Oncology Problem  List:  Malignant neoplasm of anal canal (02/08/2023; Status: Active)  Breast cancer, female, right (05/20/2016; Status: Active)    Oncology/Hematology History   Breast cancer, female, right   5/20/2016 Initial Diagnosis    Breast cancer, female, right     Malignant neoplasm of anal canal   2/8/2023 Initial Diagnosis    Anal cancer (HCC)     2/15/2023 -  Chemotherapy    OP ANAL  Capecitabine 825 mg/m2 M-F + XRT         Past Surgical History:   Procedure Laterality Date   • AV FISTULA PLACEMENT, BRACHIOBASILIC     • BREAST BIOPSY Left 2010   • BREAST CYST EXCISION     • BREAST LUMPECTOMY Right 2005   • BREAST SURGERY     • CARDIAC CATHETERIZATION N/A 10/10/2016    Procedure: Left Heart Cath;  Surgeon: Scooter Zhao MD;  Location:  [facility] CATH INVASIVE LOCATION;  Service:    • CARDIAC CATHETERIZATION  10/2016   • CARDIAC CATHETERIZATION N/A 1/21/2021    Procedure: Right and Left Heart Cath;  Surgeon: Hugh Tidwell MD;  Location:  [facility] CATH INVASIVE LOCATION;  Service: Cardiology;  Laterality: N/A;   • COLONOSCOPY N/A 7/23/2020    Procedure: COLONOSCOPY;  Surgeon: Rubén Roass MD;  Location:  [facility] ENDOSCOPY;  Service: Gastroenterology;  Laterality: N/A;   • CORONARY STENT PLACEMENT  2016   • HERNIA REPAIR     • HYSTERECTOMY  2000   • INSERTION HEMODIALYSIS CATHETER Right 6/29/2016    Procedure: HEMODIALYSIS CATHETER INSERTION TUNNELLED;  Surgeon: Ramón Chavez MD;  Location:  [facility] OR;  Service:    • MASTECTOMY Right 2006   • OOPHORECTOMY     • REDUCTION MAMMAPLASTY Left 2005       Family History:  family history includes Breast cancer (age of onset: 55) in her sister; Cancer in her mother; Colon cancer in her maternal grandmother; Coronary artery disease in her father; Heart failure in her father; Pancreatic cancer in her mother; Stroke in her mother; Throat cancer in her daughter.     Social History:  reports that she has never smoked. She has never used smokeless tobacco. She reports  current alcohol use. She reports that she does not use drugs.  Social History     Social History Narrative    Pt consumes 1 serving of caffeine once every 2 weeks.     Lost grandson in Feb. 2022       Medications:  B Complex-C-Folic Acid, Capsaicin, Hydrocortisone (Perianal), Insulin Lispro, Iron Dextran, Lancets Ultra Fine, Vitamin D, acetaminophen, amiodarone, apixaban, aspirin, atorvastatin, benzocaine-menthol, bisacodyl, carvedilol, cloNIDine, difluprednate, dorzolamide-timolol, epoetin orquidea-epbx, fluocinonide, glucose blood, glucose monitor, hydrOXYzine, hydrocortisone, isosorbide mononitrate, lidocaine-prilocaine, mirtazapine, naloxone, and nitroglycerin    Allergies   Allergen Reactions   • Mircera [Methoxy Polyethylene Glycol-Epoetin Beta] Anaphylaxis     Pt stopped breathing   • Venofer [Iron Sucrose] Anaphylaxis     Pt stopped breathing   • Albuterol Other (See Comments)     Increased blood pressure  Used right before heart attack   • Nifedipine Er Swelling   • Penicillins Hives   • Prednisone Other (See Comments)     Makes jittery/anxious       Objective    Objective     Vital Signs:   Temp:  [97 °F (36.1 °C)-99.5 °F (37.5 °C)] 97 °F (36.1 °C)  Heart Rate:  [64-89] 77  Resp:  [18] 18  BP: ()/() 105/70  Flow (L/min):  [2] 2    Physical Exam   Constitutional: No acute distress, sleeping, but awakens with verbal stimulation, alert  HENT: NCAT, mucous membranes moist  Respiratory: Clear to auscultation bilaterally, respiratory effort normal   Cardiovascular: RRR, no murmurs, rubs, or gallops, right AV fistula accessed for dialysis  Gastrointestinal: Normoactive bowel sounds, soft, nontender, nondistended  Musculoskeletal: No bilateral ankle edema  Psychiatric: Appropriate affect, cooperative  Neurologic: PERRL, symmetric facies, symmetric palate rise, tongue midline, moving all extremities, speech clear  Skin: Examined with dialysis nurse present as a chaperone; has gluteal radiation burns that  are tender to palpation    Result Review:  I have personally reviewed the results from the time of this admission to 4/5/2023 15:37 EDT and agree with these findings:  []  Laboratory list / accordion  []  Microbiology  []  Radiology  [x]  EKG/Telemetry   []  Cardiology/Vascular   []  Pathology  [x]  Old records  []  Other:  Most notable findings include: as per hpi    LAB RESULTS:      Lab 04/05/23  0851   WBC 9.16   HEMOGLOBIN 9.0*   HEMATOCRIT 30.9*   PLATELETS 203   NEUTROS ABS 6.08   IMMATURE GRANS (ABS) 0.05   LYMPHS ABS 1.10   MONOS ABS 1.27*   EOS ABS 0.62*   .6*         Lab 04/05/23  0851   SODIUM 135*   POTASSIUM 4.3   CHLORIDE 91*   CO2 29.0   ANION GAP 15.0   BUN 39*   CREATININE 6.93*   EGFR 5.7*   GLUCOSE 169*   CALCIUM 9.0   MAGNESIUM 2.3   PHOSPHORUS 3.2         Lab 04/05/23  0851   TOTAL PROTEIN 8.4   ALBUMIN 2.9*   GLOBULIN 5.5   ALT (SGPT) 12   AST (SGOT) 37*   BILIRUBIN 0.7   ALK PHOS 238*                     Brief Urine Lab Results     None        Microbiology Results (last 10 days)     ** No results found for the last 240 hours. **          No radiology results from the last 24 hrs    Results for orders placed during the hospital encounter of 03/10/23    Adult Transthoracic Echo Complete W/ Cont if Necessary Per Protocol    Interpretation Summary  •  Left ventricular systolic function is normal. Estimated left ventricular EF = 55%  •  Left ventricular wall thickness is consistent with moderate concentric hypertrophy.  •  The right ventricular cavity is mildly dilated.  •  Mild to moderate biatrial enlargement.  •  Moderate to severe aortic valve stenosis is present.  Mean gradient 34 mmHg, DOGU 0.9 cm².  •  Mild aortic insufficiency.  •  Mild mitral regurgitation.  •  Mild to moderate tricuspid regurgitation with RVSP 48 mmHg.      Assessment & Plan   Assessment & Plan       Intractable pain      78 y.o. female with ESRD on HD Monday Wednesday Friday, IDDM, HTN, HLD, HFpEF, breast cancer  status postmastectomy and radiation, paroxysmal A-fib on Xarelto previously, chronic anemia, rectal cancer recently diagnosed and was getting radiation and chemo, recent admission from 3/10 to 3/29, who presented for intractable rectal pain from SNF.  She reports that since she left the hospital, she has continued to have rectal pain such that she did not tolerate any dialysis on Monday so her last dialysis session was on Friday. She has not had much of an appetite and has had minimal oral intake for the past several weeks.  Patient has continued to decline while at the SNF - based on her labs, despite having her last dialysis session on Friday, she has had minimal oral intake.    Buttock wound, appears to be from radiation  Metastatic rectal cancer, was on chemoradiation  • Wound care consulted  • Pain control with scheduled Tylenol, as needed Norco, as needed Dilaudid  • Palliative care consult  • CT abdomen and pelvis pending for lower abdominal pain and rectal pain    Profound Debility  • She follows with Dr. Cavazos, Rad/Onc, Dr. Carpenter with oncology  • In regards to her rectal cancer, could not tolerate chemotherapy or radiation therapy because of severe debility.    • Plan from her last admission (discharged on 3/29) was to hold both therapies (chemoradiation) and transition to SNF and if she improves over the next few weeks, she can resume treatment.   • Patient has continued to decline with continued weakness, poor appetite; patient appears to be hospice appropriate  • PT and OT consulted  • Discussed the patient's poor functional status and need to discuss palliative and hospice.  Patient was amenable to talking with palliative, but was not ready to discuss hospice today.  She requested I speak with her daughters.  I was only able to reach Jeri over the phone. I discussed with her the patient has continued to decline and that we will obtain imaging, palliative consult and discuss further.     • Discussed I believe that she would likely continue to decline and that we needed to further talk about how the patient will spend the time that she has left. Comlethia to get patient's pain under control first    Poor appetite  • Continue mirtazapine    Dysphagia  · Was on a modified diet with purées and pudding thickened liquids during her last admission  · Speech consulted     ESRD on HD MWF  • Renal following for HD today  • Discussed with Dr. Butler who feels patient is likely hospice appropriate     HFpEF   Essential hypertension  hypotension  • Continue dialysis for volume control as above  • Continue imdur 120mg     PAF on eliquis   • Continue eliquis + amiodarone     Mod-Severe aortic stenosis   • Has appt with WILMA Lopez 4/27/23    DVT prophylaxis:  apixaban    CODE STATUS:  FULL per discussion with the patient on 4/5; need to reassess again the AM, as patient has had various code status orders on past admissions       Expected Discharge  Expected Discharge Date and Time     Expected Discharge Date Expected Discharge Time    Apr 10, 2023             This note has been completed as part of a split-shared workflow.     Signature: Electronically signed by Nuria Lynch MD, 04/05/23, 3:36 PM EDT                Electronically signed by Nuria Lynch MD at 04/05/23 1558         Current Facility-Administered Medications   Medication Dose Route Frequency Provider Last Rate Last Admin   • albumin human 25 % IV SOLN 12.5 g  12.5 g Intravenous PRN Gabe Butler MD       • amiodarone (PACERONE) tablet 200 mg  200 mg Oral Daily Nurai Lynch MD   200 mg at 04/20/23 1332   • apixaban (ELIQUIS) tablet 2.5 mg  2.5 mg Oral BID Nuria Lynch MD   2.5 mg at 04/20/23 1332   • aspirin EC tablet 81 mg  81 mg Oral Daily Nuria Lynch MD   81 mg at 04/20/23 1332   • atorvastatin (LIPITOR) tablet 80 mg  80 mg Oral Nightly Nuria Lynch MD   80 mg at 04/19/23 2120   • sennosides-docusate (PERICOLACE) 8.6-50 MG per tablet  2 tablet  2 tablet Oral BID Nuria Lynch MD   2 tablet at 04/20/23 1332    And   • bisacodyl (DULCOLAX) EC tablet 5 mg  5 mg Oral Daily PRN Nuria Lynch MD        And   • bisacodyl (DULCOLAX) suppository 10 mg  10 mg Rectal Daily PRN Nuria Lynch MD       • capsaicin (ZOSTRIX) 0.075 % topical cream   Topical Q12H Nuria Lynch MD   Given at 04/19/23 2121   • carvedilol (COREG) tablet 3.125 mg  3.125 mg Oral Q12H Nuria Lynch MD   3.125 mg at 04/20/23 1333   • fentaNYL (DURAGESIC) 25 MCG/HR patch 1 patch  1 patch Transdermal Q72H Sabrina Pulliam MD   1 patch at 04/19/23 1618    And   • Check Fentanyl Patch Placement  1 each Does not apply Q12H Sabrina Pulliam MD       • cloNIDine (CATAPRES) tablet 0.1 mg  0.1 mg Oral TID PRN Nuria Lynch MD       • dextrose (D50W) (25 g/50 mL) IV injection 25 g  25 g Intravenous Q15 Min PRN Ranulfo Thomas DO   25 g at 04/11/23 1946   • dextrose (GLUTOSE) oral gel 15 g  15 g Oral Q15 Min PRN Ranulfo Thomas DO       • glucagon (GLUCAGEN) injection 1 mg  1 mg Intramuscular Q15 Min PRN Ranulfo Thomas DO   1 mg at 04/17/23 0717   • HYDROmorphone (DILAUDID) injection 0.25 mg  0.25 mg Intravenous Q4H PRN Nuria Lynch MD   0.025 mg at 04/20/23 1403   • hydrOXYzine (ATARAX) tablet 25 mg  25 mg Oral TID PRN Nuria Lynch MD   25 mg at 04/20/23 1333   • Insulin Lispro (humaLOG) injection 0-7 Units  0-7 Units Subcutaneous TID AC Ranulfo Thomas DO   2 Units at 04/09/23 0927   • isosorbide mononitrate (IMDUR) 24 hr tablet 120 mg  120 mg Oral Daily Nuria Lynch MD   120 mg at 04/20/23 1332   • LORazepam (ATIVAN) injection 1 mg  1 mg Intravenous Q2H PRN Sabrina Pulliam MD   1 mg at 04/19/23 1152   • melatonin tablet 5 mg  5 mg Oral Nightly PRN Nuria Lynch MD   5 mg at 04/19/23 2120   • mirtazapine (REMERON) tablet 15 mg  15 mg Oral Nightly Nuria Lynch MD   15 mg at 04/19/23 2120   • ondansetron (ZOFRAN) injection 4 mg  4 mg Intravenous Q6H PRN Nuria Lynch MD       •  oxyCODONE (ROXICODONE) immediate release tablet 5 mg  5 mg Oral Q4H PRN Sabrina Pulliam MD   5 mg at 23 1332   • povidone-iodine 10 % swab 1 each  1 application Topical Daily Champ Ríos MD   1 each at 23 1007   • sodium chloride 0.9 % flush 10 mL  10 mL Intravenous PRN Nuria Lynch MD   10 mL at 23 1712   • sodium chloride 0.9 % flush 10 mL  10 mL Intravenous PRN Nuria Lynch MD       • sodium chloride 0.9 % flush 2 mL  2 mL Intravenous Q12H Sabrina Pulliam MD   2 mL at 23 1006   • sodium chloride 0.9 % infusion 40 mL  40 mL Intravenous PRN Nuria Lynch MD            Physician Progress Notes (last 72 hours)      Maria Elena Rodriguez DO at 23 1153              [facility] Medicine Services  PROGRESS NOTE    Patient Name: Esperanza Pop  : 1945  MRN: [id number]    Date of Admission: 2023  Primary Care Physician: Meghana Rodgers MD    Subjective   Subjective     CC:  Follow up anal cancer    HPI:  Patient resting in bed. More alert and talkative today. She is asking to get up and go to the bathroom.    ROS:  Gen- No fevers, chills  CV- No chest pain, palpitations  Resp- No cough, dyspnea  GI- No N/V/D, + abd pain    Objective   Objective     Vital Signs:   Temp:  [96.8 °F (36 °C)-97.5 °F (36.4 °C)] 97.1 °F (36.2 °C)  Heart Rate:  [62-70] 70  Resp:  [10-18] 18  BP: (100-145)/(46-69) 145/60     Physical Exam:  Constitutional: No acute distress, awake, alert  HENT: NCAT, mucous membranes moist  Respiratory:  respiratory effort normal on room air  Cardiovascular: RRR, no murmurs, rubs, or gallops  Gastrointestinal:  soft, non-distended  Musculoskeletal: No bilateral ankle edema, thin extremities  Psychiatric: Appropriate affect, cooperative  Neurologic: Oriented to person/place, moves all extremities  Skin: No rashes        Results Reviewed:  LAB RESULTS:        Brief Urine Lab Results     None          Microbiology Results Abnormal     Procedure Component  Value - Date/Time    Blood Culture - Blood, Hand, Left [[id number]]  (Normal) Collected: 04/09/23 1724    Lab Status: Final result Specimen: Blood from Hand, Left Updated: 04/14/23 1745     Blood Culture No growth at 5 days    Narrative:      AEROBIC BOTTLE ONLY    Blood Culture - Blood, Arm, Left [[id number]]  (Normal) Collected: 04/09/23 1723    Lab Status: Final result Specimen: Blood from Arm, Left Updated: 04/14/23 1745     Blood Culture No growth at 5 days    Narrative:      AEROBIC BOTTLE ONLY          No radiology results from the last 24 hrs    Results for orders placed during the hospital encounter of 03/10/23    Adult Transthoracic Echo Complete W/ Cont if Necessary Per Protocol    Interpretation Summary  •  Left ventricular systolic function is normal. Estimated left ventricular EF = 55%  •  Left ventricular wall thickness is consistent with moderate concentric hypertrophy.  •  The right ventricular cavity is mildly dilated.  •  Mild to moderate biatrial enlargement.  •  Moderate to severe aortic valve stenosis is present.  Mean gradient 34 mmHg, DOUG 0.9 cm².  •  Mild aortic insufficiency.  •  Mild mitral regurgitation.  •  Mild to moderate tricuspid regurgitation with RVSP 48 mmHg.      Current medications:  Scheduled Meds:amiodarone, 200 mg, Oral, Daily  apixaban, 2.5 mg, Oral, BID  aspirin, 81 mg, Oral, Daily  atorvastatin, 80 mg, Oral, Nightly  capsaicin, , Topical, Q12H  carvedilol, 3.125 mg, Oral, Q12H  fentaNYL, 1 patch, Transdermal, Q72H   And  Check Fentanyl Patch Placement, 1 each, Does not apply, Q12H  insulin lispro, 0-7 Units, Subcutaneous, TID AC  isosorbide mononitrate, 120 mg, Oral, Daily  mirtazapine, 15 mg, Oral, Nightly  povidone-iodine, 1 application, Topical, Daily  senna-docusate sodium, 2 tablet, Oral, BID  sodium chloride, 2 mL, Intravenous, Q12H      Continuous Infusions:   PRN Meds:.•  albumin human  •  senna-docusate sodium **AND** bisacodyl **AND** bisacodyl  •  cloNIDine  •   dextrose  •  dextrose  •  glucagon (human recombinant)  •  HYDROmorphone  •  hydrOXYzine  •  LORazepam  •  melatonin  •  ondansetron  •  oxyCODONE  •  [COMPLETED] Insert Peripheral IV **AND** sodium chloride  •  sodium chloride  •  sodium chloride    Assessment & Plan   Assessment & Plan     Active Hospital Problems    Diagnosis  POA   • **Intractable pain [R52]  Yes      Resolved Hospital Problems   No resolved problems to display.      I have reviewed the information used in this note from other sources for accuracy and reviewed current scope of care to attest to this documentation.       Brief Hospital Course to date:  Esperanza Pop is a 78 y.o. female w/ ESRD (HD-MWF), DM2, HTN, HLD, HFpEF, pAfib (xarelto), anemia, prior breast cancer, active anal cancer intolerant of chemo/XRT, recently admitted 3/10-3/29 and LA'ed to SNF to trial improvement of functional status, who returned w/ intractable rectal pain    Intractable pain, multifactorial  Wounds of the buttocks, recent radiotherapy  Metastatic anal cancer  Anorectal fistula  FTT/Anorexia and Cachexia   -s/p partial treatment with chemotherapy and radiation but was not tolerating, thus no further treatment indicated (palliative recommended) at this time, too weak to tolerate any aggressive treatments  -patient has moments of coherence and does indicate she wishes to continue dialysis, however also tells me that she is okay with being comfortable and spending time with her family instead of focusing on continuing medical treatment  -I spoke with her daughter Indigo in length yesterday morning and again later in the afternoon with multiple family members present. We discussed poor prognosis, poor nutritional status, pain control and extension of patient's suffering. Attempted to focus on big picture and overall long-term goals of care. Family planning to meet with palliative/hospice this afternoon.  -continue with wound care, pain control    Dysphagia  -tolerating  PO but takes minimal in    ESRD on HD  -HD MWF, has been limited secondary to hypotension while on dialysis    DM type 2, a1c 5.8%, w/ insulin use  -SSI    HFpEF  HTN  pAfib  -continue amio, apixiban, asa, statin, coreg, imdur     Mod-Sev AS  -has appt w/ WILMA Lopez 4/27/23     Expected Discharge     Expected Discharge Date and Time     Expected Discharge Date Expected Discharge Time    MEDICALLY READY FOR DISCHARGE            DVT prophylaxis:  Medical DVT prophylaxis orders are present.     AM-PAC 6 Clicks Score (PT): 6 (04/19/23 2000)    CODE STATUS:   Code Status and Medical Interventions:   Ordered at: 04/06/23 1151     Medical Intervention Limits:    NO intubation (DNI)     Code Status (Patient has no pulse and is not breathing):    No CPR (Do Not Attempt to Resuscitate)     Medical Interventions (Patient has pulse or is breathing):    Limited Support     Comments:    Per family       Maria Elena Rodriguez DO  04/20/23        Electronically signed by Maria Elena Rodriguez DO at 04/20/23 1204     Gabe Butler MD at 04/20/23 1047           LOS: 15 days   Patient Care Team:  Meghana Rodgers MD as PCP - General  Demetrius Sagastume MD as Consulting Physician (Cardiology)  Kevan Lerma MD as Consulting Physician (Nephrology)  Geena Estrella APRN as Nurse Practitioner (Cardiology)  Frank Mandujano MD as Referring Physician (Colon and Rectal Surgery)  Kevan Cavazos MD as Consulting Physician (Radiation Oncology)  Yue Reeves RN as Nurse Navigator  Darryn Burk MD as Consulting Physician (Nephrology)    Chief Complaint: ESRD    Subjective   Seen on HD tolerating well so far. Goal  ml as tolerated. Bp stable. Good access pressure.       Review of system: no cp or sob.       Objective     Vital Sign Min/Max for last 24 hours  Temp  Min: 96.8 °F (36 °C)  Max: 98.3 °F (36.8 °C)   BP  Min: 100/46  Max: 134/69   Pulse  Min: 62  Max: 69   Resp  Min: 10  Max: 20   SpO2  Min: 96 %  Max: 100  "%   No data recorded   No data recorded     Flowsheet Rows    Flowsheet Row First Filed Value   Admission Height 167.6 cm (66\") Documented at 04/05/2023 0702   Admission Weight 78.9 kg (174 lb) Documented at 04/05/2023 0702          No intake/output data recorded.  I/O last 3 completed shifts:  In: 60 [P.O.:60]  Out: -     Objective:  General Appearance:  Ill-appearing, comfortable and in no acute distress.    Vital signs: (most recent): Blood pressure 134/69, pulse 69, temperature 96.8 °F (36 °C), temperature source Axillary, resp. rate 14, height 167.6 cm (66\"), weight 63.5 kg (140 lb 1.6 oz), SpO2 100 %, not currently breastfeeding.    Lungs:  Normal effort.  Breath sounds clear to auscultation.  She is not in respiratory distress.  No decreased breath sounds or wheezes.    Heart: Normal rate.  Regular rhythm.  Positive for murmur.  No gallop.   Abdomen: Abdomen is soft.  There is no abdominal tenderness.     Extremities: There is no dependent edema or local swelling.              Results Review:     I reviewed the patient's new clinical results.    WBC No results found for: WBC   HGB No results found for: HGB   HCT No results found for: HCT   Platlets No results found for: LABPLAT   MCV No results found for: MCV       Sodium Sodium   Date Value Ref Range Status   04/18/2023 136 136 - 145 mmol/L Final      Potassium Potassium   Date Value Ref Range Status   04/18/2023 4.2 3.5 - 5.2 mmol/L Final     Comment:     Slight hemolysis detected by analyzer. Results may be affected.      Chloride Chloride   Date Value Ref Range Status   04/18/2023 97 (L) 98 - 107 mmol/L Final      CO2 CO2   Date Value Ref Range Status   04/18/2023 21.0 (L) 22.0 - 29.0 mmol/L Final      BUN BUN   Date Value Ref Range Status   04/18/2023 51 (H) 8 - 23 mg/dL Final      Creatinine Creatinine   Date Value Ref Range Status   04/18/2023 6.81 (H) 0.57 - 1.00 mg/dL Final      Calcium Calcium   Date Value Ref Range Status   04/18/2023 9.2 8.6 - " "10.5 mg/dL Final      PO4 No results found for: CAPO4   Albumin Albumin   Date Value Ref Range Status   04/18/2023 3.4 (L) 3.5 - 5.2 g/dL Final      Magnesium No results found for: MG   Uric Acid No results found for: URICACID     Medication Review: yes    Assessment & Plan       Intractable pain      Assessment & Plan        ESRD: MWF [facility]     Volume status: No significant anemia     Abdominal pain: In the setting of rectal cancer.     Failure to thrive     Hypoalbuminemia      Anemia: ACD due to CKD. Unable to add AMY due to rectal cancer      Recommendations:  Patient wants to continue HD for now. SW working on confirming outpatient HD chair. HD per [facility] at present.  .      Gabe Butler MD  04/20/23  10:47 EDT      Electronically signed by Gabe Butler MD at 04/20/23 1049     Gabe Butler MD at 04/19/23 1637           LOS: 14 days   Patient Care Team:  Meghana Rodgers MD as PCP - General  Demetrius Sagastume MD as Consulting Physician (Cardiology)  Kevan Lerma MD as Consulting Physician (Nephrology)  Geena Estrella APRN as Nurse Practitioner (Cardiology)  Frank Mandujano MD as Referring Physician (Colon and Rectal Surgery)  Kevan Cavazos MD as Consulting Physician (Radiation Oncology)  Yue Reeves RN as Nurse Navigator  Darryn Burk MD as Consulting Physician (Nephrology)    Chief Complaint: ESRD    Subjective   Plan for HD in AM    Review of system: no cp or sob.       Objective     Vital Sign Min/Max for last 24 hours  Temp  Min: 96.7 °F (35.9 °C)  Max: 98.7 °F (37.1 °C)   BP  Min: 115/58  Max: 198/100   Pulse  Min: 67  Max: 89   Resp  Min: 16  Max: 22   SpO2  Min: 92 %  Max: 100 %   Flow (L/min)  Min: 1  Max: 2   No data recorded     Flowsheet Rows    Flowsheet Row First Filed Value   Admission Height 167.6 cm (66\") Documented at 04/05/2023 0702   Admission Weight 78.9 kg (174 lb) Documented at 04/05/2023 0702          I/O this shift:  In: 60 [P.O.:60]  Out: -   I/O last 3 " "completed shifts:  In: 50 [I.V.:50]  Out: 0     Objective:  General Appearance:  Ill-appearing, comfortable and in no acute distress.    Vital signs: (most recent): Blood pressure 115/58, pulse 68, temperature 97 °F (36.1 °C), temperature source Axillary, resp. rate 20, height 167.6 cm (66\"), weight 63.5 kg (140 lb 1.6 oz), SpO2 98 %, not currently breastfeeding.    Lungs:  Normal effort.  Breath sounds clear to auscultation.  She is not in respiratory distress.  No decreased breath sounds or wheezes.    Heart: Normal rate.  Regular rhythm.  Positive for murmur.  No gallop.   Abdomen: Abdomen is soft.  There is no abdominal tenderness.     Extremities: There is no dependent edema or local swelling.              Results Review:     I reviewed the patient's new clinical results.    WBC No results found for: WBC   HGB No results found for: HGB   HCT No results found for: HCT   Platlets No results found for: LABPLAT   MCV No results found for: MCV       Sodium Sodium   Date Value Ref Range Status   04/18/2023 136 136 - 145 mmol/L Final      Potassium Potassium   Date Value Ref Range Status   04/18/2023 4.2 3.5 - 5.2 mmol/L Final     Comment:     Slight hemolysis detected by analyzer. Results may be affected.      Chloride Chloride   Date Value Ref Range Status   04/18/2023 97 (L) 98 - 107 mmol/L Final      CO2 CO2   Date Value Ref Range Status   04/18/2023 21.0 (L) 22.0 - 29.0 mmol/L Final      BUN BUN   Date Value Ref Range Status   04/18/2023 51 (H) 8 - 23 mg/dL Final      Creatinine Creatinine   Date Value Ref Range Status   04/18/2023 6.81 (H) 0.57 - 1.00 mg/dL Final      Calcium Calcium   Date Value Ref Range Status   04/18/2023 9.2 8.6 - 10.5 mg/dL Final      PO4 No results found for: CAPO4   Albumin Albumin   Date Value Ref Range Status   04/18/2023 3.4 (L) 3.5 - 5.2 g/dL Final      Magnesium No results found for: MG   Uric Acid No results found for: URICACID     Medication Review: yes    Assessment & Plan       " Intractable pain      Assessment & Plan        ESRD: MWF [facility]     Volume status: No significant anemia     Abdominal pain: In the setting of rectal cancer.     Failure to thrive     Hypoalbuminemia      Anemia: ACD due to CKD. Unable to add AMY due to rectal cancer      Recommendations:  Patient wants to continue HD for now. SW working on confirming outpatient HD chair. HD per [facility] at present.  .      Gabe Butler MD  04/19/23  16:37 EDT      Electronically signed by Gabe Butler MD at 04/19/23 1637     Trent Niño DO at 04/19/23 1241          Palliative Care Progress Note    Date of Admission: 4/5/2023    Subjective: Patient with no significant complaints at this point in time.  Current Code Status     Date Active Code Status Order ID Comments User Context       4/6/2023 1151 No CPR (Do Not Attempt to Resuscitate) [id number]  Ranulfo Thomas DO Inpatient      Question Answer    Code Status (Patient has no pulse and is not breathing) No CPR (Do Not Attempt to Resuscitate)    Medical Interventions (Patient has pulse or is breathing) Limited Support    Medical Intervention Limits: NO intubation (DNI)    Comments Per family              No current facility-administered medications on file prior to encounter.     Current Outpatient Medications on File Prior to Encounter   Medication Sig Dispense Refill   • acetaminophen (TYLENOL) 500 MG tablet Take 1 tablet by mouth Every 6 (Six) Hours As Needed for Mild Pain, Fever or Headache.     • amiodarone (PACERONE) 200 MG tablet Take 1 tablet by mouth Daily. 90 tablet 1   • apixaban (ELIQUIS) 2.5 MG tablet tablet Take 1 tablet by mouth 2 (Two) Times a Day. 180 tablet 3   • aspirin 81 MG EC tablet Take 1 tablet by mouth Daily.     • atorvastatin (LIPITOR) 80 MG tablet Take 1 tablet by mouth Every Night. 90 tablet 3   • B Complex-C-Folic Acid (DIALYVITE 800 PO) Take 1 tablet by mouth 3 (Three) Times a Week. On dialysis days Mon-Wed-Fri     • bisacodyl (DULCOLAX) 5  MG EC tablet Take 1 tablet by mouth Daily As Needed for Constipation.     • Capsaicin 0.1 % cream Apply 2-4 gms to feet nightly. Use gloves 60 g 3   • carvedilol (COREG) 3.125 MG tablet Take 1 tablet by mouth Every 12 (Twelve) Hours. 60 tablet 0   • docusate sodium (COLACE) 100 MG capsule Take 1 capsule by mouth Daily.     • dorzolamide-timolol (COSOPT) 22.3-6.8 MG/ML ophthalmic solution Administer 1 drop to both eyes Daily. 10 mL 3   • Durezol 0.05 % ophthalmic emulsion INSTILL 1 DROP 4 TIMES DAILY INTO SURGICAL EYE STARTING AFTER SURGERY.     • epoetin orquidea-epbx (RETACRIT) 39693 UNIT/ML injection Inject 2 mL under the skin into the appropriate area as directed 3 (Three) Times a Week. Indications: Anemia associated with Chronic Kidney Failure 6.6 mL 0   • fluocinonide (LIDEX) 0.05 % ointment Apply 1 application topically to the appropriate area as directed 2 (Two) Times a Day. Scalp  2   • HYDROcodone-acetaminophen (NORCO)  MG per tablet Take 1 tablet by mouth Every 6 (Six) Hours As Needed for Moderate Pain.     • Hydrocortisone, Perianal, (ANUSOL-HC) 2.5 % rectal cream Insert  into the rectum 2 (Two) Times a Day. 28 g 0   • hydrOXYzine (ATARAX) 25 MG tablet Take 1 tablet by mouth 3 (Three) Times a Day As Needed for Anxiety. 30 tablet 0   • Insulin Lispro (humaLOG) 100 UNIT/ML injection Inject 0-9 Units under the skin into the appropriate area as directed 3 (Three) Times a Day With Meals. (Patient taking differently: Inject 0-9 Units under the skin into the appropriate area as directed 3 (Three) Times a Day With Meals. Sliding Scale:  2 units- 150-199  4 units- 200-249  6 units- 250-299  7 units- 300-349  8 units- 350-400  9 units- Over 400) 10 mL 0   • isosorbide mononitrate (IMDUR) 120 MG 24 hr tablet Take 1 tablet by mouth Daily. 90 tablet 3   • mirtazapine (REMERON) 15 MG tablet Take 1 tablet by mouth Every Night. 30 tablet 0   • Nutritional Supplements (ProMod) liquid Take 30 mL by mouth 2 (Two) Times a  "Day.     • oxyCODONE (OXY-IR) 5 MG capsule Take 1 capsule by mouth Every 6 (Six) Hours As Needed for Moderate Pain.     • silver sulfadiazine (SILVADENE, SSD) 1 % cream Apply 1 application topically to the appropriate area as directed 2 (Two) Times a Day. Buttocks, sacrum area     • vitamin C (ASCORBIC ACID) 500 MG tablet Take 1 tablet by mouth Daily.     • Vitamin D, Cholecalciferol, (CHOLECALCIFEROL) 10 MCG (400 UNIT) tablet Take 1 tablet by mouth Daily.     • zinc sulfate (Zinc-220) 220 (50 Zn) MG capsule Take 1 capsule by mouth Daily.     • cloNIDine (CATAPRES) 0.1 MG tablet Take 1 tablet by mouth 3 (Three) Times a Day As Needed for High Blood Pressure (SBP>160). 30 tablet 0   • midodrine (PROAMATINE) 5 MG tablet Take 1 tablet by mouth As Needed. For Systolic BP below 90          •  albumin human  •  senna-docusate sodium **AND** bisacodyl **AND** bisacodyl  •  cloNIDine  •  dextrose  •  dextrose  •  glucagon (human recombinant)  •  HYDROmorphone  •  hydrOXYzine  •  LORazepam  •  melatonin  •  ondansetron  •  oxyCODONE  •  [COMPLETED] Insert Peripheral IV **AND** sodium chloride  •  sodium chloride  •  sodium chloride    Objective: /58 (BP Location: Left arm, Patient Position: Lying)   Pulse 69   Temp 98.3 °F (36.8 °C) (Axillary)   Resp 20   Ht 167.6 cm (66\")   Wt 63.5 kg (140 lb 1.6 oz)   LMP  (LMP Unknown)   SpO2 96%   BMI 22.61 kg/m²      Intake/Output Summary (Last 24 hours) at 4/19/2023 1243  Last data filed at 4/19/2023 0945  Gross per 24 hour   Intake 0 ml   Output --   Net 0 ml     Physical Exam:      General Appearance:    Alert, cooperative, frail-appearing   Head:    Normocephalic, without obvious abnormality, atraumatic   Eyes:            Lids and lashes normal, conjunctivae and sclerae normal, no   icterus, no pallor, corneas clear, PERRLA   Ears:    Ears appear intact with no abnormalities noted   Throat:   No oral lesions, no thrush, oral mucosa moist   Neck:   No adenopathy, supple, " trachea midline, no thyromegaly, no     carotid bruit, no JVD   Back:     No kyphosis present, no scoliosis present, no skin lesions,       erythema or scars, no tenderness to percussion or                   palpation,   range of motion normal   Lungs:     Clear to auscultation,respirations regular, even and                   unlabored    Heart:    Regular rhythm and normal rate, normal S1 and S2, no            murmur, no gallop, no rub, no click   Breast Exam:    Deferred   Abdomen:     Normal bowel sounds, no masses, no organomegaly, soft        non-tender, non-distended, no guarding, no rebound                 tenderness   Genitalia:    Deferred   Extremities:   Moves all extremities well, no edema, no cyanosis, no              redness   Pulses:   Pulses palpable and equal bilaterally   Skin:   No bleeding, bruising or rash   Lymph nodes:   No palpable adenopathy   Neurologic:   Cranial nerves 2 - 12 grossly intact, sensation intact, DTR        present and equal bilaterally         Results from last 7 days   Lab Units 04/18/23  0802   SODIUM mmol/L 136   POTASSIUM mmol/L 4.2   CHLORIDE mmol/L 97*   CO2 mmol/L 21.0*   BUN mg/dL 51*   CREATININE mg/dL 6.81*   CALCIUM mg/dL 9.2   GLUCOSE mg/dL 108*       Impression: End-stage renal disease  Rectal cancer  Anorexia  Neoplastic pain  Debility  Goals of care  Plan: Did talk to the patient about her wishes as far as continuing or stopping dialysis.  At this point time she states that she wants to continue dialysis for a little while longer.  My only recommendation given this fact and the fact that placement is very much an issue is to see if the patient is able to even tolerate sitting up in chair to get dialysis, as the only place that will allow her to be in a bed to get dialysis as far as facility would be found cervical.  Family apparently is very adamant about not wanting to go back to [facility], however if the patient is not able to sit up in a chair to get  dialysis then that takes most other facilities in this area off the table      Trent Niño DO  23  12:44 EDT        Electronically signed by Trent Niño DO at 23 1246     Maria Elena Rodriguez DO at 23 1201              [facility] Medicine Services  PROGRESS NOTE    Patient Name: Esperanza Pop  : 1945  MRN: [id number]    Date of Admission: 2023  Primary Care Physician: Meghana Rodgers MD    Subjective   Subjective     CC:  Follow up anal cancer    HPI:  Patient seen in her room. She is lethergic due to pain medication but does awaken and tell me that she is interested in going home and being comfortable.    ROS:  UTO due to lethargy at times    Objective   Objective     Vital Signs:   Temp:  [96.7 °F (35.9 °C)-98.7 °F (37.1 °C)] 98.3 °F (36.8 °C)  Heart Rate:  [64-89] 69  Resp:  [16-22] 20  BP: (115-198)/() 115/58  Flow (L/min):  [1-2] 1     Physical Exam:  Constitutional: No acute distress, chronically ill and frail appearing  HENT: NCAT, mucous membranes very dry  Respiratory: Clear to auscultation bilaterally  Cardiovascular: RRR  Gastrointestinal: soft, tender to palpation  Musculoskeletal: No bilateral ankle edema, very thin extremities  Neurologic: speech is clear but falls asleep mid conversation  Skin: No rashes      Results Reviewed:  LAB RESULTS:        Brief Urine Lab Results     None          Microbiology Results Abnormal     Procedure Component Value - Date/Time    Blood Culture - Blood, Hand, Left [[id number]]  (Normal) Collected: 23    Lab Status: Final result Specimen: Blood from Hand, Left Updated: 23     Blood Culture No growth at 5 days    Narrative:      AEROBIC BOTTLE ONLY    Blood Culture - Blood, Arm, Left [[id number]]  (Normal) Collected: 23    Lab Status: Final result Specimen: Blood from Arm, Left Updated: 23     Blood Culture No growth at 5 days    Narrative:      AEROBIC BOTTLE  ONLY          No radiology results from the last 24 hrs    Results for orders placed during the hospital encounter of 03/10/23    Adult Transthoracic Echo Complete W/ Cont if Necessary Per Protocol    Interpretation Summary  •  Left ventricular systolic function is normal. Estimated left ventricular EF = 55%  •  Left ventricular wall thickness is consistent with moderate concentric hypertrophy.  •  The right ventricular cavity is mildly dilated.  •  Mild to moderate biatrial enlargement.  •  Moderate to severe aortic valve stenosis is present.  Mean gradient 34 mmHg, DOUG 0.9 cm².  •  Mild aortic insufficiency.  •  Mild mitral regurgitation.  •  Mild to moderate tricuspid regurgitation with RVSP 48 mmHg.      Current medications:  Scheduled Meds:acetaminophen, 1,000 mg, Oral, TID  amiodarone, 200 mg, Oral, Daily  apixaban, 2.5 mg, Oral, BID  aspirin, 81 mg, Oral, Daily  atorvastatin, 80 mg, Oral, Nightly  capsaicin, , Topical, Q12H  carvedilol, 3.125 mg, Oral, Q12H  fentaNYL, 1 patch, Transdermal, Q72H   And  Check Fentanyl Patch Placement, 1 each, Does not apply, Q12H  insulin lispro, 0-7 Units, Subcutaneous, TID AC  isosorbide mononitrate, 120 mg, Oral, Daily  mirtazapine, 15 mg, Oral, Nightly  povidone-iodine, 1 application, Topical, Daily  senna-docusate sodium, 2 tablet, Oral, BID  sodium chloride, 2 mL, Intravenous, Q12H      Continuous Infusions:   PRN Meds:.•  albumin human  •  senna-docusate sodium **AND** bisacodyl **AND** bisacodyl  •  cloNIDine  •  dextrose  •  dextrose  •  glucagon (human recombinant)  •  HYDROmorphone  •  hydrOXYzine  •  LORazepam  •  melatonin  •  ondansetron  •  oxyCODONE  •  [COMPLETED] Insert Peripheral IV **AND** sodium chloride  •  sodium chloride  •  sodium chloride    Assessment & Plan   Assessment & Plan     Active Hospital Problems    Diagnosis  POA   • **Intractable pain [R52]  Yes      Resolved Hospital Problems   No resolved problems to display.      I have reviewed the  information used in this note from other sources for accuracy and reviewed current scope of care to attest to this documentation.       Brief Hospital Course to date:  Esperanza Pop is a 78 y.o. female w/ ESRD (HD-MWF), DM2, HTN, HLD, HFpEF, pAfib (xarelto), anemia, prior breast cancer, active anal cancer intolerant of chemo/XRT, recently admitted 3/10-3/29 and MA'ed to SNF to trial improvement of functional status, who returned w/ intractable rectal pain    Intractable pain, multifactorial  Wounds of the buttocks, recent radiotherapy  Metastatic anal cancer  Anorectal fistula  FTT/Anorexia and Cachexia   -s/p partial treatment with chemotherapy and radiation but was not tolerating, thus no further treatment indicated at this time, too weak to tolerate any aggressive treatments  -patient expresses desire to just go home/not pursue any more treatment, I did discuss this again with her today, not sure she grasps the concept of stopping dialysis, however conversation is limited by her lethargy secondary to pain medications  -I was able to update her daughter Indigo via telephone today, she plans to come back to the hospital this afternoon and would like to readdress her mother's plan of care with palliative care again.   -cont mirtazapine  -continue with wound care, pain control    Dysphagia  -tolerating PO but takes minimal in    ESRD on HD  -HD MWF, has been limited secondary to hypotension while on dialysis    DM type 2, a1c 5.8%, w/ insulin use  -SSI    HFpEF  HTN  pAfib  -cont amio, apixiban, asa, statin, coreg, imdur     Mod-Sev AS  -has appt w/ Geena Estrella, [facility] 4/27/23     Patient remains VERY appropriate for palliative/comfort based care, I did update her daughter Indigo today via telephone, she is willing to readdress patient's care plan with palliative care. Patient herself did tell me today that she would like to go home and be comfortable. I did reach out to ethics committee who recommended re-involvement  of palliative care, I plan to reach out to them today. CM following. Does appear patient has started terminal decline and at some point her BP may limit her ability to tolerate HD.     Expected Discharge     Expected Discharge Date and Time     Expected Discharge Date Expected Discharge Time    MEDICALLY READY FOR DISCHARGE            DVT prophylaxis:  Medical DVT prophylaxis orders are present.     AM-PAC 6 Clicks Score (PT): 8 (04/17/23 1606)    CODE STATUS:   Code Status and Medical Interventions:   Ordered at: 04/06/23 1151     Medical Intervention Limits:    NO intubation (DNI)     Code Status (Patient has no pulse and is not breathing):    No CPR (Do Not Attempt to Resuscitate)     Medical Interventions (Patient has pulse or is breathing):    Limited Support     Comments:    Per family       Maria Elena Rodriguez DO  04/19/23        Electronically signed by Maria Elena Rodriguez DO at 04/19/23 1213     Gabe Butler MD at 04/18/23 1942           LOS: 13 days   Patient Care Team:  Meghana Rodgers MD as PCP - General  Demetrius Sagastume MD as Consulting Physician (Cardiology)  Kevan Lerma MD as Consulting Physician (Nephrology)  Geena Estrella APRN as Nurse Practitioner (Cardiology)  Frank Mandujano MD as Referring Physician (Colon and Rectal Surgery)  Kevan Cavazos MD as Consulting Physician (Radiation Oncology)  Yue Reeves RN as Nurse Navigator  Darryn Burk MD as Consulting Physician (Nephrology)    Chief Complaint: ESRD    Subjective   Seen on HD tolerating well so far. Goal  liter as tolerated. Bp stable. Good access pressure.       Review of system: no cp or sob.       Objective     Vital Sign Min/Max for last 24 hours  Temp  Min: 95.7 °F (35.4 °C)  Max: 98.9 °F (37.2 °C)   BP  Min: 85/46  Max: 129/47   Pulse  Min: 61  Max: 78   Resp  Min: 14  Max: 16   SpO2  Min: 94 %  Max: 100 %   Flow (L/min)  Min: 2  Max: 3   No data recorded     Flowsheet Rows    Flowsheet Row First  "Filed Value   Admission Height 167.6 cm (66\") Documented at 04/05/2023 0702   Admission Weight 78.9 kg (174 lb) Documented at 04/05/2023 0702          No intake/output data recorded.  I/O last 3 completed shifts:  In: 170 [P.O.:120; I.V.:50]  Out: 0     Objective:  General Appearance:  Ill-appearing, comfortable and in no acute distress.    Vital signs: (most recent): Blood pressure 126/57, pulse 78, temperature 98.1 °F (36.7 °C), temperature source Axillary, resp. rate 16, height 167.6 cm (66\"), weight 63.5 kg (140 lb 1.6 oz), SpO2 94 %, not currently breastfeeding.    Lungs:  Normal effort.  Breath sounds clear to auscultation.  She is not in respiratory distress.  No decreased breath sounds or wheezes.    Heart: Normal rate.  Regular rhythm.  Positive for murmur.  No gallop.   Abdomen: Abdomen is soft.  There is no abdominal tenderness.     Extremities: There is no dependent edema or local swelling.              Results Review:     I reviewed the patient's new clinical results.    WBC No results found for: WBC   HGB No results found for: HGB   HCT No results found for: HCT   Platlets No results found for: LABPLAT   MCV No results found for: MCV       Sodium Sodium   Date Value Ref Range Status   04/18/2023 136 136 - 145 mmol/L Final      Potassium Potassium   Date Value Ref Range Status   04/18/2023 4.2 3.5 - 5.2 mmol/L Final     Comment:     Slight hemolysis detected by analyzer. Results may be affected.      Chloride Chloride   Date Value Ref Range Status   04/18/2023 97 (L) 98 - 107 mmol/L Final      CO2 CO2   Date Value Ref Range Status   04/18/2023 21.0 (L) 22.0 - 29.0 mmol/L Final      BUN BUN   Date Value Ref Range Status   04/18/2023 51 (H) 8 - 23 mg/dL Final      Creatinine Creatinine   Date Value Ref Range Status   04/18/2023 6.81 (H) 0.57 - 1.00 mg/dL Final      Calcium Calcium   Date Value Ref Range Status   04/18/2023 9.2 8.6 - 10.5 mg/dL Final      PO4 No results found for: CAPO4   Albumin Albumin " "  Date Value Ref Range Status   2023 3.4 (L) 3.5 - 5.2 g/dL Final      Magnesium No results found for: MG   Uric Acid No results found for: URICACID     Medication Review: yes    Assessment & Plan       Intractable pain      Assessment & Plan        ESRD: MWF [facility]     Volume status: No significant anemia     Abdominal pain: In the setting of rectal cancer.     Failure to thrive     Hypoalbuminemia      Anemia: ACD due to CKD. Unable to add AYM due to rectal cancer      Recommendations:  Patient wants to continue HD for now. SW working on confirming outpatient HD chair. HD per TTS [facility] at present.  .      Gabe Butler MD  23  19:42 EDT      Electronically signed by Gabe Butler MD at 23     Maria Elena Rodriguez DO at 23 [facility]              [facility] Medicine Services  PROGRESS NOTE    Patient Name: Esperanza Pop  : 1945  MRN: [id number]    Date of Admission: 2023  Primary Care Physician: Meghana Rodgers MD    Subjective   Subjective     CC:  Follow up anal cancer    HPI:  Seen in HD. Patient is drowsy with low blood pressures. She does not reliably follow commands or answer questions. She is able to whisper \"help me\"    ROS:  UTO due to lethargy    Objective   Objective     Vital Signs:   Temp:  [95 °F (35 °C)-98.9 °F (37.2 °C)] 98.9 °F (37.2 °C)  Heart Rate:  [63-77] 63  Resp:  [14-16] 14  BP: ()/(46-61) 115/46  Flow (L/min):  [2] 2     Physical Exam:  Constitutional: No acute distress, chronically ill and frail appearing  HENT: NCAT, mucous membranes very dry  Respiratory: Clear to auscultation bilaterally  Cardiovascular: RRR  Gastrointestinal: soft, tender to palpation  Musculoskeletal: No bilateral ankle edema, very thin extremities  Neurologic: lethargic, does not follow commands, weak/FTT  Skin: No rashes      Results Reviewed:  LAB RESULTS:        Brief Urine Lab Results     None          Microbiology Results Abnormal     Procedure Component " Value - Date/Time    Blood Culture - Blood, Hand, Left [[id number]]  (Normal) Collected: 04/09/23 1724    Lab Status: Final result Specimen: Blood from Hand, Left Updated: 04/14/23 1745     Blood Culture No growth at 5 days    Narrative:      AEROBIC BOTTLE ONLY    Blood Culture - Blood, Arm, Left [[id number]]  (Normal) Collected: 04/09/23 1723    Lab Status: Final result Specimen: Blood from Arm, Left Updated: 04/14/23 1745     Blood Culture No growth at 5 days    Narrative:      AEROBIC BOTTLE ONLY          No radiology results from the last 24 hrs    Results for orders placed during the hospital encounter of 03/10/23    Adult Transthoracic Echo Complete W/ Cont if Necessary Per Protocol    Interpretation Summary  •  Left ventricular systolic function is normal. Estimated left ventricular EF = 55%  •  Left ventricular wall thickness is consistent with moderate concentric hypertrophy.  •  The right ventricular cavity is mildly dilated.  •  Mild to moderate biatrial enlargement.  •  Moderate to severe aortic valve stenosis is present.  Mean gradient 34 mmHg, DOUG 0.9 cm².  •  Mild aortic insufficiency.  •  Mild mitral regurgitation.  •  Mild to moderate tricuspid regurgitation with RVSP 48 mmHg.      Current medications:  Scheduled Meds:acetaminophen, 1,000 mg, Oral, TID  amiodarone, 200 mg, Oral, Daily  apixaban, 2.5 mg, Oral, BID  aspirin, 81 mg, Oral, Daily  atorvastatin, 80 mg, Oral, Nightly  capsaicin, , Topical, Q12H  carvedilol, 3.125 mg, Oral, Q12H  fentaNYL, 1 patch, Transdermal, Q72H   And  Check Fentanyl Patch Placement, 1 each, Does not apply, Q12H  insulin lispro, 0-7 Units, Subcutaneous, TID AC  isosorbide mononitrate, 120 mg, Oral, Daily  mirtazapine, 15 mg, Oral, Nightly  povidone-iodine, 1 application, Topical, Daily  senna-docusate sodium, 2 tablet, Oral, BID  sodium chloride, 2 mL, Intravenous, Q12H      Continuous Infusions:   PRN Meds:.•  albumin human  •  senna-docusate sodium **AND** bisacodyl  **AND** bisacodyl  •  cloNIDine  •  dextrose  •  dextrose  •  glucagon (human recombinant)  •  HYDROmorphone  •  hydrOXYzine  •  LORazepam  •  melatonin  •  ondansetron  •  oxyCODONE  •  [COMPLETED] Insert Peripheral IV **AND** sodium chloride  •  sodium chloride  •  sodium chloride    Assessment & Plan   Assessment & Plan     Active Hospital Problems    Diagnosis  POA   • **Intractable pain [R52]  Yes      Resolved Hospital Problems   No resolved problems to display.      I have reviewed the information used in this note from other sources for accuracy and reviewed current scope of care to attest to this documentation.       Brief Hospital Course to date:  Esperanza Pop is a 78 y.o. female w/ ESRD (HD-MWF), DM2, HTN, HLD, HFpEF, pAfib (xarelto), anemia, prior breast cancer, active anal cancer intolerant of chemo/XRT, recently admitted 3/10-3/29 and IL'ed to SNF to trial improvement of functional status, who returned w/ intractable rectal pain      This patient's hospital course, problems, and plans entered by my partner were reviewed and updated as appropriate by me on 4/18/2023      Intractable pain, multifactorial  Wounds of the buttocks, recent radiotherapy  Metastatic anal cancer  Anorectal fistula  FTT/Anorexia and Cachexia   -s/p partial treatment with chemotherapy and radiation  -did not tolerate treatment before and went to SNF  -patient expresses desire to just go home/not pursue any more treatment  -hospice following, but per my partners discussion with family yesterday they desire to find placement and continue HD, declining hospice at this time  -cont mirtazapine  -continue with wound care, pain control    Dysphagia  -tolerating PO but takes minimal in    ESRD on HD  -HD MWF    DM type 2, a1c 5.8%, w/ insulin use  -SSI    HFpEF  HTN  pAfib  -cont amio, apixiban, asa, statin, coreg, imdur     Mod-Sev AS  -has appt w/ Geena Estrella, APRN 4/27/23     Unfortunately it appears that patient's family has  declined hospice and would like to continue with HD. Patient overall declining, appears VERY appropriate for palliative/comfort based care, however family looking into pursuing placement. CM following. Does appear patient has started terminal decline and at some point her BP may limit her ability to tolerate HD, will need to continue to readdress GOC.    Expected Discharge     Expected Discharge Date and Time     Expected Discharge Date Expected Discharge Time    MEDICALLY READY FOR DISCHARGE            DVT prophylaxis:  Medical DVT prophylaxis orders are present.     AM-PAC 6 Clicks Score (PT): 8 (04/17/23 1606)    CODE STATUS:   Code Status and Medical Interventions:   Ordered at: 04/06/23 1151     Medical Intervention Limits:    NO intubation (DNI)     Code Status (Patient has no pulse and is not breathing):    No CPR (Do Not Attempt to Resuscitate)     Medical Interventions (Patient has pulse or is breathing):    Limited Support     Comments:    Per family       Maria Elena Rodriguez DO  04/18/23        Electronically signed by Maria Elena Rodriguez DO at 04/18/23 1026     Gabe Butler MD at 04/17/23 2004           LOS: 12 days   Patient Care Team:  Meghana Rodgers MD as PCP - General  Demetrius Sagastume MD as Consulting Physician (Cardiology)  Kevan Lerma MD as Consulting Physician (Nephrology)  Geena Estrella APRN as Nurse Practitioner (Cardiology)  Frank Mandujano MD as Referring Physician (Colon and Rectal Surgery)  Kevan Cavazos MD as Consulting Physician (Radiation Oncology)  Yue Reeves RN as Nurse Navigator  Darryn Burk MD as Consulting Physician (Nephrology)    Chief Complaint: ESRD    Subjective   Discussed with patient. She wants to continue with HD. Will start do HD tomorrow.        Review of system: no cp or sob.       Objective     Vital Sign Min/Max for last 24 hours  Temp  Min: 95 °F (35 °C)  Max: 98.2 °F (36.8 °C)   BP  Min: 94/52  Max: 162/68   Pulse  Min: 63  Max: 77  "  Resp  Min: 16  Max: 16   SpO2  Min: 94 %  Max: 100 %   No data recorded   No data recorded     Flowsheet Rows    Flowsheet Row First Filed Value   Admission Height 167.6 cm (66\") Documented at 04/05/2023 0702   Admission Weight 78.9 kg (174 lb) Documented at 04/05/2023 0702          No intake/output data recorded.  I/O last 3 completed shifts:  In: 130 [P.O.:130]  Out: -     Objective:  General Appearance:  Ill-appearing, comfortable and in no acute distress.    Vital signs: (most recent): Blood pressure 94/52, pulse 74, temperature 97.7 °F (36.5 °C), temperature source Axillary, resp. rate 16, height 167.6 cm (66\"), weight 63.5 kg (140 lb 1.6 oz), SpO2 94 %, not currently breastfeeding.    Lungs:  Normal effort.  Breath sounds clear to auscultation.  She is not in respiratory distress.  No decreased breath sounds or wheezes.    Heart: Normal rate.  Regular rhythm.  Positive for murmur.  No gallop.   Abdomen: Abdomen is soft.  There is no abdominal tenderness.     Extremities: There is no dependent edema or local swelling.              Results Review:     I reviewed the patient's new clinical results.    WBC No results found for: WBC   HGB No results found for: HGB   HCT No results found for: HCT   Platlets No results found for: LABPLAT   MCV No results found for: MCV       Sodium No results found for: NA   Potassium No results found for: K   Chloride No results found for: CL   CO2 No results found for: CO2   BUN No results found for: BUN   Creatinine No results found for: CREATININE   Calcium No results found for: CALCIUM   PO4 No results found for: CAPO4   Albumin No results found for: ALBUMIN   Magnesium No results found for: MG   Uric Acid No results found for: URICACID     Medication Review: yes    Assessment & Plan       Intractable pain      Assessment & Plan        ESRD: MWF [facility]     Volume status: No significant anemia     Abdominal pain: In the setting of rectal cancer.     Failure to " thrive     Hypoalbuminemia      Anemia: ACD due to CKD. Unable to add AMY due to rectal cancer      Recommendations:  Patient wants to continue HD for now. SW working on confirming outpatient HD chair. Plan for HD in AM.   .      Gabe Butler MD  04/17/23  20:04 EDT      Electronically signed by Gabe Butler MD at 04/17/23 2006          Consult Notes (last 72 hours)      Trent Niño DO at 04/19/23 1249      Consult Orders    1. Inpatient Palliative Care MD Consult [[id number]] ordered by Maria Elena Rodriguez DO at 04/19/23 1214             See progress note    Trent OLIVA. DO [place]  12:49 EDT  4/19/2023    Electronically signed by Trent Niño DO at 04/19/23 8919

## 2023-04-20 NOTE — PLAN OF CARE
Goal Outcome Evaluation:      Pt drowsy but arouses to voice. Pt has rested well overnight. All dsg are CDI- chg on DS yesterday.Repositioning and turns are very painful for pt. Pt remains on T, Th, Sat Dialysis at this time. Plan for family to meet w/ Hospice and Palliative again to see if pt is eligible for inpt Hospice care. Pt is ordered non-tele. Pt remains only oriented to self. VSS on RA, will cont to monitor.

## 2023-04-20 NOTE — PROGRESS NOTES
[facility] Medicine Services  PROGRESS NOTE    Patient Name: Esperanza Pop  : 1945  MRN: [id number]    Date of Admission: 2023  Primary Care Physician: Meghana Rodgers MD    Subjective   Subjective     CC:  Follow up anal cancer    HPI:  Patient resting in bed. More alert and talkative today. She is asking to get up and go to the bathroom.    ROS:  Gen- No fevers, chills  CV- No chest pain, palpitations  Resp- No cough, dyspnea  GI- No N/V/D, + abd pain    Objective   Objective     Vital Signs:   Temp:  [96.8 °F (36 °C)-97.5 °F (36.4 °C)] 97.1 °F (36.2 °C)  Heart Rate:  [62-70] 70  Resp:  [10-18] 18  BP: (100-145)/(46-69) 145/60     Physical Exam:  Constitutional: No acute distress, awake, alert  HENT: NCAT, mucous membranes moist  Respiratory:  respiratory effort normal on room air  Cardiovascular: RRR, no murmurs, rubs, or gallops  Gastrointestinal:  soft, non-distended  Musculoskeletal: No bilateral ankle edema, thin extremities  Psychiatric: Appropriate affect, cooperative  Neurologic: Oriented to person/place, moves all extremities  Skin: No rashes        Results Reviewed:  LAB RESULTS:        Brief Urine Lab Results     None          Microbiology Results Abnormal     Procedure Component Value - Date/Time    Blood Culture - Blood, Hand, Left [[id number]]  (Normal) Collected: 23    Lab Status: Final result Specimen: Blood from Hand, Left Updated: 23     Blood Culture No growth at 5 days    Narrative:      AEROBIC BOTTLE ONLY    Blood Culture - Blood, Arm, Left [[id number]]  (Normal) Collected: 23 1723    Lab Status: Final result Specimen: Blood from Arm, Left Updated: 23     Blood Culture No growth at 5 days    Narrative:      AEROBIC BOTTLE ONLY          No radiology results from the last 24 hrs    Results for orders placed during the hospital encounter of 03/10/23    Adult Transthoracic Echo Complete W/ Cont if Necessary Per  Protocol    Interpretation Summary  •  Left ventricular systolic function is normal. Estimated left ventricular EF = 55%  •  Left ventricular wall thickness is consistent with moderate concentric hypertrophy.  •  The right ventricular cavity is mildly dilated.  •  Mild to moderate biatrial enlargement.  •  Moderate to severe aortic valve stenosis is present.  Mean gradient 34 mmHg, DOUG 0.9 cm².  •  Mild aortic insufficiency.  •  Mild mitral regurgitation.  •  Mild to moderate tricuspid regurgitation with RVSP 48 mmHg.      Current medications:  Scheduled Meds:amiodarone, 200 mg, Oral, Daily  apixaban, 2.5 mg, Oral, BID  aspirin, 81 mg, Oral, Daily  atorvastatin, 80 mg, Oral, Nightly  capsaicin, , Topical, Q12H  carvedilol, 3.125 mg, Oral, Q12H  fentaNYL, 1 patch, Transdermal, Q72H   And  Check Fentanyl Patch Placement, 1 each, Does not apply, Q12H  insulin lispro, 0-7 Units, Subcutaneous, TID AC  isosorbide mononitrate, 120 mg, Oral, Daily  mirtazapine, 15 mg, Oral, Nightly  povidone-iodine, 1 application, Topical, Daily  senna-docusate sodium, 2 tablet, Oral, BID  sodium chloride, 2 mL, Intravenous, Q12H      Continuous Infusions:   PRN Meds:.•  albumin human  •  senna-docusate sodium **AND** bisacodyl **AND** bisacodyl  •  cloNIDine  •  dextrose  •  dextrose  •  glucagon (human recombinant)  •  HYDROmorphone  •  hydrOXYzine  •  LORazepam  •  melatonin  •  ondansetron  •  oxyCODONE  •  [COMPLETED] Insert Peripheral IV **AND** sodium chloride  •  sodium chloride  •  sodium chloride    Assessment & Plan   Assessment & Plan     Active Hospital Problems    Diagnosis  POA   • **Intractable pain [R52]  Yes      Resolved Hospital Problems   No resolved problems to display.      I have reviewed the information used in this note from other sources for accuracy and reviewed current scope of care to attest to this documentation.       Brief Hospital Course to date:  Esperanza Pop is a 78 y.o. female w/ ESRD (HD-MWF), DM2, HTN,  HLD, HFpEF, pAfib (xarelto), anemia, prior breast cancer, active anal cancer intolerant of chemo/XRT, recently admitted 3/10-3/29 and DC'ed to SNF to trial improvement of functional status, who returned w/ intractable rectal pain    Intractable pain, multifactorial  Wounds of the buttocks, recent radiotherapy  Metastatic anal cancer  Anorectal fistula  FTT/Anorexia and Cachexia   -s/p partial treatment with chemotherapy and radiation but was not tolerating, thus no further treatment indicated (palliative recommended) at this time, too weak to tolerate any aggressive treatments  -patient has moments of coherence and does indicate she wishes to continue dialysis, however also tells me that she is okay with being comfortable and spending time with her family instead of focusing on continuing medical treatment  -I spoke with her daughter Indigo in length yesterday morning and again later in the afternoon with multiple family members present. We discussed poor prognosis, poor nutritional status, pain control and extension of patient's suffering. Attempted to focus on big picture and overall long-term goals of care. Family planning to meet with palliative/hospice this afternoon.  -continue with wound care, pain control    Dysphagia  -tolerating PO but takes minimal in    ESRD on HD  -HD MWF, has been limited secondary to hypotension while on dialysis    DM type 2, a1c 5.8%, w/ insulin use  -SSI    HFpEF  HTN  pAfib  -continue amio, apixiban, asa, statin, coreg, imdur     Mod-Sev AS  -has appt w/ Geena Estrella, APRN 4/27/23     Expected Discharge     Expected Discharge Date and Time     Expected Discharge Date Expected Discharge Time    MEDICALLY READY FOR DISCHARGE            DVT prophylaxis:  Medical DVT prophylaxis orders are present.     AM-PAC 6 Clicks Score (PT): 6 (04/19/23 2000)    CODE STATUS:   Code Status and Medical Interventions:   Ordered at: 04/06/23 1151     Medical Intervention Limits:    NO intubation  (DNI)     Code Status (Patient has no pulse and is not breathing):    No CPR (Do Not Attempt to Resuscitate)     Medical Interventions (Patient has pulse or is breathing):    Limited Support     Comments:    Per family       Maria Elena Rodriguez,   04/20/23

## 2023-04-21 PROCEDURE — 99232 SBSQ HOSP IP/OBS MODERATE 35: CPT | Performed by: INTERNAL MEDICINE

## 2023-04-21 PROCEDURE — 25010000002 LORAZEPAM PER 2 MG: Performed by: INTERNAL MEDICINE

## 2023-04-21 PROCEDURE — 25010000002 LORAZEPAM PER 2 MG: Performed by: FAMILY MEDICINE

## 2023-04-21 RX ADMIN — HYDROXYZINE HYDROCHLORIDE 25 MG: 25 TABLET, FILM COATED ORAL at 00:13

## 2023-04-21 RX ADMIN — POVIDONE-IODINE 1 EACH: 10 SOLUTION TOPICAL at 10:41

## 2023-04-21 RX ADMIN — OXYCODONE HYDROCHLORIDE 5 MG: 5 TABLET ORAL at 00:13

## 2023-04-21 RX ADMIN — LORAZEPAM 1 MG: 2 INJECTION INTRAMUSCULAR; INTRAVENOUS at 05:41

## 2023-04-21 RX ADMIN — CAPSAICIN: 0.75 CREAM TOPICAL at 10:41

## 2023-04-21 RX ADMIN — OXYCODONE HYDROCHLORIDE 5 MG: 5 TABLET ORAL at 05:41

## 2023-04-21 RX ADMIN — OXYCODONE HYDROCHLORIDE 5 MG: 5 TABLET ORAL at 16:42

## 2023-04-21 RX ADMIN — SENNOSIDES AND DOCUSATE SODIUM 2 TABLET: 8.6; 5 TABLET ORAL at 09:11

## 2023-04-21 RX ADMIN — CARVEDILOL 3.12 MG: 6.25 TABLET, FILM COATED ORAL at 09:11

## 2023-04-21 RX ADMIN — ASPIRIN 81 MG: 81 TABLET, COATED ORAL at 09:10

## 2023-04-21 RX ADMIN — LORAZEPAM 1 MG: 2 INJECTION INTRAMUSCULAR; INTRAVENOUS at 16:37

## 2023-04-21 RX ADMIN — APIXABAN 2.5 MG: 2.5 TABLET, FILM COATED ORAL at 09:07

## 2023-04-21 RX ADMIN — AMIODARONE HYDROCHLORIDE 200 MG: 200 TABLET ORAL at 09:11

## 2023-04-21 RX ADMIN — ISOSORBIDE MONONITRATE 120 MG: 120 TABLET, EXTENDED RELEASE ORAL at 09:10

## 2023-04-21 NOTE — CASE MANAGEMENT/SOCIAL WORK
Continued Stay Note  [facility]     Patient Name: Esperanza Pop  MRN: [id number]  Today's Date: 4/21/2023    Admit Date: 4/5/2023    Plan: Inpatient hospice   Discharge Plan     Row Name 04/21/23 1114       Plan    Plan Inpatient hospice    Patient/Family in Agreement with Plan yes    Plan Comments As stated in a previous note by Emma with Hospice, patient's daughter Indigo is requesting hospice services. CM to follow along and assist if needed.    Final Discharge Disposition Code 30 - still a patient               Discharge Codes    No documentation.               Expected Discharge Date and Time     Expected Discharge Date Expected Discharge Time    Apr 20, 2023             Diana Owusu RN

## 2023-04-21 NOTE — PROGRESS NOTES
[facility] Medicine Services  PROGRESS NOTE    Patient Name: Esperanza Pop  : 1945  MRN: [id number]    Date of Admission: 2023  Primary Care Physician: Meghana Rodgers MD    Subjective   Subjective     CC:  Follow up anal cancer    HPI:  Patient sleeping in bed. Does not arouse for exam    ROS:  UTO due to mental status    Objective   Objective     Vital Signs:   Temp:  [97.6 °F (36.4 °C)-97.9 °F (36.6 °C)] 97.9 °F (36.6 °C)  Heart Rate:  [64-88] 72  Resp:  [14-18] 18  BP: ()/(47-61) 116/49     Physical Exam:  Constitutional: No acute distress, sleeping  HENT: NCAT, mucous membranes very dry  Respiratory:  respiratory effort normal on room air  Cardiovascular: RRR, no murmurs, rubs, or gallops  Gastrointestinal:  soft, non-distended  Musculoskeletal: No bilateral ankle edema, thin extremities    Results Reviewed:  LAB RESULTS:        Brief Urine Lab Results     None          Microbiology Results Abnormal     Procedure Component Value - Date/Time    Blood Culture - Blood, Hand, Left [[id number]]  (Normal) Collected: 23    Lab Status: Final result Specimen: Blood from Hand, Left Updated: 23     Blood Culture No growth at 5 days    Narrative:      AEROBIC BOTTLE ONLY    Blood Culture - Blood, Arm, Left [[id number]]  (Normal) Collected: 23    Lab Status: Final result Specimen: Blood from Arm, Left Updated: 23     Blood Culture No growth at 5 days    Narrative:      AEROBIC BOTTLE ONLY          No radiology results from the last 24 hrs    Results for orders placed during the hospital encounter of 03/10/23    Adult Transthoracic Echo Complete W/ Cont if Necessary Per Protocol    Interpretation Summary  •  Left ventricular systolic function is normal. Estimated left ventricular EF = 55%  •  Left ventricular wall thickness is consistent with moderate concentric hypertrophy.  •  The right ventricular cavity is mildly dilated.  •  Mild to  moderate biatrial enlargement.  •  Moderate to severe aortic valve stenosis is present.  Mean gradient 34 mmHg, DOUG 0.9 cm².  •  Mild aortic insufficiency.  •  Mild mitral regurgitation.  •  Mild to moderate tricuspid regurgitation with RVSP 48 mmHg.      Current medications:  Scheduled Meds:amiodarone, 200 mg, Oral, Daily  apixaban, 2.5 mg, Oral, BID  capsaicin, , Topical, Q12H  carvedilol, 3.125 mg, Oral, Q12H  fentaNYL, 1 patch, Transdermal, Q72H   And  Check Fentanyl Patch Placement, 1 each, Does not apply, Q12H  isosorbide mononitrate, 120 mg, Oral, Daily  mirtazapine, 15 mg, Oral, Nightly  povidone-iodine, 1 application, Topical, Daily  senna-docusate sodium, 2 tablet, Oral, BID  sodium chloride, 2 mL, Intravenous, Q12H      Continuous Infusions:   PRN Meds:.•  albumin human  •  senna-docusate sodium **AND** bisacodyl **AND** bisacodyl  •  cloNIDine  •  dextrose  •  dextrose  •  glucagon (human recombinant)  •  HYDROmorphone  •  hydrOXYzine  •  LORazepam  •  melatonin  •  ondansetron  •  oxyCODONE  •  [COMPLETED] Insert Peripheral IV **AND** sodium chloride  •  sodium chloride  •  sodium chloride    Assessment & Plan   Assessment & Plan     Active Hospital Problems    Diagnosis  POA   • **Intractable pain [R52]  Yes      Resolved Hospital Problems   No resolved problems to display.      I have reviewed the information used in this note from other sources for accuracy and reviewed current scope of care to attest to this documentation.       Brief Hospital Course to date:  Esperanza Pop is a 78 y.o. female w/ ESRD (HD-MWF), DM2, HTN, HLD, HFpEF, pAfib (xarelto), anemia, prior breast cancer, active anal cancer intolerant of chemo/XRT, recently admitted 3/10-3/29 and AK'ed to SNF to trial improvement of functional status, who returned w/ intractable rectal pain    Intractable pain, multifactorial  Wounds of the buttocks, recent radiotherapy  Metastatic anal cancer  Anorectal fistula  FTT/Anorexia and Cachexia    -s/p partial treatment with chemotherapy and radiation but was not tolerating, thus no further treatment indicated (palliative recommended) at this time, too weak to tolerate any aggressive treatments  -after family meeting yesterday family and patient now in agreeance with hospice/comfort measures. Code status changes. Current plan undecided inpatient vs. Home with hospice pending how patient progresses.  -continue with wound care, pain control    Dysphagia  -tolerating PO but takes minimal in    ESRD on HD  - stop HD, now comfort measures    DM type 2, a1c 5.8%, w/ insulin use  - d/c glucose checks    HFpEF  HTN  pAfib  -continue meds for now, okay if patient does not wish to take PO/unable to    Mod-Sev AS    Expected Discharge   Home vs. Inpatient hospice tomorrow    DVT prophylaxis:  Medical DVT prophylaxis orders are present.     AM-PAC 6 Clicks Score (PT): 6 (04/19/23 2000)    CODE STATUS:   Code Status and Medical Interventions:   Ordered at: 04/20/23 1600     Level Of Support Discussed With:    Patient    Health Care Surrogate     Code Status (Patient has no pulse and is not breathing):    No CPR (Do Not Attempt to Resuscitate)     Medical Interventions (Patient has pulse or is breathing):    Comfort Measures       Maria Elena Rodriguez, DO  04/21/23

## 2023-04-21 NOTE — NURSING NOTE
WOC follow up for bilateral gluteal wound, other.    Bilateral gluteal wound still with some malodor and slight sloughing, especially on right gluteal, small drainage.. Pink shiny wound bed, no granulation. Pain when removing previous dressing. Goal is now comfort. Will adjust orders to use xeroform (which can help to soothe wound bed and help with malodor) q3 day or PRN with soiling.    Can use xeroform on bilateral inner groin as well.    Continue to offload and paint feet pressure injuries with betadine.     WOC will sign off. Please re-consult if we can be of any more help.

## 2023-04-21 NOTE — SIGNIFICANT NOTE
04/21/23 0853   SLP Deferred Reason   SLP Deferred Reason Routine  (Patient now comfort measures only. SLP to sign off at this time.)

## 2023-04-21 NOTE — PROGRESS NOTES
OVI spoke with pt daughter, Indigo. Inpatient hospice to meet with pt family at 330pm tomorrow in the hallway on 5th floor next to RN station near the ramp.  Pt family do not want to enter pt room.

## 2023-04-21 NOTE — PLAN OF CARE
Chart reviewed, d/w RN, Dr. Rodriguez, inpt hospice team, and Hospice liaison.  Visit with patient and patient's son at bedside.  Patient sleeping, appears weaker, did not rouse for discussion at bedside, breathing more shallow but appears comfortable.  Discussed with patient's son that patient is more declined than yesterday, nothing has improved with her status to change the plan for comfort measures and inpatient hospice admission.  2nd visit to room, discussion with patient's dtr Indigo outside room, discussed patient status, declining - Indigo reports patient agitated overnight, restless - educated on terminal restlessness in end of life process.  Plan remains comfort measures, inpt hospice to meet with family tomorrow.    Problem: Palliative Care  Goal: Enhanced Quality of Life  Intervention: Promote Advance Care Planning  Recent Flowsheet Documentation  Taken 4/21/2023 1640 by Mandy Pulliam MSW  Life Transition/Adjustment: (education regarding terminal restlessness) other (see comments)  Intervention: Optimize Psychosocial Wellbeing  Recent Flowsheet Documentation  Taken 4/21/2023 1640 by Mandy Pulliam MSW  Family/Support System Care:   support provided   caregiver stress acknowledged  Taken 4/21/2023 1610 by Mandy Pulliam MSW  Supportive Measures: (symptom assessment; d/w son at bedside)   other (see comments)   active listening utilized   counseling provided   decision-making supported   positive reinforcement provided   verbalization of feelings encouraged  Family/Support System Care:   support provided   caregiver stress acknowledged  Taken 4/21/2023 1300 by Mandy Pulliam MSW  Supportive Measures: (Palliative IDT: DO; APRN; LCSW; RN) --

## 2023-04-21 NOTE — PLAN OF CARE
Goal Outcome Evaluation:      PT sleeping; visited with family/sister of pt. Sister stated pt will be moving to hospice soon and will be inpatient. PT is  of  who passed in March. She fell at the  and has been in hospital ever since. Sister asked for prayer and is appreciative of  support.

## 2023-04-21 NOTE — PLAN OF CARE
Problem: Palliative Care  Goal: Enhanced Quality of Life  Intervention: Promote Advance Care Planning  Recent Flowsheet Documentation  Taken 4/20/2023 1556 by Mandy Pulliam MSW  Life Transition/Adjustment:   decision-making facilitated   end-of-life care initiated  Intervention: Optimize Psychosocial Wellbeing  Recent Flowsheet Documentation  Taken 4/20/2023 1556 by Mandy Pulliam MSW  Family/Support System Care:   support provided   self-care encouraged   caregiver stress acknowledged  Taken 4/20/2023 0930 by Mandy Pulliam MSW  Supportive Measures: (symptom assessment)   active listening utilized   positive reinforcement provided   verbalization of feelings encouraged   other (see comments)  0930 Visit at bedside with Hospice Liaison JERSEY Spangler RN, ALESSANDRA Owusu RN - patient awake and engaging, asking to get up, asking about NEPRO but when offered declines to drink.  Family not present at time of visit, Hospice Liaison reached out to family - plan to meet with dtcorin Sood today at 3pm.  Dr. Rodriguez and Dr. Niño aware. Patient went to dialysis but requested to stop, weak, with pain, unable to sit on side of bed earlier today.  Discussion with patient's dtr Indigo, Hospice Liaison and CM.  Per Indigo, family understands patient status/prognosis, agree that patient is too weak to pursue outpatient dialysis 3x per week from either SNF or home; they understand her terminal decline and agree on comfort measures and stopping dialysis.  Family would like inpatient hospice - hospice following and will evaluate for inpatient admission.  Dr. Rodriguez changed code status to comfort measures, Dr. Butler d/c dialysis, and Dr. Niño to assess current regimen for most effective comfort plan of care.  Palliative Care continues to follow for support and assist with plan of care and symptom management.    1300 Palliative IDT: DO; APRN; LCSW; RN; MDiv

## 2023-04-21 NOTE — PROGRESS NOTES
" LOS: 16 days   Patient Care Team:  Meghana Rodgers MD as PCP - General  Demetrius Sagastume MD as Consulting Physician (Cardiology)  Kevan Lerma MD as Consulting Physician (Nephrology)  Geena Estrella APRN as Nurse Practitioner (Cardiology)  Frank Mandujano MD as Referring Physician (Colon and Rectal Surgery)  Kevan Cavazos MD as Consulting Physician (Radiation Oncology)  Yue Reeves RN as Nurse Navigator  Darryn Burk MD as Consulting Physician (Nephrology)    Chief Complaint: ESRD    Subjective        Review of system: Denies any nausea vomiting.      Objective     Vital Sign Min/Max for last 24 hours  Temp  Min: 97.6 °F (36.4 °C)  Max: 97.9 °F (36.6 °C)   BP  Min: 89/47  Max: 116/49   Pulse  Min: 72  Max: 88   Resp  Min: 18  Max: 18   SpO2  Min: 89 %  Max: 100 %   No data recorded   No data recorded     Flowsheet Rows    Flowsheet Row First Filed Value   Admission Height 167.6 cm (66\") Documented at 04/05/2023 0702   Admission Weight 78.9 kg (174 lb) Documented at 04/05/2023 0702          No intake/output data recorded.  I/O last 3 completed shifts:  In: 40 [P.O.:40]  Out: 730 [Other:730]    Objective:  General Appearance:  Ill-appearing, comfortable and in no acute distress.    Vital signs: (most recent): Blood pressure 116/49, pulse 72, temperature 97.9 °F (36.6 °C), temperature source Oral, resp. rate 18, height 167.6 cm (66\"), weight 63.5 kg (140 lb 1.6 oz), SpO2 100 %, not currently breastfeeding.    Lungs:  Normal effort.  Breath sounds clear to auscultation.  She is not in respiratory distress.  No rales, decreased breath sounds, wheezes or rhonchi.    Heart: Normal rate.  Regular rhythm.  Positive for murmur.  No gallop.   Abdomen: Abdomen is soft.  There is no abdominal tenderness.     Extremities: There is no dependent edema or local swelling.            Results Review:     I reviewed the patient's new clinical results.    WBC No results found for: WBC   HGB No results " found for: HGB   HCT No results found for: HCT   Platlets No results found for: LABPLAT   MCV No results found for: MCV       Sodium No results found for: NA   Potassium No results found for: K   Chloride No results found for: CL   CO2 No results found for: CO2   BUN No results found for: BUN   Creatinine No results found for: CREATININE   Calcium No results found for: CALCIUM   PO4 No results found for: CAPO4   Albumin No results found for: ALBUMIN   Magnesium No results found for: MG   Uric Acid No results found for: URICACID     Medication Review: yes    Assessment & Plan       Intractable pain      Assessment & Plan        ESRD: MWF grayson     Volume status: No significant anemia     Abdominal pain: In the setting of rectal cancer.     Failure to thrive     Hypoalbuminemia      Anemia: ACD due to CKD. Unable to add AMY due to rectal cancer      Recommendations:  Patient is going for hospice we will sign off at this time.  .      Darryn Burk MD  04/21/23  13:42 EDT

## 2023-04-22 VITALS
HEART RATE: 72 BPM | SYSTOLIC BLOOD PRESSURE: 112 MMHG | RESPIRATION RATE: 11 BRPM | TEMPERATURE: 96 F | HEIGHT: 66 IN | OXYGEN SATURATION: 100 % | WEIGHT: 140.1 LBS | DIASTOLIC BLOOD PRESSURE: 41 MMHG | BODY MASS INDEX: 22.52 KG/M2

## 2023-04-22 PROBLEM — C21.0 ANAL CANCER: Status: ACTIVE | Noted: 2023-01-01

## 2023-04-22 PROCEDURE — 25010000002 LORAZEPAM PER 2 MG: Performed by: INTERNAL MEDICINE

## 2023-04-22 PROCEDURE — 25010000002 HYDROMORPHONE PER 4 MG: Performed by: STUDENT IN AN ORGANIZED HEALTH CARE EDUCATION/TRAINING PROGRAM

## 2023-04-22 PROCEDURE — 99231 SBSQ HOSP IP/OBS SF/LOW 25: CPT | Performed by: INTERNAL MEDICINE

## 2023-04-22 RX ORDER — ALBUMIN (HUMAN) 12.5 G/50ML
12.5 SOLUTION INTRAVENOUS AS NEEDED
Status: CANCELLED | OUTPATIENT
Start: 2023-04-22

## 2023-04-22 RX ORDER — HYDROMORPHONE HYDROCHLORIDE 1 MG/ML
0.25 INJECTION, SOLUTION INTRAMUSCULAR; INTRAVENOUS; SUBCUTANEOUS EVERY 4 HOURS PRN
Status: CANCELLED | OUTPATIENT
Start: 2023-04-22

## 2023-04-22 RX ORDER — CHOLECALCIFEROL (VITAMIN D3) 125 MCG
5 CAPSULE ORAL NIGHTLY PRN
Status: CANCELLED | OUTPATIENT
Start: 2023-04-22

## 2023-04-22 RX ORDER — SODIUM CHLORIDE 0.9 % (FLUSH) 0.9 %
10 SYRINGE (ML) INJECTION AS NEEDED
Status: CANCELLED | OUTPATIENT
Start: 2023-04-22

## 2023-04-22 RX ORDER — LORAZEPAM 2 MG/ML
1 INJECTION INTRAMUSCULAR
Status: CANCELLED | OUTPATIENT
Start: 2023-04-22 | End: 2023-04-28

## 2023-04-22 RX ORDER — IBUPROFEN 600 MG/1
1 TABLET ORAL
Status: CANCELLED | OUTPATIENT
Start: 2023-04-22

## 2023-04-22 RX ORDER — ONDANSETRON 2 MG/ML
4 INJECTION INTRAMUSCULAR; INTRAVENOUS EVERY 6 HOURS PRN
Status: CANCELLED | OUTPATIENT
Start: 2023-04-22

## 2023-04-22 RX ORDER — CLONIDINE HYDROCHLORIDE 0.1 MG/1
0.1 TABLET ORAL 3 TIMES DAILY PRN
Status: CANCELLED | OUTPATIENT
Start: 2023-04-22

## 2023-04-22 RX ORDER — NICOTINE POLACRILEX 4 MG
15 LOZENGE BUCCAL
Status: CANCELLED | OUTPATIENT
Start: 2023-04-22

## 2023-04-22 RX ORDER — OXYCODONE HYDROCHLORIDE 5 MG/1
5 TABLET ORAL EVERY 4 HOURS PRN
Status: CANCELLED | OUTPATIENT
Start: 2023-04-22 | End: 2023-04-28

## 2023-04-22 RX ORDER — SODIUM CHLORIDE 9 MG/ML
40 INJECTION, SOLUTION INTRAVENOUS AS NEEDED
Status: CANCELLED | OUTPATIENT
Start: 2023-04-22

## 2023-04-22 RX ORDER — FENTANYL 25 UG/H
1 PATCH TRANSDERMAL
Status: CANCELLED | OUTPATIENT
Start: 2023-04-25 | End: 2023-05-01

## 2023-04-22 RX ORDER — HYDROXYZINE HYDROCHLORIDE 25 MG/1
25 TABLET, FILM COATED ORAL 3 TIMES DAILY PRN
Status: CANCELLED | OUTPATIENT
Start: 2023-04-22

## 2023-04-22 RX ORDER — DEXTROSE MONOHYDRATE 25 G/50ML
25 INJECTION, SOLUTION INTRAVENOUS
Status: CANCELLED | OUTPATIENT
Start: 2023-04-22

## 2023-04-22 RX ADMIN — HYDROMORPHONE HYDROCHLORIDE 0.25 MG: 1 INJECTION, SOLUTION INTRAMUSCULAR; INTRAVENOUS; SUBCUTANEOUS at 15:58

## 2023-04-22 RX ADMIN — LORAZEPAM 1 MG: 2 INJECTION INTRAMUSCULAR; INTRAVENOUS at 12:52

## 2023-04-22 RX ADMIN — FENTANYL 1 PATCH: 25 PATCH TRANSDERMAL at 12:55

## 2023-04-22 RX ADMIN — HYDROMORPHONE HYDROCHLORIDE 0.25 MG: 1 INJECTION, SOLUTION INTRAMUSCULAR; INTRAVENOUS; SUBCUTANEOUS at 12:07

## 2023-04-22 NOTE — INTERVAL H&P NOTE
Admitted to inpatient Hospice today for full comfort focused EOL care.   Terminal diagnosis and prognosis.  Requires GIP level of care due to need for frequent skilled nursing assessments and intervention to promote comfort. Currently ordered injection medication.         Chart reviewed.   Orders placed.   Discussed with Hospice IDG and [facility] staff.  Full F2F visit details and Hospice H&P documented in Hospice EMR, [facility].      Patient not seen by Hospice MD or NP today.  Seen by [facility] Pall[facility] partner for Palliative visit.  Initial hospice provider visit to follow tomorrow.

## 2023-04-22 NOTE — PROGRESS NOTES
[facility] Medicine Services  PROGRESS NOTE    Patient Name: Esperanza Pop  : 1945  MRN: [id number]    Date of Admission: 2023  Primary Care Physician: Meghana Rodgers MD    Subjective   Subjective     CC:  Follow up anal cancer    HPI:  Patient sleeping, appears to be declining. No family at bedside.     ROS:  UTO due to mental status    Objective   Objective     Vital Signs:   Temp:  [96 °F (35.6 °C)-97.6 °F (36.4 °C)] (P) 96 °F (35.6 °C)  Heart Rate:  [65-74] (P) 66  Resp:  [11-14] (P) 11  BP: ()/(40-49) (P) 112/41     Physical Exam:  Constitutional: No acute distress, sleeping  HENT: NCAT, mucous membranes very dry  Respiratory:  respiratory effort normal on room air  Cardiovascular: RRR, no murmurs, rubs, or gallops  Gastrointestinal:  soft, non-distended  Musculoskeletal: No bilateral ankle edema, thin extremities  Neuro: drowsy, does not arouse    Results Reviewed:  LAB RESULTS:        Brief Urine Lab Results     None          Microbiology Results Abnormal     Procedure Component Value - Date/Time    Blood Culture - Blood, Hand, Left [[id number]]  (Normal) Collected: 23    Lab Status: Final result Specimen: Blood from Hand, Left Updated: 23     Blood Culture No growth at 5 days    Narrative:      AEROBIC BOTTLE ONLY    Blood Culture - Blood, Arm, Left [[id number]]  (Normal) Collected: 23    Lab Status: Final result Specimen: Blood from Arm, Left Updated: 23     Blood Culture No growth at 5 days    Narrative:      AEROBIC BOTTLE ONLY          No radiology results from the last 24 hrs    Results for orders placed during the hospital encounter of 03/10/23    Adult Transthoracic Echo Complete W/ Cont if Necessary Per Protocol    Interpretation Summary  •  Left ventricular systolic function is normal. Estimated left ventricular EF = 55%  •  Left ventricular wall thickness is consistent with moderate concentric hypertrophy.  •   The right ventricular cavity is mildly dilated.  •  Mild to moderate biatrial enlargement.  •  Moderate to severe aortic valve stenosis is present.  Mean gradient 34 mmHg, DOUG 0.9 cm².  •  Mild aortic insufficiency.  •  Mild mitral regurgitation.  •  Mild to moderate tricuspid regurgitation with RVSP 48 mmHg.      Current medications:  Scheduled Meds:fentaNYL, 1 patch, Transdermal, Q72H   And  Check Fentanyl Patch Placement, 1 each, Does not apply, Q12H      Continuous Infusions:   PRN Meds:.•  albumin human  •  cloNIDine  •  dextrose  •  dextrose  •  glucagon (human recombinant)  •  HYDROmorphone  •  hydrOXYzine  •  LORazepam  •  melatonin  •  ondansetron  •  oxyCODONE  •  [COMPLETED] Insert Peripheral IV **AND** sodium chloride  •  sodium chloride  •  sodium chloride    Assessment & Plan   Assessment & Plan     Active Hospital Problems    Diagnosis  POA   • **Intractable pain [R52]  Yes      Resolved Hospital Problems   No resolved problems to display.      I have reviewed the information used in this note from other sources for accuracy and reviewed current scope of care to attest to this documentation.       Brief Hospital Course to date:  Esperanza Pop is a 78 y.o. female w/ ESRD (HD-MWF), DM2, HTN, HLD, HFpEF, pAfib (xarelto), anemia, prior breast cancer, active anal cancer intolerant of chemo/XRT, recently admitted 3/10-3/29 and MA'ed to SNF to trial improvement of functional status, who returned w/ intractable rectal pain    Intractable pain, multifactorial  Wounds of the buttocks, recent radiotherapy  Metastatic anal cancer  Anorectal fistula  FTT/Anorexia and Cachexia   -s/p partial treatment with chemotherapy and radiation but was not tolerating, thus no further treatment indicated (palliative recommended) at this time, too weak to tolerate any aggressive treatments  -after family meeting yesterday family and patient now in agreeance with hospice/comfort measures. Code status changes. Current plan  undecided inpatient vs. Home with hospice pending how patient progresses, suspect she will meet inpatient criteria given rather rapid decline    Dysphagia    ESRD on HD  - stop HD, now comfort measures. D/w Nephrology    DM type 2, a1c 5.8%, w/ insulin use  - d/c glucose checks    HFpEF  HTN  pAfib  Mod-Severe AS  -continue meds for now, okay if patient does not wish to take PO/unable to    Expected Discharge   Home vs. Inpatient hospice     DVT prophylaxis:  No DVT prophylaxis order currently exists.     AM-PAC 6 Clicks Score (PT): (P) 6 (04/22/23 7673)    CODE STATUS:   Code Status and Medical Interventions:   Ordered at: 04/20/23 1600     Level Of Support Discussed With:    Patient    Health Care Surrogate     Code Status (Patient has no pulse and is not breathing):    No CPR (Do Not Attempt to Resuscitate)     Medical Interventions (Patient has pulse or is breathing):    Comfort Measures       Maria Elena Rodriguez, DO  04/22/23

## 2023-04-22 NOTE — PLAN OF CARE
Goal Outcome Evaluation:  Plan of Care Reviewed With: patient        Progress: declining  Outcome Evaluation: pt is declining. Pt having pain at times requiring prn meds. Pt is drowsy. Pt is not eating and drinking. She is comfort measures and stopped dialysis.  Inpt hospice to meet with family today. Continue to support pt/family and manage comfort.

## 2023-04-22 NOTE — PROGRESS NOTES
Continued Stay Note  [facility]     Patient Name: Esperanza Pop  MRN: [id number]  Today's Date: 4/22/2023    Admit Date: 4/22/2023    Plan: Inpatient hospice   Discharge Plan     Row Name 04/22/23 1837       Plan    Plan Inpatient hospice    Plan Comments Admitted patient to inpatient hospice services this day. 20% pps. Requiring intravenous dilaudid for comfort. Fentanyl patch intact. Patient is calm, pleasant, cooperative. Lethargic. Rn placed warm blanket per request. Patient requires inpatient hospice services due to necessity of injectable medications for palliation of symptoms requiring frequent skilled nursing assessment, intervention, and reevaluation. Her current level of care is unable to be provided in an alternate setting. Care coordinated with Diane STONER. Updated Dr. Rodriguez and HONEY DILLON.    Final Discharge Disposition Code 51 - hospice medical facility               Discharge Codes    No documentation.                     Crystal De Guzman RN

## 2023-04-23 NOTE — PLAN OF CARE
Goal Outcome Evaluation:          VSS. Confused, arouses to voice. Slept ok, woke up multiple times and moaned names. Gave dilaudid and ativan once. Turned. 1 L n/c.             Problem: Skin Injury Risk Increased  Goal: Skin Health and Integrity  Outcome: Ongoing, Progressing  Intervention: Optimize Skin Protection  Recent Flowsheet Documentation  Taken 4/23/2023 0200 by Prabha Diggs RN  Pressure Reduction Techniques: weight shift assistance provided  Head of Bed (HOB) Positioning: HOB at 15 degrees  Pressure Reduction Devices: specialty bed utilized  Skin Protection:   adhesive use limited   skin-to-skin areas padded   skin-to-device areas padded   tubing/devices free from skin contact  Taken 4/23/2023 0008 by Prabha Diggs RN  Head of Bed (HOB) Positioning: HOB at 20 degrees  Taken 4/23/2023 0000 by Prabha Diggs RN  Pressure Reduction Techniques: weight shift assistance provided  Pressure Reduction Devices: specialty bed utilized  Skin Protection:   adhesive use limited   skin-to-skin areas padded   skin-to-device areas padded  Taken 4/22/2023 2200 by Prabha Diggs RN  Pressure Reduction Techniques: weight shift assistance provided  Head of Bed (HOB) Positioning: HOB at 20 degrees  Pressure Reduction Devices:   positioning supports utilized   pressure-redistributing mattress utilized   specialty bed utilized  Skin Protection:   adhesive use limited   skin-to-device areas padded   skin-to-skin areas padded   transparent dressing maintained   tubing/devices free from skin contact  Taken 4/22/2023 2000 by Prabha Diggs RN  Pressure Reduction Techniques: weight shift assistance provided  Pressure Reduction Devices: positioning supports utilized  Skin Protection:   adhesive use limited   skin-to-device areas padded   skin-to-skin areas padded   transparent dressing maintained   tubing/devices free from skin contact

## 2023-04-23 NOTE — DISCHARGE SUMMARY
[facility] Medicine Services  DISCHARGE TO INPATIENT HOSPICE    Patient Name: Esperanza Pop  : 1945  MRN: [id number]    Date of Admission: 2023  Date of Discharge:  23  Primary Care Physician: Meghana Rodgers MD    Consults     Date and Time Order Name Status Description    2023 12:14 PM Inpatient Palliative Care MD Consult Completed     2023 11:49 AM Inpatient Palliative Care MD Consult Completed     3/27/2023 12:40 PM Inpatient Palliative Care MD Consult Completed     3/17/2023 11:43 AM Inpatient Palliative Care MD Consult Completed     3/14/2023 10:05 AM Inpatient Neurology Consult General Completed     3/11/2023 12:34 AM Inpatient Hematology & Oncology Consult Completed         Hospital Course     Presenting Problem:   Intractable pain [R52]    Active Hospital Problems    Diagnosis  POA   • **Intractable pain [R52]  Yes      Resolved Hospital Problems   No resolved problems to display.          Hospital Course:  Esperanza Pop is a 78 y.o. female w/ ESRD (HD-MWF), DM2, HTN, HLD, HFpEF, pAfib (xarelto), anemia, prior breast cancer, active anal cancer intolerant of chemo/XRT, recently admitted 3/10-3/29 and MN'ed to SNF to trial improvement of functional status, who returned w/ intractable rectal pain     Intractable pain, multifactorial  Wounds of the buttocks, recent radiotherapy  Metastatic anal cancer  Anorectal fistula  FTT/Anorexia and Cachexia   -s/p partial treatment with chemotherapy and radiation but was not tolerating, thus no further treatment indicated (palliative recommended) at this time, too weak to tolerate any aggressive treatments  -after family meeting family and patient now in agreeance with hospice/comfort measures. Code status changes. Given rapid decline plan was made for transition to inpatient hospice today.    Day of Discharge     Vital Signs:   Temp:  [97.1 °F (36.2 °C)-97.8 °F (36.6 °C)] 97.1 °F (36.2 °C)  Heart Rate:  [61-79]  69  Resp:  [12] 12  BP: (119-128)/(52-57) 128/52         Discharge Details     Discharge Disposition:  Transfer care to inpatient Hospice at [facility]    Time Spent on Discharge:  25 minutes    Maria Elena Rodriguez DO  04/23/23

## 2023-04-23 NOTE — PLAN OF CARE
Goal Outcome Evaluation:      Pt was transitioned to hospice on 4/22 and was transferred to the floor today at approximately 1300. Pt appears to be confused as her speech is limited and difficult to understand. Arouses to voice and touch. LLQ subQ access.

## 2023-04-23 NOTE — H&P
"Hospice History and Physical     Patient Name:  Esperanza Pop   : 1945   Sex: female    Patient Care Team:  Meghana Rodgers MD as PCP - General  Demetrius Sagastume MD as Consulting Physician (Cardiology)  Kevan Lerma MD as Consulting Physician (Nephrology)  Geena Estrella APRN as Nurse Practitioner (Cardiology)  Frank Mandujano MD as Referring Physician (Colon and Rectal Surgery)  Kevan Cavazos MD as Consulting Physician (Radiation Oncology)  Yue Reeves RN as Nurse Navigator  Darryn Burk MD as Consulting Physician (Nephrology)    Code Status: Comfort measures.     Subjective     78 yoF presented to ED on 23 from dialysis for rectal pain.  Pt was unable to sit up during treatment on 23 due to increasing rectal pain and was sent to ED for further evaluation.     Pt was diagnosed in 2023 with rectal cancer.  At that time she began chemotherapy and radiation.  She did not tolerate therapies well, became increasing weak, decreased PO intake, weight loss and debility.  Last month she was placed in SNF in [place].  Facilty reported fall 23.     Prior to February and her diagnosis of rectal cancer she was fairly active.  She has end-stage renal disease and dialyzes  and Friday was able to drive herself to dialysis and got to the store.     In ED, pt c/o constant rectal pain that comes and goes \"like a contraction\" every 8 minutes.  She is able to pass soft stools but this is also very painful for her.     PMH includes: ESRD (on HD M,WF), IDDM, HTN, HLD, HFpEF, Breast cancer, s/p mastectomy, a fib (on Xarleto), chronic anemia, Rectal cancer (Dx ).     Palliative care began seeing the pt on 23, discussed cancer treatment and HD.  Pt felt at that time that she does have good quality of life when her pain is controlled.  She did not wish to stop dialysis yet.   Palliative care continued to follow the patient and offer support.  On 23 pt " requested to stop dialysis treatment. She continued to decline (sleeping more, weaker, less responsive, decreased PO intake).  Family elected comfort measures and no further dialysis treatments.  Pt admitted to inpatient hospice 4/22/23.      Review of Systems  Review of Systems   Unable to perform ROS: Acuity of condition       History  Past Medical History:   Diagnosis Date   • Acute bronchitis    • Acute conjunctivitis    • Acute kidney injury     Admission from 12/26/2013 to 01/02/2014, now resolved with latest creatinine 1.4 on 01/08/2014.   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   • Anal cancer 2/8/2023   • Arthritis    • Breast cancer, female, right     2005 mastectomy right   • Cancer    • CHF (congestive heart failure)    • Chronic kidney disease     dialysis MW, [facility]    • Clotting disorder    • COVID-19    • Diabetes mellitus     diagnosed ~1992, checks fsbg 2-3x/day   • Diarrhea    • Dyslipidemia    • Dyspepsia    • Dyspnea    • Edema    • History of transfusion     ~2013 no reaction    • Hx of radiation therapy    • Hyperlipidemia    • Hypertension     Severe   • Ischemic heart disease    • Moderate obesity     BMI 36.2   • Myocardial infarction    • Pneumonia    • Pneumonia 02/2019   • Radiation 2005   • Seasonal allergies    • Wears glasses      Past Surgical History:   Procedure Laterality Date   • AV FISTULA PLACEMENT, BRACHIOBASILIC     • BREAST BIOPSY Left 2010   • BREAST CYST EXCISION     • BREAST LUMPECTOMY Right 2005   • BREAST SURGERY     • CARDIAC CATHETERIZATION N/A 10/10/2016    Procedure: Left Heart Cath;  Surgeon: Scooter Zhao MD;  Location:  [facility];  Service:    • CARDIAC CATHETERIZATION  10/2016   • CARDIAC CATHETERIZATION N/A 1/21/2021    Procedure: Right and Left Heart Cath;  Surgeon: Hugh Tidwell MD;  Location:  [facility];  Service: Cardiology;  Laterality: N/A;   • COLONOSCOPY N/A 7/23/2020    Procedure: COLONOSCOPY;  Surgeon: Rubén Rosas MD;  Location: [facility];  Service: Gastroenterology;  Laterality: N/A;   • CORONARY STENT PLACEMENT  2016   • HERNIA REPAIR     • HYSTERECTOMY  2000   • INSERTION HEMODIALYSIS CATHETER Right 6/29/2016    Procedure: HEMODIALYSIS CATHETER INSERTION TUNNELLED;  Surgeon: Ramón Chavez MD;  Location: [facility] OR;  Service:    • MASTECTOMY Right 2006   • OOPHORECTOMY     • REDUCTION MAMMAPLASTY Left 2005     Current Facility-Administered Medications   Medication Dose Route Frequency Provider Last Rate Last Admin   • bisacodyl (DULCOLAX) suppository 10 mg  10 mg Rectal Daily PRN Anabell Walker APRCHLOE       • [START ON 4/25/2023] fentaNYL (DURAGESIC) 25 MCG/HR patch 1 patch  1 patch Transdermal Q72H Anabell Walker APRCHLOE        And   • Check Fentanyl Patch Placement  1 each Does not apply Q12H Anabell Walker APRN       • glycopyrrolate (ROBINUL) injection 0.2 mg  0.2 mg Intravenous Q4H PRN Anabell Walker, APRN       • haloperidol lactate (HALDOL) injection 1 mg  1 mg Intravenous Q4H PRN Anabell Walker APRN       • HYDROmorphone (DILAUDID) injection 0.25 mg  0.25 mg Intravenous Q1H PRN Anabell Walker APRN   0.25 mg at 04/23/23 0907   • LORazepam (ATIVAN) injection 1 mg  1 mg Intravenous Q2H PRN Anabell Walker APRN   1 mg at 04/23/23 0047   • ondansetron (ZOFRAN) injection 4 mg  4 mg Intravenous Q6H PRN Anabell Walker, APRN       • palliative care oral rinse   Mouth/Throat PRN Anabell Walker APRN       • polyvinyl alcohol (LIQUIFILM) 1.4 % ophthalmic solution 1 drop  1 drop Both Eyes Q30 Min PRN Anabell Walker, APRN       • scopolamine patch 1 mg/72 hr  1 patch Transdermal Q72H PRN Anabell Walker APRN            •  bisacodyl  •  glycopyrrolate  •  haloperidol lactate  •  HYDROmorphone  •  LORazepam  •  ondansetron  •  palliative care oral  rinse  •  polyvinyl alcohol  •  Scopolamine  Allergies   Allergen Reactions   • Mircera [Methoxy Polyethylene Glycol-Epoetin Beta] Anaphylaxis     Pt stopped breathing   • Venofer [Iron Sucrose] Anaphylaxis     Pt stopped breathing   • Albuterol Other (See Comments)     Increased blood pressure  Used right before heart attack   • Nifedipine Er Swelling   • Penicillins Hives   • Prednisone Other (See Comments)     Makes jittery/anxious     Family History   Problem Relation Age of Onset   • Stroke Mother    • Cancer Mother    • Pancreatic cancer Mother    • Coronary artery disease Father    • Heart failure Father    • Breast cancer Sister 55   • Throat cancer Daughter    • Colon cancer Maternal Grandmother    • Ovarian cancer Neg Hx    • Endometrial cancer Neg Hx      Social History     Socioeconomic History   • Marital status:    Tobacco Use   • Smoking status: Never   • Smokeless tobacco: Never   • Tobacco comments:     Michael second hand smoke   Vaping Use   • Vaping Use: Never used   Substance and Sexual Activity   • Alcohol use: Not Currently   • Drug use: No   • Sexual activity: Defer       Objective     Vital Signs  Temp:  [97.1 °F (36.2 °C)-97.8 °F (36.6 °C)] 97.1 °F (36.2 °C)  Heart Rate:  [61-72] 69  Resp:  [12] 12  BP: (119-128)/(52-57) 128/52    PPS: Palliative Performance Scale score as of 4/23/2023, 15:29 EDT is 20% based on the following measures:   Ambulation: Totally bed bound  Activity and Evidence of Disease: Unable to do any work, extensive evidence of disease  Self-Care: Total care  Intake:Minimal sips  LOC: Full, drowsy or confusion    Physical Exam:  Physical Exam  Constitutional:       Appearance: She is ill-appearing and diaphoretic.   HENT:      Mouth/Throat:      Mouth: Mucous membranes are moist.      Pharynx: Oropharyngeal exudate present.   Cardiovascular:      Rate and Rhythm: Normal rate.   Pulmonary:      Effort: Pulmonary effort is normal. No respiratory distress.      Breath  sounds: No wheezing, rhonchi or rales.   Abdominal:      General: Abdomen is flat. Bowel sounds are normal. There is no distension.      Tenderness: There is no abdominal tenderness. There is no guarding.   Musculoskeletal:      Right lower leg: Edema present.      Left lower leg: Edema present.   Skin:     General: Skin is warm.   Neurological:      Comments: Minimally responsive to voice and gentle touch.  Non verbal.    Psychiatric:         Speech: She is noncommunicative.         Cognition and Memory: Cognition is impaired. Memory is impaired.         Results Reviewed:  LAB RESULTS:          Lab 04/18/23  0802   SODIUM 136   POTASSIUM 4.2   CHLORIDE 97*   CO2 21.0*   ANION GAP 18.0*   BUN 51*   CREATININE 6.81*   EGFR 5.9*   GLUCOSE 108*   CALCIUM 9.2   PHOSPHORUS 4.2         Lab 04/18/23  0802   ALBUMIN 3.4*                     Brief Urine Lab Results     None          Microbiology Results Abnormal     None            Anal cancer      Assessment & Plan     Assessment/Plan:   -Admit to inpatient hospice service 04/22/2023 with Anal cancer [C21.0].     -Coordination of care with nursing staff and BCN IDT.     -Pain: Morphine  2 mg IV scheduled q 2 hrs PRN     -Dyspnea:  Morphine as above, oxygen via NC PRN     -Nausea/Vomiting: Zofran 4 mg scheduled q 6 hr PRN       -Anxiety/Agitation: Lorazepam 0.5 mg q 4 hr PRN; haldol 1 mg q 4 hr PRN      -Bowel/bladder: bisacodyl supp q day PRN     -Nutrition: NPO, may adv diet as tolerated     -ADLs: total care     -Terminal fever: tylenol supp 650 mg q 6 hr PRN. tordal 15 mg IV q 6 hr PRN     -Terminal secretions:  May start PRN robinul/scop patch/furosemide if needed     -Patient comfort: palliative oral rinse PRN, liquifilm PRN        Goals of Care: achieve comfortable death, educate and support family through this process.          Total Visit Time: 50 min   Face to Face Time:  Total time spent includes time reviewing chart, face-to-face time, counseling  patient/family/caregiver, ordering medications/tests/procedures, communicating with other health care professionals, documenting clinical information in the electronic health record, and coordination of care with facility staff and BCN IDT.     Justification for care:  Patient meets criteria for acute in-patient care with required nursing assessment and interventions for symptoms with IV medications.      SHONDA WILL, MSN, APRN  [facility]  Hospice and Palliative Care Nurse Practitioner  04/23/23  15:28 EDT

## 2023-04-23 NOTE — PROGRESS NOTES
Continued Stay Note  [facility]     Patient Name: Esperanza Pop  MRN: [id number]  Today's Date: 4/23/2023    Admit Date: 4/22/2023    Plan: Inpatient hospice   Discharge Plan     Row Name 04/23/23 1032       Plan    Plan Inpatient hospice    Plan Comments EJ is a 77yo female with terminal dx of Anal Cancer. This am she is resting in bed. She opens eyes to voice and immediately grimaces and relays yes to pain. She states pain is located at her bottom. States yes to need for pain medication. Rn reassures her that she would receive medication for pain/comfort. Patient falls back to sleep. Rn reassured patient that she is safe and cared for. Patient noted anuric. LBM: 4/22/23. PRN's: dilaudid x2, ativan x 1. RN updated Leonardo STONER and she will medicate with prn dilaudid. Patient is to be transferred to [address] [place] [address]. Contacted patient's daughter, Indigo Pop, and updated regarding assessment and transfer. Rn offered ongoing support and open communication.    Final Discharge Disposition Code 51 - hospice medical facility               Discharge Codes    No documentation.                     Crystal De Guzman RN

## 2023-04-24 NOTE — PROGRESS NOTES
[facility]    Patient Name:  Esperanza Pop  YOB: 1945  MRN: [id number]  Admit Date:  4/22/2023    Pt now admit to  Hospice care, RD will sign off, please consult if needed.     Electronically signed by:  Makenna Barnes RD  04/24/23 08:06 EDT

## 2023-04-24 NOTE — PAYOR COMM NOTE
"Esperanza Ayoub (78 y.o. Female)     [id number]    Bernadette Almaguer, RN  Utilization Review  Pzouz-[phone]  [phone]      Date of Birth   1945    Social Security Number       Address   [address] [place] [address]    Home Phone   [phone]    MRN   [id number]       Episcopal   Sabianism    Marital Status                               Admission Date   4/5/23    Admission Type   Emergency    Admitting Provider   Maria Elena Rodriguez DO    Attending Provider       Department, Room/Bed   [facility] 5H, S572/1       Discharge Date   4/22/2023    Discharge Disposition   Hospice/Medical Facility ([facility])    Discharge Destination                               Attending Provider: (none)   Allergies: Mircera [Methoxy Polyethylene Glycol-epoetin Beta], Venofer [Iron Sucrose], Albuterol, Nifedipine Er, Penicillins, Prednisone    Isolation: None   Infection: None   Code Status: No CPR    Ht: 167.6 cm (66\")   Wt: 63.5 kg (140 lb 1.6 oz)    Admission Cmt: None   Principal Problem: Intractable pain [R52]                 Active Insurance as of 4/5/2023     Primary Coverage     Payor Plan Insurance Group Employer/Plan Group    [facility]IC[facility]      Payor Plan Address Payor Plan Phone Number Payor Plan Fax Number Effective Dates    [address] [address] [phone]  12/1/2021 - None Entered    [facility] [address]       Subscriber Name Subscriber Birth Date Member ID       ESPERANZA AYOUB 1945 [id number]                 Emergency Contacts      (Rel.) Home Phone Work Phone Mobile Phone    anitradanelle (Daughter) -- -- [phone]    Juan Ayoub\ (Son) [phone] -- [phone]    isreal ayoub (Daughter) -- -- [phone]    danelle martin (Sister) -- -- [phone]    Esperanza Ayoub (Daughter) -- -- [phone]               Discharge Summary      Maria Elena Rodriguez DO at 04/22/23 1634      "         [facility] Medicine Services  DISCHARGE TO INPATIENT HOSPICE    Patient Name: Esperanza Pop  : 1945  MRN: [id number]    Date of Admission: 2023  Date of Discharge:  23  Primary Care Physician: Meghana Rodgers MD    Consults     Date and Time Order Name Status Description    2023 12:14 PM Inpatient Palliative Care MD Consult Completed     2023 11:49 AM Inpatient Palliative Care MD Consult Completed     3/27/2023 12:40 PM Inpatient Palliative Care MD Consult Completed     3/17/2023 11:43 AM Inpatient Palliative Care MD Consult Completed     3/14/2023 10:05 AM Inpatient Neurology Consult General Completed     3/11/2023 12:34 AM Inpatient Hematology & Oncology Consult Completed         Hospital Course     Presenting Problem:   Intractable pain [R52]    Active Hospital Problems    Diagnosis  POA   • **Intractable pain [R52]  Yes      Resolved Hospital Problems   No resolved problems to display.          Hospital Course:  Esperanza Pop is a 78 y.o. female w/ ESRD (HD-MWF), DM2, HTN, HLD, HFpEF, pAfib (xarelto), anemia, prior breast cancer, active anal cancer intolerant of chemo/XRT, recently admitted 3/10-3/29 and MS'ed to SNF to trial improvement of functional status, who returned w/ intractable rectal pain     Intractable pain, multifactorial  Wounds of the buttocks, recent radiotherapy  Metastatic anal cancer  Anorectal fistula  FTT/Anorexia and Cachexia   -s/p partial treatment with chemotherapy and radiation but was not tolerating, thus no further treatment indicated (palliative recommended) at this time, too weak to tolerate any aggressive treatments  -after family meeting family and patient now in agreeance with hospice/comfort measures. Code status changes. Given rapid decline plan was made for transition to inpatient hospice today.    Day of Discharge     Vital Signs:   Temp:  [97.1 °F (36.2 °C)-97.8 °F (36.6 °C)] 97.1 °F (36.2 °C)  Heart Rate:  [61-79]  69  Resp:  [12] 12  BP: (119-128)/(52-57) 128/52         Discharge Details     Discharge Disposition:  Transfer care to inpatient Hospice at [facility]    Time Spent on Discharge:  25 minutes    Maria Elena Rodriguez DO  04/23/23      Electronically signed by Maria Elena Rodriguez DO at 04/23/23 7543

## 2023-04-24 NOTE — PROGRESS NOTES
Continued Stay Note  [facility]     Patient Name: Esperanza Pop  MRN: [id number]  Today's Date: 4/24/2023    Admit Date: 4/22/2023    Plan: Inpatient hospice   Discharge Plan     Row Name 04/24/23 0909       Plan    Plan Inpatient hospice    Plan Comments Patient resting in bed with eyes partially opened.  Minimally responsive to voice and touch during assessment.  Was given injectable Dilaudid and Ativan prior to this visit.  Nurse reports patient had been calling out prior to being medicated.  She has required 3 prns of injectable Dilaudid in the past 24 hours and 2 prns of injectable Ativan overnight.  Patient continues to require inpatient hospice services for skilled nursing assessment, medication titration, and injectable medication administration for palliation of pain and anxiety.  Called daughter Indigo with an update.               Discharge Codes    No documentation.                     Leticia Humphreys RN

## 2023-04-24 NOTE — PLAN OF CARE
Goal Outcome Evaluation:Family at the bedside now. She has become less wakeful as the day has progressed.  Comfort maintained.  Remains on specialty mattress , skin wounds dressed & monitored.  Continue to monitor for changes,

## 2023-04-24 NOTE — PROGRESS NOTES
Hospice Progress Note    Date of Admission: 4/22/2023    Subjective:                Current Code Status     Date Active Code Status Order ID Comments User Context       4/22/2023 1649 No CPR (Do Not Attempt to Resuscitate) [id number]  Anabell Walker APRN Inpatient      Question Answer    Code Status (Patient has no pulse and is not breathing) No CPR (Do Not Attempt to Resuscitate)    Medical Interventions (Patient has pulse or is breathing) Comfort Measures              Scheduled Meds:HYDROmorphone, 0.5 mg, Intravenous, Q4H  LORazepam, 1 mg, Intravenous, Q8H  palliative care oral rinse, , Mouth/Throat, TID      PRN Meds:.•  bisacodyl  •  glycopyrrolate  •  haloperidol lactate  •  HYDROmorphone  •  HYDROmorphone  •  LORazepam  •  ondansetron  •  palliative care oral rinse  •  polyvinyl alcohol  •  Scopolamine      Objective: /52 (BP Location: Left arm, Patient Position: Lying)   Pulse 69   Temp 97.1 °F (36.2 °C) (Axillary)   Resp 12   LMP  (LMP Unknown)   SpO2 100%      Intake/Output Summary (Last 24 hours) at 4/24/2023 1715  Last data filed at 4/24/2023 1456  Gross per 24 hour   Intake 0 ml   Output 0 ml   Net 0 ml     Physical Exam:                Results from last 7 days   Lab Units 04/18/23  0802   SODIUM mmol/L 136   POTASSIUM mmol/L 4.2   CHLORIDE mmol/L 97*   CO2 mmol/L 21.0*   BUN mg/dL 51*   CREATININE mg/dL 6.81*   CALCIUM mg/dL 9.2   GLUCOSE mg/dL 108*       Assessment/Plan:   -Admit to inpatient hospice service 04/22/2023 with Anal cancer [C21.0].      -Coordination of care with nursing staff and BCN IDT.      -Pain: Morphine  2 mg IV scheduled q 2 hrs PRN      -Dyspnea:  Morphine, oxygen via NC PRN      -Nausea/Vomiting: Zofran 4 mg scheduled q 6 hr PRN        -Anxiety/Agitation: Lorazepam 0.5 mg q 4 hr PRN; haldol 1 mg q 4 hr PRN       -Bowel/bladder: bisacodyl supp q day PRN      -Nutrition: diet as tolerated      -ADLs: total care      -Terminal fever: tylenol supp 650 mg q 6 hr PRN.  tordal 15 mg IV q 6 hr PRN      -Terminal secretions:  May start PRN robinul/scop patch if needed      -Patient comfort: palliative oral rinse PRN, liquifilm PRN         Goals of Care: achieve comfortable death, educate and support family through this process.            Time approx.     min. - includes chart review, F2F visit time, care coordination with hospice IDG and [facility] staff,  and documentation      Justification for care:    Requires GIP level of care due to need for frequent skilled nursing assessments and intervention to promote comfort. Currently ordered injection medication.          Rohini Oconnor MD  [facility]  Hospice and Palliative Care       04/24/23

## 2023-04-24 NOTE — PLAN OF CARE
Problem: Skin Injury Risk Increased  Goal: Skin Health and Integrity  Intervention: Optimize Skin Protection  Recent Flowsheet Documentation  Taken 4/24/2023 0400 by Susan Sheridan RN  Pressure Reduction Techniques:   frequent weight shift encouraged   weight shift assistance provided  Pressure Reduction Devices:   heel offloading device utilized   positioning supports utilized  Skin Protection:   adhesive use limited   tubing/devices free from skin contact  Taken 4/24/2023 0200 by Susan Sheridan RN  Pressure Reduction Techniques: frequent weight shift encouraged  Pressure Reduction Devices: positioning supports utilized  Skin Protection:   adhesive use limited   tubing/devices free from skin contact  Taken 4/24/2023 0000 by Susan Sheridan RN  Pressure Reduction Techniques:   frequent weight shift encouraged   weight shift assistance provided  Pressure Reduction Devices:   positioning supports utilized   heel offloading device utilized  Skin Protection:   adhesive use limited   tubing/devices free from skin contact  Taken 4/23/2023 2246 by Susan Sheridan RN  Pressure Reduction Techniques:   frequent weight shift encouraged   weight shift assistance provided  Pressure Reduction Devices:   positioning supports utilized   heel offloading device utilized  Skin Protection:   adhesive use limited   tubing/devices free from skin contact  Taken 4/23/2023 2000 by Susan Sheridan RN  Pressure Reduction Techniques: frequent weight shift encouraged  Pressure Reduction Devices: positioning supports utilized  Skin Protection: adhesive use limited   Goal Outcome Evaluation:   Pt arouses to voice and gentle stimulation. She has rested quietly most of the shift becoming restless and yelling out around 2300 and 0530. PRN's provided. Turned Q2hr at tolerated. Will CTM and provide care.

## 2023-04-25 NOTE — DISCHARGE SUMMARY
Date of Admission: 04/22/2023   Date and Time of Death: 4/25/2023 at 12:52 PM    Hospital Course:  Esperanza Pop is a 78 y.o. female admitted to inpatient hospice with diagnosis of Anal cancer [C21.0]  for symptom management. Medications were available throughout admission for symptom management and comfort. Patient continued to decline after admission as expected ultimately to their death on 4/25/2023 at 12:52 PM. Patient was pronounced dead by Clinical House Supervisor. Spiritual and psychosocial support were available to patient and family throughout admission. Patient's remains were released per [facility] protocol.    Anabell Walker, MSN, APRN  [facility]  Hospice and Palliative Care Nurse Practitioner  04/25/23  14:12 EDT

## 2023-04-25 NOTE — PROGRESS NOTES
Continued Stay Note  [facility]     Patient Name: Esperanza Pop  MRN: [id number]  Today's Date: 4/25/2023    Admit Date: 4/22/2023    Plan: Inpatient hospice   Discharge Plan     Row Name 04/25/23 1023       Plan    Plan Inpatient hospice    Plan Comments Visit to bedside of EJ 77yo female with anal ca. 10% pps. Responsive to oral care. Respirations are even, unlabored with jamila (=) excursion. Relaxed face, jaw, body posture, respirations. Warm to the touch. She has declined since this RN last observed Sunday aeb temporal wasting noted, less responsive, respirations shallow. Anuric. LBM: 4/25/2023. PRN's: dilaudid x3, ativan x3. Noted daughter, Indigo, is at bedside supportive and loving of patient. Discussed assess, condition, decline, nonverbal indicators of comfort, eol signs/symptom, medications, self care. Prayer given. RN offered ongoing support, open communication, continued availability. Patient continues to require gip care due to necessity of injectable medications for palliation of symptoms requiring frequent skilled nursing assessment, intervention, and reevaluation. Current level of care is unable to be provided in an alternate setting. Care coordinated with Lavinia STONER. Updated Anabell DILLON.    Final Discharge Disposition Code 51 - hospice medical facility               Discharge Codes    No documentation.                     Crystal De Guzman RN

## 2023-04-25 NOTE — SIGNIFICANT NOTE
Exam confirms with auscultation zero audible heart tones and zero audible respirations. Ms.Ella YUAN Pop was pronounced dead at 1252 4/25/23.  MD notified by Patient's RN.    Rodolfo Young RN  Clinical House Supervisor  4/25/2023 13:34 EDT

## 2023-04-25 NOTE — PLAN OF CARE
Goal Outcome Evaluation:                   Patient resting throughout shift; arouses to voice then drifts off. Scheduled meds keeping patient comfortable. Family at bedside. Brooke in place. Wound care provided. Will continue to provide comfort measures.

## 2025-06-13 NOTE — PROGRESS NOTES
"Chief Complaint  Palpitations and Follow-up    Subjective    History of Present Illness {CC  Problem List  Visit  Diagnosis   Encounters  Notes  Medications  Labs  Result Review Imaging  Media :23}       History of Present Illness   76-year-old female presents the office today with with concerns of palpitations and elevated blood pressure readings.  She reports her blood pressure has been elevated recently.  Patient does report compliance with her medications however she notes she stopped her her amiodarone roughly 6 months ago.  Patient is unclear why she stopped her amiodarone.  She reports the palpitations are occurring more frequently.  She has blood pressure readings at home of 190s to 200s systolic.  Notes mild chest discomfort intermittently and does not worsen with exertion.  Objective     Vital Signs:   Vitals:    01/15/21 1056   BP: 137/86   BP Location: Left arm   Patient Position: Sitting   Cuff Size: Adult   Pulse: 69   Resp: 17   Temp: 97.3 °F (36.3 °C)   TempSrc: Temporal   SpO2: 97%   Weight: 81.3 kg (179 lb 4 oz)   Height: 170.2 cm (67\")     Body mass index is 28.07 kg/m².  Physical Exam  Vitals signs and nursing note reviewed.   Constitutional:       Appearance: Normal appearance.   HENT:      Head: Normocephalic.   Eyes:      Pupils: Pupils are equal, round, and reactive to light.   Neck:      Musculoskeletal: Normal range of motion.   Cardiovascular:      Rate and Rhythm: Normal rate and regular rhythm. Frequent extrasystoles are present.     Pulses: Normal pulses.      Heart sounds: Murmur present.   Pulmonary:      Effort: Pulmonary effort is normal.      Breath sounds: Normal breath sounds.   Abdominal:      General: Bowel sounds are normal.      Palpations: Abdomen is soft.   Musculoskeletal: Normal range of motion.      Right lower leg: Edema (1+) present.      Left lower leg: Edema (1+) present.   Skin:     General: Skin is warm and dry.      Capillary Refill: Capillary refill " takes less than 2 seconds.   Neurological:      Mental Status: She is alert and oriented to person, place, and time.   Psychiatric:         Mood and Affect: Mood normal.         Thought Content: Thought content normal.              Result Review  Data Reviewed:{ Labs  Result Review  Imaging  Med Tab  Media :23}   COVID-19 PCR, [facility], NP SWAB IN [facility]SP[facility] MEDIA 24-30 HR TAT - Swab, Nasopharynx (12/08/2020 13:59)  Vitamin D 25 Hydroxy (09/22/2020 12:10)  TSH (09/22/2020 12:10)  Lipid Panel (09/22/2020 12:10)  Comprehensive Metabolic Panel (09/22/2020 12:10)  CBC (No Diff) (09/22/2020 12:10)  ECG 12 Lead (01/15/2021 12:13)               Assessment and Plan {CC Problem List  Visit Diagnosis  ROS  Review (Popup)  Health Maintenance  Quality  BestPractice  Medications  SmartSets  SnapShot Encounters  Media :23}   1. Palpitations  Patient reports stopping amiodarone for unknown reasons roughly 6 months ago will discuss reinitiating amiodarone at a low dose secondary to current QT/QTc  - ECG 12 Lead; Future    2. Chronic diastolic heart failure (CMS/HCC)  HTN Education provided today including signs and symptoms, medication management, daily blood pressure monitoring. Patient encouraged to call the [facility] with any abnormal readings.     3. Essential hypertension  increase Hytrin to 4 mg daily  HTN Education provided today including signs and symptoms, medication management, daily blood pressure monitoring. Patient encouraged to call the [facility] with any abnormal readings. 4. End stage renal disease on dialysis (CMS/HCC)  On HD MWF       Follow Up {Instructions Charge Capture  Follow-up Communications :23}   Return if symptoms worsen or fail to improve.    Patient was given instructions and counseling regarding her condition or for health maintenance advice. Please see specific information pulled into the AVS if appropriate.  Patient was instructed to call  the [facility] with any questions, concerns, or worsening symptoms.    *Please note that portions of this note were completed with a voice recognition program. Efforts were made to edit the dictations, but occasionally words are mistranscribed.     actual

## (undated) DEVICE — TUBING, SUCTION, 1/4" X 10', STRAIGHT: Brand: MEDLINE

## (undated) DEVICE — ST INF PRI SMRTSTE 20DRP 2VLV 24ML 117

## (undated) DEVICE — CO-SET DELIVERY SYSTEM FOR 123 ROOM TEMPATURE INJECTATE: Brand: CO-SET+

## (undated) DEVICE — ST ACC MICROPUNCTURE .018 TRANSLSS/SS/TP 5F/10CM 21G/7CM

## (undated) DEVICE — KT ORCA ORCAPOD DISP STRL

## (undated) DEVICE — PK CATH CARD 10

## (undated) DEVICE — GW J TP FIX CORE .035 150

## (undated) DEVICE — ST EXT IV SMARTSITE 2VLV SP M LL 5ML IV1

## (undated) DEVICE — SWAN-GANZ THERMODILUTION CATHETER: Brand: SWAN-GANZ

## (undated) DEVICE — Device: Brand: ASAHI SION BLUE

## (undated) DEVICE — GUIDE CATHETER: Brand: MACH1™

## (undated) DEVICE — MODEL BT2000 P/N 700287-012KIT CONTENTS: MANIFOLD WITH SALINE AND CONTRAST PORTS, SALINE TUBING WITH SPIKE AND HAND SYRINGE, TRANSDUCER: Brand: BT2000 AUTOMATED MANIFOLD KIT

## (undated) DEVICE — NC TREK CORONARY DILATATION CATHETER 2.5 MM X 8 MM / RAPID-EXCHANGE: Brand: NC TREK

## (undated) DEVICE — INTRO ACCSR BLNT TP

## (undated) DEVICE — PINNACLE INTRODUCER SHEATH: Brand: PINNACLE

## (undated) DEVICE — GW FC J TFE/COAT .025 3MM 180CM

## (undated) DEVICE — CONTN GRAD MEAS TRIANG 32OZ BLK

## (undated) DEVICE — MODEL AT P65, P/N 701554-001KIT CONTENTS: HAND CONTROLLER, 3-WAY HIGH-PRESSURE STOPCOCK WITH ROTATING END AND PREMIUM HIGH-PRESSURE TUBING: Brand: ANGIOTOUCH® KIT

## (undated) DEVICE — SYR LUERLOK 50ML

## (undated) DEVICE — LUBE GEL ENDOGLIDE 1.1OZ

## (undated) DEVICE — SKIN PREP TRAY W/CHG: Brand: MEDLINE INDUSTRIES, INC.

## (undated) DEVICE — CATH DIAG EXPO M/ PK 6FR FL4/FR4 PIG 3PK

## (undated) DEVICE — ANGIO-SEAL VIP VASCULAR CLOSURE DEVICE: Brand: ANGIO-SEAL

## (undated) DEVICE — CANNULA,ADULT,SOFT-TOUCH,7'TUBE,UC: Brand: PENDING

## (undated) DEVICE — DEV INFL MONARCH 25W